# Patient Record
Sex: MALE | Race: WHITE | Employment: FULL TIME | URBAN - METROPOLITAN AREA
[De-identification: names, ages, dates, MRNs, and addresses within clinical notes are randomized per-mention and may not be internally consistent; named-entity substitution may affect disease eponyms.]

---

## 2017-02-17 ENCOUNTER — HOSPITAL ENCOUNTER (OUTPATIENT)
Dept: WOUND CARE | Facility: CLINIC | Age: 52
Discharge: HOME | End: 2017-02-17
Attending: PODIATRIST | Admitting: PODIATRIST
Payer: COMMERCIAL

## 2017-02-17 DIAGNOSIS — L89.93 PRESSURE ULCER, STAGE III (H): Primary | ICD-10-CM

## 2017-02-17 PROCEDURE — 11042 DBRDMT SUBQ TIS 1ST 20SQCM/<: CPT | Performed by: PODIATRIST

## 2017-02-17 PROCEDURE — 27211158 ZZH IODOSORB GEL, 40 GM

## 2017-02-17 PROCEDURE — 11042 DBRDMT SUBQ TIS 1ST 20SQCM/<: CPT

## 2017-02-17 NOTE — PROGRESS NOTES
[facility]      DATE OF VISIT:  2017.        SUBJECTIVE:  Mr. Ry Ybarra is a 51-year-old diabetic male whom we are seeing for chronic right heel ulceration.  Back in August, he underwent a biopsy and bone debridement with flap closure of this chronic wound.  This has reopened up.  He notes that he has been in his regular shoes for the last 3 months and has not been offloading it.  Notes his blood sugar today was 159 and thinks his last A1c was 7.3.  Denies fever, nausea, vomiting, shortness of breath.  The patient continues to smoke.      OBJECTIVE:   VITAL SIGNS:  Blood pressure is 167/96, respirations are 18, pulse is 98 and temperature is 97.2.        After debridement of the lateral right heel wound, it measures 1.0 x 1.6 x 1.5 cm in depth.  It does not probe quite to bone.  No surrounding erythema, purulent drainage or malodor is noted.      ASSESSMENT:  A 51-year-old diabetic with neuropathy, tobacco abuse, chronic right heel ulceration with fat layer exposed.      PROCEDURE:  The wound was debrided down to and including subcutaneous tissue using a #15 blade.  All nonviable tissue was excised to decrease bioburden.  The patient tolerated the procedure well.     Bleeding was controlled with silver nitrate.        PLAN:  Discussed using his Darco offloading shoe to help decrease pressure to this area.  We will do Iodosorb gel and Mepilex dressings.  He will follow up in 4-5 weeks.  He will call with further questions or concerns.         KASHIF SCHUMACHER DPM             D: 2017 08:24   T: 2017 09:41   MT: ALIREZA      Name:     RY YBARRA   MRN:      [id number]        Account:      [id number]   :      1965           Visit Date:   2017      Document: [id number]

## 2017-03-24 ENCOUNTER — HOSPITAL ENCOUNTER (OUTPATIENT)
Dept: WOUND CARE | Facility: CLINIC | Age: 52
Discharge: HOME | End: 2017-03-24
Attending: PODIATRIST | Admitting: PODIATRIST
Payer: COMMERCIAL

## 2017-03-24 DIAGNOSIS — L97.519 DIABETIC ULCER OF RIGHT FOOT ASSOCIATED WITH TYPE 2 DIABETES MELLITUS (H): ICD-10-CM

## 2017-03-24 DIAGNOSIS — E11.42 DIABETIC POLYNEUROPATHY ASSOCIATED WITH TYPE 2 DIABETES MELLITUS (H): Primary | ICD-10-CM

## 2017-03-24 DIAGNOSIS — E11.621 DIABETIC ULCER OF RIGHT FOOT ASSOCIATED WITH TYPE 2 DIABETES MELLITUS (H): ICD-10-CM

## 2017-03-24 DIAGNOSIS — L97.412 ULCER OF RIGHT HEEL, WITH FAT LAYER EXPOSED (H): ICD-10-CM

## 2017-03-24 DIAGNOSIS — T81.31XD SURGICAL WOUND DEHISCENCE, SUBSEQUENT ENCOUNTER: ICD-10-CM

## 2017-03-24 DIAGNOSIS — L89.93 PRESSURE ULCER, STAGE III (H): ICD-10-CM

## 2017-03-24 PROCEDURE — 27211158 ZZH IODOSORB GEL, 40 GM

## 2017-03-24 PROCEDURE — 11042 DBRDMT SUBQ TIS 1ST 20SQCM/<: CPT | Performed by: PODIATRIST

## 2017-03-24 PROCEDURE — 11042 DBRDMT SUBQ TIS 1ST 20SQCM/<: CPT

## 2017-03-24 NOTE — PROGRESS NOTES
[facility]      DATE OF VISIT:  2017.      SUBJECTIVE:  Mr. Ry Ybarra is a 51-year-old diabetic male with neuropathy we have been seeing for right heel ulceration.  We have tried multiple different things on his foot.  He notes that he has been compliant in his offloading shoe.  He has been using Iodosorb for dressings.  Denies fever, nausea, vomiting, shortness of breath.      OBJECTIVE:    VITAL SIGNS:  Blood pressure is 156/108, respirations are 16, pulse is 96, temperature is 97.   EXTREMITIES:  The right heel ulceration after debridement measures 0.6 x 0.5 x 1.5 cm in depth.  It does not probe to bone.  No surrounding erythema, purulent drainage or malodor noted.      ASSESSMENT:  Diabetic with neuropathy, chronic right heel ulceration.      PLAN:  The wound was debrided down to and including subcutaneous tissue using a #15 blade.  All nonviable tissue was excised to decrease bioburden.  The patient tolerated the procedure well.      PLAN:  At this time, he will continue with the Iodosorb dressing changes.  We will see if Dr. Pagan may be able to do a sural flap to the area to see if that would close.  We discussed possibly going in and doing a partial calcanectomy with flap closure; however, my concern is that this may not heal as this has been a chronic wound and the patient's last A1c was 7.6. Will also order a crow walker to try to further offload the heel.         KASHIF SCHUMACHER DPM             D: 2017 08:28   T: 2017 08:59   MT: ALIREZA      Name:     RY YBARRA   MRN:      [id number]        Account:      [id number]   :      1965           Visit Date:   2017      Document: [id number]

## 2017-04-17 ENCOUNTER — HOSPITAL ENCOUNTER (OUTPATIENT)
Dept: WOUND CARE | Facility: CLINIC | Age: 52
Discharge: HOME | End: 2017-04-17
Attending: SURGERY | Admitting: SURGERY
Payer: COMMERCIAL

## 2017-04-17 DIAGNOSIS — L89.94 STAGE 4 PRESSURE ULCER (H): Primary | ICD-10-CM

## 2017-04-17 DIAGNOSIS — L89.613 STAGE III PRESSURE ULCER OF RIGHT HEEL (H): ICD-10-CM

## 2017-04-17 PROCEDURE — 11042 DBRDMT SUBQ TIS 1ST 20SQCM/<: CPT

## 2017-04-17 PROCEDURE — A6209 FOAM DRSG <=16 SQ IN W/O BDR: HCPCS

## 2017-04-17 PROCEDURE — 27211158 ZZH IODOSORB GEL, 40 GM

## 2017-04-17 PROCEDURE — 99202 OFFICE O/P NEW SF 15 MIN: CPT | Mod: 25 | Performed by: SURGERY

## 2017-04-17 PROCEDURE — 99211 OFF/OP EST MAY X REQ PHY/QHP: CPT | Mod: 25

## 2017-04-17 PROCEDURE — 11042 DBRDMT SUBQ TIS 1ST 20SQCM/<: CPT | Performed by: SURGERY

## 2017-04-17 NOTE — PROGRESS NOTES
[facility]        REQUESTING PHYSICIAN:  None stated.       DATE OF SERVICE:  04/17/2017        Mr. Ry Horner returns to see me today at [facility].  I saw him approximately a year ago for a right plantar heel ulceration.  He has a history of excellent arterial blood supply but diabetic peripheral neuropathy.  In 07/2016, he underwent a rotational flap by Dr. Cevallos and her associates of Podiatric Surgery.  The flap did not completely heal and he has had an ongoing ulcer on the plantar aspect of his foot.  He has undergone frequent debridements and he is using Iodosorb but the wound has failed to heal.  He is offloading with a Darco shoe.  He has had no obvious signs of infection but has not had x-rays since his last surgical procedure in the summer.      He did hit his right great toe approximately 2 weeks ago while at home.  The nail fell off.  He had a callus on the tip of the great toe, though no open ulcer.  He is applying lotion to this area.  This is the site exposed by his Darco shoe making trauma more likely.      The patient is not aware of his most recent hemoglobin A1c.  He reports that his blood sugars have been under good control.      PHYSICAL EXAMINATION:   GENERAL:  On exam today, he is alert and appropriate.   VITAL SIGNS:  Temperature 96.9, blood pressure 163/109 with a recheck of 159/106.  Pulse 93, regular.  Nonfasting a.m. blood sugar 219.   CHEST:  Clear to auscultation.   CARDIOVASCULAR:  Regular rate.   EXTREMITIES:  On the right leg, there is no edema.  Decreased sensation is noted in the foot.  A +3 dorsalis pedis and posterior tibial pulse is easily palpable with no evidence of arterial insufficiency.  The heel ulcer measures 0.4 x 0.8 cm with a depth of 1.4 cm.  The tissue is very firm.  There is some mild maceration at the edges.  There is no obvious erythema.  On the great toe he has a callus over the tip and the nail has fallen off.  No open ulcer.       PROCEDURE:  Timeout was called and sites were identified on the right heel.  Using a #15 blade scalpel, a full thickness-subcutaneous excisional debridement was performed.  I excised the tissue circumferentially approximately 0.3 cm down to the base to get rid of the macerated tissue to healthy tissue.  There is no undermining.  The base is solid with no obvious exposed bone.  Adequate bleeding was noted in the skin edges.  He tolerated this with the topical lidocaine, but has a significant peripheral neuropathy.  Iodosorb was placed in this wound.  I also trimmed the callus down on the tip of the great toe with no ulcer noted at the base.      IMPRESSION:  Nonhealing right heel ulcer with failed rotational flap.  No evidence of diabetic peripheral artery disease with excellent distal pulses.  I do have some concerns that he may have ongoing osteomyelitis and will start with a plain x-ray to rule this out though an MRI may be necessary.      We discussed about potential coverage.  One would be operative debridement with pulse lavage and primary closure with heavy nylon sutures and complete offloading for a minimum of 3-4 weeks to see if this would heal.  A free flap would be the other option using for example a radial cutaneous free flap from his wrist that could be performed by Dr. Coronado of Plastic Surgery who has done this on several other patients over the years.  This will obviously require a significant time commitment of offloading.  This has been discussed with the patient.      It is also very important that he has good diabetic control with hemoglobin A1c's less than 7 and lower blood sugars to aid in healing.         ÁNGEL DOMINGUEZ MD             D: 2017 08:56   T: 2017 10:24   MT: william      Name:     YNES YBARRA   MRN:      [id number]        Account:      [id number]   :      1965           Visit Date:   2017      Document: [id number]

## 2017-04-25 ENCOUNTER — HOSPITAL ENCOUNTER (OUTPATIENT)
Dept: GENERAL RADIOLOGY | Facility: CLINIC | Age: 52
Discharge: HOME | End: 2017-04-25
Attending: SURGERY | Admitting: SURGERY
Payer: COMMERCIAL

## 2017-04-25 DIAGNOSIS — L89.94 STAGE 4 PRESSURE ULCER (H): ICD-10-CM

## 2017-04-25 PROCEDURE — 73650 X-RAY EXAM OF HEEL: CPT | Mod: RT

## 2017-04-28 ENCOUNTER — HOSPITAL ENCOUNTER (OUTPATIENT)
Dept: WOUND CARE | Facility: CLINIC | Age: 52
Discharge: HOME | End: 2017-04-28
Attending: PODIATRIST | Admitting: PODIATRIST
Payer: COMMERCIAL

## 2017-04-28 DIAGNOSIS — E11.621 DIABETIC ULCER OF RIGHT FOOT ASSOCIATED WITH TYPE 2 DIABETES MELLITUS (H): Primary | ICD-10-CM

## 2017-04-28 DIAGNOSIS — L89.93 PRESSURE ULCER, STAGE III (H): ICD-10-CM

## 2017-04-28 DIAGNOSIS — L97.519 DIABETIC ULCER OF RIGHT FOOT ASSOCIATED WITH TYPE 2 DIABETES MELLITUS (H): Primary | ICD-10-CM

## 2017-04-28 PROCEDURE — 27211167 ZZH VASHE WOUND CLEANS SOL PER 250 ML

## 2017-04-28 PROCEDURE — A6022 COLLAGEN DRSG>16<=48 SQ IN: HCPCS

## 2017-04-28 PROCEDURE — 11042 DBRDMT SUBQ TIS 1ST 20SQCM/<: CPT

## 2017-04-28 PROCEDURE — 11042 DBRDMT SUBQ TIS 1ST 20SQCM/<: CPT | Performed by: PODIATRIST

## 2017-04-28 NOTE — PROGRESS NOTES
[facility]        REQUESTING PHYSICIAN:  None stated.       DATE OF SERVICE:  04/28/2017        SUBJECTIVE:  Mr. Ry Horner is a 51-year-old diabetic male we are seeing for a chronic right heel ulceration.  He notes a new wound to the lateral heel today.  Presents to clinic in his regular shoes.  Notes that he has not been wearing his Darco shoe as it broke, has not seen his primary care doctor in months and has been out of his metformin for quite a while and notes his blood sugars have been in the 250s.  Blood pressure was also 170/113 today.  He notes that he has been trying to find a new primary care doctor since he has moved to [place].  He just has not done it yet.  Denies fever, nausea, vomiting or shortness breath.  He had previously met with Dr. Pagan who does not do sural flaps and he would like to know what else can be done to get the wound healed.      OBJECTIVE:     VITAL SIGNS:  Blood pressure is 170/113, respirations are 16, pulse is 92 and temperature is 97.5.   EXTREMITIES:  The right heel ulceration after debridement measures 1.0 x 1.5 x 1.5 cm in depth.  The new right lateral heel ulceration just proximal to this measures 1.1 x 0.7 x 0.2 cm after debridement.  No surrounding erythema, purulent drainage or malodor noted.      ASSESSMENT:  A 51-year-old uncontrolled, noncompliant diabetic with right heel ulcer, fat layer exposed, and new right heel ulcer with fat layer exposed.      PROCEDURE:  Wounds were debrided down to and including subcutaneous tissue using a #15 blade.  All nonviable tissue was excised to decrease bioburden.  The patient tolerated the procedure well.        PLAN:  Discussed with the patient that if he is not offloading and his blood sugars as well as blood pressures are not under control, this will not heal.  He asked about just sewing this up and we discussed that that is not really an option.  We did give him the information for a plastic surgeon who might be able  to flap it, however, if he does not, unfortunately get his blood sugars under control and continue to offload, that this may not heal and lead to leg amputation.  We did get him an order for a new offloading boot, one that went up to the knee to try to help further decrease pressure to that area.  We will have him follow up in 5 weeks.  He was told to call for further questions or concerns.  Strongly encouraged him to get a primary care reestablish right away.         KASHIF SCHUMACHER DPM             D: 2017 08:50   T: 2017 09:15   MT: william      Name:     YNES YBARRA   MRN:      [id number]        Account:      [id number]   :      1965           Visit Date:   2017      Document: [id number]

## 2017-04-28 NOTE — IP AVS SNAPSHOT
[facility] [place]    [address] [facility] [address] [place] [id number]    Phone:  [phone]                                       After Visit Summary   4/28/2017    Ry Horner    MRN: [id number]           After Visit Summary Signature Page     I have received my discharge instructions, and my questions have been answered. I have discussed any challenges I see with this plan with the nurse or doctor.    ..........................................................................................................................................  Patient/Patient Representative Signature      ..........................................................................................................................................  Patient Representative Print Name and Relationship to Patient    ..................................................               ................................................  Date                                            Time    ..........................................................................................................................................  Reviewed by Signature/Title    ...................................................              ..............................................  Date                                                            Time

## 2017-04-28 NOTE — IP AVS SNAPSHOT
"                  MRN:[id number]                      After Visit Summary   4/28/2017    Ry Horner    MRN: [id number]           Visit Information        Provider Department      4/28/2017  8:00 AM Milena Cevallos DPM, Podiatry/Foot and Ankle Surgery [facility] [place]           Review of your medicines      UNREVIEWED medicines. Ask your doctor about these medicines        Dose / Directions    acetaminophen 325 MG tablet   Commonly known as:  TYLENOL   Used for:  Ulcer of right heel, with necrosis of bone (H)        Dose:  650 mg   Take 2 tablets (650 mg) by mouth every 4 hours as needed for mild pain   Quantity:  100 tablet   Refills:  0       CIALIS 20 MG tablet   Used for:  Impotence of organic origin   Generic drug:  tadalafil        USE AS DIRECTED   Quantity:  8   Refills:  1 YEAR       glimepiride 2 MG tablet   Commonly known as:  AMARYL        Dose:  4 mg   Take 4 mg by mouth daily   Refills:  0       LISINOPRIL PO        Dose:  20 mg   Take 20 mg by mouth daily   Refills:  0         START taking        Dose / Directions    order for DME   Used for:  Diabetic ulcer of right foot associated with type 2 diabetes mellitus (H), Pressure ulcer, stage III (H)        [facility] Fax [phone]  Primary Dressing Karen   Qty 10 Secondary Dressing Mepilex Foam 4x4 Qty 10 Secondary Dressing 2\" Medipore Tape Qty 1 Length of Need: 1 month Frequency of dressing change: daily If Patient has balance on account please call him. Thanks   Quantity:  30 days   Refills:  0            Where to get your medicines      Some of these will need a paper prescription and others can be bought over the counter. Ask your nurse if you have questions.     Bring a paper prescription for each of these medications     order for DME               Prescriptions were sent or printed at these locations (1 Prescription)                   Other Prescriptions                Printed at Department/Unit printer (1 of " 1)         order for DME                 Protect others around you: Learn how to safely use, store and throw away your medicines at www.disposemymeds.org.         Follow-ups after your visit        Additional Services     FAMILY PRACTICE REFERRAL       Your provider has referred you to: [facility]: [facility] Susana Chaney [phone]   [email or web address]    Please be aware that coverage of these services is subject to the terms and limitations of your health insurance plan.  Call member services at your health plan with any benefit or coverage questions.      Please bring the following with you to your appointment:    (1) Any X-Rays, CTs or MRIs which have been performed.  Contact the facility where they were done to arrange for  prior to your scheduled appointment.    (2) List of current medications   (3) This referral request   (4) Any documents/labs given to you for this referral            ORTHOTICS REFERRAL       **This referral order prints off in the [facility]  (Orthotics & Prosthetics) Central Scheduling Office**    The [facility] Central Scheduling Staff will contact the patient to schedule appointments.     Central Scheduling Contact Information: [phone] ([place])    CAM Boot with removable Pegs or Crow walker     Please be aware that coverage of these services is subject to the terms and limitations of your health insurance plan.  Call member services at your health plan with any benefit or coverage questions.      Please bring the following to your appointment:    >>   Any x-rays, CTs or MRIs which have been performed.  Contact the facility where they were done to arrange for  prior to your scheduled appointment.    >>   List of current medications   >>   This referral request   >>   Any documents/labs given to you for this referral                   Care Instructions         Additional Information About Your Visit        Jolie  "Information     [employer] lets you send messages to your doctor, view your test results, renew your prescriptions, schedule appointments and more. To sign up, go to [email or web address] . Click on \"Log in\" on the left side of the screen, which will take you to the Welcome page. Then click on \"Sign up Now\" on the right side of the page.     You will be asked to enter the access code listed below, as well as some personal information. Please follow the directions to create your username and password.     Your access code is: [email or web address]  Expires: 2017  8:27 AM     Your access code will  in 90 days. If you need help or a new code, please call your [facility] clinic or [phone].        Care EveryWhere ID     This is your Care EveryWhere ID. This could be used by other organizations to access your [facility] medical records  [id number]         Primary Care Provider Office Phone # Fax #    Tony Lesch [phone]      Thank you!     Thank you for choosing [facility] for your care. Our goal is always to provide you with excellent care. Hearing back from our patients is one way we can continue to improve our services. Please take a few minutes to complete the written survey that you may receive in the mail after you visit with us. Thank you!             Medication List: This is a list of all your medications and when to take them. Check marks below indicate your daily home schedule. Keep this list as a reference.      Medications           Morning Afternoon Evening Bedtime As Needed    acetaminophen 325 MG tablet   Commonly known as:  TYLENOL   Take 2 tablets (650 mg) by mouth every 4 hours as needed for mild pain                                CIALIS 20 MG tablet   USE AS DIRECTED   Generic drug:  tadalafil                                glimepiride 2 MG tablet   Commonly known as:  AMARYL   Take 4 mg by mouth daily                                LISINOPRIL PO   Take 20 mg by mouth daily          " "                      order for DME   [facility] Fax [phone]  Primary Dressing Karen   Qty 10 Secondary Dressing Mepilex Foam 4x4 Qty 10 Secondary Dressing 2\" Medipore Tape Qty 1 Length of Need: 1 month Frequency of dressing change: daily If Patient has balance on account please call him. Thanks                                  "

## 2017-06-02 ENCOUNTER — OFFICE VISIT (OUTPATIENT)
Dept: FAMILY MEDICINE | Facility: CLINIC | Age: 52
End: 2017-06-02
Payer: COMMERCIAL

## 2017-06-02 ENCOUNTER — HOSPITAL ENCOUNTER (OUTPATIENT)
Dept: WOUND CARE | Facility: CLINIC | Age: 52
Discharge: HOME | End: 2017-06-02
Attending: PODIATRIST | Admitting: PODIATRIST
Payer: COMMERCIAL

## 2017-06-02 VITALS
DIASTOLIC BLOOD PRESSURE: 105 MMHG | WEIGHT: 255 LBS | SYSTOLIC BLOOD PRESSURE: 153 MMHG | HEART RATE: 103 BPM | RESPIRATION RATE: 20 BRPM | OXYGEN SATURATION: 99 % | BODY MASS INDEX: 34.54 KG/M2 | HEIGHT: 72 IN | TEMPERATURE: 97.3 F

## 2017-06-02 DIAGNOSIS — Z12.5 SCREENING FOR PROSTATE CANCER: ICD-10-CM

## 2017-06-02 DIAGNOSIS — Z12.11 SCREEN FOR COLON CANCER: ICD-10-CM

## 2017-06-02 DIAGNOSIS — E11.42 TYPE 2 DIABETES MELLITUS WITH DIABETIC POLYNEUROPATHY, WITHOUT LONG-TERM CURRENT USE OF INSULIN (H): Primary | ICD-10-CM

## 2017-06-02 DIAGNOSIS — N52.9 IMPOTENCE OF ORGANIC ORIGIN: ICD-10-CM

## 2017-06-02 DIAGNOSIS — L97.519 DIABETIC ULCER OF RIGHT FOOT ASSOCIATED WITH TYPE 2 DIABETES MELLITUS (H): ICD-10-CM

## 2017-06-02 DIAGNOSIS — E78.5 HYPERLIPIDEMIA LDL GOAL <100: ICD-10-CM

## 2017-06-02 DIAGNOSIS — E11.621 DIABETIC ULCER OF RIGHT FOOT ASSOCIATED WITH TYPE 2 DIABETES MELLITUS (H): ICD-10-CM

## 2017-06-02 DIAGNOSIS — I10 ESSENTIAL HYPERTENSION, BENIGN: ICD-10-CM

## 2017-06-02 DIAGNOSIS — E66.01 MORBID OBESITY DUE TO EXCESS CALORIES (H): ICD-10-CM

## 2017-06-02 LAB
ANION GAP SERPL CALCULATED.3IONS-SCNC: 10 MMOL/L (ref 3–14)
BUN SERPL-MCNC: 30 MG/DL (ref 7–30)
CALCIUM SERPL-MCNC: 9.5 MG/DL (ref 8.5–10.1)
CHLORIDE SERPL-SCNC: 103 MMOL/L (ref 94–109)
CHOLEST SERPL-MCNC: 223 MG/DL
CO2 SERPL-SCNC: 25 MMOL/L (ref 20–32)
CREAT SERPL-MCNC: 1.08 MG/DL (ref 0.66–1.25)
GFR SERPL CREATININE-BSD FRML MDRD: 72 ML/MIN/1.7M2
GLUCOSE SERPL-MCNC: 229 MG/DL (ref 70–99)
HBA1C MFR BLD: 9.4 % (ref 4.3–6)
HDLC SERPL-MCNC: 32 MG/DL
LDLC SERPL CALC-MCNC: 116 MG/DL
NONHDLC SERPL-MCNC: 191 MG/DL
POTASSIUM SERPL-SCNC: 4.7 MMOL/L (ref 3.4–5.3)
PSA SERPL-ACNC: 1.23 UG/L (ref 0–4)
SODIUM SERPL-SCNC: 138 MMOL/L (ref 133–144)
TRIGL SERPL-MCNC: 376 MG/DL
TSH SERPL DL<=0.05 MIU/L-ACNC: 1.06 MU/L (ref 0.4–4)

## 2017-06-02 PROCEDURE — 11042 DBRDMT SUBQ TIS 1ST 20SQCM/<: CPT

## 2017-06-02 PROCEDURE — 83036 HEMOGLOBIN GLYCOSYLATED A1C: CPT | Performed by: INTERNAL MEDICINE

## 2017-06-02 PROCEDURE — 82043 UR ALBUMIN QUANTITATIVE: CPT | Performed by: INTERNAL MEDICINE

## 2017-06-02 PROCEDURE — 84443 ASSAY THYROID STIM HORMONE: CPT | Performed by: INTERNAL MEDICINE

## 2017-06-02 PROCEDURE — 80048 BASIC METABOLIC PNL TOTAL CA: CPT | Performed by: INTERNAL MEDICINE

## 2017-06-02 PROCEDURE — G0103 PSA SCREENING: HCPCS | Performed by: INTERNAL MEDICINE

## 2017-06-02 PROCEDURE — 80061 LIPID PANEL: CPT | Performed by: INTERNAL MEDICINE

## 2017-06-02 PROCEDURE — 99203 OFFICE O/P NEW LOW 30 MIN: CPT | Performed by: INTERNAL MEDICINE

## 2017-06-02 PROCEDURE — 36415 COLL VENOUS BLD VENIPUNCTURE: CPT | Performed by: INTERNAL MEDICINE

## 2017-06-02 PROCEDURE — 11042 DBRDMT SUBQ TIS 1ST 20SQCM/<: CPT | Performed by: PODIATRIST

## 2017-06-02 RX ORDER — HYDROCHLOROTHIAZIDE 25 MG/1
25 TABLET ORAL DAILY
Qty: 90 TABLET | Refills: 3 | Status: ON HOLD | OUTPATIENT
Start: 2017-06-02 | End: 2017-09-03

## 2017-06-02 RX ORDER — LISINOPRIL 20 MG/1
20 TABLET ORAL DAILY
Qty: 90 TABLET | Refills: 3 | Status: ON HOLD | OUTPATIENT
Start: 2017-06-02 | End: 2017-09-03

## 2017-06-02 RX ORDER — GLIMEPIRIDE 4 MG/1
4 TABLET ORAL DAILY
Qty: 90 TABLET | Refills: 3 | Status: SHIPPED | OUTPATIENT
Start: 2017-06-02 | End: 2018-06-08

## 2017-06-02 NOTE — MR AVS SNAPSHOT
After Visit Summary   6/2/2017    Ry Horner    MRN: [id number]           Patient Information     Date Of Birth          1965        Visit Information        Provider Department      6/2/2017 9:30 AM Kody Wing MD [facility]        Today's Diagnoses     Type 2 diabetes mellitus with diabetic polyneuropathy, without long-term current use of insulin (H)    -  1    Diabetic ulcer of right foot associated with type 2 diabetes mellitus (H)        Morbid obesity due to excess calories (H)        Essential hypertension, benign        Hyperlipidemia LDL goal <100        Impotence of organic origin        Screen for colon cancer        Screening for prostate cancer           Follow-ups after your visit        Additional Services     GASTROENTEROLOGY ADULT REF PROCEDURE ONLY       Last Lab Result: Creatinine (mg/dL)       Date                     Value                 07/27/2016               1.05             ----------  Body mass index is 34.58 kg/(m^2).     Needed:  No  Language:  English    Patient will be contacted to schedule procedure.     Please be aware that coverage of these services is subject to the terms and limitations of your health insurance plan.  Call member services at your health plan with any benefit or coverage questions.  Any procedures must be performed at a [place] facility OR coordinated by your clinic's referral office.    Please bring the following with you to your appointment:    (1) Any X-Rays, CTs or MRIs which have been performed.  Contact the facility where they were done to arrange for  prior to your scheduled appointment.    (2) List of current medications   (3) This referral request   (4) Any documents/labs given to you for this referral            OPHTHALMOLOGY ADULT REFERRAL       Your provider has referred you to: [facility], PATRICIA Chaney [phone]    "[email or web address]    Dr. Johnathan Dozier    Please be aware that coverage of these services is subject to the terms and limitations of your health insurance plan.  Call member services at your health plan with any benefit or coverage questions.      Please bring the following with you to your appointment:    (1) Any X-Rays, CTs or MRIs which have been performed.  Contact the facility where they were done to arrange for  prior to your scheduled appointment.    (2) List of current medications  (3) This referral request   (4) Any documents/labs given to you for this referral                  Follow-up notes from your care team     Return in about 4 weeks (around 6/30/2017) for Routine Visit.      Who to contact     If you have questions or need follow up information about today's clinic visit or your schedule please contact [facility] directly at [phone].  Normal or non-critical lab and imaging results will be communicated to you by Bloominoushart, letter or phone within 4 business days after the clinic has received the results. If you do not hear from us within 7 days, please contact the clinic through Bloominoushart or phone. If you have a critical or abnormal lab result, we will notify you by phone as soon as possible.  Submit refill requests through [employer] or call your pharmacy and they will forward the refill request to us. Please allow 3 business days for your refill to be completed.          Additional Information About Your Visit        [employer] Information     [employer] lets you send messages to your doctor, view your test results, renew your prescriptions, schedule appointments and more. To sign up, go to [email or web address] . Click on \"Log in\" on the left side of the screen, which will take you to the Welcome page. Then click on \"Sign up Now\" on the right side of the page.     You will be asked to enter the access code listed below, as " well as some personal information. Please follow the directions to create your username and password.     Your access code is: [email or web address]  Expires: 2017  8:27 AM     Your access code will  in 90 days. If you need help or a new code, please call your [place] clinic or [phone].        Care EveryWhere ID     This is your Care EveryWhere ID. This could be used by other organizations to access your [place] medical records  [id number]        Your Vitals Were     Pulse Temperature Respirations Height Pulse Oximetry BMI (Body Mass Index)    103 97.3  F (36.3  C) (Oral) 20 6' (1.829 m) 99% 34.58 kg/m2       Blood Pressure from Last 3 Encounters:   17 (!) 153/105   16 160/74   16 126/85    Weight from Last 3 Encounters:   17 255 lb (115.7 kg)   16 253 lb 12 oz (115.1 kg)   16 253 lb 12 oz (115.1 kg)              We Performed the Following     Albumin Random Urine Quantitative     Basic metabolic panel  (Ca, Cl, CO2, Creat, Gluc, K, Na, BUN)     GASTROENTEROLOGY ADULT REF PROCEDURE ONLY     HEMOGLOBIN A1C     Lipid Profile with reflex to direct LDL     OPHTHALMOLOGY ADULT REFERRAL     PSA, screen     TSH          Today's Medication Changes          These changes are accurate as of: 17 10:12 AM.  If you have any questions, ask your nurse or doctor.               Start taking these medicines.        Dose/Directions    ASPIRIN NOT PRESCRIBED   Commonly known as:  INTENTIONAL   Used for:  Type 2 diabetes mellitus with diabetic polyneuropathy, without long-term current use of insulin (H)   Started by:  Kody Wing MD        Please choose reason not prescribed, below   Quantity:  0 each   Refills:  0       hydrochlorothiazide 25 MG tablet   Commonly known as:  HYDRODIURIL   Used for:  Essential hypertension, benign   Started by:  Kody Wing MD        Dose:  25 mg   Take 1 tablet (25 mg) by mouth daily   Quantity:  90 tablet   Refills:  3         These medicines  have changed or have updated prescriptions.        Dose/Directions    glimepiride 4 MG tablet   Commonly known as:  AMARYL   This may have changed:  medication strength   Used for:  Type 2 diabetes mellitus with diabetic polyneuropathy, without long-term current use of insulin (H)   Changed by:  Kody Wing MD        Dose:  4 mg   Take 1 tablet (4 mg) by mouth daily   Quantity:  90 tablet   Refills:  3       lisinopril 20 MG tablet   Commonly known as:  PRINIVIL/ZESTRIL   This may have changed:  medication strength   Used for:  Essential hypertension, benign   Changed by:  Kody Wing MD        Dose:  20 mg   Take 1 tablet (20 mg) by mouth daily   Quantity:  90 tablet   Refills:  3            Where to get your medicines      These medications were sent to [facility] [address] [facility]  [address] [facility] [address]     Phone:  [phone]     glimepiride 4 MG tablet    hydrochlorothiazide 25 MG tablet    lisinopril 20 MG tablet    metFORMIN 1000 MG tablet         Some of these will need a paper prescription and others can be bought over the counter.  Ask your nurse if you have questions.     You don't need a prescription for these medications     ASPIRIN NOT PRESCRIBED                Primary Care Provider Office Phone # Fax #    Tony Lesch [phone]       [facility] [address]  [facility] [place] [address]        Thank you!     Thank you for choosing [facility]  for your care. Our goal is always to provide you with excellent care. Hearing back from our patients is one way we can continue to improve our services. Please take a few minutes to complete the written survey that you may receive in the mail after your visit with us. Thank you!             Your Updated Medication List - Protect others around you: Learn how to safely use, store and throw away your medicines at www.disposemymeds.org.          This list is accurate as of: 6/2/17  "10:12 AM.  Always use your most recent med list.                   Brand Name Dispense Instructions for use    acetaminophen 325 MG tablet    TYLENOL    100 tablet    Take 2 tablets (650 mg) by mouth every 4 hours as needed for mild pain       ASPIRIN NOT PRESCRIBED    INTENTIONAL    0 each    Please choose reason not prescribed, below       CIALIS 20 MG tablet   Generic drug:  tadalafil     8    USE AS DIRECTED       glimepiride 4 MG tablet    AMARYL    90 tablet    Take 1 tablet (4 mg) by mouth daily       hydrochlorothiazide 25 MG tablet    HYDRODIURIL    90 tablet    Take 1 tablet (25 mg) by mouth daily       lisinopril 20 MG tablet    PRINIVIL/ZESTRIL    90 tablet    Take 1 tablet (20 mg) by mouth daily       metFORMIN 1000 MG tablet    GLUCOPHAGE    180 tablet    Take 1 tablet (1,000 mg) by mouth 2 times daily (with meals)       order for DME     30 days    [facility] Fax [phone]  Primary Dressing Karen   Qty 10 Secondary Dressing Mepilex Foam 4x4 Qty 10 Secondary Dressing 2\" Medipore Tape Qty 1 Length of Need: 1 month Frequency of dressing change: daily If Patient has balance on account please call him. Thanks         "

## 2017-06-02 NOTE — LETTER
"[facility]  [address] [facility]  [address]  [place]  [address]  Tel: [phone]    June 5, 2017    Ry SUE Horner  [facility] [facility] [address]        Dear Maribel Schultze,    The following letter pertains to your most recent diagnostic tests:     -Your micro albumin level is elevated. This means you have trace amounts of protein in the urine. It indicates that your kidneys are being affected by diabetes. Keeping blood pressure low is the best treatment in order to keep this stable. People with microalbuminuria should avoid anti-inflamatory agents such as motrin, alleve and ibuprofen as much as possible because excessive use of these medications can be harmful to the kidneys. Anybody that has microalbuminuria should be on lisinopril-as you already are-since these medications are protective for the kidney's in this situation.     -Your prostate specific antigen (PSA) test result returned normal.     -TSH (thyroid stimulating hormone) level is normal which indicates normal circulating thyroid hormone levels.       -Kidney function is normal for you (Creatinine, GFR), Sodium is normal for you, Potassium is normal for you, Calcium is normal for you     -Your total cholesterol is 223 which is above your goal of total cholesterol less than 200.     -Your triglycerides are 376 which are above your goal of triglycerides less than 150.     -Your HDL or \"good cholesterol\" is 32 which is below your goal of HDL cholesterol greater than 40.     -Your LDL cholesterol or \"bad cholesterol\" is 116 which is above your goal of LDL cholesterol less than <100.  Your LDL goal is based on your risk factors for artery disease including your history of diabetes.         -Your hemoglobin A1c which is a diabetes blood test that represents an average of you blood sugars over the last 3 months returned at 9.4.  This is above your goal of hemoglobin A1c less than 7.       Bottom line:  As I discussed with you in my voice message, your " diabetes is NOT well controlled. Options are starting a new oral medication called januvia which is not generic or going on insulin.  Please inform me of what your preference so we can get things started. Your cholesterol is too high as well.         Follow up:  We should get you started on insulin or Januvia depending on your preference.  Check your blood sugars every morning before breakfast.  Then we should see you back in 3-4 weeks for an office visit appointment to review your blood sugar readings after you have been taking it for 3-4 weeks.         Sincerely,       Kody Wing MD          Enclosure: Lab Results                                        Results for orders placed or performed in visit on 06/02/17   HEMOGLOBIN A1C   Result Value Ref Range    Hemoglobin A1C 9.4 (H) 4.3 - 6.0 %   Albumin Random Urine Quantitative   Result Value Ref Range    Creatinine Urine 151 mg/dL    Albumin Urine mg/L 618 mg/L    Albumin Urine mg/g Cr 409.27 (H) 0 - 17 mg/g Cr   TSH   Result Value Ref Range    TSH 1.06 0.40 - 4.00 mU/L   Basic metabolic panel  (Ca, Cl, CO2, Creat, Gluc, K, Na, BUN)   Result Value Ref Range    Sodium 138 133 - 144 mmol/L    Potassium 4.7 3.4 - 5.3 mmol/L    Chloride 103 94 - 109 mmol/L    Carbon Dioxide 25 20 - 32 mmol/L    Anion Gap 10 3 - 14 mmol/L    Glucose 229 (H) 70 - 99 mg/dL    Urea Nitrogen 30 7 - 30 mg/dL    Creatinine 1.08 0.66 - 1.25 mg/dL    GFR Estimate 72 >60 mL/min/1.7m2    GFR Estimate If Black 88 >60 mL/min/1.7m2    Calcium 9.5 8.5 - 10.1 mg/dL   Lipid Profile with reflex to direct LDL   Result Value Ref Range    Cholesterol 223 (H) <200 mg/dL    Triglycerides 376 (H) <150 mg/dL    HDL Cholesterol 32 (L) >39 mg/dL    LDL Cholesterol Calculated 116 (H) <100 mg/dL    Non HDL Cholesterol 191 (H) <130 mg/dL   PSA, screen   Result Value Ref Range    PSA 1.23 0 - 4 ug/L

## 2017-06-02 NOTE — NURSING NOTE
Chief Complaint   Patient presents with     Establish Care     Hypertension     Diabetes       Initial BP (!) 153/105  Pulse 103  Temp 97.3  F (36.3  C) (Oral)  Resp 20  Ht 6' (1.829 m)  Wt 255 lb (115.7 kg)  SpO2 99%  BMI 34.58 kg/m2 Estimated body mass index is 34.58 kg/(m^2) as calculated from the following:    Height as of this encounter: 6' (1.829 m).    Weight as of this encounter: 255 lb (115.7 kg).  Medication Reconciliation: salena Chavez MA

## 2017-06-02 NOTE — PROGRESS NOTES
SUBJECTIVE:                                                    Ry Horner is a 51 year old male who presents to clinic today for the following health issues:      New Patient/Transfer of Care  Diabetes Follow-up    Patient is checking blood sugars: once daily.  Results are as follows:              postprandial after breakfast - 150-200    Diabetic concerns: blood sugar frequently over 200 and other - foot ulcer , weight gain      Symptoms of hypoglycemia (low blood sugar): none     Paresthesias (numbness or burning in feet) or sores: Yes sometimes      Date of last diabetic eye exam: 2 years ago      Hypertension Follow-up      Outpatient blood pressures are not being checked.    Low Salt Diet: low salt       Amount of exercise or physical activity: None    Problems taking medications regularly: No    Medication side effects: none    Diet: regular (no restrictions)      Nice man needs local PCP  Has had several year history of salazar with non healing right heel ulcer  Had Nec Fasciitis in 2015 by chart review  Had vascular surgery evaluation in past with Dr. Pagan    Problem list and histories reviewed & adjusted, as indicated.  Additional history: as documented    Patient Active Problem List   Diagnosis     Diabetes mellitus, type 2 (H)     Essential hypertension, benign     Hyperlipidemia     Impotence of organic origin     Ulcer of right heel (H)     Morbid obesity due to excess calories (H)     Past Surgical History:   Procedure Laterality Date     APPENDECTOMY       APPLY WOUND VAC Right 3/2/2015    Procedure: APPLY WOUND VAC;  Surgeon: Milena Cevallos DPM, Pod;  Location: [facility] OR     BIOPSY BONE FOOT Right 7/15/2016    Procedure: BIOPSY BONE FOOT;  Surgeon: Tim Douglas DPM;  Location: [facility] OR     IRRIGATION AND DEBRIDEMENT FOOT, COMBINED Right 3/2/2015    Procedure: COMBINED IRRIGATION AND DEBRIDEMENT FOOT;  Surgeon: Milena Cevallos DPM, Pod;  Location: [facility] OR     IRRIGATION AND DEBRIDEMENT FOOT,  "COMBINED Right 7/15/2016    Procedure: COMBINED IRRIGATION AND DEBRIDEMENT FOOT;  Surgeon: Tim Douglas DPM;  Location: [facility] OR     IRRIGATION AND DEBRIDEMENT FOOT, COMBINED Right 7/20/2016    Procedure: COMBINED IRRIGATION AND DEBRIDEMENT FOOT;  Surgeon: Tim Douglas DPM;  Location: [facility] OR     ORTHOPEDIC SURGERY         Social History   Substance Use Topics     Smoking status: Former Smoker     Packs/day: 0.50     Years: 20.00     Types: Cigarettes     Smokeless tobacco: Never Used     Alcohol use Yes      Comment: occasional     Family History   Problem Relation Age of Onset     Genetic Disorder Other      Genetic Disorder Other      Psychotic Disorder Mother      DIABETES Father      Lung Cancer Father      Genetic Disorder Maternal Grandmother      Genetic Disorder Maternal Grandfather      Asthma Sister      C.A.D. No family hx of      Hypertension No family hx of      CEREBROVASCULAR DISEASE No family hx of      Breast Cancer No family hx of      Cancer - colorectal No family hx of      Prostate Cancer No family hx of      Alcohol/Drug No family hx of          Current Outpatient Prescriptions   Medication Sig Dispense Refill     ASPIRIN NOT PRESCRIBED (INTENTIONAL) Please choose reason not prescribed, below 0 each 0     hydrochlorothiazide (HYDRODIURIL) 25 MG tablet Take 1 tablet (25 mg) by mouth daily 90 tablet 3     metFORMIN (GLUCOPHAGE) 1000 MG tablet Take 1 tablet (1,000 mg) by mouth 2 times daily (with meals) 180 tablet 3     glimepiride (AMARYL) 4 MG tablet Take 1 tablet (4 mg) by mouth daily 90 tablet 3     lisinopril (PRINIVIL/ZESTRIL) 20 MG tablet Take 1 tablet (20 mg) by mouth daily 90 tablet 3     order for DME [facility] Fax [phone]    Primary Dressing Karen   Qty 10  Secondary Dressing Mepilex Foam 4x4 Qty 10  Secondary Dressing 2\" Medipore Tape Qty 1  Length of Need: 1 month  Frequency of dressing change: daily  If Patient has balance on account please call him. Thanks " 30 days 0     acetaminophen (TYLENOL) 325 MG tablet Take 2 tablets (650 mg) by mouth every 4 hours as needed for mild pain 100 tablet 0     [DISCONTINUED] metFORMIN (GLUCOPHAGE) 1000 MG tablet Take 1,000 mg by mouth 2 times daily (with meals)  0     [DISCONTINUED] LISINOPRIL PO Take 20 mg by mouth daily        [DISCONTINUED] glimepiride (AMARYL) 2 MG tablet Take 4 mg by mouth daily        CIALIS 20 MG OR TABS USE AS DIRECTED (Patient not taking: No sig reported) 8 1 YEAR     Allergies   Allergen Reactions     Asa [Aspirin]      Vomitting       Reviewed and updated as needed this visit by clinical staff       Reviewed and updated as needed this visit by Provider         ROS:  Constitutional, HEENT, cardiovascular, pulmonary, gi and gu systems are negative, except as otherwise noted.    OBJECTIVE:                                                    BP (!) 153/105  Pulse 103  Temp 97.3  F (36.3  C) (Oral)  Resp 20  Ht 6' (1.829 m)  Wt 255 lb (115.7 kg)  SpO2 99%  BMI 34.58 kg/m2  Body mass index is 34.58 kg/(m^2).  GENERAL: healthy, alert and no distress  EYES: Eyes grossly normal to inspection, PERRL and conjunctivae and sclerae normal  HENT: ear canals and TM's normal, nose and mouth without ulcers or lesions  NECK: no adenopathy, no asymmetry, masses, or scars and thyroid normal to palpation  RESP: lungs clear to auscultation - no rales, rhonchi or wheezes  CV: regular rate and rhythm, normal S1 S2, no S3 or S4, no murmur, click or rub, no peripheral edema and peripheral pulses strong  ABDOMEN: soft, nontender, no hepatosplenomegaly, no masses and bowel sounds normal  MS: no gross musculoskeletal defects noted, no edema  SKIN: no suspicious lesions or rashes  NEURO: Normal strength and tone, mentation intact and speech normal  PSYCH: mentation appears normal, affect normal/bright  Diabetic foot exam: (left foot only as right foot in dressing and immobilizer per podiatry it was debrided this AM per patient)   normal DP and PT pulses, no trophic changes or ulcerative lesions and normal sensory exam    Diagnostic Test Results:  Results for orders placed or performed in visit on 06/02/17   HEMOGLOBIN A1C   Result Value Ref Range    Hemoglobin A1C 9.4 (H) 4.3 - 6.0 %        ASSESSMENT/PLAN:                                                            1. Type 2 diabetes mellitus with diabetic polyneuropathy, without long-term current use of insulin (H)    - ASPIRIN NOT PRESCRIBED (INTENTIONAL); Please choose reason not prescribed, below  Dispense: 0 each; Refill: 0  - HEMOGLOBIN A1C  - Albumin Random Urine Quantitative  - TSH  - OPHTHALMOLOGY ADULT REFERRAL  - metFORMIN (GLUCOPHAGE) 1000 MG tablet; Take 1 tablet (1,000 mg) by mouth 2 times daily (with meals)  Dispense: 180 tablet; Refill: 3  - glimepiride (AMARYL) 4 MG tablet; Take 1 tablet (4 mg) by mouth daily  Dispense: 90 tablet; Refill: 3      He needs more glycemic control, probably insulin, but could try januvia or victoza first pending his preferences, offer diabetes education too     2. Diabetic ulcer of right foot associated with type 2 diabetes mellitus (H)  Continue follow up with podiatry     3. Morbid obesity due to excess calories (H)  Counseled on diet and exercise interventions to promote weight loss.  He is limited due to his foot ulcer     4. Essential hypertension, benign  Add hydrochlorothiazide to lisinopril 20mg; return in 4 weeks to recheck; side effects and risks of HYDROCHLOROTHIAZIDE discussed   - Basic metabolic panel  (Ca, Cl, CO2, Creat, Gluc, K, Na, BUN)  - hydrochlorothiazide (HYDRODIURIL) 25 MG tablet; Take 1 tablet (25 mg) by mouth daily  Dispense: 90 tablet; Refill: 3  - lisinopril (PRINIVIL/ZESTRIL) 20 MG tablet; Take 1 tablet (20 mg) by mouth daily  Dispense: 90 tablet; Refill: 3    5. Hyperlipidemia LDL goal <100    - Lipid Profile with reflex to direct LDL    6. Impotence of organic origin  He declined an prescritpion for cialis today      7. Screen for colon cancer    - GASTROENTEROLOGY ADULT REF PROCEDURE ONLY    8. Screening for prostate cancer    - PSA, screen    FUTURE APPOINTMENTS:       - Follow-up visit in 4 wks     Kody Wing MD  [facility]

## 2017-06-03 LAB
CREAT UR-MCNC: 151 MG/DL
MICROALBUMIN UR-MCNC: 618 MG/L
MICROALBUMIN/CREAT UR: 409.27 MG/G CR (ref 0–17)

## 2017-06-03 NOTE — PROGRESS NOTES
WOUND HEALING  INSTITUTE      DATE OF VISIT:  2017      SUBJECTIVE:  Mr. Ynes Ybarra is a 51-year-old diabetic male we are seeing for chronic right foot heel ulceration.  He has been doing Karen dressing changes.  Notes his blood sugar today was 185 and has been bouncing above 200.  He is going to see a new primary care doctor he notes today.  Currently, denies fever, nausea, vomiting, shortness of breath.      OBJECTIVE:   VITAL SIGNS:  Blood pressure is 157/103, temperature is 96.2, respirations are 16 and pulse is 100.   EXTREMITIES:  The patient does have palpable pulses on physical exam; however, ulceration to lateral right heel persists and measures 0.9 x 0.5 x 1.7 cm in depth.  Hyperkeratotic tissue around the wound was removed.  No surrounding erythema, purulent drainage or malodor noted.  He is in his offloading boot today.      ASSESSMENT:  Uncontrolled diabetic with neuropathy, chronic right heel ulceration.      PROCEDURE:  The wound was debrided down to and including subcutaneous tissue using a #15 blade.  All nonviable tissue was excised to decrease bioburden.  The patient tolerated the procedure well.  Bleeding was controlled with silver nitrate.      PLAN:  At this time, we discussed that unfortunately his blood sugars have been high.  He has had a period where he has not been able to take his medicine as he ran out and with lapse in primary care for his diabetes.  This is likely significantly affecting his healing of his wound.  He is at high risk for a below-knee amputation.  We will continue the Karen at this time.  He will follow up in 4-5 weeks.  He was told to call with further questions or concerns.         KASHIF SCHUMACHER DPM             D: 2017 08:44   T: 2017 10:19   MT: PINO      Name:     YNES YBARRA   MRN:      [id number]        Account:      [id number]   :      1965           Visit Date:   2017      Document: [id number]

## 2017-06-05 ENCOUNTER — TELEPHONE (OUTPATIENT)
Dept: FAMILY MEDICINE | Facility: CLINIC | Age: 52
End: 2017-06-05

## 2017-06-05 DIAGNOSIS — E11.42 TYPE 2 DIABETES MELLITUS WITH DIABETIC POLYNEUROPATHY, WITHOUT LONG-TERM CURRENT USE OF INSULIN (H): Primary | ICD-10-CM

## 2017-06-05 DIAGNOSIS — E78.5 HYPERLIPIDEMIA, UNSPECIFIED HYPERLIPIDEMIA TYPE: ICD-10-CM

## 2017-06-05 RX ORDER — ATORVASTATIN CALCIUM 40 MG/1
40 TABLET, FILM COATED ORAL DAILY
Qty: 90 TABLET | Refills: 3 | Status: SHIPPED | OUTPATIENT
Start: 2017-06-05 | End: 2017-08-28

## 2017-06-05 NOTE — TELEPHONE ENCOUNTER
I will send an prescritpion for tradjenta which is similar to januvia to his pharmacy    I will also sent an prescritpion for atorvastatin (Lipitor) for his cholesterol    He should check his sugars in the AM and record them and schedule a follow up visit with me in 3-4 weeks

## 2017-06-05 NOTE — PROGRESS NOTES
"The following letter pertains to your most recent diagnostic tests:    -Your micro albumin level is elevated. This means you have trace amounts of protein in the urine. It indicates that your kidneys are being affected by diabetes. Keeping blood pressure low is the best treatment in order to keep this stable. People with microalbuminuria should avoid anti-inflamatory agents such as motrin, alleve and ibuprofen as much as possible because excessive use of these medications can be harmful to the kidneys. Anybody that has microalbuminuria should be on lisinopril-as you already are-since these medications are protective for the kidney's in this situation.     -Your prostate specific antigen (PSA) test result returned normal.     -TSH (thyroid stimulating hormone) level is normal which indicates normal circulating thyroid hormone levels.      -Kidney function is normal for you (Creatinine, GFR), Sodium is normal for you, Potassium is normal for you, Calcium is normal for you    -Your total cholesterol is 223 which is above your goal of total cholesterol less than 200.    -Your triglycerides are 376 which are above your goal of triglycerides less than 150.    -Your HDL or \"good cholesterol\" is 32 which is below your goal of HDL cholesterol greater than 40.    -Your LDL cholesterol or \"bad cholesterol\" is 116 which is above your goal of LDL cholesterol less than <100.  Your LDL goal is based on your risk factors for artery disease including your history of diabetes.     -Your hemoglobin A1c which is a diabetes blood test that represents an average of you blood sugars over the last 3 months returned at 9.4.  This is above your goal of hemoglobin A1c less than 7.       Bottom line:  As I discussed with you in my voice message, your diabetes is NOT well controlled. Options are starting a new oral medication called januvia which is not generic or going on insulin.  Please inform me of what your preference so we can get things " started. Your cholesterol is too high as well.        Follow up:  We should get you started on insulin or Januvia depending on your preference.  Check your blood sugars every morning before breakfast.  Then we should see you back in 3-4 weeks for an office visit appointment to review your blood sugar readings after you have been taking it for 3-4 weeks.        Sincerely,    Dr. Wing

## 2017-06-06 NOTE — TELEPHONE ENCOUNTER
Left message for pt to return call to Triage at Clinic.    Also need ok if can leave detailed message with callbacks.  Rere Callaway RN

## 2017-06-06 NOTE — TELEPHONE ENCOUNTER
ADvised all of below. PT already scheduled for 7/10/17, advised to keep this appt, bring log book of BG to this appt, and call sooner if any issues/questions with medications.  Rere Callaway RN

## 2017-07-07 ENCOUNTER — HOSPITAL ENCOUNTER (OUTPATIENT)
Dept: WOUND CARE | Facility: CLINIC | Age: 52
End: 2017-07-07
Attending: PODIATRIST
Payer: COMMERCIAL

## 2017-07-07 PROCEDURE — 11042 DBRDMT SUBQ TIS 1ST 20SQCM/<: CPT | Performed by: PODIATRIST

## 2017-07-07 NOTE — PROGRESS NOTES
[facility] [place] Progress Note    Subject:   Patient was seen for follow up on right heel ulcer. Notes he finally has a new primary care doctor. States he wears the boot most of the time. Denies fever, chills, nausa. No pain to foot due to neuropathy.     Objective:  Vitals:BP: 149/104, T: 97.2, P: 100, R: 16    A1C: 9.4 (6/2017)    General:  Patient is alert and orientated.  NAD  Vascular:  DP and PT pulses are palpable.  No varicosities noted  CFT's < 3secs.  Skin temp is normal  Neuro:  Light and gross touch sensation is diminished.   Derm:  Ulcer right plantar lateral heel measures 1.0cm x 1.2cm, x 1.5cm with fat layer exposed. No redness, purulent drainage or signs of infection.   Musculoskeletal:  No foot deformity noted.      Assessment: diabetic with neuropathy, chronic right heel ulcer with fat layer exposed.     Plan:  At this time, will, switch to hydro ferra blue. Will change dressings daily. Continue offloading heel. Follow up in 1 month.     Procedure: After verbal consent, excisional debridement was performed on ulcer.  ronguer was used to debride ulcer down to and including subcutaneous tissue. Bleeding controlled with light pressure.   No drainage noted.  No anesthesia was used due to neuropathy. Dry dressing applied to foot.  Patient tolerated procedure well.      Milena Cevallos DPM, Podiatry/Foot and Ankle Surgery

## 2017-07-10 ENCOUNTER — OFFICE VISIT (OUTPATIENT)
Dept: FAMILY MEDICINE | Facility: CLINIC | Age: 52
End: 2017-07-10
Payer: COMMERCIAL

## 2017-07-10 VITALS
BODY MASS INDEX: 34.4 KG/M2 | TEMPERATURE: 98 F | HEART RATE: 110 BPM | HEIGHT: 72 IN | WEIGHT: 254 LBS | OXYGEN SATURATION: 100 % | DIASTOLIC BLOOD PRESSURE: 78 MMHG | SYSTOLIC BLOOD PRESSURE: 122 MMHG

## 2017-07-10 DIAGNOSIS — I10 ESSENTIAL HYPERTENSION, BENIGN: ICD-10-CM

## 2017-07-10 DIAGNOSIS — E11.42 TYPE 2 DIABETES MELLITUS WITH DIABETIC POLYNEUROPATHY, WITHOUT LONG-TERM CURRENT USE OF INSULIN (H): ICD-10-CM

## 2017-07-10 DIAGNOSIS — E66.01 MORBID OBESITY DUE TO EXCESS CALORIES (H): ICD-10-CM

## 2017-07-10 DIAGNOSIS — E78.5 HYPERLIPIDEMIA, UNSPECIFIED HYPERLIPIDEMIA TYPE: Primary | ICD-10-CM

## 2017-07-10 PROCEDURE — 80048 BASIC METABOLIC PNL TOTAL CA: CPT | Performed by: INTERNAL MEDICINE

## 2017-07-10 PROCEDURE — 99213 OFFICE O/P EST LOW 20 MIN: CPT | Performed by: INTERNAL MEDICINE

## 2017-07-10 PROCEDURE — 36415 COLL VENOUS BLD VENIPUNCTURE: CPT | Performed by: INTERNAL MEDICINE

## 2017-07-10 RX ORDER — PRAVASTATIN SODIUM 40 MG
40 TABLET ORAL DAILY
Qty: 90 TABLET | Refills: 3 | Status: SHIPPED | OUTPATIENT
Start: 2017-07-10 | End: 2017-08-28

## 2017-07-10 NOTE — LETTER
[place] [address] [facility]  [address]  [place]  [address]  Tel: [phone]    July 13, 2017    Ry Horner  [address] [facility] [address]        Dear Mr. Horner,    The following letter pertains to your most recent diagnostic tests:    Your blood tests look OK on the HYDROCHLOROTHIAZIDE.  We expect the Urea Nitrogen and Creatinine to increase a little when you are on a diuretic.  They have not increased dangerously.          Follow up:  Return in 3 months to recheck the A1c and and cholesterol panel on the pravastatin.        Sincerely,    Dr. Wing      Enclosure: Lab Results    Results for orders placed or performed in visit on 07/10/17   Basic metabolic panel  (Ca, Cl, CO2, Creat, Gluc, K, Na, BUN)   Result Value Ref Range    Sodium 137 133 - 144 mmol/L    Potassium 4.4 3.4 - 5.3 mmol/L    Chloride 104 94 - 109 mmol/L    Carbon Dioxide 26 20 - 32 mmol/L    Anion Gap 7 3 - 14 mmol/L    Glucose 125 (H) 70 - 99 mg/dL    Urea Nitrogen 41 (H) 7 - 30 mg/dL    Creatinine 1.44 (H) 0.66 - 1.25 mg/dL    GFR Estimate 52 (L) >60 mL/min/1.7m2    GFR Estimate If Black 63 >60 mL/min/1.7m2    Calcium 9.0 8.5 - 10.1 mg/dL

## 2017-07-10 NOTE — NURSING NOTE
Chief Complaint   Patient presents with     Diabetes     1 month follow up for glucose monitoring.        Initial /90 (BP Location: Right arm, Cuff Size: Adult Large)  Pulse 110  Temp 98  F (36.7  C) (Tympanic)  Ht 6' (1.829 m)  Wt 254 lb (115.2 kg)  SpO2 100%  BMI 34.45 kg/m2 Estimated body mass index is 34.45 kg/(m^2) as calculated from the following:    Height as of this encounter: 6' (1.829 m).    Weight as of this encounter: 254 lb (115.2 kg).  Medication Reconciliation: complete   Cassie Irizarry MA

## 2017-07-10 NOTE — PROGRESS NOTES
SUBJECTIVE:                                                    Ry Horner is a 51 year old male who presents to clinic today for the following health issues:    Diabetes Follow-up    Patient is checking blood sugars: 1-3 times daily.    Blood sugar testing frequency justification: Uncontrolled diabetes  Results are as follows:         AM before breakfast, 200-220, After lunch, 120-180, before dinner, 140-180's.    Diabetic concerns: blood sugar frequently over 200     Symptoms of hypoglycemia (low blood sugar): none     Paresthesias (numbness or burning in feet) or sores: No     Date of last diabetic eye exam: 2 years ago. Working on getting an appointment.       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 His podiatrist has allowed him to increase his activity to the point where he can now write a bicycle. This has encouraged him to become more active. He believes he can get his weight down and blood sugars down by being more active and riding his bicycle more. Adding Trajenta has not made a significant impact in his blood sugars. His blood sugars remain above his goal. He is scheduled a colonoscopy but has not yet seen an eye doctor for an eye exam. He believes he can do a better job with his diet by reducing bread and potatoes. He did not tolerate atorvastatin. He got cramping in his bilateral hands and arm muscles shortly after starting the medication. He stopped the medication several days ago and has noted resolution of those symptoms.    Problem list and histories reviewed & adjusted, as indicated.  Additional history: as documented    Patient Active  Problem List   Diagnosis     Diabetes mellitus, type 2 (H)     Essential hypertension, benign     Hyperlipidemia     Impotence of organic origin     Ulcer of right heel (H)     Morbid obesity due to excess calories (H)     Past Surgical History:   Procedure Laterality Date     APPENDECTOMY       APPLY WOUND VAC Right 3/2/2015    Procedure: APPLY WOUND VAC;  Surgeon: Milena Cevallos DPM, Pod;  Location: [facility] OR     BIOPSY BONE FOOT Right 7/15/2016    Procedure: BIOPSY BONE FOOT;  Surgeon: Tim Douglas DPM;  Location: [facility] OR     IRRIGATION AND DEBRIDEMENT FOOT, COMBINED Right 3/2/2015    Procedure: COMBINED IRRIGATION AND DEBRIDEMENT FOOT;  Surgeon: Milena Cevallos DPM, Pod;  Location: [facility] OR     IRRIGATION AND DEBRIDEMENT FOOT, COMBINED Right 7/15/2016    Procedure: COMBINED IRRIGATION AND DEBRIDEMENT FOOT;  Surgeon: Tim Douglas DPM;  Location: [facility] OR     IRRIGATION AND DEBRIDEMENT FOOT, COMBINED Right 7/20/2016    Procedure: COMBINED IRRIGATION AND DEBRIDEMENT FOOT;  Surgeon: Tim Douglas DPM;  Location: [facility] OR     ORTHOPEDIC SURGERY         Social History   Substance Use Topics     Smoking status: Former Smoker     Packs/day: 0.50     Years: 20.00     Types: Cigarettes     Smokeless tobacco: Never Used     Alcohol use Yes      Comment: occasional     Family History   Problem Relation Age of Onset     Genetic Disorder Other      Genetic Disorder Other      Psychotic Disorder Mother      DIABETES Father      Lung Cancer Father      Genetic Disorder Maternal Grandmother      Genetic Disorder Maternal Grandfather      Asthma Sister      C.A.D. No family hx of      Hypertension No family hx of      CEREBROVASCULAR DISEASE No family hx of      Breast Cancer No family hx of      Cancer - colorectal No family hx of      Prostate Cancer No family hx of      Alcohol/Drug No family hx of          Current Outpatient Prescriptions   Medication Sig Dispense Refill     pravastatin (PRAVACHOL) 40 MG tablet Take 1  "tablet (40 mg) by mouth daily 90 tablet 3     linagliptin (TRADJENTA) 5 MG TABS tablet Take 1 tablet (5 mg) by mouth daily 30 tablet 2     ASPIRIN NOT PRESCRIBED (INTENTIONAL) Please choose reason not prescribed, below 0 each 0     hydrochlorothiazide (HYDRODIURIL) 25 MG tablet Take 1 tablet (25 mg) by mouth daily 90 tablet 3     metFORMIN (GLUCOPHAGE) 1000 MG tablet Take 1 tablet (1,000 mg) by mouth 2 times daily (with meals) 180 tablet 3     glimepiride (AMARYL) 4 MG tablet Take 1 tablet (4 mg) by mouth daily 90 tablet 3     lisinopril (PRINIVIL/ZESTRIL) 20 MG tablet Take 1 tablet (20 mg) by mouth daily 90 tablet 3     order for DME [facility] Fax [phone]    Primary Dressing Karen   Qty 10  Secondary Dressing Mepilex Foam 4x4 Qty 10  Secondary Dressing 2\" Medipore Tape Qty 1  Length of Need: 1 month  Frequency of dressing change: daily  If Patient has balance on account please call him. Thanks 30 days 0     acetaminophen (TYLENOL) 325 MG tablet Take 2 tablets (650 mg) by mouth every 4 hours as needed for mild pain 100 tablet 0     CIALIS 20 MG OR TABS USE AS DIRECTED 8 1 YEAR     atorvastatin (LIPITOR) 40 MG tablet Take 1 tablet (40 mg) by mouth daily (Patient not taking: Reported on 7/10/2017) 90 tablet 3     Allergies   Allergen Reactions     Asa [Aspirin] Nausea and Vomiting       Reviewed and updated as needed this visit by clinical staff  Tobacco  Allergies  Meds       Reviewed and updated as needed this visit by Provider             OBJECTIVE:     /78  Pulse 110  Temp 98  F (36.7  C) (Tympanic)  Ht 6' (1.829 m)  Wt 254 lb (115.2 kg)  SpO2 100%  BMI 34.45 kg/m2  Body mass index is 34.45 kg/(m^2).  Gen.: This is a well-appearing young man in no acute distress        ASSESSMENT/PLAN:             1. Essential hypertension, benign  His blood pressure is now under good control with the addition of hydrochlorothiazide, he needs a basic metabolic panel today, assuming that that looks " stable, he can continue hydrochlorothiazide  - Basic metabolic panel  (Ca, Cl, CO2, Creat, Gluc, K, Na, BUN)    2. Hyperlipidemia, unspecified hyperlipidemia type  He would greatly benefit from statin therapy to prevent heart attacks and strokes and other vascular complications of diabetes. This is discussed with him detail. We reviewed his 10 year atherosclerotic coronary vascular disease risk. After discussion, he is willing to try pravastatin at the dose below. Side effects and risks were discussed, recheck cholesterol in 2 months. Goal LDL less than 100  - pravastatin (PRAVACHOL) 40 MG tablet; Take 1 tablet (40 mg) by mouth daily  Dispense: 90 tablet; Refill: 3    3. Type 2 diabetes mellitus with diabetic polyneuropathy, without long-term current use of insulin (H)  His blood sugars are still not at goal, we discussed the option of going to insulin or Victoza versus drilling down on diet interventions. He is quite confident that he can make an impact in his weight and carbohydrate consumption and therefore improve his glycemic control. We decided to recheck a hemoglobin A1c and a 3 month time interval. He'll schedule an office visit appointment for that purpose.    4. Morbid obesity due to excess calories (H)  See discussion above regarding counseling regarding diet and exercise changes to promote weight loss      FUTURE APPOINTMENTS:       - Follow-up visit in 3 months or sooner if needed    Kody Wing MD  [facility]      The 10-year ASCVD risk score (Renoadolph BAILEY Jr, et al., 2013) is: 11.9%    Values used to calculate the score:      Age: 51 years      Sex: Male      Is Non- : No      Diabetic: Yes      Tobacco smoker: No      Systolic Blood Pressure: 122 mmHg      Is BP treated: Yes      HDL Cholesterol: 32 mg/dL      Total Cholesterol: 223 mg/dL

## 2017-07-10 NOTE — MR AVS SNAPSHOT
After Visit Summary   7/10/2017    Ry Horner    MRN: [id number]           Patient Information     Date Of Birth          1965        Visit Information        Provider Department      7/10/2017 4:00 PM Kody Wing MD [facility]        Today's Diagnoses     Hyperlipidemia, unspecified hyperlipidemia type    -  1    Essential hypertension, benign        Type 2 diabetes mellitus with diabetic polyneuropathy, without long-term current use of insulin (H)        Morbid obesity due to excess calories (H)           Follow-ups after your visit        Your next 10 appointments already scheduled     Jul 24, 2017   Procedure with Delaney Berg MD   [facility] ([facility])    [address]  [facility] [id number] [phone]           [facility] is located at [address] [place]            Aug 04, 2017  8:00 AM CDT   Return Visit with Milena Cevallos DPM, Podiatry/Foot and Ankle Surgery   [facility] [place] ([facility])    [address]  [address]  [facility] [id number] [phone]              Who to contact     If you have questions or need follow up information about today's clinic visit or your schedule please contact [facility] directly at [phone].  Normal or non-critical lab and imaging results will be communicated to you by MyChart, letter or phone within 4 business days after the clinic has received the results. If you do not hear from us within 7 days, please contact the clinic through MyChart or phone. If you have a critical or abnormal lab result, we will notify you by phone as soon as possible.  Submit refill requests through [facility] or call your pharmacy and they will forward the refill request to us. Please allow 3 business days for your refill to be completed.          Additional Information About Your Visit        MyChart Information     [facility]  "lets you send messages to your doctor, view your test results, renew your prescriptions, schedule appointments and more. To sign up, go to [email or web address] . Click on \"Log in\" on the left side of the screen, which will take you to the Welcome page. Then click on \"Sign up Now\" on the right side of the page.     You will be asked to enter the access code listed below, as well as some personal information. Please follow the directions to create your username and password.     Your access code is: [email or web address]  Expires: 2017  8:27 AM     Your access code will  in 90 days. If you need help or a new code, please call your [facility] clinic or [phone].        Care EveryWhere ID     This is your Care EveryWhere ID. This could be used by other organizations to access your [facility] medical records  [id number]        Your Vitals Were     Pulse Temperature Height Pulse Oximetry BMI (Body Mass Index)       110 98  F (36.7  C) (Tympanic) 6' (1.829 m) 100% 34.45 kg/m2        Blood Pressure from Last 3 Encounters:   07/10/17 122/78   17 (!) 153/105   16 160/74    Weight from Last 3 Encounters:   07/10/17 254 lb (115.2 kg)   17 255 lb (115.7 kg)   16 253 lb 12 oz (115.1 kg)              We Performed the Following     Basic metabolic panel  (Ca, Cl, CO2, Creat, Gluc, K, Na, BUN)          Today's Medication Changes          These changes are accurate as of: 7/10/17  4:45 PM.  If you have any questions, ask your nurse or doctor.               Start taking these medicines.        Dose/Directions    pravastatin 40 MG tablet   Commonly known as:  PRAVACHOL   Used for:  Hyperlipidemia, unspecified hyperlipidemia type   Started by:  Kody Wing MD        Dose:  40 mg   Take 1 tablet (40 mg) by mouth daily   Quantity:  90 tablet   Refills:  3            Where to get your medicines      These medications were sent to [facility] [place] - [place], [place] - [address] [place] [address] " [address] [facility] [address]     Phone:  [phone]     pravastatin 40 MG tablet                Primary Care Provider Office Phone # Fax #    Kody Wing [phone]       [facility] [address]  [facility] [address]        Equal Access to Services     BUNNY SALCIDO : Hadii aad ku hadasho Soomaali, waaxda luqadaha, qaybta kaalmada adeegyada, waxay idiin hayaan adeeg kharash la'aan . So [facility] [phone].    ATENCIÓN: Si habla español, tiene a fallon disposición servicios gratuitos de asistencia lingüística. Llame al [phone].    We comply with applicable federal civil rights laws and [place] laws. We do not discriminate on the basis of race, color, national origin, age, disability sex, sexual orientation or gender identity.            Thank you!     Thank you for choosing [facility]  for your care. Our goal is always to provide you with excellent care. Hearing back from our patients is one way we can continue to improve our services. Please take a few minutes to complete the written survey that you may receive in the mail after your visit with us. Thank you!             Your Updated Medication List - Protect others around you: Learn how to safely use, store and throw away your medicines at www.disposemymeds.org.          This list is accurate as of: 7/10/17  4:45 PM.  Always use your most recent med list.                   Brand Name Dispense Instructions for use Diagnosis    acetaminophen 325 MG tablet    TYLENOL    100 tablet    Take 2 tablets (650 mg) by mouth every 4 hours as needed for mild pain    Ulcer of right heel, with necrosis of bone (H)       ASPIRIN NOT PRESCRIBED    INTENTIONAL    0 each    Please choose reason not prescribed, below    Type 2 diabetes mellitus with diabetic polyneuropathy, without long-term current use of insulin (H)       atorvastatin 40 MG tablet    LIPITOR    90 tablet    Take 1 tablet (40 mg) by mouth daily    Hyperlipidemia,  "unspecified hyperlipidemia type       CIALIS 20 MG tablet   Generic drug:  tadalafil     8    USE AS DIRECTED    Impotence of organic origin       glimepiride 4 MG tablet    AMARYL    90 tablet    Take 1 tablet (4 mg) by mouth daily    Type 2 diabetes mellitus with diabetic polyneuropathy, without long-term current use of insulin (H)       hydrochlorothiazide 25 MG tablet    HYDRODIURIL    90 tablet    Take 1 tablet (25 mg) by mouth daily    Essential hypertension, benign       linagliptin 5 MG Tabs tablet    TRADJENTA    30 tablet    Take 1 tablet (5 mg) by mouth daily    Type 2 diabetes mellitus with diabetic polyneuropathy, without long-term current use of insulin (H)       lisinopril 20 MG tablet    PRINIVIL/ZESTRIL    90 tablet    Take 1 tablet (20 mg) by mouth daily    Essential hypertension, benign       metFORMIN 1000 MG tablet    GLUCOPHAGE    180 tablet    Take 1 tablet (1,000 mg) by mouth 2 times daily (with meals)    Type 2 diabetes mellitus with diabetic polyneuropathy, without long-term current use of insulin (H)       order for DME     30 days    [facility] Fax [phone]  Primary Dressing Karen   Qty 10 Secondary Dressing Mepilex Foam 4x4 Qty 10 Secondary Dressing 2\" Medipore Tape Qty 1 Length of Need: 1 month Frequency of dressing change: daily If Patient has balance on account please call him. Thanks    Diabetic ulcer of right foot associated with type 2 diabetes mellitus (H), Pressure ulcer, stage III (H)       pravastatin 40 MG tablet    PRAVACHOL    90 tablet    Take 1 tablet (40 mg) by mouth daily    Hyperlipidemia, unspecified hyperlipidemia type         "

## 2017-07-12 LAB
ANION GAP SERPL CALCULATED.3IONS-SCNC: 7 MMOL/L (ref 3–14)
BUN SERPL-MCNC: 41 MG/DL (ref 7–30)
CALCIUM SERPL-MCNC: 9 MG/DL (ref 8.5–10.1)
CHLORIDE SERPL-SCNC: 104 MMOL/L (ref 94–109)
CO2 SERPL-SCNC: 26 MMOL/L (ref 20–32)
CREAT SERPL-MCNC: 1.44 MG/DL (ref 0.66–1.25)
GFR SERPL CREATININE-BSD FRML MDRD: 52 ML/MIN/1.7M2
GLUCOSE SERPL-MCNC: 125 MG/DL (ref 70–99)
POTASSIUM SERPL-SCNC: 4.4 MMOL/L (ref 3.4–5.3)
SODIUM SERPL-SCNC: 137 MMOL/L (ref 133–144)

## 2017-07-13 NOTE — PROGRESS NOTES
The following letter pertains to your most recent diagnostic tests:    Your blood tests look OK on the HYDROCHLOROTHIAZIDE.  We expect the Urea Nitrogen and Creatinine to increase a little when you are on a diuretic.  They have not increased dangerously.          Follow up:  Return in 3 months to recheck the A1c and and cholesterol panel on the pravastatin.        Sincerely,    Dr. Wing

## 2017-07-24 ENCOUNTER — SURGERY (OUTPATIENT)
Age: 52
End: 2017-07-24

## 2017-07-24 ENCOUNTER — HOSPITAL ENCOUNTER (OUTPATIENT)
Facility: CLINIC | Age: 52
Discharge: HOME | End: 2017-07-24
Attending: COLON & RECTAL SURGERY | Admitting: COLON & RECTAL SURGERY
Payer: COMMERCIAL

## 2017-07-24 VITALS
DIASTOLIC BLOOD PRESSURE: 70 MMHG | HEART RATE: 93 BPM | RESPIRATION RATE: 21 BRPM | OXYGEN SATURATION: 94 % | HEIGHT: 72 IN | SYSTOLIC BLOOD PRESSURE: 95 MMHG | WEIGHT: 254 LBS | BODY MASS INDEX: 34.4 KG/M2

## 2017-07-24 LAB — COLONOSCOPY: NORMAL

## 2017-07-24 PROCEDURE — 88305 TISSUE EXAM BY PATHOLOGIST: CPT | Mod: 26 | Performed by: COLON & RECTAL SURGERY

## 2017-07-24 PROCEDURE — 25000128 H RX IP 250 OP 636: Performed by: COLON & RECTAL SURGERY

## 2017-07-24 PROCEDURE — G0500 MOD SEDAT ENDO SERVICE >5YRS: HCPCS | Performed by: COLON & RECTAL SURGERY

## 2017-07-24 PROCEDURE — 88305 TISSUE EXAM BY PATHOLOGIST: CPT | Performed by: COLON & RECTAL SURGERY

## 2017-07-24 PROCEDURE — 45385 COLONOSCOPY W/LESION REMOVAL: CPT | Performed by: COLON & RECTAL SURGERY

## 2017-07-24 RX ORDER — FENTANYL CITRATE 50 UG/ML
INJECTION, SOLUTION INTRAMUSCULAR; INTRAVENOUS PRN
Status: DISCONTINUED | OUTPATIENT
Start: 2017-07-24 | End: 2017-07-24 | Stop reason: HOSPADM

## 2017-07-24 RX ORDER — ONDANSETRON 2 MG/ML
4 INJECTION INTRAMUSCULAR; INTRAVENOUS
Status: DISCONTINUED | OUTPATIENT
Start: 2017-07-24 | End: 2017-07-24 | Stop reason: HOSPADM

## 2017-07-24 RX ORDER — LIDOCAINE 40 MG/G
CREAM TOPICAL
Status: DISCONTINUED | OUTPATIENT
Start: 2017-07-24 | End: 2017-07-24 | Stop reason: HOSPADM

## 2017-07-24 RX ADMIN — FENTANYL CITRATE 100 MCG: 50 INJECTION, SOLUTION INTRAMUSCULAR; INTRAVENOUS at 07:32

## 2017-07-24 RX ADMIN — MIDAZOLAM HYDROCHLORIDE 2 MG: 1 INJECTION, SOLUTION INTRAMUSCULAR; INTRAVENOUS at 07:32

## 2017-07-24 NOTE — H&P
Colon & Rectal Surgery History and Physical  Pre-Endoscopy Procedure Note    History of Present Illness   I have been asked by Dr. Kody Wing to evaluate this 52 year old male for colorectal cancer screening. He denies any abdominal pain, weight loss, bleeding per rectum, or recent change in bowel habits.    Past Medical History  Diagnosis Date     BENIGN HYPERTENSION 4/4/2007     DIABETES MELLITUS TYPE II-UNCOMPL 4/4/2007     HYPERLIPIDEMIA NEC/NOS 4/4/2007     Tobacco use disorder 4/4/2007       Past Surgical History  Procedure Laterality Date     APPENDECTOMY       APPLY WOUND VAC Right 3/2/2015    Procedure: APPLY WOUND VAC;  Surgeon: Milena Cevallos DPM, Pod;  Location: [facility] OR     BIOPSY BONE FOOT Right 7/15/2016    Procedure: BIOPSY BONE FOOT;  Surgeon: Tim Douglas DPM;  Location: [facility]     IRRIGATION AND DEBRIDEMENT FOOT, COMBINED Right 3/2/2015    Procedure: COMBINED IRRIGATION AND DEBRIDEMENT FOOT;  Surgeon: Milena Cevallos DPM, Pod;  Location: [facility] OR     IRRIGATION AND DEBRIDEMENT FOOT, COMBINED Right 7/15/2016    Procedure: COMBINED IRRIGATION AND DEBRIDEMENT FOOT;  Surgeon: Tim Douglas DPM;  Location: [facility]     IRRIGATION AND DEBRIDEMENT FOOT, COMBINED Right 7/20/2016    Procedure: COMBINED IRRIGATION AND DEBRIDEMENT FOOT;  Surgeon: Tim Douglas DPM;  Location: [facility] OR     ORTHOPEDIC SURGERY          Medications  Medication Sig     pravastatin (PRAVACHOL) 40 MG tablet Take 1 tablet (40 mg) by mouth daily     linagliptin (TRADJENTA) 5 MG TABS tablet Take 1 tablet (5 mg) by mouth daily     atorvastatin (LIPITOR) 40 MG tablet Take 1 tablet (40 mg) by mouth daily      ASPIRIN NOT PRESCRIBED (INTENTIONAL)      hydrochlorothiazide (HYDRODIURIL) 25 MG tablet Take 1 tablet (25 mg) by mouth daily     metFORMIN (GLUCOPHAGE) 1000 MG tablet Take 1 tablet (1,000 mg) by mouth 2 times daily (with meals)     glimepiride (AMARYL) 4 MG tablet Take 1 tablet (4 mg) by mouth daily     lisinopril  "(PRINIVIL/ZESTRIL) 20 MG tablet Take 1 tablet (20 mg) by mouth daily     acetaminophen (TYLENOL) 325 MG tablet Take 2 tablets (650 mg) by mouth every 4 hours as needed for mild pain     CIALIS 20 MG OR TABS USE AS DIRECTED       Allergies  Allergen Reactions     ASA [Aspirin] Nausea and Vomiting        Family History   Family history includes Asthma in his sister; DIABETES in his father; Genetic Disorder in his maternal grandfather, maternal grandmother, and other family members; Lung Cancer in his father; Psychotic Disorder in his mother.     Social History   He reports that he has quit smoking. His smoking use included Cigarettes. He has a 10.00 pack-year smoking history. He has never used smokeless tobacco. He reports that he drinks alcohol. He reports that he does not use illicit drugs.    Review of Systems   Constitutional:  No fever, weight change or fatigue.    Eyes:     No dry eyes or vision changes.   Ears/Nose/Throat/Neck:  No oral ulcers, sore throat or voice change.    Cardiovascular:   No palpitations, syncope, angina or edema.   Respiratory:    No chest pain, excessive sleepiness, shortness of breath or hemoptysis.    Gastrointestinal:   No abdominal pain, nausea, vomiting, diarrhea or heartburn.    Genitourinary:   No dysuria, hematuria, urinary retention or urinary frequency.   Musculoskeletal:  No joint swelling or arthralgias.    Dermatologic:  No skin rash or other skin changes.   Neurologic:    No focal weakness or numbness. No neuropathy.   Psychiatric:    No depression, anxiety, suicidal ideation, or paranoid ideation.   Endocrine:   No cold or heat intolerance, polydipsia, hirsutism, change in libido, or flushing.   Hematology/Lymphatic:  No bleeding or lymphadenopathy.    Allergy/Immunology:  No rhinitis or hives.     Physical Exam   Vitals:  /77, HR 93, RR 14, height 1.829 m (6' 0.01\"), weight 115.2 kg (254 lb), SpO2 99 %.    General:  Alert and oriented to person, place and " time   Airway: Normal oropharyngeal airway and neck mobility   Lungs:  Clear bilaterally   Heart:  Regular rate and rhythm   Abdomen: Soft, NT, ND, no masses   Rectal:  Perianal skin without excoriation, hemorrhoidal disease or anal fissure        Digital rectal examination reveals normal sphincter tone without masses    ASA Grade: II (mild systemic disease)    Impression: Cleared for use of conscious sedation for colorectal cancer screening    Plan: Proceed with colonoscopy     Delaney Berg MD  [facility]  [phone]

## 2017-07-25 LAB — COPATH REPORT: NORMAL

## 2017-08-28 ENCOUNTER — TELEPHONE (OUTPATIENT)
Dept: WOUND CARE | Facility: CLINIC | Age: 52
End: 2017-08-28

## 2017-08-28 ENCOUNTER — APPOINTMENT (OUTPATIENT)
Dept: ULTRASOUND IMAGING | Facility: CLINIC | Age: 52
DRG: 617 | End: 2017-08-28
Attending: HOSPITALIST
Payer: COMMERCIAL

## 2017-08-28 ENCOUNTER — APPOINTMENT (OUTPATIENT)
Dept: GENERAL RADIOLOGY | Facility: CLINIC | Age: 52
DRG: 617 | End: 2017-08-28
Attending: EMERGENCY MEDICINE
Payer: COMMERCIAL

## 2017-08-28 ENCOUNTER — HOSPITAL ENCOUNTER (INPATIENT)
Facility: CLINIC | Age: 52
LOS: 6 days | Discharge: HOME | DRG: 617 | End: 2017-09-03
Attending: EMERGENCY MEDICINE | Admitting: HOSPITALIST
Payer: COMMERCIAL

## 2017-08-28 DIAGNOSIS — M86.9 OSTEOMYELITIS OF RIGHT FOOT, UNSPECIFIED TYPE (H): ICD-10-CM

## 2017-08-28 DIAGNOSIS — I10 ESSENTIAL HYPERTENSION, BENIGN: ICD-10-CM

## 2017-08-28 DIAGNOSIS — D64.9 ANEMIA, UNSPECIFIED TYPE: Primary | ICD-10-CM

## 2017-08-28 PROBLEM — S91.301A OPEN WOUND OF RIGHT FOOT: Status: ACTIVE | Noted: 2017-08-28

## 2017-08-28 LAB
ANION GAP SERPL CALCULATED.3IONS-SCNC: 5 MMOL/L (ref 3–14)
BASOPHILS # BLD AUTO: 0 10E9/L (ref 0–0.2)
BASOPHILS NFR BLD AUTO: 0.1 %
BUN SERPL-MCNC: 27 MG/DL (ref 7–30)
CALCIUM SERPL-MCNC: 8.9 MG/DL (ref 8.5–10.1)
CHLORIDE SERPL-SCNC: 102 MMOL/L (ref 94–109)
CO2 SERPL-SCNC: 27 MMOL/L (ref 20–32)
CREAT SERPL-MCNC: 1.1 MG/DL (ref 0.66–1.25)
CRP SERPL-MCNC: 85.1 MG/L (ref 0–8)
DIFFERENTIAL METHOD BLD: ABNORMAL
EOSINOPHIL # BLD AUTO: 0.1 10E9/L (ref 0–0.7)
EOSINOPHIL NFR BLD AUTO: 0.6 %
ERYTHROCYTE [DISTWIDTH] IN BLOOD BY AUTOMATED COUNT: 11.7 % (ref 10–15)
ERYTHROCYTE [SEDIMENTATION RATE] IN BLOOD BY WESTERGREN METHOD: 105 MM/H (ref 0–20)
FERRITIN SERPL-MCNC: 798 NG/ML (ref 26–388)
FOLATE SERPL-MCNC: 5.7 NG/ML
GFR SERPL CREATININE-BSD FRML MDRD: 71 ML/MIN/1.7M2
GLUCOSE BLDC GLUCOMTR-MCNC: 157 MG/DL (ref 70–99)
GLUCOSE BLDC GLUCOMTR-MCNC: 224 MG/DL (ref 70–99)
GLUCOSE BLDC GLUCOMTR-MCNC: 96 MG/DL (ref 70–99)
GLUCOSE SERPL-MCNC: 187 MG/DL (ref 70–99)
HBA1C MFR BLD: 7.6 % (ref 4.3–6)
HCT VFR BLD AUTO: 28.3 % (ref 40–53)
HGB BLD-MCNC: 9.9 G/DL (ref 13.3–17.7)
IMM GRANULOCYTES # BLD: 0.1 10E9/L (ref 0–0.4)
IMM GRANULOCYTES NFR BLD: 0.5 %
IRON SATN MFR SERPL: 14 % (ref 15–46)
IRON SERPL-MCNC: 27 UG/DL (ref 35–180)
LACTATE BLD-SCNC: 1.1 MMOL/L (ref 0.7–2)
LACTATE BLD-SCNC: 1.5 MMOL/L (ref 0.7–2)
LYMPHOCYTES # BLD AUTO: 1.4 10E9/L (ref 0.8–5.3)
LYMPHOCYTES NFR BLD AUTO: 10.3 %
MCH RBC QN AUTO: 29.7 PG (ref 26.5–33)
MCHC RBC AUTO-ENTMCNC: 35 G/DL (ref 31.5–36.5)
MCV RBC AUTO: 85 FL (ref 78–100)
MONOCYTES # BLD AUTO: 1 10E9/L (ref 0–1.3)
MONOCYTES NFR BLD AUTO: 7.6 %
NEUTROPHILS # BLD AUTO: 10.9 10E9/L (ref 1.6–8.3)
NEUTROPHILS NFR BLD AUTO: 80.9 %
NRBC # BLD AUTO: 0 10*3/UL
NRBC BLD AUTO-RTO: 0 /100
PLATELET # BLD AUTO: 392 10E9/L (ref 150–450)
POTASSIUM SERPL-SCNC: 4.6 MMOL/L (ref 3.4–5.3)
RBC # BLD AUTO: 3.33 10E12/L (ref 4.4–5.9)
SODIUM SERPL-SCNC: 134 MMOL/L (ref 133–144)
TIBC SERPL-MCNC: 192 UG/DL (ref 240–430)
VIT B12 SERPL-MCNC: 644 PG/ML (ref 193–986)
WBC # BLD AUTO: 13.5 10E9/L (ref 4–11)

## 2017-08-28 PROCEDURE — 25000128 H RX IP 250 OP 636: Performed by: HOSPITALIST

## 2017-08-28 PROCEDURE — 87070 CULTURE OTHR SPECIMN AEROBIC: CPT | Performed by: EMERGENCY MEDICINE

## 2017-08-28 PROCEDURE — 86140 C-REACTIVE PROTEIN: CPT | Performed by: EMERGENCY MEDICINE

## 2017-08-28 PROCEDURE — 73630 X-RAY EXAM OF FOOT: CPT | Mod: RT

## 2017-08-28 PROCEDURE — 83036 HEMOGLOBIN GLYCOSYLATED A1C: CPT | Performed by: EMERGENCY MEDICINE

## 2017-08-28 PROCEDURE — 83550 IRON BINDING TEST: CPT | Performed by: EMERGENCY MEDICINE

## 2017-08-28 PROCEDURE — 99221 1ST HOSP IP/OBS SF/LOW 40: CPT | Performed by: PODIATRIST

## 2017-08-28 PROCEDURE — 85025 COMPLETE CBC W/AUTO DIFF WBC: CPT | Performed by: EMERGENCY MEDICINE

## 2017-08-28 PROCEDURE — 36415 COLL VENOUS BLD VENIPUNCTURE: CPT | Performed by: HOSPITALIST

## 2017-08-28 PROCEDURE — 99223 1ST HOSP IP/OBS HIGH 75: CPT | Mod: AI | Performed by: HOSPITALIST

## 2017-08-28 PROCEDURE — 82607 VITAMIN B-12: CPT | Performed by: EMERGENCY MEDICINE

## 2017-08-28 PROCEDURE — 87077 CULTURE AEROBIC IDENTIFY: CPT | Performed by: EMERGENCY MEDICINE

## 2017-08-28 PROCEDURE — 25000128 H RX IP 250 OP 636: Performed by: EMERGENCY MEDICINE

## 2017-08-28 PROCEDURE — 36415 COLL VENOUS BLD VENIPUNCTURE: CPT

## 2017-08-28 PROCEDURE — 96365 THER/PROPH/DIAG IV INF INIT: CPT

## 2017-08-28 PROCEDURE — 82728 ASSAY OF FERRITIN: CPT | Performed by: EMERGENCY MEDICINE

## 2017-08-28 PROCEDURE — 00000146 ZZHCL STATISTIC GLUCOSE BY METER IP

## 2017-08-28 PROCEDURE — 25000132 ZZH RX MED GY IP 250 OP 250 PS 637: Performed by: HOSPITALIST

## 2017-08-28 PROCEDURE — 85652 RBC SED RATE AUTOMATED: CPT | Performed by: EMERGENCY MEDICINE

## 2017-08-28 PROCEDURE — 83605 ASSAY OF LACTIC ACID: CPT | Performed by: HOSPITALIST

## 2017-08-28 PROCEDURE — 87186 SC STD MICRODIL/AGAR DIL: CPT | Performed by: EMERGENCY MEDICINE

## 2017-08-28 PROCEDURE — 96375 TX/PRO/DX INJ NEW DRUG ADDON: CPT

## 2017-08-28 PROCEDURE — 80048 BASIC METABOLIC PNL TOTAL CA: CPT | Performed by: EMERGENCY MEDICINE

## 2017-08-28 PROCEDURE — 83540 ASSAY OF IRON: CPT | Performed by: EMERGENCY MEDICINE

## 2017-08-28 PROCEDURE — 93971 EXTREMITY STUDY: CPT | Mod: RT

## 2017-08-28 PROCEDURE — 12000000 ZZH R&B MED SURG/OB

## 2017-08-28 PROCEDURE — 93922 UPR/L XTREMITY ART 2 LEVELS: CPT

## 2017-08-28 PROCEDURE — 87147 CULTURE TYPE IMMUNOLOGIC: CPT | Performed by: EMERGENCY MEDICINE

## 2017-08-28 PROCEDURE — 99285 EMERGENCY DEPT VISIT HI MDM: CPT | Mod: 25

## 2017-08-28 PROCEDURE — 82746 ASSAY OF FOLIC ACID SERUM: CPT | Performed by: EMERGENCY MEDICINE

## 2017-08-28 PROCEDURE — 87040 BLOOD CULTURE FOR BACTERIA: CPT | Performed by: EMERGENCY MEDICINE

## 2017-08-28 RX ORDER — LISINOPRIL 20 MG/1
20 TABLET ORAL DAILY
Status: DISCONTINUED | OUTPATIENT
Start: 2017-08-28 | End: 2017-08-28

## 2017-08-28 RX ORDER — HYDROMORPHONE HYDROCHLORIDE 1 MG/ML
.3-.5 INJECTION, SOLUTION INTRAMUSCULAR; INTRAVENOUS; SUBCUTANEOUS
Status: DISCONTINUED | OUTPATIENT
Start: 2017-08-28 | End: 2017-09-03 | Stop reason: HOSPADM

## 2017-08-28 RX ORDER — BISACODYL 10 MG
10 SUPPOSITORY, RECTAL RECTAL DAILY PRN
Status: DISCONTINUED | OUTPATIENT
Start: 2017-08-28 | End: 2017-09-03 | Stop reason: HOSPADM

## 2017-08-28 RX ORDER — ACETAMINOPHEN 325 MG/1
650 TABLET ORAL EVERY 4 HOURS PRN
Status: DISCONTINUED | OUTPATIENT
Start: 2017-08-28 | End: 2017-09-03 | Stop reason: HOSPADM

## 2017-08-28 RX ORDER — HYDRALAZINE HYDROCHLORIDE 20 MG/ML
10 INJECTION INTRAMUSCULAR; INTRAVENOUS EVERY 4 HOURS PRN
Status: DISCONTINUED | OUTPATIENT
Start: 2017-08-28 | End: 2017-09-03 | Stop reason: HOSPADM

## 2017-08-28 RX ORDER — CEFTRIAXONE 1 G/1
1 INJECTION, POWDER, FOR SOLUTION INTRAMUSCULAR; INTRAVENOUS EVERY 24 HOURS
Status: DISCONTINUED | OUTPATIENT
Start: 2017-08-29 | End: 2017-09-02

## 2017-08-28 RX ORDER — PROCHLORPERAZINE MALEATE 5 MG
5-10 TABLET ORAL EVERY 6 HOURS PRN
Status: DISCONTINUED | OUTPATIENT
Start: 2017-08-28 | End: 2017-09-03 | Stop reason: HOSPADM

## 2017-08-28 RX ORDER — HYDROCHLOROTHIAZIDE 25 MG/1
25 TABLET ORAL DAILY
Status: DISCONTINUED | OUTPATIENT
Start: 2017-08-28 | End: 2017-08-28

## 2017-08-28 RX ORDER — PROCHLORPERAZINE 25 MG
25 SUPPOSITORY, RECTAL RECTAL EVERY 12 HOURS PRN
Status: DISCONTINUED | OUTPATIENT
Start: 2017-08-28 | End: 2017-09-03 | Stop reason: HOSPADM

## 2017-08-28 RX ORDER — HYDROCODONE BITARTRATE AND ACETAMINOPHEN 5; 325 MG/1; MG/1
1-2 TABLET ORAL EVERY 4 HOURS PRN
Status: DISCONTINUED | OUTPATIENT
Start: 2017-08-28 | End: 2017-09-01

## 2017-08-28 RX ORDER — AMOXICILLIN 250 MG
1-2 CAPSULE ORAL 2 TIMES DAILY PRN
Status: DISCONTINUED | OUTPATIENT
Start: 2017-08-28 | End: 2017-09-03 | Stop reason: HOSPADM

## 2017-08-28 RX ORDER — NICOTINE POLACRILEX 4 MG
15-30 LOZENGE BUCCAL
Status: DISCONTINUED | OUTPATIENT
Start: 2017-08-28 | End: 2017-09-03 | Stop reason: HOSPADM

## 2017-08-28 RX ORDER — ONDANSETRON 4 MG/1
4 TABLET, ORALLY DISINTEGRATING ORAL EVERY 6 HOURS PRN
Status: DISCONTINUED | OUTPATIENT
Start: 2017-08-28 | End: 2017-09-03 | Stop reason: HOSPADM

## 2017-08-28 RX ORDER — GLIMEPIRIDE 4 MG/1
4 TABLET ORAL
Status: DISCONTINUED | OUTPATIENT
Start: 2017-08-29 | End: 2017-09-03 | Stop reason: HOSPADM

## 2017-08-28 RX ORDER — ONDANSETRON 2 MG/ML
4 INJECTION INTRAMUSCULAR; INTRAVENOUS EVERY 6 HOURS PRN
Status: DISCONTINUED | OUTPATIENT
Start: 2017-08-28 | End: 2017-09-03 | Stop reason: HOSPADM

## 2017-08-28 RX ORDER — DEXTROSE MONOHYDRATE 25 G/50ML
25-50 INJECTION, SOLUTION INTRAVENOUS
Status: DISCONTINUED | OUTPATIENT
Start: 2017-08-28 | End: 2017-09-03 | Stop reason: HOSPADM

## 2017-08-28 RX ORDER — CEFTRIAXONE 2 G/1
2 INJECTION, POWDER, FOR SOLUTION INTRAMUSCULAR; INTRAVENOUS ONCE
Status: COMPLETED | OUTPATIENT
Start: 2017-08-28 | End: 2017-08-28

## 2017-08-28 RX ORDER — SODIUM CHLORIDE 9 MG/ML
INJECTION, SOLUTION INTRAVENOUS CONTINUOUS
Status: DISCONTINUED | OUTPATIENT
Start: 2017-08-28 | End: 2017-08-29

## 2017-08-28 RX ORDER — NALOXONE HYDROCHLORIDE 0.4 MG/ML
.1-.4 INJECTION, SOLUTION INTRAMUSCULAR; INTRAVENOUS; SUBCUTANEOUS
Status: DISCONTINUED | OUTPATIENT
Start: 2017-08-28 | End: 2017-09-01

## 2017-08-28 RX ADMIN — VANCOMYCIN HYDROCHLORIDE 2000 MG: 1 INJECTION, POWDER, LYOPHILIZED, FOR SOLUTION INTRAVENOUS at 12:33

## 2017-08-28 RX ADMIN — CEFTRIAXONE 2 G: 2 INJECTION, POWDER, FOR SOLUTION INTRAMUSCULAR; INTRAVENOUS at 11:37

## 2017-08-28 RX ADMIN — LISINOPRIL 20 MG: 20 TABLET ORAL at 13:54

## 2017-08-28 RX ADMIN — HYDROCODONE BITARTRATE AND ACETAMINOPHEN 1 TABLET: 5; 325 TABLET ORAL at 15:01

## 2017-08-28 RX ADMIN — SODIUM CHLORIDE: 9 INJECTION, SOLUTION INTRAVENOUS at 14:56

## 2017-08-28 RX ADMIN — HYDROCHLOROTHIAZIDE 25 MG: 25 TABLET ORAL at 13:54

## 2017-08-28 RX ADMIN — SODIUM CHLORIDE: 9 INJECTION, SOLUTION INTRAVENOUS at 21:43

## 2017-08-28 RX ADMIN — SODIUM CHLORIDE 1000 ML: 9 INJECTION, SOLUTION INTRAVENOUS at 18:59

## 2017-08-28 ASSESSMENT — ACTIVITIES OF DAILY LIVING (ADL)
AMBULATION: 0-->INDEPENDENT
TOILETING: 0-->INDEPENDENT
FALL_HISTORY_WITHIN_LAST_SIX_MONTHS: NO
RETIRED_EATING: 0-->INDEPENDENT
SWALLOWING: 0-->SWALLOWS FOODS/LIQUIDS WITHOUT DIFFICULTY
DRESS: 0-->INDEPENDENT
RETIRED_COMMUNICATION: 0-->UNDERSTANDS/COMMUNICATES WITHOUT DIFFICULTY
BATHING: 0-->INDEPENDENT
COGNITION: 0 - NO COGNITION ISSUES REPORTED
TRANSFERRING: 0-->INDEPENDENT

## 2017-08-28 ASSESSMENT — ENCOUNTER SYMPTOMS
CHILLS: 0
FEVER: 0
WOUND: 1

## 2017-08-28 NOTE — PHARMACY-ADMISSION MEDICATION HISTORY
Admission medication history interview status for the 8/28/2017  admission is complete. See EPIC admission navigator for prior to admission medications     Medication history source reliability:Good    Actions taken by pharmacist (provider contacted, etc):Verified all meds with patient's bottles that he brought to the hospital.     Additional medication history information not noted on PTA med list :  1) Patient is no longer taking a statin due to intolerance (lack of energy). Per primary MD, he stated he is no longer taking.     Medication reconciliation/reorder completed by provider prior to medication history? No    Time spent in this activity: 15 minutes    Prior to Admission medications    Medication Sig Last Dose Taking? Auth Provider   IBUPROFEN PO Take 600-800 mg by mouth 2 times daily as needed for moderate pain Past Week at Unknown time Yes Unknown, Entered By History   DiphenhydrAMINE HCl (BENADRYL PO) Take 25 mg by mouth daily 8/27/2017 at am Yes Unknown, Entered By History   linagliptin (TRADJENTA) 5 MG TABS tablet Take 1 tablet (5 mg) by mouth daily 8/27/2017 at am Yes Kody Wing MD   hydrochlorothiazide (HYDRODIURIL) 25 MG tablet Take 1 tablet (25 mg) by mouth daily 8/27/2017 at am Yes Kody Wing MD   metFORMIN (GLUCOPHAGE) 1000 MG tablet Take 1 tablet (1,000 mg) by mouth 2 times daily (with meals) 8/27/2017 at Unknown time Yes Kody Wing MD   glimepiride (AMARYL) 4 MG tablet Take 1 tablet (4 mg) by mouth daily  Patient taking differently: Take 4 mg by mouth every morning (before breakfast)  8/27/2017 at am Yes Kody Wing MD   lisinopril (PRINIVIL/ZESTRIL) 20 MG tablet Take 1 tablet (20 mg) by mouth daily 8/27/2017 at am Yes Kody Wing MD   ASPIRIN NOT PRESCRIBED (INTENTIONAL) Please choose reason not prescribed, below   Kody Wing MD   order for DME [facility] Order Fax [phone]    Primary Dressing Karen   Qty 10  Secondary Dressing Mepilex Foam 4x4 Qty  "10  Secondary Dressing 2\" Medipore Tape Qty 1  Length of Need: 1 month  Frequency of dressing change: daily  If Patient has balance on account please call him. Thanks   Milena Cevallos DPM, Podiatry/Foot and Ankle Surgery         "

## 2017-08-28 NOTE — PROVIDER NOTIFICATION
MD Notification    Notified Person:  MD    Notified Persons Name:Dr Bardalesnyaessie    Notification Date/Time:8/28/17 6236    Notification Interaction:  Talked with Physician    Purpose of Notification:BP 75/52, pt states feels lightheaded/dizzy.      Orders Received:MD order for 1000ml NS bolus over 2 hours and increase maintenance fluids after bolus.    Comments:

## 2017-08-28 NOTE — PLAN OF CARE
Problem: Patient Care Overview (Adult)  Goal: Plan of Care Review  Outcome: No Change  Patient alert/orient X4, vss.  Admitted today, sepsis protocol fired, lactic acid 1.1.  Right heel wound (been there for 2.5 years), with bloody drainage/dry dressing applied until podiatry see's patient.  Lungs clear on RA, has NPC from allergies.  Complained of pain to right heel/medicated with norco.  /refused insulin (does not take at home).  Tolerating diet.

## 2017-08-28 NOTE — IP AVS SNAPSHOT
MRN:[id number]                      After Visit Summary   8/28/2017    Ry Horner    MRN: [id number]           Thank you!     Thank you for choosing [facility] for your care. Our goal is always to provide you with excellent care. Hearing back from our patients is one way we can continue to improve our services. Please take a few minutes to complete the written survey that you may receive in the mail after you visit with us. Thank you!        Patient Information     Date Of Birth          1965        Designated Caregiver       Most Recent Value    Caregiver    Will someone help with your care after discharge? yes    Name of designated caregiver Claudette Geller    Phone number of caregiver [phone]    Caregiver address [address]       About your hospital stay     You were admitted on:  August 28, 2017 You last received care in the:  [place] [address] [facility]    You were discharged on:  September 3, 2017        Reason for your hospital stay       You were hospitalized secondary to an infection in the bone of your foot, ultimately requiring amputation.                  Who to Call     For medical emergencies, please call 911.  For non-urgent questions about your medical care, please call your primary care provider or clinic, [phone]  For questions related to your surgery, please call your surgery clinic        Attending Provider     Provider Specialty    Antolin Argueta DO Emergency Medicine    Bc Espinosa MD Internal Medicine       Primary Care Provider Office Phone # Fax #    Kody Wing [phone]       When to contact your care team       Call your primary doctor if you have any of the following: temperature greater than 100.4, increased drainage, increased swelling or increased pain.                  After Care Instructions     Activity       Your activity upon discharge: NWB RLE.            Diet       Follow this diet  upon discharge: Orders Placed This Encounter      Moderate Consistent CHO Diet            Wound care and dressings       Instructions to care for your wound at home: daily dry dressing change or as needed.  Keep brace on at all time except for daily hygiene.  Keep incision clean and dry until sutures removed.                  Follow-up Appointments     Follow-up and recommended labs and tests        The patient will follow-up in clinic with Dr. Nascimento ([facility]; [phone]) in 3 weeks for wound check and suture removal.            Follow-up and recommended labs and tests        Follow up with primary care provider, Kody Wing, within 7 days for hospital follow- up.  The following labs/tests are recommended: CBC.                  Your next 10 appointments already scheduled     Oct 13, 2017  8:30 AM CDT   [facility]    [address] [facility] [address] [phone]           Bring a current list of meds and any records pertaining to this visit. For Physicals, please bring immunization records and any forms needing to be filled out. Please arrive 10 minutes early to complete paperwork.              Pending Results     Date and Time Order Name Status Description    9/1/2017 1429 Surgical pathology exam In process             Statement of Approval     Ordered          09/03/17 1330  I have reviewed and agree with all the recommendations and orders detailed in this document.  EFFECTIVE NOW     Approved and electronically signed by:  Ry Hilliard MD             Admission Information     Date & Time Provider Department Dept. Phone    8/28/2017 Bc Espinosa MD [place] [address] [facility] [phone]      Your Vitals Were     Blood Pressure Pulse Temperature Respirations Pulse Oximetry       131/86 (BP Location: Right arm) 93 99  F (37.2  C) (Oral) 16 96%       MyChart Information   "   [employer] lets you send messages to your doctor, view your test results, renew your prescriptions, schedule appointments and more. To sign up, go to [email or web address] . Click on \"Log in\" on the left side of the screen, which will take you to the Welcome page. Then click on \"Sign up Now\" on the right side of the page.     You will be asked to enter the access code listed below, as well as some personal information. Please follow the directions to create your username and password.     Your access code is: [email or web address]  Expires: 2017  3:24 PM     Your access code will  in 90 days. If you need help or a new code, please call your [facility] clinic or [phone].        Care EveryWhere ID     This is your Care EveryWhere ID. This could be used by other organizations to access your [facility] medical records  [id number]        Equal Access to Services     GILLES SALCIDO : Vania George, geraldine pennington, sue ferguson, charlene menendez . So [facility] [phone].    ATENCIÓN: Si eldonla kari, tiene a fallon disposición servicios gratuitos de asistencia lingüística. Llame al [phone].    We comply with applicable federal civil rights laws and [place] laws. We do not discriminate on the basis of race, color, national origin, age, disability sex, sexual orientation or gender identity.               Review of your medicines      START taking        Dose / Directions    acetaminophen 325 MG tablet   Commonly known as:  TYLENOL   Used for:  Osteomyelitis of right foot, unspecified type (H)        Dose:  650 mg   Take 2 tablets (650 mg) by mouth every 4 hours as needed for mild pain or fever   Quantity:  100 tablet   Refills:  0       aspirin  MG EC tablet   Used for:  Osteomyelitis of right foot, unspecified type (H)        Dose:  325 mg   Take 1 tablet (325 mg) by mouth daily   Quantity:  42 tablet   Refills:  0       ferrous sulfate 325 (65 FE) MG tablet "   Commonly known as:  IRON   Used for:  Anemia, unspecified type        Dose:  325 mg   Take 1 tablet (325 mg) by mouth 2 times daily (with meals)   Quantity:  100 tablet   Refills:  1       hydrOXYzine 25 MG tablet   Commonly known as:  ATARAX   Used for:  Osteomyelitis of right foot, unspecified type (H)        Dose:  25 mg   Take 1 tablet (25 mg) by mouth every 6 hours as needed for itching (nausea, adjuvant pain, spasms)   Quantity:  40 tablet   Refills:  0       oxyCODONE 5 MG IR tablet   Commonly known as:  ROXICODONE   Used for:  Osteomyelitis of right foot, unspecified type (H)        Dose:  5-10 mg   Take 1-2 tablets (5-10 mg) by mouth every 3 hours as needed for moderate to severe pain   Quantity:  60 tablet   Refills:  0         CONTINUE these medicines which may have CHANGED, or have new prescriptions. If we are uncertain of the size of tablets/capsules you have at home, strength may be listed as something that might have changed.        Dose / Directions    glimepiride 4 MG tablet   Commonly known as:  AMARYL   This may have changed:  when to take this   Used for:  Type 2 diabetes mellitus with diabetic polyneuropathy, without long-term current use of insulin (H)        Dose:  4 mg   Take 1 tablet (4 mg) by mouth daily   Quantity:  90 tablet   Refills:  3       lisinopril 20 MG tablet   Commonly known as:  PRINIVIL/ZESTRIL   This may have changed:  how much to take   Used for:  Essential hypertension, benign        Dose:  10 mg   Take 0.5 tablets (10 mg) by mouth daily   Quantity:  90 tablet   Refills:  3       * order for DME   This may have changed:  Another medication with the same name was added. Make sure you understand how and when to take each.   Used for:  Diabetic ulcer of right foot associated with type 2 diabetes mellitus (H), Pressure ulcer, stage III (H)        [facility] Fax [phone]  Primary Dressing Karen   Qty 10 Secondary Dressing Mepilex Foam 4x4 Qty 10 Secondary  "Dressing 2\" Medipore Tape Qty 1 Length of Need: 1 month Frequency of dressing change: daily If Patient has balance on account please call him. Thanks   Quantity:  30 days   Refills:  0       * order for DME   This may have changed:  You were already taking a medication with the same name, and this prescription was added. Make sure you understand how and when to take each.   Used for:  Osteomyelitis of right foot, unspecified type (H)        Equipment being ordered: Walker Wheels () and Walker () Treatment Diagnosis: decreased ambulation   Quantity:  1 each   Refills:  0       * Notice:  This list has 2 medication(s) that are the same as other medications prescribed for you. Read the directions carefully, and ask your doctor or other care provider to review them with you.      CONTINUE these medicines which have NOT CHANGED        Dose / Directions    ASPIRIN NOT PRESCRIBED   Commonly known as:  INTENTIONAL   Used for:  Type 2 diabetes mellitus with diabetic polyneuropathy, without long-term current use of insulin (H)        Please choose reason not prescribed, below   Quantity:  0 each   Refills:  0       IBUPROFEN PO        Dose:  600-800 mg   Take 600-800 mg by mouth 2 times daily as needed for moderate pain   Refills:  0       linagliptin 5 MG Tabs tablet   Commonly known as:  TRADJENTA   Used for:  Type 2 diabetes mellitus with diabetic polyneuropathy, without long-term current use of insulin (H)        Dose:  5 mg   Take 1 tablet (5 mg) by mouth daily   Quantity:  30 tablet   Refills:  2       metFORMIN 1000 MG tablet   Commonly known as:  GLUCOPHAGE   Used for:  Type 2 diabetes mellitus with diabetic polyneuropathy, without long-term current use of insulin (H)        Dose:  1000 mg   Take 1 tablet (1,000 mg) by mouth 2 times daily (with meals)   Quantity:  180 tablet   Refills:  3         STOP taking     BENADRYL PO           hydrochlorothiazide 25 MG tablet   Commonly known as:  HYDRODIURIL        "         Where to get your medicines      These medications were sent to [facility] [place], [place] - [address]  [address], [place] [address]     Phone:  [phone]     aspirin  MG EC tablet    ferrous sulfate 325 (65 FE) MG tablet    hydrOXYzine 25 MG tablet    lisinopril 20 MG tablet         Some of these will need a paper prescription and others can be bought over the counter. Ask your nurse if you have questions.     Bring a paper prescription for each of these medications     order for DME    oxyCODONE 5 MG IR tablet       You don't need a prescription for these medications     acetaminophen 325 MG tablet                Protect others around you: Learn how to safely use, store and throw away your medicines at www.disposemymeds.org.             Medication List: This is a list of all your medications and when to take them. Check marks below indicate your daily home schedule. Keep this list as a reference.      Medications           Morning Afternoon Evening Bedtime As Needed    acetaminophen 325 MG tablet   Commonly known as:  TYLENOL   Take 2 tablets (650 mg) by mouth every 4 hours as needed for mild pain or fever   Last time this was given:  975 mg on 9/3/2017  6:59 AM                                   aspirin  MG EC tablet   Take 1 tablet (325 mg) by mouth daily            9/4/17                       ASPIRIN NOT PRESCRIBED   Commonly known as:  INTENTIONAL   Please choose reason not prescribed, below   Last time this was given:  40 ml given on 9/1/2017 12:34 PM                                ferrous sulfate 325 (65 FE) MG tablet   Commonly known as:  IRON   Take 1 tablet (325 mg) by mouth 2 times daily (with meals)   Last time this was given:  325 mg on 9/3/2017  8:15 AM            9/4/17           9/3/17               glimepiride 4 MG tablet   Commonly known as:  AMARYL   Take 1 tablet (4 mg) by mouth daily   Last time this was given:  4 mg on 9/3/2017  6:58 AM  "           9/4/17                       hydrOXYzine 25 MG tablet   Commonly known as:  ATARAX   Take 1 tablet (25 mg) by mouth every 6 hours as needed for itching (nausea, adjuvant pain, spasms)                                   IBUPROFEN PO   Take 600-800 mg by mouth 2 times daily as needed for moderate pain                                   linagliptin 5 MG Tabs tablet   Commonly known as:  TRADJENTA   Take 1 tablet (5 mg) by mouth daily   Last time this was given:  5 mg on 9/3/2017  6:58 AM            9/4/17                       lisinopril 20 MG tablet   Commonly known as:  PRINIVIL/ZESTRIL   Take 0.5 tablets (10 mg) by mouth daily   Last time this was given:  20 mg on 8/28/2017  1:54 PM                                metFORMIN 1000 MG tablet   Commonly known as:  GLUCOPHAGE   Take 1 tablet (1,000 mg) by mouth 2 times daily (with meals)                    9/3/17               * order for DME   [facility] Fax [phone]  Primary Dressing Karen   Qty 10 Secondary Dressing Mepilex Foam 4x4 Qty 10 Secondary Dressing 2\" Medipore Tape Qty 1 Length of Need: 1 month Frequency of dressing change: daily If Patient has balance on account please call him. Thanks                                * order for DME   Equipment being ordered: Walker Wheels () and Walker () Treatment Diagnosis: decreased ambulation                                oxyCODONE 5 MG IR tablet   Commonly known as:  ROXICODONE   Take 1-2 tablets (5-10 mg) by mouth every 3 hours as needed for moderate to severe pain   Last time this was given:  10 mg on 9/3/2017 10:15 AM                            Anytime after 1.15 pm       * Notice:  This list has 2 medication(s) that are the same as other medications prescribed for you. Read the directions carefully, and ask your doctor or other care provider to review them with you.      "

## 2017-08-28 NOTE — IP AVS SNAPSHOT
[address] [facility]    [address] [place] [address]    Phone:  [phone]                                       After Visit Summary   8/28/2017    Ry Horner    MRN: [id number]           After Visit Summary Signature Page     I have received my discharge instructions, and my questions have been answered. I have discussed any challenges I see with this plan with the nurse or doctor.    ..........................................................................................................................................  Patient/Patient Representative Signature      ..........................................................................................................................................  Patient Representative Print Name and Relationship to Patient    ..................................................               ................................................  Date                                            Time    ..........................................................................................................................................  Reviewed by Signature/Title    ...................................................              ..............................................  Date                                                            Time

## 2017-08-28 NOTE — ED NOTES
[facility]  ED Nurse Handoff Report    ED Chief complaint: Wound Check (has right heel ulcer for 2.5 years, bled a lot yesterday and increased pain, states he was on it more after being in [place] for 10 days)      ED Diagnosis:   Final diagnoses:   Osteomyelitis of right foot, unspecified type (H)       Code Status: Full Code    Allergies:   Allergies   Allergen Reactions     Asa [Aspirin] Nausea and Vomiting       Activity level - Baseline/Home:  Independent    Activity Level - Current:   Independent     Needed?: No    Isolation: No  Infection: Not Applicable    Bariatric?: No    Vital Signs:   Vitals:    08/28/17 0957 08/28/17 1000   BP: 113/78 113/78   Resp: 16    Temp: 98.8  F (37.1  C)    TempSrc: Oral    SpO2: 98% 98%       Cardiac Rhythm: ,        Pain level:      Is this patient confused?: No    Patient Report: Initial Complaint: Ry Horner is a 52 year old male with a history of diabetes mellitus who presents to the ED for evaluation of a chronic diabetic ulcer on his right heel after being in [place] for 10 days and driving almost 1,000 miles; the patient was on his feet more than normal while in [place]. The patient reports he was walking out of the store yesterday and noticed blood coming off his heal, prompting him to call his primary but was referred to the ED due to his primary being out of the office today. The patient notes his heel is more swollen than baseline. He denies fever or chills.  Focused Assessment: right foot pain, odorous right heel ulcer  Tests Performed: labs, XR  Abnormal Results: XR, labs  Treatments provided: 2grams rocephin    Family Comments: wife at bedside     OBS brochure/video discussed/provided to patient: N/A    ED Medications:   Medications   cefTRIAXone (ROCEPHIN) 2 g vial to attach to  ml bag for ADULTS or NS 50 ml bag for PEDS (2 g Intravenous New Bag 8/28/17 0199)       Drips infusing?:  No      ED NURSE PHONE NUMBER: [id number]

## 2017-08-28 NOTE — PROGRESS NOTES
X cover 1717    Called for BP 75/52  Will order 1 litre fluid bolus, increase maintenance to 125 ml/hr  Will d/c his antihypertensives including lisinopril and HCTZ

## 2017-08-28 NOTE — PROVIDER NOTIFICATION
Sepsis protocol fired, ordering lactic acid, vss at this time 99.9, P108 /73.  Notified Dr. Espinosa

## 2017-08-28 NOTE — H&P
PRIMARY CARE PHYSICIAN:  Kody Wing MD      CHIEF COMPLAINT:  Right foot/heel ulcer.      HISTORY OF PRESENT ILLNESS:  Mr. Ry Horner is a 52-year-old morbidly obese male with past medical history significant for diabetes, hypertension, dyslipidemia, CKD stage II, morbid obesity and chronic right heel ulcer who presented to the ER today with wound drainage , pain and swelling.  He has had a right heel ulcer since 2015 and had a graft and wound VAC at one point, then he got admitted 07/2016 with concerns for early osteomyelitis.  He had a calcaneal biopsy done and I&D and biopsy was negative for osteomyelitis at that time.  He was recommended nonweightbearing and was discharged home on 2 weeks of Keflex and minocycline.  He recently returned from [place] and [facility] for about a week or so he has been having worsening swelling of his right foot along with some bloody drainage, local warmth, no fever, but some chills, and he called Podiatry Clinic today, who recommended he come to the ER today.  Here in the ER, he was seen by Dr. Argueta.  He was afebrile.  Vitals were stable, but labs were significant for leukocytosis, elevated CRP and x-ray of foot was suggestive of osteomyelitis.  He was started on vancomycin and ceftriaxone and hospitalist was requested admission for further evaluation.  He denies any chest pain, shortness of breath.  No pain in abdomen.  Reports normal bowel and bladder habits.  Denies any hematemesis or melena, no hematuria.      REVIEW OF SYSTEMS:  A 10-point review was done and was negative apart from those mentioned in the history of present illness.      PAST MEDICAL HISTORY:   1.  Diabetes mellitus type 2.  A1c from 06/17 was 9.4.   2.  CKD stage II.  Baseline creatinine around 1.1.   3.  Hypertension.   4.  Morbid obesity.   5.  Chronic right heel ulcer, details as noted in HPI.   6.  Dyslipidemia, intolerant to statins.      MEDICATIONS PRIOR TO ADMISSION:   Prescriptions Prior to  "Admission   Medication Sig Dispense Refill Last Dose     IBUPROFEN PO Take 600-800 mg by mouth 2 times daily as needed for moderate pain   Past Week at Unknown time     DiphenhydrAMINE HCl (BENADRYL PO) Take 25 mg by mouth daily   8/27/2017 at am     linagliptin (TRADJENTA) 5 MG TABS tablet Take 1 tablet (5 mg) by mouth daily 30 tablet 2 8/27/2017 at am     hydrochlorothiazide (HYDRODIURIL) 25 MG tablet Take 1 tablet (25 mg) by mouth daily 90 tablet 3 8/27/2017 at am     metFORMIN (GLUCOPHAGE) 1000 MG tablet Take 1 tablet (1,000 mg) by mouth 2 times daily (with meals) 180 tablet 3 8/27/2017 at Unknown time     glimepiride (AMARYL) 4 MG tablet Take 1 tablet (4 mg) by mouth daily (Patient taking differently: Take 4 mg by mouth every morning (before breakfast) ) 90 tablet 3 8/27/2017 at am     lisinopril (PRINIVIL/ZESTRIL) 20 MG tablet Take 1 tablet (20 mg) by mouth daily 90 tablet 3 8/27/2017 at am     ASPIRIN NOT PRESCRIBED (INTENTIONAL) Please choose reason not prescribed, below 0 each 0 Taking     order for [facility] Order Fax [phone]    Primary Dressing Karen   Qty 10  Secondary Dressing Mepilex Foam 4x4 Qty 10  Secondary Dressing 2\" Medipore Tape Qty 1  Length of Need: 1 month  Frequency of dressing change: daily  If Patient has balance on account please call him. Thanks 30 days 0 7/23/2017         ALLERGIES:  Aspirin.      SOCIAL HISTORY:  He  denies smoking, takes occasional alcohol, no illicit drug use.      FAMILY HISTORY:  Reviewed and not pertinent to current presentation.      PHYSICAL EXAMINATION:   GENERAL:  The patient is conscious, alert, oriented x3, lying comfortably in bed in no apparent distress.   VITAL SIGNS:  Temperature 98.8, heart rate of 98, blood pressure 113/78, saturation 98% on room air.   HEENT:  Pupils are equal and reactive to light and accommodation.  Extraocular movements are intact.  Oral mucosa is moist.   NECK:  Supple, no rigidity.   RESPIRATORY:  Lungs sounds " bilaterally clear to auscultation, no wheezes or crepitation.   CARDIOVASCULAR:  Normal S1, S2, regular rate and rhythm, no murmur.   ABDOMEN:  Soft, nontender, nondistended, no guarding, rigidity or rebound tenderness.   LOWER EXTREMITIES:  With no edema on the left.  On the right, he has mild edema.  Mild calf tenderness.  He has a right heel ulcerated wound draining some bloody drainage, does have local warmth and tenderness with surrounding erythema.   NEUROLOGIC:  No focal deficits noted.  Cranial nerves II-XII grossly intact.   PSYCHIATRIC:  Normal mood and affect.      LABORATORY AND IMAGING DATA:  BMP with sodium of 134, creatinine 1.1.  A1c 7.6.  CRP 85.1.  Glucose 187.  CBC with a WBC 13.5, hemoglobin 9.9, platelets 392.  Blood and wound cultures are pending.  X-ray of foot shows findings suggestive of osteomyelitis.      ASSESSMENT AND PLAN:  Mr. Ry Horner is a 52-year-old male with past medical history significant for diabetes, hypertension, dyslipidemia, CKD stage II, morbid obesity and chronic right heel ulcer who presented to the Emergency Room today with right foot wound drainage.     1.  Acute-on-chronic right heel ulcerated wound with suggestion of osteomyelitis.  He systemically does not look ill, but has significant leukocytosis, CRP and a suggestion of osteomyelitis, so will admit him as inpatient.  We will start him on vancomycin and ceftriaxone and will consult Podiatry.  He might need further bone biopsy or incision and drainage.  Given his calf tenderness and recent travel, will get ultrasound to rule out deep venous thrombosis.  I will also get ultrasound Doppler KAREN to rule out peripheral arterial disease given the chronicity of the wound.     2.  Normocytic anemia, likely chronic.  His hemoglobin baseline has been around 13-14 at least until last year, but current hemoglobin is 9.9.  He has no suggestion of active bleed.  He is hemodynamically stable.  This is likely chronic from probable  osteomyelitis.  Will get a serum ferritin, TIBC, B12 and folate and will monitor clinically.     3.  Diabetes mellitus type 2, uncontrolled, although it seems that his A1c is getting better and currently is at 7.6.  Will continue with his linagliptin, Amaryl, hold off on the metformin, start him on sliding scale insulin.   4.  Hypertension.  We will continue with his hydrochlorothiazide, lisinopril and will have hydralazine p.r.n.   5.  Chronic kidney disease, stage II.  Creatinine seems to be stable around his baseline of 1.1.   6.  Dyslipidemia.  He has been intolerant to statins.  His last LDL from 2017 was 116.  He is working with his primary care physician to consider a different statin.   7.  Deep venous thrombosis prophylaxis:  Mechanical with PCD boots as he might need surgical intervention.      CODE STATUS:  FULL.         DYLLAN AMEZCUA MD             D: 2017 12:49   T: 2017 13:21   MT: TS      Name:     YNES YBARRA   MRN:      [id number]        Account:      [id number]   :      1965           Admitted:     [id number]      Document: [id number]       cc: Kody Wing MD

## 2017-08-28 NOTE — TELEPHONE ENCOUNTER
"Patient called reporting he returned yesterday from a trip to [place]. He reports he was walking \"way to much\" on his heel ulcer. Since yesterday he is having significant pain and bleeding from his wound. He is concerned and doesn't want to wait until his scheduled visit this Friday. Advised patient that the emergency room at [facility] would likely be a better place to evaluate his bleeding and pain from his wound and potential need for IV antibiotics if infected, rule out bone infection, control bleeding.  "

## 2017-08-28 NOTE — CONSULTS
PATIENT HISTORY:  Ry Horner is a 52 year old male who was admitted for right diabetic foot infection.      I was asked to see Ry Horner  by  for right foot ulcer.    Patient was seen at bedside. Will know to our service. Has been following me in the wound center for approximately 2 years for right heel ulcer. Notes he went to [place] on vacation, was on his feet too much, and noticed increase drainage from right heel a few days ago. Pain and drainage progressed since he has been back and was advised to go to the hospital. Today [place] is 8/10. Notes he can't put weight on it. Normally it is numb. He is diabetic. Notes some chills lately.     Review of Systems:  Patient denies fever, chills, rash, stiffness, limping,  weakness, heart burn, blood in stool, chest pain with activity, calf pain when walking, shortness of breath with activity, chronic cough, easy bleeding/bruising, swelling of ankles, excessive thirst, fatigue, depression, anxiety.  Patient admits to numbness, wound.     PAST MEDICAL HISTORY:   Past Medical History:   Diagnosis Date     BENIGN HYPERTENSION 4/4/2007     DIABETES MELLITUS TYPE II-UNCOMPL 4/4/2007     HYPERLIPIDEMIA NEC/NOS 4/4/2007     Tobacco use disorder 4/4/2007        PAST SURGICAL HISTORY:   Past Surgical History:   Procedure Laterality Date     APPENDECTOMY       APPLY WOUND VAC Right 3/2/2015    Procedure: APPLY WOUND VAC;  Surgeon: Milena Cevallos DPM, Pod;  Location: [facility] OR     BIOPSY BONE FOOT Right 7/15/2016    Procedure: BIOPSY BONE FOOT;  Surgeon: Tim Douglas DPM;  Location: [facility] OR     IRRIGATION AND DEBRIDEMENT FOOT, COMBINED Right 3/2/2015    Procedure: COMBINED IRRIGATION AND DEBRIDEMENT FOOT;  Surgeon: iMlena Cevallos DPM, Pod;  Location: [facility] OR     IRRIGATION AND DEBRIDEMENT FOOT, COMBINED Right 7/15/2016    Procedure: COMBINED IRRIGATION AND DEBRIDEMENT FOOT;  Surgeon: Tim Douglas DPM;  Location: [facility] OR     IRRIGATION AND DEBRIDEMENT FOOT,  COMBINED Right 7/20/2016    Procedure: COMBINED IRRIGATION AND DEBRIDEMENT FOOT;  Surgeon: Tim Douglas DPM;  Location:  OR     ORTHOPEDIC SURGERY          MEDICATIONS:   Current Facility-Administered Medications:      [START ON 8/29/2017] glimepiride (AMARYL) tablet 4 mg, 4 mg, Oral, QAM AC, Bc Espinosa MD     hydrochlorothiazide (HYDRODIURIL) tablet 25 mg, 25 mg, Oral, Daily, Bc Espinosa MD, 25 mg at 08/28/17 1354     linagliptin (TRADJENTA) tablet 5 mg, 5 mg, Oral, Daily, Bc Espinosa MD     lisinopril (PRINIVIL/ZESTRIL) tablet 20 mg, 20 mg, Oral, Daily, Bc Espinosa MD, 20 mg at 08/28/17 1354     [START ON 8/29/2017] cefTRIAXone (ROCEPHIN) 1 g vial to attach to  mL bag for ADULTS or NS 50 mL bag for PEDS, 1 g, Intravenous, Q24H, Bc Espinosa MD     hydrALAZINE (APRESOLINE) injection 10 mg, 10 mg, Intravenous, Q4H PRN, Bc Espinosa MD     glucose 40 % gel 15-30 g, 15-30 g, Oral, Q15 Min PRN **OR** dextrose 50 % injection 25-50 mL, 25-50 mL, Intravenous, Q15 Min PRN **OR** glucagon injection 1 mg, 1 mg, Subcutaneous, Q15 Min PRN, Bc Espinosa MD     naloxone (NARCAN) injection 0.1-0.4 mg, 0.1-0.4 mg, Intravenous, Q2 Min PRN, Bc Espinosa MD     insulin aspart (NovoLOG) inj (RAPID ACTING), 1-10 Units, Subcutaneous, TID AC, Bc Espinosa MD     insulin aspart (NovoLOG) inj (RAPID ACTING), 1-7 Units, Subcutaneous, At Bedtime, Bc Espinosa MD     0.9% sodium chloride infusion, , Intravenous, Continuous, Bc Espinosa MD, Last Rate: 100 mL/hr at 08/28/17 1456     acetaminophen (TYLENOL) tablet 650 mg, 650 mg, Oral, Q4H PRN, Bc Espinosa MD     HYDROcodone-acetaminophen (NORCO) 5-325 MG per tablet 1-2 tablet, 1-2 tablet, Oral, Q4H PRN, Bc Espinosa MD, 1 tablet at 08/28/17 1501     HYDROmorphone (PF) (DILAUDID) injection 0.3-0.5 mg, 0.3-0.5 mg, Intravenous, Q2H PRN, Bc Espinosa,  MD     magnesium hydroxide (MILK OF MAGNESIA) suspension 30 mL, 30 mL, Oral, Daily PRN, Bc Espinosa MD     bisacodyl (DULCOLAX) Suppository 10 mg, 10 mg, Rectal, Daily PRN, Bc Espinosa MD     senna-docusate (SENOKOT-S;PERICOLACE) 8.6-50 MG per tablet 1-2 tablet, 1-2 tablet, Oral, BID PRN, cB Espinosa MD     ondansetron (ZOFRAN-ODT) ODT tab 4 mg, 4 mg, Oral, Q6H PRN **OR** ondansetron (ZOFRAN) injection 4 mg, 4 mg, Intravenous, Q6H PRN, Bc Espinosa MD     prochlorperazine (COMPAZINE) injection 5-10 mg, 5-10 mg, Intravenous, Q6H PRN **OR** prochlorperazine (COMPAZINE) tablet 5-10 mg, 5-10 mg, Oral, Q6H PRN **OR** prochlorperazine (COMPAZINE) Suppository 25 mg, 25 mg, Rectal, Q12H PRN, Bc Espinosa MD     [START ON 8/29/2017] vancomycin (VANCOCIN) 2,000 mg in NaCl 0.9 % 500 mL intermittent infusion, 2,000 mg, Intravenous, Q12H, Bc Espinosa MD     ALLERGIES:    Allergies   Allergen Reactions     Asa [Aspirin] Nausea and Vomiting        SOCIAL HISTORY:   Social History     Social History     Marital status:      Spouse name: N/A     Number of children: N/A     Years of education: N/A     Occupational History     Not on file.     Social History Main Topics     Smoking status: Former Smoker     Packs/day: 0.50     Years: 20.00     Types: Cigarettes     Smokeless tobacco: Never Used     Alcohol use Yes      Comment: occasional     Drug use: No     Sexual activity: Yes     Partners: Female     Other Topics Concern      Service Yes     Blood Transfusions No     Caffeine Concern No     Occupational Exposure No     Hobby Hazards No     Sleep Concern No     Stress Concern No     Weight Concern Yes     Would like to lose some weight     Special Diet No     Back Care No     Exercise Yes     Bike Helmet No     Seat Belt Yes     Self-Exams Yes     Social History Narrative        FAMILY HISTORY:   Family History   Problem Relation Age of Onset     Genetic  Disorder Other      Genetic Disorder Other      Psychotic Disorder Mother      DIABETES Father      Lung Cancer Father      Genetic Disorder Maternal Grandmother      Genetic Disorder Maternal Grandfather      Asthma Sister      C.A.D. No family hx of      Hypertension No family hx of      CEREBROVASCULAR DISEASE No family hx of      Breast Cancer No family hx of      Cancer - colorectal No family hx of      Prostate Cancer No family hx of      Alcohol/Drug No family hx of         EXAM:Vitals: /73 (BP Location: Right arm)  Pulse 108  Temp 99.9  F (37.7  C) (Oral)  Resp 18  SpO2 97%  BMI= There is no height or weight on file to calculate BMI.  LABS:    WBC   Date Value Ref Range Status   08/28/2017 13.5 (H) 4.0 - 11.0 10e9/L Final     ESR: 105  CRP: 85.1  A1c: 7.6    General appearance: Patient is alert and fully cooperative with history & exam.  No sign of distress is noted during the visit.      Psychiatric: Affect is pleasant & appropriate.  Patient appears motivated to improve health.       Respiratory: Breathing is regular & unlabored while sitting.      HEENT: Hearing is intact to spoken word.  Speech is clear.  No gross evidence of visual impairment that would impact ambulation.       Dermatologic: ulcer to plantar right heel measures approximately 1.0cm x 1.0cm x 1.6cm in depth. Malodorous. Purulent heavy drainage from wound. Localized redness noted.      Vascular: DP & PT pulses are intact & regular bilaterally.  Minimal edema but no varicosities noted.  CFT's normal and skin temperature to right foot is warm.     Neurologic: Lower extremity sensation is diminished.     Musculoskeletal: Patient is ambulatory without assistive device or brace.  No gross ankle deformity noted.  No foot or ankle joint effusion is noted.      IMAGING:  KAREN: Normal ABIs bilaterally without evidence of arterial  insufficiency.    Xray; Cortical erosion and lucency in the calcaneus is consistent  with osteomyelitis. This  is a significant change in comparison with  4/25/2017. Osseous structures of the forefoot appear intact.    MRI: pending     ASSESSMENT: 52 yr old diabetic male with right heel ulceration and osteomyelitis.     PLAN:  Reviewed patient's chart in epic.  -dressing changed at bedside.  -Spoke with patient about xray results. Talked about bone infection to the heel.   -he is expressing limb salvage efforts if possible. Will order MRI to assess extent of bone infection. Discussed that if it is significant, that there is noting we can offer to give him a functional foot and would need BKA.   -did order ortho consult as patient had questions about BKA.   -Dr. Randhawa will follow up on MRI results tomorrow.     Appreciate the opportunity to participate in the care of your patient.     Milena Cevallos DPM, Podiatry/Foot and Ankle Surgery  4:51 PM

## 2017-08-28 NOTE — ED PROVIDER NOTES
History   Chief Complaint:  Wound Check    HPI   Ry Horner is a 52 year old male with a history of diabetes mellitus who presents to the ED for evaluation of a chronic diabetic ulcer on his right heel after being in [place] for 10 days; the patient was on his feet more than normal while in [place]. The patient reports he was walking out of the store yesterday and noticed blood coming off his right heal, prompting him to call his primary but was referred to the ED due to his primary being out of the office today. The patient notes his heel is more swollen and painful than baseline. Pain 7/10. He denies fever or chills.    Allergies:  Aspirin    Medications:    pravastatin (PRAVACHOL) 40 MG tablet   linagliptin (TRADJENTA) 5 MG TABS tablet   atorvastatin (LIPITOR) 40 MG tablet   hydrochlorothiazide (HYDRODIURIL) 25 MG tablet   metFORMIN (GLUCOPHAGE) 1000 MG tablet   glimepiride (AMARYL) 4 MG tablet   lisinopril (PRINIVIL/ZESTRIL) 20 MG tablet   acetaminophen (TYLENOL) 325 MG tablet   CIALIS 20 MG OR TABS     Past Medical History:    Hypertension  Diabetes mellitus  Hyperlipidemia  Tobacco use disorder  Ulcer of right heel  Morbid obesity    Past Surgical History:    Appendectomy  Apply wound VAC  Irrigation and debridement foot x 3  Orthopedic surgery    Family History:    Psychotic disorder  Diabetes  Lung cancer  Asthma     Social History:  Marital Status:   [2]  Smoking status: former  Alcohol use: yes  PCP Kody Wing    Review of Systems   Constitutional: Negative for chills and fever.   Skin: Positive for wound.   All other systems reviewed and are negative.    Physical Exam   Patient Vitals for the past 24 hrs:   BP Temp Temp src Heart Rate Resp SpO2   08/28/17 0957 113/78 98.8  F (37.1  C) Oral 98 16 98 %        Physical Exam   General: Patient in mild distress.  Alert and cooperative with exam. Normal mentation  HEENT: NC/AT. Conjunctiva without injection or scleral icterus. External ears  normal.  Respiratory: Breathing comfortably on room air  CV: Normal rate, all extremities well perfused  GI:  Non-distended abdomen  Skin: Warm, dry, no rashes/open wounds on exposed skin  Musculoskeletal: RLE: 1x2 cm Deep heel ulceration with mild bloody oozing (pictured), scant purulent drainage/foul-smelling, mild surround tissue edema with tenderness to palpation and warmth. No significant erythema.   Neuro: Alert, answers questions appropriately. No gross motor deficits          Emergency Department Course   Imaging:  Radiographic findings were communicated with the patient who voiced understanding of the findings.  Foot XR, G/E 3 views, right:  Osteomyelitis.  As read by Radiology.    Laboratory:   CBC: WBC 13.5(H), HGB 9.9(L) o/w WNL ()   BMP: glucose 187(H) o/w WNL (Creatinine 1.10)  ESR: 105(H)  CRP: 85.1(H)  Hemoglobin A1c: 7.6(H)    Blood culture: in process  Blood culture: in process  Wound culture: in process    Interventions:  1137 Rocephin 2 g IV   Vancomycin 2,000 mg IV  Medications   vancomycin (VANCOCIN) 2,000 mg in NaCl 0.9 % 500 mL intermittent infusion (not administered)   cefTRIAXone (ROCEPHIN) 2 g vial to attach to  ml bag for ADULTS or NS 50 ml bag for PEDS (0 g Intravenous Stopped 8/28/17 1228)       Emergency Department Course:  Past medical records, nursing notes, and vitals reviewed.  0956: I performed an exam of the patient and obtained history, as documented above.  The patient was sent for a right foot xray while in the emergency department, findings above.  1043 I rechecked and updated the patient about imaging studies.   IV inserted and blood drawn.  1137 Rocephin 2 g IV  Findings and plan explained to the Patient who consents to admission.   1159: Discussed the patient with Dr. Espinosa, who will admit the patient to a medical bed for further monitoring, evaluation, and treatment.     Impression & Plan    Medical Decision Making:  Patient is a 52 year old male who  presents with pain: history of chronic diabetic foot ulcer. Patients medical history and records were reviewed. Initial consideration for, but not limited to, inflammatory ration, osteomyelitis, infectious process, among others. Labs and imaging were obtained. Foot imaging concerning for development of osteomyelitis. Labs notable for significant elevated for CRP and mild elevation of white count as noted above. Patient does have significant tenderness to palpation and swelling to the ankle with associated warmth again concerning for infection. Ordered IV vancomycin and Ceftriaxone; admitted to the hospitalist service for further evaluation and care. Presentation consistent with osteomyelitis. Patient remained stable throughout my care.     Diagnosis:    ICD-10-CM   1. Osteomyelitis of right foot, unspecified type (H) M86.9     Disposition:  Admitted to medical bed under the care of Dr. Francisca Garcia  8/28/2017    EMERGENCY DEPARTMENT    Valencai PATEL am serving as a scribe at 9:55 AM on 8/28/2017 to document services personally performed by Antolin Argueta DO based on my observations and the provider's statements to me.        Antolin Argueta DO  08/28/17 1737

## 2017-08-28 NOTE — PHARMACY-VANCOMYCIN DOSING SERVICE
Pharmacy Vancomycin Initial Note  Date of Service 2017  Patient's  1965  52 year old, male    Indication: Osteomyelitis    Current estimated CrCl = Estimated Creatinine Clearance: 105.2 mL/min (based on Cr of 1.1).    Creatinine for last 3 days  2017: 10:45 AM Creatinine 1.10 mg/dL    Recent Vancomycin Level(s) for last 3 days  No results found for requested labs within last 72 hours.      Vancomycin IV Administrations (past 72 hours)                   vancomycin (VANCOCIN) 2,000 mg in NaCl 0.9 % 500 mL intermittent infusion (mg) 2,000 mg New Bag 17 1233                Nephrotoxins and other renal medications (Future)    Start     Dose/Rate Route Frequency Ordered Stop    17 0030  vancomycin (VANCOCIN) 2,000 mg in NaCl 0.9 % 500 mL intermittent infusion      2,000 mg  over 60 Minutes Intravenous EVERY 12 HOURS 17 1333      17 1300  lisinopril (PRINIVIL/ZESTRIL) tablet 20 mg      20 mg Oral DAILY 17 1257            Contrast Orders - past 72 hours     None                Plan:  1.  Start vancomycin  2000 mg IV q12h.   2.  Goal Trough Level: 15-20 mg/L   3.  Pharmacy will check trough levels as appropriate in 1-3 Days.    4. Serum creatinine levels will be ordered daily for the first week of therapy and at least twice weekly for subsequent weeks.    5. Callao method utilized to dose vancomycin therapy: Method 2    Ellis Hammonds

## 2017-08-29 ENCOUNTER — APPOINTMENT (OUTPATIENT)
Dept: MRI IMAGING | Facility: CLINIC | Age: 52
DRG: 617 | End: 2017-08-29
Attending: PODIATRIST
Payer: COMMERCIAL

## 2017-08-29 LAB
ANION GAP SERPL CALCULATED.3IONS-SCNC: 6 MMOL/L (ref 3–14)
BASOPHILS # BLD AUTO: 0 10E9/L (ref 0–0.2)
BASOPHILS NFR BLD AUTO: 0.2 %
BUN SERPL-MCNC: 21 MG/DL (ref 7–30)
CALCIUM SERPL-MCNC: 8.3 MG/DL (ref 8.5–10.1)
CHLORIDE SERPL-SCNC: 106 MMOL/L (ref 94–109)
CO2 SERPL-SCNC: 26 MMOL/L (ref 20–32)
CREAT SERPL-MCNC: 1.09 MG/DL (ref 0.66–1.25)
DIFFERENTIAL METHOD BLD: ABNORMAL
EOSINOPHIL # BLD AUTO: 0.1 10E9/L (ref 0–0.7)
EOSINOPHIL NFR BLD AUTO: 1.1 %
ERYTHROCYTE [DISTWIDTH] IN BLOOD BY AUTOMATED COUNT: 11.5 % (ref 10–15)
GFR SERPL CREATININE-BSD FRML MDRD: 72 ML/MIN/1.7M2
GLUCOSE BLDC GLUCOMTR-MCNC: 122 MG/DL (ref 70–99)
GLUCOSE BLDC GLUCOMTR-MCNC: 123 MG/DL (ref 70–99)
GLUCOSE BLDC GLUCOMTR-MCNC: 160 MG/DL (ref 70–99)
GLUCOSE BLDC GLUCOMTR-MCNC: 170 MG/DL (ref 70–99)
GLUCOSE BLDC GLUCOMTR-MCNC: 99 MG/DL (ref 70–99)
GLUCOSE SERPL-MCNC: 173 MG/DL (ref 70–99)
HCT VFR BLD AUTO: 25.2 % (ref 40–53)
HGB BLD-MCNC: 8.5 G/DL (ref 13.3–17.7)
IMM GRANULOCYTES # BLD: 0.1 10E9/L (ref 0–0.4)
IMM GRANULOCYTES NFR BLD: 0.6 %
LYMPHOCYTES # BLD AUTO: 1.6 10E9/L (ref 0.8–5.3)
LYMPHOCYTES NFR BLD AUTO: 14.8 %
MCH RBC QN AUTO: 28.9 PG (ref 26.5–33)
MCHC RBC AUTO-ENTMCNC: 33.7 G/DL (ref 31.5–36.5)
MCV RBC AUTO: 86 FL (ref 78–100)
MONOCYTES # BLD AUTO: 0.9 10E9/L (ref 0–1.3)
MONOCYTES NFR BLD AUTO: 8.9 %
NEUTROPHILS # BLD AUTO: 7.8 10E9/L (ref 1.6–8.3)
NEUTROPHILS NFR BLD AUTO: 74.4 %
PLATELET # BLD AUTO: 368 10E9/L (ref 150–450)
POTASSIUM SERPL-SCNC: 4.4 MMOL/L (ref 3.4–5.3)
RBC # BLD AUTO: 2.94 10E12/L (ref 4.4–5.9)
SODIUM SERPL-SCNC: 138 MMOL/L (ref 133–144)
WBC # BLD AUTO: 10.4 10E9/L (ref 4–11)

## 2017-08-29 PROCEDURE — 25000128 H RX IP 250 OP 636: Performed by: HOSPITALIST

## 2017-08-29 PROCEDURE — 12000000 ZZH R&B MED SURG/OB

## 2017-08-29 PROCEDURE — 36415 COLL VENOUS BLD VENIPUNCTURE: CPT | Performed by: HOSPITALIST

## 2017-08-29 PROCEDURE — 85025 COMPLETE CBC W/AUTO DIFF WBC: CPT | Performed by: HOSPITALIST

## 2017-08-29 PROCEDURE — A9585 GADOBUTROL INJECTION: HCPCS | Performed by: HOSPITALIST

## 2017-08-29 PROCEDURE — 00000146 ZZHCL STATISTIC GLUCOSE BY METER IP

## 2017-08-29 PROCEDURE — 73723 MRI JOINT LWR EXTR W/O&W/DYE: CPT | Mod: RT

## 2017-08-29 PROCEDURE — 25000132 ZZH RX MED GY IP 250 OP 250 PS 637: Performed by: INTERNAL MEDICINE

## 2017-08-29 PROCEDURE — 99233 SBSQ HOSP IP/OBS HIGH 50: CPT | Performed by: INTERNAL MEDICINE

## 2017-08-29 PROCEDURE — 80048 BASIC METABOLIC PNL TOTAL CA: CPT | Performed by: HOSPITALIST

## 2017-08-29 PROCEDURE — 25000132 ZZH RX MED GY IP 250 OP 250 PS 637: Performed by: HOSPITALIST

## 2017-08-29 RX ORDER — GADOBUTROL 604.72 MG/ML
11 INJECTION INTRAVENOUS ONCE
Status: COMPLETED | OUTPATIENT
Start: 2017-08-29 | End: 2017-08-29

## 2017-08-29 RX ORDER — FERROUS SULFATE 325(65) MG
325 TABLET ORAL 2 TIMES DAILY WITH MEALS
Status: DISCONTINUED | OUTPATIENT
Start: 2017-08-29 | End: 2017-09-03 | Stop reason: HOSPADM

## 2017-08-29 RX ORDER — PSEUDOEPHEDRINE HCL 30 MG
30 TABLET ORAL EVERY 6 HOURS PRN
Status: DISCONTINUED | OUTPATIENT
Start: 2017-08-29 | End: 2017-09-03 | Stop reason: HOSPADM

## 2017-08-29 RX ADMIN — CEFTRIAXONE 1 G: 1 INJECTION, POWDER, FOR SOLUTION INTRAMUSCULAR; INTRAVENOUS at 11:28

## 2017-08-29 RX ADMIN — HYDROCODONE BITARTRATE AND ACETAMINOPHEN 1 TABLET: 5; 325 TABLET ORAL at 08:30

## 2017-08-29 RX ADMIN — FERROUS SULFATE TAB 325 MG (65 MG ELEMENTAL FE) 325 MG: 325 (65 FE) TAB at 11:30

## 2017-08-29 RX ADMIN — GLIMEPIRIDE 4 MG: 4 TABLET ORAL at 08:53

## 2017-08-29 RX ADMIN — VANCOMYCIN HYDROCHLORIDE 2000 MG: 5 INJECTION, POWDER, LYOPHILIZED, FOR SOLUTION INTRAVENOUS at 12:21

## 2017-08-29 RX ADMIN — VANCOMYCIN HYDROCHLORIDE 2000 MG: 5 INJECTION, POWDER, LYOPHILIZED, FOR SOLUTION INTRAVENOUS at 01:28

## 2017-08-29 RX ADMIN — LINAGLIPTIN 5 MG: 5 TABLET, FILM COATED ORAL at 08:54

## 2017-08-29 RX ADMIN — GADOBUTROL 11 ML: 604.72 INJECTION INTRAVENOUS at 06:09

## 2017-08-29 RX ADMIN — HYDROCODONE BITARTRATE AND ACETAMINOPHEN 1 TABLET: 5; 325 TABLET ORAL at 19:25

## 2017-08-29 RX ADMIN — FERROUS SULFATE TAB 325 MG (65 MG ELEMENTAL FE) 325 MG: 325 (65 FE) TAB at 17:03

## 2017-08-29 RX ADMIN — HYDROCODONE BITARTRATE AND ACETAMINOPHEN 1 TABLET: 5; 325 TABLET ORAL at 13:52

## 2017-08-29 NOTE — CONSULTS
[facility] Orthopedic Consultation    Ry Horner MRN# [id number]   Age: 52 year old YOB: 1965     Date of Admission:  8/28/2017    Reason for consult: Non healing ulcer RLE       Requesting physician: Milena Cornejo                   Assessment and Plan:   Assessment:   Right heel ulcer of 2 years duration with recurrence and recent increase in drainage with ? Underlying calcaneal osteomyelitis      Plan:   Continue IV antibiotics  Dressing changes by following podiatry service  NWB RLE with elevation as much as possible in acute phase  MRI pending of right foot for further evaluation of ? Involvement of underlying bone  Will discuss further with one of my partners who more routinely performs amputations for further counseling of patient regarding options moving forward.  No urgency at present given patient is not septic so would like to optimize surgical timing/management and allow for full appropriate counseling regarding options/outcomes/expectations moving forward.            Chief Complaint:   Infected right heel ulcer in diabetic patient       History is obtained from the patient         History of Present Illness:   This patient is a 52 year old male who presents with the following condition requiring a hospital admission:      Ry is a 52 yoM with PMH of diabetes who presents for ongoing management of a right heel ulcer.  Previously followed by podiatry and would clinic.  He has had a two year history of issues with this and has previously undergone debridement including down to bone with skin flap in 2016 by Dr. Douglas.  He has had intermittent recurrence and states he has always had at least a small pin point hole for the past two years without complete resolution.  Recently increased pain (which he has not noted before) and drainage.  No fevers/chills. Vitals have been stable since admission.  Was seen by Dr. Cevallos who placed a consult for orthopedics for further evaluation for  need of possible amputation and assist in counseling patient.    At this point he states he feels well other than some heel pain.  Dressing changes have been uneventful.  Having trouble bearing weight since Saturday for any period of time and swelling has been present in foot.    ABIs and Doppler of calf was done on admission. No evidence of DVT.  ABIs were normal with no evidence of vascular compromise to the limb.    Patient denies tobacco or current heavy ETOH use.           Past Medical History:   I have reviewed this patient's past medical history          Past Surgical History:   I have reviewed this patient's past surgical history          Social History:   I have reviewed this patient's social history and commented on significant items within the HPI          Family History:   I have reviewed this patient's family history          Immunizations:   Immunization record in chart reviewed          Allergies:   All allergies reviewed and addressed          Medications:     Current Facility-Administered Medications   Medication     [START ON 8/29/2017] glimepiride (AMARYL) tablet 4 mg     linagliptin (TRADJENTA) tablet 5 mg     [START ON 8/29/2017] cefTRIAXone (ROCEPHIN) 1 g vial to attach to  mL bag for ADULTS or NS 50 mL bag for PEDS     hydrALAZINE (APRESOLINE) injection 10 mg     glucose 40 % gel 15-30 g    Or     dextrose 50 % injection 25-50 mL    Or     glucagon injection 1 mg     naloxone (NARCAN) injection 0.1-0.4 mg     insulin aspart (NovoLOG) inj (RAPID ACTING)     insulin aspart (NovoLOG) inj (RAPID ACTING)     0.9% sodium chloride infusion     acetaminophen (TYLENOL) tablet 650 mg     HYDROcodone-acetaminophen (NORCO) 5-325 MG per tablet 1-2 tablet     HYDROmorphone (PF) (DILAUDID) injection 0.3-0.5 mg     magnesium hydroxide (MILK OF MAGNESIA) suspension 30 mL     bisacodyl (DULCOLAX) Suppository 10 mg     senna-docusate (SENOKOT-S;PERICOLACE) 8.6-50 MG per tablet 1-2 tablet     ondansetron  (ZOFRAN-ODT) ODT tab 4 mg    Or     ondansetron (ZOFRAN) injection 4 mg     prochlorperazine (COMPAZINE) injection 5-10 mg    Or     prochlorperazine (COMPAZINE) tablet 5-10 mg    Or     prochlorperazine (COMPAZINE) Suppository 25 mg     [START ON 8/29/2017] vancomycin (VANCOCIN) 2,000 mg in NaCl 0.9 % 500 mL intermittent infusion     0.9% sodium chloride BOLUS             Review of Systems:   A comprehensive review of systems was performed and found to be negative except as described in this note          Physical Exam:   Vitals were reviewed  All vitals have been reviewed  Well appearing, resting comfortably in bed      Psychiatric: Interactive with appropriate mood and affect.          Respiratory: Breathing is regular & unlabored while sitting.       CN II-XII grossly intact.  No evidence of poor dentition or breakdown    RLE: ulcer to plantar right heel measures approximately 1.0cm x 1.0cm x 1.6cm in depth.  Does appear to probe to bone. Malodorous. No significant surrounding cellulitis.  Erythema of wound edges.  No significant undermining of wound edges.  Purulence noted.  Full pain free ankle ROM without effusion.  Hind and forefoot alignment appropriate without charcot deformity appreciated.      Vascular: DP & PT pulses are intact & regular bilaterally.  No significant RLE edema.       Neurologic: Lower extremity sensation is diminished bilaterally consistent with diabetic peripheral neuropathy.             Data:   All laboratory data reviewed  WBC: 13.5                 CRP 85.1         A1C: 7.6    All imaging studies reviewed by me.  Agree with interval change in calcaneous appearance of plain radiographs with concern for osteomyelitis  Ankle/foot MRI pending     Attestation:  I appreciate the consultation and opportunity to be involved in Mr. Horner's care.  All questions were answered to his satisfaction at this time and he is agreement with the above noted plan.    Kristi Quintana MD  [facility]  [phone]    Kristi Quintana MD

## 2017-08-29 NOTE — PROGRESS NOTES
[facility]    Hospitalist Progress Note    Date of Service (when I saw the patient): 08/29/2017    Assessment & Plan   Mr. Horner is a 51 y/o male with a medical history remarkable for morbid obesity, DM, HTN, HLD, CKD with baseline creat at 1.1 and chronic R heel ulcer who presented to the ED with complaints of pain and swelling involving his R foot/ ankle.     R foot infection  With h/o ulcer of his foot of 2 years, s/p grafting and wound vac at some point. Admit 7/2016 with concern for early osteomyelitis; however calcaneal biopsy done at the time with no evidence of osteomyelitis at that time. To ED 8/28 with ~ 1 week of incfrased swelling of his R foot as well as drainage and warmth, no fever. Seen in Podiatry clinic who recommended to come to ED.   - on admit with elevated WBC to 13.5, ESR to 105, CRP at 85.1  - MRI this am with changes in calcaneous suspicious for osteo  - ortho has been consulted and following  - continues on vanco/ ceftx, although now with noted likely osteo ? Benefit of abx (suspect will need amputation)-> defer to orthopedics  - non weight bearing as per ortho    DM II  Outpatient on linagliptin, glimepiride, metformin  - metformin on hold  - SSI ordered  - BS sl high but monitoring  - A1C 8/28 at 7.6%    HTN  Outpatient on HCTZ and lisinopril.   - currently on hold given hypotension overnight, follow for restart    HLD  H/o intolerance to statins  - working with primary re: statin/ defer to primary    CKD  Baseline creat at 1.1, stable  - some proteinuria noted in 6/2017, likely representing underlying diabetic nephropathy    Anemia  hgb 9.9 on admission 8/28, at 8.5 on 8/29  - t-sat low  - will start on oral iron replacement    FEN (fluids, electrolytes and nutrition):  GI prophylaxis:  Discussed with nursing.  DVT Prophylaxis: Pneumatic Compression Devices  Code Status: Full Code    Disposition: Expected discharge unclear, as per ortho.    Ry Hilliard,  MD  [phone] (P)  [phone] (C)  Text Page until 6 pm (after call answering service)    Interval History   Doing ok. Denies cp/sob. Pain in foot is largely controlled    -Data reviewed today: I reviewed all new labs and imaging results over the last 24 hours. I personally reviewed no images or EKG's today.    Physical Exam   Temp: 98.5  F (36.9  C) Temp src: Oral BP: 111/75 Pulse: 93 Heart Rate: 87 Resp: 18 SpO2: 96 % O2 Device: None (Room air)    There were no vitals filed for this visit.  Vital Signs with Ranges  Temp:  [98.5  F (36.9  C)-99.9  F (37.7  C)] 98.5  F (36.9  C)  Pulse:  [] 93  Heart Rate:  [] 87  Resp:  [16-18] 18  BP: ()/(44-75) 111/75  SpO2:  [96 %-97 %] 96 %  I/O last 3 completed shifts:  In: 2740 [P.O.:940; I.V.:300; IV Piggyback:1500]  Out: 650 [Urine:650]    Constitutional: Alert, oriented, no acute distress  Respiratory: Lungs clear to auscultation bilaterally, no wheezes, no crackles  Cardiovascular: Regular rate and rhythm, no murmurs  GI: Soft, non-tender, non-disteneded, good bowel sounds  Skin/Integumen: No erythema, cyanosis or edema. R foot bandaged, c/d/i  Other:      Medications     NaCl 125 mL/hr at 08/28/17 2143       glimepiride  4 mg Oral QAM AC     linagliptin  5 mg Oral Daily     cefTRIAXone  1 g Intravenous Q24H     insulin aspart  1-10 Units Subcutaneous TID AC     insulin aspart  1-7 Units Subcutaneous At Bedtime     vancomycin (VANCOCIN) IV  2,000 mg Intravenous Q12H     sodium chloride 0.9%  500 mL Intravenous Once       Data     Recent Labs  Lab 08/29/17  0801 08/28/17  1045   WBC 10.4 13.5*   HGB 8.5* 9.9*   MCV 86 85    392    134   POTASSIUM 4.4 4.6   CHLORIDE 106 102   CO2 26 27   BUN 21 27   CR 1.09 1.10   ANIONGAP 6 5   FERNANDO 8.3* 8.9   * 187*       Recent Results (from the past 24 hour(s))   US Lower Extremity Venous Duplex Right    Narrative    VENOUS ULTRASOUND RIGHT LEG  8/28/2017 4:11 PM     HISTORY: Rule out deep vein  thrombosis; right leg.    COMPARISON: None.    FINDINGS:  Examination of the deep veins with graded compression and  color flow Doppler with spectral wave form analysis shows no evidence  of thrombus in the common femoral vein, femoral vein, popliteal vein  or calf veins.        Impression    IMPRESSION: No evidence of deep venous thrombosis.    US KAREN Doppler No Exercise    Narrative    ULTRASOUND ANKLE-BRACHIAL INDEX DOPPLER NO EXERCISE   8/28/2017 4:11  PM     HISTORY: Rule out peripheral arterial disease.      COMPARISON: None.    FINDINGS:  Right KAREN: 0.93  Left KAREN: 1.02    Waveforms are triphasic.    No exercise was done due to ulcer on bottom of the right heel.      Impression    IMPRESSION: Normal ABIs bilaterally without evidence of arterial  insufficiency.   MR Ankle Right w/o & w Contrast    Narrative    MR RIGHT ANKLE WITHOUT AND WITH CONTRAST  8/29/2017 6:17 AM     HISTORY: Assess bone infection right heel.    CONTRAST DOSE:  11 mL Gadavist.    TECHNIQUE: Radiation dose for this scan was reduced using automated  exposure control, adjustment of the mA and/or kV according to patient  size, or iterative reconstruction technique.    FINDINGS:  There is a fracture within the posterior process of the  calcaneus extending from the superior cortex immediately anterior to  the Achilles tendon attachment to the plantar aponeurotic attachment,  new since 6/23/2016. Prominent adjacent marrow edema and enhancement  is noted which nearly fills the entire calcaneus. There appears to be  a large wound along the plantar aspect of the heel extending to the  inferior aspect of the fracture site. Severe plantar fasciitis or  plantar fascial tear is noted at the calcaneal attachment. This has  progressed since 6/23/2016 as well. Subchondral cysts and marrow edema  are noted about the first, second, third, and fourth tarsometatarsal  joints. No talar dome osteochondral lesion is demonstrated. There is a  nonspecific small  tibiotalar joint effusion. Anterior talofibular  ligament and remainder of the fibular collateral ligament complex  appears within normal limits. Peroneal tendons as well as the long  flexor tendons appear within normal limits at the level of the ankle.  The deltoid ligament appears within normal limits.      Impression    IMPRESSION:  1. Large plantar heel pad wound or sinus with rim enhancement  extending from the calcaneus to the dermis.  2. Calcaneal posterior process fracture, new since 6/23/2016.  3. Ill-defined marrow edema and enhancement throughout the calcaneus.  While this may be in part related to the posterior process fracture,  given the adjacent wound or draining sinus, this is highly suspicious  for osteomyelitis.  4. Severe plantar fasciitis with suspected plantar fascial rupture at  the calcaneal attachment which corresponds with the site of the soft  tissue wound.  5. Marrow edema and subchondral cysts involving the first through  fourth tarsometatarsal joints. While nonspecific, this may be related  to early degenerative arthrosis. Early neuroarthropathy cannot be  excluded. However, joint alignment appears to be grossly normal.  6. Subcutaneous edema about the ankle. It should be noted that MR  cannot distinguish reactive edema from cellulitis. No discrete soft  tissue fluid collection or abscess is otherwise visible.  7. Nonspecific small tibiotalar/subtalar and talonavicular joint  effusions.    JOHN FALCON MD

## 2017-08-29 NOTE — PROGRESS NOTES
Called by nursing staff regarding hypotension, nursing states that patient is mentation is normal. Ordered 500cc fluid bolus over one hour with lactate.

## 2017-08-29 NOTE — PLAN OF CARE
Problem: Goal Outcome Summary  Goal: Goal Outcome Summary  Outcome: No Change  A&Ox4.  SBP 70's for most of shift, 1L and 500ml bolus given, last check 89/65.  Lightheaded with low blood pressures, improving.  Lactic acid 1.5.  Up with 1 and gait belt to bathroom x1, unable to bear weight on right heel and unsteady.  Podiatry saw and changed dressing.  IV antibiotics.  MRI not completed tonight due to low BP.  US negative for DVT.  BG 96 and 224.

## 2017-08-29 NOTE — PLAN OF CARE
Problem: Goal Outcome Summary  Goal: Goal Outcome Summary  Outcome: No Change  VSS on RA, up with 1/belt, A&Ox4. Denies pain. Voiding appropriately. BP mica to 97/53 this shift. . NS running at 125 mL/hr. Movement is impeded; unable to put much weight on R heel. IV abx. DC pending progress. Nursing will continue to monitor.

## 2017-08-29 NOTE — PLAN OF CARE
Problem: Goal Outcome Summary  Goal: Goal Outcome Summary  Outcome: No Change  VSS. RA. A&Ox4. Pain managed with Norco. R heel dressing leaking with large amount of serosanguinous drainage, dressing changed. Up SBA, no wt bear RLE. Tolerating mod cho diet,  and 122. MRI done this AM. Awaiting ortho plan. Continue to monitor.     [id number]: no significant changes, BG 99. Dressing remains CDI. VSS. Possible amputation in future, surgery date unknown.

## 2017-08-29 NOTE — PROVIDER NOTIFICATION
MD Notification    Notified Person:  MD    Notified Persons Name:Dr Mc    Notification Date/Time:8/28/17 1940    Notification Interaction:  Talked with Physician    Purpose of Notification:Continued low blood pressure, 77/52 and 75/51.  Heart rate .  Pt states feels lightheaded.    Orders Received:MD to order fluids and lactic acid level.    Comments:

## 2017-08-30 LAB
BACTERIA SPEC CULT: ABNORMAL
CREAT SERPL-MCNC: 1.09 MG/DL (ref 0.66–1.25)
ERYTHROCYTE [DISTWIDTH] IN BLOOD BY AUTOMATED COUNT: 11.7 % (ref 10–15)
GFR SERPL CREATININE-BSD FRML MDRD: 72 ML/MIN/1.7M2
GLUCOSE BLDC GLUCOMTR-MCNC: 103 MG/DL (ref 70–99)
GLUCOSE BLDC GLUCOMTR-MCNC: 126 MG/DL (ref 70–99)
GLUCOSE BLDC GLUCOMTR-MCNC: 129 MG/DL (ref 70–99)
GLUCOSE BLDC GLUCOMTR-MCNC: 146 MG/DL (ref 70–99)
GLUCOSE BLDC GLUCOMTR-MCNC: 197 MG/DL (ref 70–99)
GLUCOSE BLDC GLUCOMTR-MCNC: 60 MG/DL (ref 70–99)
GLUCOSE BLDC GLUCOMTR-MCNC: 65 MG/DL (ref 70–99)
GLUCOSE BLDC GLUCOMTR-MCNC: 90 MG/DL (ref 70–99)
HCT VFR BLD AUTO: 24.1 % (ref 40–53)
HGB BLD-MCNC: 8.3 G/DL (ref 13.3–17.7)
Lab: ABNORMAL
MCH RBC QN AUTO: 29.3 PG (ref 26.5–33)
MCHC RBC AUTO-ENTMCNC: 34.4 G/DL (ref 31.5–36.5)
MCV RBC AUTO: 85 FL (ref 78–100)
PLATELET # BLD AUTO: 280 10E9/L (ref 150–450)
RBC # BLD AUTO: 2.83 10E12/L (ref 4.4–5.9)
SPECIMEN SOURCE: ABNORMAL
VANCOMYCIN SERPL-MCNC: 20.6 MG/L
WBC # BLD AUTO: 9.1 10E9/L (ref 4–11)

## 2017-08-30 PROCEDURE — 25000128 H RX IP 250 OP 636: Performed by: HOSPITALIST

## 2017-08-30 PROCEDURE — 12000000 ZZH R&B MED SURG/OB

## 2017-08-30 PROCEDURE — 99233 SBSQ HOSP IP/OBS HIGH 50: CPT | Performed by: INTERNAL MEDICINE

## 2017-08-30 PROCEDURE — 25000132 ZZH RX MED GY IP 250 OP 250 PS 637: Performed by: INTERNAL MEDICINE

## 2017-08-30 PROCEDURE — 36415 COLL VENOUS BLD VENIPUNCTURE: CPT | Performed by: HOSPITALIST

## 2017-08-30 PROCEDURE — 80202 ASSAY OF VANCOMYCIN: CPT | Performed by: HOSPITALIST

## 2017-08-30 PROCEDURE — 85027 COMPLETE CBC AUTOMATED: CPT | Performed by: HOSPITALIST

## 2017-08-30 PROCEDURE — 82565 ASSAY OF CREATININE: CPT | Performed by: HOSPITALIST

## 2017-08-30 PROCEDURE — 25000132 ZZH RX MED GY IP 250 OP 250 PS 637: Performed by: HOSPITALIST

## 2017-08-30 PROCEDURE — 00000146 ZZHCL STATISTIC GLUCOSE BY METER IP

## 2017-08-30 RX ADMIN — VANCOMYCIN HYDROCHLORIDE 2000 MG: 5 INJECTION, POWDER, LYOPHILIZED, FOR SOLUTION INTRAVENOUS at 14:04

## 2017-08-30 RX ADMIN — HYDROCODONE BITARTRATE AND ACETAMINOPHEN 2 TABLET: 5; 325 TABLET ORAL at 21:51

## 2017-08-30 RX ADMIN — PSEUDOEPHEDRINE HCL 30 MG: 30 TABLET, FILM COATED ORAL at 21:51

## 2017-08-30 RX ADMIN — HYDROCODONE BITARTRATE AND ACETAMINOPHEN 2 TABLET: 5; 325 TABLET ORAL at 17:55

## 2017-08-30 RX ADMIN — FERROUS SULFATE TAB 325 MG (65 MG ELEMENTAL FE) 325 MG: 325 (65 FE) TAB at 17:55

## 2017-08-30 RX ADMIN — HYDROCODONE BITARTRATE AND ACETAMINOPHEN 1 TABLET: 5; 325 TABLET ORAL at 14:03

## 2017-08-30 RX ADMIN — VANCOMYCIN HYDROCHLORIDE 2000 MG: 5 INJECTION, POWDER, LYOPHILIZED, FOR SOLUTION INTRAVENOUS at 01:56

## 2017-08-30 RX ADMIN — LINAGLIPTIN 5 MG: 5 TABLET, FILM COATED ORAL at 08:20

## 2017-08-30 RX ADMIN — HYDROCODONE BITARTRATE AND ACETAMINOPHEN 2 TABLET: 5; 325 TABLET ORAL at 01:51

## 2017-08-30 RX ADMIN — FERROUS SULFATE TAB 325 MG (65 MG ELEMENTAL FE) 325 MG: 325 (65 FE) TAB at 08:20

## 2017-08-30 RX ADMIN — GLIMEPIRIDE 4 MG: 4 TABLET ORAL at 08:20

## 2017-08-30 RX ADMIN — CEFTRIAXONE 1 G: 1 INJECTION, POWDER, FOR SOLUTION INTRAMUSCULAR; INTRAVENOUS at 11:56

## 2017-08-30 RX ADMIN — HYDROCODONE BITARTRATE AND ACETAMINOPHEN 1 TABLET: 5; 325 TABLET ORAL at 08:26

## 2017-08-30 NOTE — CONSULTS
[facility]  Orthopaedics/Foot and Ankle Surgery Consultation         Nikolas Nascimento MD    Ry Horner MRN# [id number]   YOB: 1965 Age: 52 year old      Date of Admission:  8/28/2017  Date of Consult: 08/29/2017           Assessment and Plan:   51 y/o M w/ PMH significant for DM, obesity, and chronic R heel ulceration with examination and imaging studies now consistent with chronic osteomyelitis.  Poor salvage options at this point given suspected extent of infection and soft tissue envelope.  BKA would allow for eradication of infection and may typically start rehab and WB activity by 6 weeks post-op.  Not septic at this time and unsure if abx would be necessary given this surgical plan, but may be reasonable to keep on board to limit any localized or systemic spread of infection.  Daily dressing changes as ordered.  Will have the prosthetics team evaluate and consult pre-op tomorrow.  Plan for OR on Friday afternoon at 1 p.m.    The benefits and risks of surgery were reviewed extensively with the patient today.  The typical postoperative recovery following a below-knee amputation was also reviewed.  I discussed with the patient that there are no good soft tissue reconstruction options after resection of the area of infected bone that would still allow for reasonable weightbearing activity on a plantigrade or balanced foot.  The risks of infection and need for further surgical procedures were reviewed with the patient as well.  The patient's hemoglobin A1c is higher than ideal but under improved control compared to the patient's historical numbers and mildly increases the risks of infection or delayed wound healing.            Code Status:   Full Code         Primary Care Physician:   Kody Wing [phone]         Requesting Physician:      Dr. Quintana         Chief Complaint:   R chronic heel ulcer, osteomyelitis    History is obtained from the patient and medical  chart.         History of Present Illness:   Ry Horner is a 52 year old male with past medical history significant for diabetes mellitus and a chronic right heel ulcer.  The patient has undergone multiple debridement procedures with podiatry over the past two years to address a chronic ulcer.  The patient has undergone a bone biopsy of the calcaneus in the past without evidence of osteomyelitis.  Due to increasing swelling, drainage, and worsening appearance of the ulcer, the patient presented to the emergency department a few days ago.  Examination is now concerning for osteomyelitis with evidence of bone at the base of the ulcer.  MRI scan also confirms extensive edema throughout the calcaneus and insufficiency fracture of the calcaneus consistent with weakening of the bone from chronic osteomyelitis.  The patient has been admitted to the hospital and is currently on IV antibiotics.  The patient denies fevers, chills, or other systemic signs of infection.  My partner Dr. Quintana evaluated the patient earlier this morning and requested my input and consultation given my expertise in foot and ankle orthopedic surgery and performing below-knee amputations.           Past Medical History:     Patient Active Problem List   Diagnosis     Diabetes mellitus, type 2 (H)     Essential hypertension, benign     Hyperlipidemia     Impotence of organic origin     Ulcer of right heel (H)     Morbid obesity due to excess calories (H)     Open wound of right foot      Past Medical History:   Diagnosis Date     BENIGN HYPERTENSION 4/4/2007     DIABETES MELLITUS TYPE II-UNCOMPL 4/4/2007     HYPERLIPIDEMIA NEC/NOS 4/4/2007     Tobacco use disorder 4/4/2007             Past Surgical History:     Past Surgical History:   Procedure Laterality Date     APPENDECTOMY       APPLY WOUND VAC Right 3/2/2015    Procedure: APPLY WOUND VAC;  Surgeon: Milena Cevallos DPM, Pod;  Location: [facility] OR     BIOPSY BONE FOOT Right 7/15/2016    Procedure:  "BIOPSY BONE FOOT;  Surgeon: Tim Douglsa DPM;  Location: [facility] OR     IRRIGATION AND DEBRIDEMENT FOOT, COMBINED Right 3/2/2015    Procedure: COMBINED IRRIGATION AND DEBRIDEMENT FOOT;  Surgeon: Milena Cevallos DPM, Pod;  Location: [facility] OR     IRRIGATION AND DEBRIDEMENT FOOT, COMBINED Right 7/15/2016    Procedure: COMBINED IRRIGATION AND DEBRIDEMENT FOOT;  Surgeon: Tim Douglas DPM;  Location: [facility] OR     IRRIGATION AND DEBRIDEMENT FOOT, COMBINED Right 7/20/2016    Procedure: COMBINED IRRIGATION AND DEBRIDEMENT FOOT;  Surgeon: Tim Douglas DPM;  Location:  OR     ORTHOPEDIC SURGERY              Home Medications:     Prior to Admission medications    Medication Sig Last Dose Taking? Auth Provider   IBUPROFEN PO Take 600-800 mg by mouth 2 times daily as needed for moderate pain Past Week at Unknown time Yes Unknown, Entered By History   DiphenhydrAMINE HCl (BENADRYL PO) Take 25 mg by mouth daily 8/27/2017 at am Yes Unknown, Entered By History   linagliptin (TRADJENTA) 5 MG TABS tablet Take 1 tablet (5 mg) by mouth daily 8/27/2017 at am Yes Kody Wing MD   hydrochlorothiazide (HYDRODIURIL) 25 MG tablet Take 1 tablet (25 mg) by mouth daily 8/27/2017 at am Yes Kody Wing MD   metFORMIN (GLUCOPHAGE) 1000 MG tablet Take 1 tablet (1,000 mg) by mouth 2 times daily (with meals) 8/27/2017 at Unknown time Yes Kody Wing MD   glimepiride (AMARYL) 4 MG tablet Take 1 tablet (4 mg) by mouth daily  Patient taking differently: Take 4 mg by mouth every morning (before breakfast)  8/27/2017 at am Yes Kody Wing MD   lisinopril (PRINIVIL/ZESTRIL) 20 MG tablet Take 1 tablet (20 mg) by mouth daily 8/27/2017 at am Yes Kody Wing MD   ASPIRIN NOT PRESCRIBED (INTENTIONAL) Please choose reason not prescribed, below   Kody Wing MD   order for [facility] [place] [facility] Fax [phone]    Primary Dressing Karen   Qty 10  Secondary Dressing Mepilex Foam 4x4 Qty 10  Secondary Dressing 2\" Medipore " Tape Qty 1  Length of Need: 1 month  Frequency of dressing change: daily  If Patient has balance on account please call him. Thanks   Milena Cevallos DPM, Podiatry/Foot and Ankle Surgery            Current Medications:           ferrous sulfate  325 mg Oral BID w/meals     [START ON 8/30/2017] vancomycin (VANCOCIN) IV  2,000 mg Intravenous Q12H     glimepiride  4 mg Oral QAM AC     linagliptin  5 mg Oral Daily     cefTRIAXone  1 g Intravenous Q24H     insulin aspart  1-10 Units Subcutaneous TID AC     insulin aspart  1-7 Units Subcutaneous At Bedtime     sodium chloride 0.9%  500 mL Intravenous Once     hydrALAZINE, glucose **OR** dextrose **OR** glucagon, naloxone, acetaminophen, HYDROcodone-acetaminophen, HYDROmorphone, magnesium hydroxide, bisacodyl, senna-docusate, ondansetron **OR** ondansetron, prochlorperazine **OR** prochlorperazine **OR** prochlorperazine         Allergies:     Allergies   Allergen Reactions     Asa [Aspirin] Nausea and Vomiting            Social History:     Social History   Substance Use Topics     Smoking status: Former Smoker     Packs/day: 0.50     Years: 20.00     Types: Cigarettes     Smokeless tobacco: Never Used     Alcohol use Yes      Comment: occasional             Family History:     Family History   Problem Relation Age of Onset     Genetic Disorder Other      Genetic Disorder Other      Psychotic Disorder Mother      DIABETES Father      Lung Cancer Father      Genetic Disorder Maternal Grandmother      Genetic Disorder Maternal Grandfather      Asthma Sister      C.A.D. No family hx of      Hypertension No family hx of      CEREBROVASCULAR DISEASE No family hx of      Breast Cancer No family hx of      Cancer - colorectal No family hx of      Prostate Cancer No family hx of      Alcohol/Drug No family hx of               Review of Systems:   The 10 point Review of Systems is negative other than noted in the HPI            Physical Exam:   Blood pressure 113/74, pulse 93,  temperature 98.3  F (36.8  C), temperature source Oral, resp. rate 18, SpO2 97 %.  0 lbs 0 oz    Constitutional:   Awake, alert, cooperative, no apparent distress, and appears stated age.     Lungs:   No increased work of breathing, good air exchange.     Musculoskeletal:   Dressings with modest drainage around heel.  Left in place for patient comfort.    Exam earlier in the morning demonstrated ulcer measuring approximately 1.0cm x 1.0cm x 1.6cm in depth with probing to bone.  Malodorous.  No significant surrounding cellulitis but erythema of wound edges.  Purulence present.  No significant erythema or concern for infection extending proximally up the leg.               Data:   All new lab and imaging data was reviewed.  MRI of the R ankle and hindfoot with extensive edema throughout the calcaneus with associated sinus tract from the heel ulcer.  Consistent with chronic osteomyelitis.  Insufficiency fracture of the posterior tuberosity is also noted without significant displacement.  There is no substantial edema extending to the other bones of the hindfoot.  Radiographs of the right calcaneus obtained in the emergency department were personally reviewed and confirm extensive cortical erosion throughout the posterior tuberosity consistent with osteomyelitis in addition to a nondisplaced fracture of the posterior tuberosity.    Results for orders placed or performed during the hospital encounter of 08/28/17 (from the past 24 hour(s))   Glucose by meter   Result Value Ref Range    Glucose 170 (H) 70 - 99 mg/dL   MR Ankle Right w/o & w Contrast    Narrative    MR RIGHT ANKLE WITHOUT AND WITH CONTRAST  8/29/2017 6:17 AM     HISTORY: Assess bone infection right heel.    CONTRAST DOSE:  11 mL Gadavist.    TECHNIQUE: Radiation dose for this scan was reduced using automated  exposure control, adjustment of the mA and/or kV according to patient  size, or iterative reconstruction technique.    FINDINGS:  There is a fracture  within the posterior process of the  calcaneus extending from the superior cortex immediately anterior to  the Achilles tendon attachment to the plantar aponeurotic attachment,  new since 6/23/2016. Prominent adjacent marrow edema and enhancement  is noted which nearly fills the entire calcaneus. There appears to be  a large wound along the plantar aspect of the heel extending to the  inferior aspect of the fracture site. Severe plantar fasciitis or  plantar fascial tear is noted at the calcaneal attachment. This has  progressed since 6/23/2016 as well. Subchondral cysts and marrow edema  are noted about the first, second, third, and fourth tarsometatarsal  joints. No talar dome osteochondral lesion is demonstrated. There is a  nonspecific small tibiotalar joint effusion. Anterior talofibular  ligament and remainder of the fibular collateral ligament complex  appears within normal limits. Peroneal tendons as well as the long  flexor tendons appear within normal limits at the level of the ankle.  The deltoid ligament appears within normal limits.      Impression    IMPRESSION:  1. Large plantar heel pad wound or sinus with rim enhancement  extending from the calcaneus to the dermis.  2. Calcaneal posterior process fracture, new since 6/23/2016.  3. Ill-defined marrow edema and enhancement throughout the calcaneus.  While this may be in part related to the posterior process fracture,  given the adjacent wound or draining sinus, this is highly suspicious  for osteomyelitis.  4. Severe plantar fasciitis with suspected plantar fascial rupture at  the calcaneal attachment which corresponds with the site of the soft  tissue wound.  5. Marrow edema and subchondral cysts involving the first through  fourth tarsometatarsal joints. While nonspecific, this may be related  to early degenerative arthrosis. Early neuroarthropathy cannot be  excluded. However, joint alignment appears to be grossly normal.  6. Subcutaneous edema  about the ankle. It should be noted that MR  cannot distinguish reactive edema from cellulitis. No discrete soft  tissue fluid collection or abscess is otherwise visible.  7. Nonspecific small tibiotalar/subtalar and talonavicular joint  effusions.    JOHN FALCON MD   Glucose by meter   Result Value Ref Range    Glucose 160 (H) 70 - 99 mg/dL   Basic metabolic panel   Result Value Ref Range    Sodium 138 133 - 144 mmol/L    Potassium 4.4 3.4 - 5.3 mmol/L    Chloride 106 94 - 109 mmol/L    Carbon Dioxide 26 20 - 32 mmol/L    Anion Gap 6 3 - 14 mmol/L    Glucose 173 (H) 70 - 99 mg/dL    Urea Nitrogen 21 7 - 30 mg/dL    Creatinine 1.09 0.66 - 1.25 mg/dL    GFR Estimate 72 >60 mL/min/1.7m2    GFR Estimate If Black 87 >60 mL/min/1.7m2    Calcium 8.3 (L) 8.5 - 10.1 mg/dL   CBC with platelets differential   Result Value Ref Range    WBC 10.4 4.0 - 11.0 10e9/L    RBC Count 2.94 (L) 4.4 - 5.9 10e12/L    Hemoglobin 8.5 (L) 13.3 - 17.7 g/dL    Hematocrit 25.2 (L) 40.0 - 53.0 %    MCV 86 78 - 100 fl    MCH 28.9 26.5 - 33.0 pg    MCHC 33.7 31.5 - 36.5 g/dL    RDW 11.5 10.0 - 15.0 %    Platelet Count 368 150 - 450 10e9/L    Diff Method Automated Method     % Neutrophils 74.4 %    % Lymphocytes 14.8 %    % Monocytes 8.9 %    % Eosinophils 1.1 %    % Basophils 0.2 %    % Immature Granulocytes 0.6 %    Absolute Neutrophil 7.8 1.6 - 8.3 10e9/L    Absolute Lymphocytes 1.6 0.8 - 5.3 10e9/L    Absolute Monocytes 0.9 0.0 - 1.3 10e9/L    Absolute Eosinophils 0.1 0.0 - 0.7 10e9/L    Absolute Basophils 0.0 0.0 - 0.2 10e9/L    Abs Immature Granulocytes 0.1 0 - 0.4 10e9/L   Glucose by meter   Result Value Ref Range    Glucose 122 (H) 70 - 99 mg/dL   Glucose by meter   Result Value Ref Range    Glucose 99 70 - 99 mg/dL   Glucose by meter   Result Value Ref Range    Glucose 123 (H) 70 - 99 mg/dL

## 2017-08-30 NOTE — PLAN OF CARE
Problem: Goal Outcome Summary  Goal: Goal Outcome Summary  Outcome: No Change  VSS. RA.  A&Ox4. Up SBA, no wt bear RLE.  Pain managed with Norco. R heel dressing leaking  large amount of tan/brown drainage, dressing changed. Tolerating mod cho diet, BG not needing SSI.  @ dinner had hypoglycemic episode- 60/65/90/146 .  Tentative plan for BKA Friday, awaiting ortho consult to answer questions. Continue to monitor.

## 2017-08-30 NOTE — PROVIDER NOTIFICATION
Brief update:    Paged re: nasal congestion.    Pseudoephedrine ordered q6h PRN    Eder Ragland MD  11:30 PM

## 2017-08-30 NOTE — PLAN OF CARE
Problem: Goal Outcome Summary  Goal: Goal Outcome Summary  Outcome: No Change  A/O, VSS on RA and afebrile. C/O pain in Rt foot, dressing with dried drainage. PRN norco given for pain, effective relief of pain obtained. + 2 edema in Rt LE.  Ortho on board, surgery scheduled for Friday. Up with SBA-Non wt bearing. Calls appropriately. IV anbx, Mod Cho diet. BGs unremarkable, no coverage needed. D/C pending

## 2017-08-30 NOTE — PROGRESS NOTES
[facility]    Hospitalist Progress Note    Date of Service (when I saw the patient): 08/30/2017    Assessment & Plan   Mr. Horner is a 53 y/o male with a medical history remarkable for morbid obesity, DM, HTN, HLD, CKD with baseline creat at 1.1 and chronic R heel ulcer who presented to the ED with complaints of pain and swelling involving his R foot/ ankle.     R foot infection  With h/o ulcer of his foot of 2 years, s/p grafting and wound vac at some point. Admit 7/2016 with concern for early osteomyelitis; however calcaneal biopsy done at the time with no evidence of osteomyelitis at that time. To ED 8/28 with ~ 1 week of incfrased swelling of his R foot as well as drainage and warmth, no fever. Seen in Podiatry clinic who recommended to come to ED.   - on admit with elevated WBC to 13.5, ESR to 105, CRP at 85.1-> WBC improved 8/30  - MRI 8/29 consistent with osteo  - Dr. Nascimento has seen pt and plans for BKA on R Friday per notes  - continue on antibiotics until surgery; growing strep and proteus and vanco/ ceftx should cover both (may not need vanco but given OR Friday and uncertain if other bacteria involved)  - blood cultures negative from 8/28  - non weight bearing as per ortho  - KAREN's done without evidence of arterial insuffficiency (8/29)    DM II  Outpatient on linagliptin, glimepiride, metformin  - metformin on hold  - SSI ordered  - BS under very good control at this point; avoid lows  - A1C 8/28 at 7.6% (much improved from previous)    HTN  Outpatient on HCTZ and lisinopril.   - currently on hold given hypotension  - monitoring    HLD  H/o intolerance to statins  - working with primary re: statin/ defer to primary    CKD  Baseline creat at 1.1, stable  - some proteinuria noted in 6/2017, likely representing underlying diabetic nephropathy    Anemia  hgb 9.9 on admission 8/28, at 8.5 on 8/29  - t-sat low  - will start on oral iron replacement    FEN (fluids, electrolytes and nutrition):  CHO diet  Discussed with nursing.  DVT Prophylaxis: Pneumatic Compression Devices  Code Status: Full Code    Disposition: Expected discharge 2+ days, after planned BKA per ortho.    Ry Hilliard MD  [phone] (P)  [phone] (C)  Text Page until 6 pm (after call answering service)    Interval History   Doing ok. Denies cp/sob. Dr. Nascimento has discussed amputation, prosthetics visiting with patient this afternoon    -Data reviewed today: I reviewed all new labs and imaging results over the last 24 hours. I personally reviewed no images or EKG's today.    Physical Exam   Temp: 98.6  F (37  C) Temp src: Oral BP: 107/70 Pulse: 80 Heart Rate: 96 Resp: 16 SpO2: 96 % O2 Device: None (Room air)    There were no vitals filed for this visit.  Vital Signs with Ranges  Temp:  [98.3  F (36.8  C)-98.6  F (37  C)] 98.6  F (37  C)  Pulse:  [80] 80  Heart Rate:  [92-96] 96  Resp:  [14-18] 16  BP: (104-113)/(63-74) 107/70  SpO2:  [96 %-97 %] 96 %  I/O last 3 completed shifts:  In: 1880 [P.O.:1280; I.V.:600]  Out: 1975 [Urine:1975]    Constitutional: Alert, oriented, no acute distress  Respiratory: Lungs clear to auscultation bilaterally, no wheezes, no crackles  Cardiovascular: Regular rate and rhythm, no murmurs  GI: Soft, non-tender, non-disteneded, good bowel sounds  Skin/Integumen: No erythema, cyanosis or edema. R foot bandaged, c/d/i  Other:      Medications        ferrous sulfate  325 mg Oral BID w/meals     vancomycin (VANCOCIN) IV  2,000 mg Intravenous Q12H     glimepiride  4 mg Oral QAM AC     linagliptin  5 mg Oral Daily     cefTRIAXone  1 g Intravenous Q24H     insulin aspart  1-10 Units Subcutaneous TID AC     insulin aspart  1-7 Units Subcutaneous At Bedtime     sodium chloride 0.9%  500 mL Intravenous Once       Data     Recent Labs  Lab 08/30/17  0923 08/29/17  0801 08/28/17  1045   WBC 9.1 10.4 13.5*   HGB 8.3* 8.5* 9.9*   MCV 85 86 85    368 392   NA  --  138 134   POTASSIUM  --  4.4 4.6   CHLORIDE   --  106 102   CO2  --  26 27   BUN  --  21 27   CR 1.09 1.09 1.10   ANIONGAP  --  6 5   FERNANDO  --  8.3* 8.9   GLC  --  173* 187*       No results found for this or any previous visit (from the past 24 hour(s)).

## 2017-08-30 NOTE — PHARMACY-VANCOMYCIN DOSING SERVICE
Pharmacy Vancomycin Note  Date of Service 2017  Patient's  1965   52 year old, male    Indication: Osteomyelitis  Goal Trough Level: 15-20 mg/L  Day of Therapy: 3  Current Vancomycin regimen:  2000 mg IV q12h    Current estimated CrCl = Estimated Creatinine Clearance: 106.2 mL/min (based on Cr of 1.09).    Creatinine for last 3 days  2017: 10:45 AM Creatinine 1.10 mg/dL  2017:  8:01 AM Creatinine 1.09 mg/dL  2017:  9:23 AM Creatinine 1.09 mg/dL    Recent Vancomycin Levels (past 3 days)  2017: 11:35 AM Vancomycin Level 20.6 mg/L    Vancomycin IV Administrations (past 72 hours)                   vancomycin (VANCOCIN) 2,000 mg in NaCl 0.9 % 500 mL intermittent infusion (mg) 2,000 mg New Bag 17 1404     2,000 mg New Bag  0156    vancomycin (VANCOCIN) 2,000 mg in NaCl 0.9 % 500 mL intermittent infusion (mg) 2,000 mg New Bag 17 1221     2,000 mg New Bag  0128                Nephrotoxins and other renal medications (Future)    Start     Dose/Rate Route Frequency Ordered Stop    17 0030  vancomycin (VANCOCIN) 2,000 mg in NaCl 0.9 % 500 mL intermittent infusion      2,000 mg Intravenous EVERY 12 HOURS 17 1606               Contrast Orders - past 72 hours (72h ago through future)    Start     Dose/Rate Route Frequency Ordered Stop    17 0615  gadobutrol (GADAVIST) injection 11 mL      11 mL Intravenous ONCE 17 0607 17 0609          Interpretation of levels and current regimen:  Trough level is  Therapeutic    Has serum creatinine changed > 50% in last 72 hours: No    Urine output:  good urine output    Renal Function: Stable    Plan:  1.  Continue Current Dose  2.  Pharmacy will check trough levels as appropriate in 1-3 Days.    3. Serum creatinine levels will be ordered daily for the first week of therapy and at least twice weekly for subsequent weeks.      Yvette Portillo, Pharm.D          .

## 2017-08-30 NOTE — PROGRESS NOTES
Foot & Ankle Surgery  August 29, 2017    Patient was seen this PM for follow up on MRI results.  He was seen by Dr Quintana from [facility] for discussion of BK amp.  He was also seen by Dr Nascimento today, awaiting note.  Plan, based on extent of infection noted on MRI, is for BK amp, scheduled for 9/1/17.    Reviewed MRI results:    IMPRESSION:  1. Large plantar heel pad wound or sinus with rim enhancement  extending from the calcaneus to the dermis.  2. Calcaneal posterior process fracture, new since 6/23/2016.  3. Ill-defined marrow edema and enhancement throughout the calcaneus.  While this may be in part related to the posterior process fracture,  given the adjacent wound or draining sinus, this is highly suspicious  for osteomyelitis.  4. Severe plantar fasciitis with suspected plantar fascial rupture at  the calcaneal attachment which corresponds with the site of the soft  tissue wound.  5. Marrow edema and subchondral cysts involving the first through  fourth tarsometatarsal joints. While nonspecific, this may be related  to early degenerative arthrosis. Early neuroarthropathy cannot be  excluded. However, joint alignment appears to be grossly normal.  6. Subcutaneous edema about the ankle. It should be noted that MR  cannot distinguish reactive edema from cellulitis. No discrete soft  tissue fluid collection or abscess is otherwise visible.  7. Nonspecific small tibiotalar/subtalar and talonavicular joint  effusions.       Patient has question about surgical timing as well as FMLA/short-term disability.  Will defer to Ortho for this.      Will sign off.  Please call with questions or acute changes to patient/wound status.  Order placed for daily dressing change until surgery     Joseph Randhawa DPM   Podiatric Foot & Ankle Surgeon  [facility]

## 2017-08-31 LAB
CREAT SERPL-MCNC: 1.03 MG/DL (ref 0.66–1.25)
GFR SERPL CREATININE-BSD FRML MDRD: 76 ML/MIN/1.7M2
GLUCOSE BLDC GLUCOMTR-MCNC: 132 MG/DL (ref 70–99)
GLUCOSE BLDC GLUCOMTR-MCNC: 170 MG/DL (ref 70–99)
GLUCOSE BLDC GLUCOMTR-MCNC: 225 MG/DL (ref 70–99)
GLUCOSE BLDC GLUCOMTR-MCNC: 89 MG/DL (ref 70–99)

## 2017-08-31 PROCEDURE — 99233 SBSQ HOSP IP/OBS HIGH 50: CPT | Performed by: INTERNAL MEDICINE

## 2017-08-31 PROCEDURE — 36415 COLL VENOUS BLD VENIPUNCTURE: CPT | Performed by: HOSPITALIST

## 2017-08-31 PROCEDURE — 12000000 ZZH R&B MED SURG/OB

## 2017-08-31 PROCEDURE — 25000128 H RX IP 250 OP 636: Performed by: HOSPITALIST

## 2017-08-31 PROCEDURE — 25000132 ZZH RX MED GY IP 250 OP 250 PS 637: Performed by: HOSPITALIST

## 2017-08-31 PROCEDURE — 25000132 ZZH RX MED GY IP 250 OP 250 PS 637: Performed by: INTERNAL MEDICINE

## 2017-08-31 PROCEDURE — 00000146 ZZHCL STATISTIC GLUCOSE BY METER IP

## 2017-08-31 PROCEDURE — 82565 ASSAY OF CREATININE: CPT | Performed by: HOSPITALIST

## 2017-08-31 RX ORDER — CHLORHEXIDINE GLUCONATE 40 MG/ML
SOLUTION TOPICAL ONCE
Status: DISCONTINUED | OUTPATIENT
Start: 2017-08-31 | End: 2017-09-02

## 2017-08-31 RX ORDER — CHLORHEXIDINE GLUCONATE 40 MG/ML
SOLUTION TOPICAL ONCE
Status: COMPLETED | OUTPATIENT
Start: 2017-08-31 | End: 2017-08-31

## 2017-08-31 RX ADMIN — CEFTRIAXONE 1 G: 1 INJECTION, POWDER, FOR SOLUTION INTRAMUSCULAR; INTRAVENOUS at 12:10

## 2017-08-31 RX ADMIN — HYDROCODONE BITARTRATE AND ACETAMINOPHEN 2 TABLET: 5; 325 TABLET ORAL at 18:53

## 2017-08-31 RX ADMIN — FERROUS SULFATE TAB 325 MG (65 MG ELEMENTAL FE) 325 MG: 325 (65 FE) TAB at 08:26

## 2017-08-31 RX ADMIN — HYDROCODONE BITARTRATE AND ACETAMINOPHEN 1 TABLET: 5; 325 TABLET ORAL at 08:36

## 2017-08-31 RX ADMIN — FERROUS SULFATE TAB 325 MG (65 MG ELEMENTAL FE) 325 MG: 325 (65 FE) TAB at 18:51

## 2017-08-31 RX ADMIN — HYDROCODONE BITARTRATE AND ACETAMINOPHEN 2 TABLET: 5; 325 TABLET ORAL at 14:56

## 2017-08-31 RX ADMIN — VANCOMYCIN HYDROCHLORIDE 2000 MG: 5 INJECTION, POWDER, LYOPHILIZED, FOR SOLUTION INTRAVENOUS at 02:45

## 2017-08-31 RX ADMIN — GLIMEPIRIDE 4 MG: 4 TABLET ORAL at 08:26

## 2017-08-31 RX ADMIN — CHLORHEXIDINE GLUCONATE: 40 SOLUTION TOPICAL at 23:05

## 2017-08-31 RX ADMIN — VANCOMYCIN HYDROCHLORIDE 2000 MG: 5 INJECTION, POWDER, LYOPHILIZED, FOR SOLUTION INTRAVENOUS at 14:52

## 2017-08-31 RX ADMIN — HYDROCODONE BITARTRATE AND ACETAMINOPHEN 2 TABLET: 5; 325 TABLET ORAL at 23:04

## 2017-08-31 RX ADMIN — HYDROCODONE BITARTRATE AND ACETAMINOPHEN 2 TABLET: 5; 325 TABLET ORAL at 02:44

## 2017-08-31 RX ADMIN — LINAGLIPTIN 5 MG: 5 TABLET, FILM COATED ORAL at 08:26

## 2017-08-31 NOTE — PROGRESS NOTES
[facility]  Orthopaedics/Foot and Ankle Surgery Progress Note          Nikolas Nascimento MD   08/31/2017          Assessment and Plan:      53 y/o M w/ PMH significant for DM, obesity, and chronic R heel ulceration with examination and imaging studies now consistent with chronic osteomyelitis.  Poor salvage options at this point given suspected extent of infection and soft tissue envelope.  BKA would allow for eradication of infection and may typically start rehab and WB activity by 6 weeks post-op.       Perioperative care and activity following BKA were discussed with the patient today.  He feels prepared for the surgery and all questions from he and his wife were answered.  They had a good visit with the prosthetics team yesterday.  Plan to proceed tomorrow to the OR for BKA.             Interval History:      No acute events.  Denies f/c.            Physical Exam:      Blood pressure 133/88, pulse 82, temperature 98.5  F (36.9  C), temperature source Oral, resp. rate 18, SpO2 97 %.  There were no vitals filed for this visit.  Vital Signs with Ranges  Temp:  [97.8  F (36.6  C)-98.5  F (36.9  C)] 98.5  F (36.9  C)  Pulse:  [82-95] 82  Heart Rate:  [92] 92  Resp:  [14-18] 18  BP: (110-133)/(77-88) 133/88  SpO2:  [97 %] 97 %  I/O's Last 24 hours  I/O last 3 completed shifts:  In: 500 [P.O.:500]  Out: 1725 [Urine:1725]    R heel with deep ulceration, mild drainage, and mild surrounding erythema.  No erythema spreading proximal to the ankle.  Good gastroc/soleus muscle bulk present.  No concerns for vascular insufficiency that would inhibit healing of the posterior flap.         Medications:          ferrous sulfate  325 mg Oral BID w/meals     vancomycin (VANCOCIN) IV  2,000 mg Intravenous Q12H     glimepiride  4 mg Oral QAM AC     linagliptin  5 mg Oral Daily     cefTRIAXone  1 g Intravenous Q24H     insulin aspart  1-10 Units Subcutaneous TID AC     insulin aspart  1-7 Units Subcutaneous At  Bedtime     sodium chloride 0.9%  500 mL Intravenous Once     PRN Meds: pseudoePHEDrine, hydrALAZINE, glucose **OR** dextrose **OR** glucagon, naloxone, acetaminophen, HYDROcodone-acetaminophen, HYDROmorphone, magnesium hydroxide, bisacodyl, senna-docusate, ondansetron **OR** ondansetron, prochlorperazine **OR** prochlorperazine **OR** prochlorperazine         Data:      All new lab and imaging data was reviewed.      Results for orders placed or performed during the hospital encounter of 08/28/17 (from the past 24 hour(s))   Vancomycin level   Result Value Ref Range    Vancomycin Level 20.6 mg/L   Glucose by meter   Result Value Ref Range    Glucose 103 (H) 70 - 99 mg/dL   Glucose by meter   Result Value Ref Range    Glucose 60 (L) 70 - 99 mg/dL   Glucose by meter   Result Value Ref Range    Glucose 65 (L) 70 - 99 mg/dL   Glucose by meter   Result Value Ref Range    Glucose 90 70 - 99 mg/dL   Glucose by meter   Result Value Ref Range    Glucose 146 (H) 70 - 99 mg/dL   Glucose by meter   Result Value Ref Range    Glucose 197 (H) 70 - 99 mg/dL   Glucose by meter   Result Value Ref Range    Glucose 225 (H) 70 - 99 mg/dL   Glucose by meter   Result Value Ref Range    Glucose 132 (H) 70 - 99 mg/dL

## 2017-08-31 NOTE — PLAN OF CARE
Problem: Goal Outcome Summary  Goal: Goal Outcome Summary  Outcome: No Change  A/O, VSS on RA. Afebrile. C/O pain in Rt heel/foot, + 2 edema. PRN norco given x2 with relief of S/Sy. Dressings changed per plan of care.  and 225 overnight. IV Vanco and Anbx.  SBA and non wt bearing on Rt. Ortho involved. Surgery scheduled for Friday, pt wanting to speak with surgery. D/C pending. RN will continue to monitor

## 2017-08-31 NOTE — PROGRESS NOTES
[facility]    Hospitalist Progress Note    Date of Service (when I saw the patient): 08/31/2017    Assessment & Plan   Mr. Horner is a 51 y/o male with a medical history remarkable for morbid obesity, DM, HTN, HLD, CKD with baseline creat at 1.1 and chronic R heel ulcer who presented to the ED with complaints of pain and swelling involving his R foot/ ankle.     R foot infection  With h/o ulcer of his foot of 2 years, s/p grafting and wound vac at some point. Admit 7/2016 with concern for early osteomyelitis; however calcaneal biopsy done at the time with no evidence of osteomyelitis at that time. To ED 8/28 with ~ 1 week of increased swelling of his R foot as well as drainage and warmth, no fever. Seen in Podiatry clinic who recommended to come to ED.   - on admit with elevated WBC to 13.5, ESR to 105, CRP at 85.1-> WBC improved 8/31 to 9.1  - MRI 8/29 consistent with osteo  - Dr. Nascimento has seen pt and plans for BKA on R tomorrow 9/1; pt states all questions answered  - continue on antibiotics until surgery; growing strep and proteus and vanco/ ceftx should cover both (may not need vanco but given OR Friday and uncertain if other bacteria involved will continue vanco until 9/1)  - blood cultures negative from 8/28  - non weight bearing as per ortho  - KAREN's done without evidence of arterial insuffficiency (8/29)    DM II  Outpatient on linagliptin, glimepiride, metformin  - metformin on hold  - SSI ordered  - BS under very good control at this point; avoid lows  - A1C 8/28 at 7.6% (much improved from previous)  - hold meds (linagliptin, glimepiride) tomorrow am preop to avoid hypoglycemia    HTN  Outpatient on HCTZ and lisinopril.   - currently on hold given hypotension  - monitoring    HLD  H/o intolerance to statins  - working with primary re: statin/ defer to primary    CKD  Baseline creat at 1.1, stable  - some proteinuria noted in 6/2017, likely representing underlying diabetic  nephropathy    Anemia  hgb 9.9 on admission 8/28, at 8.5 on 8/29  - t-sat low, started on iron replacement    Recent Labs  Lab 08/30/17  0923 08/29/17  0801 08/28/17  1045   HGB 8.3* 8.5* 9.9*       FEN (fluids, electrolytes and nutrition): CHO diet; NPO after MN  Discussed with nursing.  DVT Prophylaxis: Pneumatic Compression Devices  Code Status: Full Code    Disposition: Expected discharge as per ortho post operatively    Ry Hilliard MD  [phone] (P)  [phone] (C)  Text Page until 6 pm (after call answering service)    Interval History   Doing ok. Denies cp/sob. Pain under acceptable control    -Data reviewed today: I reviewed all new labs and imaging results over the last 24 hours. I personally reviewed no images or EKG's today.    Physical Exam   Temp: 98.5  F (36.9  C) Temp src: Oral BP: 133/88 Pulse: 82 Heart Rate: 92 Resp: 18 SpO2: 97 % O2 Device: None (Room air)    There were no vitals filed for this visit.  Vital Signs with Ranges  Temp:  [97.8  F (36.6  C)-98.5  F (36.9  C)] 98.5  F (36.9  C)  Pulse:  [82-95] 82  Heart Rate:  [92] 92  Resp:  [14-18] 18  BP: (110-133)/(77-88) 133/88  SpO2:  [97 %] 97 %  I/O last 3 completed shifts:  In: 500 [P.O.:500]  Out: 1725 [Urine:1725]    Constitutional: Alert, oriented, no acute distress  Respiratory: Lungs clear to auscultation bilaterally, no wheezes, no crackles  Cardiovascular: Regular rate and rhythm, no murmurs  GI: Soft, non-tender, non-disteneded, good bowel sounds  Skin/Integumen: No erythema, cyanosis or edema. R foot bandaged, c/d/i  Other:      Medications        ferrous sulfate  325 mg Oral BID w/meals     vancomycin (VANCOCIN) IV  2,000 mg Intravenous Q12H     glimepiride  4 mg Oral QAM AC     linagliptin  5 mg Oral Daily     cefTRIAXone  1 g Intravenous Q24H     insulin aspart  1-10 Units Subcutaneous TID AC     insulin aspart  1-7 Units Subcutaneous At Bedtime     sodium chloride 0.9%  500 mL Intravenous Once       Data     Recent  Labs  Lab 08/31/17  1120 08/30/17  0923 08/29/17  0801 08/28/17  1045   WBC  --  9.1 10.4 13.5*   HGB  --  8.3* 8.5* 9.9*   MCV  --  85 86 85   PLT  --  280 368 392   NA  --   --  138 134   POTASSIUM  --   --  4.4 4.6   CHLORIDE  --   --  106 102   CO2  --   --  26 27   BUN  --   --  21 27   CR 1.03 1.09 1.09 1.10   ANIONGAP  --   --  6 5   FERNANDO  --   --  8.3* 8.9   GLC  --   --  173* 187*       No results found for this or any previous visit (from the past 24 hour(s)).

## 2017-08-31 NOTE — PLAN OF CARE
Problem: Goal Outcome Summary  Goal: Goal Outcome Summary  Outcome: Improving  A&Ox4.  VSS.  Afebrile.  Dressing to RLE changed.  To have Right BKA surgery with Dr. Nascimento (Ortho) tomorrow at 1300.  BG-132 & 89. No SSI coverage needed.  On PO diabetic meds.  Received Norco for pain.  CMS intact.  Up with SBA/Ind.  IV SL.  On IV Vanco & Rocephin.  Tolerating Mod CHO diet.

## 2017-09-01 ENCOUNTER — ANESTHESIA EVENT (OUTPATIENT)
Dept: SURGERY | Facility: CLINIC | Age: 52
DRG: 617 | End: 2017-09-01
Payer: COMMERCIAL

## 2017-09-01 ENCOUNTER — APPOINTMENT (OUTPATIENT)
Dept: GENERAL RADIOLOGY | Facility: CLINIC | Age: 52
DRG: 617 | End: 2017-09-01
Attending: ORTHOPAEDIC SURGERY
Payer: COMMERCIAL

## 2017-09-01 ENCOUNTER — ANESTHESIA (OUTPATIENT)
Dept: SURGERY | Facility: CLINIC | Age: 52
DRG: 617 | End: 2017-09-01
Payer: COMMERCIAL

## 2017-09-01 PROBLEM — M86.9 OSTEOMYELITIS (H): Status: ACTIVE | Noted: 2017-09-01

## 2017-09-01 LAB
CREAT SERPL-MCNC: 1.06 MG/DL (ref 0.66–1.25)
CREAT SERPL-MCNC: 1.09 MG/DL (ref 0.66–1.25)
ERYTHROCYTE [DISTWIDTH] IN BLOOD BY AUTOMATED COUNT: 11.7 % (ref 10–15)
GFR SERPL CREATININE-BSD FRML MDRD: 72 ML/MIN/1.7M2
GFR SERPL CREATININE-BSD FRML MDRD: 74 ML/MIN/1.7M2
GLUCOSE BLDC GLUCOMTR-MCNC: 108 MG/DL (ref 70–99)
GLUCOSE BLDC GLUCOMTR-MCNC: 126 MG/DL (ref 70–99)
GLUCOSE BLDC GLUCOMTR-MCNC: 141 MG/DL (ref 70–99)
GLUCOSE BLDC GLUCOMTR-MCNC: 283 MG/DL (ref 70–99)
GLUCOSE BLDC GLUCOMTR-MCNC: 96 MG/DL (ref 70–99)
GLUCOSE BLDC GLUCOMTR-MCNC: 98 MG/DL (ref 70–99)
HCT VFR BLD AUTO: 24 % (ref 40–53)
HGB BLD-MCNC: 8.1 G/DL (ref 13.3–17.7)
MCH RBC QN AUTO: 28.6 PG (ref 26.5–33)
MCHC RBC AUTO-ENTMCNC: 33.8 G/DL (ref 31.5–36.5)
MCV RBC AUTO: 85 FL (ref 78–100)
PLATELET # BLD AUTO: 300 10E9/L (ref 150–450)
PLATELET # BLD AUTO: 314 10E9/L (ref 150–450)
POTASSIUM SERPL-SCNC: 4.4 MMOL/L (ref 3.4–5.3)
RBC # BLD AUTO: 2.83 10E12/L (ref 4.4–5.9)
VANCOMYCIN SERPL-MCNC: 14.6 MG/L
WBC # BLD AUTO: 9 10E9/L (ref 4–11)

## 2017-09-01 PROCEDURE — 82565 ASSAY OF CREATININE: CPT | Performed by: HOSPITALIST

## 2017-09-01 PROCEDURE — S0020 INJECTION, BUPIVICAINE HYDRO: HCPCS | Performed by: SURGERY

## 2017-09-01 PROCEDURE — 84132 ASSAY OF SERUM POTASSIUM: CPT | Performed by: HOSPITALIST

## 2017-09-01 PROCEDURE — S0020 INJECTION, BUPIVICAINE HYDRO: HCPCS | Performed by: ORTHOPAEDIC SURGERY

## 2017-09-01 PROCEDURE — 36415 COLL VENOUS BLD VENIPUNCTURE: CPT | Performed by: HOSPITALIST

## 2017-09-01 PROCEDURE — 25000566 ZZH SEVOFLURANE, EA 15 MIN: Performed by: ORTHOPAEDIC SURGERY

## 2017-09-01 PROCEDURE — 88311 DECALCIFY TISSUE: CPT | Mod: 26 | Performed by: ORTHOPAEDIC SURGERY

## 2017-09-01 PROCEDURE — 25000132 ZZH RX MED GY IP 250 OP 250 PS 637: Performed by: HOSPITALIST

## 2017-09-01 PROCEDURE — 71000012 ZZH RECOVERY PHASE 1 LEVEL 1 FIRST HR: Performed by: ORTHOPAEDIC SURGERY

## 2017-09-01 PROCEDURE — 25000125 ZZHC RX 250: Performed by: SURGERY

## 2017-09-01 PROCEDURE — 27210995 ZZH RX 272: Performed by: ORTHOPAEDIC SURGERY

## 2017-09-01 PROCEDURE — 36415 COLL VENOUS BLD VENIPUNCTURE: CPT | Performed by: ORTHOPAEDIC SURGERY

## 2017-09-01 PROCEDURE — 37000008 ZZH ANESTHESIA TECHNICAL FEE, 1ST 30 MIN: Performed by: ORTHOPAEDIC SURGERY

## 2017-09-01 PROCEDURE — 88307 TISSUE EXAM BY PATHOLOGIST: CPT | Performed by: ORTHOPAEDIC SURGERY

## 2017-09-01 PROCEDURE — 36000056 ZZH SURGERY LEVEL 3 1ST 30 MIN: Performed by: ORTHOPAEDIC SURGERY

## 2017-09-01 PROCEDURE — 85049 AUTOMATED PLATELET COUNT: CPT | Performed by: ORTHOPAEDIC SURGERY

## 2017-09-01 PROCEDURE — 40000985 XR KNEE PORT RT 1/2 VW: Mod: RT

## 2017-09-01 PROCEDURE — 25000128 H RX IP 250 OP 636: Performed by: SURGERY

## 2017-09-01 PROCEDURE — 0Y6H0Z3 DETACHMENT AT RIGHT LOWER LEG, LOW, OPEN APPROACH: ICD-10-PCS | Performed by: ORTHOPAEDIC SURGERY

## 2017-09-01 PROCEDURE — 00000146 ZZHCL STATISTIC GLUCOSE BY METER IP

## 2017-09-01 PROCEDURE — 99232 SBSQ HOSP IP/OBS MODERATE 35: CPT | Performed by: INTERNAL MEDICINE

## 2017-09-01 PROCEDURE — 85027 COMPLETE CBC AUTOMATED: CPT | Performed by: HOSPITALIST

## 2017-09-01 PROCEDURE — 80202 ASSAY OF VANCOMYCIN: CPT | Performed by: HOSPITALIST

## 2017-09-01 PROCEDURE — 82565 ASSAY OF CREATININE: CPT | Performed by: ORTHOPAEDIC SURGERY

## 2017-09-01 PROCEDURE — 25000132 ZZH RX MED GY IP 250 OP 250 PS 637: Performed by: INTERNAL MEDICINE

## 2017-09-01 PROCEDURE — 88311 DECALCIFY TISSUE: CPT | Performed by: ORTHOPAEDIC SURGERY

## 2017-09-01 PROCEDURE — 25000128 H RX IP 250 OP 636: Performed by: NURSE ANESTHETIST, CERTIFIED REGISTERED

## 2017-09-01 PROCEDURE — 27210794 ZZH OR GENERAL SUPPLY STERILE: Performed by: ORTHOPAEDIC SURGERY

## 2017-09-01 PROCEDURE — 25000128 H RX IP 250 OP 636: Performed by: HOSPITALIST

## 2017-09-01 PROCEDURE — 36000058 ZZH SURGERY LEVEL 3 EA 15 ADDTL MIN: Performed by: ORTHOPAEDIC SURGERY

## 2017-09-01 PROCEDURE — 25000132 ZZH RX MED GY IP 250 OP 250 PS 637: Performed by: ORTHOPAEDIC SURGERY

## 2017-09-01 PROCEDURE — 37000009 ZZH ANESTHESIA TECHNICAL FEE, EACH ADDTL 15 MIN: Performed by: ORTHOPAEDIC SURGERY

## 2017-09-01 PROCEDURE — 40000170 ZZH STATISTIC PRE-PROCEDURE ASSESSMENT II: Performed by: ORTHOPAEDIC SURGERY

## 2017-09-01 PROCEDURE — 25000128 H RX IP 250 OP 636: Performed by: ANESTHESIOLOGY

## 2017-09-01 PROCEDURE — 12000007 ZZH R&B INTERMEDIATE

## 2017-09-01 PROCEDURE — 88307 TISSUE EXAM BY PATHOLOGIST: CPT | Mod: 26 | Performed by: ORTHOPAEDIC SURGERY

## 2017-09-01 PROCEDURE — 25000125 ZZHC RX 250: Performed by: NURSE ANESTHETIST, CERTIFIED REGISTERED

## 2017-09-01 PROCEDURE — 25000128 H RX IP 250 OP 636: Performed by: ORTHOPAEDIC SURGERY

## 2017-09-01 PROCEDURE — 25000125 ZZHC RX 250: Performed by: ORTHOPAEDIC SURGERY

## 2017-09-01 RX ORDER — NALOXONE HYDROCHLORIDE 0.4 MG/ML
.1-.4 INJECTION, SOLUTION INTRAMUSCULAR; INTRAVENOUS; SUBCUTANEOUS
Status: DISCONTINUED | OUTPATIENT
Start: 2017-09-01 | End: 2017-09-03 | Stop reason: HOSPADM

## 2017-09-01 RX ORDER — ONDANSETRON 2 MG/ML
4 INJECTION INTRAMUSCULAR; INTRAVENOUS EVERY 6 HOURS PRN
Status: DISCONTINUED | OUTPATIENT
Start: 2017-09-01 | End: 2017-09-01

## 2017-09-01 RX ORDER — PROPOFOL 10 MG/ML
INJECTION, EMULSION INTRAVENOUS PRN
Status: DISCONTINUED | OUTPATIENT
Start: 2017-09-01 | End: 2017-09-01

## 2017-09-01 RX ORDER — SODIUM CHLORIDE, SODIUM LACTATE, POTASSIUM CHLORIDE, CALCIUM CHLORIDE 600; 310; 30; 20 MG/100ML; MG/100ML; MG/100ML; MG/100ML
INJECTION, SOLUTION INTRAVENOUS CONTINUOUS
Status: DISCONTINUED | OUTPATIENT
Start: 2017-09-01 | End: 2017-09-01 | Stop reason: HOSPADM

## 2017-09-01 RX ORDER — LIDOCAINE 40 MG/G
CREAM TOPICAL
Status: DISCONTINUED | OUTPATIENT
Start: 2017-09-01 | End: 2017-09-03 | Stop reason: HOSPADM

## 2017-09-01 RX ORDER — FENTANYL CITRATE 50 UG/ML
INJECTION, SOLUTION INTRAMUSCULAR; INTRAVENOUS PRN
Status: DISCONTINUED | OUTPATIENT
Start: 2017-09-01 | End: 2017-09-01

## 2017-09-01 RX ORDER — ONDANSETRON 4 MG/1
4 TABLET, ORALLY DISINTEGRATING ORAL EVERY 30 MIN PRN
Status: DISCONTINUED | OUTPATIENT
Start: 2017-09-01 | End: 2017-09-01 | Stop reason: HOSPADM

## 2017-09-01 RX ORDER — SODIUM CHLORIDE 9 MG/ML
INJECTION, SOLUTION INTRAVENOUS CONTINUOUS
Status: DISCONTINUED | OUTPATIENT
Start: 2017-09-01 | End: 2017-09-03 | Stop reason: HOSPADM

## 2017-09-01 RX ORDER — DIAZEPAM 5 MG
5 TABLET ORAL EVERY 6 HOURS PRN
Status: DISCONTINUED | OUTPATIENT
Start: 2017-09-01 | End: 2017-09-03 | Stop reason: HOSPADM

## 2017-09-01 RX ORDER — ACETAMINOPHEN 325 MG/1
975 TABLET ORAL EVERY 8 HOURS
Status: DISCONTINUED | OUTPATIENT
Start: 2017-09-01 | End: 2017-09-03 | Stop reason: HOSPADM

## 2017-09-01 RX ORDER — ONDANSETRON 2 MG/ML
INJECTION INTRAMUSCULAR; INTRAVENOUS PRN
Status: DISCONTINUED | OUTPATIENT
Start: 2017-09-01 | End: 2017-09-01

## 2017-09-01 RX ORDER — ACETAMINOPHEN 325 MG/1
650 TABLET ORAL EVERY 4 HOURS PRN
Status: DISCONTINUED | OUTPATIENT
Start: 2017-09-04 | End: 2017-09-03 | Stop reason: HOSPADM

## 2017-09-01 RX ORDER — HYDROXYZINE HYDROCHLORIDE 25 MG/1
25 TABLET, FILM COATED ORAL EVERY 6 HOURS PRN
Status: DISCONTINUED | OUTPATIENT
Start: 2017-09-01 | End: 2017-09-03 | Stop reason: HOSPADM

## 2017-09-01 RX ORDER — SODIUM CHLORIDE, SODIUM LACTATE, POTASSIUM CHLORIDE, CALCIUM CHLORIDE 600; 310; 30; 20 MG/100ML; MG/100ML; MG/100ML; MG/100ML
500 INJECTION, SOLUTION INTRAVENOUS CONTINUOUS
Status: DISCONTINUED | OUTPATIENT
Start: 2017-09-01 | End: 2017-09-03 | Stop reason: HOSPADM

## 2017-09-01 RX ORDER — KETOROLAC TROMETHAMINE 30 MG/ML
30 INJECTION, SOLUTION INTRAMUSCULAR; INTRAVENOUS EVERY 6 HOURS
Status: COMPLETED | OUTPATIENT
Start: 2017-09-01 | End: 2017-09-02

## 2017-09-01 RX ORDER — FENTANYL CITRATE 50 UG/ML
25-50 INJECTION, SOLUTION INTRAMUSCULAR; INTRAVENOUS
Status: COMPLETED | OUTPATIENT
Start: 2017-09-01 | End: 2017-09-01

## 2017-09-01 RX ORDER — FENTANYL CITRATE 0.05 MG/ML
25-50 INJECTION, SOLUTION INTRAMUSCULAR; INTRAVENOUS
Status: DISCONTINUED | OUTPATIENT
Start: 2017-09-01 | End: 2017-09-01 | Stop reason: HOSPADM

## 2017-09-01 RX ORDER — PROCHLORPERAZINE MALEATE 5 MG
5-10 TABLET ORAL EVERY 6 HOURS PRN
Status: DISCONTINUED | OUTPATIENT
Start: 2017-09-01 | End: 2017-09-01

## 2017-09-01 RX ORDER — BUPIVACAINE HYDROCHLORIDE 5 MG/ML
INJECTION, SOLUTION PERINEURAL PRN
Status: DISCONTINUED | OUTPATIENT
Start: 2017-09-01 | End: 2017-09-01 | Stop reason: HOSPADM

## 2017-09-01 RX ORDER — ONDANSETRON 4 MG/1
4 TABLET, ORALLY DISINTEGRATING ORAL EVERY 6 HOURS PRN
Status: DISCONTINUED | OUTPATIENT
Start: 2017-09-01 | End: 2017-09-01

## 2017-09-01 RX ORDER — GABAPENTIN 300 MG/1
300 CAPSULE ORAL 2 TIMES DAILY
Status: DISCONTINUED | OUTPATIENT
Start: 2017-09-01 | End: 2017-09-03 | Stop reason: HOSPADM

## 2017-09-01 RX ORDER — ONDANSETRON 2 MG/ML
4 INJECTION INTRAMUSCULAR; INTRAVENOUS EVERY 30 MIN PRN
Status: DISCONTINUED | OUTPATIENT
Start: 2017-09-01 | End: 2017-09-01 | Stop reason: HOSPADM

## 2017-09-01 RX ORDER — OXYCODONE HYDROCHLORIDE 5 MG/1
5-10 TABLET ORAL
Status: DISCONTINUED | OUTPATIENT
Start: 2017-09-01 | End: 2017-09-03 | Stop reason: HOSPADM

## 2017-09-01 RX ORDER — LIDOCAINE HYDROCHLORIDE 20 MG/ML
INJECTION, SOLUTION INFILTRATION; PERINEURAL PRN
Status: DISCONTINUED | OUTPATIENT
Start: 2017-09-01 | End: 2017-09-01

## 2017-09-01 RX ORDER — DEXAMETHASONE SODIUM PHOSPHATE 4 MG/ML
INJECTION, SOLUTION INTRA-ARTICULAR; INTRALESIONAL; INTRAMUSCULAR; INTRAVENOUS; SOFT TISSUE PRN
Status: DISCONTINUED | OUTPATIENT
Start: 2017-09-01 | End: 2017-09-01

## 2017-09-01 RX ADMIN — CEFTRIAXONE 1 G: 1 INJECTION, POWDER, FOR SOLUTION INTRAMUSCULAR; INTRAVENOUS at 11:00

## 2017-09-01 RX ADMIN — FENTANYL CITRATE 50 MCG: 50 INJECTION, SOLUTION INTRAMUSCULAR; INTRAVENOUS at 15:11

## 2017-09-01 RX ADMIN — SODIUM CHLORIDE: 9 INJECTION, SOLUTION INTRAVENOUS at 18:11

## 2017-09-01 RX ADMIN — FENTANYL CITRATE 50 MCG: 50 INJECTION, SOLUTION INTRAMUSCULAR; INTRAVENOUS at 14:48

## 2017-09-01 RX ADMIN — VANCOMYCIN HYDROCHLORIDE 2000 MG: 5 INJECTION, POWDER, LYOPHILIZED, FOR SOLUTION INTRAVENOUS at 01:16

## 2017-09-01 RX ADMIN — ACETAMINOPHEN 975 MG: 325 TABLET, FILM COATED ORAL at 18:11

## 2017-09-01 RX ADMIN — MIDAZOLAM HYDROCHLORIDE 2 MG: 1 INJECTION, SOLUTION INTRAMUSCULAR; INTRAVENOUS at 12:25

## 2017-09-01 RX ADMIN — SODIUM CHLORIDE, SODIUM LACTATE, POTASSIUM CHLORIDE, CALCIUM CHLORIDE: 600; 310; 30; 20 INJECTION, SOLUTION INTRAVENOUS at 14:35

## 2017-09-01 RX ADMIN — EPINEPHRINE 40 ML GIVEN: 1 INJECTION INTRAMUSCULAR; INTRAVENOUS; SUBCUTANEOUS at 12:34

## 2017-09-01 RX ADMIN — DEXAMETHASONE SODIUM PHOSPHATE 4 MG: 4 INJECTION, SOLUTION INTRA-ARTICULAR; INTRALESIONAL; INTRAMUSCULAR; INTRAVENOUS; SOFT TISSUE at 14:12

## 2017-09-01 RX ADMIN — PROPOFOL 300 MG: 10 INJECTION, EMULSION INTRAVENOUS at 13:56

## 2017-09-01 RX ADMIN — ONDANSETRON 4 MG: 2 INJECTION INTRAMUSCULAR; INTRAVENOUS at 14:12

## 2017-09-01 RX ADMIN — KETOROLAC TROMETHAMINE 30 MG: 30 INJECTION, SOLUTION INTRAMUSCULAR at 18:11

## 2017-09-01 RX ADMIN — HYDROCODONE BITARTRATE AND ACETAMINOPHEN 2 TABLET: 5; 325 TABLET ORAL at 11:09

## 2017-09-01 RX ADMIN — MIDAZOLAM HYDROCHLORIDE 2 MG: 1 INJECTION, SOLUTION INTRAMUSCULAR; INTRAVENOUS at 13:53

## 2017-09-01 RX ADMIN — LIDOCAINE HYDROCHLORIDE 100 MG: 20 INJECTION, SOLUTION INFILTRATION; PERINEURAL at 13:56

## 2017-09-01 RX ADMIN — FENTANYL CITRATE 100 MCG: 50 INJECTION, SOLUTION INTRAMUSCULAR; INTRAVENOUS at 13:56

## 2017-09-01 RX ADMIN — FENTANYL CITRATE 50 MCG: 50 INJECTION, SOLUTION INTRAMUSCULAR; INTRAVENOUS at 12:25

## 2017-09-01 RX ADMIN — FERROUS SULFATE TAB 325 MG (65 MG ELEMENTAL FE) 325 MG: 325 (65 FE) TAB at 18:40

## 2017-09-01 RX ADMIN — PROPOFOL 50 MG: 10 INJECTION, EMULSION INTRAVENOUS at 14:58

## 2017-09-01 RX ADMIN — VANCOMYCIN HYDROCHLORIDE 1750 MG: 5 INJECTION, POWDER, LYOPHILIZED, FOR SOLUTION INTRAVENOUS at 21:16

## 2017-09-01 RX ADMIN — FENTANYL CITRATE 50 MCG: 50 INJECTION, SOLUTION INTRAMUSCULAR; INTRAVENOUS at 14:17

## 2017-09-01 RX ADMIN — HYDROCODONE BITARTRATE AND ACETAMINOPHEN 2 TABLET: 5; 325 TABLET ORAL at 02:26

## 2017-09-01 RX ADMIN — GABAPENTIN 300 MG: 300 CAPSULE ORAL at 21:18

## 2017-09-01 RX ADMIN — FENTANYL CITRATE 50 MCG: 50 INJECTION, SOLUTION INTRAMUSCULAR; INTRAVENOUS at 15:21

## 2017-09-01 RX ADMIN — SODIUM CHLORIDE, SODIUM LACTATE, POTASSIUM CHLORIDE, CALCIUM CHLORIDE 1000 ML: 600; 310; 30; 20 INJECTION, SOLUTION INTRAVENOUS at 12:01

## 2017-09-01 ASSESSMENT — LIFESTYLE VARIABLES: TOBACCO_USE: 1

## 2017-09-01 ASSESSMENT — ENCOUNTER SYMPTOMS: DYSRHYTHMIAS: 0

## 2017-09-01 NOTE — OP NOTE
PREOPERATIVE DIAGNOSIS: Right calcaneal osteomyelitis.    POSTOPERATIVE DIAGNOSIS: Right calcaneal osteomyelitis .    PROCEDURE(S): Right below knee amputation.    ATTENDING SURGEON: Dr. Nikolas Nascimento.    ASSISTANT SURGEON: None.    ANESTHESIA: General w/ regional nerve block.    EBL: 50mL.    TOURNIQUET TIME: 33min.    SPECIMENS: Right leg sent for permanent pathology.    COMPLICATIONS: None apparent.    INDICATIONS: Ry is a pleasant 52 year-old diabetic gentleman who has been undergoing treatment including multiple surgical procedures for a chronic right neuropathic heel ulcer over the past two years.  The patient presented recently to the emergency department with increasing swelling and concern for worsening infection associated with the chronic ulceration.  Examination and imaging studies were consistent with chronic osteomyelitis throughout the calcaneal tuberosity.  Given the poor salvage options available with resection of the involved portions of the bone, a below-knee amputation was recommended to allow for quicker return to weightbearing activity and eradication of infection.  The benefits and risks of a below-knee amputation were reviewed in depth with the patient and the patient also underwent counseling with the prosthetics team.  The patient provided informed consent to proceed.    The patient was identified in the pre-operative holding area on the date of surgery.  The operative site was marked with indelible marker and the patient was brought back to the operating room and transferred to the operating table in a supine position.  All bony prominences were well-padded.  Anesthesia was administered without complication.  The right lower extremity was prepped and draped in standard sterile fashion.  A pre-operative timeout was performed identifying the correct patient, procedure, operative site, antibiotic administration, and equipment necessary for the procedure.    Esmarch exsanguination was  utilized proximal to the ankle and an upper thigh tourniquet was inflated.  A standard below-knee amputation incision was created leaving a generous posterior flap.  Soft tissue dissection was carefully carried down maintaining excellent hemostasis.  All four muscle compartments were identified and dissection was carried down through each of the muscle bellies with Bovie electrocautery to maintain hemostasis.  The saphenous, sural, and superficial peroneal nerves were identified and infiltrated with 0.5% Marcaine prior to transecting the nerves proximally within the proximal soft tissues.  Hemostasis was achieved of the associated vascular structures.  The anterior tibial artery and deep peroneal nerve were also identified.  The deep peroneal nerve was infiltrated with 0.5% Marcaine and then transected proximally within the soft tissues.  The anterior tibialis artery was cauterized.  An oscillating saw was utilized to transect the distal tibia and fibula.  An appropriate level of amputation was confirmed under fluoroscopic imaging.  A bevel was created across the anterior tibia to limit bony prominence at the end of the residual limb.  The amputation was then completed through the posterior compartments and the leg was sent off for permanent pathology.  The tibial nerve and posterior tibial artery were identified.  The tibial nerve was infiltrated with 0.5% Marcaine and then transected proximally within the soft tissues.  The posterior tibial artery was coagulated and secured with a silk tie prior to transecting the artery.  The tourniquet was released and excellent hemostasis was achieved.  The wound was copiously irrigated.  Two drill holes were made through the tibial cortex and the deep fascia of the posterior compartment was secured to the tibia with #2 Ethibond suture.  The gastrocnemius fascia was reapproximated to the deep fascia of the anterior leg with interrupted 2-0 Vicryl suture.  Subcutaneous tissues  and skin were reapproximated with interrupted 3-0 Monocryl and a running 3-0 nylon suture respectively, taking care to eliminate any dog ears along the ends of the incision.  Xeroform and sterile dressings were applied.  The patient was placed in a long leg splint maintaining the knee in full extension.  The patient was extubated and brought to the PACU in stable condition for further postoperative care.    Postoperatively the patient will remain strictly nonweightbearing on the right lower extremity.  The patient will be placed on Lovenox for DVT prophylaxis.  The patient may certainly continue his antibiotic regimen postoperatively for surgical prophylaxis but I do not anticipate antibiotics will need to be continued beyond 24 hours postoperatively.  The patient will undergo a dressing change on Sunday morning with conversion to a [employer] brace.  The patient will return to clinic for follow-up as an outpatient at three weeks postoperatively for wound check and suture removal.    Of note, all counts were correct at the conclusion of the case.

## 2017-09-01 NOTE — PLAN OF CARE
Problem: Goal Outcome Summary  Goal: Goal Outcome Summary  Outcome: No Change  A&O. VSS on RA. Pain controlled with PO medication. Dressing to RLE changed.  Right BKA surgery with Dr. Nascimento (Ortho) tomorrow at 1300.  BG-170. No SSI coverage needed.  CMS intact, except numbness to right foot.  Up with independently.  IV SL. Preop shower completed. Consent in chart.

## 2017-09-01 NOTE — PLAN OF CARE
Problem: Goal Outcome Summary  Goal: Goal Outcome Summary  Outcome: No Change  VSS. NPO for OR today. , All meds held. C/o of foot/head pain. 2Narco given. R foot swollen with drainage. Kept elevated.  New IV started by OR staff. IV antibiotics. Pt to OR per cart. Transfer to station 55 post op. Wife here and updated.

## 2017-09-01 NOTE — ANESTHESIA PROCEDURE NOTES
Peripheral nerve/Neuraxial procedure note : Femoral (via adductor canal approach)  Pre-Procedure  Performed by JULIA TREVIZO  Location: pre-op      Pre-Anesthestic Checklist: patient identified, IV checked, site marked, risks and benefits discussed, informed consent, monitors and equipment checked, pre-op evaluation, at physician/surgeon's request and post-op pain management    Timeout  Correct Patient: Yes   Correct Procedure: Yes   Correct Site: Yes   Correct Laterality: Yes   Correct Position: Yes   Site Marked: Yes   .   Procedure Documentation    .    Procedure:    Femoral (via adductor canal approach).  Local skin infiltrated with 3 mL of 1% lidocaine.     Ultrasound used to identify targeted nerve, plexus, or vascular marker and placed a needle adjacent to it., Ultrasound was used to visualize the spread of the anesthetic in close proximity to the above stated nerve. A permanent image is entered into the patient's record.  Patient Prep;mask, sterile gloves, chlorhexidine gluconate and isopropyl alcohol, patient draped.  .  Needle: insulated Needle Gauge: 21.    Needle Length (Inches) 3.5  Insertion Method: Single Shot.       Assessment/Narrative  Paresthesias: No.  Injection made incrementally with aspirations every 5 mL..  The placement was negative for: blood aspirated, painful injection and site bleeding.  Bolus given via needle..   Secured via.   Complications: none. Comments:  Medication: 15cc of 0.5% bupivacaine with 1:400k epinephrine  [place]-dissection with saline, 5cc  Dose given via 5cc increments with negative aspiration

## 2017-09-01 NOTE — ANESTHESIA CARE TRANSFER NOTE
Patient: Ry Horner    Procedure(s):  RIGHT BELOW KNEE AMPUTATION  - Wound Class: III-Contaminated    Diagnosis: RIGHT CHRONIC OSTEOMYELITIS RIGHT FOOT   Diagnosis Additional Information: No value filed.    Anesthesia Type:   General, ETT, Periph. Nerve Block for postop pain     Note:  Airway :Nasal Cannula  Patient transferred to:PACU  Comments: Pt to PACU. VSS. Report complete to RN      Vitals: (Last set prior to Anesthesia Care Transfer)    CRNA VITALS  9/1/2017 1517 - 9/1/2017 1553      9/1/2017             Pulse: 94    SpO2: 97 %    Resp Rate (observed): (!)  3    Resp Rate (set): 10                Electronically Signed By: Noemy Ashton CRNA, APRN CRNA  September 1, 2017  3:53 PM

## 2017-09-01 NOTE — ANESTHESIA PREPROCEDURE EVALUATION
Procedure: Procedure(s):  AMPUTATE LEG BELOW KNEE  Preop diagnosis: RIGHT CHRONIC OSTEOMYELITIS RIGHT FOOT     Allergies   Allergen Reactions     Asa [Aspirin] Nausea and Vomiting     Past Medical History:   Diagnosis Date     BENIGN HYPERTENSION 4/4/2007     DIABETES MELLITUS TYPE II-UNCOMPL 4/4/2007     HYPERLIPIDEMIA NEC/NOS 4/4/2007     Tobacco use disorder 4/4/2007     Past Surgical History:   Procedure Laterality Date     APPENDECTOMY       APPLY WOUND VAC Right 3/2/2015    Procedure: APPLY WOUND VAC;  Surgeon: Milena Cevallos DPM, Pod;  Location: [facility] OR     BIOPSY BONE FOOT Right 7/15/2016    Procedure: BIOPSY BONE FOOT;  Surgeon: Tim Douglas DPM;  Location: [facility] OR     IRRIGATION AND DEBRIDEMENT FOOT, COMBINED Right 3/2/2015    Procedure: COMBINED IRRIGATION AND DEBRIDEMENT FOOT;  Surgeon: Milena Cevallos DPM, Pod;  Location: [facility] OR     IRRIGATION AND DEBRIDEMENT FOOT, COMBINED Right 7/15/2016    Procedure: COMBINED IRRIGATION AND DEBRIDEMENT FOOT;  Surgeon: Tim Douglas DPM;  Location: [facility] OR     IRRIGATION AND DEBRIDEMENT FOOT, COMBINED Right 7/20/2016    Procedure: COMBINED IRRIGATION AND DEBRIDEMENT FOOT;  Surgeon: Tim Douglas DPM;  Location:  OR     ORTHOPEDIC SURGERY       Prior to Admission medications    Medication Sig Start Date End Date Taking? Authorizing Provider   IBUPROFEN PO Take 600-800 mg by mouth 2 times daily as needed for moderate pain   Yes Unknown, Entered By History   DiphenhydrAMINE HCl (BENADRYL PO) Take 25 mg by mouth daily   Yes Unknown, Entered By History   linagliptin (TRADJENTA) 5 MG TABS tablet Take 1 tablet (5 mg) by mouth daily 6/5/17  Yes Kody Wing MD   hydrochlorothiazide (HYDRODIURIL) 25 MG tablet Take 1 tablet (25 mg) by mouth daily 6/2/17  Yes Kody Wing MD   metFORMIN (GLUCOPHAGE) 1000 MG tablet Take 1 tablet (1,000 mg) by mouth 2 times daily (with meals) 6/2/17  Yes Kody Wing MD   glimepiride (AMARYL) 4 MG tablet Take 1 tablet (4  "mg) by mouth daily  Patient taking differently: Take 4 mg by mouth every morning (before breakfast)  6/2/17  Yes Kody Wing MD   lisinopril (PRINIVIL/ZESTRIL) 20 MG tablet Take 1 tablet (20 mg) by mouth daily 6/2/17  Yes Kody Wing MD   ASPIRIN NOT PRESCRIBED (INTENTIONAL) Please choose reason not prescribed, below 6/2/17   Kody Wing MD   order for DME [facility] Fax [phone]    Primary Dressing Karen   Qty 10  Secondary Dressing Mepilex Foam 4x4 Qty 10  Secondary Dressing 2\" Medipore Tape Qty 1  Length of Need: 1 month  Frequency of dressing change: daily  If Patient has balance on account please call him. Thanks 4/28/17   Milena Cevallos DPM, Podiatry/Foot and Ankle Surgery     Current Facility-Administered Medications Ordered in Epic   Medication Dose Route Frequency Last Rate Last Dose     Patient RECEIVING antibiotic to treat a different condition and it provides ADEQUATE COVERAGE for this surgical procedure.  1 each As instructed Continuous         lidocaine 1 % 1 mL  1 mL Other Q1H PRN         lactated ringers infusion  500 mL Intravenous Continuous 25 mL/hr at 09/01/17 1201 1,000 mL at 09/01/17 1201     chlorhexidine 4 % solution   Topical Once   Stopped at 08/31/17 2126    Or     chlorhexidine 2 % pads   Topical Once        Or     antimicrobial soap   Topical Once         chlorhexidine 2 % pads   Topical Once   Stopped at 08/31/17 2128    Or     antimicrobial soap   Topical Once         [Auto Hold] ferrous sulfate (IRON) tablet 325 mg  325 mg Oral BID w/meals   325 mg at 08/31/17 1851     [Auto Hold] vancomycin (VANCOCIN) 2,000 mg in NaCl 0.9 % 500 mL intermittent infusion  2,000 mg Intravenous Q12H 250 mL/hr at 09/01/17 0116 2,000 mg at 09/01/17 0116     [Auto Hold] pseudoePHEDrine (SUDAFED) tablet 30 mg  30 mg Oral Q6H PRN   30 mg at 08/30/17 2151     [Auto Hold] glimepiride (AMARYL) tablet 4 mg  4 mg Oral QAM AC   Stopped at 09/01/17 0730     [Auto Hold] linagliptin " (TRADJENTA) tablet 5 mg  5 mg Oral Daily   Stopped at 09/01/17 0900     [Auto Hold] cefTRIAXone (ROCEPHIN) 1 g vial to attach to  mL bag for ADULTS or NS 50 mL bag for PEDS  1 g Intravenous Q24H 200 mL/hr at 08/31/17 1210 1 g at 09/01/17 1100     [Auto Hold] hydrALAZINE (APRESOLINE) injection 10 mg  10 mg Intravenous Q4H PRN         [Auto Hold] glucose 40 % gel 15-30 g  15-30 g Oral Q15 Min PRN        Or     [Auto Hold] dextrose 50 % injection 25-50 mL  25-50 mL Intravenous Q15 Min PRN        Or     [Auto Hold] glucagon injection 1 mg  1 mg Subcutaneous Q15 Min PRN         [Auto Hold] naloxone (NARCAN) injection 0.1-0.4 mg  0.1-0.4 mg Intravenous Q2 Min PRN         [Auto Hold] insulin aspart (NovoLOG) inj (RAPID ACTING)  1-10 Units Subcutaneous TID AC         [Auto Hold] insulin aspart (NovoLOG) inj (RAPID ACTING)  1-7 Units Subcutaneous At Bedtime         [Auto Hold] acetaminophen (TYLENOL) tablet 650 mg  650 mg Oral Q4H PRN         [Auto Hold] HYDROcodone-acetaminophen (NORCO) 5-325 MG per tablet 1-2 tablet  1-2 tablet Oral Q4H PRN   2 tablet at 09/01/17 1109     [Auto Hold] HYDROmorphone (PF) (DILAUDID) injection 0.3-0.5 mg  0.3-0.5 mg Intravenous Q2H PRN         [Auto Hold] magnesium hydroxide (MILK OF MAGNESIA) suspension 30 mL  30 mL Oral Daily PRN         [Auto Hold] bisacodyl (DULCOLAX) Suppository 10 mg  10 mg Rectal Daily PRN         [Auto Hold] senna-docusate (SENOKOT-S;PERICOLACE) 8.6-50 MG per tablet 1-2 tablet  1-2 tablet Oral BID PRN         [Auto Hold] ondansetron (ZOFRAN-ODT) ODT tab 4 mg  4 mg Oral Q6H PRN        Or     [Auto Hold] ondansetron (ZOFRAN) injection 4 mg  4 mg Intravenous Q6H PRN         [Auto Hold] prochlorperazine (COMPAZINE) injection 5-10 mg  5-10 mg Intravenous Q6H PRN        Or     [Auto Hold] prochlorperazine (COMPAZINE) tablet 5-10 mg  5-10 mg Oral Q6H PRN        Or     [Auto Hold] prochlorperazine (COMPAZINE) Suppository 25 mg  25 mg Rectal Q12H PRN         [Auto Hold]  0.9% sodium chloride BOLUS  500 mL Intravenous Once 500 mL/hr at 08/28/17 1947 500 mL at 08/28/17 1947     No current Epic-ordered outpatient prescriptions on file.     Wt Readings from Last 1 Encounters:   07/24/17 115.2 kg (254 lb)     Temp Readings from Last 1 Encounters:   09/01/17 37  C (98.6  F) (Oral)     BP Readings from Last 6 Encounters:   09/01/17 129/88   07/24/17 95/70   07/10/17 122/78   06/02/17 (!) 153/105   07/27/16 160/74   07/21/16 126/85     Pulse Readings from Last 4 Encounters:   08/31/17 82   07/24/17 93   07/10/17 110   06/02/17 103     Resp Readings from Last 1 Encounters:   09/01/17 18     SpO2 Readings from Last 1 Encounters:   09/01/17 95%     Recent Labs   Lab Test  09/01/17   0620  08/31/17   1120   08/29/17   0801  08/28/17   1045   NA   --    --    --   138  134   POTASSIUM  4.4   --    --   4.4  4.6   CHLORIDE   --    --    --   106  102   CO2   --    --    --   26  27   ANIONGAP   --    --    --   6  5   GLC   --    --    --   173*  187*   BUN   --    --    --   21  27   CR  1.09  1.03   < >  1.09  1.10   FERNANDO   --    --    --   8.3*  8.9    < > = values in this interval not displayed.     No results for input(s): AST, ALT in the last 54347 hours.    Invalid input(s): ALP, BILT, LPSE  Recent Labs   Lab Test  09/01/17   0620  08/30/17   0923   WBC  9.0  9.1   HGB  8.1*  8.3*   PLT  300  280     No results for input(s): INR in the last 37697 hours.    Invalid input(s): APTT   No results for input(s): TROPI in the last 48477 hours.  RECENT LABS:   ECG:   ECHO:   CXR:      Anesthesia Evaluation     . Pt has had prior anesthetic. Type: General           ROS/MED HX    ENT/Pulmonary:  - neg pulmonary ROS   (+)tobacco use, , . .    Neurologic:     (+)neuropathy     Cardiovascular:     (+) Dyslipidemia, hypertension----. : . . TORRES, . :. .      (-) CAD and arrhythmias   METS/Exercise Tolerance:  3 - Able to walk 1-2 blocks without stopping   Hematologic:         Musculoskeletal:   (+) , ,  other musculoskeletal- foot ulcer      GI/Hepatic:  - neg GI/hepatic ROS       Renal/Genitourinary:  - ROS Renal section negative   (+) chronic renal disease, type: CRI,       Endo:     (+) type II DM Not using insulin Obesity, .      Psychiatric:  - neg psychiatric ROS      (-) psychiatric history   Infectious Disease:         Malignancy:         Other:    (+) No chance of pregnancy C-spine cleared: N/A, no H/O Chronic Pain,no other significant disability                    Physical Exam  Normal systems: dental    Airway   Mallampati: II  TM distance: >3 FB  Neck ROM: full    Dental     Cardiovascular   Rhythm and rate: regular and normal      Pulmonary    breath sounds clear to auscultation                        Anesthesia Plan      History & Physical Review  History and physical reviewed and following examination; no interval change.    ASA Status:  2 .        Plan for General, ETT and Periph. Nerve Block for postop pain with Intravenous and Propofol induction. Maintenance will be Balanced.    PONV prophylaxis:  Ondansetron (or other 5HT-3) and Dexamethasone or Solumedrol       Postoperative Care  Postoperative pain management:  .  Plan for postoperative opioid use.    Consents  Anesthetic plan, risks, benefits and alternatives discussed with:  Patient or representative and Patient..                          .

## 2017-09-01 NOTE — OR NURSING
EILEEN Pineda now roounding on pt; pt awake and interactive w/MDA, denies pain, VS and surgical sites remain stable.  Okay for pt to transfer to floor per MARLEE Pineda.

## 2017-09-01 NOTE — ANESTHESIA POSTPROCEDURE EVALUATION
Patient: Ry Horner    Procedure(s):  RIGHT BELOW KNEE AMPUTATION  - Wound Class: III-Contaminated    Diagnosis:RIGHT CHRONIC OSTEOMYELITIS RIGHT FOOT   Diagnosis Additional Information: No value filed.    Anesthesia Type:  General, ETT, Periph. Nerve Block for postop pain    Note:  Anesthesia Post Evaluation    Patient location during evaluation: PACU  Patient participation: Able to fully participate in evaluation  Level of consciousness: sleepy but conscious and responsive to verbal stimuli  Pain management: adequate  Airway patency: patent  Cardiovascular status: acceptable and hemodynamically stable  Respiratory status: acceptable and unassisted  Hydration status: acceptable  PONV: none     Anesthetic complications: None          Last vitals:  Vitals:    09/01/17 1700 09/01/17 1710 09/01/17 1720   BP: (!) 132/94 (!) 131/103 (!) 141/93   Pulse:      Resp: 16 19 20   Temp: 36.8  C (98.2  F)     SpO2: 95% 92% 91%         Electronically Signed By: Pacheco Pineda MD  September 1, 2017  5:24 PM

## 2017-09-01 NOTE — ANESTHESIA PROCEDURE NOTES
Peripheral nerve/Neuraxial procedure note : Sciatic (via popliteal fossa approach)  Pre-Procedure  Performed by JULIA TREVIZO  Location: pre-op      Pre-Anesthestic Checklist: patient identified, IV checked, site marked, risks and benefits discussed, informed consent, monitors and equipment checked, pre-op evaluation, at physician/surgeon's request and post-op pain management    Timeout  Correct Patient: Yes   Correct Procedure: Yes   Correct Site: Yes   Correct Laterality: Yes   Correct Position: Yes   Site Marked: Yes   .   Procedure Documentation    .    Procedure:    Sciatic (via popliteal fossa approach).  Local skin infiltrated with 2 mL of 1% lidocaine.     Ultrasound used to identify targeted nerve, plexus, or vascular marker and placed a needle adjacent to it., Ultrasound was used to visualize the spread of the anesthetic in close proximity to the above stated nerve. A permanent image is entered into the patient's record.  Patient Prep;mask, sterile gloves, chlorhexidine gluconate and isopropyl alcohol, patient draped.  .  Needle: insulated Needle Gauge: 21.    Needle Length (Inches) 4  Insertion Method: Single Shot.       Assessment/Narrative  Paresthesias: No.  Injection made incrementally with aspirations every 5 mL..  The placement was negative for: blood aspirated, painful injection and site bleeding.  Bolus given via needle..   Secured via.   Complications: none. Comments:  Medication: 25cc of 0.5% Bupivacaine with 1:400k  Patient tolerated the procedure well without complications  Blocked by the popliteal fossa approach.

## 2017-09-01 NOTE — PLAN OF CARE
Problem: Goal Outcome Summary  Goal: Goal Outcome Summary  Outcome: No Change  A&Ox4. Low soft bp 99/65. Other VSS on RA. Up independently in room. Also using bedside urinal. RLE pain managed with PRN Norco 2 tabs. Diluadid available for severe pain. Dressing on R foot, CDI. Pt declined to change dressing. Elevated with pillow. Mild edema to RLE. Numbness present on R foot, CMS intact otherwise. NPO exp med since midnight. . Schedule for right BKA surgery today at 1300 by Dr Nascimento (Ortho). Plan is to transfer to station 55 after surgery. D/c pending progress. Nursing continue to monitor.

## 2017-09-01 NOTE — PROGRESS NOTES
[facility]    Hospitalist Progress Note    Date of Service (when I saw the patient): 09/01/2017    Assessment & Plan   Mr. Horner is a 53 y/o male with a medical history remarkable for morbid obesity, DM, HTN, HLD, CKD with baseline creat at 1.1 and chronic R heel ulcer who presented to the ED with complaints of pain and swelling involving his R foot/ ankle.     R foot infection  With h/o ulcer of his foot of 2 years, s/p grafting and wound vac at some point. Admit 7/2016 with concern for early osteomyelitis; however calcaneal biopsy done at the time with no evidence of osteomyelitis at that time. To ED 8/28 with ~ 1 week of increased swelling of his R foot as well as drainage and warmth, no fever. Seen in Podiatry clinic who recommended to come to ED.   - on admit with elevated WBC to 13.5, ESR to 105, CRP at 85.1-> WBC improved 9/1 to 9.0  - MRI 8/29 consistent with osteo  - plans for BKA today at 1 pm by Dr. Nascimento  - continue on antibiotics until surgery; growing strep and proteus and vanco/ ceftx should cover both-> likely d/c abx pos op as nidus of infection will be gone  - blood cultures negative from 8/28  - non weight bearing as per ortho  - KAREN's done without evidence of arterial insuffficiency (8/29)    DM II  Outpatient on linagliptin, glimepiride, metformin  - metformin on hold  - SSI ordered  - BS under very good control at this point; avoid lows  - A1C 8/28 at 7.6% (much improved from previous)  - hold meds (linagliptin, glimepiride) today to avoid hypoglycemia    HTN  Outpatient on HCTZ and lisinopril.   - currently on hold given hypotension  - monitoring    HLD  H/o intolerance to statins  - working with primary re: statin/ defer to primary    CKD  Baseline creat at 1.1, stable  - some proteinuria noted in 6/2017, likely representing underlying diabetic nephropathy    Anemia  hgb 9.9 on admission 8/28, at 8.5 on 8/29  - t-sat low, started on iron replacement    Recent Labs  Lab  09/01/17  0620 08/30/17  0923 08/29/17  0801 08/28/17  1045   HGB 8.1* 8.3* 8.5* 9.9*   - will need to watch hgb post op as may need transfusion given hgb at 8/1 9/1    FEN (fluids, electrolytes and nutrition): NPO in anticipation of surgery  Discussed with nursing.  DVT Prophylaxis: Pneumatic Compression Devices  Code Status: Full Code    Disposition: Expected discharge as per ortho post operatively    Ry Hilliard MD  [phone] (P)  [phone] (C)  Text Page until 6 pm (after call answering service)    Interval History   Doing ok. Denies cp/sob.     -Data reviewed today: I reviewed all new labs and imaging results over the last 24 hours. I personally reviewed no images or EKG's today.    Physical Exam   Temp: 98.3  F (36.8  C) Temp src: Oral BP: 99/65   Heart Rate: 88 Resp: 18 SpO2: 96 % O2 Device: None (Room air)    There were no vitals filed for this visit.  Vital Signs with Ranges  Temp:  [98.3  F (36.8  C)-99.1  F (37.3  C)] 98.3  F (36.8  C)  Heart Rate:  [88-98] 88  Resp:  [18] 18  BP: ()/(65-87) 99/65  SpO2:  [96 %-98 %] 96 %  I/O last 3 completed shifts:  In: 960 [P.O.:960]  Out: 1450 [Urine:1450]    Constitutional: Alert, oriented, no acute distress  Respiratory: Lungs clear to auscultation bilaterally, no wheezes, no crackles  Cardiovascular: Regular rate and rhythm, no murmurs  GI: Soft, non-tender, non-disteneded, good bowel sounds  Skin/Integumen: No erythema, cyanosis or edema. R foot bandaged, c/d/i  Other:      Medications        chlorhexidine   Topical Once    Or     chlorhexidine   Topical Once    Or     antimicrobial soap   Topical Once     chlorhexidine   Topical Once    Or     antimicrobial soap   Topical Once     ferrous sulfate  325 mg Oral BID w/meals     vancomycin (VANCOCIN) IV  2,000 mg Intravenous Q12H     glimepiride  4 mg Oral QAM AC     linagliptin  5 mg Oral Daily     cefTRIAXone  1 g Intravenous Q24H     insulin aspart  1-10 Units Subcutaneous TID AC     insulin  aspart  1-7 Units Subcutaneous At Bedtime     sodium chloride 0.9%  500 mL Intravenous Once       Data     Recent Labs  Lab 09/01/17  0620 08/31/17  1120 08/30/17  0923 08/29/17  0801 08/28/17  1045   WBC 9.0  --  9.1 10.4 13.5*   HGB 8.1*  --  8.3* 8.5* 9.9*   MCV 85  --  85 86 85     --  280 368 392   NA  --   --   --  138 134   POTASSIUM 4.4  --   --  4.4 4.6   CHLORIDE  --   --   --  106 102   CO2  --   --   --  26 27   BUN  --   --   --  21 27   CR 1.09 1.03 1.09 1.09 1.10   ANIONGAP  --   --   --  6 5   FERNANDO  --   --   --  8.3* 8.9   GLC  --   --   --  173* 187*       No results found for this or any previous visit (from the past 24 hour(s)).

## 2017-09-02 ENCOUNTER — APPOINTMENT (OUTPATIENT)
Dept: OCCUPATIONAL THERAPY | Facility: CLINIC | Age: 52
DRG: 617 | End: 2017-09-02
Attending: ORTHOPAEDIC SURGERY
Payer: COMMERCIAL

## 2017-09-02 ENCOUNTER — APPOINTMENT (OUTPATIENT)
Dept: PHYSICAL THERAPY | Facility: CLINIC | Age: 52
DRG: 617 | End: 2017-09-02
Attending: ORTHOPAEDIC SURGERY
Payer: COMMERCIAL

## 2017-09-02 LAB
CREAT SERPL-MCNC: 1.31 MG/DL (ref 0.66–1.25)
ERYTHROCYTE [DISTWIDTH] IN BLOOD BY AUTOMATED COUNT: 12 % (ref 10–15)
GFR SERPL CREATININE-BSD FRML MDRD: 58 ML/MIN/1.7M2
GLUCOSE BLDC GLUCOMTR-MCNC: 112 MG/DL (ref 70–99)
GLUCOSE BLDC GLUCOMTR-MCNC: 142 MG/DL (ref 70–99)
GLUCOSE BLDC GLUCOMTR-MCNC: 219 MG/DL (ref 70–99)
GLUCOSE BLDC GLUCOMTR-MCNC: 99 MG/DL (ref 70–99)
GLUCOSE SERPL-MCNC: 174 MG/DL (ref 70–99)
HCT VFR BLD AUTO: 23.4 % (ref 40–53)
HGB BLD-MCNC: 8.1 G/DL (ref 13.3–17.7)
MCH RBC QN AUTO: 29.3 PG (ref 26.5–33)
MCHC RBC AUTO-ENTMCNC: 34.6 G/DL (ref 31.5–36.5)
MCV RBC AUTO: 85 FL (ref 78–100)
PLATELET # BLD AUTO: 296 10E9/L (ref 150–450)
RBC # BLD AUTO: 2.76 10E12/L (ref 4.4–5.9)
WBC # BLD AUTO: 11.8 10E9/L (ref 4–11)

## 2017-09-02 PROCEDURE — 36415 COLL VENOUS BLD VENIPUNCTURE: CPT | Performed by: ORTHOPAEDIC SURGERY

## 2017-09-02 PROCEDURE — 12000007 ZZH R&B INTERMEDIATE

## 2017-09-02 PROCEDURE — 97161 PT EVAL LOW COMPLEX 20 MIN: CPT | Mod: GP

## 2017-09-02 PROCEDURE — 25000132 ZZH RX MED GY IP 250 OP 250 PS 637: Performed by: HOSPITALIST

## 2017-09-02 PROCEDURE — 25000128 H RX IP 250 OP 636: Performed by: HOSPITALIST

## 2017-09-02 PROCEDURE — 97530 THERAPEUTIC ACTIVITIES: CPT | Mod: GP

## 2017-09-02 PROCEDURE — 25000128 H RX IP 250 OP 636: Performed by: ORTHOPAEDIC SURGERY

## 2017-09-02 PROCEDURE — 97535 SELF CARE MNGMENT TRAINING: CPT | Mod: GO

## 2017-09-02 PROCEDURE — 25000132 ZZH RX MED GY IP 250 OP 250 PS 637: Performed by: ORTHOPAEDIC SURGERY

## 2017-09-02 PROCEDURE — 00000146 ZZHCL STATISTIC GLUCOSE BY METER IP

## 2017-09-02 PROCEDURE — 99232 SBSQ HOSP IP/OBS MODERATE 35: CPT | Performed by: INTERNAL MEDICINE

## 2017-09-02 PROCEDURE — 82947 ASSAY GLUCOSE BLOOD QUANT: CPT | Performed by: ORTHOPAEDIC SURGERY

## 2017-09-02 PROCEDURE — 25000132 ZZH RX MED GY IP 250 OP 250 PS 637: Performed by: INTERNAL MEDICINE

## 2017-09-02 PROCEDURE — 40000133 ZZH STATISTIC OT WARD VISIT

## 2017-09-02 PROCEDURE — 40000193 ZZH STATISTIC PT WARD VISIT

## 2017-09-02 PROCEDURE — 97165 OT EVAL LOW COMPLEX 30 MIN: CPT | Mod: GO

## 2017-09-02 PROCEDURE — 82565 ASSAY OF CREATININE: CPT | Performed by: ORTHOPAEDIC SURGERY

## 2017-09-02 PROCEDURE — 97110 THERAPEUTIC EXERCISES: CPT | Mod: GP

## 2017-09-02 PROCEDURE — 85027 COMPLETE CBC AUTOMATED: CPT | Performed by: ORTHOPAEDIC SURGERY

## 2017-09-02 RX ORDER — ACETAMINOPHEN 325 MG/1
650 TABLET ORAL EVERY 4 HOURS PRN
Qty: 100 TABLET | Refills: 0 | COMMUNITY
Start: 2017-09-02 | End: 2018-03-01

## 2017-09-02 RX ORDER — HYDROXYZINE HYDROCHLORIDE 25 MG/1
25 TABLET, FILM COATED ORAL EVERY 6 HOURS PRN
Qty: 40 TABLET | Refills: 0 | Status: SHIPPED | OUTPATIENT
Start: 2017-09-02 | End: 2017-10-13

## 2017-09-02 RX ORDER — OXYCODONE HYDROCHLORIDE 5 MG/1
5-10 TABLET ORAL
Qty: 60 TABLET | Refills: 0 | Status: SHIPPED | OUTPATIENT
Start: 2017-09-02 | End: 2017-10-13

## 2017-09-02 RX ADMIN — LINAGLIPTIN 5 MG: 5 TABLET, FILM COATED ORAL at 06:58

## 2017-09-02 RX ADMIN — KETOROLAC TROMETHAMINE 30 MG: 30 INJECTION, SOLUTION INTRAMUSCULAR at 12:21

## 2017-09-02 RX ADMIN — ACETAMINOPHEN 975 MG: 325 TABLET, FILM COATED ORAL at 22:35

## 2017-09-02 RX ADMIN — GABAPENTIN 300 MG: 300 CAPSULE ORAL at 08:20

## 2017-09-02 RX ADMIN — VANCOMYCIN HYDROCHLORIDE 1750 MG: 5 INJECTION, POWDER, LYOPHILIZED, FOR SOLUTION INTRAVENOUS at 08:19

## 2017-09-02 RX ADMIN — GLIMEPIRIDE 4 MG: 4 TABLET ORAL at 06:58

## 2017-09-02 RX ADMIN — GABAPENTIN 300 MG: 300 CAPSULE ORAL at 21:13

## 2017-09-02 RX ADMIN — ACETAMINOPHEN 975 MG: 325 TABLET, FILM COATED ORAL at 05:38

## 2017-09-02 RX ADMIN — CEFTRIAXONE 1 G: 1 INJECTION, POWDER, FOR SOLUTION INTRAMUSCULAR; INTRAVENOUS at 12:20

## 2017-09-02 RX ADMIN — KETOROLAC TROMETHAMINE 30 MG: 30 INJECTION, SOLUTION INTRAMUSCULAR at 00:12

## 2017-09-02 RX ADMIN — OXYCODONE HYDROCHLORIDE 10 MG: 5 TABLET ORAL at 16:10

## 2017-09-02 RX ADMIN — ENOXAPARIN SODIUM 40 MG: 40 INJECTION SUBCUTANEOUS at 15:26

## 2017-09-02 RX ADMIN — OXYCODONE HYDROCHLORIDE 10 MG: 5 TABLET ORAL at 08:22

## 2017-09-02 RX ADMIN — FERROUS SULFATE TAB 325 MG (65 MG ELEMENTAL FE) 325 MG: 325 (65 FE) TAB at 18:16

## 2017-09-02 RX ADMIN — SENNOSIDES AND DOCUSATE SODIUM 2 TABLET: 8.6; 5 TABLET ORAL at 08:35

## 2017-09-02 RX ADMIN — KETOROLAC TROMETHAMINE 30 MG: 30 INJECTION, SOLUTION INTRAMUSCULAR at 05:37

## 2017-09-02 RX ADMIN — OXYCODONE HYDROCHLORIDE 10 MG: 5 TABLET ORAL at 21:13

## 2017-09-02 RX ADMIN — ACETAMINOPHEN 975 MG: 325 TABLET, FILM COATED ORAL at 15:26

## 2017-09-02 RX ADMIN — FERROUS SULFATE TAB 325 MG (65 MG ELEMENTAL FE) 325 MG: 325 (65 FE) TAB at 08:20

## 2017-09-02 ASSESSMENT — ACTIVITIES OF DAILY LIVING (ADL): PREVIOUS_RESPONSIBILITIES: MEAL PREP;HOUSEKEEPING;LAUNDRY;SHOPPING;YARDWORK;MEDICATION MANAGEMENT;FINANCES;DRIVING;WORK

## 2017-09-02 NOTE — PLAN OF CARE
Problem: Goal Outcome Summary  Goal: Goal Outcome Summary  Outcome: Improving  Alert and oriented x4 . Up with assist of 1 . Pain managed with prn oxycodone and Toradol . Good appetite . Dressing C/D/I . CMS intact . Tolerating activity well . Vital signs stable .

## 2017-09-02 NOTE — PLAN OF CARE
Problem: Goal Outcome Summary  Goal: Goal Outcome Summary  Physical Therapy: Order received, evaluation completed and treatment initiated. Pt is a 52 year old male admitted for right BKA following non-healing diabetic ulcer on right heel. Pt non-WBing right LE, cast over residual limb. At baseline, patient reports that he lives with his significant other in a home with all needs met on first floor, 2 steps to enter. Pt reports he works as a . Pt reports independence with mobility and self cares, no use of AD at baseline.     Discharge Planner PT   Patient plan for discharge: Home with SO  Current status: Pt independent with bed mobility, good sitting balance at EOB. Pt performs sit to/from stand with CGA. Pt able to ambulate 50' with FWW with CGA.  Barriers to return to prior living situation: Steps to enter home, fall risk, decreased activity tolerance, R LE NWBing.  Recommendations for discharge: Home with SO  Rationale for recommendations: Pending progress with ambulation and ability to perform stair management, patient should be safe to discharge home with SO assisting.       Entered by: Liz Campos 09/02/2017 10:53 AM

## 2017-09-02 NOTE — PROGRESS NOTES
[facility]    Hospitalist Progress Note    Date of Service (when I saw the patient): 09/02/2017    Assessment & Plan   Mr. Horner is a 51 y/o male with a medical history remarkable for morbid obesity, DM, HTN, HLD, CKD with baseline creat at 1.1 and chronic R heel ulcer who presented to the ED with complaints of pain and swelling involving his R foot/ ankle.     R foot infection  With h/o ulcer of his foot of 2 years, s/p grafting and wound vac at some point. Admit 7/2016 with concern for early osteomyelitis; however calcaneal biopsy done at the time with no evidence of osteomyelitis at that time. To ED 8/28 with ~ 1 week of increased swelling of his R foot as well as drainage and warmth, no fever. Seen in Podiatry clinic who recommended to come to ED.   - on admit with elevated WBC to 13.5, ESR to 105, CRP at 85.1-> WBC improved 9/1 to 9.0  - MRI 8/29 consistent with osteo  - s/p BKA on 9/1 by Dr. Nascimento  - abx d/c'ed  - blood cultures negative from 8/28  - KAREN's done without evidence of arterial insuffficiency (8/29)  - therapies, DVT ppx as per ortho    DM II  Outpatient on linagliptin, glimepiride, metformin  - metformin on hold  - SSI ordered  - BS under very good control at this point; avoid lows  - A1C 8/28 at 7.6% (much improved from previous)  - resume linagliptin, glimepiride now  - resume metformin at discharge    HTN  Outpatient on HCTZ and lisinopril.   - currently on hold given occ hypotension  - likely restart at d/c    HLD  H/o intolerance to statins  - working with primary re: statin/ defer to primary    CKD  Baseline creat at 1.1, stable  - some proteinuria noted in 6/2017, likely representing underlying diabetic nephropathy  - sl elevated to 1.3 on 9/2, repeat 9/3 ordered    Anemia  hgb 9.9 on admission 8/28, at 8.5 on 8/29  - t-sat low, started on iron replacement    Recent Labs  Lab 09/02/17  0643 09/01/17  0620 08/30/17  0923 08/29/17  0801   HGB 8.1* 8.1* 8.3* 8.5*   - stable  hgb post op and following    FEN (fluids, electrolytes and nutrition): CHO diet  Discussed with nursing.  DVT Prophylaxis: Pneumatic Compression Devices  Code Status: Full Code    Disposition: Expected discharge as per ortho post operatively    Ry Hilliard MD  [phone] (P)  [phone] (C)  Text Page until 6 pm (after call answering service)    Interval History   Doing ok. S/p OR, states went well. Pain under control. Denies cp/sob     -Data reviewed today: I reviewed all new labs and imaging results over the last 24 hours. I personally reviewed no images or EKG's today.    Physical Exam   Temp: 98.3  F (36.8  C) Temp src: Oral BP: 118/72 Pulse: 75 Heart Rate: 90 Resp: 18 SpO2: 97 % O2 Device: None (Room air) Oxygen Delivery: 2 LPM  There were no vitals filed for this visit.  Vital Signs with Ranges  Temp:  [97.7  F (36.5  C)-98.7  F (37.1  C)] 98.3  F (36.8  C)  Pulse:  [75] 75  Heart Rate:  [] 90  Resp:  [15-20] 18  BP: (112-148)/() 118/72  SpO2:  [87 %-100 %] 97 %  I/O last 3 completed shifts:  In: 2195 [P.O.:950; I.V.:1245]  Out: 1475 [Urine:1425; Blood:50]    Constitutional: Alert, oriented, no acute distress  Respiratory: Lungs clear to auscultation bilaterally, no wheezes, no crackles  Cardiovascular: Regular rate and rhythm, no murmurs  GI: Soft, non-tender, non-disteneded, good bowel sounds  Skin/Integumen: No erythema, cyanosis or edema. R BKA site in ace wrap c/d/i  Other:      Medications     Patient RECEIVING antibiotic to treat a different condition and it provides ADEQUATE COVERAGE for this surgical procedure.       lactated ringers 1,000 mL (09/01/17 1201)     NaCl 75 mL/hr at 09/01/17 1811       sodium chloride (PF)  3 mL Intracatheter Q8H     acetaminophen  975 mg Oral Q8H     gabapentin  300 mg Oral BID     enoxaparin  40 mg Subcutaneous Q24H     ferrous sulfate  325 mg Oral BID w/meals     glimepiride  4 mg Oral QAM AC     linagliptin  5 mg Oral Daily     insulin aspart   1-10 Units Subcutaneous TID AC     insulin aspart  1-7 Units Subcutaneous At Bedtime     sodium chloride 0.9%  500 mL Intravenous Once       Data     Recent Labs  Lab 09/02/17  0643 09/01/17  1805 09/01/17  0620  08/30/17  0923 08/29/17  0801 08/28/17  1045   WBC 11.8*  --  9.0  --  9.1 10.4 13.5*   HGB 8.1*  --  8.1*  --  8.3* 8.5* 9.9*   MCV 85  --  85  --  85 86 85    314 300  --  280 368 392   NA  --   --   --   --   --  138 134   POTASSIUM  --   --  4.4  --   --  4.4 4.6   CHLORIDE  --   --   --   --   --  106 102   CO2  --   --   --   --   --  26 27   BUN  --   --   --   --   --  21 27   CR 1.31* 1.06 1.09  < > 1.09 1.09 1.10   ANIONGAP  --   --   --   --   --  6 5   FERNANDO  --   --   --   --   --  8.3* 8.9   *  --   --   --   --  173* 187*   < > = values in this interval not displayed.    No results found for this or any previous visit (from the past 24 hour(s)).

## 2017-09-02 NOTE — PROGRESS NOTES
[facility]  Orthopaedics/Foot and Ankle Surgery  Daily Post-Op Note    09/02/2017          Assessment and Plan:    Assessment:   Post-operative day #1  Procedure(s) with comments:  AMPUTATE LEG BELOW KNEE (Right) - RIGHT BELOW KNEE AMPUTATION         Plan:   1. NWB RLE.  Keep elevated while at rest to limit swelling and pain.  2. Cont. current pain regimen.  Under appropriate control at this time.  No phantom pain.  3. PT/OT, went well today.  4. Lovenox, SCDs for DVT prophy.  5. Appreciate hospitalist comanagement.  No acute issues at this time.  May d/c antibiotics.  6. Plan likely d/c tomorrow after FloTech brace is fit.  MPO will be coming to do the brace fitting.  Ortho orders and rxs placed.            Interval History:   No acute events overnight.  Pain well controlled.  Denies f/c, SOB, CP.  No phantom pain but some phantom sensations in small toe.              Physical Exam:   Blood pressure 124/73, pulse 75, temperature 98.7  F (37.1  C), temperature source Oral, resp. rate 16, SpO2 97 %.  I/O last 3 completed shifts:  In: 2835 [P.O.:590; I.V.:2245]  Out: 1200 [Urine:1150; Blood:50]    RLE splint c/d/i.           Data:   All laboratory data related to this surgery reviewed.    Recent Labs   Lab Test  09/02/17   0643  09/01/17   0620  08/30/17   0923  08/29/17   0801  08/28/17   1045   HGB  8.1*  8.1*  8.3*  8.5*  9.9*     No lab results found.   Recent Labs   Lab Test  09/02/17   0643   09/01/17   0620   WBC  11.8*   --   9.0   PLT  296   < >  300   POTASSIUM   --    --   4.4   CR  1.31*   < >  1.09    < > = values in this interval not displayed.

## 2017-09-02 NOTE — PROGRESS NOTES
09/02/17 1000   Quick Adds   Type of Visit Initial PT Evaluation   Living Environment   Lives With significant other   Living Arrangements house   Home Accessibility stairs to enter home   Number of Stairs to Enter Home 2   Number of Stairs Within Home 10  (To basement)   Stair Railings at Home outside, present on right side   Transportation Available car;family or friend will provide   Self-Care   Usual Activity Tolerance good   Current Activity Tolerance moderate   Regular Exercise no   Equipment Currently Used at Home none   Functional Level Prior   Ambulation 0-->independent   Transferring 0-->independent   Fall history within last six months no   Which of the above functional risks had a recent onset or change? none   Prior Functional Level Comment Pt reports independence with mobility and self cares prior to admit   General Information   Onset of Illness/Injury or Date of Surgery - Date 09/01/17   Referring Physician Dr. Nascimento   Patient/Family Goals Statement To go home   Pertinent History of Current Problem (include personal factors and/or comorbidities that impact the POC) Pt is a 52 year old male admitted for right BKA following non-healing diabetic ulcer on right heel. Pt non-WBing right LE.   Precautions/Limitations fall precautions   Weight-Bearing Status - RLE nonweight-bearing   Cognitive Status Examination   Orientation orientation to person, place and time   Level of Consciousness alert   Follows Commands and Answers Questions 100% of the time   Pain Assessment   Patient Currently in Pain No   Range of Motion (ROM)   ROM Quick Adds No deficits were identified   Strength   Manual Muscle Testing Quick Adds No deficits were identified   Strength Comments Right knee immobilized   Bed Mobility   Bed Mobility Comments Independent supine to/from sit   Transfer Skills   Transfer Comments Sit to/from stand CGA   Gait   Gait Comments 50' FWW CGA   Balance   Balance Comments Good in sitting, fair in  "standing   General Therapy Interventions   Planned Therapy Interventions bed mobility training;gait training;strengthening;transfer training;progressive activity/exercise;home program guidelines   Clinical Impression   Criteria for Skilled Therapeutic Intervention yes, treatment indicated   PT Diagnosis Impaired ambulation   Influenced by the following impairments Decreased balance, decreased endurance   Functional limitations due to impairments Difficulty with transfers, ambulation, stair management   Clinical Presentation Stable/Uncomplicated   Clinical Presentation Rationale VSS, pain controlled   Clinical Decision Making (Complexity) Low complexity   Therapy Frequency` daily   Predicted Duration of Therapy Intervention (days/wks) 1 week   Anticipated Equipment Needs at Discharge walker   Anticipated Discharge Disposition Home with Assist   Risk & Benefits of therapy have been explained Yes   Patient, Family & other staff in agreement with plan of care Yes   [facility] TM \"6 Clicks\"   2016, Trustees of [facility], under license to [employer].  All rights reserved.   6 Clicks Short Forms Basic Mobility Inpatient Short Form   [facility] AM-PAC  \"6 Clicks\" V.2 Basic Mobility Inpatient Short Form   1. Turning from your back to your side while in a flat bed without using bedrails? 4 - None   2. Moving from lying on your back to sitting on the side of a flat bed without using bedrails? 4 - None   3. Moving to and from a bed to a chair (including a wheelchair)? 3 - A Little   4. Standing up from a chair using your arms (e.g., wheelchair, or bedside chair)? 3 - A Little   5. To walk in hospital room? 3 - A Little   6. Climbing 3-5 steps with a railing? 2 - A Lot   Basic Mobility Raw Score (Score out of 24.Lower scores equate to lower levels of function) 19   Total Evaluation Time   Total Evaluation Time (Minutes) 10     "

## 2017-09-02 NOTE — PLAN OF CARE
Problem: Goal Outcome Summary  Goal: Goal Outcome Summary  Outcome: Improving  A&O x4 VSS on RA CMS intact Dressing C/D/I Up with 1 Voiding in urinal denies Pain. Progressing per plan of care.

## 2017-09-02 NOTE — PROGRESS NOTES
09/02/17 1100   Quick Adds   Type of Visit Initial Occupational Therapy Evaluation   Living Environment   Lives With significant other   Living Arrangements house   Home Accessibility bed and bath on same level;tub/shower is not walk in  (Pt will have a shower chair )   Number of Stairs to Enter Home 2   Number of Stairs Within Home 10  (To basement )   Transportation Available car;family or friend will provide   Living Environment Comment Works full time as a    Self-Care   Usual Activity Tolerance good   Current Activity Tolerance moderate   Regular Exercise no   Equipment Currently Used at Home none   Functional Level Prior   Ambulation 0-->independent   Transferring 0-->independent   Toileting 0-->independent   Bathing 0-->independent   Dressing 0-->independent   Fall history within last six months no   Which of the above functional risks had a recent onset or change? none   General Information   Onset of Illness/Injury or Date of Surgery - Date 08/28/17   Referring Physician Pily   Patient/Family Goals Statement Home   Additional Occupational Profile Info/Pertinent History of Current Problem Pt is a 52 year old male admitted for right BKA following non-healing diabetic ulcer on right heel. Pt non-WBing right LE.   Precautions/Limitations fall precautions   Weight-Bearing Status - RLE nonweight-bearing   Cognitive Status Examination   Orientation orientation to person, place and time   Level of Consciousness alert   Visual Perception   Visual Perception Wears glasses   Pain Assessment   Patient Currently in Pain Yes, see Vital Sign flowsheet   Range of Motion (ROM)   ROM Quick Adds No deficits were identified   Mobility   Bed Mobility Bed mobility skill: Sit to supine;Bed mobility skill: Supine to sit   Bed Mobility Skill: Sit to Supine   Level of [place]: Sit/Supine contact guard   Bed Mobility Skill: Supine to Sit   Level of [place]: Supine/Sit contact guard   Transfer Skills  "  Transfer Transfer Safety Analysis Bed/Chair;Transfer Skill: Stand to Sit;Transfer Safety Analysis Sit/Stand   Transfer Skill: Bed to Chair/Chair to Bed   Level of [place]: Bed to Chair contact guard   Transfer Skill: Sit to Stand   Level of [place]: Sit/Stand contact guard   Toilet Transfer   Toilet Transfer Toilet Transfer Skill;Toilet Transfer Safety Analysis   Transfer Skill: Toilet Transfer   Level of [place]: Toilet minimum assist (75% patients effort)   Lower Body Dressing   Level of [place]: Dress Lower Body minimum assist (75% patients effort)   Instrumental Activities of Daily Living (IADL)   Previous Responsibilities meal prep;housekeeping;laundry;shopping;yardwork;medication management;finances;driving;work   General Therapy Interventions   Planned Therapy Interventions ADL retraining;IADL retraining;transfer training   Clinical Impression   Criteria for Skilled Therapeutic Interventions Met yes, treatment indicated   OT Diagnosis Decreased ADls and IADls, functional transfers   Influenced by the following impairments pain, non weight bearing on RLE   Assessment of Occupational Performance 1-3 Performance Deficits   Identified Performance Deficits Decreased ADls and IADls (dressing, bathing, toileting), functional transfers   Clinical Decision Making (Complexity) Low complexity   Therapy Frequency daily   Predicted Duration of Therapy Intervention (days/wks) 5 days   Anticipated Discharge Disposition Home with Assist  (pending progress)   Risks and Benefits of Treatment have been explained. Yes   Patient, Family & other staff in agreement with plan of care Yes   [facility] TM \"6 Clicks\"   2016, Trustees of [facility], under license to [employer].  All rights reserved.   6 Clicks Short Forms Daily Activity Inpatient Short Form   [facility]-PAC  \"6 Clicks\" Daily Activity Inpatient Short Form   1. Putting on and taking off regular lower body clothing? 3 " - A Little   2. Bathing (including washing, rinsing, drying)? 2 - A Lot   3. Toileting, which includes using toilet, bedpan or urinal? 2 - A Lot   4. Putting on and taking off regular upper body clothing? 4 - None   5. Taking care of personal grooming such as brushing teeth? 3 - A Little   6. Eating meals? 4 - None   Daily Activity Raw Score (Score out of 24.Lower scores equate to lower levels of function) 18   Total Evaluation Time   Total Evaluation Time (Minutes) 8

## 2017-09-02 NOTE — PLAN OF CARE
Problem: Goal Outcome Summary  Goal: Goal Outcome Summary  Outcome: No Change  Arrived from PACU around 1730. A&O. CMS intact. VSS. Tolerated mod carb diet for dinner, blood sugars 141 and 283, refused insulin and states all with back to normal when restarted on his oral agents tomorrow AM. Scooted from cart to bed independently. Denies pain. IV abx.

## 2017-09-02 NOTE — PLAN OF CARE
Problem: Goal Outcome Summary  Goal: Goal Outcome Summary  OT: Evaluation and treatment initiated.  Pt is a 52 year old male admitted for right BKA following non-healing diabetic ulcer on right heel. Pt non-weightbearing on right LE and has a cast over residual limb. Pt lives in a home with his significant other. Prior pt independent in all I/ADls.  Discharge Planner OT   Patient plan for discharge: Home  Current status: While seated/standing pt completed lower body dressing with learned compensatory techniques and minimum assist. Pt ambulated to the bathroom with walker and CGA. Pt completed toilet transfer with minimum assist. Pt stood at the sink and completed 1 hygiene task with CGA.   Barriers to return to prior living situation: Non-weightbearing RLE, stairs (will defer to PT)   Recommendations for discharge: Pending progress, anticipate home with assist for I/ADls  Rationale for recommendations: Anticipate increased independence in I/ADLs during progression of therapy        Entered by: Kaylin Pedroza 09/02/2017 11:34 AM

## 2017-09-02 NOTE — PHARMACY-VANCOMYCIN DOSING SERVICE
Pharmacy Vancomycin Note  Date of Service 2017  Patient's  1965   52 year old, male    Indication: Osteomyelitis  Goal Trough Level: 15-20 mg/L  Day of Therapy: 5  Current Vancomycin regimen:  2000 mg IV q12h    Current estimated CrCl = Estimated Creatinine Clearance: 109.2 mL/min (based on Cr of 1.06).    Creatinine for last 3 days  2017:  9:23 AM Creatinine 1.09 mg/dL  2017: 11:20 AM Creatinine 1.03 mg/dL  2017:  6:20 AM Creatinine 1.09 mg/dL;  6:05 PM Creatinine 1.06 mg/dL    Recent Vancomycin Levels (past 3 days)  2017: 11:35 AM Vancomycin Level 20.6 mg/L  2017:  6:05 PM Vancomycin Level 14.6 mg/L    Vancomycin IV Administrations (past 72 hours)                   vancomycin (VANCOCIN) 2,000 mg in NaCl 0.9 % 500 mL intermittent infusion (mg) 2,000 mg New Bag 17 0116     2,000 mg New Bag 17 1452     2,000 mg New Bag  0245     2,000 mg New Bag 17 1404     2,000 mg New Bag  0156                Nephrotoxins and other renal medications (Future)    Start     Dose/Rate Route Frequency Ordered Stop    17 2000  vancomycin (VANCOCIN) 1,750 mg in NaCl 0.9 % 500 mL intermittent infusion      1,750 mg Intravenous EVERY 12 HOURS 17 1929      17 1800  ketorolac (TORADOL) injection 30 mg     Comments:  IF celecoxib was given pre-operatively, start ketorolac 12 hours after celecoxib given.    30 mg Intravenous EVERY 6 HOURS 17 1729 17 1759             Contrast Orders - past 72 hours     None          Interpretation of levels and current regimen:  Trough level is  Subtherapeutic; however, trough drawn ~ 17 hours after last dose of Vancomycin.  Lab drawn to evaluate whether patient is accumulating. Previous Vancomycin level was slightly supratherapeutic and based on recent Vancomycin level, patient is likely accumulating Vancomycin, therefore dose of Vancomycin will be slightly decreased.    Has serum creatinine changed > 50% in last 72  hours: No    Urine output:  good urine output    Renal Function: Stable    Plan:  1.  Decrease dose to Vancomycin 1750 mg IV q12h.    2.  Pharmacy will check trough levels as appropriate in 1-3 Days.    3. Serum creatinine levels will be ordered daily for the first week of therapy and at least twice weekly for subsequent weeks.      Alisa Kuhn, PharmD        .

## 2017-09-03 ENCOUNTER — APPOINTMENT (OUTPATIENT)
Dept: PHYSICAL THERAPY | Facility: CLINIC | Age: 52
DRG: 617 | End: 2017-09-03
Payer: COMMERCIAL

## 2017-09-03 ENCOUNTER — APPOINTMENT (OUTPATIENT)
Dept: OCCUPATIONAL THERAPY | Facility: CLINIC | Age: 52
DRG: 617 | End: 2017-09-03
Payer: COMMERCIAL

## 2017-09-03 VITALS
DIASTOLIC BLOOD PRESSURE: 86 MMHG | HEART RATE: 93 BPM | SYSTOLIC BLOOD PRESSURE: 131 MMHG | TEMPERATURE: 99 F | RESPIRATION RATE: 16 BRPM | OXYGEN SATURATION: 96 %

## 2017-09-03 LAB
BACTERIA SPEC CULT: NO GROWTH
BACTERIA SPEC CULT: NO GROWTH
CREAT SERPL-MCNC: 1.28 MG/DL (ref 0.66–1.25)
ERYTHROCYTE [DISTWIDTH] IN BLOOD BY AUTOMATED COUNT: 11.9 % (ref 10–15)
GFR SERPL CREATININE-BSD FRML MDRD: 59 ML/MIN/1.7M2
GLUCOSE BLDC GLUCOMTR-MCNC: 141 MG/DL (ref 70–99)
GLUCOSE BLDC GLUCOMTR-MCNC: 147 MG/DL (ref 70–99)
GLUCOSE SERPL-MCNC: 137 MG/DL (ref 70–99)
HCT VFR BLD AUTO: 23.5 % (ref 40–53)
HGB BLD-MCNC: 7.7 G/DL (ref 13.3–17.7)
Lab: NORMAL
Lab: NORMAL
MCH RBC QN AUTO: 28.5 PG (ref 26.5–33)
MCHC RBC AUTO-ENTMCNC: 32.8 G/DL (ref 31.5–36.5)
MCV RBC AUTO: 87 FL (ref 78–100)
PLATELET # BLD AUTO: 353 10E9/L (ref 150–450)
RBC # BLD AUTO: 2.7 10E12/L (ref 4.4–5.9)
SPECIMEN SOURCE: NORMAL
SPECIMEN SOURCE: NORMAL
WBC # BLD AUTO: 10.8 10E9/L (ref 4–11)

## 2017-09-03 PROCEDURE — 99239 HOSP IP/OBS DSCHRG MGMT >30: CPT | Performed by: INTERNAL MEDICINE

## 2017-09-03 PROCEDURE — 00000146 ZZHCL STATISTIC GLUCOSE BY METER IP

## 2017-09-03 PROCEDURE — 82947 ASSAY GLUCOSE BLOOD QUANT: CPT | Performed by: ORTHOPAEDIC SURGERY

## 2017-09-03 PROCEDURE — 36415 COLL VENOUS BLD VENIPUNCTURE: CPT | Performed by: ORTHOPAEDIC SURGERY

## 2017-09-03 PROCEDURE — 82565 ASSAY OF CREATININE: CPT | Performed by: ORTHOPAEDIC SURGERY

## 2017-09-03 PROCEDURE — 97535 SELF CARE MNGMENT TRAINING: CPT | Mod: GO | Performed by: OCCUPATIONAL THERAPIST

## 2017-09-03 PROCEDURE — 25000132 ZZH RX MED GY IP 250 OP 250 PS 637: Performed by: ORTHOPAEDIC SURGERY

## 2017-09-03 PROCEDURE — 85027 COMPLETE CBC AUTOMATED: CPT | Performed by: ORTHOPAEDIC SURGERY

## 2017-09-03 PROCEDURE — 25000132 ZZH RX MED GY IP 250 OP 250 PS 637: Performed by: INTERNAL MEDICINE

## 2017-09-03 PROCEDURE — 40000133 ZZH STATISTIC OT WARD VISIT: Performed by: OCCUPATIONAL THERAPIST

## 2017-09-03 PROCEDURE — 82947 ASSAY GLUCOSE BLOOD QUANT: CPT | Performed by: HOSPITALIST

## 2017-09-03 PROCEDURE — 40000193 ZZH STATISTIC PT WARD VISIT: Performed by: PHYSICAL THERAPIST

## 2017-09-03 PROCEDURE — 25000128 H RX IP 250 OP 636: Performed by: ORTHOPAEDIC SURGERY

## 2017-09-03 PROCEDURE — 97530 THERAPEUTIC ACTIVITIES: CPT | Mod: GP | Performed by: PHYSICAL THERAPIST

## 2017-09-03 PROCEDURE — 25000132 ZZH RX MED GY IP 250 OP 250 PS 637: Performed by: HOSPITALIST

## 2017-09-03 PROCEDURE — 97116 GAIT TRAINING THERAPY: CPT | Mod: GP | Performed by: PHYSICAL THERAPIST

## 2017-09-03 RX ORDER — LISINOPRIL 20 MG/1
10 TABLET ORAL DAILY
Qty: 90 TABLET | Refills: 3 | Status: SHIPPED | OUTPATIENT
Start: 2017-09-03 | End: 2019-07-11

## 2017-09-03 RX ORDER — FERROUS SULFATE 325(65) MG
325 TABLET ORAL 2 TIMES DAILY WITH MEALS
Qty: 100 TABLET | Refills: 1 | Status: SHIPPED | OUTPATIENT
Start: 2017-09-03 | End: 2017-10-13

## 2017-09-03 RX ADMIN — OXYCODONE HYDROCHLORIDE 10 MG: 5 TABLET ORAL at 00:43

## 2017-09-03 RX ADMIN — ACETAMINOPHEN 975 MG: 325 TABLET, FILM COATED ORAL at 14:27

## 2017-09-03 RX ADMIN — ENOXAPARIN SODIUM 40 MG: 40 INJECTION SUBCUTANEOUS at 14:27

## 2017-09-03 RX ADMIN — GABAPENTIN 300 MG: 300 CAPSULE ORAL at 08:15

## 2017-09-03 RX ADMIN — FERROUS SULFATE TAB 325 MG (65 MG ELEMENTAL FE) 325 MG: 325 (65 FE) TAB at 08:15

## 2017-09-03 RX ADMIN — OXYCODONE HYDROCHLORIDE 10 MG: 5 TABLET ORAL at 14:31

## 2017-09-03 RX ADMIN — OXYCODONE HYDROCHLORIDE 10 MG: 5 TABLET ORAL at 04:47

## 2017-09-03 RX ADMIN — GLIMEPIRIDE 4 MG: 4 TABLET ORAL at 06:58

## 2017-09-03 RX ADMIN — SENNOSIDES AND DOCUSATE SODIUM 2 TABLET: 8.6; 5 TABLET ORAL at 08:15

## 2017-09-03 RX ADMIN — ACETAMINOPHEN 975 MG: 325 TABLET, FILM COATED ORAL at 06:59

## 2017-09-03 RX ADMIN — LINAGLIPTIN 5 MG: 5 TABLET, FILM COATED ORAL at 06:58

## 2017-09-03 RX ADMIN — OXYCODONE HYDROCHLORIDE 10 MG: 5 TABLET ORAL at 10:15

## 2017-09-03 NOTE — PROGRESS NOTES
[facility]  Orthopaedics/Foot and Ankle Surgery  Daily Post-Op Note    09/03/2017          Assessment and Plan:    Assessment:   Post-operative day #2  Procedure(s) with comments:  AMPUTATE LEG BELOW KNEE (Right) - RIGHT BELOW KNEE AMPUTATION         Plan:   1. NWB RLE.  Keep elevated while at rest to limit swelling and pain.  2. Cont. current pain regimen.  Under appropriate control at this time.  No phantom pain.  3. PT/OT, went well today.  4. Lovenox, SCDs for DVT prophy.  Will d/c Lovenox and d/c on ASA for DVT prophy.  5. Appreciate hospitalist comanagement.  No acute issues at this time.  6. Plan d/c this afternoon.  Ortho orders and rxs placed.            Interval History:   No acute events overnight.  Pain well controlled.  Denies f/c, SOB, CP.  No phantom pain.  FloTech brace fit this a.m. with MPO team.              Physical Exam:   Blood pressure 131/86, pulse 93, temperature 99  F (37.2  C), temperature source Oral, resp. rate 16, SpO2 96 %.  I/O last 3 completed shifts:  In: 920 [P.O.:920]  Out: 2275 [Urine:2275]    RLE incision c/d/i per report.  FloTech brace fits well.           Data:   All laboratory data related to this surgery reviewed.    Recent Labs   Lab Test  09/03/17   0630  09/02/17   0643  09/01/17   0620  08/30/17   0923  08/29/17   0801   HGB  7.7*  8.1*  8.1*  8.3*  8.5*     No lab results found.   Recent Labs   Lab Test  09/03/17   0630   09/01/17   0620   WBC  10.8   < >  9.0   PLT  353   < >  300   POTASSIUM   --    --   4.4   CR  1.28*   < >  1.09    < > = values in this interval not displayed.

## 2017-09-03 NOTE — PLAN OF CARE
Problem: Goal Outcome Summary  Goal: Goal Outcome Summary  Outcome: Improving  A&O x4 VSS on RA CMS intact Dressing C/D/I Up with 1 assist & walker Voiding per urinal Taking oxycodone for pain. Progressing per plan of care. Plan to dc today following brace fitting

## 2017-09-03 NOTE — DISCHARGE SUMMARY
[facility]    Discharge Summary  Hospitalist    Date of Admission:  8/28/2017  Date of Discharge:  9/3/2017  Discharging Provider: Ry Hilliard MD  Date of Service (when I saw the patient): 09/03/17    Discharge Diagnoses   Right foot osteoarthritis  Diabetes mellitus, type II  Hypertension  Hyperlipidemia  Chronic kidney disease  Anemia    History of Present Illness   Mr. Horner is a 53 y/o male with a medical history remarkable for morbid obesity, DM, HTN, HLD, CKD with baseline creat at 1.1 and chronic R heel ulcer who presented to the ED with complaints of pain and swelling involving his R foot/ ankle.     Hospital Course   Ry Horner was admitted on 8/28/2017.  The following problems were addressed during his hospitalization:    Right foot osteoarthritis  Mr. Horner was admitted to the hospital after having worsening swelling and drainage in his R foot. He had chronic infection (2+ years) involving his R foot. MRI was performed and showed evidence of osteomyelitis. He was covered with appropriate antibiotics. Ultimately orthopedics was consulted and deemed amputation the best option. He underwent BKA on 9/1 by Dr. Nascimento. His post-operative course was relatively uneventful. He will follow up with Dr. Nascimento as per his instructions.     Diabetes mellitus, type II  Resume prior to admission management of diabetes.     Hypertension  Blood pressures were running low during his hospitalization (occasionally less than 100 systolic). He was advised at the time of discharge to take 10 mg of lisinopril (instead of his PTA dose of 20 mg) as well as hold his HCTZ. He should see Dr. Wing within a week for blood pressure check to see if these medications need to be restarted.     Hyperlipidemia  As per prior to admission.     Chronic kidney disease  No acute issues, creatinine roughly at baseline at the time of discharge.     Anemia  Noted to have anemia on presentation, at 9.9. His iron saturation  was low and he was started on oral iron replacement. His post op hemoglobin was ~8 range, at 7.7 at the time of discharge (no ongoing source of bleeding). He should see Dr. Wing and have a CBC checked within a week.     Ry Hilliard M.D.  Hospitalist  Pager [phone]    Significant Results and Procedures   MRI R foot  X-ray knee R  U/S doppler KAREN bilateral LEs  Foot x ray  Doppler U/S bilateral LEs    Pending Results   These results will be followed up by Dr. Nascimento  Unresulted Labs Ordered in the Past 30 Days of this Admission     Date and Time Order Name Status Description    9/1/2017 1429 Surgical pathology exam In process           Code Status   Full Code       Primary Care Physician   Kody Wing    Physical Exam   Temp: 99  F (37.2  C) Temp src: Oral BP: 131/86 Pulse: 93 Heart Rate: 93 Resp: 16 SpO2: 96 % O2 Device: None (Room air)    There were no vitals filed for this visit.  Vital Signs with Ranges  Temp:  [98.1  F (36.7  C)-99  F (37.2  C)] 99  F (37.2  C)  Pulse:  [87-93] 93  Heart Rate:  [83-93] 93  Resp:  [16-18] 16  BP: (106-131)/(47-86) 131/86  SpO2:  [95 %-98 %] 96 %  I/O last 3 completed shifts:  In: 920 [P.O.:920]  Out: 2275 [Urine:2275]    Constitutional: Alert, oriented, no acute distress  Respiratory: Lungs clear to auscultation bilaterally, no wheezes, no crackles  Cardiovascular: Regular rate and rhythm, no murmurs  GI: Soft, non-tender, non-disteneded, good bowel sounds  Skin/Integumen: No erythema, cyanosis or edema  Other: R BKA with plastic boot on     Discharge Disposition   Discharged to home  Condition at discharge: Stable    Consultations This Hospital Stay   PHARMACY TO DOSE VANCO  PODIATRY IP CONSULT  PHARMACY TO DOSE VANCO  ORTHOPEDIC SURGERY IP CONSULT  OCCUPATIONAL THERAPY ADULT IP CONSULT  PHYSICAL THERAPY ADULT IP CONSULT  SOCIAL WORK IP CONSULT    Time Spent on this Encounter   I, Ry Hilliard, personally saw the patient today and spent greater than 30  minutes discharging this patient.    Discharge Orders     Follow-up and recommended labs and tests    The patient will follow-up in clinic with Dr. Nacsimento ([facility]; [phone]) in 3 weeks for wound check and suture removal.     Activity   Your activity upon discharge: NWB RLE.     Wound care and dressings   Instructions to care for your wound at home: daily dry dressing change or as needed.  Keep brace on at all time except for daily hygiene.  Keep incision clean and dry until sutures removed.     Reason for your hospital stay   You were hospitalized secondary to an infection in the bone of your foot, ultimately requiring amputation.     Follow-up and recommended labs and tests    Follow up with primary care provider, Kody Wing, within 7 days for hospital follow- up.  The following labs/tests are recommended: CBC.     When to contact your care team   Call your primary doctor if you have any of the following: temperature greater than 100.4, increased drainage, increased swelling or increased pain.     Full Code     Diet   Follow this diet upon discharge: Orders Placed This Encounter     Moderate Consistent CHO Diet       Discharge Medications   Current Discharge Medication List      START taking these medications    Details   aspirin  MG EC tablet Take 1 tablet (325 mg) by mouth daily  Qty: 42 tablet, Refills: 0    Comments: For DVT prophy.  Associated Diagnoses: Osteomyelitis of right foot, unspecified type (H)      ferrous sulfate (IRON) 325 (65 FE) MG tablet Take 1 tablet (325 mg) by mouth 2 times daily (with meals)  Qty: 100 tablet, Refills: 1    Associated Diagnoses: Anemia, unspecified type      oxyCODONE (ROXICODONE) 5 MG IR tablet Take 1-2 tablets (5-10 mg) by mouth every 3 hours as needed for moderate to severe pain  Qty: 60 tablet, Refills: 0    Associated Diagnoses: Osteomyelitis of right foot, unspecified type (H)      acetaminophen (TYLENOL) 325 MG tablet Take 2 tablets  "(650 mg) by mouth every 4 hours as needed for mild pain or fever  Qty: 100 tablet, Refills: 0    Associated Diagnoses: Osteomyelitis of right foot, unspecified type (H)      hydrOXYzine (ATARAX) 25 MG tablet Take 1 tablet (25 mg) by mouth every 6 hours as needed for itching (nausea, adjuvant pain, spasms)  Qty: 40 tablet, Refills: 0    Associated Diagnoses: Osteomyelitis of right foot, unspecified type (H)         CONTINUE these medications which have CHANGED    Details   lisinopril (PRINIVIL/ZESTRIL) 20 MG tablet Take 0.5 tablets (10 mg) by mouth daily  Qty: 90 tablet, Refills: 3    Associated Diagnoses: Essential hypertension, benign         CONTINUE these medications which have NOT CHANGED    Details   IBUPROFEN PO Take 600-800 mg by mouth 2 times daily as needed for moderate pain      linagliptin (TRADJENTA) 5 MG TABS tablet Take 1 tablet (5 mg) by mouth daily  Qty: 30 tablet, Refills: 2    Associated Diagnoses: Type 2 diabetes mellitus with diabetic polyneuropathy, without long-term current use of insulin (H)      metFORMIN (GLUCOPHAGE) 1000 MG tablet Take 1 tablet (1,000 mg) by mouth 2 times daily (with meals)  Qty: 180 tablet, Refills: 3    Associated Diagnoses: Type 2 diabetes mellitus with diabetic polyneuropathy, without long-term current use of insulin (H)      glimepiride (AMARYL) 4 MG tablet Take 1 tablet (4 mg) by mouth daily  Qty: 90 tablet, Refills: 3    Associated Diagnoses: Type 2 diabetes mellitus with diabetic polyneuropathy, without long-term current use of insulin (H)      ASPIRIN NOT PRESCRIBED (INTENTIONAL) Please choose reason not prescribed, below  Qty: 0 each, Refills: 0    Associated Diagnoses: Type 2 diabetes mellitus with diabetic polyneuropathy, without long-term current use of insulin (H)      order for DME [facility] Order Fax [phone]    Primary Dressing Karen   Qty 10  Secondary Dressing Mepilex Foam 4x4 Qty 10  Secondary Dressing 2\" Medipore Tape Qty 1  Length of Need: 1 " month  Frequency of dressing change: daily  If Patient has balance on account please call him. Thanks  Qty: 30 days, Refills: 0    Associated Diagnoses: Diabetic ulcer of right foot associated with type 2 diabetes mellitus (H); Pressure ulcer, stage III (H)         STOP taking these medications       DiphenhydrAMINE HCl (BENADRYL PO) Comments:   Reason for Stopping:         hydrochlorothiazide (HYDRODIURIL) 25 MG tablet Comments:   Reason for Stopping:             Allergies   Allergies   Allergen Reactions     Asa [Aspirin] Nausea and Vomiting     Data   Most Recent 3 CBC's:  Recent Labs   Lab Test  09/03/17   0630  09/02/17   0643  09/01/17   1805 09/01/17   0620   WBC  10.8  11.8*   --   9.0   HGB  7.7*  8.1*   --   8.1*   MCV  87  85   --   85   PLT  353  296  314  300      Most Recent 3 BMP's:  Recent Labs   Lab Test  09/03/17 0630  09/02/17   0643  09/01/17   1805 09/01/17   0620   08/29/17   0801  08/28/17   1045  07/10/17   1646   NA   --    --    --    --    --   138  134  137   POTASSIUM   --    --    --   4.4   --   4.4  4.6  4.4   CHLORIDE   --    --    --    --    --   106  102  104   CO2   --    --    --    --    --   26  27  26   BUN   --    --    --    --    --   21  27  41*   CR  1.28*  1.31*  1.06  1.09   < >  1.09  1.10  1.44*   ANIONGAP   --    --    --    --    --   6  5  7   FERNANDO   --    --    --    --    --   8.3*  8.9  9.0   GLC  137*  174*   --    --    --   173*  187*  125*    < > = values in this interval not displayed.     Most Recent 2 LFT's:No lab results found.  Most Recent INR's and Anticoagulation Dosing History:  Anticoagulation Dose History     There is no flowsheet data to display.        Most Recent 3 Troponin's:No lab results found.  Most Recent Cholesterol Panel:  Recent Labs   Lab Test  06/02/17   1021   CHOL  223*   LDL  116*   HDL  32*   TRIG  376*     Most Recent 6 Bacteria Isolates From Any Culture (See EPIC Reports for Culture Details):  Recent Labs   Lab Test   08/28/17   1136  08/28/17   1059  08/28/17   1054  07/15/16   1521  07/14/16   1029  07/14/16   1023   CULT  No growth  No growth  Heavy growth  Beta hemolytic Streptococcus group G  *  Moderate growth  Proteus vulgaris  *  Moderate growth  Normal skin rosa    No anaerobes isolated  On day 1, isolated in broth only: Staphylococcus simulans This isolate is   presumed to be clindamycin resistant based on detection of inducible   clindamycin resistance. Erythromycin and clindamycin are resistant, therefore,   they are not recommended for use.  On day 1, isolated in broth only: Strain 2 Staphylococcus simulans This isolate   is presumed to be clindamycin resistant based on detection of inducible   clindamycin resistance. Erythromycin and clindamycin are resistant, therefore,   they are not recommended for use.  Strain 1 found to have inducible  Clindamycin resistance also,  results updated  *  No growth  No growth     Most Recent TSH, T4 and A1c Labs:  Recent Labs   Lab Test  08/28/17   1045  06/02/17   1021   TSH   --   1.06   A1C  7.6*  9.4*     Results for orders placed or performed during the hospital encounter of 08/28/17   Foot  XR, G/E 3 views, right    Narrative    XR FOOT RT G/E 3 VW 8/28/2017 10:38 AM    HISTORY: Diabetic heel ulcer, increased swelling, rule out bone  involvement.    COMPARISON: 4/25/2017    FINDINGS: Cortical erosion and lucency in the calcaneus is consistent  with osteomyelitis. This is a significant change in comparison with  4/25/2017. Osseous structures of the forefoot appear intact.      Impression    IMPRESSION: Osteomyelitis.    VON HEBERT MD   US Lower Extremity Venous Duplex Right    Narrative    VENOUS ULTRASOUND RIGHT LEG  8/28/2017 4:11 PM     HISTORY: Rule out deep vein thrombosis; right leg.    COMPARISON: None.    FINDINGS:  Examination of the deep veins with graded compression and  color flow Doppler with spectral wave form analysis shows no evidence  of thrombus in the  common femoral vein, femoral vein, popliteal vein  or calf veins.        Impression    IMPRESSION: No evidence of deep venous thrombosis.     RUBIO ANDREA, DO   US KAREN Doppler No Exercise    Narrative    ULTRASOUND ANKLE-BRACHIAL INDEX DOPPLER NO EXERCISE   8/28/2017 4:11  PM     HISTORY: Rule out peripheral arterial disease.      COMPARISON: None.    FINDINGS:  Right KAREN: 0.93  Left KAREN: 1.02    Waveforms are triphasic.    No exercise was done due to ulcer on bottom of the right heel.      Impression    IMPRESSION: Normal ABIs bilaterally without evidence of arterial  insufficiency.    RUBIO ANDREA, DO   MR Ankle Right w/o & w Contrast    Narrative    MR RIGHT ANKLE WITHOUT AND WITH CONTRAST  8/29/2017 6:17 AM     HISTORY: Assess bone infection right heel.    CONTRAST DOSE:  11 mL Gadavist.    TECHNIQUE: Radiation dose for this scan was reduced using automated  exposure control, adjustment of the mA and/or kV according to patient  size, or iterative reconstruction technique.    FINDINGS:  There is a fracture within the posterior process of the  calcaneus extending from the superior cortex immediately anterior to  the Achilles tendon attachment to the plantar aponeurotic attachment,  new since 6/23/2016. Prominent adjacent marrow edema and enhancement  is noted which nearly fills the entire calcaneus. There appears to be  a large wound along the plantar aspect of the heel extending to the  inferior aspect of the fracture site. Severe plantar fasciitis or  plantar fascial tear is noted at the calcaneal attachment. This has  progressed since 6/23/2016 as well. Subchondral cysts and marrow edema  are noted about the first, second, third, and fourth tarsometatarsal  joints. No talar dome osteochondral lesion is demonstrated. There is a  nonspecific small tibiotalar joint effusion. Anterior talofibular  ligament and remainder of the fibular collateral ligament complex  appears within normal limits. Peroneal  tendons as well as the long  flexor tendons appear within normal limits at the level of the ankle.  The deltoid ligament appears within normal limits.      Impression    IMPRESSION:  1. Large plantar heel pad wound or sinus with rim enhancement  extending from the calcaneus to the dermis.  2. Calcaneal posterior process fracture, new since 6/23/2016.  3. Ill-defined marrow edema and enhancement throughout the calcaneus.  While this may be in part related to the posterior process fracture,  given the adjacent wound or draining sinus, this is highly suspicious  for osteomyelitis.  4. Severe plantar fasciitis with suspected plantar fascial rupture at  the calcaneal attachment which corresponds with the site of the soft  tissue wound.  5. Marrow edema and subchondral cysts involving the first through  fourth tarsometatarsal joints. While nonspecific, this may be related  to early degenerative arthrosis. Early neuroarthropathy cannot be  excluded. However, joint alignment appears to be grossly normal.  6. Subcutaneous edema about the ankle. It should be noted that MR  cannot distinguish reactive edema from cellulitis. No discrete soft  tissue fluid collection or abscess is otherwise visible.  7. Nonspecific small tibiotalar/subtalar and talonavicular joint  effusions.    JOHN FALCON MD   XR Knee Port Right 1/2 Views    Narrative    XR KNEE PORT RT 1/2 VW 9/1/2017 3:00 PM    HISTORY: Intraoperative film for amputation.    COMPARISON: None.    FINDINGS: Single AP view shows below the knee amputation. Osteotomy  lines appear sharp.      Impression    IMPRESSION: Intraoperative view.    VON HEBERT MD

## 2017-09-03 NOTE — PLAN OF CARE
Problem: Goal Outcome Summary  Goal: Goal Outcome Summary  OT: Discharge Planner OT   Patient plan for discharge: Home  Current status: OT: Pt finishing breakfast, willing to participate.  Came to EOB with CGA.  Stood with CGA and moved to bathroom to try toilet transfer.  Completed transfer with CGA and verbal cues.  Also completed tub transfer to commode in tub with CGA.  Talked about application to transferring into a car.  Pt has good understanding of transfer principles with walker.   Barriers to return to prior living situation: Non-weightbearing RLE, stairs    Recommendations for discharge: Pending progress, anticipate home with assist for I/ADls  Rationale for recommendations: Pt has met all OT goals at this time, may DC later today.  Good support system at home.       Entered by: Leobardo Nieves 09/03/2017 8:43 AM        Occupational Therapy Discharge Summary    Reason for therapy discharge:    All goals and outcomes met, no further needs identified.    Progress towards therapy goal(s). See goals on Care Plan in [facility] electronic health record for goal details.  Goals met    Therapy recommendation(s):    No further therapy is recommended.

## 2017-09-03 NOTE — PLAN OF CARE
Problem: Goal Outcome Summary  Goal: Goal Outcome Summary     Discharge Planner PT   Patient plan for discharge: Home with SO  Current status: Pt independent with bed mobility, mod I with sit <> Stand with FWW, mobilized x80' with FWW and SBA progressing to mod I with w/c follow; educated on platform step for home access and pt able to perform with CGA  Barriers to return to prior living situation: Steps to enter home, fall risk, decreased activity tolerance, R LE NWBing.  Recommendations for discharge: Home with SO  Rationale for recommendations: Pending progress with ambulation and ability to perform stair management, patient should be safe to discharge home with SO assisting.       Entered by: Yanely Esparza 09/03/2017 1:55 PM       Physical Therapy Discharge Summary    Reason for therapy discharge:    Discharged to home.    Progress towards therapy goal(s). See goals on Care Plan in [facility] electronic health record for goal details.  Goals partially met.  Barriers to achieving goals:   discharge from facility.    Therapy recommendation(s):    Continue home exercise program.

## 2017-09-03 NOTE — PLAN OF CARE
Problem: Goal Outcome Summary  Goal: Goal Outcome Summary  Outcome: Improving  Up with SBA and walker  . Pain managed with prn oxycodone . Dressing changed , brace applied to right BKA. CMS intact . Vital signs stable . Good appetite .

## 2017-09-03 NOTE — PLAN OF CARE
Problem: Goal Outcome Summary  Goal: Goal Outcome Summary  Outcome: Adequate for Discharge Date Met:  09/03/17  Patient discharged to home with friend . Reviewed discharge instructions, medication and follow up appointment with patient . Discharge paper work and medication given to patient .

## 2017-09-03 NOTE — PLAN OF CARE
Problem: Goal Outcome Summary  Goal: Goal Outcome Summary  Outcome: No Change  A&O x4. Ax1 w/gb to bathroom. CMS intact. VSS. Pain managed with Oxycodone. Dressing CDI. Tolerating diet. Voiding adequately. No BG coverage needed.

## 2017-09-05 ENCOUNTER — TELEPHONE (OUTPATIENT)
Dept: FAMILY MEDICINE | Facility: CLINIC | Age: 52
End: 2017-09-05

## 2017-09-05 NOTE — TELEPHONE ENCOUNTER
No hospital follow up appointment scheduled     ED / Discharge Outreach Protocol    Patient Contact    Attempt # 1    Was call answered?  No.  Left message on voicemail with information to call me back.    Yvette WATKINS RN    [facility]  Discharge Summary  Hospitalist     Date of Admission:  8/28/2017  Date of Discharge:  9/3/2017  Discharging Provider: Ry Hilliard MD  Date of Service (when I saw the patient): 09/03/17     Discharge Diagnoses  Right foot osteoarthritis  Diabetes mellitus, type II  Hypertension  Hyperlipidemia  Chronic kidney disease  Anemia     History of Present Illness  Mr. Horner is a 53 y/o male with a medical history remarkable for morbid obesity, DM, HTN, HLD, CKD with baseline creat at 1.1 and chronic R heel ulcer who presented to the ED with complaints of pain and swelling involving his R foot/ ankle.      Hospital Course  Ry Horner was admitted on 8/28/2017.  The following problems were addressed during his hospitalization:     Right foot osteoarthritis  Mr. Horner was admitted to the hospital after having worsening swelling and drainage in his R foot. He had chronic infection (2+ years) involving his R foot. MRI was performed and showed evidence of osteomyelitis. He was covered with appropriate antibiotics. Ultimately orthopedics was consulted and deemed amputation the best option. He underwent BKA on 9/1 by Dr. Nascimento. His post-operative course was relatively uneventful. He will follow up with Dr. Nascimento as per his instructions.      Diabetes mellitus, type II  Resume prior to admission management of diabetes.      Hypertension  Blood pressures were running low during his hospitalization (occasionally less than 100 systolic). He was advised at the time of discharge to take 10 mg of lisinopril (instead of his PTA dose of 20 mg) as well as hold his HCTZ. He should see Dr. Wing within a week for blood pressure check to see if these medications need to be restarted.       Hyperlipidemia  As per prior to admission.      Chronic kidney disease  No acute issues, creatinine roughly at baseline at the time of discharge.      Anemia  Noted to have anemia on presentation, at 9.9. His iron saturation was low and he was started on oral iron replacement. His post op hemoglobin was ~8 range, at 7.7 at the time of discharge (no ongoing source of bleeding). He should see Dr. Wing and have a CBC checked within a week.     Follow-up and recommended labs and tests    The patient will follow-up in clinic with Dr. Nascimento ([facility]; [phone]) in 3 weeks for wound check and suture removal.      Activity   Your activity upon discharge: NWB RLE.      Wound care and dressings   Instructions to care for your wound at home: daily dry dressing change or as needed.  Keep brace on at all time except for daily hygiene.  Keep incision clean and dry until sutures removed.      Reason for your hospital stay   You were hospitalized secondary to an infection in the bone of your foot, ultimately requiring amputation.      Follow-up and recommended labs and tests    Follow up with primary care provider, Kody Wing, within 7 days for hospital follow- up.  The following labs/tests are recommended: CBC.      When to contact your care team   Call your primary doctor if you have any of the following: temperature greater than 100.4, increased drainage, increased swelling or increased pain.      Full Code      Diet   Follow this diet upon discharge: Orders Placed This Encounter     Moderate Consistent CHO Diet          Discharge Medications            Current Discharge Medication List             START taking these medications     Details   aspirin  MG EC tablet Take 1 tablet (325 mg) by mouth daily  Qty: 42 tablet, Refills: 0     Comments: For DVT prophy.  Associated Diagnoses: Osteomyelitis of right foot, unspecified type (H)       ferrous sulfate (IRON) 325 (65 FE) MG tablet Take 1  tablet (325 mg) by mouth 2 times daily (with meals)  Qty: 100 tablet, Refills: 1     Associated Diagnoses: Anemia, unspecified type       oxyCODONE (ROXICODONE) 5 MG IR tablet Take 1-2 tablets (5-10 mg) by mouth every 3 hours as needed for moderate to severe pain  Qty: 60 tablet, Refills: 0     Associated Diagnoses: Osteomyelitis of right foot, unspecified type (H)       acetaminophen (TYLENOL) 325 MG tablet Take 2 tablets (650 mg) by mouth every 4 hours as needed for mild pain or fever  Qty: 100 tablet, Refills: 0     Associated Diagnoses: Osteomyelitis of right foot, unspecified type (H)       hydrOXYzine (ATARAX) 25 MG tablet Take 1 tablet (25 mg) by mouth every 6 hours as needed for itching (nausea, adjuvant pain, spasms)  Qty: 40 tablet, Refills: 0     Associated Diagnoses: Osteomyelitis of right foot, unspecified type (H)                 CONTINUE these medications which have CHANGED     Details   lisinopril (PRINIVIL/ZESTRIL) 20 MG tablet Take 0.5 tablets (10 mg) by mouth daily  Qty: 90 tablet, Refills: 3     Associated Diagnoses: Essential hypertension, benign                 CONTINUE these medications which have NOT CHANGED     Details   IBUPROFEN PO Take 600-800 mg by mouth 2 times daily as needed for moderate pain       linagliptin (TRADJENTA) 5 MG TABS tablet Take 1 tablet (5 mg) by mouth daily  Qty: 30 tablet, Refills: 2     Associated Diagnoses: Type 2 diabetes mellitus with diabetic polyneuropathy, without long-term current use of insulin (H)       metFORMIN (GLUCOPHAGE) 1000 MG tablet Take 1 tablet (1,000 mg) by mouth 2 times daily (with meals)  Qty: 180 tablet, Refills: 3     Associated Diagnoses: Type 2 diabetes mellitus with diabetic polyneuropathy, without long-term current use of insulin (H)       glimepiride (AMARYL) 4 MG tablet Take 1 tablet (4 mg) by mouth daily  Qty: 90 tablet, Refills: 3     Associated Diagnoses: Type 2 diabetes mellitus with diabetic polyneuropathy, without long-term  "current use of insulin (H)       ASPIRIN NOT PRESCRIBED (INTENTIONAL) Please choose reason not prescribed, below  Qty: 0 each, Refills: 0     Associated Diagnoses: Type 2 diabetes mellitus with diabetic polyneuropathy, without long-term current use of insulin (H)       order for DME Hand Medical Order Fax [phone]     Primary Dressing Karen   Qty 10  Secondary Dressing Mepilex Foam 4x4 Qty 10  Secondary Dressing 2\" Medipore Tape Qty 1  Length of Need: 1 month  Frequency of dressing change: daily  If Patient has balance on account please call him. Thanks  Qty: 30 days, Refills: 0     Associated Diagnoses: Diabetic ulcer of right foot associated with type 2 diabetes mellitus (H); Pressure ulcer, stage III (H)                STOP taking these medications         DiphenhydrAMINE HCl (BENADRYL PO) Comments:   Reason for Stopping:            hydrochlorothiazide (HYDRODIURIL) 25 MG tablet Comments:   Reason for Stopping:              "

## 2017-09-05 NOTE — TELEPHONE ENCOUNTER
"ED / Discharge Outreach Protocol    Patient Contact    Attempt # 2    Was call answered?  Yes.  \"May I please speak with <Ry>\"  Is patient available?   Yes        ED/Discharge Protocol    \"Hi, my name is Yvette Perry, a registered nurse, and I am calling on behalf of Dr. Kaur's office at [place].  I am calling to follow up and see how things are going for you after your recent visit.\"    \"I see that you were in the (ER/UC/IP) on 8/28/17 - 9/3/17  How are you doing now that you are home?\" Doing well, right foot amputation area wound healing well. Has dry packing, is repacking today. No leakage. No redness or openings. Area is covered. Has been checking area and washing/repacking every couple of days. Doing well with pain control, is able to get up and move - doing PT exercises to prepare for prosthesis   Is patient experiencing symptoms that may require a hospital visit?  No     Discharge Instructions    \"Let's review your discharge instructions.  What is/are the follow-up recommendations?  Pt. Response: Take new medications as prescribed (see med list below) - lower dose of lisinopril, hold on HCTZ until follows up with PCP, PT exercises, follow up with PCP within 1 week. Self dressing changes     \"Were you instructed to make a follow-up appointment?\"  Pt. Response: Yes.  Has appointment been made?   No.  \"Can I help you schedule that appointment?\" Yes      \"When you see the provider, I would recommend that you bring your discharge instructions with you.    Medications    \"How many new medications are you on since your hospitalization/ED visit?\"    2 or more - [facility] MTM referral needed  \"How many of your current medicines changed (dose, timing, name, etc.) while you were in the hospital/ED visit?\"   2 or more - [facility] MTM referral needed  \"Do you have questions about your medications?\"   No  \"Were you newly diagnosed with heart failure, COPD, diabetes or did you have a heart attack?\"   No  For patients on " "insulin: \"Did you start on insulin in the hospital or did you have your insulin dose changed?\"   No    Medication reconciliation completed? Yes    Was MTM referral placed (*Make sure to put transitions as reason for referral)?   No - offered but patient declined. States he is well organized and has no questions at this time.     Call Summary    \"Do you have any questions or concerns about your condition or care plan at the moment?\"    No     Patient was in ER twice in the past year (assess appropriateness of ER visits.)      \"If you have questions or things don't continue to improve, we encourage you contact us through the main clinic number, ([phone])   Even if the clinic is not open, triage nurses are available 24/7 to help you.     We would like you to know that our clinic has extended hours (provide information).  We also have urgent care (provide details on closest location and hours/contact info)\"    \"Thank you for your time and take care!\"  Yvette WATKINS RN    "

## 2017-09-05 NOTE — TELEPHONE ENCOUNTER
Chief Complaint: Osteomyelitis Of Right Foot, Unspecified Type (H), Anemia, Unspecified Type,9/3/17,ED/IP 0/1  [phone] (home)

## 2017-09-07 LAB — COPATH REPORT: NORMAL

## 2017-09-08 ENCOUNTER — TELEPHONE (OUTPATIENT)
Dept: FAMILY MEDICINE | Facility: CLINIC | Age: 52
End: 2017-09-08

## 2017-09-08 NOTE — TELEPHONE ENCOUNTER
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

## 2017-09-14 ENCOUNTER — OFFICE VISIT (OUTPATIENT)
Dept: FAMILY MEDICINE | Facility: CLINIC | Age: 52
End: 2017-09-14
Payer: COMMERCIAL

## 2017-09-14 VITALS
HEART RATE: 111 BPM | TEMPERATURE: 98.9 F | BODY MASS INDEX: 31.83 KG/M2 | HEIGHT: 72 IN | SYSTOLIC BLOOD PRESSURE: 103 MMHG | WEIGHT: 235 LBS | DIASTOLIC BLOOD PRESSURE: 73 MMHG | OXYGEN SATURATION: 98 % | RESPIRATION RATE: 18 BRPM

## 2017-09-14 DIAGNOSIS — Z89.511 STATUS POST BELOW KNEE AMPUTATION, RIGHT (H): ICD-10-CM

## 2017-09-14 DIAGNOSIS — D62 ANEMIA DUE TO BLOOD LOSS, ACUTE: Primary | ICD-10-CM

## 2017-09-14 DIAGNOSIS — E11.8 TYPE 2 DIABETES MELLITUS WITH COMPLICATION, WITHOUT LONG-TERM CURRENT USE OF INSULIN (H): ICD-10-CM

## 2017-09-14 DIAGNOSIS — M86.9 OSTEOMYELITIS OF RIGHT FOOT, UNSPECIFIED TYPE (H): ICD-10-CM

## 2017-09-14 DIAGNOSIS — I10 ESSENTIAL HYPERTENSION, BENIGN: ICD-10-CM

## 2017-09-14 LAB
ERYTHROCYTE [DISTWIDTH] IN BLOOD BY AUTOMATED COUNT: 12.9 % (ref 10–15)
HCT VFR BLD AUTO: 35.4 % (ref 40–53)
HGB BLD-MCNC: 11.7 G/DL (ref 13.3–17.7)
MCH RBC QN AUTO: 29 PG (ref 26.5–33)
MCHC RBC AUTO-ENTMCNC: 33.1 G/DL (ref 31.5–36.5)
MCV RBC AUTO: 88 FL (ref 78–100)
PLATELET # BLD AUTO: 467 10E9/L (ref 150–450)
RBC # BLD AUTO: 4.04 10E12/L (ref 4.4–5.9)
WBC # BLD AUTO: 9.9 10E9/L (ref 4–11)

## 2017-09-14 PROCEDURE — 85027 COMPLETE CBC AUTOMATED: CPT | Performed by: INTERNAL MEDICINE

## 2017-09-14 PROCEDURE — 36415 COLL VENOUS BLD VENIPUNCTURE: CPT | Performed by: INTERNAL MEDICINE

## 2017-09-14 PROCEDURE — 99495 TRANSJ CARE MGMT MOD F2F 14D: CPT | Performed by: INTERNAL MEDICINE

## 2017-09-14 NOTE — PROGRESS NOTES
SUBJECTIVE:                                                    Ry Horner is a 52 year old male who presents to clinic today for the following health issues:        Hospital Follow-up Visit:    Hospital/Nursing Home/IP Rehab Facility: [facility]  Date of Admission: 8/28/2017  Date of Discharge: 9/3/2017  Reason(s) for Admission: Anemia; Osteomyelitis of Rt foot; Hypertension            Problems taking medications regularly:  None       Medication changes since discharge: See updated Med list       Problems adhering to non-medication therapy:  Currently working with PT and doing home exercises    Summary of hospitalization:  [facility] discharge summary reviewed  Diagnostic Tests/Treatments reviewed.  Follow up needed: cbc   Other Healthcare Providers Involved in Patient s Care:         None  Update since discharge: improved.     Post Discharge Medication Reconciliation: discharge medications reconciled and changed, per note/orders (see AVS).  Plan of care communicated with patient     Coding guidelines for this visit:  Type of Medical   Decision Making Face-to-Face Visit       within 7 Days of discharge Face-to-Face Visit        within 14 days of discharge   Moderate Complexity [id number]   High Complexity 32000 [id number]          Presented to ER with bleeding in chronically infected foot  Osteomyelitis diagnosed  Amputation was performed  He feels better since discharge  Blood pressure medications were stopped due to low pressures  He did not tolerate pravastatin   He has not restarted metformin   His pain is well controlled         Problem list and histories reviewed & adjusted, as indicated.  Additional history: as documented    Patient Active Problem List   Diagnosis     Diabetes mellitus, type 2 (H)     Essential hypertension, benign     Hyperlipidemia     Impotence of organic origin     Ulcer of right heel (H)     Morbid obesity due to excess calories (H)     Open wound of right foot      Osteomyelitis (H)     Status post below knee amputation, right (H)     Past Surgical History:   Procedure Laterality Date     AMPUTATE LEG BELOW KNEE Right 9/1/2017    Procedure: AMPUTATE LEG BELOW KNEE;  RIGHT BELOW KNEE AMPUTATION ;  Surgeon: Nikolas Nascimento MD;  Location: [facility] OR     APPENDECTOMY       APPLY WOUND VAC Right 3/2/2015    Procedure: APPLY WOUND VAC;  Surgeon: Milena Cevallos DPM, Pod;  Location: [facility] OR     BIOPSY BONE FOOT Right 7/15/2016    Procedure: BIOPSY BONE FOOT;  Surgeon: Tim Douglas DPM;  Location: [facility] OR     IRRIGATION AND DEBRIDEMENT FOOT, COMBINED Right 3/2/2015    Procedure: COMBINED IRRIGATION AND DEBRIDEMENT FOOT;  Surgeon: Milena Cevallos DPM, Pod;  Location: [facility] OR     IRRIGATION AND DEBRIDEMENT FOOT, COMBINED Right 7/15/2016    Procedure: COMBINED IRRIGATION AND DEBRIDEMENT FOOT;  Surgeon: Tim Douglas DPM;  Location: [facility] OR     IRRIGATION AND DEBRIDEMENT FOOT, COMBINED Right 7/20/2016    Procedure: COMBINED IRRIGATION AND DEBRIDEMENT FOOT;  Surgeon: Tim Douglas DPM;  Location:  OR     ORTHOPEDIC SURGERY         Social History   Substance Use Topics     Smoking status: Former Smoker     Packs/day: 0.50     Years: 20.00     Types: Cigarettes     Smokeless tobacco: Never Used     Alcohol use Yes      Comment: occasional     Family History   Problem Relation Age of Onset     Genetic Disorder Other      Genetic Disorder Other      Psychotic Disorder Mother      DIABETES Father      Lung Cancer Father      Genetic Disorder Maternal Grandmother      Genetic Disorder Maternal Grandfather      Asthma Sister      C.A.D. No family hx of      Hypertension No family hx of      CEREBROVASCULAR DISEASE No family hx of      Breast Cancer No family hx of      Cancer - colorectal No family hx of      Prostate Cancer No family hx of      Alcohol/Drug No family hx of          Current Outpatient Prescriptions   Medication Sig Dispense Refill     linagliptin (TRADJENTA)  "5 MG TABS tablet Take 1 tablet (5 mg) by mouth daily 90 tablet 3     aspirin  MG EC tablet Take 1 tablet (325 mg) by mouth daily 42 tablet 0     lisinopril (PRINIVIL/ZESTRIL) 20 MG tablet Take 0.5 tablets (10 mg) by mouth daily 90 tablet 3     ferrous sulfate (IRON) 325 (65 FE) MG tablet Take 1 tablet (325 mg) by mouth 2 times daily (with meals) 100 tablet 1     order for DME Equipment being ordered: Walker Wheels () and Walker ()  Treatment Diagnosis: decreased ambulation 1 each 0     oxyCODONE (ROXICODONE) 5 MG IR tablet Take 1-2 tablets (5-10 mg) by mouth every 3 hours as needed for moderate to severe pain 60 tablet 0     acetaminophen (TYLENOL) 325 MG tablet Take 2 tablets (650 mg) by mouth every 4 hours as needed for mild pain or fever 100 tablet 0     hydrOXYzine (ATARAX) 25 MG tablet Take 1 tablet (25 mg) by mouth every 6 hours as needed for itching (nausea, adjuvant pain, spasms) 40 tablet 0     IBUPROFEN PO Take 600-800 mg by mouth 2 times daily as needed for moderate pain       ASPIRIN NOT PRESCRIBED (INTENTIONAL) Please choose reason not prescribed, below 0 each 0     metFORMIN (GLUCOPHAGE) 1000 MG tablet Take 1 tablet (1,000 mg) by mouth 2 times daily (with meals) 180 tablet 3     glimepiride (AMARYL) 4 MG tablet Take 1 tablet (4 mg) by mouth daily (Patient taking differently: Take 4 mg by mouth every morning (before breakfast) ) 90 tablet 3     order for DME [facility] Fax [phone]    Primary Dressing Karen   Qty 10  Secondary Dressing Mepilex Foam 4x4 Qty 10  Secondary Dressing 2\" Medipore Tape Qty 1  Length of Need: 1 month  Frequency of dressing change: daily  If Patient has balance on account please call him. Thanks 30 days 0     Allergies   Allergen Reactions     Pravastatin      \"sucked the life blood out of me\"     Asa [Aspirin] Nausea and Vomiting         ROS:  Constitutional, HEENT, cardiovascular, pulmonary, gi and gu systems are negative, except as otherwise " noted.      OBJECTIVE:   /73 (BP Location: Right arm, Patient Position: Chair, Cuff Size: Adult Large)  Pulse 111  Temp 98.9  F (37.2  C) (Tympanic)  Resp 18  Ht 6' (1.829 m)  Wt 235 lb (106.6 kg)  SpO2 98%  BMI 31.87 kg/m2  Body mass index is 31.87 kg/(m^2).  GENERAL: healthy, alert and no distress  RESP: lungs clear to auscultation - no rales, rhonchi or wheezes  CV: Heart with regular rate and rhythm.   ABDOMEN: soft, nontender, no hepatosplenomegaly, no masses and bowel sounds normal  MS: Right BKA amputation dressing clean dry and intact   NEURO: Normal strength and tone, mentation intact and speech normal  PSYCH: mentation appears normal, affect normal/bright    Diagnostic Test Results:  Results for orders placed or performed during the hospital encounter of 08/28/17   Foot  XR, G/E 3 views, right    Narrative    XR FOOT RT G/E 3 VW 8/28/2017 10:38 AM    HISTORY: Diabetic heel ulcer, increased swelling, rule out bone  involvement.    COMPARISON: 4/25/2017    FINDINGS: Cortical erosion and lucency in the calcaneus is consistent  with osteomyelitis. This is a significant change in comparison with  4/25/2017. Osseous structures of the forefoot appear intact.      Impression    IMPRESSION: Osteomyelitis.    VON HEBERT MD   US Lower Extremity Venous Duplex Right    Narrative    VENOUS ULTRASOUND RIGHT LEG  8/28/2017 4:11 PM     HISTORY: Rule out deep vein thrombosis; right leg.    COMPARISON: None.    FINDINGS:  Examination of the deep veins with graded compression and  color flow Doppler with spectral wave form analysis shows no evidence  of thrombus in the common femoral vein, femoral vein, popliteal vein  or calf veins.        Impression    IMPRESSION: No evidence of deep venous thrombosis.     RUBIO ANDREA, DO   US KAREN Doppler No Exercise    Narrative    ULTRASOUND ANKLE-BRACHIAL INDEX DOPPLER NO EXERCISE   8/28/2017 4:11  PM     HISTORY: Rule out peripheral arterial disease.       COMPARISON: None.    FINDINGS:  Right KAREN: 0.93  Left KAREN: 1.02    Waveforms are triphasic.    No exercise was done due to ulcer on bottom of the right heel.      Impression    IMPRESSION: Normal ABIs bilaterally without evidence of arterial  insufficiency.    RUBIO ANDREA, DO   MR Ankle Right w/o & w Contrast    Narrative    MR RIGHT ANKLE WITHOUT AND WITH CONTRAST  8/29/2017 6:17 AM     HISTORY: Assess bone infection right heel.    CONTRAST DOSE:  11 mL Gadavist.    TECHNIQUE: Radiation dose for this scan was reduced using automated  exposure control, adjustment of the mA and/or kV according to patient  size, or iterative reconstruction technique.    FINDINGS:  There is a fracture within the posterior process of the  calcaneus extending from the superior cortex immediately anterior to  the Achilles tendon attachment to the plantar aponeurotic attachment,  new since 6/23/2016. Prominent adjacent marrow edema and enhancement  is noted which nearly fills the entire calcaneus. There appears to be  a large wound along the plantar aspect of the heel extending to the  inferior aspect of the fracture site. Severe plantar fasciitis or  plantar fascial tear is noted at the calcaneal attachment. This has  progressed since 6/23/2016 as well. Subchondral cysts and marrow edema  are noted about the first, second, third, and fourth tarsometatarsal  joints. No talar dome osteochondral lesion is demonstrated. There is a  nonspecific small tibiotalar joint effusion. Anterior talofibular  ligament and remainder of the fibular collateral ligament complex  appears within normal limits. Peroneal tendons as well as the long  flexor tendons appear within normal limits at the level of the ankle.  The deltoid ligament appears within normal limits.      Impression    IMPRESSION:  1. Large plantar heel pad wound or sinus with rim enhancement  extending from the calcaneus to the dermis.  2. Calcaneal posterior process fracture, new  since 6/23/2016.  3. Ill-defined marrow edema and enhancement throughout the calcaneus.  While this may be in part related to the posterior process fracture,  given the adjacent wound or draining sinus, this is highly suspicious  for osteomyelitis.  4. Severe plantar fasciitis with suspected plantar fascial rupture at  the calcaneal attachment which corresponds with the site of the soft  tissue wound.  5. Marrow edema and subchondral cysts involving the first through  fourth tarsometatarsal joints. While nonspecific, this may be related  to early degenerative arthrosis. Early neuroarthropathy cannot be  excluded. However, joint alignment appears to be grossly normal.  6. Subcutaneous edema about the ankle. It should be noted that MR  cannot distinguish reactive edema from cellulitis. No discrete soft  tissue fluid collection or abscess is otherwise visible.  7. Nonspecific small tibiotalar/subtalar and talonavicular joint  effusions.    JOHN FALCON MD   XR Knee Port Right 1/2 Views    Narrative    XR KNEE PORT RT 1/2 VW 9/1/2017 3:00 PM    HISTORY: Intraoperative film for amputation.    COMPARISON: None.    FINDINGS: Single AP view shows below the knee amputation. Osteotomy  lines appear sharp.      Impression    IMPRESSION: Intraoperative view.    VON HEBERT MD   CBC with platelets differential   Result Value Ref Range    WBC 13.5 (H) 4.0 - 11.0 10e9/L    RBC Count 3.33 (L) 4.4 - 5.9 10e12/L    Hemoglobin 9.9 (L) 13.3 - 17.7 g/dL    Hematocrit 28.3 (L) 40.0 - 53.0 %    MCV 85 78 - 100 fl    MCH 29.7 26.5 - 33.0 pg    MCHC 35.0 31.5 - 36.5 g/dL    RDW 11.7 10.0 - 15.0 %    Platelet Count 392 150 - 450 10e9/L    Diff Method Automated Method     % Neutrophils 80.9 %    % Lymphocytes 10.3 %    % Monocytes 7.6 %    % Eosinophils 0.6 %    % Basophils 0.1 %    % Immature Granulocytes 0.5 %    Nucleated RBCs 0 0 /100    Absolute Neutrophil 10.9 (H) 1.6 - 8.3 10e9/L    Absolute Lymphocytes 1.4 0.8 - 5.3 10e9/L     Absolute Monocytes 1.0 0.0 - 1.3 10e9/L    Absolute Eosinophils 0.1 0.0 - 0.7 10e9/L    Absolute Basophils 0.0 0.0 - 0.2 10e9/L    Abs Immature Granulocytes 0.1 0 - 0.4 10e9/L    Absolute Nucleated RBC 0.0    Basic metabolic panel   Result Value Ref Range    Sodium 134 133 - 144 mmol/L    Potassium 4.6 3.4 - 5.3 mmol/L    Chloride 102 94 - 109 mmol/L    Carbon Dioxide 27 20 - 32 mmol/L    Anion Gap 5 3 - 14 mmol/L    Glucose 187 (H) 70 - 99 mg/dL    Urea Nitrogen 27 7 - 30 mg/dL    Creatinine 1.10 0.66 - 1.25 mg/dL    GFR Estimate 71 >60 mL/min/1.7m2    GFR Estimate If Black 86 >60 mL/min/1.7m2    Calcium 8.9 8.5 - 10.1 mg/dL   Hemoglobin A1c   Result Value Ref Range    Hemoglobin A1C 7.6 (H) 4.3 - 6.0 %   CRP inflammation   Result Value Ref Range    CRP Inflammation 85.1 (H) 0.0 - 8.0 mg/L   Erythrocyte sedimentation rate auto   Result Value Ref Range    Sed Rate 105 (H) 0 - 20 mm/h   Glucose by meter   Result Value Ref Range    Glucose 157 (H) 70 - 99 mg/dL   Lactic acid level STAT   Result Value Ref Range    Lactic Acid 1.1 0.7 - 2.0 mmol/L   Iron and iron binding capacity   Result Value Ref Range    Iron 27 (L) 35 - 180 ug/dL    Iron Binding Cap 192 (L) 240 - 430 ug/dL    Iron Saturation Index 14 (L) 15 - 46 %   Ferritin   Result Value Ref Range    Ferritin 798 (H) 26 - 388 ng/mL   Folate   Result Value Ref Range    Folate 5.7 >5.4 ng/mL   Vitamin B12   Result Value Ref Range    Vitamin B12 644 193 - 986 pg/mL   Glucose by meter   Result Value Ref Range    Glucose 96 70 - 99 mg/dL   Lactic acid whole blood   Result Value Ref Range    Lactic Acid 1.5 0.7 - 2.0 mmol/L   Glucose by meter   Result Value Ref Range    Glucose 224 (H) 70 - 99 mg/dL   Basic metabolic panel   Result Value Ref Range    Sodium 138 133 - 144 mmol/L    Potassium 4.4 3.4 - 5.3 mmol/L    Chloride 106 94 - 109 mmol/L    Carbon Dioxide 26 20 - 32 mmol/L    Anion Gap 6 3 - 14 mmol/L    Glucose 173 (H) 70 - 99 mg/dL    Urea Nitrogen 21 7 - 30  mg/dL    Creatinine 1.09 0.66 - 1.25 mg/dL    GFR Estimate 72 >60 mL/min/1.7m2    GFR Estimate If Black 87 >60 mL/min/1.7m2    Calcium 8.3 (L) 8.5 - 10.1 mg/dL   CBC with platelets differential   Result Value Ref Range    WBC 10.4 4.0 - 11.0 10e9/L    RBC Count 2.94 (L) 4.4 - 5.9 10e12/L    Hemoglobin 8.5 (L) 13.3 - 17.7 g/dL    Hematocrit 25.2 (L) 40.0 - 53.0 %    MCV 86 78 - 100 fl    MCH 28.9 26.5 - 33.0 pg    MCHC 33.7 31.5 - 36.5 g/dL    RDW 11.5 10.0 - 15.0 %    Platelet Count 368 150 - 450 10e9/L    Diff Method Automated Method     % Neutrophils 74.4 %    % Lymphocytes 14.8 %    % Monocytes 8.9 %    % Eosinophils 1.1 %    % Basophils 0.2 %    % Immature Granulocytes 0.6 %    Absolute Neutrophil 7.8 1.6 - 8.3 10e9/L    Absolute Lymphocytes 1.6 0.8 - 5.3 10e9/L    Absolute Monocytes 0.9 0.0 - 1.3 10e9/L    Absolute Eosinophils 0.1 0.0 - 0.7 10e9/L    Absolute Basophils 0.0 0.0 - 0.2 10e9/L    Abs Immature Granulocytes 0.1 0 - 0.4 10e9/L   Glucose by meter   Result Value Ref Range    Glucose 170 (H) 70 - 99 mg/dL   Glucose by meter   Result Value Ref Range    Glucose 160 (H) 70 - 99 mg/dL   Glucose by meter   Result Value Ref Range    Glucose 122 (H) 70 - 99 mg/dL   Glucose by meter   Result Value Ref Range    Glucose 99 70 - 99 mg/dL   Glucose by meter   Result Value Ref Range    Glucose 123 (H) 70 - 99 mg/dL   Creatinine   Result Value Ref Range    Creatinine 1.09 0.66 - 1.25 mg/dL    GFR Estimate 72 >60 mL/min/1.7m2    GFR Estimate If Black 87 >60 mL/min/1.7m2   CBC with platelets   Result Value Ref Range    WBC 9.1 4.0 - 11.0 10e9/L    RBC Count 2.83 (L) 4.4 - 5.9 10e12/L    Hemoglobin 8.3 (L) 13.3 - 17.7 g/dL    Hematocrit 24.1 (L) 40.0 - 53.0 %    MCV 85 78 - 100 fl    MCH 29.3 26.5 - 33.0 pg    MCHC 34.4 31.5 - 36.5 g/dL    RDW 11.7 10.0 - 15.0 %    Platelet Count 280 150 - 450 10e9/L   Glucose by meter   Result Value Ref Range    Glucose 126 (H) 70 - 99 mg/dL   Vancomycin level   Result Value Ref Range     Vancomycin Level 20.6 mg/L   Glucose by meter   Result Value Ref Range    Glucose 129 (H) 70 - 99 mg/dL   Glucose by meter   Result Value Ref Range    Glucose 103 (H) 70 - 99 mg/dL   Glucose by meter   Result Value Ref Range    Glucose 60 (L) 70 - 99 mg/dL   Glucose by meter   Result Value Ref Range    Glucose 65 (L) 70 - 99 mg/dL   Glucose by meter   Result Value Ref Range    Glucose 90 70 - 99 mg/dL   Glucose by meter   Result Value Ref Range    Glucose 146 (H) 70 - 99 mg/dL   Glucose by meter   Result Value Ref Range    Glucose 197 (H) 70 - 99 mg/dL   Creatinine   Result Value Ref Range    Creatinine 1.03 0.66 - 1.25 mg/dL    GFR Estimate 76 >60 mL/min/1.7m2    GFR Estimate If Black >90 >60 mL/min/1.7m2   Glucose by meter   Result Value Ref Range    Glucose 225 (H) 70 - 99 mg/dL   Glucose by meter   Result Value Ref Range    Glucose 132 (H) 70 - 99 mg/dL   Glucose by meter   Result Value Ref Range    Glucose 89 70 - 99 mg/dL   Glucose by meter   Result Value Ref Range    Glucose 170 (H) 70 - 99 mg/dL   Creatinine   Result Value Ref Range    Creatinine 1.09 0.66 - 1.25 mg/dL    GFR Estimate 72 >60 mL/min/1.7m2    GFR Estimate If Black 87 >60 mL/min/1.7m2   CBC with platelets   Result Value Ref Range    WBC 9.0 4.0 - 11.0 10e9/L    RBC Count 2.83 (L) 4.4 - 5.9 10e12/L    Hemoglobin 8.1 (L) 13.3 - 17.7 g/dL    Hematocrit 24.0 (L) 40.0 - 53.0 %    MCV 85 78 - 100 fl    MCH 28.6 26.5 - 33.0 pg    MCHC 33.8 31.5 - 36.5 g/dL    RDW 11.7 10.0 - 15.0 %    Platelet Count 300 150 - 450 10e9/L   Glucose by meter   Result Value Ref Range    Glucose 126 (H) 70 - 99 mg/dL   Potassium   Result Value Ref Range    Potassium 4.4 3.4 - 5.3 mmol/L   Glucose by meter   Result Value Ref Range    Glucose 108 (H) 70 - 99 mg/dL   Glucose by meter   Result Value Ref Range    Glucose 98 70 - 99 mg/dL   Surgical pathology exam   Result Value Ref Range    Copath Report       Patient Name: YNES YBARRA  MR#: [id number]  Specimen #:  [id number]  Collected: 9/1/2017  Received: 9/1/2017  Reported: 9/7/2017 15:44  Ordering Phy(s): ARI COLLINS    For improved result formatting, select 'View Enhanced Report Format'  under Linked Documents section.    SPECIMEN(S):  Right lower leg    FINAL DIAGNOSIS:  Right leg, below the knee amputation  - Heel, plantar surface, ulcer with abscess, extending to the bone  - Minimal atherosclerotic plaque formation of the popliteal artery    Electronically signed out by:    Mode Caldwell M.D.    GROSS:  The specimen is received fresh with the patient's name and proper  identification a labeled \"right lower leg\".  The specimen consists of  tan edematous right below the knee amputation.  The leg is amputated  31.7 cm proximal to the heel.  There are five identifiable toes.  On the  plantar surface of the heel there is old open healing lesion measuring  2.1 x 0.7 cm.  Deep to this lesion is an abscess extending to  the bone.  The popliteal artery shows minimal atheromatous plaque.  The proximal  skin and soft tissue margins are grossly viable.  Representative  sections are submitted in two cassettes. (Dictated by: James Reilly  9/7/2017 03:37 PM)    CPT Codes:  A: 32026-LC4, 53511-GIV    TESTING LAB LOCATION:  [address]  [address] [phone]    COLLECTION SITE:  Client: [facility]  Location: [facility] (S)     Glucose by meter   Result Value Ref Range    Glucose 96 70 - 99 mg/dL   Platelet count   Result Value Ref Range    Platelet Count 314 150 - 450 10e9/L   Creatinine   Result Value Ref Range    Creatinine 1.06 0.66 - 1.25 mg/dL    GFR Estimate 74 >60 mL/min/1.7m2    GFR Estimate If Black 89 >60 mL/min/1.7m2   Vancomycin level   Result Value Ref Range    Vancomycin Level 14.6 mg/L   Glucose by meter   Result Value Ref Range    Glucose 141 (H) 70 - 99 mg/dL   Glucose by meter   Result Value Ref Range    Glucose 283 (H) 70 - 99 " mg/dL   Creatinine   Result Value Ref Range    Creatinine 1.31 (H) 0.66 - 1.25 mg/dL    GFR Estimate 58 (L) >60 mL/min/1.7m2    GFR Estimate If Black 70 >60 mL/min/1.7m2   CBC with platelets   Result Value Ref Range    WBC 11.8 (H) 4.0 - 11.0 10e9/L    RBC Count 2.76 (L) 4.4 - 5.9 10e12/L    Hemoglobin 8.1 (L) 13.3 - 17.7 g/dL    Hematocrit 23.4 (L) 40.0 - 53.0 %    MCV 85 78 - 100 fl    MCH 29.3 26.5 - 33.0 pg    MCHC 34.6 31.5 - 36.5 g/dL    RDW 12.0 10.0 - 15.0 %    Platelet Count 296 150 - 450 10e9/L   Glucose   Result Value Ref Range    Glucose 174 (H) 70 - 99 mg/dL   Glucose by meter   Result Value Ref Range    Glucose 219 (H) 70 - 99 mg/dL   Glucose by meter   Result Value Ref Range    Glucose 99 70 - 99 mg/dL   Glucose by meter   Result Value Ref Range    Glucose 112 (H) 70 - 99 mg/dL   Glucose by meter   Result Value Ref Range    Glucose 142 (H) 70 - 99 mg/dL   Creatinine   Result Value Ref Range    Creatinine 1.28 (H) 0.66 - 1.25 mg/dL    GFR Estimate 59 (L) >60 mL/min/1.7m2    GFR Estimate If Black 72 >60 mL/min/1.7m2     *Note: Due to a large number of results and/or encounters for the requested time period, some results have not been displayed. A complete set of results can be found in Results Review.       ASSESSMENT/PLAN:             1. Anemia due to blood loss, acute  Recheck hemoglobin to trend  Not likely to be normal yet   - CBC with platelets    2. Type 2 diabetes mellitus with complication, without long-term current use of insulin (H)  Recheck A1c after 10/10  - **A1C FUTURE anytime; Future    3. Osteomyelitis of right foot, unspecified type (H)  Now status post definitive therapy with amputation     4. Status post below knee amputation, right (H)  Continue working with PT and [facility] in the future     5. Essential hypertension, benign  Blood pressure well controlled in clinic today despite halving lisinopril dose and stopping hydrochlorothiazide   Continue current medications        FUTURE APPOINTMENTS:       - Pending symptoms and labs     Kody Wing MD  [facility]

## 2017-09-14 NOTE — MR AVS SNAPSHOT
After Visit Summary   9/14/2017    Ry Horner    MRN: [id number]           Patient Information     Date Of Birth          1965        Visit Information        Provider Department      9/14/2017 2:30 PM Kody Wing MD [facility]        Today's Diagnoses     Anemia due to blood loss, acute    -  1    Type 2 diabetes mellitus with complication, without long-term current use of insulin (H)        Osteomyelitis of right foot, unspecified type (H)        Status post below knee amputation, right (H)        Essential hypertension, benign           Follow-ups after your visit        Your next 10 appointments already scheduled     Oct 13, 2017  8:30 AM CDT   Office Visit with Kody Wing MD   [facility] ([facility])    [address] [facility] [address] [phone]           Bring a current list of meds and any records pertaining to this visit. For Physicals, please bring immunization records and any forms needing to be filled out. Please arrive 10 minutes early to complete paperwork.              Future tests that were ordered for you today     Open Future Orders        Priority Expected Expires Ordered    **A1C FUTURE anytime Routine 10/10/2017 9/14/2018 9/14/2017            Who to contact     If you have questions or need follow up information about today's clinic visit or your schedule please contact [facility] directly at [phone].  Normal or non-critical lab and imaging results will be communicated to you by MyChart, letter or phone within 4 business days after the clinic has received the results. If you do not hear from us within 7 days, please contact the clinic through MyChart or phone. If you have a critical or abnormal lab result, we will notify you by phone as soon as possible.  Submit refill requests through [employer] or call your pharmacy and they will forward the refill request to us. Please allow 3 business days for your  "refill to be completed.          Additional Information About Your Visit        [employer] Information     [employer] lets you send messages to your doctor, view your test results, renew your prescriptions, schedule appointments and more. To sign up, go to [email or web address] . Click on \"Log in\" on the left side of the screen, which will take you to the Welcome page. Then click on \"Sign up Now\" on the right side of the page.     You will be asked to enter the access code listed below, as well as some personal information. Please follow the directions to create your username and password.     Your access code is: [email or web address]  Expires: 2017  3:24 PM     Your access code will  in 90 days. If you need help or a new code, please call your [facility] clinic or [phone].        Care EveryWhere ID     This is your Care EveryWhere ID. This could be used by other organizations to access your [facility] medical records  [id number]        Your Vitals Were     Pulse Temperature Respirations Height Pulse Oximetry BMI (Body Mass Index)    111 98.9  F (37.2  C) (Tympanic) 18 6' (1.829 m) 98% 31.87 kg/m2       Blood Pressure from Last 3 Encounters:   17 103/73   17 131/86   17 95/70    Weight from Last 3 Encounters:   17 235 lb (106.6 kg)   17 254 lb (115.2 kg)   07/10/17 254 lb (115.2 kg)              We Performed the Following     CBC with platelets          Today's Medication Changes          These changes are accurate as of: 17  3:12 PM.  If you have any questions, ask your nurse or doctor.               These medicines have changed or have updated prescriptions.        Dose/Directions    glimepiride 4 MG tablet   Commonly known as:  AMARYL   This may have changed:  when to take this   Used for:  Type 2 diabetes mellitus with diabetic polyneuropathy, without long-term current use of insulin (H)        Dose:  4 mg   Take 1 tablet (4 mg) by mouth daily   Quantity:  90 tablet "   Refills:  3                Primary Care Provider Office Phone # Fax #    Kody Wing [phone]       [place] [address] [facility] [address]  [facility] [address]        Equal Access to Services     GILLES SALCIDO : Hadii aad ku hadgayleo Soomaali, waaxda luqadaha, qaybta kaalmada adeegyada, waxligia len erichbethany remy shon . So [facility] [phone].    ATENCIÓN: Si habla español, tiene a fallon disposición servicios gratuitos de asistencia lingüística. Llame al [phone].    We comply with applicable federal civil rights laws and [place] laws. We do not discriminate on the basis of race, color, national origin, age, disability sex, sexual orientation or gender identity.            Thank you!     Thank you for choosing [facility]  for your care. Our goal is always to provide you with excellent care. Hearing back from our patients is one way we can continue to improve our services. Please take a few minutes to complete the written survey that you may receive in the mail after your visit with us. Thank you!             Your Updated Medication List - Protect others around you: Learn how to safely use, store and throw away your medicines at www.disposemymeds.org.          This list is accurate as of: 9/14/17  3:12 PM.  Always use your most recent med list.                   Brand Name Dispense Instructions for use Diagnosis    acetaminophen 325 MG tablet    TYLENOL    100 tablet    Take 2 tablets (650 mg) by mouth every 4 hours as needed for mild pain or fever    Osteomyelitis of right foot, unspecified type (H)       aspirin  MG EC tablet     42 tablet    Take 1 tablet (325 mg) by mouth daily    Osteomyelitis of right foot, unspecified type (H)       ASPIRIN NOT PRESCRIBED    INTENTIONAL    0 each    Please choose reason not prescribed, below    Type 2 diabetes mellitus with diabetic polyneuropathy, without long-term current use of insulin (H)       ferrous sulfate 325  "(65 FE) MG tablet    IRON    100 tablet    Take 1 tablet (325 mg) by mouth 2 times daily (with meals)    Anemia, unspecified type       glimepiride 4 MG tablet    AMARYL    90 tablet    Take 1 tablet (4 mg) by mouth daily    Type 2 diabetes mellitus with diabetic polyneuropathy, without long-term current use of insulin (H)       hydrOXYzine 25 MG tablet    ATARAX    40 tablet    Take 1 tablet (25 mg) by mouth every 6 hours as needed for itching (nausea, adjuvant pain, spasms)    Osteomyelitis of right foot, unspecified type (H)       IBUPROFEN PO      Take 600-800 mg by mouth 2 times daily as needed for moderate pain        linagliptin 5 MG Tabs tablet    TRADJENTA    90 tablet    Take 1 tablet (5 mg) by mouth daily    Type 2 diabetes mellitus with diabetic polyneuropathy, without long-term current use of insulin (H)       lisinopril 20 MG tablet    PRINIVIL/ZESTRIL    90 tablet    Take 0.5 tablets (10 mg) by mouth daily    Essential hypertension, benign       metFORMIN 1000 MG tablet    GLUCOPHAGE    180 tablet    Take 1 tablet (1,000 mg) by mouth 2 times daily (with meals)    Type 2 diabetes mellitus with diabetic polyneuropathy, without long-term current use of insulin (H)       * order for DME     30 days    [facility] Fax [phone]  Primary Dressing Karen   Qty 10 Secondary Dressing Mepilex Foam 4x4 Qty 10 Secondary Dressing 2\" Medipore Tape Qty 1 Length of Need: 1 month Frequency of dressing change: daily If Patient has balance on account please call him. Thanks    Diabetic ulcer of right foot associated with type 2 diabetes mellitus (H), Pressure ulcer, stage III (H)       * order for DME     1 each    Equipment being ordered: Walker Wheels () and Walker () Treatment Diagnosis: decreased ambulation    Osteomyelitis of right foot, unspecified type (H)       oxyCODONE 5 MG IR tablet    ROXICODONE    60 tablet    Take 1-2 tablets (5-10 mg) by mouth every 3 hours as needed for moderate to " severe pain    Osteomyelitis of right foot, unspecified type (H)       * Notice:  This list has 2 medication(s) that are the same as other medications prescribed for you. Read the directions carefully, and ask your doctor or other care provider to review them with you.

## 2017-09-14 NOTE — LETTER
[place] [address] [facility]  [address]  [place]  [address]  Tel: [phone]    September 18, 2017    Ry SUE   [address] [facility] [address]        Dear Maribel Schultze,    The following letter pertains to your most recent diagnostic tests:    Good news! Your hemoglobin is nearly back to normal.      Follow up:  Return for your A1c after 10/10/17 as we had planned in clinic.        Sincerely,    Kody Wing MD/JOSEF      Enclosure: Lab Results  Results for orders placed or performed in visit on 09/14/17   CBC with platelets   Result Value Ref Range    WBC 9.9 4.0 - 11.0 10e9/L    RBC Count 4.04 (L) 4.4 - 5.9 10e12/L    Hemoglobin 11.7 (L) 13.3 - 17.7 g/dL    Hematocrit 35.4 (L) 40.0 - 53.0 %    MCV 88 78 - 100 fl    MCH 29.0 26.5 - 33.0 pg    MCHC 33.1 31.5 - 36.5 g/dL    RDW 12.9 10.0 - 15.0 %    Platelet Count 467 (H) 150 - 450 10e9/L

## 2017-09-14 NOTE — NURSING NOTE
Chief Complaint   Patient presents with     Hospital F/U       Initial /73 (BP Location: Right arm, Patient Position: Chair, Cuff Size: Adult Large)  Pulse 111  Temp 98.9  F (37.2  C) (Tympanic)  Resp 18  Ht 6' (1.829 m)  Wt 235 lb (106.6 kg)  SpO2 98%  BMI 31.87 kg/m2 Estimated body mass index is 31.87 kg/(m^2) as calculated from the following:    Height as of this encounter: 6' (1.829 m).    Weight as of this encounter: 235 lb (106.6 kg).  Medication Reconciliation: complete   Mckenzie Hammonds CMA (AAMA)

## 2017-09-16 NOTE — PROGRESS NOTES
The following letter pertains to your most recent diagnostic tests:    Good news! Your hemoglobin is nearly back to normal.            Follow up:  Return for your A1c after 10/10/17 as we had planned in clinic.        Sincerely,    Dr. Wing

## 2017-09-25 ENCOUNTER — TELEPHONE (OUTPATIENT)
Dept: FAMILY MEDICINE | Facility: CLINIC | Age: 52
End: 2017-09-25

## 2017-09-25 NOTE — TELEPHONE ENCOUNTER
Reason for Call:  Other FYI    Detailed comments: Kenya, Care Coordinator with [facility] called to let Dr. Wing and his team know that   She has been assigned to help support the plan of care for this patient.    Please call Kenya with questions, and/or if she can do anything to help.    Phone Number Kenya Care Coordinator, can be reached at: [phone]    Best Time: any time    Can we leave a detailed message on this number? YES    Call taken on 9/25/2017 at 8:59 AM by Sahara Zamudio    .

## 2017-09-26 ENCOUNTER — TRANSFERRED RECORDS (OUTPATIENT)
Dept: HEALTH INFORMATION MANAGEMENT | Facility: CLINIC | Age: 52
End: 2017-09-26

## 2017-10-10 DIAGNOSIS — E11.8 TYPE 2 DIABETES MELLITUS WITH COMPLICATION, WITHOUT LONG-TERM CURRENT USE OF INSULIN (H): ICD-10-CM

## 2017-10-10 LAB — HBA1C MFR BLD: 6.5 % (ref 4.3–6)

## 2017-10-10 PROCEDURE — 83036 HEMOGLOBIN GLYCOSYLATED A1C: CPT | Performed by: INTERNAL MEDICINE

## 2017-10-10 PROCEDURE — 36415 COLL VENOUS BLD VENIPUNCTURE: CPT | Performed by: INTERNAL MEDICINE

## 2017-10-10 NOTE — LETTER
[place] [address] [facility]  [address]  [place]  [address]  Tel: [phone]    October 10, 2017    Ry SUE   [address] [facility] [address]        Dear Mr. Schultze,    The following letter pertains to your most recent diagnostic tests:    Good news! -Your hemoglobin A1c test which is a diabetes blood test that represents and average of your blood sugars over the last 3 months returned at 6.5 which is at your goal of hemoglobin A1c less than 7 and this has improved from 7.6 at last check.  Congratulations!  Keep up the good work.  We will need to recheck in 6 months.      If you have any further questions or problems, please contact our office.      Sincerely,    Kody Wing MD/ Jasmin WATKINS, CMA  Results for orders placed or performed in visit on 10/10/17   **A1C FUTURE anytime   Result Value Ref Range    Hemoglobin A1C 6.5 (H) 4.3 - 6.0 %               Enclosure: Lab Results

## 2017-10-10 NOTE — PROGRESS NOTES
The following letter pertains to your most recent diagnostic tests:    Good news! -Your hemoglobin A1c test which is a diabetes blood test that represents and average of your blood sugars over the last 3 months returned at 6.5 which is at your goal of hemoglobin A1c less than 7 and this has improved from 7.6 at last check.  Congratulations!  Keep up the good work.  We will need to recheck in 6 months.        Sincerely,    Dr. Wing

## 2017-10-12 DIAGNOSIS — D64.9 ANEMIA, UNSPECIFIED TYPE: ICD-10-CM

## 2017-10-12 NOTE — TELEPHONE ENCOUNTER
Pending Prescriptions:                       Disp   Refills    ferrous sulfate (IRON) 325 (65 FE) MG tab*100 ta*1            Sig: Take 1 tablet (325 mg) by mouth 2 times daily           (with meals)           Last Written Prescription Date: 9/3/17  Last Fill Quantity: 100,    # refills: 1  Last Office Visit with [facility], P or [facility] prescribing provider:  9/14/17 [place]   Next 5 appointments (look out 90 days)     Oct 13, 2017  8:30 AM CDT   [facility]ody [facility]   [facility]    [address] [facility] [address]   [phone]                   Lab Results   Component Value Date    WBC 9.9 09/14/2017     Lab Results   Component Value Date    RBC 4.04 09/14/2017     Lab Results   Component Value Date    HGB 11.7 09/14/2017     Lab Results   Component Value Date    HCT 35.4 09/14/2017     No components found for: MCT  Lab Results   Component Value Date    MCV 88 09/14/2017     Lab Results   Component Value Date    MCH 29.0 09/14/2017     Lab Results   Component Value Date    MCHC 33.1 09/14/2017     Lab Results   Component Value Date    RDW 12.9 09/14/2017     Lab Results   Component Value Date     09/14/2017     Lab Results   Component Value Date    AST 23 04/04/2007     Lab Results   Component Value Date    ALT 40 04/04/2007     Creatinine   Date Value Ref Range Status   09/03/2017 1.28 (H) 0.66 - 1.25 mg/dL Final     Rea Garcia, RT(R)

## 2017-10-13 ENCOUNTER — OFFICE VISIT (OUTPATIENT)
Dept: FAMILY MEDICINE | Facility: CLINIC | Age: 52
End: 2017-10-13
Payer: COMMERCIAL

## 2017-10-13 ENCOUNTER — TELEPHONE (OUTPATIENT)
Dept: FAMILY MEDICINE | Facility: CLINIC | Age: 52
End: 2017-10-13

## 2017-10-13 ENCOUNTER — TRANSFERRED RECORDS (OUTPATIENT)
Dept: HEALTH INFORMATION MANAGEMENT | Facility: CLINIC | Age: 52
End: 2017-10-13

## 2017-10-13 VITALS
BODY MASS INDEX: 31.83 KG/M2 | SYSTOLIC BLOOD PRESSURE: 144 MMHG | TEMPERATURE: 96.5 F | HEIGHT: 72 IN | DIASTOLIC BLOOD PRESSURE: 88 MMHG | OXYGEN SATURATION: 99 % | HEART RATE: 96 BPM | WEIGHT: 235 LBS

## 2017-10-13 DIAGNOSIS — E11.42 TYPE 2 DIABETES MELLITUS WITH DIABETIC POLYNEUROPATHY, WITHOUT LONG-TERM CURRENT USE OF INSULIN (H): ICD-10-CM

## 2017-10-13 DIAGNOSIS — I10 BENIGN ESSENTIAL HYPERTENSION: Primary | ICD-10-CM

## 2017-10-13 PROCEDURE — 99213 OFFICE O/P EST LOW 20 MIN: CPT | Performed by: INTERNAL MEDICINE

## 2017-10-13 RX ORDER — HYDROCHLOROTHIAZIDE 12.5 MG/1
12.5 TABLET ORAL DAILY
Qty: 90 TABLET | Refills: 3 | Status: SHIPPED | OUTPATIENT
Start: 2017-10-13 | End: 2018-10-01

## 2017-10-13 RX ORDER — ASPIRIN 81 MG/1
81 TABLET, CHEWABLE ORAL DAILY
Qty: 108 TABLET | Refills: 3 | COMMUNITY
Start: 2017-10-13

## 2017-10-13 RX ORDER — FERROUS SULFATE 325(65) MG
325 TABLET ORAL 2 TIMES DAILY WITH MEALS
Qty: 100 TABLET | Refills: 1 | OUTPATIENT
Start: 2017-10-13

## 2017-10-13 NOTE — NURSING NOTE
Chief Complaint   Patient presents with     Follow Up For     lab resuts done 10/10/17       Initial BP (!) 176/106 (BP Location: Right arm, Cuff Size: Adult Regular)  Pulse 96  Temp 96.5  F (35.8  C) (Oral)  Ht 6' (1.829 m)  Wt 235 lb (106.6 kg)  SpO2 99%  BMI 31.87 kg/m2 Estimated body mass index is 31.87 kg/(m^2) as calculated from the following:    Height as of this encounter: 6' (1.829 m).    Weight as of this encounter: 235 lb (106.6 kg).  Medication Reconciliation: complete     HANSA Aponte

## 2017-10-13 NOTE — MR AVS SNAPSHOT
After Visit Summary   10/13/2017    Ry Horner    MRN: [id number]           Patient Information     Date Of Birth          1965        Visit Information        Provider Department      10/13/2017 8:30 AM Kody Wing MD [facility]        Today's Diagnoses     Benign essential hypertension    -  1    Type 2 diabetes mellitus with diabetic polyneuropathy, without long-term current use of insulin (H)           Follow-ups after your visit        Additional Services     OPHTHALMOLOGY ADULT REFERRAL       Your provider has referred you to: [facility], PATRICIA Jaimesa [phone]   [email or web address]    Dr. Johnathan Dozier    Please be aware that coverage of these services is subject to the terms and limitations of your health insurance plan.  Call member services at your health plan with any benefit or coverage questions.      Please bring the following with you to your appointment:    (1) Any X-Rays, CTs or MRIs which have been performed.  Contact the facility where they were done to arrange for  prior to your scheduled appointment.    (2) List of current medications  (3) This referral request   (4) Any documents/labs given to you for this referral                  Follow-up notes from your care team     Return in about 4 weeks (around 11/10/2017) for Routine Visit.      Who to contact     If you have questions or need follow up information about today's clinic visit or your schedule please contact [facility] directly at [phone].  Normal or non-critical lab and imaging results will be communicated to you by MyChart, letter or phone within 4 business days after the clinic has received the results. If you do not hear from us within 7 days, please contact the clinic through MyChart or phone. If you have a critical or abnormal lab result, we will notify you by phone as soon  "as possible.  Submit refill requests through [employer] or call your pharmacy and they will forward the refill request to us. Please allow 3 business days for your refill to be completed.          Additional Information About Your Visit        [employer] Information     [employer] lets you send messages to your doctor, view your test results, renew your prescriptions, schedule appointments and more. To sign up, go to [email or web address] . Click on \"Log in\" on the left side of the screen, which will take you to the Welcome page. Then click on \"Sign up Now\" on the right side of the page.     You will be asked to enter the access code listed below, as well as some personal information. Please follow the directions to create your username and password.     Your access code is: [email or web address]  Expires: 2017  3:24 PM     Your access code will  in 90 days. If you need help or a new code, please call your [facility] clinic or [phone].        Care EveryWhere ID     This is your Care EveryWhere ID. This could be used by other organizations to access your [facility] medical records  [id number]        Your Vitals Were     Pulse Temperature Height Pulse Oximetry BMI (Body Mass Index)       96 96.5  F (35.8  C) (Oral) 6' (1.829 m) 99% 31.87 kg/m2        Blood Pressure from Last 3 Encounters:   10/13/17 144/88   17 103/73   17 131/86    Weight from Last 3 Encounters:   10/13/17 235 lb (106.6 kg)   17 235 lb (106.6 kg)   17 254 lb (115.2 kg)              We Performed the Following     OPHTHALMOLOGY ADULT REFERRAL          Today's Medication Changes          These changes are accurate as of: 10/13/17  9:02 AM.  If you have any questions, ask your nurse or doctor.               Start taking these medicines.        Dose/Directions    hydrochlorothiazide 12.5 MG Tabs tablet   Used for:  Benign essential hypertension   Started by:  Kody Wing MD        Dose:  12.5 mg   Take 1 tablet (12.5 mg) by " mouth daily   Quantity:  90 tablet   Refills:  3         These medicines have changed or have updated prescriptions.        Dose/Directions    glimepiride 4 MG tablet   Commonly known as:  AMARYL   This may have changed:  when to take this   Used for:  Type 2 diabetes mellitus with diabetic polyneuropathy, without long-term current use of insulin (H)        Dose:  4 mg   Take 1 tablet (4 mg) by mouth daily   Quantity:  90 tablet   Refills:  3         Stop taking these medicines if you haven't already. Please contact your care team if you have questions.     ASPIRIN NOT PRESCRIBED   Commonly known as:  INTENTIONAL   Stopped by:  Kody Wing MD           ferrous sulfate 325 (65 FE) MG tablet   Commonly known as:  IRON   Stopped by:  Kody Wing MD           hydrOXYzine 25 MG tablet   Commonly known as:  ATARAX   Stopped by:  Kody Wing MD           oxyCODONE 5 MG IR tablet   Commonly known as:  ROXICODONE   Stopped by:  Kody Wing MD                Where to get your medicines      These medications were sent to [facility] [address] [facility]  [address] [facility] [address]     Phone:  [phone]     hydrochlorothiazide 12.5 MG Tabs tablet                Primary Care Provider Office Phone # Fax #    Kody Wing [phone]       [facility] [address] [place] [address] [address]  [facility] [address]        Equal Access to Services     [facility] Heart[facility]OLIV[facility] : Hadii rony ku hadasho Soidris, waaxda luqadaha, qaybta kaalmada adebethanyyada, charlene menendez . So [facility] [phone].    ATENCIÓN: Si habla español, tiene a fallon disposición servicios gratuitos de asistencia lingüística. Dilshad al [phone].    We comply with applicable federal civil rights laws and [place] laws. We do not discriminate on the basis of race, color, national origin, age, disability, sex, sexual orientation, or gender identity.            Thank you!      Thank you for choosing [facility]  for your care. Our goal is always to provide you with excellent care. Hearing back from our patients is one way we can continue to improve our services. Please take a few minutes to complete the written survey that you may receive in the mail after your visit with us. Thank you!             Your Updated Medication List - Protect others around you: Learn how to safely use, store and throw away your medicines at www.disposemymeds.org.          This list is accurate as of: 10/13/17  9:02 AM.  Always use your most recent med list.                   Brand Name Dispense Instructions for use Diagnosis    acetaminophen 325 MG tablet    TYLENOL    100 tablet    Take 2 tablets (650 mg) by mouth every 4 hours as needed for mild pain or fever    Osteomyelitis of right foot, unspecified type (H)       aspirin  MG EC tablet     42 tablet    Take 1 tablet (325 mg) by mouth daily    Osteomyelitis of right foot, unspecified type (H)       glimepiride 4 MG tablet    AMARYL    90 tablet    Take 1 tablet (4 mg) by mouth daily    Type 2 diabetes mellitus with diabetic polyneuropathy, without long-term current use of insulin (H)       hydrochlorothiazide 12.5 MG Tabs tablet     90 tablet    Take 1 tablet (12.5 mg) by mouth daily    Benign essential hypertension       IBUPROFEN PO      Take 600-800 mg by mouth 2 times daily as needed for moderate pain        linagliptin 5 MG Tabs tablet    TRADJENTA    90 tablet    Take 1 tablet (5 mg) by mouth daily    Type 2 diabetes mellitus with diabetic polyneuropathy, without long-term current use of insulin (H)       lisinopril 20 MG tablet    PRINIVIL/ZESTRIL    90 tablet    Take 0.5 tablets (10 mg) by mouth daily    Essential hypertension, benign       metFORMIN 1000 MG tablet    GLUCOPHAGE    180 tablet    Take 1 tablet (1,000 mg) by mouth 2 times daily (with meals)    Type 2 diabetes mellitus with diabetic polyneuropathy, without long-term  "current use of insulin (H)       * order for DME     30 days    [facility] Fax [phone]  Primary Dressing Karen   Qty 10 Secondary Dressing Mepilex Foam 4x4 Qty 10 Secondary Dressing 2\" Medipore Tape Qty 1 Length of Need: 1 month Frequency of dressing change: daily If Patient has balance on account please call him. Thanks    Diabetic ulcer of right foot associated with type 2 diabetes mellitus (H), Pressure ulcer, stage III (H)       * order for DME     1 each    Equipment being ordered: Walker Wheels () and Walker () Treatment Diagnosis: decreased ambulation    Osteomyelitis of right foot, unspecified type (H)       * Notice:  This list has 2 medication(s) that are the same as other medications prescribed for you. Read the directions carefully, and ask your doctor or other care provider to review them with you.      "

## 2017-10-13 NOTE — TELEPHONE ENCOUNTER
I am going to  postpone  this message and will need to wait to see if pt makes contact with us vis phone or a visit so we can givehim this message.   Tete MITCHELL MA

## 2017-10-13 NOTE — TELEPHONE ENCOUNTER
Kenya is a  with [employer] and has been trying to get in touch with the pt. She asks that if he calls or next time he comes into the clinic if we could let him know to get in touch with her. Her number is [phone]

## 2017-10-13 NOTE — PROGRESS NOTES
SUBJECTIVE:   Ry Horner is a 52 year old male who presents to clinic today for the following health issues:      Here to follow up diabetes, hypertension, anemia, amputation status  Pain from amputation resolved  Not using oxycodone anymore  His blood pressure medications were cut in the hospital due to low pressures after surgery  He is working on getting a prosthesis  We discussed his A1c result which is excellent!          Problem list and histories reviewed & adjusted, as indicated.  Additional history: as documented    Patient Active Problem List   Diagnosis     Diabetes mellitus, type 2 (H)     Essential hypertension, benign     Hyperlipidemia     Impotence of organic origin     Ulcer of right heel (H)     Morbid obesity due to excess calories (H)     Open wound of right foot     Osteomyelitis (H)     Status post below knee amputation, right (H)     Past Surgical History:   Procedure Laterality Date     AMPUTATE LEG BELOW KNEE Right 9/1/2017    Procedure: AMPUTATE LEG BELOW KNEE;  RIGHT BELOW KNEE AMPUTATION ;  Surgeon: Nikolas Nascimento MD;  Location:  OR     APPENDECTOMY       APPLY WOUND VAC Right 3/2/2015    Procedure: APPLY WOUND VAC;  Surgeon: Milena Cevallos DPM, Pod;  Location:  OR     BIOPSY BONE FOOT Right 7/15/2016    Procedure: BIOPSY BONE FOOT;  Surgeon: Tim Douglas DPM;  Location:  OR     IRRIGATION AND DEBRIDEMENT FOOT, COMBINED Right 3/2/2015    Procedure: COMBINED IRRIGATION AND DEBRIDEMENT FOOT;  Surgeon: Milena Cevallos DPM, Pod;  Location: [facility] OR     IRRIGATION AND DEBRIDEMENT FOOT, COMBINED Right 7/15/2016    Procedure: COMBINED IRRIGATION AND DEBRIDEMENT FOOT;  Surgeon: Tim Douglas DPM;  Location:  OR     IRRIGATION AND DEBRIDEMENT FOOT, COMBINED Right 7/20/2016    Procedure: COMBINED IRRIGATION AND DEBRIDEMENT FOOT;  Surgeon: Tim Douglas DPM;  Location:  OR     ORTHOPEDIC SURGERY         Social History   Substance Use Topics     Smoking status:  "Former Smoker     Packs/day: 0.50     Years: 20.00     Types: Cigarettes     Smokeless tobacco: Never Used     Alcohol use Yes      Comment: occasional     Family History   Problem Relation Age of Onset     Genetic Disorder Other      Genetic Disorder Other      Psychotic Disorder Mother      DIABETES Father      Lung Cancer Father      Genetic Disorder Maternal Grandmother      Genetic Disorder Maternal Grandfather      Asthma Sister      C.A.D. No family hx of      Hypertension No family hx of      CEREBROVASCULAR DISEASE No family hx of      Breast Cancer No family hx of      Cancer - colorectal No family hx of      Prostate Cancer No family hx of      Alcohol/Drug No family hx of          Current Outpatient Prescriptions   Medication Sig Dispense Refill     hydrochlorothiazide 12.5 MG TABS tablet Take 1 tablet (12.5 mg) by mouth daily 90 tablet 3     aspirin 81 MG chewable tablet Take 1 tablet (81 mg) by mouth daily 108 tablet 3     linagliptin (TRADJENTA) 5 MG TABS tablet Take 1 tablet (5 mg) by mouth daily 90 tablet 3     lisinopril (PRINIVIL/ZESTRIL) 20 MG tablet Take 0.5 tablets (10 mg) by mouth daily 90 tablet 3     order for DME Equipment being ordered: Walker Wheels () and Walker ()  Treatment Diagnosis: decreased ambulation 1 each 0     acetaminophen (TYLENOL) 325 MG tablet Take 2 tablets (650 mg) by mouth every 4 hours as needed for mild pain or fever 100 tablet 0     IBUPROFEN PO Take 600-800 mg by mouth 2 times daily as needed for moderate pain       metFORMIN (GLUCOPHAGE) 1000 MG tablet Take 1 tablet (1,000 mg) by mouth 2 times daily (with meals) 180 tablet 3     glimepiride (AMARYL) 4 MG tablet Take 1 tablet (4 mg) by mouth daily (Patient taking differently: Take 4 mg by mouth every morning (before breakfast) ) 90 tablet 3     order for DME [employer] [facility] Fax [phone]    Primary Dressing Karen   Qty 10  Secondary Dressing Mepilex Foam 4x4 Qty 10  Secondary Dressing 2\" Medipore " "Tape Qty 1  Length of Need: 1 month  Frequency of dressing change: daily  If Patient has balance on account please call him. Thanks 30 days 0     Allergies   Allergen Reactions     Pravastatin      \"sucked the life blood out of me\"     Asa [Aspirin] Nausea and Vomiting         Reviewed and updated as needed this visit by clinical staffTobacco  Allergies  Meds  Soc Hx      Reviewed and updated as needed this visit by Provider         ROS:  No chest pains or dyspnea, no vision changes, but has not seen eye MD yet     OBJECTIVE:     /88  Pulse 96  Temp 96.5  F (35.8  C) (Oral)  Ht 6' (1.829 m)  Wt 235 lb (106.6 kg)  SpO2 99%  BMI 31.87 kg/m2  Body mass index is 31.87 kg/(m^2).  GENERAL: healthy, alert and no distress  PSYCH: mentation appears normal, affect normal/bright    Diagnostic Test Results:  Results for orders placed or performed in visit on 10/10/17   **A1C FUTURE anytime   Result Value Ref Range    Hemoglobin A1C 6.5 (H) 4.3 - 6.0 %       ASSESSMENT/PLAN:             1. Benign essential hypertension  Blood pressure is climbing back after surgery  Restart hydrochlorothiazide at dose below added to lisinopril 10  Return in 4 weeks for blood pressure recheck and BMP  - hydrochlorothiazide 12.5 MG TABS tablet; Take 1 tablet (12.5 mg) by mouth daily  Dispense: 90 tablet; Refill: 3    2. Type 2 diabetes mellitus with diabetic polyneuropathy, without long-term current use of insulin (H)  Now well controlled, needs to see eye md; contact information provided again  Now that he is past 31 days from surgery, I think he can reduce his aspirin dose to 81mg daily   - OPHTHALMOLOGY ADULT REFERRAL  - aspirin 81 MG chewable tablet; Take 1 tablet (81 mg) by mouth daily  Dispense: 108 tablet; Refill: 3    FUTURE APPOINTMENTS:       - Follow-up visit in 4 weeks or sooner as needed    Kody Wing MD  [facility]    "

## 2017-10-13 NOTE — TELEPHONE ENCOUNTER
Routing refill request to provider for review/approval because:  Labs out of range:  Hgb (low).   Yvette WATKINS RN

## 2017-11-17 ENCOUNTER — TRANSFERRED RECORDS (OUTPATIENT)
Dept: HEALTH INFORMATION MANAGEMENT | Facility: CLINIC | Age: 52
End: 2017-11-17

## 2018-02-22 ENCOUNTER — TELEPHONE (OUTPATIENT)
Dept: FAMILY MEDICINE | Facility: CLINIC | Age: 53
End: 2018-02-22

## 2018-02-22 NOTE — TELEPHONE ENCOUNTER
Reason for Call:  Other call back    Detailed comments: patient has a dental appointment scheduled for Monday.  If he needs to have any type of procedure/surgery, the dental office requested he speak with nurse for PCP to see if he would need any oral antibiotic prior to surgery?    Patient had a below the knee amputation.  Dental office said that  They normally have patients that have had joint replacements take medication, but they are not sure about the amputation.    Phone Number Patient can be reached at: Home number on file [phone] (home)    Best Time: anytime    Can we leave a detailed message on this number? YES    Call taken on 2/22/2018 at 1:52 PM by Sahara Zamudio  .

## 2018-02-23 NOTE — TELEPHONE ENCOUNTER
"Per patients verbal ok in initial message 'Ok' to leave VM machine identified correct number listed in patients chart and number listed he gave us to call back and ok to leave message at. Left in detail message ok providers message of 'no need for Abx with amputation.\" Told patient to call back with further questions/concerns at [phone].  [facility]\    "

## 2018-03-01 ENCOUNTER — OFFICE VISIT (OUTPATIENT)
Dept: FAMILY MEDICINE | Facility: CLINIC | Age: 53
End: 2018-03-01
Payer: COMMERCIAL

## 2018-03-01 VITALS
TEMPERATURE: 98 F | BODY MASS INDEX: 34.81 KG/M2 | DIASTOLIC BLOOD PRESSURE: 70 MMHG | WEIGHT: 257 LBS | HEART RATE: 114 BPM | SYSTOLIC BLOOD PRESSURE: 106 MMHG | HEIGHT: 72 IN | OXYGEN SATURATION: 98 %

## 2018-03-01 DIAGNOSIS — E11.42 TYPE 2 DIABETES MELLITUS WITH DIABETIC POLYNEUROPATHY, WITHOUT LONG-TERM CURRENT USE OF INSULIN (H): Primary | ICD-10-CM

## 2018-03-01 DIAGNOSIS — R09.89 RUNNY NOSE: ICD-10-CM

## 2018-03-01 DIAGNOSIS — Z89.511 STATUS POST BELOW KNEE AMPUTATION, RIGHT (H): ICD-10-CM

## 2018-03-01 DIAGNOSIS — E78.5 HYPERLIPIDEMIA LDL GOAL <100: ICD-10-CM

## 2018-03-01 PROBLEM — M86.9 OSTEOMYELITIS (H): Status: RESOLVED | Noted: 2017-09-01 | Resolved: 2018-03-01

## 2018-03-01 LAB — HBA1C MFR BLD: 8.3 % (ref 4.3–6)

## 2018-03-01 PROCEDURE — 99213 OFFICE O/P EST LOW 20 MIN: CPT | Performed by: INTERNAL MEDICINE

## 2018-03-01 PROCEDURE — 83036 HEMOGLOBIN GLYCOSYLATED A1C: CPT | Performed by: INTERNAL MEDICINE

## 2018-03-01 PROCEDURE — 36415 COLL VENOUS BLD VENIPUNCTURE: CPT | Performed by: INTERNAL MEDICINE

## 2018-03-01 RX ORDER — IPRATROPIUM BROMIDE 42 UG/1
2 SPRAY, METERED NASAL 4 TIMES DAILY PRN
Qty: 3 BOX | Refills: 3 | Status: SHIPPED | OUTPATIENT
Start: 2018-03-01 | End: 2018-07-05

## 2018-03-01 NOTE — LETTER
To Whom it May Concern:      Ry Horner underwent a below the knee amputation of the right leg in September of 2017.  To the best of my knowledge, I do not think he requires prophylactic antibiotics for dental procedures.  However, I advised him to check with his orthopedist's office (Dr. Nascimento [facility]) to see if his surgeon has different advice.      Sincerely,      Dr. Kody Wing (Internal Medicine)

## 2018-03-01 NOTE — PROGRESS NOTES
SUBJECTIVE:   Ry Horner is a 52 year old male who presents to clinic today for the following health issues:      Diabetes Follow-up    Patient is checking blood sugars: three times daily.   Results are as follows:         am -     Diabetic concerns: None     Symptoms of hypoglycemia (low blood sugar): none     Paresthesias (numbness or burning in feet) or sores:  no      Date of last diabetic eye exam: has been longer that 1 year    Hyperlipidemia Follow-Up      Rate your low fat/cholesterol diet?: fair    Taking statin?  Yes, no muscle aches from statin    Other lipid medications/supplements?:  none    Hypertension Follow-up      Outpatient blood pressures are being checked at store.  Results are less than 140/90 .    Low Salt Diet: no added salt    BP Readings from Last 2 Encounters:   10/13/17 144/88   09/14/17 103/73     Hemoglobin A1C (%)   Date Value   10/10/2017 6.5 (H)   08/28/2017 7.6 (H)     LDL Cholesterol Calculated (mg/dL)   Date Value   06/02/2017 116 (H)   04/04/2007     Cannot estimate LDL when triglyceride exceeds 400 mg/dL       Pleasant 52-year-old man with type 2 diabetes, who underwent a right below the knee amputation for a nonhealing foot ulcer with osteomyelitis in September 2017.  Since amputation, he has been paying close attention to his diet and exercise practices.  He has been training for a 5 km walk [place] in [place].  He is exercising regularly.  He has noted blood sugars ranging between  in the mornings fasting.  He does not have symptoms from hypoglycemia.    Problem list and histories reviewed & adjusted, as indicated.  Additional history: as documented    Patient Active Problem List   Diagnosis     Diabetes mellitus, type 2 (H)     Essential hypertension, benign     Hyperlipidemia LDL goal <100     Impotence of organic origin     Morbid obesity due to excess calories (H)     Open wound of right foot     Status post below knee amputation, right (H)     Past  Surgical History:   Procedure Laterality Date     AMPUTATE LEG BELOW KNEE Right 9/1/2017    Procedure: AMPUTATE LEG BELOW KNEE;  RIGHT BELOW KNEE AMPUTATION ;  Surgeon: Nikolas Nascimento MD;  Location: [facility] OR     APPENDECTOMY       APPLY WOUND VAC Right 3/2/2015    Procedure: APPLY WOUND VAC;  Surgeon: Milena Cevallos DPM, Pod;  Location: [facility] OR     BIOPSY BONE FOOT Right 7/15/2016    Procedure: BIOPSY BONE FOOT;  Surgeon: Tim Douglas DPM;  Location: [facility] OR     IRRIGATION AND DEBRIDEMENT FOOT, COMBINED Right 3/2/2015    Procedure: COMBINED IRRIGATION AND DEBRIDEMENT FOOT;  Surgeon: Milena Cevallos DPM, Pod;  Location: [facility] OR     IRRIGATION AND DEBRIDEMENT FOOT, COMBINED Right 7/15/2016    Procedure: COMBINED IRRIGATION AND DEBRIDEMENT FOOT;  Surgeon: Tim Douglas DPM;  Location: [facility] OR     IRRIGATION AND DEBRIDEMENT FOOT, COMBINED Right 7/20/2016    Procedure: COMBINED IRRIGATION AND DEBRIDEMENT FOOT;  Surgeon: Tim Douglas DPM;  Location: [facility] OR     ORTHOPEDIC SURGERY         Social History   Substance Use Topics     Smoking status: Former Smoker     Packs/day: 0.50     Years: 20.00     Types: Cigarettes     Smokeless tobacco: Never Used     Alcohol use Yes      Comment: occasional     Family History   Problem Relation Age of Onset     Genetic Disorder Other      Genetic Disorder Other      Psychotic Disorder Mother      DIABETES Father      Lung Cancer Father      Genetic Disorder Maternal Grandmother      Genetic Disorder Maternal Grandfather      Asthma Sister      C.A.D. No family hx of      Hypertension No family hx of      CEREBROVASCULAR DISEASE No family hx of      Breast Cancer No family hx of      Cancer - colorectal No family hx of      Prostate Cancer No family hx of      Alcohol/Drug No family hx of          Current Outpatient Prescriptions   Medication Sig Dispense Refill     ipratropium (ATROVENT) 0.06 % spray Spray 2 sprays into both nostrils 4 times daily as needed for  "rhinitis 3 Box 3     hydrochlorothiazide 12.5 MG TABS tablet Take 1 tablet (12.5 mg) by mouth daily 90 tablet 3     aspirin 81 MG chewable tablet Take 1 tablet (81 mg) by mouth daily 108 tablet 3     linagliptin (TRADJENTA) 5 MG TABS tablet Take 1 tablet (5 mg) by mouth daily 90 tablet 3     lisinopril (PRINIVIL/ZESTRIL) 20 MG tablet Take 0.5 tablets (10 mg) by mouth daily 90 tablet 3     metFORMIN (GLUCOPHAGE) 1000 MG tablet Take 1 tablet (1,000 mg) by mouth 2 times daily (with meals) 180 tablet 3     glimepiride (AMARYL) 4 MG tablet Take 1 tablet (4 mg) by mouth daily (Patient taking differently: Take 4 mg by mouth every morning (before breakfast) ) 90 tablet 3     Allergies   Allergen Reactions     Pravastatin      \"sucked the life blood out of me\"     Asa [Aspirin] Nausea and Vomiting       Reviewed and updated as needed this visit by clinical staff       Reviewed and updated as needed this visit by Provider         ROS:  Constitutional, HEENT (he describes an intermittent terrible runny nose that occurs when he smells certain perfumes, the runny nose can last for several days without fevers or facial pain), cardiovascular, pulmonary, gi and gu systems are negative, except as otherwise noted.    OBJECTIVE:     /70  Pulse 114  Temp 98  F (36.7  C) (Oral)  Ht 6' (1.829 m)  Wt 257 lb (116.6 kg)  SpO2 98%  BMI 34.86 kg/m2  Body mass index is 34.86 kg/(m^2).  GENERAL: healthy, alert and no distress  EXT: He has a carbon fiber right leg prosthesis  NEURO: Normal strength and tone, mentation intact and speech normal  PSYCH: mentation appears normal, affect normal/bright        ASSESSMENT/PLAN:       1. Type 2 diabetes mellitus with diabetic polyneuropathy, without long-term current use of insulin (H)  Recheck hemoglobin A1c, if less than 6, consider stopping Amaryl due to lower blood sugars.  Continue great work with diet and exercise modification.  - Patient care order  - Hemoglobin A1c    2. " Hyperlipidemia LDL goal <100  He has tried 3 different statins with intolerable side effects.  His LDL was 116 in June.  He would like to have more time to work hard on diet interventions before rechecking his blood cholesterol.  He would like to return in June fasting for cholesterol panel.  If his LDL cholesterol remains greater than 100 at that time, consider an alternative statin    3. Status post below knee amputation, right (H)      4. Runny nose  He can try ipratropium nasal for symptoms  - ipratropium (ATROVENT) 0.06 % spray; Spray 2 sprays into both nostrils 4 times daily as needed for rhinitis  Dispense: 3 Box; Refill: 3    FUTURE APPOINTMENTS:       -3 months or sooner pending symptoms/labs    Kody Wing MD  [facility]

## 2018-03-01 NOTE — MR AVS SNAPSHOT
After Visit Summary   3/1/2018    Ry Horner    MRN: [id number]           Patient Information     Date Of Birth          1965        Visit Information        Provider Department      3/1/2018 5:30 PM Kody Wing MD [facility]        Today's Diagnoses     Type 2 diabetes mellitus with diabetic polyneuropathy, without long-term current use of insulin (H)    -  1    Hyperlipidemia LDL goal <100        Status post below knee amputation, right (H)        Runny nose           Follow-ups after your visit        Your next 10 appointments already scheduled     Jun 06, 2018  8:00 AM CDT   Office Visit with Kody Wing MD   [facility]    [address] [facility] [address] [phone]           Bring a current list of meds and any records pertaining to this visit. For Physicals, please bring immunization records and any forms needing to be filled out. Please arrive 10 minutes early to complete paperwork.              Who to contact     If you have questions or need follow up information about today's clinic visit or your schedule please contact [facility] directly at [phone].  Normal or non-critical lab and imaging results will be communicated to you by MyChart, letter or phone within 4 business days after the clinic has received the results. If you do not hear from us within 7 days, please contact the clinic through [employer] or phone. If you have a critical or abnormal lab result, we will notify you by phone as soon as possible.  Submit refill requests through [employer] or call your pharmacy and they will forward the refill request to us. Please allow 3 business days for your refill to be completed.          Additional Information About Your Visit        MyChart Information     [employer] lets you send messages to your doctor, view your test results, renew your prescriptions, schedule appointments and more. To sign up, go to  [email or web address] . Click on \"Log in\" on the left side of the screen, which will take you to the Welcome page. Then click on \"Sign up Now\" on the right side of the page.     You will be asked to enter the access code listed below, as well as some personal information. Please follow the directions to create your username and password.     Your access code is: [email or web address]  Expires: 2018  6:14 PM     Your access code will  in 90 days. If you need help or a new code, please call your [place] clinic or [phone].        Care EveryWhere ID     This is your Care EveryWhere ID. This could be used by other organizations to access your [place] medical records  [id number]        Your Vitals Were     Pulse Temperature Height Pulse Oximetry BMI (Body Mass Index)       114 98  F (36.7  C) (Oral) 6' (1.829 m) 98% 34.86 kg/m2        Blood Pressure from Last 3 Encounters:   18 106/70   10/13/17 144/88   17 103/73    Weight from Last 3 Encounters:   18 257 lb (116.6 kg)   10/13/17 235 lb (106.6 kg)   17 235 lb (106.6 kg)              We Performed the Following     Hemoglobin A1c     Patient care order          Today's Medication Changes          These changes are accurate as of 3/1/18  6:14 PM.  If you have any questions, ask your nurse or doctor.               Start taking these medicines.        Dose/Directions    ipratropium 0.06 % spray   Commonly known as:  ATROVENT   Used for:  Runny nose   Started by:  Kody Wing MD        Dose:  2 spray   Spray 2 sprays into both nostrils 4 times daily as needed for rhinitis   Quantity:  3 Box   Refills:  3         These medicines have changed or have updated prescriptions.        Dose/Directions    glimepiride 4 MG tablet   Commonly known as:  AMARYL   This may have changed:  when to take this   Used for:  Type 2 diabetes mellitus with diabetic polyneuropathy, without long-term current use of insulin (H)        Dose:  4 mg   Take 1 " tablet (4 mg) by mouth daily   Quantity:  90 tablet   Refills:  3         Stop taking these medicines if you haven't already. Please contact your care team if you have questions.     acetaminophen 325 MG tablet   Commonly known as:  TYLENOL   Stopped by:  Kody Wing MD           IBUPROFEN PO   Stopped by:  Kody Wing MD           order for DME   Stopped by:  Kody Wing MD                Where to get your medicines      These medications were sent to [facility] [address] [facility]  [address] [facility] [address]     Phone:  [phone]     ipratropium 0.06 % spray                Primary Care Provider Office Phone # Fax #    Kody Wing [phone]       [facility] [address] [place] [address] [address]  [facility] [address]        Equal Access to Services     [facility]: Hadii aad ku hadasho Soomaali, waaxda luqadaha, qaybta kaalmada adeegyada, waxay idiin hayaan ade kharajonny menendez . So [facility] [phone].    ATENCIÓN: Si habla español, tiene a fallon disposición servicios gratuitos de asistencia lingüística. Llame al [phone].    We comply with applicable federal civil rights laws and [place] laws. We do not discriminate on the basis of race, color, national origin, age, disability, sex, sexual orientation, or gender identity.            Thank you!     Thank you for choosing [facility]  for your care. Our goal is always to provide you with excellent care. Hearing back from our patients is one way we can continue to improve our services. Please take a few minutes to complete the written survey that you may receive in the mail after your visit with us. Thank you!             Your Updated Medication List - Protect others around you: Learn how to safely use, store and throw away your medicines at www.disposemymeds.org.          This list is accurate as of 3/1/18  6:14 PM.  Always use your most recent med list.                    Brand Name Dispense Instructions for use Diagnosis    aspirin 81 MG chewable tablet     108 tablet    Take 1 tablet (81 mg) by mouth daily    Type 2 diabetes mellitus with diabetic polyneuropathy, without long-term current use of insulin (H)       glimepiride 4 MG tablet    AMARYL    90 tablet    Take 1 tablet (4 mg) by mouth daily    Type 2 diabetes mellitus with diabetic polyneuropathy, without long-term current use of insulin (H)       hydrochlorothiazide 12.5 MG Tabs tablet     90 tablet    Take 1 tablet (12.5 mg) by mouth daily    Benign essential hypertension       ipratropium 0.06 % spray    ATROVENT    3 Box    Spray 2 sprays into both nostrils 4 times daily as needed for rhinitis    Runny nose       linagliptin 5 MG Tabs tablet    TRADJENTA    90 tablet    Take 1 tablet (5 mg) by mouth daily    Type 2 diabetes mellitus with diabetic polyneuropathy, without long-term current use of insulin (H)       lisinopril 20 MG tablet    PRINIVIL/ZESTRIL    90 tablet    Take 0.5 tablets (10 mg) by mouth daily    Essential hypertension, benign       metFORMIN 1000 MG tablet    GLUCOPHAGE    180 tablet    Take 1 tablet (1,000 mg) by mouth 2 times daily (with meals)    Type 2 diabetes mellitus with diabetic polyneuropathy, without long-term current use of insulin (H)

## 2018-03-01 NOTE — NURSING NOTE
Chief Complaint   Patient presents with     Diabetes     3-4 month f/u       Initial /70  Pulse 114  Temp 98  F (36.7  C) (Oral)  Ht 6' (1.829 m)  Wt 257 lb (116.6 kg)  SpO2 98%  BMI 34.86 kg/m2 Estimated body mass index is 34.86 kg/(m^2) as calculated from the following:    Height as of this encounter: 6' (1.829 m).    Weight as of this encounter: 257 lb (116.6 kg).  Medication Reconciliation: complete   Sidney Chadwick, CMA

## 2018-03-01 NOTE — LETTER
[place] [id number] [facility]  [address]  [place]  [address]  Tel: [phone]    March 5, 2018    Ry SUE Horner  [address] [facility] [address]        Dear Mr. Horner,    The following letter pertains to your most recent diagnostic tests:    Unfortunately your hemoglobin A1c has increased significantly since last check.  Rather than starting insulin to get the A1c down, I recommend you drill down on eliminating carbohydrates (starches and sugars) from your diet.  I believe that if you do so, you can get your A1c down below 7 when we recheck in 3 months.  We will recheck your cholesterol then too.        Follow up:  Office visit appointment with A1c and cholesterol panel (fasting) when you return in 3 months.    If you have any further questions or problems, please contact our office.      Sincerely,    Kody Wing MD/ Jenny Freedman CMA  Results for orders placed or performed in visit on 03/01/18   Hemoglobin A1c   Result Value Ref Range    Hemoglobin A1C 8.3 (H) 4.3 - 6.0 %               Enclosure: Lab Results

## 2018-03-03 NOTE — PROGRESS NOTES
The following letter pertains to your most recent diagnostic tests:    Unfortunately your hemoglobin A1c has increased significantly since last check.  Rather than starting insulin to get the A1c down, I recommend you drill down on eliminating carbohydrates (starches and sugars) from your diet.  I believe that if you do so, you can get your A1c down below 7 when we recheck in 3 months.  We will recheck your cholesterol then too.        Follow up:  Office visit appointment with A1c and cholesterol panel (fasting) when you return in 3 months.        Sincerely,    Dr. Wing

## 2018-03-07 ENCOUNTER — TELEPHONE (OUTPATIENT)
Dept: FAMILY MEDICINE | Facility: CLINIC | Age: 53
End: 2018-03-07

## 2018-03-07 DIAGNOSIS — E11.42 TYPE 2 DIABETES MELLITUS WITH DIABETIC POLYNEUROPATHY, WITHOUT LONG-TERM CURRENT USE OF INSULIN (H): Primary | ICD-10-CM

## 2018-03-07 NOTE — TELEPHONE ENCOUNTER
Meter and lancets also requested    Note from pharmacy - paperwork brought in by patient did not have all the necessary information to act as an Rx.    Mary Bloom, RT (R)

## 2018-03-07 NOTE — TELEPHONE ENCOUNTER
Fax from [facility] pharmacy requesting refill of BG test strips  Not active in patient's chart, but patient is known diabetic  Pended per pharmacy request, test 2-3 times daily    LOV 3-1-18 [place]    Needs diagnosis    RT Lily (R)

## 2018-06-05 NOTE — PROGRESS NOTES
SUBJECTIVE:   Ry Horner is a 52 year old male who presents to clinic today for the following health issues:      Diabetes Follow-up      Patient is checking blood sugars: rarely.  Results range from 130 to 90    Diabetic concerns: None     Symptoms of hypoglycemia (low blood sugar): none     Paresthesias (numbness or burning in feet) or sores: No     Date of last diabetic eye exam: over due; reminded patient again today     BP Readings from Last 2 Encounters:   06/06/18 (!) 170/105   03/01/18 106/70     Hemoglobin A1C (%)   Date Value   03/01/2018 8.3 (H)   10/10/2017 6.5 (H)     LDL Cholesterol Calculated (mg/dL)   Date Value   06/02/2017 116 (H)   04/04/2007     Cannot estimate LDL when triglyceride exceeds 400 mg/dL       Amount of exercise or physical activity: 4-5 days/week for an average of 45-60 minutes    Problems taking medications regularly: No    Medication side effects: none    Diet: regular (no restrictions)        Bracket for left leg amputation stump is too big and he needs a new one  Form filled out for patient and faxed     Problem list and histories reviewed & adjusted, as indicated.  Additional history: as documented    Patient Active Problem List   Diagnosis     Diabetes mellitus, type 2 (H)     Essential hypertension, benign     Hyperlipidemia LDL goal <100     Impotence of organic origin     Morbid obesity due to excess calories (H)     Open wound of right foot     Status post below knee amputation, right (H)     Past Surgical History:   Procedure Laterality Date     AMPUTATE LEG BELOW KNEE Right 9/1/2017    Procedure: AMPUTATE LEG BELOW KNEE;  RIGHT BELOW KNEE AMPUTATION ;  Surgeon: Nikolas Nascimento MD;  Location:  OR     APPENDECTOMY       APPLY WOUND VAC Right 3/2/2015    Procedure: APPLY WOUND VAC;  Surgeon: Milena Cevallos DPM, Pod;  Location:  OR     BIOPSY BONE FOOT Right 7/15/2016    Procedure: BIOPSY BONE FOOT;  Surgeon: Tim Douglas DPM;  Location:  OR      IRRIGATION AND DEBRIDEMENT FOOT, COMBINED Right 3/2/2015    Procedure: COMBINED IRRIGATION AND DEBRIDEMENT FOOT;  Surgeon: Milena Cevallos DPM, Pod;  Location: [facility] OR     IRRIGATION AND DEBRIDEMENT FOOT, COMBINED Right 7/15/2016    Procedure: COMBINED IRRIGATION AND DEBRIDEMENT FOOT;  Surgeon: Tim Douglas DPM;  Location: [facility] OR     IRRIGATION AND DEBRIDEMENT FOOT, COMBINED Right 7/20/2016    Procedure: COMBINED IRRIGATION AND DEBRIDEMENT FOOT;  Surgeon: Tim Douglas DPM;  Location: [facility] OR     ORTHOPEDIC SURGERY         Social History   Substance Use Topics     Smoking status: Former Smoker     Packs/day: 0.50     Years: 20.00     Types: Cigarettes     Smokeless tobacco: Never Used     Alcohol use Yes      Comment: occasional     Family History   Problem Relation Age of Onset     Genetic Disorder Other      Genetic Disorder Other      Psychotic Disorder Mother      DIABETES Father      Lung Cancer Father      Genetic Disorder Maternal Grandmother      Genetic Disorder Maternal Grandfather      Asthma Sister      C.A.D. No family hx of      Hypertension No family hx of      CEREBROVASCULAR DISEASE No family hx of      Breast Cancer No family hx of      Cancer - colorectal No family hx of      Prostate Cancer No family hx of      Alcohol/Drug No family hx of          Current Outpatient Prescriptions   Medication Sig Dispense Refill     aspirin 81 MG chewable tablet Take 1 tablet (81 mg) by mouth daily 108 tablet 3     blood glucose (NO BRAND SPECIFIED) lancets standard Use to test blood sugar 2-3 times daily or as directed. 100 each 11     blood glucose monitoring (NO BRAND SPECIFIED) meter device kit Use to test blood sugar 2-3 times daily or as directed. 1 kit 1     blood glucose monitoring (NO BRAND SPECIFIED) test strip Use to test blood sugars 2-3 times daily or as directed 100 strip 3     glimepiride (AMARYL) 4 MG tablet Take 1 tablet (4 mg) by mouth daily (Patient taking differently: Take 4 mg by  "mouth every morning (before breakfast) ) 90 tablet 3     hydrochlorothiazide 12.5 MG TABS tablet Take 1 tablet (12.5 mg) by mouth daily 90 tablet 3     ipratropium (ATROVENT) 0.06 % spray Spray 2 sprays into both nostrils 4 times daily as needed for rhinitis 3 Box 3     linagliptin (TRADJENTA) 5 MG TABS tablet Take 1 tablet (5 mg) by mouth daily 90 tablet 3     lisinopril (PRINIVIL/ZESTRIL) 20 MG tablet Take 0.5 tablets (10 mg) by mouth daily 90 tablet 3     metFORMIN (GLUCOPHAGE) 1000 MG tablet Take 1 tablet (1,000 mg) by mouth 2 times daily (with meals) 180 tablet 3     Allergies   Allergen Reactions     Pravastatin      \"sucked the life blood out of me\"     Asa [Aspirin] Nausea and Vomiting       Reviewed and updated as needed this visit by clinical staff       Reviewed and updated as needed this visit by Provider         ROS:  Constitutional, HEENT, cardiovascular, pulmonary, gi and gu systems are negative, except as otherwise noted.    OBJECTIVE:     /78  Pulse 104  Temp 97.6  F (36.4  C) (Tympanic)  Ht 6' (1.829 m)  Wt 249 lb (112.9 kg)  SpO2 97%  BMI 33.77 kg/m2  Body mass index is 33.77 kg/(m^2).  GENERAL: healthy, alert and no distress  NECK: no adenopathy, no asymmetry, masses, or scars and thyroid normal to palpation  RESP: lungs clear to auscultation - no rales, rhonchi or wheezes  CV: Heart with regular rate and rhythm.   ABDOMEN: soft, nontender, no hepatosplenomegaly, no masses and bowel sounds normal  MS: Right leg amputation, ne edema in left leg   NEURO: Normal strength and tone, mentation intact and speech normal  PSYCH: mentation appears normal, affect normal/bright  Diabetic foot exam: normal DP and PT pulses, no trophic changes or ulcerative lesions and normal sensory exam on left foot    Labs pending     ASSESSMENT/PLAN:       1. Type 2 diabetes mellitus with diabetic polyneuropathy, without long-term current use of insulin (H)  Hopefully A1c will improve with the 8lbs of weight " loss since last visit  Recheck A1c today   - Albumin Random Urine Quantitative with Creat Ratio    2. Hyperlipidemia LDL goal <100  If LDL not below 100, discussed adding Zetia; side effects and risks discussed; he has statin intoleranced   - Lipid panel reflex to direct LDL Non-fasting  - Hemoglobin A1c    3. Status post below knee amputation, right (H)  Form filled out for new part for prosthesis     4. Essential hypertension, benign  Well controlled on second check     5. Screening for diabetic peripheral neuropathy    - FOOT EXAM  NO CHARGE [15409.908]    6. Screening for HIV (human immunodeficiency virus)    - HIV Screening    FUTURE APPOINTMENTS:       - 3-6 months or sooner pending A1c/labs/symptoms     Kody Wing MD  [facility]

## 2018-06-06 ENCOUNTER — OFFICE VISIT (OUTPATIENT)
Dept: FAMILY MEDICINE | Facility: CLINIC | Age: 53
End: 2018-06-06
Payer: COMMERCIAL

## 2018-06-06 VITALS
BODY MASS INDEX: 33.72 KG/M2 | SYSTOLIC BLOOD PRESSURE: 128 MMHG | OXYGEN SATURATION: 97 % | WEIGHT: 249 LBS | HEIGHT: 72 IN | DIASTOLIC BLOOD PRESSURE: 78 MMHG | TEMPERATURE: 97.6 F | HEART RATE: 104 BPM

## 2018-06-06 DIAGNOSIS — Z11.4 SCREENING FOR HIV (HUMAN IMMUNODEFICIENCY VIRUS): ICD-10-CM

## 2018-06-06 DIAGNOSIS — Z89.511 STATUS POST BELOW KNEE AMPUTATION, RIGHT (H): ICD-10-CM

## 2018-06-06 DIAGNOSIS — E11.42 TYPE 2 DIABETES MELLITUS WITH DIABETIC POLYNEUROPATHY, WITHOUT LONG-TERM CURRENT USE OF INSULIN (H): Primary | ICD-10-CM

## 2018-06-06 DIAGNOSIS — I10 ESSENTIAL HYPERTENSION, BENIGN: ICD-10-CM

## 2018-06-06 DIAGNOSIS — Z13.89 SCREENING FOR DIABETIC PERIPHERAL NEUROPATHY: ICD-10-CM

## 2018-06-06 DIAGNOSIS — E78.5 HYPERLIPIDEMIA LDL GOAL <100: ICD-10-CM

## 2018-06-06 LAB
CHOLEST SERPL-MCNC: 309 MG/DL
CREAT UR-MCNC: 57 MG/DL
HBA1C MFR BLD: 12.2 % (ref 0–5.6)
HDLC SERPL-MCNC: 28 MG/DL
HIV 1+2 AB+HIV1 P24 AG SERPL QL IA: NONREACTIVE
LDLC SERPL CALC-MCNC: ABNORMAL MG/DL
LDLC SERPL DIRECT ASSAY-MCNC: 84 MG/DL
MICROALBUMIN UR-MCNC: 111 MG/L
MICROALBUMIN/CREAT UR: 195.08 MG/G CR (ref 0–17)
NONHDLC SERPL-MCNC: 281 MG/DL
TRIGL SERPL-MCNC: 1383 MG/DL

## 2018-06-06 PROCEDURE — 99207 C FOOT EXAM  NO CHARGE: CPT | Mod: 25 | Performed by: INTERNAL MEDICINE

## 2018-06-06 PROCEDURE — 87389 HIV-1 AG W/HIV-1&-2 AB AG IA: CPT | Performed by: INTERNAL MEDICINE

## 2018-06-06 PROCEDURE — 36415 COLL VENOUS BLD VENIPUNCTURE: CPT | Performed by: INTERNAL MEDICINE

## 2018-06-06 PROCEDURE — 82043 UR ALBUMIN QUANTITATIVE: CPT | Performed by: INTERNAL MEDICINE

## 2018-06-06 PROCEDURE — 83721 ASSAY OF BLOOD LIPOPROTEIN: CPT | Mod: 59 | Performed by: INTERNAL MEDICINE

## 2018-06-06 PROCEDURE — 80061 LIPID PANEL: CPT | Performed by: INTERNAL MEDICINE

## 2018-06-06 PROCEDURE — 83036 HEMOGLOBIN GLYCOSYLATED A1C: CPT | Performed by: INTERNAL MEDICINE

## 2018-06-06 PROCEDURE — 99214 OFFICE O/P EST MOD 30 MIN: CPT | Performed by: INTERNAL MEDICINE

## 2018-06-06 NOTE — MR AVS SNAPSHOT
"              After Visit Summary   6/6/2018    Ry Horner    MRN: [id number]           Patient Information     Date Of Birth          1965        Visit Information        Provider Department      6/6/2018 8:00 AM Kody Wing MD [facility]        Today's Diagnoses     Type 2 diabetes mellitus with diabetic polyneuropathy, without long-term current use of insulin (H)    -  1    Hyperlipidemia LDL goal <100        Status post below knee amputation, right (H)        Essential hypertension, benign        Screening for diabetic peripheral neuropathy        Screening for HIV (human immunodeficiency virus)           Follow-ups after your visit        Who to contact     If you have questions or need follow up information about today's clinic visit or your schedule please contact [facility] directly at [phone].  Normal or non-critical lab and imaging results will be communicated to you by MyChart, letter or phone within 4 business days after the clinic has received the results. If you do not hear from us within 7 days, please contact the clinic through MyChart or phone. If you have a critical or abnormal lab result, we will notify you by phone as soon as possible.  Submit refill requests through [employer] or call your pharmacy and they will forward the refill request to us. Please allow 3 business days for your refill to be completed.          Additional Information About Your Visit        [employer] Information     [employer] lets you send messages to your doctor, view your test results, renew your prescriptions, schedule appointments and more. To sign up, go to [email or web address] . Click on \"Log in\" on the left side of the screen, which will take you to the Welcome page. Then click on \"Sign up Now\" on the right side of the page.     You will be asked to enter the access code listed below, as well as some personal information. Please follow the directions to create your username and " password.     Your access code is: [email or web address]  Expires: 2018  8:48 AM     Your access code will  in 90 days. If you need help or a new code, please call your [place] clinic or [phone].        Care EveryWhere ID     This is your Care EveryWhere ID. This could be used by other organizations to access your [place] medical records  [id number]        Your Vitals Were     Pulse Temperature Height Pulse Oximetry BMI (Body Mass Index)       104 97.6  F (36.4  C) (Tympanic) 6' (1.829 m) 97% 33.77 kg/m2        Blood Pressure from Last 3 Encounters:   18 128/78   18 106/70   10/13/17 144/88    Weight from Last 3 Encounters:   18 249 lb (112.9 kg)   18 257 lb (116.6 kg)   10/13/17 235 lb (106.6 kg)              We Performed the Following     Albumin Random Urine Quantitative with Creat Ratio     FOOT EXAM  NO CHARGE [90435.114]     Hemoglobin A1c     HIV Screening     Lipid panel reflex to direct LDL Non-fasting          Today's Medication Changes          These changes are accurate as of 18 10:54 AM.  If you have any questions, ask your nurse or doctor.               These medicines have changed or have updated prescriptions.        Dose/Directions    glimepiride 4 MG tablet   Commonly known as:  AMARYL   This may have changed:  when to take this   Used for:  Type 2 diabetes mellitus with diabetic polyneuropathy, without long-term current use of insulin (H)        Dose:  4 mg   Take 1 tablet (4 mg) by mouth daily   Quantity:  90 tablet   Refills:  3                Primary Care Provider Office Phone # Fax #    Kody Wing [phone] [address] [facility] [address]  [facility] [address]        Equal Access to Services     GILLES SALCIDO : Vania George, geraldine pennington, charlene duarte. So [facility] [phone].    ATENCIÓN: Si habla español, tiene a fallon disposición servicios gratuitos de asistencia  lingüísticaMaribel Yung al [phone].    We comply with applicable federal civil rights laws and [place] laws. We do not discriminate on the basis of race, color, national origin, age, disability, sex, sexual orientation, or gender identity.            Thank you!     Thank you for choosing [facility]  for your care. Our goal is always to provide you with excellent care. Hearing back from our patients is one way we can continue to improve our services. Please take a few minutes to complete the written survey that you may receive in the mail after your visit with us. Thank you!             Your Updated Medication List - Protect others around you: Learn how to safely use, store and throw away your medicines at www.disposemymeds.org.          This list is accurate as of 6/6/18 10:54 AM.  Always use your most recent med list.                   Brand Name Dispense Instructions for use Diagnosis    aspirin 81 MG chewable tablet     108 tablet    Take 1 tablet (81 mg) by mouth daily    Type 2 diabetes mellitus with diabetic polyneuropathy, without long-term current use of insulin (H)       blood glucose lancets standard    no brand specified    100 each    Use to test blood sugar 2-3 times daily or as directed.    Type 2 diabetes mellitus with diabetic polyneuropathy, without long-term current use of insulin (H)       blood glucose monitoring meter device kit    no brand specified    1 kit    Use to test blood sugar 2-3 times daily or as directed.    Type 2 diabetes mellitus with diabetic polyneuropathy, without long-term current use of insulin (H)       blood glucose monitoring test strip    no brand specified    100 strip    Use to test blood sugars 2-3 times daily or as directed    Type 2 diabetes mellitus with diabetic polyneuropathy, without long-term current use of insulin (H)       glimepiride 4 MG tablet    AMARYL    90 tablet    Take 1 tablet (4 mg) by mouth daily    Type 2 diabetes mellitus with  diabetic polyneuropathy, without long-term current use of insulin (H)       hydrochlorothiazide 12.5 MG Tabs tablet     90 tablet    Take 1 tablet (12.5 mg) by mouth daily    Benign essential hypertension       ipratropium 0.06 % spray    ATROVENT    3 Box    Spray 2 sprays into both nostrils 4 times daily as needed for rhinitis    Runny nose       linagliptin 5 MG Tabs tablet    TRADJENTA    90 tablet    Take 1 tablet (5 mg) by mouth daily    Type 2 diabetes mellitus with diabetic polyneuropathy, without long-term current use of insulin (H)       lisinopril 20 MG tablet    PRINIVIL/ZESTRIL    90 tablet    Take 0.5 tablets (10 mg) by mouth daily    Essential hypertension, benign       metFORMIN 1000 MG tablet    GLUCOPHAGE    180 tablet    Take 1 tablet (1,000 mg) by mouth 2 times daily (with meals)    Type 2 diabetes mellitus with diabetic polyneuropathy, without long-term current use of insulin (H)

## 2018-06-06 NOTE — LETTER
[place] [address] [facility]  [address]  [place]  [address]  Tel: [phone]    June 11, 2018    Ry SUE Horner  [address] [facility] [address]        Dear  Horner,    The following letter pertains to your most recent diagnostic tests:    As we discussed by phone, the diabetes blood test is much worse than last check.  As such, I think he should stop taking glimepiride (Amaryl) and start injecting insulin at bedtime.  I have sent a prescription for the insulin pen to your pharmacy.  I recommend starting with the dose of 8 units at bedtime.  Please record your a.m. fasting blood sugars in a log and return to see me after you have been injecting insulin for about 3-4 weeks to discuss the results and to adjust the insulin dose.  Your triglycerides have become markedly elevated again, but I suspect they will improve once we get better control of your blood sugars as we discussed by phone.  When you return to see me after 3 or 4 weeks, return fasting so that we can recheck your blood triglycerides at that time.      Sincerely,    Dr. Wing / ben    Resulted Orders   HIV Screening   Result Value Ref Range    HIV Antigen Antibody Combo Nonreactive NR^Nonreactive          Comment:      HIV-1 p24 Ag & HIV-1/HIV-2 Ab Not Detected   Lipid panel reflex to direct LDL Non-fasting   Result Value Ref Range    Cholesterol 309 (H) <200 mg/dL      Comment:      Desirable:       <200 mg/dl    Triglycerides 1383 (H) <150 mg/dL      Comment:      Borderline high:  150-199 mg/dl  High:             200-499 mg/dl  Very high:       >499 mg/dl      HDL Cholesterol 28 (L) >39 mg/dL    LDL Cholesterol Calculated  <100 mg/dL     Cannot estimate LDL when triglyceride exceeds 400 mg/dL    Non HDL Cholesterol 281 (H) <130 mg/dL      Comment:      Above Desirable:  130-159 mg/dl  Borderline high:  160-189 mg/dl  High:             190-219 mg/dl  Very high:       >219 mg/dl     Albumin Random Urine Quantitative with Creat Ratio    Result Value Ref Range    Creatinine Urine 57 mg/dL    Albumin Urine mg/L 111 mg/L    Albumin Urine mg/g Cr 195.08 (H) 0 - 17 mg/g Cr   Hemoglobin A1c   Result Value Ref Range    Hemoglobin A1C 12.2 (H) 0 - 5.6 %      Comment:      Normal <5.7% Prediabetes 5.7-6.4%  Diabetes 6.5% or higher - adopted from ADA   consensus guidelines.  Results confirmed by repeat test     LDL cholesterol direct   Result Value Ref Range    LDL Cholesterol Direct 84 <100 mg/dL      Comment:      Desirable:       <100 mg/dl

## 2018-06-06 NOTE — NURSING NOTE
Chief Complaint   Patient presents with     Diabetes        Initial BP (!) 170/105 (BP Location: Right arm, Patient Position: Chair, Cuff Size: Adult Large)  Pulse 104  Temp 97.6  F (36.4  C) (Tympanic)  Ht 6' (1.829 m)  Wt 249 lb (112.9 kg)  SpO2 97%  BMI 33.77 kg/m2 Estimated body mass index is 33.77 kg/(m^2) as calculated from the following:    Height as of this encounter: 6' (1.829 m).    Weight as of this encounter: 249 lb (112.9 kg)..    BP completed using cuff size: large  MEDICATIONS REVIEWED  SOCIAL AND FAMILY HX REVIEWED  Doris Mcmullen CMA

## 2018-06-08 ENCOUNTER — NURSE TRIAGE (OUTPATIENT)
Dept: NURSING | Facility: CLINIC | Age: 53
End: 2018-06-08

## 2018-06-08 NOTE — PROGRESS NOTES
The following letter pertains to your most recent diagnostic tests:    As we discussed by phone, the diabetes blood test is much worse than last check.  As such, I think he should stop taking glimepiride (Amaryl) and start injecting insulin at bedtime.  I have sent a prescription for the insulin pen to your pharmacy.  I recommend starting with the dose of 8 units at bedtime.  Please record your a.m. fasting blood sugars in a log and return to see me after you have been injecting insulin for about 3-4 weeks to discuss the results and to adjust the insulin dose.  Your triglycerides have become markedly elevated again, but I suspect they will improve once we get better control of your blood sugars as we discussed by phone.  When you return to see me after 3 or 4 weeks, return fasting so that we can recheck your blood triglycerides at that time.      Sincerely,    Dr. Wing

## 2018-06-08 NOTE — TELEPHONE ENCOUNTER
"  [facility]NA Triage Call  Presenting Problem:[facility] calling regarding:   insulin glargine (LANTUS SOLOSTAR) 100 UNIT/ML pen 15 mL 1 6/8/2018     Sig - Route: Inject 8 Units Subcutaneous At Bedtime - Subcutaneous      His insurance will not cover it, but will cover basaglar, it's the exact same thing.\"  Patient Recommendations/Teaching:Ok per FNA protocol.  Megan Son RN [facility]            "

## 2018-06-09 ENCOUNTER — NURSE TRIAGE (OUTPATIENT)
Dept: NURSING | Facility: CLINIC | Age: 53
End: 2018-06-09

## 2018-06-09 ENCOUNTER — TELEPHONE (OUTPATIENT)
Dept: FAMILY MEDICINE | Facility: CLINIC | Age: 53
End: 2018-06-09

## 2018-06-09 NOTE — TELEPHONE ENCOUNTER
Pharmacist, Cassie[facility] calling to state patient needs prescription for BD pen needles amando to use with basaglar.  FNA gave verbal order for 1 box, no refills.

## 2018-06-09 NOTE — TELEPHONE ENCOUNTER
Pharmacist, Cassie, [facility] calling to state patient needs prescription for BD pen needles amando to use with basaglar.  FNA gave verbal order for 1 box, no refills and routed to PCP.

## 2018-07-02 DIAGNOSIS — E11.42 TYPE 2 DIABETES MELLITUS WITH DIABETIC POLYNEUROPATHY, WITHOUT LONG-TERM CURRENT USE OF INSULIN (H): ICD-10-CM

## 2018-07-02 DIAGNOSIS — R09.89 RUNNY NOSE: ICD-10-CM

## 2018-07-02 NOTE — TELEPHONE ENCOUNTER
"Pending Prescriptions:                       Disp   Refills    ipratropium (ATROVENT) 0.06 % spray [Phar*45 mL  3            Sig: INSTILL TWO SPRAYS INTO BOTH NOSTRILS FOUR TIMES           A DAY AS NEEDED FOR RHINITIS    ACCU-CHEK JAYNA PLUS test strip [Pharmacy*100 ea*3            Sig: TEST BLOOD SUGARS 2 TO 3 TIMES DAILY OR AS           DIRECTED    Ipratropium  Last Written Prescription Date:  03/01/2018  Last Fill Quantity: 3box,  # refills: 3   Last office visit: 6/6/2018 with prescribing provider:     Future Office Visit:      Accu-Chek Test strips  Last Written Prescription Date:  03/07/2018  Last Fill Quantity: 100strips,  # refills: 3   Last office visit: 6/6/2018 with prescribing provider:     Future Office Visit:      Requested Prescriptions   Pending Prescriptions Disp Refills     ipratropium (ATROVENT) 0.06 % spray [Pharmacy Med Name: IPRATROPIUM BROMIDE 0.06% SOLN] 45 mL 3     Sig: INSTILL TWO SPRAYS INTO BOTH NOSTRILS FOUR TIMES A DAY AS NEEDED FOR RHINITIS    There is no refill protocol information for this order        ACCU-CHEK JAYNA PLUS test strip [Pharmacy Med Name: ACCU-CHEK JAYNA PLUS  STRP] 100 each 3     Sig: TEST BLOOD SUGARS 2 TO 3 TIMES DAILY OR AS DIRECTED    Diabetic Supplies Protocol Passed    7/2/2018  3:53 AM       Passed - Patient is 18 years of age or older       Passed - Recent (6 mo) or future (30 days) visit within the authorizing provider's specialty    Patient had office visit in the last 6 months or has a visit in the next 30 days with authorizing provider.  See \"Patient Info\" tab in inbasket, or \"Choose Columns\" in Meds & Orders section of the refill encounter.              "

## 2018-07-03 RX ORDER — BLOOD SUGAR DIAGNOSTIC
STRIP MISCELLANEOUS
Qty: 100 EACH | Refills: 3 | Status: SHIPPED | OUTPATIENT
Start: 2018-07-03 | End: 2020-02-27

## 2018-07-03 RX ORDER — IPRATROPIUM BROMIDE 42 UG/1
SPRAY, METERED NASAL
Start: 2018-07-03

## 2018-07-03 NOTE — TELEPHONE ENCOUNTER
Prescription approved per [facility] [place].- test strips.    Atrovent duplicate.      Nicole Hawthorne RN

## 2018-07-05 RX ORDER — IPRATROPIUM BROMIDE 42 UG/1
2 SPRAY, METERED NASAL 4 TIMES DAILY PRN
Qty: 3 BOX | Refills: 2 | Status: SHIPPED | OUTPATIENT
Start: 2018-07-05 | End: 2018-10-03

## 2018-07-05 NOTE — TELEPHONE ENCOUNTER
[facility] called saying he is out of refills, they have been  All filled    Please send a New Rx for the Ipratropium Spray    Thank you

## 2018-07-05 NOTE — TELEPHONE ENCOUNTER
Sent Ipratropium per Rx approved by PCP before (per below, they are needing a new Rx)  Yvette WATKINS RN

## 2018-07-12 ENCOUNTER — TELEPHONE (OUTPATIENT)
Dept: FAMILY MEDICINE | Facility: CLINIC | Age: 53
End: 2018-07-12

## 2018-07-12 NOTE — TELEPHONE ENCOUNTER
Reason for call:  Patient reporting a symptom    Symptom or request: High Blood Sugar   400- Taken this morning    States he feels fine ( no symptoms)       Duration (how long have symptoms been present): 1 day    Have you been treated for this before? Yes    Additional comments:     Phone Number patient can be reached at:  Home number on file [phone] (home)    Best Time:  any    Can we leave a detailed message on this number:  YES    Call taken on 7/12/2018 at 11:07 AM by Moira Smart

## 2018-07-12 NOTE — TELEPHONE ENCOUNTER
PCP,    Pt reports FASTING BG this morning of 455.  Recently has been having fasting levels of 350-450, today is highest yet.  Pt was started on 8 units of Lantus 6/8/18 has since titrated to 12 units, then 16 units.  Started 16 units 3 days ago.  Advised pt DO NOT titrate insulin on own, especially long acting insulin at night can be very dangerous.      After 3 days of 16 units in evening, pt still at 455.  Pt will plan to continue 16 units, but PCP please advise if this should be changed.     Pt reports some polyuria, polydipsia, denies all other s/sx hyperglycemia.   Advised OV today due to rapidly increasing BG. Pt refused due to transportation.  Pt will be in tomorrow morning at 830 for team Sergo).  Please advise if pt should be moved to your schedule.      Lab Results   Component Value Date     09/03/2017     09/02/2017     08/29/2017     08/28/2017     07/10/2017     06/02/2017    GLC 95 07/27/2016     07/15/2016     07/14/2016     04/04/2007    @ 03/25/2005           Lab Results   Component Value Date    A1C 12.2 06/06/2018    A1C 8.3 03/01/2018    A1C 6.5 10/10/2017    A1C 7.6 08/28/2017    A1C 9.4 06/02/2017         Rere Callaway RN

## 2018-07-13 ENCOUNTER — OFFICE VISIT (OUTPATIENT)
Dept: FAMILY MEDICINE | Facility: CLINIC | Age: 53
End: 2018-07-13
Payer: COMMERCIAL

## 2018-07-13 VITALS
HEART RATE: 112 BPM | BODY MASS INDEX: 32.95 KG/M2 | OXYGEN SATURATION: 99 % | DIASTOLIC BLOOD PRESSURE: 81 MMHG | HEIGHT: 72 IN | TEMPERATURE: 97.9 F | WEIGHT: 243.3 LBS | SYSTOLIC BLOOD PRESSURE: 117 MMHG

## 2018-07-13 DIAGNOSIS — E11.42 TYPE 2 DIABETES MELLITUS WITH DIABETIC POLYNEUROPATHY, WITH LONG-TERM CURRENT USE OF INSULIN (H): Primary | ICD-10-CM

## 2018-07-13 DIAGNOSIS — Z79.4 TYPE 2 DIABETES MELLITUS WITH DIABETIC POLYNEUROPATHY, WITH LONG-TERM CURRENT USE OF INSULIN (H): Primary | ICD-10-CM

## 2018-07-13 PROCEDURE — 99213 OFFICE O/P EST LOW 20 MIN: CPT | Performed by: INTERNAL MEDICINE

## 2018-07-13 RX ORDER — GLIPIZIDE 5 MG/1
5 TABLET ORAL
Qty: 180 TABLET | Refills: 3 | Status: SHIPPED | OUTPATIENT
Start: 2018-07-13 | End: 2019-06-21

## 2018-07-13 NOTE — TELEPHONE ENCOUNTER
Spoke with patient. States he was already informed of below message from PCP when he stopped by at the  this morning  Yvette WATKINS RN

## 2018-07-13 NOTE — TELEPHONE ENCOUNTER
Called patient to offer 1330 with PCP:     Dr. Sow pt is wondering if you require fasting labs? He is willing to come for Lab Only at 0830 and then return for appt at 1330. Please advise and order appropriate labs as necessary.     Thank you,   Grace SHAY RN        Home BG reading this morning 385

## 2018-07-13 NOTE — PROGRESS NOTES
SUBJECTIVE:   Ry Horner is a 52 year old male who presents to clinic today for the following health issues:      Diabetes Follow-up    Patient is checking blood sugars: once daily.  Results are as follows:         am -     Diabetic concerns: None and blood sugar frequently over 200     Symptoms of hypoglycemia (low blood sugar): none     Paresthesias (numbness or burning in feet) or sores: No     Date of last diabetic eye exam: due now, will schedule    Diabetes Management Resources    Hyperlipidemia Follow-Up      Rate your low fat/cholesterol diet?: good    Taking statin?  No    Other lipid medications/supplements?:  none    Hypertension Follow-up      Outpatient blood pressures are not being checked.    Low Salt Diet: no added salt    BP Readings from Last 2 Encounters:   06/06/18 128/78   03/01/18 106/70     Hemoglobin A1C (%)   Date Value   06/06/2018 12.2 (H)   03/01/2018 8.3 (H)     LDL Cholesterol Calculated (mg/dL)   Date Value   06/06/2018     Cannot estimate LDL when triglyceride exceeds 400 mg/dL   06/02/2017 116 (H)     LDL Cholesterol Direct (mg/dL)   Date Value   06/06/2018 84       Amount of exercise or physical activity: decreased with Right lower leg brace    Problems taking medications regularly: No    Medication side effects: none    Diet: low salt        AM fasting sugars remain > 400 despite increasing lantus insulin to 16 units  He wants to avoid with meal insulin injections  Has been trying to cut down on carbs      Problem list and histories reviewed & adjusted, as indicated.  Additional history: as documented    Patient Active Problem List   Diagnosis     Diabetes mellitus, type 2 (H)     Essential hypertension, benign     Hyperlipidemia LDL goal <100     Impotence of organic origin     Morbid obesity due to excess calories (H)     Open wound of right foot     Status post below knee amputation, right (H)     Past Surgical History:   Procedure Laterality Date     AMPUTATE LEG BELOW KNEE  Right 9/1/2017    Procedure: AMPUTATE LEG BELOW KNEE;  RIGHT BELOW KNEE AMPUTATION ;  Surgeon: Nikolas Nascimento MD;  Location: [facility]     APPENDECTOMY       APPLY WOUND VAC Right 3/2/2015    Procedure: APPLY WOUND VAC;  Surgeon: Milena Cevallos DPM, Pod;  Location: [facility] OR     BIOPSY BONE FOOT Right 7/15/2016    Procedure: BIOPSY BONE FOOT;  Surgeon: Tim Douglas DPM;  Location: [facility]     IRRIGATION AND DEBRIDEMENT FOOT, COMBINED Right 3/2/2015    Procedure: COMBINED IRRIGATION AND DEBRIDEMENT FOOT;  Surgeon: Milena Cevallos DPM, Pod;  Location: [facility]     IRRIGATION AND DEBRIDEMENT FOOT, COMBINED Right 7/15/2016    Procedure: COMBINED IRRIGATION AND DEBRIDEMENT FOOT;  Surgeon: Tim Douglas DPM;  Location: [facility]     IRRIGATION AND DEBRIDEMENT FOOT, COMBINED Right 7/20/2016    Procedure: COMBINED IRRIGATION AND DEBRIDEMENT FOOT;  Surgeon: Tim Douglas DPM;  Location: [facility]     ORTHOPEDIC SURGERY         Social History   Substance Use Topics     Smoking status: Former Smoker     Packs/day: 0.50     Years: 20.00     Types: Cigarettes     Smokeless tobacco: Never Used     Alcohol use Yes      Comment: 3-4 drinks per month     Family History   Problem Relation Age of Onset     Genetic Disorder Other      Genetic Disorder Other      Psychotic Disorder Mother      Diabetes Father      Lung Cancer Father      Genetic Disorder Maternal Grandmother      Genetic Disorder Maternal Grandfather      Asthma Sister      C.A.D. No family hx of      Hypertension No family hx of      Cerebrovascular Disease No family hx of      Breast Cancer No family hx of      Cancer - colorectal No family hx of      Prostate Cancer No family hx of      Alcohol/Drug No family hx of          Current Outpatient Prescriptions   Medication Sig Dispense Refill     ACCU-CHEK JAYNA PLUS test strip TEST BLOOD SUGARS 2 TO 3 TIMES DAILY OR AS DIRECTED 100 each 3     aspirin 81 MG chewable tablet Take 1 tablet (81 mg) by mouth daily  "108 tablet 3     blood glucose (NO BRAND SPECIFIED) lancets standard Use to test blood sugar 2-3 times daily or as directed. 100 each 11     blood glucose monitoring (NO BRAND SPECIFIED) meter device kit Use to test blood sugar 2-3 times daily or as directed. 1 kit 1     glipiZIDE (GLUCOTROL) 5 MG tablet Take 1 tablet (5 mg) by mouth 2 times daily (before meals) 180 tablet 3     hydrochlorothiazide 12.5 MG TABS tablet Take 1 tablet (12.5 mg) by mouth daily 90 tablet 3     insulin glargine (LANTUS SOLOSTAR) 100 UNIT/ML pen Inject 8 Units Subcutaneous At Bedtime 15 mL 1     ipratropium (ATROVENT) 0.06 % spray Spray 2 sprays into both nostrils 4 times daily as needed for rhinitis 3 Box 2     linagliptin (TRADJENTA) 5 MG TABS tablet Take 1 tablet (5 mg) by mouth daily 90 tablet 3     lisinopril (PRINIVIL/ZESTRIL) 20 MG tablet Take 0.5 tablets (10 mg) by mouth daily 90 tablet 3     metFORMIN (GLUCOPHAGE) 1000 MG tablet Take 1 tablet (1,000 mg) by mouth 2 times daily (with meals) 180 tablet 3     Allergies   Allergen Reactions     Pravastatin      \"sucked the life blood out of me\"     Asa [Aspirin] Nausea and Vomiting       Reviewed and updated as needed this visit by clinical staff       Reviewed and updated as needed this visit by Provider         ROS:      OBJECTIVE:     /81 (BP Location: Left arm, Cuff Size: Adult Large)  Pulse 112  Temp 97.9  F (36.6  C) (Oral)  Ht 6' (1.829 m)  Wt 243 lb 4.8 oz (110.4 kg)  SpO2 99%  BMI 33 kg/m2  Body mass index is 33 kg/(m^2).  GEN:  Well appearing, no distress        ASSESSMENT/PLAN:         ICD-10-CM    1. Type 2 diabetes mellitus with diabetic polyneuropathy, with long-term current use of insulin (H) E11.42 glipiZIDE (GLUCOTROL) 5 MG tablet    Z79.4      Discussed with meal short acting insulin, he declined  Can try glipizide to help with post prandial hyperglycemia, risks and side effects discussed  Also increase lantus every 3 days until goal of AM sugars < 150 " achieved  He demonstrated understanding on how to safely do so  He will contact me by MyChart if he has symptoms of hypoglycemia or blood sugar readings less than 90     FUTURE APPOINTMENTS:       - Follow-up visit in 4 weeks or sooner pending symptom and sugar readings     Kody Wing MD  [facility]    Total face to face contact time was greater than 20 minutes of which more than 50% of this time was spent counseling and coordinating care regarding the above topics.

## 2018-07-13 NOTE — MR AVS SNAPSHOT
After Visit Summary   7/13/2018    Ry Horner    MRN: [id number]           Patient Information     Date Of Birth          1965        Visit Information        Provider Department      7/13/2018 1:30 PM Kody Wing MD [facility]        Today's Diagnoses     Type 2 diabetes mellitus with diabetic polyneuropathy, with long-term current use of insulin (H)    -  1       Follow-ups after your visit        Your next 10 appointments already scheduled     Sep 07, 2018  4:00 PM CDT   Office Visit with Kody Wing MD   [facility]    [address] [facility] [id number] [phone]           Bring a current list of meds and any records pertaining to this visit. For Physicals, please bring immunization records and any forms needing to be filled out. Please arrive 10 minutes early to complete paperwork.              Who to contact     If you have questions or need follow up information about today's clinic visit or your schedule please contact [facility] directly at [phone].  Normal or non-critical lab and imaging results will be communicated to you by [employer], letter or phone within 4 business days after the clinic has received the results. If you do not hear from us within 7 days, please contact the clinic through [employer] or phone. If you have a critical or abnormal lab result, we will notify you by phone as soon as possible.  Submit refill requests through [employer] or call your pharmacy and they will forward the refill request to us. Please allow 3 business days for your refill to be completed.          Additional Information About Your Visit        MyChart Information     [employer] gives you secure access to your electronic health record. If you see a primary care provider, you can also send messages to your care team and make appointments. If you have questions, please call your primary care clinic.  If you do not have a  primary care provider, please call [phone] and they will assist you.        Care EveryWhere ID     This is your Care EveryWhere ID. This could be used by other organizations to access your [facility] medical records  [id number]        Your Vitals Were     Pulse Temperature Height Pulse Oximetry BMI (Body Mass Index)       112 97.9  F (36.6  C) (Oral) 6' (1.829 m) 99% 33 kg/m2        Blood Pressure from Last 3 Encounters:   07/13/18 117/81   06/06/18 128/78   03/01/18 106/70    Weight from Last 3 Encounters:   07/13/18 243 lb 4.8 oz (110.4 kg)   06/06/18 249 lb (112.9 kg)   03/01/18 257 lb (116.6 kg)              Today, you had the following     No orders found for display         Today's Medication Changes          These changes are accurate as of 7/13/18  2:05 PM.  If you have any questions, ask your nurse or doctor.               Start taking these medicines.        Dose/Directions    glipiZIDE 5 MG tablet   Commonly known as:  GLUCOTROL   Used for:  Type 2 diabetes mellitus with diabetic polyneuropathy, with long-term current use of insulin (H)   Started by:  Kody Wing MD        Dose:  5 mg   Take 1 tablet (5 mg) by mouth 2 times daily (before meals)   Quantity:  180 tablet   Refills:  3            Where to get your medicines      These medications were sent to [place], [place] - [facility] [address] [facility]  [address] [facility] [address]     Phone:  [phone]     glipiZIDE 5 MG tablet                Primary Care Provider Office Phone # Fax #    Kody Wing [phone] [address]  [facility] [address]        Equal Access to Services     GILLES SALCIDO : Vania George, geraldine pennington, qaaubreyta kaalmada phyllis, charlene moss. So [facility] [phone].    ATENCIÓN: Si habla español, tiene a fallon disposición servicios gratuitos de asistencia lingüística. Llame al [phone].    We comply with  applicable federal civil rights laws and [place] laws. We do not discriminate on the basis of race, color, national origin, age, disability, sex, sexual orientation, or gender identity.            Thank you!     Thank you for choosing [facility]  for your care. Our goal is always to provide you with excellent care. Hearing back from our patients is one way we can continue to improve our services. Please take a few minutes to complete the written survey that you may receive in the mail after your visit with us. Thank you!             Your Updated Medication List - Protect others around you: Learn how to safely use, store and throw away your medicines at www.disposemymeds.org.          This list is accurate as of 7/13/18  2:05 PM.  Always use your most recent med list.                   Brand Name Dispense Instructions for use Diagnosis    ACCU-CHEK JAYNA PLUS test strip   Generic drug:  blood glucose monitoring     100 each    TEST BLOOD SUGARS 2 TO 3 TIMES DAILY OR AS DIRECTED    Type 2 diabetes mellitus with diabetic polyneuropathy, without long-term current use of insulin (H)       aspirin 81 MG chewable tablet     108 tablet    Take 1 tablet (81 mg) by mouth daily    Type 2 diabetes mellitus with diabetic polyneuropathy, without long-term current use of insulin (H)       blood glucose lancets standard    no brand specified    100 each    Use to test blood sugar 2-3 times daily or as directed.    Type 2 diabetes mellitus with diabetic polyneuropathy, without long-term current use of insulin (H)       blood glucose monitoring meter device kit    no brand specified    1 kit    Use to test blood sugar 2-3 times daily or as directed.    Type 2 diabetes mellitus with diabetic polyneuropathy, without long-term current use of insulin (H)       glipiZIDE 5 MG tablet    GLUCOTROL    180 tablet    Take 1 tablet (5 mg) by mouth 2 times daily (before meals)    Type 2 diabetes mellitus with diabetic  polyneuropathy, with long-term current use of insulin (H)       hydrochlorothiazide 12.5 MG Tabs tablet     90 tablet    Take 1 tablet (12.5 mg) by mouth daily    Benign essential hypertension       insulin glargine 100 UNIT/ML injection    LANTUS SOLOSTAR    15 mL    Inject 8 Units Subcutaneous At Bedtime    Type 2 diabetes mellitus with diabetic polyneuropathy, without long-term current use of insulin (H)       ipratropium 0.06 % spray    ATROVENT    3 Box    Spray 2 sprays into both nostrils 4 times daily as needed for rhinitis    Runny nose       linagliptin 5 MG Tabs tablet    TRADJENTA    90 tablet    Take 1 tablet (5 mg) by mouth daily    Type 2 diabetes mellitus with diabetic polyneuropathy, without long-term current use of insulin (H)       lisinopril 20 MG tablet    PRINIVIL/ZESTRIL    90 tablet    Take 0.5 tablets (10 mg) by mouth daily    Essential hypertension, benign       metFORMIN 1000 MG tablet    GLUCOPHAGE    180 tablet    Take 1 tablet (1,000 mg) by mouth 2 times daily (with meals)    Type 2 diabetes mellitus with diabetic polyneuropathy, without long-term current use of insulin (H)

## 2018-08-09 DIAGNOSIS — E11.42 TYPE 2 DIABETES MELLITUS WITH DIABETIC POLYNEUROPATHY, WITHOUT LONG-TERM CURRENT USE OF INSULIN (H): ICD-10-CM

## 2018-08-09 NOTE — TELEPHONE ENCOUNTER
Last Written Prescription Date:  6/02/17  Last Fill Quantity: 180 tablet,  # refills: 3   Last office visit: 7/13/2018 with prescribing provider:  [facility]   Future Office Visit:   Next 5 appointments (look out 90 days)     Sep 07, 2018  4:00 PM CDT   Office Visit with Kody Wing MD   [facility]    [address] [facility] [address] [phone]                 Requested Prescriptions   Pending Prescriptions Disp Refills     metFORMIN (GLUCOPHAGE) 1000 MG tablet [Pharmacy Med Name: METFORMIN HCL 1000MG TABS] 180 tablet 3     Sig: TAKE ONE TABLET BY MOUTH TWICE A DAY WITH MEALS    Biguanide Agents Passed    8/9/2018  3:43 AM       Passed - Blood pressure less than 140/90 in past 6 months    BP Readings from Last 3 Encounters:   07/13/18 117/81   06/06/18 128/78   03/01/18 106/70                Passed - Patient has documented LDL within the past 12 mos.    Recent Labs   Lab Test  06/06/18   0855   LDL  Cannot estimate LDL when triglyceride exceeds 400 mg/dL  84            Passed - Patient has had a Microalbumin in the past 12 mos.    Recent Labs   Lab Test  06/06/18   0856   MICROL  111   UMALCR  195.08*            Passed - Patient is age 10 or older       Passed - Patient has documented A1c within the specified period of time.    If HgbA1C is 8 or greater, it needs to be on file within the past 3 months.  If less than 8, must be on file within the past 6 months.     Recent Labs   Lab Test  06/06/18   0855   A1C  12.2*            Passed - Patient's CR is NOT>1.4 OR Patient's EGFR is NOT<45 within past 12 mos.    Recent Labs   Lab Test  09/03/17   0630   GFRESTIMATED  59*   GFRESTBLACK  72       Recent Labs   Lab Test  09/03/17   0630   CR  1.28*            Passed - Patient does NOT have a diagnosis of CHF.       Passed - Recent (6 mo) or future (30 days) visit within the authorizing provider's specialty    Patient had office visit in the last 6 months or has a visit  "in the next 30 days with authorizing provider or within the authorizing provider's specialty.  See \"Patient Info\" tab in inbasket, or \"Choose Columns\" in Meds & Orders section of the refill encounter.              "

## 2018-08-31 DIAGNOSIS — E11.42 TYPE 2 DIABETES MELLITUS WITH DIABETIC POLYNEUROPATHY, WITHOUT LONG-TERM CURRENT USE OF INSULIN (H): ICD-10-CM

## 2018-08-31 RX ORDER — PEN NEEDLE, DIABETIC 32GX 5/32"
NEEDLE, DISPOSABLE MISCELLANEOUS
Qty: 100 EACH | Refills: 0 | Status: SHIPPED | OUTPATIENT
Start: 2018-08-31 | End: 2018-12-02

## 2018-08-31 RX ORDER — LINAGLIPTIN 5 MG/1
TABLET, FILM COATED ORAL
Qty: 90 TABLET | Refills: 3 | Status: SHIPPED | OUTPATIENT
Start: 2018-08-31 | End: 2019-08-25

## 2018-08-31 RX ORDER — PEN NEEDLE, DIABETIC 32GX 5/32"
NEEDLE, DISPOSABLE MISCELLANEOUS
Refills: 0 | COMMUNITY
Start: 2018-06-09 | End: 2019-07-11

## 2018-08-31 NOTE — TELEPHONE ENCOUNTER
Routing refill request to provider for review/approval because:  Labs out of range:  Creatinine    Lab Test  09/03/17   0630    06/23/16   0729   CR  1.28*   < >   --    CREAT   --    --   0.9    < > = values in this interval not displayed       Please review and authorize if appropriate,     Thank you,   Grace TABARES RN

## 2018-08-31 NOTE — TELEPHONE ENCOUNTER
"Tradjenta 5 mg    Last Written Prescription Date:  09/07/17  Last Fill Quantity: 90 tablets,  # refills: 3   Last office visit: 7/13/2018 with prescribing provider:  [facility]   Future Office Visit:   Next 5 appointments (look out 90 days)     Sep 07, 2018  4:00 PM CDT   Office Visit with Kody Wing MD   [facility]    [address] [facility] [address]   [phone]                 BD Megan U/F 32 G x 4 mm  Medication is listed as historical on patient's med list    Last Written Prescription Date:  ?  Last Fill Quantity: ?,  # refills: ?   Last office visit: 7/13/2018 with prescribing provider:  [facility]   Future Office Visit:   Next 5 appointments (look out 90 days)     Sep 07, 2018  4:00 PM CDT   Office Visit with Kody Wing MD   [facility] ([facility]    [address] [facility] [address]   [phone]                 Requested Prescriptions   Pending Prescriptions Disp Refills     BD MEGAN U/F 32G X 4 MM insulin pen needle [Pharmacy Med Name: BD PEN NEEDLE MEGAN  32G X 4 MM MISC] 100 each 0     Sig: USE AS DIRECTED WITH [facility]    Diabetic Supplies Protocol Passed    8/31/2018  3:48 AM       Passed - Patient is 18 years of age or older       Passed - Recent (6 mo) or future (30 days) visit within the authorizing provider's specialty    Patient had office visit in the last 6 months or has a visit in the next 30 days with authorizing provider.  See \"Patient Info\" tab in inbasket, or \"Choose Columns\" in Meds & Orders section of the refill encounter.            TRADJENTA 5 MG TABS tablet [Pharmacy Med Name: TRADJENTA 5MG TABS] 90 tablet 3     Sig: TAKE ONE TABLET BY MOUTH EVERY DAY    DPP4 Inhibitors Protocol Failed    8/31/2018  3:48 AM       Failed - Normal serum creatinine in past 12 months    Recent Labs   Lab Test  09/03/17   0630   06/23/16   0729   CR  1.28*   < >   --    CREAT   --    --   0.9    < > = values in this interval " "not displayed.            Passed - Blood pressure less than 140/90 in past 6 months    BP Readings from Last 3 Encounters:   07/13/18 117/81   06/06/18 128/78   03/01/18 106/70                Passed - LDL on file in past 12 months    Recent Labs   Lab Test  06/06/18   0855   LDL  Cannot estimate LDL when triglyceride exceeds 400 mg/dL  84            Passed - Microalbumin on file in past 12 months    Recent Labs   Lab Test  06/06/18   0856   MICROL  111   UMALCR  195.08*            Passed - HgbA1C in past 3 or 6 months    If HgbA1C is 8 or greater, it needs to be on file within the past 3 months.  If less than 8, must be on file within the past 6 months.     Recent Labs   Lab Test  06/06/18   0855   A1C  12.2*            Passed - Patient is age 18 or older       Passed - Recent (6 mo) or future (30 days) visit within the authorizing provider's specialty    Patient had office visit in the last 6 months or has a visit in the next 30 days with authorizing provider.  See \"Patient Info\" tab in inbasket, or \"Choose Columns\" in Meds & Orders section of the refill encounter.              "

## 2018-09-07 ENCOUNTER — OFFICE VISIT (OUTPATIENT)
Dept: FAMILY MEDICINE | Facility: CLINIC | Age: 53
End: 2018-09-07
Payer: COMMERCIAL

## 2018-09-07 VITALS
WEIGHT: 255.1 LBS | HEART RATE: 88 BPM | SYSTOLIC BLOOD PRESSURE: 112 MMHG | BODY MASS INDEX: 34.55 KG/M2 | DIASTOLIC BLOOD PRESSURE: 64 MMHG | OXYGEN SATURATION: 97 % | HEIGHT: 72 IN | TEMPERATURE: 98.5 F

## 2018-09-07 DIAGNOSIS — Z89.511 STATUS POST BELOW KNEE AMPUTATION, RIGHT (H): ICD-10-CM

## 2018-09-07 DIAGNOSIS — E78.5 HYPERLIPIDEMIA LDL GOAL <100: ICD-10-CM

## 2018-09-07 DIAGNOSIS — I10 ESSENTIAL HYPERTENSION, BENIGN: ICD-10-CM

## 2018-09-07 DIAGNOSIS — E66.01 MORBID OBESITY DUE TO EXCESS CALORIES (H): ICD-10-CM

## 2018-09-07 DIAGNOSIS — E11.42 TYPE 2 DIABETES MELLITUS WITH DIABETIC POLYNEUROPATHY, WITHOUT LONG-TERM CURRENT USE OF INSULIN (H): Primary | ICD-10-CM

## 2018-09-07 LAB — HBA1C MFR BLD: 8.8 % (ref 0–5.6)

## 2018-09-07 PROCEDURE — 99213 OFFICE O/P EST LOW 20 MIN: CPT | Performed by: INTERNAL MEDICINE

## 2018-09-07 PROCEDURE — 83036 HEMOGLOBIN GLYCOSYLATED A1C: CPT | Performed by: INTERNAL MEDICINE

## 2018-09-07 PROCEDURE — 36415 COLL VENOUS BLD VENIPUNCTURE: CPT | Performed by: INTERNAL MEDICINE

## 2018-09-07 PROCEDURE — 80048 BASIC METABOLIC PNL TOTAL CA: CPT | Performed by: INTERNAL MEDICINE

## 2018-09-07 NOTE — MR AVS SNAPSHOT
After Visit Summary   9/7/2018    Ry Horner    MRN: [id number]           Patient Information     Date Of Birth          1965        Visit Information        Provider Department      9/7/2018 4:00 PM Kody Wing MD [facility]        Today's Diagnoses     Type 2 diabetes mellitus with diabetic polyneuropathy, without long-term current use of insulin (H)    -  1    Morbid obesity due to excess calories (H)        Status post below knee amputation, right (H)        Essential hypertension, benign        Hyperlipidemia LDL goal <100           Follow-ups after your visit        Who to contact     If you have questions or need follow up information about today's clinic visit or your schedule please contact [facility] directly at [phone].  Normal or non-critical lab and imaging results will be communicated to you by [employer], letter or phone within 4 business days after the clinic has received the results. If you do not hear from us within 7 days, please contact the clinic through [employer] or phone. If you have a critical or abnormal lab result, we will notify you by phone as soon as possible.  Submit refill requests through [employer] or call your pharmacy and they will forward the refill request to us. Please allow 3 business days for your refill to be completed.          Additional Information About Your Visit        MyChart Information     [employer] gives you secure access to your electronic health record. If you see a primary care provider, you can also send messages to your care team and make appointments. If you have questions, please call your primary care clinic.  If you do not have a primary care provider, please call [phone] and they will assist you.        Care EveryWhere ID     This is your Care EveryWhere ID. This could be used by other organizations to access your [place] medical records  [id number]        Your Vitals Were     Pulse Temperature Height  Pulse Oximetry BMI (Body Mass Index)       88 98.5  F (36.9  C) (Oral) 6' (1.829 m) 97% 34.6 kg/m2        Blood Pressure from Last 3 Encounters:   09/07/18 112/64   07/13/18 117/81   06/06/18 128/78    Weight from Last 3 Encounters:   09/07/18 255 lb 1.6 oz (115.7 kg)   07/13/18 243 lb 4.8 oz (110.4 kg)   06/06/18 249 lb (112.9 kg)              We Performed the Following     Basic metabolic panel     Hemoglobin A1c          Today's Medication Changes          These changes are accurate as of 9/7/18  5:46 PM.  If you have any questions, ask your nurse or doctor.               These medicines have changed or have updated prescriptions.        Dose/Directions    LANTUS SOLOSTAR 100 UNIT/ML injection   This may have changed:  how much to take   Used for:  Type 2 diabetes mellitus with diabetic polyneuropathy, without long-term current use of insulin (H)   Generic drug:  insulin glargine   Changed by:  Kody Wing MD        Dose:  24 Units   Inject 24 Units Subcutaneous At Bedtime   Quantity:  15 mL   Refills:  11                Primary Care Provider Office Phone # Fax #    Kody Wing [phone] [address] [address]        Equal Access to Services     [facility] AH: Hadii aad ku hadasho Soomaali, waaxda luqadaha, qaybta kaalmada adeegyada, waxay idiin hayaan gabe menendez . So [facility] [phone].    ATENCIÓN: Si habla español, tiene a fallon disposición servicios gratuitos de asistencia lingüística. Llame al [phone].    We comply with applicable federal civil rights laws and [place] laws. We do not discriminate on the basis of race, color, national origin, age, disability, sex, sexual orientation, or gender identity.            Thank you!     Thank you for choosing [facility]  for your care. Our goal is always to provide you with excellent care. Hearing back from our patients is one way we can continue to improve our services. Please take a few  minutes to complete the written survey that you may receive in the mail after your visit with us. Thank you!             Your Updated Medication List - Protect others around you: Learn how to safely use, store and throw away your medicines at www.disposemymeds.org.          This list is accurate as of 9/7/18  5:46 PM.  Always use your most recent med list.                   Brand Name Dispense Instructions for use Diagnosis    ACCU-CHEK JAYNA PLUS test strip   Generic drug:  blood glucose monitoring     100 each    TEST BLOOD SUGARS 2 TO 3 TIMES DAILY OR AS DIRECTED    Type 2 diabetes mellitus with diabetic polyneuropathy, without long-term current use of insulin (H)       aspirin 81 MG chewable tablet     108 tablet    Take 1 tablet (81 mg) by mouth daily    Type 2 diabetes mellitus with diabetic polyneuropathy, without long-term current use of insulin (H)       * BD MEGAN U/F 32G X 4 MM   Generic drug:  insulin pen needle      USE AS DIRECTED WITH BASAGLAR        * BD MEGAN U/F 32G X 4 MM   Generic drug:  insulin pen needle     100 each    USE AS DIRECTED WITH BASAGLAR    Type 2 diabetes mellitus with diabetic polyneuropathy, without long-term current use of insulin (H)       blood glucose lancets standard    no brand specified    100 each    Use to test blood sugar 2-3 times daily or as directed.    Type 2 diabetes mellitus with diabetic polyneuropathy, without long-term current use of insulin (H)       blood glucose monitoring meter device kit    no brand specified    1 kit    Use to test blood sugar 2-3 times daily or as directed.    Type 2 diabetes mellitus with diabetic polyneuropathy, without long-term current use of insulin (H)       glipiZIDE 5 MG tablet    GLUCOTROL    180 tablet    Take 1 tablet (5 mg) by mouth 2 times daily (before meals)    Type 2 diabetes mellitus with diabetic polyneuropathy, with long-term current use of insulin (H)       hydrochlorothiazide 12.5 MG Tabs tablet     90 tablet    Take 1  tablet (12.5 mg) by mouth daily    Benign essential hypertension       ipratropium 0.06 % spray    ATROVENT    3 Box    Spray 2 sprays into both nostrils 4 times daily as needed for rhinitis    Runny nose       LANTUS SOLOSTAR 100 UNIT/ML injection   Generic drug:  insulin glargine     15 mL    Inject 24 Units Subcutaneous At Bedtime    Type 2 diabetes mellitus with diabetic polyneuropathy, without long-term current use of insulin (H)       lisinopril 20 MG tablet    PRINIVIL/ZESTRIL    90 tablet    Take 0.5 tablets (10 mg) by mouth daily    Essential hypertension, benign       metFORMIN 1000 MG tablet    GLUCOPHAGE    180 tablet    TAKE ONE TABLET BY MOUTH TWICE A DAY WITH MEALS    Type 2 diabetes mellitus with diabetic polyneuropathy, without long-term current use of insulin (H)       TRADJENTA 5 MG Tabs tablet   Generic drug:  linagliptin     90 tablet    TAKE ONE TABLET BY MOUTH EVERY DAY    Type 2 diabetes mellitus with diabetic polyneuropathy, without long-term current use of insulin (H)       * Notice:  This list has 2 medication(s) that are the same as other medications prescribed for you. Read the directions carefully, and ask your doctor or other care provider to review them with you.

## 2018-09-07 NOTE — PROGRESS NOTES
SUBJECTIVE:   Ry Horner is a 53 year old male who presents to clinic today for the following health issues:      Diabetes Follow-up    Patient is checking blood sugars: twice daily.    Blood sugar testing frequency justification: On insulin, frequency appropriate   Results are as follows:         am - before breakfast, before supper    Diabetic concerns: None and other - Usual 175-220     Symptoms of hypoglycemia (low blood sugar): none     Paresthesias (numbness or burning in feet) or sores: No     Date of last diabetic eye exam: 2016    BP Readings from Last 2 Encounters:   07/13/18 117/81   06/06/18 128/78     Hemoglobin A1C (%)   Date Value   06/06/2018 12.2 (H)   03/01/2018 8.3 (H)     LDL Cholesterol Calculated (mg/dL)   Date Value   06/06/2018     Cannot estimate LDL when triglyceride exceeds 400 mg/dL   06/02/2017 116 (H)     LDL Cholesterol Direct (mg/dL)   Date Value   06/06/2018 84       Diabetes Management Resources    Amount of exercise or physical activity: 1-2 mile walk/day    Problems taking medications regularly: No    Medication side effects: none    Diet: regular (no restrictions)        Menisci with poorly controlled type 2 diabetes who is status post amputation he also has hypertension and intolerance to statin therapy.  He was seen about 2 months ago after his hemoglobin A1c was noted to be  12.2.  He was given parameters to increase his Lantus insulin dose.  He stopped increasing his dose at the dose of 18 units per night.  However, his blood sugars are still routinely abov 150 when he wakes up in the morning.  He has not had any low blood sugar readings.  He has not had any symptoms of hypoglycemia.  He admits to diet indiscretions during his travels over the summer to [place] in [place].    Problem list and histories reviewed & adjusted, as indicated.  Additional history: as documented    Patient Active Problem List   Diagnosis     Diabetes mellitus, type 2 (H)     Essential  hypertension, benign     Hyperlipidemia LDL goal <100     Impotence of organic origin     Morbid obesity due to excess calories (H)     Open wound of right foot     Status post below knee amputation, right (H)     Past Surgical History:   Procedure Laterality Date     AMPUTATE LEG BELOW KNEE Right 9/1/2017    Procedure: AMPUTATE LEG BELOW KNEE;  RIGHT BELOW KNEE AMPUTATION ;  Surgeon: Nikolas Nascimento MD;  Location: [facility] OR     APPENDECTOMY       APPLY WOUND VAC Right 3/2/2015    Procedure: APPLY WOUND VAC;  Surgeon: Milena Cevallos DPM, Pod;  Location: [facility] OR     BIOPSY BONE FOOT Right 7/15/2016    Procedure: BIOPSY BONE FOOT;  Surgeon: Tim Douglas DPM;  Location: [facility] OR     IRRIGATION AND DEBRIDEMENT FOOT, COMBINED Right 3/2/2015    Procedure: COMBINED IRRIGATION AND DEBRIDEMENT FOOT;  Surgeon: Milena Cevallos DPM, Pod;  Location: [facility] OR     IRRIGATION AND DEBRIDEMENT FOOT, COMBINED Right 7/15/2016    Procedure: COMBINED IRRIGATION AND DEBRIDEMENT FOOT;  Surgeon: Tim Douglas DPM;  Location: [facility] OR     IRRIGATION AND DEBRIDEMENT FOOT, COMBINED Right 7/20/2016    Procedure: COMBINED IRRIGATION AND DEBRIDEMENT FOOT;  Surgeon: Tim Douglas DPM;  Location: [facility] OR     ORTHOPEDIC SURGERY         Social History   Substance Use Topics     Smoking status: Former Smoker     Packs/day: 0.50     Years: 20.00     Types: Cigarettes     Smokeless tobacco: Never Used     Alcohol use Yes      Comment: 3-4 drinks per month     Family History   Problem Relation Age of Onset     Genetic Disorder Other      Genetic Disorder Other      Psychotic Disorder Mother      Diabetes Father      Lung Cancer Father      Genetic Disorder Maternal Grandmother      Genetic Disorder Maternal Grandfather      Asthma Sister      C.A.D. No family hx of      Hypertension No family hx of      Cerebrovascular Disease No family hx of      Breast Cancer No family hx of      Cancer - colorectal No family hx of      Prostate Cancer No  "family hx of      Alcohol/Drug No family hx of          Current Outpatient Prescriptions   Medication Sig Dispense Refill     ACCU-CHEK JAYNA PLUS test strip TEST BLOOD SUGARS 2 TO 3 TIMES DAILY OR AS DIRECTED 100 each 3     aspirin 81 MG chewable tablet Take 1 tablet (81 mg) by mouth daily 108 tablet 3     BD MEGAN U/F 32G X 4 MM insulin pen needle USE AS DIRECTED WITH BASAGLAR 100 each 0     BD MEGAN U/F 32G X 4 MM insulin pen needle USE AS DIRECTED WITH BASAGLAR  0     blood glucose (NO BRAND SPECIFIED) lancets standard Use to test blood sugar 2-3 times daily or as directed. 100 each 11     blood glucose monitoring (NO BRAND SPECIFIED) meter device kit Use to test blood sugar 2-3 times daily or as directed. 1 kit 1     glipiZIDE (GLUCOTROL) 5 MG tablet Take 1 tablet (5 mg) by mouth 2 times daily (before meals) 180 tablet 3     hydrochlorothiazide 12.5 MG TABS tablet Take 1 tablet (12.5 mg) by mouth daily 90 tablet 3     insulin glargine (LANTUS SOLOSTAR) 100 UNIT/ML pen Inject 24 Units Subcutaneous At Bedtime 15 mL 11     ipratropium (ATROVENT) 0.06 % spray Spray 2 sprays into both nostrils 4 times daily as needed for rhinitis 3 Box 2     lisinopril (PRINIVIL/ZESTRIL) 20 MG tablet Take 0.5 tablets (10 mg) by mouth daily 90 tablet 3     metFORMIN (GLUCOPHAGE) 1000 MG tablet TAKE ONE TABLET BY MOUTH TWICE A DAY WITH MEALS 180 tablet 0     TRADJENTA 5 MG TABS tablet TAKE ONE TABLET BY MOUTH EVERY DAY 90 tablet 3     [DISCONTINUED] insulin glargine (LANTUS SOLOSTAR) 100 UNIT/ML pen Inject 8 Units Subcutaneous At Bedtime 15 mL 1     Allergies   Allergen Reactions     Pravastatin      \"sucked the life blood out of me\"     Asa [Aspirin] Nausea and Vomiting       Reviewed and updated as needed this visit by clinical staff       Reviewed and updated as needed this visit by Provider         ROS:  No chest pains or shortness of breath    OBJECTIVE:     /64  Pulse 88  Temp 98.5  F (36.9  C) (Oral)  Ht 6' (1.829 m)  " Wt 255 lb 1.6 oz (115.7 kg)  SpO2 97%  BMI 34.6 kg/m2  Body mass index is 34.6 kg/(m^2).    General: This is a well-appearing man in no acute distress  Diagnostic Test Results:  Labs pending    ASSESSMENT/PLAN:         ICD-10-CM    1. Type 2 diabetes mellitus with diabetic polyneuropathy, without long-term current use of insulin (H) E11.42 insulin glargine (LANTUS SOLOSTAR) 100 UNIT/ML pen     Hemoglobin A1c     Basic metabolic panel   2. Morbid obesity due to excess calories (H) E66.01    3. Status post below knee amputation, right (H) Z89.511    4. Essential hypertension, benign I10    5. Hyperlipidemia LDL goal <100 E78.5      I spent about 15 minutes with this patient in face-to-face contact more than 50% of that time was spent counseling and coordinating care regarding his diabetes management.  He needs empowerment to increase his Lantus insulin dose to the target of a.m. fasting blood sugars less than 150.  He was warned to not increase his dose further if he has any blood sugar readings less than 90.  We also discussed diet considerations.  His blood pressure is adequately controlled on second check today.  Again, he has intolerant to any statin therapy.  We plan to have him return in 3 months or sooner depending on the results of his hemoglobin A1c and symptoms.      Kody Wing MD  [facility]

## 2018-09-08 LAB
ANION GAP SERPL CALCULATED.3IONS-SCNC: 10 MMOL/L (ref 3–14)
BUN SERPL-MCNC: 30 MG/DL (ref 7–30)
CALCIUM SERPL-MCNC: 8.3 MG/DL (ref 8.5–10.1)
CHLORIDE SERPL-SCNC: 104 MMOL/L (ref 94–109)
CO2 SERPL-SCNC: 26 MMOL/L (ref 20–32)
CREAT SERPL-MCNC: 1.19 MG/DL (ref 0.66–1.25)
GFR SERPL CREATININE-BSD FRML MDRD: 64 ML/MIN/1.7M2
GLUCOSE SERPL-MCNC: 159 MG/DL (ref 70–99)
POTASSIUM SERPL-SCNC: 4 MMOL/L (ref 3.4–5.3)
SODIUM SERPL-SCNC: 140 MMOL/L (ref 133–144)

## 2018-09-09 NOTE — PROGRESS NOTES
The following letter pertains to your most recent diagnostic tests:    The A1c is much better, but not at goal quite yet.  Nice job on the improvement!  Work on getting those AM fasting blood sugars less than 150 as we discussed in clinic.          Follow up:  Office visit appointment in 3 months.  You can schedule a lab appointment prior to the visit to have your hemoglobin A1c drawn so we can discuss the result at the time of your visit.        Sincerely,    Dr. Wing

## 2018-10-01 DIAGNOSIS — I10 BENIGN ESSENTIAL HYPERTENSION: ICD-10-CM

## 2018-10-01 DIAGNOSIS — R09.89 RUNNY NOSE: ICD-10-CM

## 2018-10-02 RX ORDER — HYDROCHLOROTHIAZIDE 12.5 MG/1
TABLET ORAL
Qty: 90 TABLET | Refills: 1 | Status: SHIPPED | OUTPATIENT
Start: 2018-10-02 | End: 2019-03-26

## 2018-10-02 RX ORDER — IPRATROPIUM BROMIDE 42 UG/1
SPRAY, METERED NASAL
Start: 2018-10-02

## 2018-10-02 NOTE — TELEPHONE ENCOUNTER
"Hydrochlorothiazide:  Prescription approved per [facility] [place].    Atrovent:  Too soon.  Rx sent 7/5/18 for 3 boxes with 2 refills.  Kavita Coto RN    Requested Prescriptions   Signed Prescriptions Disp Refills     hydrochlorothiazide 12.5 MG TABS tablet 90 tablet 1     Sig: TAKE ONE TABLET BY MOUTH EVERY DAY    Diuretics (Including Combos) Protocol Passed    10/1/2018  3:54 AM       Passed - Blood pressure under 140/90 in past 12 months    BP Readings from Last 3 Encounters:   09/07/18 112/64   07/13/18 117/81   06/06/18 128/78                Passed - Recent (12 mo) or future (30 days) visit within the authorizing provider's specialty    Patient had office visit in the last 12 months or has a visit in the next 30 days with authorizing provider or within the authorizing provider's specialty.  See \"Patient Info\" tab in inbasket, or \"Choose Columns\" in Meds & Orders section of the refill encounter.           Passed - Patient is age 18 or older       Passed - Normal serum creatinine on file in past 12 months    Recent Labs   Lab Test  09/07/18   1651   CR  1.19             Passed - Normal serum potassium on file in past 12 months    Recent Labs   Lab Test  09/07/18   1651   POTASSIUM  4.0                   Passed - Normal serum sodium on file in past 12 months    Recent Labs   Lab Test  09/07/18   1651   NA  140            Refused Prescriptions Disp Refills     ipratropium (ATROVENT) 0.06 % spray [Pharmacy Med Name: IPRATROPIUM BROMIDE 0.06% SOLN]       Sig: INHALE TWO SPRAYS IN BOTH NOSTRIL FOUR TIMES A DAY AS NEEDED FOR RHINITIS    There is no refill protocol information for this order          "

## 2018-10-03 RX ORDER — IPRATROPIUM BROMIDE 42 UG/1
2 SPRAY, METERED NASAL 4 TIMES DAILY PRN
Qty: 3 BOX | Refills: 0 | Status: SHIPPED | OUTPATIENT
Start: 2018-10-03 | End: 2020-09-29

## 2018-10-03 NOTE — TELEPHONE ENCOUNTER
Pharmacy calling.  They report that based on the instruction on the box the pt uses 3 boxes in one month.  He is out of this medication and has been calling the pharmacy.

## 2018-10-03 NOTE — TELEPHONE ENCOUNTER
Prescription approved per [facility] Refill Protocol.  Kavita Coto RN

## 2018-10-08 ENCOUNTER — MYC MEDICAL ADVICE (OUTPATIENT)
Dept: FAMILY MEDICINE | Facility: CLINIC | Age: 53
End: 2018-10-08

## 2018-10-08 DIAGNOSIS — E11.42 TYPE 2 DIABETES MELLITUS WITH DIABETIC POLYNEUROPATHY, WITHOUT LONG-TERM CURRENT USE OF INSULIN (H): ICD-10-CM

## 2018-10-08 NOTE — TELEPHONE ENCOUNTER
PCP,  This was written as last OV on 9/7/18 as historical - for pt's updated dose of insulin(Lantus) 24 units HS.  Previously written at 8 units hs.   Pended to pt's pharmacy.  Rere Callaway RN

## 2018-11-14 DIAGNOSIS — E11.42 TYPE 2 DIABETES MELLITUS WITH DIABETIC POLYNEUROPATHY, WITHOUT LONG-TERM CURRENT USE OF INSULIN (H): ICD-10-CM

## 2018-11-14 NOTE — TELEPHONE ENCOUNTER
"Requested Prescriptions   Pending Prescriptions Disp Refills     metFORMIN (GLUCOPHAGE) 1000 MG tablet [Pharmacy Med Name: METFORMIN HCL 1000MG TABS]  Last Written Prescription Date:  08/10/2018  Last Fill Quantity: 180,  # refills: 0   Last office visit: 9/7/2018 with prescribing provider:   MADELYN  Future Office Visit:     180 tablet 0     Sig: TAKE ONE TABLET BY MOUTH TWICE A DAY WITH MEALS    Biguanide Agents Passed    11/14/2018  3:44 AM       Passed - Blood pressure less than 140/90 in past 6 months    BP Readings from Last 3 Encounters:   09/07/18 112/64   07/13/18 117/81   06/06/18 128/78                Passed - Patient has documented LDL within the past 12 mos.    Recent Labs   Lab Test  06/06/18   0855   LDL  Cannot estimate LDL when triglyceride exceeds 400 mg/dL  84            Passed - Patient has had a Microalbumin in the past 15 mos.    Recent Labs   Lab Test  06/06/18   0856   MICROL  111   UMALCR  195.08*            Passed - Patient is age 10 or older       Passed - Patient has documented A1c within the specified period of time.    If HgbA1C is 8 or greater, it needs to be on file within the past 3 months.  If less than 8, must be on file within the past 6 months.     Recent Labs   Lab Test  09/07/18   1651   A1C  8.8*            Passed - Patient's CR is NOT>1.4 OR Patient's EGFR is NOT<45 within past 12 mos.    Recent Labs   Lab Test  09/07/18   1651   GFRESTIMATED  64   GFRESTBLACK  78       Recent Labs   Lab Test  09/07/18   1651   CR  1.19            Passed - Patient does NOT have a diagnosis of CHF.       Passed - Recent (6 mo) or future (30 days) visit within the authorizing provider's specialty    Patient had office visit in the last 6 months or has a visit in the next 30 days with authorizing provider or within the authorizing provider's specialty.  See \"Patient Info\" tab in inbasket, or \"Choose Columns\" in Meds & Orders section of the refill encounter.              "

## 2018-11-14 NOTE — TELEPHONE ENCOUNTER
Prescription approved per [facility] Refill Protocol.  Due for follow up office visit for diabetes in December per last office visit no amilcar.    MILLY Rios, RN, PHN

## 2018-12-02 DIAGNOSIS — E11.42 TYPE 2 DIABETES MELLITUS WITH DIABETIC POLYNEUROPATHY, WITHOUT LONG-TERM CURRENT USE OF INSULIN (H): ICD-10-CM

## 2018-12-04 RX ORDER — PEN NEEDLE, DIABETIC 32GX 5/32"
NEEDLE, DISPOSABLE MISCELLANEOUS
Qty: 100 EACH | Refills: 0 | Status: SHIPPED | OUTPATIENT
Start: 2018-12-04 | End: 2019-03-02

## 2018-12-04 NOTE — TELEPHONE ENCOUNTER
"Requested Prescriptions   Pending Prescriptions Disp Refills     BD MEGAN U/F 32G X 4 MM insulin pen needle [Pharmacy Med Name: BD PEN NEEDLE MEGAN  32G X 4 MM MISC]  Last Written Prescription Date:  8/31/18  Last Fill Quantity: 100 EACH,  # refills: 0   Last office visit: 9/7/2018 with prescribing provider:  MADELYN   Future Office Visit:     100 each 0     Sig: USE AS DIRECTED WITH BASAGLAR    Diabetic Supplies Protocol Passed    12/2/2018  4:18 AM       Passed - Patient is 18 years of age or older       Passed - Recent (6 mo) or future (30 days) visit within the authorizing provider's specialty    Patient had office visit in the last 6 months or has a visit in the next 30 days with authorizing provider.  See \"Patient Info\" tab in inbasket, or \"Choose Columns\" in Meds & Orders section of the refill encounter.              "

## 2018-12-04 NOTE — TELEPHONE ENCOUNTER
Prescription approved per [facility] Refill Protocol.    Regino SCHNEIDER RN

## 2018-12-13 DIAGNOSIS — E11.42 TYPE 2 DIABETES MELLITUS WITH DIABETIC POLYNEUROPATHY, WITHOUT LONG-TERM CURRENT USE OF INSULIN (H): ICD-10-CM

## 2018-12-13 NOTE — TELEPHONE ENCOUNTER
"metFORMIN (GLUCOPHAGE) 1000 MG tablet  Last Written Prescription Date:  11/14/18  Last Fill Quantity: 60,  # refills: 0   Last office visit: 9/7/2018 with prescribing provider:  juan   Future Office Visit:          Requested Prescriptions   Pending Prescriptions Disp Refills     metFORMIN (GLUCOPHAGE) 1000 MG tablet [Pharmacy Med Name: METFORMIN HCL 1000MG TABS] 60 tablet 0     Sig: TAKE ONE TABLET BY MOUTH TWICE A DAY WITH MEALS *DUE FOR FOLLOW UP FOR FURTHER REFILLS*    Biguanide Agents Failed - 12/13/2018  4:03 AM       Failed - Patient has documented A1c within the specified period of time.    If HgbA1C is 8 or greater, it needs to be on file within the past 3 months.  If less than 8, must be on file within the past 6 months.     Recent Labs   Lab Test 09/07/18  1651   A1C 8.8*            Passed - Blood pressure less than 140/90 in past 6 months    BP Readings from Last 3 Encounters:   09/07/18 112/64   07/13/18 117/81   06/06/18 128/78                Passed - Patient has documented LDL within the past 12 mos.    Recent Labs   Lab Test 06/06/18  0855   LDL Cannot estimate LDL when triglyceride exceeds 400 mg/dL  84            Passed - Patient has had a Microalbumin in the past 15 mos.    Recent Labs   Lab Test 06/06/18  0856   MICROL 111   UMALCR 195.08*            Passed - Patient is age 10 or older       Passed - Patient's CR is NOT>1.4 OR Patient's EGFR is NOT<45 within past 12 mos.    Recent Labs   Lab Test 09/07/18  1651   GFRESTIMATED 64   GFRESTBLACK 78       Recent Labs   Lab Test 09/07/18  1651   CR 1.19            Passed - Patient does NOT have a diagnosis of CHF.       Passed - Recent (6 mo) or future (30 days) visit within the authorizing provider's specialty    Patient had office visit in the last 6 months or has a visit in the next 30 days with authorizing provider or within the authorizing provider's specialty.  See \"Patient Info\" tab in inbasket, or \"Choose Columns\" in Meds & Orders section of " the refill encounter.

## 2018-12-14 NOTE — TELEPHONE ENCOUNTER
Routing refill request to provider for review/approval because:  Labs out of range:  A1c  Patient needs to be seen because:  Patient advised to follow up in December    MILLY FryN, RN  [employer] [facility]

## 2019-03-02 DIAGNOSIS — E11.42 TYPE 2 DIABETES MELLITUS WITH DIABETIC POLYNEUROPATHY, WITHOUT LONG-TERM CURRENT USE OF INSULIN (H): ICD-10-CM

## 2019-03-04 RX ORDER — PEN NEEDLE, DIABETIC 32GX 5/32"
NEEDLE, DISPOSABLE MISCELLANEOUS
Qty: 100 EACH | Refills: 0 | Status: SHIPPED | OUTPATIENT
Start: 2019-03-04 | End: 2019-05-31

## 2019-03-26 DIAGNOSIS — I10 BENIGN ESSENTIAL HYPERTENSION: ICD-10-CM

## 2019-03-26 NOTE — TELEPHONE ENCOUNTER
"Pending Prescriptions:                       Disp   Refills    hydrochlorothiazide (HYDRODIURIL) 12.5 MG*90 tab*1            Sig: TAKE ONE TABLET BY MOUTH EVERY DAY    Last Written Prescription Date:  10/2/18  Last Fill Quantity: 90,  # refills: 1   Last office visit: 9/7/2018 with prescribing provider:     Future Office Visit:    Requested Prescriptions   Pending Prescriptions Disp Refills     hydrochlorothiazide (HYDRODIURIL) 12.5 MG tablet [Pharmacy Med Name: HYDROCHLOROTHIAZIDE 12.5MG TABS] 90 tablet 1     Sig: TAKE ONE TABLET BY MOUTH EVERY DAY    Diuretics (Including Combos) Protocol Passed - 3/26/2019  4:08 AM       Passed - Blood pressure under 140/90 in past 12 months    BP Readings from Last 3 Encounters:   09/07/18 112/64   07/13/18 117/81   06/06/18 128/78                Passed - Recent (12 mo) or future (30 days) visit within the authorizing provider's specialty    Patient had office visit in the last 12 months or has a visit in the next 30 days with authorizing provider or within the authorizing provider's specialty.  See \"Patient Info\" tab in inbasket, or \"Choose Columns\" in Meds & Orders section of the refill encounter.             Passed - Medication is active on med list       Passed - Patient is age 18 or older       Passed - Normal serum creatinine on file in past 12 months    Recent Labs   Lab Test 09/07/18  1651   CR 1.19             Passed - Normal serum potassium on file in past 12 months    Recent Labs   Lab Test 09/07/18  1651   POTASSIUM 4.0                   Passed - Normal serum sodium on file in past 12 months    Recent Labs   Lab Test 09/07/18  1651                   "

## 2019-03-27 RX ORDER — HYDROCHLOROTHIAZIDE 12.5 MG/1
TABLET ORAL
Qty: 90 TABLET | Refills: 0 | Status: SHIPPED | OUTPATIENT
Start: 2019-03-27 | End: 2019-06-21

## 2019-03-27 NOTE — TELEPHONE ENCOUNTER
Prescription approved per [facility] Refill Protocol.  Yvette WATKINS RN

## 2019-05-31 ENCOUNTER — TELEPHONE (OUTPATIENT)
Dept: FAMILY MEDICINE | Facility: CLINIC | Age: 54
End: 2019-05-31

## 2019-05-31 DIAGNOSIS — E11.42 TYPE 2 DIABETES MELLITUS WITH DIABETIC POLYNEUROPATHY, WITHOUT LONG-TERM CURRENT USE OF INSULIN (H): ICD-10-CM

## 2019-05-31 NOTE — TELEPHONE ENCOUNTER
"  BD MEGAN U/F 32G X 4 MM insulin pen needle 100 each 0 3/4/2019       Last Written Prescription Date:  03/04/2019  Last Fill Quantity: 100,  # refills: 0   Last office visit: 9/7/2018 with prescribing provider:     Future Office Visit:  Unknown  Requested Prescriptions   Pending Prescriptions Disp Refills     BD MEGAN U/F 32G X 4 MM insulin pen needle [Pharmacy Med Name: BD PEN NEEDLE MEGAN  32G X 4 MM MISC] 100 each 0     Sig: USE AS DIRECTED WITH BASAGLAR *DUE TO BE SEEN*       Diabetic Supplies Protocol Failed - 5/31/2019  3:56 AM        Failed - Recent (6 mo) or future (30 days) visit within the authorizing provider's specialty     Patient had office visit in the last 6 months or has a visit in the next 30 days with authorizing provider.  See \"Patient Info\" tab in inbasket, or \"Choose Columns\" in Meds & Orders section of the refill encounter.            Passed - Medication is active on med list        Passed - Patient is 18 years of age or older          "

## 2019-06-03 RX ORDER — PEN NEEDLE, DIABETIC 32GX 5/32"
NEEDLE, DISPOSABLE MISCELLANEOUS
Qty: 30 EACH | Refills: 0 | Status: SHIPPED | OUTPATIENT
Start: 2019-06-03 | End: 2019-08-25

## 2019-06-03 NOTE — TELEPHONE ENCOUNTER
Routing refill request to provider for review/approval because:  Pt was to f/u in December for 3 month f/u but has not.    Pended 30 days and added ion pharm comments to schedule.  Please authorize if appropriate.  Thanks,  Nicole Hawthorne RN

## 2019-06-03 NOTE — TELEPHONE ENCOUNTER
Reason for Call:  Other prescription    Detailed comments: Mario Pharmacist Colin calling in to let us know that they're changing the quantity from 30 to 100. That was all she needed. Since the quantities in a box is 100 only.    Phone Number Patient can be reached at: Cell number on file:    Telephone Information:   Mobile [phone]       Best Time: anyt    Can we leave a detailed message on this number? NO    Call taken on 6/3/2019 at 3:53 PM by Yehuda Godinez

## 2019-06-21 DIAGNOSIS — E11.42 TYPE 2 DIABETES MELLITUS WITH DIABETIC POLYNEUROPATHY, WITH LONG-TERM CURRENT USE OF INSULIN (H): ICD-10-CM

## 2019-06-21 DIAGNOSIS — I10 BENIGN ESSENTIAL HYPERTENSION: ICD-10-CM

## 2019-06-21 DIAGNOSIS — Z79.4 TYPE 2 DIABETES MELLITUS WITH DIABETIC POLYNEUROPATHY, WITH LONG-TERM CURRENT USE OF INSULIN (H): ICD-10-CM

## 2019-06-21 NOTE — TELEPHONE ENCOUNTER
"hydrochlorothiazide (HYDRODIURIL) 12.5 MG tablet  Last Written Prescription Date:  3/27/19  Last Fill Quantity: 90 tablet,  # refills: 0   Last office visit: 9/7/2018 with prescribing provider:  [facility]   Future Office Visit:      glipiZIDE (GLUCOTROL) 5 MG tablet  Last Written Prescription Date:  7/13/18  Last Fill Quantity: 180 tablet,  # refills: 3   Last office visit: 9/7/2018 with prescribing provider:  [facility]   Future Office Visit:        Requested Prescriptions   Pending Prescriptions Disp Refills     hydrochlorothiazide (HYDRODIURIL) 12.5 MG tablet [Pharmacy Med Name: HYDROCHLOROTHIAZIDE 12.5MG TABS] 90 tablet 0     Sig: TAKE ONE TABLET BY MOUTH EVERY DAY *DUE TO BE SEEN*       Diuretics (Including Combos) Protocol Passed - 6/21/2019  3:54 AM        Passed - Blood pressure under 140/90 in past 12 months     BP Readings from Last 3 Encounters:   09/07/18 112/64   07/13/18 117/81   06/06/18 128/78                 Passed - Recent (12 mo) or future (30 days) visit within the authorizing provider's specialty     Patient had office visit in the last 12 months or has a visit in the next 30 days with authorizing provider or within the authorizing provider's specialty.  See \"Patient Info\" tab in inbasket, or \"Choose Columns\" in Meds & Orders section of the refill encounter.              Passed - Medication is active on med list        Passed - Patient is age 18 or older        Passed - Normal serum creatinine on file in past 12 months     Recent Labs   Lab Test 09/07/18  1651   CR 1.19              Passed - Normal serum potassium on file in past 12 months     Recent Labs   Lab Test 09/07/18  1651   POTASSIUM 4.0                    Passed - Normal serum sodium on file in past 12 months     Recent Labs   Lab Test 09/07/18  1651                 glipiZIDE (GLUCOTROL) 5 MG tablet [Pharmacy Med Name: GLIPIZIDE 5MG TABS] 180 tablet 3     Sig: TAKE ONE TABLET BY MOUTH TWICE A DAY BEFORE MEALS       Sulfonylurea Agents " "Failed - 6/21/2019  3:54 AM        Failed - Blood pressure less than 140/90 in past 6 months     BP Readings from Last 3 Encounters:   09/07/18 112/64   07/13/18 117/81   06/06/18 128/78                 Failed - Patient has documented LDL within the past 12 mos.     Recent Labs   Lab Test 06/06/18  0855   LDL Cannot estimate LDL when triglyceride exceeds 400 mg/dL  84             Failed - Patient has documented A1c within the specified period of time.     If HgbA1C is 8 or greater, it needs to be on file within the past 3 months.  If less than 8, must be on file within the past 6 months.     Recent Labs   Lab Test 09/07/18  1651   A1C 8.8*             Failed - Recent (6 mo) or future (30 days) visit within the authorizing provider's specialty     Patient had office visit in the last 6 months or has a visit in the next 30 days with authorizing provider or within the authorizing provider's specialty.  See \"Patient Info\" tab in inbasket, or \"Choose Columns\" in Meds & Orders section of the refill encounter.            Passed - Patient has had a Microalbumin in the past 15 mos.     Recent Labs   Lab Test 06/06/18  0856   MICROL 111   UMALCR 195.08*             Passed - Medication is active on med list        Passed - Patient is age 18 or older        Passed - Patient has a recent creatinine (normal) within the past 12 mos.     Recent Labs   Lab Test 09/07/18  1651  06/23/16  0729   CR 1.19   < >  --    CREAT  --   --  0.9    < > = values in this interval not displayed.               "

## 2019-06-24 RX ORDER — HYDROCHLOROTHIAZIDE 12.5 MG/1
12.5 TABLET ORAL DAILY
Qty: 90 TABLET | Refills: 0 | Status: SHIPPED | OUTPATIENT
Start: 2019-06-24 | End: 2019-09-20

## 2019-06-24 RX ORDER — GLIPIZIDE 5 MG/1
TABLET ORAL
Qty: 180 TABLET | Refills: 0 | Status: SHIPPED | OUTPATIENT
Start: 2019-06-24 | End: 2019-07-11

## 2019-06-30 DIAGNOSIS — E11.42 TYPE 2 DIABETES MELLITUS WITH DIABETIC POLYNEUROPATHY, WITHOUT LONG-TERM CURRENT USE OF INSULIN (H): ICD-10-CM

## 2019-07-01 NOTE — TELEPHONE ENCOUNTER
One month supply sent 12/14/18  Last OV 9/7/18  A1C was 8.8. Was due for follow up in December.    [employer] message sent to patient asking him to schedule an appointment.  Kavita Coto RN

## 2019-07-01 NOTE — TELEPHONE ENCOUNTER
"metFORMIN (GLUCOPHAGE) 1000 MG tablet  Last Written Prescription Date:  12/14/18  Last Fill Quantity: 60,  # refills: 0   Last office visit: 9/7/2018 with prescribing provider:  [facility]    Future Office Visit:        Requested Prescriptions   Pending Prescriptions Disp Refills     metFORMIN (GLUCOPHAGE) 1000 MG tablet [Pharmacy Med Name: METFORMIN HCL 1000MG TABS] 60 tablet 0     Sig: TAKE ONE TABLET BY MOUTH TWICE A DAY WITH MEALS *DUE TO BE SEEN*       Biguanide Agents Failed - 6/30/2019  4:07 AM        Failed - Blood pressure less than 140/90 in past 6 months     BP Readings from Last 3 Encounters:   09/07/18 112/64   07/13/18 117/81   06/06/18 128/78                 Failed - Patient has documented LDL within the past 12 mos.     Recent Labs   Lab Test 06/06/18  0855   LDL Cannot estimate LDL when triglyceride exceeds 400 mg/dL  84             Failed - Patient has documented A1c within the specified period of time.     If HgbA1C is 8 or greater, it needs to be on file within the past 3 months.  If less than 8, must be on file within the past 6 months.     Recent Labs   Lab Test 09/07/18  1651   A1C 8.8*             Failed - Recent (6 mo) or future (30 days) visit within the authorizing provider's specialty     Patient had office visit in the last 6 months or has a visit in the next 30 days with authorizing provider or within the authorizing provider's specialty.  See \"Patient Info\" tab in inbasket, or \"Choose Columns\" in Meds & Orders section of the refill encounter.            Passed - Patient has had a Microalbumin in the past 15 mos.     Recent Labs   Lab Test 06/06/18  0856   MICROL 111   UMALCR 195.08*             Passed - Patient is age 10 or older        Passed - Patient's CR is NOT>1.4 OR Patient's EGFR is NOT<45 within past 12 mos.     Recent Labs   Lab Test 09/07/18  1651   GFRESTIMATED 64   GFRESTBLACK 78       Recent Labs   Lab Test 09/07/18  1651   CR 1.19             Passed - Patient does NOT have a " diagnosis of CHF.        Passed - Medication is active on med list

## 2019-07-11 ENCOUNTER — OFFICE VISIT (OUTPATIENT)
Dept: FAMILY MEDICINE | Facility: CLINIC | Age: 54
End: 2019-07-11
Payer: COMMERCIAL

## 2019-07-11 VITALS
WEIGHT: 266 LBS | TEMPERATURE: 97.5 F | SYSTOLIC BLOOD PRESSURE: 161 MMHG | DIASTOLIC BLOOD PRESSURE: 108 MMHG | HEART RATE: 109 BPM | OXYGEN SATURATION: 99 % | HEIGHT: 72 IN | BODY MASS INDEX: 36.03 KG/M2

## 2019-07-11 DIAGNOSIS — E11.42 TYPE 2 DIABETES MELLITUS WITH DIABETIC POLYNEUROPATHY, WITHOUT LONG-TERM CURRENT USE OF INSULIN (H): ICD-10-CM

## 2019-07-11 DIAGNOSIS — E78.5 HYPERLIPIDEMIA LDL GOAL <100: ICD-10-CM

## 2019-07-11 DIAGNOSIS — Z89.511 STATUS POST BELOW KNEE AMPUTATION, RIGHT (H): Primary | ICD-10-CM

## 2019-07-11 DIAGNOSIS — I10 ESSENTIAL HYPERTENSION, BENIGN: ICD-10-CM

## 2019-07-11 DIAGNOSIS — E66.01 MORBID OBESITY DUE TO EXCESS CALORIES (H): ICD-10-CM

## 2019-07-11 LAB — HBA1C MFR BLD: 7.5 % (ref 0–5.6)

## 2019-07-11 PROCEDURE — 83036 HEMOGLOBIN GLYCOSYLATED A1C: CPT | Performed by: INTERNAL MEDICINE

## 2019-07-11 PROCEDURE — 82043 UR ALBUMIN QUANTITATIVE: CPT | Performed by: INTERNAL MEDICINE

## 2019-07-11 PROCEDURE — 99214 OFFICE O/P EST MOD 30 MIN: CPT | Performed by: INTERNAL MEDICINE

## 2019-07-11 PROCEDURE — 83721 ASSAY OF BLOOD LIPOPROTEIN: CPT | Performed by: INTERNAL MEDICINE

## 2019-07-11 PROCEDURE — 36415 COLL VENOUS BLD VENIPUNCTURE: CPT | Performed by: INTERNAL MEDICINE

## 2019-07-11 RX ORDER — LISINOPRIL 40 MG/1
40 TABLET ORAL DAILY
Qty: 90 TABLET | Refills: 3 | Status: SHIPPED | OUTPATIENT
Start: 2019-07-11 | End: 2020-06-30

## 2019-07-11 ASSESSMENT — MIFFLIN-ST. JEOR: SCORE: 2099.57

## 2019-07-11 NOTE — PROGRESS NOTES
Subjective     Ry Horner is a 53 year old male who presents to clinic today for the following health issues:    HPI   Follow up - DM      53-year-old man with type 2 diabetes right below the knee amputation, obesity, hypertension, hyperlipidemia who is a non-smoker who presents for routine follow-up of his diabetes.  His last hemoglobin A1c was checked in September 2018 and was noted to be 8.8.  He has titrated his dose of Lantus insulin to 26 units/day.  He describes a.m. fasting blood sugars usually in the 170s to 200s.  He states that when he increase his Lantus insulin further, he has low blood sugars during the day.  When asked how low is low, he states that he has sugars sometimes in the 60s or 70s mostly in the 90s.  He does not feel well when his blood sugars drop into the 90s.  He is adamant that he is intolerant to all statins.  He also states that his blood pressure is always higher in the doctor's office than it is when he checks it at the pharmacy frequently outside of the clinic.  Otherwise he feels well and has returned to playing golf and is doing a kitchen remodel project.  He plans a trip to the [facility] next fall.    Patient Active Problem List   Diagnosis     Diabetes mellitus, type 2 (H)     Essential hypertension, benign     Hyperlipidemia LDL goal <100     Impotence of organic origin     Morbid obesity due to excess calories (H)     Open wound of right foot     Status post below knee amputation, right (H)     Type 2 diabetes mellitus with diabetic polyneuropathy, without long-term current use of insulin (H)     Past Surgical History:   Procedure Laterality Date     AMPUTATE LEG BELOW KNEE Right 9/1/2017    Procedure: AMPUTATE LEG BELOW KNEE;  RIGHT BELOW KNEE AMPUTATION ;  Surgeon: Nikolas Nascimento MD;  Location: [facility] OR     APPENDECTOMY       APPLY WOUND VAC Right 3/2/2015    Procedure: APPLY WOUND VAC;  Surgeon: Milena Cevallos DPM, Pod;  Location: [facility] OR     BIOPSY BONE FOOT  Right 7/15/2016    Procedure: BIOPSY BONE FOOT;  Surgeon: Tim Douglas DPM;  Location: [facility] OR     IRRIGATION AND DEBRIDEMENT FOOT, COMBINED Right 3/2/2015    Procedure: COMBINED IRRIGATION AND DEBRIDEMENT FOOT;  Surgeon: Milena Cevallos DPM, Pod;  Location: [facility] OR     IRRIGATION AND DEBRIDEMENT FOOT, COMBINED Right 7/15/2016    Procedure: COMBINED IRRIGATION AND DEBRIDEMENT FOOT;  Surgeon: Tim Douglas DPM;  Location: [facility] OR     IRRIGATION AND DEBRIDEMENT FOOT, COMBINED Right 7/20/2016    Procedure: COMBINED IRRIGATION AND DEBRIDEMENT FOOT;  Surgeon: Tim Douglas DPM;  Location: [facility] OR     ORTHOPEDIC SURGERY         Social History     Tobacco Use     Smoking status: Former Smoker     Packs/day: 0.50     Years: 20.00     Pack years: 10.00     Types: Cigarettes     Smokeless tobacco: Never Used   Substance Use Topics     Alcohol use: Yes     Comment: 3-4 drinks per month     Family History   Problem Relation Age of Onset     Genetic Disorder Other      Genetic Disorder Other      Psychotic Disorder Mother      Diabetes Father      Lung Cancer Father      Genetic Disorder Maternal Grandmother      Genetic Disorder Maternal Grandfather      Asthma Sister      C.A.D. No family hx of      Hypertension No family hx of      Cerebrovascular Disease No family hx of      Breast Cancer No family hx of      Cancer - colorectal No family hx of      Prostate Cancer No family hx of      Alcohol/Drug No family hx of          Current Outpatient Medications   Medication Sig Dispense Refill     ACCU-CHEK JAYNA PLUS test strip TEST BLOOD SUGARS 2 TO 3 TIMES DAILY OR AS DIRECTED 100 each 3     aspirin 81 MG chewable tablet Take 1 tablet (81 mg) by mouth daily 108 tablet 3     BD MEGAN U/F 32G X 4 MM insulin pen needle USE AS DIRECTED WITH BASAGLAR *DUE TO BE SEEN* 30 each 0     blood glucose (NO BRAND SPECIFIED) lancets standard Use to test blood sugar 2-3 times daily or as directed. 100 each 11     blood glucose  "monitoring (NO BRAND SPECIFIED) meter device kit Use to test blood sugar 2-3 times daily or as directed. 1 kit 1     hydrochlorothiazide (HYDRODIURIL) 12.5 MG tablet Take 1 tablet (12.5 mg) by mouth daily 90 tablet 0     insulin glargine (LANTUS SOLOSTAR PEN) 100 UNIT/ML pen Inject 26 Units Subcutaneous At Bedtime 15 mL 5     ipratropium (ATROVENT) 0.06 % spray Spray 2 sprays into both nostrils 4 times daily as needed for rhinitis 3 Box 0     lisinopril (PRINIVIL/ZESTRIL) 40 MG tablet Take 1 tablet (40 mg) by mouth daily 90 tablet 3     metFORMIN (GLUCOPHAGE) 1000 MG tablet TAKE ONE TABLET BY MOUTH TWICE A DAY WITH MEALS *DUE TO BE SEEN* 60 tablet 0     TRADJENTA 5 MG TABS tablet TAKE ONE TABLET BY MOUTH EVERY DAY 90 tablet 3     Allergies   Allergen Reactions     Pravastatin      \"sucked the life blood out of me\"     Asa [Aspirin] Nausea and Vomiting       Reviewed and updated as needed this visit by Provider         Review of Systems   ROS COMP: Constitutional, HEENT, cardiovascular, pulmonary, gi and gu systems are negative, except as otherwise noted.      Objective    BP (!) 161/108 (BP Location: Right arm, Cuff Size: Adult Large)   Pulse 109   Temp 97.5  F (36.4  C) (Oral)   Ht 1.829 m (6')   Wt 120.7 kg (266 lb)   SpO2 99%   BMI 36.08 kg/m    Body mass index is 36.08 kg/m .  Physical Exam   General: This is a well-appearing man in no acute distress.  Cardiovascular: The heart has a regular rate and rhythm and there are no carotid bruits.  Pulmonary: The lungs are clear to auscultation bilaterally, breathing is not labored.  Extremities: The left foot is with a 2+ dorsalis pedis pulse there are no foot ulcerations, sensation to 10 g monofilament is intact in the left foot.    Diagnostic Test Results:    Labs are pending        Assessment & Plan       ICD-10-CM    1. Status post below knee amputation, right (H) Z89.511    2. Type 2 diabetes mellitus with diabetic polyneuropathy, without long-term current " use of insulin (H) E11.42 Albumin Random Urine Quantitative with Creat Ratio     Hemoglobin A1c     insulin glargine (LANTUS SOLOSTAR PEN) 100 UNIT/ML pen     DIABETES EDUCATOR REFERRAL   3. Morbid obesity due to excess calories (H) E66.01    4. Essential hypertension, benign I10 lisinopril (PRINIVIL/ZESTRIL) 40 MG tablet   5. Hyperlipidemia LDL goal <100 E78.5 LDL cholesterol direct       Total face to face contact time was greater than 26 minutes of which more than 50% of this time was spent counseling and coordinating care regarding the above topics.      Diabetes does not seem to be well controlled.  Given that he has high a.m. fasting sugars, I think that increasing the Lantus is probably the right move.  However, the glipizide that he takes during the day might be causing low blood sugars throughout the day.  To understand this better, a continuous glucose monitor might be helpful.  Patient was referred to diabetes education for fitting of a continuous glucose monitor.  Until then, we will stop glipizide and have him slowly increase Lantus insulin to allow for blood sugar readings of less than 150 consistently throughout the morning.  He was counseled on how to do so.  He is also been to work on diet and exercise interventions to promote weight loss.  His blood pressure is not well controlled at all and I would not attribute this just to whitecoat blood pressure elevation.  After a lengthy discussion, we decided to increase lisinopril from 20 mg daily to 40 mg daily with short-term follow-up.  He may need a calcium channel blocker in addition to the hydrochlorothiazide and maximum dose lisinopril to get the blood pressure under good control.  He will record his blood pressure readings from the out of clinic blood pressure machine at his pharmacy for our review when he returns in 6 weeks.  Again, he remains reluctant to consider statin therapy, but if the LDL is above 100, he would consider Zetia or some other  non-statin lipid-lowering agent.    BMI:   Estimated body mass index is 36.08 kg/m  as calculated from the following:    Height as of this encounter: 1.829 m (6').    Weight as of this encounter: 120.7 kg (266 lb).   Weight management plan: Discussed healthy diet and exercise guidelines            Return in about 6 weeks (around 8/22/2019) for Diabetes Check, Blood Pressure Check.    Kody Wing MD  [facility]

## 2019-07-12 LAB
CREAT UR-MCNC: 80 MG/DL
LDLC SERPL DIRECT ASSAY-MCNC: 116 MG/DL
MICROALBUMIN UR-MCNC: 324 MG/L
MICROALBUMIN/CREAT UR: 402.48 MG/G CR (ref 0–17)

## 2019-07-13 RX ORDER — EZETIMIBE 10 MG/1
10 TABLET ORAL DAILY
Qty: 90 TABLET | Refills: 3 | Status: SHIPPED | OUTPATIENT
Start: 2019-07-13 | End: 2019-12-18

## 2019-07-13 NOTE — RESULT ENCOUNTER NOTE
The following letter pertains to your most recent diagnostic tests:    -Your micro albumin level is elevated. This means you have trace amounts of protein in the urine. It indicates that your kidneys are being affected by diabetes. Keeping blood pressure low is the best treatment in order to keep this stable. People with microalbuminuria should avoid anti-inflamatory agents such as motrin, alleve and ibuprofen as much as possible because excessive use of these medications can be harmful to the kidneys. Anybody that has microalbuminuria should be on lisinopril or losartan-as you already are-since these medications are protective for the kidney's in this situation.     The cholesterol has worsened since last check and is now above your goal of LDL cholesterol less than 100.      -Your hemoglobin A1c test which is a diabetes blood test that represents and average of your blood sugars over the last 3 months returned at above which is at your goal of hemoglobin A1c less than 7, but actually improved from last check when it was 8.8.         Bottom line:  I recommend starting NON STATIN cholesterol-lowering medication called ezetimibe (Zetia) to get the LDL cholesterol down.  Since this medication is not a statin, it does not cause muscle side effects.  We should recheck in your cholesterol 6 weeks.   You can also reduce your total and LDL cholesterol levels by eating less saturated fats.  This means you should eat less fried foods and meat.  If you eat meat, you should try to eat more chicken and fish and less beef or pork.  Also, you should increase dietary fiber intake by eating more fruits, vegetables and whole grains.  A diet high in fiber reduces total and LDL cholesterol levels.     The A1c is actually better!   I still think that stopping the glipizide and slowly increasing the Lantus insulin is the best way to get the A1c less than 7.   The continuous glucose monitor will give us even more information on how to  balance pills and basal insulin or whether there is a need for short acting insulin with meals.         Follow up:  Schedule an appointment with the diabetes educator to be fitted with the continuous glucose monitor.  Return to see me in 6 weeks to review those results, recheck your blood pressure and recheck your cholesterol.          Sincerely,    Dr. Wing

## 2019-07-30 DIAGNOSIS — E11.42 TYPE 2 DIABETES MELLITUS WITH DIABETIC POLYNEUROPATHY, WITHOUT LONG-TERM CURRENT USE OF INSULIN (H): ICD-10-CM

## 2019-07-30 NOTE — TELEPHONE ENCOUNTER
Last Written Prescription Date:  7/05/19  Last Fill Quantity: 60 tablet,  # refills: 0   Last office visit: 7/11/2019 with prescribing provider:  [facility]   Future Office Visit:   Next 5 appointments (look out 90 days)    Aug 22, 2019  4:00 PM CDT  [facility] [address] [facility] [id number] [phone]         Requested Prescriptions   Pending Prescriptions Disp Refills     metFORMIN (GLUCOPHAGE) 1000 MG tablet [Pharmacy Med Name: METFORMIN HCL 1000MG TABS] 60 tablet 0     Sig: TAKE ONE TABLET BY MOUTH TWICE A DAY WITH MEALS **NEED TO BE SEEN FOR MORE REFILLS**       Biguanide Agents Failed - 7/30/2019  4:05 AM        Failed - Blood pressure less than 140/90 in past 6 months     BP Readings from Last 3 Encounters:   07/11/19 (!) 161/108   09/07/18 112/64   07/13/18 117/81                 Passed - Patient has documented LDL within the past 12 mos.     Recent Labs   Lab Test 07/11/19  1720   *             Passed - Patient has had a Microalbumin in the past 15 mos.     Recent Labs   Lab Test 07/11/19  1720   MICROL 324   UMALCR 402.48*             Passed - Patient is age 10 or older        Passed - Patient has documented A1c within the specified period of time.     If HgbA1C is 8 or greater, it needs to be on file within the past 3 months.  If less than 8, must be on file within the past 6 months.     Recent Labs   Lab Test 07/11/19  1720   A1C 7.5*             Passed - Patient's CR is NOT>1.4 OR Patient's EGFR is NOT<45 within past 12 mos.     Recent Labs   Lab Test 09/07/18  1651   GFRESTIMATED 64   GFRESTBLACK 78       Recent Labs   Lab Test 09/07/18  1651   CR 1.19             Passed - Patient does NOT have a diagnosis of CHF.        Passed - Medication is active on med list        Passed - Recent (6 mo) or future (30 days) visit within the authorizing provider's specialty     Patient had office visit in the last 6 months  "or has a visit in the next 30 days with authorizing provider or within the authorizing provider's specialty.  See \"Patient Info\" tab in inbasket, or \"Choose Columns\" in Meds & Orders section of the refill encounter.              "

## 2019-07-31 NOTE — TELEPHONE ENCOUNTER
Pt is due for annual OV. Refilled 30 day supply with note to pharmacy to inform patient to keep schedule OV for further refills    Regino SCHNEIDER RN

## 2019-08-20 ENCOUNTER — ALLIED HEALTH/NURSE VISIT (OUTPATIENT)
Dept: EDUCATION SERVICES | Facility: CLINIC | Age: 54
End: 2019-08-20
Payer: COMMERCIAL

## 2019-08-20 VITALS — WEIGHT: 265 LBS | BODY MASS INDEX: 35.94 KG/M2

## 2019-08-20 DIAGNOSIS — E11.42 TYPE 2 DIABETES MELLITUS WITH DIABETIC POLYNEUROPATHY, WITHOUT LONG-TERM CURRENT USE OF INSULIN (H): Primary | ICD-10-CM

## 2019-08-20 PROCEDURE — 95250 CONT GLUC MNTR PHYS/QHP EQP: CPT

## 2019-08-20 PROCEDURE — G0108 DIAB MANAGE TRN  PER INDIV: HCPCS

## 2019-08-20 NOTE — PATIENT INSTRUCTIONS
1. Plan to wear the LibrePro sensor for 14 days. It is okay to shower, bathe, and swim (up to 3 feet deep for 30 minutes)    2. Continue with your usual diabetes care plan - check blood sugars and take medicines, as prescribed.    3. Keep a log of what you eat and drink, when you take your medications and how much you take, and exercise you do while you are wearing the sensor.    3. Do not cover the sensor with extra adhesive (the small hole in the center of the sensor must remain uncovered)    4. Use a little extra care, especially when getting dressed or exercising, to avoid accidentally loosening or removing the sensor.     5. Remove the sensor if you need to have an MRI or CT scan.     Return the sensor to the [facility] on 9/3.    Follow-up appointment: 9/3 at 2pm    [facility] the [facility]:  For Your Diabetes Education and Nutrition Appointments Call:  [phone]   For Diabetes Education or Nutrition Related Questions:   Phone: [phone]  E-mail: [email or web address]  Fax: [phone]   If you need a medication refill please contact your pharmacy. Please allow 3 business days for your refills to be completed.    Instructions for emailing the Diabetes Educators    If you need to communicate a non-urgent message to a Diabetes Educator via email, please send to [email or web address].    Please follow the following email guidelines:    Subject line: Secure: your clinic name (example: Secure: [facility])  In the email please include: First name, middle initial, last name and date of birth.    We will be in touch with you within one (1) business day.

## 2019-08-20 NOTE — PROGRESS NOTES
Diabetes Self-Management Education & Support      Diabetes Self-Management Education & Support - Professional CGM Insertion    SUBJECTIVE/OBJECTIVE  Presents for: Individual review  Accompanied by: Self  Diabetes education in the past 24mo: No(Sister is diabetes educator )  Focus of Visit: CGM  Diabetes type: Type 2  Date of diagnosis:   Disease course: Getting harder to manage  How confident are you filling out medical forms by yourself:: Extremely  Transportation concerns: No  Other concerns:: Physical impairment  Cultural Influences/Ethnic Background:  American    Patient seen today for Professional CGM Insertion:    Professional CGM Insertion  Sensor Type: LibrePro  Lot #: [id number]  Serial #: [id number]  Expiration Date: 19  Indication(s) for CGM Study: Unexplained fluctuations in glucose values       Healthy Eating  Healthy Eating Assessed Today: Yes  Cultural/Latter day diet restrictions?: No  Patient on a regular basis: Eats 3 meals a day, Has a low intake of carbohydrates  Meal planning: Carbohydrate counting, Smaller portions(avoid white potatoes )  Meals include: Breakfast, Lunch, Dinner, Snacks  Beverages: Coffee, Water(coffee with sweetener in AM, otherwise black )  Has patient met with a dietitian in the past?: No        Being Active  Being Active Assessed Today: Yes  Exercise:: Yes  Days per week of moderate to strenuous exercise (like a brisk walk): 5(Working towards going to the gym 5 days/week)  On average, minutes per day of exercise at this level: 60  How intense was your typical exercise? : Heavy (like jogging or swimming  Exercise Minutes per Week: 300  Barrier to exercise: None    Monitoring  Monitoring Assessed Today: Yes  Did patient bring glucose meter to appointment? : No  Home Glucose (Sugar) Monitorin-2 times per day  Overall Range (mg/dL): 140-180      Taking Medications  Diabetes Medication(s)     Biguanides       metFORMIN (GLUCOPHAGE) 1000 MG tablet    TAKE ONE TABLET BY  MOUTH TWICE A DAY WITH MEALS **NEED TO BE SEEN FOR MORE REFILLS**    Dipeptidyl Peptidase-4 (DPP-4) Inhibitors       TRADJENTA 5 MG TABS tablet    TAKE ONE TABLET BY MOUTH EVERY DAY    Insulin       insulin glargine (LANTUS SOLOSTAR PEN) 100 UNIT/ML pen    Inject 26 Units Subcutaneous At Bedtime          Taking Medication Assessed Today: Yes  Current Treatments: Oral Agent (dual therapy), Insulin Injections  Dose schedule: at bedtime  Given by: Patient  Injection/Infusion sites: Abdomen  Problems taking diabetes medications regularly?: Yes  Diabetes medication side effects?: Yes(occasional low blood sugar )  Treatment Compliance: Most of the time    Problem Solving  Problem Solving Assessed Today: Yes  Hypoglycemia Frequency: Rarely  Hypoglycemia Treatment: Other food  Patient carries a carbohydrate source: Yes    Hypoglycemia symptoms  Dizziness or Light-Headedness: Yes  Sweats: Yes  Tremors: Yes         Reducing Risks  Reducing Risks Assessed Today: Yes  Diabetes Risks: Age over 45 years  CAD Risks: Diabetes Mellitus, Male sex, Obesity, Hypertension  Has dilated eye exam at least once a year?: No  Sees dentist every 6 months?: Yes  Sees podiatrist (foot doctor)?: No    Healthy Coping  Healthy Coping Assessed Today: No  Emotional response to diabetes: Concern for health and well-being, Acceptance  Informal Support system:: Significant other, Children  Stage of change: PREPARATION (Decided to change - considering how)  Difficulty affording diabetes management supplies?: Yes(High deductible plan)  Support resources: None  Patient Activation Measure Survey Score:  No flowsheet data found.      ASSESSMENT  Patient states his sister helps him manage his diabetes. She lives in [place] but has access to his [facility] and knows his meds. As a result, he feels he has good support and no immediate questions or concerns today. He declines interest in personal Sj today as he has a high deductible insurance plan so it would be  out of pocket right now. Reviewed pricing of this product and he would like to hold off. He is hoping the study will be helpful in determining changes to make but is somewhat uncertain if they will. He is hoping to determine what is causing elevated fasting blood sugars.       INTERVENTION:   Diabetes knowledge and skills assessment:     Patient is knowledgeable in diabetes management concepts related to: Healthy Eating, Being Active, Monitoring, Taking Medication and Problem Solving    Patient needs further education on the following diabetes management concepts: Monitoring, Taking Medication and Problem Solving    Based on learning assessment above, most appropriate setting for further diabetes education would be: Group class or Individual setting.    Education provided today on:  AADE Self-Care Behaviors:  Monitoring: individual blood glucose targets, frequency of monitoring and use of personal Sj as option  Taking Medication: proper site selection and rotation for injections and side effects of prescribed medications  Problem Solving: low blood glucose - causes, signs/symptoms, treatment and prevention and carrying a carbohydrate source at all times    WRITTEN AND VERBAL INFORMATION GIVEN TO SUPPORT UNDERSTANDING OF:   LibrePro CGM: Sensor insertion, intention of monitoring for 14 days. Keep records of BG, food intake, exercise, and medication dosing during wear.     Opportunities for ongoing education and support in diabetes-self management were discussed.    Pt verbalized understanding of concepts discussed and recommendations provided today.       Education Materials Provided:  BG Log Sheet      PLAN  See Patient Instructions for co-developed, patient-stated behavior change goals.  AVS printed and provided to patient today. See Follow-Up section for recommended follow-up.    Adrianna Peterson RD, LD   Time Spent: 45 minutes  Encounter Type: Individual    Any diabetes medication dose changes were made via the  CDE Protocol and Collaborative Practice Agreement with the patient's referring provider. A copy of this encounter was shared with the provider.

## 2019-08-25 DIAGNOSIS — E11.42 TYPE 2 DIABETES MELLITUS WITH DIABETIC POLYNEUROPATHY, WITHOUT LONG-TERM CURRENT USE OF INSULIN (H): ICD-10-CM

## 2019-08-26 RX ORDER — PEN NEEDLE, DIABETIC 32GX 5/32"
NEEDLE, DISPOSABLE MISCELLANEOUS
Qty: 100 EACH | Refills: 0 | Status: SHIPPED | OUTPATIENT
Start: 2019-08-26 | End: 2019-11-29

## 2019-08-26 RX ORDER — LINAGLIPTIN 5 MG/1
TABLET, FILM COATED ORAL
Qty: 90 TABLET | Refills: 3 | Status: SHIPPED | OUTPATIENT
Start: 2019-08-26 | End: 2019-12-18

## 2019-08-26 NOTE — TELEPHONE ENCOUNTER
TRADJENTA 5 MG TABS tablet 90 tablet 3 8/31/2018     Last Written Prescription Date:  8/31/2018  Last Fill Quantity: 90,  # refills: 3     BD MEGAN U/F 32G X 4 MM insulin pen needle 30 each 0 6/3/2019     Last Written Prescription Date:  6/3/2019  Last Fill Quantity: 30,  # refills: 0     metFORMIN (GLUCOPHAGE) 1000 MG tablet 60 tablet 0 7/31/2019     Last Written Prescription Date:  7/31/2019  Last Fill Quantity: 60,  # refills: 0   Last office visit: 7/11/2019 with prescribing provider: ERICA Wing   Future Office Visit:   Next 5 appointments (look out 90 days)    Sep 19, 2019  4:00 PM CDT  Office Visit with Kody Wing MD  [facility] [address] [id number]  [phone]         Requested Prescriptions   Pending Prescriptions Disp Refills     metFORMIN (GLUCOPHAGE) 1000 MG tablet [Pharmacy Med Name: METFORMIN HCL 1000MG TABS] 60 tablet 0     Sig: TAKE ONE TABLET BY MOUTH TWICE A DAY WITH MEALS **MUST MAKE APPOINTMENT**       Biguanide Agents Failed - 8/25/2019  5:49 AM        Failed - Blood pressure less than 140/90 in past 6 months     BP Readings from Last 3 Encounters:   07/11/19 (!) 161/108   09/07/18 112/64   07/13/18 117/81                 Passed - Patient has documented LDL within the past 12 mos.     Recent Labs   Lab Test 07/11/19  1720   *             Passed - Patient has had a Microalbumin in the past 15 mos.     Recent Labs   Lab Test 07/11/19  1720   MICROL 324   UMALCR 402.48*             Passed - Patient is age 10 or older        Passed - Patient has documented A1c within the specified period of time.     If HgbA1C is 8 or greater, it needs to be on file within the past 3 months.  If less than 8, must be on file within the past 6 months.     Recent Labs   Lab Test 07/11/19  1720   A1C 7.5*             Passed - Patient's CR is NOT>1.4 OR Patient's EGFR is NOT<45 within past 12 mos.     Recent Labs   Lab Test 09/07/18  6321  "  GFRESTIMATED 64   GFRESTBLACK 78       Recent Labs   Lab Test 09/07/18  1651   CR 1.19             Passed - Patient does NOT have a diagnosis of CHF.        Passed - Medication is active on med list        Passed - Recent (6 mo) or future (30 days) visit within the authorizing provider's specialty     Patient had office visit in the last 6 months or has a visit in the next 30 days with authorizing provider or within the authorizing provider's specialty.  See \"Patient Info\" tab in inbasket, or \"Choose Columns\" in Meds & Orders section of the refill encounter.            BD MEGAN U/F 32G X 4 MM insulin pen needle [Pharmacy Med Name: BD PEN NEEDLE MEGAN  32G X 4 MM MISC] 100 each 0     Sig: USE AS DIRECTED WITH [facility] (1 BOX = 100 DAYS) *DUE TO BE SEEN*       Diabetic Supplies Protocol Passed - 8/25/2019  5:49 AM        Passed - Medication is active on med list        Passed - Patient is 18 years of age or older        Passed - Recent (6 mo) or future (30 days) visit within the authorizing provider's specialty     Patient had office visit in the last 6 months or has a visit in the next 30 days with authorizing provider.  See \"Patient Info\" tab in inbasket, or \"Choose Columns\" in Meds & Orders section of the refill encounter.            TRADJENTA 5 MG TABS tablet [Pharmacy Med Name: TRADJENTA 5MG TABS] 90 tablet 3     Sig: TAKE ONE TABLET BY MOUTH EVERY DAY       DPP4 Inhibitors Protocol Failed - 8/25/2019  5:49 AM        Failed - Blood pressure less than 140/90 in past 6 months     BP Readings from Last 3 Encounters:   07/11/19 (!) 161/108   09/07/18 112/64   07/13/18 117/81                 Passed - LDL on file in past 12 months     Recent Labs   Lab Test 07/11/19  1720   *             Passed - Microalbumin on file in past 12 months     Recent Labs   Lab Test 07/11/19  1720   MICROL 324   UMALCR 402.48*             Passed - HgbA1C in past 3 or 6 months     If HgbA1C is 8 or greater, it needs to be on file " "within the past 3 months.  If less than 8, must be on file within the past 6 months.     Recent Labs   Lab Test 07/11/19  1720   A1C 7.5*             Passed - Medication is active on med list        Passed - Patient is age 18 or older        Passed - Normal serum creatinine in past 12 months     Recent Labs   Lab Test 09/07/18  1651  06/23/16  0729   CR 1.19   < >  --    CREAT  --   --  0.9    < > = values in this interval not displayed.             Passed - Recent (6 mo) or future (30 days) visit within the authorizing provider's specialty     Patient had office visit in the last 6 months or has a visit in the next 30 days with authorizing provider.  See \"Patient Info\" tab in inbasket, or \"Choose Columns\" in Meds & Orders section of the refill encounter.              "

## 2019-08-26 NOTE — TELEPHONE ENCOUNTER
Routing refill request to provider for review/approval because:  Labs out of range:  BP    MILLY FryN, RN  [employer] [facility]

## 2019-09-03 ENCOUNTER — ALLIED HEALTH/NURSE VISIT (OUTPATIENT)
Dept: EDUCATION SERVICES | Facility: CLINIC | Age: 54
End: 2019-09-03
Payer: COMMERCIAL

## 2019-09-03 DIAGNOSIS — E11.42 TYPE 2 DIABETES MELLITUS WITH DIABETIC POLYNEUROPATHY, WITHOUT LONG-TERM CURRENT USE OF INSULIN (H): Primary | ICD-10-CM

## 2019-09-03 PROCEDURE — G0108 DIAB MANAGE TRN  PER INDIV: HCPCS

## 2019-09-03 NOTE — PROGRESS NOTES
Sent PDF copy of LibrePro report to patient via email, per patient request    Email message:  Prasanth Raza,   Unfortunately, I wasn t able to place an attachment on a ubigrate message. Instead, I m attaching a PDF of your LibrePro report here. Please let me know if you have any questions.   Thank you!  Adrianna

## 2019-09-03 NOTE — PATIENT INSTRUCTIONS
-Continue to increase Lantus dosing, per Dr. Wing, until fasting blood sugars are closer to 150 or below    -Follow up in 3-6 months, with new insurance coverage, to review other potential medication options if needed     -You could consider doing another LibrePro study then

## 2019-09-03 NOTE — PROGRESS NOTES
Diabetes Self-Management Education & Support      Diabetes Education Self-Management & Training: Follow-up and Continuous Glucose Monitor Download    Ry Horner presents today for download and report review of Professional continuous glucose monitor device      Current Diabetes Management per Patient:  Diabetes Medication(s)     Biguanides       metFORMIN (GLUCOPHAGE) 1000 MG tablet    TAKE ONE TABLET BY MOUTH TWICE A DAY WITH MEALS **MUST MAKE APPOINTMENT**    Dipeptidyl Peptidase-4 (DPP-4) Inhibitors       TRADJENTA 5 MG TABS tablet    TAKE ONE TABLET BY MOUTH EVERY DAY    Insulin       insulin glargine (LANTUS SOLOSTAR PEN) 100 UNIT/ML pen    Inject 26 Units Subcutaneous At Bedtime          Taking Medication Assessed Today: Yes  Current Treatments: Oral Agent (dual therapy), Insulin Injections  Dose schedule: at bedtime  Given by: Patient  Injection/Infusion sites: Abdomen  Problems taking diabetes medications regularly?: Yes  Diabetes medication side effects?: Yes(occasional low blood sugar )  Treatment Compliance: Most of the time    Most Recent A1c Result:    Lab Results   Component Value Date    A1C 7.5 07/11/2019       Continuous Glucose Monitor Interpretation     Reports:            Consistent day-to-day patterns: Pattern of nocturnal hyperglycemia, Pattern of post meal hyperglycemia    Healthy Eating  Healthy Eating Assessed Today: Yes  Cultural/Restoration diet restrictions?: No  Patient on a regular basis: Keeps food records, Eats 3 meals a day  Meal planning: Carbohydrate counting, Smaller portions(avoid white potatoes )  Meals include: Breakfast, Lunch, Dinner, Snacks  Beverages: Coffee, Water(coffee with sweetener in AM, otherwise black )  Has patient met with a dietitian in the past?: No            Being Active  Being Active Assessed Today: Yes  Exercise:: Yes  Barrier to exercise: Physical limitation    Monitoring  Monitoring Assessed Today: Yes  Did patient bring glucose meter to appointment? :  Yes  Blood Glucose Meter: Accu-check  Home Glucose (Sugar) Monitorin-2 times per day  Blood glucose trend: Increasing steadily  Low Glucose Range (mg/dL): 180-200  High Glucose Range (mg/dL): >200  Overall Range (mg/dL): 180-200    Taking Medications  Diabetes Medication(s)     Biguanides       metFORMIN (GLUCOPHAGE) 1000 MG tablet    TAKE ONE TABLET BY MOUTH TWICE A DAY WITH MEALS **MUST MAKE APPOINTMENT**    Dipeptidyl Peptidase-4 (DPP-4) Inhibitors       TRADJENTA 5 MG TABS tablet    TAKE ONE TABLET BY MOUTH EVERY DAY    Insulin       insulin glargine (LANTUS SOLOSTAR PEN) 100 UNIT/ML pen    Inject 26 Units Subcutaneous At Bedtime      Lantus - 0-0-0-34, increasing per PCP recommendation    Taking Medication Assessed Today: Yes  Current Treatments: Oral Agent (dual therapy), Insulin Injections  Dose schedule: at bedtime  Given by: Patient  Injection/Infusion sites: Abdomen  Problems taking diabetes medications regularly?: Yes  Diabetes medication side effects?: Yes(occasional low blood sugar )  Treatment Compliance: Most of the time    Problem Solving  Problem Solving Assessed Today: Yes  Hypoglycemia Frequency: Rarely  Hypoglycemia Treatment: Other food  Patient carries a carbohydrate source: Yes    Hypoglycemia symptoms  Dizziness or Light-Headedness: Yes  Sweats: Yes  Tremors: Yes    Reducing Risks  Reducing Risks Assessed Today: Yes  Diabetes Risks: Age over 45 years  CAD Risks: Diabetes Mellitus, Male sex, Obesity, Hypertension  Has dilated eye exam at least once a year?: No  Sees dentist every 6 months?: Yes  Sees podiatrist (foot doctor)?: No    Healthy Coping  Healthy Coping Assessed Today: No  Emotional response to diabetes: Concern for health and well-being, Acceptance  Informal Support system:: Significant other, Children  Stage of change: PREPARATION (Decided to change - considering how)  Difficulty affording diabetes management supplies?: Yes(High deductible plan)  Support resources:  None  Patient Activation Measure Survey Score:  No flowsheet data found.      Assessment:  Medication and/or insulin dosing is: accurate    Patient feels his diet suffered in the 2nd week of wearing the LibrePro due to some unexpected travel and making poor food choices. He continues to try his best to eta healthy. He feels that his very high blood sugars seen on the 2nd week of the study are very much diet related. The first week he feels is a more typical week. His sister is a Diabetes Educator and is very interested to see the LibrePro reports. He did forget his Lantus when traveling last weekend and instead took glipizide BID on that day (8/31). This seemed to be effective.     Goals        General    Monitoring (pt-stated)     Notes - Note created  8/20/2019  4:04 PM by Adrianna Peterson RD    My Goal: I will wear Sj sensor x 14 days and take notes on food, drink, medication & activity in this time.     What I need to meet my goal: logs, sensor placement     I plan to meet my goal by this date: 9/3 at follow up appointment               INTERVENTION:   Diabetes knowledge and skills assessment:     Patient is knowledgeable in diabetes management concepts related to: Healthy Eating, Being Active, Monitoring, Taking Medication and Problem Solving    Patient needs further education on the following diabetes management concepts: Monitoring, Taking Medication and Problem Solving    Based on learning assessment above, most appropriate setting for further diabetes education would be: Group class or Individual setting.    Education provided today on:  AADE Self-Care Behaviors:  Healthy Eating: consistency in amount, composition, and timing of food intake and portion control  Monitoring: individual blood glucose targets and frequency of monitoring  Taking Medication: when to take medications and discussed increasing Lantus dosing to bring blood sugars down overall, per PCP's recommendation. He is agreeable and feels that  36-38 unit(s) at HS is likely what he needs. He is resistant to meal time insulin and would prefer to work on diet. His TGs were very elevated, per labs ~1 year ago, so he likely would not be a good candidate for GLP1 medication options. Cost and insurance coverage are additionally an issue for different medication options - this will change in the new year.   Problem Solving: high blood glucose - causes, signs/symptoms, treatment and prevention    CGM-specific education:   Use of trends and graphs for pattern management and problem solving    Pt verbalized understanding of concepts discussed and recommendations provided today.       Education Materials Provided:  LibrePro printout    PLAN:  See Patient Instructions for co-developed, patient-stated behavior change goals.  AVS printed and provided to patient today. See Follow-Up section for recommended follow-up.    Adrianna Peterson RD, LD   Time Spent: 30 minutes  Encounter Type: Individual    Any diabetes medication dose changes were made via the CDE Protocol and Collaborative Practice Agreement with the patient's referring provider. A copy of this encounter was shared with the provider.

## 2019-09-03 NOTE — Clinical Note
Dr. Wing, Patient feels diet plays a big role with much higher blood sugars. My recommendation is to continue to increase his Lantus dosing, per your previus recs, until fasting blood sugars are looking better. He is agreeable. Please see scanned continuous glucose monitoring (CGM) reports, interpretation and recommendations. As a provider, you can bill for a non face-to-face interpretation of the sensor report. If you feel it is appropriate, please create a 'Documentation Only' encounter noting the interpretation and your recommended plan and bill for this glucose sensor interpretation using code [id number].Let me know if you have other questions. Thanks!Adrianna Peterson, RD, LD

## 2019-09-19 ENCOUNTER — OFFICE VISIT (OUTPATIENT)
Dept: FAMILY MEDICINE | Facility: CLINIC | Age: 54
End: 2019-09-19
Payer: COMMERCIAL

## 2019-09-19 VITALS
BODY MASS INDEX: 36.04 KG/M2 | WEIGHT: 266.1 LBS | HEART RATE: 107 BPM | TEMPERATURE: 98.4 F | HEIGHT: 72 IN | DIASTOLIC BLOOD PRESSURE: 95 MMHG | SYSTOLIC BLOOD PRESSURE: 146 MMHG | OXYGEN SATURATION: 97 %

## 2019-09-19 DIAGNOSIS — I10 ESSENTIAL HYPERTENSION, BENIGN: Primary | ICD-10-CM

## 2019-09-19 DIAGNOSIS — E78.5 HYPERLIPIDEMIA LDL GOAL <100: ICD-10-CM

## 2019-09-19 DIAGNOSIS — E11.42 TYPE 2 DIABETES MELLITUS WITH DIABETIC POLYNEUROPATHY, WITHOUT LONG-TERM CURRENT USE OF INSULIN (H): ICD-10-CM

## 2019-09-19 PROCEDURE — 99214 OFFICE O/P EST MOD 30 MIN: CPT | Performed by: INTERNAL MEDICINE

## 2019-09-19 RX ORDER — AMLODIPINE BESYLATE 5 MG/1
5 TABLET ORAL DAILY
Qty: 90 TABLET | Refills: 3 | Status: SHIPPED | OUTPATIENT
Start: 2019-09-19 | End: 2019-11-25

## 2019-09-19 ASSESSMENT — MIFFLIN-ST. JEOR: SCORE: 2095.02

## 2019-09-19 NOTE — PATIENT INSTRUCTIONS
To get better control of the blood sugars, I recommend increasing Lantus insulin slowly.  Every 3 days increase Lantus by 2 units if sugars are measured greater than 150 fasting in the AM.  Continue to do this until the blood sugars are consistently less than 150.  If you measure a sugar less than 90, do not increase Lantus further and inform me.

## 2019-09-19 NOTE — PROGRESS NOTES
Subjective     Ry Horner is a 54 year old male who presents to clinic today for the following health issues:    HPI   Diabetes Follow-up      How often are you checking your blood sugar? One time daily    What time of day are you checking your blood sugars (select all that apply)?  Before meals    Have you had any blood sugars above 200?  Yes - today    Have you had any blood sugars below 70?  No    What symptoms do you notice when your blood sugar is low?  Shaky, sweaty     What concerns do you have today about your diabetes? None and Blood sugar is often over 200     Do you have any of these symptoms? (Select all that apply)  No numbness or tingling in feet.  No redness, sores or blisters on feet.  No complaints of excessive thirst.  No reports of blurry vision.  No significant changes to weight.     Have you had a diabetic eye exam in the last 12 months? No    BP Readings from Last 2 Encounters:   09/19/19 (!) 146/95   07/11/19 (!) 161/108     Hemoglobin A1C (%)   Date Value   07/11/2019 7.5 (H)   09/07/2018 8.8 (H)     LDL Cholesterol Calculated (mg/dL)   Date Value   06/06/2018     Cannot estimate LDL when triglyceride exceeds 400 mg/dL   06/02/2017 116 (H)     LDL Cholesterol Direct (mg/dL)   Date Value   07/11/2019 116 (H)   06/06/2018 84       Diabetes Management Resources  Hypertension Follow-up      Do you check your blood pressure regularly outside of the clinic? No     Are you following a low salt diet? Yes    Are your blood pressures ever more than 140 on the top number (systolic) OR more   than 90 on the bottom number (diastolic), for example 140/90? Yes      How many servings of fruits and vegetables do you eat daily?  4 or more    On average, how many sweetened beverages do you drink each day (soda, juice, sweet tea, etc)?   2  How many days per week do you miss taking your medication? 1    What makes it hard for you to take your medications?  remembering to take    54-year-old man with  insulin-dependent type 2 diabetes, obesity, hypertension, hyperlipidemia statin intolerance who is status post a below-knee amputation of the right leg presents for follow-up of his diabetes.  He wore a 14-day continuous glucose monitor which did not demonstrate any low blood sugar readings other than one reading that occurred when he return to taking glipizide because he forgot to bring his Lantus insulin with him on a business trip.  He is frustrated because his morning fasting blood sugars are very high and he is adhering to a low carbohydrate diet.  He has increased his Lantus insulin dose to 40 units daily.  He has not had any symptoms of hypoglycemia.  He is tolerating Zetia without issues.    Patient Active Problem List   Diagnosis     Diabetes mellitus, type 2 (H)     Essential hypertension, benign     Hyperlipidemia LDL goal <100     Impotence of organic origin     Morbid obesity due to excess calories (H)     Open wound of right foot     Status post below knee amputation, right (H)     Type 2 diabetes mellitus with diabetic polyneuropathy, without long-term current use of insulin (H)     Past Surgical History:   Procedure Laterality Date     AMPUTATE LEG BELOW KNEE Right 9/1/2017    Procedure: AMPUTATE LEG BELOW KNEE;  RIGHT BELOW KNEE AMPUTATION ;  Surgeon: Nikolas Nascimento MD;  Location: [facility]     APPENDECTOMY       APPLY WOUND VAC Right 3/2/2015    Procedure: APPLY WOUND VAC;  Surgeon: Milena Cevallos DPM, Pod;  Location: [facility] OR     BIOPSY BONE FOOT Right 7/15/2016    Procedure: BIOPSY BONE FOOT;  Surgeon: Tim Douglas DPM;  Location: [facility]     IRRIGATION AND DEBRIDEMENT FOOT, COMBINED Right 3/2/2015    Procedure: COMBINED IRRIGATION AND DEBRIDEMENT FOOT;  Surgeon: Milena Cevallos DPM, Pod;  Location: [facility]     IRRIGATION AND DEBRIDEMENT FOOT, COMBINED Right 7/15/2016    Procedure: COMBINED IRRIGATION AND DEBRIDEMENT FOOT;  Surgeon: Tim Douglas DPM;  Location: [facility]     IRRIGATION  AND DEBRIDEMENT FOOT, COMBINED Right 7/20/2016    Procedure: COMBINED IRRIGATION AND DEBRIDEMENT FOOT;  Surgeon: Tim Douglas DPM;  Location: [facility] OR     ORTHOPEDIC SURGERY         Social History     Tobacco Use     Smoking status: Former Smoker     Packs/day: 0.50     Years: 20.00     Pack years: 10.00     Types: Cigarettes     Smokeless tobacco: Never Used   Substance Use Topics     Alcohol use: Yes     Comment: 3-4 drinks per month     Family History   Problem Relation Age of Onset     Genetic Disorder Other      Genetic Disorder Other      Psychotic Disorder Mother      Diabetes Father      Lung Cancer Father      Genetic Disorder Maternal Grandmother      Genetic Disorder Maternal Grandfather      Asthma Sister      C.A.D. No family hx of      Hypertension No family hx of      Cerebrovascular Disease No family hx of      Breast Cancer No family hx of      Cancer - colorectal No family hx of      Prostate Cancer No family hx of      Alcohol/Drug No family hx of          Current Outpatient Medications   Medication Sig Dispense Refill     ACCU-CHEK JAYNA PLUS test strip TEST BLOOD SUGARS 2 TO 3 TIMES DAILY OR AS DIRECTED 100 each 3     amLODIPine (NORVASC) 5 MG tablet Take 1 tablet (5 mg) by mouth daily 90 tablet 3     aspirin 81 MG chewable tablet Take 1 tablet (81 mg) by mouth daily 108 tablet 3     BD MEGAN U/F 32G X 4 MM insulin pen needle USE AS DIRECTED WITH NEEMA (1 BOX = 100 DAYS) *DUE TO BE SEEN* 100 each 0     blood glucose (NO BRAND SPECIFIED) lancets standard Use to test blood sugar 2-3 times daily or as directed. 100 each 11     blood glucose monitoring (NO BRAND SPECIFIED) meter device kit Use to test blood sugar 2-3 times daily or as directed. 1 kit 1     ezetimibe (ZETIA) 10 MG tablet Take 1 tablet (10 mg) by mouth daily 90 tablet 3     hydrochlorothiazide (HYDRODIURIL) 12.5 MG tablet Take 1 tablet (12.5 mg) by mouth daily 90 tablet 0     insulin glargine (LANTUS SOLOSTAR) 100 UNIT/ML pen  "Inject 50 Units Subcutaneous At Bedtime 15 mL 11     ipratropium (ATROVENT) 0.06 % spray Spray 2 sprays into both nostrils 4 times daily as needed for rhinitis 3 Box 0     lisinopril (PRINIVIL/ZESTRIL) 40 MG tablet Take 1 tablet (40 mg) by mouth daily 90 tablet 3     metFORMIN (GLUCOPHAGE) 1000 MG tablet TAKE ONE TABLET BY MOUTH TWICE A DAY WITH MEALS **MUST MAKE APPOINTMENT** 60 tablet 0     STATIN NOT PRESCRIBED (INTENTIONAL) Please choose reason not prescribed, below       TRADJENTA 5 MG TABS tablet TAKE ONE TABLET BY MOUTH EVERY DAY 90 tablet 3     Allergies   Allergen Reactions     Pravastatin      \"sucked the life blood out of me\"     Asa [Aspirin] Nausea and Vomiting       Reviewed and updated as needed this visit by Provider         Review of Systems   ROS COMP: Constitutional, HEENT, cardiovascular, pulmonary, gi and gu systems are negative, except as otherwise noted.      Objective    BP (!) 146/95 (BP Location: Right arm, Patient Position: Sitting, Cuff Size: Adult Large)   Pulse 107   Temp 98.4  F (36.9  C) (Oral)   Ht 1.829 m (6')   Wt 120.7 kg (266 lb 1.6 oz)   SpO2 97%   BMI 36.09 kg/m    Body mass index is 36.09 kg/m .  Physical Exam   General: This is a well-appearing man in no acute distress    Diagnostic Test Results:  Labs reviewed in Epic  Results for orders placed or performed in visit on 07/11/19   Albumin Random Urine Quantitative with Creat Ratio   Result Value Ref Range    Creatinine Urine 80 mg/dL    Albumin Urine mg/L 324 mg/L    Albumin Urine mg/g Cr 402.48 (H) 0 - 17 mg/g Cr   Hemoglobin A1c   Result Value Ref Range    Hemoglobin A1C 7.5 (H) 0 - 5.6 %   LDL cholesterol direct   Result Value Ref Range    LDL Cholesterol Direct 116 (H) <100 mg/dL           Assessment & Plan       ICD-10-CM    1. Essential hypertension, benign I10 amLODIPine (NORVASC) 5 MG tablet   2. Type 2 diabetes mellitus with diabetic polyneuropathy, without long-term current use of insulin (H) E11.42 insulin " glargine (LANTUS SOLOSTAR) 100 UNIT/ML pen   3. Hyperlipidemia LDL goal <100 E78.5      Diabetes is not well controlled, increase Lantus as per patient instructions.  Blood pressure is not well controlled, add amlodipine to current medications.  Potential risks and side effects of amlodipine were discussed with the patient in detail.  Return in 4-6 weeks to recheck blood pressure, check A1c and recheck LDL on Zetia.    He declined my recommendation for a flu shot today      BMI:   Estimated body mass index is 36.09 kg/m  as calculated from the following:    Height as of this encounter: 1.829 m (6').    Weight as of this encounter: 120.7 kg (266 lb 1.6 oz).   Weight management plan: Discussed healthy diet and exercise guidelines      Total face to face contact time was greater than 26 minutes of which more than 50% of this time was spent counseling and coordinating care regarding the above topics.        Return in about 6 weeks (around 10/31/2019) for Diabetes Check.    Kody Wing MD  [facility]

## 2019-09-20 DIAGNOSIS — I10 BENIGN ESSENTIAL HYPERTENSION: ICD-10-CM

## 2019-09-20 DIAGNOSIS — E11.42 TYPE 2 DIABETES MELLITUS WITH DIABETIC POLYNEUROPATHY, WITHOUT LONG-TERM CURRENT USE OF INSULIN (H): ICD-10-CM

## 2019-09-20 DIAGNOSIS — E11.42 TYPE 2 DIABETES MELLITUS WITH DIABETIC POLYNEUROPATHY, WITH LONG-TERM CURRENT USE OF INSULIN (H): ICD-10-CM

## 2019-09-20 DIAGNOSIS — Z79.4 TYPE 2 DIABETES MELLITUS WITH DIABETIC POLYNEUROPATHY, WITH LONG-TERM CURRENT USE OF INSULIN (H): ICD-10-CM

## 2019-09-20 RX ORDER — HYDROCHLOROTHIAZIDE 12.5 MG/1
TABLET ORAL
Qty: 90 TABLET | Refills: 1 | Status: SHIPPED | OUTPATIENT
Start: 2019-09-20 | End: 2019-11-25

## 2019-09-20 RX ORDER — GLIPIZIDE 5 MG/1
TABLET ORAL
Qty: 180 TABLET | Refills: 1 | Status: ON HOLD | OUTPATIENT
Start: 2019-09-20 | End: 2019-11-17

## 2019-09-20 RX ORDER — GLIPIZIDE 5 MG/1
TABLET ORAL
Qty: 180 TABLET | Refills: 0 | OUTPATIENT
Start: 2019-09-20

## 2019-09-20 NOTE — TELEPHONE ENCOUNTER
Metformin 1000mg  Last Written Prescription Date:  8/26/19  Last Fill Quantity: 60,  # refills: 0    Glipidize 5mg (NOT ACTIVE ON MED LIST)  Last Written Prescription Date:  6/24/19  Last Fill Quantity: 180,  # refills: 0 (stop date: 7/11/19)    Per OV notes 7/11/19, PCP stopped Glipizide  Unclear if patient to resume taking per yesterday's OV notes?     Hydrochlorothiazide 12.5mg  Last Written Prescription Date:  6/24/19  Last Fill Quantity: 90,  # refills: 0     Last office visit: 9/19/2019 with prescribing provider:  PCP      Routing refill request to provider for review/approval because:  Last BP high         Future Office Visit:   Next 5 appointments (look out 90 days)    Oct 31, 2019  4:00 PM CDT  [facility] [address] [address]  [phone]             Requested Prescriptions   Pending Prescriptions Disp Refills     hydrochlorothiazide (HYDRODIURIL) 12.5 MG tablet [Pharmacy Med Name: HYDROCHLOROTHIAZIDE 12.5MG TABS] 90 tablet 0     Sig: TAKE ONE TABLET BY MOUTH EVERY DAY       Diuretics (Including Combos) Protocol Failed - 9/20/2019  4:08 AM        Failed - Blood pressure under 140/90 in past 12 months     BP Readings from Last 3 Encounters:   09/19/19 (!) 146/95   07/11/19 (!) 161/108   09/07/18 112/64                 Failed - Normal serum creatinine on file in past 12 months     Recent Labs   Lab Test 09/07/18  1651   CR 1.19              Failed - Normal serum potassium on file in past 12 months     Recent Labs   Lab Test 09/07/18  1651   POTASSIUM 4.0                    Failed - Normal serum sodium on file in past 12 months     Recent Labs   Lab Test 09/07/18  1651                 Passed - Recent (12 mo) or future (30 days) visit within the authorizing provider's specialty     Patient had office visit in the last 12 months or has a visit in the next 30 days with authorizing provider or within the  "authorizing provider's specialty.  See \"Patient Info\" tab in inbasket, or \"Choose Columns\" in Meds & Orders section of the refill encounter.              Passed - Medication is active on med list        Passed - Patient is age 18 or older        glipiZIDE (GLUCOTROL) 5 MG tablet [Pharmacy Med Name: GLIPIZIDE 5MG TABS] 180 tablet 0     Sig: TAKE ONE TABLET BY MOUTH TWICE A DAY BEFORE MEALS       Sulfonylurea Agents Failed - 9/20/2019  4:08 AM        Failed - Blood pressure less than 140/90 in past 6 months     BP Readings from Last 3 Encounters:   09/19/19 (!) 146/95   07/11/19 (!) 161/108   09/07/18 112/64                 Failed - Medication is active on med list        Failed - Patient has a recent creatinine (normal) within the past 12 mos.     Recent Labs   Lab Test 09/07/18  1651  06/23/16  0729   CR 1.19   < >  --    CREAT  --   --  0.9    < > = values in this interval not displayed.             Passed - Patient has documented LDL within the past 12 mos.     Recent Labs   Lab Test 07/11/19  1720   *             Passed - Patient has had a Microalbumin in the past 15 mos.     Recent Labs   Lab Test 07/11/19  1720   MICROL 324   UMALCR 402.48*             Passed - Patient has documented A1c within the specified period of time.     If HgbA1C is 8 or greater, it needs to be on file within the past 3 months.  If less than 8, must be on file within the past 6 months.     Recent Labs   Lab Test 07/11/19  1720   A1C 7.5*             Passed - Patient is age 18 or older        Passed - Recent (6 mo) or future (30 days) visit within the authorizing provider's specialty     Patient had office visit in the last 6 months or has a visit in the next 30 days with authorizing provider or within the authorizing provider's specialty.  See \"Patient Info\" tab in inbasket, or \"Choose Columns\" in Meds & Orders section of the refill encounter.            metFORMIN (GLUCOPHAGE) 1000 MG tablet [Pharmacy Med Name: METFORMIN HCL " "1000MG TABS] 60 tablet 0     Sig: TAKE ONE TABLET BY MOUTH TWICE A DAY WITH MEALS **MUST MAKE AN APPOINTMENT**       Biguanide Agents Failed - 9/20/2019  4:08 AM        Failed - Blood pressure less than 140/90 in past 6 months     BP Readings from Last 3 Encounters:   09/19/19 (!) 146/95   07/11/19 (!) 161/108   09/07/18 112/64                 Failed - Patient's CR is NOT>1.4 OR Patient's EGFR is NOT<45 within past 12 mos.     Recent Labs   Lab Test 09/07/18  1651   GFRESTIMATED 64   GFRESTBLACK 78       Recent Labs   Lab Test 09/07/18  1651   CR 1.19             Passed - Patient has documented LDL within the past 12 mos.     Recent Labs   Lab Test 07/11/19  1720   *             Passed - Patient has had a Microalbumin in the past 15 mos.     Recent Labs   Lab Test 07/11/19  1720   MICROL 324   UMALCR 402.48*             Passed - Patient is age 10 or older        Passed - Patient has documented A1c within the specified period of time.     If HgbA1C is 8 or greater, it needs to be on file within the past 3 months.  If less than 8, must be on file within the past 6 months.     Recent Labs   Lab Test 07/11/19  1720   A1C 7.5*             Passed - Patient does NOT have a diagnosis of CHF.        Passed - Medication is active on med list        Passed - Recent (6 mo) or future (30 days) visit within the authorizing provider's specialty     Patient had office visit in the last 6 months or has a visit in the next 30 days with authorizing provider or within the authorizing provider's specialty.  See \"Patient Info\" tab in inbasket, or \"Choose Columns\" in Meds & Orders section of the refill encounter.              "

## 2019-11-08 ENCOUNTER — HEALTH MAINTENANCE LETTER (OUTPATIENT)
Age: 54
End: 2019-11-08

## 2019-11-15 ENCOUNTER — OFFICE VISIT (OUTPATIENT)
Dept: URGENT CARE | Facility: URGENT CARE | Age: 54
End: 2019-11-15
Payer: COMMERCIAL

## 2019-11-15 VITALS
HEART RATE: 130 BPM | DIASTOLIC BLOOD PRESSURE: 73 MMHG | WEIGHT: 257 LBS | OXYGEN SATURATION: 97 % | BODY MASS INDEX: 34.86 KG/M2 | SYSTOLIC BLOOD PRESSURE: 146 MMHG | TEMPERATURE: 102.3 F

## 2019-11-15 DIAGNOSIS — L03.119 CELLULITIS IN DIABETIC FOOT (H): Primary | ICD-10-CM

## 2019-11-15 DIAGNOSIS — E11.628 CELLULITIS IN DIABETIC FOOT (H): Primary | ICD-10-CM

## 2019-11-15 PROCEDURE — 99214 OFFICE O/P EST MOD 30 MIN: CPT

## 2019-11-15 RX ORDER — CIPROFLOXACIN 750 MG/1
750 TABLET, FILM COATED ORAL 2 TIMES DAILY
Qty: 20 TABLET | Refills: 0 | Status: ON HOLD | OUTPATIENT
Start: 2019-11-15 | End: 2019-11-22

## 2019-11-15 RX ORDER — CLINDAMYCIN HCL 300 MG
300 CAPSULE ORAL 3 TIMES DAILY
Qty: 30 CAPSULE | Refills: 0 | Status: ON HOLD | OUTPATIENT
Start: 2019-11-15 | End: 2019-11-22

## 2019-11-16 NOTE — PATIENT INSTRUCTIONS
Take prescribed antibiotics as directed.  Contact on-call doctor if you develop any side effects from the medications over the weekend -- dial [phone] and follow the voice instructions to get in touch with the on-call doctor.    Seek immediate medical attention if you develop persistent fever, shortness of breath, nausea/vomiting, diarrhea, worsening redness/pain/swelling at the left foot.    Contact your podiatrist and schedule a follow-up visit as soon as possible.    Follow-up with Dr. Wnig next week.

## 2019-11-17 ENCOUNTER — APPOINTMENT (OUTPATIENT)
Dept: GENERAL RADIOLOGY | Facility: CLINIC | Age: 54
DRG: 616 | End: 2019-11-17
Attending: EMERGENCY MEDICINE
Payer: COMMERCIAL

## 2019-11-17 ENCOUNTER — APPOINTMENT (OUTPATIENT)
Dept: MRI IMAGING | Facility: CLINIC | Age: 54
DRG: 616 | End: 2019-11-17
Attending: PODIATRIST
Payer: COMMERCIAL

## 2019-11-17 ENCOUNTER — HOSPITAL ENCOUNTER (INPATIENT)
Facility: CLINIC | Age: 54
LOS: 5 days | Discharge: HOME HEALTH | DRG: 616 | End: 2019-11-22
Attending: EMERGENCY MEDICINE | Admitting: INTERNAL MEDICINE
Payer: COMMERCIAL

## 2019-11-17 ENCOUNTER — ANESTHESIA EVENT (OUTPATIENT)
Dept: SURGERY | Facility: CLINIC | Age: 54
DRG: 616 | End: 2019-11-17
Payer: COMMERCIAL

## 2019-11-17 ENCOUNTER — ANESTHESIA (OUTPATIENT)
Dept: SURGERY | Facility: CLINIC | Age: 54
DRG: 616 | End: 2019-11-17
Payer: COMMERCIAL

## 2019-11-17 ENCOUNTER — APPOINTMENT (OUTPATIENT)
Dept: GENERAL RADIOLOGY | Facility: CLINIC | Age: 54
DRG: 616 | End: 2019-11-17
Attending: PODIATRIST
Payer: COMMERCIAL

## 2019-11-17 DIAGNOSIS — M86.9 OSTEOMYELITIS (H): Primary | ICD-10-CM

## 2019-11-17 DIAGNOSIS — M86.9 OSTEOMYELITIS OF LEFT FOOT, UNSPECIFIED TYPE (H): ICD-10-CM

## 2019-11-17 DIAGNOSIS — N28.9 RENAL INSUFFICIENCY: ICD-10-CM

## 2019-11-17 LAB
ANION GAP SERPL CALCULATED.3IONS-SCNC: 8 MMOL/L (ref 3–14)
BASOPHILS # BLD AUTO: 0 10E9/L (ref 0–0.2)
BASOPHILS NFR BLD AUTO: 0.1 %
BUN SERPL-MCNC: 51 MG/DL (ref 7–30)
CALCIUM SERPL-MCNC: 8.7 MG/DL (ref 8.5–10.1)
CHLORIDE SERPL-SCNC: 99 MMOL/L (ref 94–109)
CO2 SERPL-SCNC: 24 MMOL/L (ref 20–32)
CREAT SERPL-MCNC: 1.82 MG/DL (ref 0.66–1.25)
DIFFERENTIAL METHOD BLD: ABNORMAL
EOSINOPHIL # BLD AUTO: 0 10E9/L (ref 0–0.7)
EOSINOPHIL NFR BLD AUTO: 0.1 %
ERYTHROCYTE [DISTWIDTH] IN BLOOD BY AUTOMATED COUNT: 11.6 % (ref 10–15)
GFR SERPL CREATININE-BSD FRML MDRD: 42 ML/MIN/{1.73_M2}
GLUCOSE BLDC GLUCOMTR-MCNC: 182 MG/DL (ref 70–99)
GLUCOSE BLDC GLUCOMTR-MCNC: 189 MG/DL (ref 70–99)
GLUCOSE BLDC GLUCOMTR-MCNC: 235 MG/DL (ref 70–99)
GLUCOSE BLDC GLUCOMTR-MCNC: 261 MG/DL (ref 70–99)
GLUCOSE SERPL-MCNC: 224 MG/DL (ref 70–99)
GRAM STN SPEC: ABNORMAL
HBA1C MFR BLD: 9.3 % (ref 0–5.6)
HCT VFR BLD AUTO: 29.7 % (ref 40–53)
HGB BLD-MCNC: 10.1 G/DL (ref 13.3–17.7)
IMM GRANULOCYTES # BLD: 0.1 10E9/L (ref 0–0.4)
IMM GRANULOCYTES NFR BLD: 0.3 %
INTERPRETATION ECG - MUSE: NORMAL
LYMPHOCYTES # BLD AUTO: 0.8 10E9/L (ref 0.8–5.3)
LYMPHOCYTES NFR BLD AUTO: 4.4 %
MCH RBC QN AUTO: 28.8 PG (ref 26.5–33)
MCHC RBC AUTO-ENTMCNC: 34 G/DL (ref 31.5–36.5)
MCV RBC AUTO: 85 FL (ref 78–100)
MONOCYTES # BLD AUTO: 1.6 10E9/L (ref 0–1.3)
MONOCYTES NFR BLD AUTO: 8.8 %
NEUTROPHILS # BLD AUTO: 15.9 10E9/L (ref 1.6–8.3)
NEUTROPHILS NFR BLD AUTO: 86.3 %
NRBC # BLD AUTO: 0 10*3/UL
NRBC BLD AUTO-RTO: 0 /100
PLATELET # BLD AUTO: 294 10E9/L (ref 150–450)
POTASSIUM SERPL-SCNC: 3.9 MMOL/L (ref 3.4–5.3)
RBC # BLD AUTO: 3.51 10E12/L (ref 4.4–5.9)
SODIUM SERPL-SCNC: 131 MMOL/L (ref 133–144)
SPECIMEN SOURCE: ABNORMAL
WBC # BLD AUTO: 18.4 10E9/L (ref 4–11)

## 2019-11-17 PROCEDURE — 96365 THER/PROPH/DIAG IV INF INIT: CPT

## 2019-11-17 PROCEDURE — 80048 BASIC METABOLIC PNL TOTAL CA: CPT | Performed by: EMERGENCY MEDICINE

## 2019-11-17 PROCEDURE — A9585 GADOBUTROL INJECTION: HCPCS | Performed by: INTERNAL MEDICINE

## 2019-11-17 PROCEDURE — 88311 DECALCIFY TISSUE: CPT | Performed by: PODIATRIST

## 2019-11-17 PROCEDURE — 25800030 ZZH RX IP 258 OP 636: Performed by: ANESTHESIOLOGY

## 2019-11-17 PROCEDURE — 25000132 ZZH RX MED GY IP 250 OP 250 PS 637: Performed by: PODIATRIST

## 2019-11-17 PROCEDURE — 25000128 H RX IP 250 OP 636: Performed by: EMERGENCY MEDICINE

## 2019-11-17 PROCEDURE — 87076 CULTURE ANAEROBE IDENT EACH: CPT | Performed by: PODIATRIST

## 2019-11-17 PROCEDURE — 25000128 H RX IP 250 OP 636: Performed by: NURSE ANESTHETIST, CERTIFIED REGISTERED

## 2019-11-17 PROCEDURE — 25000125 ZZHC RX 250: Performed by: NURSE ANESTHETIST, CERTIFIED REGISTERED

## 2019-11-17 PROCEDURE — 88305 TISSUE EXAM BY PATHOLOGIST: CPT | Performed by: PODIATRIST

## 2019-11-17 PROCEDURE — 40000985 XR FOOT PORT LT 2 VW: Mod: LT

## 2019-11-17 PROCEDURE — 73660 X-RAY EXAM OF TOE(S): CPT | Mod: LT

## 2019-11-17 PROCEDURE — 25000566 ZZH SEVOFLURANE, EA 15 MIN: Performed by: PODIATRIST

## 2019-11-17 PROCEDURE — 25500064 ZZH RX 255 OP 636: Performed by: INTERNAL MEDICINE

## 2019-11-17 PROCEDURE — 87186 SC STD MICRODIL/AGAR DIL: CPT | Performed by: PODIATRIST

## 2019-11-17 PROCEDURE — 37000009 ZZH ANESTHESIA TECHNICAL FEE, EACH ADDTL 15 MIN: Performed by: PODIATRIST

## 2019-11-17 PROCEDURE — 27110028 ZZH OR GENERAL SUPPLY NON-STERILE: Performed by: PODIATRIST

## 2019-11-17 PROCEDURE — 12000000 ZZH R&B MED SURG/OB

## 2019-11-17 PROCEDURE — 25000131 ZZH RX MED GY IP 250 OP 636 PS 637: Performed by: INTERNAL MEDICINE

## 2019-11-17 PROCEDURE — 25000128 H RX IP 250 OP 636: Performed by: PODIATRIST

## 2019-11-17 PROCEDURE — 25000128 H RX IP 250 OP 636: Performed by: INTERNAL MEDICINE

## 2019-11-17 PROCEDURE — 99285 EMERGENCY DEPT VISIT HI MDM: CPT | Mod: 25

## 2019-11-17 PROCEDURE — 36000058 ZZH SURGERY LEVEL 3 EA 15 ADDTL MIN: Performed by: PODIATRIST

## 2019-11-17 PROCEDURE — 83036 HEMOGLOBIN GLYCOSYLATED A1C: CPT | Performed by: EMERGENCY MEDICINE

## 2019-11-17 PROCEDURE — 71000012 ZZH RECOVERY PHASE 1 LEVEL 1 FIRST HR: Performed by: PODIATRIST

## 2019-11-17 PROCEDURE — 85025 COMPLETE CBC W/AUTO DIFF WBC: CPT | Performed by: EMERGENCY MEDICINE

## 2019-11-17 PROCEDURE — 25800030 ZZH RX IP 258 OP 636: Performed by: NURSE ANESTHETIST, CERTIFIED REGISTERED

## 2019-11-17 PROCEDURE — 25800030 ZZH RX IP 258 OP 636: Performed by: INTERNAL MEDICINE

## 2019-11-17 PROCEDURE — 87070 CULTURE OTHR SPECIMN AEROBIC: CPT | Performed by: PODIATRIST

## 2019-11-17 PROCEDURE — 99223 1ST HOSP IP/OBS HIGH 75: CPT | Mod: 57 | Performed by: PODIATRIST

## 2019-11-17 PROCEDURE — 27210794 ZZH OR GENERAL SUPPLY STERILE: Performed by: PODIATRIST

## 2019-11-17 PROCEDURE — 25000125 ZZHC RX 250: Performed by: PODIATRIST

## 2019-11-17 PROCEDURE — 88305 TISSUE EXAM BY PATHOLOGIST: CPT | Mod: 26 | Performed by: PODIATRIST

## 2019-11-17 PROCEDURE — 37000008 ZZH ANESTHESIA TECHNICAL FEE, 1ST 30 MIN: Performed by: PODIATRIST

## 2019-11-17 PROCEDURE — 28820 AMPUTATION OF TOE: CPT | Mod: TA | Performed by: PODIATRIST

## 2019-11-17 PROCEDURE — 00000146 ZZHCL STATISTIC GLUCOSE BY METER IP

## 2019-11-17 PROCEDURE — 0Y6Q0Z0 DETACHMENT AT LEFT 1ST TOE, COMPLETE, OPEN APPROACH: ICD-10-PCS | Performed by: PODIATRIST

## 2019-11-17 PROCEDURE — 88311 DECALCIFY TISSUE: CPT | Mod: 26 | Performed by: PODIATRIST

## 2019-11-17 PROCEDURE — 25800030 ZZH RX IP 258 OP 636: Performed by: PODIATRIST

## 2019-11-17 PROCEDURE — 25000131 ZZH RX MED GY IP 250 OP 636 PS 637: Performed by: PODIATRIST

## 2019-11-17 PROCEDURE — 25800030 ZZH RX IP 258 OP 636: Performed by: EMERGENCY MEDICINE

## 2019-11-17 PROCEDURE — 87075 CULTR BACTERIA EXCEPT BLOOD: CPT | Performed by: PODIATRIST

## 2019-11-17 PROCEDURE — 71046 X-RAY EXAM CHEST 2 VIEWS: CPT

## 2019-11-17 PROCEDURE — 36000060 ZZH SURGERY LEVEL 3 W FLUORO 1ST 30 MIN: Performed by: PODIATRIST

## 2019-11-17 PROCEDURE — 96375 TX/PRO/DX INJ NEW DRUG ADDON: CPT

## 2019-11-17 PROCEDURE — 99223 1ST HOSP IP/OBS HIGH 75: CPT | Mod: AI | Performed by: INTERNAL MEDICINE

## 2019-11-17 PROCEDURE — 87077 CULTURE AEROBIC IDENTIFY: CPT | Performed by: PODIATRIST

## 2019-11-17 PROCEDURE — 93005 ELECTROCARDIOGRAM TRACING: CPT

## 2019-11-17 PROCEDURE — 40000170 ZZH STATISTIC PRE-PROCEDURE ASSESSMENT II: Performed by: PODIATRIST

## 2019-11-17 PROCEDURE — 87205 SMEAR GRAM STAIN: CPT | Performed by: PODIATRIST

## 2019-11-17 PROCEDURE — 96374 THER/PROPH/DIAG INJ IV PUSH: CPT

## 2019-11-17 PROCEDURE — 73720 MRI LWR EXTREMITY W/O&W/DYE: CPT | Mod: LT

## 2019-11-17 RX ORDER — VANCOMYCIN HYDROCHLORIDE 1 G/200ML
1000 INJECTION, SOLUTION INTRAVENOUS ONCE
Status: COMPLETED | OUTPATIENT
Start: 2019-11-17 | End: 2019-11-17

## 2019-11-17 RX ORDER — HYDRALAZINE HYDROCHLORIDE 20 MG/ML
2.5-5 INJECTION INTRAMUSCULAR; INTRAVENOUS EVERY 10 MIN PRN
Status: DISCONTINUED | OUTPATIENT
Start: 2019-11-17 | End: 2019-11-17 | Stop reason: HOSPADM

## 2019-11-17 RX ORDER — ONDANSETRON 4 MG/1
4 TABLET, ORALLY DISINTEGRATING ORAL EVERY 30 MIN PRN
Status: DISCONTINUED | OUTPATIENT
Start: 2019-11-17 | End: 2019-11-17 | Stop reason: HOSPADM

## 2019-11-17 RX ORDER — FENTANYL CITRATE 50 UG/ML
25-50 INJECTION, SOLUTION INTRAMUSCULAR; INTRAVENOUS
Status: DISCONTINUED | OUTPATIENT
Start: 2019-11-17 | End: 2019-11-17 | Stop reason: HOSPADM

## 2019-11-17 RX ORDER — LIDOCAINE HYDROCHLORIDE 20 MG/ML
INJECTION, SOLUTION INFILTRATION; PERINEURAL PRN
Status: DISCONTINUED | OUTPATIENT
Start: 2019-11-17 | End: 2019-11-17

## 2019-11-17 RX ORDER — NICOTINE POLACRILEX 4 MG
15-30 LOZENGE BUCCAL
Status: DISCONTINUED | OUTPATIENT
Start: 2019-11-17 | End: 2019-11-22 | Stop reason: HOSPADM

## 2019-11-17 RX ORDER — ONDANSETRON 4 MG/1
4 TABLET, ORALLY DISINTEGRATING ORAL EVERY 6 HOURS PRN
Status: DISCONTINUED | OUTPATIENT
Start: 2019-11-17 | End: 2019-11-22 | Stop reason: HOSPADM

## 2019-11-17 RX ORDER — MAGNESIUM HYDROXIDE 1200 MG/15ML
LIQUID ORAL PRN
Status: DISCONTINUED | OUTPATIENT
Start: 2019-11-17 | End: 2019-11-17 | Stop reason: HOSPADM

## 2019-11-17 RX ORDER — ONDANSETRON 2 MG/ML
4 INJECTION INTRAMUSCULAR; INTRAVENOUS ONCE
Status: COMPLETED | OUTPATIENT
Start: 2019-11-17 | End: 2019-11-17

## 2019-11-17 RX ORDER — HYDROMORPHONE HYDROCHLORIDE 1 MG/ML
0.5 INJECTION, SOLUTION INTRAMUSCULAR; INTRAVENOUS; SUBCUTANEOUS ONCE
Status: COMPLETED | OUTPATIENT
Start: 2019-11-17 | End: 2019-11-17

## 2019-11-17 RX ORDER — OXYCODONE HYDROCHLORIDE 5 MG/1
5 TABLET ORAL EVERY 4 HOURS PRN
Status: DISCONTINUED | OUTPATIENT
Start: 2019-11-17 | End: 2019-11-20

## 2019-11-17 RX ORDER — ONDANSETRON 2 MG/ML
INJECTION INTRAMUSCULAR; INTRAVENOUS PRN
Status: DISCONTINUED | OUTPATIENT
Start: 2019-11-17 | End: 2019-11-17

## 2019-11-17 RX ORDER — NALOXONE HYDROCHLORIDE 0.4 MG/ML
.1-.4 INJECTION, SOLUTION INTRAMUSCULAR; INTRAVENOUS; SUBCUTANEOUS
Status: ACTIVE | OUTPATIENT
Start: 2019-11-17 | End: 2019-11-18

## 2019-11-17 RX ORDER — FENTANYL CITRATE 50 UG/ML
INJECTION, SOLUTION INTRAMUSCULAR; INTRAVENOUS PRN
Status: DISCONTINUED | OUTPATIENT
Start: 2019-11-17 | End: 2019-11-17

## 2019-11-17 RX ORDER — LIDOCAINE 40 MG/G
CREAM TOPICAL
Status: DISCONTINUED | OUTPATIENT
Start: 2019-11-17 | End: 2019-11-20

## 2019-11-17 RX ORDER — AMOXICILLIN 250 MG
2 CAPSULE ORAL 2 TIMES DAILY PRN
Status: DISCONTINUED | OUTPATIENT
Start: 2019-11-17 | End: 2019-11-22 | Stop reason: HOSPADM

## 2019-11-17 RX ORDER — LABETALOL HYDROCHLORIDE 5 MG/ML
10 INJECTION, SOLUTION INTRAVENOUS
Status: DISCONTINUED | OUTPATIENT
Start: 2019-11-17 | End: 2019-11-17 | Stop reason: HOSPADM

## 2019-11-17 RX ORDER — ONDANSETRON 2 MG/ML
4 INJECTION INTRAMUSCULAR; INTRAVENOUS EVERY 6 HOURS PRN
Status: DISCONTINUED | OUTPATIENT
Start: 2019-11-17 | End: 2019-11-22 | Stop reason: HOSPADM

## 2019-11-17 RX ORDER — GADOBUTROL 604.72 MG/ML
11 INJECTION INTRAVENOUS ONCE
Status: COMPLETED | OUTPATIENT
Start: 2019-11-17 | End: 2019-11-17

## 2019-11-17 RX ORDER — ONDANSETRON 2 MG/ML
4 INJECTION INTRAMUSCULAR; INTRAVENOUS EVERY 30 MIN PRN
Status: DISCONTINUED | OUTPATIENT
Start: 2019-11-17 | End: 2019-11-17 | Stop reason: HOSPADM

## 2019-11-17 RX ORDER — SODIUM CHLORIDE 9 MG/ML
INJECTION, SOLUTION INTRAVENOUS CONTINUOUS
Status: DISCONTINUED | OUTPATIENT
Start: 2019-11-17 | End: 2019-11-20

## 2019-11-17 RX ORDER — SODIUM CHLORIDE, SODIUM LACTATE, POTASSIUM CHLORIDE, CALCIUM CHLORIDE 600; 310; 30; 20 MG/100ML; MG/100ML; MG/100ML; MG/100ML
INJECTION, SOLUTION INTRAVENOUS CONTINUOUS
Status: DISCONTINUED | OUTPATIENT
Start: 2019-11-17 | End: 2019-11-17 | Stop reason: HOSPADM

## 2019-11-17 RX ORDER — PIPERACILLIN SODIUM, TAZOBACTAM SODIUM 4; .5 G/20ML; G/20ML
4.5 INJECTION, POWDER, LYOPHILIZED, FOR SOLUTION INTRAVENOUS ONCE
Status: COMPLETED | OUTPATIENT
Start: 2019-11-17 | End: 2019-11-17

## 2019-11-17 RX ORDER — DEXTROSE MONOHYDRATE 25 G/50ML
25-50 INJECTION, SOLUTION INTRAVENOUS
Status: DISCONTINUED | OUTPATIENT
Start: 2019-11-17 | End: 2019-11-22 | Stop reason: HOSPADM

## 2019-11-17 RX ORDER — AMOXICILLIN 250 MG
1 CAPSULE ORAL 2 TIMES DAILY PRN
Status: DISCONTINUED | OUTPATIENT
Start: 2019-11-17 | End: 2019-11-22 | Stop reason: HOSPADM

## 2019-11-17 RX ORDER — NALOXONE HYDROCHLORIDE 0.4 MG/ML
.1-.4 INJECTION, SOLUTION INTRAMUSCULAR; INTRAVENOUS; SUBCUTANEOUS
Status: DISCONTINUED | OUTPATIENT
Start: 2019-11-17 | End: 2019-11-20

## 2019-11-17 RX ORDER — PIPERACILLIN SODIUM, TAZOBACTAM SODIUM 3; .375 G/15ML; G/15ML
3.38 INJECTION, POWDER, LYOPHILIZED, FOR SOLUTION INTRAVENOUS EVERY 6 HOURS
Status: DISCONTINUED | OUTPATIENT
Start: 2019-11-17 | End: 2019-11-22 | Stop reason: HOSPADM

## 2019-11-17 RX ORDER — PROPOFOL 10 MG/ML
INJECTION, EMULSION INTRAVENOUS PRN
Status: DISCONTINUED | OUTPATIENT
Start: 2019-11-17 | End: 2019-11-17

## 2019-11-17 RX ORDER — HYDROMORPHONE HYDROCHLORIDE 1 MG/ML
0.3 INJECTION, SOLUTION INTRAMUSCULAR; INTRAVENOUS; SUBCUTANEOUS
Status: DISCONTINUED | OUTPATIENT
Start: 2019-11-17 | End: 2019-11-22 | Stop reason: HOSPADM

## 2019-11-17 RX ORDER — HYDROMORPHONE HYDROCHLORIDE 1 MG/ML
.3-.5 INJECTION, SOLUTION INTRAMUSCULAR; INTRAVENOUS; SUBCUTANEOUS EVERY 5 MIN PRN
Status: DISCONTINUED | OUTPATIENT
Start: 2019-11-17 | End: 2019-11-17 | Stop reason: HOSPADM

## 2019-11-17 RX ORDER — EZETIMIBE 10 MG/1
10 TABLET ORAL DAILY
Status: DISCONTINUED | OUTPATIENT
Start: 2019-11-17 | End: 2019-11-22 | Stop reason: HOSPADM

## 2019-11-17 RX ADMIN — PHENYLEPHRINE HYDROCHLORIDE 100 MCG: 10 INJECTION INTRAVENOUS at 14:23

## 2019-11-17 RX ADMIN — FENTANYL CITRATE 50 MCG: 50 INJECTION, SOLUTION INTRAMUSCULAR; INTRAVENOUS at 13:53

## 2019-11-17 RX ADMIN — PHENYLEPHRINE HYDROCHLORIDE 50 MCG: 10 INJECTION INTRAVENOUS at 14:11

## 2019-11-17 RX ADMIN — PROPOFOL 200 MG: 10 INJECTION, EMULSION INTRAVENOUS at 13:50

## 2019-11-17 RX ADMIN — FENTANYL CITRATE 25 MCG: 50 INJECTION, SOLUTION INTRAMUSCULAR; INTRAVENOUS at 14:08

## 2019-11-17 RX ADMIN — GADOBUTROL 11 ML: 604.72 INJECTION INTRAVENOUS at 20:31

## 2019-11-17 RX ADMIN — PROPOFOL 50 MG: 10 INJECTION, EMULSION INTRAVENOUS at 13:58

## 2019-11-17 RX ADMIN — PHENYLEPHRINE HYDROCHLORIDE 100 MCG: 10 INJECTION INTRAVENOUS at 14:28

## 2019-11-17 RX ADMIN — MIDAZOLAM 2 MG: 1 INJECTION INTRAMUSCULAR; INTRAVENOUS at 13:45

## 2019-11-17 RX ADMIN — FENTANYL CITRATE 50 MCG: 50 INJECTION, SOLUTION INTRAMUSCULAR; INTRAVENOUS at 13:49

## 2019-11-17 RX ADMIN — ONDANSETRON 4 MG: 2 INJECTION INTRAMUSCULAR; INTRAVENOUS at 09:55

## 2019-11-17 RX ADMIN — LIDOCAINE HYDROCHLORIDE 100 MG: 20 INJECTION, SOLUTION INFILTRATION; PERINEURAL at 13:49

## 2019-11-17 RX ADMIN — SODIUM CHLORIDE 1000 ML: 9 INJECTION, SOLUTION INTRAVENOUS at 09:54

## 2019-11-17 RX ADMIN — PIPERACILLIN SODIUM AND TAZOBACTAM SODIUM 3.38 G: 3; .375 INJECTION, POWDER, LYOPHILIZED, FOR SOLUTION INTRAVENOUS at 14:00

## 2019-11-17 RX ADMIN — ONDANSETRON 4 MG: 2 INJECTION INTRAMUSCULAR; INTRAVENOUS at 14:31

## 2019-11-17 RX ADMIN — INSULIN ASPART 1 UNITS: 100 INJECTION, SOLUTION INTRAVENOUS; SUBCUTANEOUS at 17:47

## 2019-11-17 RX ADMIN — INSULIN ASPART 2 UNITS: 100 INJECTION, SOLUTION INTRAVENOUS; SUBCUTANEOUS at 12:15

## 2019-11-17 RX ADMIN — PIPERACILLIN SODIUM AND TAZOBACTAM SODIUM 4.5 G: 4; .5 INJECTION, POWDER, LYOPHILIZED, FOR SOLUTION INTRAVENOUS at 09:58

## 2019-11-17 RX ADMIN — SODIUM CHLORIDE, POTASSIUM CHLORIDE, SODIUM LACTATE AND CALCIUM CHLORIDE: 600; 310; 30; 20 INJECTION, SOLUTION INTRAVENOUS at 13:35

## 2019-11-17 RX ADMIN — HYDROMORPHONE HYDROCHLORIDE 0.5 MG: 1 INJECTION, SOLUTION INTRAMUSCULAR; INTRAVENOUS; SUBCUTANEOUS at 09:55

## 2019-11-17 RX ADMIN — OXYCODONE HYDROCHLORIDE 5 MG: 5 TABLET ORAL at 16:10

## 2019-11-17 RX ADMIN — SODIUM CHLORIDE 500 ML: 9 INJECTION, SOLUTION INTRAVENOUS at 12:16

## 2019-11-17 RX ADMIN — PHENYLEPHRINE HYDROCHLORIDE 100 MCG: 10 INJECTION INTRAVENOUS at 14:14

## 2019-11-17 RX ADMIN — INSULIN GLARGINE 30 UNITS: 100 INJECTION, SOLUTION SUBCUTANEOUS at 22:24

## 2019-11-17 RX ADMIN — SODIUM CHLORIDE: 9 INJECTION, SOLUTION INTRAVENOUS at 16:16

## 2019-11-17 RX ADMIN — FENTANYL CITRATE 25 MCG: 50 INJECTION, SOLUTION INTRAMUSCULAR; INTRAVENOUS at 14:25

## 2019-11-17 RX ADMIN — PIPERACILLIN SODIUM AND TAZOBACTAM SODIUM 3.38 G: 3; .375 INJECTION, POWDER, LYOPHILIZED, FOR SOLUTION INTRAVENOUS at 22:24

## 2019-11-17 RX ADMIN — VANCOMYCIN HYDROCHLORIDE 1500 MG: 5 INJECTION, POWDER, LYOPHILIZED, FOR SOLUTION INTRAVENOUS at 20:46

## 2019-11-17 RX ADMIN — PIPERACILLIN SODIUM AND TAZOBACTAM SODIUM 3.38 G: 3; .375 INJECTION, POWDER, LYOPHILIZED, FOR SOLUTION INTRAVENOUS at 16:10

## 2019-11-17 RX ADMIN — VANCOMYCIN HYDROCHLORIDE 1000 MG: 1 INJECTION, SOLUTION INTRAVENOUS at 10:40

## 2019-11-17 RX ADMIN — PHENYLEPHRINE HYDROCHLORIDE 100 MCG: 10 INJECTION INTRAVENOUS at 14:22

## 2019-11-17 RX ADMIN — PHENYLEPHRINE HYDROCHLORIDE 50 MCG: 10 INJECTION INTRAVENOUS at 14:09

## 2019-11-17 ASSESSMENT — ENCOUNTER SYMPTOMS
WOUND: 1
DYSRHYTHMIAS: 0

## 2019-11-17 ASSESSMENT — LIFESTYLE VARIABLES: TOBACCO_USE: 1

## 2019-11-17 ASSESSMENT — ACTIVITIES OF DAILY LIVING (ADL)
ADLS_ACUITY_SCORE: 12

## 2019-11-17 ASSESSMENT — MIFFLIN-ST. JEOR: SCORE: 2053.74

## 2019-11-17 NOTE — H&P
[facility]  Hospitalist History and Physical    Name: Ry Horner    MRN: [id number]  YOB: 1965    Age: 54 year old  Date of Admission:  11/17/2019  Physician:  Carlos Enrique Dennis DO, [facility]    Assessment & Plan   Ry Horner is a 54 year old Diabetic male who presented to [facility] ER with worsening left foot redness and swelling.    Left foot/great toe diabetic foot infection, concern for osteomyelitis:  -  X-ray suggests osteomyelitis.  Zosyn, Vancomycin IV started in ER.  -  I did not obtain blood cultures as patient had already started on the IV abx at admission in the ER.  If he has fevers I would obtain blood cultures.  -  Podiatry consult  -  Pain control  -  NPO for now until Podiatry plan clear, if no plan for intervention today he can have a diet until midnight.  -  From pre-op perspective, he denies any recent chest pain or prior major cardiac problems.  No chronic pulm problems reported.  He denies prior anesthesia issues.  EKG NSR.  Appears reasonably optimized for surgery.  While he has a cough, it is dry and CXR not consistent with a pneumonia.    Diabetes mellitus, last A1C 7.5 July 2019 but he reports more uncontrolled this Fall:  -  Check A1C  -  NPO sliding scale insulin, hold oral diabetic meds  -  Reduced bedtime lantus to 30 units given probable NPO plan.  If no surgery planned tomorrow and he does not need to be NPO the dose can be increased back toward his home dose.  -  Hold ASA until surgical plan clear    Hypertension with lower normal range BP's currently:  -  Hold anti-hypertensive's with current BP and JOHNATHAN.  -  Patient to have another IVF bolus    JOHNATHAN superimposed on CKD, likely dehydration:  -  Hold ACEI  -  Obtain UA  -  IVF as above, recheck lab in AM    Dyslipidemia:  -  Zetia  -  Intolerant to statins    DVT Prophylaxis: Pneumatic Compression Devices  Code Status: Full Code    Disposition: Expected discharge in 2-3 days once infection treated.    Primary Care  "Physician   Kody Wing, [phone]    Chief Complaint   Left foot swelling/redness    History is obtained from the patient.  I also spoke with the ER provider about the history.     History of Present Illness   Ry Horner is a 54 year old male who presents with worsening left foot pain/redness.  He reports 2-3 weeks ago he noticed a \"blister\" on the 1st toe.  He treated this with neosporin.  A few days ago the toe dramatically started swelling and he had more erythema.  He presented to a local clinic Friday and was noted to have a temperature of 102.  He was given Cipro and clindamycin and asked to follow-up with his podiatrist.  His toe swelling and foot erythema worsened over the past 24 hours leading him to present to the ER.  He denies a fever this AM.  No chest pain, sob, nausea.  He has had a dry cough for a couple weeks he blames on a post-nasal drip.  No other acute complaints.    Past Medical History    Past Medical History:   Diagnosis Date     BENIGN HYPERTENSION 4/4/2007     DIABETES MELLITUS TYPE II-UNCOMPL 4/4/2007     HYPERLIPIDEMIA NEC/NOS 4/4/2007     Tobacco use disorder 4/4/2007    (Not currently smoking)  Prior RLE infection, BKA      Past Surgical History   Past Surgical History:   Procedure Laterality Date     AMPUTATE LEG BELOW KNEE Right 9/1/2017    Procedure: AMPUTATE LEG BELOW KNEE;  RIGHT BELOW KNEE AMPUTATION ;  Surgeon: Nikolas Nascimento MD;  Location: [facility] OR     APPENDECTOMY       APPLY WOUND VAC Right 3/2/2015    Procedure: APPLY WOUND VAC;  Surgeon: Milena Cevallos DPM, Pod;  Location: [facility] OR     BIOPSY BONE FOOT Right 7/15/2016    Procedure: BIOPSY BONE FOOT;  Surgeon: Tim Douglas DPM;  Location: [facility] OR     IRRIGATION AND DEBRIDEMENT FOOT, COMBINED Right 3/2/2015    Procedure: COMBINED IRRIGATION AND DEBRIDEMENT FOOT;  Surgeon: Milena Cevallos DPM, Pod;  Location: [facility] OR     IRRIGATION AND DEBRIDEMENT FOOT, COMBINED Right 7/15/2016    Procedure: COMBINED IRRIGATION " AND DEBRIDEMENT FOOT;  Surgeon: Tim Douglas DPM;  Location: [facility] OR     IRRIGATION AND DEBRIDEMENT FOOT, COMBINED Right 7/20/2016    Procedure: COMBINED IRRIGATION AND DEBRIDEMENT FOOT;  Surgeon: Tim Douglas DPM;  Location:  OR     ORTHOPEDIC SURGERY          Prior to Admission Medications   Prior to Admission Medications   Prescriptions Last Dose Informant Patient Reported? Taking?   ACCU-CHEK JAYNA PLUS test strip   No No   Sig: TEST BLOOD SUGARS 2 TO 3 TIMES DAILY OR AS DIRECTED   BD MEGAN U/F 32G X 4 MM insulin pen needle   No No   Sig: USE AS DIRECTED WITH NEEMA (1 BOX = 100 DAYS) *DUE TO BE SEEN*   STATIN NOT PRESCRIBED (INTENTIONAL)   No No   Sig: Please choose reason not prescribed, below   TRADJENTA 5 MG TABS tablet   No No   Sig: TAKE ONE TABLET BY MOUTH EVERY DAY   amLODIPine (NORVASC) 5 MG tablet   No No   Sig: Take 1 tablet (5 mg) by mouth daily   aspirin 81 MG chewable tablet   Yes No   Sig: Take 1 tablet (81 mg) by mouth daily   blood glucose (NO BRAND SPECIFIED) lancets standard   No No   Sig: Use to test blood sugar 2-3 times daily or as directed.   blood glucose monitoring (NO BRAND SPECIFIED) meter device kit   No No   Sig: Use to test blood sugar 2-3 times daily or as directed.   ciprofloxacin (CIPRO) 750 MG tablet   No No   Sig: Take 1 tablet (750 mg) by mouth 2 times daily for 10 days   clindamycin (CLEOCIN) 300 MG capsule   No No   Sig: Take 1 capsule (300 mg) by mouth 3 times daily for 10 days   ezetimibe (ZETIA) 10 MG tablet   No No   Sig: Take 1 tablet (10 mg) by mouth daily   glipiZIDE (GLUCOTROL) 5 MG tablet   No No   Sig: TAKE ONE TABLET BY MOUTH TWICE A DAY BEFORE MEALS   hydrochlorothiazide (HYDRODIURIL) 12.5 MG tablet   No No   Sig: TAKE ONE TABLET BY MOUTH EVERY DAY   insulin glargine (LANTUS SOLOSTAR) 100 UNIT/ML pen   No No   Sig: Inject 50 Units Subcutaneous At Bedtime   ipratropium (ATROVENT) 0.06 % spray   No No   Sig: Spray 2 sprays into both nostrils 4 times  "daily as needed for rhinitis   lisinopril (PRINIVIL/ZESTRIL) 40 MG tablet   No No   Sig: Take 1 tablet (40 mg) by mouth daily   metFORMIN (GLUCOPHAGE) 1000 MG tablet   No No   Sig: Take 1 tablet (1,000 mg) by mouth 2 times daily (with meals)      Facility-Administered Medications: None     Allergies   Allergies   Allergen Reactions     Pravastatin      \"sucked the life blood out of me\"       Social History   Social History     Tobacco Use     Smoking status: Former Smoker     Packs/day: 0.50     Years: 20.00     Pack years: 10.00     Types: Cigarettes     Smokeless tobacco: Never Used   Substance Use Topics     Alcohol use: Yes     Comment: 3-4 drinks per month   Quit smoking over a year ago.  Drinks 3 alcoholic drinks per month.  Works as a .      Family History   I have reviewed this patient's family history and updated it with pertinent information if needed.   Family History   Problem Relation Age of Onset     Genetic Disorder Other      Genetic Disorder Other      Psychotic Disorder Mother      Diabetes Father      Lung Cancer Father      Genetic Disorder Maternal Grandmother      Genetic Disorder Maternal Grandfather      Asthma Sister      C.A.D. No family hx of      Hypertension No family hx of      Cerebrovascular Disease No family hx of      Breast Cancer No family hx of      Cancer - colorectal No family hx of      Prostate Cancer No family hx of      Alcohol/Drug No family hx of    Polycystic kidneys runs in his family, but he reports he does not have them.    Review of Systems   A Comprehensive greater than 10 system review of systems was carried out.  Pertinent positives and negatives are noted above.  Otherwise negative for contributory information.    Physical Exam   Temp: 98.5  F (36.9  C) Temp src: Oral BP: 104/66 Pulse: 93   Resp: 18 SpO2: 95 % O2 Device: None (Room air)    Vital Signs with Ranges  Temp:  [97.7  F (36.5  C)-98.5  F (36.9  C)] 98.5  F (36.9  C)  Pulse:  [] " 93  Resp:  [18] 18  BP: (103-113)/(66-75) 104/66  SpO2:  [94 %-99 %] 95 %  257 lbs 0 oz    GEN:  Alert, oriented x 3, appears ill but comfortable, no overt distress  HEENT:  Normocephalic/atraumatic, no scleral icterus, no nasal discharge, mouth moist.  CV:  Regular rate and rhythm, no murmur or JVD.  S1 + S2 noted, no S3 or S4.  LUNGS:  Clear to auscultation bilaterally without rales/rhonchi/wheezing/retractions.  Symmetric chest rise on inhalation noted.  ABD:  Active bowel sounds, soft, non-tender/non-distended.  No rebound/guarding/rigidity.  EXT:  Trace LE edema except +1 left foot.  No cyanosis.  Left foot and 1st toe swollen.        SKIN:  Dry to touch, no exanthems noted in the visualized areas outside the left foot.  NEURO:  Moves extremities well on general exam, sensation to touch grossly intact though appears to have some decreased sensation in general to the feet.  No new focal deficits appreciated.    Data   Data reviewed today:  I personally reviewed the EKG tracing showing NSR, no marked ST elevation or depression and the chest x-ray image(s) showing No acute infiltrate.    Recent Labs   Lab 11/17/19  0856   WBC 18.4*   HGB 10.1*   HCT 29.7*   MCV 85          Recent Labs   Lab 11/17/19  0856   *   POTASSIUM 3.9   CHLORIDE 99   CO2 24   ANIONGAP 8   *   BUN 51*   CR 1.82*   GFRESTIMATED 42*   GFRESTBLACK 48*   FERNANDO 8.7       Recent Results (from the past 24 hour(s))   XR Toe Left G/E 2 Views    Narrative    Examination:  XR TOE LT G/E 2 VW  Date:  11/17/2019 9:16 AM     Clinical Information: Foot wound and infection.   Comparison: None.      Impression    Impression: Acute left first toe proximal phalanx head fracture and  fragmentation; in the setting of soft tissue infection, this  represents osteomyelitis until proven otherwise. There is moderate  first toe soft tissue swelling as well as subcutaneous emphysema  (either related to open wound or gas-forming infection). The first  toe  and foot is otherwise unremarkable.    RODRIGUE REID MD   XR Chest 2 Views    Narrative    CHEST TWO VIEWS  11/17/2019 10:12 AM     HISTORY: Cough, pre-op.    COMPARISON: None.      Impression    IMPRESSION: No acute cardiopulmonary disease.

## 2019-11-17 NOTE — PHARMACY-VANCOMYCIN DOSING SERVICE
Pharmacy Vancomycin Initial Note  Date of Service 2019  Patient's  1965  54 year old, male    Indication: Skin and Soft Tissue Infection    Current estimated CrCl = Estimated Creatinine Clearance: 62.6 mL/min (A) (based on SCr of 1.82 mg/dL (H)).    Creatinine for last 3 days  2019:  8:56 AM Creatinine 1.82 mg/dL    Recent Vancomycin Level(s) for last 3 days  No results found for requested labs within last 72 hours.      Vancomycin IV Administrations (past 72 hours)                   vancomycin (VANCOCIN) 1000 mg in dextrose 5% 200 mL PREMIX (mg) 1,000 mg New Bag 19 1040                Nephrotoxins and other renal medications (From now, onward)    Start     Dose/Rate Route Frequency Ordered Stop    19 1800  vancomycin 1500 mg in 0.9% NaCl 250 ml intermittent infusion 1,500 mg      1,500 mg  over 90 Minutes Intravenous EVERY 12 HOURS 19 1117      19 1600  piperacillin-tazobactam (ZOSYN) 3.375 g vial to attach to  mL bag      3.375 g  over 30 Minutes Intravenous EVERY 6 HOURS 19 1105            Contrast Orders - past 72 hours (72h ago, onward)    None                Plan:  1.  Start vancomycin  1500 mg IV q12h.   2.  Goal Trough Level: 10-15 mg/L   3.  Pharmacy will check trough levels as appropriate in 1-3 Days.    4. Serum creatinine levels will be ordered daily for the first week of therapy and at least twice weekly for subsequent weeks.    5. Cordova method utilized to dose vancomycin therapy: Method 1    Bianka Ware [facility]

## 2019-11-17 NOTE — ANESTHESIA PREPROCEDURE EVALUATION
Anesthesia Pre-Procedure Evaluation    Patient: Ry Horner   MRN: [id number] : 1965          Preoperative Diagnosis: * No pre-op diagnosis entered *    Procedure(s):  LEFT PARTIAL FOOT AMPUTATION  (SAGITAL SAW; MINI C-ARM)    Past Medical History:   Diagnosis Date     BENIGN HYPERTENSION 2007     DIABETES MELLITUS TYPE II-UNCOMPL 2007     HYPERLIPIDEMIA NEC/NOS 2007     Tobacco use disorder 2007     Past Surgical History:   Procedure Laterality Date     AMPUTATE LEG BELOW KNEE Right 2017    Procedure: AMPUTATE LEG BELOW KNEE;  RIGHT BELOW KNEE AMPUTATION ;  Surgeon: Nikolas Nascimento MD;  Location: [facility] OR     APPENDECTOMY       APPLY WOUND VAC Right 3/2/2015    Procedure: APPLY WOUND VAC;  Surgeon: Milena Cevallos DPM, Pod;  Location: [facility] OR     BIOPSY BONE FOOT Right 7/15/2016    Procedure: BIOPSY BONE FOOT;  Surgeon: Tim Douglas DPM;  Location: [facility] OR     IRRIGATION AND DEBRIDEMENT FOOT, COMBINED Right 3/2/2015    Procedure: COMBINED IRRIGATION AND DEBRIDEMENT FOOT;  Surgeon: Milena Cevallos DPM, Pod;  Location: [facility] OR     IRRIGATION AND DEBRIDEMENT FOOT, COMBINED Right 7/15/2016    Procedure: COMBINED IRRIGATION AND DEBRIDEMENT FOOT;  Surgeon: Tim Douglas DPM;  Location: [facility] OR     IRRIGATION AND DEBRIDEMENT FOOT, COMBINED Right 2016    Procedure: COMBINED IRRIGATION AND DEBRIDEMENT FOOT;  Surgeon: Tim Douglas DPM;  Location:  OR     ORTHOPEDIC SURGERY         Anesthesia Evaluation     . Pt has had prior anesthetic. Type: General    No history of anesthetic complications          ROS/MED HX    ENT/Pulmonary:  - neg pulmonary ROS   (+)NITESH risk factors hypertension, obese, daytime somnolence, tobacco use, Past use , . .    Neurologic:     (+)neuropathy     Cardiovascular:     (+) Dyslipidemia, hypertension----. : . . TORRES, . :. . Previous cardiac testing date:results:date: results:ECG reviewed date:19 results:NSR date: results:         (-) CAD and  arrhythmias   METS/Exercise Tolerance:  1 - Eating, dressing   Hematologic:         Musculoskeletal:   (+)  other musculoskeletal- foot ulcer      GI/Hepatic:         Renal/Genitourinary:     (+) chronic renal disease, type: CRI,       Endo:     (+) type II DM Not using insulin Obesity, .   (-) Type I DM   Psychiatric:  - neg psychiatric ROS      (-) psychiatric history   Infectious Disease:   (+) Other Infectious Disease Osteo      Malignancy:         Other:    (+) No chance of pregnancy C-spine cleared: N/A, no H/O Chronic Pain,no other significant disability                         Physical Exam  Normal systems: dental    Airway   Mallampati: II  TM distance: >3 FB  Neck ROM: full    Dental     Cardiovascular   Rhythm and rate: regular and normal      Pulmonary    breath sounds clear to auscultation            Lab Results   Component Value Date    WBC 18.4 (H) 11/17/2019    HGB 10.1 (L) 11/17/2019    HCT 29.7 (L) 11/17/2019     11/17/2019    CRP 85.1 (H) 08/28/2017     (H) 08/28/2017     (L) 11/17/2019    POTASSIUM 3.9 11/17/2019    CHLORIDE 99 11/17/2019    CO2 24 11/17/2019    BUN 51 (H) 11/17/2019    CR 1.82 (H) 11/17/2019     (H) 11/17/2019    FERNANDO 8.7 11/17/2019    ALT 40 04/04/2007    AST 23 04/04/2007    TSH 1.06 06/02/2017       Preop Vitals  BP Readings from Last 3 Encounters:   11/17/19 104/66   11/15/19 (!) 146/73   09/19/19 (!) 146/95    Pulse Readings from Last 3 Encounters:   11/17/19 93   11/15/19 130   09/19/19 107      Resp Readings from Last 3 Encounters:   11/17/19 18   09/14/17 18   09/03/17 16    SpO2 Readings from Last 3 Encounters:   11/17/19 95%   11/15/19 97%   09/19/19 97%      Temp Readings from Last 1 Encounters:   11/17/19 36.9  C (98.5  F) (Oral)    Ht Readings from Last 1 Encounters:   11/17/19 1.829 m (6')      Wt Readings from Last 1 Encounters:   11/17/19 116.6 kg (257 lb)    Estimated body mass index is 34.86 kg/m  as calculated from the following:     Height as of this encounter: 1.829 m (6').    Weight as of this encounter: 116.6 kg (257 lb).       Anesthesia Plan      History & Physical Review  History and physical reviewed and following examination; no interval change.    ASA Status:  3 .        Plan for General and LMA with Intravenous and Propofol induction. Maintenance will be Balanced.    PONV prophylaxis:  Ondansetron (or other 5HT-3)       Postoperative Care  Postoperative pain management:  IV analgesics and Multi-modal analgesia.      Consents  Anesthetic plan, risks, benefits and alternatives discussed with:  Patient or representative and Patient..                 James Evans MD

## 2019-11-17 NOTE — ED PROVIDER NOTES
History     Chief Complaint:  Wound Check    HPI   Ry Horner is a 54 year old male with a history of diabetes mellitus type 2 who presents with wound check status post right below knee amputation in 2017. The patient reports that he had a blister develop on his left great toe two weeks ago. He states that on 11/13, it started to look significantly worse. He notes that between 11/13-11/15, his great toe had doubled in size along with an odor and pain. He states he went to Urgent Care on 11/15 where he was prescribed Cipro and Cleocin. He comes in today due to increased swelling and pain despite taking antibiotics as prescribed.     Allergies:  Pravastatin    Medications:    Cipro  Cleocin  Glucotrol  Hydrodiuril  Glucophage  Norvasc  Zetia  Lisinopril  Aspirin 81 mg    Past Medical History:    Hypertension  Diabetes mellitus type 2   Hyperlipidemia  Tobacco use disorder    Past Surgical History:    Amputate leg below knee (R)  Appendectomy  Apply wound vac  Irrigation and debridement foot (R) (x3)  Orthopedic surgery    Family History:    Psychotic disorder (Mother)  Diabetes (Father)  Lung Cancer (Father)  Asthma (Sister)    Social History:  Smoking status: Former  Alcohol use: Yes, 3-4 drinks/month  Drug use: No  PCP: Kody Wing  Presents to the ED with wife  Marital Status:   [2]    Review of Systems   Skin: Positive for wound.   All other systems reviewed and are negative.        Physical Exam     Patient Vitals for the past 24 hrs:   BP Temp Temp src Pulse Resp SpO2 Height Weight   11/17/19 1104 104/66 98.5  F (36.9  C) Oral 93 18 95 % -- --   11/17/19 1048 -- -- -- -- -- 95 % -- --   11/17/19 1029 103/67 -- -- 96 -- 94 % -- --   11/17/19 0838 113/75 97.7  F (36.5  C) Oral 100 18 99 % 1.829 m (6') 116.6 kg (257 lb)     Physical Exam  GENERAL: well developed, pleasant  HEAD: atraumatic  EYES: pupils reactive, extraocular muscles intact, conjunctivae normal  ENT:  mucus membranes moist  NECK:   trachea midline, normal range of motion  RESPIRATORY: no tachypnea, breath sounds clear to auscultation   CVS: normal S1/S2, no murmurs, intact distal pulses  ABDOMEN: soft, nontender, nondistention  MUSCULOSKELETAL: wound as noted in photos with odor. Prior amputation below the knee on the right  SKIN: warm and dry, no acute rashes or ulceration  NEURO: GCS 15, cranial nerves intact, alert and oriented x3  PSYCH:  Mood/affect normal                Emergency Department Course   ECG (09:58:01):  Rate 97 bpm. AL interval 158. QRS duration 70. QT/QTc 340/431. P-R-T axes 39 26 32. Sinus rhythm. Normal ECG.  Interpreted at 1000 by Ez Jones MD.     Imaging:  Radiographic findings were communicated with the patient who voiced understanding of the findings.    XR Toe Left G/E 2 Views  Acute left first toe proximal phalanx head fracture and  fragmentation; in the setting of soft tissue infection, this  represents osteomyelitis until proven otherwise. There is moderate  first toe soft tissue swelling as well as subcutaneous emphysema  (either related to open wound or gas-forming infection). The first toe  and foot is otherwise unremarkable.  As read by Radiology.    XR Chest 2 Views  No acute cardiopulmonary disease.  As read by Radiology.     Laboratory:  CBC: WBC 18.4 (H), HGB 10.1 (L) o/w WNL ()  BMP:  (L), Glucose 224 (H), BUN 51 (H), Creatinine 1.82 (H), GFR 42 (L) o/w WNL  Hemoglobin A1c: Pending    Procedures:  None    Interventions:  0954: NS 1L IV Bolus   0958: Zosyn 4.5 g IV Infusion  0955: Zofran 4 mg IV  0955: Dilaudid 0.5 mg IV  1040: Vancocin 1000 mg in 5% dextrose 200 mL mL IV Infusion    Emergency Department Course:  Past medical records, nursing notes, and vitals reviewed.  0846: I performed an exam of the patient and obtained history, as documented above.  EKG performed, results above.  IV inserted and blood drawn.  The patient was sent for a left toe x-ray and a chest x-ray while in the  emergency department, findings above.    0937: Discussed the patient with Dr. Dennis, who will admit the patient to an othropedic bed for further monitoring, evaluation, and treatment.     0939: Findings and plan explained to the Patient and spouse who consents to admission.       0951: I performed a doppler pulse.    Impression & Plan    Medical Decision Making:    Patient presents with wound check with great toe symptoms.  Pictures are noted as above.  He has been on clindamycin and Cipro and notes rapidly worsening symptoms over the last few days with a strong odor.  Physical exam is highly concerning for osteomyelitis and x-ray and labs confirm this.  Patient was given antibiotics and spoke with the hospitalist.  Attempted calling orthopedic surgery several times and patient went upstairs.  Hospitalist notes that he will contact orthopedics.  Patient is also had a mild cough recently and looks dehydrated he was given IV fluids and chest x-ray was added on as well.  Patient has a bounding pulse with Doppler on the dorsalis pedis.    Diagnosis:    ICD-10-CM    1. Osteomyelitis of left foot, unspecified type (H) M86.9 Hemoglobin A1c     Hemoglobin A1c     CANCELED: Hemoglobin A1c   2. Renal insufficiency N28.9      Disposition:  Admitted to ortho    Naeem Hansen  11/17/2019    EMERGENCY DEPARTMENT  Naeem PATEL, am serving as a scribe at 8:46 AM on 11/17/2019 to document services personally performed by Song Jones MD based on my observations and the provider's statements to me.      Song Jones MD  11/17/19 5178

## 2019-11-17 NOTE — BRIEF OP NOTE
[facility]    Brief Operative Note    Pre-operative diagnosis: Gas gangrene L foot  Post-operative diagnosis Same as pre-operative diagnosis    Procedure: Procedure(s):  LEFT PARTIAL FOOT AMPUTATION  Surgeon: Surgeon(s) and Role:     * Antoine Pena DPM - Primary  Anesthesia: General   Estimated blood loss: Less than 50 ml  Drains: None  Specimens:   ID Type Source Tests Collected by Time Destination   1 : left wound tissue Tissue Foot, Left ANAEROBIC BACTERIAL CULTURE Antoine Pena DPM 11/17/2019  2:11 PM    2 : left foot wound tissue Tissue Foot, Left ANAEROBIC BACTERIAL CULTURE, GRAM STAIN Antoine Pena DPM 11/17/2019  2:14 PM    3 : left 1st toe bone Bone Toe ANAEROBIC BACTERIAL CULTURE Antoine Pena DPM 11/17/2019  2:18 PM    4 : left 1st toe bone Tissue Toe TISSUE CULTURE AEROBIC BACTERIAL Antoine Pena DPM 11/17/2019  2:19 PM    A : left great toe Tissue Toe SURGICAL PATHOLOGY EXAM Antoine Pena DPM 11/17/2019  2:21 PM      Findings:   None.  Complications: None.  Implants: * No implants in log *

## 2019-11-17 NOTE — OP NOTE
Procedure Date: 11/17/2019      STAFF SURGEON:  Antoine ePna DPM.      PREOPERATIVE DIAGNOSES:   1.  Gas gangrene of left foot.   2.  Osteomyelitis of left great toe.   3.  Diabetes mellitus with peripheral polyneuropathy.      POSTOPERATIVE DIAGNOSES:     1.  Gas gangrene of left foot.   2.  Osteomyelitis of left great toe.   3.  Diabetes mellitus with peripheral polyneuropathy.      PROCEDURE:  Left hallux amputation with irrigation and debridement.      ANESTHESIA:  General.      HEMOSTASIS:  Electrocautery.      ESTIMATED BLOOD LOSS:  20 mL.      SPECIMENS:   1.  Left foot deep wound tissue for culture, aerobic, anaerobic and Gram stain.   2.  Left first toe bone for culture, aerobic and anaerobic.   3.  Left first toe for pathology, gross and micro.      INDICATIONS:  This is a 54-year-old diabetic male who was admitted to [facility] with a severely infected left foot.  The left great toe was clearly nonviable and necrotic with wet black tissue.  There is a medial soft tissue ulcer on the great toe that probes to bone.  X-ray examination shows gas in the first toe with a pathologic fracture of the first toe indicative of osteomyelitis.  Emergent partial foot amputation was recommended to control this severe limb-threatening infection.  The patient agreed.  Risks versus benefit discussed at length prior.  Risks include but are not limited to infection, open wound, delayed wound healing, need for additional surgery, further amputation, blood clot, loss of function.      DESCRIPTION OF PROCEDURE:  All questions were answered to the patient's satisfaction.  Consent form was signed and placed in chart.  The patient was brought into the operating room by the anesthesia team and placed supine on the table.  After general sedation was administered, foot was prepped and draped in the usual sterile fashion.      Attention was directed to the left foot.  Full-thickness incision was carried out with a #15  blade directly to bone circumferentially around the great toe proximal to any clearly necrotic and nonviable tissue.  The hallux was disarticulated at the metatarsophalangeal joint.  Upon incising the joint, purulent drainage was noted from within the joint.  Toe was placed on the back table and specimens were retrieved as above.  The surgical site was copiously lavaged with sterile saline.  Further sharp excisional debridement was carried out with a #15 blade and rongeur to what appeared to be healthy bleeding tissue.  There was some necrotic tissue also into the first webspace, which was excisionally debrided with a rongeur to healthy bleeding tissue.  Debridement was up to and including fascia.  No further purulence or clearly nonviable tissue was noted at the end of the procedure.  After hemostasis with electrocautery, sterile saline irrigation was again performed, packing with iodoform gauze into the first webspace.  The head of the first metatarsal and sesamoids appeared viable at this time, so I did not resect these.  Sterile dressing was applied.      The patient was awakened from anesthesia and returned to the PACU with vital signs stable and vascular status intact.  He tolerated the procedure and anesthesia well.      PLAN:  The patient will be returned to the inpatient floor.  He will continue on IV antibiotics.  I expect the patient will likely need repeat irrigation and debridement in 2 days' time.  The patient will also need revision partial foot amputation at some point for hopeful delayed primary closure, but even with this the wound may not be closable.  This would involve partial first ray amputation.  I am planning a postoperative MRI to evaluate for any proximal osteomyelitis.  I am also ordering ABIs to evaluate for significant peripheral vascular disease.  The patient was bleeding intraoperatively, but bleeding was less than optimal.  He may need a Vascular Surgery consultation pending  findings.  Our service will continue to follow.         TAYE BELLAMY DPM             D: 2019   T: 2019   MT: [id number]      Name:     YNES YBARRA   MRN:      [id number]        Account:        [id number]   :      1965           Procedure Date: 2019      Document: [id number]

## 2019-11-17 NOTE — CONSULTS
[facility]ATION  November 17, 2019      ASSESSMENT:   L foot gas gangrene, osteomyelitis  DM II with peripheral polyneuropathy     PLAN:  Reviewed patient's chart in epic.  Discussed condition and treatment options including pros and cons.    Pt has a severe gas forming infection with evidence of osteomyelitis.    Advised OR today emergently for L partial foot amputation.  Pt agrees.  Expect pt will need multiple surgeries.    Discussed surgery today would entail at least 1st toe amputation, possibly partial 1st ray.  Level of amputation pending intraop findings.    Potential risks associated with surgery include but are not limited to infection, continued open wound at the surgical site, nonhealing wound, ulceration, bleeding, repeat surgery or amputation and blood clot.       Discussed with hospitalist.    Will likely plan post op MRI to assess for more proximal osteomyelitis, also ABIs to assess for PAD.  Severity of infection necessitates OR intervention prior.    Continue IV abx; ID consult placed.     Antoine Pena DPM, FACFAS  Pager: [phone]      PATIENT HISTORY:  Ry Horner is a 54 year old diabetic male who was admitted for severe L foot infection.  Hx of R BKA.  Pt reports 2-3 wks ago he had a blister on the L great toe.  A few days ago the toe began to swell and look red.  He was seen at [facility] with a fever and placed on oral abx.  Pt presented to the ED today with worsening SOI.      I was requested to see this patient for this issue by Dr Dennis.    Review of Systems:  Patient denies current f/c/n/v.  Rest of 10 pt ROS neg except for HPI.     PAST MEDICAL HISTORY:   Past Medical History:   Diagnosis Date     BENIGN HYPERTENSION 4/4/2007     DIABETES MELLITUS TYPE II-UNCOMPL 4/4/2007     HYPERLIPIDEMIA NEC/NOS 4/4/2007     Tobacco use disorder 4/4/2007        PAST SURGICAL HISTORY:   Past Surgical History:   Procedure Laterality Date     AMPUTATE LEG  BELOW KNEE Right 9/1/2017    Procedure: AMPUTATE LEG BELOW KNEE;  RIGHT BELOW KNEE AMPUTATION ;  Surgeon: Nikolas Nascimento MD;  Location: [facility] OR     APPENDECTOMY       APPLY WOUND VAC Right 3/2/2015    Procedure: APPLY WOUND VAC;  Surgeon: Milena Cevallos DPM, Pod;  Location: [facility] OR     BIOPSY BONE FOOT Right 7/15/2016    Procedure: BIOPSY BONE FOOT;  Surgeon: Tim Douglas DPM;  Location: [facility] OR     IRRIGATION AND DEBRIDEMENT FOOT, COMBINED Right 3/2/2015    Procedure: COMBINED IRRIGATION AND DEBRIDEMENT FOOT;  Surgeon: Milena Cevallos DPM, Pod;  Location: [facility] OR     IRRIGATION AND DEBRIDEMENT FOOT, COMBINED Right 7/15/2016    Procedure: COMBINED IRRIGATION AND DEBRIDEMENT FOOT;  Surgeon: Tim Douglas DPM;  Location: [facility] OR     IRRIGATION AND DEBRIDEMENT FOOT, COMBINED Right 7/20/2016    Procedure: COMBINED IRRIGATION AND DEBRIDEMENT FOOT;  Surgeon: Tim Douglas DPM;  Location: [facility] OR     ORTHOPEDIC SURGERY          MEDICATIONS:   Current Facility-Administered Medications:      0.9% sodium chloride BOLUS, 500 mL, Intravenous, Once, Carlos Enrique Dennis,      glucose gel 15-30 g, 15-30 g, Oral, Q15 Min PRN **OR** dextrose 50 % injection 25-50 mL, 25-50 mL, Intravenous, Q15 Min PRN **OR** glucagon injection 1 mg, 1 mg, Subcutaneous, Q15 Min PRN, Carlos Enrique Dennis, DO     ezetimibe (ZETIA) tablet 10 mg, 10 mg, Oral, Daily, Carlos Enrique Dennis, DO     HYDROmorphone (PF) (DILAUDID) injection 0.3 mg, 0.3 mg, Intravenous, Q2H PRN, Carlos Enrique Dennis, DO     insulin aspart (NovoLOG) inj (RAPID ACTING), 1-6 Units, Subcutaneous, Q4H, Carlos Enrique Dennis,      insulin glargine (LANTUS PEN) injection 30 Units, 30 Units, Subcutaneous, At Bedtime, Carlos Enrique Dennis, DO     lidocaine (LMX4) cream, , Topical, Q1H PRN, Carlos Enrique Dennis, DO     lidocaine 1 % 0.1-1 mL, 0.1-1 mL, Other, Q1H PRN, Carlos Enrique Dennis, DO     melatonin tablet 1 mg, 1 mg, Oral, At Bedtime PRN, Carlos Enrique Dennis P, DO     naloxone (NARCAN) injection 0.1-0.4  "mg, 0.1-0.4 mg, Intravenous, Q2 Min PRN, Carlos Enrique Dennis P, DO     ondansetron (ZOFRAN-ODT) ODT tab 4 mg, 4 mg, Oral, Q6H PRN **OR** ondansetron (ZOFRAN) injection 4 mg, 4 mg, Intravenous, Q6H PRN, Esvin Dennisn P, DO     oxyCODONE (ROXICODONE) tablet 5 mg, 5 mg, Oral, Q4H PRN, Esvin Dennisn P, DO     piperacillin-tazobactam (ZOSYN) 3.375 g vial to attach to  mL bag, 3.375 g, Intravenous, Q6H, Carlos Enrique Dennis P, DO     senna-docusate (SENOKOT-S/PERICOLACE) 8.6-50 MG per tablet 1 tablet, 1 tablet, Oral, BID PRN **OR** senna-docusate (SENOKOT-S/PERICOLACE) 8.6-50 MG per tablet 2 tablet, 2 tablet, Oral, BID PRN, Esvin Dennisn P, DO     sodium chloride (PF) 0.9% PF flush 3 mL, 3 mL, Intracatheter, q1 min prn, Carlos Enrique Dennis P, DO     sodium chloride (PF) 0.9% PF flush 3 mL, 3 mL, Intracatheter, Q8H, Carlos Enrique Dennis P, DO     sodium chloride 0.9% infusion, , Intravenous, Continuous, Carlos Enrique Dennis P, DO     vancomycin 1500 mg in 0.9% NaCl 250 ml intermittent infusion 1,500 mg, 1,500 mg, Intravenous, Q12H, Carlos Enrique Dennis P, DO     ALLERGIES:    Allergies   Allergen Reactions     Pravastatin      \"sucked the life blood out of me\"        SOCIAL HISTORY:   Social History     Socioeconomic History     Marital status:      Spouse name: Not on file     Number of children: Not on file     Years of education: Not on file     Highest education level: Not on file   Occupational History     Not on file   Social Needs     Financial resource strain: Not on file     Food insecurity:     Worry: Not on file     Inability: Not on file     Transportation needs:     Medical: Not on file     Non-medical: Not on file   Tobacco Use     Smoking status: Former Smoker     Packs/day: 0.50     Years: 20.00     Pack years: 10.00     Types: Cigarettes     Smokeless tobacco: Never Used   Substance and Sexual Activity     Alcohol use: Yes     Comment: 3-4 drinks per month     Drug use: No     Sexual activity: Yes     Partners: Female   Lifestyle "     Physical activity:     Days per week: Not on file     Minutes per session: Not on file     Stress: Not on file   Relationships     Social connections:     Talks on phone: Not on file     Gets together: Not on file     Attends [employer] service: Not on file     Active member of club or organization: Not on file     Attends meetings of clubs or organizations: Not on file     Relationship status: Not on file     Intimate partner violence:     Fear of current or ex partner: Not on file     Emotionally abused: Not on file     Physically abused: Not on file     Forced sexual activity: Not on file   Other Topics Concern      Service Yes     Blood Transfusions No     Caffeine Concern No     Occupational Exposure No     Hobby Hazards No     Sleep Concern No     Stress Concern No     Weight Concern Yes     Comment: Would like to lose some weight     Special Diet No     Back Care No     Exercise Yes     Bike Helmet No     Seat Belt Yes     Self-Exams Yes     Parent/sibling w/ CABG, MI or angioplasty before 65F 55M? Not Asked   Social History Narrative     Not on file        FAMILY HISTORY:   Family History   Problem Relation Age of Onset     Genetic Disorder Other      Genetic Disorder Other      Psychotic Disorder Mother      Diabetes Father      Lung Cancer Father      Genetic Disorder Maternal Grandmother      Genetic Disorder Maternal Grandfather      Asthma Sister      C.A.D. No family hx of      Hypertension No family hx of      Cerebrovascular Disease No family hx of      Breast Cancer No family hx of      Cancer - colorectal No family hx of      Prostate Cancer No family hx of      Alcohol/Drug No family hx of         EXAM:Vitals: /66 (BP Location: Left arm)   Pulse 93   Temp 98.5  F (36.9  C) (Oral)   Resp 18   Ht 1.829 m (6')   Wt 116.6 kg (257 lb)   SpO2 95%   BMI 34.86 kg/m    BMI= Body mass index is 34.86 kg/m .    General appearance: Patient is alert and fully cooperative with history &  exam.  No sign of distress is noted during the visit.     Psychiatric: Affect is pleasant & appropriate.  Patient appears motivated to improve health.     Respiratory: Breathing is regular & unlabored while sitting.     HEENT: Hearing is intact to spoken word.  Speech is clear.  No gross evidence of visual impairment that would impact ambulation.     Dermatologic: L hallux is extremely edematous, obviously necrotic with medial black 1cm ulceration that probes to hard end point, purple discoloration of toe.  Proximal erythema into forefoot.     Vascular: DP & PT pulses are not readily palpable on the L, but significant edema likely preventing this.  Pulses were dopplerable today per pt, and pedal hair growth is noted.  CFT and skin temperature are normal.     Neurologic: Lower extremity sensation is diminished to light touch.     Musculoskeletal: R BKA.  Patient is ambulatory..  No gross ankle deformity noted.  No foot or ankle joint effusion is noted.        Imaging reviewed with pt:       Examination:  XR TOE LT G/E 2 VW  Date:  11/17/2019 9:16 AM      Clinical Information: Foot wound and infection.   Comparison: None.                                                                      Impression: Acute left first toe proximal phalanx head fracture and  fragmentation; in the setting of soft tissue infection, this  represents osteomyelitis until proven otherwise. There is moderate  first toe soft tissue swelling as well as subcutaneous emphysema  (either related to open wound or gas-forming infection). The first toe  and foot is otherwise unremarkable.     RODRIGUE REID MD          Lab Results   Component Value Date    WBC 18.4 11/17/2019     Lab Results   Component Value Date    RBC 3.51 11/17/2019     Lab Results   Component Value Date    HGB 10.1 11/17/2019     Lab Results   Component Value Date    HCT 29.7 11/17/2019     No components found for: MCT  Lab Results   Component Value Date    MCV 85 11/17/2019      Lab Results   Component Value Date    MCH 28.8 11/17/2019     Lab Results   Component Value Date    MCHC 34.0 11/17/2019     Lab Results   Component Value Date    RDW 11.6 11/17/2019     Lab Results   Component Value Date     11/17/2019

## 2019-11-17 NOTE — ANESTHESIA CARE TRANSFER NOTE
Patient: Ry Horner    Procedure(s):  LEFT PARTIAL FOOT AMPUTATION    Diagnosis: * No pre-op diagnosis entered *  Diagnosis Additional Information: No value filed.    Anesthesia Type:   General, LMA     Note:  Airway :Face Mask  Patient transferred to:PACU  Comments: To pacu. Vss. Denies pain. Report given to RN assuming care of ptHandoff Report: Identifed the Patient, Identified the Reponsible Provider, Reviewed the pertinent medical history, Discussed the surgical course, Reviewed Intra-OP anesthesia mangement and issues during anesthesia, Set expectations for post-procedure period and Allowed opportunity for questions and acknowledgement of understanding      Vitals: (Last set prior to Anesthesia Care Transfer)    CRNA VITALS  11/17/2019 1409 - 11/17/2019 1446      11/17/2019             SpO2:  97 %    Resp Rate (set):  10                Electronically Signed By: LILIANA Cruz CRNA  November 17, 2019  2:46 PM

## 2019-11-17 NOTE — PROGRESS NOTES
RECEIVING UNIT ED HANDOFF REVIEW    ED Nurse Handoff Report was reviewed by: Jennie Catherine RN on November 17, 2019 at 10:02 AM

## 2019-11-17 NOTE — PHARMACY-ADMISSION MEDICATION HISTORY
Pharmacy Medication History  Admission medication history interview status for the 11/17/2019  admission is complete. See EPIC admission navigator for prior to admission medications     Medication history sources: Patient and Surescripts  Medication history source reliability: Good  Adherence assessment: Good    Significant changes made to the medication list:  Removed Glipizide       Additional medication history information:       Medication reconciliation completed by provider prior to medication history? Yes    Time spent in this activity: 15 min      Prior to Admission medications    Medication Sig Last Dose Taking? Auth Provider   amLODIPine (NORVASC) 5 MG tablet Take 1 tablet (5 mg) by mouth daily 11/16/2019 at am Yes Kody Wing MD   aspirin 81 MG chewable tablet Take 1 tablet (81 mg) by mouth daily 11/16/2019 at am Yes Kody Wing MD   ciprofloxacin (CIPRO) 750 MG tablet Take 1 tablet (750 mg) by mouth 2 times daily for 10 days  Patient taking differently: Take 750 mg by mouth 2 times daily Started 11/15 11/16/2019 at Unknown time Yes Craig Villatoro MD   clindamycin (CLEOCIN) 300 MG capsule Take 1 capsule (300 mg) by mouth 3 times daily for 10 days  Patient taking differently: Take 300 mg by mouth 3 times daily Started 11/15 11/16/2019 at Unknown time Yes Craig Villatoro MD   ezetimibe (ZETIA) 10 MG tablet Take 1 tablet (10 mg) by mouth daily 11/16/2019 at am Yes Kody Wing MD   hydrochlorothiazide (HYDRODIURIL) 12.5 MG tablet TAKE ONE TABLET BY MOUTH EVERY DAY 11/16/2019 at am Yes Kody Wing MD   insulin glargine (LANTUS PEN) 100 UNIT/ML pen Inject 58 Units Subcutaneous At Bedtime 11/16/2019 at Unknown time Yes Unknown, Entered By History   ipratropium (ATROVENT) 0.06 % spray Spray 2 sprays into both nostrils 4 times daily as needed for rhinitis Past Week at Unknown time Yes Kody Wing MD   lisinopril (PRINIVIL/ZESTRIL) 40 MG tablet Take 1  tablet (40 mg) by mouth daily 11/16/2019 at am Yes Kody Wing MD   metFORMIN (GLUCOPHAGE) 1000 MG tablet Take 1 tablet (1,000 mg) by mouth 2 times daily (with meals) 11/16/2019 at pm Yes Kody Wing MD   TRADJENTA 5 MG TABS tablet TAKE ONE TABLET BY MOUTH EVERY DAY 11/16/2019 at Unknown time Yes Kody Wing MD   ACCU-CHEK JAYNA PLUS test strip TEST BLOOD SUGARS 2 TO 3 TIMES DAILY OR AS DIRECTED   Kody Wing MD   BD MEGAN U/F 32G X 4 MM insulin pen needle USE AS DIRECTED WITH NEEMA (1 BOX = 100 DAYS) *DUE TO BE SEEN*   Kody Wing MD   blood glucose (NO BRAND SPECIFIED) lancets standard Use to test blood sugar 2-3 times daily or as directed.   Kody Wing MD   blood glucose monitoring (NO BRAND SPECIFIED) meter device kit Use to test blood sugar 2-3 times daily or as directed.   Kody Wing MD   STATIN NOT PRESCRIBED (INTENTIONAL) Please choose reason not prescribed, below   Kody Wing MD

## 2019-11-17 NOTE — ED NOTES
"[facility]  ED Nurse Handoff Report    ED Chief complaint: Wound Check      ED Diagnosis:   Final diagnoses:   None       Code Status: Full Code    Allergies:   Allergies   Allergen Reactions     Pravastatin      \"sucked the life blood out of me\"       Activity level - Baseline/Home:  Independent  Activity Level - Current:   Independent RBK amputation    Patient's Preferred language: English   Needed?: No    Isolation: No  Infection: Not Applicable  Bariatric?: No    Vital Signs:   Vitals:    11/17/19 0838   BP: 113/75   Pulse: 100   Resp: 18   Temp: 97.7  F (36.5  C)   TempSrc: Oral   SpO2: 99%   Weight: 116.6 kg (257 lb)   Height: 1.829 m (6')       Cardiac Rhythm: ,        Pain level: 0-10 Pain Scale: 5    Is this patient confused?: No   Does this patient have a guardian?  No         If yes, is there guardianship documents in the Epic \"Code/ACP\" activity?  N/A         Guardian Notified?  N/A  [place] - Suicide Severity Rating Scale Completed?  Yes  If yes, what color did the patient score?  White    Patient Report: Initial Complaint: Pt presents to the ER with c/o left big toe cellulitis that is getting worse since starting antibiotics.   Focused Assessment: Pt has a hx of diabetes, has a RBK amputation from a few years ago, 2 wks ago pt got a blister on his left great toe, it continued to get worse, started abx on Friday but doesn't feel it has helped, Toe is very swollen with necrotic tissue on the side.   Tests Performed:   Results for orders placed or performed during the hospital encounter of 11/17/19   CBC with platelets differential     Status: Abnormal   Result Value Ref Range    WBC 18.4 (H) 4.0 - 11.0 10e9/L    RBC Count 3.51 (L) 4.4 - 5.9 10e12/L    Hemoglobin 10.1 (L) 13.3 - 17.7 g/dL    Hematocrit 29.7 (L) 40.0 - 53.0 %    MCV 85 78 - 100 fl    MCH 28.8 26.5 - 33.0 pg    MCHC 34.0 31.5 - 36.5 g/dL    RDW 11.6 10.0 - 15.0 %    Platelet Count 294 150 - 450 10e9/L    Diff " Method Automated Method     % Neutrophils 86.3 %    % Lymphocytes 4.4 %    % Monocytes 8.8 %    % Eosinophils 0.1 %    % Basophils 0.1 %    % Immature Granulocytes 0.3 %    Nucleated RBCs 0 0 /100    Absolute Neutrophil 15.9 (H) 1.6 - 8.3 10e9/L    Absolute Lymphocytes 0.8 0.8 - 5.3 10e9/L    Absolute Monocytes 1.6 (H) 0.0 - 1.3 10e9/L    Absolute Eosinophils 0.0 0.0 - 0.7 10e9/L    Absolute Basophils 0.0 0.0 - 0.2 10e9/L    Abs Immature Granulocytes 0.1 0 - 0.4 10e9/L    Absolute Nucleated RBC 0.0    Basic metabolic panel     Status: Abnormal   Result Value Ref Range    Sodium 131 (L) 133 - 144 mmol/L    Potassium 3.9 3.4 - 5.3 mmol/L    Chloride 99 94 - 109 mmol/L    Carbon Dioxide 24 20 - 32 mmol/L    Anion Gap 8 3 - 14 mmol/L    Glucose 224 (H) 70 - 99 mg/dL    Urea Nitrogen 51 (H) 7 - 30 mg/dL    Creatinine 1.82 (H) 0.66 - 1.25 mg/dL    GFR Estimate 42 (L) >60 mL/min/[1.73_m2]    GFR Estimate If Black 48 (L) >60 mL/min/[1.73_m2]    Calcium 8.7 8.5 - 10.1 mg/dL       Abnormal Results: see results  Treatments provided: Monitoring    Family Comments: SO at bedside    OBS brochure/video discussed/provided to patient/family: N/A              Name of person given brochure if not patient:               Relationship to patient:     ED Medications: Medications - No data to display    Drips infusing?:  Yes    For the majority of the shift this patient was Green.   Interventions performed were .    Severe Sepsis OR Septic Shock Diagnosis Present: No    To be done/followed up on inpatient unit:      ED NURSE PHONE NUMBER: [id number]

## 2019-11-17 NOTE — ANESTHESIA POSTPROCEDURE EVALUATION
Patient: Ry Horner    Procedure(s):  LEFT PARTIAL FOOT AMPUTATION    Diagnosis:* No pre-op diagnosis entered *  Diagnosis Additional Information: No value filed.    Anesthesia Type:  General, LMA    Note:  Anesthesia Post Evaluation    Patient location during evaluation: PACU  Patient participation: Able to fully participate in evaluation  Level of consciousness: awake  Pain management: adequate  Airway patency: patent  Cardiovascular status: acceptable  Respiratory status: acceptable  Hydration status: acceptable  PONV: none             Last vitals:  Vitals:    11/17/19 1440 11/17/19 1450 11/17/19 1500   BP:  90/46 100/65   Pulse:  96 93   Resp: 12 12 23   Temp: 37.5  C (99.5  F)  37.1  C (98.7  F)   SpO2: 96% 96% 97%         Electronically Signed By: James Evans MD  November 17, 2019  3:07 PM

## 2019-11-18 ENCOUNTER — APPOINTMENT (OUTPATIENT)
Dept: ULTRASOUND IMAGING | Facility: CLINIC | Age: 54
DRG: 616 | End: 2019-11-18
Attending: PODIATRIST
Payer: COMMERCIAL

## 2019-11-18 ENCOUNTER — APPOINTMENT (OUTPATIENT)
Dept: PHYSICAL THERAPY | Facility: CLINIC | Age: 54
DRG: 616 | End: 2019-11-18
Payer: COMMERCIAL

## 2019-11-18 ENCOUNTER — HOME INFUSION (PRE-WILLOW HOME INFUSION) (OUTPATIENT)
Dept: PHARMACY | Facility: CLINIC | Age: 54
End: 2019-11-18

## 2019-11-18 LAB
ALBUMIN UR-MCNC: 10 MG/DL
ANION GAP SERPL CALCULATED.3IONS-SCNC: 4 MMOL/L (ref 3–14)
APPEARANCE UR: CLEAR
BILIRUB UR QL STRIP: NEGATIVE
BUN SERPL-MCNC: 43 MG/DL (ref 7–30)
CALCIUM SERPL-MCNC: 7.7 MG/DL (ref 8.5–10.1)
CHLORIDE SERPL-SCNC: 103 MMOL/L (ref 94–109)
CO2 SERPL-SCNC: 27 MMOL/L (ref 20–32)
COLOR UR AUTO: YELLOW
CREAT SERPL-MCNC: 1.85 MG/DL (ref 0.66–1.25)
ERYTHROCYTE [DISTWIDTH] IN BLOOD BY AUTOMATED COUNT: 11.8 % (ref 10–15)
GFR SERPL CREATININE-BSD FRML MDRD: 41 ML/MIN/{1.73_M2}
GLUCOSE BLDC GLUCOMTR-MCNC: 203 MG/DL (ref 70–99)
GLUCOSE BLDC GLUCOMTR-MCNC: 207 MG/DL (ref 70–99)
GLUCOSE BLDC GLUCOMTR-MCNC: 241 MG/DL (ref 70–99)
GLUCOSE BLDC GLUCOMTR-MCNC: 272 MG/DL (ref 70–99)
GLUCOSE BLDC GLUCOMTR-MCNC: 274 MG/DL (ref 70–99)
GLUCOSE SERPL-MCNC: 209 MG/DL (ref 70–99)
GLUCOSE UR STRIP-MCNC: NEGATIVE MG/DL
HCT VFR BLD AUTO: 26.1 % (ref 40–53)
HGB BLD-MCNC: 8.8 G/DL (ref 13.3–17.7)
HGB UR QL STRIP: ABNORMAL
KETONES UR STRIP-MCNC: NEGATIVE MG/DL
LEUKOCYTE ESTERASE UR QL STRIP: NEGATIVE
MCH RBC QN AUTO: 29.2 PG (ref 26.5–33)
MCHC RBC AUTO-ENTMCNC: 33.7 G/DL (ref 31.5–36.5)
MCV RBC AUTO: 87 FL (ref 78–100)
NITRATE UR QL: NEGATIVE
PH UR STRIP: 5.5 PH (ref 5–7)
PLATELET # BLD AUTO: 275 10E9/L (ref 150–450)
POTASSIUM SERPL-SCNC: 4.5 MMOL/L (ref 3.4–5.3)
RBC # BLD AUTO: 3.01 10E12/L (ref 4.4–5.9)
RBC #/AREA URNS AUTO: 3 /HPF (ref 0–2)
SODIUM SERPL-SCNC: 134 MMOL/L (ref 133–144)
SOURCE: ABNORMAL
SP GR UR STRIP: 1.02 (ref 1–1.03)
SQUAMOUS #/AREA URNS AUTO: <1 /HPF (ref 0–1)
UROBILINOGEN UR STRIP-MCNC: 2 MG/DL (ref 0–2)
WBC # BLD AUTO: 13.9 10E9/L (ref 4–11)
WBC #/AREA URNS AUTO: 2 /HPF (ref 0–5)

## 2019-11-18 PROCEDURE — 40000141 ZZH STATISTIC PERIPHERAL IV START W/O US GUIDANCE

## 2019-11-18 PROCEDURE — 81001 URINALYSIS AUTO W/SCOPE: CPT | Performed by: PODIATRIST

## 2019-11-18 PROCEDURE — 80048 BASIC METABOLIC PNL TOTAL CA: CPT | Performed by: PODIATRIST

## 2019-11-18 PROCEDURE — 36415 COLL VENOUS BLD VENIPUNCTURE: CPT | Performed by: PODIATRIST

## 2019-11-18 PROCEDURE — 25000132 ZZH RX MED GY IP 250 OP 250 PS 637: Performed by: INTERNAL MEDICINE

## 2019-11-18 PROCEDURE — 85027 COMPLETE CBC AUTOMATED: CPT | Performed by: PODIATRIST

## 2019-11-18 PROCEDURE — 25000128 H RX IP 250 OP 636: Performed by: PODIATRIST

## 2019-11-18 PROCEDURE — 25800030 ZZH RX IP 258 OP 636: Performed by: PODIATRIST

## 2019-11-18 PROCEDURE — 25000131 ZZH RX MED GY IP 250 OP 636 PS 637: Performed by: INTERNAL MEDICINE

## 2019-11-18 PROCEDURE — 00000146 ZZHCL STATISTIC GLUCOSE BY METER IP

## 2019-11-18 PROCEDURE — 93922 UPR/L XTREMITY ART 2 LEVELS: CPT

## 2019-11-18 PROCEDURE — 97161 PT EVAL LOW COMPLEX 20 MIN: CPT | Mod: GP | Performed by: PHYSICAL THERAPIST

## 2019-11-18 PROCEDURE — 25000132 ZZH RX MED GY IP 250 OP 250 PS 637: Performed by: PODIATRIST

## 2019-11-18 PROCEDURE — 97530 THERAPEUTIC ACTIVITIES: CPT | Mod: GP | Performed by: PHYSICAL THERAPIST

## 2019-11-18 PROCEDURE — 99232 SBSQ HOSP IP/OBS MODERATE 35: CPT | Performed by: INTERNAL MEDICINE

## 2019-11-18 PROCEDURE — 12000000 ZZH R&B MED SURG/OB

## 2019-11-18 RX ORDER — ACETAMINOPHEN 325 MG/1
650 TABLET ORAL EVERY 4 HOURS PRN
Status: DISCONTINUED | OUTPATIENT
Start: 2019-11-18 | End: 2019-11-22 | Stop reason: HOSPADM

## 2019-11-18 RX ADMIN — ACETAMINOPHEN 650 MG: 325 TABLET, FILM COATED ORAL at 12:53

## 2019-11-18 RX ADMIN — EZETIMIBE 10 MG: 10 TABLET ORAL at 08:16

## 2019-11-18 RX ADMIN — ACETAMINOPHEN 650 MG: 325 TABLET, FILM COATED ORAL at 22:11

## 2019-11-18 RX ADMIN — OXYCODONE HYDROCHLORIDE 5 MG: 5 TABLET ORAL at 12:53

## 2019-11-18 RX ADMIN — SODIUM CHLORIDE: 9 INJECTION, SOLUTION INTRAVENOUS at 19:51

## 2019-11-18 RX ADMIN — OXYCODONE HYDROCHLORIDE 5 MG: 5 TABLET ORAL at 17:17

## 2019-11-18 RX ADMIN — PIPERACILLIN SODIUM AND TAZOBACTAM SODIUM 3.38 G: 3; .375 INJECTION, POWDER, LYOPHILIZED, FOR SOLUTION INTRAVENOUS at 23:41

## 2019-11-18 RX ADMIN — ACETAMINOPHEN 650 MG: 325 TABLET, FILM COATED ORAL at 17:21

## 2019-11-18 RX ADMIN — OXYCODONE HYDROCHLORIDE 5 MG: 5 TABLET ORAL at 22:11

## 2019-11-18 RX ADMIN — INSULIN GLARGINE 36 UNITS: 100 INJECTION, SOLUTION SUBCUTANEOUS at 22:07

## 2019-11-18 RX ADMIN — PIPERACILLIN SODIUM AND TAZOBACTAM SODIUM 3.38 G: 3; .375 INJECTION, POWDER, LYOPHILIZED, FOR SOLUTION INTRAVENOUS at 04:48

## 2019-11-18 RX ADMIN — PIPERACILLIN SODIUM AND TAZOBACTAM SODIUM 3.38 G: 3; .375 INJECTION, POWDER, LYOPHILIZED, FOR SOLUTION INTRAVENOUS at 17:21

## 2019-11-18 RX ADMIN — OXYCODONE HYDROCHLORIDE 5 MG: 5 TABLET ORAL at 04:50

## 2019-11-18 RX ADMIN — OXYCODONE HYDROCHLORIDE 5 MG: 5 TABLET ORAL at 00:09

## 2019-11-18 RX ADMIN — VANCOMYCIN HYDROCHLORIDE 1500 MG: 5 INJECTION, POWDER, LYOPHILIZED, FOR SOLUTION INTRAVENOUS at 05:59

## 2019-11-18 ASSESSMENT — ACTIVITIES OF DAILY LIVING (ADL)
ADLS_ACUITY_SCORE: 12

## 2019-11-18 NOTE — PROGRESS NOTES
"Care Coordination:    Hospitalist indicated there is a chance pt will need IV antibiotics at discharge.      CC-RN requested benefit check from [facility] department.  The results are below.    Pt has a [employer] plan with a ded $4300 ($2468 met so far), then 80% up to max OOP $7350 ($4175 met so far). Once ded and OOP are met, coverage will be at 100% for IV abx.   Thank you     Provider:  please enter a  \"Care Transition RN / SW IP Consult\" (select reason) if it is possible that Home IV antibiotics or TCU is needed at discharge.     Yvette Combs RN, BSN, PHN  [facility] [place] Care Coordination  [facility]   Mobile: [phone]    "

## 2019-11-18 NOTE — PLAN OF CARE
Patient is A&O, VSS on RA, CMS intact, moderate carb diet, up with assist with 1, voiding adequately in the urinal, and dressing CDI. He is taking Oxycodone for pain control, NS infusing at 125 ml, on Zosyn and Vancomycin, and BG checks. Will continue to monitor

## 2019-11-18 NOTE — CONSULTS
[facility]    Infectious Disease Consultation     Date of Admission:  11/17/2019  Date of Consult (When I saw the patient): 11/18/19    Assessment & Plan   Ry Horner is a 54 year old male who was admitted on 11/17/2019.     Impression:  1. 54 y.o male with diabetes with peripheral polyneuropathy.  2. History of infection in the right foot leading to amputation at the knee.   3. Admitted with severely infected left foot.   4. Gas gangrene of left foot. Osteomyelitis of left great toe.   5. S/PLeft hallux amputation with irrigation and debridement.  6. CKD.     Recommendations:   Will do zosyn for this   Follow up on the pending cultures       Franklin Maza MD    Reason for Consult   Reason for consult: I was asked by Dr. Chavez  to evaluate this patient for left foot infection and osteo.    Primary Care Physician   Kody Wing    Chief Complaint   Severely infected left foot.     History is obtained from the patient and medical records    History of Present Illness   Ry Horner is a 54 year old male who presents with  a 54-year-old diabetic male who was admitted to [facility] with a severely infected left foot.  The left great toe was clearly nonviable and necrotic with wet black tissue.  There is a medial soft tissue ulcer on the great toe that probes to bone.  X-ray examination shows gas in the first toe with a pathologic fracture of the first toe indicative of osteomyelitis.  Emergent partial foot amputation was recommended to control this severe limb-threatening infection.    Past Medical History   I have reviewed this patient's medical history and updated it with pertinent information if needed.   Past Medical History:   Diagnosis Date     BENIGN HYPERTENSION 4/4/2007     DIABETES MELLITUS TYPE II-UNCOMPL 4/4/2007     HYPERLIPIDEMIA NEC/NOS 4/4/2007     Tobacco use disorder 4/4/2007       Past Surgical History   I have reviewed this patient's surgical history and updated it with  pertinent information if needed.  Past Surgical History:   Procedure Laterality Date     AMPUTATE LEG BELOW KNEE Right 9/1/2017    Procedure: AMPUTATE LEG BELOW KNEE;  RIGHT BELOW KNEE AMPUTATION ;  Surgeon: Nikolas Nascimento MD;  Location: [facility] OR     APPENDECTOMY       APPLY WOUND VAC Right 3/2/2015    Procedure: APPLY WOUND VAC;  Surgeon: Milena Cevallos DPM, Pod;  Location: [facility] OR     BIOPSY BONE FOOT Right 7/15/2016    Procedure: BIOPSY BONE FOOT;  Surgeon: Tim Douglas DPM;  Location: [facility] OR     IRRIGATION AND DEBRIDEMENT FOOT, COMBINED Right 3/2/2015    Procedure: COMBINED IRRIGATION AND DEBRIDEMENT FOOT;  Surgeon: Milena Cevallos DPM, Pod;  Location: [facility] OR     IRRIGATION AND DEBRIDEMENT FOOT, COMBINED Right 7/15/2016    Procedure: COMBINED IRRIGATION AND DEBRIDEMENT FOOT;  Surgeon: Tim Douglas DPM;  Location: [facility] OR     IRRIGATION AND DEBRIDEMENT FOOT, COMBINED Right 7/20/2016    Procedure: COMBINED IRRIGATION AND DEBRIDEMENT FOOT;  Surgeon: Tim Douglas DPM;  Location:  OR     ORTHOPEDIC SURGERY         Prior to Admission Medications   Prior to Admission Medications   Prescriptions Last Dose Informant Patient Reported? Taking?   ACCU-CHEK JAYNA PLUS test strip  Self No No   Sig: TEST BLOOD SUGARS 2 TO 3 TIMES DAILY OR AS DIRECTED   BD MEGAN U/F 32G X 4 MM insulin pen needle  Self No No   Sig: USE AS DIRECTED WITH NEEMA (1 BOX = 100 DAYS) *DUE TO BE SEEN*   STATIN NOT PRESCRIBED (INTENTIONAL)  Self No No   Sig: Please choose reason not prescribed, below   TRADJENTA 5 MG TABS tablet 11/16/2019 at Unknown time Self No Yes   Sig: TAKE ONE TABLET BY MOUTH EVERY DAY   amLODIPine (NORVASC) 5 MG tablet 11/16/2019 at am Self No Yes   Sig: Take 1 tablet (5 mg) by mouth daily   aspirin 81 MG chewable tablet 11/16/2019 at am Self Yes Yes   Sig: Take 1 tablet (81 mg) by mouth daily   blood glucose (NO BRAND SPECIFIED) lancets standard  Self No No   Sig: Use to test blood sugar 2-3 times  "daily or as directed.   blood glucose monitoring (NO BRAND SPECIFIED) meter device kit  Self No No   Sig: Use to test blood sugar 2-3 times daily or as directed.   ciprofloxacin (CIPRO) 750 MG tablet 11/16/2019 at Unknown time Self No Yes   Sig: Take 1 tablet (750 mg) by mouth 2 times daily for 10 days   Patient taking differently: Take 750 mg by mouth 2 times daily Started 11/15   clindamycin (CLEOCIN) 300 MG capsule 11/16/2019 at Unknown time Self No Yes   Sig: Take 1 capsule (300 mg) by mouth 3 times daily for 10 days   Patient taking differently: Take 300 mg by mouth 3 times daily Started 11/15   ezetimibe (ZETIA) 10 MG tablet 11/16/2019 at am Self No Yes   Sig: Take 1 tablet (10 mg) by mouth daily   hydrochlorothiazide (HYDRODIURIL) 12.5 MG tablet 11/16/2019 at am Self No Yes   Sig: TAKE ONE TABLET BY MOUTH EVERY DAY   insulin glargine (LANTUS PEN) 100 UNIT/ML pen 11/16/2019 at Unknown time Self Yes Yes   Sig: Inject 58 Units Subcutaneous At Bedtime   ipratropium (ATROVENT) 0.06 % spray Past Week at Unknown time Self No Yes   Sig: Spray 2 sprays into both nostrils 4 times daily as needed for rhinitis   lisinopril (PRINIVIL/ZESTRIL) 40 MG tablet 11/16/2019 at am Self No Yes   Sig: Take 1 tablet (40 mg) by mouth daily   metFORMIN (GLUCOPHAGE) 1000 MG tablet 11/16/2019 at pm Self No Yes   Sig: Take 1 tablet (1,000 mg) by mouth 2 times daily (with meals)      Facility-Administered Medications: None     Allergies   Allergies   Allergen Reactions     Pravastatin      \"sucked the life blood out of me\"       Immunization History   Immunization History   Administered Date(s) Administered     Influenza (H1N1) 01/19/2010     Influenza (IIV3) PF 11/01/2010, 09/22/2011     Pneumococcal 23 valent 10/31/2011, 07/16/2016     TDAP Vaccine (Adacel) 10/28/2014       Social History   I have reviewed this patient's social history and updated it with pertinent information if needed. Ry Horner  reports that he has quit smoking. His " smoking use included cigarettes. He has a 10.00 pack-year smoking history. He has never used smokeless tobacco. He reports current alcohol use. He reports that he does not use drugs.    Family History   I have reviewed this patient's family history and updated it with pertinent information if needed.   Family History   Problem Relation Age of Onset     Genetic Disorder Other      Genetic Disorder Other      Psychotic Disorder Mother      Diabetes Father      Lung Cancer Father      Genetic Disorder Maternal Grandmother      Genetic Disorder Maternal Grandfather      Asthma Sister      C.A.D. No family hx of      Hypertension No family hx of      Cerebrovascular Disease No family hx of      Breast Cancer No family hx of      Cancer - colorectal No family hx of      Prostate Cancer No family hx of      Alcohol/Drug No family hx of        Review of Systems   The 10 point Review of Systems is negative other than noted in the HPI or here.     Physical Exam   Temp: 99.6  F (37.6  C) Temp src: Oral BP: 104/68 Pulse: 98 Heart Rate: 72 Resp: 16 SpO2: 91 % O2 Device: None (Room air) Oxygen Delivery: 1 LPM  Vital Signs with Ranges  Temp:  [98.4  F (36.9  C)-100.4  F (38  C)] 99.6  F (37.6  C)  Pulse:  [] 98  Heart Rate:  [] 72  Resp:  [10-23] 16  BP: ()/(46-73) 104/68  SpO2:  [91 %-97 %] 91 %  257 lbs 0 oz  Body mass index is 34.86 kg/m .    GENERAL APPEARANCE:  alert and no distress  EYES: Eyes grossly normal to inspection, PERRL and conjunctivae and sclerae normal  HENT: ear canals and TM's normal and nose and mouth without ulcers or lesions  NECK: no adenopathy, no asymmetry, masses, or scars and thyroid normal to palpation  RESP: lungs clear to auscultation - no rales, rhonchi or wheezes  CV: regular rates and rhythm, normal S1 S2, no S3 or S4 and no murmur, click or rub  LYMPHATICS: normal ant/post cervical and supraclavicular nodes  ABDOMEN: soft, nontender, without hepatosplenomegaly or masses and  bowel sounds normal  MS: extremities right sided BKA and left foot in bandage   SKIN: no suspicious lesions or rashes      Data   Lab Results   Component Value Date    WBC 13.9 (H) 11/18/2019    HGB 8.8 (L) 11/18/2019    HCT 26.1 (L) 11/18/2019     11/18/2019     11/18/2019    POTASSIUM 4.5 11/18/2019    CHLORIDE 103 11/18/2019    CO2 27 11/18/2019    BUN 43 (H) 11/18/2019    CR 1.85 (H) 11/18/2019     (H) 11/18/2019     (H) 08/28/2017    AST 23 04/04/2007    ALT 40 04/04/2007     Recent Labs   Lab 11/17/19  1525 11/17/19  1419 11/17/19  1411   CULT PENDING Culture negative monitoring continues  PENDING Culture negative monitoring continues     Recent Labs   Lab Test 11/17/19  1525 11/17/19  1419 11/17/19  1411 08/28/17  1136 08/28/17  1059 08/28/17  1054 07/15/16  1521 07/14/16  1029 07/14/16  1023   CULT PENDING Culture negative monitoring continues  PENDING Culture negative monitoring continues No growth No growth Heavy growth  Beta hemolytic Streptococcus group G  *  Moderate growth  Proteus vulgaris  *  Moderate growth  Normal skin rosa   No anaerobes isolated  On day 1, isolated in broth only: Staphylococcus simulans This isolate is   presumed to be clindamycin resistant based on detection of inducible   clindamycin resistance. Erythromycin and clindamycin are resistant, therefore,   they are not recommended for use.  On day 1, isolated in broth only: Strain 2 Staphylococcus simulans This isolate   is presumed to be clindamycin resistant based on detection of inducible   clindamycin resistance. Erythromycin and clindamycin are resistant, therefore,   they are not recommended for use.  Strain 1 found to have inducible  Clindamycin resistance also,  results updated  * No growth No growth       Amount of time performed on this consult: 45 minutes. This includes face to face assessment and care coordination with the primary team.

## 2019-11-18 NOTE — PROGRESS NOTES
Podiatry / Foot and Ankle Surgery Progress Note    November 18, 2019    Subject: Patient was seen at bedside.  Wife at bedside. Notes intermittent pain to foot but denies fever, chills.     Objective:  Vitals: /75   Pulse 98   Temp 99.9  F (37.7  C) (Oral)   Resp 16   Ht 1.829 m (6')   Wt 116.6 kg (257 lb)   SpO2 92%   BMI 34.86 kg/m    BMI= Body mass index is 34.86 kg/m .     A1C: 9.3 (11/2019)    General:  Patient is alert and orientated.  NAD  Dressing is c/d/i. Redness to foot resolving. 1st metatarsal head bone exposed in wound. No further purulent drainage or necrotic tissue noted.     Imaging:MRI left foot -  Surgical changes of amputation at the level of the first metatarsophalangeal joint. There is no evidence of osteomyelitis of  the remaining bones.     KAREN's: Waveform analysis indicates triphasic waveforms in the distal left  tibial arteries    Cultures:  Currently negative, gram stain - gram positive cocci  Pathology: pending    Assessment: 54 yr old diabetic male s/p left great toe amputation due to gas gangrene.    Plan:    -Dressing changed.  -reviewed MrI and KAREN's with patient.   -will schedule repeat incision and debridement for tomorrow, possible closure with Dr. Randhawa.   -NPO after 8am tomorrow. Orders placed.     Milena Cevallos DPM, Podiatry/Foot and Ankle Surgery  3:25 PM

## 2019-11-18 NOTE — PROGRESS NOTES
Therapy: IV abx  Insurance: HP  Ded: $4300  Met: $2468    Co-Insurance: 80%  Max Out of Pocket: $7350  Met: $4175       In reference to admission on 11/17/19 to check IV abx coverage       Please contact Intake with any questions, [phone] or In Basket pool, [facility] ([phone]).

## 2019-11-18 NOTE — PLAN OF CARE
Pt a&o x4, VSS on RA, capno on, dressing CDI, CMS intact, NWB on LLE, mod cho diet - blood sugar checks, controlling pain with oxycodone. Continue to monitor.

## 2019-11-18 NOTE — PROGRESS NOTES
[facility]    Medicine Progress Note - Hospitalist Service       Date of Admission:  11/17/2019  Assessment & Plan   Ry Horner is a 54 year old Diabetic male who presented to [facility] ER with worsening left foot redness and swelling.     Left foot/great toe diabetic foot infection, concern for osteomyelitis  X-ray suggestive of osteomyelitis.  Zosyn, Vancomycin IV started in ER.  Seen by podiatry who took the patient to the ER emergently on admission for a left 1st toe amputation on 11/17/19.    -  Blood cultures were note obtained as the patient had already started on the IV abx at admission in the ER.  If he has fevers I would obtain blood cultures  - Podiatry consulted and appreciate their assistance.  Defer post-op cares to them   - ID following and appreciate their assistance in antibiotics      Diabetes mellitus type II   Last A1C 7.5 July 2019 but he reports more uncontrolled this fall. HgbA1c here is 9.3.  Blood sugars have been in the 200s  -  Increased Lantus to 36 U at bedtime   -  SSI      Hypertension  -  Holing  anti-hypertensive's with current BP and JOHNATHAN     JOHNATHAN superimposed on CKD, likely dehydration  Cr still elevated at 1.85.  Baseline appears to be 1.1-1.2  -  Holding ACEI  - Continue IVF      Dyslipidemia  Intolerant to statins  -  Zetia      Diet: Advance Diet as Tolerated: Regular Diet Adult; 8155-9608 Calories: Moderate Consistent CHO (4-6 CHO units/meal)    DVT Prophylaxis: Pneumatic Compression Devices  Corrales Catheter: not present  Code Status: Full Code      Disposition Plan   Expected discharge: 3-5 days, will need PT/OT evaluation and antibiotic determination.  Entered: Napoleon Chavez DO 11/18/2019, 11:17 AM       The patient's care was discussed with the Care Coordinator/ and Patient.    Napoleon Chavez DO  Hospitalist Service  [facility]    ______________________________________________________________________    Interval History    Patient seen and examined.  No acute events over night.  Tolerated the surgery well.  Pain is controlled.  No fevers or chills.  No chest pain or SOB.     Data reviewed today: I reviewed all medications, new labs and imaging results over the last 24 hours. I personally reviewed no images or EKG's today.    Physical Exam   Vital Signs: Temp: 99.6  F (37.6  C) Temp src: Oral BP: 104/68 Pulse: 98 Heart Rate: 72 Resp: 16 SpO2: 91 % O2 Device: None (Room air) Oxygen Delivery: 1 LPM  Weight: 257 lbs 0 oz  General Appearance: Resting comfortably.  NAD   Respiratory: Clear to auscultation.  No respiratory distress  Cardiovascular: RRR.  No murmurs  GI: Bowel sounds noted.  Non-tender  Skin: Left foot is wrapped.  Scabbing at the right AKA  Other: Right AKA.  No obvious edema     Data   Recent Labs   Lab 11/18/19  0709 11/17/19  0856   WBC 13.9* 18.4*   HGB 8.8* 10.1*   MCV 87 85    294    131*   POTASSIUM 4.5 3.9   CHLORIDE 103 99   CO2 27 24   BUN 43* 51*   CR 1.85* 1.82*   ANIONGAP 4 8   FERNANDO 7.7* 8.7   * 224*     Recent Results (from the past 24 hour(s))   MR Foot Left w/o & w Contrast    Narrative    MRI LEFT FOOT WITHOUT AND WITH INTRAVENOUS CONTRAST   11/17/2019 8:33  PM    HISTORY: Left foot infection, status post great toe amputation 11/17,  evaluate for further osteomyelitis.    COMPARISON: Radiographs earlier today.    TECHNIQUE: Multiplanar MR imaging was performed through the left  midfoot and forefoot before and after the uneventful intravenous  administration of 11 mL Gadavist.     FINDINGS: There has been amputation of the great toe at the level of  the metatarsophalangeal joint. No abnormal marrow signal intensity or  contrast enhancement is seen to suggest osteomyelitis. There is edema  in the soft tissues consistent with cellulitis with no evidence of an  abscess. There is a large soft tissue defect adjacent to the first  metatarsal head at the site of surgery. It appears that the bone  is  exposed. No joint effusion is seen. No other abnormality is noted.      Impression    IMPRESSION: Surgical changes of amputation at the level of the first  metatarsophalangeal joint. There is no evidence of osteomyelitis of  the remaining bones.     CHRIS ROBERTS MD   US KAREN Doppler No Exercise    Narrative    US KAREN DOPPLER NO EXERCISE, 1-2 LEVELS LEFT 11/18/2019 9:41 AM     HISTORY: L foot infection, wound    COMPARISON: 8/28/2017    FINDINGS:   The resting left ankle-brachial index is 1.09 versus 1.02 on the prior  exam.    Waveform analysis indicates triphasic waveforms in the distal left  tibial arteries      Impression    IMPRESSION: No evidence for significant lower left extremity arterial  insufficiency.      KAREN Diagnostic Criteria      >/= 1.3          Non compressible  0.95-1.0          Normal  0.90-0.94        Mild PAD  0.50-0.89        Moderate PAD  0.20-0.49        Severe PAD  <0.20               Critical    CRISTELA SKINNER MD

## 2019-11-18 NOTE — PLAN OF CARE
Discharge Planner PT   Patient plan for discharge: Return home.  Current status: Orders received, evaluation completed, and treatment initiated. Patient is a 55 y/o male POD # 1 L hallux amputation with I & D. Pt lives with his significant other in a house with 2 stairs to enter/exit and 2 stairs to navigate within house. Pt is independent at baseline with use of R LE prosthesis. Pt performed bed mobility independently. Noted pt had difficulty getting R prosthesis to click in sitting EOB requiring assist. Pt performed sit <> stand and ambulation of 10 feet with FWW and CGA. Pt required cues to maintain L NWB restriction. Pt returned to supine at end of session.  Barriers to return to prior living situation: Level of assist, NWB restriction, stairs-not yet assessed  Recommendations for discharge: Return home with assist of SO to navigate stairs  Rationale for recommendations: Anticipate pt will continue to progress towards independence in order to safely discharge home with assist.       Entered by: Keeley Nice 11/18/2019 12:16 PM

## 2019-11-18 NOTE — PROGRESS NOTES
Hospitalist cross cover    The patient's back, diet changed blood sugars to preprandial any chest with appropriate sliding scale coverage.    Carrie Larson PA-C  Hospitalist [facility]  Pager: [phone]

## 2019-11-18 NOTE — PROGRESS NOTES
11/18/19 1014   Quick Adds   Type of Visit Initial PT Evaluation   Living Environment   Lives With significant other   Living Arrangements house   Home Accessibility stairs to enter home;stairs within home   Number of Stairs, Main Entrance 2   Stair Railings, Main Entrance none   Number of Stairs, Within Home, Primary 2   Stair Railings, Within Home, Primary none   Transportation Anticipated car, drives self;public transportation   Living Environment Comment Pt's SO will work from home for a month in order to assist pt.   Self-Care   Usual Activity Tolerance good   Current Activity Tolerance moderate   Equipment Currently Used at Home prosthesis  (R LE prosthesis)   Functional Level Prior   Ambulation 1-->assistive equipment  (R prosthesis)   Transferring 1-->assistive equipment  (R prosthesis)   Fall history within last six months no   General Information   Onset of Illness/Injury or Date of Surgery - Date 11/17/19   Referring Physician Antoine Pena DPM   Patient/Family Goals Statement None stated.   Pertinent History of Current Problem (include personal factors and/or comorbidities that impact the POC) 53 y/o male POD # 1 L hallux amputation with I & D. PMH including R BKA.   Precautions/Limitations fall precautions   Weight-Bearing Status - LLE nonweight-bearing   General Observations Pt in supine upon arrival of therapist.    Cognitive Status Examination   Orientation orientation to person, place and time   Level of Consciousness alert   Follows Commands and Answers Questions 100% of the time   Personal Safety and Judgment intact   Pain Assessment   Patient Currently in Pain   (L foot pain at rest: 6/10)   Integumentary/Edema   Integumentary/Edema Comments L foot covered with dressing.   Posture    Posture Comments Noted forward head and shoulder posture upon sitting EOB and standing at FWW.    Range of Motion (ROM)   ROM Comment B LEs WFL.    Strength   Strength Comments Not formally assessed, pt  "demonstrated sufficient B LE strength to complete mobility with AD and assist, at least 3/5 grossly in B LEs.    Bed Mobility   Bed Mobility Comments Supine <> sit independently.   Transfer Skills   Transfer Comments Sit <> stand with FWW and CGA.    Gait   Gait Comments Pt amb 5' with FWW and CGA.   Balance   Balance Comments Noted good sitting and standing balance at FWW.   Sensory Examination   Sensory Perception Comments Pt reported varying numbness/tingling in L LE.   General Therapy Interventions   Planned Therapy Interventions bed mobility training;gait training;strengthening;transfer training   Clinical Impression   Criteria for Skilled Therapeutic Intervention yes, treatment indicated   PT Diagnosis Difficulty with gait.   Influenced by the following impairments Pain, Generalized weakness, Decreased activity tolerance, L NWB restriction   Functional limitations due to impairments Limited functional mobility requiring AD and assist.   Clinical Presentation Stable/Uncomplicated   Clinical Presentation Rationale Based on PMH, current presentation, and social support.    Clinical Decision Making (Complexity) Low complexity   Therapy Frequency Daily   Predicted Duration of Therapy Intervention (days/wks) 3 days   Anticipated Equipment Needs at Discharge other (see comments)  (Knee scooter)   Anticipated Discharge Disposition Home with Assist   Risk & Benefits of therapy have been explained Yes   Patient, Family & other staff in agreement with plan of care Yes   [facility] [employer] \"6 Clicks\"   2016, Trustees of [facility], under license to [employer].  All rights reserved.   6 Clicks Short Forms Basic Mobility Inpatient Short Form   [facility] [employer]  \"6 Clicks\" V.2 Basic Mobility Inpatient Short Form   1. Turning from your back to your side while in a flat bed without using bedrails? 4 - None   2. Moving from lying on your back to sitting on the side of a flat bed without using bedrails? " 4 - None   3. Moving to and from a bed to a chair (including a wheelchair)? 3 - A Little   4. Standing up from a chair using your arms (e.g., wheelchair, or bedside chair)? 3 - A Little   5. To walk in hospital room? 3 - A Little   6. Climbing 3-5 steps with a railing? 2 - A Lot   Basic Mobility Raw Score (Score out of 24.Lower scores equate to lower levels of function) 19   Total Evaluation Time   Total Evaluation Time (Minutes) 5

## 2019-11-19 ENCOUNTER — ANESTHESIA EVENT (OUTPATIENT)
Dept: SURGERY | Facility: CLINIC | Age: 54
DRG: 616 | End: 2019-11-19
Payer: COMMERCIAL

## 2019-11-19 ENCOUNTER — ANESTHESIA (OUTPATIENT)
Dept: SURGERY | Facility: CLINIC | Age: 54
DRG: 616 | End: 2019-11-19
Payer: COMMERCIAL

## 2019-11-19 LAB
CREAT SERPL-MCNC: 1.44 MG/DL (ref 0.66–1.25)
GFR SERPL CREATININE-BSD FRML MDRD: 55 ML/MIN/{1.73_M2}
GLUCOSE BLDC GLUCOMTR-MCNC: 152 MG/DL (ref 70–99)
GLUCOSE BLDC GLUCOMTR-MCNC: 164 MG/DL (ref 70–99)
GLUCOSE BLDC GLUCOMTR-MCNC: 174 MG/DL (ref 70–99)
GLUCOSE BLDC GLUCOMTR-MCNC: 190 MG/DL (ref 70–99)
GLUCOSE BLDC GLUCOMTR-MCNC: 202 MG/DL (ref 70–99)
GLUCOSE BLDC GLUCOMTR-MCNC: 212 MG/DL (ref 70–99)
GLUCOSE BLDC GLUCOMTR-MCNC: 216 MG/DL (ref 70–99)
LACTATE BLD-SCNC: 0.9 MMOL/L (ref 0.7–2)

## 2019-11-19 PROCEDURE — 25000128 H RX IP 250 OP 636: Performed by: PODIATRIST

## 2019-11-19 PROCEDURE — 87070 CULTURE OTHR SPECIMN AEROBIC: CPT | Performed by: PODIATRIST

## 2019-11-19 PROCEDURE — 40000170 ZZH STATISTIC PRE-PROCEDURE ASSESSMENT II: Performed by: PODIATRIST

## 2019-11-19 PROCEDURE — 25800030 ZZH RX IP 258 OP 636: Performed by: NURSE ANESTHETIST, CERTIFIED REGISTERED

## 2019-11-19 PROCEDURE — 25000128 H RX IP 250 OP 636: Performed by: NURSE ANESTHETIST, CERTIFIED REGISTERED

## 2019-11-19 PROCEDURE — 27110028 ZZH OR GENERAL SUPPLY NON-STERILE: Performed by: PODIATRIST

## 2019-11-19 PROCEDURE — 25000132 ZZH RX MED GY IP 250 OP 250 PS 637: Performed by: PODIATRIST

## 2019-11-19 PROCEDURE — 25000125 ZZHC RX 250: Performed by: NURSE ANESTHETIST, CERTIFIED REGISTERED

## 2019-11-19 PROCEDURE — 12000000 ZZH R&B MED SURG/OB

## 2019-11-19 PROCEDURE — 11044 DBRDMT BONE 1ST 20 SQ CM/<: CPT | Mod: 78 | Performed by: PODIATRIST

## 2019-11-19 PROCEDURE — 87077 CULTURE AEROBIC IDENTIFY: CPT | Performed by: PODIATRIST

## 2019-11-19 PROCEDURE — 0QBR0ZZ EXCISION OF LEFT TOE PHALANX, OPEN APPROACH: ICD-10-PCS | Performed by: PODIATRIST

## 2019-11-19 PROCEDURE — 37000009 ZZH ANESTHESIA TECHNICAL FEE, EACH ADDTL 15 MIN: Performed by: PODIATRIST

## 2019-11-19 PROCEDURE — 83605 ASSAY OF LACTIC ACID: CPT | Performed by: INTERNAL MEDICINE

## 2019-11-19 PROCEDURE — 00000146 ZZHCL STATISTIC GLUCOSE BY METER IP

## 2019-11-19 PROCEDURE — 87075 CULTR BACTERIA EXCEPT BLOOD: CPT | Performed by: PODIATRIST

## 2019-11-19 PROCEDURE — 71000012 ZZH RECOVERY PHASE 1 LEVEL 1 FIRST HR: Performed by: PODIATRIST

## 2019-11-19 PROCEDURE — 25800030 ZZH RX IP 258 OP 636: Performed by: PODIATRIST

## 2019-11-19 PROCEDURE — 36415 COLL VENOUS BLD VENIPUNCTURE: CPT | Performed by: INTERNAL MEDICINE

## 2019-11-19 PROCEDURE — 36000069 ZZH SURGERY LEVEL 5 EA 15 ADDTL MIN: Performed by: PODIATRIST

## 2019-11-19 PROCEDURE — 25000125 ZZHC RX 250: Performed by: PODIATRIST

## 2019-11-19 PROCEDURE — 82565 ASSAY OF CREATININE: CPT | Performed by: PODIATRIST

## 2019-11-19 PROCEDURE — 99207 ZZC NON-BILLABLE SERV PER CHARTING: CPT | Performed by: INTERNAL MEDICINE

## 2019-11-19 PROCEDURE — 27210794 ZZH OR GENERAL SUPPLY STERILE: Performed by: PODIATRIST

## 2019-11-19 PROCEDURE — 25000132 ZZH RX MED GY IP 250 OP 250 PS 637: Performed by: INTERNAL MEDICINE

## 2019-11-19 PROCEDURE — 25000131 ZZH RX MED GY IP 250 OP 636 PS 637: Performed by: INTERNAL MEDICINE

## 2019-11-19 PROCEDURE — 37000008 ZZH ANESTHESIA TECHNICAL FEE, 1ST 30 MIN: Performed by: PODIATRIST

## 2019-11-19 PROCEDURE — 87076 CULTURE ANAEROBE IDENT EACH: CPT | Performed by: PODIATRIST

## 2019-11-19 PROCEDURE — 36000067 ZZH SURGERY LEVEL 5 1ST 30 MIN: Performed by: PODIATRIST

## 2019-11-19 PROCEDURE — 36415 COLL VENOUS BLD VENIPUNCTURE: CPT | Performed by: PODIATRIST

## 2019-11-19 PROCEDURE — 87186 SC STD MICRODIL/AGAR DIL: CPT | Performed by: PODIATRIST

## 2019-11-19 RX ORDER — PROPOFOL 10 MG/ML
INJECTION, EMULSION INTRAVENOUS PRN
Status: DISCONTINUED | OUTPATIENT
Start: 2019-11-19 | End: 2019-11-19

## 2019-11-19 RX ORDER — BUPIVACAINE HYDROCHLORIDE 2.5 MG/ML
INJECTION, SOLUTION EPIDURAL; INFILTRATION; INTRACAUDAL
Status: DISCONTINUED
Start: 2019-11-19 | End: 2019-11-19 | Stop reason: HOSPADM

## 2019-11-19 RX ORDER — ONDANSETRON 2 MG/ML
INJECTION INTRAMUSCULAR; INTRAVENOUS PRN
Status: DISCONTINUED | OUTPATIENT
Start: 2019-11-19 | End: 2019-11-19

## 2019-11-19 RX ORDER — SIMETHICONE 80 MG
80 TABLET,CHEWABLE ORAL EVERY 6 HOURS PRN
Status: DISCONTINUED | OUTPATIENT
Start: 2019-11-19 | End: 2019-11-22 | Stop reason: HOSPADM

## 2019-11-19 RX ORDER — BUPIVACAINE HYDROCHLORIDE 5 MG/ML
INJECTION, SOLUTION EPIDURAL; INTRACAUDAL PRN
Status: DISCONTINUED | OUTPATIENT
Start: 2019-11-19 | End: 2019-11-19 | Stop reason: HOSPADM

## 2019-11-19 RX ORDER — GINSENG 100 MG
CAPSULE ORAL
Status: DISCONTINUED
Start: 2019-11-19 | End: 2019-11-19 | Stop reason: HOSPADM

## 2019-11-19 RX ORDER — SODIUM CHLORIDE, SODIUM LACTATE, POTASSIUM CHLORIDE, CALCIUM CHLORIDE 600; 310; 30; 20 MG/100ML; MG/100ML; MG/100ML; MG/100ML
INJECTION, SOLUTION INTRAVENOUS CONTINUOUS PRN
Status: DISCONTINUED | OUTPATIENT
Start: 2019-11-19 | End: 2019-11-19

## 2019-11-19 RX ORDER — LIDOCAINE HYDROCHLORIDE 20 MG/ML
INJECTION, SOLUTION INFILTRATION; PERINEURAL PRN
Status: DISCONTINUED | OUTPATIENT
Start: 2019-11-19 | End: 2019-11-19

## 2019-11-19 RX ORDER — PROPOFOL 10 MG/ML
INJECTION, EMULSION INTRAVENOUS CONTINUOUS PRN
Status: DISCONTINUED | OUTPATIENT
Start: 2019-11-19 | End: 2019-11-19

## 2019-11-19 RX ORDER — BUPIVACAINE HYDROCHLORIDE 5 MG/ML
INJECTION, SOLUTION EPIDURAL; INTRACAUDAL
Status: DISCONTINUED
Start: 2019-11-19 | End: 2019-11-19 | Stop reason: HOSPADM

## 2019-11-19 RX ADMIN — PIPERACILLIN SODIUM AND TAZOBACTAM SODIUM 3.38 G: 3; .375 INJECTION, POWDER, LYOPHILIZED, FOR SOLUTION INTRAVENOUS at 23:15

## 2019-11-19 RX ADMIN — SODIUM CHLORIDE: 9 INJECTION, SOLUTION INTRAVENOUS at 23:20

## 2019-11-19 RX ADMIN — PROPOFOL 30 MG: 10 INJECTION, EMULSION INTRAVENOUS at 18:12

## 2019-11-19 RX ADMIN — PROPOFOL 100 MCG/KG/MIN: 10 INJECTION, EMULSION INTRAVENOUS at 18:12

## 2019-11-19 RX ADMIN — PIPERACILLIN SODIUM AND TAZOBACTAM SODIUM 3.38 G: 3; .375 INJECTION, POWDER, LYOPHILIZED, FOR SOLUTION INTRAVENOUS at 18:11

## 2019-11-19 RX ADMIN — MIDAZOLAM 2 MG: 1 INJECTION INTRAMUSCULAR; INTRAVENOUS at 18:12

## 2019-11-19 RX ADMIN — EZETIMIBE 10 MG: 10 TABLET ORAL at 08:54

## 2019-11-19 RX ADMIN — OXYCODONE HYDROCHLORIDE 5 MG: 5 TABLET ORAL at 09:01

## 2019-11-19 RX ADMIN — SODIUM CHLORIDE: 9 INJECTION, SOLUTION INTRAVENOUS at 05:37

## 2019-11-19 RX ADMIN — SODIUM CHLORIDE: 9 INJECTION, SOLUTION INTRAVENOUS at 11:13

## 2019-11-19 RX ADMIN — INSULIN GLARGINE 36 UNITS: 100 INJECTION, SOLUTION SUBCUTANEOUS at 23:15

## 2019-11-19 RX ADMIN — LIDOCAINE HYDROCHLORIDE 100 MG: 20 INJECTION, SOLUTION INFILTRATION; PERINEURAL at 18:11

## 2019-11-19 RX ADMIN — PIPERACILLIN SODIUM AND TAZOBACTAM SODIUM 3.38 G: 3; .375 INJECTION, POWDER, LYOPHILIZED, FOR SOLUTION INTRAVENOUS at 05:36

## 2019-11-19 RX ADMIN — PROPOFOL 20 MG: 10 INJECTION, EMULSION INTRAVENOUS at 18:14

## 2019-11-19 RX ADMIN — SODIUM CHLORIDE, POTASSIUM CHLORIDE, SODIUM LACTATE AND CALCIUM CHLORIDE: 600; 310; 30; 20 INJECTION, SOLUTION INTRAVENOUS at 18:02

## 2019-11-19 RX ADMIN — SIMETHICONE CHEW TAB 80 MG 80 MG: 80 TABLET ORAL at 11:56

## 2019-11-19 RX ADMIN — OXYCODONE HYDROCHLORIDE 5 MG: 5 TABLET ORAL at 23:20

## 2019-11-19 RX ADMIN — ONDANSETRON 4 MG: 2 INJECTION INTRAMUSCULAR; INTRAVENOUS at 18:34

## 2019-11-19 RX ADMIN — PIPERACILLIN SODIUM AND TAZOBACTAM SODIUM 3.38 G: 3; .375 INJECTION, POWDER, LYOPHILIZED, FOR SOLUTION INTRAVENOUS at 11:11

## 2019-11-19 ASSESSMENT — ENCOUNTER SYMPTOMS
SINUS PAIN: 1
CHILLS: 1
SINUS PRESSURE: 1
DIARRHEA: 0
NAUSEA: 0
ABDOMINAL PAIN: 0
SHORTNESS OF BREATH: 0
COUGH: 1
FEVER: 1
SORE THROAT: 0
LIGHT-HEADEDNESS: 0
VOMITING: 0
FATIGUE: 1
DYSRHYTHMIAS: 0

## 2019-11-19 ASSESSMENT — ACTIVITIES OF DAILY LIVING (ADL)
ADLS_ACUITY_SCORE: 14
ADLS_ACUITY_SCORE: 12

## 2019-11-19 ASSESSMENT — LIFESTYLE VARIABLES: TOBACCO_USE: 1

## 2019-11-19 NOTE — PLAN OF CARE
Pt a&o x4, VSS on RA, dressing CDI, up with 1 - NWB on LLE, mod cho diet, controlling pain with oxy and tylenol. Possible surgery tomorrow. Continue to monitor.

## 2019-11-19 NOTE — PROGRESS NOTES
[facility]    Infectious Disease Progress Note    Date of Service (when I saw the patient): 11/19/2019     Assessment & Plan   Ry Horner is a 54 year old male who was admitted on 11/17/2019.        Impression:  1. 54 y.o male with diabetes with peripheral polyneuropathy.  2. History of infection in the right foot leading to amputation at the knee.   3. Admitted with severely infected left foot.   4. Gas gangrene of left foot. Osteomyelitis of left great toe.   5. S/PLeft hallux amputation with irrigation and debridement.  6. CKD.      Recommendations:   Continue on zosyn.   Follow up on the pending cultures, GS positive for both GPC and GNR.   Noted plans for more surgery.          Franklin Maza MD    Interval History   Afebrile   Feels ok     Physical Exam   Temp: 99.5  F (37.5  C) Temp src: Oral BP: (!) 144/89 Pulse: 82 Heart Rate: 104 Resp: 16 SpO2: 93 % O2 Device: None (Room air) Oxygen Delivery: 2 LPM  Vitals:    11/17/19 0838   Weight: 116.6 kg (257 lb)     Vital Signs with Ranges  Temp:  [99.2  F (37.3  C)-100.1  F (37.8  C)] 99.5  F (37.5  C)  Pulse:  [82] 82  Heart Rate:  [] 104  Resp:  [16-18] 16  BP: (109-144)/(67-89) 144/89  SpO2:  [88 %-96 %] 93 %    Constitutional: Awake, alert, cooperative, no apparent distress  Lungs: Clear to auscultation bilaterally, no crackles or wheezing  Cardiovascular: Regular rate and rhythm, normal S1 and S2, and no murmur noted  Abdomen: Normal bowel sounds, soft, non-distended, non-tender  Skin: No rashes, no cyanosis, no edema  Other:    Medications     sodium chloride 125 mL/hr at 11/19/19 0537       ezetimibe  10 mg Oral Daily     insulin aspart  1-7 Units Subcutaneous TID AC     insulin aspart  1-5 Units Subcutaneous At Bedtime     insulin glargine  36 Units Subcutaneous At Bedtime     piperacillin-tazobactam  3.375 g Intravenous Q6H     sodium chloride (PF)  3 mL Intracatheter Q8H       Data   All microbiology laboratory data reviewed.  Recent  Labs   Lab Test 11/18/19  0709 11/17/19  0856 09/14/17  1508   WBC 13.9* 18.4* 9.9   HGB 8.8* 10.1* 11.7*   HCT 26.1* 29.7* 35.4*   MCV 87 85 88    294 467*     Recent Labs   Lab Test 11/19/19  0725 11/18/19  0709 11/17/19  0856   CR 1.44* 1.85* 1.82*     Recent Labs   Lab Test 08/28/17  1045   *     Recent Labs   Lab Test 11/17/19  1525 11/17/19  1419 11/17/19  1411 08/28/17  1136 08/28/17  1059 08/28/17  1054 07/15/16  1521 07/14/16  1029 07/14/16  1023   CULT Heavy growth  Gram positive cocci  *  Culture in progress Moderate growth  Gram positive cocci  *  Culture in progress  Culture negative monitoring continues Culture negative monitoring continues No growth No growth Heavy growth  Beta hemolytic Streptococcus group G  *  Moderate growth  Proteus vulgaris  *  Moderate growth  Normal skin rosa   No anaerobes isolated  On day 1, isolated in broth only: Staphylococcus simulans This isolate is   presumed to be clindamycin resistant based on detection of inducible   clindamycin resistance. Erythromycin and clindamycin are resistant, therefore,   they are not recommended for use.  On day 1, isolated in broth only: Strain 2 Staphylococcus simulans This isolate   is presumed to be clindamycin resistant based on detection of inducible   clindamycin resistance. Erythromycin and clindamycin are resistant, therefore,   they are not recommended for use.  Strain 1 found to have inducible  Clindamycin resistance also,  results updated  * No growth No growth       Attestation:  Total time on the floor involved in the patient's care: 35 minutes. Total time spent in counseling/care coordination: >50%

## 2019-11-19 NOTE — ANESTHESIA PREPROCEDURE EVALUATION
Anesthesia Pre-Procedure Evaluation    Patient: Ry Horner   MRN: [id number] : 1965          Preoperative Diagnosis: Diabetic polyneuropathy (H) [E11.42]    Procedure(s):  REVISIONAL IRRIGATION AND DEBRIDEMENT LEFT FOOT AND POSSIBLE BONE DEBEIDEMENT  IRRIGATION AND DEBRIDEMENT, BONE, LOWER EXTREMITY    Past Medical History:   Diagnosis Date     BENIGN HYPERTENSION 2007     DIABETES MELLITUS TYPE II-UNCOMPL 2007     HYPERLIPIDEMIA NEC/NOS 2007     Tobacco use disorder 2007     Past Surgical History:   Procedure Laterality Date     AMPUTATE FOOT Left 2019    Procedure: LEFT PARTIAL FOOT AMPUTATION;  Surgeon: Antoine Pena DPM;  Location: [facility] OR     AMPUTATE LEG BELOW KNEE Right 2017    Procedure: AMPUTATE LEG BELOW KNEE;  RIGHT BELOW KNEE AMPUTATION ;  Surgeon: Nikolas Nascimento MD;  Location: [facility] OR     APPENDECTOMY       APPLY WOUND VAC Right 3/2/2015    Procedure: APPLY WOUND VAC;  Surgeon: Milena Cevallos DPM, Pod;  Location: [facility] OR     BIOPSY BONE FOOT Right 7/15/2016    Procedure: BIOPSY BONE FOOT;  Surgeon: Tim Douglas DPM;  Location: [facility] OR     IRRIGATION AND DEBRIDEMENT FOOT, COMBINED Right 3/2/2015    Procedure: COMBINED IRRIGATION AND DEBRIDEMENT FOOT;  Surgeon: Milena Cevallos DPM, Pod;  Location: [facility] OR     IRRIGATION AND DEBRIDEMENT FOOT, COMBINED Right 7/15/2016    Procedure: COMBINED IRRIGATION AND DEBRIDEMENT FOOT;  Surgeon: Tim Douglas DPM;  Location: [facility] OR     IRRIGATION AND DEBRIDEMENT FOOT, COMBINED Right 2016    Procedure: COMBINED IRRIGATION AND DEBRIDEMENT FOOT;  Surgeon: Tim Douglas DPM;  Location:  OR     ORTHOPEDIC SURGERY         Anesthesia Evaluation     . Pt has had prior anesthetic. Type: General    No history of anesthetic complications          ROS/MED HX    ENT/Pulmonary:  - neg pulmonary ROS   (+)NITESH risk factors hypertension, obese, daytime somnolence, tobacco use, Past use , . .    Neurologic:      (+)neuropathy     Cardiovascular:     (+) Dyslipidemia, hypertension----. : . . TORRES, . :. . Previous cardiac testing date:results:date: results:ECG reviewed date:11/17/19 results:NSR date: results:         (-) CAD and arrhythmias   METS/Exercise Tolerance:  1 - Eating, dressing   Hematologic:         Musculoskeletal:   (+)  other musculoskeletal- foot ulcer      GI/Hepatic:         Renal/Genitourinary:     (+) chronic renal disease, type: CRI,       Endo:     (+) type II DM Not using insulin Obesity, .   (-) Type I DM   Psychiatric:  - neg psychiatric ROS      (-) psychiatric history   Infectious Disease:   (+) Other Infectious Disease Osteo      Malignancy:         Other:    (+) No chance of pregnancy C-spine cleared: N/A, no H/O Chronic Pain,no other significant disability                         Physical Exam  Normal systems: cardiovascular and pulmonary    Airway   Mallampati: II  TM distance: >3 FB  Neck ROM: full    Dental   (+) missing    Cardiovascular       Pulmonary             Lab Results   Component Value Date    WBC 13.9 (H) 11/18/2019    HGB 8.8 (L) 11/18/2019    HCT 26.1 (L) 11/18/2019     11/18/2019    CRP 85.1 (H) 08/28/2017     (H) 08/28/2017     11/18/2019    POTASSIUM 4.5 11/18/2019    CHLORIDE 103 11/18/2019    CO2 27 11/18/2019    BUN 43 (H) 11/18/2019    CR 1.44 (H) 11/19/2019     (H) 11/18/2019    FERNANDO 7.7 (L) 11/18/2019    ALT 40 04/04/2007    AST 23 04/04/2007    TSH 1.06 06/02/2017       Preop Vitals  BP Readings from Last 3 Encounters:   11/19/19 (!) 143/84   11/15/19 (!) 146/73   09/19/19 (!) 146/95    Pulse Readings from Last 3 Encounters:   11/18/19 82   11/15/19 130   09/19/19 107      Resp Readings from Last 3 Encounters:   11/19/19 16   09/14/17 18   09/03/17 16    SpO2 Readings from Last 3 Encounters:   11/19/19 96%   11/15/19 97%   09/19/19 97%      Temp Readings from Last 1 Encounters:   11/19/19 38.4  C (101.1  F) (Oral)    Ht Readings from Last 1  Encounters:   11/17/19 1.829 m (6')      Wt Readings from Last 1 Encounters:   11/17/19 116.6 kg (257 lb)    Estimated body mass index is 34.86 kg/m  as calculated from the following:    Height as of this encounter: 1.829 m (6').    Weight as of this encounter: 116.6 kg (257 lb).       Anesthesia Plan      History & Physical Review  History and physical reviewed and following examination; no interval change.    ASA Status:  3 .    NPO Status:  > 8 hours    Plan for MAC Reason for MAC:  Deep or markedly invasive procedure (G8)  PONV prophylaxis:  Ondansetron (or other 5HT-3)       Postoperative Care  Postoperative pain management:  Multi-modal analgesia.      Consents  Anesthetic plan, risks, benefits and alternatives discussed with:  Patient..                 Felipe Aly MD

## 2019-11-19 NOTE — PROGRESS NOTES
Patient complains of swelling and redness at the left forefoot and great toe.  This started out as a skin break following a blister formation at the great toe.  Tried treating it with over-the-counter Neosporin; however, condition persisted prompting this consultation.    Patient feels feverish.  Also complains of recent URI symptoms.  Denies chest pain or shortness of breath.      Past Medical History:   Diagnosis Date     BENIGN HYPERTENSION 4/4/2007     DIABETES MELLITUS TYPE II-UNCOMPL 4/4/2007     HYPERLIPIDEMIA NEC/NOS 4/4/2007     Tobacco use disorder 4/4/2007       Review of Systems   Constitutional: Positive for chills, fatigue and fever.   HENT: Positive for congestion, sinus pressure and sinus pain. Negative for sore throat.    Respiratory: Positive for cough. Negative for shortness of breath.    Cardiovascular: Negative for chest pain.   Gastrointestinal: Negative for abdominal pain, diarrhea, nausea and vomiting.   Neurological: Negative for light-headedness.       BP (!) 146/73   Pulse 130   Temp 102.3  F (39.1  C) (Oral)   Wt 116.6 kg (257 lb)   SpO2 97%   BMI 34.86 kg/m      Physical Exam  Constitutional:       General: He is not in acute distress.     Appearance: He is not ill-appearing.   HENT:      Mouth/Throat:      Mouth: Mucous membranes are moist.   Cardiovascular:      Rate and Rhythm: Regular rhythm. Tachycardia present.   Pulmonary:      Effort: Pulmonary effort is normal. No respiratory distress.      Breath sounds: Normal breath sounds.   Musculoskeletal:      Left lower leg: Edema present.   Skin:     Comments: Foul-smelling open wound at left great toe and erythema/swelling at the left forefoot.   Neurological:      Mental Status: He is alert and oriented to person, place, and time.   Psychiatric:         Mood and Affect: Mood normal.         Behavior: Behavior normal.           ICD-10-CM    1. Cellulitis in diabetic foot (H) E11.628 clindamycin (CLEOCIN) 300 MG capsule     L03.119 ciprofloxacin (CIPRO) 750 MG tablet       Patient Instructions   Take prescribed antibiotics as directed.  Contact on-call doctor if you develop any side effects from the medications over the weekend -- dial [phone] and follow the voice instructions to get in touch with the on-call doctor.    Seek immediate medical attention if you develop persistent fever, shortness of breath, nausea/vomiting, diarrhea, worsening redness/pain/swelling at the left foot.    Contact your podiatrist and schedule a follow-up visit as soon as possible.    Follow-up with Dr. Wing next week.

## 2019-11-19 NOTE — PLAN OF CARE
PT: Attempted session, pt declining at this time stating he has been up to the bathroom a few times and would like to rest now prior to returning to OR this afternoon.

## 2019-11-19 NOTE — PROGRESS NOTES
5:10 pm chart check  Went to see pt and he is in OR this evening  Chart reviewed    On abx (zosyn) and ID following    On glargine 36 units at HS, sliding scale insulin, he did receive full dose last evening and was NPO at 0800. Will monitor closely this evening for any hypoglycemia    JOHNATHAN noted to be improving    BP's sl high, will have prn hydralazine available and address restarting meds in the am pending renal function and pressures    Labs ordered for the am     Please call the hospitalist service overnight with questions, will be see on 11/20    Ry Hilliard M.D.  Hospitalist  Pager [phone]  Text Page

## 2019-11-19 NOTE — PLAN OF CARE
Tmax 100.1. Sats 88% on RA. Frequent cough, pt states it is from post nasal drip. IS encouraged. Sats 91-92% on 2L overnight. Lungs with fine crackles in right base. Left foot dressing CDI. Right BKA stump with small scab. Voiding per urinal. Plan for NPO at 0800 with return to OR for I&D and possible closure at 1600.

## 2019-11-20 ENCOUNTER — APPOINTMENT (OUTPATIENT)
Dept: GENERAL RADIOLOGY | Facility: CLINIC | Age: 54
DRG: 616 | End: 2019-11-20
Attending: INTERNAL MEDICINE
Payer: COMMERCIAL

## 2019-11-20 ENCOUNTER — APPOINTMENT (OUTPATIENT)
Dept: PHYSICAL THERAPY | Facility: CLINIC | Age: 54
DRG: 616 | End: 2019-11-20
Payer: COMMERCIAL

## 2019-11-20 LAB
ANION GAP SERPL CALCULATED.3IONS-SCNC: 6 MMOL/L (ref 3–14)
BACTERIA SPEC CULT: ABNORMAL
BACTERIA SPEC CULT: ABNORMAL
BUN SERPL-MCNC: 17 MG/DL (ref 7–30)
CALCIUM SERPL-MCNC: 7.7 MG/DL (ref 8.5–10.1)
CHLORIDE SERPL-SCNC: 104 MMOL/L (ref 94–109)
CO2 SERPL-SCNC: 25 MMOL/L (ref 20–32)
CREAT SERPL-MCNC: 1.04 MG/DL (ref 0.66–1.25)
ERYTHROCYTE [DISTWIDTH] IN BLOOD BY AUTOMATED COUNT: 11.7 % (ref 10–15)
GFR SERPL CREATININE-BSD FRML MDRD: 82 ML/MIN/{1.73_M2}
GLUCOSE BLDC GLUCOMTR-MCNC: 205 MG/DL (ref 70–99)
GLUCOSE BLDC GLUCOMTR-MCNC: 286 MG/DL (ref 70–99)
GLUCOSE BLDC GLUCOMTR-MCNC: 347 MG/DL (ref 70–99)
GLUCOSE BLDC GLUCOMTR-MCNC: 400 MG/DL (ref 70–99)
GLUCOSE SERPL-MCNC: 192 MG/DL (ref 70–99)
HCT VFR BLD AUTO: 28 % (ref 40–53)
HGB BLD-MCNC: 9.3 G/DL (ref 13.3–17.7)
LACTATE BLD-SCNC: 1.4 MMOL/L (ref 0.7–2)
MCH RBC QN AUTO: 28.6 PG (ref 26.5–33)
MCHC RBC AUTO-ENTMCNC: 33.2 G/DL (ref 31.5–36.5)
MCV RBC AUTO: 86 FL (ref 78–100)
PLATELET # BLD AUTO: 360 10E9/L (ref 150–450)
POTASSIUM SERPL-SCNC: 4.2 MMOL/L (ref 3.4–5.3)
RBC # BLD AUTO: 3.25 10E12/L (ref 4.4–5.9)
SODIUM SERPL-SCNC: 135 MMOL/L (ref 133–144)
SPECIMEN SOURCE: ABNORMAL
WBC # BLD AUTO: 17.5 10E9/L (ref 4–11)

## 2019-11-20 PROCEDURE — 25000132 ZZH RX MED GY IP 250 OP 250 PS 637: Performed by: INTERNAL MEDICINE

## 2019-11-20 PROCEDURE — 80048 BASIC METABOLIC PNL TOTAL CA: CPT | Performed by: INTERNAL MEDICINE

## 2019-11-20 PROCEDURE — 83605 ASSAY OF LACTIC ACID: CPT | Performed by: INTERNAL MEDICINE

## 2019-11-20 PROCEDURE — 94640 AIRWAY INHALATION TREATMENT: CPT | Mod: 76

## 2019-11-20 PROCEDURE — 71045 X-RAY EXAM CHEST 1 VIEW: CPT

## 2019-11-20 PROCEDURE — 25000128 H RX IP 250 OP 636: Performed by: PODIATRIST

## 2019-11-20 PROCEDURE — 12000000 ZZH R&B MED SURG/OB

## 2019-11-20 PROCEDURE — 97605 NEG PRS WND THER DME<=50SQCM: CPT

## 2019-11-20 PROCEDURE — 99232 SBSQ HOSP IP/OBS MODERATE 35: CPT | Performed by: INTERNAL MEDICINE

## 2019-11-20 PROCEDURE — 25000132 ZZH RX MED GY IP 250 OP 250 PS 637: Performed by: PODIATRIST

## 2019-11-20 PROCEDURE — 00000146 ZZHCL STATISTIC GLUCOSE BY METER IP

## 2019-11-20 PROCEDURE — 85027 COMPLETE CBC AUTOMATED: CPT | Performed by: INTERNAL MEDICINE

## 2019-11-20 PROCEDURE — 36415 COLL VENOUS BLD VENIPUNCTURE: CPT | Performed by: INTERNAL MEDICINE

## 2019-11-20 PROCEDURE — G0463 HOSPITAL OUTPT CLINIC VISIT: HCPCS | Mod: 25

## 2019-11-20 PROCEDURE — 25000131 ZZH RX MED GY IP 250 OP 636 PS 637: Performed by: INTERNAL MEDICINE

## 2019-11-20 PROCEDURE — 40000275 ZZH STATISTIC RCP TIME EA 10 MIN

## 2019-11-20 PROCEDURE — 93005 ELECTROCARDIOGRAM TRACING: CPT

## 2019-11-20 PROCEDURE — 97116 GAIT TRAINING THERAPY: CPT | Mod: GP | Performed by: PHYSICAL THERAPIST

## 2019-11-20 PROCEDURE — 94640 AIRWAY INHALATION TREATMENT: CPT

## 2019-11-20 PROCEDURE — 25000125 ZZHC RX 250: Performed by: INTERNAL MEDICINE

## 2019-11-20 PROCEDURE — 93010 ELECTROCARDIOGRAM REPORT: CPT | Performed by: INTERNAL MEDICINE

## 2019-11-20 RX ORDER — NALOXONE HYDROCHLORIDE 0.4 MG/ML
.1-.4 INJECTION, SOLUTION INTRAMUSCULAR; INTRAVENOUS; SUBCUTANEOUS
Status: DISCONTINUED | OUTPATIENT
Start: 2019-11-20 | End: 2019-11-22 | Stop reason: HOSPADM

## 2019-11-20 RX ORDER — FUROSEMIDE 20 MG
20 TABLET ORAL ONCE
Status: COMPLETED | OUTPATIENT
Start: 2019-11-20 | End: 2019-11-20

## 2019-11-20 RX ORDER — ACETAMINOPHEN 325 MG/1
975 TABLET ORAL EVERY 8 HOURS
Status: DISCONTINUED | OUTPATIENT
Start: 2019-11-20 | End: 2019-11-22 | Stop reason: HOSPADM

## 2019-11-20 RX ORDER — LIDOCAINE 40 MG/G
CREAM TOPICAL
Status: DISCONTINUED | OUTPATIENT
Start: 2019-11-20 | End: 2019-11-22 | Stop reason: HOSPADM

## 2019-11-20 RX ORDER — IPRATROPIUM BROMIDE AND ALBUTEROL SULFATE 2.5; .5 MG/3ML; MG/3ML
3 SOLUTION RESPIRATORY (INHALATION)
Status: DISCONTINUED | OUTPATIENT
Start: 2019-11-20 | End: 2019-11-21

## 2019-11-20 RX ORDER — SODIUM CHLORIDE, SODIUM LACTATE, POTASSIUM CHLORIDE, CALCIUM CHLORIDE 600; 310; 30; 20 MG/100ML; MG/100ML; MG/100ML; MG/100ML
INJECTION, SOLUTION INTRAVENOUS CONTINUOUS
Status: DISCONTINUED | OUTPATIENT
Start: 2019-11-20 | End: 2019-11-20

## 2019-11-20 RX ORDER — OXYCODONE HYDROCHLORIDE 5 MG/1
5-10 TABLET ORAL
Status: DISCONTINUED | OUTPATIENT
Start: 2019-11-20 | End: 2019-11-22 | Stop reason: HOSPADM

## 2019-11-20 RX ORDER — ACETAMINOPHEN 325 MG/1
650 TABLET ORAL EVERY 4 HOURS PRN
Status: DISCONTINUED | OUTPATIENT
Start: 2019-11-22 | End: 2019-11-20

## 2019-11-20 RX ADMIN — OXYCODONE HYDROCHLORIDE 5 MG: 5 TABLET ORAL at 15:20

## 2019-11-20 RX ADMIN — PIPERACILLIN SODIUM AND TAZOBACTAM SODIUM 3.38 G: 3; .375 INJECTION, POWDER, LYOPHILIZED, FOR SOLUTION INTRAVENOUS at 23:17

## 2019-11-20 RX ADMIN — PIPERACILLIN SODIUM AND TAZOBACTAM SODIUM 3.38 G: 3; .375 INJECTION, POWDER, LYOPHILIZED, FOR SOLUTION INTRAVENOUS at 04:30

## 2019-11-20 RX ADMIN — IPRATROPIUM BROMIDE AND ALBUTEROL SULFATE 3 ML: .5; 3 SOLUTION RESPIRATORY (INHALATION) at 20:51

## 2019-11-20 RX ADMIN — INSULIN GLARGINE 46 UNITS: 100 INJECTION, SOLUTION SUBCUTANEOUS at 23:19

## 2019-11-20 RX ADMIN — GUAIFENESIN 10 ML: 200 SOLUTION ORAL at 10:51

## 2019-11-20 RX ADMIN — FUROSEMIDE 20 MG: 20 TABLET ORAL at 00:58

## 2019-11-20 RX ADMIN — OXYCODONE HYDROCHLORIDE 5 MG: 5 TABLET ORAL at 04:30

## 2019-11-20 RX ADMIN — OXYCODONE HYDROCHLORIDE 5 MG: 5 TABLET ORAL at 19:40

## 2019-11-20 RX ADMIN — GUAIFENESIN 10 ML: 200 SOLUTION ORAL at 21:12

## 2019-11-20 RX ADMIN — PIPERACILLIN SODIUM AND TAZOBACTAM SODIUM 3.38 G: 3; .375 INJECTION, POWDER, LYOPHILIZED, FOR SOLUTION INTRAVENOUS at 17:47

## 2019-11-20 RX ADMIN — EZETIMIBE 10 MG: 10 TABLET ORAL at 08:13

## 2019-11-20 RX ADMIN — PIPERACILLIN SODIUM AND TAZOBACTAM SODIUM 3.38 G: 3; .375 INJECTION, POWDER, LYOPHILIZED, FOR SOLUTION INTRAVENOUS at 11:06

## 2019-11-20 RX ADMIN — OXYCODONE HYDROCHLORIDE 10 MG: 5 TABLET ORAL at 23:17

## 2019-11-20 RX ADMIN — IPRATROPIUM BROMIDE AND ALBUTEROL SULFATE 3 ML: .5; 3 SOLUTION RESPIRATORY (INHALATION) at 07:43

## 2019-11-20 RX ADMIN — OXYCODONE HYDROCHLORIDE 5 MG: 5 TABLET ORAL at 10:49

## 2019-11-20 RX ADMIN — ACETAMINOPHEN 975 MG: 325 TABLET, FILM COATED ORAL at 23:17

## 2019-11-20 RX ADMIN — IPRATROPIUM BROMIDE AND ALBUTEROL SULFATE 3 ML: .5; 3 SOLUTION RESPIRATORY (INHALATION) at 15:33

## 2019-11-20 RX ADMIN — ACETAMINOPHEN 650 MG: 325 TABLET, FILM COATED ORAL at 13:41

## 2019-11-20 ASSESSMENT — ACTIVITIES OF DAILY LIVING (ADL)
ADLS_ACUITY_SCORE: 14
ADLS_ACUITY_SCORE: 13

## 2019-11-20 ASSESSMENT — MIFFLIN-ST. JEOR: SCORE: 2053.74

## 2019-11-20 NOTE — PROGRESS NOTES
[facility]    Infectious Disease Progress Note    Date of Service (when I saw the patient): 11/20/2019     Assessment & Plan   Ry Horner is a 54 year old male who was admitted on 11/17/2019.        Impression:  1. 54 y.o male with diabetes with peripheral polyneuropathy.  2. History of infection in the right foot leading to amputation at the knee.   3. Admitted with severely infected left foot.   4. Gas gangrene of left foot. Osteomyelitis of left great toe.   5. S/PLeft hallux amputation with irrigation and debridement.  6. CKD.      Recommendations:   Continue on zosyn. So far Strep, staph, anaerobes, will follow up on the pending ID and NEAL and adjust antibiotics as needed.   Noted podiatry`s recommendations on another 24- 48 hours of IV antibiotics.            Franklin Maza MD    Interval History   Afebrile   Feels ok   S/p above mentioned surgery   Cultures as below     Physical Exam   Temp: 100.2  F (37.9  C) Temp src: Oral BP: 129/81 Pulse: 115 Heart Rate: 102 Resp: 16 SpO2: 90 % O2 Device: None (Room air) Oxygen Delivery: 3 LPM  Vitals:    11/17/19 0838 11/20/19 0713   Weight: 116.6 kg (257 lb) 116.6 kg (257 lb)     Vital Signs with Ranges  Temp:  [98  F (36.7  C)-101.1  F (38.4  C)] 100.2  F (37.9  C)  Pulse:  [] 115  Heart Rate:  [] 102  Resp:  [11-28] 16  BP: (119-169)/() 129/81  SpO2:  [90 %-99 %] 90 %    Constitutional: Awake, alert, cooperative, no apparent distress  Lungs: Clear to auscultation bilaterally, no crackles or wheezing  Cardiovascular: Regular rate and rhythm, normal S1 and S2, and no murmur noted  Abdomen: Normal bowel sounds, soft, non-distended, non-tender  Skin: No rashes, no cyanosis, no edema  Other:    Medications       ezetimibe  10 mg Oral Daily     insulin aspart  1-7 Units Subcutaneous TID AC     insulin aspart  1-5 Units Subcutaneous At Bedtime     insulin glargine  36 Units Subcutaneous At Bedtime     ipratropium - albuterol 0.5 mg/2.5 mg/3  mL  3 mL Nebulization 4x daily     piperacillin-tazobactam  3.375 g Intravenous Q6H     sodium chloride (PF)  3 mL Intracatheter Q8H       Data   All microbiology laboratory data reviewed.  Recent Labs   Lab Test 11/20/19  0717 11/18/19  0709 11/17/19  0856   WBC 17.5* 13.9* 18.4*   HGB 9.3* 8.8* 10.1*   HCT 28.0* 26.1* 29.7*   MCV 86 87 85    275 294     Recent Labs   Lab Test 11/20/19  0717 11/19/19  0725 11/18/19  0709   CR 1.04 1.44* 1.85*     Recent Labs   Lab Test 08/28/17  1045   *     Recent Labs   Lab Test 11/19/19  1829 11/17/19  1525 11/17/19  1419 11/17/19  1411 08/28/17  1136 08/28/17  1059 08/28/17  1054 07/15/16  1521 07/14/16  1029   CULT Culture negative monitoring continues  Culture negative monitoring continues Heavy growth  beta hemolytic   Streptococcus constellatus  Susceptibility testing done on previous specimen  *  Light growth  Alcaligenes faecalis  Susceptibility testing in progress  *  Light growth  Staphylococcus aureus  Susceptibility testing in progress  *  On day 1, isolated in broth only:  Anaerobic gram negative rods  See anaerobic report for identification  * Heavy growth  Bacteroides fragilis  Susceptibility testing not routinely done  *  Heavy growth  Peptoniphilus asaccharolyticus  Susceptibility testing not routinely done  *  Moderate growth  beta hemolytic   Streptococcus constellatus  *  On day 1, isolated in broth only:  Anaerobic gram negative rods  See anaerobic report for identification  * Heavy growth  Bacteroides fragilis  Susceptibility testing not routinely done  *  Heavy growth  Parvimonas micra  Susceptibility testing not routinely done  *  Heavy growth  Peptoniphilus asaccharolyticus  Susceptibility testing not routinely done  * No growth No growth Heavy growth  Beta hemolytic Streptococcus group G  *  Moderate growth  Proteus vulgaris  *  Moderate growth  Normal skin rosa   No anaerobes isolated  On day 1, isolated in broth only:  Staphylococcus simulans This isolate is   presumed to be clindamycin resistant based on detection of inducible   clindamycin resistance. Erythromycin and clindamycin are resistant, therefore,   they are not recommended for use.  On day 1, isolated in broth only: Strain 2 Staphylococcus simulans This isolate   is presumed to be clindamycin resistant based on detection of inducible   clindamycin resistance. Erythromycin and clindamycin are resistant, therefore,   they are not recommended for use.  Strain 1 found to have inducible  Clindamycin resistance also,  results updated  * No growth       Attestation:  Total time on the floor involved in the patient's care: 35 minutes. Total time spent in counseling/care coordination: >50%

## 2019-11-20 NOTE — BRIEF OP NOTE
[facility]    Brief Operative Note    Pre-operative diagnosis: Diabetic polyneuropathy (H) [E11.42]  Post-operative diagnosis sp wound debridement and tibial sesamoid resection left foot    Procedure: Procedure(s):  REVISIONAL IRRIGATION AND DEBRIDEMENT LEFT FOOT AND BONE DEBRIDEMENT  Surgeon: Surgeon(s) and Role:     * Joseph Randhawa DPM - Primary  Anesthesia: Combined MAC with Local   Estimated blood loss: Less than 10 ml  Drains: None  Specimens:   ID Type Source Tests Collected by Time Destination   1 : left foot tissue for aerobic Tissue Foot, Left TISSUE CULTURE AEROBIC BACTERIAL Joseph Randhawa DPM 11/19/2019  6:29 PM    2 : left foot tissue for anaerobic  Tissue Foot, Left ANAEROBIC BACTERIAL CULTURE Joseph Randhawa DPM 11/19/2019  6:29 PM      Findings:   debrided back to healthy/bleeding tissue.  fibular sesamoid left intact, but tibial sesamoid resected..  Complications: None.  Implants: * No implants in log *

## 2019-11-20 NOTE — OP NOTE
Procedure Date: 11/19/2019      SURGEON:  Joseph Randhawa DPM      PREOPERATIVE DIAGNOSES:   1.  Poorly controlled diabetes mellitus.   2.  Gas gangrene, left great toe, status post open amputation 11/17/2019.      POSTOPERATIVE DIAGNOSES:   1.  Poorly controlled diabetes mellitus.   2.  Gas gangrene, left great toe, status post open amputation 11/17/2019.      PROCEDURE:  Debridement down to and including bone, less than 20 cm2.      PATHOLOGY:  Deep tissue was sent off for aerobic and anaerobic cultures.      ANESTHESIA:  MAC with local.      HEMOSTASIS:  None.      ESTIMATED BLOOD LOSS:  10 mL.      MATERIALS:  None.      INJECTABLES:  18 mL of 0.25% bupivacaine plain.      COMPLICATIONS:  None apparent.      INDICATIONS FOR PROCEDURE:  The patient is a 54-year-old neuropathic, poorly controlled diabetic male who presented with worsening infection of the left foot.  X-ray showed gas within the soft tissue of the left great toe.  He underwent an open amputation by Dr. Pena on 11/17/2019.  The amputation unfortunately required resection of a large amount of soft tissue, and so there was insufficient tissue for closure.  The first metatarsal head is readily exposed at the base of the wound.  He is brought back today for surgical management.      DETAILS OF PROCEDURE:  After obtaining written consent, the patient was transferred to the operating room, placed in supine position on the operating table.  IV sedation was initiated.  The foot was anesthetized with a preoperative local.  It was then prepped and draped in normal aseptic fashion.  No tourniquet was utilized.      Attention was directed to the left hallux open amputation site.  Utilizing a #15 blade, a rongeur and a curet, the wound was debrided down to and including deep fascia.  The tibial sesamoid was exposed and appeared nonsalvageable, so this was resected as well.  Tissue was debrided back to healthy bleeding margins, excising all nonviable  tissue with the above sharp instrumentation.  Based on the fact that the patient has a right below-knee amputation, I had concerns about resecting the first metatarsal head, as this would likely lead to a rapid deterioration of the forefoot, including plantar second MPJ ulcerations.  I applied a few sutures distally to close as much of the wound as possible, but elected to leave the remaining portion open versus resecting the first metatarsal head.  A wound nurse consult has been placed for a wound VAC application.  We will attempt to close this and maintain a plantigrade foot with an intact metatarsal parabola, but I did discuss with family that further amputation may be necessary if this fails to heal.         SHAWN MOHR DPM             D: 2019   T: 2019   MT: PARVIZ      Name:     YNES YBARRA   MRN:      [id number]        Account:        [id number]   :      1965           Procedure Date: 2019      Document: [id number]

## 2019-11-20 NOTE — PROVIDER NOTIFICATION
MD Notification    Notified Person: MD    Notified Person Name: Dr. Franco    Notification Date/Time: 11/19/19 @ 4368    Notification Interaction: page/phone    Purpose of Notification: pt feeling very SOB and congested, could we try a neb treatment or mucinex. Also patient brought Cepacol throat lozenges and would like to use these and need them added to MAR and labeled by Pharmacy please.     Orders Received: MD to come assess patient, ordering chest XR, stop IV fluids    Comments: After MD assessment, ordered Lasix, EKG, Tele, PRN nebs, robitussin for cough PRN

## 2019-11-20 NOTE — PLAN OF CARE
Pt up with 1, NWB LLE.  Oxycodone for pain.  Tele ST.  Breathing improving from overnight, cough continues.  Wound Vac placed today.  Low grade temp.

## 2019-11-20 NOTE — PROGRESS NOTES
[facility]    No complaints.  in room for discharge planning discussion.    Exam:  B/P: 129/81, T: 100.2, P: 115, R: 16  Wound VAC in on and functioning, left foot  There is residual erythema around the medial and plantar left 1st metatarsophalangeal joint    Assessment:  54-year old male with poorly controlled DM, peripheral neuropathy status post open left hallux amputation 11/17/19 for treatment of gas gangrene and excisional debridement 11/19/19.     Plan:  Wound VAC therapy; paperwork signed  Non weight bearing left foot;  Heel weight bearing for transfers only.  Discharge planning per Case Management  Recommend another 24-48 hours of IV antibiotics/ hospitalization  Discharge antibiotics per ID  Podiatry will sign off at this time. Please notify us if any increasing redness or other concerns.  Foot relevant discharge orders placed.    Tim Douglas DPM, FACFAS, MS  [facility]  [phone]

## 2019-11-20 NOTE — ANESTHESIA POSTPROCEDURE EVALUATION
Patient: Ry Horner    Procedure(s):  REVISIONAL IRRIGATION AND DEBRIDEMENT LEFT FOOT AND BONE DEBRIDEMENT    Diagnosis:Diabetic polyneuropathy (H) [E11.42]  Diagnosis Additional Information: No value filed.    Anesthesia Type:  MAC    Note:  Anesthesia Post Evaluation    Patient location during evaluation: PACU  Patient participation: Able to fully participate in evaluation  Level of consciousness: awake and alert  Pain management: adequate  Airway patency: patent  Cardiovascular status: acceptable  Respiratory status: acceptable  Hydration status: acceptable  PONV: none     Anesthetic complications: None          Last vitals:  Vitals:    11/19/19 1900 11/19/19 1910 11/19/19 1920   BP: 119/83 123/81 130/82   Pulse: 92 91 90   Resp: 20 22 11   Temp:      SpO2: 92% 92% 93%         Electronically Signed By: Felipe Aly MD  November 19, 2019  7:26 PM

## 2019-11-20 NOTE — PLAN OF CARE
A&Ox4. VSS on 3L O2 overnight for continued SOB, and elevated BP. Tele ST. Up A1 GB/W, NWB on L, R BKA with prosthesis. Dressing CDI. CMS intact ex numbness to LLE. Cough congested and causing severe pain in RLQ abdomen. IV SL. LS dim with crackles in bilat bases and some exp wheezes. Needs PTA meds addressed and added today please. Continue to monitor.

## 2019-11-20 NOTE — PROGRESS NOTES
X-cover note      Called regarding shortness of breath. Patient evaluated. See vitals-shows tachy in 116. Placed on oxygen for comfort but no hypoxia document. Patient reports to feel sob for last hour or so. Reports has been coughing for the last few weeks.  On exam, diminshed air movment bilaterally with few scattered faint wheezes and bibasilar crackles.   - CXR stat  - EKG  - telemetry  - Lasix 20mg IV x 1  - stop IVF- currently running at 125cc/hr  - strict I & Os.     Zulay Franco MD  Hospitalist

## 2019-11-20 NOTE — CONSULTS
Care Coordination:    Care Transition Initial Assessment - RN        Met with: Patient.  DATA   Active Problems:    Osteomyelitis (H)       Cognitive Status: awake, alert and oriented.        Contact information and PCP information verified: Yes  Lives With: significant other   Living Arrangements: house                 Insurance concerns: No Insurance issues identified  ASSESSMENT  Patient currently receives the following services:  Pt lives with girlfriend in house. Currently no services in the home. Pt has R LE prosthesis.        Identified issues/concerns regarding health management: Pt admitted with L foot gas gangrene/osteomyhelitis.  Taken to OR 11/18 for L foot partial foot amputation. Back to OR on 11/19 for partial closure.  Seen 11/20 by [facility] and wound vac placed.  ID following for IV abx.  Benefit check done on 11/18, refer to Yvette Combs note. [facility] has been following for IV infusion needs  Met with patient and explained role. Per PT Pt could return home w/ assist of S.O to navigate stairs.  Pt is comfortable with this plan and feels he could manage.  If he does not have IV infusion, he would like to use [facility] for Wound vac needs.  Vac therapy insurance authorization form filled out/signed by Dr. Douglas/[facility].  Entered in [facility] [employer] and supporting clinical data sent to [facility]  Patient would like a w/c for home. Hoping to rent. [facility] contacted to inquire of W/C rental. Negrito Luis at [facility] does not have any w/c that will suit the patient's size.  Recommended to call [employer] or [facility].  Call made to  [employer] Phone: [phone].  They do have an available chair.  Clinical data faxed to [phone]. Will follow up tomorrow that they have received supporting clinical data.        PLAN  Financial costs for the patient include TBD .  Patient given options and choices for discharge yes .  Patient/family is agreeable to the plan?  Yes:   Patient anticipates discharging to home .        Patient  anticipates needs for home equipment: Yes- VAC + Wheelchair  Transportation/person available to transport on day of discharge  is significant other and have they been notified/set up TBD  Plan/Disposition: Home   Appointments: TBD.. awaiting final recommendations.      Care  (CTS) will continue to follow as needed.      Hannah Horne RN BSN  Inpatient Care Coordination  [facility]  [phone]

## 2019-11-20 NOTE — PROGRESS NOTES
[facility]    Medicine Progress Note - Hospitalist Service       Date of Admission:  11/17/2019  Assessment & Plan   Ry Horner is a 54 year old Diabetic male who presented to [facility] ER with worsening left foot redness and swelling.     Left foot/great toe diabetic foot infection, concern for osteomyelitis  X-ray suggestive of osteomyelitis.  Zosyn, Vancomycin IV started in ER.  Seen by podiatry who took the patient to the ER emergently on admission for a left 1st toe amputation on 11/17/19.    -  Blood cultures were note obtained as the patient had already started on the IV abx at admission in the ER.   - Podiatry consulted and s/p open L hallux amputation 11/17 for gas gangrene and excisional debridement 11/19  - wound vac in place  - NWB L foot  - 1-2 more days of IV abx per podiatry/ ID then PO regimen per ID  - noted WBC elevation to 17.5 post-op, will monitor    SOB  Episode of SOB last evening. CXR checked and suggestive of volume overload. EKG with sinus tachy. Given lasix 20 mg IV x 1. Likely 2/2 fluid overload from volume resuscitation  - stable, monitor      Diabetes mellitus type II   Prior to admission on lantus 58 units at HS, metformin 1000 mg BID, tradjenta 5 mg daily. Last A1C 7.5% July 2019 but he reports more uncontrolled this fall. HgbA1c here is 9.3.  Blood sugars have been in the 200s  -  currently on lantus 36 units at HS  -  SSI      Hypertension  PTA on amlodipine 5 mg daily, hydrochlorothiazide 12.5 mg daily, lisinopril 40 mg daily. meds initially held 2/2 JOHNATHAN on admission   - restart meds as needed/ able     JOHNATHAN superimposed on CKD, likely dehydration  Cr peak at  1.85 on second day of admission.  Baseline appears to be 1.1-1.2  -  Holding ACEI  - Continue IVF      Dyslipidemia  Intolerant to statins  -  Zetia      Diet: Moderate Consistent CHO Diet    DVT Prophylaxis: Pneumatic Compression Devices  Corrales Catheter: not present  Code Status: Full Code      Disposition Plan    Expected discharge: 2 - 3 days after wound vac change on 11/22.  Entered: Ry Hilliard MD 11/20/2019, 3:33 PM       The patient's care was discussed with the Care Coordinator/ and Patient.    Ry Hilliard MD  Hospitalist Service  [facility]    ______________________________________________________________________    Interval History   Patient seen and examined. Overnight acute events reviewed, had episode of acute SOB. Better now. Denies cp. C/o R flank pain with cough.     Data reviewed today: I reviewed all medications, new labs and imaging results over the last 24 hours. I personally reviewed no images or EKG's today.    Physical Exam   Vital Signs: Temp: 100.2  F (37.9  C) Temp src: Oral BP: 129/81 Pulse: 115 Heart Rate: 102 Resp: 16 SpO2: 90 % O2 Device: None (Room air) Oxygen Delivery: 3 LPM  Weight: 256 lbs 15.98 oz  General Appearance: Resting comfortably.  NAD   Respiratory: Clear to auscultation.  No respiratory distress  Cardiovascular: RRR.  No murmurs  GI: Bowel sounds noted.  Non-tender  Skin: L foot wound vac in place.   Other: Right AKA.  No obvious edema     Data   Recent Labs   Lab 11/20/19  0717 11/19/19  0725 11/18/19  0709 11/17/19  0856   WBC 17.5*  --  13.9* 18.4*   HGB 9.3*  --  8.8* 10.1*   MCV 86  --  87 85     --  275 294     --  134 131*   POTASSIUM 4.2  --  4.5 3.9   CHLORIDE 104  --  103 99   CO2 25  --  27 24   BUN 17  --  43* 51*   CR 1.04 1.44* 1.85* 1.82*   ANIONGAP 6  --  4 8   FERNANDO 7.7*  --  7.7* 8.7   *  --  209* 224*     Recent Results (from the past 24 hour(s))   XR Chest Port 1 View    Narrative    CHEST SINGLE VIEW PORTABLE  11/20/2019 12:41 AM     HISTORY: Shortness of breath.    COMPARISON: 11/17/2019.    FINDINGS: Increased pulmonary vascularity and mild interstitial  opacities in both lungs, new since 11/17/2019. The lungs are otherwise  clear. Normal-sized cardiac silhouette.      Impression    IMPRESSION:  Increased pulmonary vascularity and mild interstitial  opacities in both lungs, likely relating to pulmonary edema. These  findings are new since 11/17/2019.    TERRI ARRINGTON MD

## 2019-11-20 NOTE — PROGRESS NOTES
[facility] Nurse Inpatient Wound Assessment   -KCI VAC Dressing change     Initial Assessment of wound(s) on pt's:   Left great toe amp site        Data:   Patient History:      per Provider note(s):  The patient is a 54-year-old neuropathic, poorly controlled diabetic male who presented with worsening infection of the left foot.  X-ray showed gas within the soft tissue of the left great toe.  He underwent an open amputation by Dr. Pena on 11/17/2019.  The amputation unfortunately required resection of a large amount of soft tissue, and so there was insufficient tissue for closure.  The first metatarsal head is readily exposed at the base of the wound.  He is brought back today for surgical management.      11-20-19: PROCEDURE:  Debridement down to and including bone, less than 20 cm2. (Dr. Randhawa, [facility])    Reji Risk Assessment  Sensory Perception: 4-->no impairment    Moisture: 4-->rarely moist   Activity: 3-->walks occasionally     Mobility: 3-->slightly limited   Nutrition: 3-->adequate   Reji Score: 20      Positioning: Pillows    Mattress:  Standard , Atmos Air mattress    Moisture Management:  Urinal      Current Diet / Nutrition:       Orders Placed This Encounter        Moderate Consistent CHO Diet        Labs:   Recent Labs   Lab Test 11/20/19  0717  11/17/19  0856  08/28/17  1045   HGB 9.3*   < > 10.1*   < > 9.9*   WBC 17.5*   < > 18.4*   < > 13.5*   A1C  --   --  9.3*   < > 7.6*   CRP  --   --   --   --  85.1*    < > = values in this interval not displayed.       Wound Assessment (location):   Left distal foot/ great toe amp site  Wound History:  See above; urgent amputation of left hallux 11-17-19 for gas gangrene, with follow-up I&D 11-19-19.     11-20-19 11-20-19          Age of wound/ surgical date: 11-19-19    Date KCI VAC placed: 11-20-19      KCI Wound VAC initiated by:  [facility] Nurse: Yes  MD: No    Any other wound therapies tried prior to KCI VAC placed?  No    Wound Base: smooth round bone exposed, with pink-yellow moist tissue at edges    Specific Dimensions (length x width x depth, in cm) :   approx 5 x 2.5 x 3.5cm, depth is down around theunderside of the bone    Tunneling:  N/A    Undermining: up to 0.5cm from 3-4 o'clock    Palpation of the wound bed:  Normal and firm smooth bone    Slough appearance:  none    Eschar appearance:  none    Periwound Skin: peely and macerated to immediate edges; plantar surface of foot has approx 10 x 6cm area of ecchymotic pink-purple erythema, slightly boggy and blistery  ? Color: pink and purple  ? Temperature  normal     Drainage:  Small serosang    Odor: none    Pain:  minimal    ? Was patient premedicated prior to dressing change? Yes  ? Medication(s) used:  Oral narcotics          Intervention:     Patient's chart evaluated.      Wound was assessed.    Wound Care: was done:  KCI vac dressing applied:  Skin prep to periwound, double-layer of Adaptic to main bone surface, white foam x 3 pieces into depths around bone and over bone, black foam x 1 to surface, partial ostomy ring applied over sutures/creases near medial 2nd toe, TRAC pad bridged to dorsal foot, all sealed with drape, NPWT started at -150mmHg continuous (higher suction due to density of white foam), good seal noted    Orders  Written    Supplies  reviewed and gathered    Discussed plan of care with Patient and Nurse and care coordinator            Assessment:         Left great toe amp site with readily exposed smooth white bone.  Plantar foot with ecchymotic tissue that will need to be monitored for ongoing viability.  Foot needs close monitoring for further s/s infection.  Vac therapy appropriate at this time.             Need to use Adaptic and white foam (under the black foam) to protect the exposed bone and to keep it moist.  NPWT should then be set slightly higher (-150mmHg) because of the higher density of the white foam.          Plan:     Nursing to  notify the Provider(s) and re-consult the [facility] Nurse if wound(s) deteriorate(s) or if necessary to reevaluate the plan.      Plan of care for wound located on left great toe amp site:  ? NPWT dressing change by the [facility] Nurses MWF while in the hospital, using adaptic and white foam over bone, and black foam over the top, and pressure set to -150mmHg continuous suction.  ? Need to premedicate pt prior to dressing change: yes    Pt planning to have home care for dressing changes 3x/week and will follow-up with Podiatry.      KCI VAC dressing change:  < 50 cm     Michelle Thompson RN

## 2019-11-20 NOTE — ANESTHESIA CARE TRANSFER NOTE
Patient: Ry Horner    Procedure(s):  REVISIONAL IRRIGATION AND DEBRIDEMENT LEFT FOOT AND BONE DEBRIDEMENT    Diagnosis: Diabetic polyneuropathy (H) [E11.42]  Diagnosis Additional Information: No value filed.    Anesthesia Type:   MAC     Note:  Airway :Face Mask  Patient transferred to:PACU  Comments: At end of procedure, spontaneous respirations, patient alert to voice, able to follow commands. Oxygen via facemask at 6 liters per minute to PACU. Oxygen tubing connected to wall O2 in PACU, SpO2, NiBP, and EKG monitors and alarms on and functioning, report on patient's clinical status given to PACU RN, RN questions answered.Handoff Report: Identifed the Patient, Identified the Reponsible Provider, Reviewed the pertinent medical history, Discussed the surgical course, Reviewed Intra-OP anesthesia mangement and issues during anesthesia, Set expectations for post-procedure period and Allowed opportunity for questions and acknowledgement of understanding      Vitals: (Last set prior to Anesthesia Care Transfer)    CRNA VITALS  11/19/2019 1816 - 11/19/2019 1852      11/19/2019             Resp Rate (set):  10                Electronically Signed By: LILIANA Braxton CRNA  November 19, 2019  6:52 PM

## 2019-11-20 NOTE — PROVIDER NOTIFICATION
MD Notification    Notified Person: MD    Notified Person Name: Dr. Franco    Notification Date/Time: 11/20/19 @ 0450    Notification Interaction: page/phone    Purpose of Notification: Pt noted that he has not received any of his PTA meds except zetia. Please address these meds. BP remains high.    Orders Received: Joan related to pass on to rounding physician this morning, please.     Comments:

## 2019-11-20 NOTE — PLAN OF CARE
Discharge Planner PT   Patient plan for discharge: Return home with girlfriend.   Current status: Noted pt is now POD # 1 revision of I & D of L foot and bone debridement. Pt in supine upon arrival of therapist, pt reported L foot pain of 6/10 at rest. Discussed trial of knee scooter, pt declined trial of knee scooter stating he prefers to utilize FWW in his house and is requesting a wheelchair for community ambulation. Pt performed supine-sit independently. Sit <> stand with FWW and SBA, cues provided for safe technique and pt tends to pull up on walker and sitting impulsively. Pt ambulated 5 feet x 2 with FWW and CGA. Pt navigated platform step x 2 with FWW and CGA, cue provided for sequencing. Pt demonstrated ability to maintain NWB restriction.  Barriers to return to prior living situation: Pain, Decreased activity tolerance, Stairs, NWB restriction  Recommendations for discharge: Return home with assist of girlfriend to navigate stairs  Rationale for recommendations: Anticipate pt will continue to progress towards independence in order to safely discharge home with his SO.        Entered by: Keeley Nice 11/20/2019 11:12 AM     Discussed with SW, pt is requesting a wheelchair for discharge as pt doesn't feel comfortable using a knee scooter with his R prosthetic.

## 2019-11-21 ENCOUNTER — APPOINTMENT (OUTPATIENT)
Dept: PHYSICAL THERAPY | Facility: CLINIC | Age: 54
DRG: 616 | End: 2019-11-21
Attending: PODIATRIST
Payer: COMMERCIAL

## 2019-11-21 LAB
BACTERIA SPEC CULT: ABNORMAL
COPATH REPORT: NORMAL
CREAT SERPL-MCNC: 1.12 MG/DL (ref 0.66–1.25)
ERYTHROCYTE [DISTWIDTH] IN BLOOD BY AUTOMATED COUNT: 11.7 % (ref 10–15)
GFR SERPL CREATININE-BSD FRML MDRD: 75 ML/MIN/{1.73_M2}
GLUCOSE BLDC GLUCOMTR-MCNC: 267 MG/DL (ref 70–99)
GLUCOSE BLDC GLUCOMTR-MCNC: 284 MG/DL (ref 70–99)
GLUCOSE BLDC GLUCOMTR-MCNC: 289 MG/DL (ref 70–99)
GLUCOSE BLDC GLUCOMTR-MCNC: 291 MG/DL (ref 70–99)
GLUCOSE BLDC GLUCOMTR-MCNC: 363 MG/DL (ref 70–99)
HCT VFR BLD AUTO: 27.6 % (ref 40–53)
HGB BLD-MCNC: 9 G/DL (ref 13.3–17.7)
INTERPRETATION ECG - MUSE: NORMAL
LACTATE BLD-SCNC: 0.7 MMOL/L (ref 0.7–2)
Lab: ABNORMAL
MCH RBC QN AUTO: 28.2 PG (ref 26.5–33)
MCHC RBC AUTO-ENTMCNC: 32.6 G/DL (ref 31.5–36.5)
MCV RBC AUTO: 87 FL (ref 78–100)
PLATELET # BLD AUTO: 382 10E9/L (ref 150–450)
RBC # BLD AUTO: 3.19 10E12/L (ref 4.4–5.9)
SPECIMEN SOURCE: ABNORMAL
SPECIMEN SOURCE: ABNORMAL
WBC # BLD AUTO: 14 10E9/L (ref 4–11)

## 2019-11-21 PROCEDURE — 25000125 ZZHC RX 250: Performed by: INTERNAL MEDICINE

## 2019-11-21 PROCEDURE — 94640 AIRWAY INHALATION TREATMENT: CPT | Mod: 76

## 2019-11-21 PROCEDURE — 83605 ASSAY OF LACTIC ACID: CPT | Performed by: INTERNAL MEDICINE

## 2019-11-21 PROCEDURE — 25000132 ZZH RX MED GY IP 250 OP 250 PS 637: Performed by: INTERNAL MEDICINE

## 2019-11-21 PROCEDURE — 97116 GAIT TRAINING THERAPY: CPT | Mod: GP

## 2019-11-21 PROCEDURE — 25000132 ZZH RX MED GY IP 250 OP 250 PS 637: Performed by: PODIATRIST

## 2019-11-21 PROCEDURE — 36415 COLL VENOUS BLD VENIPUNCTURE: CPT | Performed by: INTERNAL MEDICINE

## 2019-11-21 PROCEDURE — 00000146 ZZHCL STATISTIC GLUCOSE BY METER IP

## 2019-11-21 PROCEDURE — 25000128 H RX IP 250 OP 636: Performed by: PODIATRIST

## 2019-11-21 PROCEDURE — 94640 AIRWAY INHALATION TREATMENT: CPT

## 2019-11-21 PROCEDURE — 82565 ASSAY OF CREATININE: CPT | Performed by: PODIATRIST

## 2019-11-21 PROCEDURE — 40000275 ZZH STATISTIC RCP TIME EA 10 MIN

## 2019-11-21 PROCEDURE — 99232 SBSQ HOSP IP/OBS MODERATE 35: CPT | Performed by: INTERNAL MEDICINE

## 2019-11-21 PROCEDURE — 25000131 ZZH RX MED GY IP 250 OP 636 PS 637: Performed by: INTERNAL MEDICINE

## 2019-11-21 PROCEDURE — 85027 COMPLETE CBC AUTOMATED: CPT | Performed by: PODIATRIST

## 2019-11-21 PROCEDURE — 12000000 ZZH R&B MED SURG/OB

## 2019-11-21 PROCEDURE — 36415 COLL VENOUS BLD VENIPUNCTURE: CPT | Performed by: PODIATRIST

## 2019-11-21 RX ORDER — IPRATROPIUM BROMIDE AND ALBUTEROL SULFATE 2.5; .5 MG/3ML; MG/3ML
3 SOLUTION RESPIRATORY (INHALATION) EVERY 4 HOURS PRN
Status: DISCONTINUED | OUTPATIENT
Start: 2019-11-21 | End: 2019-11-22 | Stop reason: HOSPADM

## 2019-11-21 RX ORDER — LISINOPRIL 40 MG/1
40 TABLET ORAL DAILY
Status: DISCONTINUED | OUTPATIENT
Start: 2019-11-21 | End: 2019-11-22 | Stop reason: HOSPADM

## 2019-11-21 RX ADMIN — OXYCODONE HYDROCHLORIDE 10 MG: 5 TABLET ORAL at 17:14

## 2019-11-21 RX ADMIN — INSULIN GLARGINE 46 UNITS: 100 INJECTION, SOLUTION SUBCUTANEOUS at 23:01

## 2019-11-21 RX ADMIN — OXYCODONE HYDROCHLORIDE 10 MG: 5 TABLET ORAL at 13:48

## 2019-11-21 RX ADMIN — OXYCODONE HYDROCHLORIDE 10 MG: 5 TABLET ORAL at 22:58

## 2019-11-21 RX ADMIN — IPRATROPIUM BROMIDE AND ALBUTEROL SULFATE 3 ML: .5; 3 SOLUTION RESPIRATORY (INHALATION) at 07:12

## 2019-11-21 RX ADMIN — GUAIFENESIN 10 ML: 200 SOLUTION ORAL at 19:20

## 2019-11-21 RX ADMIN — ACETAMINOPHEN 975 MG: 325 TABLET, FILM COATED ORAL at 13:49

## 2019-11-21 RX ADMIN — LISINOPRIL 40 MG: 40 TABLET ORAL at 18:19

## 2019-11-21 RX ADMIN — PIPERACILLIN SODIUM AND TAZOBACTAM SODIUM 3.38 G: 3; .375 INJECTION, POWDER, LYOPHILIZED, FOR SOLUTION INTRAVENOUS at 23:01

## 2019-11-21 RX ADMIN — IPRATROPIUM BROMIDE AND ALBUTEROL SULFATE 3 ML: .5; 3 SOLUTION RESPIRATORY (INHALATION) at 10:36

## 2019-11-21 RX ADMIN — EZETIMIBE 10 MG: 10 TABLET ORAL at 09:12

## 2019-11-21 RX ADMIN — PIPERACILLIN SODIUM AND TAZOBACTAM SODIUM 3.38 G: 3; .375 INJECTION, POWDER, LYOPHILIZED, FOR SOLUTION INTRAVENOUS at 17:30

## 2019-11-21 RX ADMIN — ACETAMINOPHEN 975 MG: 325 TABLET, FILM COATED ORAL at 22:58

## 2019-11-21 RX ADMIN — PIPERACILLIN SODIUM AND TAZOBACTAM SODIUM 3.38 G: 3; .375 INJECTION, POWDER, LYOPHILIZED, FOR SOLUTION INTRAVENOUS at 11:21

## 2019-11-21 RX ADMIN — PIPERACILLIN SODIUM AND TAZOBACTAM SODIUM 3.38 G: 3; .375 INJECTION, POWDER, LYOPHILIZED, FOR SOLUTION INTRAVENOUS at 06:14

## 2019-11-21 RX ADMIN — OXYCODONE HYDROCHLORIDE 10 MG: 5 TABLET ORAL at 09:12

## 2019-11-21 RX ADMIN — ACETAMINOPHEN 975 MG: 325 TABLET, FILM COATED ORAL at 06:14

## 2019-11-21 ASSESSMENT — ACTIVITIES OF DAILY LIVING (ADL)
WHICH_OF_THE_ABOVE_FUNCTIONAL_RISKS_HAD_A_RECENT_ONSET_OR_CHANGE?: AMBULATION;TRANSFERRING;TOILETING;BATHING
RETIRED_EATING: 0-->INDEPENDENT
TRANSFERRING: 1-->ASSISTIVE EQUIPMENT
TOILETING: 0-->INDEPENDENT
SWALLOWING: 0-->SWALLOWS FOODS/LIQUIDS WITHOUT DIFFICULTY
COGNITION: 0 - NO COGNITION ISSUES REPORTED
ADLS_ACUITY_SCORE: 13
ADLS_ACUITY_SCORE: 13
DRESS: 0-->INDEPENDENT
ADLS_ACUITY_SCORE: 13
ADLS_ACUITY_SCORE: 13
AMBULATION: 1-->ASSISTIVE EQUIPMENT
BATHING: 0-->INDEPENDENT
RETIRED_COMMUNICATION: 0-->UNDERSTANDS/COMMUNICATES WITHOUT DIFFICULTY
ADLS_ACUITY_SCORE: 13
ADLS_ACUITY_SCORE: 13

## 2019-11-21 NOTE — PROGRESS NOTES
Care Coordination:    Follow for discharge on Friday 11/22.      Mei at [facility] ([phone]) received documentation of w/c and is working to arrange delivery to hospital.  Message left to arrange timing for 11/22.    Received confirmation from [facility] for Order Number [id number] is ready to be placed.   Will release tomorrow at discharge.       Noted patient has discharge orders/plans for home care including Home (RN)  Patient was given choice in selecting homecare and chose [facility]  Referral sent (11/21) and confirmed.  Provided contact information on S for pt to call if they have questions for ([facility] [phone])    CC to follow for discharge plans       Hannah Horne RN BSN  Inpatient Care Coordination  [facility]  [phone]

## 2019-11-21 NOTE — PLAN OF CARE
Discharge Planner PT   Patient plan for discharge: Return home with girlfriend.   Current status: Pt transferred supine to/from sit on EOB with SBA. Pt donned R prosthesis. Pt transferred sit to/from stand IND. Gait training 30' in room with FWW and NWB on L LE and CGA. Following gait pt transferred back to supine, left with alarm on and needs in reach.  Barriers to return to prior living situation: Pain, Decreased activity tolerance, Stairs, NWB restriction  Recommendations for discharge: Return home with assist of girlfriend to navigate stairs per plan established by the PT.  Rationale for recommendations: Anticipate pt will continue to progress towards independence in order to safely discharge home with his SO.        Entered by: Delma Núñez 11/21/2019 3:43 PM

## 2019-11-21 NOTE — PLAN OF CARE
Pt A/OX4. VSS. Up 1 assist with walker. Reports pain 5/10 using oxycodone with relief. Mod CHO diet tolerated, . Voiding adequately. Dressing c/d/I. Wound vac patent.

## 2019-11-21 NOTE — PROGRESS NOTES
[facility]    Medicine Progress Note - Hospitalist Service       Date of Admission:  11/17/2019  Assessment & Plan   Ry Horner is a 54 year old Diabetic male who presented to [facility] with worsening left foot redness and swelling.     Left foot/great toe diabetic foot infection, concern for osteomyelitis  X-ray suggestive of osteomyelitis.  Zosyn, Vancomycin IV started in ER.  Seen by podiatry who took the patient to the ER emergently on admission for a left 1st toe amputation on 11/17/19.    -  Blood cultures were not obtained as the patient had already started on the IV abx at admission in the ER.   - Podiatry consulted and s/p open L hallux amputation 11/17 for gas gangrene and excisional debridement 11/19  - wound vac in place  - NWB L foot  - 24 more hours of IV abx per podiatry/ ID, then 7 days of Augmentin at discharge    Diabetes mellitus type II   Prior to admission on lantus 58 units at HS, metformin 1000 mg BID, tradjenta 5 mg daily. Last A1C 7.5% July 2019 but he reports more uncontrolled this fall. HgbA1c here is 9.3.  Blood sugars have been in the 200s  - hyperglycemia last night to 400  - increased lantus to 46 11/21 (home dose 58)  - sliding scale insulin- high intensity     Hypertension  PTA on amlodipine 5 mg daily, hydrochlorothiazide 12.5 mg daily, lisinopril 40 mg daily. meds initially held 2/2 JOHNATHAN on admission   - bp sl high, will restart lisinopril tonight 11/21  - likely resume amlodipine, hydrochlorothiazide at discharge     JOHNATHAN superimposed on CKD, likely dehydration  Cr peak at  1.85 on second day of admission.  Baseline appears to be 1.1-1.2  - creatinine normalized  - monitor for now     Dyslipidemia  Intolerant to statins  -  Zetia    Diet: Moderate Consistent CHO Diet    DVT Prophylaxis: Pneumatic Compression Devices  Corrales Catheter: not present  Code Status: Full Code      Disposition Plan   Expected discharge: Tomorrow after wound vac change on 11/22.  Entered: Ry  ANA Hilliard MD 11/21/2019, 4:54 PM     The patient's care was discussed with the Patient and Patient's Family.    Ry Hilliard MD  Hospitalist Service  [facility]    ______________________________________________________________________    Interval History   Patient seen and examined. Overnight acute events reviewed. Denies cp/sob.     Data reviewed today: I reviewed all medications, new labs and imaging results over the last 24 hours. I personally reviewed no images or EKG's today.    Physical Exam   Vital Signs: Temp: 98.8  F (37.1  C) Temp src: Oral BP: (!) 150/95 Pulse: 106 Heart Rate: 104 Resp: 18 SpO2: 95 % O2 Device: None (Room air)    Weight: 256 lbs 15.98 oz  General Appearance: Resting comfortably.  NAD   Respiratory: Clear to auscultation.  No respiratory distress  Cardiovascular: RRR.  No murmurs  GI: Bowel sounds noted.  Non-tender  Skin: L foot wound vac in place. Edema, mild erythema  Other: Right AKA.  No obvious edema     Data   Recent Labs   Lab 11/21/19  0644 11/20/19  0717 11/19/19  0725 11/18/19  0709 11/17/19  0856   WBC 14.0* 17.5*  --  13.9* 18.4*   HGB 9.0* 9.3*  --  8.8* 10.1*   MCV 87 86  --  87 85    360  --  275 294   NA  --  135  --  134 131*   POTASSIUM  --  4.2  --  4.5 3.9   CHLORIDE  --  104  --  103 99   CO2  --  25  --  27 24   BUN  --  17  --  43* 51*   CR 1.12 1.04 1.44* 1.85* 1.82*   ANIONGAP  --  6  --  4 8   FERNANDO  --  7.7*  --  7.7* 8.7   GLC  --  192*  --  209* 224*     No results found for this or any previous visit (from the past 24 hour(s)).

## 2019-11-21 NOTE — PROGRESS NOTES
[facility]    Infectious Disease Progress Note    Date of Service (when I saw the patient): 11/21/2019     Assessment & Plan   Ry Horner is a 54 year old male who was admitted on 11/17/2019.        Impression:  1. 54 y.o male with diabetes with peripheral polyneuropathy.  2. History of infection in the right foot leading to amputation at the knee.   3. Admitted with severely infected left foot.   4. Gas gangrene of left foot. Osteomyelitis of left great toe.   5. S/PLeft hallux amputation with irrigation and debridement.  6. CKD.      Recommendations:   Continue on zosyn. So far Strep, staph, anaerobes, will follow up on the pending ID and NEAL and adjust antibiotics as needed.   Another 24 hours of IV antibiotics.   Ok to discharge tomorrow on oral Augmentin for 7 days.            Franklin Maza MD    Interval History   Afebrile   Feels ok   S/p above mentioned surgery   Cultures as below     Physical Exam   Temp: 99.3  F (37.4  C) Temp src: Oral BP: 136/82 Pulse: 99 Heart Rate: 96 Resp: 16 SpO2: 95 % O2 Device: None (Room air)    Vitals:    11/17/19 0838 11/20/19 0713   Weight: 116.6 kg (257 lb) 116.6 kg (257 lb)     Vital Signs with Ranges  Temp:  [97.5  F (36.4  C)-100.2  F (37.9  C)] 99.3  F (37.4  C)  Pulse:  [] 99  Heart Rate:  [] 96  Resp:  [16-18] 16  BP: (117-149)/(55-93) 136/82  SpO2:  [90 %-95 %] 95 %    Constitutional: Awake, alert, cooperative, no apparent distress  Lungs: Clear to auscultation bilaterally, no crackles or wheezing  Cardiovascular: Regular rate and rhythm, normal S1 and S2, and no murmur noted  Abdomen: Normal bowel sounds, soft, non-distended, non-tender  Skin: No rashes, no cyanosis, no edema  Other:    Medications       acetaminophen  975 mg Oral Q8H     ezetimibe  10 mg Oral Daily     insulin aspart  1-10 Units Subcutaneous TID AC     insulin aspart  1-7 Units Subcutaneous At Bedtime     insulin glargine  46 Units Subcutaneous At Bedtime     ipratropium -  albuterol 0.5 mg/2.5 mg/3 mL  3 mL Nebulization 4x daily     piperacillin-tazobactam  3.375 g Intravenous Q6H     sodium chloride (PF)  3 mL Intracatheter Q8H       Data   All microbiology laboratory data reviewed.  Recent Labs   Lab Test 11/21/19  0644 11/20/19  0717 11/18/19  0709   WBC 14.0* 17.5* 13.9*   HGB 9.0* 9.3* 8.8*   HCT 27.6* 28.0* 26.1*   MCV 87 86 87    360 275     Recent Labs   Lab Test 11/21/19  0644 11/20/19  0717 11/19/19  0725   CR 1.12 1.04 1.44*     Recent Labs   Lab Test 08/28/17  1045   *     Recent Labs   Lab Test 11/19/19  1829 11/17/19  1525 11/17/19  1419 11/17/19  1411 08/28/17  1136 08/28/17  1059 08/28/17  1054 07/15/16  1521 07/14/16  1029   CULT Culture negative monitoring continues  Culture negative monitoring continues Heavy growth  beta hemolytic   Streptococcus constellatus  Susceptibility testing done on previous specimen  *  Light growth  Alcaligenes faecalis  Susceptibility testing in progress  *  Light growth  Staphylococcus aureus  *  On day 1, isolated in broth only:  Anaerobic gram negative rods  See anaerobic report for identification  * Heavy growth  Bacteroides fragilis  Susceptibility testing not routinely done  *  Heavy growth  Peptoniphilus asaccharolyticus  Susceptibility testing not routinely done  *  Moderate growth  beta hemolytic   Streptococcus constellatus  *  On day 1, isolated in broth only:  Anaerobic gram negative rods  See anaerobic report for identification  * Heavy growth  Bacteroides fragilis  Susceptibility testing not routinely done  *  Heavy growth  Parvimonas micra  Susceptibility testing not routinely done  *  Heavy growth  Peptoniphilus asaccharolyticus  Susceptibility testing not routinely done  * No growth No growth Heavy growth  Beta hemolytic Streptococcus group G  *  Moderate growth  Proteus vulgaris  *  Moderate growth  Normal skin rosa   No anaerobes isolated  On day 1, isolated in broth only: Staphylococcus  simulans This isolate is   presumed to be clindamycin resistant based on detection of inducible   clindamycin resistance. Erythromycin and clindamycin are resistant, therefore,   they are not recommended for use.  On day 1, isolated in broth only: Strain 2 Staphylococcus simulans This isolate   is presumed to be clindamycin resistant based on detection of inducible   clindamycin resistance. Erythromycin and clindamycin are resistant, therefore,   they are not recommended for use.  Strain 1 found to have inducible  Clindamycin resistance also,  results updated  * No growth       Attestation:  Total time on the floor involved in the patient's care: 35 minutes. Total time spent in counseling/care coordination: >50%

## 2019-11-21 NOTE — PLAN OF CARE
PT-  Attempted to see pt x 2 this AM but pt was just starting breakfast on first attempt and wanted more time.  Returned later and pt was in the bathroom.  Unable to see pt at time of AM appt.

## 2019-11-21 NOTE — PROGRESS NOTES
X-cover note    Called regarding BG of 400. Trending up throughout the day today.  PTA on Lantus 58 units at bedtime and Metformin.      Will increase Lantus to 46 units and adjust sliding scale insulin to higher intensity.     Zulay Franco MD  Hospitalist

## 2019-11-21 NOTE — PLAN OF CARE
Pt A/Ox4. VSS. Up 1 assist with walker. Reports pain 6/10 using oxycodone with relief. Mod CHO diet tolerated. CMS intact, except baseline numbness in LLE. Dressing c/d/I. Wound vac patent.

## 2019-11-21 NOTE — PLAN OF CARE
.A/Ox4. Ax 1 with gait belt and walker. VSS on RA Tachycardia at times.Tele Sinus Tach. Sepsis protocol triggered, lactic acid normal. CMS intact. Dressing C/D/I. Wound vac intact and patent, minimal output. Pain controlled with Oxycodone & Tylenol. Tolerating Modified Carb Diet. 0200 BS elevated; Hospitalist notified via page-advised to keep monitoring.  Progressing per POC. Will Cont to monitor.

## 2019-11-22 VITALS
RESPIRATION RATE: 18 BRPM | OXYGEN SATURATION: 96 % | TEMPERATURE: 99.1 F | WEIGHT: 264 LBS | DIASTOLIC BLOOD PRESSURE: 62 MMHG | HEIGHT: 72 IN | BODY MASS INDEX: 35.76 KG/M2 | SYSTOLIC BLOOD PRESSURE: 135 MMHG | HEART RATE: 106 BPM

## 2019-11-22 LAB
CREAT SERPL-MCNC: 1.02 MG/DL (ref 0.66–1.25)
ERYTHROCYTE [DISTWIDTH] IN BLOOD BY AUTOMATED COUNT: 11.9 % (ref 10–15)
GFR SERPL CREATININE-BSD FRML MDRD: 84 ML/MIN/{1.73_M2}
GLUCOSE BLDC GLUCOMTR-MCNC: 188 MG/DL (ref 70–99)
GLUCOSE BLDC GLUCOMTR-MCNC: 206 MG/DL (ref 70–99)
GLUCOSE BLDC GLUCOMTR-MCNC: 235 MG/DL (ref 70–99)
HCT VFR BLD AUTO: 29.5 % (ref 40–53)
HGB BLD-MCNC: 9.7 G/DL (ref 13.3–17.7)
MCH RBC QN AUTO: 28.6 PG (ref 26.5–33)
MCHC RBC AUTO-ENTMCNC: 32.9 G/DL (ref 31.5–36.5)
MCV RBC AUTO: 87 FL (ref 78–100)
PLATELET # BLD AUTO: 401 10E9/L (ref 150–450)
RBC # BLD AUTO: 3.39 10E12/L (ref 4.4–5.9)
WBC # BLD AUTO: 17.4 10E9/L (ref 4–11)

## 2019-11-22 PROCEDURE — 82565 ASSAY OF CREATININE: CPT | Performed by: PODIATRIST

## 2019-11-22 PROCEDURE — 85027 COMPLETE CBC AUTOMATED: CPT | Performed by: PODIATRIST

## 2019-11-22 PROCEDURE — 97605 NEG PRS WND THER DME<=50SQCM: CPT

## 2019-11-22 PROCEDURE — 25000128 H RX IP 250 OP 636: Performed by: PODIATRIST

## 2019-11-22 PROCEDURE — 00000146 ZZHCL STATISTIC GLUCOSE BY METER IP

## 2019-11-22 PROCEDURE — 25000132 ZZH RX MED GY IP 250 OP 250 PS 637: Performed by: INTERNAL MEDICINE

## 2019-11-22 PROCEDURE — 99239 HOSP IP/OBS DSCHRG MGMT >30: CPT | Performed by: INTERNAL MEDICINE

## 2019-11-22 PROCEDURE — 25000132 ZZH RX MED GY IP 250 OP 250 PS 637: Performed by: PODIATRIST

## 2019-11-22 PROCEDURE — 36415 COLL VENOUS BLD VENIPUNCTURE: CPT | Performed by: PODIATRIST

## 2019-11-22 RX ORDER — OXYCODONE HYDROCHLORIDE 5 MG/1
5 TABLET ORAL EVERY 6 HOURS PRN
Qty: 15 TABLET | Refills: 0 | Status: ON HOLD | OUTPATIENT
Start: 2019-11-22 | End: 2019-12-03

## 2019-11-22 RX ADMIN — PIPERACILLIN SODIUM AND TAZOBACTAM SODIUM 3.38 G: 3; .375 INJECTION, POWDER, LYOPHILIZED, FOR SOLUTION INTRAVENOUS at 06:55

## 2019-11-22 RX ADMIN — OXYCODONE HYDROCHLORIDE 10 MG: 5 TABLET ORAL at 13:43

## 2019-11-22 RX ADMIN — PIPERACILLIN SODIUM AND TAZOBACTAM SODIUM 3.38 G: 3; .375 INJECTION, POWDER, LYOPHILIZED, FOR SOLUTION INTRAVENOUS at 12:28

## 2019-11-22 RX ADMIN — OXYCODONE HYDROCHLORIDE 5 MG: 5 TABLET ORAL at 06:57

## 2019-11-22 RX ADMIN — EZETIMIBE 10 MG: 10 TABLET ORAL at 08:28

## 2019-11-22 RX ADMIN — OXYCODONE HYDROCHLORIDE 10 MG: 5 TABLET ORAL at 10:11

## 2019-11-22 RX ADMIN — LISINOPRIL 40 MG: 40 TABLET ORAL at 08:28

## 2019-11-22 RX ADMIN — ACETAMINOPHEN 975 MG: 325 TABLET, FILM COATED ORAL at 06:57

## 2019-11-22 ASSESSMENT — ACTIVITIES OF DAILY LIVING (ADL)
ADLS_ACUITY_SCORE: 13

## 2019-11-22 ASSESSMENT — MIFFLIN-ST. JEOR: SCORE: 2085.5

## 2019-11-22 NOTE — PLAN OF CARE
PT-  Pt declined PT this AM.  States it's too early.  Pt reports he is planning to discharge home today.  Pt denies need for PT prior to discharge.  Discussed activity at home.  Pt reports he has all the equipment he needs at this time.       Pt discharged home today.  PT goals partially met.

## 2019-11-22 NOTE — PLAN OF CARE
Pt has wound vac attached to (L) ft.  Rating pain #7 and taking Oxycodone every 3 hrs.  (L) ft swollen and enc to be up on pillows.  Denies nausea.  Pt continues with prod cough but states it has improved.  Robtussin given.  Lungs clear.  Voiding. Blood sugar 289 but pt states he is not on his usual doses of insulin yet.

## 2019-11-22 NOTE — PROGRESS NOTES
[facility]    Infectious Disease Progress Note    Date of Service (when I saw the patient): 11/22/2019     Assessment & Plan   Ry Horner is a 54 year old male who was admitted on 11/17/2019.        Impression:  1. 54 y.o male with diabetes with peripheral polyneuropathy.  2. History of infection in the right foot leading to amputation at the knee.   3. Admitted with severely infected left foot.   4. Gas gangrene of left foot. Osteomyelitis of left great toe.   5. S/PLeft hallux amputation with irrigation and debridement.  6. CKD.      Recommendations:   If all bony infection deemed surgically removed will go home on oral Augmentin. 7 days course.            Franklin Maza MD    Interval History   Afebrile   Feels ok   S/p above mentioned surgery   Cultures as below     Physical Exam   Temp: 99.1  F (37.3  C) Temp src: Oral BP: 135/62 Pulse: 106 Heart Rate: 87 Resp: 18 SpO2: 96 % O2 Device: None (Room air)    Vitals:    11/17/19 0838 11/20/19 0713 11/22/19 0613   Weight: 116.6 kg (257 lb) 116.6 kg (257 lb) 119.7 kg (264 lb)     Vital Signs with Ranges  Temp:  [98.8  F (37.1  C)-100.2  F (37.9  C)] 99.1  F (37.3  C)  Pulse:  [106] 106  Heart Rate:  [] 87  Resp:  [16-18] 18  BP: (131-154)/(62-97) 135/62  SpO2:  [91 %-96 %] 96 %    Constitutional: Awake, alert, cooperative, no apparent distress  Lungs: Clear to auscultation bilaterally, no crackles or wheezing  Cardiovascular: Regular rate and rhythm, normal S1 and S2, and no murmur noted  Abdomen: Normal bowel sounds, soft, non-distended, non-tender  Skin: No rashes, no cyanosis, no edema  Other:    Medications       acetaminophen  975 mg Oral Q8H     ezetimibe  10 mg Oral Daily     insulin aspart  1-10 Units Subcutaneous TID AC     insulin aspart  1-7 Units Subcutaneous At Bedtime     insulin glargine  46 Units Subcutaneous At Bedtime     lisinopril  40 mg Oral Daily     piperacillin-tazobactam  3.375 g Intravenous Q6H     sodium chloride (PF)   3 mL Intracatheter Q8H       Data   All microbiology laboratory data reviewed.  Recent Labs   Lab Test 11/22/19  0624 11/21/19  0644 11/20/19  0717   WBC 17.4* 14.0* 17.5*   HGB 9.7* 9.0* 9.3*   HCT 29.5* 27.6* 28.0*   MCV 87 87 86    382 360     Recent Labs   Lab Test 11/22/19  0624 11/21/19  0644 11/20/19  0717   CR 1.02 1.12 1.04     Recent Labs   Lab Test 08/28/17  1045   *     Recent Labs   Lab Test 11/19/19  1829 11/17/19  1525 11/17/19  1419 11/17/19  1411 08/28/17  1136 08/28/17  1059 08/28/17  1054 07/15/16  1521 07/14/16  1029   CULT On day 2, isolated in broth only:  Gram positive cocci  *  Culture negative monitoring continues Heavy growth  beta hemolytic   Streptococcus constellatus  Susceptibility testing done on previous specimen  *  Light growth  Alcaligenes faecalis  *  Light growth  Staphylococcus aureus  *  On day 1, isolated in broth only:  Anaerobic gram negative rods  See anaerobic report for identification  * Heavy growth  Bacteroides fragilis  Susceptibility testing not routinely done  *  Heavy growth  Peptoniphilus asaccharolyticus  Susceptibility testing not routinely done  *  Moderate growth  beta hemolytic   Streptococcus constellatus  *  On day 1, isolated in broth only:  Anaerobic gram negative rods  See anaerobic report for identification  * Heavy growth  Bacteroides fragilis  *  Heavy growth  Parvimonas micra  *  Heavy growth  Peptoniphilus asaccharolyticus  *  Heavy growth  Mixed aerobic and anaerobic rosa  *  Susceptibility testing not routinely done No growth No growth Heavy growth  Beta hemolytic Streptococcus group G  *  Moderate growth  Proteus vulgaris  *  Moderate growth  Normal skin rosa   No anaerobes isolated  On day 1, isolated in broth only: Staphylococcus simulans This isolate is   presumed to be clindamycin resistant based on detection of inducible   clindamycin resistance. Erythromycin and clindamycin are resistant, therefore,   they are  not recommended for use.  On day 1, isolated in broth only: Strain 2 Staphylococcus simulans This isolate   is presumed to be clindamycin resistant based on detection of inducible   clindamycin resistance. Erythromycin and clindamycin are resistant, therefore,   they are not recommended for use.  Strain 1 found to have inducible  Clindamycin resistance also,  results updated  * No growth       Attestation:  Total time on the floor involved in the patient's care: 35 minutes. Total time spent in counseling/care coordination: >50%

## 2019-11-22 NOTE — DISCHARGE INSTRUCTIONS
Your doctor has ordered home care to help you after your hospital stay.  They will contact you regarding your 1st visit.  The service will be provided by [facility].  If you have not received a call within 48hrs of discharge, please call them at [phone]

## 2019-11-22 NOTE — PLAN OF CARE
Patient A&Ox4. VSS on RA. Tele NSR. Tachy at times. CMS intact, except baseline numbness LLE. Left foot redness unchanged. Dressing intact. Wound vac intact and patent, minimal output. Pain managed with PRN Oxycodone. Up SBA. Voiding adequately. Blood glucose monitoring, blood sugars 284 and 206. Patient slept between cares. Plan for discharge home today. Will continue to monitor.

## 2019-11-22 NOTE — PROGRESS NOTES
Care Coordination:    Wound vac [id number] + supplies delivered to patient's room.  Proof of Delivery signed and faxed to [phone].  Copy given to patient.      Mei from [facility] called and is arranging delivery of w/c to patient's hospital room at 12noon.    [facility] updated that patient is leaving this afternoon.       Pt voices no other needs for discharge.       Hannah Horne RN BSN  Inpatient Care Coordination  [facility]  [phone]

## 2019-11-22 NOTE — DISCHARGE SUMMARY
[facility]    Discharge Summary  Hospitalist    Date of Admission:  11/17/2019  Date of Discharge:  11/22/2019  Discharging Provider: Ry Hilliard MD  Date of Service (when I saw the patient): 11/22/19    Discharge Diagnoses   L foot gas gangrene, diabetic foot infection, concern for osteomyelitis  DM II  HTN  Acute on chronic renal insufficiency  HLD    History of Present Illness   Ry Horner is a 54 year old Diabetic male who presented to [facility] ER with worsening left foot redness and swelling.    Hospital Course   Ry Horner was admitted on 11/17/2019.  The following problems were addressed during his hospitalization:    L foot gas gangrene, diabetic foot infection, concern for osteomyelitis  Mr Horner presented to the hospital with concerns of redness and swelling in his left foot.  He has a history of infection in his right foot leading to an amputation.  Imaging demonstrated gas gangrene of his left foot.  He also had osteomyelitis of his left great toe.  Podiatry became involved in a left hallux amputation with irrigation and debridement was performed.  He was continued on Zosyn during his hospital stay.  Infectious disease was also consulted and followed closely.  He had a wound VAC placed.  At the time of discharge he will be placed on Augmentin 1 tablet p.o. twice daily for 7 days.  He was also given oxycodone 5 mg no. 15 with no refills.  Podiatry will arrange follow-up.    DM II  Prior to admission he is on Lantus 58 units at at bedtime, metformin 1000 mg twice daily and Tradjenta 5 mg daily.  Last A1c was 7.5% in July 2019.  On presentation here his A1c was 9.3%.  During his hospital stay his insulin dose was decreased given surgery and n.p.o. status.  At the time of discharge he should resume his prior to admission diabetes management.    HTN  Prior to admission on amlodipine 5 mg daily, hydrochlorothiazide 12.5 mg daily and lisinopril 5 mg daily.  He initially had acute kidney  injury on presentation, his meds were held, but this resolved by the time of discharge.  He should resume his prior to admission antihypertensive regimen at discharge.    Acute on chronic renal insufficiency  Creatinine peaked at 1.85 on day 2 of admission.  His baseline is  1.1-1.2.  His acute renal insufficiency likely secondary to his acute illness.  His creatinine normalized at the time of discharge.    HLD  He is intolerant of statins.  He should continue Zetia at discharge.    Ry Hilliard M.D.  Hospitalist  Pager [phone]    Significant Results and Procedures   Foot xray, multiple  MR L foot  US KAREN doppler  CXR    Pending Results   These results will be followed up by the hospitalist service  Unresulted Labs Ordered in the Past 30 Days of this Admission     Date and Time Order Name Status Description    11/19/2019 1830 Tissue Culture Aerobic Bacterial Preliminary     11/19/2019 1830 Anaerobic bacterial culture Preliminary     11/17/2019 1415 Anaerobic bacterial culture Preliminary           Code Status   Full Code       Primary Care Physician   Kody Wing    Physical Exam   Temp: 99.1  F (37.3  C) Temp src: Oral BP: 135/62 Pulse: 106 Heart Rate: 87 Resp: 18 SpO2: 96 % O2 Device: None (Room air)    Vitals:    11/17/19 0838 11/20/19 0713 11/22/19 0613   Weight: 116.6 kg (257 lb) 116.6 kg (257 lb) 119.7 kg (264 lb)     Vital Signs with Ranges  Temp:  [98.8  F (37.1  C)-100.2  F (37.9  C)] 99.1  F (37.3  C)  Pulse:  [106] 106  Heart Rate:  [] 87  Resp:  [16-18] 18  BP: (131-154)/(62-97) 135/62  SpO2:  [92 %-96 %] 96 %  I/O last 3 completed shifts:  In: 840 [P.O.:840]  Out: 1850 [Urine:1850]    Constitutional: Alert, oriented, no acute distress  Respiratory: Lungs clear to auscultation bilaterally, no wheezes, no crackles  Cardiovascular: Regular rate and rhythm, no murmurs  GI: Soft, non-tender, non-disteneded, good bowel sounds  Skin/Integumen: R BKA. L foot with wound vac, erythema as well  as edema in foot to mid-calf area  Other:      Discharge Disposition   Discharged to home  Condition at discharge: Stable    Consultations This Hospital Stay   PODIATRY IP CONSULT  PHARMACY TO DOSE VANCO  INFECTIOUS DISEASES IP CONSULT  PHYSICAL THERAPY ADULT IP CONSULT  CARE TRANSITION RN/SW IP CONSULT  WOUND OSTOMY CONTINENCE NURSE  IP CONSULT  PHYSICAL THERAPY ADULT IP CONSULT  WOUND OSTOMY CONTINENCE NURSE  IP CONSULT    Time Spent on this Encounter   I, Ry Hilliard MD, personally saw the patient today and spent greater than 30 minutes discharging this patient.    Discharge Orders      Activity    Non weight bearing left foot  Heel touch down okay for transfers.     Dressing    Every other day Wound VAC dressing change per home health.     Follow Up    Follow up with Dr. Pena 1-2 weeks post discharge.  Please note, wound VAC will not be put back on in clinic. A gauze dressing will be placed. Plan clinic visit the day before or morning prior to home health care visit. Thank you.     Discharge Equipment: Wheelchair    The patient has a mobility limitation that significantly impairs his/her ability to participate in one or more mobility-related activities of daily living (MRADLs).  The patient's mobility limitation cannot be sufficiently resolved by the use of an appropriately fitted cane or walker  The patient's home provides adequate access between rooms, maneuvering space and surfaces for the use of the manual wheelchair provided.  Use of a manual wheelchair will significantly improve the patient's ability to participate in MRADLs and the patient will use it on a regular basis in the home  The patient has not expressed an unwillingness to use the manual wheelchair that is provided in the home.  The patient has sufficient upper extremity function and other physical and mental capabilities needed to safely self-propel the manual wheelchair that is provided in the home during a typical day, OR the  patient has a caregiver who is available, willing and able to provide assistance with the wheelchair when the patient has limitations.    Treatment Diagnosis: diabetic foot wound with L hallux amputation 2/2 gangrene     Reason for your hospital stay    You were hospitalized secondary to a severe infection in your foot     Follow-up and recommended labs and tests     Follow up with primary care provider, Kody Wing, as previously arranged on 11/25. Recommended tests: BMP, CBC     When to contact your care team    Call your primary doctor if you have any of the following: temperature greater than 100.5 or less than 99.6, increased drainage, increased swelling or increased pain.     Full Code     Diet    Follow this diet upon discharge: Orders Placed This Encounter      Moderate Consistent CHO Diet     Discharge Medications   Current Discharge Medication List      START taking these medications    Details   amoxicillin-clavulanate (AUGMENTIN) 875-125 MG tablet Take 1 tablet by mouth 2 times daily  Qty: 15 tablet, Refills: 0    Associated Diagnoses: Osteomyelitis of left foot, unspecified type (H)      order for DME Equipment being ordered: Wheelchair Treatment Diagnosis: Diabetic Foot wound with L hallux amputation 2/2 gangrene  Qty: 1 Units, Refills: 0    Associated Diagnoses: Osteomyelitis (H)      oxyCODONE (ROXICODONE) 5 MG tablet Take 1 tablet (5 mg) by mouth every 6 hours as needed for moderate to severe pain  Qty: 15 tablet, Refills: 0    Associated Diagnoses: Osteomyelitis of left foot, unspecified type (H)         CONTINUE these medications which have NOT CHANGED    Details   amLODIPine (NORVASC) 5 MG tablet Take 1 tablet (5 mg) by mouth daily  Qty: 90 tablet, Refills: 3    Associated Diagnoses: Essential hypertension, benign      aspirin 81 MG chewable tablet Take 1 tablet (81 mg) by mouth daily  Qty: 108 tablet, Refills: 3    Associated Diagnoses: Type 2 diabetes mellitus with diabetic polyneuropathy,  without long-term current use of insulin (H)      ezetimibe (ZETIA) 10 MG tablet Take 1 tablet (10 mg) by mouth daily  Qty: 90 tablet, Refills: 3    Associated Diagnoses: Hyperlipidemia LDL goal <100      hydrochlorothiazide (HYDRODIURIL) 12.5 MG tablet TAKE ONE TABLET BY MOUTH EVERY DAY  Qty: 90 tablet, Refills: 1    Associated Diagnoses: Benign essential hypertension      insulin glargine (LANTUS PEN) 100 UNIT/ML pen Inject 58 Units Subcutaneous At Bedtime    Comments: If Lantus is not covered by insurance, may substitute Basaglar at same dose and frequency.        ipratropium (ATROVENT) 0.06 % spray Spray 2 sprays into both nostrils 4 times daily as needed for rhinitis  Qty: 3 Box, Refills: 0    Associated Diagnoses: Runny nose      lisinopril (PRINIVIL/ZESTRIL) 40 MG tablet Take 1 tablet (40 mg) by mouth daily  Qty: 90 tablet, Refills: 3    Associated Diagnoses: Essential hypertension, benign      metFORMIN (GLUCOPHAGE) 1000 MG tablet Take 1 tablet (1,000 mg) by mouth 2 times daily (with meals)  Qty: 180 tablet, Refills: 3    Associated Diagnoses: Type 2 diabetes mellitus with diabetic polyneuropathy, without long-term current use of insulin (H)      TRADJENTA 5 MG TABS tablet TAKE ONE TABLET BY MOUTH EVERY DAY  Qty: 90 tablet, Refills: 3    Associated Diagnoses: Type 2 diabetes mellitus with diabetic polyneuropathy, without long-term current use of insulin (H)      ACCU-CHEK JAYNA PLUS test strip TEST BLOOD SUGARS 2 TO 3 TIMES DAILY OR AS DIRECTED  Qty: 100 each, Refills: 3    Associated Diagnoses: Type 2 diabetes mellitus with diabetic polyneuropathy, without long-term current use of insulin (H)      BD MEGAN U/F 32G X 4 MM insulin pen needle USE AS DIRECTED WITH NEEMA (1 BOX = 100 DAYS) *DUE TO BE SEEN*  Qty: 100 each, Refills: 0    Associated Diagnoses: Type 2 diabetes mellitus with diabetic polyneuropathy, without long-term current use of insulin (H)      blood glucose (NO BRAND SPECIFIED) lancets  "standard Use to test blood sugar 2-3 times daily or as directed.  Qty: 100 each, Refills: 11    Comments: Accu Chek softclix lancets or brand desired by insurance  Associated Diagnoses: Type 2 diabetes mellitus with diabetic polyneuropathy, without long-term current use of insulin (H)      blood glucose monitoring (NO BRAND SPECIFIED) meter device kit Use to test blood sugar 2-3 times daily or as directed.  Qty: 1 kit, Refills: 1    Comments: Accu Chek Letha meter or brand desired by insurance  Associated Diagnoses: Type 2 diabetes mellitus with diabetic polyneuropathy, without long-term current use of insulin (H)      STATIN NOT PRESCRIBED (INTENTIONAL) Please choose reason not prescribed, below    Associated Diagnoses: Hyperlipidemia LDL goal <100         STOP taking these medications       ciprofloxacin (CIPRO) 750 MG tablet Comments:   Reason for Stopping:         clindamycin (CLEOCIN) 300 MG capsule Comments:   Reason for Stopping:             Allergies   Allergies   Allergen Reactions     Pravastatin      \"sucked the life blood out of me\"     Data   Most Recent 3 CBC's:  Recent Labs   Lab Test 11/22/19  0624 11/21/19  0644 11/20/19  0717   WBC 17.4* 14.0* 17.5*   HGB 9.7* 9.0* 9.3*   MCV 87 87 86    382 360      Most Recent 3 BMP's:  Recent Labs   Lab Test 11/22/19  0624 11/21/19  0644 11/20/19  0717  11/18/19  0709 11/17/19  0856   NA  --   --  135  --  134 131*   POTASSIUM  --   --  4.2  --  4.5 3.9   CHLORIDE  --   --  104  --  103 99   CO2  --   --  25  --  27 24   BUN  --   --  17  --  43* 51*   CR 1.02 1.12 1.04   < > 1.85* 1.82*   ANIONGAP  --   --  6  --  4 8   FERNANDO  --   --  7.7*  --  7.7* 8.7   GLC  --   --  192*  --  209* 224*    < > = values in this interval not displayed.     Most Recent 2 LFT's:No lab results found.  Most Recent INR's and Anticoagulation Dosing History:  Anticoagulation Dose History     There is no flowsheet data to display.        Most Recent 3 Troponin's:No lab results " found.  Most Recent Cholesterol Panel:  Recent Labs   Lab Test 07/11/19  1720 06/06/18  0855   CHOL  --  309*   * Cannot estimate LDL when triglyceride exceeds 400 mg/dL  84   HDL  --  28*   TRIG  --  1,383*     Most Recent 6 Bacteria Isolates From Any Culture (See EPIC Reports for Culture Details):  Recent Labs   Lab Test 11/19/19  1829 11/17/19  1525 11/17/19  1419 11/17/19  1411 08/28/17  1136 08/28/17  1059   CULT On day 2, isolated in broth only:  Streptococcus constellatus  *  Susceptibility testing in progress  Light growth  Bacteroides fragilis  Susceptibility testing not routinely done  * Heavy growth  beta hemolytic   Streptococcus constellatus  Susceptibility testing done on previous specimen  *  Light growth  Alcaligenes faecalis  *  Light growth  Staphylococcus aureus  *  On day 1, isolated in broth only:  Anaerobic gram negative rods  See anaerobic report for identification  * Heavy growth  Bacteroides fragilis  Susceptibility testing not routinely done  *  Heavy growth  Peptoniphilus asaccharolyticus  Susceptibility testing not routinely done  *  Moderate growth  beta hemolytic   Streptococcus constellatus  *  On day 1, isolated in broth only:  Anaerobic gram negative rods  See anaerobic report for identification  * Heavy growth  Bacteroides fragilis  *  Heavy growth  Parvimonas micra  *  Heavy growth  Peptoniphilus asaccharolyticus  *  Heavy growth  Mixed aerobic and anaerobic rosa  *  Susceptibility testing not routinely done No growth No growth     Most Recent TSH, T4 and A1c Labs:  Recent Labs   Lab Test 11/17/19  0856  06/02/17  1021   TSH  --   --  1.06   A1C 9.3*   < > 9.4*    < > = values in this interval not displayed.     Results for orders placed or performed during the hospital encounter of 11/17/19   XR Toe Left G/E 2 Views    Narrative    Examination:  XR TOE LT G/E 2 VW  Date:  11/17/2019 9:16 AM     Clinical Information: Foot wound and infection.   Comparison:  None.      Impression    Impression: Acute left first toe proximal phalanx head fracture and  fragmentation; in the setting of soft tissue infection, this  represents osteomyelitis until proven otherwise. There is moderate  first toe soft tissue swelling as well as subcutaneous emphysema  (either related to open wound or gas-forming infection). The first toe  and foot is otherwise unremarkable.    RODRIGUE REID MD   XR Chest 2 Views    Narrative    CHEST TWO VIEWS  11/17/2019 10:12 AM     HISTORY: Cough, pre-op.    COMPARISON: None.      Impression    IMPRESSION: No acute cardiopulmonary disease.    SHANTA COFFMAN MD   XR Foot Port Left 2 Views    Narrative    Examination:  XR FOOT PORT LT 2 VW  Date:  11/17/2019 3:12 PM     Clinical Information: Postoperative follow-up.   Comparison: 11/17/2019.      Impression    Impression: Interval left first toe amputation at the level of the MTP  joint. Negative for postoperative purposes. No fracture or  subluxation. Soft tissue irregularity and bandage in the region of  amputation. Mild subcutaneous emphysema.    RODRIGUE REID MD   MR Foot Left w/o & w Contrast    Narrative    MRI LEFT FOOT WITHOUT AND WITH INTRAVENOUS CONTRAST   11/17/2019 8:33  PM    HISTORY: Left foot infection, status post great toe amputation 11/17,  evaluate for further osteomyelitis.    COMPARISON: Radiographs earlier today.    TECHNIQUE: Multiplanar MR imaging was performed through the left  midfoot and forefoot before and after the uneventful intravenous  administration of 11 mL Gadavist.     FINDINGS: There has been amputation of the great toe at the level of  the metatarsophalangeal joint. No abnormal marrow signal intensity or  contrast enhancement is seen to suggest osteomyelitis. There is edema  in the soft tissues consistent with cellulitis with no evidence of an  abscess. There is a large soft tissue defect adjacent to the first  metatarsal head at the site of surgery. It appears that the bone  is  exposed. No joint effusion is seen. No other abnormality is noted.      Impression    IMPRESSION: Surgical changes of amputation at the level of the first  metatarsophalangeal joint. There is no evidence of osteomyelitis of  the remaining bones.     CHRIS ROBERTS MD   US KAREN Doppler No Exercise    Narrative    US KAREN DOPPLER NO EXERCISE, 1-2 LEVELS LEFT 11/18/2019 9:41 AM     HISTORY: L foot infection, wound    COMPARISON: 8/28/2017    FINDINGS:   The resting left ankle-brachial index is 1.09 versus 1.02 on the prior  exam.    Waveform analysis indicates triphasic waveforms in the distal left  tibial arteries      Impression    IMPRESSION: No evidence for significant lower left extremity arterial  insufficiency.      KAREN Diagnostic Criteria      >/= 1.3          Non compressible  0.95-1.0          Normal  0.90-0.94        Mild PAD  0.50-0.89        Moderate PAD  0.20-0.49        Severe PAD  <0.20               Critical    CRISTELA SKINNER MD   XR Chest Port 1 View    Narrative    CHEST SINGLE VIEW PORTABLE  11/20/2019 12:41 AM     HISTORY: Shortness of breath.    COMPARISON: 11/17/2019.    FINDINGS: Increased pulmonary vascularity and mild interstitial  opacities in both lungs, new since 11/17/2019. The lungs are otherwise  clear. Normal-sized cardiac silhouette.      Impression    IMPRESSION: Increased pulmonary vascularity and mild interstitial  opacities in both lungs, likely relating to pulmonary edema. These  findings are new since 11/17/2019.    TERRI ARRINGTON MD

## 2019-11-22 NOTE — PROGRESS NOTES
[facility] Nurse Inpatient Wound Assessment   -KCI VAC Dressing change     Follow-up Assessment of wound(s) on pt's:   Left great toe amp site        Data:   Patient History:      per Provider note(s):  The patient is a 54-year-old neuropathic, poorly controlled diabetic male who presented with worsening infection of the left foot.  X-ray showed gas within the soft tissue of the left great toe.  He underwent an open amputation by Dr. Pena on 11/17/2019.  The amputation unfortunately required resection of a large amount of soft tissue, and so there was insufficient tissue for closure.  The first metatarsal head is readily exposed at the base of the wound.  He is brought back today for surgical management.      11-20-19: PROCEDURE:  Debridement down to and including bone, less than 20 cm2. (Dr. Randhawa, [facility])    Reji Risk Assessment  Sensory Perception: 4-->no impairment    Moisture: 4-->rarely moist   Activity: 3-->walks occasionally     Mobility: 3-->slightly limited   Nutrition: 3-->adequate   Friction and Shear: 3-->no apparent problem  Reji Score: 20         Positioning: Pillows    Mattress:  Standard , Atmos Air mattress    Moisture Management:  Urinal      Current Diet / Nutrition:     Orders Placed This Encounter      Moderate Consistent CHO Diet      Labs:   Recent Labs   Lab Test 11/22/19  0624  11/17/19  0856  08/28/17  1045   HGB 9.7*   < > 10.1*   < > 9.9*   WBC 17.4*   < > 18.4*   < > 13.5*   A1C  --   --  9.3*   < > 7.6*   CRP  --   --   --   --  85.1*    < > = values in this interval not displayed.       Wound Assessment (location):   Left distal foot/ great toe amp site  Wound History:  See above; urgent amputation of left hallux 11-17-19 for gas gangrene, with follow-up I&D 11-19-19.     11-22-19 11-20-19 11-22-19 11-20-19          Age of wound/ surgical date: 11-19-19    Date KCI VAC placed: 11-20-19      KCI Wound VAC initiated by:  [facility] Nurse: Yes   MD: No    Any other wound therapies tried prior to KCI VAC placed? No    Wound Base: smooth round bone exposed, with pink-yellow moist tissue at edges    Specific Dimensions (length x width x depth, in cm) :   approx 5 x 2.5 x 3.5cm, depth is down around the underside of the bone    Tunneling:  N/A    Undermining: up to 1cm from 3-4 o'clock, under the sutures    Palpation of the wound bed:  Normal and firm smooth bone    Slough appearance:  none    Eschar appearance:  none    Periwound Skin: peely and macerated to immediate edges; 3 sutures by 2nd toe; plantar surface of foot has approx 10 x 6cm area of ecchymotic pink-purple erythema, slightly boggy and blistery but more firm and healthier-looking than a couple days ago  ? Color: pink and purple  ? Temperature  normal     Drainage:  Small serosang    Odor: none    Pain:  minimal    ? Was patient premedicated prior to dressing change? no  ? Medication(s) used: n/a          Intervention:     Patient's chart evaluated.      Wound was assessed.    Wound Care: was done:  KCI vac dressing applied:  Skin prep to periwound, double-layer of Adaptic to main bone surface, white foam x 1 into depths around bone and over bone, black foam x 2 to undermining and to overall surface, partial ostomy ring applied around sutures/creases near medial 2nd toe, TRAC pad bridged to dorsal foot, all sealed with drape, NPWT started at -125mmHg continuous, good seal noted; attempted -150mmHg due to the white foam, but the home vac unit would not run well at this level of suction so decreased back to -125    Orders  reviewed    Supplies  reviewed and gathered - home vac now connected to pt in anticipation of discharge home in a couple of hours; supplies for home are in room    Discussed plan of care with Patient and Nurse and care coordinator            Assessment:         Left great toe amp site with readily-exposed smooth white bone.  Plantar foot with ecchymotic tissue that will need to be  monitored for ongoing viability.  Foot needs close monitoring for further s/s infection.  Vac therapy appropriate at this time.             Need to use Adaptic and white foam (under the black foam) to protect the exposed bone and to keep it moist.  NPWT should then ideally be set slightly higher (-150mmHg) because of the higher density of the white foam.          Plan:     Nursing to notify the Provider(s) and re-consult the [facility] Nurse if wound(s) deteriorate(s) or if necessary to reevaluate the plan.      Plan of care for wound located on left great toe amp site:  ? NPWT dressing change by the [facility] Nurses MWF while in the hospital, using adaptic and white foam over bone, and black foam over the top, and pressure set to -150mmHg continuous suction.  ? Need to premedicate pt prior to dressing change: yes    Pt planning to have home care for dressing changes 3x/week and will follow-up with Podiatry.      KCI VAC dressing change:  < 50 cm     Michelle Thompson RN

## 2019-11-22 NOTE — PLAN OF CARE
Patient up with assist of 1 and walker, partial weight on heel only for transfers. Adequate I/O. Wound vac in place for discharge. Pain managed with PO meds. Discharge AVS and medications reviewed with patient and significant other, all questions answered at this time. Patient discharged home with all belongings.

## 2019-11-22 NOTE — PROGRESS NOTES
[facility]  Met with pt to discuss plans for HC.  Pt to be discharged home today and has agreed to have [facility] follow with services of SN/WOCN. Patient care support center processing referral.  Pt verbalized understanding that initial visit is scheduled for Monday 11/25/19 as ordered by MD for first home wound vac dressing change. Pt has 24 hour phone number for [facility] for any questions or concerns.

## 2019-11-22 NOTE — PROGRESS NOTES
Spiritual Health    SH visited Pt per length of stay. Pt says he is doing very well and is looking forward to going home today around 3. Pt has not additional SH needs at this time.    SH will remain available as needed.     Ramonita Sauceda  Chaplain Resident

## 2019-11-23 LAB
BACTERIA SPEC CULT: ABNORMAL
SPECIMEN SOURCE: ABNORMAL

## 2019-11-24 LAB
BACTERIA SPEC CULT: ABNORMAL
BACTERIA SPEC CULT: ABNORMAL
Lab: ABNORMAL
SPECIMEN SOURCE: ABNORMAL

## 2019-11-25 ENCOUNTER — OFFICE VISIT (OUTPATIENT)
Dept: FAMILY MEDICINE | Facility: CLINIC | Age: 54
End: 2019-11-25
Payer: COMMERCIAL

## 2019-11-25 ENCOUNTER — TELEPHONE (OUTPATIENT)
Dept: FAMILY MEDICINE | Facility: CLINIC | Age: 54
End: 2019-11-25

## 2019-11-25 ENCOUNTER — TELEPHONE (OUTPATIENT)
Dept: PODIATRY | Facility: CLINIC | Age: 54
End: 2019-11-25

## 2019-11-25 VITALS
DIASTOLIC BLOOD PRESSURE: 81 MMHG | BODY MASS INDEX: 35.8 KG/M2 | TEMPERATURE: 97.4 F | HEIGHT: 72 IN | HEART RATE: 101 BPM | SYSTOLIC BLOOD PRESSURE: 125 MMHG | OXYGEN SATURATION: 98 %

## 2019-11-25 DIAGNOSIS — E11.42 TYPE 2 DIABETES MELLITUS WITH DIABETIC POLYNEUROPATHY, WITHOUT LONG-TERM CURRENT USE OF INSULIN (H): ICD-10-CM

## 2019-11-25 DIAGNOSIS — I10 BENIGN ESSENTIAL HYPERTENSION: ICD-10-CM

## 2019-11-25 DIAGNOSIS — E78.5 HYPERLIPIDEMIA LDL GOAL <100: ICD-10-CM

## 2019-11-25 DIAGNOSIS — A48.0 GAS GANGRENE (H): Primary | ICD-10-CM

## 2019-11-25 PROCEDURE — 99214 OFFICE O/P EST MOD 30 MIN: CPT | Performed by: INTERNAL MEDICINE

## 2019-11-25 RX ORDER — HYDROCHLOROTHIAZIDE 50 MG/1
50 TABLET ORAL DAILY
Qty: 90 TABLET | Refills: 3 | Status: ON HOLD | OUTPATIENT
Start: 2019-11-25 | End: 2019-12-12

## 2019-11-25 NOTE — TELEPHONE ENCOUNTER
Chief Complaint: Osteomyelitis Of Left Foot, Unspecified Type (H), Osteomyelitis (H),  FRI 22-NOV-2019 0 / 1    [phone] (home)

## 2019-11-25 NOTE — PROGRESS NOTES
Subjective     Ry Horner is a 54 year old male who presents to clinic today for the following health issues:    HPI       Hospital Follow-up Visit:    Hospital/Nursing Home/IP Rehab Facility: [facility]  Date of Admission: 11/17/2019  Date of Discharge: 11/22/2019  Reason(s) for Admission:     L foot gas gangrene, diabetic foot infection, concern for osteomyelitis  DM II  HTN  Acute on chronic renal insufficiency  HLD            Problems taking medications regularly:  None       Medication changes since discharge: Yes - updated in med list        Problems adhering to non-medication therapy:  None    Summary of hospitalization:  [facility] discharge summary reviewed  Diagnostic Tests/Treatments reviewed.  Follow up needed: podiatry as directed   Other Healthcare Providers Involved in Patient s Care:         None  Update since discharge: improved.     Post Discharge Medication Reconciliation: discharge medications reconciled and changed, per note/orders (see AVS).  Plan of care communicated with patient and family     Coding guidelines for this visit:  Type of Medical   Decision Making Face-to-Face Visit       within 7 Days of discharge Face-to-Face Visit        within 14 days of discharge   Moderate Complexity 82995 [id number]   High Complexity [id number]            Hospitalized with gas gangrene of left great toe  Status post amputation with wound vac in place  Since discharge moderate swelling of left leg without redness fever or pain  Swelling improves overnight   No orthopnea PND or TORRES or chest pain  Sugar control has been good back on home regimen       Patient Active Problem List   Diagnosis     Diabetes mellitus, type 2 (H)     Essential hypertension, benign     Hyperlipidemia LDL goal <100     Impotence of organic origin     Morbid obesity due to excess calories (H)     Open wound of right foot     Status post below knee amputation, right (H)     Type 2 diabetes mellitus with diabetic  polyneuropathy, without long-term current use of insulin (H)     Osteomyelitis (H)     Past Surgical History:   Procedure Laterality Date     AMPUTATE FOOT Left 11/17/2019    Procedure: LEFT PARTIAL FOOT AMPUTATION;  Surgeon: Antoine Pena DPM;  Location:  OR     AMPUTATE LEG BELOW KNEE Right 9/1/2017    Procedure: AMPUTATE LEG BELOW KNEE;  RIGHT BELOW KNEE AMPUTATION ;  Surgeon: Nikolas Nascimento MD;  Location:  OR     APPENDECTOMY       APPLY WOUND VAC Right 3/2/2015    Procedure: APPLY WOUND VAC;  Surgeon: Milena Cevallos DPM, Pod;  Location: [facility] OR     BIOPSY BONE FOOT Right 7/15/2016    Procedure: BIOPSY BONE FOOT;  Surgeon: Tim Douglas DPM;  Location:  OR     IRRIGATION AND DEBRIDEMENT FOOT, COMBINED Right 3/2/2015    Procedure: COMBINED IRRIGATION AND DEBRIDEMENT FOOT;  Surgeon: Milena Cevallos DPM, Pod;  Location: [facility] OR     IRRIGATION AND DEBRIDEMENT FOOT, COMBINED Right 7/15/2016    Procedure: COMBINED IRRIGATION AND DEBRIDEMENT FOOT;  Surgeon: Tim Douglas DPM;  Location:  OR     IRRIGATION AND DEBRIDEMENT FOOT, COMBINED Right 7/20/2016    Procedure: COMBINED IRRIGATION AND DEBRIDEMENT FOOT;  Surgeon: Tim Douglas DPM;  Location:  OR     IRRIGATION AND DEBRIDEMENT FOOT, COMBINED Left 11/19/2019    Procedure: REVISIONAL IRRIGATION AND DEBRIDEMENT LEFT FOOT AND BONE DEBRIDEMENT;  Surgeon: Joseph Randhawa DPM;  Location:  OR     ORTHOPEDIC SURGERY         Social History     Tobacco Use     Smoking status: Former Smoker     Packs/day: 0.50     Years: 20.00     Pack years: 10.00     Types: Cigarettes     Smokeless tobacco: Never Used   Substance Use Topics     Alcohol use: Yes     Comment: 3-4 drinks per month     Family History   Problem Relation Age of Onset     Genetic Disorder Other      Genetic Disorder Other      Psychotic Disorder Mother      Diabetes Father      Lung Cancer Father      Genetic Disorder Maternal Grandmother      Genetic Disorder  Maternal Grandfather      Asthma Sister      C.A.D. No family hx of      Hypertension No family hx of      Cerebrovascular Disease No family hx of      Breast Cancer No family hx of      Cancer - colorectal No family hx of      Prostate Cancer No family hx of      Alcohol/Drug No family hx of          Current Outpatient Medications   Medication Sig Dispense Refill     ACCU-CHEK JAYNA PLUS test strip TEST BLOOD SUGARS 2 TO 3 TIMES DAILY OR AS DIRECTED 100 each 3     amoxicillin-clavulanate (AUGMENTIN) 875-125 MG tablet Take 1 tablet by mouth 2 times daily 15 tablet 0     aspirin 81 MG chewable tablet Take 1 tablet (81 mg) by mouth daily 108 tablet 3     BD MEGAN U/F 32G X 4 MM insulin pen needle USE AS DIRECTED WITH NEEMA (1 BOX = 100 DAYS) *DUE TO BE SEEN* 100 each 0     blood glucose (NO BRAND SPECIFIED) lancets standard Use to test blood sugar 2-3 times daily or as directed. 100 each 11     blood glucose monitoring (NO BRAND SPECIFIED) meter device kit Use to test blood sugar 2-3 times daily or as directed. 1 kit 1     ezetimibe (ZETIA) 10 MG tablet Take 1 tablet (10 mg) by mouth daily 90 tablet 3     hydrochlorothiazide (HYDRODIURIL) 50 MG tablet Take 1 tablet (50 mg) by mouth daily 90 tablet 3     insulin glargine (LANTUS PEN) 100 UNIT/ML pen Inject 58 Units Subcutaneous At Bedtime       ipratropium (ATROVENT) 0.06 % spray Spray 2 sprays into both nostrils 4 times daily as needed for rhinitis 3 Box 0     lisinopril (PRINIVIL/ZESTRIL) 40 MG tablet Take 1 tablet (40 mg) by mouth daily 90 tablet 3     metFORMIN (GLUCOPHAGE) 1000 MG tablet Take 1 tablet (1,000 mg) by mouth 2 times daily (with meals) 180 tablet 3     order for DME Equipment being ordered: Wheelchair Treatment Diagnosis: Diabetic Foot wound with L hallux amputation 2/2 gangrene 1 Units 0     oxyCODONE (ROXICODONE) 5 MG tablet Take 1 tablet (5 mg) by mouth every 6 hours as needed for moderate to severe pain 15 tablet 0     STATIN NOT PRESCRIBED  "(INTENTIONAL) Please choose reason not prescribed, below       TRADJENTA 5 MG TABS tablet TAKE ONE TABLET BY MOUTH EVERY DAY 90 tablet 3     Allergies   Allergen Reactions     Pravastatin      \"sucked the life blood out of me\"       Reviewed and updated as needed this visit by Provider         Review of Systems   ROS COMP: Constitutional, HEENT, cardiovascular, pulmonary, gi and gu systems are negative, except as otherwise noted.      Objective    /81 (BP Location: Right arm, Patient Position: Sitting, Cuff Size: Adult Regular)   Pulse 101   Temp 97.4  F (36.3  C) (Tympanic)   Ht 1.829 m (6')   SpO2 98%   BMI 35.80 kg/m    Body mass index is 35.8 kg/m .  Physical Exam   GEN:  Well appearing, not toxic  EXT:  Wound vac over left great toe amputation without surrounding skin erythema,  edema of left leg extending to mid thigh, with negative Hohmann's sign , DP pulse palpable, distal C/S/M seems intact          Assessment & Plan     1. Gas gangrene (H)  On oral antibiotic   Post amputation  Seems to be responding  Follow up with podiatry for discussion of when to remove wound vac and try to close wound     2. Benign essential hypertension  OK control but amlodipine may be causing swelling problems  Recommended stopping amlodipine and increasing hydrochlorothiazide dose  Low clinical suspicion for DVT, no evidence of compartment syndrome   Short term follow up to assess therapy and recheck blood pressure and labs   - hydrochlorothiazide (HYDRODIURIL) 50 MG tablet; Take 1 tablet (50 mg) by mouth daily  Dispense: 90 tablet; Refill: 3    3. Type 2 diabetes mellitus with diabetic polyneuropathy, without long-term current use of insulin (H)  He reports good glycemic control after titrating lantus dose to 58 units per day   Recheck A1c in 3 month interval    4. Hyperlipidemia LDL goal <100  Now on Zetia recheck LDL next visit; he did not feel like labs today                No follow-ups on file.    Kody Wing, " MD  [facility]

## 2019-11-25 NOTE — TELEPHONE ENCOUNTER
Reason for Call: Request for an order or referral:    Order or referral being requested: Wound Care:  KCI dressing change 3 times a week and as needed.    Apply skin prep to alexander wound. Double layer adaptic to bone surface.    White foam x1 in to ducts around and over bone.    Black foam x2 to undermining and over all surface.    Partial ostomy ring to be applied over sutures and creases near medial 2nd toe.    Wound vac to run at -125 continuously with a goal of -150.    Date needed: as soon as possible    Has the patient been seen by the PCP for this problem? YES    Additional comments:     Phone number Patient can be reached at:  Other phone number:  [phone]    Best Time:      Can we leave a detailed message on this number?  YES    Call taken on 11/25/2019 at 2:30 PM by Karen Meek

## 2019-11-25 NOTE — TELEPHONE ENCOUNTER
Reason for Call:  Home Health Care    Aggie with [facility] called regarding (reason for call):     Orders are needed for this patient.     Skilled Nursing: 2x1 week, 3x3 weeks, 5 prn for wound management    Phone Number Homecare Nurse can be reached at: [phone]    Can we leave a detailed message on this number? YES    Best Time: any    Call taken on 11/25/2019 at 1:41 PM by Sarah Bliss

## 2019-11-26 ENCOUNTER — DOCUMENTATION ONLY (OUTPATIENT)
Dept: OTHER | Facility: CLINIC | Age: 54
End: 2019-11-26

## 2019-11-26 NOTE — TELEPHONE ENCOUNTER
Dressing recommendations acknowledged.  I approve. Thank you. Voice message also left for Aggie.     Dr. Douglas

## 2019-11-28 LAB
BACTERIA SPEC CULT: ABNORMAL
Lab: ABNORMAL
SPECIMEN SOURCE: ABNORMAL

## 2019-11-29 ENCOUNTER — TELEPHONE (OUTPATIENT)
Dept: PODIATRY | Facility: CLINIC | Age: 54
End: 2019-11-29

## 2019-11-29 DIAGNOSIS — E11.42 TYPE 2 DIABETES MELLITUS WITH DIABETIC POLYNEUROPATHY, WITHOUT LONG-TERM CURRENT USE OF INSULIN (H): ICD-10-CM

## 2019-11-29 NOTE — TELEPHONE ENCOUNTER
"Last Written Prescription Date:  8/26/19  Last Fill Quantity: 100,  # refills: 0   Last office visit: 11/25/2019 with prescribing provider:     Future Office Visit:   Next 5 appointments (look out 90 days)    Nov 29, 2019 12:00 PM CST  Office Visit with Craig Villatoro MD  [facility] ([facility]) [address] [address]  [phone]   Dec 04, 2019  3:00 PM CST  Office Visit with Kody Wing MD  [facility] ([facility]) [address] [address]  [phone]   Dec 06, 2019  7:45 AM CST  Return Visit with Antoine Pena DPM  [facility] ([facility]) [address] [address]  [phone]         Requested Prescriptions   Pending Prescriptions Disp Refills     BD PEN NEEDLE MEGAN 2ND GEN 32G X 4 MM miscellaneous [Pharmacy Med Name: BD PEN NEEDLE MEGAN  32G X 4 MM MISC] 100 each 0     Sig: USE AS DIRECTED WITH BASGAGLAR (1 BOX = 100 DAYS) *DUE TO BE SEEN*       Diabetic Supplies Protocol Passed - 11/29/2019  4:05 AM        Passed - Medication is active on med list        Passed - Patient is 18 years of age or older        Passed - Recent (6 mo) or future (30 days) visit within the authorizing provider's specialty     Patient had office visit in the last 6 months or has a visit in the next 30 days with authorizing provider.  See \"Patient Info\" tab in inbasket, or \"Choose Columns\" in Meds & Orders section of the refill encounter.              "

## 2019-11-29 NOTE — TELEPHONE ENCOUNTER
Patient and spouse came in because the VAC did not hold suction and the periwound skin is so macerted that it is now in shreds per the spouse. RN took the VAC off and sent them to the clinic to see a podiatrist and they ended up seeing an internist then came to our clinic. I sent them home with supplies moist to moist and adaptic to cover the bone with instructions to do it daily or bid if it soaks through. The periwound skin needs to dry out and the wound and bone needs to stay moist. They said they understood. RN to visit on Monday and he will follow up with Dr Pena on the 6th.

## 2019-11-29 NOTE — TELEPHONE ENCOUNTER
This was completed. Will hold VAC and try to resolve maceration. If able to do this, then will resume with VAC therapy. I spoke with RN, Tracey.    Tim Douglas DPM, FACFAS, MS    [place] Department of Podiatry/Foot & Ankle Surgery

## 2019-11-29 NOTE — TELEPHONE ENCOUNTER
Ry Horner is a 54 year old male whose Wound Care Nurse called with request for new orders to discontinue Wound Vac.     Wound is 12 x 12cm on the plantar surface macerated and has no intact skin available for wound vac re-application .    Description/location: Wound is 12 x 12cm on the plantar surface of left foot macerated and has no intact skin available for wound vac re-application .    Request Orders to discontinue wound vac and instead pack moist ABD Bandages changing BID.       Preferred contact number:  Tracey ROSAS at [phone]  Can we leave a detailed message on this number: YES

## 2019-12-02 NOTE — TELEPHONE ENCOUNTER
Info noted.  Dr. Douglas also discussed with the RN late last week.    Pt is scheduled with me for Friday, but I would rather he come in sooner for a wound check.  He can see myself or Dr. Randhawa, as we both operated on him.  Ideally a 30 minute visit if scheduled with me.

## 2019-12-02 NOTE — TELEPHONE ENCOUNTER
Phone call to patient and informed of need to come in sooner. Appointment scheduled for tomorrow in [place].     KELLEY Simon RN

## 2019-12-03 ENCOUNTER — APPOINTMENT (OUTPATIENT)
Dept: MRI IMAGING | Facility: CLINIC | Age: 54
DRG: 465 | End: 2019-12-03
Attending: PHYSICIAN ASSISTANT
Payer: COMMERCIAL

## 2019-12-03 ENCOUNTER — OFFICE VISIT (OUTPATIENT)
Dept: PODIATRY | Facility: CLINIC | Age: 54
End: 2019-12-03
Payer: COMMERCIAL

## 2019-12-03 ENCOUNTER — ANCILLARY PROCEDURE (OUTPATIENT)
Dept: GENERAL RADIOLOGY | Facility: CLINIC | Age: 54
End: 2019-12-03
Attending: PODIATRIST
Payer: COMMERCIAL

## 2019-12-03 ENCOUNTER — HOSPITAL ENCOUNTER (INPATIENT)
Facility: CLINIC | Age: 54
LOS: 9 days | Discharge: HOME | DRG: 465 | End: 2019-12-12
Attending: INTERNAL MEDICINE | Admitting: HOSPITALIST
Payer: COMMERCIAL

## 2019-12-03 VITALS
TEMPERATURE: 98.1 F | HEART RATE: 102 BPM | WEIGHT: 257 LBS | BODY MASS INDEX: 34.81 KG/M2 | HEIGHT: 72 IN | SYSTOLIC BLOOD PRESSURE: 138 MMHG | DIASTOLIC BLOOD PRESSURE: 91 MMHG

## 2019-12-03 DIAGNOSIS — Z89.432 STATUS POST PARTIAL AMPUTATION OF FOOT, LEFT (H): ICD-10-CM

## 2019-12-03 DIAGNOSIS — M86.9 OSTEOMYELITIS (H): ICD-10-CM

## 2019-12-03 DIAGNOSIS — E11.42 TYPE 2 DIABETES MELLITUS WITH DIABETIC POLYNEUROPATHY, WITHOUT LONG-TERM CURRENT USE OF INSULIN (H): ICD-10-CM

## 2019-12-03 DIAGNOSIS — M86.9 OSTEOMYELITIS OF LEFT FOOT, UNSPECIFIED TYPE (H): ICD-10-CM

## 2019-12-03 DIAGNOSIS — D50.9 IRON DEFICIENCY ANEMIA, UNSPECIFIED IRON DEFICIENCY ANEMIA TYPE: ICD-10-CM

## 2019-12-03 DIAGNOSIS — L03.119 CELLULITIS OF FOOT: ICD-10-CM

## 2019-12-03 DIAGNOSIS — S91.301A OPEN WOUND OF RIGHT FOOT, INITIAL ENCOUNTER: Primary | ICD-10-CM

## 2019-12-03 DIAGNOSIS — I10 BENIGN ESSENTIAL HYPERTENSION: ICD-10-CM

## 2019-12-03 LAB
ALBUMIN SERPL-MCNC: 2.8 G/DL (ref 3.4–5)
ALP SERPL-CCNC: 95 U/L (ref 40–150)
ALT SERPL W P-5'-P-CCNC: 40 U/L (ref 0–70)
ANION GAP SERPL CALCULATED.3IONS-SCNC: 6 MMOL/L (ref 3–14)
AST SERPL W P-5'-P-CCNC: 15 U/L (ref 0–45)
BILIRUB SERPL-MCNC: 0.3 MG/DL (ref 0.2–1.3)
BUN SERPL-MCNC: 28 MG/DL (ref 7–30)
CALCIUM SERPL-MCNC: 8.9 MG/DL (ref 8.5–10.1)
CHLORIDE SERPL-SCNC: 103 MMOL/L (ref 94–109)
CK SERPL-CCNC: 51 U/L (ref 30–300)
CO2 SERPL-SCNC: 27 MMOL/L (ref 20–32)
CREAT SERPL-MCNC: 1.06 MG/DL (ref 0.66–1.25)
CRP SERPL-MCNC: 31.6 MG/L (ref 0–8)
ERYTHROCYTE [DISTWIDTH] IN BLOOD BY AUTOMATED COUNT: 11.8 % (ref 10–15)
ERYTHROCYTE [SEDIMENTATION RATE] IN BLOOD BY WESTERGREN METHOD: 88 MM/H (ref 0–20)
GFR SERPL CREATININE-BSD FRML MDRD: 80 ML/MIN/{1.73_M2}
GLUCOSE BLDC GLUCOMTR-MCNC: 182 MG/DL (ref 70–99)
GLUCOSE BLDC GLUCOMTR-MCNC: 247 MG/DL (ref 70–99)
GLUCOSE SERPL-MCNC: 223 MG/DL (ref 70–99)
HCT VFR BLD AUTO: 33.6 % (ref 40–53)
HGB BLD-MCNC: 11.1 G/DL (ref 13.3–17.7)
LACTATE BLD-SCNC: 0.7 MMOL/L (ref 0.7–2)
MCH RBC QN AUTO: 28 PG (ref 26.5–33)
MCHC RBC AUTO-ENTMCNC: 33 G/DL (ref 31.5–36.5)
MCV RBC AUTO: 85 FL (ref 78–100)
PLATELET # BLD AUTO: 503 10E9/L (ref 150–450)
POTASSIUM SERPL-SCNC: 4.2 MMOL/L (ref 3.4–5.3)
PROT SERPL-MCNC: 8.5 G/DL (ref 6.8–8.8)
RBC # BLD AUTO: 3.96 10E12/L (ref 4.4–5.9)
SODIUM SERPL-SCNC: 136 MMOL/L (ref 133–144)
WBC # BLD AUTO: 12.4 10E9/L (ref 4–11)

## 2019-12-03 PROCEDURE — 12000000 ZZH R&B MED SURG/OB

## 2019-12-03 PROCEDURE — 25000128 H RX IP 250 OP 636: Performed by: PODIATRIST

## 2019-12-03 PROCEDURE — 25000132 ZZH RX MED GY IP 250 OP 250 PS 637: Performed by: PHYSICIAN ASSISTANT

## 2019-12-03 PROCEDURE — 40000141 ZZH STATISTIC PERIPHERAL IV START W/O US GUIDANCE

## 2019-12-03 PROCEDURE — 25800025 ZZH RX 258: Performed by: PODIATRIST

## 2019-12-03 PROCEDURE — 73720 MRI LWR EXTREMITY W/O&W/DYE: CPT | Mod: LT

## 2019-12-03 PROCEDURE — 80053 COMPREHEN METABOLIC PANEL: CPT | Performed by: PHYSICIAN ASSISTANT

## 2019-12-03 PROCEDURE — 99223 1ST HOSP IP/OBS HIGH 75: CPT | Mod: AI | Performed by: PHYSICIAN ASSISTANT

## 2019-12-03 PROCEDURE — 11042 DBRDMT SUBQ TIS 1ST 20SQCM/<: CPT | Mod: 78 | Performed by: PODIATRIST

## 2019-12-03 PROCEDURE — 85652 RBC SED RATE AUTOMATED: CPT | Performed by: HOSPITALIST

## 2019-12-03 PROCEDURE — 25000128 H RX IP 250 OP 636: Performed by: PHYSICIAN ASSISTANT

## 2019-12-03 PROCEDURE — 86140 C-REACTIVE PROTEIN: CPT | Performed by: HOSPITALIST

## 2019-12-03 PROCEDURE — 25000131 ZZH RX MED GY IP 250 OP 636 PS 637: Performed by: PHYSICIAN ASSISTANT

## 2019-12-03 PROCEDURE — 99215 OFFICE O/P EST HI 40 MIN: CPT | Mod: 24 | Performed by: PODIATRIST

## 2019-12-03 PROCEDURE — 83605 ASSAY OF LACTIC ACID: CPT | Performed by: PHYSICIAN ASSISTANT

## 2019-12-03 PROCEDURE — 82550 ASSAY OF CK (CPK): CPT | Performed by: HOSPITALIST

## 2019-12-03 PROCEDURE — 36415 COLL VENOUS BLD VENIPUNCTURE: CPT | Performed by: HOSPITALIST

## 2019-12-03 PROCEDURE — 00000146 ZZHCL STATISTIC GLUCOSE BY METER IP

## 2019-12-03 PROCEDURE — 25500064 ZZH RX 255 OP 636: Performed by: INTERNAL MEDICINE

## 2019-12-03 PROCEDURE — 85027 COMPLETE CBC AUTOMATED: CPT | Performed by: PHYSICIAN ASSISTANT

## 2019-12-03 PROCEDURE — 36415 COLL VENOUS BLD VENIPUNCTURE: CPT | Performed by: PHYSICIAN ASSISTANT

## 2019-12-03 PROCEDURE — A9585 GADOBUTROL INJECTION: HCPCS | Performed by: INTERNAL MEDICINE

## 2019-12-03 PROCEDURE — 73630 X-RAY EXAM OF FOOT: CPT | Mod: LT

## 2019-12-03 RX ORDER — DEXTROSE MONOHYDRATE 25 G/50ML
25-50 INJECTION, SOLUTION INTRAVENOUS
Status: DISCONTINUED | OUTPATIENT
Start: 2019-12-03 | End: 2019-12-12 | Stop reason: HOSPADM

## 2019-12-03 RX ORDER — DEXTROSE MONOHYDRATE, SODIUM CHLORIDE, AND POTASSIUM CHLORIDE 50; .745; 4.5 G/1000ML; G/1000ML; G/1000ML
INJECTION, SOLUTION INTRAVENOUS CONTINUOUS
Status: DISCONTINUED | OUTPATIENT
Start: 2019-12-04 | End: 2019-12-03

## 2019-12-03 RX ORDER — NICOTINE POLACRILEX 4 MG
15-30 LOZENGE BUCCAL
Status: DISCONTINUED | OUTPATIENT
Start: 2019-12-03 | End: 2019-12-12 | Stop reason: HOSPADM

## 2019-12-03 RX ORDER — MULTIVIT WITH MINERALS/LUTEIN
1 TABLET ORAL DAILY
COMMUNITY

## 2019-12-03 RX ORDER — HYDROMORPHONE HYDROCHLORIDE 1 MG/ML
.3-.5 INJECTION, SOLUTION INTRAMUSCULAR; INTRAVENOUS; SUBCUTANEOUS
Status: DISCONTINUED | OUTPATIENT
Start: 2019-12-03 | End: 2019-12-12 | Stop reason: HOSPADM

## 2019-12-03 RX ORDER — OXYCODONE HYDROCHLORIDE 5 MG/1
5-10 TABLET ORAL
Status: DISCONTINUED | OUTPATIENT
Start: 2019-12-03 | End: 2019-12-12 | Stop reason: HOSPADM

## 2019-12-03 RX ORDER — PIPERACILLIN SODIUM, TAZOBACTAM SODIUM 3; .375 G/15ML; G/15ML
3.38 INJECTION, POWDER, LYOPHILIZED, FOR SOLUTION INTRAVENOUS EVERY 6 HOURS
Status: DISCONTINUED | OUTPATIENT
Start: 2019-12-03 | End: 2019-12-12 | Stop reason: HOSPADM

## 2019-12-03 RX ORDER — HYDRALAZINE HYDROCHLORIDE 20 MG/ML
10 INJECTION INTRAMUSCULAR; INTRAVENOUS EVERY 4 HOURS PRN
Status: DISCONTINUED | OUTPATIENT
Start: 2019-12-03 | End: 2019-12-12 | Stop reason: HOSPADM

## 2019-12-03 RX ORDER — PROCHLORPERAZINE 25 MG
25 SUPPOSITORY, RECTAL RECTAL EVERY 12 HOURS PRN
Status: DISCONTINUED | OUTPATIENT
Start: 2019-12-03 | End: 2019-12-12 | Stop reason: HOSPADM

## 2019-12-03 RX ORDER — SODIUM CHLORIDE 9 MG/ML
INJECTION, SOLUTION INTRAVENOUS CONTINUOUS
Status: DISCONTINUED | OUTPATIENT
Start: 2019-12-03 | End: 2019-12-05

## 2019-12-03 RX ORDER — ONDANSETRON 2 MG/ML
4 INJECTION INTRAMUSCULAR; INTRAVENOUS EVERY 6 HOURS PRN
Status: DISCONTINUED | OUTPATIENT
Start: 2019-12-03 | End: 2019-12-12 | Stop reason: HOSPADM

## 2019-12-03 RX ORDER — ONDANSETRON 4 MG/1
4 TABLET, ORALLY DISINTEGRATING ORAL EVERY 6 HOURS PRN
Status: DISCONTINUED | OUTPATIENT
Start: 2019-12-03 | End: 2019-12-12 | Stop reason: HOSPADM

## 2019-12-03 RX ORDER — PROCHLORPERAZINE MALEATE 5 MG
10 TABLET ORAL EVERY 6 HOURS PRN
Status: DISCONTINUED | OUTPATIENT
Start: 2019-12-03 | End: 2019-12-12 | Stop reason: HOSPADM

## 2019-12-03 RX ORDER — EZETIMIBE 10 MG/1
10 TABLET ORAL DAILY
Status: DISCONTINUED | OUTPATIENT
Start: 2019-12-04 | End: 2019-12-12 | Stop reason: HOSPADM

## 2019-12-03 RX ORDER — ACETAMINOPHEN 650 MG/1
650 SUPPOSITORY RECTAL EVERY 4 HOURS PRN
Status: DISCONTINUED | OUTPATIENT
Start: 2019-12-03 | End: 2019-12-12 | Stop reason: HOSPADM

## 2019-12-03 RX ORDER — ACETAMINOPHEN 325 MG/1
650 TABLET ORAL EVERY 4 HOURS PRN
Status: DISCONTINUED | OUTPATIENT
Start: 2019-12-03 | End: 2019-12-12 | Stop reason: HOSPADM

## 2019-12-03 RX ORDER — GADOBUTROL 604.72 MG/ML
11 INJECTION INTRAVENOUS ONCE
Status: COMPLETED | OUTPATIENT
Start: 2019-12-03 | End: 2019-12-03

## 2019-12-03 RX ORDER — NALOXONE HYDROCHLORIDE 0.4 MG/ML
.1-.4 INJECTION, SOLUTION INTRAMUSCULAR; INTRAVENOUS; SUBCUTANEOUS
Status: DISCONTINUED | OUTPATIENT
Start: 2019-12-03 | End: 2019-12-12 | Stop reason: HOSPADM

## 2019-12-03 RX ORDER — LIDOCAINE 40 MG/G
CREAM TOPICAL
Status: DISCONTINUED | OUTPATIENT
Start: 2019-12-03 | End: 2019-12-12 | Stop reason: HOSPADM

## 2019-12-03 RX ADMIN — POTASSIUM CHLORIDE: 149 INJECTION, SOLUTION, CONCENTRATE INTRAVENOUS at 18:54

## 2019-12-03 RX ADMIN — INSULIN GLARGINE 40 UNITS: 100 INJECTION, SOLUTION SUBCUTANEOUS at 21:44

## 2019-12-03 RX ADMIN — PIPERACILLIN AND TAZOBACTAM 3.38 G: 3; .375 INJECTION, POWDER, FOR SOLUTION INTRAVENOUS at 18:20

## 2019-12-03 RX ADMIN — OXYCODONE HYDROCHLORIDE 10 MG: 5 TABLET ORAL at 20:47

## 2019-12-03 RX ADMIN — PIPERACILLIN AND TAZOBACTAM 3.38 G: 3; .375 INJECTION, POWDER, FOR SOLUTION INTRAVENOUS at 23:54

## 2019-12-03 RX ADMIN — GADOBUTROL 11 ML: 604.72 INJECTION INTRAVENOUS at 23:43

## 2019-12-03 ASSESSMENT — ACTIVITIES OF DAILY LIVING (ADL)
SWALLOWING: 0-->SWALLOWS FOODS/LIQUIDS WITHOUT DIFFICULTY
AMBULATION: 1-->ASSISTIVE EQUIPMENT
ADLS_ACUITY_SCORE: 13
RETIRED_COMMUNICATION: 0-->UNDERSTANDS/COMMUNICATES WITHOUT DIFFICULTY
TRANSFERRING: 1-->ASSISTIVE EQUIPMENT
WHICH_OF_THE_ABOVE_FUNCTIONAL_RISKS_HAD_A_RECENT_ONSET_OR_CHANGE?: AMBULATION;TRANSFERRING
TOILETING: 0-->INDEPENDENT
ADLS_ACUITY_SCORE: 15
RETIRED_EATING: 0-->INDEPENDENT
BATHING: 0-->INDEPENDENT
FALL_HISTORY_WITHIN_LAST_SIX_MONTHS: NO
COGNITION: 0 - NO COGNITION ISSUES REPORTED
DRESS: 0-->INDEPENDENT

## 2019-12-03 ASSESSMENT — MIFFLIN-ST. JEOR: SCORE: 2053.74

## 2019-12-03 NOTE — Clinical Note
Here is the [facility] admit.  We both operated on him in Nov.  Nonhealing L 1st toe amputation site wound, exposed 1st met head.  Concern for possible bone infection beyond this area as well.  Will need new MRI first, but I would schedule him for surgery with Raghavendra tomorrow, as it looks rough and they are expecting surgery tomorrow.  Thanks.

## 2019-12-03 NOTE — PROGRESS NOTES
Foot & Ankle Surgery  December 3, 2019    Admission from Dr Pena's clinic.  11/17 surgery for gas left foot with subsequent revision debridement 11/19.  Discharged with wound VAC.  The wound has now deteriorated, and there's readily exposed 1st metatarsal head.  xrays indicate possible gas, although this is not a necrotizing infection.    To OR tomorrow with Dr Douglas for partial left foot amputation.  NPO after breakfast.    Joseph Randhawa DPM FACFAS FACFAOM  Podiatric Foot & Ankle Surgeon  [facility]  [phone]

## 2019-12-03 NOTE — PROGRESS NOTES
PATIENT HISTORY:  Ry Horner is a 54 year old male who presents to clinic for recheck of L foot following partial foot amputation for gas gangrene with me on 11/17.  Pt had repeat I&D on 11/19 with Dr. Randhawa.  Pt was discharged to home on a Wound VAC.  Pt walked into the wound clinic on Friday 11/29, and has been followed by home wound RN.  They note the VAC was discontinued Friday due to concern for surrounding wound maceration.  Pt was scheduled to follow-up with me on 12/6, but I asked that he come into clinic sooner this week for wound check.  Pt denies f/c/n/v.  Local redness reported.  They have been changing a dressing at home.  Pt's wife present.  Hx of DM with neuropathy, prior R BKA.    Review of Systems:  Rest of 10 pt ROS neg except for HPI.     PAST MEDICAL HISTORY:   Past Medical History:   Diagnosis Date     BENIGN HYPERTENSION 4/4/2007     DIABETES MELLITUS TYPE II-UNCOMPL 4/4/2007     HYPERLIPIDEMIA NEC/NOS 4/4/2007     Tobacco use disorder 4/4/2007        PAST SURGICAL HISTORY:   Past Surgical History:   Procedure Laterality Date     AMPUTATE FOOT Left 11/17/2019    Procedure: LEFT PARTIAL FOOT AMPUTATION;  Surgeon: Antoine Pena DPM;  Location: [facility] OR     AMPUTATE LEG BELOW KNEE Right 9/1/2017    Procedure: AMPUTATE LEG BELOW KNEE;  RIGHT BELOW KNEE AMPUTATION ;  Surgeon: Nikolas Nascimento MD;  Location: [facility] OR     APPENDECTOMY       APPLY WOUND VAC Right 3/2/2015    Procedure: APPLY WOUND VAC;  Surgeon: Milena Cevallos DPM, Pod;  Location: [facility] OR     BIOPSY BONE FOOT Right 7/15/2016    Procedure: BIOPSY BONE FOOT;  Surgeon: Tim Douglas DPM;  Location: [facility] OR     IRRIGATION AND DEBRIDEMENT FOOT, COMBINED Right 3/2/2015    Procedure: COMBINED IRRIGATION AND DEBRIDEMENT FOOT;  Surgeon: Milena Cevallos DPM, Pod;  Location: [facility] OR     IRRIGATION AND DEBRIDEMENT FOOT, COMBINED Right 7/15/2016    Procedure: COMBINED IRRIGATION AND DEBRIDEMENT FOOT;  Surgeon: Tim Douglas  LAURA;  Location: [facility] OR     IRRIGATION AND DEBRIDEMENT FOOT, COMBINED Right 7/20/2016    Procedure: COMBINED IRRIGATION AND DEBRIDEMENT FOOT;  Surgeon: Tim Douglas DPM;  Location: [facility] OR     IRRIGATION AND DEBRIDEMENT FOOT, COMBINED Left 11/19/2019    Procedure: REVISIONAL IRRIGATION AND DEBRIDEMENT LEFT FOOT AND BONE DEBRIDEMENT;  Surgeon: Joseph Randhawa DPM;  Location: [facility] OR     ORTHOPEDIC SURGERY          MEDICATIONS:   Current Outpatient Medications:      ACCU-CHEK JAYNA PLUS test strip, TEST BLOOD SUGARS 2 TO 3 TIMES DAILY OR AS DIRECTED, Disp: 100 each, Rfl: 3     amoxicillin-clavulanate (AUGMENTIN) 875-125 MG tablet, Take 1 tablet by mouth 2 times daily, Disp: 15 tablet, Rfl: 0     aspirin 81 MG chewable tablet, Take 1 tablet (81 mg) by mouth daily, Disp: 108 tablet, Rfl: 3     blood glucose (NO BRAND SPECIFIED) lancets standard, Use to test blood sugar 2-3 times daily or as directed., Disp: 100 each, Rfl: 11     blood glucose monitoring (NO BRAND SPECIFIED) meter device kit, Use to test blood sugar 2-3 times daily or as directed., Disp: 1 kit, Rfl: 1     ezetimibe (ZETIA) 10 MG tablet, Take 1 tablet (10 mg) by mouth daily, Disp: 90 tablet, Rfl: 3     hydrochlorothiazide (HYDRODIURIL) 50 MG tablet, Take 1 tablet (50 mg) by mouth daily, Disp: 90 tablet, Rfl: 3     insulin glargine (LANTUS PEN) 100 UNIT/ML pen, Inject 58 Units Subcutaneous At Bedtime, Disp: , Rfl:      insulin pen needle (BD PEN NEEDLE MEGAN 2ND GEN) 32G X 4 MM miscellaneous, Use as directed with Basaglar (1 box = 100 days), Disp: 100 each, Rfl: 1     ipratropium (ATROVENT) 0.06 % spray, Spray 2 sprays into both nostrils 4 times daily as needed for rhinitis, Disp: 3 Box, Rfl: 0     lisinopril (PRINIVIL/ZESTRIL) 40 MG tablet, Take 1 tablet (40 mg) by mouth daily, Disp: 90 tablet, Rfl: 3     metFORMIN (GLUCOPHAGE) 1000 MG tablet, Take 1 tablet (1,000 mg) by mouth 2 times daily (with meals), Disp: 180 tablet, Rfl: 3     order for DME,  "Equipment being ordered: Wheelchair Treatment Diagnosis: Diabetic Foot wound with L hallux amputation 2/2 gangrene, Disp: 1 Units, Rfl: 0     oxyCODONE (ROXICODONE) 5 MG tablet, Take 1 tablet (5 mg) by mouth every 6 hours as needed for moderate to severe pain, Disp: 15 tablet, Rfl: 0     STATIN NOT PRESCRIBED (INTENTIONAL), Please choose reason not prescribed, below, Disp: , Rfl:      TRADJENTA 5 MG TABS tablet, TAKE ONE TABLET BY MOUTH EVERY DAY, Disp: 90 tablet, Rfl: 3     ALLERGIES:    Allergies   Allergen Reactions     Pravastatin      \"sucked the life blood out of me\"        SOCIAL HISTORY:   Social History     Socioeconomic History     Marital status:      Spouse name: Not on file     Number of children: Not on file     Years of education: Not on file     Highest education level: Not on file   Occupational History     Not on file   Social Needs     Financial resource strain: Not on file     Food insecurity:     Worry: Not on file     Inability: Not on file     Transportation needs:     Medical: Not on file     Non-medical: Not on file   Tobacco Use     Smoking status: Former Smoker     Packs/day: 0.50     Years: 20.00     Pack years: 10.00     Types: Cigarettes     Smokeless tobacco: Never Used   Substance and Sexual Activity     Alcohol use: Yes     Comment: 3-4 drinks per month     Drug use: No     Sexual activity: Yes     Partners: Female   Lifestyle     Physical activity:     Days per week: Not on file     Minutes per session: Not on file     Stress: Not on file   Relationships     Social connections:     Talks on phone: Not on file     Gets together: Not on file     Attends [employer] service: Not on file     Active member of club or organization: Not on file     Attends meetings of clubs or organizations: Not on file     Relationship status: Not on file     Intimate partner violence:     Fear of current or ex partner: Not on file     Emotionally abused: Not on file     Physically abused: Not on " file     Forced sexual activity: Not on file   Other Topics Concern      Service Yes     Blood Transfusions No     Caffeine Concern No     Occupational Exposure No     Hobby Hazards No     Sleep Concern No     Stress Concern No     Weight Concern Yes     Comment: Would like to lose some weight     Special Diet No     Back Care No     Exercise Yes     Bike Helmet No     Seat Belt Yes     Self-Exams Yes     Parent/sibling w/ CABG, MI or angioplasty before 65F 55M? Not Asked   Social History Narrative     Not on file        FAMILY HISTORY:   Family History   Problem Relation Age of Onset     Genetic Disorder Other      Genetic Disorder Other      Psychotic Disorder Mother      Diabetes Father      Lung Cancer Father      Genetic Disorder Maternal Grandmother      Genetic Disorder Maternal Grandfather      Asthma Sister      C.A.D. No family hx of      Hypertension No family hx of      Cerebrovascular Disease No family hx of      Breast Cancer No family hx of      Cancer - colorectal No family hx of      Prostate Cancer No family hx of      Alcohol/Drug No family hx of         EXAM:Vitals: BP (!) 138/91   Pulse 102   Ht 1.829 m (6')   Wt 116.6 kg (257 lb)   BMI 34.86 kg/m    BMI= Body mass index is 34.86 kg/m .    General appearance: Patient is alert and fully cooperative with history & exam.  No sign of distress is noted during the visit.     Psychiatric: Affect is pleasant & appropriate.  Patient appears motivated to improve health.     Respiratory: Breathing is regular & unlabored while sitting.     HEENT: Hearing is intact to spoken word.  Speech is clear.  No gross evidence of visual impairment that would impact ambulation.     Dermatologic: L foot 1st toe amputation site appears to have extensive nonviable tissue, surrounding erythema and edema, exposed 1st metatarsal head.  Wound is approx 5x4cm.  Serous appearing drainage.  Dry sloughing skin to plantar surface.       Vascular: DP & PT pulses are not  readily palpable on the L, but edema noted.  Prior ABIs showed adequate flow to foot.  CFT and skin temperature are normal.     Neurologic: Lower extremity sensation is diminished to light touch, L foot.     Musculoskeletal: s/p open L hallux amputation.  No gross ankle deformity noted.  No foot or ankle joint effusion is noted.  R BKA.    XRs of L foot reviewed with pt.  Some small areas of air corresponding to open wound area.  Clinically this does not present as an acute necrotizing infection at this time.      Upon comparing the films with prior x-rays, question subtle erosions at medial base of 2nd toe/2nd met head, but inconclusive.  I reviewed this with him on the phone after he left.  We did discuss in clinic there is risk of bone infection beyond the 1st toe area, and MRI would be needed to assess further.     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  Discussed condition and treatment options including pros and cons.    Discussed the foot wound looks to have deteriorated significantly.  Local signs of infection, necrotic appearing tissue, exposed bone.  Further bone infection is likely.    I advised immediate admission for IV abx, surgery.   Surgery would involve at least further irrigation and excisional debridement, likely further partial foot amputation.  Discussed risk of partial limb loss.  Advise new MRI in house.  Surgery likely tomorrow with Dr. Douglas.  Reviewed with on call Dr. Randhawa.    Pt reluctant to be readmitted at first, but ultimately agreed.    With consent I did perform excisional debridement on the L foot with a tissue nipper and scissors up to an including sub q.  This was limited by tools available and pt's reported pain.  Betadine applied and gauze dressing placed.      They will proceed to [facility] for admission.  Discussed with  hospitalist.    Antoine Pena DPM, FACFAS    Weight management plan: Patient was referred to their PCP to discuss a diet and exercise plan.  Ry to follow up with Primary Care provider regarding elevated blood pressure.

## 2019-12-03 NOTE — LETTER
12/3/2019         RE: Ry Horner  [address] [facility] [address]        Dear Colleague,    Thank you for referring your patient, Ry Horner, to the [facility]. Please see a copy of my visit note below.    PATIENT HISTORY:  Ry Horner is a 54 year old male who presents to clinic for recheck of L foot following partial foot amputation for gas gangrene with me on 11/17.  Pt had repeat I&D on 11/19 with Dr. Randhawa.  Pt was discharged to home on a Wound VAC.  Pt walked into the wound clinic on Friday 11/29, and has been followed by home wound RN.  They note the VAC was discontinued Friday due to concern for surrounding wound maceration.  Pt was scheduled to follow-up with me on 12/6, but I asked that he come into clinic sooner this week for wound check.  Pt denies f/c/n/v.  Local redness reported.  They have been changing a dressing at home.  Pt's wife present.  Hx of DM with neuropathy, prior R BKA.    Review of Systems:  Rest of 10 pt ROS neg except for HPI.     PAST MEDICAL HISTORY:   Past Medical History:   Diagnosis Date     BENIGN HYPERTENSION 4/4/2007     DIABETES MELLITUS TYPE II-UNCOMPL 4/4/2007     HYPERLIPIDEMIA NEC/NOS 4/4/2007     Tobacco use disorder 4/4/2007        PAST SURGICAL HISTORY:   Past Surgical History:   Procedure Laterality Date     AMPUTATE FOOT Left 11/17/2019    Procedure: LEFT PARTIAL FOOT AMPUTATION;  Surgeon: Antoine Pena DPM;  Location: [facility] OR     AMPUTATE LEG BELOW KNEE Right 9/1/2017    Procedure: AMPUTATE LEG BELOW KNEE;  RIGHT BELOW KNEE AMPUTATION ;  Surgeon: Nikolas Nascimento MD;  Location: [facility] OR     APPENDECTOMY       APPLY WOUND VAC Right 3/2/2015    Procedure: APPLY WOUND VAC;  Surgeon: Milena Cevallos DPM, Pod;  Location: [facility] OR     BIOPSY BONE FOOT Right 7/15/2016    Procedure: BIOPSY BONE FOOT;  Surgeon: Tim Douglas DPM;  Location: [facility] OR     IRRIGATION AND DEBRIDEMENT FOOT, COMBINED Right 3/2/2015    Procedure: COMBINED  IRRIGATION AND DEBRIDEMENT FOOT;  Surgeon: Milena Cevallos DPM, Pod;  Location: [facility] OR     IRRIGATION AND DEBRIDEMENT FOOT, COMBINED Right 7/15/2016    Procedure: COMBINED IRRIGATION AND DEBRIDEMENT FOOT;  Surgeon: Tim Douglas DPM;  Location: [facility] OR     IRRIGATION AND DEBRIDEMENT FOOT, COMBINED Right 7/20/2016    Procedure: COMBINED IRRIGATION AND DEBRIDEMENT FOOT;  Surgeon: Tim Douglas DPM;  Location: [facility] OR     IRRIGATION AND DEBRIDEMENT FOOT, COMBINED Left 11/19/2019    Procedure: REVISIONAL IRRIGATION AND DEBRIDEMENT LEFT FOOT AND BONE DEBRIDEMENT;  Surgeon: Joseph Randhawa DPM;  Location: [facility] OR     ORTHOPEDIC SURGERY          MEDICATIONS:   Current Outpatient Medications:      ACCU-CHEK JAYNA PLUS test strip, TEST BLOOD SUGARS 2 TO 3 TIMES DAILY OR AS DIRECTED, Disp: 100 each, Rfl: 3     amoxicillin-clavulanate (AUGMENTIN) 875-125 MG tablet, Take 1 tablet by mouth 2 times daily, Disp: 15 tablet, Rfl: 0     aspirin 81 MG chewable tablet, Take 1 tablet (81 mg) by mouth daily, Disp: 108 tablet, Rfl: 3     blood glucose (NO BRAND SPECIFIED) lancets standard, Use to test blood sugar 2-3 times daily or as directed., Disp: 100 each, Rfl: 11     blood glucose monitoring (NO BRAND SPECIFIED) meter device kit, Use to test blood sugar 2-3 times daily or as directed., Disp: 1 kit, Rfl: 1     ezetimibe (ZETIA) 10 MG tablet, Take 1 tablet (10 mg) by mouth daily, Disp: 90 tablet, Rfl: 3     hydrochlorothiazide (HYDRODIURIL) 50 MG tablet, Take 1 tablet (50 mg) by mouth daily, Disp: 90 tablet, Rfl: 3     insulin glargine (LANTUS PEN) 100 UNIT/ML pen, Inject 58 Units Subcutaneous At Bedtime, Disp: , Rfl:      insulin pen needle (BD PEN NEEDLE MEGAN 2ND GEN) 32G X 4 MM miscellaneous, Use as directed with Basaglar (1 box = 100 days), Disp: 100 each, Rfl: 1     ipratropium (ATROVENT) 0.06 % spray, Spray 2 sprays into both nostrils 4 times daily as needed for rhinitis, Disp: 3 Box, Rfl: 0     lisinopril  "(PRINIVIL/ZESTRIL) 40 MG tablet, Take 1 tablet (40 mg) by mouth daily, Disp: 90 tablet, Rfl: 3     metFORMIN (GLUCOPHAGE) 1000 MG tablet, Take 1 tablet (1,000 mg) by mouth 2 times daily (with meals), Disp: 180 tablet, Rfl: 3     order for DME, Equipment being ordered: Wheelchair Treatment Diagnosis: Diabetic Foot wound with L hallux amputation 2/2 gangrene, Disp: 1 Units, Rfl: 0     oxyCODONE (ROXICODONE) 5 MG tablet, Take 1 tablet (5 mg) by mouth every 6 hours as needed for moderate to severe pain, Disp: 15 tablet, Rfl: 0     STATIN NOT PRESCRIBED (INTENTIONAL), Please choose reason not prescribed, below, Disp: , Rfl:      TRADJENTA 5 MG TABS tablet, TAKE ONE TABLET BY MOUTH EVERY DAY, Disp: 90 tablet, Rfl: 3     ALLERGIES:    Allergies   Allergen Reactions     Pravastatin      \"sucked the life blood out of me\"        SOCIAL HISTORY:   Social History     Socioeconomic History     Marital status:      Spouse name: Not on file     Number of children: Not on file     Years of education: Not on file     Highest education level: Not on file   Occupational History     Not on file   Social Needs     Financial resource strain: Not on file     Food insecurity:     Worry: Not on file     Inability: Not on file     Transportation needs:     Medical: Not on file     Non-medical: Not on file   Tobacco Use     Smoking status: Former Smoker     Packs/day: 0.50     Years: 20.00     Pack years: 10.00     Types: Cigarettes     Smokeless tobacco: Never Used   Substance and Sexual Activity     Alcohol use: Yes     Comment: 3-4 drinks per month     Drug use: No     Sexual activity: Yes     Partners: Female   Lifestyle     Physical activity:     Days per week: Not on file     Minutes per session: Not on file     Stress: Not on file   Relationships     Social connections:     Talks on phone: Not on file     Gets together: Not on file     Attends Anabaptist service: Not on file     Active member of club or organization: Not on file "     Attends meetings of clubs or organizations: Not on file     Relationship status: Not on file     Intimate partner violence:     Fear of current or ex partner: Not on file     Emotionally abused: Not on file     Physically abused: Not on file     Forced sexual activity: Not on file   Other Topics Concern      Service Yes     Blood Transfusions No     Caffeine Concern No     Occupational Exposure No     Hobby Hazards No     Sleep Concern No     Stress Concern No     Weight Concern Yes     Comment: Would like to lose some weight     Special Diet No     Back Care No     Exercise Yes     Bike Helmet No     Seat Belt Yes     Self-Exams Yes     Parent/sibling w/ CABG, MI or angioplasty before 65F 55M? Not Asked   Social History Narrative     Not on file        FAMILY HISTORY:   Family History   Problem Relation Age of Onset     Genetic Disorder Other      Genetic Disorder Other      Psychotic Disorder Mother      Diabetes Father      Lung Cancer Father      Genetic Disorder Maternal Grandmother      Genetic Disorder Maternal Grandfather      Asthma Sister      C.A.D. No family hx of      Hypertension No family hx of      Cerebrovascular Disease No family hx of      Breast Cancer No family hx of      Cancer - colorectal No family hx of      Prostate Cancer No family hx of      Alcohol/Drug No family hx of         EXAM:Vitals: BP (!) 138/91   Pulse 102   Ht 1.829 m (6')   Wt 116.6 kg (257 lb)   BMI 34.86 kg/m     BMI= Body mass index is 34.86 kg/m .    General appearance: Patient is alert and fully cooperative with history & exam.  No sign of distress is noted during the visit.     Psychiatric: Affect is pleasant & appropriate.  Patient appears motivated to improve health.     Respiratory: Breathing is regular & unlabored while sitting.     HEENT: Hearing is intact to spoken word.  Speech is clear.  No gross evidence of visual impairment that would impact ambulation.     Dermatologic: L foot 1st toe  amputation site appears to have extensive nonviable tissue, surrounding erythema and edema, exposed 1st metatarsal head.  Wound is approx 5x4cm.  Serous appearing drainage.  Dry sloughing skin to plantar surface.       Vascular: DP & PT pulses are not readily palpable on the L, but edema noted.  Prior ABIs showed adequate flow to foot.  CFT and skin temperature are normal.     Neurologic: Lower extremity sensation is diminished to light touch, L foot.     Musculoskeletal: s/p open L hallux amputation.  No gross ankle deformity noted.  No foot or ankle joint effusion is noted.  R BKA.    XRs of L foot reviewed with pt.  Some small areas of air corresponding to open wound area.  Clinically this does not present as an acute necrotizing infection at this time.      Upon comparing the films with prior x-rays, question subtle erosions at medial base of 2nd toe/2nd met head, but inconclusive.  I reviewed this with him on the phone after he left.  We did discuss in clinic there is risk of bone infection beyond the 1st toe area, and MRI would be needed to assess further.     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  Discussed condition and treatment options including pros and cons.    Discussed the foot wound looks to have deteriorated significantly.  Local signs of infection, necrotic appearing tissue, exposed bone.  Further bone infection is likely.    I advised immediate admission for IV abx, surgery.   Surgery would involve at least further irrigation and excisional debridement, likely further partial foot amputation.  Discussed risk of partial limb loss.  Advise new MRI in house.  Surgery likely tomorrow with Dr. Douglas.  Reviewed with on call Dr. Randhawa.    Pt reluctant to be readmitted at first, but ultimately agreed.    With consent I did perform excisional  debridement on the L foot with a tissue nipper and scissors up to an including sub q.  This was limited by tools available and pt's reported pain.  Betadine applied and gauze dressing placed.      They will proceed to [facility] for admission.  Discussed with hospitalist.    Antoine Pena DPM, FACFAS    Weight management plan: Patient was referred to their PCP to discuss a diet and exercise plan.  Ry to follow up with Primary Care provider regarding elevated blood pressure.        Again, thank you for allowing me to participate in the care of your patient.        Sincerely,        Antoine Pena DPM

## 2019-12-03 NOTE — PHARMACY-ADMISSION MEDICATION HISTORY
Pharmacy Medication History  Admission medication history interview status for the 12/3/2019  admission is complete. See EPIC admission navigator for prior to admission medications     Medication history sources: Patient and Surescripts  Medication history source reliability: Good  Adherence assessment: Good      Additional medication history information:   Recently completed course of Augmentin.  Recent changes to blood pressure medications - Amlodipine 5 mg dc'd, and hydrochlorothiazide increased from 12.5 mg to 50 mg daily.    Medication reconciliation completed by provider prior to medication history? Yes    Time spent in this activity: 15 minutes      Prior to Admission medications    Medication Sig Last Dose Taking? Auth Provider   aspirin 81 MG chewable tablet Take 1 tablet (81 mg) by mouth daily 12/3/2019 at Unknown time Yes Kody Wing MD   ezetimibe (ZETIA) 10 MG tablet Take 1 tablet (10 mg) by mouth daily 12/3/2019 at Unknown time Yes Kody Wing MD   hydrochlorothiazide (HYDRODIURIL) 50 MG tablet Take 1 tablet (50 mg) by mouth daily 12/3/2019 at Unknown time Yes Kody Wing MD   insulin glargine (LANTUS PEN) 100 UNIT/ML pen Inject 58 Units Subcutaneous At Bedtime 12/2/2019 at Unknown time Yes Unknown, Entered By History   ipratropium (ATROVENT) 0.06 % spray Spray 2 sprays into both nostrils 4 times daily as needed for rhinitis prn Yes Kody Wing MD   lisinopril (PRINIVIL/ZESTRIL) 40 MG tablet Take 1 tablet (40 mg) by mouth daily 12/3/2019 at Unknown time Yes Kody Wing MD   metFORMIN (GLUCOPHAGE) 1000 MG tablet Take 1 tablet (1,000 mg) by mouth 2 times daily (with meals) 12/3/2019 at am Yes Kody Wing MD   multivitamin (CENTRUM SILVER) tablet Take 1 tablet by mouth daily 12/3/2019 at Unknown time Yes Unknown, Entered By History   TRADJENTA 5 MG TABS tablet TAKE ONE TABLET BY MOUTH EVERY DAY 12/3/2019 at Unknown time Yes Kody Wing MD   ACCU-CHEK JAYNA PLUS test  strip TEST BLOOD SUGARS 2 TO 3 TIMES DAILY OR AS DIRECTED   Kody Wing MD   blood glucose (NO BRAND SPECIFIED) lancets standard Use to test blood sugar 2-3 times daily or as directed.   Kody Wing MD   blood glucose monitoring (NO BRAND SPECIFIED) meter device kit Use to test blood sugar 2-3 times daily or as directed.   Kody Wing MD   insulin pen needle (BD PEN NEEDLE MEGAN 2ND GEN) 32G X 4 MM miscellaneous Use as directed with Basaglar (1 box = 100 days)   Kody Wing MD   order for DME Equipment being ordered: Wheelchair Treatment Diagnosis: Diabetic Foot wound with L hallux amputation 2/2 gangrene   Ry Hilliard MD   STATIN NOT PRESCRIBED (INTENTIONAL) Please choose reason not prescribed, below   Kody Wing MD

## 2019-12-03 NOTE — LETTER
REPORT OF WORK ABILITY    [place] [address] [facility]  [address] [place] [address]  [phone]    Employee Name: Ry Horner        : 1965         Today's date: 2019    To Whom it May Concern:    Patient recently underwent surgery on the left foot. May need further surgery. Please allow him to work remotely from home for the next 2 months. He may be able to go into the office a few times within the next 2 months if feeling okay. Please accommodate. Please call with questions or concerns.             Milena Cevallos DPM

## 2019-12-03 NOTE — PATIENT INSTRUCTIONS
"Proceed to [facility] desk      Thank you for choosing [facility]!    DR. BELLAMY'S CLINIC LOCATIONS     MONDAY  [place] TUESDAY & FRIDAY AM  [place]   [address] [facility]   [address]   [place] [address] [place] [address]   [phone]  [phone]  [phone]       WEDNESDAY  [place] SCHEDULE SURGERY: [phone]   [address] [facility] APPOINTMENTS: [phone]   [place] [address] BILLING QUESTIONS: [phone]   [phone]         DIABETES AND YOUR FEET  Diabetes can result in several problems in the feet including ulcers (open sores) and amputations. Two of the most important reasons why people develop foot problems when they have diabetes is : 1. Neuropathy (loss of feeling)  2. Vascular disease (loss or decrease of blood flow).    Neuropathy is a term used to describe a loss of nerve function.  Patients with diabetes are at risk of developing neuropathy if their sugars continue to run high and are above the normal value. One theory for neuropathy is that the \"extra\" sugar in the body enters the nerves and is broken down. These by-products build up in the nerve causing it to swell and impairing nerve function. Often times, this can be prevented by controlling your sugars, dieting and exercise.    When a person develops neuropathy, they usually begin to feel numbness or tingling in their feet and sometime in their legs.  Other symptoms may include painful burning or hot feet, tingling or feeling like insects or ants are crawling on your feet or legs.  If the diabetes is sever and the sugars run high for long periods of time, neuropathy can also occur in the hands.    Vascular disease  is a term used to describe a loss or decrease in circulation (blood flow). There is a problem in getting blood and oxygen to areas that need it. Similar to neuropathy, sugars can build up in the " walls of the arteries (blood vessels) and cause them to become swollen, thickened and hardened. This decreases the amount of blood that can go to an area that needs it. Though this is common in the legs of diabetic patients, it can also affect other arteries (blood vessels) in the body such as in the heart and eyes.    In the legs, vascular disease usually results in cramping. Patients who develop leg cramps after walking the same distance every time (i.e. One block, half a mile, ect.) need to let their doctors know so that their circulation may be checked. Cramps causing severe pain in the feet and/or legs while sleeping and the cramps go away when you stand or hang your legs off the side of the bed, may also be a sign of poor blood circulation.  Occasional cramping in cold weather or on rare occasions with activity may not be due to poor circulation, but you should inform your doctor.    PREVENTION OF THESE DISEASES  The key to prevention is good blood sugar control. Poor blood sugar control is a big reason many of these problems start. Physical activity (exercise) is a very good way to help decrease your blood sugars. Exercise can lower your blood sugar, blood pressure, and cholesterol. It also reduces your risk for heart disease and stroke, relieves stress, and strengthens your heart, muscles and bones.  In addition, regular activity helps insulin work better, improves your blood circulation, and keeps your joints flexible. If you're trying to lose weight, a combination of exercise and wise food choices can help you reach your target weight and maintain it.      PAIN MANAGEMENT  1.Blood Sugar Control - Most important  2. Medications such as:  Amytriptylline, duloxetine, gabapentin, lyrica, tramadol  3. Nutritional therapy:  Vitamin B6 (100mg daily), Vitamin B12 (75mcg daily), Vitamin D 2000 IU daily), Alpha-Lipoic Acid (600-1800mg daily), Acetyl-L-Carnitine (500-1000mg TID, L-methyl folate (1500mcg daily)    **  Metformin can block Vitamin B6 and B12 so it is important to supplement**    FOOT CARE RECOMMENDATIONS   1. Wash your feet with lukewarm water and a mild soap and then dry them thoroughly, especially between the toes.     2. Examine your feet daily looking for cuts, corns, blisters, cracks, ect, especially after wearing new shoes. Make sure to look between your toes. If you cannot see the bottom of your feet, set a mirror on the floor and hold your foot over it, or ask a spouse, friend or family member to examine your feet for you. Contact your doctor immediately if new problems are noted or if sores are not healing.     3. Immediately apply moisturizer to the tops and bottoms of your feet, avoiding areas between the toes. Hand lotion (Intesive Care, Kate, Eucerin, Neutrogena, Curel, ect) is sufficient unless your doctor prescribes a medicated lotion. Apply sunscreen to your feet when going swimming outside.     4. Use clean comfortable shoes, wear white socks (if you have any bleeding or drainage, you will see it on white socks). Socks should not have thick seams or cut off the circulation around the leg. Break in new shoes slowly and rotate with older shoes until broken in. Check the inside of your shoes with your hand to look for areas of irritation or objects that may have fallen into your shoes.       5. Keep slippers by the side of your bed for use during the night.     6.  Shoes should be fitted by a professional and should not cause areas of irritation.  Check your feet regularly when wearing a new pair of shoes and replace them as needed.     7.  Talk to your doctor about proper exercise. Exercise and stretching stimulate blood flow to your feet and maintain proper glucose levels.     8.  Monitor your blood glucose level as instructed by your doctor. Notify your doctor immediately if your blood sugar is abnormally high or low.    9. Cut your nails straight across, but then gently round any sharp edges with  a cardboard nail file. If you have neuropathy, peripheral vascular disease or cannot see that well to trim your own toenails contact [employer] ([phone]) or [employer] ([phone]).      THINGS TO AVOID DOING   1.  Do not soak your feet if you have an open sore. Use only lukewarm water and always check the temperature with your hand as hot water can easily burn your feet.       2.  Never use a hot water bottle or heating pad on your feet. Also do not apply cold compresses to your feet. With decreased sensation, you could burn or freeze your feet.       3.  Do not apply any of these to your feet:    -  Over the counter medicine for corns or warts    -  Harsh chemicals like boric acid    -  Do not self-treat corns, cuts, blisters or infections. Always consult your doctor.       4.  Do not wear sandals, slippers or walk barefoot, especially on hot sand or concrete or other harsh surfaces.     5.  If you smoke, stop!!!        Ry to follow up with Primary Care provider regarding elevated blood pressure.        BODY WEIGHT AND YOUR FEET  The following information is included in the after visit summary for all patients. Body weight can be a sensitive issue to discuss in clinic, but we think the following information is very important. Although we focus on the feet and ankles, we do support the overall health of our patients.     Many things can cause foot and ankle problems. Foot structure, activity level, foot mechanics and injuries are common causes of pain. One very important issue that often goes unmentioned, is body weight. Extra weight can cause increased stress on muscles, ligaments, bones and tendons. Sometimes just a few extra pounds is all it takes to put one over her/his threshold. Without reducing that stress, it can be difficult to alleviate pain. As Foot & Ankle specialists, our job is addressing the lower extremity problem and possible causes. Regarding extra body weight, we encourage patients to  discuss diet and weight management plans with their primary care doctors. It is this team approach that gives you the best opportunity for pain relief and getting you back on your feet.      [facility] has a Comprehensive Weight Management Program. This program includes counseling, education, non-surgical and surgical approaches to weight loss. If you are interested in learning more either talk to you primary care provider or call [phone].

## 2019-12-03 NOTE — H&P
Admitted:     12/03/2019      PRIMARY CARE PHYSICIAN:  Kody Wing MD.      CHIEF COMPLAINT:  Left foot wound.      SOURCE FILE INFORMATION:  History obtained from patient and chart review.      HISTORY OF PRESENT ILLNESS:  Ry Horner is an exceptionally pleasant 54-year-old male with past medical history of insulin-dependent type 2 diabetes, previous osteomyelitis requiring a right below-the-knee amputation, and recent osteomyelitis status post left hallux amputation 11/17/2019 who was directly admitted from Podiatry Clinic due to wound dehiscence.  The patient was admitted 11/17 through 11/22 at [facility] after he presented with erythema of his left hallux.  He was diagnosed with gas gangrene.  He was treated with IV Zosyn.  He underwent a left hallux amputation on 11/17/2019 and subsequent debridement on 11/19/2019.  Infectious Disease was consulted.  Surgical cultures grew out staph and strep.  He was treated with a course of Zosyn and discharged on oral Augmentin with a wound VAC.  He notes that unfortunately with the wound VAC his foot became somewhat macerated.  The wound VAC was discontinued on Friday.  He was seen by Podiatry Clinic today with exposed bone.  Admission was requested for likely surgical intervention tomorrow.      At present, the patient is evaluated in his hospital room.  He is doing well.  Notes that he has been following all of his weightbearing restrictions.  He has been feeling great.  No fevers, chills or general malaise.  Pain has been minimal.      PAST MEDICAL HISTORY:   1.  Insulin-dependent type 2 diabetes, uncontrolled.  A1c 9.3.   2.  Hypertension.   3.  Dyslipidemia.      PRIOR TO ADMISSION MEDICATIONS:   Prior to Admission medications    Medication Sig Last Dose Taking? Auth Provider   ACCU-CHEK JAYNA PLUS test strip TEST BLOOD SUGARS 2 TO 3 TIMES DAILY OR AS DIRECTED   Kody Wing MD   amoxicillin-clavulanate (AUGMENTIN) 875-125 MG tablet Take 1 tablet  by mouth 2 times daily   Ry Hilliard MD   aspirin 81 MG chewable tablet Take 1 tablet (81 mg) by mouth daily   Kody Wing MD   blood glucose (NO BRAND SPECIFIED) lancets standard Use to test blood sugar 2-3 times daily or as directed.   Kody Wing MD   blood glucose monitoring (NO BRAND SPECIFIED) meter device kit Use to test blood sugar 2-3 times daily or as directed.   Kody Wing MD   ezetimibe (ZETIA) 10 MG tablet Take 1 tablet (10 mg) by mouth daily   Kody Wing MD   hydrochlorothiazide (HYDRODIURIL) 50 MG tablet Take 1 tablet (50 mg) by mouth daily   Kody Wing MD   insulin glargine (LANTUS PEN) 100 UNIT/ML pen Inject 58 Units Subcutaneous At Bedtime   Unknown, Entered By History   insulin pen needle (BD PEN NEEDLE MEGAN 2ND GEN) 32G X 4 MM miscellaneous Use as directed with Basaglar (1 box = 100 days)   Kody Wing MD   ipratropium (ATROVENT) 0.06 % spray Spray 2 sprays into both nostrils 4 times daily as needed for rhinitis   Kody Wing MD   lisinopril (PRINIVIL/ZESTRIL) 40 MG tablet Take 1 tablet (40 mg) by mouth daily   Kody Wing MD   metFORMIN (GLUCOPHAGE) 1000 MG tablet Take 1 tablet (1,000 mg) by mouth 2 times daily (with meals)   Kody Wing MD   order for DME Equipment being ordered: Wheelchair Treatment Diagnosis: Diabetic Foot wound with L hallux amputation 2/2 gangrene   Ry Hilliard MD   oxyCODONE (ROXICODONE) 5 MG tablet Take 1 tablet (5 mg) by mouth every 6 hours as needed for moderate to severe pain   Ry Hilliard MD   STATIN NOT PRESCRIBED (INTENTIONAL) Please choose reason not prescribed, below   Kody Wing MD   TRADJENTA 5 MG TABS tablet TAKE ONE TABLET BY MOUTH EVERY DAY   Kody Wing MD          ALLERGIES:  PRAVASTATIN.      PAST SURGICAL HISTORY:  Reviewed in Epic.      FAMILY HISTORY:  Father had diabetes and lung cancer.      SOCIAL HISTORY:  He is a previous smoker, smoked half pack per  day for 20 years.  Drinks alcohol on occasion.  Works in [employer].      REVIEW OF SYSTEMS:  A 10-point review of systems was completed.  Pertinent positives are noted in HPI, all other systems negative.      PHYSICAL EXAMINATION:   GENERAL:  Ry Horner is a well-developed, well-nourished 54-year-old male who is lying comfortably in bed.   VITAL SIGNS:  Blood pressure is 140/90, pulse 101, temperature 98.9.   HEENT:  Normocephalic and atraumatic.  Eyes:  Pupils equal, round, reactive to light.   NECK:  Supple, no adenopathy.   CARDIOVASCULAR:  Regular rate and rhythm, no murmurs.   PULMONARY:  Normal effort.  Lungs are clear.     ABDOMEN:  Soft, nontender.   EXTREMITIES:  Status post right BKA.  Left lower extremity:  Dorsalis pedis pulses palpated.  Status post left hallux amputation.  A large open wound with visualized bone.  Mild erythema.   NEUROLOGIC:  Alert and oriented.  Cranial nerves II through XII grossly intact.      LABORATORY DATA:  BMP and CBC are pending.      ASSESSMENT:  Ry Horner is a 54-year-old male with past medical history of osteomyelitis with recent hospitalization for left gas gangrene, status post left hallux amputation on 11/19/2019 who presents from Podiatry Clinic for wound dehiscence and concern for ongoing infection.   1.  Status post left hallux amputation 11/19/2019 with wound dehiscence.  The patient directly admitted from Podiatry Clinic.  Will admit under inpatient status.  CBC and BMP are pending.  We will initiate IV Zosyn.  An MRI will be obtained.  Podiatry will be consulted with likely surgical intervention tomorrow.  Pain control will be provided with Tylenol, oxycodone, and IV Dilaudid.   2.  Uncontrolled insulin-dependent type 2 diabetes.  A1c is 9.3.  Prior to admission regimen includes Lantus 58 units at bedtime, Metformin 1000 mg b.i.d., and Tradjenta 5 mg daily.  Hold metformin and Tradjenta.  Reduce Lantus to 40 units this evening.  Sliding scale insulin  with meals and at bedtime.   3.  Hypertension.  Prior to admission regimen includes hydrochlorothiazide 50 mg daily and lisinopril 40 mg daily.  Likely planned for surgical intervention tomorrow.  He already took his medications today.  We will hold both of these medications for now.  Hydralazine will be available as needed.   4.  Dyslipidemia, not on statin due to intolerance.   5.  Deep venous thrombosis prophylaxis.  PCDs.      CODE STATUS:  Full code.      The patient was discussed with Dr. Currie of the Hospitalist Service, who independently interviewed and examined the patient.  She is in agreement with the above plan.         JANIS CURRIE MD       As dictated by DANIELLE BACON PA-C            D: 2019   T: 2019   MT: TUYET      Name:     YNES YBARRA   MRN:      [id number]        Account:      [id number]   :      1965        Admitted:     2019                   Document: [id number]       cc: Kody Wing MD

## 2019-12-04 ENCOUNTER — HOME INFUSION (PRE-WILLOW HOME INFUSION) (OUTPATIENT)
Dept: PHARMACY | Facility: CLINIC | Age: 54
End: 2019-12-04

## 2019-12-04 ENCOUNTER — ANESTHESIA (OUTPATIENT)
Dept: SURGERY | Facility: CLINIC | Age: 54
DRG: 465 | End: 2019-12-04
Payer: COMMERCIAL

## 2019-12-04 ENCOUNTER — ANESTHESIA EVENT (OUTPATIENT)
Dept: SURGERY | Facility: CLINIC | Age: 54
DRG: 465 | End: 2019-12-04
Payer: COMMERCIAL

## 2019-12-04 ENCOUNTER — APPOINTMENT (OUTPATIENT)
Dept: GENERAL RADIOLOGY | Facility: CLINIC | Age: 54
DRG: 465 | End: 2019-12-04
Attending: PODIATRIST
Payer: COMMERCIAL

## 2019-12-04 LAB
GLUCOSE BLDC GLUCOMTR-MCNC: 120 MG/DL (ref 70–99)
GLUCOSE BLDC GLUCOMTR-MCNC: 143 MG/DL (ref 70–99)
GLUCOSE BLDC GLUCOMTR-MCNC: 174 MG/DL (ref 70–99)
GLUCOSE BLDC GLUCOMTR-MCNC: 188 MG/DL (ref 70–99)
GLUCOSE BLDC GLUCOMTR-MCNC: 209 MG/DL (ref 70–99)
GLUCOSE BLDC GLUCOMTR-MCNC: 334 MG/DL (ref 70–99)

## 2019-12-04 PROCEDURE — 88305 TISSUE EXAM BY PATHOLOGIST: CPT | Performed by: PODIATRIST

## 2019-12-04 PROCEDURE — 40000277 XR SURGERY CARM FLUORO LESS THAN 5 MIN W STILLS

## 2019-12-04 PROCEDURE — 11043 DBRDMT MUSC&/FSCA 1ST 20/<: CPT | Mod: 51 | Performed by: PODIATRIST

## 2019-12-04 PROCEDURE — 20240 BONE BIOPSY OPEN SUPERFICIAL: CPT | Mod: 59 | Performed by: PODIATRIST

## 2019-12-04 PROCEDURE — 12000000 ZZH R&B MED SURG/OB

## 2019-12-04 PROCEDURE — 36000067 ZZH SURGERY LEVEL 5 1ST 30 MIN: Performed by: PODIATRIST

## 2019-12-04 PROCEDURE — 25000131 ZZH RX MED GY IP 250 OP 636 PS 637: Performed by: PHYSICIAN ASSISTANT

## 2019-12-04 PROCEDURE — 25800030 ZZH RX IP 258 OP 636: Performed by: PODIATRIST

## 2019-12-04 PROCEDURE — 25000125 ZZHC RX 250: Performed by: NURSE ANESTHETIST, CERTIFIED REGISTERED

## 2019-12-04 PROCEDURE — 25800030 ZZH RX IP 258 OP 636: Performed by: ANESTHESIOLOGY

## 2019-12-04 PROCEDURE — 28315 REMOVAL OF SESAMOID BONE: CPT | Mod: 51 | Performed by: PODIATRIST

## 2019-12-04 PROCEDURE — 0JBR0ZZ EXCISION OF LEFT FOOT SUBCUTANEOUS TISSUE AND FASCIA, OPEN APPROACH: ICD-10-PCS | Performed by: PODIATRIST

## 2019-12-04 PROCEDURE — 25000128 H RX IP 250 OP 636: Performed by: PHYSICIAN ASSISTANT

## 2019-12-04 PROCEDURE — 87075 CULTR BACTERIA EXCEPT BLOOD: CPT | Performed by: PODIATRIST

## 2019-12-04 PROCEDURE — 0QBR0ZX EXCISION OF LEFT TOE PHALANX, OPEN APPROACH, DIAGNOSTIC: ICD-10-PCS | Performed by: PODIATRIST

## 2019-12-04 PROCEDURE — 87070 CULTURE OTHR SPECIMN AEROBIC: CPT | Performed by: PODIATRIST

## 2019-12-04 PROCEDURE — 88311 DECALCIFY TISSUE: CPT | Performed by: PODIATRIST

## 2019-12-04 PROCEDURE — 28122 PARTIAL REMOVAL OF FOOT BONE: CPT | Mod: 78 | Performed by: PODIATRIST

## 2019-12-04 PROCEDURE — 25000128 H RX IP 250 OP 636: Performed by: NURSE ANESTHETIST, CERTIFIED REGISTERED

## 2019-12-04 PROCEDURE — 37000009 ZZH ANESTHESIA TECHNICAL FEE, EACH ADDTL 15 MIN: Performed by: PODIATRIST

## 2019-12-04 PROCEDURE — 88311 DECALCIFY TISSUE: CPT | Mod: 26 | Performed by: PODIATRIST

## 2019-12-04 PROCEDURE — 0Y6N0Z9 DETACHMENT AT LEFT FOOT, PARTIAL 1ST RAY, OPEN APPROACH: ICD-10-PCS | Performed by: PODIATRIST

## 2019-12-04 PROCEDURE — 25000125 ZZHC RX 250: Performed by: PODIATRIST

## 2019-12-04 PROCEDURE — 87205 SMEAR GRAM STAIN: CPT | Performed by: PODIATRIST

## 2019-12-04 PROCEDURE — 11046 DBRDMT MUSC&/FSCA EA ADDL: CPT | Mod: 59 | Performed by: PODIATRIST

## 2019-12-04 PROCEDURE — 40000169 ZZH STATISTIC PRE-PROCEDURE ASSESSMENT I: Performed by: PODIATRIST

## 2019-12-04 PROCEDURE — 25000132 ZZH RX MED GY IP 250 OP 250 PS 637: Performed by: PHYSICIAN ASSISTANT

## 2019-12-04 PROCEDURE — 00000146 ZZHCL STATISTIC GLUCOSE BY METER IP

## 2019-12-04 PROCEDURE — 25000128 H RX IP 250 OP 636: Performed by: PODIATRIST

## 2019-12-04 PROCEDURE — 36000069 ZZH SURGERY LEVEL 5 EA 15 ADDTL MIN: Performed by: PODIATRIST

## 2019-12-04 PROCEDURE — 40000985 XR FOOT PORT LT 3 VW: Mod: LT

## 2019-12-04 PROCEDURE — 25000132 ZZH RX MED GY IP 250 OP 250 PS 637: Performed by: PODIATRIST

## 2019-12-04 PROCEDURE — 25800030 ZZH RX IP 258 OP 636: Performed by: PHYSICIAN ASSISTANT

## 2019-12-04 PROCEDURE — 88305 TISSUE EXAM BY PATHOLOGIST: CPT | Mod: 26,59 | Performed by: PODIATRIST

## 2019-12-04 PROCEDURE — 37000008 ZZH ANESTHESIA TECHNICAL FEE, 1ST 30 MIN: Performed by: PODIATRIST

## 2019-12-04 PROCEDURE — 71000012 ZZH RECOVERY PHASE 1 LEVEL 1 FIRST HR: Performed by: PODIATRIST

## 2019-12-04 PROCEDURE — 0QBP0ZZ EXCISION OF LEFT METATARSAL, OPEN APPROACH: ICD-10-PCS | Performed by: PODIATRIST

## 2019-12-04 PROCEDURE — 27210794 ZZH OR GENERAL SUPPLY STERILE: Performed by: PODIATRIST

## 2019-12-04 PROCEDURE — 87176 TISSUE HOMOGENIZATION CULTR: CPT | Performed by: PODIATRIST

## 2019-12-04 PROCEDURE — 27110028 ZZH OR GENERAL SUPPLY NON-STERILE: Performed by: PODIATRIST

## 2019-12-04 RX ORDER — ONDANSETRON 4 MG/1
4 TABLET, ORALLY DISINTEGRATING ORAL EVERY 30 MIN PRN
Status: DISCONTINUED | OUTPATIENT
Start: 2019-12-04 | End: 2019-12-04 | Stop reason: HOSPADM

## 2019-12-04 RX ORDER — ONDANSETRON 2 MG/ML
4 INJECTION INTRAMUSCULAR; INTRAVENOUS EVERY 30 MIN PRN
Status: DISCONTINUED | OUTPATIENT
Start: 2019-12-04 | End: 2019-12-04 | Stop reason: HOSPADM

## 2019-12-04 RX ORDER — SODIUM CHLORIDE, SODIUM LACTATE, POTASSIUM CHLORIDE, CALCIUM CHLORIDE 600; 310; 30; 20 MG/100ML; MG/100ML; MG/100ML; MG/100ML
INJECTION, SOLUTION INTRAVENOUS CONTINUOUS
Status: DISCONTINUED | OUTPATIENT
Start: 2019-12-04 | End: 2019-12-04 | Stop reason: HOSPADM

## 2019-12-04 RX ORDER — ALBUTEROL SULFATE 0.83 MG/ML
2.5 SOLUTION RESPIRATORY (INHALATION) EVERY 4 HOURS PRN
Status: DISCONTINUED | OUTPATIENT
Start: 2019-12-04 | End: 2019-12-04 | Stop reason: HOSPADM

## 2019-12-04 RX ORDER — LIDOCAINE HYDROCHLORIDE 20 MG/ML
INJECTION, SOLUTION EPIDURAL; INFILTRATION; INTRACAUDAL; PERINEURAL PRN
Status: DISCONTINUED | OUTPATIENT
Start: 2019-12-04 | End: 2019-12-04 | Stop reason: HOSPADM

## 2019-12-04 RX ORDER — ONDANSETRON 2 MG/ML
INJECTION INTRAMUSCULAR; INTRAVENOUS PRN
Status: DISCONTINUED | OUTPATIENT
Start: 2019-12-04 | End: 2019-12-04

## 2019-12-04 RX ORDER — FENTANYL CITRATE 50 UG/ML
25-50 INJECTION, SOLUTION INTRAMUSCULAR; INTRAVENOUS
Status: DISCONTINUED | OUTPATIENT
Start: 2019-12-04 | End: 2019-12-04 | Stop reason: HOSPADM

## 2019-12-04 RX ORDER — PROPOFOL 10 MG/ML
INJECTION, EMULSION INTRAVENOUS CONTINUOUS PRN
Status: DISCONTINUED | OUTPATIENT
Start: 2019-12-04 | End: 2019-12-04

## 2019-12-04 RX ORDER — LIDOCAINE HYDROCHLORIDE 20 MG/ML
INJECTION, SOLUTION INFILTRATION; PERINEURAL PRN
Status: DISCONTINUED | OUTPATIENT
Start: 2019-12-04 | End: 2019-12-04

## 2019-12-04 RX ORDER — BUPIVACAINE HYDROCHLORIDE 5 MG/ML
INJECTION, SOLUTION EPIDURAL; INTRACAUDAL PRN
Status: DISCONTINUED | OUTPATIENT
Start: 2019-12-04 | End: 2019-12-04 | Stop reason: HOSPADM

## 2019-12-04 RX ORDER — MEPERIDINE HYDROCHLORIDE 25 MG/ML
12.5 INJECTION INTRAMUSCULAR; INTRAVENOUS; SUBCUTANEOUS EVERY 5 MIN PRN
Status: DISCONTINUED | OUTPATIENT
Start: 2019-12-04 | End: 2019-12-04 | Stop reason: HOSPADM

## 2019-12-04 RX ORDER — FENTANYL CITRATE 50 UG/ML
INJECTION, SOLUTION INTRAMUSCULAR; INTRAVENOUS PRN
Status: DISCONTINUED | OUTPATIENT
Start: 2019-12-04 | End: 2019-12-04

## 2019-12-04 RX ORDER — HYDROMORPHONE HYDROCHLORIDE 1 MG/ML
.3-.5 INJECTION, SOLUTION INTRAMUSCULAR; INTRAVENOUS; SUBCUTANEOUS EVERY 5 MIN PRN
Status: DISCONTINUED | OUTPATIENT
Start: 2019-12-04 | End: 2019-12-04 | Stop reason: HOSPADM

## 2019-12-04 RX ORDER — NALOXONE HYDROCHLORIDE 0.4 MG/ML
.1-.4 INJECTION, SOLUTION INTRAMUSCULAR; INTRAVENOUS; SUBCUTANEOUS
Status: ACTIVE | OUTPATIENT
Start: 2019-12-04 | End: 2019-12-05

## 2019-12-04 RX ADMIN — FENTANYL CITRATE 50 MCG: 50 INJECTION, SOLUTION INTRAMUSCULAR; INTRAVENOUS at 15:17

## 2019-12-04 RX ADMIN — OXYCODONE HYDROCHLORIDE 5 MG: 5 TABLET ORAL at 05:59

## 2019-12-04 RX ADMIN — SODIUM CHLORIDE: 9 INJECTION, SOLUTION INTRAVENOUS at 17:48

## 2019-12-04 RX ADMIN — ONDANSETRON 4 MG: 2 INJECTION INTRAMUSCULAR; INTRAVENOUS at 15:26

## 2019-12-04 RX ADMIN — PIPERACILLIN AND TAZOBACTAM 3.38 G: 3; .375 INJECTION, POWDER, FOR SOLUTION INTRAVENOUS at 18:57

## 2019-12-04 RX ADMIN — OXYCODONE HYDROCHLORIDE 5 MG: 5 TABLET ORAL at 11:58

## 2019-12-04 RX ADMIN — LIDOCAINE HYDROCHLORIDE 60 MG: 20 INJECTION, SOLUTION INFILTRATION; PERINEURAL at 15:20

## 2019-12-04 RX ADMIN — PIPERACILLIN AND TAZOBACTAM 3.38 G: 3; .375 INJECTION, POWDER, FOR SOLUTION INTRAVENOUS at 05:52

## 2019-12-04 RX ADMIN — OXYCODONE HYDROCHLORIDE 10 MG: 5 TABLET ORAL at 21:18

## 2019-12-04 RX ADMIN — PIPERACILLIN AND TAZOBACTAM 3.38 G: 3; .375 INJECTION, POWDER, FOR SOLUTION INTRAVENOUS at 11:58

## 2019-12-04 RX ADMIN — PIPERACILLIN AND TAZOBACTAM 3.38 G: 3; .375 INJECTION, POWDER, FOR SOLUTION INTRAVENOUS at 23:39

## 2019-12-04 RX ADMIN — ACETAMINOPHEN 650 MG: 325 TABLET, FILM COATED ORAL at 23:46

## 2019-12-04 RX ADMIN — PROPOFOL 100 MCG/KG/MIN: 10 INJECTION, EMULSION INTRAVENOUS at 15:20

## 2019-12-04 RX ADMIN — INSULIN ASPART 1 UNITS: 100 INJECTION, SOLUTION INTRAVENOUS; SUBCUTANEOUS at 08:02

## 2019-12-04 RX ADMIN — SODIUM CHLORIDE, POTASSIUM CHLORIDE, SODIUM LACTATE AND CALCIUM CHLORIDE: 600; 310; 30; 20 INJECTION, SOLUTION INTRAVENOUS at 14:38

## 2019-12-04 RX ADMIN — SODIUM CHLORIDE: 9 INJECTION, SOLUTION INTRAVENOUS at 05:52

## 2019-12-04 RX ADMIN — EZETIMIBE 10 MG: 10 TABLET ORAL at 08:03

## 2019-12-04 RX ADMIN — INSULIN GLARGINE 18 UNITS: 100 INJECTION, SOLUTION SUBCUTANEOUS at 23:37

## 2019-12-04 RX ADMIN — FENTANYL CITRATE 50 MCG: 50 INJECTION, SOLUTION INTRAMUSCULAR; INTRAVENOUS at 15:35

## 2019-12-04 RX ADMIN — INSULIN GLARGINE 40 UNITS: 100 INJECTION, SOLUTION SUBCUTANEOUS at 21:20

## 2019-12-04 RX ADMIN — MIDAZOLAM 2 MG: 1 INJECTION INTRAMUSCULAR; INTRAVENOUS at 15:17

## 2019-12-04 RX ADMIN — OXYCODONE HYDROCHLORIDE 10 MG: 5 TABLET ORAL at 00:04

## 2019-12-04 ASSESSMENT — ACTIVITIES OF DAILY LIVING (ADL)
ADLS_ACUITY_SCORE: 15

## 2019-12-04 ASSESSMENT — LIFESTYLE VARIABLES: TOBACCO_USE: 1

## 2019-12-04 NOTE — PROGRESS NOTES
[facility]  Patient is currently open to home care services with [facility]. The patient is currently receiving Skilled Nursing/WOCN services. [facility]  and team have been notified of patient admission. [facility] liaison will continue to follow patient during stay. If appropriate provide orders to resume home care at time of discharge.

## 2019-12-04 NOTE — OP NOTE
Procedure Date: 12/04/2019      SURGEON:  Tim Douglas DPM.      PREOPERATIVE DIAGNOSES:   1.  Necrotic wound, left foot.   2.  Osteomyelitis, left first metatarsal.   3.  Possible osteomyelitis, left second toe proximal phalanx.      POSTOPERATIVE DIAGNOSES:   1.  Necrotic wound, left foot.   2.  Osteomyelitis, left first metatarsal.   3.  Possible osteomyelitis, left second toe proximal phalanx.      PROCEDURES:   1.  Excisional debridement of an area greater than 20 square cm down to and including the fat layer and deep fascia.   2.  Excision of the sesamoids.   3.  Partial amputation of the left first metatarsal.   4.  Bone biopsy, proximal phalanx, left second toe.      ANESTHESIA:  MAC with local.      HEMOSTASIS:  None.      ESTIMATED BLOOD LOSS:  5 mL.      MATERIALS:  Nonabsorbable suture material.      INJECTABLES:  0.5% Marcaine plain injected preoperatively.      COMPLICATIONS:  None apparent.      INTRAOPERATIVE FINDINGS:  The open wound had edematous and necrotic tissue including skin and deep fat.  The bone at the level of the left first metatarsal amputation appeared healthy.  The large open wound was noted to be very close to the region of the left second metatarsophalangeal joint; however, I did not see any exposed bone at the joint.     INDICATIONS FOR SURGERY:  Ry Horner is a 54-year-old male with insulin-dependent type 2 diabetes with a history of osteomyelitis requiring right below-knee amputation and more recent osteomyelitis of the left hallux.  This toe was amputated on 11/17/2019.  He went on to develop wound dehiscence.  Negative pressure therapy/wound VAC therapy was attempted and ultimately not successful.  The left first metatarsal head became fully exposed.  He was evaluated by Dr. Pena yesterday in clinic.  There was concern for a deep infection including osteomyelitis.  Hospital admission was recommended.      Dr. Pena, Dr. Randhawa and I have discussed surgical  intervention with Mr. Horner.  His wife was present prior to surgery today.  I explained that our goal is to treat the infection.  The situation is complicated, given his right-sided below-knee amputation and now complications/nonhealing after a hallux amputation on the left.  We had a thorough discussion and ultimately decided on the above listed procedures.  No guarantees were given.  I could not guarantee that his wound will be ultimately closeable.  He might need ongoing wound VAC therapy.  He might need additional amputation.  Mr. Horner stated understanding and opted to proceed with the operation.      DESCRIPTION OF PROCEDURE:  Ry Horner was transported to the operating room and placed supine on the operating table.  IV sedation was initiated.  A timeout was called.  Local anesthetic was injected in the left foot.  The left foot was then prepped and draped in the normal aseptic fashion.  A second timeout was called for the planned procedures.      PROCEDURE #1:  Excisional debridement down to and including the fat layer and deep fascia was performed at the area of wound dehiscence in the region of the first metatarsal head.  Skin around the wound was excised back to healthy bleeding tissue.  Deeper necrotic tissues were excised.  Methodical excisional debridement was carried out until the majority of the soft tissues appeared vascular/viable.  I did not encounter any purulence or Infectious tenosynovitis.  Excisional debridement involved an area of greater than 20 square cm.  The resulting wound measured 4 cm x 6 cm x 2 cm in depth.  The wound was irrigated with a copious amount of normal sterile saline.      PROCEDURE #2:  An incision was made along the lateral aspect of the foot extending proximally from the open wound.  This allowed adequate exposure of the distal first metatarsal.  Subperiosteal reflection was performed.  A sagittal saw was then used to perform a partial amputation of the left first  metatarsal just beyond the mid shaft level.  A second small wafer of bone was then taken and sent to pathology, labeled proximal margin.  This is to rule out osteomyelitis at that level.  Bone was harvested from the left first metatarsal head and sent for aerobic and anaerobic culture.  This area was irrigated with a copious amount of normal sterile saline.  Closure was performed with several loose retention sutures.      PROCEDURE #3:  After partial amputation of the left first metatarsal, the sesamoid apparatus remained and has no function.  Therefore, it was indicated to remove these bones as is typically done with a first metatarsal amputation.  A #15 scalpel was used to excise and remove the sesamoid bones.  The area was irrigated with a copious amount of normal sterile saline.  Additional retention sutures were placed to provide temporary loose closure of the medial forefoot.      PROCEDURE #4:  Next the foot was reprepped with Betadine.  It was allowed to dry.  A large piece of Tegaderm was then used to cover the region of the amputation.  Donning fresh instrumentation, an incision was made at the base of the left second toe.  Blunt dissection was used to dissect down to bone.  An 11 gauge LeeLock bone trephine was then used to harvest a dowel of bone from the base of the proximal phalanx.  The wound was irrigated with a copious amount of normal sterile saline.  Skin was reapproximated with 3-0 nylon.      A well-padded compressive dressing was placed.  Mr. Horner tolerated the anesthesia and procedure well.  He was transported to the postanesthesia care unit in stable condition.      Specimens collected today included bone, aerobic and anaerobic cultures from the left first metatarsal head, proximal margin bone from the first metatarsal, and bone from the proximal phalanx of the left second toe.  The latter was sent for histological analysis to evaluate for osteomyelitis.         EDUARDA FUENTES DPM              D: 2019   T: 2019   MT: ANDRES      Name:     YNES YBARRA   MRN:      [id number]        Account:        [id number]   :      1965           Procedure Date: 2019      Document: [id number]

## 2019-12-04 NOTE — PROGRESS NOTES
Therapy: iv abx  Insurance: health partners   Ded: $4300  Met: $4300    Co-Insurance: 0  Max Out of Pocket: $7350  Met: $7350    Please contact Intake with any questions, [phone] or In Basket pool, [facility] ([phone]).  In reference to admission date 12/03/2019 to check iv abx coverage

## 2019-12-04 NOTE — ANESTHESIA CARE TRANSFER NOTE
Patient: Ry Horner    Procedure(s):  LEFT PARTIAL FOOT AMPUTATION  EXCISIONAL DEBRIDEMENT LEFT FOOT  BONE BIOPSY LEFT SECOND TOE    Diagnosis: * No pre-op diagnosis entered *  Diagnosis Additional Information: No value filed.    Anesthesia Type:   MAC     Note:  Airway :Room Air  Patient transferred to:PACU  Comments: To recovery, VSSHandoff Report: Identifed the Patient, Identified the Reponsible Provider, Reviewed the pertinent medical history, Discussed the surgical course, Reviewed Intra-OP anesthesia mangement and issues during anesthesia, Set expectations for post-procedure period and Allowed opportunity for questions and acknowledgement of understanding      Vitals: (Last set prior to Anesthesia Care Transfer)    CRNA VITALS  12/4/2019 1556 - 12/4/2019 1630      12/4/2019             Resp Rate (set):  10                Electronically Signed By: LILIANA Mccrary CRNA  December 4, 2019  4:30 PM

## 2019-12-04 NOTE — CONSULTS
Anticipate patient may need IV Antibiotics at discharge. Benefits checked with [facility]. See benefits below:    The patient is covered at 100% for IV ABX. He has met the $4,300 deductible and the $7,350 out of pocket max.   Thank You,   Bao Hurd  Intake   [facility]

## 2019-12-04 NOTE — PROGRESS NOTES
[facility]    Received referral for IV antibiotics-currently on Zosyn.  Benefits verified, covered at 100%.  Will meet with patient to introduce home infusion services, review benefits and offer choice of providers.      Thank you for the referral.    Bobbi Templeton, MILLYN, [facility]  [phone]

## 2019-12-04 NOTE — BRIEF OP NOTE
[facility] Brief Operative Note    Pre-operative diagnosis: 1) necrotic wound, left foot  2) osteomyelitis, left first metatarsal  3) possible osteomyelitis, left 2nd toe, proximal phalanx.   Post-operative diagnosis same   Procedure: Procedure(s):  LEFT PARTIAL FOOT AMPUTATION  EXCISIONAL DEBRIDEMENT LEFT FOOT  BONE BIOPSY LEFT SECOND TOE   Surgeon(s): Surgeon(s) and Role:     * Tim Douglas DPM - Primary   Estimated blood loss: 10 mL    Specimens: ID Type Source Tests Collected by Time Destination   2 : LEFT 1ST METATARSAL HEAD  Tissue Foot, Left ANAEROBIC BACTERIAL CULTURE Tim Douglas DPM 12/4/2019  4:19 PM    3 : LEFT 1ST METATARSAL HEAD Tissue Foot, Left TISSUE CULTURE AEROBIC BACTERIAL Tim Douglas DPM 12/4/2019  4:21 PM    A : LEFT 1ST PROXIMAL MARGIN  Tissue Foot, Left SURGICAL PATHOLOGY EXAM Tim Douglas DPM 12/4/2019  3:20 PM    B : LEFT 2ND TOE BONE  Tissue Foot, Left GRAM STAIN, SURGICAL PATHOLOGY EXAM Tim Douglas DPM 12/4/2019  3:23 PM       Findings:              Plan: At the location of the open wound, there was edematous and necrotic tissue. Post excisional debridement, the majority of the wound appear vascular/ viable. The wound is very close toe the 2nd metatarsophalangeal joint, yet I did not find exposed bone.  At the level of the first metatarsal amputation, the bone appears healthy.  No purulence found.    Continue IV Zosyn  Await bone biopsy and bone cultures results  Definitive surgery once results available. This might involve additional first metatarsal resection, delayed primary closure and 2nd toe amputation.   Will continue to follow.    Tim Douglas DPM, SHANIAFAS, MS    [facility] Department of Podiatry/Foot & Ankle Surgery

## 2019-12-04 NOTE — PLAN OF CARE
Pt A/O x4. Dressing has scant drainage. VSS on RA besides being slighty tachy at times. Up A1 ww and R prosthesis; Voiding adequately in urinal. Taking oxy 10 mg for pain minimally. MRI done late last night. Pt to be NPO by 10:30 today for LLE surgical intervention.

## 2019-12-04 NOTE — PROGRESS NOTES
hospitalist note :  I was unable to see the pt on rounds today as the pt is in the OR. Discussed with RN to call me with concerns. I will see the pt in the morning.    Jessica Kat MD.  Hospitalist [phone] (7am -6 pm)

## 2019-12-04 NOTE — ANESTHESIA POSTPROCEDURE EVALUATION
Patient: Ry Horner    Procedure(s):  LEFT PARTIAL FOOT AMPUTATION  EXCISIONAL DEBRIDEMENT LEFT FOOT  BONE BIOPSY LEFT SECOND TOE    Diagnosis:* No pre-op diagnosis entered *  Diagnosis Additional Information: No value filed.    Anesthesia Type:  MAC    Note:  Anesthesia Post Evaluation    Patient location during evaluation: PACU  Patient participation: Able to fully participate in evaluation  Level of consciousness: awake and alert  Pain management: adequate  Airway patency: patent  Cardiovascular status: acceptable  Respiratory status: acceptable and unassisted  Hydration status: acceptable  PONV: none             Last vitals:  Vitals:    12/04/19 1428 12/04/19 1628 12/04/19 1630   BP: 125/88 99/66 98/61   Pulse:  91 92   Resp: 17 17 21   Temp: 36.2  C (97.2  F) 36.6  C (97.8  F) 36.6  C (97.9  F)   SpO2: 97% 97% 93%         Electronically Signed By: Marnie Blackmon MD  December 4, 2019  4:34 PM

## 2019-12-04 NOTE — ANESTHESIA PREPROCEDURE EVALUATION
Anesthesia Pre-Procedure Evaluation    Patient: Ry Horner   MRN: [id number] : 1965          Preoperative Diagnosis: * No pre-op diagnosis entered *    Procedure(s):  LEFT PARTIAL FOOT AMPUTATION    Past Medical History:   Diagnosis Date     BENIGN HYPERTENSION 2007     DIABETES MELLITUS TYPE II-UNCOMPL 2007     HYPERLIPIDEMIA NEC/NOS 2007     Tobacco use disorder 2007     Past Surgical History:   Procedure Laterality Date     AMPUTATE FOOT Left 2019    Procedure: LEFT PARTIAL FOOT AMPUTATION;  Surgeon: Antoine Pena DPM;  Location:  OR     AMPUTATE LEG BELOW KNEE Right 2017    Procedure: AMPUTATE LEG BELOW KNEE;  RIGHT BELOW KNEE AMPUTATION ;  Surgeon: Nikolas Nascimento MD;  Location:  OR     APPENDECTOMY       APPLY WOUND VAC Right 3/2/2015    Procedure: APPLY WOUND VAC;  Surgeon: Milnea Cevallos DPM, Pod;  Location: [facility] OR     BIOPSY BONE FOOT Right 7/15/2016    Procedure: BIOPSY BONE FOOT;  Surgeon: Tim Douglas DPM;  Location:  OR     IRRIGATION AND DEBRIDEMENT FOOT, COMBINED Right 3/2/2015    Procedure: COMBINED IRRIGATION AND DEBRIDEMENT FOOT;  Surgeon: Milena Cevallos DPM, Pod;  Location: [facility] OR     IRRIGATION AND DEBRIDEMENT FOOT, COMBINED Right 7/15/2016    Procedure: COMBINED IRRIGATION AND DEBRIDEMENT FOOT;  Surgeon: Tim Douglas DPM;  Location:  OR     IRRIGATION AND DEBRIDEMENT FOOT, COMBINED Right 2016    Procedure: COMBINED IRRIGATION AND DEBRIDEMENT FOOT;  Surgeon: Tim Douglas DPM;  Location:  OR     IRRIGATION AND DEBRIDEMENT FOOT, COMBINED Left 2019    Procedure: REVISIONAL IRRIGATION AND DEBRIDEMENT LEFT FOOT AND BONE DEBRIDEMENT;  Surgeon: Joseph Randhawa DPM;  Location:  OR     ORTHOPEDIC SURGERY         Anesthesia Evaluation     . Pt has had prior anesthetic. Type: General (Bhandari 2 = grade 1 view)    No history of anesthetic complications          ROS/MED HX    ENT/Pulmonary:     (+)tobacco use,  Past use , . .   (-) sleep apnea   Neurologic:  - neg neurologic ROS     Cardiovascular: Comment: S/p R BKA  S/p left hallux amputation, now with would dehiscence and osteomyelitis    (+) Dyslipidemia, hypertension----. : . . . :. . Previous cardiac testing date:results:date: results:ECG reviewed date:11/2019 results:ST at 106 bpm date: results:          METS/Exercise Tolerance:  >4 METS   Hematologic:  - neg hematologic  ROS       Musculoskeletal:         GI/Hepatic:  - neg GI/hepatic ROS      (-) GERD   Renal/Genitourinary:  - ROS Renal section negative       Endo: Comment: BMI 35    (+) type II DM Using insulin Obesity, .   (-) Type I DM   Psychiatric:         Infectious Disease:   (+) Other Infectious Disease Osteomyelitis      Malignancy:         Other:                          Physical Exam      Airway   Mallampati: II  TM distance: >3 FB  Neck ROM: full    Dental   (+) missing    Cardiovascular   Rhythm and rate: regular      Pulmonary    breath sounds clear to auscultation            Lab Results   Component Value Date    WBC 12.4 (H) 12/03/2019    HGB 11.1 (L) 12/03/2019    HCT 33.6 (L) 12/03/2019     (H) 12/03/2019    CRP 31.6 (H) 12/03/2019    SED 88 (H) 12/03/2019     12/03/2019    POTASSIUM 4.2 12/03/2019    CHLORIDE 103 12/03/2019    CO2 27 12/03/2019    BUN 28 12/03/2019    CR 1.06 12/03/2019     (H) 12/03/2019    FERNANDO 8.9 12/03/2019    ALBUMIN 2.8 (L) 12/03/2019    PROTTOTAL 8.5 12/03/2019    ALT 40 12/03/2019    AST 15 12/03/2019    ALKPHOS 95 12/03/2019    BILITOTAL 0.3 12/03/2019    TSH 1.06 06/02/2017       Preop Vitals  BP Readings from Last 3 Encounters:   12/04/19 114/80   12/03/19 (!) 138/91   11/25/19 125/81    Pulse Readings from Last 3 Encounters:   12/04/19 98   12/03/19 102   11/25/19 101      Resp Readings from Last 3 Encounters:   12/04/19 16   11/22/19 18   09/14/17 18    SpO2 Readings from Last 3 Encounters:   12/04/19 96%   11/25/19 98%   11/22/19 96%      Temp  Readings from Last 1 Encounters:   12/04/19 37.2  C (99  F) (Oral)    Ht Readings from Last 1 Encounters:   12/03/19 1.829 m (6')      Wt Readings from Last 1 Encounters:   12/03/19 116.6 kg (257 lb)    Estimated body mass index is 34.86 kg/m  as calculated from the following:    Height as of an earlier encounter on 12/3/19: 1.829 m (6').    Weight as of an earlier encounter on 12/3/19: 116.6 kg (257 lb).       Anesthesia Plan      History & Physical Review  History and physical reviewed and following examination; no interval change.    ASA Status:  3 .        Plan for MAC   PONV prophylaxis:  Ondansetron (or other 5HT-3)       Postoperative Care  Postoperative pain management:  Multi-modal analgesia.      Consents  Anesthetic plan, risks, benefits and alternatives discussed with:  Patient..                 Manpreet Willis MD

## 2019-12-04 NOTE — PLAN OF CARE
Pt A&Ox4, VSS on RA, Pain on LLE but refused pain meds at this time, blood sugar checked and not able to give insulin before dinner that was brought by partner because pt had already eaten even after asking to call. Dressing changed by MD. Baseline numbness LLE and BKA on RLE. Continue to monitor.

## 2019-12-05 LAB
GLUCOSE BLDC GLUCOMTR-MCNC: 168 MG/DL (ref 70–99)
GLUCOSE BLDC GLUCOMTR-MCNC: 170 MG/DL (ref 70–99)
GLUCOSE BLDC GLUCOMTR-MCNC: 228 MG/DL (ref 70–99)
GLUCOSE BLDC GLUCOMTR-MCNC: 258 MG/DL (ref 70–99)
GLUCOSE BLDC GLUCOMTR-MCNC: 264 MG/DL (ref 70–99)
GRAM STN SPEC: NORMAL
GRAM STN SPEC: NORMAL
SPECIMEN SOURCE: NORMAL

## 2019-12-05 PROCEDURE — 25000128 H RX IP 250 OP 636: Performed by: PODIATRIST

## 2019-12-05 PROCEDURE — 25000132 ZZH RX MED GY IP 250 OP 250 PS 637: Performed by: PODIATRIST

## 2019-12-05 PROCEDURE — 99232 SBSQ HOSP IP/OBS MODERATE 35: CPT | Performed by: INTERNAL MEDICINE

## 2019-12-05 PROCEDURE — 25000132 ZZH RX MED GY IP 250 OP 250 PS 637: Performed by: PHYSICIAN ASSISTANT

## 2019-12-05 PROCEDURE — 25800030 ZZH RX IP 258 OP 636: Performed by: PODIATRIST

## 2019-12-05 PROCEDURE — 99207 ZZC MOONLIGHTING INDICATOR: CPT | Performed by: INTERNAL MEDICINE

## 2019-12-05 PROCEDURE — 12000000 ZZH R&B MED SURG/OB

## 2019-12-05 PROCEDURE — 25000132 ZZH RX MED GY IP 250 OP 250 PS 637: Performed by: INTERNAL MEDICINE

## 2019-12-05 PROCEDURE — 25000131 ZZH RX MED GY IP 250 OP 636 PS 637: Performed by: PHYSICIAN ASSISTANT

## 2019-12-05 PROCEDURE — 00000146 ZZHCL STATISTIC GLUCOSE BY METER IP

## 2019-12-05 RX ORDER — LISINOPRIL 5 MG/1
5 TABLET ORAL DAILY
Status: DISCONTINUED | OUTPATIENT
Start: 2019-12-06 | End: 2019-12-06

## 2019-12-05 RX ADMIN — ACETAMINOPHEN 650 MG: 325 TABLET, FILM COATED ORAL at 09:13

## 2019-12-05 RX ADMIN — PIPERACILLIN AND TAZOBACTAM 3.38 G: 3; .375 INJECTION, POWDER, FOR SOLUTION INTRAVENOUS at 06:27

## 2019-12-05 RX ADMIN — PIPERACILLIN AND TAZOBACTAM 3.38 G: 3; .375 INJECTION, POWDER, FOR SOLUTION INTRAVENOUS at 18:12

## 2019-12-05 RX ADMIN — INSULIN GLARGINE 58 UNITS: 100 INJECTION, SOLUTION SUBCUTANEOUS at 22:10

## 2019-12-05 RX ADMIN — EZETIMIBE 10 MG: 10 TABLET ORAL at 09:13

## 2019-12-05 RX ADMIN — INSULIN ASPART 2 UNITS: 100 INJECTION, SOLUTION INTRAVENOUS; SUBCUTANEOUS at 18:13

## 2019-12-05 RX ADMIN — INSULIN ASPART 1 UNITS: 100 INJECTION, SOLUTION INTRAVENOUS; SUBCUTANEOUS at 13:35

## 2019-12-05 RX ADMIN — INSULIN ASPART 1 UNITS: 100 INJECTION, SOLUTION INTRAVENOUS; SUBCUTANEOUS at 08:22

## 2019-12-05 RX ADMIN — ACETAMINOPHEN 650 MG: 325 TABLET, FILM COATED ORAL at 18:16

## 2019-12-05 RX ADMIN — SODIUM CHLORIDE: 9 INJECTION, SOLUTION INTRAVENOUS at 04:34

## 2019-12-05 RX ADMIN — PIPERACILLIN AND TAZOBACTAM 3.38 G: 3; .375 INJECTION, POWDER, FOR SOLUTION INTRAVENOUS at 13:34

## 2019-12-05 RX ADMIN — LINAGLIPTIN 5 MG: 5 TABLET, FILM COATED ORAL at 18:13

## 2019-12-05 RX ADMIN — OXYCODONE HYDROCHLORIDE 10 MG: 5 TABLET ORAL at 22:11

## 2019-12-05 ASSESSMENT — ACTIVITIES OF DAILY LIVING (ADL)
ADLS_ACUITY_SCORE: 15

## 2019-12-05 NOTE — PLAN OF CARE
Pt is A & O x 4. Lungs sound clear, bowel sounds active, cms intact except for baseline numbness and swelling in left foot. Up with SBA and walker. NWB on left foot, Bedrest with BRP. BG covered per order. Received oxycodone before surgery and surgery today. IV fluid and antibiotic infusing. Will continue to monitor.

## 2019-12-05 NOTE — PROGRESS NOTES
[facility]  December 5, 2019    A/P:  55 y/o DM male s/p:    PROCEDURES:   1.  Excisional debridement of an area greater than 20 square cm down to and including the fat layer and deep fascia.   2.  Excision of the sesamoids.   3.  Partial amputation of the left first metatarsal.   4.  Bone biopsy, proximal phalanx, left second toe.     POD #1.    Discussed condition and treatment options including pros and cons.    Dressing changed.  Betadine applied to wound edges.    Foot improving.  Will await further resolution of edema/erythema.  Pt will need eventual repeat I&D with delayed primary closure.  May also need further partial amputation pending results of biopsies.    Dr. Cevallos to f/u tomorrow.    NWB L foot.    IV abx to continue.  Will consult ID.    Antoine Pena DPM, FACFAS  Pager: [phone]    S:  Pt seen at bedside.  No acute concerns.    O:  BP (!) 131/93 (BP Location: Left arm)   Pulse 92   Temp 98.7  F (37.1  C) (Oral)   Resp 16   SpO2 96%   NAD.  L foot dressing changed.  1st ray incision loosely copated, but not completely closed.  Mild maceration of wound edges.  2nd toe biopsy site stable.  Erythema and edema reducing.  No purulence.        Path pending.    All cultures:  No results for input(s): CULT in the last 168 hours.

## 2019-12-05 NOTE — PROGRESS NOTES
[facility]  Hospitalist Progress Note for 12/5/2019:          Assessment and Plan:    Ry Horner is a 54-year-old male with past medical history of osteomyelitis with recent hospitalization for left gas gangrene, status post left hallux amputation on 11/19/2019 who presents from Podiatry Clinic for wound dehiscence and concern for ongoing infection.       Necrotic L foot wound, Osteomyelitis Left metatarsal,possible osteomyelitis, left second toe proximal phalanx   S/P Excisional debridement of an area greater than 20 square cm down to and including the fat layer and deep fascia.  Excision of the sesamoids. Partial amputation of the left first metatarsal.   & Bone biopsy, proximal phalanx, left second toe on 12/4/2019:    The patient directly admitted from Podiatry Clinic 2/2  wound dehiscence of a  recent left hallux amputation 11/19/2019. wound Cultures then showed Bacteroides fragilis, treated with Augmentin.  - WBC 12.4,MRI was abn & suspicious for osteo.  -on Zosyn since admission x 12/3  - Podiatry following & pt underwent surgery on 12/4th  - surgical wound cult NTD  - post op pain controlled with Tylenol, oxycodone, and IV Dilaudid.   - further postop orders per Podiatry     Uncontrolled insulin-dependent type 2 diabetes:   A1c is 9.3.  Prior to admission regimen includes Lantus 58 units at bedtime, Metformin 1000 mg b.i.d., and Tradjenta 5 mg daily.    -on admission PTA metformin and Tradjenta held & PTA.Lantus decreased to 40 units Sliding scale insulin with meals and at bedtime.   - , 168, 170 today   - Lantus dose increased to PTA 58 U at HS , restart  Tradjenta 5 mg today & resume PTA Metformin from 12/6 Am  - continue ISS      Hypertension:    PTA regimen includes hydrochlorothiazide 50 mg daily and lisinopril 40 mg daily.held  BP creeping up, restart lower dose of Lisinopril 5 mg /day with hold parameters , continue to hold PTA hydrochlorothiazide  - has prn Hydralazine available  if needed.     Dyslipidemia:   not on statin due to intolerance.      DVT prophylaxis.  PCDs.      CODE STATUS:  Full code.      Disposition: expected discharge 3+ days.    Jessica Kat MD.  Hospitalist [phone] (7am -6 pm)             Interval History:   no new complaints, doing well; no cp, sob, n/v/d, or abd pain.  Tolerating a reg diet.              Medications:       ezetimibe  10 mg Oral Daily     insulin aspart  1-7 Units Subcutaneous TID AC     insulin aspart  1-5 Units Subcutaneous At Bedtime     insulin glargine  58 Units Subcutaneous At Bedtime     piperacillin-tazobactam  3.375 g Intravenous Q6H     sodium chloride (PF)  3 mL Intracatheter Q8H     acetaminophen, acetaminophen, glucose **OR** dextrose **OR** glucagon, hydrALAZINE, HYDROmorphone, lidocaine 4%, lidocaine (buffered or not buffered), melatonin, naloxone, naloxone, ondansetron **OR** ondansetron, oxyCODONE, prochlorperazine **OR** prochlorperazine **OR** prochlorperazine, sodium chloride (PF)               Physical Exam:   Blood pressure (!) 131/93, pulse 92, temperature 98.7  F (37.1  C), temperature source Oral, resp. rate 16, SpO2 96 %.  Wt Readings from Last 4 Encounters:   19 116.6 kg (257 lb)   19 119.7 kg (264 lb)   11/15/19 116.6 kg (257 lb)   19 120.7 kg (266 lb 1.6 oz)         Vital Sign Ranges  Temperature Temp  Av  F (36.7  C)  Min: 97.2  F (36.2  C)  Max: 98.7  F (37.1  C)   Blood pressure Systolic (24hrs), Av , Min:98 , Max:138        Diastolic (24hrs), Av, Min:58, Max:94      Pulse Pulse  Av.2  Min: 85  Max: 94   Respirations Resp  Av.4  Min: 12  Max: 23   Pulse oximetry SpO2  Av.9 %  Min: 93 %  Max: 98 %         Intake/Output Summary (Last 24 hours) at 2019 1403  Last data filed at 2019 1200  Gross per 24 hour   Intake 3420 ml   Output 2685 ml   Net 735 ml       Constitutional: Awake, alert, cooperative, no apparent distress   Lungs: Clear to auscultation  bilaterally, no crackles or wheezing   Cardiovascular: Regular rate and rhythm, normal S1 and S2, and no murmur noted   Abdomen: Normal bowel sounds, soft, non-distended, non-tender   Skin: No rashes, no cyanosis, no edema  Left foot and dressing.   Neuro:                Data:   All laboratory data reviewed

## 2019-12-05 NOTE — PROGRESS NOTES
[facility]    Referral received for [facility] provide [facility].  I met with patient at bedside today.  Introduced myself and my role to assist with   transition to home.   Offered some preliminary information about I services and offered choice of providers. Let patient know I am available M-F and would continue to follow and be available for any questions.   I brochure left with patient  as well as information on how to reach me if they have any additional questions.      Thank you for the home infusion referral.    ROSALBA Ron, EDIN  [facility]  [phone]

## 2019-12-05 NOTE — PROGRESS NOTES
.A/Ox4. Ax 1 with gait belt, walker and pt prosthetic foot. VSS on RA. CMS intact. Dressing C/D/I. Pain controlled with Tylenol and Oxycodone . Tolerating Consistent Carb Diet. Progressing per POC. Will Cont to monitor.

## 2019-12-06 LAB
GLUCOSE BLDC GLUCOMTR-MCNC: 137 MG/DL (ref 70–99)
GLUCOSE BLDC GLUCOMTR-MCNC: 146 MG/DL (ref 70–99)
GLUCOSE BLDC GLUCOMTR-MCNC: 194 MG/DL (ref 70–99)
GLUCOSE BLDC GLUCOMTR-MCNC: 214 MG/DL (ref 70–99)

## 2019-12-06 PROCEDURE — 00000146 ZZHCL STATISTIC GLUCOSE BY METER IP

## 2019-12-06 PROCEDURE — 99207 ZZC MOONLIGHTING INDICATOR: CPT | Performed by: INTERNAL MEDICINE

## 2019-12-06 PROCEDURE — 25000132 ZZH RX MED GY IP 250 OP 250 PS 637: Performed by: PODIATRIST

## 2019-12-06 PROCEDURE — 25000132 ZZH RX MED GY IP 250 OP 250 PS 637: Performed by: INTERNAL MEDICINE

## 2019-12-06 PROCEDURE — 25000128 H RX IP 250 OP 636: Performed by: PODIATRIST

## 2019-12-06 PROCEDURE — 12000000 ZZH R&B MED SURG/OB

## 2019-12-06 PROCEDURE — 25000132 ZZH RX MED GY IP 250 OP 250 PS 637: Performed by: PHYSICIAN ASSISTANT

## 2019-12-06 PROCEDURE — 25000131 ZZH RX MED GY IP 250 OP 636 PS 637: Performed by: PHYSICIAN ASSISTANT

## 2019-12-06 PROCEDURE — 99232 SBSQ HOSP IP/OBS MODERATE 35: CPT | Performed by: INTERNAL MEDICINE

## 2019-12-06 RX ORDER — HYDROCHLOROTHIAZIDE 12.5 MG/1
12.5 CAPSULE ORAL DAILY
Status: DISCONTINUED | OUTPATIENT
Start: 2019-12-06 | End: 2019-12-12 | Stop reason: HOSPADM

## 2019-12-06 RX ORDER — LISINOPRIL 40 MG/1
40 TABLET ORAL DAILY
Status: DISCONTINUED | OUTPATIENT
Start: 2019-12-07 | End: 2019-12-12 | Stop reason: HOSPADM

## 2019-12-06 RX ORDER — LISINOPRIL 5 MG/1
5 TABLET ORAL DAILY
Status: DISCONTINUED | OUTPATIENT
Start: 2019-12-06 | End: 2019-12-06

## 2019-12-06 RX ADMIN — PIPERACILLIN AND TAZOBACTAM 3.38 G: 3; .375 INJECTION, POWDER, FOR SOLUTION INTRAVENOUS at 18:11

## 2019-12-06 RX ADMIN — ACETAMINOPHEN 650 MG: 325 TABLET, FILM COATED ORAL at 12:44

## 2019-12-06 RX ADMIN — HYDROCHLOROTHIAZIDE 12.5 MG: 12.5 CAPSULE ORAL at 13:41

## 2019-12-06 RX ADMIN — INSULIN GLARGINE 58 UNITS: 100 INJECTION, SOLUTION SUBCUTANEOUS at 21:51

## 2019-12-06 RX ADMIN — EZETIMIBE 10 MG: 10 TABLET ORAL at 08:04

## 2019-12-06 RX ADMIN — PIPERACILLIN AND TAZOBACTAM 3.38 G: 3; .375 INJECTION, POWDER, FOR SOLUTION INTRAVENOUS at 01:11

## 2019-12-06 RX ADMIN — METFORMIN HYDROCHLORIDE 1000 MG: 500 TABLET, FILM COATED ORAL at 18:11

## 2019-12-06 RX ADMIN — ACETAMINOPHEN 650 MG: 325 TABLET, FILM COATED ORAL at 21:50

## 2019-12-06 RX ADMIN — PIPERACILLIN AND TAZOBACTAM 3.38 G: 3; .375 INJECTION, POWDER, FOR SOLUTION INTRAVENOUS at 05:30

## 2019-12-06 RX ADMIN — LISINOPRIL 5 MG: 5 TABLET ORAL at 08:04

## 2019-12-06 RX ADMIN — LINAGLIPTIN 5 MG: 5 TABLET, FILM COATED ORAL at 08:04

## 2019-12-06 RX ADMIN — OXYCODONE HYDROCHLORIDE 10 MG: 5 TABLET ORAL at 21:53

## 2019-12-06 RX ADMIN — LISINOPRIL 30 MG: 20 TABLET ORAL at 13:41

## 2019-12-06 RX ADMIN — METFORMIN HYDROCHLORIDE 1000 MG: 500 TABLET, FILM COATED ORAL at 13:41

## 2019-12-06 RX ADMIN — PIPERACILLIN AND TAZOBACTAM 3.38 G: 3; .375 INJECTION, POWDER, FOR SOLUTION INTRAVENOUS at 12:40

## 2019-12-06 ASSESSMENT — ACTIVITIES OF DAILY LIVING (ADL)
ADLS_ACUITY_SCORE: 15

## 2019-12-06 NOTE — PROGRESS NOTES
.A/Ox 4. Ax 1 with prosthetic leg, gait belt and walker. VSS on RA. CMS intact. Dressing C/D/I. Pain controlled with Tylenol and Oxycodone at bedtime. Tolerating Mod Carb Diet. Progressing per POC. Will Cont to monitor.

## 2019-12-06 NOTE — PROGRESS NOTES
[facility]    Infectious Disease Progress Note    Date of Service (when I saw the patient): 12/06/2019     Assessment & Plan   Ry Horner is a 54 year old male who was admitted on 12/3/2019.     Impression:     1. 54 y.o male with diabetes with peripheral polyneuropathy.  2. History of infection in the right foot leading to amputation at the knee.   3. Recently Admitted with severely infected left foot. Gas gangrene of left foot. Osteomyelitis of left great toe. S/PLeft hallux amputation with irrigation and debridement.  4. CKD.   5. Was discharged home on oral Augmentin, presenting now with his foot became macerated.  The wound VAC was discontinued on Friday.  He was seen by Podiatry Clinic with exposed bone.   6. Admitted for IV antibiotics and surgical debridement.         Recommendations:   Continue on IV zosyn covers all of the previously isolated organisms   Will follow up on the culture, path report          Franklin Maza MD    Interval History   Afebrile no new complaints     Physical Exam   Temp: 98.4  F (36.9  C) Temp src: Oral BP: (!) 140/92 Pulse: 94 Heart Rate: 94 Resp: 16 SpO2: 96 % O2 Device: None (Room air)    There were no vitals filed for this visit.  Vital Signs with Ranges  Temp:  [98.4  F (36.9  C)-98.8  F (37.1  C)] 98.4  F (36.9  C)  Pulse:  [90-94] 94  Heart Rate:  [] 94  Resp:  [16] 16  BP: (132-173)/() 140/92  SpO2:  [94 %-96 %] 96 %    Constitutional: Awake, alert, cooperative, no apparent distress  Lungs: Clear to auscultation bilaterally, no crackles or wheezing  Cardiovascular: Regular rate and rhythm, normal S1 and S2, and no murmur noted  Abdomen: Normal bowel sounds, soft, non-distended, non-tender  Skin: No rashes, no cyanosis, no edema  Other:    Medications       ezetimibe  10 mg Oral Daily     insulin aspart  1-7 Units Subcutaneous TID AC     insulin aspart  1-5 Units Subcutaneous At Bedtime     insulin glargine  58 Units Subcutaneous At Bedtime      linagliptin  5 mg Oral Daily     lisinopril  5 mg Oral Daily     piperacillin-tazobactam  3.375 g Intravenous Q6H     sodium chloride (PF)  3 mL Intracatheter Q8H       Data   All microbiology laboratory data reviewed.  Recent Labs   Lab Test 12/03/19  1606 11/22/19  0624 11/21/19  0644   WBC 12.4* 17.4* 14.0*   HGB 11.1* 9.7* 9.0*   HCT 33.6* 29.5* 27.6*   MCV 85 87 87   * 401 382     Recent Labs   Lab Test 12/03/19  1606 11/22/19  0624 11/21/19  0644   CR 1.06 1.02 1.12     Recent Labs   Lab Test 12/03/19  1727   SED 88*     Recent Labs   Lab Test 12/04/19  1619 11/19/19  1829 11/17/19  1525 11/17/19  1419 11/17/19  1411 08/28/17  1136 08/28/17  1059 08/28/17  1054 07/15/16  1521   CULT Culture negative monitoring continues  Culture negative monitoring continues Light growth  Bacteroides fragilis  *  Light growth  Parvimonas micra  *  Susceptibility testing not routinely done  On day 2, isolated in broth only:  beta hemolytic   Streptococcus constellatus  * Heavy growth  beta hemolytic   Streptococcus constellatus  Susceptibility testing done on previous specimen  *  Light growth  Alcaligenes faecalis  *  Light growth  Staphylococcus aureus  *  On day 1, isolated in broth only:  Anaerobic gram negative rods  See anaerobic report for identification  * Heavy growth  Bacteroides fragilis  Susceptibility testing not routinely done  *  Heavy growth  Peptoniphilus asaccharolyticus  Susceptibility testing not routinely done  *  Moderate growth  beta hemolytic   Streptococcus constellatus  *  On day 1, isolated in broth only:  Anaerobic gram negative rods  See anaerobic report for identification  * Heavy growth  Bacteroides fragilis  *  Heavy growth  Parvimonas micra  *  Heavy growth  Peptoniphilus asaccharolyticus  *  Heavy growth  Mixed aerobic and anaerobic rosa  *  Susceptibility testing not routinely done No growth No growth Heavy growth  Beta hemolytic Streptococcus group G  *  Moderate  growth  Proteus vulgaris  *  Moderate growth  Normal skin rosa   No anaerobes isolated  On day 1, isolated in broth only: Staphylococcus simulans This isolate is   presumed to be clindamycin resistant based on detection of inducible   clindamycin resistance. Erythromycin and clindamycin are resistant, therefore,   they are not recommended for use.  On day 1, isolated in broth only: Strain 2 Staphylococcus simulans This isolate   is presumed to be clindamycin resistant based on detection of inducible   clindamycin resistance. Erythromycin and clindamycin are resistant, therefore,   they are not recommended for use.  Strain 1 found to have inducible  Clindamycin resistance also,  results updated  *       Attestation:  Total time on the floor involved in the patient's care: 35 minutes. Total time spent in counseling/care coordination: >50%

## 2019-12-06 NOTE — PROGRESS NOTES
[facility]  Hospitalist Progress Note for 12/6/2019:          Assessment and Plan:   Ry Horner is a 54-year-old male with past medical history of osteomyelitis with recent hospitalization for left gas gangrene, status post left hallux amputation on 11/19/2019 who presents from Podiatry Clinic for wound dehiscence and concern for ongoing infection.       S/P OR Excisional debridement, Excision of the sesamoids. Partial amputation of the left first metatarsal. Bone biopsy, proximal phalanx, left second toe on 12/4/2019:  2/2  Necrotic L foot wound, Osteomyelitis Left metatarsal, possible osteomyelitis left second toe proximal phalanx.    The patient directly admitted from Podiatry Clinic 2/2  wound dehiscence of a  recent left hallux amputation 11/19/2019. wound Cultures then showed Bacteroides fragilis, treated with Augmentin.  - WBC 12.4,MRI was abn & suspicious for osteo.  - on Zosyn since admission x 12/3  -  pt underwent surgery on 12/4th  - surgical wound cult NTD, path report pending  - post op pain controlled with Tylenol, oxycodone, and IV Dilaudid.   - Podiatry & ID following      Uncontrolled insulin-dependent type 2 diabetes:   A1c is 9.3.  Prior to admission regimen includes Lantus 58 units at bedtime, Metformin 1000 mg b.i.d., and Tradjenta 5 mg daily.    -on admission PTA metformin and Tradjenta held & PTA.Lantus decreased to 40 units Sliding scale insulin with meals and at bedtime.    - on 12/5 resumed PTA Lantus dose 58 U at HS on 12/5 , restarted  Tradjenta 5 mg   - on 12/6 resumed PTA Metformin 1000 mg bid  - BS in 200's yest 137 this morning  - continue PTA medications & continue ISS       Hypertension:    PTA regimen includes hydrochlorothiazide 50 mg daily and lisinopril 40 mg daily.held  On 12/5 BP creeping up, restart lower dose of Lisinopril 5 mg /day with hold parameters , continue to hold PTA hydrochlorothiazide  - on 12/6 BP uncontrolled 162/111.Give 30 mg lisinopril &  hydrochlorothiazide 50 mg   - has prn Hydralazine availabe   - BMP in AM      Dyslipidemia:   not on statin due to intolerance.       DVT prophylaxis.  PCDs.      CODE STATUS:  Full code.      Disposition: expected discharge 3+ days.     Jessica Kat MD.  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              Medications:       ezetimibe  10 mg Oral Daily     insulin aspart  1-7 Units Subcutaneous TID AC     insulin aspart  1-5 Units Subcutaneous At Bedtime     insulin glargine  58 Units Subcutaneous At Bedtime     linagliptin  5 mg Oral Daily     lisinopril  5 mg Oral Daily     piperacillin-tazobactam  3.375 g Intravenous Q6H     sodium chloride (PF)  3 mL Intracatheter Q8H     acetaminophen, acetaminophen, glucose **OR** dextrose **OR** glucagon, hydrALAZINE, HYDROmorphone, lidocaine 4%, lidocaine (buffered or not buffered), melatonin, naloxone, ondansetron **OR** ondansetron, oxyCODONE, prochlorperazine **OR** prochlorperazine **OR** prochlorperazine, sodium chloride (PF)               Physical Exam:   Blood pressure (!) 162/111, pulse 99, temperature 98.6  F (37  C), temperature source Oral, resp. rate 16, SpO2 98 %.  Wt Readings from Last 4 Encounters:   19 116.6 kg (257 lb)   19 119.7 kg (264 lb)   11/15/19 116.6 kg (257 lb)   19 120.7 kg (266 lb 1.6 oz)         Vital Sign Ranges  Temperature Temp  Av.6  F (37  C)  Min: 98.4  F (36.9  C)  Max: 98.8  F (37.1  C)   Blood pressure Systolic (24hrs), Av , Min:132 , Max:173        Diastolic (24hrs), Av, Min:86, Max:111      Pulse Pulse  Av.5  Min: 90  Max: 99   Respirations Resp  Av  Min: 16  Max: 16   Pulse oximetry SpO2  Av %  Min: 94 %  Max: 98 %         Intake/Output Summary (Last 24 hours) at 2019 1218  Last data filed at 2019 1030  Gross per 24 hour   Intake 820 ml   Output 2050 ml   Net -1230 ml        Constitutional: Awake, alert, cooperative, no apparent distress   Lungs: Clear to auscultation bilaterally, no crackles or wheezing   Cardiovascular: Regular rate and rhythm, normal S1 and S2, and no murmur noted   Abdomen: Normal bowel sounds, soft, non-distended, non-tender   Skin: No rashes, no cyanosis, no edema  Dressing over L distal foot-appears dry.   Neuro:                Data:   All laboratory data reviewed

## 2019-12-06 NOTE — PROGRESS NOTES
Podiatry / Foot and Ankle Surgery Progress Note    December 6, 2019    Subject: Patient was seen at bedside.  No pain to foot. Notes he is feeling better.     Objective:  Vitals: BP (!) 136/92 (BP Location: Right arm)   Pulse 99   Temp 98.5  F (36.9  C) (Oral)   Resp 16   SpO2 97%   BMI= There is no height or weight on file to calculate BMI.     WBC   Date Value Ref Range Status   12/03/2019 12.4 (H) 4.0 - 11.0 10e9/L Final     A1C: 9.3 (11/2019)    General:  Patient is alert and orientated.  NAD    Vascular:  DP and PT pulses are palpable.  No varicosities noted  CFT's < 3secs.  Skin temp is normal    Neuro: sensation absent to feet.     Derm:  Dressing is c/d/i. Retention sutures intact. No redness or purulent drainage noted.     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     Imaging: left foot xray (12/4/2019) - The amputation of the first ray has been extended in the interval and is now present at the level of the proximal first metatarsal diaphysis. Small amount of postoperative air is present.     Cultures:  Currently negative    Pathology: pending    Assessment: 53 y/o DM male s/p:     PROCEDURES:   1.  Excisional debridement of an area greater than 20 square cm down to and including the fat layer and deep fascia.   2.  Excision of the sesamoids.   3.  Partial amputation of the left first metatarsal.   4.  Bone biopsy, proximal phalanx, left second toe.     Plan:     -POD#2  -dressing changed.   -at this time, would hold off on further surgery until pathology comes back.   -will monitor daily.     Milena Cevallos DPM, Podiatry/Foot and Ankle Surgery  5:55 PM

## 2019-12-06 NOTE — PLAN OF CARE
Pt A&Ox4, VSS, CMS intact at baseline, dressing has small amount of moist sanguinous drainage, reinforced, dressing changed by MD this am, up with assist of one and walker to BR, voiding well, tylenol for pain management, continues on IV antibiotics, bg checks with sliding scale insulin.

## 2019-12-07 LAB
ANION GAP SERPL CALCULATED.3IONS-SCNC: 4 MMOL/L (ref 3–14)
BUN SERPL-MCNC: 21 MG/DL (ref 7–30)
CALCIUM SERPL-MCNC: 9 MG/DL (ref 8.5–10.1)
CHLORIDE SERPL-SCNC: 106 MMOL/L (ref 94–109)
CO2 SERPL-SCNC: 31 MMOL/L (ref 20–32)
CREAT SERPL-MCNC: 1.19 MG/DL (ref 0.66–1.25)
GFR SERPL CREATININE-BSD FRML MDRD: 70 ML/MIN/{1.73_M2}
GLUCOSE BLDC GLUCOMTR-MCNC: 104 MG/DL (ref 70–99)
GLUCOSE BLDC GLUCOMTR-MCNC: 143 MG/DL (ref 70–99)
GLUCOSE BLDC GLUCOMTR-MCNC: 176 MG/DL (ref 70–99)
GLUCOSE BLDC GLUCOMTR-MCNC: 237 MG/DL (ref 70–99)
GLUCOSE SERPL-MCNC: 110 MG/DL (ref 70–99)
POTASSIUM SERPL-SCNC: 4 MMOL/L (ref 3.4–5.3)
SODIUM SERPL-SCNC: 141 MMOL/L (ref 133–144)

## 2019-12-07 PROCEDURE — 00000146 ZZHCL STATISTIC GLUCOSE BY METER IP

## 2019-12-07 PROCEDURE — 25000128 H RX IP 250 OP 636: Performed by: PODIATRIST

## 2019-12-07 PROCEDURE — 25000132 ZZH RX MED GY IP 250 OP 250 PS 637: Performed by: INTERNAL MEDICINE

## 2019-12-07 PROCEDURE — 25000132 ZZH RX MED GY IP 250 OP 250 PS 637: Performed by: PHYSICIAN ASSISTANT

## 2019-12-07 PROCEDURE — 25000131 ZZH RX MED GY IP 250 OP 636 PS 637: Performed by: PHYSICIAN ASSISTANT

## 2019-12-07 PROCEDURE — 99207 ZZC CDG-MDM COMPONENT: MEETS MODERATE - UP CODED: CPT | Performed by: INTERNAL MEDICINE

## 2019-12-07 PROCEDURE — 12000000 ZZH R&B MED SURG/OB

## 2019-12-07 PROCEDURE — 36415 COLL VENOUS BLD VENIPUNCTURE: CPT | Performed by: INTERNAL MEDICINE

## 2019-12-07 PROCEDURE — 25000132 ZZH RX MED GY IP 250 OP 250 PS 637: Performed by: PODIATRIST

## 2019-12-07 PROCEDURE — 80048 BASIC METABOLIC PNL TOTAL CA: CPT | Performed by: INTERNAL MEDICINE

## 2019-12-07 PROCEDURE — 99232 SBSQ HOSP IP/OBS MODERATE 35: CPT | Performed by: INTERNAL MEDICINE

## 2019-12-07 RX ADMIN — INSULIN GLARGINE 58 UNITS: 100 INJECTION, SOLUTION SUBCUTANEOUS at 23:31

## 2019-12-07 RX ADMIN — HYDROCHLOROTHIAZIDE 12.5 MG: 12.5 CAPSULE ORAL at 08:24

## 2019-12-07 RX ADMIN — ACETAMINOPHEN 650 MG: 325 TABLET, FILM COATED ORAL at 16:18

## 2019-12-07 RX ADMIN — LISINOPRIL 40 MG: 40 TABLET ORAL at 08:24

## 2019-12-07 RX ADMIN — OXYCODONE HYDROCHLORIDE 5 MG: 5 TABLET ORAL at 19:34

## 2019-12-07 RX ADMIN — PIPERACILLIN AND TAZOBACTAM 3.38 G: 3; .375 INJECTION, POWDER, FOR SOLUTION INTRAVENOUS at 23:32

## 2019-12-07 RX ADMIN — PIPERACILLIN AND TAZOBACTAM 3.38 G: 3; .375 INJECTION, POWDER, FOR SOLUTION INTRAVENOUS at 06:45

## 2019-12-07 RX ADMIN — PIPERACILLIN AND TAZOBACTAM 3.38 G: 3; .375 INJECTION, POWDER, FOR SOLUTION INTRAVENOUS at 11:59

## 2019-12-07 RX ADMIN — INSULIN ASPART 1 UNITS: 100 INJECTION, SOLUTION INTRAVENOUS; SUBCUTANEOUS at 18:21

## 2019-12-07 RX ADMIN — METFORMIN HYDROCHLORIDE 1000 MG: 500 TABLET, FILM COATED ORAL at 08:24

## 2019-12-07 RX ADMIN — PIPERACILLIN AND TAZOBACTAM 3.38 G: 3; .375 INJECTION, POWDER, FOR SOLUTION INTRAVENOUS at 17:54

## 2019-12-07 RX ADMIN — LINAGLIPTIN 5 MG: 5 TABLET, FILM COATED ORAL at 08:24

## 2019-12-07 RX ADMIN — EZETIMIBE 10 MG: 10 TABLET ORAL at 08:24

## 2019-12-07 RX ADMIN — PIPERACILLIN AND TAZOBACTAM 3.38 G: 3; .375 INJECTION, POWDER, FOR SOLUTION INTRAVENOUS at 00:54

## 2019-12-07 RX ADMIN — METFORMIN HYDROCHLORIDE 1000 MG: 500 TABLET, FILM COATED ORAL at 18:21

## 2019-12-07 RX ADMIN — OXYCODONE HYDROCHLORIDE 5 MG: 5 TABLET ORAL at 00:58

## 2019-12-07 RX ADMIN — OXYCODONE HYDROCHLORIDE 10 MG: 5 TABLET ORAL at 23:37

## 2019-12-07 ASSESSMENT — ACTIVITIES OF DAILY LIVING (ADL)
ADLS_ACUITY_SCORE: 15

## 2019-12-07 NOTE — PLAN OF CARE
A&OX4. VSS on RA. Numbness and tingling present- baseline neuropathy. Taking oxycodone and scheduled tylenol for pain. Dressing CDI. Awaiting culture. Continue to monitor.

## 2019-12-07 NOTE — PROGRESS NOTES
[facility]  Hospitalist Progress Note for 12/6/2019:          Assessment and Plan:   Ry Horner is a 54-year-old male with past medical history of osteomyelitis with recent hospitalization for left gas gangrene, status post left hallux amputation on 11/19/2019 who presents from Podiatry Clinic for wound dehiscence and concern for ongoing infection.       S/P OR Excisional debridement, Excision of the sesamoids. Partial amputation of the left first metatarsal. Bone biopsy, proximal phalanx, left second toe on 12/4/2019:  2/2  Necrotic L foot wound, Osteomyelitis Left metatarsal, possible osteomyelitis left second toe proximal phalanx.    The patient directly admitted from Podiatry Clinic 2/2  wound dehiscence of a  recent left hallux amputation 11/19/2019. wound Cultures then showed Bacteroides fragilis, treated with Augmentin.  - WBC 12.4,MRI was abn & suspicious for osteo.  - on Zosyn since admission x 12/3  -  pt underwent surgery on 12/4th  - surgical wound cult NTD, path report pending  - post op pain controlled with Tylenol, oxycodone, and IV Dilaudid.   - Podiatry & ID following      Uncontrolled insulin-dependent type 2 diabetes:   A1c is 9.3.  Prior to admission regimen includes Lantus 58 units at bedtime, Metformin 1000 mg b.i.d., and Tradjenta 5 mg daily.    -on admission PTA metformin and Tradjenta held & PTA.Lantus decreased to 40 units Sliding scale insulin with meals and at bedtime.    - on 12/5 resumed PTA Lantus dose 58 U at HS on 12/5 , restarted  Tradjenta 5 mg   - on 12/6 resumed PTA Metformin 1000 mg bid  - BS in 200's yest 137 this morning  - continue PTA medications & continue ISS       Hypertension:    PTA regimen includes hydrochlorothiazide 50 mg daily and lisinopril 40 mg daily.held  On 12/5 BP creeping up, restart lower dose of Lisinopril 5 mg /day with hold parameters , continue to hold PTA hydrochlorothiazide  - on 12/6 BP uncontrolled 162/111.Give 30 mg lisinopril &  hydrochlorothiazide 50 mg   - has prn Hydralazine availabe   - BMP in AM      Dyslipidemia:   not on statin due to intolerance.       DVT prophylaxis.  PCDs.      CODE STATUS:  Full code.      Disposition: expected discharge 3+ days.     Daysi Mercedes MD  Hospitalist [phone] (7am -6 pm)                        Interval History:   Chart reviewed, patient seen.  Pain controlled.  No SOB, f/c, n/v.              Medications:       ezetimibe  10 mg Oral Daily     hydrochlorothiazide  12.5 mg Oral Daily     insulin aspart  1-7 Units Subcutaneous TID AC     insulin aspart  1-5 Units Subcutaneous At Bedtime     insulin glargine  58 Units Subcutaneous At Bedtime     linagliptin  5 mg Oral Daily     lisinopril  40 mg Oral Daily     metFORMIN  1,000 mg Oral BID w/meals     piperacillin-tazobactam  3.375 g Intravenous Q6H     sodium chloride (PF)  3 mL Intracatheter Q8H     acetaminophen, acetaminophen, glucose **OR** dextrose **OR** glucagon, hydrALAZINE, HYDROmorphone, lidocaine 4%, lidocaine (buffered or not buffered), melatonin, naloxone, ondansetron **OR** ondansetron, oxyCODONE, prochlorperazine **OR** prochlorperazine **OR** prochlorperazine, sodium chloride (PF)               Physical Exam:   Blood pressure (!) 134/95, pulse 99, temperature 98.2  F (36.8  C), resp. rate 16, SpO2 98 %.  Wt Readings from Last 4 Encounters:   19 116.6 kg (257 lb)   19 119.7 kg (264 lb)   11/15/19 116.6 kg (257 lb)   19 120.7 kg (266 lb 1.6 oz)         Vital Sign Ranges  Temperature Temp  Av.6  F (37  C)  Min: 98.4  F (36.9  C)  Max: 98.8  F (37.1  C)   Blood pressure Systolic (24hrs), Av , Min:132 , Max:173        Diastolic (24hrs), Av, Min:86, Max:111      Pulse Pulse  Av.5  Min: 90  Max: 99   Respirations Resp  Av  Min: 16  Max: 16   Pulse oximetry SpO2  Av %  Min: 94 %  Max: 98 %         Intake/Output Summary (Last 24 hours) at 2019 1218  Last data filed at 2019 1030  Gross  per 24 hour   Intake 820 ml   Output 2050 ml   Net -1230 ml       Constitutional: Awake, alert, cooperative, no apparent distress   Lungs: Clear to auscultation bilaterally, no crackles or wheezing   Cardiovascular: Regular rate and rhythm, normal S1 and S2, and no murmur noted   Abdomen: Normal bowel sounds, soft, non-distended, non-tender   Skin: No rashes, no cyanosis, no edema  Dressing over L distal foot-appears dry.   Neuro:                Data:   All laboratory data reviewed

## 2019-12-07 NOTE — PROGRESS NOTES
Podiatry / Foot and Ankle Surgery Progress Note    December 7, 2019    Subject: Patient was seen at bedside.  No pain to foot. Note he is doing well.     Objective:  Vitals: /85 (BP Location: Right arm)   Pulse 99   Temp 98.2  F (36.8  C) (Oral)   Resp 16   SpO2 95%   BMI= There is no height or weight on file to calculate BMI.     A1C: 9.3 (11/2019)     General:  Patient is alert and orientated.  NAD     Vascular:  DP and PT pulses are palpable.  No varicosities noted  CFT's < 3secs.  Skin temp is normal     Neuro: sensation absent to feet.      Derm:  Dressing is c/d/i. Retention sutures intact. No redness or purulent drainage noted.      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      Imaging: left foot xray (12/4/2019) - The amputation of the first ray has been extended in the interval and is now present at the level of the proximal first metatarsal diaphysis. Small amount of postoperative air is present.      Cultures:  Currently negative     Pathology: pending     Assessment: 55 y/o DM male s/p:     PROCEDURES:   1.  Excisional debridement of an area greater than 20 square cm down to and including the fat layer and deep fascia.   2.  Excision of the sesamoids.   3.  Partial amputation of the left first metatarsal.   4.  Bone biopsy, proximal phalanx, left second toe.      Plan:     -POD#3  -dressing changed.   -at this time, would hold off on further surgery until pathology comes back.   -will monitor daily.       Milena Cevallos DPM, Podiatry/Foot and Ankle Surgery  7:21 AM

## 2019-12-07 NOTE — PLAN OF CARE
Pt A&Ox4, VSS, CMS intact with baseline neuropathy, up with assist of one and walker to BR, NWB to LLE, dressing has small amount of dried bloody drainage, reinforced, tolerating mod carb diet, continues on IV antibiotics, awaiting biopsy results.

## 2019-12-07 NOTE — PLAN OF CARE
Pt A&Ox4, VSS, CMS intact with baseline neuropathy, up with assist of one and walker to BR, voiding well, IV SL, continues on IV antibiotics, awaiting biopsy results, dressing C,D,I, tolerating mod carb diet, bg WNL today, tylenol/oxy for pain management.

## 2019-12-08 LAB
GLUCOSE BLDC GLUCOMTR-MCNC: 114 MG/DL (ref 70–99)
GLUCOSE BLDC GLUCOMTR-MCNC: 120 MG/DL (ref 70–99)
GLUCOSE BLDC GLUCOMTR-MCNC: 134 MG/DL (ref 70–99)
GLUCOSE BLDC GLUCOMTR-MCNC: 196 MG/DL (ref 70–99)
GLUCOSE BLDC GLUCOMTR-MCNC: 239 MG/DL (ref 70–99)

## 2019-12-08 PROCEDURE — 25000128 H RX IP 250 OP 636: Performed by: PODIATRIST

## 2019-12-08 PROCEDURE — 99207 ZZC CDG-MDM COMPONENT: MEETS MODERATE - UP CODED: CPT | Performed by: INTERNAL MEDICINE

## 2019-12-08 PROCEDURE — 00000146 ZZHCL STATISTIC GLUCOSE BY METER IP

## 2019-12-08 PROCEDURE — 25000132 ZZH RX MED GY IP 250 OP 250 PS 637: Performed by: PHYSICIAN ASSISTANT

## 2019-12-08 PROCEDURE — 99232 SBSQ HOSP IP/OBS MODERATE 35: CPT | Performed by: INTERNAL MEDICINE

## 2019-12-08 PROCEDURE — 12000000 ZZH R&B MED SURG/OB

## 2019-12-08 PROCEDURE — 25000132 ZZH RX MED GY IP 250 OP 250 PS 637: Performed by: INTERNAL MEDICINE

## 2019-12-08 PROCEDURE — 25000132 ZZH RX MED GY IP 250 OP 250 PS 637: Performed by: PODIATRIST

## 2019-12-08 PROCEDURE — 25000131 ZZH RX MED GY IP 250 OP 636 PS 637: Performed by: PHYSICIAN ASSISTANT

## 2019-12-08 RX ADMIN — INSULIN GLARGINE 58 UNITS: 100 INJECTION, SOLUTION SUBCUTANEOUS at 22:15

## 2019-12-08 RX ADMIN — METFORMIN HYDROCHLORIDE 1000 MG: 500 TABLET, FILM COATED ORAL at 18:12

## 2019-12-08 RX ADMIN — OXYCODONE HYDROCHLORIDE 5 MG: 5 TABLET ORAL at 06:29

## 2019-12-08 RX ADMIN — LINAGLIPTIN 5 MG: 5 TABLET, FILM COATED ORAL at 08:28

## 2019-12-08 RX ADMIN — HYDROCHLOROTHIAZIDE 12.5 MG: 12.5 CAPSULE ORAL at 08:28

## 2019-12-08 RX ADMIN — PIPERACILLIN AND TAZOBACTAM 3.38 G: 3; .375 INJECTION, POWDER, FOR SOLUTION INTRAVENOUS at 17:23

## 2019-12-08 RX ADMIN — OXYCODONE HYDROCHLORIDE 10 MG: 5 TABLET ORAL at 22:20

## 2019-12-08 RX ADMIN — LISINOPRIL 40 MG: 40 TABLET ORAL at 08:28

## 2019-12-08 RX ADMIN — PIPERACILLIN AND TAZOBACTAM 3.38 G: 3; .375 INJECTION, POWDER, FOR SOLUTION INTRAVENOUS at 06:26

## 2019-12-08 RX ADMIN — EZETIMIBE 10 MG: 10 TABLET ORAL at 08:28

## 2019-12-08 RX ADMIN — METFORMIN HYDROCHLORIDE 1000 MG: 500 TABLET, FILM COATED ORAL at 08:28

## 2019-12-08 RX ADMIN — PIPERACILLIN AND TAZOBACTAM 3.38 G: 3; .375 INJECTION, POWDER, FOR SOLUTION INTRAVENOUS at 12:09

## 2019-12-08 ASSESSMENT — ACTIVITIES OF DAILY LIVING (ADL)
ADLS_ACUITY_SCORE: 15

## 2019-12-08 NOTE — PROGRESS NOTES
Podiatry / Foot and Ankle Surgery Progress Note    December 8, 2019    Subject: Patient was seen at bedside.  No pain. No concerns.     Objective:  Vitals: BP (!) 157/97 (BP Location: Right leg)   Pulse 100   Temp 98.7  F (37.1  C) (Oral)   Resp 16   SpO2 95%   BMI= There is no height or weight on file to calculate BMI.     A1C: 9.3 (11/2019)     General:  Patient is alert and orientated.  NAD     Vascular:  DP and PT pulses are palpable.  No varicosities noted  CFT's < 3secs.  Skin temp is normal     Neuro: sensation absent to feet.      Derm:  Dressing is c/d/i. Retention sutures intact. No redness or purulent drainage noted.      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      Imaging: left foot xray (12/4/2019) - The amputation of the first ray has been extended in the interval and is now present at the level of the proximal first metatarsal diaphysis. Small amount of postoperative air is present.      Cultures:  Currently negative     Pathology: pending     Assessment: 55 y/o DM male s/p:     PROCEDURES:   1.  Excisional debridement of an area greater than 20 square cm down to and including the fat layer and deep fascia.   2.  Excision of the sesamoids.   3.  Partial amputation of the left first metatarsal.   4.  Bone biopsy, proximal phalanx, left second toe.      Plan:     -POD#4  -dressing changed.   -at this time, would hold off on further surgery until pathology comes back.   -will monitor daily.      Milena Cevallos DPM, Podiatry/Foot and Ankle Surgery  1:19 PM

## 2019-12-08 NOTE — PLAN OF CARE
Patient A&Ox4. VSS on RA. CMS intact, except left foot numbness. Foot dressing clean, dry, and intact. Pain managed with PRN Oxycodone. Voiding adequately. Blood glucose monitoring, blood sugars 237 and 196. Insulin per sliding scale at HS. Awaiting biopsy results. Patient slept between cares. Will continue to monitor.

## 2019-12-08 NOTE — PROGRESS NOTES
[facility]  Hospitalist Progress Note for 12/6/2019:          Assessment and Plan:   Ry Horner is a 54-year-old male with past medical history of osteomyelitis with recent hospitalization for left gas gangrene, status post left hallux amputation on 11/19/2019 who presents from Podiatry Clinic for wound dehiscence and concern for ongoing infection.       S/P OR Excisional debridement, Excision of the sesamoids. Partial amputation of the left first metatarsal. Bone biopsy, proximal phalanx, left second toe on 12/4/2019:  2/2  Necrotic L foot wound, Osteomyelitis Left metatarsal, possible osteomyelitis left second toe proximal phalanx.    The patient directly admitted from Podiatry Clinic 2/2  wound dehiscence of a  recent left hallux amputation 11/19/2019. wound Cultures then showed Bacteroides fragilis, treated with Augmentin.  - WBC 12.4,MRI was abn & suspicious for osteo.  - on Zosyn since admission x 12/3  -  pt underwent surgery on 12/4th  - surgical wound cult NTD, path report pending  - post op pain controlled with Tylenol, oxycodone, and IV Dilaudid.   - Podiatry & ID following      Uncontrolled insulin-dependent type 2 diabetes:   A1c is 9.3.  Prior to admission regimen includes Lantus 58 units at bedtime, Metformin 1000 mg b.i.d., and Tradjenta 5 mg daily.    -on admission PTA metformin and Tradjenta held & PTA.Lantus decreased to 40 units Sliding scale insulin with meals and at bedtime.    - on 12/5 resumed PTA Lantus dose 58 U at HS on 12/5 , restarted  Tradjenta 5 mg   - on 12/6 resumed PTA Metformin 1000 mg bid  - BS in 200's yest 137 this morning  - continue PTA medications & continue ISS       Hypertension:    PTA regimen includes hydrochlorothiazide 50 mg daily and lisinopril 40 mg daily.held  On 12/5 BP creeping up, restart lower dose of Lisinopril 5 mg /day with hold parameters , continue to hold PTA hydrochlorothiazide  - on 12/6 BP uncontrolled 162/111.Give 30 mg lisinopril &  hydrochlorothiazide 50 mg   - has prn Hydralazine availabe   - BMP in AM      Dyslipidemia:   not on statin due to intolerance.       DVT prophylaxis.  PCDs.      CODE STATUS:  Full code.      Disposition: expected discharge 3+ days.     Daysi Mercedes MD  Hospitalist [phone] (7am -6 pm)                        Interval History:   Chart reviewed, patient seen.  Pain controlled.  No SOB, f/c, n/v.              Medications:       ezetimibe  10 mg Oral Daily     hydrochlorothiazide  12.5 mg Oral Daily     insulin aspart  1-7 Units Subcutaneous TID AC     insulin aspart  1-5 Units Subcutaneous At Bedtime     insulin glargine  58 Units Subcutaneous At Bedtime     linagliptin  5 mg Oral Daily     lisinopril  40 mg Oral Daily     metFORMIN  1,000 mg Oral BID w/meals     piperacillin-tazobactam  3.375 g Intravenous Q6H     sodium chloride (PF)  3 mL Intracatheter Q8H     acetaminophen, acetaminophen, glucose **OR** dextrose **OR** glucagon, hydrALAZINE, HYDROmorphone, lidocaine 4%, lidocaine (buffered or not buffered), melatonin, naloxone, ondansetron **OR** ondansetron, oxyCODONE, prochlorperazine **OR** prochlorperazine **OR** prochlorperazine, sodium chloride (PF)               Physical Exam:   Blood pressure (!) 139/97, pulse 105, temperature 97.7  F (36.5  C), temperature source Oral, resp. rate 16, SpO2 96 %.  Wt Readings from Last 4 Encounters:   19 116.6 kg (257 lb)   19 119.7 kg (264 lb)   11/15/19 116.6 kg (257 lb)   19 120.7 kg (266 lb 1.6 oz)         Vital Sign Ranges  Temperature Temp  Av.6  F (37  C)  Min: 98.4  F (36.9  C)  Max: 98.8  F (37.1  C)   Blood pressure Systolic (24hrs), Av , Min:132 , Max:173        Diastolic (24hrs), Av, Min:86, Max:111      Pulse Pulse  Av.5  Min: 90  Max: 99   Respirations Resp  Av  Min: 16  Max: 16   Pulse oximetry SpO2  Av %  Min: 94 %  Max: 98 %         Intake/Output Summary (Last 24 hours) at 2019 1218  Last data  filed at 12/6/2019 1030  Gross per 24 hour   Intake 820 ml   Output 2050 ml   Net -1230 ml       Constitutional: Awake, alert, cooperative, no apparent distress   Lungs: Clear to auscultation bilaterally, no crackles or wheezing   Cardiovascular: Regular rate and rhythm, normal S1 and S2, and no murmur noted   Abdomen: Normal bowel sounds, soft, non-distended, non-tender   Skin: No rashes, no cyanosis, no edema  Dressing over L distal foot-appears dry.   Neuro:                Data:   All laboratory data reviewed

## 2019-12-08 NOTE — PLAN OF CARE
[id number]:  A&OX4.  Up with 1 and walker and prothesis for Right BKA, non weight bearing to LLE.  Dressing C/D/I.  Denies pain.  Blood glucose 167.  Waiting for biopsy results.

## 2019-12-09 ENCOUNTER — DOCUMENTATION ONLY (OUTPATIENT)
Dept: PODIATRY | Facility: CLINIC | Age: 54
End: 2019-12-09

## 2019-12-09 LAB
BACTERIA SPEC CULT: NO GROWTH
GLUCOSE BLDC GLUCOMTR-MCNC: 111 MG/DL (ref 70–99)
GLUCOSE BLDC GLUCOMTR-MCNC: 132 MG/DL (ref 70–99)
GLUCOSE BLDC GLUCOMTR-MCNC: 135 MG/DL (ref 70–99)
GLUCOSE BLDC GLUCOMTR-MCNC: 160 MG/DL (ref 70–99)
GLUCOSE BLDC GLUCOMTR-MCNC: 266 MG/DL (ref 70–99)
SPECIMEN SOURCE: NORMAL

## 2019-12-09 PROCEDURE — 12000000 ZZH R&B MED SURG/OB

## 2019-12-09 PROCEDURE — 99232 SBSQ HOSP IP/OBS MODERATE 35: CPT | Performed by: INTERNAL MEDICINE

## 2019-12-09 PROCEDURE — 25000132 ZZH RX MED GY IP 250 OP 250 PS 637: Performed by: PODIATRIST

## 2019-12-09 PROCEDURE — 25000132 ZZH RX MED GY IP 250 OP 250 PS 637: Performed by: INTERNAL MEDICINE

## 2019-12-09 PROCEDURE — 25000128 H RX IP 250 OP 636: Performed by: PODIATRIST

## 2019-12-09 PROCEDURE — 25000132 ZZH RX MED GY IP 250 OP 250 PS 637: Performed by: PHYSICIAN ASSISTANT

## 2019-12-09 PROCEDURE — 25000131 ZZH RX MED GY IP 250 OP 636 PS 637: Performed by: PHYSICIAN ASSISTANT

## 2019-12-09 PROCEDURE — 99207 ZZC CDG-MDM COMPONENT: MEETS MODERATE - UP CODED: CPT | Performed by: INTERNAL MEDICINE

## 2019-12-09 PROCEDURE — 00000146 ZZHCL STATISTIC GLUCOSE BY METER IP

## 2019-12-09 RX ADMIN — PIPERACILLIN AND TAZOBACTAM 3.38 G: 3; .375 INJECTION, POWDER, FOR SOLUTION INTRAVENOUS at 00:51

## 2019-12-09 RX ADMIN — METFORMIN HYDROCHLORIDE 1000 MG: 500 TABLET, FILM COATED ORAL at 17:37

## 2019-12-09 RX ADMIN — PIPERACILLIN AND TAZOBACTAM 3.38 G: 3; .375 INJECTION, POWDER, FOR SOLUTION INTRAVENOUS at 19:50

## 2019-12-09 RX ADMIN — INSULIN GLARGINE 58 UNITS: 100 INJECTION, SOLUTION SUBCUTANEOUS at 22:35

## 2019-12-09 RX ADMIN — EZETIMIBE 10 MG: 10 TABLET ORAL at 08:23

## 2019-12-09 RX ADMIN — LISINOPRIL 40 MG: 40 TABLET ORAL at 08:23

## 2019-12-09 RX ADMIN — LINAGLIPTIN 5 MG: 5 TABLET, FILM COATED ORAL at 08:23

## 2019-12-09 RX ADMIN — OXYCODONE HYDROCHLORIDE 10 MG: 5 TABLET ORAL at 01:29

## 2019-12-09 RX ADMIN — METFORMIN HYDROCHLORIDE 1000 MG: 500 TABLET, FILM COATED ORAL at 08:23

## 2019-12-09 RX ADMIN — OXYCODONE HYDROCHLORIDE 10 MG: 5 TABLET ORAL at 19:42

## 2019-12-09 RX ADMIN — HYDROCHLOROTHIAZIDE 12.5 MG: 12.5 CAPSULE ORAL at 08:23

## 2019-12-09 RX ADMIN — PIPERACILLIN AND TAZOBACTAM 3.38 G: 3; .375 INJECTION, POWDER, FOR SOLUTION INTRAVENOUS at 06:39

## 2019-12-09 RX ADMIN — PIPERACILLIN AND TAZOBACTAM 3.38 G: 3; .375 INJECTION, POWDER, FOR SOLUTION INTRAVENOUS at 13:03

## 2019-12-09 ASSESSMENT — ACTIVITIES OF DAILY LIVING (ADL)
ADLS_ACUITY_SCORE: 15

## 2019-12-09 NOTE — PROGRESS NOTES
Podiatry / Foot and Ankle Surgery Progress Note    December 9, 2019    Subject: Patient was seen at bedside.  No pain, wife at bedside.     Objective:  Vitals: /88 (BP Location: Left arm)   Pulse 100   Temp 98.4  F (36.9  C) (Oral)   Resp 16   SpO2 96%   BMI= There is no height or weight on file to calculate BMI.    A1C: 9.3 (11/2019)     General:  Patient is alert and orientated.  NAD     Vascular:  DP and PT pulses are palpable.  No varicosities noted  CFT's < 3secs.  Skin temp is normal     Neuro: sensation absent to feet.      Derm:  Dressing is c/d/i. Retention sutures intact. No redness or purulent drainage noted.      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      Imaging: left foot xray (12/4/2019) - The amputation of the first ray has been extended in the interval and is now present at the level of the proximal first metatarsal diaphysis. Small amount of postoperative air is present.      Cultures:  Currently negative     Pathology: pending     Assessment: 55 y/o DM male s/p:     PROCEDURES:   1.  Excisional debridement of an area greater than 20 square cm down to and including the fat layer and deep fascia.   2.  Excision of the sesamoids.   3.  Partial amputation of the left first metatarsal.   4.  Bone biopsy, proximal phalanx, left second toe.      Plan:     -POD#4  -dressing changed.   -at this time, would hold off on further surgery until pathology comes back.   -spoke with path. Results hopefully back tomorrow.   -will schedule I&D for Wednesday with Dr. Douglas, possible partial 2nd ray/toe amputation if pathology comes back positive.     Milena Cevallos DPM, Podiatry/Foot and Ankle Surgery  12:55 PM

## 2019-12-09 NOTE — PROGRESS NOTES
[facility]    Infectious Disease Progress Note    Date of Service (when I saw the patient): 12/09/2019     Assessment & Plan   Ry Horner is a 54 year old male who was admitted on 12/3/2019.     Impression:     1. 54 y.o male with diabetes with peripheral polyneuropathy.  2. History of infection in the right foot leading to amputation at the knee.   3. Recently Admitted with severely infected left foot. Gas gangrene of left foot. Osteomyelitis of left great toe. S/PLeft hallux amputation with irrigation and debridement.  4. CKD.   5. Was discharged home on oral Augmentin, presenting now with his foot became macerated.  The wound VAC was discontinued on Friday.  He was seen by Podiatry Clinic with exposed bone.   6. Admitted for IV antibiotics and surgical debridement.         Recommendations:   Continue on IV zosyn covers all of the previously isolated organisms   Will follow up on the culture, path report          Franklin Maza MD    Interval History   Afebrile no new complaints     Physical Exam   Temp: 98.4  F (36.9  C) Temp src: Oral BP: 131/88 Pulse: 100 Heart Rate: 97 Resp: 16 SpO2: 96 % O2 Device: None (Room air)    There were no vitals filed for this visit.  Vital Signs with Ranges  Temp:  [98.4  F (36.9  C)-98.7  F (37.1  C)] 98.4  F (36.9  C)  Pulse:  [100] 100  Heart Rate:  [] 97  Resp:  [16] 16  BP: (121-157)/(83-97) 131/88  SpO2:  [95 %-96 %] 96 %    Constitutional: Awake, alert, cooperative, no apparent distress  Lungs: Clear to auscultation bilaterally, no crackles or wheezing  Cardiovascular: Regular rate and rhythm, normal S1 and S2, and no murmur noted  Abdomen: Normal bowel sounds, soft, non-distended, non-tender  Skin: No rashes, no cyanosis, no edema  Other:    Medications       ezetimibe  10 mg Oral Daily     hydrochlorothiazide  12.5 mg Oral Daily     insulin aspart  1-7 Units Subcutaneous TID AC     insulin aspart  1-5 Units Subcutaneous At Bedtime     insulin glargine   58 Units Subcutaneous At Bedtime     linagliptin  5 mg Oral Daily     lisinopril  40 mg Oral Daily     metFORMIN  1,000 mg Oral BID w/meals     piperacillin-tazobactam  3.375 g Intravenous Q6H     sodium chloride (PF)  3 mL Intracatheter Q8H       Data   All microbiology laboratory data reviewed.  Recent Labs   Lab Test 12/03/19  1606 11/22/19  0624 11/21/19  0644   WBC 12.4* 17.4* 14.0*   HGB 11.1* 9.7* 9.0*   HCT 33.6* 29.5* 27.6*   MCV 85 87 87   * 401 382     Recent Labs   Lab Test 12/07/19  0647 12/03/19  1606 11/22/19  0624   CR 1.19 1.06 1.02     Recent Labs   Lab Test 12/03/19  1727   SED 88*     Recent Labs   Lab Test 12/04/19  1619 11/19/19  1829 11/17/19  1525 11/17/19  1419 11/17/19  1411 08/28/17  1136 08/28/17  1059 08/28/17  1054 07/15/16  1521   CULT No growth  Culture negative monitoring continues Light growth  Bacteroides fragilis  *  Light growth  Parvimonas micra  *  Susceptibility testing not routinely done  On day 2, isolated in broth only:  beta hemolytic   Streptococcus constellatus  * Heavy growth  beta hemolytic   Streptococcus constellatus  Susceptibility testing done on previous specimen  *  Light growth  Alcaligenes faecalis  *  Light growth  Staphylococcus aureus  *  On day 1, isolated in broth only:  Anaerobic gram negative rods  See anaerobic report for identification  * Heavy growth  Bacteroides fragilis  Susceptibility testing not routinely done  *  Heavy growth  Peptoniphilus asaccharolyticus  Susceptibility testing not routinely done  *  Moderate growth  beta hemolytic   Streptococcus constellatus  *  On day 1, isolated in broth only:  Anaerobic gram negative rods  See anaerobic report for identification  * Heavy growth  Bacteroides fragilis  *  Heavy growth  Parvimonas micra  *  Heavy growth  Peptoniphilus asaccharolyticus  *  Heavy growth  Mixed aerobic and anaerobic rosa  *  Susceptibility testing not routinely done No growth No growth Heavy growth  Beta  hemolytic Streptococcus group G  *  Moderate growth  Proteus vulgaris  *  Moderate growth  Normal skin rosa   No anaerobes isolated  On day 1, isolated in broth only: Staphylococcus simulans This isolate is   presumed to be clindamycin resistant based on detection of inducible   clindamycin resistance. Erythromycin and clindamycin are resistant, therefore,   they are not recommended for use.  On day 1, isolated in broth only: Strain 2 Staphylococcus simulans This isolate   is presumed to be clindamycin resistant based on detection of inducible   clindamycin resistance. Erythromycin and clindamycin are resistant, therefore,   they are not recommended for use.  Strain 1 found to have inducible  Clindamycin resistance also,  results updated  *       Attestation:  Total time on the floor involved in the patient's care: 35 minutes. Total time spent in counseling/care coordination: >50%

## 2019-12-09 NOTE — PLAN OF CARE
Pt A&O. VSS on RA. Up w/ A1 using walker. Rt BKA, uses prosthesis. CMS intact, dressing CDI. Taking oxycodone for pain. Mod CHO diet. Possible discharge pending biopsy results. Will continue to monitor.

## 2019-12-09 NOTE — PLAN OF CARE
[id number]: A&Ox4. VSS on RA. Up with SB, walker, gait belt, and prosthesis for right BKA. IV SL. Intermittent antibiotics. Diabetic diet. LLE NWB and neuropathy. Reports 5/10 pain is tolerable and declines available oxycodone. Bone biopsy results pending. Tentative plan for I&D on Wednesday with Dr. Douglas. Continue to monitor.

## 2019-12-09 NOTE — PROGRESS NOTES
[facility]  Hospitalist Progress Note for 12/6/2019:          Assessment and Plan:   Ry Horner is a 54-year-old male with past medical history of osteomyelitis with recent hospitalization for left gas gangrene, status post left hallux amputation on 11/19/2019 who presents from Podiatry Clinic for wound dehiscence and concern for ongoing infection.       S/P OR Excisional debridement, Excision of the sesamoids. Partial amputation of the left first metatarsal. Bone biopsy, proximal phalanx, left second toe on 12/4/2019:  2/2  Necrotic L foot wound, Osteomyelitis Left metatarsal, possible osteomyelitis left second toe proximal phalanx.    The patient directly admitted from Podiatry Clinic 2/2  wound dehiscence of a  recent left hallux amputation 11/19/2019. wound Cultures then showed Bacteroides fragilis, treated with Augmentin.  - WBC 12.4,MRI was abn & suspicious for osteo.  - on Zosyn since admission x 12/3  -  pt underwent surgery on 12/4th  - surgical wound cult NTD, path report pending  - post op pain controlled with Tylenol, oxycodone, and IV Dilaudid.   - Podiatry  following      Uncontrolled insulin-dependent type 2 diabetes:   A1c is 9.3.  Prior to admission regimen includes Lantus 58 units at bedtime, Metformin 1000 mg b.i.d., and Tradjenta 5 mg daily.    -on admission PTA metformin and Tradjenta held & PTA.Lantus decreased to 40 units Sliding scale insulin with meals and at bedtime.    - on 12/5 resumed PTA Lantus dose 58 U at HS on 12/5 , restarted  Tradjenta 5 mg   - on 12/6 resumed PTA Metformin 1000 mg bid  - BS in 200's yest 137 this morning  - continue PTA medications & continue ISS       Hypertension:    PTA regimen includes hydrochlorothiazide 50 mg daily and lisinopril 40 mg daily.held  On 12/5 BP creeping up, restart lower dose of Lisinopril 5 mg /day with hold parameters , continue to hold PTA hydrochlorothiazide  - on 12/6 BP uncontrolled 162/111.Give 30 mg lisinopril &  hydrochlorothiazide 50 mg   - has prn Hydralazine availabe   - BMP in AM      Dyslipidemia:   not on statin due to intolerance.       DVT prophylaxis.  PCDs.      CODE STATUS:  Full code.      Disposition: Generally medically stable, discharge plan per podiatry.     Daysi Mercedes MD  Hospitalist [phone] (7am -6 pm)                        Interval History:   Uneventful night, pain controlled.  No SOB, f/c, n/v.              Medications:       ezetimibe  10 mg Oral Daily     hydrochlorothiazide  12.5 mg Oral Daily     insulin aspart  1-7 Units Subcutaneous TID AC     insulin aspart  1-5 Units Subcutaneous At Bedtime     insulin glargine  58 Units Subcutaneous At Bedtime     linagliptin  5 mg Oral Daily     lisinopril  40 mg Oral Daily     metFORMIN  1,000 mg Oral BID w/meals     piperacillin-tazobactam  3.375 g Intravenous Q6H     sodium chloride (PF)  3 mL Intracatheter Q8H     acetaminophen, acetaminophen, glucose **OR** dextrose **OR** glucagon, hydrALAZINE, HYDROmorphone, lidocaine 4%, lidocaine (buffered or not buffered), melatonin, naloxone, ondansetron **OR** ondansetron, oxyCODONE, prochlorperazine **OR** prochlorperazine **OR** prochlorperazine, sodium chloride (PF)               Physical Exam:   Blood pressure 131/88, pulse 100, temperature 98.4  F (36.9  C), temperature source Oral, resp. rate 16, SpO2 96 %.  Wt Readings from Last 4 Encounters:   19 116.6 kg (257 lb)   19 119.7 kg (264 lb)   11/15/19 116.6 kg (257 lb)   19 120.7 kg (266 lb 1.6 oz)         Vital Sign Ranges  Temperature Temp  Av.6  F (37  C)  Min: 98.4  F (36.9  C)  Max: 98.8  F (37.1  C)   Blood pressure Systolic (24hrs), Av , Min:132 , Max:173        Diastolic (24hrs), Av, Min:86, Max:111      Pulse Pulse  Av.5  Min: 90  Max: 99   Respirations Resp  Av  Min: 16  Max: 16   Pulse oximetry SpO2  Av %  Min: 94 %  Max: 98 %         Intake/Output Summary (Last 24 hours) at 2019  1218  Last data filed at 12/6/2019 1030  Gross per 24 hour   Intake 820 ml   Output 2050 ml   Net -1230 ml       Constitutional: Awake, alert, cooperative, no apparent distress   Lungs: Clear to auscultation bilaterally, no crackles or wheezing   Cardiovascular: Regular rate and rhythm, normal S1 and S2, and no murmur noted   Abdomen: Normal bowel sounds, soft, non-distended, non-tender   Skin: No rashes, no cyanosis, no edema  Dressing over L distal foot-appears dry.   Neuro:                Data:   All laboratory data reviewed

## 2019-12-09 NOTE — PLAN OF CARE
Pt POD#5 for toe amputation and bone biopsy. HX of R BKA; uses prosthetic for ambulation. Pt Aox4, VSS on RA, CMS intact (numbness at baseline), NWB on LLE Ax1 w/ GB and walker. Voiding w/o difficulty. Tolerating mod carb diet. PIV SL. Pain is manageable w/ distraction; declined medication, Oxy available. Plan pending bone biopsy results and pt progress.

## 2019-12-09 NOTE — PROGRESS NOTES
Spiritual Health    SH attempted to visit Pt per length of stay. SH attempted to visit Pt several times today and Pt was unavailable during each attempt.     SH will make a follow up visit this week.     Ramonita Sauceda  Chaplain Resident

## 2019-12-09 NOTE — PLAN OF CARE
A&OX4.  Up with 1 and walker and prothesis to RLE.  NWB to LLE.  Voiding adequately.  Blood glucose checks.  States pain is tolerable, declines pain medication.  Biopsy results pending.

## 2019-12-10 LAB
COPATH REPORT: NORMAL
GLUCOSE BLDC GLUCOMTR-MCNC: 128 MG/DL (ref 70–99)
GLUCOSE BLDC GLUCOMTR-MCNC: 134 MG/DL (ref 70–99)
GLUCOSE BLDC GLUCOMTR-MCNC: 147 MG/DL (ref 70–99)
GLUCOSE BLDC GLUCOMTR-MCNC: 178 MG/DL (ref 70–99)
GLUCOSE BLDC GLUCOMTR-MCNC: 205 MG/DL (ref 70–99)

## 2019-12-10 PROCEDURE — 25000132 ZZH RX MED GY IP 250 OP 250 PS 637: Performed by: PODIATRIST

## 2019-12-10 PROCEDURE — 00000146 ZZHCL STATISTIC GLUCOSE BY METER IP

## 2019-12-10 PROCEDURE — 25000132 ZZH RX MED GY IP 250 OP 250 PS 637: Performed by: PHYSICIAN ASSISTANT

## 2019-12-10 PROCEDURE — 25000132 ZZH RX MED GY IP 250 OP 250 PS 637: Performed by: INTERNAL MEDICINE

## 2019-12-10 PROCEDURE — 25000128 H RX IP 250 OP 636: Performed by: PODIATRIST

## 2019-12-10 PROCEDURE — 12000000 ZZH R&B MED SURG/OB

## 2019-12-10 PROCEDURE — 25000131 ZZH RX MED GY IP 250 OP 636 PS 637: Performed by: PHYSICIAN ASSISTANT

## 2019-12-10 PROCEDURE — 99207 ZZC CDG-MDM COMPONENT: MEETS MODERATE - UP CODED: CPT | Performed by: INTERNAL MEDICINE

## 2019-12-10 PROCEDURE — 99232 SBSQ HOSP IP/OBS MODERATE 35: CPT | Performed by: INTERNAL MEDICINE

## 2019-12-10 RX ORDER — DEXTROSE MONOHYDRATE, SODIUM CHLORIDE, AND POTASSIUM CHLORIDE 50; .745; 4.5 G/1000ML; G/1000ML; G/1000ML
INJECTION, SOLUTION INTRAVENOUS CONTINUOUS
Status: DISCONTINUED | OUTPATIENT
Start: 2019-12-11 | End: 2019-12-10

## 2019-12-10 RX ADMIN — INSULIN ASPART 1 UNITS: 100 INJECTION, SOLUTION INTRAVENOUS; SUBCUTANEOUS at 18:32

## 2019-12-10 RX ADMIN — HYDROCHLOROTHIAZIDE 12.5 MG: 12.5 CAPSULE ORAL at 08:36

## 2019-12-10 RX ADMIN — LINAGLIPTIN 5 MG: 5 TABLET, FILM COATED ORAL at 08:35

## 2019-12-10 RX ADMIN — PIPERACILLIN AND TAZOBACTAM 3.38 G: 3; .375 INJECTION, POWDER, FOR SOLUTION INTRAVENOUS at 15:06

## 2019-12-10 RX ADMIN — OXYCODONE HYDROCHLORIDE 10 MG: 5 TABLET ORAL at 20:40

## 2019-12-10 RX ADMIN — PIPERACILLIN AND TAZOBACTAM 3.38 G: 3; .375 INJECTION, POWDER, FOR SOLUTION INTRAVENOUS at 20:31

## 2019-12-10 RX ADMIN — LISINOPRIL 40 MG: 40 TABLET ORAL at 08:35

## 2019-12-10 RX ADMIN — PIPERACILLIN AND TAZOBACTAM 3.38 G: 3; .375 INJECTION, POWDER, FOR SOLUTION INTRAVENOUS at 08:36

## 2019-12-10 RX ADMIN — EZETIMIBE 10 MG: 10 TABLET ORAL at 08:36

## 2019-12-10 RX ADMIN — INSULIN GLARGINE 58 UNITS: 100 INJECTION, SOLUTION SUBCUTANEOUS at 22:27

## 2019-12-10 RX ADMIN — METFORMIN HYDROCHLORIDE 1000 MG: 500 TABLET, FILM COATED ORAL at 18:32

## 2019-12-10 RX ADMIN — METFORMIN HYDROCHLORIDE 1000 MG: 500 TABLET, FILM COATED ORAL at 08:35

## 2019-12-10 RX ADMIN — OXYCODONE HYDROCHLORIDE 10 MG: 5 TABLET ORAL at 01:46

## 2019-12-10 RX ADMIN — PIPERACILLIN AND TAZOBACTAM 3.38 G: 3; .375 INJECTION, POWDER, FOR SOLUTION INTRAVENOUS at 01:47

## 2019-12-10 RX ADMIN — ACETAMINOPHEN 650 MG: 325 TABLET, FILM COATED ORAL at 19:34

## 2019-12-10 ASSESSMENT — ACTIVITIES OF DAILY LIVING (ADL)
ADLS_ACUITY_SCORE: 15

## 2019-12-10 NOTE — PROGRESS NOTES
[facility]  Hospitalist Progress Note for 12/6/2019:          Assessment and Plan:   Ry Horner is a 54-year-old male with past medical history of osteomyelitis with recent hospitalization for left gas gangrene, status post left hallux amputation on 11/19/2019 who presents from Podiatry Clinic for wound dehiscence and concern for ongoing infection.       S/P OR Excisional debridement, Excision of the sesamoids. Partial amputation of the left first metatarsal. Bone biopsy, proximal phalanx, left second toe on 12/4/2019:  2/2  Necrotic L foot wound, Osteomyelitis Left metatarsal, possible osteomyelitis left second toe proximal phalanx.    The patient directly admitted from Podiatry Clinic 2/2  wound dehiscence of a  recent left hallux amputation 11/19/2019. wound Cultures then showed Bacteroides fragilis, treated with Augmentin.  - WBC 12.4,MRI was abn & suspicious for osteo.  - on Zosyn since admission x 12/3  -  pt underwent surgery on 12/4th  - surgical wound cult NTD, path report pending  - post op pain controlled with Tylenol, oxycodone, and IV Dilaudid.   - Podiatry  Following, awaiting pathology results to determine next course of action, possibly back to OR on Wednesday per Dr. Cevallos's note.      Uncontrolled insulin-dependent type 2 diabetes:   A1c is 9.3.  Prior to admission regimen includes Lantus 58 units at bedtime, Metformin 1000 mg b.i.d., and Tradjenta 5 mg daily.    -on admission PTA metformin and Tradjenta held & PTA.Lantus decreased to 40 units Sliding scale insulin with meals and at bedtime.    - on 12/5 resumed PTA Lantus dose 58 U at HS on 12/5 , restarted  Tradjenta 5 mg   - on 12/6 resumed PTA Metformin 1000 mg bid  - BS in 200's yest 137 this morning  - continue PTA medications & continue ISS       Hypertension:    PTA regimen includes hydrochlorothiazide 50 mg daily and lisinopril 40 mg daily.held  On 12/5 BP creeping up, restart lower dose of Lisinopril 5 mg /day with hold  parameters , continue to hold PTA hydrochlorothiazide  - on  BP uncontrolled 162/111.Give 30 mg lisinopril & hydrochlorothiazide 50 mg   - has prn Hydralazine availabe   - BMP in AM      Dyslipidemia:   not on statin due to intolerance.       DVT prophylaxis.  PCDs.      CODE STATUS:  Full code.      Disposition: Generally medically stable, discharge plan per podiatry.     Daysi Mercedes MD  Hospitalist [phone] (7am -6 pm)                        Interval History:   Uneventful night, pain controlled.  No SOB, f/c, n/v.              Medications:       ezetimibe  10 mg Oral Daily     hydrochlorothiazide  12.5 mg Oral Daily     insulin aspart  1-7 Units Subcutaneous TID AC     insulin aspart  1-5 Units Subcutaneous At Bedtime     insulin glargine  58 Units Subcutaneous At Bedtime     linagliptin  5 mg Oral Daily     lisinopril  40 mg Oral Daily     metFORMIN  1,000 mg Oral BID w/meals     piperacillin-tazobactam  3.375 g Intravenous Q6H     sodium chloride (PF)  3 mL Intracatheter Q8H     acetaminophen, acetaminophen, glucose **OR** dextrose **OR** glucagon, hydrALAZINE, HYDROmorphone, lidocaine 4%, lidocaine (buffered or not buffered), melatonin, naloxone, ondansetron **OR** ondansetron, oxyCODONE, prochlorperazine **OR** prochlorperazine **OR** prochlorperazine, sodium chloride (PF)               Physical Exam:   Blood pressure (!) 142/95, pulse 106, temperature 98.3  F (36.8  C), temperature source Oral, resp. rate 16, SpO2 96 %.  Wt Readings from Last 4 Encounters:   19 116.6 kg (257 lb)   19 119.7 kg (264 lb)   11/15/19 116.6 kg (257 lb)   19 120.7 kg (266 lb 1.6 oz)         Vital Sign Ranges  Temperature Temp  Av.6  F (37  C)  Min: 98.4  F (36.9  C)  Max: 98.8  F (37.1  C)   Blood pressure Systolic (24hrs), Av , Min:132 , Max:173        Diastolic (24hrs), Av, Min:86, Max:111      Pulse Pulse  Av.5  Min: 90  Max: 99   Respirations Resp  Av  Min: 16  Max: 16    Pulse oximetry SpO2  Av %  Min: 94 %  Max: 98 %         Intake/Output Summary (Last 24 hours) at 2019 1218  Last data filed at 2019 1030  Gross per 24 hour   Intake 820 ml   Output 2050 ml   Net -1230 ml       Constitutional: Awake, alert, cooperative, no apparent distress   Lungs: Clear to auscultation bilaterally, no crackles or wheezing   Cardiovascular: Regular rate and rhythm, normal S1 and S2, and no murmur noted   Abdomen: Normal bowel sounds, soft, non-distended, non-tender   Skin: No rashes, no cyanosis, no edema  Dressing over L distal foot-appears dry.   Neuro:                Data:   All laboratory data reviewed

## 2019-12-10 NOTE — PROGRESS NOTES
Foot & Ankle Surgery  December 10, 2019    Patient seen at bedside this PM POD#6 sp revision I&D left foot for non-healing toe amp.  No acute concerns.    BP (!) 149/91 (BP Location: Left arm)   Pulse 101   Temp 97.8  F (36.6  C) (Oral)   Resp 18   SpO2 96%     PE - retention sutures intact, some gapping at distal aspect of the incision.  Slight serosanguinous drainage but minimal erythema and no purulence.    Pathology - proximal 1st met and 2nd toe biopsies neg for osteomyelitis    Cultures - current cultures and Gram show NGTD and neg for organisms    A/P - 53 yo neuropathic poorly-controlled diabetic male sp above procedure  -dressing change at bedside  -discussed neg bone biopsies  -to OR tomorrow with Dr Douglas for debridement and likely closure; discussed possible need for VAC.  They would prefer clinic VAC changes rather than home WOC RN.  Discussed we don't typically apply VAC in clinic, unless he's seen at the [facility]  -NPO after breakfast    Joseph Randhawa DPM FACFAS FACFAOM  Podiatric Foot & Ankle Surgeon  [facility]  [phone]

## 2019-12-10 NOTE — PROGRESS NOTES
BRIEF NUTRITION ASSESSMENT      REASON FOR ASSESSMENT:  Ry Horner is a 54 year old male seen by Registered Dietitian for [facility]    NUTRITION HISTORY:  Information received from patient.  Patient follows a regular diet at home.  Eats three meals per day and snacks. Watches blood sugars closely.  Noticed his blood sugars were elevated a couple of months ago.  Followed up with primary care and endocrinologist to confirm pancreas was producing less insulin than before.  Increased insulin regimen.  A few weeks later went to Urgent care for infection on foot.  A couple of days later had to have toe amputated.      Intake 100% of all meals.    CURRENT DIET AND INTAKE:  Diet:  Mod CHO               ANTHROPOMETRICS:  Height: 6'  Weight:  116.6 kg   BMI = 34.86 kg/m^2   Weight Status: Obesity Grade I BMI 30-34.9  IBW:  178# (80.9 kg)  %IBW: 144%  Weight History: Pt reports usual wt is #257.  Reports no wt changes. Pt has LLE prosthetic. States that variation in weights may be due to wt taken with or without prosthetic.   Wt Readings from Last 10 Encounters:   12/03/19 116.6 kg (257 lb)   11/22/19 119.7 kg (264 lb)   11/15/19 116.6 kg (257 lb)   09/19/19 120.7 kg (266 lb 1.6 oz)   08/20/19 120.2 kg (265 lb)   07/11/19 120.7 kg (266 lb)   09/07/18 115.7 kg (255 lb 1.6 oz)   07/13/18 110.4 kg (243 lb 4.8 oz)   06/06/18 112.9 kg (249 lb)   03/01/18 116.6 kg (257 lb)     LABS:  BG = 111-266    MALNUTRITION:  Visual Nutrition Focused Physical Assessment (NFPA) completed. Patient does not meet two of the following criteria necessary for diagnosing malnutrition: significant weight loss, reduced intake, subcutaneous fat loss, muscle loss or fluid retention.     NUTRITION INTERVENTION:  Nutrition Diagnosis:  No nutrition diagnosis at this time.    Implementation:  Nutrition Education:  Per Provider order if indicated    FOLLOW UP/MONITORING:   Will re-evaluate in 7 - 10 days, or sooner, if re-consulted.    Lindy Mosqueda  Registered  Dietitian

## 2019-12-10 NOTE — PROGRESS NOTES
Spiritual Health    SH visited Pt per length of stay. SH offered SH services and Pt has no SH needs at this time.     SH will remain available as needed.     Ramonita Sauceda  Chaplain Resident

## 2019-12-10 NOTE — PLAN OF CARE
A&Ox4. VSS ex. tachycardic at times on . Up with Ax1, walker, gait belt, and prosthesis for right BKA. IV SL. Intermittent antibiotics. Diabetic diet. B, 205, insulin given per sliding scale. LLE NWB. Dressing CDI.  C/o L foot pain, PRN oxy given. Bone biopsy results pending. Tentative plan for I&D on Wednesday with Dr. Douglas. Continue to monitor.

## 2019-12-11 ENCOUNTER — ANESTHESIA (OUTPATIENT)
Dept: SURGERY | Facility: CLINIC | Age: 54
DRG: 465 | End: 2019-12-11
Payer: COMMERCIAL

## 2019-12-11 ENCOUNTER — APPOINTMENT (OUTPATIENT)
Dept: GENERAL RADIOLOGY | Facility: CLINIC | Age: 54
DRG: 465 | End: 2019-12-11
Attending: PODIATRIST
Payer: COMMERCIAL

## 2019-12-11 ENCOUNTER — ANESTHESIA EVENT (OUTPATIENT)
Dept: SURGERY | Facility: CLINIC | Age: 54
DRG: 465 | End: 2019-12-11
Payer: COMMERCIAL

## 2019-12-11 LAB
ALBUMIN SERPL-MCNC: 2.8 G/DL (ref 3.4–5)
ALP SERPL-CCNC: 81 U/L (ref 40–150)
ALT SERPL W P-5'-P-CCNC: 28 U/L (ref 0–70)
ANION GAP SERPL CALCULATED.3IONS-SCNC: 5 MMOL/L (ref 3–14)
AST SERPL W P-5'-P-CCNC: 20 U/L (ref 0–45)
BACTERIA SPEC CULT: NORMAL
BILIRUB SERPL-MCNC: 0.4 MG/DL (ref 0.2–1.3)
BUN SERPL-MCNC: 19 MG/DL (ref 7–30)
CALCIUM SERPL-MCNC: 9 MG/DL (ref 8.5–10.1)
CHLORIDE SERPL-SCNC: 105 MMOL/L (ref 94–109)
CK SERPL-CCNC: 63 U/L (ref 30–300)
CO2 SERPL-SCNC: 28 MMOL/L (ref 20–32)
CREAT SERPL-MCNC: 1.26 MG/DL (ref 0.66–1.25)
CRP SERPL-MCNC: 24.9 MG/L (ref 0–8)
ERYTHROCYTE [DISTWIDTH] IN BLOOD BY AUTOMATED COUNT: 12.6 % (ref 10–15)
GFR SERPL CREATININE-BSD FRML MDRD: 65 ML/MIN/{1.73_M2}
GLUCOSE BLDC GLUCOMTR-MCNC: 112 MG/DL (ref 70–99)
GLUCOSE BLDC GLUCOMTR-MCNC: 113 MG/DL (ref 70–99)
GLUCOSE BLDC GLUCOMTR-MCNC: 123 MG/DL (ref 70–99)
GLUCOSE BLDC GLUCOMTR-MCNC: 163 MG/DL (ref 70–99)
GLUCOSE BLDC GLUCOMTR-MCNC: 71 MG/DL (ref 70–99)
GLUCOSE SERPL-MCNC: 120 MG/DL (ref 70–99)
HCT VFR BLD AUTO: 32.6 % (ref 40–53)
HGB BLD-MCNC: 10.6 G/DL (ref 13.3–17.7)
IRON SATN MFR SERPL: 14 % (ref 15–46)
IRON SERPL-MCNC: 31 UG/DL (ref 35–180)
Lab: NORMAL
MCH RBC QN AUTO: 27.7 PG (ref 26.5–33)
MCHC RBC AUTO-ENTMCNC: 32.5 G/DL (ref 31.5–36.5)
MCV RBC AUTO: 85 FL (ref 78–100)
PLATELET # BLD AUTO: 332 10E9/L (ref 150–450)
POTASSIUM SERPL-SCNC: 4.2 MMOL/L (ref 3.4–5.3)
PROT SERPL-MCNC: 8 G/DL (ref 6.8–8.8)
RBC # BLD AUTO: 3.83 10E12/L (ref 4.4–5.9)
SODIUM SERPL-SCNC: 138 MMOL/L (ref 133–144)
SPECIMEN SOURCE: NORMAL
TIBC SERPL-MCNC: 223 UG/DL (ref 240–430)
WBC # BLD AUTO: 11.2 10E9/L (ref 4–11)

## 2019-12-11 PROCEDURE — 0QBP0ZZ EXCISION OF LEFT METATARSAL, OPEN APPROACH: ICD-10-PCS | Performed by: PODIATRIST

## 2019-12-11 PROCEDURE — 36415 COLL VENOUS BLD VENIPUNCTURE: CPT | Performed by: HOSPITALIST

## 2019-12-11 PROCEDURE — 25800030 ZZH RX IP 258 OP 636: Performed by: NURSE ANESTHETIST, CERTIFIED REGISTERED

## 2019-12-11 PROCEDURE — 00000146 ZZHCL STATISTIC GLUCOSE BY METER IP

## 2019-12-11 PROCEDURE — 83550 IRON BINDING TEST: CPT | Performed by: HOSPITALIST

## 2019-12-11 PROCEDURE — 25000128 H RX IP 250 OP 636: Performed by: NURSE ANESTHETIST, CERTIFIED REGISTERED

## 2019-12-11 PROCEDURE — 25800025 ZZH RX 258: Performed by: ANESTHESIOLOGY

## 2019-12-11 PROCEDURE — 36000069 ZZH SURGERY LEVEL 5 EA 15 ADDTL MIN: Performed by: PODIATRIST

## 2019-12-11 PROCEDURE — 25800025 ZZH RX 258: Performed by: PODIATRIST

## 2019-12-11 PROCEDURE — 71000012 ZZH RECOVERY PHASE 1 LEVEL 1 FIRST HR: Performed by: PODIATRIST

## 2019-12-11 PROCEDURE — 99232 SBSQ HOSP IP/OBS MODERATE 35: CPT | Performed by: HOSPITALIST

## 2019-12-11 PROCEDURE — 25000132 ZZH RX MED GY IP 250 OP 250 PS 637: Performed by: PODIATRIST

## 2019-12-11 PROCEDURE — 27210794 ZZH OR GENERAL SUPPLY STERILE: Performed by: PODIATRIST

## 2019-12-11 PROCEDURE — 25000128 H RX IP 250 OP 636: Performed by: PODIATRIST

## 2019-12-11 PROCEDURE — 80053 COMPREHEN METABOLIC PANEL: CPT | Performed by: HOSPITALIST

## 2019-12-11 PROCEDURE — 11046 DBRDMT MUSC&/FSCA EA ADDL: CPT | Performed by: PODIATRIST

## 2019-12-11 PROCEDURE — 27110028 ZZH OR GENERAL SUPPLY NON-STERILE: Performed by: PODIATRIST

## 2019-12-11 PROCEDURE — 25800030 ZZH RX IP 258 OP 636: Performed by: ANESTHESIOLOGY

## 2019-12-11 PROCEDURE — 37000009 ZZH ANESTHESIA TECHNICAL FEE, EACH ADDTL 15 MIN: Performed by: PODIATRIST

## 2019-12-11 PROCEDURE — 28122 PARTIAL REMOVAL OF FOOT BONE: CPT | Mod: 58 | Performed by: PODIATRIST

## 2019-12-11 PROCEDURE — 0JBR0ZZ EXCISION OF LEFT FOOT SUBCUTANEOUS TISSUE AND FASCIA, OPEN APPROACH: ICD-10-PCS | Performed by: PODIATRIST

## 2019-12-11 PROCEDURE — 25000132 ZZH RX MED GY IP 250 OP 250 PS 637: Performed by: PHYSICIAN ASSISTANT

## 2019-12-11 PROCEDURE — 25000132 ZZH RX MED GY IP 250 OP 250 PS 637: Performed by: INTERNAL MEDICINE

## 2019-12-11 PROCEDURE — 25000125 ZZHC RX 250: Performed by: NURSE ANESTHETIST, CERTIFIED REGISTERED

## 2019-12-11 PROCEDURE — 11043 DBRDMT MUSC&/FSCA 1ST 20/<: CPT | Mod: 58 | Performed by: PODIATRIST

## 2019-12-11 PROCEDURE — 37000008 ZZH ANESTHESIA TECHNICAL FEE, 1ST 30 MIN: Performed by: PODIATRIST

## 2019-12-11 PROCEDURE — 40000985 XR FOOT PORT LT 3 VW: Mod: LT

## 2019-12-11 PROCEDURE — 36000071 ZZH SURGERY LEVEL 5 W FLUORO 1ST 30 MIN: Performed by: PODIATRIST

## 2019-12-11 PROCEDURE — 25000131 ZZH RX MED GY IP 250 OP 636 PS 637: Performed by: PODIATRIST

## 2019-12-11 PROCEDURE — 82550 ASSAY OF CK (CPK): CPT | Performed by: HOSPITALIST

## 2019-12-11 PROCEDURE — 86140 C-REACTIVE PROTEIN: CPT | Performed by: HOSPITALIST

## 2019-12-11 PROCEDURE — 85027 COMPLETE CBC AUTOMATED: CPT | Performed by: HOSPITALIST

## 2019-12-11 PROCEDURE — 40000169 ZZH STATISTIC PRE-PROCEDURE ASSESSMENT I: Performed by: PODIATRIST

## 2019-12-11 PROCEDURE — 12000000 ZZH R&B MED SURG/OB

## 2019-12-11 PROCEDURE — 83540 ASSAY OF IRON: CPT | Performed by: HOSPITALIST

## 2019-12-11 RX ORDER — ONDANSETRON 4 MG/1
4 TABLET, ORALLY DISINTEGRATING ORAL EVERY 30 MIN PRN
Status: DISCONTINUED | OUTPATIENT
Start: 2019-12-11 | End: 2019-12-11 | Stop reason: HOSPADM

## 2019-12-11 RX ORDER — SODIUM CHLORIDE, SODIUM LACTATE, POTASSIUM CHLORIDE, CALCIUM CHLORIDE 600; 310; 30; 20 MG/100ML; MG/100ML; MG/100ML; MG/100ML
INJECTION, SOLUTION INTRAVENOUS CONTINUOUS
Status: DISCONTINUED | OUTPATIENT
Start: 2019-12-11 | End: 2019-12-11 | Stop reason: HOSPADM

## 2019-12-11 RX ORDER — HYDROMORPHONE HYDROCHLORIDE 1 MG/ML
.3-.5 INJECTION, SOLUTION INTRAMUSCULAR; INTRAVENOUS; SUBCUTANEOUS EVERY 5 MIN PRN
Status: DISCONTINUED | OUTPATIENT
Start: 2019-12-11 | End: 2019-12-11 | Stop reason: HOSPADM

## 2019-12-11 RX ORDER — ONDANSETRON 2 MG/ML
4 INJECTION INTRAMUSCULAR; INTRAVENOUS EVERY 30 MIN PRN
Status: DISCONTINUED | OUTPATIENT
Start: 2019-12-11 | End: 2019-12-11 | Stop reason: HOSPADM

## 2019-12-11 RX ORDER — ONDANSETRON 2 MG/ML
INJECTION INTRAMUSCULAR; INTRAVENOUS PRN
Status: DISCONTINUED | OUTPATIENT
Start: 2019-12-11 | End: 2019-12-11

## 2019-12-11 RX ORDER — DEXTROSE MONOHYDRATE 25 G/50ML
25 INJECTION, SOLUTION INTRAVENOUS ONCE
Status: COMPLETED | OUTPATIENT
Start: 2019-12-11 | End: 2019-12-11

## 2019-12-11 RX ORDER — BUPIVACAINE HYDROCHLORIDE 5 MG/ML
INJECTION, SOLUTION EPIDURAL; INTRACAUDAL PRN
Status: DISCONTINUED | OUTPATIENT
Start: 2019-12-11 | End: 2019-12-11 | Stop reason: HOSPADM

## 2019-12-11 RX ORDER — BUPIVACAINE HYDROCHLORIDE 5 MG/ML
INJECTION, SOLUTION EPIDURAL; INTRACAUDAL
Status: DISCONTINUED
Start: 2019-12-11 | End: 2019-12-11 | Stop reason: HOSPADM

## 2019-12-11 RX ORDER — NALOXONE HYDROCHLORIDE 0.4 MG/ML
.1-.4 INJECTION, SOLUTION INTRAMUSCULAR; INTRAVENOUS; SUBCUTANEOUS
Status: DISCONTINUED | OUTPATIENT
Start: 2019-12-11 | End: 2019-12-12 | Stop reason: HOSPADM

## 2019-12-11 RX ORDER — LIDOCAINE HYDROCHLORIDE 20 MG/ML
INJECTION, SOLUTION INFILTRATION; PERINEURAL PRN
Status: DISCONTINUED | OUTPATIENT
Start: 2019-12-11 | End: 2019-12-11

## 2019-12-11 RX ORDER — FENTANYL CITRATE 50 UG/ML
INJECTION, SOLUTION INTRAMUSCULAR; INTRAVENOUS PRN
Status: DISCONTINUED | OUTPATIENT
Start: 2019-12-11 | End: 2019-12-11

## 2019-12-11 RX ORDER — PROPOFOL 10 MG/ML
INJECTION, EMULSION INTRAVENOUS CONTINUOUS PRN
Status: DISCONTINUED | OUTPATIENT
Start: 2019-12-11 | End: 2019-12-11

## 2019-12-11 RX ORDER — FENTANYL CITRATE 50 UG/ML
25-50 INJECTION, SOLUTION INTRAMUSCULAR; INTRAVENOUS
Status: DISCONTINUED | OUTPATIENT
Start: 2019-12-11 | End: 2019-12-11 | Stop reason: HOSPADM

## 2019-12-11 RX ADMIN — OXYCODONE HYDROCHLORIDE 5 MG: 5 TABLET ORAL at 22:18

## 2019-12-11 RX ADMIN — INSULIN GLARGINE 58 UNITS: 100 INJECTION, SOLUTION SUBCUTANEOUS at 23:30

## 2019-12-11 RX ADMIN — SODIUM CHLORIDE, POTASSIUM CHLORIDE, SODIUM LACTATE AND CALCIUM CHLORIDE: 600; 310; 30; 20 INJECTION, SOLUTION INTRAVENOUS at 18:16

## 2019-12-11 RX ADMIN — FENTANYL CITRATE 25 MCG: 50 INJECTION, SOLUTION INTRAMUSCULAR; INTRAVENOUS at 18:23

## 2019-12-11 RX ADMIN — POTASSIUM CHLORIDE: 149 INJECTION, SOLUTION, CONCENTRATE INTRAVENOUS at 10:54

## 2019-12-11 RX ADMIN — DEXMEDETOMIDINE HYDROCHLORIDE 20 MCG: 100 INJECTION, SOLUTION INTRAVENOUS at 18:31

## 2019-12-11 RX ADMIN — POTASSIUM CHLORIDE: 149 INJECTION, SOLUTION, CONCENTRATE INTRAVENOUS at 20:28

## 2019-12-11 RX ADMIN — ACETAMINOPHEN 650 MG: 325 TABLET, FILM COATED ORAL at 15:04

## 2019-12-11 RX ADMIN — PIPERACILLIN AND TAZOBACTAM 3.38 G: 3; .375 INJECTION, POWDER, FOR SOLUTION INTRAVENOUS at 08:37

## 2019-12-11 RX ADMIN — ONDANSETRON 4 MG: 2 INJECTION INTRAMUSCULAR; INTRAVENOUS at 19:08

## 2019-12-11 RX ADMIN — PIPERACILLIN AND TAZOBACTAM 3.38 G: 3; .375 INJECTION, POWDER, FOR SOLUTION INTRAVENOUS at 00:57

## 2019-12-11 RX ADMIN — EZETIMIBE 10 MG: 10 TABLET ORAL at 08:37

## 2019-12-11 RX ADMIN — OXYCODONE HYDROCHLORIDE 10 MG: 5 TABLET ORAL at 00:58

## 2019-12-11 RX ADMIN — METFORMIN HYDROCHLORIDE 1000 MG: 500 TABLET, FILM COATED ORAL at 20:26

## 2019-12-11 RX ADMIN — PIPERACILLIN AND TAZOBACTAM 3.38 G: 3; .375 INJECTION, POWDER, FOR SOLUTION INTRAVENOUS at 14:18

## 2019-12-11 RX ADMIN — PROPOFOL 100 MCG/KG/MIN: 10 INJECTION, EMULSION INTRAVENOUS at 18:21

## 2019-12-11 RX ADMIN — DEXTROSE MONOHYDRATE 25 ML: 500 INJECTION PARENTERAL at 17:52

## 2019-12-11 RX ADMIN — FENTANYL CITRATE 25 MCG: 50 INJECTION, SOLUTION INTRAMUSCULAR; INTRAVENOUS at 18:24

## 2019-12-11 RX ADMIN — PIPERACILLIN AND TAZOBACTAM 3.38 G: 3; .375 INJECTION, POWDER, FOR SOLUTION INTRAVENOUS at 20:26

## 2019-12-11 RX ADMIN — OXYCODONE HYDROCHLORIDE 5 MG: 5 TABLET ORAL at 22:15

## 2019-12-11 RX ADMIN — MIDAZOLAM 2 MG: 1 INJECTION INTRAMUSCULAR; INTRAVENOUS at 18:17

## 2019-12-11 RX ADMIN — LISINOPRIL 40 MG: 40 TABLET ORAL at 08:36

## 2019-12-11 RX ADMIN — LINAGLIPTIN 5 MG: 5 TABLET, FILM COATED ORAL at 08:37

## 2019-12-11 RX ADMIN — LIDOCAINE HYDROCHLORIDE 60 MG: 20 INJECTION, SOLUTION INFILTRATION; PERINEURAL at 18:21

## 2019-12-11 ASSESSMENT — LIFESTYLE VARIABLES: TOBACCO_USE: 1

## 2019-12-11 ASSESSMENT — ACTIVITIES OF DAILY LIVING (ADL)
ADLS_ACUITY_SCORE: 13
ADLS_ACUITY_SCORE: 15
ADLS_ACUITY_SCORE: 15
ADLS_ACUITY_SCORE: 14
ADLS_ACUITY_SCORE: 15

## 2019-12-11 ASSESSMENT — ENCOUNTER SYMPTOMS
ORTHOPNEA: 0
SEIZURES: 0

## 2019-12-11 ASSESSMENT — MIFFLIN-ST. JEOR: SCORE: 2053.74

## 2019-12-11 NOTE — PROGRESS NOTES
[facility]    Infectious Disease Progress Note    Date of Service (when I saw the patient): 12/11/2019     Assessment & Plan   Ry Horner is a 54 year old male who was admitted on 12/3/2019.     Impression:     1. 54 y.o male with diabetes with peripheral polyneuropathy.  2. History of infection in the right foot leading to amputation at the knee.   3. Recently Admitted with severely infected left foot. Gas gangrene of left foot. Osteomyelitis of left great toe. S/PLeft hallux amputation with irrigation and debridement.  4. CKD.   5. Was discharged home on oral Augmentin, presenting now with his foot became macerated.  The wound VAC was discontinued   He was seen by Podiatry Clinic with exposed bone.   6. Admitted for IV antibiotics and surgical debridement.         Recommendations:   Continue on IV zosyn covers all of the previously isolated organisms            Franklin Maza MD    Interval History   Afebrile no new complaints     Physical Exam   Temp: 97.9  F (36.6  C) Temp src: Oral BP: 126/88 Pulse: 101 Heart Rate: 109 Resp: 16 SpO2: 98 % O2 Device: None (Room air)    There were no vitals filed for this visit.  Vital Signs with Ranges  Temp:  [97.8  F (36.6  C)-98.8  F (37.1  C)] 97.9  F (36.6  C)  Pulse:  [101] 101  Heart Rate:  [] 109  Resp:  [16-18] 16  BP: (114-157)/() 126/88  SpO2:  [96 %-98 %] 98 %    Constitutional: Awake, alert, cooperative, no apparent distress  Lungs: Clear to auscultation bilaterally, no crackles or wheezing  Cardiovascular: Regular rate and rhythm, normal S1 and S2, and no murmur noted  Abdomen: Normal bowel sounds, soft, non-distended, non-tender  Skin: No rashes, no cyanosis, no edema  Other:    Medications     IV infusion builder WITH LARGE additive list       no pre procedure antibiotic needed         ezetimibe  10 mg Oral Daily     hydrochlorothiazide  12.5 mg Oral Daily     insulin aspart  1-7 Units Subcutaneous TID AC     insulin aspart  1-5 Units  Subcutaneous At Bedtime     insulin glargine  58 Units Subcutaneous At Bedtime     linagliptin  5 mg Oral Daily     lisinopril  40 mg Oral Daily     metFORMIN  1,000 mg Oral BID w/meals     piperacillin-tazobactam  3.375 g Intravenous Q6H     sodium chloride (PF)  3 mL Intracatheter Q8H       Data   All microbiology laboratory data reviewed.  Recent Labs   Lab Test 12/03/19  1606 11/22/19  0624 11/21/19  0644   WBC 12.4* 17.4* 14.0*   HGB 11.1* 9.7* 9.0*   HCT 33.6* 29.5* 27.6*   MCV 85 87 87   * 401 382     Recent Labs   Lab Test 12/07/19  0647 12/03/19  1606 11/22/19  0624   CR 1.19 1.06 1.02     Recent Labs   Lab Test 12/03/19  1727   SED 88*     Recent Labs   Lab Test 12/04/19  1619 11/19/19  1829 11/17/19  1525 11/17/19  1419 11/17/19  1411 08/28/17  1136 08/28/17  1059 08/28/17  1054 07/15/16  1521   CULT No growth  Culture negative monitoring continues Light growth  Bacteroides fragilis  *  Light growth  Parvimonas micra  *  Susceptibility testing not routinely done  On day 2, isolated in broth only:  beta hemolytic   Streptococcus constellatus  * Heavy growth  beta hemolytic   Streptococcus constellatus  Susceptibility testing done on previous specimen  *  Light growth  Alcaligenes faecalis  *  Light growth  Staphylococcus aureus  *  On day 1, isolated in broth only:  Anaerobic gram negative rods  See anaerobic report for identification  * Heavy growth  Bacteroides fragilis  Susceptibility testing not routinely done  *  Heavy growth  Peptoniphilus asaccharolyticus  Susceptibility testing not routinely done  *  Moderate growth  beta hemolytic   Streptococcus constellatus  *  On day 1, isolated in broth only:  Anaerobic gram negative rods  See anaerobic report for identification  * Heavy growth  Bacteroides fragilis  *  Heavy growth  Parvimonas micra  *  Heavy growth  Peptoniphilus asaccharolyticus  *  Heavy growth  Mixed aerobic and anaerobic rosa  *  Susceptibility testing not routinely  done No growth No growth Heavy growth  Beta hemolytic Streptococcus group G  *  Moderate growth  Proteus vulgaris  *  Moderate growth  Normal skin rosa   No anaerobes isolated  On day 1, isolated in broth only: Staphylococcus simulans This isolate is   presumed to be clindamycin resistant based on detection of inducible   clindamycin resistance. Erythromycin and clindamycin are resistant, therefore,   they are not recommended for use.  On day 1, isolated in broth only: Strain 2 Staphylococcus simulans This isolate   is presumed to be clindamycin resistant based on detection of inducible   clindamycin resistance. Erythromycin and clindamycin are resistant, therefore,   they are not recommended for use.  Strain 1 found to have inducible  Clindamycin resistance also,  results updated  *       Attestation:  Total time on the floor involved in the patient's care: 35 minutes. Total time spent in counseling/care coordination: >50%

## 2019-12-11 NOTE — ANESTHESIA PREPROCEDURE EVALUATION
Anesthesia Pre-Procedure Evaluation    Patient: Ry Horner   MRN: [id number] : 1965          Preoperative Diagnosis: Diabetic infection of left foot (H) [E11.628, L08.9]    Procedure(s):  IRRIGATION AND DEBRIDEMENT FOOT ( MINI C ARM, DOUBLE ANTIBIOTIC SOLUTION, SAGITTAL SAW WITH 138 BLADE)  POSSIBLE PARTIAL FOOT AMPUTATION    Past Medical History:   Diagnosis Date     BENIGN HYPERTENSION 2007     DIABETES MELLITUS TYPE II-UNCOMPL 2007     HYPERLIPIDEMIA NEC/NOS 2007     Tobacco use disorder 2007     Past Surgical History:   Procedure Laterality Date     AMPUTATE FOOT Left 2019    Procedure: LEFT PARTIAL FOOT AMPUTATION;  Surgeon: Antoine Pena DPM;  Location: [facility] OR     AMPUTATE FOOT Left 2019    Procedure: LEFT PARTIAL FOOT AMPUTATION;  Surgeon: Tim Douglas DPM;  Location: [facility] OR     AMPUTATE LEG BELOW KNEE Right 2017    Procedure: AMPUTATE LEG BELOW KNEE;  RIGHT BELOW KNEE AMPUTATION ;  Surgeon: Nikolas Nascimento MD;  Location: [facility] OR     APPENDECTOMY       APPLY WOUND VAC Right 3/2/2015    Procedure: APPLY WOUND VAC;  Surgeon: Milena Cevallos DPM, Pod;  Location: [facility] OR     BIOPSY BONE FOOT Right 7/15/2016    Procedure: BIOPSY BONE FOOT;  Surgeon: Tim Douglas DPM;  Location: [facility] OR     BIOPSY BONE TOE Left 2019    Procedure: BONE BIOPSY LEFT SECOND TOE;  Surgeon: Tim Douglas DPM;  Location: [facility] OR     IRRIGATION AND DEBRIDEMENT FOOT, COMBINED Right 3/2/2015    Procedure: COMBINED IRRIGATION AND DEBRIDEMENT FOOT;  Surgeon: Milena Cevallos DPM, Pod;  Location: [facility] OR     IRRIGATION AND DEBRIDEMENT FOOT, COMBINED Right 7/15/2016    Procedure: COMBINED IRRIGATION AND DEBRIDEMENT FOOT;  Surgeon: Tim Douglas DPM;  Location: [facility] OR     IRRIGATION AND DEBRIDEMENT FOOT, COMBINED Right 2016    Procedure: COMBINED IRRIGATION AND DEBRIDEMENT FOOT;  Surgeon: Tim Douglas DPM;  Location: [facility] OR     IRRIGATION AND DEBRIDEMENT FOOT,  COMBINED Left 11/19/2019    Procedure: REVISIONAL IRRIGATION AND DEBRIDEMENT LEFT FOOT AND BONE DEBRIDEMENT;  Surgeon: Joseph Randhawa DPM;  Location: [facility] OR     IRRIGATION AND DEBRIDEMENT FOOT, COMBINED Left 12/4/2019    Procedure: EXCISIONAL DEBRIDEMENT LEFT FOOT;  Surgeon: Tim Douglas DPM;  Location:  OR     ORTHOPEDIC SURGERY       EKG  20-NOV-2019 00:39:32 [facility]-S5 SO ROUTINE RECORD  Sinus tachycardia  Otherwise normal ECG  When compared with ECG of 17-NOV-2019 09:58,  No significant change was found  Anesthesia Evaluation     . Pt has had prior anesthetic.     No history of anesthetic complications          ROS/MED HX    ENT/Pulmonary:     (+)tobacco use, , . .    Neurologic:     (+)neuropathy    (-) seizures and migraines   Cardiovascular:     (+) Dyslipidemia, hypertension----. : . . . :. .      (-) CHF and orthopnea/PND   METS/Exercise Tolerance:     Hematologic:     (+) Anemia, -      Musculoskeletal: Comment: Diabetic foot  Osteomyelitis    S/p Right below the knee amputation        GI/Hepatic:  - neg GI/hepatic ROS      (-) GERD   Renal/Genitourinary: Comment: CKD - 2        Endo:     (+) type II DM Last HgA1c: 9.3 Using insulin - not using insulin pump Diabetic complications: neuropathy, Obesity, .      Psychiatric:         Infectious Disease: Comment: Diabetic foot with osteomyelitis.         Malignancy:         Other:                          Physical Exam  Normal systems: cardiovascular and pulmonary    Airway   Mallampati: II  TM distance: >3 FB  Neck ROM: full    Dental   (+) missing    Cardiovascular   Rhythm and rate: regular and normal      Pulmonary    breath sounds clear to auscultation            Lab Results   Component Value Date    WBC 12.4 (H) 12/03/2019    HGB 11.1 (L) 12/03/2019    HCT 33.6 (L) 12/03/2019     (H) 12/03/2019    CRP 31.6 (H) 12/03/2019    SED 88 (H) 12/03/2019     12/07/2019    POTASSIUM 4.0 12/07/2019    CHLORIDE 106 12/07/2019     CO2 31 12/07/2019    BUN 21 12/07/2019    CR 1.19 12/07/2019     (H) 12/07/2019    FERNANDO 9.0 12/07/2019    ALBUMIN 2.8 (L) 12/03/2019    PROTTOTAL 8.5 12/03/2019    ALT 40 12/03/2019    AST 15 12/03/2019    ALKPHOS 95 12/03/2019    BILITOTAL 0.3 12/03/2019    TSH 1.06 06/02/2017       Preop Vitals  BP Readings from Last 3 Encounters:   12/11/19 126/88   12/03/19 (!) 138/91   11/25/19 125/81    Pulse Readings from Last 3 Encounters:   12/10/19 101   12/03/19 102   11/25/19 101      Resp Readings from Last 3 Encounters:   12/11/19 16   11/22/19 18   09/14/17 18    SpO2 Readings from Last 3 Encounters:   12/11/19 98%   11/25/19 98%   11/22/19 96%      Temp Readings from Last 1 Encounters:   12/11/19 36.6  C (97.9  F) (Oral)    Ht Readings from Last 1 Encounters:   12/03/19 1.829 m (6')      Wt Readings from Last 1 Encounters:   12/03/19 116.6 kg (257 lb)    Estimated body mass index is 34.86 kg/m  as calculated from the following:    Height as of an earlier encounter on 12/3/19: 1.829 m (6').    Weight as of an earlier encounter on 12/3/19: 116.6 kg (257 lb).       Anesthesia Plan      History & Physical Review  History and physical reviewed and following examination; no interval change.    ASA Status:  3 .    NPO Status:  > 8 hours    Plan for MAC Reason for MAC:  Deep or markedly invasive procedure (G8)  PONV prophylaxis:  Ondansetron (or other 5HT-3)       Postoperative Care  Postoperative pain management:  Multi-modal analgesia.      Consents  Anesthetic plan, risks, benefits and alternatives discussed with:  Patient and Spouse.  Use of blood products discussed: No .   .                 Tomas Hauser MD

## 2019-12-11 NOTE — PLAN OF CARE
Pt. A&o, vss, up with SBA, voiding adequate amount in urinal, denied pain, dressing CDI, possible surgery tomorrow. Will continue to monitor.

## 2019-12-11 NOTE — PLAN OF CARE
POD 7 from a L partial food amputation. A&O. CMS intact with baseline R BKA. Bowel sounds +x4, + flatus, tolerating MOD CHO diet. VSS. Dressing CDI. Up with SBA & prosthetic. Used urinal overnight. C/o moderate pain, decreased with PRN oxycodone & Tyenol. Headache relieved with Tylenol.  1st CHG bed bath & shampoo completed. Plan for L debridement & closure today at 1545 - NPO @ 8AM, 2nd CHG bed bath & Metformin, Microzide, & Lisinopril held.

## 2019-12-11 NOTE — PROGRESS NOTES
[facility]  Hospitalist Progress Note   12/11/2019          Assessment and Plan:       Ry Horner is a 54-year-old male with medical history of osteomyelitis with recent hospitalization for left gas gangrene, status post left hallux amputation on 11/19/2019 admitted from podiatry clinic for wound dehiscence on 12/3/2019.      S/P OR Excisional debridement, Excision of the sesamoids. Partial amputation of the left first metatarsal. Bone biopsy, proximal phalanx, left second toe on 12/4/2019:  2/2  Necrotic L foot wound, Osteomyelitis Left metatarsal, possible osteomyelitis left second toe proximal phalanx.  Directly admitted from Podiatry Clinic 2/2 wound dehiscence of recent left hallux amputation 11/19/2019. Wound Cultures then showed Bacteroides fragilis, Was treated with Augmentin.  WBC 12.4, CRP 31 CPK 51.  Lactic acid 0.7.  MRI Great toe amputation at the metatarsophalangeal joint with large soft tissue defect or wound extending to the first metatarsal head. Mild nonspecific marrow edema is noted within the first metatarsal head. Second toe proximal phalangeal marrow edema and enhancement. Given proximity to the wound, this may represent osteomyelitis as well.  Underwent Excisional debridement, Excision of the sesamoids. Partial amputation of the left first metatarsal. Bone biopsy, proximal phalanx, left second toe on 12/4/2019.   Surgical wound cultures no growth to date.  Left first metatarsal head,  left second toe bone biopsy is negative for osteomyelitis.  On IV Zosyn from 12/3, to continue.  Infectious disease following.  Podiatry following, plan for debridement and likely closure with possible VAC.  Post op pain controlled with Tylenol, oxycodone for moderate pain, and IV Dilaudid for severe pain.  Will need to wean off oxycodone as able to.  Appreciate podiatry, ID comanagement.  PT, OT, care coordinator assistance with transition    Uncontrolled insulin-dependent type 2 diabetes: With  hemoglobin A1c of 9.3.  Diabetic neuropathy.  Continue PTA insulin Lantus 58 units at bedtime.  Continue PTA metformin thousand milligrams twice daily.  Hold while n.p.o. for procedure.  Continue PTA linagliptin 5 mg oral daily.  Continue insulin sliding scale.  Will need optimization of insulin therapy, diabetic health maintenance as outpatient.     Hypertension:   Continue PTA lisinopril 40 mg oral daily.  Decrease dose of PTA hydrochlorothiazide from 50 mg to 12.5 mg anticipating procedure, borderline low blood pressures.  Optimize dose a.m.  PRN IV hydralazine.    CKD stage II.  Baseline creatinine 1.1-1.2.  Monitor renal function periodically.    History of recurrent right foot infection status post BKA 2017.  Stump appears clean no acute issues.     Hyperlipidemia.  PTA not on statin due to intolerance.   Continue PTA Zetia.  Needs age-appropriate health maintenance on PCP visit.    History of chronic anemia.  Baseline hemoglobin around 10.  Check hemoglobin levels a.m., iron studies.  Needs age-appropriate health maintenance on PCP visit.    Overweight.  Weight 116.6 kg's, requested BMI.    Orders Placed This Encounter      NPO per Anesthesia Guidelines for Procedure/Surgery Except for: Meds      DVT Prophylaxis: SCD, postprocedure pharmacological prophylaxis per surgical team.  Code Status: Full Code  Disposition: Expected discharge pending podiatry clearance.    Discussed with patient, bedside RN    Annette Pozo MD        Interval History:      Patient appears comfortable lying in bed.  N.p.o. for debridement and closure later today with podiatry.  Denies any chest pain or shortness of breath.  No headache or dizziness.  No nausea vomiting.         Physical Exam:        Physical Exam   Temp:  [97.8  F (36.6  C)-98.8  F (37.1  C)] 97.9  F (36.6  C)  Pulse:  [101] 101  Heart Rate:  [] 109  Resp:  [16-18] 16  BP: (114-157)/() 126/88  SpO2:  [96 %-98 %] 98 %    PHYSICAL EXAM  GENERAL: Patient  is in no distress. Alert and oriented.  HEART: Regular rate and rhythm. S1S2. No murmurs  LUNGS: Clear to auscultation bilaterally. No expiratory wheeze.  Respirations unlabored  ABDOMEN: Soft, no abdominal tenderness, bowel sounds heard   NEURO: Moving all extremities.  EXTREMITIES: Right below-knee amputation.  Left foot dressing intact.  SKIN: Warm, dry.  PSYCHIATRY Cooperative       Medications:          ezetimibe  10 mg Oral Daily     hydrochlorothiazide  12.5 mg Oral Daily     insulin aspart  1-7 Units Subcutaneous TID AC     insulin aspart  1-5 Units Subcutaneous At Bedtime     insulin glargine  58 Units Subcutaneous At Bedtime     linagliptin  5 mg Oral Daily     lisinopril  40 mg Oral Daily     metFORMIN  1,000 mg Oral BID w/meals     piperacillin-tazobactam  3.375 g Intravenous Q6H     sodium chloride (PF)  3 mL Intracatheter Q8H     acetaminophen, acetaminophen, glucose **OR** dextrose **OR** glucagon, hydrALAZINE, HYDROmorphone, lidocaine 4%, lidocaine (buffered or not buffered), melatonin, naloxone, ondansetron **OR** ondansetron, oxyCODONE, prochlorperazine **OR** prochlorperazine **OR** prochlorperazine, sodium chloride (PF)         Data:      All new lab and imaging data was reviewed.

## 2019-12-11 NOTE — PLAN OF CARE
Pt A&Ox4, VSS, CMS intact with baseline neuropathy to LLE, dressing C, D,I, NWB to LLE, voiding well , tylenol for pain management, bg 112,123 today, NPO at 0800 for OR this afternoon, IVF, continues on IV Zosyn. Pt transferred to pre op at 1445 today via cart.

## 2019-12-12 ENCOUNTER — TELEPHONE (OUTPATIENT)
Dept: PODIATRY | Facility: CLINIC | Age: 54
End: 2019-12-12

## 2019-12-12 VITALS
HEIGHT: 72 IN | BODY MASS INDEX: 34.81 KG/M2 | HEART RATE: 101 BPM | SYSTOLIC BLOOD PRESSURE: 141 MMHG | DIASTOLIC BLOOD PRESSURE: 84 MMHG | OXYGEN SATURATION: 97 % | WEIGHT: 257 LBS | RESPIRATION RATE: 16 BRPM | TEMPERATURE: 98.4 F

## 2019-12-12 LAB
ANION GAP SERPL CALCULATED.3IONS-SCNC: 2 MMOL/L (ref 3–14)
BUN SERPL-MCNC: 16 MG/DL (ref 7–30)
CALCIUM SERPL-MCNC: 9.1 MG/DL (ref 8.5–10.1)
CHLORIDE SERPL-SCNC: 105 MMOL/L (ref 94–109)
CO2 SERPL-SCNC: 30 MMOL/L (ref 20–32)
CREAT SERPL-MCNC: 1.29 MG/DL (ref 0.66–1.25)
GFR SERPL CREATININE-BSD FRML MDRD: 63 ML/MIN/{1.73_M2}
GLUCOSE BLDC GLUCOMTR-MCNC: 123 MG/DL (ref 70–99)
GLUCOSE BLDC GLUCOMTR-MCNC: 247 MG/DL (ref 70–99)
GLUCOSE SERPL-MCNC: 142 MG/DL (ref 70–99)
HGB BLD-MCNC: 10.8 G/DL (ref 13.3–17.7)
POTASSIUM SERPL-SCNC: 4 MMOL/L (ref 3.4–5.3)
SODIUM SERPL-SCNC: 137 MMOL/L (ref 133–144)
WBC # BLD AUTO: 9.6 10E9/L (ref 4–11)

## 2019-12-12 PROCEDURE — 80048 BASIC METABOLIC PNL TOTAL CA: CPT | Performed by: PODIATRIST

## 2019-12-12 PROCEDURE — 25000128 H RX IP 250 OP 636: Performed by: PODIATRIST

## 2019-12-12 PROCEDURE — 85018 HEMOGLOBIN: CPT | Performed by: PODIATRIST

## 2019-12-12 PROCEDURE — L3260 AMBULATORY SURGICAL BOOT EAC: HCPCS

## 2019-12-12 PROCEDURE — 25000132 ZZH RX MED GY IP 250 OP 250 PS 637: Performed by: PODIATRIST

## 2019-12-12 PROCEDURE — 00000146 ZZHCL STATISTIC GLUCOSE BY METER IP

## 2019-12-12 PROCEDURE — 99239 HOSP IP/OBS DSCHRG MGMT >30: CPT | Performed by: HOSPITALIST

## 2019-12-12 PROCEDURE — 40000893 ZZH STATISTIC PT IP EVAL DEFER: Performed by: PHYSICAL THERAPIST

## 2019-12-12 PROCEDURE — 25800025 ZZH RX 258: Performed by: PODIATRIST

## 2019-12-12 PROCEDURE — 85048 AUTOMATED LEUKOCYTE COUNT: CPT | Performed by: PODIATRIST

## 2019-12-12 RX ORDER — OXYCODONE HYDROCHLORIDE 5 MG/1
2.5-5 TABLET ORAL EVERY 6 HOURS PRN
Qty: 12 TABLET | Refills: 0 | Status: ON HOLD | OUTPATIENT
Start: 2019-12-12 | End: 2020-02-03

## 2019-12-12 RX ORDER — FERROUS SULFATE 325(65) MG
325 TABLET ORAL
Qty: 30 TABLET | Refills: 0 | Status: SHIPPED | OUTPATIENT
Start: 2019-12-12 | End: 2020-02-17

## 2019-12-12 RX ORDER — ACETAMINOPHEN 325 MG/1
650 TABLET ORAL EVERY 8 HOURS PRN
Refills: 0 | Status: ON HOLD | COMMUNITY
Start: 2019-12-12 | End: 2020-02-03

## 2019-12-12 RX ORDER — HYDROCHLOROTHIAZIDE 50 MG/1
25 TABLET ORAL DAILY
Qty: 90 TABLET | Refills: 3 | Status: ON HOLD | COMMUNITY
Start: 2019-12-12 | End: 2020-02-03

## 2019-12-12 RX ADMIN — METFORMIN HYDROCHLORIDE 1000 MG: 500 TABLET, FILM COATED ORAL at 08:48

## 2019-12-12 RX ADMIN — PIPERACILLIN AND TAZOBACTAM 3.38 G: 3; .375 INJECTION, POWDER, FOR SOLUTION INTRAVENOUS at 08:48

## 2019-12-12 RX ADMIN — LINAGLIPTIN 5 MG: 5 TABLET, FILM COATED ORAL at 08:48

## 2019-12-12 RX ADMIN — OXYCODONE HYDROCHLORIDE 10 MG: 5 TABLET ORAL at 01:19

## 2019-12-12 RX ADMIN — LISINOPRIL 40 MG: 40 TABLET ORAL at 08:48

## 2019-12-12 RX ADMIN — POTASSIUM CHLORIDE: 149 INJECTION, SOLUTION, CONCENTRATE INTRAVENOUS at 01:17

## 2019-12-12 RX ADMIN — EZETIMIBE 10 MG: 10 TABLET ORAL at 08:48

## 2019-12-12 RX ADMIN — OXYCODONE HYDROCHLORIDE 5 MG: 5 TABLET ORAL at 04:15

## 2019-12-12 RX ADMIN — HYDROCHLOROTHIAZIDE 12.5 MG: 12.5 CAPSULE ORAL at 08:48

## 2019-12-12 RX ADMIN — PIPERACILLIN AND TAZOBACTAM 3.38 G: 3; .375 INJECTION, POWDER, FOR SOLUTION INTRAVENOUS at 01:20

## 2019-12-12 ASSESSMENT — ACTIVITIES OF DAILY LIVING (ADL)
ADLS_ACUITY_SCORE: 16
ADLS_ACUITY_SCORE: 14
ADLS_ACUITY_SCORE: 16

## 2019-12-12 NOTE — PLAN OF CARE
Pt A&Ox4, VSS, CMS intact with baseline neuropathy, dressing C,D,I, up with SBA and walker to BR, NWB LLE, oxycodone/tylenol for pain management, IV SL, tolerating mod carb diet, bg WNL. Pt discharged to home with significant other. Pt verbalizes understanding of discharge instructions/medications/follow up plan. Pt significant other present during education.

## 2019-12-12 NOTE — PROGRESS NOTES
S: Order received for a DH2 shoe for the L.    O:  The patient is sitting up in bed getting ready to shower.  I donned a size large DH2 shoe on the L foot.  A:  The DH2 shoe fits the L foot adequate and will accommodate the L foot dressings and bandages.  P:  The patient will wear the DH2 shoe when weight bearing.  G:  Provide DH2 shoe to accommodate the L foot.  Jimmy DUEÑAS

## 2019-12-12 NOTE — TELEPHONE ENCOUNTER
Reason for Call:  Other appointment    Detailed comments: Care coordinator from  [facility] called to see if pt can be seen sooner than his scheduled appt on 12/26. This would be a new pt appt and pt was discharged from the hospital today. Please f/u with pt if this can or cannot be done.     Phone Number Patient can be reached at: Home number on file [phone] (home)        Call taken on 12/12/2019 at 3:54 PM by Saumya Burns

## 2019-12-12 NOTE — PROGRESS NOTES
[facility]    Infectious Disease Progress Note    Date of Service (when I saw the patient): 12/12/2019     Assessment & Plan   Ry Horner is a 54 year old male who was admitted on 12/3/2019.     Impression:     1. 54 y.o male with diabetes with peripheral polyneuropathy.  2. History of infection in the right foot leading to amputation at the knee.   3. Recently Admitted with severely infected left foot. Gas gangrene of left foot. Osteomyelitis of left great toe. S/PLeft hallux amputation with irrigation and debridement.  4. CKD.   5. Was discharged home on oral Augmentin, presenting now with his foot became macerated.  The wound VAC was discontinued   He was seen by Podiatry Clinic with exposed bone.   6. Admitted for IV antibiotics and surgical debridement. s/p I and D and amputation.         Recommendations:   Done with 10 days of zosyn while admitted, if all bony infection deemed surgically removed another 7 days of oral Augmentin for discharge,            Franklin Maza MD    Interval History   Afebrile no new complaints     Physical Exam   Temp: 98.3  F (36.8  C) Temp src: Oral BP: 126/82 Pulse: 97 Heart Rate: 95 Resp: 16 SpO2: 98 % O2 Device: None (Room air) Oxygen Delivery: 6 LPM  Vitals:    12/11/19 1522   Weight: 116.6 kg (257 lb)     Vital Signs with Ranges  Temp:  [97  F (36.1  C)-98.4  F (36.9  C)] 98.3  F (36.8  C)  Pulse:  [] 97  Heart Rate:  [] 95  Resp:  [10-18] 16  BP: ()/() 126/82  SpO2:  [96 %-100 %] 98 %    Constitutional: Awake, alert, cooperative, no apparent distress  Lungs: Clear to auscultation bilaterally, no crackles or wheezing  Cardiovascular: Regular rate and rhythm, normal S1 and S2, and no murmur noted  Abdomen: Normal bowel sounds, soft, non-distended, non-tender  Skin: No rashes, no cyanosis, no edema  Other:    Medications     IV infusion builder WITH LARGE additive list 125 mL/hr at 12/12/19 0117       ezetimibe  10 mg Oral Daily      hydrochlorothiazide  12.5 mg Oral Daily     insulin aspart  1-7 Units Subcutaneous TID AC     insulin aspart  1-5 Units Subcutaneous At Bedtime     insulin glargine  58 Units Subcutaneous At Bedtime     linagliptin  5 mg Oral Daily     lisinopril  40 mg Oral Daily     metFORMIN  1,000 mg Oral BID w/meals     piperacillin-tazobactam  3.375 g Intravenous Q6H     sodium chloride (PF)  3 mL Intracatheter Q8H       Data   All microbiology laboratory data reviewed.  Recent Labs   Lab Test 12/12/19  0620 12/11/19  1127 12/03/19  1606 11/22/19  0624   WBC 9.6 11.2* 12.4* 17.4*   HGB 10.8* 10.6* 11.1* 9.7*   HCT  --  32.6* 33.6* 29.5*   MCV  --  85 85 87   PLT  --  332 503* 401     Recent Labs   Lab Test 12/12/19  0620 12/11/19  1127 12/07/19  0647   CR 1.29* 1.26* 1.19     Recent Labs   Lab Test 12/03/19  1727   SED 88*     Recent Labs   Lab Test 12/04/19  1619 11/19/19  1829 11/17/19  1525 11/17/19  1419 11/17/19  1411 08/28/17  1136 08/28/17  1059 08/28/17  1054 07/15/16  1521   CULT No anaerobes isolated  No growth Light growth  Bacteroides fragilis  *  Light growth  Parvimonas micra  *  Susceptibility testing not routinely done  On day 2, isolated in broth only:  beta hemolytic   Streptococcus constellatus  * Heavy growth  beta hemolytic   Streptococcus constellatus  Susceptibility testing done on previous specimen  *  Light growth  Alcaligenes faecalis  *  Light growth  Staphylococcus aureus  *  On day 1, isolated in broth only:  Anaerobic gram negative rods  See anaerobic report for identification  * Heavy growth  Bacteroides fragilis  Susceptibility testing not routinely done  *  Heavy growth  Peptoniphilus asaccharolyticus  Susceptibility testing not routinely done  *  Moderate growth  beta hemolytic   Streptococcus constellatus  *  On day 1, isolated in broth only:  Anaerobic gram negative rods  See anaerobic report for identification  * Heavy growth  Bacteroides fragilis  *  Heavy growth  Parvimonas  micra  *  Heavy growth  Peptoniphilus asaccharolyticus  *  Heavy growth  Mixed aerobic and anaerobic rosa  *  Susceptibility testing not routinely done No growth No growth Heavy growth  Beta hemolytic Streptococcus group G  *  Moderate growth  Proteus vulgaris  *  Moderate growth  Normal skin rosa   No anaerobes isolated  On day 1, isolated in broth only: Staphylococcus simulans This isolate is   presumed to be clindamycin resistant based on detection of inducible   clindamycin resistance. Erythromycin and clindamycin are resistant, therefore,   they are not recommended for use.  On day 1, isolated in broth only: Strain 2 Staphylococcus simulans This isolate   is presumed to be clindamycin resistant based on detection of inducible   clindamycin resistance. Erythromycin and clindamycin are resistant, therefore,   they are not recommended for use.  Strain 1 found to have inducible  Clindamycin resistance also,  results updated  *       Attestation:  Total time on the floor involved in the patient's care: 35 minutes. Total time spent in counseling/care coordination: >50%

## 2019-12-12 NOTE — DISCHARGE SUMMARY
Discharge Summary  Hospitalist    Date of Admission:  12/3/2019  Date of Discharge:  12/12/2019  Discharging Provider: Annette Pozo MD    Primary Care Physician   Kody Wing  Primary Care Provider Phone Number: [phone]  Primary Care Provider Fax Number: [phone]    PRINCIPAL DIAGNOSIS  S/P OR Excisional debridement, Excision of the sesamoids. Partial amputation of the left first metatarsal. Bone biopsy, proximal phalanx, left second toe on 12/4/2019  Status post partial amputation with closure 12/11/2019.  2/2  Necrotic L foot wound, Osteomyelitis Left metatarsal, possible osteomyelitis left second toe proximal phalanx.  Uncontrolled insulin-dependent type 2 diabetes: With hemoglobin A1c of 9.3.      Past Medical History:   Diagnosis Date     BENIGN HYPERTENSION 4/4/2007     DIABETES MELLITUS TYPE II-UNCOMPL 4/4/2007     HYPERLIPIDEMIA NEC/NOS 4/4/2007     Tobacco use disorder 4/4/2007       History of Present Illness   Ry Horner is an 54 year old male who presented with wound dehiscence.    Hospital Course   Ry Horner is a 54-year-old male with medical history of osteomyelitis with recent hospitalization for left gas gangrene, status post left hallux amputation on 11/19/2019 admitted from podiatry clinic for wound dehiscence on 12/3/2019.       S/P OR Excisional debridement, Excision of the sesamoids. Partial amputation of the left first metatarsal. Bone biopsy, proximal phalanx, left second toe on 12/4/2019  Status post partial amputation with closure 12/11/2019.  2/2  Necrotic L foot wound, Osteomyelitis Left metatarsal, possible osteomyelitis left second toe proximal phalanx.  Directly admitted from Podiatry Clinic 2/2 wound dehiscence of recent left hallux amputation 11/19/2019. Wound Cultures then showed Bacteroides fragilis, Was treated with Augmentin.  WBC 12.4, CRP 31 CPK 51.  Lactic acid 0.7.  MRI Great toe amputation at the metatarsophalangeal joint with large soft tissue defect or  wound extending to the first metatarsal head. Mild nonspecific marrow edema is noted within the first metatarsal head. Second toe proximal phalangeal marrow edema and enhancement. Given proximity to the wound, this may represent osteomyelitis as well.  Underwent Excisional debridement, Excision of the sesamoids. Partial amputation of the left first metatarsal. Bone biopsy, proximal phalanx, left second toe on 12/4/2019.    Surgical wound cultures no growth to date.  Left first metatarsal head,  left second toe bone biopsy is negative for osteomyelitis.  Was followed by infectious disease during hospitalization.  Was on IV Zosyn from 12/3 to 12/12.  Infectious disease recommend oral Augmentin for 7 days at discharge.   Underwent partial amputation with wound closure on 12/11/2019.  Postoperative recommendations per podiatry, nonweightbearing.  Commended Tylenol for pain, oxycodone wean off as tolerated [prescription for 12 tablets provided at time of discharge]  Evaluated by physical therapy, recommend home with her significant other supervision on stairs.    Uncontrolled insulin-dependent type 2 diabetes: With hemoglobin A1c of 9.3.  Diabetic neuropathy.  Continue PTA insulin Lantus 58 units at bedtime.  Continue PTA metformin thousand milligrams twice daily.  Continue PTA linagliptin 5 mg oral daily.  Will need optimization of insulin therapy, diabetic health maintenance as outpatient.     Hypertension:   Continue PTA lisinopril 40 mg oral daily.  Decrease dose of PTA hydrochlorothiazide from 50 mg to 12.5 mg  during hospitalization anticipating procedures, borderline low blood pressure.  Increase dose to 25 mg oral daily at discharge.  Monitor blood pressure readings and review on provider visit and optimize therapy accordingly.    CKD stage II.  Baseline creatinine 1.1-1.2.  Monitor renal function periodically.  Avoid nephrotoxic drugs.     History of recurrent right foot infection status post BKA 2017.  Stump  appears clean no acute issues.     Hyperlipidemia.  PTA not on statin due to intolerance.   Continue PTA Zetia.  Needs age-appropriate health maintenance on PCP visit.     History of chronic anemia.  Baseline hemoglobin around 10.  Hemoglobin around baseline.  Iron saturation index 14.  Started on ferrous sulfate supplements oral once a day.  Needs age-appropriate health maintenance on PCP visit.     Obesity with a BMI of 34.86.  Need to consider lifestyle modification with diet and exercise as able to.    Annette Pozo MD, MD    Pending Results   Unresulted Labs Ordered in the Past 30 Days of this Admission     No orders found from 11/3/2019 to 12/4/2019.             Physical Exam   Vitals:    12/11/19 1522   Weight: 116.6 kg (257 lb)     Vital Signs with Ranges  Temp:  [97  F (36.1  C)-98.4  F (36.9  C)] 98.4  F (36.9  C)  Pulse:  [] 101  Heart Rate:  [] 100  Resp:  [10-18] 16  BP: ()/() 141/84  SpO2:  [96 %-100 %] 97 %  I/O last 3 completed shifts:  In: 2347 [P.O.:420; I.V.:1927]  Out: 1700 [Urine:1700]  PHYSICAL EXAM  GENERAL: Patient is in no distress. Alert and oriented.  HEART: Regular rate and rhythm. S1S2. No murmurs  LUNGS: Clear to auscultation bilaterally. No expiratory wheeze.  Respirations unlabored  ABDOMEN: Soft, no abdominal tenderness, bowel sounds heard   NEURO: Moving all extremities.  EXTREMITIES: Right below-knee amputation.  Left foot dressing intact.  SKIN: Warm, dry.  PSYCHIATRY Cooperative    )Consultations This Hospital Stay   PODIATRY IP CONSULT  CARE TRANSITION RN/SW IP CONSULT  INFECTIOUS DISEASES IP CONSULT  PHYSICAL THERAPY ADULT IP CONSULT  CARE COORDINATOR IP CONSULT  ORTHOSIS EXTREMITY LOWER REFERRAL IP CONSULT    Time Spent on this Encounter   I, Annette Pozo MD, personally saw the patient today and spent greater than 30 minutes discharging this patient.  Discussed with patient, bedside RN.    Discharge Orders      Follow Up    With Dr. Douglas 1-2  weeks post discharge.    Thank you for choosing [facility] Pod[facility] / Foot & Ankle Surgery!    DR. FUENTES'S CLINIC LOCATIONS    MONDAY - [place] WEDNESDAY (AM ONLY) - [place]  [address] [place] [address] [place] [address]  [phone] / [phone] / [phone]     THURSDAY - [place] SCHEDULE SURGERY: [phone]  [address] APPOINTMENTS: [phone]  [place] [address] BILLING QUESTIONS: [phone] / [phone]     Activity    Strict non weight bearing, left foot     Dressing    The dressing place by Podiatry on POD #1 should stay on and intact until follow up in clinic.     Reason for your hospital stay    Admitted from podiatry clinic for wound dehiscence on 12/3/2019.  Underwent Excisional debridement, Excision of the sesamoids. Partial amputation of the left first metatarsal. Bone biopsy, proximal phalanx, left second toe on 12/4/2019, followed by podiatry, ID.  Treated with IV antibiotics and switch to oral antibiotics at time of discharge.     Follow-up and recommended labs and tests     Follow up with primary care provider, Kody Wing, within 7 days for hospital follow- up.  The following labs/tests are recommended: Creatinine, blood sugars.     Follow-up and recommended labs and tests     Follow-up with podiatry per schedule.  Age-appropriate health maintenance including anemia work-up on PCP visit.     When to contact your care team    Contact your medical provider if any worsening pain or oozing discharge from surgical site.     Discharge Instructions    Ambulation, weightbearing per podiatry  Avoid nephrotoxic drugs.  Must monitor blood sugars at least 2 times a day and review blood sugar log on PCP visit.  Must monitor blood pressures at home if able to and review on provider visit.     Diet    Follow this diet upon discharge: Orders Placed This Encounter      Consistent Carbohydrate Diet 4650-3872  Calories: Moderate Consistent CHO (4-6 CHO units/meal)  Low-salt, low-fat diet emphasized.       Discharge Medications   Current Discharge Medication List      START taking these medications    Details   acetaminophen (TYLENOL) 325 MG tablet Take 2 tablets (650 mg) by mouth every 8 hours as needed for pain  Refills: 0    Associated Diagnoses: Open wound of right foot, initial encounter      amoxicillin-clavulanate (AUGMENTIN) 875-125 MG tablet Take 1 tablet by mouth 2 times daily  Qty: 14 tablet, Refills: 0    Associated Diagnoses: Open wound of right foot, initial encounter      ferrous sulfate (FEROSUL) 325 (65 Fe) MG tablet Take 1 tablet (325 mg) by mouth daily (with breakfast)  Qty: 30 tablet, Refills: 0    Comments: Future refills by PCP Dr. Kody Wing with phone number [phone].  Associated Diagnoses: Iron deficiency anemia, unspecified iron deficiency anemia type      oxyCODONE (ROXICODONE) 5 MG tablet Take 0.5-1 tablets (2.5-5 mg) by mouth every 6 hours as needed for severe pain  Qty: 12 tablet, Refills: 0    Associated Diagnoses: Open wound of right foot, initial encounter         CONTINUE these medications which have CHANGED    Details   hydrochlorothiazide (HYDRODIURIL) 50 MG tablet Take 0.5 tablets (25 mg) by mouth daily Dose decreased to 25 mg  Qty: 90 tablet, Refills: 3    Associated Diagnoses: Benign essential hypertension         CONTINUE these medications which have NOT CHANGED    Details   aspirin 81 MG chewable tablet Take 1 tablet (81 mg) by mouth daily  Qty: 108 tablet, Refills: 3    Associated Diagnoses: Type 2 diabetes mellitus with diabetic polyneuropathy, without long-term current use of insulin (H)      ezetimibe (ZETIA) 10 MG tablet Take 1 tablet (10 mg) by mouth daily  Qty: 90 tablet, Refills: 3    Associated Diagnoses: Hyperlipidemia LDL goal <100      insulin glargine (LANTUS PEN) 100 UNIT/ML pen Inject 58 Units Subcutaneous At Bedtime    Comments: If Lantus is not covered by  insurance, may substitute Basaglar at same dose and frequency.        ipratropium (ATROVENT) 0.06 % spray Spray 2 sprays into both nostrils 4 times daily as needed for rhinitis  Qty: 3 Box, Refills: 0    Associated Diagnoses: Runny nose      lisinopril (PRINIVIL/ZESTRIL) 40 MG tablet Take 1 tablet (40 mg) by mouth daily  Qty: 90 tablet, Refills: 3    Associated Diagnoses: Essential hypertension, benign      metFORMIN (GLUCOPHAGE) 1000 MG tablet Take 1 tablet (1,000 mg) by mouth 2 times daily (with meals)  Qty: 180 tablet, Refills: 3    Associated Diagnoses: Type 2 diabetes mellitus with diabetic polyneuropathy, without long-term current use of insulin (H)      multivitamin (CENTRUM SILVER) tablet Take 1 tablet by mouth daily      TRADJENTA 5 MG TABS tablet TAKE ONE TABLET BY MOUTH EVERY DAY  Qty: 90 tablet, Refills: 3    Associated Diagnoses: Type 2 diabetes mellitus with diabetic polyneuropathy, without long-term current use of insulin (H)      ACCU-CHEK JAYNA PLUS test strip TEST BLOOD SUGARS 2 TO 3 TIMES DAILY OR AS DIRECTED  Qty: 100 each, Refills: 3    Associated Diagnoses: Type 2 diabetes mellitus with diabetic polyneuropathy, without long-term current use of insulin (H)      blood glucose (NO BRAND SPECIFIED) lancets standard Use to test blood sugar 2-3 times daily or as directed.  Qty: 100 each, Refills: 11    Comments: Accu Chek softclix lancets or brand desired by insurance  Associated Diagnoses: Type 2 diabetes mellitus with diabetic polyneuropathy, without long-term current use of insulin (H)      blood glucose monitoring (NO BRAND SPECIFIED) meter device kit Use to test blood sugar 2-3 times daily or as directed.  Qty: 1 kit, Refills: 1    Comments: Accu Chek Jayna meter or brand desired by insurance  Associated Diagnoses: Type 2 diabetes mellitus with diabetic polyneuropathy, without long-term current use of insulin (H)      insulin pen needle (BD PEN NEEDLE MEGAN 2ND GEN) 32G X 4 MM miscellaneous Use  "as directed with Basaglar (1 box = 100 days)  Qty: 100 each, Refills: 1    Associated Diagnoses: Type 2 diabetes mellitus with diabetic polyneuropathy, without long-term current use of insulin (H)      order for DME Equipment being ordered: Wheelchair Treatment Diagnosis: Diabetic Foot wound with L hallux amputation 2/2 gangrene  Qty: 1 Units, Refills: 0    Associated Diagnoses: Osteomyelitis (H)      STATIN NOT PRESCRIBED (INTENTIONAL) Please choose reason not prescribed, below    Associated Diagnoses: Hyperlipidemia LDL goal <100           Allergies   Allergies   Allergen Reactions     Pravastatin      \"sucked the life blood out of me\"       Discharge Disposition   Discharged to home  Condition at discharge: Stable    DATA  Most Recent 3 CBC's:  Recent Labs   Lab Test 12/12/19  0620 12/11/19  1127 12/03/19  1606 11/22/19  0624   WBC 9.6 11.2* 12.4* 17.4*   HGB 10.8* 10.6* 11.1* 9.7*   MCV  --  85 85 87   PLT  --  332 503* 401      Most Recent 3 BMP's:  Recent Labs   Lab Test 12/12/19  0620 12/11/19  1127 12/07/19  0647    138 141   POTASSIUM 4.0 4.2 4.0   CHLORIDE 105 105 106   CO2 30 28 31   BUN 16 19 21   CR 1.29* 1.26* 1.19   ANIONGAP 2* 5 4   FERNANDO 9.1 9.0 9.0   * 120* 110*     Most Recent 2 LFT's:  Recent Labs   Lab Test 12/11/19  1127 12/03/19  1606   AST 20 15   ALT 28 40   ALKPHOS 81 95   BILITOTAL 0.4 0.3       Most Recent 6 Bacteria Isolates From Any Culture (See EPIC Reports for Culture Details):  Recent Labs   Lab Test 12/04/19  1619 11/19/19  1829 11/17/19  1525 11/17/19  1419 11/17/19  1411 08/28/17  1136   CULT No anaerobes isolated  No growth Light growth  Bacteroides fragilis  *  Light growth  Parvimonas micra  *  Susceptibility testing not routinely done  On day 2, isolated in broth only:  beta hemolytic   Streptococcus constellatus  * Heavy growth  beta hemolytic   Streptococcus constellatus  Susceptibility testing done on previous specimen  *  Light growth  Alcaligenes " faecalis  *  Light growth  Staphylococcus aureus  *  On day 1, isolated in broth only:  Anaerobic gram negative rods  See anaerobic report for identification  * Heavy growth  Bacteroides fragilis  Susceptibility testing not routinely done  *  Heavy growth  Peptoniphilus asaccharolyticus  Susceptibility testing not routinely done  *  Moderate growth  beta hemolytic   Streptococcus constellatus  *  On day 1, isolated in broth only:  Anaerobic gram negative rods  See anaerobic report for identification  * Heavy growth  Bacteroides fragilis  *  Heavy growth  Parvimonas micra  *  Heavy growth  Peptoniphilus asaccharolyticus  *  Heavy growth  Mixed aerobic and anaerobic rosa  *  Susceptibility testing not routinely done No growth     Most Recent TSH, T4 and A1c Labs:  Recent Labs   Lab Test 11/17/19  0856  06/02/17  1021   TSH  --   --  1.06   A1C 9.3*   < > 9.4*    < > = values in this interval not displayed.     Results for orders placed or performed during the hospital encounter of 12/03/19   MR Foot Left w/o & w Contrast    Narrative    MR FOOT LEFT WITH AND WITHOUT CONTRAST December 3, 2019 11:41 PM     HISTORY: Evaluate osteomyelitis.    DOSE: 11 mL Gadavist.    COMPARISON: 11/17/2019 MR and 12/3/2019 radiographs.    FINDINGS: Again noted is an amputation of the great toe at the  metatarsophalangeal joint with a large soft tissue defect or wound  extending down to the first metatarsal head. Mild marrow edema and  enhancement are noted in the first metatarsal head raising the  suspicion of osteomyelitis. In addition, there is now marrow edema and  enhancement within the second toe proximal phalanx, new since  11/17/2019. Increased signal intensity on fat-suppressed T1-weighted  images in the additional toes is likely artifactual. Mild patient  motion artifact is noted. Within the forefoot, there is dorsal and  deeper soft tissue edema and enhancement. There also appears to be rim  enhancement along the long  flexor tendons including the flexor  hallucis longus and flexor digitorum longus tendons along the plantar  aspect of the midfoot. Punctate regions of very low signal intensity  could represent soft tissue gas though soft tissue gas is not  definitively confirmed on recent radiographs. On the proximalmost  images, trace fluid is noted in the distal aspect of the posterior  tibialis and peroneus longus tendon sheaths of indeterminate  significance.      Impression    IMPRESSION:  1. Great toe amputation at the metatarsophalangeal joint with large  soft tissue defect or wound extending to the first metatarsal head.  Mild nonspecific marrow edema is noted within the first metatarsal  head which could be reactive. However, developing osteomyelitis is not  excluded.  2. Second toe proximal phalangeal marrow edema and enhancement. Given  proximity to the wound, this may represent osteomyelitis as well.  3. Dorsal and deeper soft tissue edema/enhancement within the  forefoot. MR cannot distinguish reactive edema from cellulitis.  4. Fluid/rim enhancement involving the flexor hallucis longus and  flexor digitorum tendon sheaths within the midfoot. Punctate regions  of low signal intensity may represent tendon sheath or soft tissue gas  but is not confirmed on recent radiographs. However, the finding is  highly suspicious for infectious tenosynovitis given the large wound  present distally.  5. Mild fluid within the visualized distal portions of the posterior  tibialis and peroneus longus tendon sheaths suggesting nonspecific  tenosynovitis. This is of uncertain significance.    JOHN FALCON MD   XR Foot Port Left 3 Views    Narrative    XR FOOT PORT LT 3 VW 12/4/2019 5:19 PM     HISTORY: post op    COMPARISON: 12/3/2019      Impression    IMPRESSION: The amputation of the first ray has been extended in the  interval and is now present at the level of the proximal first  metatarsal diaphysis. Small amount of postoperative  air is present.     XOCHITL CUELLAR MD   XR Surgery ABELARDO L/T 5 Min Fluoro w Stills    Narrative    XR SURGERY C-ARM FLUOROSCOPY LESS THAN FIVE MINUTES WITH STILLS  12/4/2019 4:25 PM     COMPARISON: 7/15/2016.    HISTORY: Left foot; Osteomyelitis (H)    NUMBER OF IMAGES ACQUIRED: 4    VIEWS: 2    FLUOROSCOPY TIME: .7 minutes.      Impression    IMPRESSION: Posterior plantar heel soft tissue defect is again seen.  The defect now extends all the way to the bone. The underlying bone  appears irregular which may be related to infection or recent  biopsy/debridement. First ray amputation at the mid first metatarsal  diaphysis.    RADHA DE SANTIAGO MD   XR Foot Port Left 3 Views    Narrative    PORTABLE THREE VIEWS LEFT FOOT   12/11/2019 7:53 PM    HISTORY: Postoperative evaluation.    COMPARISON: 12/4/2019      Impression    IMPRESSION: Again seen is amputation of the first ray at the level of  the midshaft of the metatarsal. No other abnormality is noted. I see  no definite change since the previous examination.      CHRIS ROBERTS MD

## 2019-12-12 NOTE — BRIEF OP NOTE
[facility]    Brief Operative Note    Pre-operative diagnosis: Diabetic infection of left foot (H) [E11.628, L08.9]  Post-operative diagnosis Same as pre-operative diagnosis    Procedure: Procedure(s):  IRRIGATION AND DEBRIDEMENT FOOT, Partial osteotomy left first metatarsal  POSSIBLE PARTIAL FOOT AMPUTATION  Surgeon: Surgeon(s) and Role:     * Tim Douglas DPM - Primary  Anesthesia: General   Estimated blood loss: 10 ml  Drains: None  Specimens: * No specimens in log *  Findings:   None.  Complications: None.  Implants: * No implants in log *    Plan:  Will check surgical site tomorrow.   Closed without difficulty.  Will place foot-relevant discharge orders  Discharge antibiotic plan per ID; no additional cultures or bone biopsy    Tim Douglas DPM, FACFAS, MS    [place] [facility]/Foot & Ankle Surgery

## 2019-12-12 NOTE — PLAN OF CARE
Discharge Planner PT   Patient plan for discharge: Home with S.O.  Current status: PT orders received, PT screen completed. 53 y/o male s/p multiple L foot surgeries for osteomyelitis, POD#1 s/p partial amputation with closure; strict NWB LLE. PMH includes R BKA with prosthesis, uncontrolled DM.    Today pt able to demonstrate IND with bed mobility, IND donning/doffing R prosthesis, Mod IND with sit<>Stand to FWW, Mod IND to ambulate 30' with FWW and excellent adherence to NWB on L. Pt reports using FWW for household mobility and w/c for community mobility has been working well.   Barriers to return to prior living situation: None anticipated  Recommendations for discharge: Home with S.O supervision on stairs, use of FWW for household ambulation, w/c for community mobility as prior  Rationale for recommendations: Pt demonstrates IND with functional mobility, no skilled PT needs identified, all AD/AE needs met at home. PT orders completed.       Entered by: Ana María Mcdowell 12/12/2019 2:20 PM

## 2019-12-12 NOTE — PLAN OF CARE
Returned to floor around 1945.    AxO x4. VSS. Pain managed with PRN Oxycodone. Denies nausea. R CMS intact. DANIE pulses due to surgical wrap. L foot in bandage, CDI. R BKA, prosthetic in room. Good UO post-surgery. Bedrest overnight. Mod carb diet. Discharge pending.

## 2019-12-12 NOTE — PROGRESS NOTES
[facility]  December 12, 2019    A/P:  55 y/o DM male s/p multiple L foot surgeries for osteomyelitis, s/p partial amputation with closure yesterday by Dr. Douglas.  Foot is stable.    -Discussed condition and instructions with pt.  -Sterile dressing applied.  Keep c/d/i until follow up.  -Elevate foot.  -NWB L foot.  Will order post op shoe.  -Abx per ID.  -Pt ok for discharge from our standpoint.  Orders placed.  F/u with Dr. Douglas within 1 wk of discharge.  -Will sign off.    Antoine Pena DPM, FACFAS  Pager: [phone]    S:  Pt seen at bedside.  No acute concerns.    O:  /82   Pulse 97   Temp 98.3  F (36.8  C) (Oral)   Resp 16   Ht 1.829 m (6')   Wt 116.6 kg (257 lb)   SpO2 98%   BMI 34.86 kg/m    NAD.  L foot incision coapted.  Very minimal local erythema.  Some maceration distally, betadine applied.  No active drainage.        Lab Results   Component Value Date    WBC 9.6 12/12/2019     Lab Results   Component Value Date    RBC 3.83 12/11/2019     Lab Results   Component Value Date    HGB 10.8 12/12/2019     Lab Results   Component Value Date    HCT 32.6 12/11/2019     No components found for: MCT  Lab Results   Component Value Date    MCV 85 12/11/2019     Lab Results   Component Value Date    MCH 27.7 12/11/2019     Lab Results   Component Value Date    MCHC 32.5 12/11/2019     Lab Results   Component Value Date    RDW 12.6 12/11/2019     Lab Results   Component Value Date     12/11/2019

## 2019-12-12 NOTE — ANESTHESIA CARE TRANSFER NOTE
Patient: Ry Horner    Procedure(s):  IRRIGATION AND DEBRIDEMENT FOOT, Partial osteotomy left first metatarsal  POSSIBLE PARTIAL FOOT AMPUTATION    Diagnosis: Diabetic infection of left foot (H) [E11.628, L08.9]  Diagnosis Additional Information: No value filed.    Anesthesia Type:   MAC     Note:  Airway :Face Mask  Patient transferred to:PACU  Comments: At end of procedure, spontaneous respirations, patient alert to voice, able to follow commands. Oxygen via facemask at 6 liters per minute to PACU. Oxygen tubing connected to wall O2 in PACU, SpO2, NiBP, and EKG monitors and alarms on and functioning, [place] Hugger warmer connected to patient gown, report on patient's clinical status given to PACU RN, RN questions answered.Handoff Report: Identifed the Patient, Identified the Reponsible Provider, Reviewed the pertinent medical history, Discussed the surgical course, Reviewed Intra-OP anesthesia mangement and issues during anesthesia, Set expectations for post-procedure period and Allowed opportunity for questions and acknowledgement of understanding      Vitals: (Last set prior to Anesthesia Care Transfer)    CRNA VITALS  12/11/2019 1845 - 12/11/2019 1919      12/11/2019             Resp Rate (set):  10                Electronically Signed By: LILIANA Razo CRNA  December 11, 2019  7:19 PM

## 2019-12-12 NOTE — OP NOTE
Procedure Date: 12/11/2019      SURGEON:  Tim Douglas DPM.      PREOPERATIVE DIAGNOSIS:  Left foot osteomyelitis status post open amputation of the left first metatarsal with excisional debridement on 12/04/2019.      POSTOPERATIVE DIAGNOSIS:  Left foot osteomyelitis status post open amputation of the left first metatarsal with excisional debridement on 12/04/2019.      PROCEDURE:     1.  Repeat irrigation and excisional debridement involving an area larger than 20 square cm with debridement down to and including the fat layer and deep fascia.   2.  Partial ostectomy distal left first metatarsal.      ANESTHESIA:  MAC with local.      HEMOSTASIS:  None.      ESTIMATED BLOOD LOSS:  10 mL.      MATERIALS:  Nonabsorbable suture material.      INJECTABLES:  0.5% Marcaine plain injected preoperatively.      COMPLICATIONS:  None apparent.      INTRAOPERATIVE FINDINGS:  All tissues appeared grossly viable post excisional debridement.  The wound depth was down to the joint capsule of the left second metatarsophalangeal joint, but no exposed bone.  Bone at the level of the ostectomy appeared grossly viable.  Delayed primary closure was achieved without tension along the incision.      INDICATIONS FOR SURGERY:  Ry Horner is a 54-year-old male with insulin-dependent type 2 diabetes and a history of osteomyelitis requiring right below-knee amputation and more recent osteomyelitis of the left hallux.  The left hallux was amputated on 11/17/2019.  He went on to develop wound dehiscence.  Negative pressure therapy/wound VAC therapy was attempted and ultimately not successful.  The left first metatarsal head became fully exposed.  MRI and clinical exam was consistent with osteomyelitis of the left first metatarsal.  I brought him to surgery on 12/04/2019.  Excisional debridement was performed.  The sesamoids were excised.  Partial amputation of the first metatarsal was performed.  A bone biopsy was taken from the left second toe  as MRI was concerning for osteomyelitis at that location as well.  Ultimately bone biopsies from the second toe and the first metatarsal (proximal margin) were negative for osteomyelitis.  There was no growth in the bone cultures.  My team then discussed the planned procedure, return to the OR for excisional debridement and partial ostectomy with attempt to achieve delayed primary closure.  No guarantees were given.  Mr. Horner consented to the procedures.      DESCRIPTION OF PROCEDURE:  Ry Horner was transported to the operating room and placed supine on the operating table.  IV sedation was initiated.  Local anesthetic was injected into the left foot after calling a timeout.  The left foot was then prepped and draped in the normal aseptic fashion.  A second timeout was called for the planned procedures.      The wound was opened.  Excisional debridement was performed in a methodical fashion from proximal to distal using a #15 scalpel and bone rongeur.  This debridement involved an area larger than 20 square cm and was carried out down to, and including, the fat layer and deep fascia.  After completion of the excisional debridement, the tissues within the wound appeared healthier and viable.  The wound was then irrigated with a copious amount of normal sterile saline.      Next, some periosteal reflection was performed around the distal aspect of the residual left first metatarsal.  Using a sagittal saw, a partial ostectomy was performed.  This involved removing an additional 0.3 cm of bone.  This was done to freshen the distal edge of the bone which has essentially been exposed to the environment for a week.  It also helped achieve delayed primary closure without unnecessary tension.  The wound was irrigated with a copious amount of normal sterile saline.  Complete delayed primary closure was then completed with 3-0 nylon.  A well-padded compressive dressing was placed.      Mr. Ry Horner tolerated the anesthesia  and procedure well.  He was transported to the postanesthesia care unit in stable condition.         EDUARDA FUENTES DPM             D: 2019   T: 2019   MT: [id number]      Name:     YNES YBARRA   MRN:      [id number]        Account:        [id number]   :      1965           Procedure Date: 2019      Document: [id number]

## 2019-12-13 ENCOUNTER — PATIENT OUTREACH (OUTPATIENT)
Dept: CARE COORDINATION | Facility: CLINIC | Age: 54
End: 2019-12-13

## 2019-12-13 ASSESSMENT — ACTIVITIES OF DAILY LIVING (ADL): DEPENDENT_IADLS:: INDEPENDENT

## 2019-12-13 NOTE — TELEPHONE ENCOUNTER
Pt needs to be scheduled with Dr. Douglas since he did the surgery.  Please ask Dr. Douglas if he can double book if he is full.

## 2019-12-13 NOTE — TELEPHONE ENCOUNTER
Phone call to patient. He states Dr. Douglas told him it was ok to double book him in order to get a timely appointment. He prefers early morning or as late as possible if able. Informed that Dr. Douglas is out of the office today. Will discuss with provider and get back with him on 12/16/19.   He can be reached at: [phone]  Ok to leave message :YES    Please advise if patient may be worked in your schedule on 12/19/19 at [place] for post op 12/11/19: Repeat irrigation and excisional debridement involving an area larger than 20 square cm with debridement down to and including the fat layer and deep fascia.   2.  Partial ostectomy distal left first metatarsal    Patient lives in [place], [place].     KELLEY Simon RN

## 2019-12-13 NOTE — TELEPHONE ENCOUNTER
I recommend double book at 0815, 12/19.  I have a meeting at noon and likely will have little wiggle room as the day progresses.    Thank you.

## 2019-12-13 NOTE — TELEPHONE ENCOUNTER
Phone call to patient and offered 8:15 on 12/19/19. Appointment scheduled and address given. Provided triage number for future concerns. Patient was appreciative of assistance.     KELLEY Simon RN

## 2019-12-13 NOTE — PROGRESS NOTES
Clinic Care Coordination Contact  [facility]/Voicemail    Referral Source: IP Report  Clinical Data: Care Coordinator Outreach    Outreach attempted x 1.  Left message on patient's voicemail with call back information and requested return call.    Plan: Care Coordinator will try to reach patient again in 1-2 business days.    JERI Barnhart, [facility]  Clinic Care Coordinator  [facility]  [phone]  [email or web address]

## 2019-12-13 NOTE — TELEPHONE ENCOUNTER
Unsure of why patient got scheduled on 12/26/19 when there are sooner appointments with Dr. Douglas on 12/23/19.   1 wk follow up would be due on 12/19/19.  Is it ok to schedule patient with Dr. Pnea on 12/20/19 in [place]?    Please advise.     KELLEY Simon RN

## 2019-12-13 NOTE — PROGRESS NOTES
Clinic Care Coordination Contact    Clinic Care Coordination Contact  OUTREACH    Referral Information:  Referral Source: IP Report    Primary Diagnosis: Other (include Comment box)(Podiatry)    Chief Complaint   Patient presents with     Clinic Care Coordination - Post Hospital     [place] Utilization: Pt has been hospitalized 2x in past 90 days.  Clinic Utilization  Difficulty keeping appointments:: No  Compliance Concerns: No  No-Show Concerns: No  No PCP office visit in Past Year: No  Utilization    Last refreshed: 12/13/2019 11:04 AM:  Hospital Admissions 2           Last refreshed: 12/13/2019 11:04 AM:  ED Visits 0           Last refreshed: 12/13/2019 11:04 AM:  No Show Count (past year) 0              Current as of: 12/13/2019 11:04 AM            Clinical Concerns:  [facility] spoke with pt regarding his recent hospital stay. Pt stated that he is doing well. He has a follow up with his PCP on 12/18 and his Podiatrist 12/26. He has a call in to his Podiatrist to also try to get an appointment for next week.    Pt has no questions or concerns at this time regarding his recent surgery or his recovery.    Resources and Interventions:  Community Resources: None  Referrals Placed: None     Goals        General    Monitoring (pt-stated)     Notes - Note created  8/20/2019  4:04 PM by Adrianna Peterson RD    My Goal: I will wear Sj sensor x 14 days and take notes on food, drink, medication & activity in this time.     What I need to meet my goal: logs, sensor placement     I plan to meet my goal by this date: 9/3 at follow up appointment             Future Appointments              In 5 days Kody Wing MD [facility] [place],     In 1 week Tim Douglas DPM [facility] [place],         Plan: No further outreaches will be made at this time unless a new referral is made or a change in the pt's status occurs. Patient was provided with [facility] contact information and encouraged to call with any  questions or concerns.    JERI Barnhart, [facility]  Clinic Care Coordinator  [facility]  [phone]  [email or web address]

## 2019-12-16 ENCOUNTER — DOCUMENTATION ONLY (OUTPATIENT)
Dept: PODIATRY | Facility: CLINIC | Age: 54
End: 2019-12-16

## 2019-12-18 ENCOUNTER — OFFICE VISIT (OUTPATIENT)
Dept: FAMILY MEDICINE | Facility: CLINIC | Age: 54
End: 2019-12-18
Payer: COMMERCIAL

## 2019-12-18 VITALS
HEART RATE: 115 BPM | OXYGEN SATURATION: 98 % | TEMPERATURE: 97.3 F | BODY MASS INDEX: 34.86 KG/M2 | SYSTOLIC BLOOD PRESSURE: 143 MMHG | DIASTOLIC BLOOD PRESSURE: 88 MMHG | HEIGHT: 72 IN

## 2019-12-18 DIAGNOSIS — E78.5 HYPERLIPIDEMIA LDL GOAL <100: ICD-10-CM

## 2019-12-18 DIAGNOSIS — E11.42 TYPE 2 DIABETES MELLITUS WITH DIABETIC POLYNEUROPATHY, WITHOUT LONG-TERM CURRENT USE OF INSULIN (H): ICD-10-CM

## 2019-12-18 PROCEDURE — 99495 TRANSJ CARE MGMT MOD F2F 14D: CPT | Performed by: INTERNAL MEDICINE

## 2019-12-18 RX ORDER — EZETIMIBE 10 MG/1
10 TABLET ORAL DAILY
Qty: 100 TABLET | Refills: 11 | Status: SHIPPED | OUTPATIENT
Start: 2019-12-18 | End: 2020-06-30

## 2019-12-18 NOTE — PROGRESS NOTES
Subjective     Ry Horner is a 54 year old male who presents to clinic today for the following health issues:    HPI       Hospital Follow-up Visit:    Hospital/Nursing Home/IP Rehab Facility: [facility]  Date of Admission: 12/3/2019  Date of Discharge: 12/12/2019  Reason(s) for Admission:     S/P OR Excisional debridement, Excision of the sesamoids. Partial amputation of the left first metatarsal. Bone biopsy, proximal phalanx, left second toe on 12/4/2019  Status post partial amputation with closure 12/11/2019.  2/2  Necrotic L foot wound, Osteomyelitis Left metatarsal, possible osteomyelitis left second toe proximal phalanx.  Uncontrolled insulin-dependent type 2 diabetes: With hemoglobin A1c of 9.3.  Diabetic neuropathy.  Hypertension  CKD stage II.  History of recurrent right foot infection status post BKA 2017  Hyperlipidemia  History of chronic anemia  Obesity with a BMI of 34.86               Problems taking medications regularly:  None       Medication changes since discharge: None       Problems adhering to non-medication therapy:  None    Summary of hospitalization:  [facility] discharge summary reviewed  Diagnostic Tests/Treatments reviewed.  Follow up needed: podiatry as directed  Other Healthcare Providers Involved in Patient s Care:         None  Update since discharge: improved.     Post Discharge Medication Reconciliation: discharge medications reconciled, continue medications without change.  Plan of care communicated with patient and family     Coding guidelines for this visit:  Type of Medical   Decision Making Face-to-Face Visit       within 7 Days of discharge Face-to-Face Visit        within 14 days of discharge   Moderate Complexity [id number]   High Complexity [id number]            Admitted for closing of wound  Hydrochlorothiazide was decreased to 25 daily  Blood pressure was low in hospital but is high today  Swelling improved after stopping amlodipine  Sugars  have been well controlled upon review of glucometer most AM fasting sugars in low 100's  No low sugars      Patient Active Problem List   Diagnosis     Diabetes mellitus, type 2 (H)     Essential hypertension, benign     Hyperlipidemia LDL goal <100     Impotence of organic origin     Morbid obesity due to excess calories (H)     Open wound of right foot     Status post below knee amputation, right (H)     Type 2 diabetes mellitus with diabetic polyneuropathy, without long-term current use of insulin (H)     Osteomyelitis (H)     Past Surgical History:   Procedure Laterality Date     AMPUTATE FOOT Left 11/17/2019    Procedure: LEFT PARTIAL FOOT AMPUTATION;  Surgeon: Antoine Pena DPM;  Location: [facility] OR     AMPUTATE FOOT Left 12/4/2019    Procedure: LEFT PARTIAL FOOT AMPUTATION;  Surgeon: Tim Douglas DPM;  Location: [facility] OR     AMPUTATE FOOT Left 12/11/2019    Procedure: POSSIBLE PARTIAL FOOT AMPUTATION;  Surgeon: Tim Douglas DPM;  Location: [facility] OR     AMPUTATE LEG BELOW KNEE Right 9/1/2017    Procedure: AMPUTATE LEG BELOW KNEE;  RIGHT BELOW KNEE AMPUTATION ;  Surgeon: Nikolas Nascimento MD;  Location: [facility] OR     APPENDECTOMY       APPLY WOUND VAC Right 3/2/2015    Procedure: APPLY WOUND VAC;  Surgeon: Milena Cevallos DPM, Pod;  Location: [facility] OR     BIOPSY BONE FOOT Right 7/15/2016    Procedure: BIOPSY BONE FOOT;  Surgeon: Tim Douglas DPM;  Location: [facility]     BIOPSY BONE TOE Left 12/4/2019    Procedure: BONE BIOPSY LEFT SECOND TOE;  Surgeon: Tim Douglas DPM;  Location: [facility]     IRRIGATION AND DEBRIDEMENT FOOT, COMBINED Right 3/2/2015    Procedure: COMBINED IRRIGATION AND DEBRIDEMENT FOOT;  Surgeon: Milena Cevallos DPM, Pod;  Location: [facility] OR     IRRIGATION AND DEBRIDEMENT FOOT, COMBINED Right 7/15/2016    Procedure: COMBINED IRRIGATION AND DEBRIDEMENT FOOT;  Surgeon: Tim Douglas DPM;  Location: [facility]     IRRIGATION AND DEBRIDEMENT FOOT, COMBINED Right 7/20/2016     Procedure: COMBINED IRRIGATION AND DEBRIDEMENT FOOT;  Surgeon: Tim Douglas DPM;  Location: [facility] OR     IRRIGATION AND DEBRIDEMENT FOOT, COMBINED Left 2019    Procedure: REVISIONAL IRRIGATION AND DEBRIDEMENT LEFT FOOT AND BONE DEBRIDEMENT;  Surgeon: Joseph Randhawa DPM;  Location: [facility] OR     IRRIGATION AND DEBRIDEMENT FOOT, COMBINED Left 2019    Procedure: EXCISIONAL DEBRIDEMENT LEFT FOOT;  Surgeon: Tim Douglas DPM;  Location: [facility] OR     IRRIGATION AND DEBRIDEMENT FOOT, COMBINED Left 2019    Procedure: IRRIGATION AND DEBRIDEMENT FOOT, Partial osteotomy left first metatarsal;  Surgeon: Tim Douglas DPM;  Location: [facility] OR     ORTHOPEDIC SURGERY         Social History     Tobacco Use     Smoking status: Former Smoker     Packs/day: 0.50     Years: 20.00     Pack years: 10.00     Types: Cigarettes     Last attempt to quit:      Years since quittin.9     Smokeless tobacco: Never Used   Substance Use Topics     Alcohol use: Yes     Comment: 3-4 drinks per month     Family History   Problem Relation Age of Onset     Genetic Disorder Other      Genetic Disorder Other      Psychotic Disorder Mother      Diabetes Father      Lung Cancer Father      Genetic Disorder Maternal Grandmother      Genetic Disorder Maternal Grandfather      Asthma Sister      C.A.D. No family hx of      Hypertension No family hx of      Cerebrovascular Disease No family hx of      Breast Cancer No family hx of      Cancer - colorectal No family hx of      Prostate Cancer No family hx of      Alcohol/Drug No family hx of          Current Outpatient Medications   Medication Sig Dispense Refill     ACCU-CHEK JAYNA PLUS test strip TEST BLOOD SUGARS 2 TO 3 TIMES DAILY OR AS DIRECTED 100 each 3     acetaminophen (TYLENOL) 325 MG tablet Take 2 tablets (650 mg) by mouth every 8 hours as needed for pain  0     amoxicillin-clavulanate (AUGMENTIN) 875-125 MG tablet Take 1 tablet by mouth 2 times daily 14 tablet 0  "    aspirin 81 MG chewable tablet Take 1 tablet (81 mg) by mouth daily 108 tablet 3     blood glucose (NO BRAND SPECIFIED) lancets standard Use to test blood sugar 2-3 times daily or as directed. 100 each 11     blood glucose monitoring (NO BRAND SPECIFIED) meter device kit Use to test blood sugar 2-3 times daily or as directed. 1 kit 1     ezetimibe (ZETIA) 10 MG tablet Take 1 tablet (10 mg) by mouth daily 100 tablet 11     ferrous sulfate (FEROSUL) 325 (65 Fe) MG tablet Take 1 tablet (325 mg) by mouth daily (with breakfast) 30 tablet 0     hydrochlorothiazide (HYDRODIURIL) 50 MG tablet Take 0.5 tablets (25 mg) by mouth daily Dose decreased to 25 mg 90 tablet 3     insulin glargine (LANTUS PEN) 100 UNIT/ML pen Inject 58 Units Subcutaneous At Bedtime       insulin pen needle (BD PEN NEEDLE MEGAN 2ND GEN) 32G X 4 MM miscellaneous Use as directed with Basaglar (1 box = 100 days) 100 each 1     ipratropium (ATROVENT) 0.06 % spray Spray 2 sprays into both nostrils 4 times daily as needed for rhinitis 3 Box 0     linagliptin (TRADJENTA) 5 MG TABS tablet Take 1 tablet (5 mg) by mouth daily 84 tablet 11     lisinopril (PRINIVIL/ZESTRIL) 40 MG tablet Take 1 tablet (40 mg) by mouth daily 90 tablet 3     metFORMIN (GLUCOPHAGE) 1000 MG tablet Take 1 tablet (1,000 mg) by mouth 2 times daily (with meals) 180 tablet 3     multivitamin (CENTRUM SILVER) tablet Take 1 tablet by mouth daily       order for DME Equipment being ordered: Wheelchair Treatment Diagnosis: Diabetic Foot wound with L hallux amputation 2/2 gangrene 1 Units 0     STATIN NOT PRESCRIBED (INTENTIONAL) Please choose reason not prescribed, below       Allergies   Allergen Reactions     Pravastatin      \"sucked the life blood out of me\"       Reviewed and updated as needed this visit by Provider         Review of Systems   ROS COMP: Constitutional, HEENT, cardiovascular, pulmonary, gi and gu systems are negative, except as otherwise noted.      Objective    BP (!) " 143/88 (BP Location: Right arm, Patient Position: Sitting, Cuff Size: Adult Regular)   Pulse 115   Temp 97.3  F (36.3  C) (Tympanic)   Ht 1.829 m (6')   SpO2 98%   BMI 34.86 kg/m    Body mass index is 34.86 kg/m .  Physical Exam   GENERAL: healthy, alert and no distress  RESP: lungs clear to auscultation - no rales, rhonchi or wheezes  CV: Heart with regular rate and rhythm.   ABDOMEN: soft, nontender, no hepatosplenomegaly, no masses and bowel sounds normal  MS: left leg wrapped in ace wraps with sterile dressings that he tells me will be removed tomorrow in podiatry clinic  NEURO: Normal strength and tone, mentation intact and speech normal  PSYCH: mentation appears normal, affect normal/bright    Labs reviewed in EPIC        Assessment & Plan     1. Hyperlipidemia LDL goal <100  - ezetimibe (ZETIA) 10 MG tablet; Take 1 tablet (10 mg) by mouth daily  Dispense: 100 tablet; Refill: 11  - LDL cholesterol direct; Future    2. Type 2 diabetes mellitus with diabetic polyneuropathy, without long-term current use of insulin (H)    Am fasting sugars at goal  Check a1c after 2/18; consider with meal insulin if a1c remains > 7 then  Office visit follow up following A1c to discuss result      - linagliptin (TRADJENTA) 5 MG TABS tablet; Take 1 tablet (5 mg) by mouth daily  Dispense: 84 tablet; Refill: 11  - **TSH with free T4 reflex FUTURE anytime; Future  - **A1C FUTURE anytime; Future    3.  Hypertension   Recheck pressure in Feb, if still > 130/80 then, consider adding spironolactone or beta blocker         Return in about 2 months (around 2/18/2020). or sooner as needed     Kody Wing MD  [facility]

## 2019-12-19 ENCOUNTER — OFFICE VISIT (OUTPATIENT)
Dept: PODIATRY | Facility: CLINIC | Age: 54
End: 2019-12-19
Payer: COMMERCIAL

## 2019-12-19 VITALS
DIASTOLIC BLOOD PRESSURE: 84 MMHG | HEIGHT: 72 IN | SYSTOLIC BLOOD PRESSURE: 138 MMHG | WEIGHT: 257 LBS | BODY MASS INDEX: 34.81 KG/M2

## 2019-12-19 DIAGNOSIS — Z09 SURGERY FOLLOW-UP EXAMINATION: Primary | ICD-10-CM

## 2019-12-19 DIAGNOSIS — E11.42 TYPE 2 DIABETES MELLITUS WITH DIABETIC POLYNEUROPATHY, WITHOUT LONG-TERM CURRENT USE OF INSULIN (H): ICD-10-CM

## 2019-12-19 DIAGNOSIS — G89.18 POST-OPERATIVE PAIN: ICD-10-CM

## 2019-12-19 DIAGNOSIS — Z89.432 STATUS POST PARTIAL AMPUTATION OF LEFT FOOT (H): ICD-10-CM

## 2019-12-19 DIAGNOSIS — E78.5 HYPERLIPIDEMIA LDL GOAL <100: ICD-10-CM

## 2019-12-19 LAB
LDLC SERPL DIRECT ASSAY-MCNC: 103 MG/DL
TSH SERPL DL<=0.005 MIU/L-ACNC: 0.94 MU/L (ref 0.4–4)

## 2019-12-19 PROCEDURE — 83721 ASSAY OF BLOOD LIPOPROTEIN: CPT | Performed by: FAMILY MEDICINE

## 2019-12-19 PROCEDURE — 84443 ASSAY THYROID STIM HORMONE: CPT | Performed by: FAMILY MEDICINE

## 2019-12-19 PROCEDURE — 36415 COLL VENOUS BLD VENIPUNCTURE: CPT | Performed by: FAMILY MEDICINE

## 2019-12-19 PROCEDURE — 99024 POSTOP FOLLOW-UP VISIT: CPT | Performed by: PODIATRIST

## 2019-12-19 RX ORDER — OXYCODONE HYDROCHLORIDE 5 MG/1
5 TABLET ORAL EVERY 6 HOURS PRN
Qty: 12 TABLET | Refills: 0 | Status: ON HOLD | OUTPATIENT
Start: 2019-12-19 | End: 2020-02-03

## 2019-12-19 ASSESSMENT — MIFFLIN-ST. JEOR: SCORE: 2053.74

## 2019-12-19 NOTE — LETTER
12/19/2019         RE: Ry Horner  [address] [address]        Dear Colleague,    Thank you for referring your patient, Ry Horner, to the [facility]. Please see a copy of my visit note below.    S: Ry Horner  presents 1+ week  post op.     POSTOPERATIVE DIAGNOSES:   1.  Necrotic wound, left foot.   2.  Osteomyelitis, left first metatarsal.   3.  Possible osteomyelitis, left second toe proximal phalanx.      12/4/19 PROCEDURES:   1.  Excisional debridement of an area greater than 20 square cm down to and including the fat layer and deep fascia.   2.  Excision of the sesamoids.   3.  Partial amputation of the left first metatarsal.   4.  Bone biopsy, proximal phalanx, left second toe.        POSTOPERATIVE DIAGNOSIS:  Left foot osteomyelitis status post open amputation of the left first metatarsal with excisional debridement on 12/04/2019.      12/11/19 PROCEDURES:     1.  Repeat irrigation and excisional debridement involving an area larger than 20 square cm with debridement down to and including the fat layer and deep fascia.   2.  Partial ostectomy distal left first metatarsal.       He was hospitalized at the time and received IV antibiotics.    O:   Vascular:  Pedal pulses are palpable.  Moderate left forefoot edema.    Derm: The incision is well coapted.  Sutures are intact.  No significant alexander-incisional erythema.  The incision looks like it has healed more proximally than distally, yet no open areas/ dehiscence. Skin is fully viable.     ASSESSMENT:  1) s/p above noted surgery.  No clinical signs of infection.  Pain is controlled.    Encounter Diagnoses   Name Primary?     Surgery follow-up examination Yes     Post-operative pain      Status post partial amputation of left foot (H)          PLAN:  1) sterile re-dress of left foot  2) I reinforced the importance of non weight bearing/ offloading  3) continue and complete oral antibiotics  4) sutures will be left in for  1 months  5) follow up for a check on 12/26.  6) oxycodone refilled    Tim Douglas DPM, [place], [place]    [place] Department of Podiatry/Foot & Ankle Surgery        Tim Douglas DPM;      Again, thank you for allowing me to participate in the care of your patient.        Sincerely,        Tim Douglas DPM

## 2019-12-19 NOTE — PROGRESS NOTES
S: Ry Horner  presents 1+ week  post op.     POSTOPERATIVE DIAGNOSES:   1.  Necrotic wound, left foot.   2.  Osteomyelitis, left first metatarsal.   3.  Possible osteomyelitis, left second toe proximal phalanx.      12/4/19 PROCEDURES:   1.  Excisional debridement of an area greater than 20 square cm down to and including the fat layer and deep fascia.   2.  Excision of the sesamoids.   3.  Partial amputation of the left first metatarsal.   4.  Bone biopsy, proximal phalanx, left second toe.        POSTOPERATIVE DIAGNOSIS:  Left foot osteomyelitis status post open amputation of the left first metatarsal with excisional debridement on 12/04/2019.      12/11/19 PROCEDURES:     1.  Repeat irrigation and excisional debridement involving an area larger than 20 square cm with debridement down to and including the fat layer and deep fascia.   2.  Partial ostectomy distal left first metatarsal.       He was hospitalized at the time and received IV antibiotics.    O:   Vascular:  Pedal pulses are palpable.  Moderate left forefoot edema.    Derm: The incision is well coapted.  Sutures are intact.  No significant alexander-incisional erythema.  The incision looks like it has healed more proximally than distally, yet no open areas/ dehiscence. Skin is fully viable.     ASSESSMENT:  1) s/p above noted surgery.  No clinical signs of infection.  Pain is controlled.    Encounter Diagnoses   Name Primary?     Surgery follow-up examination Yes     Post-operative pain      Status post partial amputation of left foot (H)          PLAN:  1) sterile re-dress of left foot  2) I reinforced the importance of non weight bearing/ offloading  3) continue and complete oral antibiotics  4) sutures will be left in for 1 months  5) follow up for a check on 12/26.  6) oxycodone refilled    Tim Douglas DPM, [place], [place]    [place] Department of Podiatry/Foot & Ankle Surgery        Tim Douglas DPM;

## 2019-12-19 NOTE — LETTER
[place] [address] [address] [facility]  [address]  [place]  [address]  Tel: [phone]    December 20, 2019    Ry Horner  [address] [address]        Dear Mr. Horner,    The following letter pertains to your most recent diagnostic tests:     -TSH (thyroid stimulating hormone) level is normal which indicates normal circulating thyroid hormone levels.       -Cholesterol is ALMOST at you goal of that less than 100.  You can reduce your total and LDL cholesterol levels by eating less saturated fats.  This means you should eat less fried foods and meat.  If you eat meat, you should try to eat more chicken and fish and less beef or pork.  Also, you should increase dietary fiber intake by eating more fruits, vegetables and whole grains.  A diet high in fiber reduces total and LDL cholesterol levels. Keep taking the Zetia (ezetamibe) as you are (you are on the maximum dose).       If you have any further questions or problems, please contact our office.      Sincerely,    Kody Wing MD / ben      Resulted Orders   **TSH with free T4 reflex FUTURE anytime   Result Value Ref Range    TSH 0.94 0.40 - 4.00 mU/L   LDL cholesterol direct   Result Value Ref Range    LDL Cholesterol Direct 103 (H) <100 mg/dL      Comment:      Above desirable:  100-129 mg/dl  Borderline High:  130-159 mg/dL  High:             160-189 mg/dL  Very high:       >189 mg/dl

## 2019-12-19 NOTE — PATIENT INSTRUCTIONS
Pain Control:  Rest and elevation  1000 mg Tylenol every 8 hours  600 mg Ibuprofen every 6 hours    Will do one refill of oxycodone.     Daily dressing change:  Paint incision with betadine  Apply sterile gauze    SIGNS OF INFECTION    expanding redness around the wound     yellow or greenish-colored pus or cloudy wound drainage     red streaking spreading from the wound     increased swelling, tenderness, or pain around the wound     fever  *If you notice any of these signs of infection, call us right away!          Thank you for choosing [facility] Podiatry / Foot & Ankle Surgery!    DR. FUENTES'S CLINIC LOCATIONS     MONDAY - [place] WEDNESDAY (AM ONLY) - [place]   [address] [place] [address] [place] [address]   [phone] / [phone] / [phone]       [facility] [phone]   [address] APPOINTMENTS: [phone]   [place] [address] BILLING QUESTIONS: [phone] / [phone]               FYI: BODY WEIGHT AND YOUR FEET  The following information is included in the after visit summary for all patients. Body weight can be a sensitive issue to discuss in clinic, but we think the following information is very important. Although we focus on the feet and ankles, we do support the overall health of our patients.     Many things can cause foot and ankle problems. Foot structure, activity level, foot mechanics and injuries are common causes of pain. One very important issue that often goes unmentioned, is body weight. Extra weight can cause increased stress on muscles, ligaments, bones and tendons. Sometimes just a few extra pounds is all it takes to put one over her/his threshold. Without reducing that stress, it can be difficult to alleviate pain. As Foot & Ankle specialists, our job is addressing the lower extremity problem and possible causes. Regarding extra body weight, we encourage  patients to discuss diet and weight management plans with their primary care doctors. It is this team approach that gives you the best opportunity for pain relief and getting you back on your feet.      [place] has a Comprehensive Weight Management Program. This program includes counseling, education, non-surgical and surgical approaches to weight loss. If you are interested in learning more either talk to you primary care provider or call [phone].    Ry to follow up with Primary Care provider regarding elevated blood pressure.

## 2019-12-20 NOTE — RESULT ENCOUNTER NOTE
The following letter pertains to your most recent diagnostic tests:    -TSH (thyroid stimulating hormone) level is normal which indicates normal circulating thyroid hormone levels.      -Cholesterol is ALMOST at you goal of that less than 100.  You can reduce your total and LDL cholesterol levels by eating less saturated fats.  This means you should eat less fried foods and meat.  If you eat meat, you should try to eat more chicken and fish and less beef or pork.  Also, you should increase dietary fiber intake by eating more fruits, vegetables and whole grains.  A diet high in fiber reduces total and LDL cholesterol levels. Keep taking the Zetia (ezetamibe) as you are (you are on the maximum dose).        Sincerely,    Dr. Wing

## 2019-12-26 ENCOUNTER — OFFICE VISIT (OUTPATIENT)
Dept: PODIATRY | Facility: CLINIC | Age: 54
End: 2019-12-26
Payer: COMMERCIAL

## 2019-12-26 VITALS
BODY MASS INDEX: 34.81 KG/M2 | SYSTOLIC BLOOD PRESSURE: 128 MMHG | WEIGHT: 257 LBS | HEIGHT: 72 IN | DIASTOLIC BLOOD PRESSURE: 88 MMHG

## 2019-12-26 DIAGNOSIS — Z89.431 STATUS POST PARTIAL AMPUTATION OF RIGHT FOOT (H): ICD-10-CM

## 2019-12-26 DIAGNOSIS — Z09 SURGERY FOLLOW-UP EXAMINATION: ICD-10-CM

## 2019-12-26 DIAGNOSIS — L03.119 CELLULITIS OF FOOT: ICD-10-CM

## 2019-12-26 DIAGNOSIS — S91.301A OPEN WOUND OF RIGHT FOOT, INITIAL ENCOUNTER: ICD-10-CM

## 2019-12-26 DIAGNOSIS — E11.42 TYPE 2 DIABETES MELLITUS WITH DIABETIC POLYNEUROPATHY, WITHOUT LONG-TERM CURRENT USE OF INSULIN (H): ICD-10-CM

## 2019-12-26 DIAGNOSIS — T81.30XA WOUND DEHISCENCE: Primary | ICD-10-CM

## 2019-12-26 PROCEDURE — 99024 POSTOP FOLLOW-UP VISIT: CPT | Performed by: PODIATRIST

## 2019-12-26 PROCEDURE — 11042 DBRDMT SUBQ TIS 1ST 20SQCM/<: CPT | Mod: 78 | Performed by: PODIATRIST

## 2019-12-26 ASSESSMENT — MIFFLIN-ST. JEOR: SCORE: 2053.74

## 2019-12-26 NOTE — LETTER
12/26/2019         RE: Ry Horner  [address] [facility] [facility] [address]        Dear Colleague,    Thank you for referring your patient, Ry Horner, to the [facility]. Please see a copy of my visit note below.    S: Ry Horner  presents 2+ week  post op.     POSTOPERATIVE DIAGNOSES:   1.  Necrotic wound, left foot.   2.  Osteomyelitis, left first metatarsal.   3.  Possible osteomyelitis, left second toe proximal phalanx.      12/4/19 PROCEDURES:   1.  Excisional debridement of an area greater than 20 square cm down to and including the fat layer and deep fascia.   2.  Excision of the sesamoids.   3.  Partial amputation of the left first metatarsal.   4.  Bone biopsy, proximal phalanx, left second toe.        POSTOPERATIVE DIAGNOSIS:  Left foot osteomyelitis status post open amputation of the left first metatarsal with excisional debridement on 12/04/2019.      12/11/19 PROCEDURES:     1.  Repeat irrigation and excisional debridement involving an area larger than 20 square cm with debridement down to and including the fat layer and deep fascia.   2.  Partial ostectomy distal left first metatarsal.       He was hospitalized at the time and received IV antibiotics.    No complaints. Wearing a DH2 shoe, uses a walker at home and a wheel chair outside of the house. His wife is assisting with daily dressing cares.       O:   Vascular:  Pedal pulses are palpable.  Interval reduction in left forefoot edema.    Derm: The incision is well coapted.  Sutures are intact.  No significant alexander-incisional erythema.    The incision looks like it has healed more proximally than distally. There is macerated skin distally. A suture has come out. I can probe 0.3cm into an area of dehiscence. No bone is contacted.    ASSESSMENT:  1) s/p above noted surgery.  No clinical signs of infection.  Pain is controlled.  Encounter Diagnoses   Name Primary?     Wound dehiscence Yes     Cellulitis of foot      Surgery  "follow-up examination      Type 2 diabetes mellitus with diabetic polyneuropathy, without long-term current use of insulin (H)      Status post partial amputation of right foot (H)      Open wound of right foot, initial encounter        PLAN:  1) sterile re-dress of left foot  2) I reinforced the importance of non weight bearing/ offloading  3) Due to wound dehiscence and light erythema, will prescribed Augmenting 875-125mg PO BID x 10 days  4) sutures will be left in for 1 month  5) follow up next week    Excisional Debridement    The excisional debridement procedure was discussed.  This included the goals of removing non-viable tissue, evaluating the full extent of wound, and promoting wound healing.  Ry Horner  provided verbal and written consent.  The \"Time Out\" was called.     Using a sterile #15 blade, tissue nippers, small scissors and forceps, excisional debridment of the right foot wound/ ulcer was performed.  Debridement was carried out to the depth of the fat layer.   The hyperkeratotic eschar surrounding the wound was removed, by excising skin edges back to healthy, bleeding tissue .  Other non-viable tissue was excised. The ulcer base was scraped to remove bioburden and promote healing.  The area debrided was less than 20 square cm.   There was moderate bleeding with the procedure. No anesthesia was needed due to peripheral neuropathy.   A sterile dressing was applied.      Tim Douglas DPM, BLAKE, MS    [place] Department of Podiatry/Foot & Ankle Surgery        Tim Douglas DPM;      Again, thank you for allowing me to participate in the care of your patient.        Sincerely,        Tim Douglas DPM    "

## 2019-12-26 NOTE — PROGRESS NOTES
S: Ry Horner  presents 2+ week  post op.     POSTOPERATIVE DIAGNOSES:   1.  Necrotic wound, left foot.   2.  Osteomyelitis, left first metatarsal.   3.  Possible osteomyelitis, left second toe proximal phalanx.      12/4/19 PROCEDURES:   1.  Excisional debridement of an area greater than 20 square cm down to and including the fat layer and deep fascia.   2.  Excision of the sesamoids.   3.  Partial amputation of the left first metatarsal.   4.  Bone biopsy, proximal phalanx, left second toe.        POSTOPERATIVE DIAGNOSIS:  Left foot osteomyelitis status post open amputation of the left first metatarsal with excisional debridement on 12/04/2019.      12/11/19 PROCEDURES:     1.  Repeat irrigation and excisional debridement involving an area larger than 20 square cm with debridement down to and including the fat layer and deep fascia.   2.  Partial ostectomy distal left first metatarsal.       He was hospitalized at the time and received IV antibiotics.    No complaints. Wearing a DH2 shoe, uses a walker at home and a wheel chair outside of the house. His wife is assisting with daily dressing cares.       O:   Vascular:  Pedal pulses are palpable.  Interval reduction in left forefoot edema.    Derm: The incision is well coapted.  Sutures are intact.  No significant alexander-incisional erythema.    The incision looks like it has healed more proximally than distally. There is macerated skin distally. A suture has come out. I can probe 0.3cm into an area of dehiscence. No bone is contacted.    ASSESSMENT:  1) s/p above noted surgery.  No clinical signs of infection.  Pain is controlled.  Encounter Diagnoses   Name Primary?     Wound dehiscence Yes     Cellulitis of foot      Surgery follow-up examination      Type 2 diabetes mellitus with diabetic polyneuropathy, without long-term current use of insulin (H)      Status post partial amputation of right foot (H)      Open wound of right foot, initial encounter   "      PLAN:  1) sterile re-dress of left foot  2) I reinforced the importance of non weight bearing/ offloading  3) Due to wound dehiscence and light erythema, will prescribed Augmenting 875-125mg PO BID x 10 days  4) sutures will be left in for 1 month  5) follow up next week    Excisional Debridement    The excisional debridement procedure was discussed.  This included the goals of removing non-viable tissue, evaluating the full extent of wound, and promoting wound healing.  Ry SUE Schultze  provided verbal and written consent.  The \"Time Out\" was called.     Using a sterile #15 blade, tissue nippers, small scissors and forceps, excisional debridment of the right foot wound/ ulcer was performed.  Debridement was carried out to the depth of the fat layer.   The hyperkeratotic eschar surrounding the wound was removed, by excising skin edges back to healthy, bleeding tissue .  Other non-viable tissue was excised. The ulcer base was scraped to remove bioburden and promote healing.  The area debrided was less than 20 square cm.   There was moderate bleeding with the procedure. No anesthesia was needed due to peripheral neuropathy.   A sterile dressing was applied.      Tim Douglas DPM, FACFAS, MS    [place] Department of Podiatry/Foot & Ankle Surgery        Tim Douglas DPM;    "

## 2020-01-02 ENCOUNTER — OFFICE VISIT (OUTPATIENT)
Dept: PODIATRY | Facility: CLINIC | Age: 55
End: 2020-01-02
Payer: COMMERCIAL

## 2020-01-02 VITALS
BODY MASS INDEX: 34.81 KG/M2 | WEIGHT: 257 LBS | DIASTOLIC BLOOD PRESSURE: 72 MMHG | HEIGHT: 72 IN | SYSTOLIC BLOOD PRESSURE: 132 MMHG

## 2020-01-02 DIAGNOSIS — Z09 SURGERY FOLLOW-UP EXAMINATION: Primary | ICD-10-CM

## 2020-01-02 PROCEDURE — 99024 POSTOP FOLLOW-UP VISIT: CPT | Performed by: PODIATRIST

## 2020-01-02 ASSESSMENT — MIFFLIN-ST. JEOR: SCORE: 2053.74

## 2020-01-02 NOTE — PROGRESS NOTES
S: Ry Horner  presents 3+ week  post op.     POSTOPERATIVE DIAGNOSES:   1.  Necrotic wound, left foot.   2.  Osteomyelitis, left first metatarsal.   3.  Possible osteomyelitis, left second toe proximal phalanx.      12/4/19 PROCEDURES:   1.  Excisional debridement of an area greater than 20 square cm down to and including the fat layer and deep fascia.   2.  Excision of the sesamoids.   3.  Partial amputation of the left first metatarsal.   4.  Bone biopsy, proximal phalanx, left second toe.        POSTOPERATIVE DIAGNOSIS:  Left foot osteomyelitis status post open amputation of the left first metatarsal with excisional debridement on 12/04/2019.      12/11/19 PROCEDURES:     1.  Repeat irrigation and excisional debridement involving an area larger than 20 square cm with debridement down to and including the fat layer and deep fascia.   2.  Partial ostectomy distal left first metatarsal.       He was hospitalized at the time and received IV antibiotics.    No complaints. Wearing a DH2 shoe, uses a walker at home and a wheel chair outside of the house. His wife is assisting with daily dressing cares.     At his last visit, some wound dehiscence was noted.    O:   Vascular:  Pedal pulses are palpable.  Interval reduction in left forefoot edema.    Derm: The incision is well coapted.  Sutures are intact.  No significant alexander-incisional erythema.    The incision looks like it has healed more proximally than distally. There is macerated skin distally. A suture has come out. I can probe 0.3cm into an area of dehiscence. No bone is contacted.    ASSESSMENT:  1) s/p above noted surgery.  No clinical signs of infection.  Pain is controlled.  Encounter Diagnosis   Name Primary?     Surgery follow-up examination Yes       PLAN:  1) sterile re-dress of left foot; no indication for debridement today  3) complete the Augmentin 875-125mg PO BID x 10 days  4) sutures will be removed next week and we will then focus on healing the  open area by secondary intention. We discussed basic wound cares today.  5) follow up next week    Tim Douglas DPM, BLAKE, MS [place] Department of Podiatry/Foot & Ankle Surgery

## 2020-01-02 NOTE — LETTER
1/2/2020         RE: Ry Horner  [address] [facility] [address]        Dear Colleague,    Thank you for referring your patient, Ry Horner, to the [facility]. Please see a copy of my visit note below.    S: Ry Horner  presents 3+ week  post op.     POSTOPERATIVE DIAGNOSES:   1.  Necrotic wound, left foot.   2.  Osteomyelitis, left first metatarsal.   3.  Possible osteomyelitis, left second toe proximal phalanx.      12/4/19 PROCEDURES:   1.  Excisional debridement of an area greater than 20 square cm down to and including the fat layer and deep fascia.   2.  Excision of the sesamoids.   3.  Partial amputation of the left first metatarsal.   4.  Bone biopsy, proximal phalanx, left second toe.        POSTOPERATIVE DIAGNOSIS:  Left foot osteomyelitis status post open amputation of the left first metatarsal with excisional debridement on 12/04/2019.      12/11/19 PROCEDURES:     1.  Repeat irrigation and excisional debridement involving an area larger than 20 square cm with debridement down to and including the fat layer and deep fascia.   2.  Partial ostectomy distal left first metatarsal.       He was hospitalized at the time and received IV antibiotics.    No complaints. Wearing a DH2 shoe, uses a walker at home and a wheel chair outside of the house. His wife is assisting with daily dressing cares.     At his last visit, some wound dehiscence was noted.    O:   Vascular:  Pedal pulses are palpable.  Interval reduction in left forefoot edema.    Derm: The incision is well coapted.  Sutures are intact.  No significant alexander-incisional erythema.    The incision looks like it has healed more proximally than distally. There is macerated skin distally. A suture has come out. I can probe 0.3cm into an area of dehiscence. No bone is contacted.    ASSESSMENT:  1) s/p above noted surgery.  No clinical signs of infection.  Pain is controlled.  Encounter Diagnosis   Name Primary?     Surgery  follow-up examination Yes       PLAN:  1) sterile re-dress of left foot; no indication for debridement today  3) complete the Augmentin 875-125mg PO BID x 10 days  4) sutures will be removed next week and we will then focus on healing the open area by secondary intention. We discussed basic wound cares today.  5) follow up next week    Tim Douglas DPM, FACFAS, MS    [place] Department of Podiatry/Foot & Ankle Surgery      Again, thank you for allowing me to participate in the care of your patient.        Sincerely,        Tim Douglas DPM

## 2020-01-09 ENCOUNTER — ANCILLARY PROCEDURE (OUTPATIENT)
Dept: GENERAL RADIOLOGY | Facility: CLINIC | Age: 55
End: 2020-01-09
Attending: PODIATRIST
Payer: COMMERCIAL

## 2020-01-09 ENCOUNTER — OFFICE VISIT (OUTPATIENT)
Dept: PODIATRY | Facility: CLINIC | Age: 55
End: 2020-01-09
Payer: COMMERCIAL

## 2020-01-09 VITALS
DIASTOLIC BLOOD PRESSURE: 82 MMHG | WEIGHT: 257 LBS | SYSTOLIC BLOOD PRESSURE: 136 MMHG | BODY MASS INDEX: 34.81 KG/M2 | HEIGHT: 72 IN

## 2020-01-09 DIAGNOSIS — Z89.432 STATUS POST PARTIAL AMPUTATION OF LEFT FOOT (H): ICD-10-CM

## 2020-01-09 DIAGNOSIS — E11.42 TYPE 2 DIABETES MELLITUS WITH DIABETIC POLYNEUROPATHY, WITHOUT LONG-TERM CURRENT USE OF INSULIN (H): ICD-10-CM

## 2020-01-09 DIAGNOSIS — Z09 SURGERY FOLLOW-UP EXAMINATION: Primary | ICD-10-CM

## 2020-01-09 DIAGNOSIS — T81.30XA WOUND DEHISCENCE: ICD-10-CM

## 2020-01-09 DIAGNOSIS — S91.302D OPEN WOUND OF LEFT FOOT, SUBSEQUENT ENCOUNTER: ICD-10-CM

## 2020-01-09 PROCEDURE — 73630 X-RAY EXAM OF FOOT: CPT | Mod: LT

## 2020-01-09 PROCEDURE — 99024 POSTOP FOLLOW-UP VISIT: CPT | Performed by: PODIATRIST

## 2020-01-09 PROCEDURE — 11042 DBRDMT SUBQ TIS 1ST 20SQCM/<: CPT | Mod: 58 | Performed by: PODIATRIST

## 2020-01-09 ASSESSMENT — MIFFLIN-ST. JEOR: SCORE: 2053.74

## 2020-01-09 NOTE — PATIENT INSTRUCTIONS
Thank you for choosing [facility] Podiatry / Foot & Ankle Surgery!    DR. FUENTES'S CLINIC LOCATIONS     MONDAY - [place] WEDNESDAY (AM ONLY) - [place]   [address] [address] [place] [address]   [phone] / [phone] / [phone]       THURSDAY - [place] SCHEDULE SURGERY: [phone]   [address] APPOINTMENTS: [phone]   [place] [address] BILLING QUESTIONS: [phone] / [phone] TRIAGE NURSE: [phone]

## 2020-01-09 NOTE — LETTER
1/9/2020         RE: Ry Horner  [address] [address] [address]        Dear Colleague,    Thank you for referring your patient, Ry Horner, to the [facility]. Please see a copy of my visit note below.    S: Ry Horner  presents 4+ week  post op.     POSTOPERATIVE DIAGNOSES:   1.  Necrotic wound, left foot.   2.  Osteomyelitis, left first metatarsal.   3.  Possible osteomyelitis, left second toe proximal phalanx.      12/4/19 PROCEDURES:   1.  Excisional debridement of an area greater than 20 square cm down to and including the fat layer and deep fascia.   2.  Excision of the sesamoids.   3.  Partial amputation of the left first metatarsal.   4.  Bone biopsy, proximal phalanx, left second toe.        POSTOPERATIVE DIAGNOSIS:  Left foot osteomyelitis status post open amputation of the left first metatarsal with excisional debridement on 12/04/2019.      12/11/19 PROCEDURES:     1.  Repeat irrigation and excisional debridement involving an area larger than 20 square cm with debridement down to and including the fat layer and deep fascia.   2.  Partial ostectomy distal left first metatarsal.       He was hospitalized at the time and received IV antibiotics.    No complaints. Wearing a DH2 shoe, uses a walker at home and a wheel chair outside of the house. His wife is assisting with daily dressing cares.     dehiscence of the wound distally    O:   Vascular:  Pedal pulses are palpable.  Interval reduction in left forefoot edema.    Derm: The incision is well coapted.  Sutures are intact.  No significant alexander-incisional erythema.    The incision looks like it has healed more proximally than distally. There is macerated skin distally. Mild malodor. No erythema. No purulence.    Post suture removal, near complete wound dehiscence.  It does not probe to the residual first metatarsal.       ASSESSMENT:  Encounter Diagnoses   Name Primary?     Surgery follow-up examination Yes     Wound  "dehiscence      Open wound of left foot, subsequent encounter      Type 2 diabetes mellitus with diabetic polyneuropathy, without long-term current use of insulin (H)      Status post partial amputation of left foot (H)      No clinical signs of infection.    PLAN:  1) Suture Removal    The incision was prepped with povodine iodine solution.  Using a sterile suture scissors and forceps, the sutures were removed w/o difficulty.  Incision cleansed with an alcohol wipe and a light dressing was applied.  Pt advised to keep foot dry for an additional 24 hours, and then washing the foot is okay.  Pt stated understanding.    3) Short CAM walker and cane for offloading.     4) Wound was debrided - see below    5) continue current wound cares: daily cleansing with MicroKlenz, blot dry, apply Iodosorb and a dressing    5) XR left foot pending    6) follow up in 3-4 weeks; if no significant progress, will refer him to the [facility] [place].     Excisional Debridement    The excisional debridement procedure was discussed.  This included the goals of removing non-viable tissue, evaluating the full extent of wound, and promoting wound healing.  Ry ROGERS Horner  provided verbal and written consent.  The \"Time Out\" was called.     Using a sterile #15 blade, tissue nippers, small scissors and forceps, excisional debridment of the left foot wound/ ulcer was performed.  Debridement was carried out to the depth of the fat layer.   The hyperkeratotic eschar surrounding the wound was removed, by excising skin edges back to healthy, bleeding tissue .  Other non-viable tissue was excised. The ulcer base was scraped to remove bioburden and promote healing.  The area debrided was less than 20 square cm.   There was moderate bleeding with the procedure. No anesthesia was needed due to peripheral neuropathy.   A sterile dressing was applied.      Tim Douglas DPM, FACFAS, MS    [place] Department of Podiatry/Foot & Ankle " Surgery      Again, thank you for allowing me to participate in the care of your patient.        Sincerely,        Tim Douglas DPM

## 2020-01-09 NOTE — PROGRESS NOTES
S: Ry Horner  presents 4+ week  post op.     POSTOPERATIVE DIAGNOSES:   1.  Necrotic wound, left foot.   2.  Osteomyelitis, left first metatarsal.   3.  Possible osteomyelitis, left second toe proximal phalanx.      12/4/19 PROCEDURES:   1.  Excisional debridement of an area greater than 20 square cm down to and including the fat layer and deep fascia.   2.  Excision of the sesamoids.   3.  Partial amputation of the left first metatarsal.   4.  Bone biopsy, proximal phalanx, left second toe.        POSTOPERATIVE DIAGNOSIS:  Left foot osteomyelitis status post open amputation of the left first metatarsal with excisional debridement on 12/04/2019.      12/11/19 PROCEDURES:     1.  Repeat irrigation and excisional debridement involving an area larger than 20 square cm with debridement down to and including the fat layer and deep fascia.   2.  Partial ostectomy distal left first metatarsal.       He was hospitalized at the time and received IV antibiotics.    No complaints. Wearing a DH2 shoe, uses a walker at home and a wheel chair outside of the house. His wife is assisting with daily dressing cares.     dehiscence of the wound distally    O:   Vascular:  Pedal pulses are palpable.  Interval reduction in left forefoot edema.    Derm: The incision is well coapted.  Sutures are intact.  No significant alexander-incisional erythema.    The incision looks like it has healed more proximally than distally. There is macerated skin distally. Mild malodor. No erythema. No purulence.    Post suture removal, near complete wound dehiscence.  It does not probe to the residual first metatarsal.       ASSESSMENT:  Encounter Diagnoses   Name Primary?     Surgery follow-up examination Yes     Wound dehiscence      Open wound of left foot, subsequent encounter      Type 2 diabetes mellitus with diabetic polyneuropathy, without long-term current use of insulin (H)      Status post partial amputation of left foot (H)      No clinical signs  "of infection.    PLAN:  1) Suture Removal    The incision was prepped with povodine iodine solution.  Using a sterile suture scissors and forceps, the sutures were removed w/o difficulty.  Incision cleansed with an alcohol wipe and a light dressing was applied.  Pt advised to keep foot dry for an additional 24 hours, and then washing the foot is okay.  Pt stated understanding.    3) Short CAM walker and cane for offloading.     4) Wound was debrided - see below    5) continue current wound cares: daily cleansing with MicroKlenz, blot dry, apply Iodosorb and a dressing    5) XR left foot pending    6) follow up in 3-4 weeks; if no significant progress, will refer him to the [facility] [place].     Excisional Debridement    The excisional debridement procedure was discussed.  This included the goals of removing non-viable tissue, evaluating the full extent of wound, and promoting wound healing.  Ry Horner  provided verbal and written consent.  The \"Time Out\" was called.     Using a sterile #15 blade, tissue nippers, small scissors and forceps, excisional debridment of the left foot wound/ ulcer was performed.  Debridement was carried out to the depth of the fat layer.   The hyperkeratotic eschar surrounding the wound was removed, by excising skin edges back to healthy, bleeding tissue .  Other non-viable tissue was excised. The ulcer base was scraped to remove bioburden and promote healing.  The area debrided was less than 20 square cm.   There was moderate bleeding with the procedure. No anesthesia was needed due to peripheral neuropathy.   A sterile dressing was applied.      Tim Douglas DPM, FACFAS, MS    [place] Department of Podiatry/Foot & Ankle Surgery    "

## 2020-02-02 ENCOUNTER — HOSPITAL ENCOUNTER (INPATIENT)
Facility: CLINIC | Age: 55
LOS: 4 days | Discharge: HOME | DRG: 616 | End: 2020-02-07
Attending: EMERGENCY MEDICINE | Admitting: HOSPITALIST
Payer: COMMERCIAL

## 2020-02-02 ENCOUNTER — APPOINTMENT (OUTPATIENT)
Dept: GENERAL RADIOLOGY | Facility: CLINIC | Age: 55
DRG: 616 | End: 2020-02-02
Attending: EMERGENCY MEDICINE
Payer: COMMERCIAL

## 2020-02-02 DIAGNOSIS — L08.9 DIABETIC FOOT INFECTION (H): ICD-10-CM

## 2020-02-02 DIAGNOSIS — E11.628 DIABETIC FOOT INFECTION (H): ICD-10-CM

## 2020-02-02 DIAGNOSIS — N17.9 AKI (ACUTE KIDNEY INJURY) (H): ICD-10-CM

## 2020-02-02 LAB
ALBUMIN SERPL-MCNC: 3.5 G/DL (ref 3.4–5)
ALP SERPL-CCNC: 96 U/L (ref 40–150)
ALT SERPL W P-5'-P-CCNC: 22 U/L (ref 0–70)
ANION GAP SERPL CALCULATED.3IONS-SCNC: 7 MMOL/L (ref 3–14)
AST SERPL W P-5'-P-CCNC: 14 U/L (ref 0–45)
BASOPHILS # BLD AUTO: 0 10E9/L (ref 0–0.2)
BASOPHILS NFR BLD AUTO: 0.2 %
BILIRUB SERPL-MCNC: 0.3 MG/DL (ref 0.2–1.3)
BUN SERPL-MCNC: 40 MG/DL (ref 7–30)
CALCIUM SERPL-MCNC: 9 MG/DL (ref 8.5–10.1)
CHLORIDE SERPL-SCNC: 102 MMOL/L (ref 94–109)
CO2 SERPL-SCNC: 23 MMOL/L (ref 20–32)
CREAT SERPL-MCNC: 1.6 MG/DL (ref 0.66–1.25)
CRP SERPL-MCNC: 125 MG/L (ref 0–8)
DIFFERENTIAL METHOD BLD: ABNORMAL
EOSINOPHIL # BLD AUTO: 0.1 10E9/L (ref 0–0.7)
EOSINOPHIL NFR BLD AUTO: 0.7 %
ERYTHROCYTE [DISTWIDTH] IN BLOOD BY AUTOMATED COUNT: 13.4 % (ref 10–15)
GFR SERPL CREATININE-BSD FRML MDRD: 49 ML/MIN/{1.73_M2}
GLUCOSE SERPL-MCNC: 279 MG/DL (ref 70–99)
HCT VFR BLD AUTO: 33.2 % (ref 40–53)
HGB BLD-MCNC: 11 G/DL (ref 13.3–17.7)
IMM GRANULOCYTES # BLD: 0 10E9/L (ref 0–0.4)
IMM GRANULOCYTES NFR BLD: 0.1 %
LACTATE BLD-SCNC: 1 MMOL/L (ref 0.7–2)
LYMPHOCYTES # BLD AUTO: 1.6 10E9/L (ref 0.8–5.3)
LYMPHOCYTES NFR BLD AUTO: 9.8 %
MCH RBC QN AUTO: 27.1 PG (ref 26.5–33)
MCHC RBC AUTO-ENTMCNC: 33.1 G/DL (ref 31.5–36.5)
MCV RBC AUTO: 82 FL (ref 78–100)
MONOCYTES # BLD AUTO: 1.4 10E9/L (ref 0–1.3)
MONOCYTES NFR BLD AUTO: 8.5 %
NEUTROPHILS # BLD AUTO: 13.5 10E9/L (ref 1.6–8.3)
NEUTROPHILS NFR BLD AUTO: 80.7 %
PLATELET # BLD AUTO: 390 10E9/L (ref 150–450)
POTASSIUM SERPL-SCNC: 4 MMOL/L (ref 3.4–5.3)
PROT SERPL-MCNC: 8.8 G/DL (ref 6.8–8.8)
RBC # BLD AUTO: 4.06 10E12/L (ref 4.4–5.9)
SODIUM SERPL-SCNC: 132 MMOL/L (ref 133–144)
WBC # BLD AUTO: 16.7 10E9/L (ref 4–11)

## 2020-02-02 PROCEDURE — 87040 BLOOD CULTURE FOR BACTERIA: CPT | Performed by: EMERGENCY MEDICINE

## 2020-02-02 PROCEDURE — 85025 COMPLETE CBC W/AUTO DIFF WBC: CPT | Performed by: EMERGENCY MEDICINE

## 2020-02-02 PROCEDURE — 96365 THER/PROPH/DIAG IV INF INIT: CPT

## 2020-02-02 PROCEDURE — 86140 C-REACTIVE PROTEIN: CPT | Performed by: EMERGENCY MEDICINE

## 2020-02-02 PROCEDURE — 36415 COLL VENOUS BLD VENIPUNCTURE: CPT

## 2020-02-02 PROCEDURE — 83605 ASSAY OF LACTIC ACID: CPT | Performed by: EMERGENCY MEDICINE

## 2020-02-02 PROCEDURE — 99285 EMERGENCY DEPT VISIT HI MDM: CPT | Mod: 25

## 2020-02-02 PROCEDURE — 25800030 ZZH RX IP 258 OP 636: Performed by: EMERGENCY MEDICINE

## 2020-02-02 PROCEDURE — 73630 X-RAY EXAM OF FOOT: CPT | Mod: LT

## 2020-02-02 PROCEDURE — 96375 TX/PRO/DX INJ NEW DRUG ADDON: CPT

## 2020-02-02 PROCEDURE — 80053 COMPREHEN METABOLIC PANEL: CPT | Performed by: EMERGENCY MEDICINE

## 2020-02-02 PROCEDURE — 96361 HYDRATE IV INFUSION ADD-ON: CPT

## 2020-02-02 RX ADMIN — SODIUM CHLORIDE 1000 ML: 9 INJECTION, SOLUTION INTRAVENOUS at 22:53

## 2020-02-02 ASSESSMENT — ENCOUNTER SYMPTOMS
SHORTNESS OF BREATH: 0
ARTHRALGIAS: 1
WOUND: 1
COLOR CHANGE: 1
ABDOMINAL PAIN: 0
JOINT SWELLING: 1
FEVER: 0

## 2020-02-02 ASSESSMENT — MIFFLIN-ST. JEOR: SCORE: 2044.67

## 2020-02-03 ENCOUNTER — ANESTHESIA (OUTPATIENT)
Dept: SURGERY | Facility: CLINIC | Age: 55
DRG: 616 | End: 2020-02-03
Payer: COMMERCIAL

## 2020-02-03 ENCOUNTER — APPOINTMENT (OUTPATIENT)
Dept: MRI IMAGING | Facility: CLINIC | Age: 55
DRG: 616 | End: 2020-02-03
Attending: HOSPITALIST
Payer: COMMERCIAL

## 2020-02-03 ENCOUNTER — ANESTHESIA EVENT (OUTPATIENT)
Dept: SURGERY | Facility: CLINIC | Age: 55
DRG: 616 | End: 2020-02-03
Payer: COMMERCIAL

## 2020-02-03 ENCOUNTER — APPOINTMENT (OUTPATIENT)
Dept: GENERAL RADIOLOGY | Facility: CLINIC | Age: 55
DRG: 616 | End: 2020-02-03
Attending: PODIATRIST
Payer: COMMERCIAL

## 2020-02-03 PROBLEM — L03.119 CELLULITIS IN DIABETIC FOOT (H): Status: ACTIVE | Noted: 2020-02-03

## 2020-02-03 PROBLEM — E11.628 CELLULITIS IN DIABETIC FOOT (H): Status: ACTIVE | Noted: 2020-02-03

## 2020-02-03 LAB
ANION GAP SERPL CALCULATED.3IONS-SCNC: 5 MMOL/L (ref 3–14)
BASOPHILS # BLD AUTO: 0 10E9/L (ref 0–0.2)
BASOPHILS NFR BLD AUTO: 0.2 %
BUN SERPL-MCNC: 34 MG/DL (ref 7–30)
CALCIUM SERPL-MCNC: 8.2 MG/DL (ref 8.5–10.1)
CHLORIDE SERPL-SCNC: 109 MMOL/L (ref 94–109)
CO2 SERPL-SCNC: 24 MMOL/L (ref 20–32)
CREAT SERPL-MCNC: 1.41 MG/DL (ref 0.66–1.25)
DIFFERENTIAL METHOD BLD: ABNORMAL
EOSINOPHIL # BLD AUTO: 0.2 10E9/L (ref 0–0.7)
EOSINOPHIL NFR BLD AUTO: 1.2 %
ERYTHROCYTE [DISTWIDTH] IN BLOOD BY AUTOMATED COUNT: 13.4 % (ref 10–15)
GFR SERPL CREATININE-BSD FRML MDRD: 57 ML/MIN/{1.73_M2}
GLUCOSE BLDC GLUCOMTR-MCNC: 131 MG/DL (ref 70–99)
GLUCOSE BLDC GLUCOMTR-MCNC: 164 MG/DL (ref 70–99)
GLUCOSE BLDC GLUCOMTR-MCNC: 169 MG/DL (ref 70–99)
GLUCOSE BLDC GLUCOMTR-MCNC: 78 MG/DL (ref 70–99)
GLUCOSE BLDC GLUCOMTR-MCNC: 95 MG/DL (ref 70–99)
GLUCOSE BLDC GLUCOMTR-MCNC: 99 MG/DL (ref 70–99)
GLUCOSE SERPL-MCNC: 128 MG/DL (ref 70–99)
GRAM STN SPEC: ABNORMAL
HBA1C MFR BLD: 7.9 % (ref 0–5.6)
HCT VFR BLD AUTO: 27.2 % (ref 40–53)
HGB BLD-MCNC: 9.1 G/DL (ref 13.3–17.7)
IMM GRANULOCYTES # BLD: 0 10E9/L (ref 0–0.4)
IMM GRANULOCYTES NFR BLD: 0.2 %
LYMPHOCYTES # BLD AUTO: 1.6 10E9/L (ref 0.8–5.3)
LYMPHOCYTES NFR BLD AUTO: 12.8 %
Lab: ABNORMAL
MCH RBC QN AUTO: 27.5 PG (ref 26.5–33)
MCHC RBC AUTO-ENTMCNC: 33.5 G/DL (ref 31.5–36.5)
MCV RBC AUTO: 82 FL (ref 78–100)
MONOCYTES # BLD AUTO: 1.3 10E9/L (ref 0–1.3)
MONOCYTES NFR BLD AUTO: 10.6 %
NEUTROPHILS # BLD AUTO: 9.1 10E9/L (ref 1.6–8.3)
NEUTROPHILS NFR BLD AUTO: 75 %
PLATELET # BLD AUTO: 314 10E9/L (ref 150–450)
POTASSIUM SERPL-SCNC: 3.8 MMOL/L (ref 3.4–5.3)
RBC # BLD AUTO: 3.31 10E12/L (ref 4.4–5.9)
SODIUM SERPL-SCNC: 138 MMOL/L (ref 133–144)
SPECIMEN SOURCE: ABNORMAL
WBC # BLD AUTO: 12.2 10E9/L (ref 4–11)

## 2020-02-03 PROCEDURE — 25800030 ZZH RX IP 258 OP 636: Performed by: NURSE ANESTHETIST, CERTIFIED REGISTERED

## 2020-02-03 PROCEDURE — 71000012 ZZH RECOVERY PHASE 1 LEVEL 1 FIRST HR: Performed by: PODIATRIST

## 2020-02-03 PROCEDURE — 25000125 ZZHC RX 250: Performed by: NURSE ANESTHETIST, CERTIFIED REGISTERED

## 2020-02-03 PROCEDURE — 25000566 ZZH SEVOFLURANE, EA 15 MIN: Performed by: PODIATRIST

## 2020-02-03 PROCEDURE — 25000131 ZZH RX MED GY IP 250 OP 636 PS 637: Performed by: HOSPITALIST

## 2020-02-03 PROCEDURE — 25000128 H RX IP 250 OP 636

## 2020-02-03 PROCEDURE — 73720 MRI LWR EXTREMITY W/O&W/DYE: CPT | Mod: LT

## 2020-02-03 PROCEDURE — 40000170 ZZH STATISTIC PRE-PROCEDURE ASSESSMENT II: Performed by: PODIATRIST

## 2020-02-03 PROCEDURE — A9585 GADOBUTROL INJECTION: HCPCS | Performed by: HOSPITALIST

## 2020-02-03 PROCEDURE — 99207 ZZC NON-BILLABLE SERV PER CHARTING: CPT | Performed by: INTERNAL MEDICINE

## 2020-02-03 PROCEDURE — 25800030 ZZH RX IP 258 OP 636: Performed by: HOSPITALIST

## 2020-02-03 PROCEDURE — 28820 AMPUTATION OF TOE: CPT | Mod: 79 | Performed by: PODIATRIST

## 2020-02-03 PROCEDURE — 00000146 ZZHCL STATISTIC GLUCOSE BY METER IP

## 2020-02-03 PROCEDURE — 25000132 ZZH RX MED GY IP 250 OP 250 PS 637: Performed by: HOSPITALIST

## 2020-02-03 PROCEDURE — 25000132 ZZH RX MED GY IP 250 OP 250 PS 637: Performed by: PODIATRIST

## 2020-02-03 PROCEDURE — 40000985 XR FOOT PORT LT 3 VW: Mod: LT

## 2020-02-03 PROCEDURE — 88305 TISSUE EXAM BY PATHOLOGIST: CPT | Performed by: PODIATRIST

## 2020-02-03 PROCEDURE — 87186 SC STD MICRODIL/AGAR DIL: CPT | Performed by: PODIATRIST

## 2020-02-03 PROCEDURE — 83036 HEMOGLOBIN GLYCOSYLATED A1C: CPT | Performed by: HOSPITALIST

## 2020-02-03 PROCEDURE — 25000128 H RX IP 250 OP 636: Performed by: HOSPITALIST

## 2020-02-03 PROCEDURE — 37000009 ZZH ANESTHESIA TECHNICAL FEE, EACH ADDTL 15 MIN: Performed by: PODIATRIST

## 2020-02-03 PROCEDURE — 25800030 ZZH RX IP 258 OP 636: Performed by: EMERGENCY MEDICINE

## 2020-02-03 PROCEDURE — 88311 DECALCIFY TISSUE: CPT | Mod: 26 | Performed by: PODIATRIST

## 2020-02-03 PROCEDURE — 36000052 ZZH SURGERY LEVEL 2 EA 15 ADDTL MIN: Performed by: PODIATRIST

## 2020-02-03 PROCEDURE — 87205 SMEAR GRAM STAIN: CPT | Performed by: PODIATRIST

## 2020-02-03 PROCEDURE — 12000000 ZZH R&B MED SURG/OB

## 2020-02-03 PROCEDURE — 25000128 H RX IP 250 OP 636: Performed by: PODIATRIST

## 2020-02-03 PROCEDURE — 25800030 ZZH RX IP 258 OP 636: Performed by: PODIATRIST

## 2020-02-03 PROCEDURE — 99223 1ST HOSP IP/OBS HIGH 75: CPT | Mod: AI | Performed by: HOSPITALIST

## 2020-02-03 PROCEDURE — 25000131 ZZH RX MED GY IP 250 OP 636 PS 637: Performed by: PODIATRIST

## 2020-02-03 PROCEDURE — 25500064 ZZH RX 255 OP 636: Performed by: HOSPITALIST

## 2020-02-03 PROCEDURE — 36000050 ZZH SURGERY LEVEL 2 1ST 30 MIN: Performed by: PODIATRIST

## 2020-02-03 PROCEDURE — 0Y6S0Z0 DETACHMENT AT LEFT 2ND TOE, COMPLETE, OPEN APPROACH: ICD-10-PCS | Performed by: PODIATRIST

## 2020-02-03 PROCEDURE — 25800030 ZZH RX IP 258 OP 636

## 2020-02-03 PROCEDURE — 87040 BLOOD CULTURE FOR BACTERIA: CPT | Performed by: EMERGENCY MEDICINE

## 2020-02-03 PROCEDURE — 37000008 ZZH ANESTHESIA TECHNICAL FEE, 1ST 30 MIN: Performed by: PODIATRIST

## 2020-02-03 PROCEDURE — 25000128 H RX IP 250 OP 636: Performed by: EMERGENCY MEDICINE

## 2020-02-03 PROCEDURE — 27210794 ZZH OR GENERAL SUPPLY STERILE: Performed by: PODIATRIST

## 2020-02-03 PROCEDURE — 25800030 ZZH RX IP 258 OP 636: Performed by: ANESTHESIOLOGY

## 2020-02-03 PROCEDURE — 25000128 H RX IP 250 OP 636: Performed by: NURSE ANESTHETIST, CERTIFIED REGISTERED

## 2020-02-03 PROCEDURE — 88311 DECALCIFY TISSUE: CPT | Performed by: PODIATRIST

## 2020-02-03 PROCEDURE — 85025 COMPLETE CBC W/AUTO DIFF WBC: CPT | Performed by: HOSPITALIST

## 2020-02-03 PROCEDURE — 80048 BASIC METABOLIC PNL TOTAL CA: CPT | Performed by: HOSPITALIST

## 2020-02-03 PROCEDURE — 36415 COLL VENOUS BLD VENIPUNCTURE: CPT | Performed by: HOSPITALIST

## 2020-02-03 PROCEDURE — 88305 TISSUE EXAM BY PATHOLOGIST: CPT | Mod: 26 | Performed by: PODIATRIST

## 2020-02-03 PROCEDURE — 99223 1ST HOSP IP/OBS HIGH 75: CPT | Mod: 24 | Performed by: PODIATRIST

## 2020-02-03 PROCEDURE — 87077 CULTURE AEROBIC IDENTIFY: CPT | Performed by: PODIATRIST

## 2020-02-03 PROCEDURE — 87070 CULTURE OTHR SPECIMN AEROBIC: CPT | Performed by: PODIATRIST

## 2020-02-03 RX ORDER — ACETAMINOPHEN 325 MG/1
975 TABLET ORAL EVERY 8 HOURS
Status: COMPLETED | OUTPATIENT
Start: 2020-02-03 | End: 2020-02-06

## 2020-02-03 RX ORDER — HYDROCHLOROTHIAZIDE 12.5 MG/1
12.5 TABLET ORAL DAILY
COMMUNITY
End: 2020-03-11

## 2020-02-03 RX ORDER — DEXTROSE MONOHYDRATE 25 G/50ML
25-50 INJECTION, SOLUTION INTRAVENOUS
Status: DISCONTINUED | OUTPATIENT
Start: 2020-02-03 | End: 2020-02-07 | Stop reason: HOSPADM

## 2020-02-03 RX ORDER — NICOTINE POLACRILEX 4 MG
15-30 LOZENGE BUCCAL
Status: DISCONTINUED | OUTPATIENT
Start: 2020-02-03 | End: 2020-02-07 | Stop reason: HOSPADM

## 2020-02-03 RX ORDER — ONDANSETRON 2 MG/ML
4 INJECTION INTRAMUSCULAR; INTRAVENOUS EVERY 6 HOURS PRN
Status: DISCONTINUED | OUTPATIENT
Start: 2020-02-03 | End: 2020-02-07 | Stop reason: HOSPADM

## 2020-02-03 RX ORDER — IBUPROFEN 200 MG
1000 TABLET ORAL
COMMUNITY
End: 2021-02-22

## 2020-02-03 RX ORDER — HYDROMORPHONE HYDROCHLORIDE 1 MG/ML
.3-.5 INJECTION, SOLUTION INTRAMUSCULAR; INTRAVENOUS; SUBCUTANEOUS
Status: DISCONTINUED | OUTPATIENT
Start: 2020-02-03 | End: 2020-02-07 | Stop reason: HOSPADM

## 2020-02-03 RX ORDER — OXYCODONE HYDROCHLORIDE 5 MG/1
5-10 TABLET ORAL
Status: DISCONTINUED | OUTPATIENT
Start: 2020-02-03 | End: 2020-02-03

## 2020-02-03 RX ORDER — ONDANSETRON 4 MG/1
4 TABLET, ORALLY DISINTEGRATING ORAL EVERY 6 HOURS PRN
Status: DISCONTINUED | OUTPATIENT
Start: 2020-02-03 | End: 2020-02-07 | Stop reason: HOSPADM

## 2020-02-03 RX ORDER — HYDROMORPHONE HYDROCHLORIDE 1 MG/ML
0.5 INJECTION, SOLUTION INTRAMUSCULAR; INTRAVENOUS; SUBCUTANEOUS ONCE
Status: COMPLETED | OUTPATIENT
Start: 2020-02-03 | End: 2020-02-03

## 2020-02-03 RX ORDER — LIDOCAINE 40 MG/G
CREAM TOPICAL
Status: DISCONTINUED | OUTPATIENT
Start: 2020-02-03 | End: 2020-02-07 | Stop reason: HOSPADM

## 2020-02-03 RX ORDER — ACETAMINOPHEN 325 MG/1
650 TABLET ORAL EVERY 4 HOURS PRN
Status: DISCONTINUED | OUTPATIENT
Start: 2020-02-06 | End: 2020-02-07 | Stop reason: HOSPADM

## 2020-02-03 RX ORDER — GLYCOPYRROLATE 0.2 MG/ML
INJECTION, SOLUTION INTRAMUSCULAR; INTRAVENOUS PRN
Status: DISCONTINUED | OUTPATIENT
Start: 2020-02-03 | End: 2020-02-03

## 2020-02-03 RX ORDER — SODIUM CHLORIDE, SODIUM LACTATE, POTASSIUM CHLORIDE, CALCIUM CHLORIDE 600; 310; 30; 20 MG/100ML; MG/100ML; MG/100ML; MG/100ML
INJECTION, SOLUTION INTRAVENOUS CONTINUOUS
Status: DISCONTINUED | OUTPATIENT
Start: 2020-02-03 | End: 2020-02-03 | Stop reason: HOSPADM

## 2020-02-03 RX ORDER — ACETAMINOPHEN 500 MG
500 TABLET ORAL
COMMUNITY
End: 2020-06-03

## 2020-02-03 RX ORDER — NALOXONE HYDROCHLORIDE 0.4 MG/ML
.1-.4 INJECTION, SOLUTION INTRAMUSCULAR; INTRAVENOUS; SUBCUTANEOUS
Status: DISCONTINUED | OUTPATIENT
Start: 2020-02-03 | End: 2020-02-05

## 2020-02-03 RX ORDER — OXYCODONE HYDROCHLORIDE 5 MG/1
5-10 TABLET ORAL
Status: DISCONTINUED | OUTPATIENT
Start: 2020-02-03 | End: 2020-02-07 | Stop reason: HOSPADM

## 2020-02-03 RX ORDER — SODIUM CHLORIDE, SODIUM LACTATE, POTASSIUM CHLORIDE, CALCIUM CHLORIDE 600; 310; 30; 20 MG/100ML; MG/100ML; MG/100ML; MG/100ML
INJECTION, SOLUTION INTRAVENOUS CONTINUOUS
Status: DISCONTINUED | OUTPATIENT
Start: 2020-02-03 | End: 2020-02-04

## 2020-02-03 RX ORDER — LANOLIN ALCOHOL/MO/W.PET/CERES
3 CREAM (GRAM) TOPICAL
Status: DISCONTINUED | OUTPATIENT
Start: 2020-02-03 | End: 2020-02-07 | Stop reason: HOSPADM

## 2020-02-03 RX ORDER — BISACODYL 10 MG
10 SUPPOSITORY, RECTAL RECTAL DAILY PRN
Status: DISCONTINUED | OUTPATIENT
Start: 2020-02-03 | End: 2020-02-07 | Stop reason: HOSPADM

## 2020-02-03 RX ORDER — GADOBUTROL 604.72 MG/ML
11 INJECTION INTRAVENOUS ONCE
Status: COMPLETED | OUTPATIENT
Start: 2020-02-03 | End: 2020-02-03

## 2020-02-03 RX ORDER — AMOXICILLIN 250 MG
2 CAPSULE ORAL 2 TIMES DAILY PRN
Status: DISCONTINUED | OUTPATIENT
Start: 2020-02-03 | End: 2020-02-07 | Stop reason: HOSPADM

## 2020-02-03 RX ORDER — NALOXONE HYDROCHLORIDE 0.4 MG/ML
.1-.4 INJECTION, SOLUTION INTRAMUSCULAR; INTRAVENOUS; SUBCUTANEOUS
Status: DISCONTINUED | OUTPATIENT
Start: 2020-02-03 | End: 2020-02-03

## 2020-02-03 RX ORDER — FENTANYL CITRATE 50 UG/ML
INJECTION, SOLUTION INTRAMUSCULAR; INTRAVENOUS PRN
Status: DISCONTINUED | OUTPATIENT
Start: 2020-02-03 | End: 2020-02-03

## 2020-02-03 RX ORDER — ACETAMINOPHEN 325 MG/1
650 TABLET ORAL EVERY 4 HOURS PRN
Status: DISCONTINUED | OUTPATIENT
Start: 2020-02-03 | End: 2020-02-03

## 2020-02-03 RX ORDER — AMPICILLIN AND SULBACTAM 2; 1 G/1; G/1
3 INJECTION, POWDER, FOR SOLUTION INTRAMUSCULAR; INTRAVENOUS EVERY 6 HOURS
Status: DISCONTINUED | OUTPATIENT
Start: 2020-02-03 | End: 2020-02-07 | Stop reason: HOSPADM

## 2020-02-03 RX ORDER — ACETAMINOPHEN 650 MG/1
650 SUPPOSITORY RECTAL EVERY 4 HOURS PRN
Status: DISCONTINUED | OUTPATIENT
Start: 2020-02-03 | End: 2020-02-07 | Stop reason: HOSPADM

## 2020-02-03 RX ORDER — PROPOFOL 10 MG/ML
INJECTION, EMULSION INTRAVENOUS CONTINUOUS PRN
Status: DISCONTINUED | OUTPATIENT
Start: 2020-02-03 | End: 2020-02-03

## 2020-02-03 RX ORDER — AMOXICILLIN 250 MG
1 CAPSULE ORAL 2 TIMES DAILY PRN
Status: DISCONTINUED | OUTPATIENT
Start: 2020-02-03 | End: 2020-02-07 | Stop reason: HOSPADM

## 2020-02-03 RX ORDER — LIDOCAINE 40 MG/G
CREAM TOPICAL
Status: DISCONTINUED | OUTPATIENT
Start: 2020-02-03 | End: 2020-02-03

## 2020-02-03 RX ORDER — BUPIVACAINE HYDROCHLORIDE 5 MG/ML
INJECTION, SOLUTION EPIDURAL; INTRACAUDAL PRN
Status: DISCONTINUED | OUTPATIENT
Start: 2020-02-03 | End: 2020-02-03 | Stop reason: HOSPADM

## 2020-02-03 RX ORDER — POLYETHYLENE GLYCOL 3350 17 G/17G
17 POWDER, FOR SOLUTION ORAL DAILY PRN
Status: DISCONTINUED | OUTPATIENT
Start: 2020-02-03 | End: 2020-02-07 | Stop reason: HOSPADM

## 2020-02-03 RX ORDER — PROPOFOL 10 MG/ML
INJECTION, EMULSION INTRAVENOUS PRN
Status: DISCONTINUED | OUTPATIENT
Start: 2020-02-03 | End: 2020-02-03

## 2020-02-03 RX ORDER — ONDANSETRON 2 MG/ML
INJECTION INTRAMUSCULAR; INTRAVENOUS PRN
Status: DISCONTINUED | OUTPATIENT
Start: 2020-02-03 | End: 2020-02-03

## 2020-02-03 RX ADMIN — AMPICILLIN SODIUM AND SULBACTAM SODIUM 3 G: 2; 1 INJECTION, POWDER, FOR SOLUTION INTRAMUSCULAR; INTRAVENOUS at 22:36

## 2020-02-03 RX ADMIN — INSULIN GLARGINE 20 UNITS: 100 INJECTION, SOLUTION SUBCUTANEOUS at 02:47

## 2020-02-03 RX ADMIN — SODIUM CHLORIDE, POTASSIUM CHLORIDE, SODIUM LACTATE AND CALCIUM CHLORIDE: 600; 310; 30; 20 INJECTION, SOLUTION INTRAVENOUS at 12:19

## 2020-02-03 RX ADMIN — SODIUM CHLORIDE 1000 ML: 9 INJECTION, SOLUTION INTRAVENOUS at 01:09

## 2020-02-03 RX ADMIN — PROPOFOL 20 MG: 10 INJECTION, EMULSION INTRAVENOUS at 13:13

## 2020-02-03 RX ADMIN — GLYCOPYRROLATE 0.2 MG: 0.2 INJECTION, SOLUTION INTRAMUSCULAR; INTRAVENOUS at 13:21

## 2020-02-03 RX ADMIN — ACETAMINOPHEN 975 MG: 325 TABLET, FILM COATED ORAL at 17:24

## 2020-02-03 RX ADMIN — DEXMEDETOMIDINE HYDROCHLORIDE 8 MCG: 100 INJECTION, SOLUTION INTRAVENOUS at 13:19

## 2020-02-03 RX ADMIN — AMPICILLIN SODIUM AND SULBACTAM SODIUM 3 G: 2; 1 INJECTION, POWDER, FOR SOLUTION INTRAMUSCULAR; INTRAVENOUS at 03:16

## 2020-02-03 RX ADMIN — FENTANYL CITRATE 25 MCG: 50 INJECTION, SOLUTION INTRAMUSCULAR; INTRAVENOUS at 13:37

## 2020-02-03 RX ADMIN — SODIUM CHLORIDE, POTASSIUM CHLORIDE, SODIUM LACTATE AND CALCIUM CHLORIDE: 600; 310; 30; 20 INJECTION, SOLUTION INTRAVENOUS at 05:13

## 2020-02-03 RX ADMIN — PROPOFOL 75 MCG/KG/MIN: 10 INJECTION, EMULSION INTRAVENOUS at 13:10

## 2020-02-03 RX ADMIN — ACETAMINOPHEN 975 MG: 325 TABLET, FILM COATED ORAL at 22:35

## 2020-02-03 RX ADMIN — INSULIN GLARGINE 20 UNITS: 100 INJECTION, SOLUTION SUBCUTANEOUS at 22:40

## 2020-02-03 RX ADMIN — OXYCODONE HYDROCHLORIDE 10 MG: 5 TABLET ORAL at 17:26

## 2020-02-03 RX ADMIN — MIDAZOLAM 2 MG: 1 INJECTION INTRAMUSCULAR; INTRAVENOUS at 13:07

## 2020-02-03 RX ADMIN — GADOBUTROL 11 ML: 604.72 INJECTION INTRAVENOUS at 21:01

## 2020-02-03 RX ADMIN — DEXMEDETOMIDINE HYDROCHLORIDE 8 MCG: 100 INJECTION, SOLUTION INTRAVENOUS at 13:15

## 2020-02-03 RX ADMIN — AMPICILLIN SODIUM AND SULBACTAM SODIUM 3 G: 2; 1 INJECTION, POWDER, FOR SOLUTION INTRAMUSCULAR; INTRAVENOUS at 07:49

## 2020-02-03 RX ADMIN — INSULIN ASPART 1 UNITS: 100 INJECTION, SOLUTION INTRAVENOUS; SUBCUTANEOUS at 02:48

## 2020-02-03 RX ADMIN — AMPICILLIN SODIUM AND SULBACTAM SODIUM 3 G: 2; 1 INJECTION, POWDER, FOR SOLUTION INTRAMUSCULAR; INTRAVENOUS at 13:15

## 2020-02-03 RX ADMIN — HYDROMORPHONE HYDROCHLORIDE 0.5 MG: 1 INJECTION, SOLUTION INTRAMUSCULAR; INTRAVENOUS; SUBCUTANEOUS at 01:09

## 2020-02-03 RX ADMIN — VANCOMYCIN HYDROCHLORIDE 2000 MG: 5 INJECTION, POWDER, LYOPHILIZED, FOR SOLUTION INTRAVENOUS at 00:08

## 2020-02-03 RX ADMIN — OXYCODONE HYDROCHLORIDE 10 MG: 5 TABLET ORAL at 22:35

## 2020-02-03 RX ADMIN — OXYCODONE HYDROCHLORIDE 10 MG: 5 TABLET ORAL at 04:16

## 2020-02-03 RX ADMIN — PHENYLEPHRINE HYDROCHLORIDE 100 MCG: 10 INJECTION INTRAVENOUS at 13:41

## 2020-02-03 RX ADMIN — FENTANYL CITRATE 25 MCG: 50 INJECTION, SOLUTION INTRAMUSCULAR; INTRAVENOUS at 13:36

## 2020-02-03 RX ADMIN — SODIUM CHLORIDE, POTASSIUM CHLORIDE, SODIUM LACTATE AND CALCIUM CHLORIDE: 600; 310; 30; 20 INJECTION, SOLUTION INTRAVENOUS at 17:27

## 2020-02-03 RX ADMIN — INSULIN ASPART 1 UNITS: 100 INJECTION, SOLUTION INTRAVENOUS; SUBCUTANEOUS at 22:40

## 2020-02-03 RX ADMIN — DEXMEDETOMIDINE HYDROCHLORIDE 4 MCG: 100 INJECTION, SOLUTION INTRAVENOUS at 13:25

## 2020-02-03 RX ADMIN — ONDANSETRON 4 MG: 2 INJECTION INTRAMUSCULAR; INTRAVENOUS at 13:19

## 2020-02-03 ASSESSMENT — ACTIVITIES OF DAILY LIVING (ADL)
ADLS_ACUITY_SCORE: 14

## 2020-02-03 ASSESSMENT — ENCOUNTER SYMPTOMS
ORTHOPNEA: 0
SEIZURES: 0

## 2020-02-03 ASSESSMENT — MIFFLIN-ST. JEOR: SCORE: 1999.31

## 2020-02-03 ASSESSMENT — LIFESTYLE VARIABLES: TOBACCO_USE: 1

## 2020-02-03 NOTE — PHARMACY-VANCOMYCIN DOSING SERVICE
Pharmacy Vancomycin Initial Note  Date of Service February 3, 2020  Patient's  1965  54 year old, male    Indication: Sepsis    Current estimated CrCl = Estimated Creatinine Clearance: 70.9 mL/min (A) (based on SCr of 1.6 mg/dL (H)).    Creatinine for last 3 days  2020: 10:50 PM Creatinine 1.60 mg/dL    Recent Vancomycin Level(s) for last 3 days  No results found for requested labs within last 72 hours.      Vancomycin IV Administrations (past 72 hours)                   vancomycin (VANCOCIN) 2,000 mg in sodium chloride 0.9 % 500 mL intermittent infusion (mg) 2,000 mg New Bag 20 0008                Nephrotoxins and other renal medications (From now, onward)    Start     Dose/Rate Route Frequency Ordered Stop    20 1200  vancomycin (VANCOCIN) 2,000 mg in sodium chloride 0.9 % 500 mL intermittent infusion      2,000 mg  over 2 Hours Intravenous EVERY 12 HOURS 20 0210      20 0200  ampicillin-sulbactam (UNASYN) 3 g vial to attach to  mL bag      3 g  over 1 Hours Intravenous EVERY 6 HOURS 20 0147            Contrast Orders - past 72 hours (72h ago, onward)    None                Plan:  1.  Start vancomycin  2000 mg IV q12h.   2.  Goal Trough Level: 15-20 mg/L   3.  Pharmacy will check trough levels as appropriate in 1-3 Days.    4. Serum creatinine levels will be ordered daily for the first week of therapy and at least twice weekly for subsequent weeks.    5. Proctor method utilized to dose vancomycin therapy: Method 1    Mingo Gonzalez [facility]

## 2020-02-03 NOTE — OP NOTE
Procedure Date: 02/03/2020      SURGEON:  Milena Cevallos DPM      PREOPERATIVE DIAGNOSES:   1.  Diabetic with neuropathy.   2.  Ulceration, left second toe.   3.  Gas gangrene, left second toe.      POSTOPERATIVE DIAGNOSES:   1.  Diabetic with neuropathy.   2.  Ulceration, left second toe.   3.  Gas gangrene, left second toe.      PROCEDURES:  Left second toe amputation at metatarsophalangeal joint.      ANESTHESIA:  MAC with local.      HEMOSTASIS:  None.      ESTIMATED BLOOD LOSS:  Less than 10 mL.      SPECIMENS:  Left second toe wound swab and toe for pathology.      MATERIALS:  Quarter-inch plain packing.      INDICATIONS:  Mr. Horner is a 54-year-old diabetic male who presented to the hospital with redness to the left foot and a new wound to the second toe.  On physical exam, patient's pulses are palpable.  He has had a previous right below-the-knee amputation and left partial first ray amputation.  There was significant black eschar to the distal left second toe with malodor noted.  Bone was exposed.  X-rays did show gas in the tissue.  It was discussed with patient to go in and remove the toe urgently to try to prevent worsening infection or possible further loss of limb, sepsis or life.  Risks, benefits and complications were discussed with the patient.  No guarantees were made.  Discussed that we may have to go in and do another washout in a few days to try to help get infection under control.  They wished to proceed with surgery.      PROCEDURE:  The patient was brought to the operating room, placed on the operating table in a supine position.  Anesthesia was administered and local was injected.  The foot was prepped and draped using sterile technique.  Attention was directed to the distal aspect of the left second toe.  A fishmouth incision was made around the base of the left second toe full thickness down to bone with a #15 blade.  The tissue was sharply dissected off the base of the proximal phalanx  and the toe was disarticulated at the metatarsophalangeal joint.  There was purulent material that was noted and this was swabbed and sent for cultures.  The wound was flushed with copious amounts of normal saline and probed with a hemostat.  This did probe to the previous incision area of the left great toe amputation and the incision area was opened.  Incision was then extended medially.  The previous incision was debrided with rongeur.  Again, the wound was flushed with copious amounts of normal saline.  No further purulent material was noted.  The wound was packed with quarter-inch packing and the skin was reapproximated with 4-0 Prolene.  The patient's foot was placed in a dry sterile dressing.  The patient tolerated the procedure and anesthesia well and was transferred to recovery with vital signs stable and vascular status intact.  He will be nonweightbearing.         KASHIF SCHUMACHER DPM             D: 2020   T: 2020   MT: MAYKEL      Name:     YNES YBARRA   MRN:      [id number]        Account:        [id number]   :      1965           Procedure Date: 2020      Document: [id number]

## 2020-02-03 NOTE — OR NURSING
CHIDI tristan for patient to return to the floor from PACU from [facility] Dr. Herrera.

## 2020-02-03 NOTE — ED NOTES
Bed: ED18  Expected date:   Expected time:   Means of arrival:   Comments:  Triage if will take a medical

## 2020-02-03 NOTE — ANESTHESIA CARE TRANSFER NOTE
Patient: Ry Horner    Procedure(s):  LEFT SECOND TOE AMPUTATION    Diagnosis: Osteomyelitis (H) [M86.9]  Diagnosis Additional Information: No value filed.    Anesthesia Type:   MAC     Note:  Airway :Face Mask  Patient transferred to:PACU  Comments: To PACU with face mask, 8l/min O2, spontaneous ventilations. VSS. Report to RNHandoff Report: Identifed the Patient, Identified the Reponsible Provider, Reviewed the pertinent medical history, Discussed the surgical course, Reviewed Intra-OP anesthesia mangement and issues during anesthesia, Set expectations for post-procedure period and Allowed opportunity for questions and acknowledgement of understanding      Vitals: (Last set prior to Anesthesia Care Transfer)    CRNA VITALS  2/3/2020 1321 - 2/3/2020 1357      2/3/2020             Pulse:  89    SpO2:  100 %    Resp Rate (set):  10                Electronically Signed By: LILIANA Marx CRNA  February 3, 2020  1:57 PM

## 2020-02-03 NOTE — PLAN OF CARE
Arrived at 1500 from PACU. VSS, CMS intact, up with SBA . Pain well controlled with tylenol and oxycodone. Dressing C/D/I. Voiding per urinal. A&OX4.

## 2020-02-03 NOTE — BRIEF OP NOTE
[facility]    Brief Operative Note    Pre-operative diagnosis: Osteomyelitis (H) [M86.9], diabetic with neuropathy, gas gangrene left foot.   Post-operative diagnosis Same as pre-operative diagnosis    Procedure: Procedure(s):  LEFT SECOND TOE AMPUTATION  Surgeon: Surgeon(s) and Role:     * Milena Cevallos DPM, Podiatry/Foot and Ankle Surgery - Primary  Anesthesia: Combined MAC with Local   Estimated blood loss: Less than 10 ml  Drains: 1/4 inch plain packing  Specimens:   ID Type Source Tests Collected by Time Destination   1 : LEFT SECOND TOE WOUND Wound Toe GRAM STAIN, WOUND CULTURE AEROBIC BACTERIAL Milena Cevallos DPM, Podiatry/Foot and Ankle Surgery 2/3/2020  1:24 PM    A : LEFT SECOND TOE Tissue Toe SURGICAL PATHOLOGY EXAM Milena Cevallos DPM, Podiatry/Foot and Ankle Surgery 2/3/2020  1:29 PM      Findings:   None.  Complications: None   .  Implants: * No implants in log *

## 2020-02-03 NOTE — ED NOTES
"[facility]  ED Nurse Handoff Report    ED Chief complaint: post op infection      ED Diagnosis:   Final diagnoses:   None       Code Status: Full Code    Allergies:   Allergies   Allergen Reactions     Pravastatin      \"sucked the life blood out of me\"       Patient Story: wound check  Focused Assessment:  Pt has gangrene foot, pt is too embarassed to show his girlfriend his foot. MD thinks at least a toe will need amputation.     Treatments and/or interventions provided: IVF, vanco  Patient's response to treatments and/or interventions: good    To be done/followed up on inpatient unit:  monitor, see ortho    Does this patient have any cognitive concerns?:none    Activity level - Baseline/Home:  Independent  Activity Level - Current:   Stand with Assist    Patient's Preferred language: English   Needed?: No    Isolation: None  Infection: Not Applicable  Bariatric?: No    Vital Signs:   Vitals:    02/02/20 2237 02/02/20 2300   BP: (!) 143/91 (!) 157/96   Pulse: 125 123   Resp: 16 15   Temp: 99.6  F (37.6  C)    TempSrc: Temporal    SpO2:  98%   Weight: 115.7 kg (255 lb)    Height: 1.829 m (6')        Cardiac Rhythm:     Was the PSS-3 completed:   Yes  What interventions are required if any?               Family Comments: GF at bedside  OBS brochure/video discussed/provided to patient/family: N/A              Name of person given brochure if not patient: NA              Relationship to patient: NA    For the majority of the shift this patient's behavior was Green.   Behavioral interventions performed were none.    ED NURSE PHONE NUMBER: *[phone]         "

## 2020-02-03 NOTE — H&P
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    PLAN    1) Left toe diabetic foot dry gas gangrene with foot cellulitis: he has has had prior right BKA. More recently he has had ongoing left foot diabetic ulcerations with osteomyelitis. He was admitted for left foot gas gangrene with osteomyelitis 11/2019. ALLISON KAREN testing showed no acute vascular abnormalities. Podiatry was consulted and he had a left hallux amputation with I and D; he had a wound vac placed under ID consultation guidance and was discharged with Augmentin. He was readmitted 12/2019 for wound dehiscence and underwent excisional debridement, excision of the sesamoids, and partial amputation of the left first metatarsal. He also had a bone biopsy of the proximal phalanx of the left second toe for suspected osteomyelitis. Wound cultures were notable for B fragilis. He was treated with Zosyn and Augmentin again under ID guidance. His most recent follow up with Podiatry was 1/9/2020. He has been using a CAM boot. Now he has new evident gas gangrene of the left second toe with concomitant cellulitis; it seems likely he has osteomyelitis as well. There are no signs of sepsis at present.      -- Inpatient. NPO. Podiatry and ID consulted. Vancomycin and Unasyn continued. MRI of the left foot ordered. Follow up blood cultures.  ml/hour IV fluid. Tylenol, Oxycodone, IV Dilaudid as needed for pain. Anti-emetics as needed.    2) JOHNATHAN: Likely due to hypovolemia; repeat after fluid  "administration    3) Diabetes: Uncontrolled when A1c last checked 11/2019; it was 9.3.     -- ISS insulin. Lantus 20 units ordered for tonight while he is NPO (less than usual 58 units at bedtime). Also holding Linagliptin and Metformin for now.    4) Hyponatremia: Normal when corrected for Glucose. Monitor    5) Chronic anemia: Monitor while hospitalized    Other chronic medical problems include Hypertension, dyslipidemia: Hold hydrochlorothiazide, Lisinopril for JOHNATHAN, resume statin, Zetia when verifeid    Chief Complaint   Left toe pain    History is obtained from the patient, his wife at the bedside, and the ED physician whom I have spoken with    History of Present Illness   Ry Horner is a markedly pleasant 54 year old gentleman who presents with worsening odor, swelling, and pain in th left 2nd toe radiating to the middle of the foot for the past couple of days. He has been wearing a CAM boot without relief of symptoms. The toe has been \"curled up\" chronically and he hasn't really had a chance to assess the tip of the toe until today, due to work demands. Today he noticed the tip was black, with redness down the medial side of the foot, and so he came in. He denies fever, chills, sweats, or nausea, abdominal pain, pain in the upper left leg, or any other acute complaints.    In the ED, T 99.6, pulse 123, /96, sats 98% on room air.    CBC notable for WBC 17, HGB 11 (unchagned from 12/12/2019), . CMP notable for Na 132, Glucose 279, BUN 40 and Cr 1.60 (it was 1.3 on 12/12/2019). Hepatic panel in normal reference range. CRP was 125. Lactate 1.0. Blood cultures were sent.     X-Rays of the left foot showed:  \"IMPRESSION: Previous first metatarsal amputation. There is probable gas in the soft tissues at the distal second toe. No fracture or definite bone destruction to suggest osteomyelitis. Multiple vascular calcifications.\"    He was given IV fluid, Vancomycin and Dilaudid in the ED.    PHYSICAL " EXAM  Blood pressure 112/70, pulse 112, temperature 100.2  F (37.9  C), temperature source Oral, resp. rate 16, height 1.829 m (6'), weight 115.7 kg (255 lb), SpO2 97 %.  Constitutional: Alert and oriented to person, place and time; no apparent distress  HEENT: normocephalic moist mucus membranes  Respiratory: lungs clear to auscultation bilaterally  Cardiovascular: regular S1 S2   GI: abdomen soft non tender non distended bowel sounds positive  Lymph/Hematologic: no pallor, no cervical lymphadenopathy  Skin: no rash, good turgor  Musculoskeletal: Right BKA stump; LLE 2nd toe with black gangrene at the tip and erythema extending downward and also over the medical surface of the foot; pulse are palpable; see ED proider picture  Neurologic: extra-ocular muscles intact; moves all four extremities  Psychiatric: appropriate affect, insight and judgment     DVT Prophylaxis: Pneumatic Compression Devices  Code Status: Full Code    Disposition: Expected discharge in 2-3 days    Felipe Haywood MD    Past Medical History    I have reviewed this patient's medical history and updated it with pertinent information if needed.   Past Medical History:   Diagnosis Date     BENIGN HYPERTENSION 4/4/2007     DIABETES MELLITUS TYPE II-UNCOMPL 4/4/2007     HYPERLIPIDEMIA NEC/NOS 4/4/2007     Tobacco use disorder 4/4/2007       Past Surgical History   I have reviewed this patient's surgical history and updated it with pertinent information if needed.  Past Surgical History:   Procedure Laterality Date     AMPUTATE FOOT Left 11/17/2019    Procedure: LEFT PARTIAL FOOT AMPUTATION;  Surgeon: Antoine Pena DPM;  Location: [facility] OR     AMPUTATE FOOT Left 12/4/2019    Procedure: LEFT PARTIAL FOOT AMPUTATION;  Surgeon: Tim Douglas DPM;  Location: [facility] OR     AMPUTATE FOOT Left 12/11/2019    Procedure: POSSIBLE PARTIAL FOOT AMPUTATION;  Surgeon: Tim Douglas DPM;  Location: [facility] OR     AMPUTATE LEG BELOW KNEE Right 9/1/2017     Procedure: AMPUTATE LEG BELOW KNEE;  RIGHT BELOW KNEE AMPUTATION ;  Surgeon: Nikolas Nascimento MD;  Location: [facility] OR     APPENDECTOMY       APPLY WOUND VAC Right 3/2/2015    Procedure: APPLY WOUND VAC;  Surgeon: Milena Cevallos DPM, Pod;  Location: [facility] OR     BIOPSY BONE FOOT Right 7/15/2016    Procedure: BIOPSY BONE FOOT;  Surgeon: Tim Douglas DPM;  Location: [facility] OR     BIOPSY BONE TOE Left 12/4/2019    Procedure: BONE BIOPSY LEFT SECOND TOE;  Surgeon: Tim Douglas DPM;  Location: [facility] OR     IRRIGATION AND DEBRIDEMENT FOOT, COMBINED Right 3/2/2015    Procedure: COMBINED IRRIGATION AND DEBRIDEMENT FOOT;  Surgeon: Milena Cevallos DPM, Pod;  Location: [facility] OR     IRRIGATION AND DEBRIDEMENT FOOT, COMBINED Right 7/15/2016    Procedure: COMBINED IRRIGATION AND DEBRIDEMENT FOOT;  Surgeon: Tim Douglas DPM;  Location: [facility]     IRRIGATION AND DEBRIDEMENT FOOT, COMBINED Right 7/20/2016    Procedure: COMBINED IRRIGATION AND DEBRIDEMENT FOOT;  Surgeon: Tim Douglas DPM;  Location: [facility] OR     IRRIGATION AND DEBRIDEMENT FOOT, COMBINED Left 11/19/2019    Procedure: REVISIONAL IRRIGATION AND DEBRIDEMENT LEFT FOOT AND BONE DEBRIDEMENT;  Surgeon: Joseph Randhawa DPM;  Location: [facility]     IRRIGATION AND DEBRIDEMENT FOOT, COMBINED Left 12/4/2019    Procedure: EXCISIONAL DEBRIDEMENT LEFT FOOT;  Surgeon: Tim Douglas DPM;  Location: [facility] OR     IRRIGATION AND DEBRIDEMENT FOOT, COMBINED Left 12/11/2019    Procedure: IRRIGATION AND DEBRIDEMENT FOOT, Partial osteotomy left first metatarsal;  Surgeon: Tim Douglas DPM;  Location:  OR     ORTHOPEDIC SURGERY         Prior to Admission Medications   Prior to Admission Medications   Prescriptions Last Dose Informant Patient Reported? Taking?   ACCU-CHEK JAYNA PLUS test strip  Self No No   Sig: TEST BLOOD SUGARS 2 TO 3 TIMES DAILY OR AS DIRECTED   STATIN NOT PRESCRIBED (INTENTIONAL)  Self No No   Sig: Please choose reason not prescribed, below    acetaminophen (TYLENOL) 325 MG tablet   No No   Sig: Take 2 tablets (650 mg) by mouth every 8 hours as needed for pain   amoxicillin-clavulanate (AUGMENTIN) 875-125 MG tablet   No No   Sig: Take 1 tablet by mouth 2 times daily   amoxicillin-clavulanate (AUGMENTIN) 875-125 MG tablet   No No   Sig: Take 1 tablet by mouth 2 times daily   aspirin 81 MG chewable tablet  Self Yes No   Sig: Take 1 tablet (81 mg) by mouth daily   blood glucose (NO BRAND SPECIFIED) lancets standard  Self No No   Sig: Use to test blood sugar 2-3 times daily or as directed.   blood glucose monitoring (NO BRAND SPECIFIED) meter device kit  Self No No   Sig: Use to test blood sugar 2-3 times daily or as directed.   ezetimibe (ZETIA) 10 MG tablet   No No   Sig: Take 1 tablet (10 mg) by mouth daily   ferrous sulfate (FEROSUL) 325 (65 Fe) MG tablet   No No   Sig: Take 1 tablet (325 mg) by mouth daily (with breakfast)   hydrochlorothiazide (HYDRODIURIL) 50 MG tablet   Yes No   Sig: Take 0.5 tablets (25 mg) by mouth daily Dose decreased to 25 mg   insulin glargine (LANTUS PEN) 100 UNIT/ML pen  Self Yes No   Sig: Inject 58 Units Subcutaneous At Bedtime   insulin pen needle (BD PEN NEEDLE MEGAN 2ND GEN) 32G X 4 MM miscellaneous   No No   Sig: Use as directed with Basaglar (1 box = 100 days)   ipratropium (ATROVENT) 0.06 % spray  Self No No   Sig: Spray 2 sprays into both nostrils 4 times daily as needed for rhinitis   linagliptin (TRADJENTA) 5 MG TABS tablet   No No   Sig: Take 1 tablet (5 mg) by mouth daily   lisinopril (PRINIVIL/ZESTRIL) 40 MG tablet  Self No No   Sig: Take 1 tablet (40 mg) by mouth daily   metFORMIN (GLUCOPHAGE) 1000 MG tablet  Self No No   Sig: Take 1 tablet (1,000 mg) by mouth 2 times daily (with meals)   multivitamin (CENTRUM SILVER) tablet   Yes No   Sig: Take 1 tablet by mouth daily   order for DME   No No   Sig: Equipment being ordered: Wheelchair Treatment Diagnosis: Diabetic Foot wound with L hallux amputation 2/2 gangrene  "  oxyCODONE (ROXICODONE) 5 MG tablet   No No   Sig: Take 0.5-1 tablets (2.5-5 mg) by mouth every 6 hours as needed for severe pain   oxyCODONE (ROXICODONE) 5 MG tablet   No No   Sig: Take 1 tablet (5 mg) by mouth every 6 hours as needed for pain      Facility-Administered Medications: None     Allergies   Allergies   Allergen Reactions     Pravastatin      \"sucked the life blood out of me\"       Social History   I have reviewed this patient's social history and updated it with pertinent information if needed. Ry Horner  reports that he quit smoking about 14 years ago. His smoking use included cigarettes. He has a 10.00 pack-year smoking history. He has never used smokeless tobacco. He reports current alcohol use. He reports that he does not use drugs.    Family History   Family history assessed and, except as above, is non-contributory.    Family History   Problem Relation Age of Onset     Genetic Disorder Other      Genetic Disorder Other      Psychotic Disorder Mother      Diabetes Father      Lung Cancer Father      Genetic Disorder Maternal Grandmother      Genetic Disorder Maternal Grandfather      Asthma Sister      C.A.D. No family hx of      Hypertension No family hx of      Cerebrovascular Disease No family hx of      Breast Cancer No family hx of      Cancer - colorectal No family hx of      Prostate Cancer No family hx of      Alcohol/Drug No family hx of        Review of Systems   The 10 point Review of Systems is negative other than noted in the HPI or here.     Primary Care Physician   Kody Wing    Data   Labs Ordered and Resulted from Time of ED Arrival Up to the Time of Departure from the ED   CBC WITH PLATELETS DIFFERENTIAL - Abnormal; Notable for the following components:       Result Value    WBC 16.7 (*)     RBC Count 4.06 (*)     Hemoglobin 11.0 (*)     Hematocrit 33.2 (*)     Absolute Neutrophil 13.5 (*)     Absolute Monocytes 1.4 (*)     All other components within normal limits "   COMPREHENSIVE METABOLIC PANEL - Abnormal; Notable for the following components:    Sodium 132 (*)     Glucose 279 (*)     Urea Nitrogen 40 (*)     Creatinine 1.60 (*)     GFR Estimate 49 (*)     GFR Estimate If Black 56 (*)     All other components within normal limits   CRP INFLAMMATION - Abnormal; Notable for the following components:    CRP Inflammation 125.0 (*)     All other components within normal limits   LACTIC ACID WHOLE BLOOD   PULSE OXIMETRY NURSING   CARDIAC CONTINUOUS MONITORING   STRICT INTAKE AND OUTPUT   PERIPHERAL IV CATHETER   BLOOD CULTURE   BLOOD CULTURE       Data reviewed today:  I personally reviewed the left foot x-ray image(s) showing gas gangrene.    Recent Results (from the past 24 hour(s))   Foot XR, G/E 3 views, left    Narrative    EXAM: XR FOOT LT G/E 3 VW  LOCATION: [facility]  DATE/TIME: 2/2/2020 11:26 PM    INDICATION: Second toe swelling. Evaluate for osteomyelitis.  COMPARISON: None.      Impression    IMPRESSION: Previous first metatarsal amputation. There is probable gas in the soft tissues at the distal second toe. No fracture or definite bone destruction to suggest osteomyelitis. Multiple vascular calcifications.

## 2020-02-03 NOTE — CONSULTS
[facility]    Infectious Disease Consultation     Date of Admission:  2/2/2020  Date of Consult (When I saw the patient): 02/03/20    Assessment & Plan   Ry Horner is a 54 year old male who was admitted on 2/2/2020.     Impression:  1. 54 y.o male with diabetes with peripheral polyneuropathy.  2. History of infection in the right foot leading to amputation at the knee.   3. Recently Admitted with severely infected left foot. Gas gangrene of left foot. Osteomyelitis of left great toe. S/PLeft hallux amputation with irrigation and debridement. Followed by readmission in December and had another I and D.   4. CKD.   5. Admitted now with second toe on the left infection.       Recommendations:   On unasyn + vanco, given no history of MRSA and CKD recommend stopping Vanco and continuing on the ampicillin-sulbactam alone.   Noted plans for surgery today.     Franklin Maza MD    Reason for Consult   Reason for consult: I was asked to evaluate this patient for diabetic foot infection.    Primary Care Physician   Kody Wing    Chief Complaint   Left foot infection.     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    Past Medical History   I have reviewed this patient's medical history and updated it with pertinent information if needed.   Past Medical History:   Diagnosis Date     BENIGN HYPERTENSION 4/4/2007     DIABETES MELLITUS TYPE II-UNCOMPL 4/4/2007     HYPERLIPIDEMIA NEC/NOS 4/4/2007     Tobacco use disorder 4/4/2007       Past Surgical History   I have reviewed  this patient's surgical history and updated it with pertinent information if needed.  Past Surgical History:   Procedure Laterality Date     AMPUTATE FOOT Left 11/17/2019    Procedure: LEFT PARTIAL FOOT AMPUTATION;  Surgeon: Antoine Pena DPM;  [facility]     AMPUTATE FOOT Left 12/4/2019    Procedure: LEFT PARTIAL FOOT AMPUTATION;  Surgeon: Tim Douglas DPM;  [facility]: [facility]     AMPUTATE FOOT Left 12/11/2019    Procedure: POSSIBLE PARTIAL FOOT AMPUTATION;  Surgeon: Tim Douglas DPM;  [facility]     AMPUTATE LEG BELOW KNEE Right 9/1/2017    Procedure: AMPUTATE LEG BELOW KNEE;  RIGHT BELOW KNEE AMPUTATION ;  Surgeon: Nikolas Nascimento MD;  [facility]     APPENDECTOMY       APPLY WOUND VAC Right 3/2/2015    Procedure: APPLY WOUND VAC;  Surgeon: Milena Cevallos DPM, Pod;  Location: [facility]     BIOPSY BONE FOOT Right 7/15/2016    Procedure: BIOPSY BONE FOOT;  Surgeon: Tim Douglas DPM;  [facility]     BIOPSY BONE TOE Left 12/4/2019    Procedure: BONE BIOPSY LEFT SECOND TOE;  Surgeon: Tim Douglas DPM;  [facility]     IRRIGATION AND DEBRIDEMENT FOOT, COMBINED Right 3/2/2015    Procedure: COMBINED IRRIGATION AND DEBRIDEMENT FOOT;  Surgeon: Milena Cevallos DPM, Pod;  [facility]: [facility]     IRRIGATION AND DEBRIDEMENT FOOT, COMBINED Right 7/15/2016    Procedure: COMBINED IRRIGATION AND DEBRIDEMENT FOOT;  Surgeon: Tim Douglas DPM;  [facility]     IRRIGATION AND DEBRIDEMENT FOOT, COMBINED Right 7/20/2016    Procedure: COMBINED IRRIGATION AND DEBRIDEMENT FOOT;  Surgeon: Tim Douglas DPM;  [facility]     IRRIGATION AND DEBRIDEMENT FOOT, COMBINED Left 11/19/2019    Procedure: REVISIONAL IRRIGATION AND DEBRIDEMENT LEFT FOOT AND BONE DEBRIDEMENT;  Surgeon: Joseph Randhawa DPM;  [facility]     IRRIGATION AND DEBRIDEMENT FOOT, COMBINED Left 12/4/2019    Procedure: EXCISIONAL DEBRIDEMENT LEFT FOOT;  Surgeon: Tim Douglas DPM;   Location:  OR     IRRIGATION AND DEBRIDEMENT FOOT, COMBINED Left 12/11/2019    Procedure: IRRIGATION AND DEBRIDEMENT FOOT, Partial osteotomy left first metatarsal;  Surgeon: Tim Douglas DPM;  Location:  OR     ORTHOPEDIC SURGERY         Prior to Admission Medications   Prior to Admission Medications   Prescriptions Last Dose Informant Patient Reported? Taking?   ACCU-CHEK JAYNA PLUS test strip  Self No No   Sig: TEST BLOOD SUGARS 2 TO 3 TIMES DAILY OR AS DIRECTED   STATIN NOT PRESCRIBED (INTENTIONAL)  Self No No   Sig: Please choose reason not prescribed, below   acetaminophen (TYLENOL) 325 MG tablet   No No   Sig: Take 2 tablets (650 mg) by mouth every 8 hours as needed for pain   amoxicillin-clavulanate (AUGMENTIN) 875-125 MG tablet   No No   Sig: Take 1 tablet by mouth 2 times daily   amoxicillin-clavulanate (AUGMENTIN) 875-125 MG tablet   No No   Sig: Take 1 tablet by mouth 2 times daily   aspirin 81 MG chewable tablet  Self Yes No   Sig: Take 1 tablet (81 mg) by mouth daily   blood glucose (NO BRAND SPECIFIED) lancets standard  Self No No   Sig: Use to test blood sugar 2-3 times daily or as directed.   blood glucose monitoring (NO BRAND SPECIFIED) meter device kit  Self No No   Sig: Use to test blood sugar 2-3 times daily or as directed.   ezetimibe (ZETIA) 10 MG tablet   No No   Sig: Take 1 tablet (10 mg) by mouth daily   ferrous sulfate (FEROSUL) 325 (65 Fe) MG tablet   No No   Sig: Take 1 tablet (325 mg) by mouth daily (with breakfast)   hydrochlorothiazide (HYDRODIURIL) 50 MG tablet   Yes No   Sig: Take 0.5 tablets (25 mg) by mouth daily Dose decreased to 25 mg   insulin glargine (LANTUS PEN) 100 UNIT/ML pen  Self Yes No   Sig: Inject 58 Units Subcutaneous At Bedtime   insulin pen needle (BD PEN NEEDLE MEGAN 2ND GEN) 32G X 4 MM miscellaneous   No No   Sig: Use as directed with Basaglar (1 box = 100 days)   ipratropium (ATROVENT) 0.06 % spray  Self No No   Sig: Spray 2 sprays into both nostrils 4  "times daily as needed for rhinitis   linagliptin (TRADJENTA) 5 MG TABS tablet   No No   Sig: Take 1 tablet (5 mg) by mouth daily   lisinopril (PRINIVIL/ZESTRIL) 40 MG tablet  Self No No   Sig: Take 1 tablet (40 mg) by mouth daily   metFORMIN (GLUCOPHAGE) 1000 MG tablet  Self No No   Sig: Take 1 tablet (1,000 mg) by mouth 2 times daily (with meals)   multivitamin (CENTRUM SILVER) tablet   Yes No   Sig: Take 1 tablet by mouth daily   order for DME   No No   Sig: Equipment being ordered: Wheelchair Treatment Diagnosis: Diabetic Foot wound with L hallux amputation 2/2 gangrene   oxyCODONE (ROXICODONE) 5 MG tablet   No No   Sig: Take 0.5-1 tablets (2.5-5 mg) by mouth every 6 hours as needed for severe pain   oxyCODONE (ROXICODONE) 5 MG tablet   No No   Sig: Take 1 tablet (5 mg) by mouth every 6 hours as needed for pain      Facility-Administered Medications: None     Allergies   Allergies   Allergen Reactions     Pravastatin      \"sucked the life blood out of me\"       Immunization History   Immunization History   Administered Date(s) Administered     Influenza (H1N1) 01/19/2010     Influenza (IIV3) PF 11/01/2010, 09/22/2011     Pneumococcal 23 valent 10/31/2011, 07/16/2016     TDAP Vaccine (Adacel) 10/28/2014       Social History   I have reviewed this patient's social history and updated it with pertinent information if needed. Ry ROGERS Horner  reports that he quit smoking about 14 years ago. His smoking use included cigarettes. He has a 10.00 pack-year smoking history. He has never used smokeless tobacco. He reports current alcohol use. He reports that he does not use drugs.    Family History   I have reviewed this patient's family history and updated it with pertinent information if needed.   Family History   Problem Relation Age of Onset     Genetic Disorder Other      Genetic Disorder Other      Psychotic Disorder Mother      Diabetes Father      Lung Cancer Father      Genetic Disorder Maternal Grandmother      " Genetic Disorder Maternal Grandfather      Asthma Sister      C.A.D. No family hx of      Hypertension No family hx of      Cerebrovascular Disease No family hx of      Breast Cancer No family hx of      Cancer - colorectal No family hx of      Prostate Cancer No family hx of      Alcohol/Drug No family hx of        Review of Systems   The 10 point Review of Systems is negative other than noted in the HPI or here.     Physical Exam   Temp: 98.9  F (37.2  C) Temp src: Oral BP: 104/67 Pulse: 93 Heart Rate: 113 Resp: 16 SpO2: 98 % O2 Device: None (Room air)    Vital Signs with Ranges  Temp:  [98.9  F (37.2  C)-100.2  F (37.9  C)] 98.9  F (37.2  C)  Pulse:  [] 93  Heart Rate:  [113-124] 113  Resp:  [15-16] 16  BP: ()/(54-96) 104/67  SpO2:  [95 %-98 %] 98 %  245 lbs 0 oz  Body mass index is 33.23 kg/m .    GENERAL APPEARANCE:  alert and no distress  EYES: Eyes grossly normal to inspection, PERRL and conjunctivae and sclerae normal  HENT: ear canals and TM's normal and nose and mouth without ulcers or lesions  NECK: no adenopathy, no asymmetry, masses, or scars and thyroid normal to palpation  RESP: lungs clear to auscultation - no rales, rhonchi or wheezes  CV: regular rates and rhythm, normal S1 S2, no S3 or S4 and no murmur, click or rub  LYMPHATICS: normal ant/post cervical and supraclavicular nodes  ABDOMEN: soft, nontender, without hepatosplenomegaly or masses and bowel sounds normal  MS: left foot second toe is swollen and gangrenous along with incision itself on the stump of the great toe appearing boggy and draining.   SKIN: no suspicious lesions or rashes      Data   Lab Results   Component Value Date    WBC 12.2 (H) 02/03/2020    HGB 9.1 (L) 02/03/2020    HCT 27.2 (L) 02/03/2020     02/03/2020     02/03/2020    POTASSIUM 3.8 02/03/2020    CHLORIDE 109 02/03/2020    CO2 24 02/03/2020    BUN 34 (H) 02/03/2020    CR 1.41 (H) 02/03/2020     (H) 02/03/2020    SED 88 (H) 12/03/2019     AST 14 02/02/2020    ALT 22 02/02/2020    ALKPHOS 96 02/02/2020    BILITOTAL 0.3 02/02/2020     Recent Labs   Lab 02/03/20  0007 02/02/20  2250   CULT No growth after 2 hours No growth after 5 hours     Recent Labs   Lab Test 02/03/20  0007 02/02/20  2250 12/04/19  1619 11/19/19  1829 11/17/19  1525 11/17/19  1419 11/17/19  1411 08/28/17  1136 08/28/17  1059   CULT No growth after 2 hours No growth after 5 hours No anaerobes isolated  No growth Light growth  Bacteroides fragilis  *  Light growth  Parvimonas micra  *  Susceptibility testing not routinely done  On day 2, isolated in broth only:  beta hemolytic   Streptococcus constellatus  * Heavy growth  beta hemolytic   Streptococcus constellatus  Susceptibility testing done on previous specimen  *  Light growth  Alcaligenes faecalis  *  Light growth  Staphylococcus aureus  *  On day 1, isolated in broth only:  Anaerobic gram negative rods  See anaerobic report for identification  * Heavy growth  Bacteroides fragilis  Susceptibility testing not routinely done  *  Heavy growth  Peptoniphilus asaccharolyticus  Susceptibility testing not routinely done  *  Moderate growth  beta hemolytic   Streptococcus constellatus  *  On day 1, isolated in broth only:  Anaerobic gram negative rods  See anaerobic report for identification  * Heavy growth  Bacteroides fragilis  *  Heavy growth  Parvimonas micra  *  Heavy growth  Peptoniphilus asaccharolyticus  *  Heavy growth  Mixed aerobic and anaerobic rosa  *  Susceptibility testing not routinely done No growth No growth       Amount of time performed on this consult: 45 minutes. This includes face to face assessment and care coordination with the primary team.

## 2020-02-03 NOTE — ANESTHESIA PREPROCEDURE EVALUATION
Anesthesia Pre-Procedure Evaluation    Patient: Ry Horner   MRN: [id number] : 1965          Preoperative Diagnosis: Diabetic infection of left foot (H) [E11.628, L08.9]    Procedure(s):  IRRIGATION AND DEBRIDEMENT FOOT ( MINI C ARM, DOUBLE ANTIBIOTIC SOLUTION, SAGITTAL SAW WITH 138 BLADE)  POSSIBLE PARTIAL FOOT AMPUTATION    Past Medical History:   Diagnosis Date     BENIGN HYPERTENSION 2007     DIABETES MELLITUS TYPE II-UNCOMPL 2007     HYPERLIPIDEMIA NEC/NOS 2007     Tobacco use disorder 2007     Past Surgical History:   Procedure Laterality Date     AMPUTATE FOOT Left 2019    Procedure: LEFT PARTIAL FOOT AMPUTATION;  Surgeon: Antoine Pena DPM;  [facility]: [facility]     AMPUTATE FOOT Left 2019    Procedure: LEFT PARTIAL FOOT AMPUTATION;  Surgeon: Tim Douglas DPM;  [facility]: [facility]     AMPUTATE FOOT Left 2019    Procedure: POSSIBLE PARTIAL FOOT AMPUTATION;  Surgeon: Tim Doulgas DPM;  [facility]: [facility]     AMPUTATE LEG BELOW KNEE Right 2017    Procedure: AMPUTATE LEG BELOW KNEE;  RIGHT BELOW KNEE AMPUTATION ;  Surgeon: Nikolas Nascimento MD;  [facility]     APPENDECTOMY       APPLY WOUND VAC Right 3/2/2015    Procedure: APPLY WOUND VAC;  Surgeon: Milena Cevallos DPM, Pod;  [facility]: [facility]     BIOPSY BONE FOOT Right 7/15/2016    Procedure: BIOPSY BONE FOOT;  Surgeon: Tim Douglas DPM;  [facility]     BIOPSY BONE TOE Left 2019    Procedure: BONE BIOPSY LEFT SECOND TOE;  Surgeon: Tim Douglas DPM;  [facility]     IRRIGATION AND DEBRIDEMENT FOOT, COMBINED Right 3/2/2015    Procedure: COMBINED IRRIGATION AND DEBRIDEMENT FOOT;  Surgeon: Milena Cevallos DPM, Pod;  [facility]: [facility]     IRRIGATION AND DEBRIDEMENT FOOT, COMBINED Right 7/15/2016    Procedure: COMBINED IRRIGATION AND DEBRIDEMENT FOOT;  Surgeon: Tim Douglas DPM;  [facility]     IRRIGATION AND DEBRIDEMENT FOOT, COMBINED Right 2016    Procedure:  COMBINED IRRIGATION AND DEBRIDEMENT FOOT;  Surgeon: Tim Douglas DPM;  [facility]     IRRIGATION AND DEBRIDEMENT FOOT, COMBINED Left 11/19/2019    Procedure: REVISIONAL IRRIGATION AND DEBRIDEMENT LEFT FOOT AND BONE DEBRIDEMENT;  Surgeon: Joseph Randhawa DPM;  [facility]     IRRIGATION AND DEBRIDEMENT FOOT, COMBINED Left 12/4/2019    Procedure: EXCISIONAL DEBRIDEMENT LEFT FOOT;  Surgeon: Tim Douglas DPM;  [facility]     IRRIGATION AND DEBRIDEMENT FOOT, COMBINED Left 12/11/2019    Procedure: IRRIGATION AND DEBRIDEMENT FOOT, Partial osteotomy left first metatarsal;  Surgeon: Tim Douglas DPM;  Location:  OR     ORTHOPEDIC SURGERY       EKG  20-NOV-2019 00:39:32 [facility]-S5 SO ROUTINE RECORD  Sinus tachycardia  Otherwise normal ECG  When compared with ECG of 17-NOV-2019 09:58,  No significant change was found  Anesthesia Evaluation     . Pt has had prior anesthetic.     History of anesthetic complications          ROS/MED HX    ENT/Pulmonary:     (+)tobacco use, Past use , . .   (-) sleep apnea   Neurologic:     (+)neuropathy    (-) seizures and migraines   Cardiovascular:     (+) Dyslipidemia, hypertension----. : . . . :. .      (-) CHF and orthopnea/PND   METS/Exercise Tolerance:     Hematologic:     (+) Anemia, -      Musculoskeletal: Comment: Diabetic foot  Osteomyelitis    S/p Right below the knee amputation        GI/Hepatic:  - neg GI/hepatic ROS      (-) GERD   Renal/Genitourinary: Comment: CKD - 2        Endo:     (+) type II DM Last HgA1c: 9.3 Using insulin - not using insulin pump Diabetic complications: neuropathy, Obesity, .      Psychiatric:         Infectious Disease: Comment: Diabetic foot with osteomyelitis.         Malignancy:         Other:                            Physical Exam  Normal systems: cardiovascular and pulmonary    Airway   Mallampati: I  TM distance: >3 FB  Neck ROM: full    Dental   (+) missing and caps    Cardiovascular    Rhythm and rate: regular and normal      Pulmonary    breath sounds clear to auscultation            Lab Results   Component Value Date    WBC 12.2 (H) 02/03/2020    HGB 9.1 (L) 02/03/2020    HCT 27.2 (L) 02/03/2020     02/03/2020    .0 (H) 02/02/2020    SED 88 (H) 12/03/2019     02/03/2020    POTASSIUM 3.8 02/03/2020    CHLORIDE 109 02/03/2020    CO2 24 02/03/2020    BUN 34 (H) 02/03/2020    CR 1.41 (H) 02/03/2020     (H) 02/03/2020    FERNANDO 8.2 (L) 02/03/2020    ALBUMIN 3.5 02/02/2020    PROTTOTAL 8.8 02/02/2020    ALT 22 02/02/2020    AST 14 02/02/2020    ALKPHOS 96 02/02/2020    BILITOTAL 0.3 02/02/2020    TSH 0.94 12/19/2019       Preop Vitals  BP Readings from Last 3 Encounters:   02/03/20 104/67   01/09/20 136/82   01/02/20 132/72    Pulse Readings from Last 3 Encounters:   02/03/20 93   12/18/19 115   12/12/19 101      Resp Readings from Last 3 Encounters:   02/03/20 16   12/12/19 16   11/22/19 18    SpO2 Readings from Last 3 Encounters:   02/03/20 98%   12/18/19 98%   12/12/19 97%      Temp Readings from Last 1 Encounters:   02/03/20 37.2  C (98.9  F) (Oral)    Ht Readings from Last 1 Encounters:   02/02/20 1.829 m (6')      Wt Readings from Last 1 Encounters:   02/03/20 111.1 kg (245 lb)    Estimated body mass index is 33.23 kg/m  as calculated from the following:    Height as of 2/2/20: 1.829 m (6').    Weight as of an earlier encounter on 2/3/20: 111.1 kg (245 lb).       Anesthesia Plan      History & Physical Review  History and physical reviewed and following examination; no interval change.    ASA Status:  3 .    NPO Status:  > 8 hours    Plan for MAC Reason for MAC:  Deep or markedly invasive procedure (G8)  PONV prophylaxis:  Ondansetron (or other 5HT-3)       Postoperative Care  Postoperative pain management:  Multi-modal analgesia.      Consents  Anesthetic plan, risks, benefits and alternatives discussed with:  Patient and Spouse.  Use of blood products discussed: No  .   .                   Krishan Herrera MD

## 2020-02-03 NOTE — PROGRESS NOTES
[facility]    Hospitalist Progress Note    Date of Service (when I saw the patient): 02/03/2020    Assessment & Plan   Ry Horner is a pleasant 54 year old gentleman with Type 2 Diabetes mellitus, peripheral neuropathy and chronic foot ulcerations; status post Right BKA and recent Left 1st toe resection for osteomyelitis; presented earlier today with Left toe diabetic foot dry gas gangrene with foot cellulitis as well as JOHNATHAN.  Current problems include:      Left toe diabetic foot dry gas gangrene with foot cellulitis; stable post-op this afternoon.  Had Left second toe amputation at metatarsophalangeal joint; see OP note by Dr. Cevallos.  - anticipate f/u by Podiatry and ID 2/4; appreciate their input.  - continue Unasyn; vancomycin discontinued earlier today  - Follow up blood cultures.   -  ml/hour IV fluid; decrease when PO intake adequate  - Tylenol, Oxycodone, IV Dilaudid as needed for pain. Anti-emetics as needed.  - post-op MRI to be reviewed by Podiatry and ID 2/4     JOHNATHAN: Likely due to hypovolemia; improving.  - recheck 2/4 a.m.     Diabetes, uncontrolled when A1c last checked 11/2019; it was 9.3.   - ISS insulin.   - Lantus 20 units at HS until eating adequately (usual 58 units at bedtime).  - holding Linagliptin and Metformin for now.     Hyponatremia; has corrected.     Chronic anemia  - repeat CBC 2/4     Hypertension; stable pressures.  - holding hydrochlorothiazide and lisinopril; reassess 2/4    Dyslipidemia  - resume Zetia 2/4 if no new problems overnight      DVT Prophylaxis: Pneumatic Compression Devices  Code Status: Full Code    Disposition: Expected discharge in 2-3 days.      STEPHEN Dee MD, [facility]     Internal Medicine Hospitalist  Text Page (7am - 6pm)    Interval History   Doing well post-procedure this afternoon.  No f/c/SOB or chest/abdom. Pain.  No leg or foot pain.    Data reviewed today: I reviewed all new labs and imaging results over the last 24 hours.    Physical  Exam   Temp: 98.9  F (37.2  C) Temp src: Oral BP: 104/67 Pulse: 93 Heart Rate: 113 Resp: 16 SpO2: 98 % O2 Device: None (Room air)    Vitals:    02/02/20 2237 02/03/20 0515   Weight: 115.7 kg (255 lb) 111.1 kg (245 lb)     Vital Signs with Ranges  Temp:  [98.9  F (37.2  C)-100.2  F (37.9  C)] 98.9  F (37.2  C)  Pulse:  [] 93  Heart Rate:  [113-124] 113  Resp:  [15-16] 16  BP: ()/(54-96) 104/67  SpO2:  [95 %-98 %] 98 %  I/O last 3 completed shifts:  In: -   Out: 450 [Urine:450]    Constitutional: In no apparent distress; lying in bed  HEENT: sclerae clear; MM's moist  Respiratory: good a/e bilaterally, no wheezing or rhonchi  Cardiovascular: Regular rate and rhythm, S1, S2 noted; no m/r/g  GI: abdomen flat, + bowel sounds; soft, non-tender, non-distended  Skin/Integumen: no rashes, no cyanosis, no jaundice  Musculoskeletal: Right BKA stump; Left foot covered in dressings  Neurologic:  no focal deficits      Medications     lactated ringers 150 mL/hr at 02/03/20 0513       ampicillin-sulbactam (UNASYN) IV  3 g Intravenous Q6H     insulin aspart  1-6 Units Subcutaneous Q4H     insulin glargine  20 Units Subcutaneous At Bedtime     sodium chloride (PF)  3 mL Intracatheter Q8H       Data   Recent Labs   Lab 02/03/20  0638 02/02/20  2250   WBC 12.2* 16.7*   HGB 9.1* 11.0*   MCV 82 82    390    132*   POTASSIUM 3.8 4.0   CHLORIDE 109 102   CO2 24 23   BUN 34* 40*   CR 1.41* 1.60*   ANIONGAP 5 7   FERNANDO 8.2* 9.0   * 279*   ALBUMIN  --  3.5   PROTTOTAL  --  8.8   BILITOTAL  --  0.3   ALKPHOS  --  96   ALT  --  22   AST  --  14       Recent Results (from the past 24 hour(s))   Foot XR, G/E 3 views, left    Narrative    EXAM: XR FOOT LT G/E 3 VW  LOCATION: [facility]  DATE/TIME: 2/2/2020 11:26 PM    INDICATION: Second toe swelling. Evaluate for osteomyelitis.  COMPARISON: None.      Impression    IMPRESSION: Previous first metatarsal amputation. There is probable gas in the soft  tissues at the distal second toe. No fracture or definite bone destruction to suggest osteomyelitis. Multiple vascular calcifications.

## 2020-02-03 NOTE — ANESTHESIA POSTPROCEDURE EVALUATION
Patient: Ry Horner    Procedure(s):  LEFT SECOND TOE AMPUTATION    Diagnosis:Osteomyelitis (H) [M86.9]  Diagnosis Additional Information: No value filed.    Anesthesia Type:  MAC    Note:  Anesthesia Post Evaluation    Patient location during evaluation: PACU  Patient participation: Able to fully participate in evaluation  Level of consciousness: awake  Pain management: adequate  Airway patency: patent  Cardiovascular status: acceptable  Respiratory status: acceptable  Hydration status: acceptable  PONV: none     Anesthetic complications: None          Last vitals:  Vitals:    02/03/20 1440 02/03/20 1450 02/03/20 1510   BP: 103/76 101/75 110/76   Pulse: 84 83 98   Resp: 21 20 24   Temp:  37  C (98.6  F) 37.1  C (98.7  F)   SpO2:  97% 97%         Electronically Signed By: Krishan Herrera MD  February 3, 2020  3:54 PM

## 2020-02-03 NOTE — PLAN OF CARE
AA&O. VSS. CMS intact. Right BKA. Left second toe infection, foul smell, red, swelling. PRN oxy for pain. SBA. NPO. Voiding in urinal.

## 2020-02-03 NOTE — PHARMACY-ADMISSION MEDICATION HISTORY
Pharmacy Medication History  Admission medication history interview status for the 2/2/2020  admission is complete. See EPIC admission navigator for prior to admission medications     Medication history sources: Patient, Surescripts, Care Everywhere and Prescription bottles from home.  Medication history source reliability: Good  Adherence assessment: Good    Significant changes made to the medication list:  - Added ibuprofen  - Removed Augmentin (patient finished the beginning of January), iron supplement, and oxycodone.    Additional medication history information:   - None    Medication reconciliation completed by provider prior to medication history? Yes    Time spent in this activity: 15 minutes      Prior to Admission medications    Medication Sig Last Dose Taking? Auth Provider   acetaminophen (TYLENOL) 500 MG tablet Take 500 mg by mouth nightly as needed for mild pain prn Yes Unknown, Entered By History   aspirin 81 MG chewable tablet Take 1 tablet (81 mg) by mouth daily 2/2/2020 at am Yes Kody Wing MD   ezetimibe (ZETIA) 10 MG tablet Take 1 tablet (10 mg) by mouth daily 2/2/2020 at am Yes Kody Wing MD   hydrochlorothiazide (HYDRODIURIL) 12.5 MG tablet Take 12.5 mg by mouth daily 2/2/2020 at am Yes Unknown, Entered By History   ibuprofen (ADVIL/MOTRIN) 200 MG tablet Take 1,000 mg by mouth nightly as needed for mild pain prn Yes Unknown, Entered By History   insulin glargine (LANTUS PEN) 100 UNIT/ML pen Inject 58 Units Subcutaneous At Bedtime 2/1/2020 at pm Yes Unknown, Entered By History   ipratropium (ATROVENT) 0.06 % spray Spray 2 sprays into both nostrils 4 times daily as needed for rhinitis prn Yes Kody Wing MD   linagliptin (TRADJENTA) 5 MG TABS tablet Take 1 tablet (5 mg) by mouth daily 2/2/2020 at am Yes Kody Wing MD   lisinopril (PRINIVIL/ZESTRIL) 40 MG tablet Take 1 tablet (40 mg) by mouth daily 2/2/2020 at am Yes Kody Wing MD   metFORMIN (GLUCOPHAGE) 1000 MG  tablet Take 1 tablet (1,000 mg) by mouth 2 times daily (with meals) 2/2/2020 at am Yes Kody Wing MD   multivitamin (CENTRUM SILVER) tablet Take 1 tablet by mouth daily 2/2/2020 at am Yes Unknown, Entered By History   ACCU-CHEK JAYNA PLUS test strip TEST BLOOD SUGARS 2 TO 3 TIMES DAILY OR AS DIRECTED   Kody Wing MD   blood glucose (NO BRAND SPECIFIED) lancets standard Use to test blood sugar 2-3 times daily or as directed.   Kody Wing MD   blood glucose monitoring (NO BRAND SPECIFIED) meter device kit Use to test blood sugar 2-3 times daily or as directed.   Kody Wing MD   ferrous sulfate (FEROSUL) 325 (65 Fe) MG tablet Take 1 tablet (325 mg) by mouth daily (with breakfast)   Annette Pozo MD   insulin pen needle (BD PEN NEEDLE MEGAN 2ND GEN) 32G X 4 MM miscellaneous Use as directed with Basaglar (1 box = 100 days)   Kody Wing MD   order for DME Equipment being ordered: Wheelchair Treatment Diagnosis: Diabetic Foot wound with L hallux amputation 2/2 gangrene   Ry Hilliard MD   STATIN NOT PRESCRIBED (INTENTIONAL) Please choose reason not prescribed, below   Kody Wing MD

## 2020-02-03 NOTE — ED TRIAGE NOTES
Pt has foull smelling wound / possibly necrotic left 2nd toe - post amputation of great toe . Tachy in the 125's

## 2020-02-03 NOTE — ED PROVIDER NOTES
"  History     Chief Complaint:  Post Op Infection    The history is provided by the patient.      Ry Horner is a 54 year old type II diabetic male, with history of osteomyelitis, right below the knee amputation, hypertension, hyperlipidemia, former smoker, who presents with his girlfriend for evaluation of a post of infection. Patient reports he had his left great toe amputated in December 2019. Patient reports that after the surgery, his second toe had \"curled up\", but had no pain or issue. He notes over the last few days, he noticed a foul smelling for the left foot with associated increased redness, swelling and pain. However, he states that he has been working \"crazy hours\" at work and finally took a good look at it earlier this afternoon and was prompted to present.     Here, patient reports that he had a similar occurrence to his right foot after he had a diabetic ulcer in the heel, which then shattered and required a below the knee amputation. He states that he took 1000 mg of ibuprofen this morning, but denies any chest pain, noted fever, abdominal pain or shortness of breath.     Allergies:  Pravastatin     Medications:    Tylenol  Aspirin 81 mg  Zetia  Hydrochlorothiazide  Lantus pen  Tradgenta  Lisinopril  Metformin  Statin    Past Medical History:    Benign hypertension  Diabetes mellitus type II  Hyperlipidemia  Tobacco use disorder  Osteomyelitis  CKD stage II    Past Surgical History:    Amputate foot - left x3 partial  Amputate leg below knee - right  Appendectomy  Apply wound vac  Biopsy bone foot - right  Biopsy bone toe - left  Irrigation and debridement foot, combined right x3  Irrigation and debridement foot, combined left x3  Orthopedic surgery    Family History:    Mother - psychotic disorder  Father - diabetes, lung cancer  Sister - asthma    Social History:  The patient was accompanied to the ED by significant other.  Smoking Status: Former  Smokeless Tobacco: No  Alcohol Use: Yes  Drug " Use: No   Marital Status:   [2]     Review of Systems   Constitutional: Negative for fever.   Respiratory: Negative for shortness of breath.    Cardiovascular: Negative for chest pain.   Gastrointestinal: Negative for abdominal pain.   Musculoskeletal: Positive for arthralgias and joint swelling.   Skin: Positive for color change and wound.   All other systems reviewed and are negative.      Physical Exam     Patient Vitals for the past 24 hrs:   BP Temp Temp src Pulse Heart Rate Resp SpO2 Height Weight   02/02/20 2300 (!) 157/96 -- -- 123 124 15 98 % -- --   02/02/20 2237 (!) 143/91 99.6  F (37.6  C) Temporal 125 -- 16 -- 1.829 m (6') 115.7 kg (255 lb)       Physical Exam  General: Alert and cooperative with exam. Patient in mild distress. Normal mentation.  Head:  Scalp is NC/AT  Eyes:  No scleral icterus, PERRL  ENT:  The external nose and ears are normal. The oropharynx is normal and without erythema; mucus membranes are moist. Uvula midline, no evidence of deep space infection.  Neck:  Normal range of motion without rigidity.  CV:  Tachycardic rate and regular rhythm    No pathologic murmur   Resp:  Breath sounds are clear bilaterally    Non-labored, no retractions or accessory muscle use  GI:  Abdomen is soft, no distension, no tenderness. No peritoneal signs  MS:  RLE: BKA    LLE: CMS intact. Evidence of prior great toe amputation with cellulitis, necrosis and swelling to the second toe as pictured. Foul smelling.   Skin:  Warm and dry, No rash or lesions noted.  Neuro: Oriented x 3. No gross motor deficits.            Emergency Department Course     Imaging:  Radiology findings were communicated with the patient and family who voiced understanding of the findings.    Foot XR Left:  IMPRESSION: Previous first metatarsal amputation. There is probable gas in the soft tissues at the distal second toe. No fracture or definite bone destruction to suggest osteomyelitis. Multiple vascular  calcifications.  Reading per radiology.     Laboratory:  Laboratory findings were communicated with the patient and family who voiced understanding of the findings.    CBC: WBC 16.7 (H), HGB 11.0 (L) o/w WNL ()  CMP:  (L), Glucose 279 (H), Bun 40 (H), Creatinine 1.60 (H), GFR Estimate 49 (L) o/w WNL  CRP Inflammation: 125.0 (H)  Lactic Acid (Resulted at 2308): 1.0   Blood cultures: Pending x2    Interventions:  2253 0.9% NaCl Bolus 1000 mL IV  0008 Vancocin 2000 mg IV  0109 0.9% NaCl Bolus 1000 mL IV  0109 Dilaudid 0.5 mg IV    Emergency Department Course:  Past medical records, nursing notes, and vitals reviewed.    (2248)   I performed an exam of the patient as documented above. History obtained from patient. Discussed admission is indicated.     The patient was sent for a Foot XR Left while in the emergency department, results above.     IV was inserted and blood was drawn for laboratory testing, results above.     (0032)   I spoke with Dr. Haywood of the Hospitalist service regarding patient's presentation, findings, and plan of care, who agrees to accept patient for further care, monitoring and evaluation.      (0100)   I rechecked the patient and discussed the results of his workup thus far.     Findings and plan explained to the Patient and significant other who consents to admission. Discussed the patient with Dr. Haywood, who will admit the patient to a [facility] bed for further monitoring, evaluation, and treatment.    I personally reviewed the laboratory and imaging results with the Patient and significant other and answered all related questions prior to admission.     Impression & Plan     Medical Decision Making:  Patient is a 54-year-old male who presents with diabetic foot infection; history of recent toe amputation/surgery.  Patient's medical history and records were reviewed.  On evaluation, patient has obvious infection/cellulitis/necrosis of his second left toe.  He was noted to be  borderline febrile and tachycardic on initial arrival.  He was provided IV fluids and initiated vancomycin in the ED.  X-ray demonstrates probable gas in the distal second toe though no definitive bone destruction.  Patient's wound has been slowly progressive over the last 2 days and at this time there is no emergent indication for ED surgical consultation.  Patient was provided Dilaudid for pain control.  Labs notable for normal lactic acid, significantly elevated CRP (125), elevated white count (16.7), acute kidney injury (creatinine 1.6), hyperglycemia (glucose 279), and mild hyponatremia (sodium 132).  Patient will be admitted to the hospitalist service for further evaluation and care.  Blood cultures obtained prior to antibiotic administration.    Diagnosis:    ICD-10-CM    1. Diabetic foot infection (H) E11.628 Blood culture    L08.9 Blood culture     Glucose by meter     Glucose by meter   2. JOHNATHAN (acute kidney injury) (H) N17.9        Disposition:  Admitted to Medicine.    Scribe Disclosure:  Purnima PATEL, am serving as a scribe at 10:46 PM on 2/2/2020 to document services personally performed by Antolin Argueta DO based on my observations and the provider's statements to me.   2/2/2020    EMERGENCY DEPARTMENT       Antolin Argueta DO  02/03/20 0403

## 2020-02-03 NOTE — PROGRESS NOTES
RECEIVING UNIT ED HANDOFF REVIEW    ED Nurse Handoff Report was reviewed by: Uday Mooney RN on February 3, 2020 at 1:06 AM

## 2020-02-03 NOTE — CONSULTS
PATIENT HISTORY:  Ry Horner is a 54 year old male who was admitted for left 2nd toe ulcer and cellulitis.      I was asked to see Ry Horner  by  for left 2nd toe ulcer and gas in tissue.    Patient was seen at bedside.  Wife at bedside. He notes that he has been working a lot and noticed it was red the other day. That is why he came in yesterday.  Currently no pain due to diabetic neuropathy.      Review of Systems:  Patient denies fever, chills, rash, stiffness, limping, numbness, weakness, heart burn, blood in stool, chest pain with activity, calf pain when walking, shortness of breath with activity, chronic cough, easy bleeding/bruising, swelling of ankles, excessive thirst, fatigue, depression, anxiety.  Patient admits to numbness, wound.     PAST MEDICAL HISTORY:   Past Medical History:   Diagnosis Date     BENIGN HYPERTENSION 4/4/2007     DIABETES MELLITUS TYPE II-UNCOMPL 4/4/2007     HYPERLIPIDEMIA NEC/NOS 4/4/2007     Tobacco use disorder 4/4/2007        PAST SURGICAL HISTORY:   Past Surgical History:   Procedure Laterality Date     AMPUTATE FOOT Left 11/17/2019    Procedure: LEFT PARTIAL FOOT AMPUTATION;  Surgeon: Antoine Pena DPM;  Location: [facility] OR     AMPUTATE FOOT Left 12/4/2019    Procedure: LEFT PARTIAL FOOT AMPUTATION;  Surgeon: Tim Douglas DPM;  Location: [facility]     AMPUTATE FOOT Left 12/11/2019    Procedure: POSSIBLE PARTIAL FOOT AMPUTATION;  Surgeon: Tim Douglas DPM;  Location: [facility]     AMPUTATE LEG BELOW KNEE Right 9/1/2017    Procedure: AMPUTATE LEG BELOW KNEE;  RIGHT BELOW KNEE AMPUTATION ;  Surgeon: Nikolas Nascimento MD;  Location: [facility]     APPENDECTOMY       APPLY WOUND VAC Right 3/2/2015    Procedure: APPLY WOUND VAC;  Surgeon: Milena Cevallos DPM, Pod;  Location: [facility] OR     BIOPSY BONE FOOT Right 7/15/2016    Procedure: BIOPSY BONE FOOT;  Surgeon: Tim Douglas DPM;  Location: [facility]     BIOPSY BONE TOE Left 12/4/2019    Procedure: BONE BIOPSY  LEFT SECOND TOE;  Surgeon: Tim Douglas DPM;  Location: [facility]     IRRIGATION AND DEBRIDEMENT FOOT, COMBINED Right 3/2/2015    Procedure: COMBINED IRRIGATION AND DEBRIDEMENT FOOT;  Surgeon: Milena Cevallos DPM, Pod;  Location: [facility]     IRRIGATION AND DEBRIDEMENT FOOT, COMBINED Right 7/15/2016    Procedure: COMBINED IRRIGATION AND DEBRIDEMENT FOOT;  Surgeon: Tim Douglas DPM;  Location: [facility]     IRRIGATION AND DEBRIDEMENT FOOT, COMBINED Right 7/20/2016    Procedure: COMBINED IRRIGATION AND DEBRIDEMENT FOOT;  Surgeon: Tim Douglas DPM;  Location: [facility]     IRRIGATION AND DEBRIDEMENT FOOT, COMBINED Left 11/19/2019    Procedure: REVISIONAL IRRIGATION AND DEBRIDEMENT LEFT FOOT AND BONE DEBRIDEMENT;  Surgeon: Joseph Randhawa DPM;  Location: [facility]     IRRIGATION AND DEBRIDEMENT FOOT, COMBINED Left 12/4/2019    Procedure: EXCISIONAL DEBRIDEMENT LEFT FOOT;  Surgeon: Tim Douglas DPM;  Location: [facility]     IRRIGATION AND DEBRIDEMENT FOOT, COMBINED Left 12/11/2019    Procedure: IRRIGATION AND DEBRIDEMENT FOOT, Partial osteotomy left first metatarsal;  Surgeon: Tim Douglas DPM;  Location: [facility]     ORTHOPEDIC SURGERY          MEDICATIONS:   Current Facility-Administered Medications:      acetaminophen (TYLENOL) Suppository 650 mg, 650 mg, Rectal, Q4H PRN, Felipe Haywood MD     acetaminophen (TYLENOL) tablet 650 mg, 650 mg, Oral, Q4H PRN, Felipe Haywood MD     ampicillin-sulbactam (UNASYN) 3 g vial to attach to  mL bag, 3 g, Intravenous, Q6H, Felipe Haywood MD, Last Rate: 100 mL/hr at 02/03/20 0316, 3 g at 02/03/20 0749     bisacodyl (DULCOLAX) Suppository 10 mg, 10 mg, Rectal, Daily PRN, Felipe Haywood MD     glucose gel 15-30 g, 15-30 g, Oral, Q15 Min PRN **OR** dextrose 50 % injection 25-50 mL, 25-50 mL, Intravenous, Q15 Min PRN **OR** glucagon injection 1 mg, 1 mg, Subcutaneous, Q15 Min PRN, Felipe Haywood MD     HYDROmorphone (PF)  "(DILAUDID) injection 0.3-0.5 mg, 0.3-0.5 mg, Intravenous, Q2H PRN, Felipe Haywood MD     insulin aspart (NovoLOG) inj (RAPID ACTING), 1-6 Units, Subcutaneous, Q4H, Felipe Haywood MD, 1 Units at 02/03/20 0248     insulin glargine (LANTUS PEN) injection 20 Units, 20 Units, Subcutaneous, At Bedtime, Felipe Haywood MD, 20 Units at 02/03/20 0247     lactated ringers infusion, , Intravenous, Continuous, Felipe Haywood MD, Last Rate: 150 mL/hr at 02/03/20 0513     lidocaine (LMX4) cream, , Topical, Q1H PRN, Felipe Haywood MD     lidocaine 1 % 0.1-1 mL, 0.1-1 mL, Other, Q1H PRN, Felipe Haywood MD     melatonin tablet 3 mg, 3 mg, Oral, At Bedtime PRN, Felipe Haywood MD     naloxone (NARCAN) injection 0.1-0.4 mg, 0.1-0.4 mg, Intravenous, Q2 Min PRN, Felipe Haywood MD     ondansetron (ZOFRAN-ODT) ODT tab 4 mg, 4 mg, Oral, Q6H PRN **OR** ondansetron (ZOFRAN) injection 4 mg, 4 mg, Intravenous, Q6H PRN, Felipe Haywood MD     oxyCODONE (ROXICODONE) tablet 5-10 mg, 5-10 mg, Oral, Q3H PRN, Felipe Haywood MD, 10 mg at 02/03/20 0416     polyethylene glycol (MIRALAX/GLYCOLAX) Packet 17 g, 17 g, Oral, Daily PRN, Felipe Haywood MD     senna-docusate (SENOKOT-S/PERICOLACE) 8.6-50 MG per tablet 1 tablet, 1 tablet, Oral, BID PRN **OR** senna-docusate (SENOKOT-S/PERICOLACE) 8.6-50 MG per tablet 2 tablet, 2 tablet, Oral, BID PRN, Felipe Haywood MD     sodium chloride (PF) 0.9% PF flush 3 mL, 3 mL, Intracatheter, q1 min prn, Kendall, Antolin Beltre, DO     sodium chloride (PF) 0.9% PF flush 3 mL, 3 mL, Intracatheter, Q8H, Antolin Argueta, DO     vancomycin (VANCOCIN) 2,000 mg in sodium chloride 0.9 % 500 mL intermittent infusion, 2,000 mg, Intravenous, Q12H, Felipe Haywood MD     ALLERGIES:    Allergies   Allergen Reactions     Pravastatin      \"sucked the life blood out of me\"        SOCIAL HISTORY:   Social History "     Socioeconomic History     Marital status:      Spouse name: Not on file     Number of children: Not on file     Years of education: Not on file     Highest education level: Not on file   Occupational History     Not on file   Social Needs     Financial resource strain: Not on file     Food insecurity:     Worry: Not on file     Inability: Not on file     Transportation needs:     Medical: Not on file     Non-medical: Not on file   Tobacco Use     Smoking status: Former Smoker     Packs/day: 0.50     Years: 20.00     Pack years: 10.00     Types: Cigarettes     Last attempt to quit:      Years since quittin.0     Smokeless tobacco: Never Used   Substance and Sexual Activity     Alcohol use: Yes     Comment: 3-4 drinks per month     Drug use: No     Sexual activity: Yes     Partners: Female   Lifestyle     Physical activity:     Days per week: Not on file     Minutes per session: Not on file     Stress: Not on file   Relationships     Social connections:     Talks on phone: Not on file     Gets together: Not on file     Attends Jew service: Not on file     Active member of club or organization: Not on file     Attends meetings of clubs or organizations: Not on file     Relationship status: Not on file     Intimate partner violence:     Fear of current or ex partner: Not on file     Emotionally abused: Not on file     Physically abused: Not on file     Forced sexual activity: Not on file   Other Topics Concern      Service Yes     Blood Transfusions No     Caffeine Concern No     Occupational Exposure No     Hobby Hazards No     Sleep Concern No     Stress Concern No     Weight Concern Yes     Comment: Would like to lose some weight     Special Diet No     Back Care No     Exercise Yes     Bike Helmet No     Seat Belt Yes     Self-Exams Yes     Parent/sibling w/ CABG, MI or angioplasty before 65F 55M? Not Asked   Social History Narrative     Not on file        FAMILY HISTORY:   Family  History   Problem Relation Age of Onset     Genetic Disorder Other      Genetic Disorder Other      Psychotic Disorder Mother      Diabetes Father      Lung Cancer Father      Genetic Disorder Maternal Grandmother      Genetic Disorder Maternal Grandfather      Asthma Sister      C.A.D. No family hx of      Hypertension No family hx of      Cerebrovascular Disease No family hx of      Breast Cancer No family hx of      Cancer - colorectal No family hx of      Prostate Cancer No family hx of      Alcohol/Drug No family hx of         EXAM:Vitals: /67   Pulse 93   Temp 98.9  F (37.2  C) (Oral)   Resp 16   Ht 1.829 m (6')   Wt 111.1 kg (245 lb)   SpO2 98%   BMI 33.23 kg/m    BMI= Body mass index is 33.23 kg/m .    LABS:    WBC   Date Value Ref Range Status   02/03/2020 12.2 (H) 4.0 - 11.0 10e9/L Final     HA1C:  7.9 (2/3/2019)    CRP: 125    General appearance: Patient is alert and fully cooperative with history & exam.  No sign of distress is noted during the visit.      Psychiatric: Affect is pleasant & appropriate.  Patient appears motivated to improve health.       Respiratory: Breathing is regular & unlabored while sitting.      HEENT: Hearing is intact to spoken word.  Speech is clear.  No gross evidence of visual impairment that would impact ambulation.       Dermatologic:  Black eschar to the entire distal left 2nd toe. Malodorous. No purulent drainage observed. Redness to dorsal left foot.      Vascular: DP & PT pulses are intact & regular bilaterally.  minimal edema left foot.   CFT and skin temperature is normal to both lower extremities.       Neurologic: Lower extremity sensation is absent to left foot.      Musculoskeletal: Patient is ambulatory without assistive device or brace.  Previous right below knee amputation, left great toe amputation.     IMAGING: left foot xray - I personally reviewed images - Previous first metatarsal amputation. There is probable gas in the soft tissues at the  distal second toe. No fracture or definite bone destruction to suggest osteomyelitis. Multiple vascular calcifications.    MRI: pending      ASSESSMENT: 54 yr old diabetic male with previous right below knee amputation and left great toe amputation, with current left 2nd toe ulceration and gas gangrene.      PLAN:  Reviewed [facility] in [facility].  -Patient was NPO after midnight.   -given the gas noted on xray, recommend urgent surgery to remove the left 2nd toe. May require subsequent surgery for closure.  -Discussed risks of not doing surgery including worsening infection, potential further loss of limb or life.   -Patient agrees to surgery.   -will do MRI after surgery.   -did discuss possibly removing toes 3-5 to give his left foot a more even parabola as now those toes will be more prone to pressure and ulceration. He does not want that at this time.        Milena Cevallos DPM, Podiatry/Foot and Ankle Surgery    8:14 AM

## 2020-02-04 LAB
ANION GAP SERPL CALCULATED.3IONS-SCNC: 3 MMOL/L (ref 3–14)
BUN SERPL-MCNC: 24 MG/DL (ref 7–30)
CALCIUM SERPL-MCNC: 8.2 MG/DL (ref 8.5–10.1)
CHLORIDE SERPL-SCNC: 108 MMOL/L (ref 94–109)
CO2 SERPL-SCNC: 26 MMOL/L (ref 20–32)
CREAT SERPL-MCNC: 1.33 MG/DL (ref 0.66–1.25)
ERYTHROCYTE [DISTWIDTH] IN BLOOD BY AUTOMATED COUNT: 13.4 % (ref 10–15)
GFR SERPL CREATININE-BSD FRML MDRD: 61 ML/MIN/{1.73_M2}
GLUCOSE BLDC GLUCOMTR-MCNC: 140 MG/DL (ref 70–99)
GLUCOSE BLDC GLUCOMTR-MCNC: 198 MG/DL (ref 70–99)
GLUCOSE BLDC GLUCOMTR-MCNC: 99 MG/DL (ref 70–99)
GLUCOSE BLDC GLUCOMTR-MCNC: 99 MG/DL (ref 70–99)
GLUCOSE SERPL-MCNC: 78 MG/DL (ref 70–99)
HCT VFR BLD AUTO: 25.8 % (ref 40–53)
HGB BLD-MCNC: 8.6 G/DL (ref 13.3–17.7)
MCH RBC QN AUTO: 27.8 PG (ref 26.5–33)
MCHC RBC AUTO-ENTMCNC: 33.3 G/DL (ref 31.5–36.5)
MCV RBC AUTO: 84 FL (ref 78–100)
PLATELET # BLD AUTO: 226 10E9/L (ref 150–450)
POTASSIUM SERPL-SCNC: 4.1 MMOL/L (ref 3.4–5.3)
RBC # BLD AUTO: 3.09 10E12/L (ref 4.4–5.9)
SODIUM SERPL-SCNC: 137 MMOL/L (ref 133–144)
WBC # BLD AUTO: 10.1 10E9/L (ref 4–11)

## 2020-02-04 PROCEDURE — 99207 ZZC CDG-MDM COMPONENT: MEETS MODERATE - UP CODED: CPT | Performed by: INTERNAL MEDICINE

## 2020-02-04 PROCEDURE — 80048 BASIC METABOLIC PNL TOTAL CA: CPT | Performed by: PODIATRIST

## 2020-02-04 PROCEDURE — 40000893 ZZH STATISTIC PT IP EVAL DEFER: Performed by: PHYSICAL THERAPIST

## 2020-02-04 PROCEDURE — 25000131 ZZH RX MED GY IP 250 OP 636 PS 637: Performed by: PODIATRIST

## 2020-02-04 PROCEDURE — 25800030 ZZH RX IP 258 OP 636: Performed by: INTERNAL MEDICINE

## 2020-02-04 PROCEDURE — 36415 COLL VENOUS BLD VENIPUNCTURE: CPT | Performed by: PODIATRIST

## 2020-02-04 PROCEDURE — 25000128 H RX IP 250 OP 636: Performed by: PODIATRIST

## 2020-02-04 PROCEDURE — 85027 COMPLETE CBC AUTOMATED: CPT | Performed by: PODIATRIST

## 2020-02-04 PROCEDURE — 25000132 ZZH RX MED GY IP 250 OP 250 PS 637: Performed by: PODIATRIST

## 2020-02-04 PROCEDURE — 12000000 ZZH R&B MED SURG/OB

## 2020-02-04 PROCEDURE — 00000146 ZZHCL STATISTIC GLUCOSE BY METER IP

## 2020-02-04 PROCEDURE — 99233 SBSQ HOSP IP/OBS HIGH 50: CPT | Performed by: INTERNAL MEDICINE

## 2020-02-04 RX ORDER — SODIUM CHLORIDE, SODIUM LACTATE, POTASSIUM CHLORIDE, CALCIUM CHLORIDE 600; 310; 30; 20 MG/100ML; MG/100ML; MG/100ML; MG/100ML
INJECTION, SOLUTION INTRAVENOUS CONTINUOUS
Status: DISCONTINUED | OUTPATIENT
Start: 2020-02-04 | End: 2020-02-06

## 2020-02-04 RX ORDER — SODIUM CHLORIDE, SODIUM LACTATE, POTASSIUM CHLORIDE, CALCIUM CHLORIDE 600; 310; 30; 20 MG/100ML; MG/100ML; MG/100ML; MG/100ML
INJECTION, SOLUTION INTRAVENOUS CONTINUOUS
Status: DISCONTINUED | OUTPATIENT
Start: 2020-02-05 | End: 2020-02-04

## 2020-02-04 RX ADMIN — INSULIN GLARGINE 20 UNITS: 100 INJECTION, SOLUTION SUBCUTANEOUS at 21:47

## 2020-02-04 RX ADMIN — AMPICILLIN SODIUM AND SULBACTAM SODIUM 3 G: 2; 1 INJECTION, POWDER, FOR SOLUTION INTRAMUSCULAR; INTRAVENOUS at 21:46

## 2020-02-04 RX ADMIN — ACETAMINOPHEN 975 MG: 325 TABLET, FILM COATED ORAL at 06:50

## 2020-02-04 RX ADMIN — AMPICILLIN SODIUM AND SULBACTAM SODIUM 3 G: 2; 1 INJECTION, POWDER, FOR SOLUTION INTRAMUSCULAR; INTRAVENOUS at 17:08

## 2020-02-04 RX ADMIN — ACETAMINOPHEN 975 MG: 325 TABLET, FILM COATED ORAL at 15:03

## 2020-02-04 RX ADMIN — AMPICILLIN SODIUM AND SULBACTAM SODIUM 3 G: 2; 1 INJECTION, POWDER, FOR SOLUTION INTRAMUSCULAR; INTRAVENOUS at 04:49

## 2020-02-04 RX ADMIN — SODIUM CHLORIDE, POTASSIUM CHLORIDE, SODIUM LACTATE AND CALCIUM CHLORIDE: 600; 310; 30; 20 INJECTION, SOLUTION INTRAVENOUS at 09:00

## 2020-02-04 RX ADMIN — OXYCODONE HYDROCHLORIDE 10 MG: 5 TABLET ORAL at 21:50

## 2020-02-04 RX ADMIN — OXYCODONE HYDROCHLORIDE 10 MG: 5 TABLET ORAL at 04:56

## 2020-02-04 RX ADMIN — OXYCODONE HYDROCHLORIDE 10 MG: 5 TABLET ORAL at 15:03

## 2020-02-04 RX ADMIN — AMPICILLIN SODIUM AND SULBACTAM SODIUM 3 G: 2; 1 INJECTION, POWDER, FOR SOLUTION INTRAMUSCULAR; INTRAVENOUS at 09:35

## 2020-02-04 ASSESSMENT — ACTIVITIES OF DAILY LIVING (ADL)
ADLS_ACUITY_SCORE: 14

## 2020-02-04 NOTE — PROGRESS NOTES
[facility]    Infectious Disease Progress Note    Date of Service (when I saw the patient): 02/04/2020     Assessment & Plan   Ry Horner is a 54 year old male who was admitted on 2/2/2020.     Impression:  1. 54 y.o male with diabetes with peripheral polyneuropathy.  2. History of infection in the right foot leading to amputation at the knee.   3. Recently Admitted with severely infected left foot. Gas gangrene of left foot. Osteomyelitis of left great toe. S/PLeft hallux amputation with irrigation and debridement. Followed by readmission in December and had another I and D.   4. CKD.   5. Admitted now with second toe on the left infection.       Recommendations:   On unasyn to continue, follow up on the cultures. S/P:  Left second toe amputation at metatarsophalangeal join           Franklin Maza MD    Interval History   S/p above mentioned surgery   Afebrile   Cultures as below    Physical Exam   Temp: 98.5  F (36.9  C) Temp src: Oral BP: 128/81 Pulse: 87 Heart Rate: 87 Resp: 16 SpO2: 98 % O2 Device: None (Room air) Oxygen Delivery: 2 LPM  Vitals:    02/02/20 2237 02/03/20 0515   Weight: 115.7 kg (255 lb) 111.1 kg (245 lb)     Vital Signs with Ranges  Temp:  [96.5  F (35.8  C)-100.6  F (38.1  C)] 98.5  F (36.9  C)  Pulse:  [82-98] 87  Heart Rate:  [] 87  Resp:  [14-24] 16  BP: ()/(60-82) 128/81  SpO2:  [93 %-100 %] 98 %    Constitutional: Awake, alert, cooperative, no apparent distress  Lungs: Clear to auscultation bilaterally, no crackles or wheezing  Cardiovascular: Regular rate and rhythm, normal S1 and S2, and no murmur noted  Abdomen: Normal bowel sounds, soft, non-distended, non-tender  Skin: No rashes, no cyanosis, no edema  Other:    Medications     lactated ringers 150 mL/hr at 02/04/20 0900       acetaminophen  975 mg Oral Q8H     ampicillin-sulbactam (UNASYN) IV  3 g Intravenous Q6H     insulin aspart  1-7 Units Subcutaneous TID AC     insulin aspart  1-5 Units Subcutaneous  At Bedtime     insulin glargine  20 Units Subcutaneous At Bedtime     sodium chloride (PF)  3 mL Intracatheter Q8H       Data   All microbiology laboratory data reviewed.  Recent Labs   Lab Test 02/04/20  0631 02/03/20  0638 02/02/20  2250   WBC 10.1 12.2* 16.7*   HGB 8.6* 9.1* 11.0*   HCT 25.8* 27.2* 33.2*   MCV 84 82 82    314 390     Recent Labs   Lab Test 02/04/20  0631 02/03/20  0638 02/02/20  2250   CR 1.33* 1.41* 1.60*     Recent Labs   Lab Test 12/03/19  1727   SED 88*     Recent Labs   Lab Test 02/03/20  1324 02/03/20  0007 02/02/20  2250 12/04/19  1619 11/19/19  1829 11/17/19  1525 11/17/19  1419 11/17/19  1411 08/28/17  1136   CULT PENDING No growth after 1 day No growth after 1 day No anaerobes isolated  No growth Light growth  Bacteroides fragilis  *  Light growth  Parvimonas micra  *  Susceptibility testing not routinely done  On day 2, isolated in broth only:  beta hemolytic   Streptococcus constellatus  * Heavy growth  beta hemolytic   Streptococcus constellatus  Susceptibility testing done on previous specimen  *  Light growth  Alcaligenes faecalis  *  Light growth  Staphylococcus aureus  *  On day 1, isolated in broth only:  Anaerobic gram negative rods  See anaerobic report for identification  * Heavy growth  Bacteroides fragilis  Susceptibility testing not routinely done  *  Heavy growth  Peptoniphilus asaccharolyticus  Susceptibility testing not routinely done  *  Moderate growth  beta hemolytic   Streptococcus constellatus  *  On day 1, isolated in broth only:  Anaerobic gram negative rods  See anaerobic report for identification  * Heavy growth  Bacteroides fragilis  *  Heavy growth  Parvimonas micra  *  Heavy growth  Peptoniphilus asaccharolyticus  *  Heavy growth  Mixed aerobic and anaerobic rosa  *  Susceptibility testing not routinely done No growth       Attestation:  Total time on the floor involved in the patient's care: 35 minutes. Total time spent in  counseling/care coordination: >50%

## 2020-02-04 NOTE — PLAN OF CARE
Pt progressing well towards goals. CMS intact; Dressing CDI. VSS on RA. Blood glucose Up SBA; Voiding adequately in urinal. Pain well controlled with 10 mg oxycodone. MRI done for LLE. Continue to monitor.

## 2020-02-04 NOTE — PLAN OF CARE
Discharge Planner PT   Patient plan for discharge: Home with S.O assist as prior    Current status: PT orders received, screen completed. Pt known to writer from prior admissions. Pt here for L 2nd toe amputation secondary to ulceration and gas gangrene; pt to be NWB. Hx of R BKA.     Pt reports no concerns with mobility, currently IND with bed mobility, SBA for transfers and ambulation with FWW with good adherence to NWB. Pt reports he has been using his FWW to navigate 2 platform steps into home and then all needs met on main level. Pt has FWW, w/c for use at home. Reports S.O. available to assist as needed. Reviewed importance of NWB and elevation L foot to promote healing. Pt verbalized understanding, denied need for further PT while IP.    Barriers to return to prior living situation: None  Recommendations for discharge: Home   Rationale for recommendations: Pt demonstrates understanding of mobility adhering to NWB L foot, pt has all AD/AE needs met at home, S.O. available to assist as needed. No further IP PT needs identified, anticipate safe return home when medically ready. PT orders completed.       Entered by: Ana María Mcdowell 02/04/2020 12:04 PM

## 2020-02-04 NOTE — PLAN OF CARE
VSS.  Numbness LLE.  Up with SBA. DRESSING C/D/I. Pain managed with oxycodone and tylenol. NPO after MN for surgery tomorrow. A&OX4.

## 2020-02-04 NOTE — PROGRESS NOTES
[facility]    Medicine Progress Note - Hospitalist Service       Date of Admission:  2/2/2020  Assessment & Plan     Ry Horner is a pleasant 54 year old gentleman with Type 2 Diabetes mellitus, peripheral neuropathy and chronic foot ulcerations; status post Right BKA and recent Left 1st toe resection for osteomyelitis; presented earlier today with Left toe diabetic foot gas gangrene with foot cellulitis as well as JOHNATHAN.      Left foot(second toe) gas gangrene   Had recent admission with severely infected left foot with gas gangrene and osteomyelitis of left great toe, status post left hallux amputation with I&D now presents with left second toe infection  Started on Unasyn and Vanco during admission, infectious disease following, Vanco discontinued and currently on Unasyn, continue  Status post left second toe amputation at metatarsophalangeal joint 2/3/2020    by Dr. Cevallos.  Wound culture growing beta-hemolytic streptococcus constellatus, sensitivity pending  - Follow up blood cultures, negative to date.   - post-op MRI shows no evidence of osteomyelitis, no subcutaneous abscess.  Plan for revision I and D on 2/5/2020 and anticipate debridement every 2 day until stable wound, podiatry following, appreciate input     JOHNATHAN on chronic kidney disease stage II:  Baseline creatinine runs around 1.1-1.2, admission creatinine was 1.6   -likely due to hypovolemia; improving.  Continue hydration especially with patient being n.p.o.  -Continue to hold PTA ibuprofen, hydrochlorothiazide, lisinopril  -Monitor, avoid nephrotoxins     Diabetes, uncontrolled   Hemoglobin A1c this admission is 7.9  -PTA on 58 units of Lantus along with linagliptin 5 mg daily and metformin thousand milligrams twice daily  -Currently on Lantus 20 units (as patient is n.p.o. at midnight )at night and ISS insulin.   -Blood sugar over the last 24 hours   -Continue current dose of Lantus with sliding scale insulin and close  monitoring  - holding Linagliptin and Metformin for now.     Hyponatremia; has corrected.     Chronic anemia  -Hemoglobin at baseline runs around 9-11  -Hemoglobin this morning is 8.6, monitor during perioperative period and transfuse as needed.     Essential Hypertension   -PTA on lisinopril and hydrochlorothiazide  Currently PTA lisinopril and hydrochlorothiazide on hold secondary to acute kidney injury, also patient has normal/soft blood pressure  -Continue to hold PTA antihypertensives while surgical procedure completed, renal failure improves  -Monitor and adjust medication as needed     Dyslipidemia  -PTA on Zetia, resume once stable     Diet: Moderate Consistent CHO Diet    DVT Prophylaxis: Pneumatic Compression Devices  Corrales Catheter: not present  Code Status: Full Code      Disposition Plan   Expected discharge: 2+ days, recommended to Prior living versus transitional care once Once all surgical procedure completed, antibiotic plan formulated and cleared by all consultants.  Entered: Betzy Olea MD 02/04/2020, 3:35 PM       The patient's care was discussed with the Bedside Nurse and Patient.    Betzy Olea MD  Hospitalist Service  [facility]    ______________________________________________________________________    Interval History   Patient without any new complaints.  No new nursing concerns    Data reviewed today: I reviewed all medications, new labs and imaging results over the last 24 hours. I personally reviewed the Lower extremity MRI image(s) showing No evidence of abscess/tendinitis/osteomyelitis.  See below for details.    Physical Exam   Vital Signs: Temp: 98.6  F (37  C) Temp src: Oral BP: (!) 146/87 Pulse: 89 Heart Rate: 90 Resp: 16 SpO2: 99 % O2 Device: None (Room air) Oxygen Delivery: 2 LPM  Weight: 245 lbs 0 oz  Exam:  Constitutional: Awake, alert and no distress. Appears comfortable  Head: Normocephalic. No masses, lesions, tenderness or abnormalities  ENT: ENT exam  normal, no neck nodes or sinus tenderness  Cardiovascular: RRR.  no murmurs, no rubs or JVD  Respiratory:normal WOB,b/l equal air entry, no wheezes or crackles   Gastrointestinal: Abdomen soft, non-tender. BS normal. No masses, organomegaly  : Deferred  Extremities :minimal edema on left shin, dressing on left leg/foot, no clubbing or cyanosis.  Below-knee amputation on the right    Data   Recent Labs   Lab 02/04/20  0631 02/03/20  0638 02/02/20  2250   WBC 10.1 12.2* 16.7*   HGB 8.6* 9.1* 11.0*   MCV 84 82 82    314 390    138 132*   POTASSIUM 4.1 3.8 4.0   CHLORIDE 108 109 102   CO2 26 24 23   BUN 24 34* 40*   CR 1.33* 1.41* 1.60*   ANIONGAP 3 5 7   FERNANDO 8.2* 8.2* 9.0   GLC 78 128* 279*   ALBUMIN  --   --  3.5   PROTTOTAL  --   --  8.8   BILITOTAL  --   --  0.3   ALKPHOS  --   --  96   ALT  --   --  22   AST  --   --  14     Recent Results (from the past 24 hour(s))   MR Foot Left w/o & w Contrast    Narrative    EXAM: MR FOOT LEFT W/O and W CONTRAST  LOCATION: [facility]  DATE/TIME: 2/3/2020 7:56 PM    INDICATION: Osteomyelitis suspected, foot swelling, diabetic  COMPARISON: 12/03/2019 MRI. 2/3/2020, 2/2/2020 and 1/9/2020 radiographs.  TECHNIQUE: Routine. Additional postgadolinium T1 sequences were obtained.  IV CONTRAST: 11 mL Gadavist    FINDINGS:     JOINTS AND BONES: Interval resection of the second toe at the level of the MTP joint. Minimal intramedullary edema and enhancement in the head of the second metatarsal is likely reactive. No cortical erosion is evident. Continued follow-up could be   performed as clinically indicated. Partial resection of the first ray at the level of the proximal diaphysis of the metatarsal is again identified. Mild reactive soft tissue thickening and dystrophic calcification is evident. No findings to suggest   osteomyelitis at the amputation margin. No additional osseous abnormalities. The visualized articular cartilage is intact. No evidence for  septic arthritis.    TENDONS/MUSCLES: No evidence for tenosynovitis. Diffuse muscular atrophy and edema.    SOFT TISSUES: There is a subcutaneous tract/wound along the medial margin of the foot which extends to the base of the great toe and second toe amputation margins. There is a prominent amount of adjacent subcutaneous edema and soft tissue enhancement.   However no focal abscess is identified. Diffuse subcutaneous edema.      Impression    IMPRESSION:  1.  New amputation of the second toe. Edema at the second metatarsal head is likely reactive.  2.  Partial amputation of the first ray. Adjacent reactive soft tissue thickening and dystrophic calcification. No evidence for osteomyelitis.  3.  Tract/wound along the first and second ray's amputation margin with soft tissue edema/enhancement. No subcutaneous abscess.                 Medications     lactated ringers         acetaminophen  975 mg Oral Q8H     ampicillin-sulbactam (UNASYN) IV  3 g Intravenous Q6H     insulin aspart  1-7 Units Subcutaneous TID AC     insulin aspart  1-5 Units Subcutaneous At Bedtime     insulin glargine  20 Units Subcutaneous At Bedtime     sodium chloride (PF)  3 mL Intracatheter Q8H

## 2020-02-05 ENCOUNTER — ANESTHESIA (OUTPATIENT)
Dept: SURGERY | Facility: CLINIC | Age: 55
DRG: 616 | End: 2020-02-05
Payer: COMMERCIAL

## 2020-02-05 ENCOUNTER — ANESTHESIA EVENT (OUTPATIENT)
Dept: SURGERY | Facility: CLINIC | Age: 55
DRG: 616 | End: 2020-02-05
Payer: COMMERCIAL

## 2020-02-05 LAB
ANION GAP SERPL CALCULATED.3IONS-SCNC: 3 MMOL/L (ref 3–14)
BUN SERPL-MCNC: 17 MG/DL (ref 7–30)
CALCIUM SERPL-MCNC: 8.6 MG/DL (ref 8.5–10.1)
CHLORIDE SERPL-SCNC: 107 MMOL/L (ref 94–109)
CO2 SERPL-SCNC: 27 MMOL/L (ref 20–32)
COPATH REPORT: NORMAL
CREAT SERPL-MCNC: 1.25 MG/DL (ref 0.66–1.25)
ERYTHROCYTE [DISTWIDTH] IN BLOOD BY AUTOMATED COUNT: 13.1 % (ref 10–15)
GFR SERPL CREATININE-BSD FRML MDRD: 66 ML/MIN/{1.73_M2}
GLUCOSE BLDC GLUCOMTR-MCNC: 109 MG/DL (ref 70–99)
GLUCOSE BLDC GLUCOMTR-MCNC: 166 MG/DL (ref 70–99)
GLUCOSE BLDC GLUCOMTR-MCNC: 191 MG/DL (ref 70–99)
GLUCOSE BLDC GLUCOMTR-MCNC: 85 MG/DL (ref 70–99)
GLUCOSE BLDC GLUCOMTR-MCNC: 94 MG/DL (ref 70–99)
GLUCOSE SERPL-MCNC: 152 MG/DL (ref 70–99)
HCT VFR BLD AUTO: 26.6 % (ref 40–53)
HGB BLD-MCNC: 8.8 G/DL (ref 13.3–17.7)
MCH RBC QN AUTO: 27.6 PG (ref 26.5–33)
MCHC RBC AUTO-ENTMCNC: 33.1 G/DL (ref 31.5–36.5)
MCV RBC AUTO: 83 FL (ref 78–100)
PLATELET # BLD AUTO: 259 10E9/L (ref 150–450)
POTASSIUM SERPL-SCNC: 4.2 MMOL/L (ref 3.4–5.3)
RBC # BLD AUTO: 3.19 10E12/L (ref 4.4–5.9)
SODIUM SERPL-SCNC: 137 MMOL/L (ref 133–144)
WBC # BLD AUTO: 8.1 10E9/L (ref 4–11)

## 2020-02-05 PROCEDURE — 25000132 ZZH RX MED GY IP 250 OP 250 PS 637: Performed by: PODIATRIST

## 2020-02-05 PROCEDURE — 25000131 ZZH RX MED GY IP 250 OP 636 PS 637: Performed by: PODIATRIST

## 2020-02-05 PROCEDURE — 37000009 ZZH ANESTHESIA TECHNICAL FEE, EACH ADDTL 15 MIN: Performed by: PODIATRIST

## 2020-02-05 PROCEDURE — 80048 BASIC METABOLIC PNL TOTAL CA: CPT | Performed by: INTERNAL MEDICINE

## 2020-02-05 PROCEDURE — 71000012 ZZH RECOVERY PHASE 1 LEVEL 1 FIRST HR: Performed by: PODIATRIST

## 2020-02-05 PROCEDURE — 25000566 ZZH SEVOFLURANE, EA 15 MIN: Performed by: PODIATRIST

## 2020-02-05 PROCEDURE — 85027 COMPLETE CBC AUTOMATED: CPT | Performed by: INTERNAL MEDICINE

## 2020-02-05 PROCEDURE — 11046 DBRDMT MUSC&/FSCA EA ADDL: CPT | Mod: 58 | Performed by: PODIATRIST

## 2020-02-05 PROCEDURE — 40000169 ZZH STATISTIC PRE-PROCEDURE ASSESSMENT I: Performed by: PODIATRIST

## 2020-02-05 PROCEDURE — 12000000 ZZH R&B MED SURG/OB

## 2020-02-05 PROCEDURE — 36000067 ZZH SURGERY LEVEL 5 1ST 30 MIN: Performed by: PODIATRIST

## 2020-02-05 PROCEDURE — 00000146 ZZHCL STATISTIC GLUCOSE BY METER IP

## 2020-02-05 PROCEDURE — 11043 DBRDMT MUSC&/FSCA 1ST 20/<: CPT | Mod: 58 | Performed by: PODIATRIST

## 2020-02-05 PROCEDURE — 25000128 H RX IP 250 OP 636: Performed by: PODIATRIST

## 2020-02-05 PROCEDURE — 0JBR0ZZ EXCISION OF LEFT FOOT SUBCUTANEOUS TISSUE AND FASCIA, OPEN APPROACH: ICD-10-PCS | Performed by: PODIATRIST

## 2020-02-05 PROCEDURE — 36415 COLL VENOUS BLD VENIPUNCTURE: CPT | Performed by: INTERNAL MEDICINE

## 2020-02-05 PROCEDURE — 27110028 ZZH OR GENERAL SUPPLY NON-STERILE: Performed by: PODIATRIST

## 2020-02-05 PROCEDURE — 25000125 ZZHC RX 250: Performed by: NURSE ANESTHETIST, CERTIFIED REGISTERED

## 2020-02-05 PROCEDURE — 25800030 ZZH RX IP 258 OP 636: Performed by: ANESTHESIOLOGY

## 2020-02-05 PROCEDURE — 25800030 ZZH RX IP 258 OP 636: Performed by: INTERNAL MEDICINE

## 2020-02-05 PROCEDURE — 25000125 ZZHC RX 250: Performed by: PODIATRIST

## 2020-02-05 PROCEDURE — 37000008 ZZH ANESTHESIA TECHNICAL FEE, 1ST 30 MIN: Performed by: PODIATRIST

## 2020-02-05 PROCEDURE — 25000128 H RX IP 250 OP 636: Performed by: NURSE ANESTHETIST, CERTIFIED REGISTERED

## 2020-02-05 PROCEDURE — 36000069 ZZH SURGERY LEVEL 5 EA 15 ADDTL MIN: Performed by: PODIATRIST

## 2020-02-05 PROCEDURE — 99232 SBSQ HOSP IP/OBS MODERATE 35: CPT | Performed by: INTERNAL MEDICINE

## 2020-02-05 PROCEDURE — 27210794 ZZH OR GENERAL SUPPLY STERILE: Performed by: PODIATRIST

## 2020-02-05 RX ORDER — FENTANYL CITRATE 50 UG/ML
25-50 INJECTION, SOLUTION INTRAMUSCULAR; INTRAVENOUS
Status: DISCONTINUED | OUTPATIENT
Start: 2020-02-05 | End: 2020-02-05 | Stop reason: HOSPADM

## 2020-02-05 RX ORDER — ONDANSETRON 2 MG/ML
INJECTION INTRAMUSCULAR; INTRAVENOUS PRN
Status: DISCONTINUED | OUTPATIENT
Start: 2020-02-05 | End: 2020-02-05

## 2020-02-05 RX ORDER — MAGNESIUM HYDROXIDE 1200 MG/15ML
LIQUID ORAL PRN
Status: DISCONTINUED | OUTPATIENT
Start: 2020-02-05 | End: 2020-02-05 | Stop reason: HOSPADM

## 2020-02-05 RX ORDER — FENTANYL CITRATE 50 UG/ML
INJECTION, SOLUTION INTRAMUSCULAR; INTRAVENOUS PRN
Status: DISCONTINUED | OUTPATIENT
Start: 2020-02-05 | End: 2020-02-05

## 2020-02-05 RX ORDER — SODIUM CHLORIDE, SODIUM LACTATE, POTASSIUM CHLORIDE, CALCIUM CHLORIDE 600; 310; 30; 20 MG/100ML; MG/100ML; MG/100ML; MG/100ML
INJECTION, SOLUTION INTRAVENOUS CONTINUOUS
Status: DISCONTINUED | OUTPATIENT
Start: 2020-02-05 | End: 2020-02-05 | Stop reason: HOSPADM

## 2020-02-05 RX ORDER — HYDROMORPHONE HYDROCHLORIDE 1 MG/ML
.3-.5 INJECTION, SOLUTION INTRAMUSCULAR; INTRAVENOUS; SUBCUTANEOUS EVERY 5 MIN PRN
Status: DISCONTINUED | OUTPATIENT
Start: 2020-02-05 | End: 2020-02-05 | Stop reason: HOSPADM

## 2020-02-05 RX ORDER — ONDANSETRON 4 MG/1
4 TABLET, ORALLY DISINTEGRATING ORAL EVERY 30 MIN PRN
Status: DISCONTINUED | OUTPATIENT
Start: 2020-02-05 | End: 2020-02-05 | Stop reason: HOSPADM

## 2020-02-05 RX ORDER — PROPOFOL 10 MG/ML
INJECTION, EMULSION INTRAVENOUS PRN
Status: DISCONTINUED | OUTPATIENT
Start: 2020-02-05 | End: 2020-02-05

## 2020-02-05 RX ORDER — ONDANSETRON 2 MG/ML
4 INJECTION INTRAMUSCULAR; INTRAVENOUS EVERY 30 MIN PRN
Status: DISCONTINUED | OUTPATIENT
Start: 2020-02-05 | End: 2020-02-05 | Stop reason: HOSPADM

## 2020-02-05 RX ORDER — NALOXONE HYDROCHLORIDE 0.4 MG/ML
.1-.4 INJECTION, SOLUTION INTRAMUSCULAR; INTRAVENOUS; SUBCUTANEOUS
Status: ACTIVE | OUTPATIENT
Start: 2020-02-05 | End: 2020-02-06

## 2020-02-05 RX ORDER — LIDOCAINE HYDROCHLORIDE 20 MG/ML
INJECTION, SOLUTION INFILTRATION; PERINEURAL PRN
Status: DISCONTINUED | OUTPATIENT
Start: 2020-02-05 | End: 2020-02-05

## 2020-02-05 RX ADMIN — AMPICILLIN SODIUM AND SULBACTAM SODIUM 3 G: 2; 1 INJECTION, POWDER, FOR SOLUTION INTRAMUSCULAR; INTRAVENOUS at 04:00

## 2020-02-05 RX ADMIN — INSULIN GLARGINE 20 UNITS: 100 INJECTION, SOLUTION SUBCUTANEOUS at 22:09

## 2020-02-05 RX ADMIN — AMPICILLIN SODIUM AND SULBACTAM SODIUM 3 G: 2; 1 INJECTION, POWDER, FOR SOLUTION INTRAMUSCULAR; INTRAVENOUS at 09:44

## 2020-02-05 RX ADMIN — ONDANSETRON 4 MG: 2 INJECTION INTRAMUSCULAR; INTRAVENOUS at 14:12

## 2020-02-05 RX ADMIN — OXYCODONE HYDROCHLORIDE 10 MG: 5 TABLET ORAL at 06:34

## 2020-02-05 RX ADMIN — FENTANYL CITRATE 50 MCG: 50 INJECTION, SOLUTION INTRAMUSCULAR; INTRAVENOUS at 14:08

## 2020-02-05 RX ADMIN — PROPOFOL 170 MG: 10 INJECTION, EMULSION INTRAVENOUS at 14:08

## 2020-02-05 RX ADMIN — ACETAMINOPHEN 975 MG: 325 TABLET, FILM COATED ORAL at 16:17

## 2020-02-05 RX ADMIN — SODIUM CHLORIDE, POTASSIUM CHLORIDE, SODIUM LACTATE AND CALCIUM CHLORIDE: 600; 310; 30; 20 INJECTION, SOLUTION INTRAVENOUS at 13:31

## 2020-02-05 RX ADMIN — OXYCODONE HYDROCHLORIDE 10 MG: 5 TABLET ORAL at 18:18

## 2020-02-05 RX ADMIN — AMPICILLIN SODIUM AND SULBACTAM SODIUM 3 G: 2; 1 INJECTION, POWDER, FOR SOLUTION INTRAMUSCULAR; INTRAVENOUS at 22:09

## 2020-02-05 RX ADMIN — OXYCODONE HYDROCHLORIDE 10 MG: 5 TABLET ORAL at 22:09

## 2020-02-05 RX ADMIN — ACETAMINOPHEN 975 MG: 325 TABLET, FILM COATED ORAL at 00:22

## 2020-02-05 RX ADMIN — OXYCODONE HYDROCHLORIDE 10 MG: 5 TABLET ORAL at 01:56

## 2020-02-05 RX ADMIN — MIDAZOLAM 2 MG: 1 INJECTION INTRAMUSCULAR; INTRAVENOUS at 14:08

## 2020-02-05 RX ADMIN — LIDOCAINE HYDROCHLORIDE 100 MG: 20 INJECTION, SOLUTION INFILTRATION; PERINEURAL at 14:08

## 2020-02-05 RX ADMIN — AMPICILLIN SODIUM AND SULBACTAM SODIUM 3 G: 2; 1 INJECTION, POWDER, FOR SOLUTION INTRAMUSCULAR; INTRAVENOUS at 16:17

## 2020-02-05 RX ADMIN — ACETAMINOPHEN 975 MG: 325 TABLET, FILM COATED ORAL at 06:34

## 2020-02-05 RX ADMIN — SODIUM CHLORIDE, POTASSIUM CHLORIDE, SODIUM LACTATE AND CALCIUM CHLORIDE: 600; 310; 30; 20 INJECTION, SOLUTION INTRAVENOUS at 08:54

## 2020-02-05 ASSESSMENT — ACTIVITIES OF DAILY LIVING (ADL)
ADLS_ACUITY_SCORE: 16
ADLS_ACUITY_SCORE: 14
ADLS_ACUITY_SCORE: 14

## 2020-02-05 ASSESSMENT — LIFESTYLE VARIABLES: TOBACCO_USE: 1

## 2020-02-05 ASSESSMENT — ENCOUNTER SYMPTOMS
ORTHOPNEA: 0
SEIZURES: 0

## 2020-02-05 ASSESSMENT — MIFFLIN-ST. JEOR: SCORE: 2021.99

## 2020-02-05 NOTE — ANESTHESIA CARE TRANSFER NOTE
Patient: Ry Horner    Procedure(s):  IRRIGATION AND DEBRIDEMENT LEFT FOOT    Diagnosis: Gangrene (H) [I96]  Diagnosis Additional Information: No value filed.    Anesthesia Type:   General, LMA     Note:  Airway :Face Mask  Patient transferred to:PACU  Comments: At end of procedure, spontaneous respirations, adequate tidal volumes, followed commands to voice, LMA removed atraumatically, oropharynx suctioned, airway patent after LMA removal. Oxygen via facemask at 10 liters per minute to PACU. Oxygen tubing connected to wall O2 in PACU, SpO2, NiBP, and EKG monitors and alarms on and functioning, Belem Hugger warmer connected to patient gown, report on patient's clinical status given to PACU RN, RN questions answered.Handoff Report: Identifed the Patient, Identified the Reponsible Provider, Reviewed the pertinent medical history, Discussed the surgical course, Reviewed Intra-OP anesthesia mangement and issues during anesthesia, Set expectations for post-procedure period and Allowed opportunity for questions and acknowledgement of understanding      Vitals: (Last set prior to Anesthesia Care Transfer)    CRNA VITALS  2/5/2020 1420 - 2/5/2020 1456      2/5/2020             Resp Rate (observed):  16    EKG:  Sinus rhythm                Electronically Signed By: LILIANA Cool CRNA  February 5, 2020  2:56 PM

## 2020-02-05 NOTE — ANESTHESIA POSTPROCEDURE EVALUATION
Patient: Ry Horner    Procedure(s):  IRRIGATION AND DEBRIDEMENT LEFT FOOT    Diagnosis:Gangrene (H) [I96]  Diagnosis Additional Information: No value filed.    Anesthesia Type:  General, LMA    Note:  Anesthesia Post Evaluation    Patient location during evaluation: PACU  Patient participation: Able to fully participate in evaluation  Level of consciousness: awake  Pain management: adequate  Airway patency: patent  Cardiovascular status: acceptable  Respiratory status: acceptable  Hydration status: acceptable  PONV: none     Anesthetic complications: None          Last vitals:  Vitals:    02/05/20 1510 02/05/20 1520 02/05/20 1530   BP: 125/85 (!) 137/97 (!) 141/91   Pulse:   80   Resp: 11 16 20   Temp:      SpO2: 94% 97% 95%         Electronically Signed By: Tim Alarcon MD  February 5, 2020  3:49 PM

## 2020-02-05 NOTE — ANESTHESIA PREPROCEDURE EVALUATION
Anesthesia Pre-Procedure Evaluation    Patient: Ry Horner   MRN: [id number] : 1965          Preoperative Diagnosis: Gangrene (H) [I96]    Procedure(s):  IRRIGATION AND DEBRIDEMENT LEFT FOOT    Past Medical History:   Diagnosis Date     BENIGN HYPERTENSION 2007     DIABETES MELLITUS TYPE II-UNCOMPL 2007     HYPERLIPIDEMIA NEC/NOS 2007     Tobacco use disorder 2007     Past Surgical History:   Procedure Laterality Date     AMPUTATE FOOT Left 2019    Procedure: LEFT PARTIAL FOOT AMPUTATION;  Surgeon: Antoine Pena DPM;  Location: [facility]     AMPUTATE FOOT Left 2019    Procedure: LEFT PARTIAL FOOT AMPUTATION;  Surgeon: Tim Douglas DPM;  Location: [facility]     AMPUTATE FOOT Left 2019    Procedure: POSSIBLE PARTIAL FOOT AMPUTATION;  Surgeon: Tim Douglas DPM;  Location: [facility]     AMPUTATE LEG BELOW KNEE Right 2017    Procedure: AMPUTATE LEG BELOW KNEE;  RIGHT BELOW KNEE AMPUTATION ;  Surgeon: Nikolas Nascimento MD;  Location: [facility]     AMPUTATE TOE(S) Left 2/3/2020    Procedure: LEFT SECOND TOE AMPUTATION;  Surgeon: Milena Cevallos DPM, Podiatry/Foot and Ankle Surgery;  Location: [facility]     APPENDECTOMY       APPLY WOUND VAC Right 3/2/2015    Procedure: APPLY WOUND VAC;  Surgeon: Milena Cevallos DPM, Pod;  Location: [facility]     BIOPSY BONE FOOT Right 7/15/2016    Procedure: BIOPSY BONE FOOT;  Surgeon: Tim Douglas DPM;  Location: [facility]     BIOPSY BONE TOE Left 2019    Procedure: BONE BIOPSY LEFT SECOND TOE;  Surgeon: Tim Douglas DPM;  Location: [facility]     IRRIGATION AND DEBRIDEMENT FOOT, COMBINED Right 3/2/2015    Procedure: COMBINED IRRIGATION AND DEBRIDEMENT FOOT;  Surgeon: Milena Cevallos DPM, Pod;  Location: [facility]     IRRIGATION AND DEBRIDEMENT FOOT, COMBINED Right 7/15/2016    Procedure: COMBINED IRRIGATION AND DEBRIDEMENT FOOT;  Surgeon: Tim Douglas DPM;  Location: [facility]     IRRIGATION AND DEBRIDEMENT FOOT, COMBINED Right  7/20/2016    Procedure: COMBINED IRRIGATION AND DEBRIDEMENT FOOT;  Surgeon: Tim Douglas DPM;  Location: [facility]     IRRIGATION AND DEBRIDEMENT FOOT, COMBINED Left 11/19/2019    Procedure: REVISIONAL IRRIGATION AND DEBRIDEMENT LEFT FOOT AND BONE DEBRIDEMENT;  Surgeon: Joseph Randhawa DPM;  Location: [facility]     IRRIGATION AND DEBRIDEMENT FOOT, COMBINED Left 12/4/2019    Procedure: EXCISIONAL DEBRIDEMENT LEFT FOOT;  Surgeon: Tim Douglas DPM;  Location: [facility]     IRRIGATION AND DEBRIDEMENT FOOT, COMBINED Left 12/11/2019    Procedure: IRRIGATION AND DEBRIDEMENT FOOT, Partial osteotomy left first metatarsal;  Surgeon: Tim Douglas DPM;  Location: [facility]     ORTHOPEDIC SURGERY       EKG  20-NOV-2019 00:39:32 [facility]-S5 SO ROUTINE RECORD  Sinus tachycardia  Otherwise normal ECG  When compared with ECG of 17-NOV-2019 09:58,  No significant change was found  Anesthesia Evaluation     . Pt has had prior anesthetic.     History of anesthetic complications          ROS/MED HX    ENT/Pulmonary:     (+)tobacco use, Past use , . .   (-) sleep apnea   Neurologic:     (+)neuropathy    (-) seizures and migraines   Cardiovascular:     (+) Dyslipidemia, hypertension----. : . . . :. .      (-) CHF and orthopnea/PND   METS/Exercise Tolerance:     Hematologic:     (+) Anemia, -      Musculoskeletal: Comment: Diabetic foot  Osteomyelitis    S/p Right below the knee amputation        GI/Hepatic:  - neg GI/hepatic ROS      (-) GERD   Renal/Genitourinary: Comment: CKD - 2        Endo:     (+) type II DM Last HgA1c: 9.3 Using insulin - not using insulin pump Diabetic complications: neuropathy, Obesity, .      Psychiatric:         Infectious Disease: Comment: Diabetic foot with osteomyelitis.         Malignancy:         Other:                          Physical Exam  Normal systems: cardiovascular and pulmonary    Airway   Mallampati: II  TM distance: >3 FB  Neck ROM: full    Dental     Cardiovascular    Rhythm and rate: regular and normal      Pulmonary    breath sounds clear to auscultation            Lab Results   Component Value Date    WBC 8.1 02/05/2020    HGB 8.8 (L) 02/05/2020    HCT 26.6 (L) 02/05/2020     02/05/2020    .0 (H) 02/02/2020    SED 88 (H) 12/03/2019     02/05/2020    POTASSIUM 4.2 02/05/2020    CHLORIDE 107 02/05/2020    CO2 27 02/05/2020    BUN 17 02/05/2020    CR 1.25 02/05/2020     (H) 02/05/2020    FERNANDO 8.6 02/05/2020    ALBUMIN 3.5 02/02/2020    PROTTOTAL 8.8 02/02/2020    ALT 22 02/02/2020    AST 14 02/02/2020    ALKPHOS 96 02/02/2020    BILITOTAL 0.3 02/02/2020    TSH 0.94 12/19/2019       Preop Vitals  BP Readings from Last 3 Encounters:   02/05/20 (!) 149/98   01/09/20 136/82   01/02/20 132/72    Pulse Readings from Last 3 Encounters:   02/05/20 89   12/18/19 115   12/12/19 101      Resp Readings from Last 3 Encounters:   02/05/20 16   12/12/19 16   11/22/19 18    SpO2 Readings from Last 3 Encounters:   02/05/20 98%   12/18/19 98%   12/12/19 97%      Temp Readings from Last 1 Encounters:   02/05/20 36.3  C (97.4  F) (Oral)    Ht Readings from Last 1 Encounters:   02/02/20 1.829 m (6')      Wt Readings from Last 1 Encounters:   02/05/20 113.4 kg (250 lb)    Estimated body mass index is 33.91 kg/m  as calculated from the following:    Height as of this encounter: 1.829 m (6').    Weight as of this encounter: 113.4 kg (250 lb).       Anesthesia Plan      History & Physical Review  History and physical reviewed and following examination; no interval change.    ASA Status:  2 .    NPO Status:  > 8 hours    Plan for General and LMA with Propofol induction. Maintenance will be Balanced.    PONV prophylaxis:  Ondansetron (or other 5HT-3)       Postoperative Care      Consents  Anesthetic plan, risks, benefits and alternatives discussed with:  Patient..                 Tim Alarcon MD

## 2020-02-05 NOTE — PLAN OF CARE
Pt A/O x4. Baseline numbness to LLE. ACE wrap dressing CDI. LR @ 100; adequate I/Os. Pain well controlled with 10 mg oxycodone. Glucose overnight was 191. Pt NPO since 0000 for I/D of L foot today @ 1400.

## 2020-02-05 NOTE — OP NOTE
Procedure Date: 02/05/2020      SURGEON:  Tim Douglas DPM      PREOPERATIVE DIAGNOSES:   1.  Diabetes with peripheral neuropathy.   2.  Ulceration, left second toe.   3.  Gas gangrene, left second toe.      POSTOPERATIVE DIAGNOSES:     1.  Diabetes with peripheral neuropathy.   2.  Ulceration, left second toe.   3.  Gas gangrene, left second toe.      PROCEDURE:  Irrigation and excisional debridement, left foot, status post amputation of the left second toe on 02/03/2020.      ANESTHESIA:  General.      HEMOSTASIS:  None.      ESTIMATED BLOOD LOSS:  5 mL.      MATERIALS:  One-quarter-inch Nu Gauze packing dressing nonabsorbable suture material.      INDICATIONS FOR SURGERY:  Ry Horner is a 54-year-old male with type 2 diabetes, peripheral neuropathy, status post right below-knee amputation and a left partial first ray amputation, who was admitted to the hospital on 02/02/2020 due to concerning changes of his left second toe.  He was found to have gas gangrene.  He was brought to the operating room by Dr. Cevallos for an amputation of the toe.  Retention sutures and packing dressing were placed.  His foot was monitored and the plan was to bring him back to the operating room in 48 hours for additional debridement and irrigation.  Prior to surgery, I reviewed the goals with the patient and his wife.  Goals include treating the infection as well as achieving primary closure of the wound.  I explained that closure would depend on intraoperative findings.  No guarantees were given.      DESCRIPTION OF PROCEDURE:  Mr. Horner was transported to the operating room and placed supine on the operating table.  General endotracheal anesthesia was initiated.  The left lower extremity was prepped and draped in the normal aseptic fashion, a timeout was called.      The sutures that were placed at his previous surgery were removed.  The wound was opened bluntly.  An excisional debridement was performed in a methodical fashion from  proximal to distal.  Debridement was carried out down to the level of deep fat and deep fascia.  This involved an area greater than 20 square cm.  Debridement was performed via a bone rongeur and curettage.  At various locations, skin edges were incised to remove necrotic skin.       After excisional debridement, the wound was irrigated with a copious amount of normal sterile saline.  With visualization, all residual tissues appeared viable.  The cartilage on the head of the left second metatarsal appeared grossly viable.  At that point, it was deemed appropriate to primarily close the wound.  This was done using 3-0 nylon.  I did place packing dressing in 2 locations.  A well-padded compressive dressing was placed.      Mr. Ry Ybarra tolerated the anesthesia and procedure well and was transported to the postanesthesia care unit.  We will continue to monitor.  If his foot appears stable tomorrow, he will possibly be ready for discharge on Friday.         EDUARDA FUENTES DPM             D: 2020   T: 2020   MT: CARLIN      Name:     RY YBARRA   MRN:      [id number]        Account:        [id number]   :      1965           Procedure Date: 2020      Document: [id number]

## 2020-02-05 NOTE — BRIEF OP NOTE
[facility] Brief Operative Note    Pre-operative diagnosis: Gangrene (H) [I96]   Post-operative diagnosis same   Procedure: Procedure(s):  IRRIGATION AND DEBRIDEMENT LEFT FOOT   Surgeon(s): Surgeon(s) and Role:     * Tim Douglas DPM - Primary   Estimated blood loss: 5 mL    Specimens: * No specimens in log *   Findings: All tissues within the wound appeared grossly viable after debridement. The head of the 2nd metatarsal showed grossly intact, viable cartilage.  No purulence. Closure achieved without unnecessary tension.

## 2020-02-05 NOTE — PROGRESS NOTES
[facility]    Infectious Disease Progress Note    Date of Service (when I saw the patient): 02/05/2020     Assessment & Plan   Ry Horner is a 54 year old male who was admitted on 2/2/2020.     Impression:  1. 54 y.o male with diabetes with peripheral polyneuropathy.  2. History of infection in the right foot leading to amputation at the knee.   3. Recently Admitted with severely infected left foot. Gas gangrene of left foot. Osteomyelitis of left great toe. S/PLeft hallux amputation with irrigation and debridement. Followed by readmission in December and had another I and D.   4. CKD.   5. Admitted now with second toe on the left infection.       Recommendations:   On unasyn to continue, follow up on the cultures, so far strep, covered by current antibiotics,  S/P:  Left second toe amputation at metatarsophalangeal join with plans formore I and d will follow.            Franklin Maza MD    Interval History   S/p above mentioned surgery   Afebrile   Cultures as below    Physical Exam   Temp: 97.4  F (36.3  C) Temp src: Oral BP: (!) 149/98 Pulse: 89 Heart Rate: 92 Resp: 16 SpO2: 98 % O2 Device: None (Room air)    Vitals:    02/02/20 2237 02/03/20 0515 02/05/20 0630   Weight: 115.7 kg (255 lb) 111.1 kg (245 lb) 113.4 kg (250 lb)     Vital Signs with Ranges  Temp:  [97.4  F (36.3  C)-99.1  F (37.3  C)] 97.4  F (36.3  C)  Pulse:  [89-96] 89  Heart Rate:  [86-94] 92  Resp:  [16-18] 16  BP: (125-149)/(82-98) 149/98  SpO2:  [96 %-99 %] 98 %    Constitutional: Awake, alert, cooperative, no apparent distress  Lungs: Clear to auscultation bilaterally, no crackles or wheezing  Cardiovascular: Regular rate and rhythm, normal S1 and S2, and no murmur noted  Abdomen: Normal bowel sounds, soft, non-distended, non-tender  Skin: No rashes, no cyanosis, no edema  Other:    Medications     lactated ringers 150 mL/hr at 02/05/20 0854       acetaminophen  975 mg Oral Q8H     ampicillin-sulbactam (UNASYN) IV  3 g  Intravenous Q6H     insulin aspart  1-7 Units Subcutaneous TID AC     insulin aspart  1-5 Units Subcutaneous At Bedtime     insulin glargine  20 Units Subcutaneous At Bedtime     sodium chloride (PF)  3 mL Intracatheter Q8H       Data   All microbiology laboratory data reviewed.  Recent Labs   Lab Test 02/05/20  0708 02/04/20  0631 02/03/20  0638   WBC 8.1 10.1 12.2*   HGB 8.8* 8.6* 9.1*   HCT 26.6* 25.8* 27.2*   MCV 83 84 82    226 314     Recent Labs   Lab Test 02/05/20  0708 02/04/20  0631 02/03/20  0638   CR 1.25 1.33* 1.41*     Recent Labs   Lab Test 12/03/19  1727   SED 88*     Recent Labs   Lab Test 02/03/20  1324 02/03/20  0007 02/02/20  2250 12/04/19  1619 11/19/19  1829 11/17/19  1525 11/17/19  1419 11/17/19  1411 08/28/17  1136   CULT Heavy growth  beta hemolytic   Streptococcus constellatus  *  Culture in progress No growth after 2 days No growth after 2 days No anaerobes isolated  No growth Light growth  Bacteroides fragilis  *  Light growth  Parvimonas micra  *  Susceptibility testing not routinely done  On day 2, isolated in broth only:  beta hemolytic   Streptococcus constellatus  * Heavy growth  beta hemolytic   Streptococcus constellatus  Susceptibility testing done on previous specimen  *  Light growth  Alcaligenes faecalis  *  Light growth  Staphylococcus aureus  *  On day 1, isolated in broth only:  Anaerobic gram negative rods  See anaerobic report for identification  * Heavy growth  Bacteroides fragilis  Susceptibility testing not routinely done  *  Heavy growth  Peptoniphilus asaccharolyticus  Susceptibility testing not routinely done  *  Moderate growth  beta hemolytic   Streptococcus constellatus  *  On day 1, isolated in broth only:  Anaerobic gram negative rods  See anaerobic report for identification  * Heavy growth  Bacteroides fragilis  *  Heavy growth  Parvimonas micra  *  Heavy growth  Peptoniphilus asaccharolyticus  *  Heavy growth  Mixed aerobic and anaerobic  rosa  *  Susceptibility testing not routinely done No growth       Attestation:  Total time on the floor involved in the patient's care: 35 minutes. Total time spent in counseling/care coordination: >50%

## 2020-02-05 NOTE — PROGRESS NOTES
[facility]    Medicine Progress Note - Hospitalist Service       Date of Admission:  2/2/2020  Assessment & Plan     Ry Horner is a pleasant 54 year old gentleman with Type 2 Diabetes mellitus, peripheral neuropathy and chronic foot ulcerations; status post Right BKA and recent Left 1st toe resection for osteomyelitis; presented now with Left toe diabetic foot gas gangrene with foot cellulitis as well as JOHNATHAN.      Left foot(second toe) gas gangrene status post left second toe amputation at metatarsophalangeal joint.  Further I&D as per the podiatry.  Had recent admission with severely infected left foot with gas gangrene and osteomyelitis of left great toe, status post left hallux amputation with I&D now presents with left second toe infection  Started on Unasyn and Vanco during admission, infectious disease following, Vanco discontinued and currently on Unasyn, continue  Status post left second toe amputation at metatarsophalangeal joint 2/3/2020    by Dr. Cevallos.  Wound culture heavy growth of beta-hemolytic streptococcus constellatus, sensitivity pending, also growing staph aureus.  - Follow up blood cultures, negative to date.   - post-op MRI shows no evidence of osteomyelitis, no subcutaneous abscess.  Plan for revision I and D today and and further department as per podiatry.  Await for sensitivities.  Continue IV Unasyn at this time.     JOHNATHAN on chronic kidney disease stage II:  Baseline creatinine runs around 1.1-1.2, admission creatinine was 1.6, improved to 1.25 today.  -likely due to hypovolemia; improving.  Continue with IV fluids for now..  -Continue to hold PTA ibuprofen, hydrochlorothiazide, lisinopril  -Monitor, avoid nephrotoxins     Diabetes, uncontrolled  on admission  Hemoglobin A1c this admission is 7.9  -PTA on 58 units of Lantus along with linagliptin 5 mg daily and metformin thousand milligrams twice daily  -Currently on Lantus 20 units (as patient is n.p.o. at midnight )at  night and ISS insulin.   -Blood sugar over the last 24 hours are is much better controlled.  -Continue current dose of Lantus with sliding scale insulin and close monitoring  - holding Linagliptin and Metformin for now.     Hyponatremia; has corrected.     Chronic anemia  -Hemoglobin at baseline runs around 9-11  -Hemoglobin this morning is 8.6, monitor during perioperative period and transfuse as needed.     Essential Hypertension   -PTA on lisinopril and hydrochlorothiazide  Currently PTA lisinopril and hydrochlorothiazide on hold secondary to acute kidney injury, also patient has normal/soft blood pressure  -Continue to hold PTA antihypertensives while surgical procedure completed, renal failure improves  -Monitor and adjust medication as needed  Patient slightly on higher side today.  If his kidney function continue to improve and blood pressure on higher side can restart his lisinopril from tomorrow.     Dyslipidemia  -PTA on Zetia, resume once stable     Diet: NPO per Anesthesia Guidelines for Procedure/Surgery Except for: Meds    DVT Prophylaxis: Pneumatic Compression Devices  Corrales Catheter: not present  Code Status: Full Code      Disposition Plan   Expected discharge: 2+ days, recommended to Prior living versus transitional care once Once all surgical procedure completed, antibiotic plan formulated and cleared by all consultants.  Entered: Ran Garcia MD 02/05/2020, 12:51 PM       The patient's care was discussed with the Bedside Nurse and Patient.    Ran Garcia MD  Hospitalist Service  [facility]    ______________________________________________________________________    Interval History   Patient seen and examined this morning.  Denies any chest pain shortness of breath nausea vomiting headache dizziness lightheadedness no abdominal pain back pain dysuria major constipation diarrhea at this time.    No other significant event overnight    Data reviewed today: I reviewed all  medications, new labs and imaging results over the last 24 hours. I personally reviewed the Lower extremity MRI image(s) showing No evidence of abscess/tendinitis/osteomyelitis.  See below for details.    Physical Exam   Vital Signs: Temp: 97.4  F (36.3  C) Temp src: Oral BP: (!) 149/98 Pulse: 89 Heart Rate: 92 Resp: 16 SpO2: 98 % O2 Device: None (Room air)    Weight: 250 lbs 0 oz  Exam:  Constitutional: Awake, alert and no distress. Appears comfortable  Head: Normocephalic. No masses, lesions, tenderness or abnormalities  ENT: ENT exam normal, no neck nodes or sinus tenderness  Cardiovascular: RRR.  no murmurs, no rubs or JVD  Respiratory:normal WOB,b/l equal air entry, no wheezes or crackles   Gastrointestinal: Abdomen soft, non-tender. BS normal. No masses, organomegaly  : Deferred  Extremities :minimal edema on left shin, dressing on left leg/foot, no clubbing or cyanosis.  Below-knee amputation on the right    Data   Recent Labs   Lab 02/05/20  0708 02/04/20  0631 02/03/20  0638 02/02/20  2250   WBC 8.1 10.1 12.2* 16.7*   HGB 8.8* 8.6* 9.1* 11.0*   MCV 83 84 82 82    226 314 390    137 138 132*   POTASSIUM 4.2 4.1 3.8 4.0   CHLORIDE 107 108 109 102   CO2 27 26 24 23   BUN 17 24 34* 40*   CR 1.25 1.33* 1.41* 1.60*   ANIONGAP 3 3 5 7   FERNANDO 8.6 8.2* 8.2* 9.0   * 78 128* 279*   ALBUMIN  --   --   --  3.5   PROTTOTAL  --   --   --  8.8   BILITOTAL  --   --   --  0.3   ALKPHOS  --   --   --  96   ALT  --   --   --  22   AST  --   --   --  14     No results found for this or any previous visit (from the past 24 hour(s)).  Medications     lactated ringers 150 mL/hr at 02/05/20 0854       acetaminophen  975 mg Oral Q8H     ampicillin-sulbactam (UNASYN) IV  3 g Intravenous Q6H     insulin aspart  1-7 Units Subcutaneous TID AC     insulin aspart  1-5 Units Subcutaneous At Bedtime     insulin glargine  20 Units Subcutaneous At Bedtime     sodium chloride (PF)  3 mL Intracatheter Q8H

## 2020-02-06 LAB
ALBUMIN SERPL-MCNC: 2.5 G/DL (ref 3.4–5)
ANION GAP SERPL CALCULATED.3IONS-SCNC: 3 MMOL/L (ref 3–14)
BASOPHILS # BLD AUTO: 0 10E9/L (ref 0–0.2)
BASOPHILS NFR BLD AUTO: 0.5 %
BUN SERPL-MCNC: 12 MG/DL (ref 7–30)
CALCIUM SERPL-MCNC: 8.7 MG/DL (ref 8.5–10.1)
CHLORIDE SERPL-SCNC: 106 MMOL/L (ref 94–109)
CO2 SERPL-SCNC: 29 MMOL/L (ref 20–32)
CREAT SERPL-MCNC: 1.1 MG/DL (ref 0.66–1.25)
DIFFERENTIAL METHOD BLD: ABNORMAL
EOSINOPHIL # BLD AUTO: 0.2 10E9/L (ref 0–0.7)
EOSINOPHIL NFR BLD AUTO: 2.6 %
ERYTHROCYTE [DISTWIDTH] IN BLOOD BY AUTOMATED COUNT: 13.2 % (ref 10–15)
GFR SERPL CREATININE-BSD FRML MDRD: 76 ML/MIN/{1.73_M2}
GLUCOSE BLDC GLUCOMTR-MCNC: 130 MG/DL (ref 70–99)
GLUCOSE BLDC GLUCOMTR-MCNC: 144 MG/DL (ref 70–99)
GLUCOSE BLDC GLUCOMTR-MCNC: 166 MG/DL (ref 70–99)
GLUCOSE BLDC GLUCOMTR-MCNC: 216 MG/DL (ref 70–99)
GLUCOSE SERPL-MCNC: 120 MG/DL (ref 70–99)
HCT VFR BLD AUTO: 27.1 % (ref 40–53)
HGB BLD-MCNC: 9 G/DL (ref 13.3–17.7)
IMM GRANULOCYTES # BLD: 0 10E9/L (ref 0–0.4)
IMM GRANULOCYTES NFR BLD: 0.3 %
LYMPHOCYTES # BLD AUTO: 1.2 10E9/L (ref 0.8–5.3)
LYMPHOCYTES NFR BLD AUTO: 16 %
MCH RBC QN AUTO: 27.6 PG (ref 26.5–33)
MCHC RBC AUTO-ENTMCNC: 33.2 G/DL (ref 31.5–36.5)
MCV RBC AUTO: 83 FL (ref 78–100)
MONOCYTES # BLD AUTO: 0.5 10E9/L (ref 0–1.3)
MONOCYTES NFR BLD AUTO: 7 %
NEUTROPHILS # BLD AUTO: 5.7 10E9/L (ref 1.6–8.3)
NEUTROPHILS NFR BLD AUTO: 73.6 %
NRBC # BLD AUTO: 0 10*3/UL
NRBC BLD AUTO-RTO: 0 /100
PHOSPHATE SERPL-MCNC: 3.7 MG/DL (ref 2.5–4.5)
PLATELET # BLD AUTO: 289 10E9/L (ref 150–450)
POTASSIUM SERPL-SCNC: 4.2 MMOL/L (ref 3.4–5.3)
RBC # BLD AUTO: 3.26 10E12/L (ref 4.4–5.9)
SODIUM SERPL-SCNC: 138 MMOL/L (ref 133–144)
WBC # BLD AUTO: 7.7 10E9/L (ref 4–11)

## 2020-02-06 PROCEDURE — 12000000 ZZH R&B MED SURG/OB

## 2020-02-06 PROCEDURE — 36415 COLL VENOUS BLD VENIPUNCTURE: CPT | Performed by: PODIATRIST

## 2020-02-06 PROCEDURE — 25000131 ZZH RX MED GY IP 250 OP 636 PS 637: Performed by: PODIATRIST

## 2020-02-06 PROCEDURE — 25000132 ZZH RX MED GY IP 250 OP 250 PS 637: Performed by: INTERNAL MEDICINE

## 2020-02-06 PROCEDURE — 25000128 H RX IP 250 OP 636: Performed by: PODIATRIST

## 2020-02-06 PROCEDURE — 85025 COMPLETE CBC W/AUTO DIFF WBC: CPT | Performed by: PODIATRIST

## 2020-02-06 PROCEDURE — 25000132 ZZH RX MED GY IP 250 OP 250 PS 637: Performed by: PODIATRIST

## 2020-02-06 PROCEDURE — 25800030 ZZH RX IP 258 OP 636: Performed by: PODIATRIST

## 2020-02-06 PROCEDURE — 00000146 ZZHCL STATISTIC GLUCOSE BY METER IP

## 2020-02-06 PROCEDURE — 99232 SBSQ HOSP IP/OBS MODERATE 35: CPT | Performed by: INTERNAL MEDICINE

## 2020-02-06 PROCEDURE — 80069 RENAL FUNCTION PANEL: CPT | Performed by: PODIATRIST

## 2020-02-06 RX ORDER — ASPIRIN 81 MG/1
81 TABLET, CHEWABLE ORAL DAILY
Status: DISCONTINUED | OUTPATIENT
Start: 2020-02-06 | End: 2020-02-07 | Stop reason: HOSPADM

## 2020-02-06 RX ORDER — HYDROCHLOROTHIAZIDE 12.5 MG/1
12.5 CAPSULE ORAL DAILY
Status: DISCONTINUED | OUTPATIENT
Start: 2020-02-06 | End: 2020-02-07 | Stop reason: HOSPADM

## 2020-02-06 RX ORDER — LISINOPRIL 40 MG/1
40 TABLET ORAL DAILY
Status: DISCONTINUED | OUTPATIENT
Start: 2020-02-06 | End: 2020-02-07 | Stop reason: HOSPADM

## 2020-02-06 RX ORDER — HYDROCHLOROTHIAZIDE 12.5 MG/1
12.5 TABLET ORAL DAILY
Status: DISCONTINUED | OUTPATIENT
Start: 2020-02-06 | End: 2020-02-06

## 2020-02-06 RX ORDER — EZETIMIBE 10 MG/1
10 TABLET ORAL DAILY
Status: DISCONTINUED | OUTPATIENT
Start: 2020-02-06 | End: 2020-02-07 | Stop reason: HOSPADM

## 2020-02-06 RX ADMIN — AMPICILLIN SODIUM AND SULBACTAM SODIUM 3 G: 2; 1 INJECTION, POWDER, FOR SOLUTION INTRAMUSCULAR; INTRAVENOUS at 22:06

## 2020-02-06 RX ADMIN — POLYETHYLENE GLYCOL 3350 17 G: 17 POWDER, FOR SOLUTION ORAL at 14:11

## 2020-02-06 RX ADMIN — OXYCODONE HYDROCHLORIDE 10 MG: 5 TABLET ORAL at 19:24

## 2020-02-06 RX ADMIN — OXYCODONE HYDROCHLORIDE 10 MG: 5 TABLET ORAL at 14:09

## 2020-02-06 RX ADMIN — AMPICILLIN SODIUM AND SULBACTAM SODIUM 3 G: 2; 1 INJECTION, POWDER, FOR SOLUTION INTRAMUSCULAR; INTRAVENOUS at 03:42

## 2020-02-06 RX ADMIN — AMPICILLIN SODIUM AND SULBACTAM SODIUM 3 G: 2; 1 INJECTION, POWDER, FOR SOLUTION INTRAMUSCULAR; INTRAVENOUS at 16:18

## 2020-02-06 RX ADMIN — LISINOPRIL 40 MG: 40 TABLET ORAL at 11:03

## 2020-02-06 RX ADMIN — HYDROCHLOROTHIAZIDE 12.5 MG: 12.5 CAPSULE ORAL at 11:27

## 2020-02-06 RX ADMIN — ASPIRIN 81 MG 81 MG: 81 TABLET ORAL at 11:03

## 2020-02-06 RX ADMIN — ACETAMINOPHEN 975 MG: 325 TABLET, FILM COATED ORAL at 00:34

## 2020-02-06 RX ADMIN — SODIUM CHLORIDE, POTASSIUM CHLORIDE, SODIUM LACTATE AND CALCIUM CHLORIDE: 600; 310; 30; 20 INJECTION, SOLUTION INTRAVENOUS at 00:34

## 2020-02-06 RX ADMIN — OXYCODONE HYDROCHLORIDE 10 MG: 5 TABLET ORAL at 03:41

## 2020-02-06 RX ADMIN — AMPICILLIN SODIUM AND SULBACTAM SODIUM 3 G: 2; 1 INJECTION, POWDER, FOR SOLUTION INTRAMUSCULAR; INTRAVENOUS at 11:03

## 2020-02-06 RX ADMIN — ACETAMINOPHEN 975 MG: 325 TABLET, FILM COATED ORAL at 08:56

## 2020-02-06 RX ADMIN — INSULIN GLARGINE 20 UNITS: 100 INJECTION, SOLUTION SUBCUTANEOUS at 22:07

## 2020-02-06 RX ADMIN — EZETIMIBE 10 MG: 10 TABLET ORAL at 11:27

## 2020-02-06 ASSESSMENT — ACTIVITIES OF DAILY LIVING (ADL)
ADLS_ACUITY_SCORE: 16
ADLS_ACUITY_SCORE: 16
ADLS_ACUITY_SCORE: 14
ADLS_ACUITY_SCORE: 16
ADLS_ACUITY_SCORE: 14
ADLS_ACUITY_SCORE: 16

## 2020-02-06 NOTE — PLAN OF CARE
Arrived at 1600 from PACU. VSS except BP slightly elevated but asymptomatic. Baseline numbness LLE. Tolerated regular diet. Pain well managed with oxycodone. A&OX4. Refused capno.

## 2020-02-06 NOTE — PROGRESS NOTES
[facility]    Medicine Progress Note - Hospitalist Service       Date of Admission:  2/2/2020  Assessment & Plan     Ry Horner is a pleasant 54 year old gentleman with Type 2 Diabetes mellitus, peripheral neuropathy and chronic foot ulcerations; status post Right BKA and recent Left 1st toe resection for osteomyelitis; presented now with Left toe diabetic foot gas gangrene with foot cellulitis as well as JOHNATHAN.      Left foot(second toe) gas gangrene status post left second toe amputation at metatarsophalangeal joint.  Status post repeat I&D by the podiatry on 2 5/2/2010 with primary closure.  Had recent admission with severely infected left foot with gas gangrene and osteomyelitis of left great toe, status post left hallux amputation with I&D now presents with left second toe infection  Started on Unasyn and Vanco during admission, infectious disease following, Vanco discontinued and currently on Unasyn, continue  Status post left second toe amputation at metatarsophalangeal joint 2/3/2020    by Dr. Cevallos.  Wound culture heavy growth of beta-hemolytic streptococcus constellatus, sensitivity pending, also growing staph aureus.  - Follow up blood cultures, negative to date.   - post-op MRI shows no evidence of osteomyelitis, no subcutaneous abscess.  Plan for revision I and D today and and further department as per podiatry.  Await for sensitivities.  Continue IV Unasyn at this time.    Podiatry has close the wound.  Most likely will not need any further I&D if his wound remains stable.  Infectious disease following will need to determine the mode, duration and type of antibiotic on discharge.  Most likely will be able to discharge on Friday.     JOHNATHAN on chronic kidney disease stage II: Now resolved.  Baseline creatinine runs around 1.1-1.2, admission creatinine was 1.6, creatinine is at his baseline  -likely due to hypovolemia; improving.  Stop IV fluids today.  -Continue to hold PTA ibuprofen, can  restart his lisinopril hydrochlorothiazide at this time.  -Monitor, avoid nephrotoxins     Diabetes, uncontrolled  on admission  Now much better control.  Hemoglobin A1c this admission is 7.9  -PTA on 58 units of Lantus along with linagliptin 5 mg daily and metformin thousand milligrams twice daily  -Currently on Lantus 20 units (as patient is n.p.o. at midnight )at night and ISS insulin.   -Blood sugar over the last 24 hours are is much better controlled.  -Continue current dose of Lantus with sliding scale insulin and close monitoring  - holding Linagliptin and Metformin for now.     Hyponatremia; has corrected.     Chronic anemia  -Hemoglobin at baseline runs around 9-11  -Hemoglobin this morning is 8.6, monitor during perioperative period and transfuse as needed.     Essential Hypertension   -PTA on lisinopril and hydrochlorothiazide  Initially PTA lisinopril and hydrochlorothiazide was on hold secondary to acute kidney injury, also patient has normal/soft blood pressure.  Now blood pressure is on the higher side.  I will restart his lisinopril and hydrochlorothiazide at this time.  -This is done with all the debridement and his renal failure is resolved.  -Monitor and adjust medication as needed       Dyslipidemia  -Start Zetia today as well.      Status post IND and primary wound closure on 2/5/2020  He is on IV Unasyn and infectious disease following.  Infectious disease need to determine the duration type and more of antibiotics.  If need IV antibiotic we can put PICC line and today.  He will be ready to discharge tomorrow if his foot wound remains stable.  Appreciate input from podiatry and infectious disease.  Start lisinopril hydrochlorothiazide at this time.    Diet: Consistent Carbohydrate Diet 8143-9052 Calories: High Consistent CHO (4-7 CHO units/meal)    DVT Prophylaxis: Pneumatic Compression Devices  Corrales Catheter: not present  Code Status: Full Code      Disposition Plan   Expected discharge: 2+  days, recommended to Prior living versus transitional care once Once all surgical procedure completed, antibiotic plan formulated and cleared by all consultants.  Entered: Ran Garcia MD 02/06/2020, 11:42 AM       The patient's care was discussed with the Bedside Nurse and Patient.    Ran Garcia MD  Hospitalist Service  [facility]    ______________________________________________________________________    Interval History   Wanted to have his blood pressure medication back on his blood pressure is now on higher side.  Want to know when he can be discharged.  Denies any chest pain shortness of breath fever chills nausea vomiting abdominal pain back pain dysuria hematuria constipation diarrhea at this time.    No other significant event overnight    Data reviewed today: I reviewed all medications, new labs and imaging results over the last 24 hours. I personally reviewed the Lower extremity MRI image(s) showing No evidence of abscess/tendinitis/osteomyelitis.  See below for details.    Physical Exam   Vital Signs: Temp: 97.9  F (36.6  C) Temp src: Oral BP: (!) 158/99 Pulse: 80 Heart Rate: 76 Resp: 18 SpO2: 96 % O2 Device: None (Room air) Oxygen Delivery: 8 LPM  Weight: 250 lbs 0 oz  Exam:  Constitutional: Awake, alert and no distress. Appears comfortable  Head: Normocephalic. No masses, lesions, tenderness or abnormalities  ENT: ENT exam normal, no neck nodes or sinus tenderness  Cardiovascular: RRR.  no murmurs, no rubs or JVD  Respiratory:normal WOB,b/l equal air entry, no wheezes or crackles   Gastrointestinal: Abdomen soft, non-tender. BS normal. No masses, organomegaly  : Deferred  Extremities :minimal edema on left shin, dressing on left leg/foot, no clubbing or cyanosis.  Below-knee amputation on the right    Data   Recent Labs   Lab 02/06/20  0656 02/05/20  0708 02/04/20  0631  02/02/20  2250   WBC 7.7 8.1 10.1   < > 16.7*   HGB 9.0* 8.8* 8.6*   < > 11.0*   MCV 83 83 84   < > 82   PLT  289 259 226   < > 390    137 137   < > 132*   POTASSIUM 4.2 4.2 4.1   < > 4.0   CHLORIDE 106 107 108   < > 102   CO2 29 27 26   < > 23   BUN 12 17 24   < > 40*   CR 1.10 1.25 1.33*   < > 1.60*   ANIONGAP 3 3 3   < > 7   FERNANDO 8.7 8.6 8.2*   < > 9.0   * 152* 78   < > 279*   ALBUMIN 2.5*  --   --   --  3.5   PROTTOTAL  --   --   --   --  8.8   BILITOTAL  --   --   --   --  0.3   ALKPHOS  --   --   --   --  96   ALT  --   --   --   --  22   AST  --   --   --   --  14    < > = values in this interval not displayed.     No results found for this or any previous visit (from the past 24 hour(s)).  Medications       ampicillin-sulbactam (UNASYN) IV  3 g Intravenous Q6H     aspirin  81 mg Oral Daily     ezetimibe  10 mg Oral Daily     hydrochlorothiazide  12.5 mg Oral Daily     insulin aspart  1-7 Units Subcutaneous TID AC     insulin aspart  1-5 Units Subcutaneous At Bedtime     insulin glargine  20 Units Subcutaneous At Bedtime     lisinopril  40 mg Oral Daily     sodium chloride (PF)  3 mL Intracatheter Q8H

## 2020-02-06 NOTE — PROGRESS NOTES
[facility]  February 6, 2020    A/P:  53 y/o DM male s/p L 2nd toe amputation, POD #1 from repeat I&D and closure    -Discussed condition and treatment options including pros and cons.  -Dressing changed, packing pulled; betadine to incision  -NWB to L foot  -Elevate L foot  -Will monitor 1 more day, if stable, likely ok for discharge tomorrow  -Abx per ID    Antoine Pena DPM, FACFAS  Pager: [phone]    S:  Pt seen at bedside.  No acute concerns    O:  BP (!) 165/108 (BP Location: Left arm)   Pulse 80   Temp 98.8  F (37.1  C) (Oral)   Resp 18   Ht 1.829 m (6')   Wt 113.4 kg (250 lb)   SpO2 96%   BMI 33.91 kg/m    NAD.  L foot incision coapted.  Mild serous drainage, some maceration at packing sites.  Packing removed.  Little to no erythema.  No purulence.    Lab Results   Component Value Date    WBC 7.7 02/06/2020     Lab Results   Component Value Date    RBC 3.26 02/06/2020     Lab Results   Component Value Date    HGB 9.0 02/06/2020     Lab Results   Component Value Date    HCT 27.1 02/06/2020     No components found for: MCT  Lab Results   Component Value Date    MCV 83 02/06/2020     Lab Results   Component Value Date    MCH 27.6 02/06/2020     Lab Results   Component Value Date    MCHC 33.2 02/06/2020     Lab Results   Component Value Date    RDW 13.2 02/06/2020     Lab Results   Component Value Date     02/06/2020

## 2020-02-06 NOTE — PLAN OF CARE
Worked with pt on pain goals and constipation, constipation is a new symptom onset.      Pain seems to be managed with oxycodone.   Pt states pain in foot after dressing was changed andstates a pain of 6 or 7 consistently

## 2020-02-06 NOTE — PLAN OF CARE
Pt doing well. Baseline numbness LLE; R BKA. Dressing CDI. VSS on RA. Voiding adequately via urinal; IV infusing. Pain well controlled with oxy 10 mg.  Will continue to monitor.

## 2020-02-06 NOTE — PLAN OF CARE
Pt A&O, VSS, CMS intact. Up SBA to bathroom. Reg. Diet. Saline lock. Oxy given once for pain. Possibly discharging tomorrow.

## 2020-02-06 NOTE — PROGRESS NOTES
[facility]    Infectious Disease Progress Note    Date of Service (when I saw the patient): 02/06/2020     Assessment & Plan   Ry Horner is a 54 year old male who was admitted on 2/2/2020.     Impression:  1. 54 y.o male with diabetes with peripheral polyneuropathy.  2. History of infection in the right foot leading to amputation at the knee.   3. Recently Admitted with severely infected left foot. Gas gangrene of left foot. Osteomyelitis of left great toe. S/PLeft hallux amputation with irrigation and debridement. Followed by readmission in December and had another I and D.   4. CKD.   5. Admitted now with second toe on the left infection.       Recommendations:   S/P:  Left second toe amputation at metatarsophalangeal and a repeat I and D.   MSSA and strep constellatus in the cultures so far, covered by unasyn.   If all bony infection deemed surgically removed can be discharged on 10 more days of oral Augmentin.              Franklin Maza MD    Interval History   S/p above mentioned surgery   Afebrile   Cultures as below    Physical Exam   Temp: 97.9  F (36.6  C) Temp src: Oral BP: (!) 158/99 Pulse: 80 Heart Rate: 76 Resp: 18 SpO2: 96 % O2 Device: None (Room air) Oxygen Delivery: 8 LPM  Vitals:    02/02/20 2237 02/03/20 0515 02/05/20 0630   Weight: 115.7 kg (255 lb) 111.1 kg (245 lb) 113.4 kg (250 lb)     Vital Signs with Ranges  Temp:  [97.5  F (36.4  C)-99.7  F (37.6  C)] 97.9  F (36.6  C)  Pulse:  [80-85] 80  Heart Rate:  [76-97] 76  Resp:  [11-20] 18  BP: (118-158)/() 158/99  SpO2:  [94 %-97 %] 96 %    Constitutional: Awake, alert, cooperative, no apparent distress  Lungs: Clear to auscultation bilaterally, no crackles or wheezing  Cardiovascular: Regular rate and rhythm, normal S1 and S2, and no murmur noted  Abdomen: Normal bowel sounds, soft, non-distended, non-tender  Skin: No rashes, no cyanosis, no edema  Other:    Medications       ampicillin-sulbactam (UNASYN) IV  3 g  Intravenous Q6H     aspirin  81 mg Oral Daily     ezetimibe  10 mg Oral Daily     hydrochlorothiazide  12.5 mg Oral Daily     insulin aspart  1-7 Units Subcutaneous TID AC     insulin aspart  1-5 Units Subcutaneous At Bedtime     insulin glargine  20 Units Subcutaneous At Bedtime     lisinopril  40 mg Oral Daily     sodium chloride (PF)  3 mL Intracatheter Q8H       Data   All microbiology laboratory data reviewed.  Recent Labs   Lab Test 02/06/20  0656 02/05/20  0708 02/04/20  0631   WBC 7.7 8.1 10.1   HGB 9.0* 8.8* 8.6*   HCT 27.1* 26.6* 25.8*   MCV 83 83 84    259 226     Recent Labs   Lab Test 02/06/20  0656 02/05/20  0708 02/04/20  0631   CR 1.10 1.25 1.33*     Recent Labs   Lab Test 12/03/19  1727   SED 88*     Recent Labs   Lab Test 02/03/20  1324 02/03/20  0007 02/02/20  2250 12/04/19  1619 11/19/19  1829 11/17/19  1525 11/17/19  1419 11/17/19  1411 08/28/17  1136   CULT Heavy growth  beta hemolytic   Streptococcus constellatus  Susceptibility testing in progress  *  Light growth  Staphylococcus aureus  * No growth after 3 days No growth after 3 days No anaerobes isolated  No growth Light growth  Bacteroides fragilis  *  Light growth  Parvimonas micra  *  Susceptibility testing not routinely done  On day 2, isolated in broth only:  beta hemolytic   Streptococcus constellatus  * Heavy growth  beta hemolytic   Streptococcus constellatus  Susceptibility testing done on previous specimen  *  Light growth  Alcaligenes faecalis  *  Light growth  Staphylococcus aureus  *  On day 1, isolated in broth only:  Anaerobic gram negative rods  See anaerobic report for identification  * Heavy growth  Bacteroides fragilis  Susceptibility testing not routinely done  *  Heavy growth  Peptoniphilus asaccharolyticus  Susceptibility testing not routinely done  *  Moderate growth  beta hemolytic   Streptococcus constellatus  *  On day 1, isolated in broth only:  Anaerobic gram negative rods  See anaerobic report  for identification  * Heavy growth  Bacteroides fragilis  *  Heavy growth  Parvimonas micra  *  Heavy growth  Peptoniphilus asaccharolyticus  *  Heavy growth  Mixed aerobic and anaerobic rosa  *  Susceptibility testing not routinely done No growth       Attestation:  Total time on the floor involved in the patient's care: 35 minutes. Total time spent in counseling/care coordination: >50%

## 2020-02-07 VITALS
SYSTOLIC BLOOD PRESSURE: 137 MMHG | OXYGEN SATURATION: 95 % | HEART RATE: 87 BPM | HEIGHT: 72 IN | BODY MASS INDEX: 34.07 KG/M2 | WEIGHT: 251.54 LBS | RESPIRATION RATE: 16 BRPM | TEMPERATURE: 98.8 F | DIASTOLIC BLOOD PRESSURE: 93 MMHG

## 2020-02-07 LAB — GLUCOSE BLDC GLUCOMTR-MCNC: 150 MG/DL (ref 70–99)

## 2020-02-07 PROCEDURE — 25000128 H RX IP 250 OP 636: Performed by: PODIATRIST

## 2020-02-07 PROCEDURE — 99239 HOSP IP/OBS DSCHRG MGMT >30: CPT | Performed by: INTERNAL MEDICINE

## 2020-02-07 PROCEDURE — 25000132 ZZH RX MED GY IP 250 OP 250 PS 637: Performed by: PODIATRIST

## 2020-02-07 PROCEDURE — 25000132 ZZH RX MED GY IP 250 OP 250 PS 637: Performed by: INTERNAL MEDICINE

## 2020-02-07 PROCEDURE — 00000146 ZZHCL STATISTIC GLUCOSE BY METER IP

## 2020-02-07 RX ORDER — OXYCODONE HYDROCHLORIDE 5 MG/1
5 TABLET ORAL EVERY 6 HOURS PRN
Qty: 16 TABLET | Refills: 0 | Status: SHIPPED | OUTPATIENT
Start: 2020-02-07 | End: 2020-02-17

## 2020-02-07 RX ADMIN — EZETIMIBE 10 MG: 10 TABLET ORAL at 08:15

## 2020-02-07 RX ADMIN — SENNOSIDES AND DOCUSATE SODIUM 2 TABLET: 8.6; 5 TABLET ORAL at 08:23

## 2020-02-07 RX ADMIN — AMPICILLIN SODIUM AND SULBACTAM SODIUM 3 G: 2; 1 INJECTION, POWDER, FOR SOLUTION INTRAMUSCULAR; INTRAVENOUS at 10:21

## 2020-02-07 RX ADMIN — HYDROCHLOROTHIAZIDE 12.5 MG: 12.5 CAPSULE ORAL at 08:15

## 2020-02-07 RX ADMIN — ACETAMINOPHEN 650 MG: 325 TABLET, FILM COATED ORAL at 08:15

## 2020-02-07 RX ADMIN — ASPIRIN 81 MG 81 MG: 81 TABLET ORAL at 08:15

## 2020-02-07 RX ADMIN — LISINOPRIL 40 MG: 40 TABLET ORAL at 08:15

## 2020-02-07 RX ADMIN — AMPICILLIN SODIUM AND SULBACTAM SODIUM 3 G: 2; 1 INJECTION, POWDER, FOR SOLUTION INTRAMUSCULAR; INTRAVENOUS at 03:51

## 2020-02-07 RX ADMIN — OXYCODONE HYDROCHLORIDE 10 MG: 5 TABLET ORAL at 03:52

## 2020-02-07 ASSESSMENT — ACTIVITIES OF DAILY LIVING (ADL)
ADLS_ACUITY_SCORE: 14

## 2020-02-07 ASSESSMENT — MIFFLIN-ST. JEOR: SCORE: 2029

## 2020-02-07 NOTE — PLAN OF CARE
Patient up with SBA and walker with orthotic brace on RLE, NWB LLE maintained.  Pain managed with Oxycodone.  Hypertensive at times, otherwise VSS on RA, afebrile.  A/Ox4.  Dressing CDI.  CMS intact.  LLE elevated.  Voiding adequately.  No BM this shift, patient feels Miralax will be effective and wishes to defer taking other PRN bowel regimen meds.  Good PO intake.  Patient refusing sliding scale insulin if parameters are only to receive 1 unit.  Patient will likely discharge home tomorrow.

## 2020-02-07 NOTE — PLAN OF CARE
Date/Time 2/6 [id number]     Trauma/Ortho/Medical (Choose one) Ortho     Diagnosis/POD: Left 2nd toe amputation (POD4) -->  I/D of L foot infection (POD2)  Mental Status: A/Ox4  Activity/dangle: Up with SBA and walker; NWB LLE  Diet: Regular   Pain:Oxycodone given x1   [place]/Voiding: Urinal/BR  Tele/Restraints/Iso: NA  02/LDA: RA; SL  D/C Date: Home today   Other Info: Hx: Right BKA.  Refuses sliding scale insulin if only requiring 1 unit.  HTN at times.  Pt ambulates well with orthotic brace on RLE.  Dressing CDI. ID following.

## 2020-02-07 NOTE — PROGRESS NOTES
[facility]  February 7, 2020    A/P:  53 y/o DM male s/p L 2nd toe amputation, POD #2 from repeat I&D and closure    -Discussed condition and treatment options including pros and cons.  -Dressing changed; betadine to incision; overall stable.  Keep c/d/i until f/u.  -NWB to L foot.  -Elevate L foot.  -Abx per ID.  -ok for discharge from Podiatry standpoint.  -F/u next week with Dr. Douglas.  -Will sign off.    Antoine Pena DPM, FACFAS  Pager: [phone]    S:  Pt seen at bedside.  No acute concerns    O:  BP (!) 137/93 (BP Location: Right arm)   Pulse 87   Temp 98.8  F (37.1  C) (Oral)   Resp 16   Ht 1.829 m (6')   Wt 114.1 kg (251 lb 8.7 oz)   SpO2 95%   BMI 34.12 kg/m    NAD.  L foot incision coapted.  Mild serous drainage, some maceration at packing site. No erythema.  No purulence.

## 2020-02-07 NOTE — PLAN OF CARE
A&Ox4. VSS on RA. Up SB with prosthetic limb. Pain managed with tylenol and oxycodone. Mod carb diet well tolerated. CMS intact. Dressing CDI. NWB LLE. AVS reviewed at the bedside; understanding verbalized.

## 2020-02-07 NOTE — DISCHARGE SUMMARY
[facility]    Discharge Summary  Hospitalist    Date of Admission:  2/2/2020  Date of Discharge:  2/7/2020 11:23 AM  Discharging Provider: Ran Garcia MD  Date of Service (when I saw the patient): 02/07/20    Discharge Diagnoses   Please refer below    History of Present Illness   Ry Horner is an 54 year old male who presented with left toe diabetic foot infection    Hospital Course      Ry Horner is a pleasant 54 year old gentleman with Type 2 Diabetes mellitus, peripheral neuropathy and chronic foot ulcerations; status post Right BKA and recent Left 1st toe resection for osteomyelitis; presented now with Left toe diabetic foot gas gangrene with foot cellulitis as well as JOHNATHAN.    Final discharge diagnoses and hospital course      Left foot(second toe) gas gangrene status post left second toe amputation at metatarsophalangeal joint 2/3/2020.  Status post repeat I&D by the podiatry on 2/5/2010 with primary closure.  Had recent admission with severely infected left foot with gas gangrene and osteomyelitis of left great toe, status post left hallux amputation with I&D now presents with left second toe infection  Started on Unasyn and Vanco during admission, infectious disease following, Vanco discontinued and currently on Unasyn, continue  Status post left second toe amputation at metatarsophalangeal joint 2/3/2020    by Dr. Cevallos.  Wound culture heavy growth of beta-hemolytic streptococcus constellatus, and methicillin sensitive staph aureus.  - Follow up blood cultures, negative to date.   - post-op MRI shows no evidence of osteomyelitis, no subcutaneous abscess.  revision I and D done on 2/5/2020 with primary closure and no further debridement as per the podiatry.  Patient continue IV Unasyn while he was in the hospital.     At this time the patient is doing well.  Podiatry reevaluate the patient today and recommending discharging home.  Not weightbearing on the left leg.  ID recommended oral  Augmentin for 10 more days.  At this time the patient will be discharged home in stable condition with oral antibiotics and follow-up with the podiatry and primary care physician.     JOHNATHAN on chronic kidney disease stage II: Now resolved.   Baseline creatinine runs around 1.1-1.2, admission creatinine was 1.6, creatinine is at his baseline  -likely due to hypovolemia; improving.  Stop IV fluids now  -Continue to hold PTA ibuprofen, can restart his lisinopril hydrochlorothiazide at this time.  -Monitor, avoid nephrotoxins     Diabetes, uncontrolled  on admission  Now much better control.  Hemoglobin A1c this admission is 7.9  -PTA on 58 units of Lantus along with linagliptin 5 mg daily and metformin thousand milligrams twice daily  -Currently on Lantus 20 units (as patient is n.p.o. at midnight )at night and ISS insulin.   -Blood sugar over the last 24 hours are is much better controlled.  -Continue current dose of Lantus with sliding scale insulin and close monitoring  - holding Linagliptin and Metformin for now.  Resume oral medications at discharge.     Hyponatremia; has corrected.     Chronic anemia  -Hemoglobin at baseline runs around 9-11  -Hemoglobin this morning is 8.6, monitor during perioperative period and transfuse as needed.     Essential Hypertension   -PTA on lisinopril and hydrochlorothiazide  Initially PTA lisinopril and hydrochlorothiazide was on hold secondary to acute kidney injury, also patient has normal/soft blood pressure.  Now blood pressure is on the higher side,  restart his lisinopril and hydrochlorothiazide at this time, blood pressure is much better controlled at this time.        Dyslipidemia  -Start Zetia today as well.        Status post amputation and I&D and primary wound closure on 2/5/2020  He is on IV Unasyn switch to oral Augmentin on discharge as per ID recommendation  Discharged home in stable condition.     Ran Garcia MD, MD    Significant Results and Procedures   Procedure  Date: 02/03/2020      SURGEON:  Milena Cevallos DPM      PREOPERATIVE DIAGNOSES:   1.  Diabetic with neuropathy.   2.  Ulceration, left second toe.   3.  Gas gangrene, left second toe.      POSTOPERATIVE DIAGNOSES:   1.  Diabetic with neuropathy.   2.  Ulceration, left second toe.   3.  Gas gangrene, left second toe.      PROCEDURES:  Left second toe amputation at metatarsophalangeal joint.       Procedure Date: 02/05/2020      SURGEON:  Tim Douglas DPM      PREOPERATIVE DIAGNOSES:   1.  Diabetes with peripheral neuropathy.   2.  Ulceration, left second toe.   3.  Gas gangrene, left second toe.      POSTOPERATIVE DIAGNOSES:     1.  Diabetes with peripheral neuropathy.   2.  Ulceration, left second toe.   3.  Gas gangrene, left second toe.      PROCEDURE:  Irrigation and excisional debridement, left foot, status post amputation of the left second toe on 02/03/2020.     Pending Results   These results will be followed up by PCP  Unresulted Labs Ordered in the Past 30 Days of this Admission     Date and Time Order Name Status Description    2/3/2020 1327 Wound Culture Aerobic Bacterial Preliminary     2/2/2020 2245 Blood culture Preliminary     2/2/2020 2245 Blood culture Preliminary           Code Status   Full Code       Primary Care Physician   Kody Wing    Physical Exam   Temp: 98.8  F (37.1  C) Temp src: Oral BP: (!) 137/93 Pulse: 87 Heart Rate: 77 Resp: 16 SpO2: 95 % O2 Device: None (Room air)    Vitals:    02/03/20 0515 02/05/20 0630 02/07/20 0623   Weight: 111.1 kg (245 lb) 113.4 kg (250 lb) 114.1 kg (251 lb 8.7 oz)     Vital Signs with Ranges  Temp:  [98.1  F (36.7  C)-99.2  F (37.3  C)] 98.8  F (37.1  C)  Pulse:  [87] 87  Heart Rate:  [77-89] 77  Resp:  [16] 16  BP: (137-161)/() 137/93  SpO2:  [94 %-96 %] 95 %  I/O last 3 completed shifts:  In: 850 [P.O.:850]  Out: 3050 [Urine:3050]    Constitutional: awake, alert, cooperative, no apparent distress, and appears stated age  Eyes: Lids and lashes  normal, pupils equal, round and reactive to light, extra ocular muscles intact, sclera clear, conjunctiva normal  Respiratory: No increased work of breathing, good air exchange, clear to auscultation bilaterally, no crackles or wheezing  Cardiovascular: Normal apical impulse, regular rate and rhythm, normal S1 and S2, no S3 or S4, and no murmur noted  GI: No scars, normal bowel sounds, soft, non-distended, non-tender, no masses palpated, no hepatosplenomegally  Neurologic: Awake, alert, oriented to name, place and time.  Cranial nerves II-XII are grossly intact.  Motor is 5 out of 5 bilaterally.  Cerebellar finger to nose, heel to shin intact.  Sensory is intact.  Babinski down going, Romberg negative, and gait is normal.    Discharge Disposition   Discharged to home  Condition at discharge: Stable    Consultations This Hospital Stay   PHARMACY TO DOSE [facility]  PODIATRY IP CONSULT  PHARMACY TO DOSE [facility]  INFECTIOUS DISEASES IP CONSULT  PHYSICAL THERAPY ADULT IP CONSULT    Time Spent on this Encounter   I, Ran Garcia MD, personally saw the patient today and spent greater than 30 minutes discharging this patient.    Discharge Orders      Follow-up and recommended labs and tests     -follow up with Dr. Douglas within 1 week of discharge  -keep foot dressing clean, dry and intact until clinic follow up  -No weight bearing on surgical foot  -elevate foot  -ankle ROM exercises bilateral lower extremity every 4 hours x 5 minutes     Reason for your hospital stay    Gas gangrene left 2nd toe s/p amputation.     Follow-up and recommended labs and tests     Follow up with primary care provider, Kody Wing, within 7 days for hospital follow- up.  The following labs/tests are recommended: cbc, bmp.     Activity    Your activity upon discharge: activity as tolerated and NWB to Left lower extremity     Full Code     Diet    Follow this diet upon discharge: Orders Placed This Encounter      Consistent Carbohydrate Diet  3663-1091 Calories: High Consistent CHO (4-7 CHO units/meal)     Discharge Medications   Discharge Medication List as of 2/7/2020 10:34 AM      START taking these medications    Details   amoxicillin-clavulanate (AUGMENTIN) 875-125 MG tablet Take 1 tablet by mouth 2 times daily for 10 days, Disp-20 tablet, R-0, E-Prescribe      oxyCODONE (ROXICODONE) 5 MG tablet Take 1 tablet (5 mg) by mouth every 6 hours as needed for moderate to severe pain, Disp-16 tablet, R-0, E-Prescribe         CONTINUE these medications which have NOT CHANGED    Details   ACCU-CHEK JAYNA PLUS test strip TEST BLOOD SUGARS 2 TO 3 TIMES DAILY OR AS DIRECTED, Disp-100 each, R-3, E-Prescribe      acetaminophen (TYLENOL) 500 MG tablet Take 500 mg by mouth nightly as needed for mild pain, Historical      aspirin 81 MG chewable tablet Take 1 tablet (81 mg) by mouth daily, Disp-108 tablet, R-3, Historical      blood glucose (NO BRAND SPECIFIED) lancets standard Use to test blood sugar 2-3 times daily or as directed.Disp-100 each, [id number] Chek softclix lancets or brand desired by insurance      blood glucose monitoring (NO BRAND SPECIFIED) meter device kit Use to test blood sugar 2-3 times daily or as directed.Disp-1 kit, [id number] Chek Jayna meter or brand desired by insurance      ezetimibe (ZETIA) 10 MG tablet Take 1 tablet (10 mg) by mouth daily, Disp-100 tablet, R-11, Local Print      ferrous sulfate (FEROSUL) 325 (65 Fe) MG tablet Take 1 tablet (325 mg) by mouth daily (with breakfast), Disp-30 tablet, R-0, E-PrescribeFuture refills by PCP Dr. Kody Wing with phone number [phone].      hydrochlorothiazide (HYDRODIURIL) 12.5 MG tablet Take 12.5 mg by mouth daily, Historical      ibuprofen (ADVIL/MOTRIN) 200 MG tablet Take 1,000 mg by mouth nightly as needed for mild pain, Historical      insulin glargine (LANTUS PEN) 100 UNIT/ML pen Inject 58 Units Subcutaneous At Bedtime, HistoricalIf Lantus is not covered by  "insurance, may substitute Basaglar at same dose and frequency.        insulin pen needle (BD PEN NEEDLE MEGAN 2ND GEN) 32G X 4 MM miscellaneous Use as directed with Basaglar (1 box = 100 days)Disp-100 each, [id number]      ipratropium (ATROVENT) 0.06 % spray Spray 2 sprays into both nostrils 4 times daily as needed for rhinitis, Disp-3 Box, R-0, E-Prescribe      linagliptin (TRADJENTA) 5 MG TABS tablet Take 1 tablet (5 mg) by mouth daily, Disp-84 tablet, R-11, Local Print      lisinopril (PRINIVIL/ZESTRIL) 40 MG tablet Take 1 tablet (40 mg) by mouth daily, Disp-90 tablet, R-3, E-Prescribe      metFORMIN (GLUCOPHAGE) 1000 MG tablet Take 1 tablet (1,000 mg) by mouth 2 times daily (with meals), Disp-180 tablet, R-3, E-Prescribe      multivitamin (CENTRUM SILVER) tablet Take 1 tablet by mouth daily, Historical      order for DME Equipment being ordered: Wheelchair Treatment Diagnosis: Diabetic Foot wound with L hallux amputation 2/2 gangreneDisp-1 Units, R-0, Local Print      STATIN NOT PRESCRIBED (INTENTIONAL) Reason Statin was Not Prescribed: Intolerance (with supporting documentation of trying a statin at least once within the last 5 years)           Allergies   Allergies   Allergen Reactions     Pravastatin      \"sucked the life blood out of me,\" Indicates this occurs with all statins.     Data   Most Recent 3 CBC's:  Recent Labs   Lab Test 02/06/20  0656 02/05/20  0708 02/04/20  0631   WBC 7.7 8.1 10.1   HGB 9.0* 8.8* 8.6*   MCV 83 83 84    259 226      Most Recent 3 BMP's:  Recent Labs   Lab Test 02/06/20  0656 02/05/20  0708 02/04/20  0631    137 137   POTASSIUM 4.2 4.2 4.1   CHLORIDE 106 107 108   CO2 29 27 26   BUN 12 17 24   CR 1.10 1.25 1.33*   ANIONGAP 3 3 3   FERNANDO 8.7 8.6 8.2*   * 152* 78     Most Recent 2 LFT's:  Recent Labs   Lab Test 02/02/20  2250 12/11/19  1127   AST 14 20   ALT 22 28   ALKPHOS 96 81   BILITOTAL 0.3 0.4     Most Recent INR's and Anticoagulation Dosing " History:  Anticoagulation Dose History     There is no flowsheet data to display.        Most Recent 3 Troponin's:No lab results found.  Most Recent Cholesterol Panel:  Recent Labs   Lab Test 12/19/19  0852  06/06/18  0855   CHOL  --   --  309*   *   < > Cannot estimate LDL when triglyceride exceeds 400 mg/dL  84   HDL  --   --  28*   TRIG  --   --  1,383*    < > = values in this interval not displayed.     Most Recent 6 Bacteria Isolates From Any Culture (See EPIC Reports for Culture Details):  Recent Labs   Lab Test 02/03/20  1324 02/03/20  0007 02/02/20  2250 12/04/19  1619 11/19/19  1829 11/17/19  1525   CULT Heavy growth  beta hemolytic   Streptococcus constellatus  Susceptibility testing in progress  *  Light growth  Staphylococcus aureus  * No growth after 4 days No growth after 4 days No anaerobes isolated  No growth Light growth  Bacteroides fragilis  *  Light growth  Parvimonas micra  *  Susceptibility testing not routinely done  On day 2, isolated in broth only:  beta hemolytic   Streptococcus constellatus  * Heavy growth  beta hemolytic   Streptococcus constellatus  Susceptibility testing done on previous specimen  *  Light growth  Alcaligenes faecalis  *  Light growth  Staphylococcus aureus  *  On day 1, isolated in broth only:  Anaerobic gram negative rods  See anaerobic report for identification  *     Most Recent TSH, T4 and A1c Labs:  Recent Labs   Lab Test 02/03/20  0638 12/19/19  0852   TSH  --  0.94   A1C 7.9*  --      Results for orders placed or performed during the hospital encounter of 02/02/20   Foot XR, G/E 3 views, left    Narrative    EXAM: XR FOOT LT G/E 3 VW  LOCATION: [facility]  DATE/TIME: 2/2/2020 11:26 PM    INDICATION: Second toe swelling. Evaluate for osteomyelitis.  COMPARISON: None.      Impression    IMPRESSION: Previous first metatarsal amputation. There is probable gas in the soft tissues at the distal second toe. No fracture or definite bone  destruction to suggest osteomyelitis. Multiple vascular calcifications.   MR Foot Left w/o & w Contrast    Narrative    EXAM: MR FOOT LEFT W/O and W CONTRAST  LOCATION: [facility]  DATE/TIME: 2/3/2020 7:56 PM    INDICATION: Osteomyelitis suspected, foot swelling, diabetic  COMPARISON: 12/03/2019 MRI. 2/3/2020, 2/2/2020 and 1/9/2020 radiographs.  TECHNIQUE: Routine. Additional postgadolinium T1 sequences were obtained.  IV CONTRAST: 11 mL Gadavist    FINDINGS:     JOINTS AND BONES: Interval resection of the second toe at the level of the MTP joint. Minimal intramedullary edema and enhancement in the head of the second metatarsal is likely reactive. No cortical erosion is evident. Continued follow-up could be   performed as clinically indicated. Partial resection of the first ray at the level of the proximal diaphysis of the metatarsal is again identified. Mild reactive soft tissue thickening and dystrophic calcification is evident. No findings to suggest   osteomyelitis at the amputation margin. No additional osseous abnormalities. The visualized articular cartilage is intact. No evidence for septic arthritis.    TENDONS/MUSCLES: No evidence for tenosynovitis. Diffuse muscular atrophy and edema.    SOFT TISSUES: There is a subcutaneous tract/wound along the medial margin of the foot which extends to the base of the great toe and second toe amputation margins. There is a prominent amount of adjacent subcutaneous edema and soft tissue enhancement.   However no focal abscess is identified. Diffuse subcutaneous edema.      Impression    IMPRESSION:  1.  New amputation of the second toe. Edema at the second metatarsal head is likely reactive.  2.  Partial amputation of the first ray. Adjacent reactive soft tissue thickening and dystrophic calcification. No evidence for osteomyelitis.  3.  Tract/wound along the first and second ray's amputation margin with soft tissue edema/enhancement. No subcutaneous  abscess.               X-ray lt Foot 3 vw port: In PACU    Narrative    XR PORTABLE LEFT FOOT THREE VIEWS  2/3/2020 2:19 PM     HISTORY: Postoperative state.      Impression    IMPRESSION: Second toe amputation at the metatarsophalangeal joint,  new since 2/2/2020. Soft tissue bubbles of gas are noted, presumably  postoperative in origin. First ray amputation appears unchanged.  Forefoot and ankle vascular calcification is noted.    JOHN FALCON MD     Most Recent 3 CBC's:  Recent Labs   Lab Test 02/06/20  0656 02/05/20  0708 02/04/20  0631   WBC 7.7 8.1 10.1   HGB 9.0* 8.8* 8.6*   MCV 83 83 84    259 226     Most Recent 3 BMP's:  Recent Labs   Lab Test 02/06/20  0656 02/05/20  0708 02/04/20  0631    137 137   POTASSIUM 4.2 4.2 4.1   CHLORIDE 106 107 108   CO2 29 27 26   BUN 12 17 24   CR 1.10 1.25 1.33*   ANIONGAP 3 3 3   FERNANDO 8.7 8.6 8.2*   * 152* 78

## 2020-02-08 LAB
BACTERIA SPEC CULT: ABNORMAL
BACTERIA SPEC CULT: ABNORMAL
Lab: ABNORMAL
SPECIMEN SOURCE: ABNORMAL

## 2020-02-09 LAB
BACTERIA SPEC CULT: NO GROWTH
BACTERIA SPEC CULT: NO GROWTH
SPECIMEN SOURCE: NORMAL
SPECIMEN SOURCE: NORMAL

## 2020-02-10 ENCOUNTER — PATIENT OUTREACH (OUTPATIENT)
Dept: CARE COORDINATION | Facility: CLINIC | Age: 55
End: 2020-02-10

## 2020-02-10 ENCOUNTER — TELEPHONE (OUTPATIENT)
Dept: FAMILY MEDICINE | Facility: CLINIC | Age: 55
End: 2020-02-10

## 2020-02-10 DIAGNOSIS — L03.032 CELLULITIS OF TOE OF LEFT FOOT: Primary | ICD-10-CM

## 2020-02-10 DIAGNOSIS — E11.42 TYPE 2 DIABETES MELLITUS WITH DIABETIC POLYNEUROPATHY, WITHOUT LONG-TERM CURRENT USE OF INSULIN (H): Primary | ICD-10-CM

## 2020-02-10 RX ORDER — INSULIN GLARGINE 100 [IU]/ML
58 INJECTION, SOLUTION SUBCUTANEOUS DAILY
Qty: 99 ML | Refills: 11 | Status: SHIPPED | OUTPATIENT
Start: 2020-02-10 | End: 2021-02-22

## 2020-02-10 ASSESSMENT — ACTIVITIES OF DAILY LIVING (ADL): DEPENDENT_IADLS:: INDEPENDENT

## 2020-02-10 NOTE — PROGRESS NOTES
Clinic Care Coordination Contact    Clinic Care Coordination Contact  OUTREACH    Referral Information:  Referral Source: IP Report    Primary Diagnosis: Other (include Comment box)(Podiatry/infected toe)    Chief Complaint   Patient presents with     Clinic Care Coordination - Post Hospital     Clinic Care Coordination RN         [place] Utilization: [facility] admission 2/2-2/7/2020 Left toe diabetic foot infection   Clinic Utilization  Difficulty keeping appointments:: No  Compliance Concerns: No  No-Show Concerns: No  No PCP office visit in Past Year: No  Utilization    Last refreshed: 2/10/2020  9:39 AM:  Hospital Admissions 3           Last refreshed: 2/10/2020  9:39 AM:  ED Visits 0           Last refreshed: 2/10/2020  9:39 AM:  No Show Count (past year) 0              Current as of: 2/10/2020  9:39 AM              Clinical Concerns:  Current Medical Concerns:  Patient reports he has taken 4 Oxycodone since hospital discharge.  patient describes the pain as a # 7-8 and goes down to a # 3-4 after the pain medication.  Patient changed his dressing last night and the stiches are intact and no redness or increased swelling or drainage.  Patient has a follow up appointment with Dr Douglas Thursday 2/13/2020.  Patient continues to elevate leg continues course of antibiotic and uses a walker in the home and a wheelchair away from home   Patient is able to work in the home during the healing process   Patient reports the Lantus is no longer covered with insurance and will discuss a PA at hospital visit Patient has 1 1/2 weeks left of the insulin     Current Behavioral Concerns: Not discussed     Education Provided to patient: CC introductory letter and care plan mailed to patient today    Pain  Pain (GOAL):: No  Health Maintenance Reviewed:    Clinical Pathway: None    Medication Management:  Reviewed      Functional Status:  Dependent ADLs:: Ambulation-walker, Wheelchair-independent  Dependent  IADLs:: Independent  Bed or wheelchair confined:: No  Mobility Status: Independent    Living Situation:  Current living arrangement:: I live in a private home with spouse  Type of residence:: Private home - stairs    Lifestyle & Psychosocial Needs:        Diet:: Regular  Inadequate nutrition (GOAL):: No  Tube Feeding: No  Inadequate activity/exercise (GOAL):: No  Significant changes in sleep pattern (GOAL): No  Regular car  Financial/Insurance concerns (GOAL):: No  Protestant or spiritual beliefs that impact treatment:: No  Mental health DX:: No  Mental health management concern (GOAL):: No  Informal Support system:: Significant other   Socioeconomic History     Marital status:      Spouse name: Not on file     Number of children: Not on file     Years of education: Not on file     Highest education level: Not on file     Tobacco Use     Smoking status: Former Smoker     Packs/day: 0.50     Years: 20.00     Pack years: 10.00     Types: Cigarettes     Last attempt to quit:      Years since quittin.1     Smokeless tobacco: Never Used   Substance and Sexual Activity     Alcohol use: Yes     Comment: 3-4 drinks per month     Drug use: No     Sexual activity: Yes     Partners: Female        Community Resources: None  Supplies used at home:: Wound Care Supplies, Diabetic Supplies  Equipment Currently Used at Home: wheelchair, manual, walker, rolling    Advance Care Plan/Directive  Advanced Care Plans/Directives on file:: Yes  Type Advanced Care Plans/Directives: Advanced Directive - On File  Advanced Care Plan/Directive Status: Not Applicable    Referrals Placed: None     Goals:   Goals        General    Monitoring (pt-stated)     Notes - Note created  2019  4:04 PM by Adrianna Peterson RD    My Goal: I will wear Sj sensor x 14 days and take notes on food, drink, medication & activity in this time.     What I need to meet my goal: logs, sensor placement     I plan to meet my goal by this date: 9/3 at  follow up appointment             Goal Statement: I will have a healed surgical toe infection   Date Goal set: 2/10/2020  Barriers: Deconditioned   Strengths: Supportive girlfriend   Date to Achieve By: 4/10/2020  Patient expressed understanding of goal: Yes  Action steps to achieve this goal:  1. I will change surgical dressing as needed   2. I will call Podiatry office if experiencing any signs of infection such as increased pain,redness ,swelling ,drainage or fever   3. I will elevate leg and do ankle exercises twice a day   4  I will keep podiatry visit 2/13 and will call their office with questions or concerns   5> I will follow no weightbearing and use walker or wheelchair for safety   As of today's date 2/10/2020 goal is met at 0 - 25%.   Goal Status:  Active      Patient/Caregiver understanding: Expresses good understanding of discharge instructions     Outreach Frequency: 2 weeks  Future Appointments              In 3 days Tim Douglas DPM [facility],     In 1 week Kody Wing MD [facility],           Plan:   Patient will keep Podiatry appointment 2/13 and call with any concerns of surgical infection before   Patient will make a hospital follow up with PCP  Patient will elevate leg,change dressing as needed. Non weightbearing on surgical leg,ROM ankle exercises twice a day   Patent will use walker or wheelchair for safety   Patient will continue the antibiotic and Oxycodone as directed   CC will follow up in 3-5 business days   [facility]     Lavonne Tello RN Care Coordinator   [facility] / [facility] -[facility] -[facility]   Phone: [phone]  Email :  [email or web address]

## 2020-02-10 NOTE — TELEPHONE ENCOUNTER
"ED / Discharge Outreach Protocol    Patient Contact    Attempt # 1    Was call answered?  Yes.  \"May I please speak with <patient name>\"  Is patient available?   Yes    Not weightbearing on the left leg.  ID recommended oral Augmentin for 10 more days.  At this time the patient will be discharged home in stable condition with oral antibiotics and follow-up with the podiatry and primary care physician.    Follow-up and recommended labs and tests      -follow up with Dr. Douglas within 1 week of discharge  -keep foot dressing clean, dry and intact until clinic follow up  -No weight bearing on surgical foot  -elevate foot  -ankle ROM exercises bilateral lower extremity every 4 hours x 5 minutes      Follow-up and recommended labs and tests      Follow up with primary care provider, Kody Wing, within 7 days for hospital follow- up.  The following labs/tests are recommended: cbc, bmp     Hospital/TCU/ED for chronic condition Discharge Protocol    \"Hi, my name is Delaney New RN, a registered nurse, and I am calling from [facility].  I am calling to follow up and see how things are going for you after your recent emergency visit/hospital/TCU stay.\"    Tell me how you are doing now that you are home?\" doing well---biggest concern is a change to his med insurance.  Has learned that Lantus not covered by his current insurance plan.   Only has 1 1/2 weeks of Lantus still available.          Discharge Instructions    \"Let's review your discharge instructions.  What is/are the follow-up recommendations?  Pt. Response: see above    \"Has an appointment with your primary care provider been scheduled?\"   Yes. (confirm)    \"When you see the provider, I would recommend that you bring your medications with you.\"    Medications    \"Tell me what changed about your medicines when you discharged?\"    Changes to chronic meds?    0-1    \"What questions do you have about your medications?\"    Lantus coverage - Questions cannot be " "easily answered - Epic MTM referral needed     New diagnoses of heart failure, COPD, diabetes, or MI?    No     On insulin: \"Did you start on insulin in the hospital or did you have your insulin dose changed?\"  No         Post Discharge Medication Reconciliation Status: unable to reconcile discharge medications due to Pt concerned about Lantus insurance coverage. States he just finished speaking with someone else (Care Coordination) who reviewed his meds.  .    Was MTM referral placed (*Make sure to put transitions as reason for referral)?   No    Call Summary    \"What questions or concerns do you have about your recent visit and your follow-up care?\"     none    \"If you have questions or things don't continue to improve, we encourage you contact us through the main clinic number (give number).  Even if the clinic is not open, triage nurses are available 24/7 to help you.     We would like you to know that our clinic has extended hours (provide information).  We also have urgent care (provide details on closest location and hours/contact info)\"      \"Thank you for your time and take care!\"             "

## 2020-02-10 NOTE — TELEPHONE ENCOUNTER
Verified insurance plans formulary. Lantus is not covered by insurance policy but Basaglar pens are. Pended up new Rx for Basaglar with previous Lantus sig. Medication is listed as historical so quantity to dispense and refills unknown.    Joseph Bess, CMA on 2/10/2020 at 2:54 PM

## 2020-02-10 NOTE — LETTER
[facility]  Complex Care Plan  About Me:    Patient Name:  Ry Ybarra    YOB: 1965  Age:         54 year old   [place] MRN:    [id number] Telephone Information:  Home Phone [phone]   Mobile [phone]       Address:  [place] [address] Email address:  [email or web address]      Emergency Contact(s)    Name Relationship Lgl Grd Work Phone Home Phone Mobile Phone   1. AVA FORREST Significant ot* No  [phone]   2. TEDDY NAVAS Sister No   [phone]   3. RODRIGUE YBARRA Son No   [phone]           Primary language:  English     needed? No   [place] [facility]:  [phone] op. 1  Other communication barriers:    Preferred Method of Communication:  Jolie  Current living arrangement: I live in a private home with spouse  Mobility Status/ Medical Equipment: Independent    Health Maintenance  Health Maintenance Reviewed:      My Access Plan  Medical Emergency 911   Primary Clinic Line [facility] - [phone]   24 Hour Appointment Line [phone] or  [phone]) (toll-free)   24 Hour Nurse Line [phone] (toll-free)   Preferred Urgent Care [facility], [phone]   Preferred Hospital [facility]  [phone]   Preferred Pharmacy -[place], [place] - [facility] [address] [place] [address]     Behavioral Health Crisis Line The National Suicide Prevention Lifeline at 1-145.483.9158 or 911             My Care Team Members  Patient Care Team       Relationship Specialty Notifications Start End    Rodrigue Wing MD PCP - General Internal Medicine  6/5/17     Phone: [phone] Pager: [phone] Fax: [phone] [address] [facility] [address]    Rodrigue Wing MD Assigned PCP   5/11/17     Phone: [phone] Pager: [phone] Fax: [phone]        [address] [facility] [address]    Adrianna Peterson, RD  Diabetes Educator Dietitian, Registered  8/20/19     Phone: [phone] Fax: [phone] [address] [place] [address]    [facility] ([facility] ([place])  11/22/19     Phone: [phone]         Lavonne Tello, RN Lead Care Coordinator Primary Care - CC Admissions 2/10/20     Phone: [phone]                 My Care Plans  Self Management and Treatment Plan  Goals and (Comments)  Goals        General    Monitoring (pt-stated)     Notes - Note created  8/20/2019  4:04 PM by Adrianna Peterson RD    My Goal: I will wear Sj sensor x 14 days and take notes on food, drink, medication & activity in this time.     What I need to meet my goal: logs, sensor placement     I plan to meet my goal by this date: 9/3 at follow up appointment         Monitoring (pt-stated)     Notes - Note edited  2/10/2020 10:38 AM by Lavonne Tello, RALPH    Goal Statement: I will have a healed surgical toe infection   Date Goal set: 2/10/2020  Barriers: Deconditioned   Strengths: Supportive girlfriend   Date to Achieve By: 4/10/2020  Patient expressed understanding of goal: Yes  Action steps to achieve this goal:  1. I will change surgical dressing as needed   2. I will call Podiatry office if experiencing any signs of infection such as increased pain,reness ,swelling ,drainage or fever   3. I will elevate leg and do ankle exercises twice a day   4  I will keep podiatry visit 2/13 and will call their office with questions or concerns   5> I will follow no weightbearing and use walker or wheelchair for safety   As of today's date 2/10/2020 goal is met at 0 - 25%.   Goal Status:  Active               Action Plans on File: None                      Advance Care Plans/Directives Type: None  Type Advanced Care Plans/Directives: Advanced Directive - On File    My Medical and Care Information  Problem List   Patient Active Problem List   Diagnosis     Diabetes mellitus, type 2 (H)     Essential hypertension,  benign     Hyperlipidemia LDL goal <100     Impotence of organic origin     Morbid obesity due to excess calories (H)     Open wound of right foot     Status post below knee amputation, right (H)     Type 2 diabetes mellitus with diabetic polyneuropathy, without long-term current use of insulin (H)     Osteomyelitis (H)     Cellulitis in diabetic foot (H)      Current Medications and Allergies:  See printed Medication Report.    Care Coordination Start Date: 2/10/2020   Frequency of Care Coordination: 2 weeks   Form Last Updated: 02/10/2020

## 2020-02-10 NOTE — LETTER
[facility] CARE COORDINATION  [address]  [place] [address]    February 10, 2020    Ry Horner  [address] [place] [address]      Dear Ry,    I am a clinic care coordinator who works with Kody Wing MD. at [facility] . I wanted to thank you for spending the time to talk with me.  Below is a description of clinic care coordination and how I can further assist you.      The clinic care coordinator team is made up of a registered nurse,  and community health worker who understand the health care system. The goal of clinic care coordination is to help you manage your health and improve access to the health care system in the most efficient manner. The team can assist you in meeting your health care goals by providing education, coordinating services, strengthening the communication among your providers  and supporting you with any resource needs.    Please feel free to contact [phone], with any questions or concerns. We are focused on providing you with the highest-quality healthcare experience possible and that all starts with you.     Sincerely,     [facility]     Lavonne Tello RN Care Coordinator   [facility] / [facility] -[facility]   Phone: [phone]  Email :  [email or web address]      Enclosed: I have enclosed a copy of the Complex Care Plan. This has helpful information and goals that we have talked about. Please keep this in an easy to access place to use as needed.

## 2020-02-10 NOTE — TELEPHONE ENCOUNTER
Routing to Dr Wing and Prior Auth pool.     Patient has learned that RX Lantus not covered by his insurance as of 2020.  States his company merged and he still has [employer] insurance---however as of 2020 it has changed to [employer].      Patient only has 1 1/2 weeks of Lantus still available.    Rx Lantus 100 Unit/ml pen:  58 units at hs.      Please review and start PA, if needed.       Thank you,  Delaney LYNCH RN,BSN

## 2020-02-13 ENCOUNTER — OFFICE VISIT (OUTPATIENT)
Dept: PODIATRY | Facility: CLINIC | Age: 55
End: 2020-02-13
Payer: COMMERCIAL

## 2020-02-13 VITALS
HEIGHT: 72 IN | DIASTOLIC BLOOD PRESSURE: 72 MMHG | SYSTOLIC BLOOD PRESSURE: 126 MMHG | BODY MASS INDEX: 34 KG/M2 | WEIGHT: 251 LBS

## 2020-02-13 DIAGNOSIS — Z89.432 STATUS POST PARTIAL AMPUTATION OF LEFT FOOT (H): ICD-10-CM

## 2020-02-13 DIAGNOSIS — Z09 SURGERY FOLLOW-UP EXAMINATION: Primary | ICD-10-CM

## 2020-02-13 DIAGNOSIS — E11.42 TYPE 2 DIABETES MELLITUS WITH DIABETIC POLYNEUROPATHY, WITHOUT LONG-TERM CURRENT USE OF INSULIN (H): ICD-10-CM

## 2020-02-13 PROCEDURE — 99024 POSTOP FOLLOW-UP VISIT: CPT | Performed by: PODIATRIST

## 2020-02-13 ASSESSMENT — MIFFLIN-ST. JEOR: SCORE: 2026.53

## 2020-02-13 NOTE — LETTER
2/13/2020         RE: Ry Horner  [address] [place] [address]        Dear Colleague,    Thank you for referring your patient, Ry Horner, to the [facility]. Please see a copy of my visit note below.    S: Ry Horner  presents 1 week  post op.      POSTOPERATIVE DIAGNOSES:     1.  Diabetes with peripheral neuropathy.   2.  Ulceration, left second toe.   3.  Gas gangrene, left second toe.      PROCEDURE:  Irrigation and excisional debridement, left foot, status post amputation of the left second toe on 02/03/2020.      INDICATIONS FOR SURGERY:  Ry Horner is a 54-year-old male with type 2 diabetes, peripheral neuropathy, status post right below-knee amputation and a left partial first ray amputation, who was admitted to the hospital on 02/02/2020 due to concerning changes of his left second toe.  He was found to have gas gangrene.  He was brought to the operating room by Dr. Cevallos for an amputation of the toe.  Retention sutures and packing dressing were placed.  His foot was monitored and the plan was to bring him back to the operating room in 48 hours for additional debridement and irrigation.  Prior to surgery, I reviewed the goals with the patient and his wife.  Goals include treating the infection as well as achieving primary closure of the wound.  I explained that closure would depend on intraoperative findings.  No guarantees were given.       O:   Vascular:  Pedal pulses are palpable.  Moderate left forefoot edema.    Derm: The incision is coapted.  Sutures are intact.  No significant alexander-incisional erythema.  There is an area in the mid incision that shows some macerated skin and granulation tissue.       ASSESSMENT:  1) s/p above noted surgery.  No clinical signs of infection.  Pain is controlled.  Encounter Diagnoses   Name Primary?     Surgery follow-up examination Yes     Status post partial amputation of left foot (H)      Type 2 diabetes mellitus with diabetic  polyneuropathy, without long-term current use of insulin (H)        PLAN:  I did a sterile redress of his left foot.   He and his wife are to continue with daily dressing changes.  This involves gently cleansing, blotting dry, applying Betadine, and dry gauze dressing.   He is to remain strictly nonweightbearing  He is to complete his oral antibiotic  I did express some concern about the mid incision area that is not dry.  We will plan on leaving the sutures in for a total of 4 weeks  Thereafter the plan will be diabetic shoes, custom orthotics and prevention of future complications.  He is to follow-up in 2 weeks, and call if he has any concerns before and call if he has any concerns before then    Tim Douglas DPM, FACFAS, MS    [facility] Department of Podiatry/Foot & Ankle Surgery      Again, thank you for allowing me to participate in the care of your patient.        Sincerely,        Tim Douglas DPM

## 2020-02-17 ENCOUNTER — OFFICE VISIT (OUTPATIENT)
Dept: FAMILY MEDICINE | Facility: CLINIC | Age: 55
End: 2020-02-17
Payer: COMMERCIAL

## 2020-02-17 ENCOUNTER — PATIENT OUTREACH (OUTPATIENT)
Dept: CARE COORDINATION | Facility: CLINIC | Age: 55
End: 2020-02-17

## 2020-02-17 VITALS
HEIGHT: 72 IN | WEIGHT: 245.8 LBS | SYSTOLIC BLOOD PRESSURE: 136 MMHG | BODY MASS INDEX: 33.29 KG/M2 | DIASTOLIC BLOOD PRESSURE: 82 MMHG | OXYGEN SATURATION: 98 % | TEMPERATURE: 97.9 F | HEART RATE: 102 BPM

## 2020-02-17 DIAGNOSIS — E11.42 TYPE 2 DIABETES MELLITUS WITH DIABETIC POLYNEUROPATHY, WITHOUT LONG-TERM CURRENT USE OF INSULIN (H): ICD-10-CM

## 2020-02-17 DIAGNOSIS — E78.5 HYPERLIPIDEMIA LDL GOAL <100: ICD-10-CM

## 2020-02-17 DIAGNOSIS — A48.0 GAS GANGRENE (H): Primary | ICD-10-CM

## 2020-02-17 DIAGNOSIS — L08.9 TOE INFECTION: Primary | ICD-10-CM

## 2020-02-17 PROBLEM — M86.9 OSTEOMYELITIS (H): Status: RESOLVED | Noted: 2019-11-17 | Resolved: 2020-02-17

## 2020-02-17 PROBLEM — E66.01 MORBID OBESITY DUE TO EXCESS CALORIES (H): Status: RESOLVED | Noted: 2017-06-02 | Resolved: 2020-02-17

## 2020-02-17 PROCEDURE — 99495 TRANSJ CARE MGMT MOD F2F 14D: CPT | Performed by: INTERNAL MEDICINE

## 2020-02-17 ASSESSMENT — MIFFLIN-ST. JEOR: SCORE: 2002.94

## 2020-02-17 ASSESSMENT — ACTIVITIES OF DAILY LIVING (ADL): DEPENDENT_IADLS:: INDEPENDENT

## 2020-02-17 NOTE — PROGRESS NOTES
Clinic Care Coordination Contact    Follow Up Progress Note      Assessment:CC met the patient and his significant other .  Patient presents in a wheelchair and very pleasant.  Patient has a boot on left foot and reports his toe is healing .  Patient saw Podiatrist last week and has a future appointment next week     Goals addressed this encounter:   Goal Statement: I will have a healed surgical toe infection   Date Goal set: 2/10/2020  Barriers: Deconditioned   Strengths: Supportive girlfriend   Date to Achieve By: 4/10/2020  Patient expressed understanding of goal: Yes  Action steps to achieve this goal:  1. I will change surgical dressing as needed   2. I will call Podiatry office if experiencing any signs of infection such as increased pain,redness ,swelling ,drainage or fever   3. I will elevate leg and do ankle exercises twice a day   4  I will keep podiatry visit 2/13 and will call their office with questions or concerns   5> I will follow no weightbearing and use walker or wheelchair for safety   As of today's date 2/10/2020 goal is met at 0 - 25%.   Goal Status:  Active         Intervention/Education provided during outreach: Not discussed today      Outreach Frequency: 2 weeks    Plan:   Patient will continue toe wound care as directed     Care Coordinator will follow up in 3-5 business days   [facility]     Lavonne Tello  RN Care Coordinator   [facility] / [facility] -[facility] -[facility]   Phone: [phone]  Email :  [email or web address]

## 2020-02-17 NOTE — PROGRESS NOTES
"Subjective     Ry Horner is a 54 year old male who presents to clinic today for the following health issues:    HPI       Hospital Follow-up Visit:    Hospital/Nursing Home/IP Rehab Facility: [facility]  Date of Admission: 2-2-2020  Date of Discharge: 2-7-2020  Reason(s) for Admission: left toe diabetic foot infection            Problems taking medications regularly:  None       Medication changes since discharge: None       Problems adhering to non-medication therapy:  None    Summary of hospitalization:  [facility] discharge summary reviewed  Diagnostic Tests/Treatments reviewed.  Follow up needed: Podiatry as directed  Other Healthcare Providers Involved in Patient s Care:         None  Update since discharge: improved.     Post Discharge Medication Reconciliation: discharge medications reconciled and changed, per note/orders (see AVS).  Plan of care communicated with patient and family     Coding guidelines for this visit:  Type of Medical   Decision Making Face-to-Face Visit       within 7 Days of discharge Face-to-Face Visit        within 14 days of discharge   Moderate Complexity 32246 [id number]   High Complexity 27632 [id number]            54-year-old man with insulin-dependent diabetes was hospitalized for foot infection status post amputation of the digit.  Today feels well.  Plans to follow-up with podiatry for suture removal.  Home blood sugar readings have been at goal per his report.  Home blood pressure readings have been at goal per his report as well.  His wife and the patient explained to me how they are changing his diet to a \"noninflammatory\" diet.  This has resulted in some weight loss.    Patient Active Problem List   Diagnosis     Diabetes mellitus, type 2 (H)     Essential hypertension, benign     Hyperlipidemia LDL goal <100     Impotence of organic origin     Open wound of right foot     Status post below knee amputation, right (H)     Type 2 diabetes mellitus with diabetic " polyneuropathy, without long-term current use of insulin (H)     Cellulitis in diabetic foot (H)     Past Surgical History:   Procedure Laterality Date     AMPUTATE FOOT Left 11/17/2019    Procedure: LEFT PARTIAL FOOT AMPUTATION;  Surgeon: Antoine Pena DPM;  Location: [facility]     AMPUTATE FOOT Left 12/4/2019    Procedure: LEFT PARTIAL FOOT AMPUTATION;  Surgeon: Tim Douglas DPM;  Location: [facility]     AMPUTATE FOOT Left 12/11/2019    Procedure: POSSIBLE PARTIAL FOOT AMPUTATION;  Surgeon: Tim Douglas DPM;  Location: [facility]     AMPUTATE LEG BELOW KNEE Right 9/1/2017    Procedure: AMPUTATE LEG BELOW KNEE;  RIGHT BELOW KNEE AMPUTATION ;  Surgeon: Nikolas Nascimento MD;  Location: [facility]     AMPUTATE TOE(S) Left 2/3/2020    Procedure: LEFT SECOND TOE AMPUTATION;  Surgeon: Milena Cevallos DPM, Podiatry/Foot and Ankle Surgery;  Location: [facility]     APPENDECTOMY       APPLY WOUND VAC Right 3/2/2015    Procedure: APPLY WOUND VAC;  Surgeon: Milena Cevallos DPM, Pod;  Location: [facility]     BIOPSY BONE FOOT Right 7/15/2016    Procedure: BIOPSY BONE FOOT;  Surgeon: Tim Douglas DPM;  [facility]     BIOPSY BONE TOE Left 12/4/2019    Procedure: BONE BIOPSY LEFT SECOND TOE;  Surgeon: Tim Douglas DPM;  [facility]     IRRIGATION AND DEBRIDEMENT FOOT, COMBINED Right 3/2/2015    Procedure: COMBINED IRRIGATION AND DEBRIDEMENT FOOT;  Surgeon: Milena Cevallos DPM, Pod;  Location: [facility]     IRRIGATION AND DEBRIDEMENT FOOT, COMBINED Right 7/15/2016    Procedure: COMBINED IRRIGATION AND DEBRIDEMENT FOOT;  Surgeon: Tim Douglas DPM;  [facility]     IRRIGATION AND DEBRIDEMENT FOOT, COMBINED Right 7/20/2016    Procedure: COMBINED IRRIGATION AND DEBRIDEMENT FOOT;  Surgeon: Tim Douglas DPM;  [facility]     IRRIGATION AND DEBRIDEMENT FOOT, COMBINED Left 11/19/2019    Procedure: REVISIONAL IRRIGATION AND DEBRIDEMENT LEFT FOOT AND BONE DEBRIDEMENT;  Surgeon: Joseph Randhawa,  LAURA;  Location: [facility]     IRRIGATION AND DEBRIDEMENT FOOT, COMBINED Left 2019    Procedure: EXCISIONAL DEBRIDEMENT LEFT FOOT;  Surgeon: Tim Douglas DPM;  [facility]     IRRIGATION AND DEBRIDEMENT FOOT, COMBINED Left 2019    Procedure: IRRIGATION AND DEBRIDEMENT FOOT, Partial osteotomy left first metatarsal;  Surgeon: Tim Douglas DPM;  [facility]     IRRIGATION AND DEBRIDEMENT FOOT, COMBINED Left 2020    Procedure: IRRIGATION AND DEBRIDEMENT LEFT FOOT;  Surgeon: Tim Douglas DPM;  Location: [facility]     ORTHOPEDIC SURGERY         Social History     Tobacco Use     Smoking status: Former Smoker     Packs/day: 0.50     Years: 20.00     Pack years: 10.00     Types: Cigarettes     Last attempt to quit:      Years since quittin.1     Smokeless tobacco: Never Used   Substance Use Topics     Alcohol use: Yes     Comment: 3-4 drinks per month     Family History   Problem Relation Age of Onset     Genetic Disorder Other      Genetic Disorder Other      Psychotic Disorder Mother      Diabetes Father      Lung Cancer Father      Genetic Disorder Maternal Grandmother      Genetic Disorder Maternal Grandfather      Asthma Sister      C.A.D. No family hx of      Hypertension No family hx of      Cerebrovascular Disease No family hx of      Breast Cancer No family hx of      Cancer - colorectal No family hx of      Prostate Cancer No family hx of      Alcohol/Drug No family hx of          Current Outpatient Medications   Medication Sig Dispense Refill     ACCU-CHEK JAYNA PLUS test strip TEST BLOOD SUGARS 2 TO 3 TIMES DAILY OR AS DIRECTED 100 each 3     acetaminophen (TYLENOL) 500 MG tablet Take 500 mg by mouth nightly as needed for mild pain       aspirin 81 MG chewable tablet Take 1 tablet (81 mg) by mouth daily 108 tablet 3     blood glucose (NO BRAND SPECIFIED) lancets standard Use to test blood sugar 2-3 times daily or as directed. 100 each 11     blood glucose monitoring  "(NO BRAND SPECIFIED) meter device kit Use to test blood sugar 2-3 times daily or as directed. 1 kit 1     ezetimibe (ZETIA) 10 MG tablet Take 1 tablet (10 mg) by mouth daily 100 tablet 11     hydrochlorothiazide (HYDRODIURIL) 12.5 MG tablet Take 12.5 mg by mouth daily       ibuprofen (ADVIL/MOTRIN) 200 MG tablet Take 1,000 mg by mouth nightly as needed for mild pain       insulin glargine (BASAGLAR KWIKPEN) 100 UNIT/ML pen Inject 58 Units Subcutaneous daily 99 mL 11     insulin pen needle (BD PEN NEEDLE MEGAN 2ND GEN) 32G X 4 MM miscellaneous Use as directed with Basaglar (1 box = 100 days) 100 each 1     ipratropium (ATROVENT) 0.06 % spray Spray 2 sprays into both nostrils 4 times daily as needed for rhinitis 3 Box 0     linagliptin (TRADJENTA) 5 MG TABS tablet Take 1 tablet (5 mg) by mouth daily 84 tablet 11     lisinopril (PRINIVIL/ZESTRIL) 40 MG tablet Take 1 tablet (40 mg) by mouth daily 90 tablet 3     metFORMIN (GLUCOPHAGE) 1000 MG tablet Take 1 tablet (1,000 mg) by mouth 2 times daily (with meals) 180 tablet 3     multivitamin (CENTRUM SILVER) tablet Take 1 tablet by mouth daily       order for DME Equipment being ordered: Wheelchair Treatment Diagnosis: Diabetic Foot wound with L hallux amputation 2/2 gangrene 1 Units 0     STATIN NOT PRESCRIBED (INTENTIONAL) Please choose reason not prescribed, below (Patient not taking: Reported on 2/17/2020)       Allergies   Allergen Reactions     Pravastatin      \"sucked the life blood out of me,\" Indicates this occurs with all statins.         Reviewed and updated as needed this visit by Provider         Review of Systems   ROS COMP: Constitutional, HEENT, cardiovascular, pulmonary, gi and gu systems are negative, except as otherwise noted.      Objective    /82 (BP Location: Right arm, Patient Position: Sitting, Cuff Size: Adult Large)   Pulse 102   Temp 97.9  F (36.6  C) (Oral)   Ht 1.829 m (6')   Wt 111.5 kg (245 lb 12.8 oz)   SpO2 98%   BMI 33.34 kg/m  "   Body mass index is 33.34 kg/m .  Physical Exam   General: This is a well-appearing man in no acute distress.  His foot is wrapped in an immobilizer and bandage.    A1c in the hospital was noted to be 7.9        Assessment & Plan       ICD-10-CM    1. Gas gangrene (H) A48.0    2. Type 2 diabetes mellitus with diabetic polyneuropathy, without long-term current use of insulin (H) E11.42 Hemoglobin A1c   3. Hyperlipidemia LDL goal <100 E78.5 Lipid panel reflex to direct LDL Fasting     Continue follow-up with podiatry for foot infection as directed.  Complete course of oral antibiotics as directed.  Continue current management of diabetes, follow-up A1c in 3 months.  Recheck lipids at that time as well.  He is currently taking Zetia as he is intolerant to statins.        Return in about 3 months (around 5/17/2020) for Pre-visit Non-fasting Lab.  Or return sooner as needed    Kody Wing MD  [facility]

## 2020-02-27 ENCOUNTER — OFFICE VISIT (OUTPATIENT)
Dept: PODIATRY | Facility: CLINIC | Age: 55
End: 2020-02-27
Payer: COMMERCIAL

## 2020-02-27 ENCOUNTER — ANCILLARY PROCEDURE (OUTPATIENT)
Dept: GENERAL RADIOLOGY | Facility: CLINIC | Age: 55
End: 2020-02-27
Attending: PODIATRIST
Payer: COMMERCIAL

## 2020-02-27 VITALS — WEIGHT: 245.8 LBS | BODY MASS INDEX: 33.29 KG/M2 | HEIGHT: 72 IN

## 2020-02-27 DIAGNOSIS — Z89.432 STATUS POST PARTIAL AMPUTATION OF LEFT FOOT (H): ICD-10-CM

## 2020-02-27 DIAGNOSIS — E11.42 TYPE 2 DIABETES MELLITUS WITH DIABETIC POLYNEUROPATHY, WITHOUT LONG-TERM CURRENT USE OF INSULIN (H): ICD-10-CM

## 2020-02-27 DIAGNOSIS — Z09 SURGERY FOLLOW-UP EXAMINATION: Primary | ICD-10-CM

## 2020-02-27 DIAGNOSIS — Z09 SURGERY FOLLOW-UP EXAMINATION: ICD-10-CM

## 2020-02-27 DIAGNOSIS — T81.30XA WOUND DEHISCENCE: ICD-10-CM

## 2020-02-27 DIAGNOSIS — L97.522 SKIN ULCER OF LEFT FOOT WITH FAT LAYER EXPOSED (H): ICD-10-CM

## 2020-02-27 PROCEDURE — 11042 DBRDMT SUBQ TIS 1ST 20SQCM/<: CPT | Mod: 58 | Performed by: PODIATRIST

## 2020-02-27 PROCEDURE — 99024 POSTOP FOLLOW-UP VISIT: CPT | Performed by: PODIATRIST

## 2020-02-27 PROCEDURE — 73630 X-RAY EXAM OF FOOT: CPT | Mod: LT

## 2020-02-27 ASSESSMENT — MIFFLIN-ST. JEOR: SCORE: 2002.94

## 2020-02-27 NOTE — PROGRESS NOTES
S: Ry Horner  presents 2 weeks  post op.      POSTOPERATIVE DIAGNOSES:     1.  Diabetes with peripheral neuropathy.   2.  Ulceration, left second toe.   3.  Gas gangrene, left second toe.      PROCEDURE:  Irrigation and excisional debridement, left foot, status post amputation of the left second toe on 02/03/2020.      INDICATIONS FOR SURGERY:  Ry Horner is a 54-year-old male with type 2 diabetes, peripheral neuropathy, status post right below-knee amputation and a left partial first ray amputation, who was admitted to the hospital on 02/02/2020 due to concerning changes of his left second toe.  He was found to have gas gangrene.  He was brought to the operating room by Dr. Cevallos for an amputation of the toe.  Retention sutures and packing dressing were placed.  His foot was monitored and the plan was to bring him back to the operating room in 48 hours for additional debridement and irrigation.  Prior to surgery, I reviewed the goals with the patient and his wife.  Goals include treating the infection as well as achieving primary closure of the wound.  I explained that closure would depend on intraoperative findings.  No guarantees were given.     He and his wife are reporting minimal drainage now.       O:   Vascular:  Pedal pulses are palpable.  Interval reduction to minimal left forefoot edema.    Derm: The proxima distal incision is coapted.  Sutures are intact.  No significant alexander-incisional erythema.      There is an area in the mid incision that now as dehiscence.  Post excisional debridement of macerated tissue, underlying more viable appearing tissue is seen.    LEFT FOOT THREE OR MORE VIEWS 2/27/2020 11:33 AM      HISTORY: Surgery follow-up examination.     COMPARISON: 2/3/2020.                                                                      IMPRESSION: First ray amputation at the mid metatarsal level and  second ray amputation at the metatarsophalangeal joint level are again  noted. Some of the  "small amount of bony debris adjacent to the first  metatarsal surgical site are no longer identified. Interval decrease  in the associated postoperative soft tissue swelling. No new bony  abnormalities. Vascular calcifications are present.    ASSESSMENT:  Encounter Diagnoses   Name Primary?     Surgery follow-up examination Yes     Wound dehiscence      Skin ulcer of left foot with fat layer exposed (H)      Type 2 diabetes mellitus with diabetic polyneuropathy, without long-term current use of insulin (H)      Status post partial amputation of left foot (H)      I am not sure why he is again having difficulty healing this foot.  This is happened twice before.  Noninvasive vascular studies in November did not indicate any significant lower left extremity arterial insufficiency.    PLAN:  I reviewed the XR images with the patient.  I did a sterile redress of his left foot.   He and his wife are to continue with daily dressing changes.  This involves gently cleansing, blotting dry, applying Betadine, and dry gauze dressing.   We will use Iodosorb at the open wound  He is to remain strictly nonweightbearing  I did express some concern about the mid incision area that has dehisced now.  We will plan on leaving the sutures in for a total of 4 weeks  Thereafter the plan will be diabetic shoes, custom orthotics and prevention of future complications.  He is to follow-up in 2 weeks, and call if he has any concerns before and call if he has any concerns before then    If he does not heal, I will refer him to the [facility].     Excisional Debridement    The excisional debridement procedure was discussed.  This included the goals of removing non-viable tissue, evaluating the full extent of wound, and promoting wound healing.  Ry Horner  provided verbal and written consent.  The \"Time Out\" was called.     Using a sterile #15 blade, tissue nippers, small scissors and forceps, excisional debridment of the left foot " area of wound dehiscence/  ulcer was performed.  Debridement was carried out to the depth of the fat layer. The macerated, non viable issue was excised. The ulcer base was scraped to remove bioburden and promote healing.  The area debrided was less than 20 square cm.   There was moderate bleeding with the procedure. No anesthesia was needed due to peripheral neuropathy.  A sterile dressing was applied.      Tim Douglas DPM, FACTHAI, MS    [place] [facility] Pod[facility] & Ankle Surgery

## 2020-02-27 NOTE — LETTER
2/27/2020         RE: Ry Horner  [address] [place] [address]        Dear Colleague,    Thank you for referring your patient, Ry Horner, to the [facility]. Please see a copy of my visit note below.    S: Ry Horner  presents 2 weeks  post op.      POSTOPERATIVE DIAGNOSES:     1.  Diabetes with peripheral neuropathy.   2.  Ulceration, left second toe.   3.  Gas gangrene, left second toe.      PROCEDURE:  Irrigation and excisional debridement, left foot, status post amputation of the left second toe on 02/03/2020.      INDICATIONS FOR SURGERY:  Ry Horner is a 54-year-old male with type 2 diabetes, peripheral neuropathy, status post right below-knee amputation and a left partial first ray amputation, who was admitted to the hospital on 02/02/2020 due to concerning changes of his left second toe.  He was found to have gas gangrene.  He was brought to the operating room by Dr. Cevallos for an amputation of the toe.  Retention sutures and packing dressing were placed.  His foot was monitored and the plan was to bring him back to the operating room in 48 hours for additional debridement and irrigation.  Prior to surgery, I reviewed the goals with the patient and his wife.  Goals include treating the infection as well as achieving primary closure of the wound.  I explained that closure would depend on intraoperative findings.  No guarantees were given.     He and his wife are reporting minimal drainage now.       O:   Vascular:  Pedal pulses are palpable.  Interval reduction to minimal left forefoot edema.    Derm: The proxima distal incision is coapted.  Sutures are intact.  No significant alexander-incisional erythema.      There is an area in the mid incision that now as dehiscence.  Post excisional debridement of macerated tissue, underlying more viable appearing tissue is seen.    LEFT FOOT THREE OR MORE VIEWS 2/27/2020 11:33 AM      HISTORY: Surgery follow-up examination.     COMPARISON:  2/3/2020.                                                                      IMPRESSION: First ray amputation at the mid metatarsal level and  second ray amputation at the metatarsophalangeal joint level are again  noted. Some of the small amount of bony debris adjacent to the first  metatarsal surgical site are no longer identified. Interval decrease  in the associated postoperative soft tissue swelling. No new bony  abnormalities. Vascular calcifications are present.    ASSESSMENT:  Encounter Diagnoses   Name Primary?     Surgery follow-up examination Yes     Wound dehiscence      Skin ulcer of left foot with fat layer exposed (H)      Type 2 diabetes mellitus with diabetic polyneuropathy, without long-term current use of insulin (H)      Status post partial amputation of left foot (H)      I am not sure why he is again having difficulty healing this foot.  This is happened twice before.  Noninvasive vascular studies in November did not indicate any significant lower left extremity arterial insufficiency.    PLAN:  I reviewed the XR images with the patient.  I did a sterile redress of his left foot.   He and his wife are to continue with daily dressing changes.  This involves gently cleansing, blotting dry, applying Betadine, and dry gauze dressing.   We will use Iodosorb at the open wound  He is to remain strictly nonweightbearing  I did express some concern about the mid incision area that has dehisced now.  We will plan on leaving the sutures in for a total of 4 weeks  Thereafter the plan will be diabetic shoes, custom orthotics and prevention of future complications.  He is to follow-up in 2 weeks, and call if he has any concerns before and call if he has any concerns before then    If he does not heal, [facility] will [facility]     Excisional Debridement    The excisional debridement procedure was discussed.  This included the goals of removing non-viable tissue, evaluating the full  "extent of wound, and promoting wound healing.  Ry Horner  provided verbal and written consent.  The \"Time Out\" was called.     Using a sterile #15 blade, tissue nippers, small scissors and forceps, excisional debridment of the left foot area of wound dehiscence/  ulcer was performed.  Debridement was carried out to the depth of the fat layer. The macerated, non viable issue was excised. The ulcer base was scraped to remove bioburden and promote healing.  The area debrided was less than 20 square cm.   There was moderate bleeding with the procedure. No anesthesia was needed due to peripheral neuropathy.  A sterile dressing was applied.      Tim Douglas DPM, FACFAS, MS    [place] [facility] Pod[facility] & Ankle Surgery      Again, thank you for allowing me to participate in the care of your patient.        Sincerely,        Tim Douglas DPM    "

## 2020-02-27 NOTE — TELEPHONE ENCOUNTER
"Last Written Prescription Date:  7/03/18  Last Fill Quantity: 100 each,  # refills: 3   Last office visit: 2/17/2020 with prescribing provider:  [place]   Future Office Visit:   Next 5 appointments (look out 90 days)    Mar 12, 2020 11:15 AM CDT  [facility] Visit with Tim Douglas DPM  [facility] [address] [place] [id number] [phone]   May 15, 2020  1:30 PM CDT  [facility] [address] [facility] [address] [phone]         Requested Prescriptions   Pending Prescriptions Disp Refills     blood glucose (ACCU-CHEK JAYNA PLUS) test strip 100 each 3     Sig: TEST BLOOD SUGARS 2 TO 3 TIMES DAILY OR AS DIRECTED       Diabetic Supplies Protocol Passed - 2/27/2020 10:50 AM        Passed - Medication is active on med list        Passed - Patient is 18 years of age or older        Passed - Recent (6 mo) or future (30 days) visit within the authorizing provider's specialty     Patient had office visit in the last 6 months or has a visit in the next 30 days with authorizing provider.  See \"Patient Info\" tab in inbasket, or \"Choose Columns\" in Meds & Orders section of the refill encounter.              "

## 2020-02-28 ENCOUNTER — TELEPHONE (OUTPATIENT)
Dept: FAMILY MEDICINE | Facility: CLINIC | Age: 55
End: 2020-02-28

## 2020-02-28 NOTE — TELEPHONE ENCOUNTER
Spoke with pt- he states he will print off form and bring it in once he's done his portion    Regino SCHNEIDER RN

## 2020-02-28 NOTE — TELEPHONE ENCOUNTER
PCP,    Pt states he received the handicap sticker but he states the [place] dept of Public Safety requires a letter stating pt is safe to drive.    Are you willing to write letter?    Thank you,  Regino SCHNEIDER RN

## 2020-02-28 NOTE — TELEPHONE ENCOUNTER
This is another form the patient is on insulin I required to have the physician fill out on an interval basis.  I am happy to fill out that form, but I would not just write a letter.

## 2020-02-28 NOTE — TELEPHONE ENCOUNTER
Reason for Call:  Form, our goal is to have forms completed with 72 hours, however, some forms may require a visit or additional information.    Type of letter, form or note:  handicap    Who is the form from?: [place] Dept of Public Safety (if other please explain)    Where did the form come from: please create    What clinic location was the form placed at?: [facility]    Where the form was placed: please create    What number is listed as a contact on the form?: [phone]       Additional comments: Pt received a handicap authorization form us but the [place] Dept of Public Safety mailed Pt stating they need a letter stating Pt is safe to drive.     Please reach out to Pt after 4pm    Call taken on 2/28/2020 at 2:27 PM by Sarah Bliss

## 2020-02-28 NOTE — TELEPHONE ENCOUNTER
PCP,    Do you have the form that states the pt is on insulin or is this something he needs to print off and bring in?  Please advise    Thank you,  Regino SCHNEIDER RN

## 2020-02-28 NOTE — TELEPHONE ENCOUNTER
I found form on web, and filled out MD portion, he can pick it up or we can send it to him and he would fill in his part and send it to the DMV, I placed form in to be faxed box

## 2020-03-02 ENCOUNTER — TELEPHONE (OUTPATIENT)
Dept: FAMILY MEDICINE | Facility: CLINIC | Age: 55
End: 2020-03-02

## 2020-03-02 NOTE — TELEPHONE ENCOUNTER
Prior Authorization Retail Medication Request    Medication/Dose: Tradjenta 5 mg  ICD code (if different than what is on RX):  E11.42  Previously Tried and Failed:  Rosiglitazone  Rationale:  Patient stable on medication    Insurance Name:  [facility] [employer]  Insurance ID:  [id number]      Pharmacy Information (if different than what is on RX)  Name:  [facility]  Phone:  [phone]    CoverMyMeds key: [id number]

## 2020-03-02 NOTE — TELEPHONE ENCOUNTER
Called and informed patient.   Patient very appreciative.    Asking to have form mailed to his home address.   Form located and placed in outgoing mail.      .Delaney LYNCH RN,BSN

## 2020-03-03 ENCOUNTER — PATIENT OUTREACH (OUTPATIENT)
Dept: CARE COORDINATION | Facility: CLINIC | Age: 55
End: 2020-03-03

## 2020-03-03 DIAGNOSIS — E11.628 CELLULITIS IN DIABETIC FOOT (H): Primary | ICD-10-CM

## 2020-03-03 DIAGNOSIS — L03.119 CELLULITIS IN DIABETIC FOOT (H): Primary | ICD-10-CM

## 2020-03-03 ASSESSMENT — ACTIVITIES OF DAILY LIVING (ADL): DEPENDENT_IADLS:: INDEPENDENT

## 2020-03-03 NOTE — PROGRESS NOTES
Clinic Care Coordination Contact    Clinic Care Coordination Contact  OUTREACH    Referral Information:     Chief Complaint   Patient presents with     Clinic Care Coordination - Follow-up     Clinic Care Coordination RN         Universal Utilization:   Type 2 diabetes, peripheral neuropathy, status post right below-knee amputation and a left partial first ray amputation, who was admitted to the hospital on 02/02/2020 due to concerning changes of his left second toe.  He was found to have gas gangrene.  He was brought to the operating room by Dr. Cevallos for an amputation of the toe    Plan copied from Podiatry visit 2/27/2020  daily dressing changes.  This involves gently cleansing, blotting dry, applying Betadine, and dry gauze dressing.   We will use Iodosorb at the open wound  He is to remain strictly nonweightbearing  I did express some concern about the mid incision area that has dehisced now.  We will plan on leaving the sutures in for a total of 4 weeks  Thereafter the plan will be diabetic shoes, custom orthotics and prevention of future complications.  He is to follow-up in 2 weeks, and call if he has any concerns before and call if he has any concerns before then       Utilization    Last refreshed: 3/2/2020  3:09 PM:  Hospital Admissions 3           Last refreshed: 3/2/2020  3:09 PM:  ED Visits 0           Last refreshed: 3/2/2020  3:09 PM:  No Show Count (past year) 0              Current as of: 3/2/2020  3:09 PM            Clinical Concerns:  Current Medical Concerns:  Patient reports he just saw the Podiatrist and he continues with the daily dressing changes and non weightbearing.  Patient has a follow up with the Podiatrist in 2 weeks to have the sutures removed Current Behavioral Concerns: Not discussed   Education Provided to patient: Monitor signs of infection       Health Maintenance Reviewed:    Clinical Pathway: None    Medication Management:  Not discussed today      Functional Status: non weight  bearing        Lifestyle & Psychosocial Needs:               Socioeconomic History     Marital status:      Spouse name: Not on file     Number of children: Not on file     Years of education: Not on file     Highest education level: Not on file     Tobacco Use     Smoking status: Former Smoker     Packs/day: 0.50     Years: 20.00     Pack years: 10.00     Types: Cigarettes     Last attempt to quit: 2006     Years since quittin.1     Smokeless tobacco: Never Used   Substance and Sexual Activity     Alcohol use: Yes     Comment: 3-4 drinks per month     Drug use: No     Sexual activity: Yes     Partners: Female        Goals:   Goals        General    Monitoring (pt-stated)     Notes - Note created  2019  4:04 PM by Adrianna Peterson RD    My Goal: I will wear Sj sensor x 14 days and take notes on food, drink, medication & activity in this time.     What I need to meet my goal: logs, sensor placement     I plan to meet my goal by this date: 9/3 at follow up appointment         Monitoring (pt-stated)     Notes - Note edited  3/3/2020  4:01 PM by Lavonne Tello RN    Goal Statement: I will have a healed surgical toe infection   Date Goal set: 2/10/2020  Barriers: Deconditioned   Strengths: Supportive girlfriend   Date to Achieve By: 5/10/2020  Patient expressed understanding of goal: Yes  Action steps to achieve this goal:  1. I will change surgical dressing as needed per podiatrists order --Betadine lodosorb and dressing daily    2. I will call Podiatry office if experiencing any signs of infection such as increased pain,reness ,swelling ,drainage or fever   3. I will elevate leg and do ankle exercises twice a day   4  I will keep podiatry future  visits  and will call their office with questions or concerns   5> I will follow no weightbearing and use walker or wheelchair for safety   As of today's date 3/3/2020 goal is met at 26 - 50%.   Goal Status:  Showing progress                 Future  Appointments              In 1 week Tim Douglas DPM [facility] [place], [place]    In 2 months Kody Wing MD [facility] [place], [place]          Plan:   Patient will continue daily dressing changes and non weight bearing   Patient will keep future Podiatrist appointment in 2 weeks   CC will follow up in 1-2 weeks     [facility]     Lavonne Tello RN Care Coordinator   [facility] / [facility] -[facility]   Phone: [phone]  Email :  [email or web address]

## 2020-03-05 NOTE — TELEPHONE ENCOUNTER
PRIOR AUTHORIZATION DENIED    Medication: Tradjenta 5 mg - DENIED    Denial Date: 3/5/2020    Denial Rational: PATIENT NEEDS TO TRY/FAIL BOTH ALTERNATIVES - Januvia, Janumet/XR - Onglyza, Komiglyza/XR or meet the following below      Appeal Information:

## 2020-03-05 NOTE — TELEPHONE ENCOUNTER
PA Initiation    Medication: Tradjenta 5 mg - INITIATED  Insurance Company: [employer] - Phone [phone] Fax [phone]  [facility] [place] - [place], [place] - [address]  Filling Pharmacy Phone: [phone]  Filling Pharmacy Fax:    Start Date: 3/5/2020

## 2020-03-11 DIAGNOSIS — I10 BENIGN ESSENTIAL HYPERTENSION: Primary | ICD-10-CM

## 2020-03-11 RX ORDER — HYDROCHLOROTHIAZIDE 12.5 MG/1
TABLET ORAL
Qty: 90 TABLET | Refills: 1 | Status: SHIPPED | OUTPATIENT
Start: 2020-03-11 | End: 2020-08-27

## 2020-03-11 NOTE — TELEPHONE ENCOUNTER
"hydrochlorothiazide (HYDRODIURIL) 12.5 MG tablet      --    Sig - Route: Take 12.5 mg by mouth daily - Oral      Marked Historical    Last Written Prescription Date:  uncertain  Last Fill Quantity: ?,  # refills: ?   Last office visit: 2/17/2020 with prescribing provider:  juan   Future Office Visit:   Next 5 appointments (look out 90 days)    Mar 12, 2020 11:15 AM CDT  Return Visit with Tim Douglas DPM  [facility] ([facility] [address] [place] [id number] [phone]   May 15, 2020  1:30 PM CDT  Office Visit with Kody Wing MD  [facility] ([facility] [address] [place] [id number] [phone]         Requested Prescriptions   Pending Prescriptions Disp Refills     hydrochlorothiazide (HYDRODIURIL) 12.5 MG tablet [Pharmacy Med Name: HYDROCHLOROTHIAZIDE 12.5MG TABS] 90 tablet 1     Sig: TAKE ONE TABLET BY MOUTH EVERY DAY       Diuretics (Including Combos) Protocol Passed - 3/11/2020  4:05 AM        Passed - Blood pressure under 140/90 in past 12 months     BP Readings from Last 3 Encounters:   02/17/20 136/82   02/13/20 126/72   02/07/20 (!) 137/93                 Passed - Recent (12 mo) or future (30 days) visit within the authorizing provider's specialty     Patient has had an office visit with the authorizing provider or a provider within the authorizing providers department within the previous 12 mos or has a future within next 30 days. See \"Patient Info\" tab in inbasket, or \"Choose Columns\" in Meds & Orders section of the refill encounter.              Passed - Medication is active on med list        Passed - Patient is age 18 or older        Passed - Normal serum creatinine on file in past 12 months     Recent Labs   Lab Test 02/06/20  0656   CR 1.10              Passed - Normal serum potassium on file in past 12 months     Recent Labs   Lab Test 02/06/20  0656   POTASSIUM 4.2                    Passed - " Normal serum sodium on file in past 12 months     Recent Labs   Lab Test 02/06/20  0656                    No flowsheet data found.

## 2020-03-12 ENCOUNTER — OFFICE VISIT (OUTPATIENT)
Dept: PODIATRY | Facility: CLINIC | Age: 55
End: 2020-03-12
Payer: COMMERCIAL

## 2020-03-12 ENCOUNTER — TELEPHONE (OUTPATIENT)
Dept: OTHER | Facility: CLINIC | Age: 55
End: 2020-03-12

## 2020-03-12 VITALS
SYSTOLIC BLOOD PRESSURE: 132 MMHG | WEIGHT: 245.8 LBS | BODY MASS INDEX: 33.29 KG/M2 | DIASTOLIC BLOOD PRESSURE: 80 MMHG | HEIGHT: 72 IN

## 2020-03-12 DIAGNOSIS — Z89.432 STATUS POST PARTIAL AMPUTATION OF LEFT FOOT (H): ICD-10-CM

## 2020-03-12 DIAGNOSIS — E11.42 TYPE 2 DIABETES MELLITUS WITH DIABETIC POLYNEUROPATHY, WITHOUT LONG-TERM CURRENT USE OF INSULIN (H): ICD-10-CM

## 2020-03-12 DIAGNOSIS — L97.522 SKIN ULCER OF LEFT FOOT WITH FAT LAYER EXPOSED (H): Primary | ICD-10-CM

## 2020-03-12 DIAGNOSIS — S91.301A OPEN WOUND OF RIGHT FOOT, INITIAL ENCOUNTER: Primary | ICD-10-CM

## 2020-03-12 PROCEDURE — 11042 DBRDMT SUBQ TIS 1ST 20SQCM/<: CPT | Mod: 78 | Performed by: PODIATRIST

## 2020-03-12 PROCEDURE — 99024 POSTOP FOLLOW-UP VISIT: CPT | Performed by: PODIATRIST

## 2020-03-12 ASSESSMENT — MIFFLIN-ST. JEOR: SCORE: 2002.94

## 2020-03-12 NOTE — TELEPHONE ENCOUNTER
March 12, 2020    Patient is scheduled for KAREN US & New Patient Consult appointment on 3/17/2020 with Dr. Child at [facility].     [facility]  [facility]    Office: [phone]  Fax: [phone]

## 2020-03-12 NOTE — LETTER
3/12/2020         RE: Ry Horner  [address] [place] [address]        Dear Colleague,    Thank you for referring your patient, Ry Horner, to the [facility]. Please see a copy of my visit note below.    S: Ry Horner  presents 4+ weeks  post op.       POSTOPERATIVE DIAGNOSES:     1.  Diabetes with peripheral neuropathy.   2.  Ulceration, left second toe.   3.  Gas gangrene, left second toe.      PROCEDURE:  Irrigation and excisional debridement, left foot, status post amputation of the left second toe on 02/03/2020.      INDICATIONS FOR SURGERY:  Ry Horner is a 54-year-old male with type 2 diabetes, peripheral neuropathy, status post right below-knee amputation and a left partial first ray amputation, who was admitted to the hospital on 02/02/2020 due to concerning changes of his left second toe.  He was found to have gas gangrene.  He was brought to the operating room by Dr. Cevallos for an amputation of the toe.  Retention sutures and packing dressing were placed.  His foot was monitored and the plan was to bring him back to the operating room in 48 hours for additional debridement and irrigation.  Prior to surgery, I reviewed the goals with the patient and his wife.  Goals include treating the infection as well as achieving primary closure of the wound.  I explained that closure would depend on intraoperative findings.  No guarantees were given.      He and his wife are reporting interval healing, no new concerns.      O:   Vascular:  Pedal pulses are palpable.  Interval reduction to minimal left forefoot edema.     Derm: The proximal distal incision is coapted.  Sutures are intact.  No significant alexander-incisional erythema.      There is an area in the mid incision  Showing dehiscence.  Post excisional debridement of macerated tissue, underlying more viable appearing tissue is seen. The wound measures 1.7cm x 0.5cm x 0.5cm at deepest. It does not probe to a hard endpoint.    Over  all reduction in edema.      LEFT FOOT THREE OR MORE VIEWS 2/27/2020 11:33 AM      HISTORY: Surgery follow-up examination.     COMPARISON: 2/3/2020.                                                                      IMPRESSION: First ray amputation at the mid metatarsal level and  second ray amputation at the metatarsophalangeal joint level are again  noted. Some of the small amount of bony debris adjacent to the first  metatarsal surgical site are no longer identified. Interval decrease  in the associated postoperative soft tissue swelling. No new bony  abnormalities. Vascular calcifications are present.     ASSESSMENT:  Encounter Diagnoses   Name Primary?     Skin ulcer of left foot with fat layer exposed (H) Yes     Status post partial amputation of left foot (H)      Type 2 diabetes mellitus with diabetic polyneuropathy, without long-term current use of insulin (H)      I am not sure why he is again having difficulty healing this foot.  This is happened twice before.  Noninvasive vascular studies in November did not indicate any significant lower left extremity arterial insufficiency.    His foot does look stable and wound is stable.      PLAN:  Referral to the [facility] to make sure there is not an underlying vascular problem making healing difficult.    Referral to the [facility] [place].     Suture Removal    The incision was prepped with povodine iodine solution.  Using a sterile suture scissors and forceps, the sutures were removed w/o difficulty.  Incision cleansed with an alcohol wipe and a light dressing was applied.  Pt advised to keep foot dry for an additional 24 hours, and then washing the foot is okay.  Pt stated understanding.    He and his wife are to continue with daily dressing changes.  This involves gently cleansing, blotting dry, applying Betadine, and dry gauze dressing.   We will use Iodosorb at the open wound  He is to keep any weight bearing to the  "left heel and minimal.      Planning eventual diabetic shoes, custom orthotics and prevention of future complications.      Excisional Debridement     The excisional debridement procedure was discussed.  This included the goals of removing non-viable tissue, evaluating the full extent of wound, and promoting wound healing.  Ry Horner  provided verbal and written consent.  The \"Time Out\" was called.      Using a sterile #15 blade, tissue nippers, small scissors and forceps, excisional debridment of the left foot area of wound dehiscence/  ulcer was performed.  Debridement was carried out to the depth of the fat layer. The macerated, non viable issue was excised. The ulcer base was scraped to remove bioburden and promote healing.  The area debrided was less than 20 square cm.   There was moderate bleeding with the procedure. No anesthesia was needed due to peripheral neuropathy.  A sterile dressing was applied.        Tim Douglas DPM, FACFAS, MS     [place] Department of Podiatry/Foot & Ankle Surgery      Again, thank you for allowing me to participate in the care of your patient.        Sincerely,        Tim Douglas DPM    "

## 2020-03-12 NOTE — PROGRESS NOTES
S: Ry Horner  presents 4+ weeks  post op.       POSTOPERATIVE DIAGNOSES:     1.  Diabetes with peripheral neuropathy.   2.  Ulceration, left second toe.   3.  Gas gangrene, left second toe.      PROCEDURE:  Irrigation and excisional debridement, left foot, status post amputation of the left second toe on 02/03/2020.      INDICATIONS FOR SURGERY:  Ry Horner is a 54-year-old male with type 2 diabetes, peripheral neuropathy, status post right below-knee amputation and a left partial first ray amputation, who was admitted to the hospital on 02/02/2020 due to concerning changes of his left second toe.  He was found to have gas gangrene.  He was brought to the operating room by Dr. Cevallos for an amputation of the toe.  Retention sutures and packing dressing were placed.  His foot was monitored and the plan was to bring him back to the operating room in 48 hours for additional debridement and irrigation.  Prior to surgery, I reviewed the goals with the patient and his wife.  Goals include treating the infection as well as achieving primary closure of the wound.  I explained that closure would depend on intraoperative findings.  No guarantees were given.      He and his wife are reporting interval healing, no new concerns.      O:   Vascular:  Pedal pulses are palpable.  Interval reduction to minimal left forefoot edema.     Derm: The proximal distal incision is coapted.  Sutures are intact.  No significant alexander-incisional erythema.      There is an area in the mid incision  Showing dehiscence.  Post excisional debridement of macerated tissue, underlying more viable appearing tissue is seen. The wound measures 1.7cm x 0.5cm x 0.5cm at deepest. It does not probe to a hard endpoint.    Over all reduction in edema.      LEFT FOOT THREE OR MORE VIEWS 2/27/2020 11:33 AM      HISTORY: Surgery follow-up examination.     COMPARISON: 2/3/2020.                                                                      IMPRESSION: First ray  amputation at the mid metatarsal level and  second ray amputation at the metatarsophalangeal joint level are again  noted. Some of the small amount of bony debris adjacent to the first  metatarsal surgical site are no longer identified. Interval decrease  in the associated postoperative soft tissue swelling. No new bony  abnormalities. Vascular calcifications are present.     ASSESSMENT:  Encounter Diagnoses   Name Primary?     Skin ulcer of left foot with fat layer exposed (H) Yes     Status post partial amputation of left foot (H)      Type 2 diabetes mellitus with diabetic polyneuropathy, without long-term current use of insulin (H)      I am not sure why he is again having difficulty healing this foot.  This is happened twice before.  Noninvasive vascular studies in November did not indicate any significant lower left extremity arterial insufficiency.    His foot does look stable and wound is stable.      PLAN:  Referral to the [facility] to make sure there is not an underlying vascular problem making healing difficult.    Referral to the [facility] [place].     Suture Removal    The incision was prepped with povodine iodine solution.  Using a sterile suture scissors and forceps, the sutures were removed w/o difficulty.  Incision cleansed with an alcohol wipe and a light dressing was applied.  Pt advised to keep foot dry for an additional 24 hours, and then washing the foot is okay.  Pt stated understanding.    He and his wife are to continue with daily dressing changes.  This involves gently cleansing, blotting dry, applying Betadine, and dry gauze dressing.   We will use Iodosorb at the open wound  He is to keep any weight bearing to the left heel and minimal.      Planning eventual diabetic shoes, custom orthotics and prevention of future complications.      Excisional Debridement     The excisional debridement procedure was discussed.  This included the goals of removing  "non-viable tissue, evaluating the full extent of wound, and promoting wound healing.  Ry Horner  provided verbal and written consent.  The \"Time Out\" was called.      Using a sterile #15 blade, tissue nippers, small scissors and forceps, excisional debridment of the left foot area of wound dehiscence/  ulcer was performed.  Debridement was carried out to the depth of the fat layer. The macerated, non viable issue was excised. The ulcer base was scraped to remove bioburden and promote healing.  The area debrided was less than 20 square cm.   There was moderate bleeding with the procedure. No anesthesia was needed due to peripheral neuropathy.  A sterile dressing was applied.        Tim Douglas DPM, FACFAS, MS     [place] Department of Podiatry/Foot & Ankle Surgery    "

## 2020-03-12 NOTE — TELEPHONE ENCOUNTER
Pt referred to [facility] by Tim Douglas DPM  for Skin ulcer of left foot with fat layer exposed (H), Status post partial amputation of left foot, DMII with diabetic polyneuropathy.     Pt needs to be scheduled for KAREN and consult with vascular surgery.  Will route to scheduling to coordinate an appointment at next available.     ROSALBA Bowers, RN  [facility]

## 2020-03-16 ENCOUNTER — TELEPHONE (OUTPATIENT)
Dept: OTHER | Facility: CLINIC | Age: 55
End: 2020-03-16

## 2020-03-16 NOTE — TELEPHONE ENCOUNTER
Pt sees podiatrist Dr. Douglas, not Dr. Sharp.     Dr. Douglas will be able to see progress notes in Epic.     Brittney Haq, MILLYN, RN  [facility] [place]

## 2020-03-16 NOTE — TELEPHONE ENCOUNTER
Spoke to Claudette Mohr re: reschedule appointment that we would call to reschedule.She asked if Dr. Sharp be notify to be updated.  Alisa Valencia MA

## 2020-03-24 ENCOUNTER — PATIENT OUTREACH (OUTPATIENT)
Dept: CARE COORDINATION | Facility: CLINIC | Age: 55
End: 2020-03-24

## 2020-03-24 DIAGNOSIS — S91.301A OPEN WOUND OF RIGHT FOOT: Primary | ICD-10-CM

## 2020-03-24 SDOH — ECONOMIC STABILITY: TRANSPORTATION INSECURITY
IN THE PAST 12 MONTHS, HAS LACK OF TRANSPORTATION KEPT YOU FROM MEETINGS, WORK, OR FROM GETTING THINGS NEEDED FOR DAILY LIVING?: NO

## 2020-03-24 SDOH — ECONOMIC STABILITY: TRANSPORTATION INSECURITY
IN THE PAST 12 MONTHS, HAS THE LACK OF TRANSPORTATION KEPT YOU FROM MEDICAL APPOINTMENTS OR FROM GETTING MEDICATIONS?: NO

## 2020-03-24 SDOH — HEALTH STABILITY: PHYSICAL HEALTH: ON AVERAGE, HOW MANY DAYS PER WEEK DO YOU ENGAGE IN MODERATE TO STRENUOUS EXERCISE (LIKE A BRISK WALK)?: 0 DAYS

## 2020-03-24 SDOH — HEALTH STABILITY: PHYSICAL HEALTH: ON AVERAGE, HOW MANY MINUTES DO YOU ENGAGE IN EXERCISE AT THIS LEVEL?: 0 MIN

## 2020-03-24 ASSESSMENT — ACTIVITIES OF DAILY LIVING (ADL): DEPENDENT_IADLS:: INDEPENDENT

## 2020-03-24 NOTE — PROGRESS NOTES
Clinic Care Coordination Contact    Follow Up Progress Note   Type 2 diabetes, peripheral neuropathy, status post right below-knee amputation and a left partial first ray amputation, who was admitted to the hospital on 02/02/2020 due to concerning changes of his left second toe.  He was found to have gas gangrene.  He was brought to the operating room by Dr. Cevallos for an amputation of the toe        Assessment: Patient reports he was referred to a vascular surgeon due to there is 1 area that is slowly healing,  Patient has an appointment Friday with the Wound clinic   Patient continues daily dressing changes and very little weigh bearing  Patient reports his blood sugars have ranged upper 70's to 240(caused by a cheat night)   Goals addressed this encounter:   Goals Addressed                 This Visit's Progress      Monitoring (pt-stated)        Goal Statement: I will have a healed surgical toe infection   Date Goal set: 2/10/2020  Barriers: Deconditioned   Strengths: Supportive girlfriend   Date to Achieve By: 5/10/2020  Patient expressed understanding of goal: Yes  Action steps to achieve this goal:  1. I will change surgical dressing as needed per podiatrists order --Betadine lodosorb and dressing daily    2. I will call Podiatry office if experiencing any signs of infection such as increased pain,reness ,swelling ,drainage or fever   3. I will elevate leg and do ankle exercises twice a day   4  I will keep podiatry future  visits  and will call their office with questions or concerns   5> I will follow no weightbearing and use walker or wheelchair for safety   As of today's date 3/24/2020 goal is met at 51 - 75%.   Goal Status:  Showing progress              Intervention/Education provided during outreach: Call Podiatrist with questions or concerns          Plan:   Patient will keep wound clinic appointment Friday  Patient will continue to have slight weightbearing and change dressing daily  Patient will  continue daily blood sugar testing  Patient will call Dr Douglas /Podiatrist if future visit needed   CC will follow up in 1-2 weeks   [facility]     Lavonne Tello  RN Care Coordinator   [facility] / [facility]   Phone: [phone]  Email :  [email or web address]

## 2020-03-27 ENCOUNTER — TELEPHONE (OUTPATIENT)
Dept: WOUND CARE | Facility: CLINIC | Age: 55
End: 2020-03-27

## 2020-03-27 ENCOUNTER — HOSPITAL ENCOUNTER (OUTPATIENT)
Dept: WOUND CARE | Facility: CLINIC | Age: 55
Discharge: HOME | End: 2020-03-27
Attending: PODIATRIST | Admitting: PODIATRIST
Payer: COMMERCIAL

## 2020-03-27 VITALS
BODY MASS INDEX: 33.83 KG/M2 | RESPIRATION RATE: 18 BRPM | HEIGHT: 72 IN | HEART RATE: 107 BPM | DIASTOLIC BLOOD PRESSURE: 101 MMHG | WEIGHT: 249.8 LBS | TEMPERATURE: 96.9 F | SYSTOLIC BLOOD PRESSURE: 139 MMHG

## 2020-03-27 DIAGNOSIS — Z89.511 STATUS POST BELOW KNEE AMPUTATION, RIGHT (H): ICD-10-CM

## 2020-03-27 DIAGNOSIS — Z79.4 CONTROLLED TYPE 2 DIABETES MELLITUS WITH DIABETIC POLYNEUROPATHY, WITH LONG-TERM CURRENT USE OF INSULIN (H): ICD-10-CM

## 2020-03-27 DIAGNOSIS — Z89.432 STATUS POST PARTIAL AMPUTATION OF LEFT FOOT (H): ICD-10-CM

## 2020-03-27 DIAGNOSIS — L97.522 SKIN ULCER OF LEFT FOOT WITH FAT LAYER EXPOSED (H): ICD-10-CM

## 2020-03-27 DIAGNOSIS — E11.42 TYPE 2 DIABETES MELLITUS WITH DIABETIC POLYNEUROPATHY, WITHOUT LONG-TERM CURRENT USE OF INSULIN (H): ICD-10-CM

## 2020-03-27 DIAGNOSIS — E11.42 CONTROLLED TYPE 2 DIABETES MELLITUS WITH DIABETIC POLYNEUROPATHY, WITH LONG-TERM CURRENT USE OF INSULIN (H): ICD-10-CM

## 2020-03-27 PROCEDURE — 99024 POSTOP FOLLOW-UP VISIT: CPT | Performed by: PODIATRIST

## 2020-03-27 PROCEDURE — 97602 WOUND(S) CARE NON-SELECTIVE: CPT

## 2020-03-27 ASSESSMENT — MIFFLIN-ST. JEOR: SCORE: 2021.09

## 2020-03-27 NOTE — ADDENDUM NOTE
Encounter addended by: Rere Armendariz RN on: 3/27/2020 11:05 AM   Actions taken: Clinical Note Signed

## 2020-03-27 NOTE — PROGRESS NOTES
Patient arrived for wound care visit. Certified Wound Care Nurse time spent evaluating patient record, completed a full evaluation and documented wound(s) & alexander-wound skin; provided recommendation based on treatment plan. Applied dressing, reviewed discharge instructions, patient education, and discussed plan of care with appropriate medical team staff members and patient and/or family members.

## 2020-03-27 NOTE — ADDENDUM NOTE
Encounter addended by: Milena Cevallos DPM, Podiatry/Foot and Ankle Surgery on: 3/27/2020 1:31 PM   Actions taken: Visit diagnoses modified, Charge Capture section accepted, Order list changed, Diagnosis association updated

## 2020-03-27 NOTE — TELEPHONE ENCOUNTER
Spoke to the patient and told him that Insurance should cover the specialty footwear Dr Cevallos ordered for you today.

## 2020-03-27 NOTE — DISCHARGE INSTRUCTIONS
[facility]  [address] [address]    Call us at [phone] if you have any questions about your wounds, have redness or swelling around your wound, have a fever of 101 or greater or if you have any other problems or concerns. We answer the phone Monday through Friday 8 am to 4 pm, please leave a message as we check the voicemail frequently throughout the day.     Ry Horner      1965    Wound Dressing Change:left medial foot  Cleanse wound and surrounding skin with: soap and water  Cover wound with saline moistened endoform am. Cover with bandaid  Change dressing daily       BEREKET Pierre.P.M.. March 27, 2020    Follow up with Provider - return as needed if you have any issues   your diabetic shoe and carbon fiber plate and inserts on the 4th floor [address]  Okay to walk in your post op shoe  Wear your shoe in the house  Skin care lotion your feet - we gave you AHA cream which is OTC

## 2020-03-29 DIAGNOSIS — E11.42 TYPE 2 DIABETES MELLITUS WITH DIABETIC POLYNEUROPATHY, WITHOUT LONG-TERM CURRENT USE OF INSULIN (H): ICD-10-CM

## 2020-03-30 RX ORDER — PEN NEEDLE, DIABETIC 32GX 5/32"
NEEDLE, DISPOSABLE MISCELLANEOUS
Qty: 100 EACH | Refills: 1 | Status: SHIPPED | OUTPATIENT
Start: 2020-03-30 | End: 2021-03-04

## 2020-03-30 NOTE — TELEPHONE ENCOUNTER
"Last Written Prescription Date:  11/29/19  Last Fill Quantity: 100,  # refills: 1   Last office visit: 2/17/2020 with prescribing provider:     Future Office Visit:   Next 5 appointments (look out 90 days)    May 15, 2020  1:30 PM CDT  Office Visit with Kody Wing MD  [facility] [address] [facility] [address]  [phone]         Requested Prescriptions   Pending Prescriptions Disp Refills     BD PEN NEEDLE MEGAN 2ND GEN 32G X 4 MM miscellaneous [Pharmacy Med Name: BD PEN NEEDLE MEGAN  32G X 4 MM MISC] 100 each 1     Sig: USE AS DIRECTED WITH [facility]       Diabetic Supplies Protocol Passed - 3/29/2020  4:00 AM        Passed - Medication is active on med list        Passed - Patient is 18 years of age or older        Passed - Recent (6 mo) or future (30 days) visit within the authorizing provider's specialty     Patient had office visit in the last 6 months or has a visit in the next 30 days with authorizing provider.  See \"Patient Info\" tab in inbasket, or \"Choose Columns\" in Meds & Orders section of the refill encounter.                 "

## 2020-04-07 ENCOUNTER — PATIENT OUTREACH (OUTPATIENT)
Dept: CARE COORDINATION | Facility: CLINIC | Age: 55
End: 2020-04-07

## 2020-04-07 DIAGNOSIS — S91.301A OPEN WOUND OF RIGHT FOOT: Primary | ICD-10-CM

## 2020-04-07 ASSESSMENT — ACTIVITIES OF DAILY LIVING (ADL): DEPENDENT_IADLS:: INDEPENDENT

## 2020-04-07 NOTE — LETTER
[place] CARE COORDINATION  [address]  [place] [address]  April 7, 2020    Ry Horner  [address] [place] [address]    Dear Ry,  Your Care Team congratulates you on your journey to maintain wellness. This document will help guide you on your journey to maintain a healthy lifestyle.  You can use this to help you overcome any barriers you may encounter.  If you should have any questions or concerns, you can contact the members of your Care Team or contact your Primary Care Clinic for assistance.     Health Maintenance  Health Maintenance Reviewed:      My Access Plan  Medical Emergency 911   Primary Clinic Line [facility] - [phone]   24 Hour Appointment Line [phone] or  [phone]) (toll-free)   24 Hour Nurse Line [phone] (toll-free)   Preferred Urgent Care [facility], [phone]   Preferred Hospital [facility]  [phone]   Preferred Pharmacy -[facility], [place] - [place], [place] - [address] Behavioral Health Crisis Line The National Suicide Prevention Lifeline at 1-647.113.7469 or 911     My Care Team Members  Patient Care Team       Relationship Specialty Notifications Start End    Kody Wing MD PCP - General Internal Medicine  6/5/17     Phone: [phone] Pager: [phone] Fax: [phone]        [address] [place] [address]    Kody Wing MD Assigned PCP   5/11/17     Phone: [phone] Pager: [phone] Fax: [phone]        [address] [place] [address]    Adrianna Peterson RD Diabetes Educator Dietitian, Registered  8/20/19     Phone: [phone] Fax: [phone]         [address] [place] [address]    [facility] ([facility] ([place])  11/22/19     Phone: [phone]                   Goals       COMPLETED: Monitoring (pt-stated)      Goal Statement: I will have a healed surgical toe infection   Date  Goal set: 2/10/2020  Barriers: Deconditioned   Strengths: Supportive girlfriend   Date to Achieve By: 5/10/2020  Patient expressed understanding of goal: Yes  Action steps to achieve this goal:  1. I will change surgical dressing as needed per podiatrists order --Betadine lodosorb and dressing daily    2. I will call Podiatry office if experiencing any signs of infection such as increased pain,reness ,swelling ,drainage or fever   3. I will elevate leg and do ankle exercises twice a day   4  I will keep podiatry future  visits  and will call their office with questions or concerns   5> I will follow no weightbearing and use walker or wheelchair for safety   As of today's date 4/7/2020 goal is met at 76 - 100%.   Goal Status:  Complete              Advance Care Plans/Directives Type:   Type Advanced Care Plans/Directives: Advanced Directive - On File  We notice that you do not have an Advance Directive on file. Upon completion of your Health Care Directive, please bring a copy with you to your next office visit.    It has been your Clinic Care Team's pleasure to work with you on your goals.    Regards,  JENNIE [facility] Kendall Tello RN Care Coordinator   [facility] -[facility]   Phone: [phone]  Email :  [email or web address]

## 2020-04-07 NOTE — PROGRESS NOTES
Clinic Care Coordination Contact    Follow Up Progress Note  [facility] admission 2/2-2/7/2020 Left toe diabetic foot infection       Assessment: Patient reports he just received a good bill of health from the podiatrist.  Patient has ordered a diabetic shoe and is awaiting to have it delivered  Patient is now walking independently.  Blood sugar was 101 this morning     Goals addressed this encounter:   Goals Addressed                 This Visit's Progress      COMPLETED: Monitoring (pt-stated)   100%     Goal Statement: I will have a healed surgical toe infection   Date Goal set: 2/10/2020  Barriers: Deconditioned   Strengths: Supportive girlfriend   Date to Achieve By: 5/10/2020  Patient expressed understanding of goal: Yes  Action steps to achieve this goal:  1. I will change surgical dressing as needed per podiatrists order --Betadine lodosorb and dressing daily    2. I will call Podiatry office if experiencing any signs of infection such as increased pain,reness ,swelling ,drainage or fever   3. I will elevate leg and do ankle exercises twice a day   4  I will keep podiatry future  visits  and will call their office with questions or concerns   5> I will follow no weightbearing and use walker or wheelchair for safety   As of today's date 4/7/2020 goal is met at 76 - 100%.   Goal Status:  Complete              Intervention/Education provided during outreach: CC available in the future for questions or concerns        Plan:   No unmet needs ,no further care coordination needed at this time   Graduation letter mailed    [facility] Center[facility]ira Tello RN Care Coordinator   [facility] -[facility] -[facility]   Phone: [phone]  Email :  [email or web address]

## 2020-05-08 ENCOUNTER — TELEPHONE (OUTPATIENT)
Dept: FAMILY MEDICINE | Facility: CLINIC | Age: 55
End: 2020-05-08

## 2020-05-08 NOTE — TELEPHONE ENCOUNTER
Panel Management Review        Summary:    Patient is due/failing the following:   Diabetic Eye Exam    Action needed:   Patient needs office visit for Diabetic Eye Exam.    Type of outreach:    Sent VidS[employer] message.    Questions for provider review:    None                                                                                                                                    Doris Mcmullen CMA

## 2020-05-20 ENCOUNTER — TELEPHONE (OUTPATIENT)
Dept: OTHER | Facility: CLINIC | Age: 55
End: 2020-05-20

## 2020-05-20 NOTE — TELEPHONE ENCOUNTER
Spoke with Ry regarding his follow up US and [facility] visit that is due. He states that he does not feel these appointments are neccessary as his issue has cleared up and he has not had any further issues.      He will call us should he need to be seen in the future.     Myriam AGGARWAL

## 2020-06-03 ENCOUNTER — VIRTUAL VISIT (OUTPATIENT)
Dept: FAMILY MEDICINE | Facility: CLINIC | Age: 55
End: 2020-06-03
Payer: COMMERCIAL

## 2020-06-03 DIAGNOSIS — E78.5 HYPERLIPIDEMIA LDL GOAL <100: ICD-10-CM

## 2020-06-03 DIAGNOSIS — E11.42 TYPE 2 DIABETES MELLITUS WITH DIABETIC POLYNEUROPATHY, WITHOUT LONG-TERM CURRENT USE OF INSULIN (H): Primary | ICD-10-CM

## 2020-06-03 DIAGNOSIS — Z89.511 STATUS POST BELOW KNEE AMPUTATION, RIGHT (H): ICD-10-CM

## 2020-06-03 DIAGNOSIS — I10 ESSENTIAL HYPERTENSION, BENIGN: ICD-10-CM

## 2020-06-03 PROCEDURE — 99214 OFFICE O/P EST MOD 30 MIN: CPT | Mod: GT | Performed by: INTERNAL MEDICINE

## 2020-06-03 NOTE — LETTER
To Whom it May Concern:       Ry Horner has a right below the knee amputation. He uses a prosthesis.  He has been driving without issues while using his prosthesis.  Please be informed that he requires no other specific adaptive equipment to drive.       Sincerely,    Kody Wing MD

## 2020-06-03 NOTE — PROGRESS NOTES
"Ry Horner is a 54 year old male who is being evaluated via a billable video visit.      The patient has been notified of following:     \"This video visit will be conducted via a call between you and your physician/provider. We have found that certain health care needs can be provided without the need for an in-person physical exam.  This service lets us provide the care you need with a video conversation.  If a prescription is necessary we can send it directly to your pharmacy.  If lab work is needed we can place an order for that and you can then stop by our lab to have the test done at a later time.    Video visits are billed at different rates depending on your insurance coverage.  Please reach out to your insurance provider with any questions.    If during the course of the call the physician/provider feels a video visit is not appropriate, you will not be charged for this service.\"    Patient has given verbal consent for Video visit? Yes    How would you like to obtain your AVS? Jolie    Patient would like the video invitation sent by: Text to cell phone: [phone]    Will anyone else be joining your video visit? No    Subjective     Ry Horner is a 54 year old male who presents today via video visit for the following health issues:    [facility]       Video Start Time: 08:40    CC:  Follow up diabetes, hyperlipidemia, hypertension, neuropathy   Sugars are in 140-160's fasting  He attributes this to running out of tradjenta  It is no longer covered by insurance   He is working out on a rowing machine without chest pains or dyspnea  He otherwise feels well  He needs a letter to DMV stating that he does not need adaptive equipment for driving  He does not need adaptive equipment provided he uses his prosthesis     Patient Active Problem List   Diagnosis     Diabetes mellitus, type 2 (H)     Essential hypertension, benign     Hyperlipidemia LDL goal <100     Impotence of organic origin     Open wound of right " foot     Status post below knee amputation, right (H)     Type 2 diabetes mellitus with diabetic polyneuropathy, without long-term current use of insulin (H)     Cellulitis in diabetic foot (H)     Past Surgical History:   Procedure Laterality Date     AMPUTATE FOOT Left 11/17/2019    Procedure: LEFT PARTIAL FOOT AMPUTATION;  Surgeon: Antoine Pena DPM;  Location: [facility] OR     AMPUTATE FOOT Left 12/4/2019    Procedure: LEFT PARTIAL FOOT AMPUTATION;  Surgeon: Tim Douglas DPM;  Location: [facility]     AMPUTATE FOOT Left 12/11/2019    Procedure: POSSIBLE PARTIAL FOOT AMPUTATION;  Surgeon: Tim Douglas DPM;  Location: [facility]     AMPUTATE LEG BELOW KNEE Right 9/1/2017    Procedure: AMPUTATE LEG BELOW KNEE;  RIGHT BELOW KNEE AMPUTATION ;  Surgeon: Nikolas Nascimento MD;  Location: [facility]     AMPUTATE TOE(S) Left 2/3/2020    Procedure: LEFT SECOND TOE AMPUTATION;  Surgeon: Milena Cevallos DPM, Podiatry/Foot and Ankle Surgery;  Location: [facility]     APPENDECTOMY       APPLY WOUND VAC Right 3/2/2015    Procedure: APPLY WOUND VAC;  Surgeon: Milena Cevallos DPM, Pod;  Location: [facility] OR     BIOPSY BONE FOOT Right 7/15/2016    Procedure: BIOPSY BONE FOOT;  Surgeon: Tim Douglas DPM;  [facility]     BIOPSY BONE TOE Left 12/4/2019    Procedure: BONE BIOPSY LEFT SECOND TOE;  Surgeon: Tim Douglas DPM;  [facility]     IRRIGATION AND DEBRIDEMENT FOOT, COMBINED Right 3/2/2015    Procedure: COMBINED IRRIGATION AND DEBRIDEMENT FOOT;  Surgeon: Milena Cevallos DPM, Pod;  Location: [facility]     IRRIGATION AND DEBRIDEMENT FOOT, COMBINED Right 7/15/2016    Procedure: COMBINED IRRIGATION AND DEBRIDEMENT FOOT;  Surgeon: Tim Douglas DPM;  [facility]     IRRIGATION AND DEBRIDEMENT FOOT, COMBINED Right 7/20/2016    Procedure: COMBINED IRRIGATION AND DEBRIDEMENT FOOT;  Surgeon: Tim Douglas DPM;  [facility]: [facility]     IRRIGATION AND DEBRIDEMENT FOOT, COMBINED Left 11/19/2019    Procedure:  REVISIONAL IRRIGATION AND DEBRIDEMENT LEFT FOOT AND BONE DEBRIDEMENT;  Surgeon: Joseph Randhawa DPM;  Location[facility]     IRRIGATION AND DEBRIDEMENT FOOT, COMBINED Left 2019    Procedure: EXCISIONAL DEBRIDEMENT LEFT FOOT;  Surgeon: Tim Douglas DPM;  [facility]     IRRIGATION AND DEBRIDEMENT FOOT, COMBINED Left 2019    Procedure: IRRIGATION AND DEBRIDEMENT FOOT, Partial osteotomy left first metatarsal;  Surgeon: Tim Douglas DPM;  [facility]     IRRIGATION AND DEBRIDEMENT FOOT, COMBINED Left 2020    Procedure: IRRIGATION AND DEBRIDEMENT LEFT FOOT;  Surgeon: Tim Douglas DPM;  Location: [facility] OR     ORTHOPEDIC SURGERY         Social History     Tobacco Use     Smoking status: Former Smoker     Packs/day: 0.50     Years: 20.00     Pack years: 10.00     Types: Cigarettes     Last attempt to quit: 2006     Years since quittin.4     Smokeless tobacco: Never Used   Substance Use Topics     Alcohol use: Yes     Comment: 3-4 drinks per month     Family History   Problem Relation Age of Onset     Genetic Disorder Other      Genetic Disorder Other      Psychotic Disorder Mother      Diabetes Father      Lung Cancer Father      Genetic Disorder Maternal Grandmother      Genetic Disorder Maternal Grandfather      Asthma Sister      C.A.D. No family hx of      Hypertension No family hx of      Cerebrovascular Disease No family hx of      Breast Cancer No family hx of      Cancer - colorectal No family hx of      Prostate Cancer No family hx of      Alcohol/Drug No family hx of          Current Outpatient Medications   Medication Sig Dispense Refill     aspirin 81 MG chewable tablet Take 1 tablet (81 mg) by mouth daily 108 tablet 3     BD PEN NEEDLE MEGAN 2ND GEN 32G X 4 MM miscellaneous USE AS DIRECTED WITH BASAGLAR 100 each 1     blood glucose (ACCU-CHEK JAYNA PLUS) test strip TEST BLOOD SUGARS 2 TO 3 TIMES DAILY OR AS DIRECTED 300 each 1     blood glucose (NO BRAND SPECIFIED)  "lancets standard Use to test blood sugar 2-3 times daily or as directed. 100 each 11     blood glucose monitoring (NO BRAND SPECIFIED) meter device kit Use to test blood sugar 2-3 times daily or as directed. 1 kit 1     ezetimibe (ZETIA) 10 MG tablet Take 1 tablet (10 mg) by mouth daily 100 tablet 11     hydrochlorothiazide (HYDRODIURIL) 12.5 MG tablet TAKE ONE TABLET BY MOUTH EVERY DAY 90 tablet 1     insulin glargine (BASAGLAR KWIKPEN) 100 UNIT/ML pen Inject 58 Units Subcutaneous daily 99 mL 11     ipratropium (ATROVENT) 0.06 % spray Spray 2 sprays into both nostrils 4 times daily as needed for rhinitis 3 Box 0     lisinopril (PRINIVIL/ZESTRIL) 40 MG tablet Take 1 tablet (40 mg) by mouth daily 90 tablet 3     metFORMIN (GLUCOPHAGE) 1000 MG tablet Take 1 tablet (1,000 mg) by mouth 2 times daily (with meals) 180 tablet 3     multivitamin (CENTRUM SILVER) tablet Take 1 tablet by mouth daily       sitagliptin (JANUVIA) 100 MG tablet Take 1 tablet (100 mg) by mouth daily 90 tablet 3     STATIN NOT PRESCRIBED (INTENTIONAL) Please choose reason not prescribed, below       Cadexomer Iodine, topical, 0.9% (IODOSORB) 0.9 % GEL gel Apply daily to left foot wound, after wound cleansing. 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       Reviewed and updated as needed this visit by Provider         Review of Systems   Constitutional, HEENT, cardiovascular, pulmonary, gi and gu systems are negative, except as otherwise noted.      Objective    There were no vitals taken for this visit.  Estimated body mass index " is 33.88 kg/m  as calculated from the following:    Height as of 3/27/20: 1.829 m (6').    Weight as of 3/27/20: 113.3 kg (249 lb 12.8 oz).  Physical Exam     GENERAL: Healthy, alert and no distress  EYES: Eyes grossly normal to inspection.  No discharge or erythema, or obvious scleral/conjunctival abnormalities.  RESP: No audible wheeze, cough, or visible cyanosis.  No visible retractions or increased work of breathing.    SKIN: Visible skin clear. No significant rash, abnormal pigmentation or lesions.  NEURO: Cranial nerves grossly intact.  Mentation and speech appropriate for age.  PSYCH: Mentation appears normal, affect normal/bright, judgement and insight intact, normal speech and appearance well-groomed.      Diagnostic Test Results:  Labs reviewed in Epic        Assessment & Plan     1. Type 2 diabetes mellitus with diabetic polyneuropathy, without long-term current use of insulin (H)  Stop tradgjenta, replace with januvia for formulary reasons  Side effects and risks of januvia discussed  Plan for A1c when safer re covid    2. Status post below knee amputation, right (H)  Will right letter to DMV    3. Hyperlipidemia LDL goal <100  Statin intolerant  On zetia   Check lipids prior to visit later this summer post COVID    4. Essential hypertension, benign  Had been under good control recheck post COVID       BMI:   Estimated body mass index is 33.88 kg/m  as calculated from the following:    Height as of 3/27/20: 1.829 m (6').    Weight as of 3/27/20: 113.3 kg (249 lb 12.8 oz).   Weight management plan: Discussed healthy diet and exercise guidelines            Return in about 3 months (around 9/3/2020) for Diabetes Check, Pre-visit Fasting Lab.    Kody Wing MD  [facility]      Video-Visit Details    Type of service:  Video Visit    Video End Time:9:01 AM    Originating Location (pt. Location): Home    Distant Location (provider location):  [facility]     Platform used for Video  Visit: Doximity    Return in about 3 months (around 9/3/2020) for Diabetes Check, Pre-visit Fasting Lab.  Patient instructed to return to clinic or contact us sooner if symptoms worsen or new symptoms develop.       Kody Wing MD

## 2020-06-29 DIAGNOSIS — E78.5 HYPERLIPIDEMIA LDL GOAL <100: ICD-10-CM

## 2020-06-29 DIAGNOSIS — I10 ESSENTIAL HYPERTENSION, BENIGN: ICD-10-CM

## 2020-06-30 RX ORDER — EZETIMIBE 10 MG/1
TABLET ORAL
Qty: 90 TABLET | Refills: 3 | Status: SHIPPED | OUTPATIENT
Start: 2020-06-30 | End: 2021-06-04

## 2020-06-30 RX ORDER — LISINOPRIL 40 MG/1
TABLET ORAL
Qty: 90 TABLET | Refills: 3 | Status: SHIPPED | OUTPATIENT
Start: 2020-06-30 | End: 2021-06-04

## 2020-06-30 NOTE — TELEPHONE ENCOUNTER
BP Readings from Last 3 Encounters:   03/27/20 (!) 139/101   03/12/20 132/80   02/17/20 136/82     Routing refill request to provider for review/approval because:  Last BP high     LDL not current     Yvette WATKINS RN

## 2020-07-26 NOTE — TELEPHONE ENCOUNTER
"Last Written Prescription Date:  12/04/18  Last Fill Quantity: 100 each,  # refills: 0   Last office visit: 9/7/2018 with prescribing provider:  [place]   Future Office Visit:      Requested Prescriptions   Pending Prescriptions Disp Refills     BD MEGAN U/F 32G X 4 MM insulin pen needle [Pharmacy Med Name: BD PEN NEEDLE MEGAN  32G X 4 MM MISC] 100 each 0     Sig: USE AS DIRECTED WITH BASAGLAR    Diabetic Supplies Protocol Passed - 3/2/2019  3:52 AM       Passed - Medication is active on med list       Passed - Patient is 18 years of age or older       Passed - Recent (6 mo) or future (30 days) visit within the authorizing provider's specialty    Patient had office visit in the last 6 months or has a visit in the next 30 days with authorizing provider.  See \"Patient Info\" tab in inbasket, or \"Choose Columns\" in Meds & Orders section of the refill encounter.              " no

## 2020-08-21 DIAGNOSIS — E78.5 HYPERLIPIDEMIA LDL GOAL <100: ICD-10-CM

## 2020-08-21 DIAGNOSIS — E11.42 TYPE 2 DIABETES MELLITUS WITH DIABETIC POLYNEUROPATHY, WITHOUT LONG-TERM CURRENT USE OF INSULIN (H): ICD-10-CM

## 2020-08-21 LAB — HBA1C MFR BLD: 7 % (ref 0–5.6)

## 2020-08-21 PROCEDURE — 80061 LIPID PANEL: CPT | Performed by: INTERNAL MEDICINE

## 2020-08-21 PROCEDURE — 36415 COLL VENOUS BLD VENIPUNCTURE: CPT | Performed by: INTERNAL MEDICINE

## 2020-08-21 PROCEDURE — 83721 ASSAY OF BLOOD LIPOPROTEIN: CPT | Mod: 59 | Performed by: INTERNAL MEDICINE

## 2020-08-21 PROCEDURE — 82043 UR ALBUMIN QUANTITATIVE: CPT | Performed by: INTERNAL MEDICINE

## 2020-08-21 PROCEDURE — 83036 HEMOGLOBIN GLYCOSYLATED A1C: CPT | Performed by: INTERNAL MEDICINE

## 2020-08-21 NOTE — LETTER
August 24, 2020      Ry Horner  [address] [place] [address]        Dear ,    The following letter pertains to your most recent diagnostic tests:     -Your micro albumin level is elevated. This means you have trace amounts of protein in the urine. It indicates that your kidneys are being affected by diabetes. Keeping blood pressure low is the best treatment in order to keep this stable. People with microalbuminuria should avoid anti-inflamatory agents such as motrin, alleve and ibuprofen as much as possible because excessive use of these medications can be harmful to the kidneys. Anybody that has microalbuminuria should be on lisinopril or losartan-as you already are-since these medications are protective for the kidney's in this situation.     -Triglycerides are much improved.  Furthermore, the LDL has increase significantly.  Overall, the cholesterol is a lot better!     -The A1c is better!  The A1c is at your goal of that less than 8!     Bottom line:  These numbers look a lot better!  Keep up the very good work.       Follow up:  We will chat further when we talk on 8/24     Resulted Orders   Albumin Random Urine Quantitative with Creat Ratio   Result Value Ref Range    Creatinine Urine 115 mg/dL    Albumin Urine mg/L 322 mg/L    Albumin Urine mg/g Cr 280.00 (H) 0 - 17 mg/g Cr   Lipid panel reflex to direct LDL Fasting   Result Value Ref Range    Cholesterol 192 <200 mg/dL    Triglycerides 448 (H) <150 mg/dL      Comment:      Borderline high:  150-199 mg/dl  High:             200-499 mg/dl  Very high:       >499 mg/dl  Fasting specimen      HDL Cholesterol 35 (L) >39 mg/dL    LDL Cholesterol Calculated  <100 mg/dL     Cannot estimate LDL when triglyceride exceeds 400 mg/dL    Non HDL Cholesterol 157 (H) <130 mg/dL      Comment:      Above Desirable:  130-159 mg/dl  Borderline high:  160-189 mg/dl  High:             190-219 mg/dl  Very high:       >219 mg/dl     **A1C FUTURE anytime    Result Value Ref Range    Hemoglobin A1C 7.0 (H) 0 - 5.6 %      Comment:      Normal <5.7% Prediabetes 5.7-6.4%  Diabetes 6.5% or higher - adopted from ADA   consensus guidelines.     LDL cholesterol direct   Result Value Ref Range    LDL Cholesterol Direct 79 <100 mg/dL      Comment:      Desirable:       <100 mg/dl       If you have any questions or concerns, please call the clinic at the number listed above.       Sincerely,     Dr. Wing

## 2020-08-22 LAB
CHOLEST SERPL-MCNC: 192 MG/DL
CREAT UR-MCNC: 115 MG/DL
HDLC SERPL-MCNC: 35 MG/DL
LDLC SERPL CALC-MCNC: ABNORMAL MG/DL
LDLC SERPL DIRECT ASSAY-MCNC: 79 MG/DL
MICROALBUMIN UR-MCNC: 322 MG/L
MICROALBUMIN/CREAT UR: 280 MG/G CR (ref 0–17)
NONHDLC SERPL-MCNC: 157 MG/DL
TRIGL SERPL-MCNC: 448 MG/DL

## 2020-08-24 NOTE — RESULT ENCOUNTER NOTE
The following letter pertains to your most recent diagnostic tests:    -Your micro albumin level is elevated. This means you have trace amounts of protein in the urine. It indicates that your kidneys are being affected by diabetes. Keeping blood pressure low is the best treatment in order to keep this stable. People with microalbuminuria should avoid anti-inflamatory agents such as motrin, alleve and ibuprofen as much as possible because excessive use of these medications can be harmful to the kidneys. Anybody that has microalbuminuria should be on lisinopril or losartan-as you already are-since these medications are protective for the kidney's in this situation.    -Triglycerides are much improved.  Furthermore, the LDL has increase significantly.  Overall, the cholesterol is a lot better!    -The A1c is better!  The A1c is at your goal of that less than 8!    Bottom line:  These numbers look a lot better!  Keep up the very good work.        Follow up:  We will chat further when we talk on 8/24      Sincerely,    Dr. Wing

## 2020-08-26 DIAGNOSIS — I10 BENIGN ESSENTIAL HYPERTENSION: ICD-10-CM

## 2020-08-26 DIAGNOSIS — E11.42 TYPE 2 DIABETES MELLITUS WITH DIABETIC POLYNEUROPATHY, WITHOUT LONG-TERM CURRENT USE OF INSULIN (H): ICD-10-CM

## 2020-08-27 ENCOUNTER — VIRTUAL VISIT (OUTPATIENT)
Dept: FAMILY MEDICINE | Facility: CLINIC | Age: 55
End: 2020-08-27
Payer: COMMERCIAL

## 2020-08-27 DIAGNOSIS — E78.5 HYPERLIPIDEMIA LDL GOAL <100: Primary | ICD-10-CM

## 2020-08-27 DIAGNOSIS — I10 ESSENTIAL HYPERTENSION, BENIGN: ICD-10-CM

## 2020-08-27 DIAGNOSIS — E11.42 TYPE 2 DIABETES MELLITUS WITH DIABETIC POLYNEUROPATHY, WITHOUT LONG-TERM CURRENT USE OF INSULIN (H): ICD-10-CM

## 2020-08-27 PROCEDURE — 99214 OFFICE O/P EST MOD 30 MIN: CPT | Mod: GT | Performed by: INTERNAL MEDICINE

## 2020-08-27 RX ORDER — BLOOD SUGAR DIAGNOSTIC
STRIP MISCELLANEOUS
Qty: 300 EACH | Refills: 11 | Status: SHIPPED | OUTPATIENT
Start: 2020-08-27 | End: 2021-11-01

## 2020-08-27 RX ORDER — HYDROCHLOROTHIAZIDE 12.5 MG/1
TABLET ORAL
Qty: 90 TABLET | Refills: 1 | Status: SHIPPED | OUTPATIENT
Start: 2020-08-27 | End: 2021-09-07

## 2020-08-27 NOTE — PROGRESS NOTES
"Ry Horner is a 55 year old male who is being evaluated via a billable video visit.      The patient has been notified of following:     \"This video visit will be conducted via a call between you and your physician/provider. We have found that certain health care needs can be provided without the need for an in-person physical exam.  This service lets us provide the care you need with a video conversation.  If a prescription is necessary we can send it directly to your pharmacy.  If lab work is needed we can place an order for that and you can then stop by our lab to have the test done at a later time.    Video visits are billed at different rates depending on your insurance coverage.  Please reach out to your insurance provider with any questions.    If during the course of the call the physician/provider feels a video visit is not appropriate, you will not be charged for this service.\"    Patient has given verbal consent for Video visit? Yes  How would you like to obtain your AVS? MyChart  If you are dropped from the video visit, the video invite should be resent to: Send to e-mail at: [email or web address]  Will anyone else be joining your video visit? No    Subjective     Ry Horner is a 55 year old male who presents today via video visit for the following health issues:    HPI    Diabetes Follow-up    How often are you checking your blood sugar? One time daily  What time of day are you checking your blood sugars (select all that apply)?  Before meals  Have you had any blood sugars above 200?  No  Have you had any blood sugars below 70?  No    What symptoms do you notice when your blood sugar is low?  Shaky and clamy     What concerns do you have today about your diabetes? None     Do you have any of these symptoms? (Select all that apply)  Weight loss    Have you had a diabetic eye exam in the last 12 months? No    BP Readings from Last 2 Encounters:   03/27/20 (!) 139/101   03/12/20 132/80     Hemoglobin A1C " (%)   Date Value   08/21/2020 7.0 (H)   02/03/2020 7.9 (H)     LDL Cholesterol Calculated (mg/dL)   Date Value   08/21/2020     Cannot estimate LDL when triglyceride exceeds 400 mg/dL   06/06/2018     Cannot estimate LDL when triglyceride exceeds 400 mg/dL     LDL Cholesterol Direct (mg/dL)   Date Value   08/21/2020 79   12/19/2019 103 (H)           How many servings of fruits and vegetables do you eat daily?  4 or more    On average, how many sweetened beverages do you drink each day (Examples: soda, juice, sweet tea, etc.  Do NOT count diet or artificially sweetened beverages)?   1    How many days per week do you exercise enough to make your heart beat faster? 3 or less    How many minutes a day do you exercise enough to make your heart beat faster? 30 - 60    How many days per week do you miss taking your medication? 0         Video Start Time: 1610    Sugars are doing well  He is exercising more  He is making better food choices  He feels great  No complaints  Needs a referral for an eye MD     Review of Systems   Constitutional, HEENT, cardiovascular, pulmonary, gi and gu systems are negative, except as otherwise noted.      Objective           Vitals:  No vitals were obtained today due to virtual visit.    Physical Exam     GENERAL: Healthy, alert and no distress  EYES: Eyes grossly normal to inspection.  No discharge or erythema, or obvious scleral/conjunctival abnormalities.  RESP: No audible wheeze, cough, or visible cyanosis.  No visible retractions or increased work of breathing.    SKIN: Visible skin clear. No significant rash, abnormal pigmentation or lesions.  NEURO: Cranial nerves grossly intact.  Mentation and speech appropriate for age.  PSYCH: Mentation appears normal, affect normal/bright, judgement and insight intact, normal speech and appearance well-groomed.              Assessment & Plan     Ry was seen today for diabetes.    Diagnoses and all orders for this visit:    Hyperlipidemia LDL  goal <100    Type 2 diabetes mellitus with diabetic polyneuropathy, without long-term current use of insulin (H)  -     EYE ADULT REFERRAL; Future  -     blood glucose (ACCU-CHEK JAYNA PLUS) test strip; TEST BLOOD SUGARS 2 TO 3 TIMES DAILY OR AS DIRECTED    Essential hypertension, benign       Diabetes well controlled on current drugs, continue, counseled on diet and exercise  Lipids better controlled continue current drugs; recheck one year  No home blood pressure readings, he plans on getting a cuff, continue current medications  Recheck in 6 months with A1c       No follow-ups on file.    Kody Wing MD  [facility]      Video-Visit Details    Type of service:  Video Visit    Video End Time:4:35 PM    Originating Location (pt. Location): Home    Distant Location (provider location):  [facility]     Platform used for Video Visit: Zack

## 2020-09-28 DIAGNOSIS — R09.89 RUNNY NOSE: ICD-10-CM

## 2020-09-29 RX ORDER — IPRATROPIUM BROMIDE 42 UG/1
2 SPRAY, METERED NASAL 4 TIMES DAILY PRN
Qty: 3 BOX | Refills: 1 | Status: SHIPPED | OUTPATIENT
Start: 2020-09-29 | End: 2020-11-19

## 2020-10-05 ENCOUNTER — TELEPHONE (OUTPATIENT)
Dept: FAMILY MEDICINE | Facility: CLINIC | Age: 55
End: 2020-10-05
Payer: COMMERCIAL

## 2020-10-05 NOTE — TELEPHONE ENCOUNTER
Reason for Call:  Form, our goal is to have forms completed with 72 hours, however, some forms may require a visit or additional information.    Type of letter, form or note:  medical    Who is the form from?: Patient    Where did the form come from: Patient or family brought in       What clinic location was the form placed at?: [facility]    Where the form was placed: [address] [place] [address] tray Box/Folder    What number is listed as a contact on the form?: [phone] or fax [phone]       Additional comments: business card enclosed with contact information    Call taken on 10/5/2020 at 4:35 PM by Olinda Thompson

## 2020-10-07 ENCOUNTER — MEDICAL CORRESPONDENCE (OUTPATIENT)
Dept: HEALTH INFORMATION MANAGEMENT | Facility: CLINIC | Age: 55
End: 2020-10-07

## 2020-12-06 ENCOUNTER — HEALTH MAINTENANCE LETTER (OUTPATIENT)
Age: 55
End: 2020-12-06

## 2020-12-12 DIAGNOSIS — R09.89 RUNNY NOSE: ICD-10-CM

## 2020-12-14 RX ORDER — IPRATROPIUM BROMIDE 42 UG/1
SPRAY, METERED NASAL
Qty: 15 ML | Refills: 0 | Status: SHIPPED | OUTPATIENT
Start: 2020-12-14 | End: 2022-02-17

## 2021-02-19 DIAGNOSIS — E11.42 TYPE 2 DIABETES MELLITUS WITH DIABETIC POLYNEUROPATHY, WITHOUT LONG-TERM CURRENT USE OF INSULIN (H): ICD-10-CM

## 2021-02-19 LAB — HBA1C MFR BLD: 8.4 % (ref 0–5.6)

## 2021-02-19 PROCEDURE — 83036 HEMOGLOBIN GLYCOSYLATED A1C: CPT | Performed by: INTERNAL MEDICINE

## 2021-02-19 PROCEDURE — 36415 COLL VENOUS BLD VENIPUNCTURE: CPT | Performed by: INTERNAL MEDICINE

## 2021-02-19 NOTE — LETTER
February 22, 2021      Ry SUE   [address]  [place] [facility] [address]        Dear ,    We are writing to inform you of your test results.    The following letter pertains to your most recent diagnostic tests:     The A1c result is not as good as we would like.  We can discuss this further when I see you tomorrow.         Resulted Orders   **A1C FUTURE 6mo   Result Value Ref Range    Hemoglobin A1C 8.4 (H) 0 - 5.6 %      Comment:      Normal <5.7% Prediabetes 5.7-6.4%  Diabetes 6.5% or higher - adopted from ADA   consensus guidelines.         If you have any questions or concerns, please call the clinic at the number listed above.       Sincerely,      Kody Wing MD

## 2021-02-21 NOTE — RESULT ENCOUNTER NOTE
The following letter pertains to your most recent diagnostic tests:    The A1c result is not as good as we would like.  We can discuss this further when I see you tomorrow.         Sincerely,    Dr. Wing

## 2021-02-22 ENCOUNTER — TELEPHONE (OUTPATIENT)
Dept: FAMILY MEDICINE | Facility: CLINIC | Age: 56
End: 2021-02-22

## 2021-02-22 ENCOUNTER — OFFICE VISIT (OUTPATIENT)
Dept: FAMILY MEDICINE | Facility: CLINIC | Age: 56
End: 2021-02-22
Payer: COMMERCIAL

## 2021-02-22 VITALS
WEIGHT: 264 LBS | HEART RATE: 105 BPM | DIASTOLIC BLOOD PRESSURE: 110 MMHG | TEMPERATURE: 96.8 F | BODY MASS INDEX: 35.76 KG/M2 | SYSTOLIC BLOOD PRESSURE: 170 MMHG | OXYGEN SATURATION: 99 % | HEIGHT: 72 IN

## 2021-02-22 DIAGNOSIS — E11.42 TYPE 2 DIABETES MELLITUS WITH DIABETIC POLYNEUROPATHY, WITHOUT LONG-TERM CURRENT USE OF INSULIN (H): Primary | ICD-10-CM

## 2021-02-22 DIAGNOSIS — I10 BENIGN ESSENTIAL HYPERTENSION: ICD-10-CM

## 2021-02-22 DIAGNOSIS — E11.42 TYPE 2 DIABETES MELLITUS WITH DIABETIC POLYNEUROPATHY, WITHOUT LONG-TERM CURRENT USE OF INSULIN (H): ICD-10-CM

## 2021-02-22 DIAGNOSIS — E66.01 MORBID OBESITY (H): ICD-10-CM

## 2021-02-22 DIAGNOSIS — Z12.5 PROSTATE CANCER SCREENING: ICD-10-CM

## 2021-02-22 DIAGNOSIS — E78.5 HYPERLIPIDEMIA LDL GOAL <100: ICD-10-CM

## 2021-02-22 PROCEDURE — 99214 OFFICE O/P EST MOD 30 MIN: CPT | Performed by: INTERNAL MEDICINE

## 2021-02-22 RX ORDER — INSULIN GLARGINE 100 [IU]/ML
62 INJECTION, SOLUTION SUBCUTANEOUS DAILY
Qty: 99 ML | Refills: 11 | COMMUNITY
Start: 2021-02-22 | End: 2021-03-23

## 2021-02-22 RX ORDER — PROCHLORPERAZINE 25 MG/1
1 SUPPOSITORY RECTAL
Qty: 1 EACH | Refills: 3 | Status: SHIPPED | OUTPATIENT
Start: 2021-02-22 | End: 2021-02-23

## 2021-02-22 RX ORDER — PROCHLORPERAZINE 25 MG/1
1 SUPPOSITORY RECTAL
Qty: 3 EACH | Refills: 11 | Status: SHIPPED | OUTPATIENT
Start: 2021-02-22 | End: 2021-02-23

## 2021-02-22 RX ORDER — PROCHLORPERAZINE 25 MG/1
1 SUPPOSITORY RECTAL ONCE
Qty: 1 EACH | Refills: 11 | Status: SHIPPED | OUTPATIENT
Start: 2021-02-22 | End: 2021-02-23

## 2021-02-22 ASSESSMENT — MIFFLIN-ST. JEOR: SCORE: 2080.5

## 2021-02-22 NOTE — PATIENT INSTRUCTIONS
Omron home blood pressure cuff is a good brand  Check twice daily in AM and PM for one week and record readings  We are looking for pressures less than 140/90   If the majority of readings are greater than 140/90 please inform me by MyChart  Bring your blood pressure readings with you for our review when you return in 3 months

## 2021-02-22 NOTE — TELEPHONE ENCOUNTER
Reason for Call:  Medication or medication refill:    Do you use a [facility]?  Name of the pharmacy and phone number for the current request:     [facility] - [place], [place] - [address] [place] [address]    Name of the medication requested: Dexcom , sensor, transmitter    Other request: Pt would like to have items mailed to him rather than . Please send to change in pharmacy    Can we leave a detailed message on this number? YES    Phone number [place] [employer] for [facility] can be reached at: [phone]    Best Time: any    Call taken on 2/22/2021 at 5:12 PM by Sarah Bliss
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
## 2021-02-23 ENCOUNTER — TELEPHONE (OUTPATIENT)
Dept: FAMILY MEDICINE | Facility: CLINIC | Age: 56
End: 2021-02-23

## 2021-02-23 DIAGNOSIS — E11.42 TYPE 2 DIABETES MELLITUS WITH DIABETIC POLYNEUROPATHY, WITHOUT LONG-TERM CURRENT USE OF INSULIN (H): Primary | ICD-10-CM

## 2021-02-23 DIAGNOSIS — E11.42 TYPE 2 DIABETES MELLITUS WITH DIABETIC POLYNEUROPATHY, WITHOUT LONG-TERM CURRENT USE OF INSULIN (H): ICD-10-CM

## 2021-02-23 RX ORDER — PROCHLORPERAZINE 25 MG/1
1 SUPPOSITORY RECTAL
Qty: 3 EACH | Refills: 11 | Status: SHIPPED | OUTPATIENT
Start: 2021-02-23 | End: 2023-01-04

## 2021-02-23 RX ORDER — PROCHLORPERAZINE 25 MG/1
1 SUPPOSITORY RECTAL
Qty: 1 EACH | Refills: 3 | Status: SHIPPED | OUTPATIENT
Start: 2021-02-23 | End: 2022-01-03

## 2021-02-23 RX ORDER — PROCHLORPERAZINE 25 MG/1
1 SUPPOSITORY RECTAL ONCE
Qty: 1 EACH | Refills: 11 | Status: SHIPPED | OUTPATIENT
Start: 2021-02-23 | End: 2021-02-23

## 2021-02-23 NOTE — TELEPHONE ENCOUNTER
Question set received from the insurance and was placed in the [facility] FOLDER for completion     Prior Authorization Retail Medication Request    Medication/Dose: Continuous Blood Gluc Sensor (DEXCOM G6 SENSOR) MISC   ICD code (if different than what is on RX):  Type 2 diabetes mellitus with diabetic polyneuropathy, without long-term current use of insulin (H) (E11.42)  Previously Tried and Failed:   Rationale:     Insurance Name: [employer]  Insurance ID: [id number]      Pharmacy Information (if different than what is on RX)  [place] [address] [place] [address]  Phone: [phone]

## 2021-02-24 RX ORDER — INSULIN GLARGINE 100 [IU]/ML
INJECTION, SOLUTION SUBCUTANEOUS
Refills: 11 | OUTPATIENT
Start: 2021-02-24

## 2021-02-24 NOTE — TELEPHONE ENCOUNTER
PA Initiation    Medication: Continuous Blood Gluc Sensor (DEXCOM G6 SENSOR) MISC   Insurance Company: [employer] - Phone [phone] Fax [phone]  Pharmacy Filling the Rx: [facility] - [address]  Filling Pharmacy Phone: [phone]  Filling Pharmacy Fax: [phone]  Start Date: 2/24/2021    Faxed question-set back to insurance.

## 2021-02-25 NOTE — TELEPHONE ENCOUNTER
Prior Authorization Not Needed per Insurance    Medication: Continuous Blood Gluc Sensor (DEXCOM G6 SENSOR) MISC   Insurance Company: [employer] - Phone [phone] Fax [phone]  Expected CoPay:      Pharmacy Filling the Rx: [facility] - [address]  Pharmacy Notified: Yes  Patient Notified: No    Pharmacy needs to contact help desk for assistance with claim processing. Notified pharmacy.

## 2021-03-03 DIAGNOSIS — E11.42 TYPE 2 DIABETES MELLITUS WITH DIABETIC POLYNEUROPATHY, WITHOUT LONG-TERM CURRENT USE OF INSULIN (H): ICD-10-CM

## 2021-03-04 RX ORDER — PEN NEEDLE, DIABETIC 32GX 5/32"
NEEDLE, DISPOSABLE MISCELLANEOUS
Qty: 100 EACH | Refills: 1 | Status: SHIPPED | OUTPATIENT
Start: 2021-03-04 | End: 2021-10-04

## 2021-03-08 NOTE — TELEPHONE ENCOUNTER
Dexcom sensors are excluded from patient's insurance policy. Staff message sent to Dr. Suazo to see if there any alternative options or any recommendations that he might have.    Joseph Bess, SURESH on 3/8/2021 at 3:20 PM     0

## 2021-03-10 ENCOUNTER — MYC MEDICAL ADVICE (OUTPATIENT)
Dept: FAMILY MEDICINE | Facility: CLINIC | Age: 56
End: 2021-03-10

## 2021-03-10 ENCOUNTER — TELEPHONE (OUTPATIENT)
Dept: FAMILY MEDICINE | Facility: CLINIC | Age: 56
End: 2021-03-10

## 2021-03-10 NOTE — TELEPHONE ENCOUNTER
Patient called requesting status of PA for Dexcom G6 Sensor. Reports he has the glucose monitor and transmitter but is missing the sensor. States he got off the phone with insurance before calling clinic and they are requesting a PA letter from PCP. Pt was advised to  Schedule an appointment with diabetes education. Reports he has already gone through diabetes education.       Pt also talked to JESÚS Ruiz who advised he call his insurance. Pt is getting  mixed messages for insurance. Triage huddled with JESÚS Ruiz. MA will send Diabetes education scheduling number via my chart. Pt will follow up with clinic if further questions. See previus telephone encounter.

## 2021-03-10 NOTE — TELEPHONE ENCOUNTER
I think the best I can do here is set up a diabetes education referral  Call [phone] to schedule an appointment for a diabetes education session.

## 2021-03-10 NOTE — TELEPHONE ENCOUNTER
"Per information from Dr. Suazo:    \"wonder if his insurance doesn't cover a particular brand of CGM sensor yet may cover a different brand?  Since the Freestyle Sj is cheaper, it may be a more manageable option for patient if out of pocket payment.  His PCP can also arrange a Diabetes Education Referral to see one of our [facility] CDE's and they may have other ideas... or be able to place a 14-day Diagnostic Sj sensor to provide up to 14-days of useful trend data. \"    Dr. Wing,    Please review information above from Dr. Suazo about options for CGM as they are excluded from his insurance policy.    Joseph Bess, CMA on 3/10/2021 at 10:24 AM    "

## 2021-03-20 DIAGNOSIS — E11.42 TYPE 2 DIABETES MELLITUS WITH DIABETIC POLYNEUROPATHY, WITHOUT LONG-TERM CURRENT USE OF INSULIN (H): ICD-10-CM

## 2021-03-23 RX ORDER — INSULIN GLARGINE 100 [IU]/ML
INJECTION, SOLUTION SUBCUTANEOUS
Qty: 99 ML | Refills: 11 | Status: SHIPPED | OUTPATIENT
Start: 2021-03-23 | End: 2021-07-01

## 2021-03-23 NOTE — TELEPHONE ENCOUNTER
Routing refill request to provider for review/approval because:  Creat overdue, plus A1C review    Creatinine   Date Value Ref Range Status   02/06/2020 1.10 0.66 - 1.25 mg/dL Final     Lab Results   Component Value Date    A1C 8.4 02/19/2021    A1C 7.0 08/21/2020    A1C 7.9 02/03/2020    A1C 9.3 11/17/2019    A1C 7.5 07/11/2019       Rere Prasad RN

## 2021-03-24 NOTE — TELEPHONE ENCOUNTER
Prior authorization started via CoverMyMeds and will update with further information as it is received.    Joseph Bess, CMA on 3/24/2021 at 8:35 AM

## 2021-03-25 ENCOUNTER — MYC MEDICAL ADVICE (OUTPATIENT)
Dept: PEDIATRICS | Facility: CLINIC | Age: 56
End: 2021-03-25

## 2021-05-11 ENCOUNTER — TRANSFERRED RECORDS (OUTPATIENT)
Dept: HEALTH INFORMATION MANAGEMENT | Facility: CLINIC | Age: 56
End: 2021-05-11

## 2021-05-11 LAB — RETINOPATHY: POSITIVE

## 2021-05-23 DIAGNOSIS — E11.42 TYPE 2 DIABETES MELLITUS WITH DIABETIC POLYNEUROPATHY, WITHOUT LONG-TERM CURRENT USE OF INSULIN (H): ICD-10-CM

## 2021-05-26 RX ORDER — SITAGLIPTIN 100 MG/1
TABLET, FILM COATED ORAL
Qty: 90 TABLET | Refills: 3 | Status: SHIPPED | OUTPATIENT
Start: 2021-05-26 | End: 2021-10-12

## 2021-06-04 DIAGNOSIS — I10 ESSENTIAL HYPERTENSION, BENIGN: ICD-10-CM

## 2021-06-04 DIAGNOSIS — E78.5 HYPERLIPIDEMIA LDL GOAL <100: ICD-10-CM

## 2021-06-04 RX ORDER — EZETIMIBE 10 MG/1
TABLET ORAL
Qty: 90 TABLET | Refills: 3 | Status: SHIPPED | OUTPATIENT
Start: 2021-06-04 | End: 2022-05-13

## 2021-06-04 RX ORDER — LISINOPRIL 40 MG/1
TABLET ORAL
Qty: 90 TABLET | Refills: 3 | Status: SHIPPED | OUTPATIENT
Start: 2021-06-04 | End: 2021-08-05

## 2021-06-09 ENCOUNTER — TRANSFERRED RECORDS (OUTPATIENT)
Dept: HEALTH INFORMATION MANAGEMENT | Facility: CLINIC | Age: 56
End: 2021-06-09

## 2021-06-09 LAB — RETINOPATHY: POSITIVE

## 2021-06-25 DIAGNOSIS — E11.42 TYPE 2 DIABETES MELLITUS WITH DIABETIC POLYNEUROPATHY, WITHOUT LONG-TERM CURRENT USE OF INSULIN (H): ICD-10-CM

## 2021-06-25 DIAGNOSIS — E78.5 HYPERLIPIDEMIA LDL GOAL <100: ICD-10-CM

## 2021-06-25 DIAGNOSIS — Z12.5 PROSTATE CANCER SCREENING: ICD-10-CM

## 2021-06-25 LAB
ALBUMIN SERPL-MCNC: 3.7 G/DL (ref 3.4–5)
ALP SERPL-CCNC: 84 U/L (ref 40–150)
ALT SERPL W P-5'-P-CCNC: 52 U/L (ref 0–70)
ANION GAP SERPL CALCULATED.3IONS-SCNC: 2 MMOL/L (ref 3–14)
AST SERPL W P-5'-P-CCNC: 19 U/L (ref 0–45)
BILIRUB SERPL-MCNC: 0.4 MG/DL (ref 0.2–1.3)
BUN SERPL-MCNC: 41 MG/DL (ref 7–30)
CALCIUM SERPL-MCNC: 9.6 MG/DL (ref 8.5–10.1)
CHLORIDE SERPL-SCNC: 103 MMOL/L (ref 94–109)
CHOLEST SERPL-MCNC: 250 MG/DL
CO2 SERPL-SCNC: 29 MMOL/L (ref 20–32)
CREAT SERPL-MCNC: 1.51 MG/DL (ref 0.66–1.25)
ERYTHROCYTE [DISTWIDTH] IN BLOOD BY AUTOMATED COUNT: 12.4 % (ref 10–15)
GFR SERPL CREATININE-BSD FRML MDRD: 52 ML/MIN/{1.73_M2}
GLUCOSE SERPL-MCNC: 181 MG/DL (ref 70–99)
HBA1C MFR BLD: 8.5 % (ref 0–5.6)
HCT VFR BLD AUTO: 35.7 % (ref 40–53)
HDLC SERPL-MCNC: 30 MG/DL
HGB BLD-MCNC: 12.8 G/DL (ref 13.3–17.7)
LDLC SERPL CALC-MCNC: ABNORMAL MG/DL
MCH RBC QN AUTO: 30.6 PG (ref 26.5–33)
MCHC RBC AUTO-ENTMCNC: 35.9 G/DL (ref 31.5–36.5)
MCV RBC AUTO: 85 FL (ref 78–100)
NONHDLC SERPL-MCNC: 220 MG/DL
PLATELET # BLD AUTO: 231 10E9/L (ref 150–450)
POTASSIUM SERPL-SCNC: 5.2 MMOL/L (ref 3.4–5.3)
PROT SERPL-MCNC: 7.8 G/DL (ref 6.8–8.8)
RBC # BLD AUTO: 4.18 10E12/L (ref 4.4–5.9)
SODIUM SERPL-SCNC: 134 MMOL/L (ref 133–144)
TRIGL SERPL-MCNC: 797 MG/DL
WBC # BLD AUTO: 10.5 10E9/L (ref 4–11)

## 2021-06-25 PROCEDURE — 85027 COMPLETE CBC AUTOMATED: CPT | Performed by: INTERNAL MEDICINE

## 2021-06-25 PROCEDURE — 82043 UR ALBUMIN QUANTITATIVE: CPT | Performed by: INTERNAL MEDICINE

## 2021-06-25 PROCEDURE — 80061 LIPID PANEL: CPT | Performed by: INTERNAL MEDICINE

## 2021-06-25 PROCEDURE — G0103 PSA SCREENING: HCPCS | Performed by: INTERNAL MEDICINE

## 2021-06-25 PROCEDURE — 83721 ASSAY OF BLOOD LIPOPROTEIN: CPT | Mod: 59 | Performed by: INTERNAL MEDICINE

## 2021-06-25 PROCEDURE — 36415 COLL VENOUS BLD VENIPUNCTURE: CPT | Performed by: INTERNAL MEDICINE

## 2021-06-25 PROCEDURE — 83036 HEMOGLOBIN GLYCOSYLATED A1C: CPT | Performed by: INTERNAL MEDICINE

## 2021-06-25 PROCEDURE — 80053 COMPREHEN METABOLIC PANEL: CPT | Performed by: INTERNAL MEDICINE

## 2021-06-25 NOTE — LETTER
June 28, 2021      Ry SUE   [address]  [place] [address]        Dear ,    We are writing to inform you of your test results.    -Your prostate specific antigen (PSA) test result returned normal. j     -Your micro albumin level is elevated. This means you have trace amounts of protein in the urine. It indicates that your kidneys are being affected by diabetes. Keeping blood pressure low is the best treatment in order to keep this stable. People with microalbuminuria should avoid anti-inflamatory agents such as motrin, alleve and ibuprofen as much as possible because excessive use of these medications can be harmful to the kidneys. Anybody that has microalbuminuria should be on lisinopril or losartan-as you already are-since these medications are protective for the kidney's in this situation.     -The cholesterol is not so good and we can discuss this when we meet up later this week.     -The metabolic panel shows a slight increase in the kidney blood test (creatinine).     -The complete blood count is stable.       -The hemoglobin A1c is a little higher than we would like and we can discuss this further when we meet up as well.         Follow up:  We will talk on July 1st about these matters.       Resulted Orders   Comprehensive metabolic panel   Result Value Ref Range    Sodium 134 133 - 144 mmol/L    Potassium 5.2 3.4 - 5.3 mmol/L    Chloride 103 94 - 109 mmol/L    Carbon Dioxide 29 20 - 32 mmol/L    Anion Gap 2 (L) 3 - 14 mmol/L    Glucose 181 (H) 70 - 99 mg/dL      Comment:      Fasting specimen    Urea Nitrogen 41 (H) 7 - 30 mg/dL    Creatinine 1.51 (H) 0.66 - 1.25 mg/dL    GFR Estimate 52 (L) >60 mL/min/[1.73_m2]      Comment:      Non  GFR Calc  Starting 12/18/2018, serum creatinine based estimated GFR (eGFR) will be   calculated using the Chronic Kidney Disease Epidemiology Collaboration   (CKD-EPI) equation.      GFR Estimate If Black 60 (L) >60 mL/min/[1.73_m2]       Comment:       GFR Calc  Starting 12/18/2018, serum creatinine based estimated GFR (eGFR) will be   calculated using the Chronic Kidney Disease Epidemiology Collaboration   (CKD-EPI) equation.      Calcium 9.6 8.5 - 10.1 mg/dL    Bilirubin Total 0.4 0.2 - 1.3 mg/dL    Albumin 3.7 3.4 - 5.0 g/dL    Protein Total 7.8 6.8 - 8.8 g/dL    Alkaline Phosphatase 84 40 - 150 U/L    ALT 52 0 - 70 U/L    AST 19 0 - 45 U/L   CBC with platelets   Result Value Ref Range    WBC 10.5 4.0 - 11.0 10e9/L    RBC Count 4.18 (L) 4.4 - 5.9 10e12/L    Hemoglobin 12.8 (L) 13.3 - 17.7 g/dL    Hematocrit 35.7 (L) 40.0 - 53.0 %    MCV 85 78 - 100 fl    MCH 30.6 26.5 - 33.0 pg    MCHC 35.9 31.5 - 36.5 g/dL    RDW 12.4 10.0 - 15.0 %    Platelet Count 231 150 - 450 10e9/L   Prostate spec antigen screen   Result Value Ref Range    PSA 0.92 0 - 4 ug/L      Comment:      Assay Method:  Chemiluminescence using Siemens Vista analyzer   Lipid panel reflex to direct LDL Fasting   Result Value Ref Range    Cholesterol 250 (H) <200 mg/dL      Comment:      Desirable:       <200 mg/dl    Triglycerides 797 (H) <150 mg/dL      Comment:      Borderline high:  150-199 mg/dl  High:             200-499 mg/dl  Very high:       >499 mg/dl  Fasting specimen      HDL Cholesterol 30 (L) >39 mg/dL    LDL Cholesterol Calculated  <100 mg/dL     Cannot estimate LDL when triglyceride exceeds 400 mg/dL    Non HDL Cholesterol 220 (H) <130 mg/dL      Comment:      Above Desirable:  130-159 mg/dl  Borderline high:  160-189 mg/dl  High:             190-219 mg/dl  Very high:       >219 mg/dl     Hemoglobin A1c   Result Value Ref Range    Hemoglobin A1C 8.5 (H) 0 - 5.6 %      Comment:      Normal <5.7% Prediabetes 5.7-6.4%  Diabetes 6.5% or higher - adopted from ADA   consensus guidelines.  Reviewed: OK with previous     Albumin Random Urine Quantitative with Creat Ratio   Result Value Ref Range    Creatinine Urine 68 mg/dL    Albumin Urine mg/L 676 mg/L     Albumin Urine mg/g Cr 988.30 (H) 0 - 17 mg/g Cr   LDL cholesterol direct   Result Value Ref Range    LDL Cholesterol Direct 90 <100 mg/dL      Comment:      Desirable:       <100 mg/dl       If you have any questions or concerns, please call the clinic at the number listed above.       Sincerely,      Kody Wing MD

## 2021-06-26 LAB
CREAT UR-MCNC: 68 MG/DL
LDLC SERPL DIRECT ASSAY-MCNC: 90 MG/DL
MICROALBUMIN UR-MCNC: 676 MG/L
MICROALBUMIN/CREAT UR: 988.3 MG/G CR (ref 0–17)
PSA SERPL-ACNC: 0.92 UG/L (ref 0–4)

## 2021-06-28 NOTE — RESULT ENCOUNTER NOTE
The following letter pertains to your most recent diagnostic tests:    -Your prostate specific antigen (PSA) test result returned normal. j    -Your micro albumin level is elevated. This means you have trace amounts of protein in the urine. It indicates that your kidneys are being affected by diabetes. Keeping blood pressure low is the best treatment in order to keep this stable. People with microalbuminuria should avoid anti-inflamatory agents such as motrin, alleve and ibuprofen as much as possible because excessive use of these medications can be harmful to the kidneys. Anybody that has microalbuminuria should be on lisinopril or losartan-as you already are-since these medications are protective for the kidney's in this situation.     -The cholesterol is not so good and we can discuss this when we meet up later this week.    -The metabolic panel shows a slight increase in the kidney blood test (creatinine).    -The complete blood count is stable.      -The hemoglobin A1c is a little higher than we would like and we can discuss this further when we meet up as well.        Follow up:  We will talk on July 1st about these matters.        Sincerely,    Dr. Wing

## 2021-07-01 ENCOUNTER — OFFICE VISIT (OUTPATIENT)
Dept: FAMILY MEDICINE | Facility: CLINIC | Age: 56
End: 2021-07-01
Payer: COMMERCIAL

## 2021-07-01 VITALS
SYSTOLIC BLOOD PRESSURE: 159 MMHG | WEIGHT: 270 LBS | DIASTOLIC BLOOD PRESSURE: 101 MMHG | TEMPERATURE: 98.4 F | HEIGHT: 72 IN | OXYGEN SATURATION: 97 % | HEART RATE: 110 BPM | BODY MASS INDEX: 36.57 KG/M2

## 2021-07-01 DIAGNOSIS — E11.42 TYPE 2 DIABETES MELLITUS WITH DIABETIC POLYNEUROPATHY, WITHOUT LONG-TERM CURRENT USE OF INSULIN (H): Primary | ICD-10-CM

## 2021-07-01 DIAGNOSIS — N18.30 STAGE 3 CHRONIC KIDNEY DISEASE, UNSPECIFIED WHETHER STAGE 3A OR 3B CKD (H): ICD-10-CM

## 2021-07-01 DIAGNOSIS — I10 ESSENTIAL HYPERTENSION, BENIGN: ICD-10-CM

## 2021-07-01 DIAGNOSIS — E66.01 MORBID OBESITY (H): ICD-10-CM

## 2021-07-01 DIAGNOSIS — E78.5 HYPERLIPIDEMIA LDL GOAL <100: ICD-10-CM

## 2021-07-01 DIAGNOSIS — Z89.511 STATUS POST BELOW KNEE AMPUTATION, RIGHT (H): ICD-10-CM

## 2021-07-01 PROCEDURE — 99214 OFFICE O/P EST MOD 30 MIN: CPT | Performed by: INTERNAL MEDICINE

## 2021-07-01 RX ORDER — KETOROLAC TROMETHAMINE 5 MG/ML
1 SOLUTION OPHTHALMIC 4 TIMES DAILY
COMMUNITY
Start: 2021-06-09 | End: 2023-06-24

## 2021-07-01 RX ORDER — INSULIN GLARGINE 100 [IU]/ML
80 INJECTION, SOLUTION SUBCUTANEOUS DAILY
Qty: 99 ML | Refills: 11 | Status: SHIPPED | OUTPATIENT
Start: 2021-07-01 | End: 2022-01-03

## 2021-07-01 RX ORDER — PREDNISOLONE ACETATE 10 MG/ML
1 SUSPENSION/ DROPS OPHTHALMIC 4 TIMES DAILY
COMMUNITY
Start: 2021-06-09 | End: 2023-06-24

## 2021-07-01 ASSESSMENT — ENCOUNTER SYMPTOMS
HEMATOCHEZIA: 0
CONSTIPATION: 0
NAUSEA: 0
HEARTBURN: 0
NERVOUS/ANXIOUS: 0
MYALGIAS: 0
HEADACHES: 0
HEMATURIA: 0
WEAKNESS: 0
SORE THROAT: 0
DIARRHEA: 0
DYSURIA: 0
EYE PAIN: 0
PALPITATIONS: 0
FREQUENCY: 0
COUGH: 0
ABDOMINAL PAIN: 0
PARESTHESIAS: 0
FEVER: 0
SHORTNESS OF BREATH: 0
ARTHRALGIAS: 0
CHILLS: 0
JOINT SWELLING: 0
DIZZINESS: 0

## 2021-07-01 ASSESSMENT — MIFFLIN-ST. JEOR: SCORE: 2107.71

## 2021-07-01 NOTE — PROGRESS NOTES
Assessment & Plan     Type 2 diabetes mellitus with diabetic polyneuropathy, without long-term current use of insulin (H)  Not well controlled, discussed options.  Reluctant to increase insulin because it will promote further weight gain.  He would like to try adding an additional oral agent.  Jardiance would address his weight gain, blood pressure elevation, blood sugar being out of control and provide renal protection as well as cardiovascular protection.  We discussed the potential risks as well including the association with increased risk of amputation which is pertinent to his case.  Understanding the potential risks and side effects, he would like to proceed with Jardiance.  We will start with a low-dose of 10 mg daily and follow a basic metabolic panel after taking it for 2 weeks.  We will schedule telephone follow-up to review home blood sugar readings at home blood pressure readings after being on Jardiance 10 mg once daily for 1 month.  If there is some improvement in blood sugar control and blood pressure control but suboptimal improvement, the dose can be further increased to 25 mg daily.  Plan for repeat hemoglobin A1c in September.  Goal A1c less than 8 at least, but less than 7 would be better.  - insulin glargine (BASAGLAR KWIKPEN) 100 UNIT/ML pen; Inject 80 Units Subcutaneous daily  - empagliflozin (JARDIANCE) 10 MG TABS tablet; Take 1 tablet (10 mg) by mouth daily  - **Basic metabolic panel FUTURE anytime; Future    Morbid obesity (H)  Discussed the importance of resuming regular physical activity as a mechanism for controlling blood pressure and blood sugar and improving outcomes with type 2 diabetes.    Stage 3 chronic kidney disease, unspecified whether stage 3a or 3b CKD    - **Basic metabolic panel FUTURE anytime; Future    Status post below knee amputation, right (H)      Essential hypertension, benign  Hopefully, his blood pressure will improve with the addition of Jardiance.  Returning  to physical activity and any weight loss will also improve this.    Hyperlipidemia LDL goal <100  The triglycerides are markedly elevated, but he attributes this to indiscretions related to diet and weight gain.  In the past, when he has paid more attention to his diet and been more physically active, he has been able to improve his triglyceride management.  Certainly improve glycemic control will also help the triglycerides.  Plan to recheck when we recheck a A1c in September.        32 minutes spent on the date of the encounter doing chart review, history and exam, documentation and further activities per the note           Return in about 1 month (around 8/1/2021) for non-fasting lab visit 2 weeks, telephone/video visit in 1 month w Mecca GARCIA.  Patient instructed to return to clinic or contact us sooner if symptoms worsen or new symptoms develop.     Kody Wing MD  [facility] SARANYA Raza is a 55 year old who presents for the following health issues     HPI     Diabetes Follow up - Declines physical today, would like to focus on medications and diabetes. Review recent labs.    He is here to follow-up diabetes, hypertension, hyperlipidemia, hypertriglyceridemia, obesity, status post below the knee amputation    Unfortunately, his hemoglobin A1c is back up again.  His home blood pressure readings have been elevated as well.  He admits to not exercising regularly.  He has been spending a lot of time working on restoring a classic [place] convertible.  He increased his Lantus insulin dose to 80 units each night.  His a.m. fasting blood sugars have all been over 150 despite this.  Most of his a.m. fasting sugars are in the 200s.      Review of Systems         Objective    BP (!) 159/101 (BP Location: Right arm, Cuff Size: Adult Large)   Pulse 110   Temp 98.4  F (36.9  C) (Temporal)   Ht 1.829 m (6')   Wt 122.5 kg (270 lb)   SpO2 97%   BMI 36.62 kg/m    Body mass index is 36.62  kg/m .  Physical Exam   This is a well-appearing man in no acute distress.  No new carotid bruits.  Heart has a regular rate and rhythm.  Lungs are clear to auscultation bilaterally, breathing is nonlabored.  There is a right below the knee amputation.  The left foot has several toe amputations, there are no ulcerations, there is a palpable dorsalis pedis pulse, there is sensation to 10 g monofilament in the left foot.

## 2021-07-14 ENCOUNTER — LAB (OUTPATIENT)
Dept: LAB | Facility: CLINIC | Age: 56
End: 2021-07-14
Payer: COMMERCIAL

## 2021-07-14 ENCOUNTER — TELEPHONE (OUTPATIENT)
Dept: FAMILY MEDICINE | Facility: CLINIC | Age: 56
End: 2021-07-14

## 2021-07-14 DIAGNOSIS — N18.30 STAGE 3 CHRONIC KIDNEY DISEASE, UNSPECIFIED WHETHER STAGE 3A OR 3B CKD (H): ICD-10-CM

## 2021-07-14 DIAGNOSIS — E11.42 TYPE 2 DIABETES MELLITUS WITH DIABETIC POLYNEUROPATHY, WITHOUT LONG-TERM CURRENT USE OF INSULIN (H): ICD-10-CM

## 2021-07-14 LAB
ANION GAP SERPL CALCULATED.3IONS-SCNC: 10 MMOL/L (ref 3–14)
BUN SERPL-MCNC: 52 MG/DL (ref 7–30)
CALCIUM SERPL-MCNC: 9.2 MG/DL (ref 8.5–10.1)
CHLORIDE BLD-SCNC: 102 MMOL/L
CO2 SERPL-SCNC: 22 MMOL/L (ref 20–32)
CREAT SERPL-MCNC: 2.4 MG/DL
GFR SERPL CREATININE-BSD FRML MDRD: 30 ML/MIN/1.73M2
GLUCOSE BLD-MCNC: 214 MG/DL (ref 70–99)
POTASSIUM BLD-SCNC: 5.1 MMOL/L (ref 3.4–5.3)
SODIUM SERPL-SCNC: 134 MMOL/L (ref 133–144)

## 2021-07-14 PROCEDURE — 36415 COLL VENOUS BLD VENIPUNCTURE: CPT

## 2021-07-14 PROCEDURE — 80048 BASIC METABOLIC PNL TOTAL CA: CPT

## 2021-07-14 NOTE — RESULT ENCOUNTER NOTE
The following letter pertains to your most recent diagnostic tests:    As we discussed by phone, the kidney function is worse than last check.  I recommend scheduling a kidney ultrasound as we discussed.  Please call [facility] at [phone] to schedule your kidney ultrasound.  Also, schedule a lab appointment as soon as convenient to check blood and urine tests that may help us understand why the kidney function is worsening.      Sincerely,    Dr. Wing

## 2021-07-14 NOTE — RESULT ENCOUNTER NOTE
Ry,     The kidney blood test is worse than last check.  I am not sure why.  I am going to have my staff reach out to you to schedule an appointment tomorrow to discuss these findings and next steps.  If you cannot make it into the clinic physically, a virtual visit would suffice.    Sincerely,    Kody Wing MD

## 2021-07-14 NOTE — TELEPHONE ENCOUNTER
Reason for Call:  Other     Detailed comments: Pt calling in about getting labs for Friday, however, there is no lab availability anywhere near by due to new system. Writer does not have authority to overbook labs. Please advise pt.      Phone Number Patient can be reached at: [phone]    Best Time: Any    Can we leave a detailed message on this number? YES    Call taken on 7/14/2021 at 5:37 PM by Neelam Clark

## 2021-07-14 NOTE — RESULT ENCOUNTER NOTE
Can you please call Ry and inform him that his kidney blood test is unfortunately worse than the last check.  Can you help him schedule an appointment in my hospital follow-up or same-day slot tomorrow to discuss these results and next steps in evaluating the elevated kidney blood test level.   If he cannot make it into the clinic, a virtual visit would suffice.

## 2021-07-14 NOTE — TELEPHONE ENCOUNTER
Provider spoke with writer face to face.   States they spoke with patient no follow up needed at this time.   Michelle Brooks RN  [facility]

## 2021-07-19 ENCOUNTER — HOSPITAL ENCOUNTER (OUTPATIENT)
Dept: ULTRASOUND IMAGING | Facility: CLINIC | Age: 56
Discharge: HOME | End: 2021-07-19
Attending: INTERNAL MEDICINE | Admitting: INTERNAL MEDICINE
Payer: COMMERCIAL

## 2021-07-19 DIAGNOSIS — N18.30 STAGE 3 CHRONIC KIDNEY DISEASE, UNSPECIFIED WHETHER STAGE 3A OR 3B CKD (H): ICD-10-CM

## 2021-07-19 PROCEDURE — 76770 US EXAM ABDO BACK WALL COMP: CPT

## 2021-07-19 NOTE — RESULT ENCOUNTER NOTE
The following letter pertains to your most recent diagnostic tests:      Good news!  The kidney ultrasound is normal        Sincerely,    Dr. Wing

## 2021-07-21 NOTE — PROGRESS NOTES
[facility] Progress Note    Subject: Patient was seen at wound center.  Patient is here for follow-up status post 10 weeks first and second toe amputations on left foot.  He notes he is doing well.  Has not noted any drainage.  Denies fever, nausea, vomiting.  No concerns today.    PMH:   Past Medical History:   Diagnosis Date     BENIGN HYPERTENSION 2007     DIABETES MELLITUS TYPE II-UNCOMPL 2007     HYPERLIPIDEMIA NEC/NOS 2007     Tobacco use disorder 2007       Social Hx:   Social History     Socioeconomic History     Marital status: Single     Spouse name: Not on file     Number of children: Not on file     Years of education: Not on file     Highest education level: Not on file   Occupational History     Not on file   Social Needs     Financial resource strain: Not on file     Food insecurity     Worry: Not on file     Inability: Not on file     Transportation needs     Medical: No     Non-medical: No   Tobacco Use     Smoking status: Former Smoker     Packs/day: 0.50     Years: 20.00     Pack years: 10.00     Types: Cigarettes     Last attempt to quit:      Years since quittin.2     Smokeless tobacco: Never Used   Substance and Sexual Activity     Alcohol use: Yes     Comment: 3-4 drinks per month     Drug use: No     Sexual activity: Yes     Partners: Female   Lifestyle     Physical activity     Days per week: 0 days     Minutes per session: 0 min     Stress: Not on file   Relationships     Social connections     Talks on phone: Not on file     Gets together: Not on file     Attends Caodaism service: Not on file     Active member of club or organization: Not on file     Attends meetings of clubs or organizations: Not on file     Relationship status: Not on file     Intimate partner violence     Fear of current or ex partner: Not on file     Emotionally abused: Not on file     Physically abused: Not on file     Forced sexual activity: Not on file   Other Topics  Subjective:      Dennis Collins is a 33 y.o. male who presents with No chief complaint on file.            Dennis comes in today for audio clearance occupational health encounter.  See scanned forms.      Review of Systems   HENT: Negative for ear discharge, ear pain, hearing loss and tinnitus.      Medications, Allergies, and current problem list reviewed today in Epic     Objective:     There were no vitals taken for this visit.     Physical Exam  Constitutional:       General: He is not in acute distress.     Appearance: Normal appearance. He is not ill-appearing, toxic-appearing or diaphoretic.   HENT:      Right Ear: Tympanic membrane, ear canal and external ear normal. There is no impacted cerumen.      Left Ear: Tympanic membrane, ear canal and external ear normal. There is no impacted cerumen.   Neurological:      Mental Status: He is alert.                        Assessment/Plan:        1. Encounter for audiology evaluation       Concern      Service Yes     Blood Transfusions No     Caffeine Concern No     Occupational Exposure No     Hobby Hazards No     Sleep Concern No     Stress Concern No     Weight Concern Yes     Comment: Would like to lose some weight     Special Diet No     Back Care No     Exercise Yes     Bike Helmet No     Seat Belt Yes     Self-Exams Yes     Parent/sibling w/ CABG, MI or angioplasty before 65F 55M? Not Asked   Social History Narrative     Not on file       Surgical Hx:   Past Surgical History:   Procedure Laterality Date     AMPUTATE FOOT Left 11/17/2019    Procedure: LEFT PARTIAL FOOT AMPUTATION;  Surgeon: Antoine Pena DPM;  Location: [facility]     AMPUTATE FOOT Left 12/4/2019    Procedure: LEFT PARTIAL FOOT AMPUTATION;  Surgeon: Tim Douglas DPM;  Location: [facility]     AMPUTATE FOOT Left 12/11/2019    Procedure: POSSIBLE PARTIAL FOOT AMPUTATION;  Surgeon: Tim Douglas DPM;  Location: [facility]     AMPUTATE LEG BELOW KNEE Right 9/1/2017    Procedure: AMPUTATE LEG BELOW KNEE;  RIGHT BELOW KNEE AMPUTATION ;  Surgeon: Nikolas Nascimento MD;  Location: [facility]     AMPUTATE TOE(S) Left 2/3/2020    Procedure: LEFT SECOND TOE AMPUTATION;  Surgeon: Milena Cevallos DPM, Podiatry/Foot and Ankle Surgery;  Location: [facility]     APPENDECTOMY       APPLY WOUND VAC Right 3/2/2015    Procedure: APPLY WOUND VAC;  Surgeon: Milena Cevallos DPM, Pod;  Location: [facility]     BIOPSY BONE FOOT Right 7/15/2016    Procedure: BIOPSY BONE FOOT;  Surgeon: Tim Douglas DPM;  [facility]: [facility]     BIOPSY BONE TOE Left 12/4/2019    Procedure: BONE BIOPSY LEFT SECOND TOE;  Surgeon: Tim Douglas DPM;  [facility]     IRRIGATION AND DEBRIDEMENT FOOT, COMBINED Right 3/2/2015    Procedure: COMBINED IRRIGATION AND DEBRIDEMENT FOOT;  Surgeon: Milena Cevallos DPM, Pod;  Location: [facility]     IRRIGATION AND DEBRIDEMENT FOOT, COMBINED Right 7/15/2016    Procedure: COMBINED IRRIGATION AND DEBRIDEMENT FOOT;  Surgeon:  "Tim Douglas DPM;  Location: [facility]     IRRIGATION AND DEBRIDEMENT FOOT, COMBINED Right 7/20/2016    Procedure: COMBINED IRRIGATION AND DEBRIDEMENT FOOT;  Surgeon: Tim Douglas DPM;  [facility]     IRRIGATION AND DEBRIDEMENT FOOT, COMBINED Left 11/19/2019    Procedure: REVISIONAL IRRIGATION AND DEBRIDEMENT LEFT FOOT AND BONE DEBRIDEMENT;  Surgeon: Joseph Randhawa DPM;  [facility]     IRRIGATION AND DEBRIDEMENT FOOT, COMBINED Left 12/4/2019    Procedure: EXCISIONAL DEBRIDEMENT LEFT FOOT;  Surgeon: Tim Douglas DPM;  [facility]     IRRIGATION AND DEBRIDEMENT FOOT, COMBINED Left 12/11/2019    Procedure: IRRIGATION AND DEBRIDEMENT FOOT, Partial osteotomy left first metatarsal;  Surgeon: Tim Douglas DPM;  [facility]     IRRIGATION AND DEBRIDEMENT FOOT, COMBINED Left 2/5/2020    Procedure: IRRIGATION AND DEBRIDEMENT LEFT FOOT;  Surgeon: Tim Douglas DPM;  Location: [facility]     ORTHOPEDIC SURGERY         Allergies:    Allergies   Allergen Reactions     Pravastatin      \"sucked the life blood out of me,\" Indicates this occurs with all statins.       Medications:   Current Outpatient Medications   Medication     acetaminophen (TYLENOL) 500 MG tablet     aspirin 81 MG chewable tablet     blood glucose (ACCU-CHEK JAYNA PLUS) test strip     blood glucose (NO BRAND SPECIFIED) lancets standard     blood glucose monitoring (NO BRAND SPECIFIED) meter device kit     Cadexomer Iodine, topical, 0.9% (IODOSORB) 0.9 % GEL gel     ezetimibe (ZETIA) 10 MG tablet     hydrochlorothiazide (HYDRODIURIL) 12.5 MG tablet     ibuprofen (ADVIL/MOTRIN) 200 MG tablet     insulin glargine (BASAGLAR KWIKPEN) 100 UNIT/ML pen     insulin pen needle (BD PEN NEEDLE MEGAN 2ND GEN) 32G X 4 MM miscellaneous     ipratropium (ATROVENT) 0.06 % spray     linagliptin (TRADJENTA) 5 MG TABS tablet     lisinopril (PRINIVIL/ZESTRIL) 40 MG tablet     metFORMIN (GLUCOPHAGE) 1000 MG tablet     multivitamin " (CENTRUM SILVER) tablet     order for DME     STATIN NOT PRESCRIBED (INTENTIONAL)     No current facility-administered medications for this encounter.          Objective:  Vitals:  BP (!) 139/101   Pulse 107   Temp 96.9  F (36.1  C) (Temporal)   Resp 18   Ht 1.829 m (6')   Wt 113.3 kg (249 lb 12.8 oz)   BMI 33.88 kg/m      A1C: 7.9 (7/2019)    General:  Patient is alert and orientated.  NAD     Dermatologic: Medial right foot incision is mostly healed at this time.  There is just a small area open at the distal aspect of the incision that measures 0.1 x 0.1 x 0.2.  No drainage noted.  No redness or acute signs of infection noted.    .   Wound (used by OP [facility] only) 03/27/20 0847 Left medial foot surgical (Active)   Length (cm) 0.4 03/27/20 0800   Width (cm) 0.1 03/27/20 0800   Depth (cm) 0.8 03/27/20 0800   Wound (cm^2) 0.04 cm^2 03/27/20 0800   Wound Volume (cm^3) 0.03 cm^3 03/27/20 0800   Dressing Appearance moist drainage 03/27/20 0800   Drainage Characteristics/Odor serosanguineous 03/27/20 0800   Drainage Amount moderate 03/27/20 0800     Vascular: DP & PT pulses are intact & regular bilaterally.   edema but no varicosities noted.  CFT and skin temperature is normal to both lower extremities.     Neurologic: Lower extremity sensation is absent.     Musculoskeletal: BKA on the right foot. Previous partial left 1st ray and 2nd toe amputations.     Assessment:    Skin ulcer of left foot with fat layer exposed (H)  Status post partial amputation of left foot (H)  Type 2 diabetes mellitus with diabetic polyneuropathy, without long-term current use of insulin (H)  Status post below knee amputation, right (H)    Plan: At this time we will have him apply endoform to the small open area for the next week with a Band-Aid.  He can continue to get the foot wet.  We will order diabetic shoe and inserts with a carbon fiber plate to help decrease pressure to the ball of the foot to hopefully prevent ulceration of this  area.  We will have him call with further questions or concerns.      Milena Cevallos DPM, Podiatry/Foot and Ankle Surgery

## 2021-07-23 ENCOUNTER — TELEPHONE (OUTPATIENT)
Dept: FAMILY MEDICINE | Facility: CLINIC | Age: 56
End: 2021-07-23

## 2021-07-23 NOTE — TELEPHONE ENCOUNTER
Can year you please call this man and remind him to schedule a lab appointment for his follow up kidney tests.  His ultrasound was OK, but I don't want him think he does not need to have the additional ordered labs because of that result

## 2021-07-26 ENCOUNTER — LAB (OUTPATIENT)
Dept: LAB | Facility: CLINIC | Age: 56
End: 2021-07-26
Payer: COMMERCIAL

## 2021-07-26 DIAGNOSIS — N18.30 STAGE 3 CHRONIC KIDNEY DISEASE, UNSPECIFIED WHETHER STAGE 3A OR 3B CKD (H): ICD-10-CM

## 2021-07-26 LAB
ALBUMIN UR-MCNC: 100 MG/DL
ANION GAP SERPL CALCULATED.3IONS-SCNC: 8 MMOL/L (ref 3–14)
APPEARANCE UR: CLEAR
BACTERIA #/AREA URNS HPF: NORMAL /HPF
BILIRUB UR QL STRIP: NEGATIVE
BUN SERPL-MCNC: 34 MG/DL (ref 7–30)
CALCIUM SERPL-MCNC: 10 MG/DL (ref 8.5–10.1)
CHLORIDE BLD-SCNC: 104 MMOL/L (ref 94–109)
CO2 SERPL-SCNC: 24 MMOL/L (ref 20–32)
COLOR UR AUTO: YELLOW
CREAT SERPL-MCNC: 1.51 MG/DL (ref 0.66–1.25)
CREAT SERPL-MCNC: 1.51 MG/DL (ref 0.66–1.25)
CREAT UR-MCNC: 62 MG/DL
FRACT EXCRET NA UR+SERPL-RTO: 1.5 %
GFR SERPL CREATININE-BSD FRML MDRD: 52 ML/MIN/1.73M2
GLUCOSE BLD-MCNC: 231 MG/DL (ref 70–99)
GLUCOSE UR STRIP-MCNC: >=1000 MG/DL
HGB UR QL STRIP: ABNORMAL
KETONES UR STRIP-MCNC: NEGATIVE MG/DL
LEUKOCYTE ESTERASE UR QL STRIP: NEGATIVE
NITRATE UR QL: NEGATIVE
PH UR STRIP: 5.5 [PH] (ref 5–7)
POTASSIUM BLD-SCNC: 4.4 MMOL/L (ref 3.4–5.3)
RBC #/AREA URNS AUTO: NORMAL /HPF
SODIUM SERPL-SCNC: 136 MMOL/L (ref 133–144)
SODIUM SERPL-SCNC: 136 MMOL/L (ref 133–144)
SODIUM UR-SCNC: 85 MMOL/L
SP GR UR STRIP: 1.02 (ref 1–1.03)
TOTAL PROTEIN SERUM FOR ELP: 7.9 G/DL (ref 6.8–8.8)
UROBILINOGEN UR STRIP-ACNC: 0.2 E.U./DL
WBC #/AREA URNS AUTO: NORMAL /HPF

## 2021-07-26 PROCEDURE — 84295 ASSAY OF SERUM SODIUM: CPT | Mod: 59

## 2021-07-26 PROCEDURE — 36415 COLL VENOUS BLD VENIPUNCTURE: CPT

## 2021-07-26 PROCEDURE — 81001 URINALYSIS AUTO W/SCOPE: CPT

## 2021-07-26 PROCEDURE — 84166 PROTEIN E-PHORESIS/URINE/CSF: CPT | Performed by: INTERNAL MEDICINE

## 2021-07-26 PROCEDURE — 84300 ASSAY OF URINE SODIUM: CPT

## 2021-07-26 PROCEDURE — 84165 PROTEIN E-PHORESIS SERUM: CPT | Performed by: PATHOLOGY

## 2021-07-26 PROCEDURE — 84155 ASSAY OF PROTEIN SERUM: CPT

## 2021-07-26 PROCEDURE — 80048 BASIC METABOLIC PNL TOTAL CA: CPT

## 2021-07-26 PROCEDURE — 82570 ASSAY OF URINE CREATININE: CPT

## 2021-07-26 NOTE — LETTER
[facility]  [address] [facility]  [address]  [place]  [address]  Tel: [phone]    July 27, 2021    Ry Horner  [address]  [facility] [address]        Dear Mr. Horner,    The following letter pertains to your most recent diagnostic tests:     Great News!  The kidney function test (creatinine) actually returned to your baseline level.  Furthermore the additional blood and urine testing did not reveal a dangerous cause for worsening kidney function.   As you know the ultrasound was normal too.  I am not sure what caused the transient elevation in your kidney test.  Sometimes this is from dehydration.   However, it is better now. I think we can resume checking the kidney function test when you return for routine follow up.           Sincerely,     Dr. Wing/JOSEF

## 2021-07-27 LAB
ALBUMIN MFR UR ELPH: 79.7 %
ALBUMIN SERPL ELPH-MCNC: 4.5 G/DL (ref 3.7–5.1)
ALPHA1 GLOB MFR UR ELPH: 1.7 %
ALPHA1 GLOB SERPL ELPH-MCNC: 0.4 G/DL (ref 0.2–0.4)
ALPHA2 GLOB MFR UR ELPH: 3 %
ALPHA2 GLOB SERPL ELPH-MCNC: 0.9 G/DL (ref 0.5–0.9)
B-GLOBULIN MFR UR ELPH: 4.4 %
B-GLOBULIN SERPL ELPH-MCNC: 1 G/DL (ref 0.6–1)
GAMMA GLOB MFR UR ELPH: 11.2 %
GAMMA GLOB SERPL ELPH-MCNC: 1.1 G/DL (ref 0.7–1.6)
M PROTEIN MFR UR ELPH: 0 %
M PROTEIN SERPL ELPH-MCNC: 0 G/DL
PROT PATTERN SERPL ELPH-IMP: NORMAL
PROT PATTERN UR ELPH-IMP: ABNORMAL

## 2021-07-27 NOTE — RESULT ENCOUNTER NOTE
The following letter pertains to your most recent diagnostic tests:    Great News!  The kidney function test (creatinine) actually returned to your baseline level.  Furthermore the additional blood and urine testing did not reveal a dangerous cause for worsening kidney function.   As you know the ultrasound was normal too.  I am not sure what caused the transient elevation in your kidney test.  Sometimes this is from dehydration.   However, it is better now. I think we can resume checking the kidney function test when you return for routine follow up.          Sincerely,    Dr. Wing

## 2021-08-05 ENCOUNTER — VIRTUAL VISIT (OUTPATIENT)
Dept: FAMILY MEDICINE | Facility: CLINIC | Age: 56
End: 2021-08-05
Payer: COMMERCIAL

## 2021-08-05 DIAGNOSIS — Z11.59 NEED FOR HEPATITIS C SCREENING TEST: ICD-10-CM

## 2021-08-05 DIAGNOSIS — E78.5 HYPERLIPIDEMIA LDL GOAL <100: ICD-10-CM

## 2021-08-05 DIAGNOSIS — I10 ESSENTIAL HYPERTENSION, BENIGN: ICD-10-CM

## 2021-08-05 DIAGNOSIS — E11.42 TYPE 2 DIABETES MELLITUS WITH DIABETIC POLYNEUROPATHY, WITHOUT LONG-TERM CURRENT USE OF INSULIN (H): Primary | ICD-10-CM

## 2021-08-05 DIAGNOSIS — N18.30 STAGE 3 CHRONIC KIDNEY DISEASE, UNSPECIFIED WHETHER STAGE 3A OR 3B CKD (H): ICD-10-CM

## 2021-08-05 DIAGNOSIS — E11.42 TYPE 2 DIABETES MELLITUS WITH DIABETIC POLYNEUROPATHY, WITHOUT LONG-TERM CURRENT USE OF INSULIN (H): ICD-10-CM

## 2021-08-05 PROCEDURE — 99213 OFFICE O/P EST LOW 20 MIN: CPT | Mod: 95 | Performed by: INTERNAL MEDICINE

## 2021-08-05 RX ORDER — LISINOPRIL 40 MG/1
40 TABLET ORAL DAILY
Qty: 90 TABLET | Refills: 3 | COMMUNITY
Start: 2021-08-05 | End: 2022-05-13

## 2021-08-05 NOTE — PROGRESS NOTES
Ry is a 56 year old who is being evaluated via a billable video visit.      How would you like to obtain your AVS? MyChart  If the video visit is dropped, the invitation should be resent by: Text to cell phone: [phone]  Will anyone else be joining your video visit? No    Video Start Time: 8:07 AM    Assessment & Plan     Type 2 diabetes mellitus with diabetic polyneuropathy, without long-term current use of insulin (H)  Difficult to ascertain how well the 10 mg jardiance dose is working due to need to stop metformin   We decided to be patient and wait for hemoglobin A1c after 9/27 before we made any adjustments to jardiance dose  Check A1c at 3 month interval 9/27   - Hemoglobin A1c; Future    Stage 3 chronic kidney disease, unspecified whether stage 3a or 3b CKD  Unclear what caused transient cr elevation  However now cr back to baseline   I think it is ok to go back on metformin and lisinopril and he was instructed to do so  Recheck bmp on 9/27 blood work ; blood pressure should improve with reintroducing lisinopril and with the addition of jardiance   - Basic metabolic panel  (Ca, Cl, CO2, Creat, Gluc, K, Na, BUN); Future    Hyperlipidemia LDL goal <100  Recheck triglycerides on 9/27 too; hopefully there will be improvement with improved glycemic control  - Lipid panel reflex to direct LDL Fasting; Future    Need for hepatitis C screening test    - Hepatitis C antibody; Future    21 minutes spent on the date of the encounter doing chart review, history and exam, documentation and further activities per the note           Return in about 8 weeks (around 9/27/2021) for Non-fasting Lab Only Visit.   Patient instructed to return to clinic or contact us sooner if symptoms worsen or new symptoms develop.   Kody Wing MD  [facility]    Subjective   Ry is a 56 year old who presents for the following health issues     HPI       Blood sugars were improving on jardiance 10  However, he had  to stop lisinopril and metformin due to Cr elevation  Work up for dangerous or reversible causes of JOHNATHAN was negative  Cr returned to baseline  Home blood pressure is high due to holding lisinopril  Blood sugars also elevated after stopping metformin       Review of Systems         Objective    Vitals - Patient Reported  Systolic (Patient Reported): (!) 150  Diastolic (Patient Reported): (!) 98        Physical Exam   GENERAL: Healthy, alert and no distress  EYES: Eyes grossly normal to inspection.  No discharge or erythema, or obvious scleral/conjunctival abnormalities.  RESP: No audible wheeze, cough, or visible cyanosis.  No visible retractions or increased work of breathing.    SKIN: Visible skin clear. No significant rash, abnormal pigmentation or lesions.  NEURO: Cranial nerves grossly intact.  Mentation and speech appropriate for age.  PSYCH: Mentation appears normal, affect normal/bright, judgement and insight intact, normal speech and appearance well-groomed.                Video-Visit Details    Type of service:  Video Visit    Video End Time:8:20 AM    Originating Location (pt. Location): Home    Distant Location (provider location):  [facility]     Platform used for Video Visit: Insight Direct (ServiceCEO)

## 2021-08-18 DIAGNOSIS — E11.42 TYPE 2 DIABETES MELLITUS WITH DIABETIC POLYNEUROPATHY, WITHOUT LONG-TERM CURRENT USE OF INSULIN (H): ICD-10-CM

## 2021-08-19 NOTE — TELEPHONE ENCOUNTER
This is a duplicate medication request. Mediation script last sent on 8/5/21     Michelle Brooks RN  [facility]

## 2021-09-25 ENCOUNTER — HEALTH MAINTENANCE LETTER (OUTPATIENT)
Age: 56
End: 2021-09-25

## 2021-09-30 ENCOUNTER — LAB (OUTPATIENT)
Dept: LAB | Facility: CLINIC | Age: 56
End: 2021-09-30
Payer: COMMERCIAL

## 2021-09-30 DIAGNOSIS — Z11.59 NEED FOR HEPATITIS C SCREENING TEST: ICD-10-CM

## 2021-09-30 DIAGNOSIS — E11.42 TYPE 2 DIABETES MELLITUS WITH DIABETIC POLYNEUROPATHY, WITHOUT LONG-TERM CURRENT USE OF INSULIN (H): ICD-10-CM

## 2021-09-30 DIAGNOSIS — N18.30 STAGE 3 CHRONIC KIDNEY DISEASE, UNSPECIFIED WHETHER STAGE 3A OR 3B CKD (H): ICD-10-CM

## 2021-09-30 DIAGNOSIS — E78.5 HYPERLIPIDEMIA LDL GOAL <100: ICD-10-CM

## 2021-09-30 LAB
ANION GAP SERPL CALCULATED.3IONS-SCNC: 6 MMOL/L (ref 3–14)
BUN SERPL-MCNC: 71 MG/DL (ref 7–30)
CALCIUM SERPL-MCNC: 9 MG/DL (ref 8.5–10.1)
CHLORIDE BLD-SCNC: 105 MMOL/L (ref 94–109)
CHOLEST SERPL-MCNC: 276 MG/DL
CO2 SERPL-SCNC: 21 MMOL/L (ref 20–32)
CREAT SERPL-MCNC: 2.5 MG/DL (ref 0.66–1.25)
FASTING STATUS PATIENT QL REPORTED: YES
GFR SERPL CREATININE-BSD FRML MDRD: 28 ML/MIN/1.73M2
GLUCOSE BLD-MCNC: 233 MG/DL (ref 70–99)
HBA1C MFR BLD: 9.6 % (ref 0–5.6)
HCV AB SERPL QL IA: NONREACTIVE
HDLC SERPL-MCNC: 27 MG/DL
LDLC SERPL CALC-MCNC: 92 MG/DL
LDLC SERPL CALC-MCNC: ABNORMAL MG/DL
NONHDLC SERPL-MCNC: 249 MG/DL
POTASSIUM BLD-SCNC: 5.2 MMOL/L (ref 3.4–5.3)
SODIUM SERPL-SCNC: 132 MMOL/L (ref 133–144)
TRIGL SERPL-MCNC: 728 MG/DL

## 2021-09-30 PROCEDURE — 80061 LIPID PANEL: CPT

## 2021-09-30 PROCEDURE — 36415 COLL VENOUS BLD VENIPUNCTURE: CPT

## 2021-09-30 PROCEDURE — 83036 HEMOGLOBIN GLYCOSYLATED A1C: CPT

## 2021-09-30 PROCEDURE — 86803 HEPATITIS C AB TEST: CPT

## 2021-09-30 PROCEDURE — 80048 BASIC METABOLIC PNL TOTAL CA: CPT

## 2021-09-30 PROCEDURE — 83721 ASSAY OF BLOOD LIPOPROTEIN: CPT | Mod: 59

## 2021-09-30 NOTE — LETTER
October 1, 2021      Ry Horner  [address]  [place] [address]        Dear ,    We are writing to inform you of your test results.    The following letter pertains to your most recent diagnostic tests:     These result do NOT look good.  I recommend that you schedule a visit with me (telephone or video visit is OK) to discuss these results.                 Resulted Orders   Hemoglobin A1c   Result Value Ref Range    Hemoglobin A1C 9.6 (H) 0.0 - 5.6 %      Comment:      Normal <5.7%   Prediabetes 5.7-6.4%    Diabetes 6.5% or higher     Note: Adopted from ADA consensus guidelines.   Hepatitis C antibody   Result Value Ref Range    Hepatitis C Antibody Nonreactive Nonreactive    Narrative    Assay performance characteristics have not been established for newborns, infants, and children.   Lipid panel reflex to direct LDL Fasting   Result Value Ref Range    Cholesterol 276 (H) <200 mg/dL    Triglycerides 728 (H) <150 mg/dL    Direct Measure HDL 27 (L) >=40 mg/dL    LDL Cholesterol Calculated        Comment:      Cannot estimate LDL when triglyceride exceeds 400 mg/dL    Non HDL Cholesterol 249 (H) <130 mg/dL    Patient Fasting > 8hrs? Yes     Narrative    Cholesterol  Desirable:  <200 mg/dL    Triglycerides  Normal:  Less than 150 mg/dL  Borderline High:  150-199 mg/dL  High:  200-499 mg/dL  Very High:  Greater than or equal to 500 mg/dL    Direct Measure HDL  Female:  Greater than or equal to 50 mg/dL   Male:  Greater than or equal to 40 mg/dL    LDL Cholesterol  Desirable:  <100mg/dL  Above Desirable:  100-129 mg/dL   Borderline High:  130-159 mg/dL   High:  160-189 mg/dL   Very High:  >= 190 mg/dL    Non HDL Cholesterol  Desirable:  130 mg/dL  Above Desirable:  130-159 mg/dL  Borderline High:  160-189 mg/dL  High:  190-219 mg/dL  Very High:  Greater than or equal to 220 mg/dL   Basic metabolic panel  (Ca, Cl, CO2, Creat, Gluc, K, Na, BUN)   Result Value Ref Range    Sodium 132 (L) 133 - 144 mmol/L     Potassium 5.2 3.4 - 5.3 mmol/L    Chloride 105 94 - 109 mmol/L    Carbon Dioxide (CO2) 21 20 - 32 mmol/L    Anion Gap 6 3 - 14 mmol/L    Urea Nitrogen 71 (H) 7 - 30 mg/dL    Creatinine 2.50 (H) 0.66 - 1.25 mg/dL    Calcium 9.0 8.5 - 10.1 mg/dL    Glucose 233 (H) 70 - 99 mg/dL    GFR Estimate 28 (L) >60 mL/min/1.73m2      Comment:      As of July 11, 2021, eGFR is calculated by the CKD-EPI creatinine equation, without race adjustment. eGFR can be influenced by muscle mass, exercise, and diet. The reported eGFR is an estimation only and is only applicable if the renal function is stable.   LDL cholesterol direct   Result Value Ref Range    LDL Cholesterol Direct 92 <100 mg/dL      Comment:      Age 0-19 years:  Desirable: 0-110 mg/dL   Borderline high: 110-129 mg/dL   High: >= 130 mg/dL    Age 20 years and older:  Desirable: <100mg/dL  Above desirable: 100-129 mg/dL   Borderline high: 130-159 mg/dL   High: 160-189 mg/dL   Very high: >= 190 mg/dL       If you have any questions or concerns, please call the clinic at the number listed above.       Sincerely,      Kody Wing MD

## 2021-10-01 ENCOUNTER — OFFICE VISIT (OUTPATIENT)
Dept: FAMILY MEDICINE | Facility: CLINIC | Age: 56
End: 2021-10-01
Payer: COMMERCIAL

## 2021-10-01 ENCOUNTER — HOSPITAL ENCOUNTER (OUTPATIENT)
Dept: WOUND CARE | Facility: CLINIC | Age: 56
Discharge: HOME | End: 2021-10-01
Attending: PHYSICIAN ASSISTANT | Admitting: PHYSICIAN ASSISTANT
Payer: COMMERCIAL

## 2021-10-01 ENCOUNTER — TELEPHONE (OUTPATIENT)
Dept: FAMILY MEDICINE | Facility: CLINIC | Age: 56
End: 2021-10-01

## 2021-10-01 ENCOUNTER — NURSE TRIAGE (OUTPATIENT)
Dept: NURSING | Facility: CLINIC | Age: 56
End: 2021-10-01

## 2021-10-01 VITALS
RESPIRATION RATE: 16 BRPM | HEIGHT: 72 IN | WEIGHT: 260 LBS | TEMPERATURE: 96.8 F | BODY MASS INDEX: 35.21 KG/M2 | OXYGEN SATURATION: 99 % | DIASTOLIC BLOOD PRESSURE: 89 MMHG | HEART RATE: 99 BPM | SYSTOLIC BLOOD PRESSURE: 131 MMHG

## 2021-10-01 VITALS
HEIGHT: 72 IN | WEIGHT: 262 LBS | SYSTOLIC BLOOD PRESSURE: 123 MMHG | TEMPERATURE: 96.8 F | DIASTOLIC BLOOD PRESSURE: 79 MMHG | BODY MASS INDEX: 35.49 KG/M2 | HEART RATE: 110 BPM

## 2021-10-01 DIAGNOSIS — E11.42 TYPE 2 DIABETES MELLITUS WITH DIABETIC POLYNEUROPATHY, WITHOUT LONG-TERM CURRENT USE OF INSULIN (H): ICD-10-CM

## 2021-10-01 DIAGNOSIS — S91.302D OPEN WOUND OF PLANTAR ASPECT OF FOOT, LEFT, SUBSEQUENT ENCOUNTER: ICD-10-CM

## 2021-10-01 DIAGNOSIS — S81.802A OPEN WOUND OF LEFT LOWER EXTREMITY, INITIAL ENCOUNTER: Primary | ICD-10-CM

## 2021-10-01 PROCEDURE — 99214 OFFICE O/P EST MOD 30 MIN: CPT | Performed by: INTERNAL MEDICINE

## 2021-10-01 PROCEDURE — 99215 OFFICE O/P EST HI 40 MIN: CPT | Performed by: PHYSICIAN ASSISTANT

## 2021-10-01 PROCEDURE — 97602 WOUND(S) CARE NON-SELECTIVE: CPT

## 2021-10-01 PROCEDURE — G0463 HOSPITAL OUTPT CLINIC VISIT: HCPCS | Mod: 25

## 2021-10-01 ASSESSMENT — MIFFLIN-ST. JEOR
SCORE: 2057.35
SCORE: 2066.42

## 2021-10-01 NOTE — DISCHARGE INSTRUCTIONS
[facility]  [address] [facility] [address], [facility] [address]    Call us at [phone] if you have any questions about your wounds, have redness or swelling around your wound, have a fever of 101 or greater or if you have any other problems or concerns. We answer the phone Monday through Friday 8 am to 4 pm, please leave a message as we check the voicemail frequently throughout the day.     Ry Horner      1965    Continue to monitor Blood Sugars and continue high protein diet.   Shoe wear: off load and elevate the foot through out the day as often as possible    Wound Dressing Change: Left Plantar   Cleanse wound and surrounding skin with: soap and water, rinse well and pat dry  Swab open area, toes and bottom of foot with Betadine swab and fan dry  Cover wound with dry gauze and secure with roll gauze and tape  Change dressing  Daily     Carmina Paz PA-C. October 1, 2021    Consult placed to : [facility] [place]:Phone: [phone] Fax: [phone]  [place]: Phone [phone] Fax [phone]  Please call Patient to schedule Evaluate and Treat as indicated     Return to [facility] as scheduled  If you had a positive experience please indicate on your patient satisfaction survey form that [facility] will be sending you.    If you have any billing related questions please call the [facility] Business office at [phone]. The clinic staff does not handle billing related matters.

## 2021-10-01 NOTE — PROGRESS NOTES
[facility]    ASSESSMENT:   1. (S91.302D) Open wound of plantar aspect of foot, left, subsequent encounter  2. Poorly controlled diabetes    PLAN/DISCUSSION:   1. Wound care plan: daily paint with betadine, dry gauze  2. Referral to [facility] to fit for custom inserts  3. Stay off foot as much as possible  4. Take care to improve blood glucose, this is vital to healing    HISTORY OF PRESENT ILLNESS:   Ry Horner is a 56 year old male with poorly controlled diabetes and history of R BKA who presents today for a new ulceration on his plantar left foot. Has long history of foot wounds. Most recent A1C was 9.6% is working with his team to improve this. Started wearing new shoes a month ago, these are just off the shelf sneakers.      TREATMENT COURSE:  10/01/21: Presents for initial visit at our clinic.     VITALS: /79 (BP Location: Left arm)   Pulse 110   Temp 96.8  F (36  C) (Temporal)   Ht 1.829 m (6')   Wt 118.8 kg (262 lb)   BMI 35.53 kg/m       PHYSICAL EXAM:  GENERAL: Patient is alert and oriented and in no acute distress  CV: unable to palpate pedal pulses, triphasic signal heard on doppler on PT and DP  INTEGUMENTARY:   Wound (used by OP [facility] only) 10/01/21 1414 Left neuropathic ulcer (Active)   Thickness/Stage full thickness 10/01/21 1400   Dressing Appearance moist drainage 10/01/21 1400   Base moist;pink 10/01/21 1400   Periwound macerated;intact 10/01/21 1400   Periwound Temperature warm 10/01/21 1400   Periwound Skin Turgor soft 10/01/21 1400   Edges open 10/01/21 1400   Length (cm) 3 10/01/21 1400   Width (cm) 2.5 10/01/21 1400   Depth (cm) 0 10/01/21 1400   Wound (cm^2) 7.5 cm^2 10/01/21 1400   Wound Volume (cm^3) 0 cm^3 10/01/21 1400   Drainage Characteristics/Odor serosanguineous 10/01/21 1400   Drainage Amount moderate 10/01/21 1400   Care, Wound non-select wound debridement performed 10/01/21 1400   Dressing Care dressing applied 10/01/21 1400           MDM: 45-59 minutes  were spent on the date of the visit reviewing previous chart notes, evaluating patient and developing the treatment plan, this excludes any time spent on procedures.     BRITT CHRISTIANSON PA-C

## 2021-10-01 NOTE — TELEPHONE ENCOUNTER
Blister on left foot, starting to bleed. Type 2 diabetic. Last time it happened he was on antibiotics and had toes removed. I connected him with scheduling for an appointment and advised urgent care or the ER if they can't get him an appointment. He said he will go to the ER because last time this happened, he had his toes amputated.   Tennille Ramires RN  [facility]      Reason for Disposition    [1] Drainage from foot AND [2] new or increased    Additional Information    Negative: Sounds like a life-threatening emergency to the triager    Negative: Caused by an animal bite    Negative: Caused by a human bite    Negative: Foreign body in skin (e.g., splinter, sliver)    Negative: Injury is a puncture wound    Negative: Followed a foot or ankle injury    Negative: SEVERE pain     Got new shoes. Pain at 4 neuropathy    Negative: Foot is cool or blue in comparison to other side    Negative: [1] Looks infected (e.g., spreading redness, red streak, or pus) AND [2] fever    Negative: Patient sounds very sick or weak to the triager    Negative: Fever > 100.4 F (38.0 C)    Protocols used: DIABETES - FOOT PROBLEMS AND [id number]

## 2021-10-01 NOTE — TELEPHONE ENCOUNTER
Pt has urgent 3-5 day pod referral- please triage and advise/schedule appropriately.  No openings within 3-5 days     Thank you,     [facility]a [facility]

## 2021-10-01 NOTE — TELEPHONE ENCOUNTER
Upon chart review, patient appears to have been seen at [facility] [place] today for foot wound. Podiatry visit/ referral is not needed.     Karrie Pimentel, ATC

## 2021-10-01 NOTE — PROGRESS NOTES
Assessment & Plan   Problem List Items Addressed This Visit     None      Visit Diagnoses     Open wound of left lower extremity, initial encounter    -  Primary    Relevant Medications    cephALEXin (KEFLEX) 250 MG capsule    Other Relevant Orders    Wound Care Referral    Orthopedic  Referral           Given his history of peripheral vascular disease,  Foot/toe amputation and now with blister formation with some redness near the second digit at the base; possible early cellulitis, will go out and cover with Keflex for 3 days I do not see any purulent drainage or collection.  There appears to be a  blister unroofed with granulation tissue, no surrounding erythema, applied nonadhesive dressing referral urgent wound clinic and also with podiatry.  There is no signs of active cellulitis will cover with Keflex for 3 days and go from there.  If any redness swelling or purulent drainage to follow-up immediately.  Continue follow-up with PCP for management of his diabetes.       BMI:   Estimated body mass index is 35.26 kg/m  as calculated from the following:    Height as of this encounter: 1.829 m (6').    Weight as of this encounter: 117.9 kg (260 lb).   Weight management plan: Discussed healthy diet and exercise guidelines    See Patient Instructions    No follow-ups on file.    Roxy Wallace MD  [facility] SARANYA Raza is a 56 year old who presents for the following health issues     HPI     Blister concern    Patient presented for evaluation of blisters that opened on his left foot there was some serous drainage on his socks.  He has right below-knee amputation he has a left big toe amputation he was very concerned with blister that happened in the drainage.  Denies any pus draining.  Denies any redness or swelling of the left foot.  No other systemic complaints.  His diabetes still not controlled.    Review of Systems   Constitutional, HEENT, cardiovascular, pulmonary, gi  and gu systems are negative, except as otherwise noted.      Objective    /89 (BP Location: Right arm, Patient Position: Sitting, Cuff Size: Adult Large)   Pulse 99   Temp 96.8  F (36  C) (Temporal)   Resp 16   Ht 1.829 m (6')   Wt 117.9 kg (260 lb)   SpO2 99%   BMI 35.26 kg/m    Body mass index is 35.26 kg/m .  Physical Exam   General appearance: not in acute distress.Patient is not sick or toxic lookin   MSK:left lower extremity has prosthesis in right leg  SKIN: Left foot there is a blister coin sized on the plantar aspect of the forefoot with unroofed blister and there is  granulation tissue underlying, area near the medial aspect of the second digit at the base is slightly red but there is no pus drainage, no purulence collection, no spread of the swelling.  No foot swelling per se.  No red streaks.    EXTremity: left foot, Pulses +1-2 distally.     Lab on 09/30/2021   Component Date Value Ref Range Status     Hemoglobin A1C 09/30/2021 9.6* 0.0 - 5.6 % Final    Normal <5.7%   Prediabetes 5.7-6.4%    Diabetes 6.5% or higher     Note: Adopted from ADA consensus guidelines.     Hepatitis C Antibody 09/30/2021 Nonreactive  Nonreactive Final     Cholesterol 09/30/2021 276* <200 mg/dL Final     Triglycerides 09/30/2021 728* <150 mg/dL Final     Direct Measure HDL 09/30/2021 27* >=40 mg/dL Final     LDL Cholesterol Calculated 09/30/2021    Final    Cannot estimate LDL when triglyceride exceeds 400 mg/dL     Non HDL Cholesterol 09/30/2021 249* <130 mg/dL Final     Patient Fasting > 8hrs? 09/30/2021 Yes   Final     Sodium 09/30/2021 132* 133 - 144 mmol/L Final     Potassium 09/30/2021 5.2  3.4 - 5.3 mmol/L Final     Chloride 09/30/2021 105  94 - 109 mmol/L Final     Carbon Dioxide (CO2) 09/30/2021 21  20 - 32 mmol/L Final     Anion Gap 09/30/2021 6  3 - 14 mmol/L Final     Urea Nitrogen 09/30/2021 71* 7 - 30 mg/dL Final     Creatinine 09/30/2021 2.50* 0.66 - 1.25 mg/dL Final     Calcium 09/30/2021 9.0  8.5  - 10.1 mg/dL Final     Glucose 09/30/2021 233* 70 - 99 mg/dL Final     GFR Estimate 09/30/2021 28* >60 mL/min/1.73m2 Final    As of July 11, 2021, eGFR is calculated by the CKD-EPI creatinine equation, without race adjustment. eGFR can be influenced by muscle mass, exercise, and diet. The reported eGFR is an estimation only and is only applicable if the renal function is stable.     LDL Cholesterol Direct 09/30/2021 92  <100 mg/dL Final    Age 0-19 years:  Desirable: 0-110 mg/dL   Borderline high: 110-129 mg/dL   High: >= 130 mg/dL    Age 20 years and older:  Desirable: <100mg/dL  Above desirable: 100-129 mg/dL   Borderline high: 130-159 mg/dL   High: 160-189 mg/dL   Very high: >= 190 mg/dL

## 2021-10-01 NOTE — RESULT ENCOUNTER NOTE
Please help him schedule an appointment with me to discuss his abnormal lab results.  Virtual visit OK

## 2021-10-01 NOTE — PROGRESS NOTES
Patient arrived for wound care visit. Certified Wound Care Nurse time spent evaluating patient record, completed a full evaluation and documented wound(s) & alexander-wound skin; provided recommendation based on treatment plan. Applied dressing, reviewed discharge instructions, patient education, and discussed plan of care with appropriate medical team staff members and patient and/or family members.     Education provided on wound care, healing with good teach back.  all concerns addressed and pt will get supplies from drug store.

## 2021-10-01 NOTE — RESULT ENCOUNTER NOTE
The following letter pertains to your most recent diagnostic tests:    These result do NOT look good.  I recommend that you schedule a visit with me (telephone or video visit is OK) to discuss these results.        I will have my staff contact you to secure an appointment.        Sincerely,    Dr. Wing

## 2021-10-04 RX ORDER — PEN NEEDLE, DIABETIC 32GX 5/32"
NEEDLE, DISPOSABLE MISCELLANEOUS
Qty: 100 EACH | Refills: 1 | Status: SHIPPED | OUTPATIENT
Start: 2021-10-04 | End: 2022-01-13

## 2021-10-11 ENCOUNTER — OFFICE VISIT (OUTPATIENT)
Dept: FAMILY MEDICINE | Facility: CLINIC | Age: 56
End: 2021-10-11
Payer: COMMERCIAL

## 2021-10-11 VITALS
SYSTOLIC BLOOD PRESSURE: 129 MMHG | WEIGHT: 259.6 LBS | BODY MASS INDEX: 35.21 KG/M2 | OXYGEN SATURATION: 97 % | DIASTOLIC BLOOD PRESSURE: 89 MMHG | RESPIRATION RATE: 16 BRPM | TEMPERATURE: 97.1 F | HEART RATE: 113 BPM

## 2021-10-11 DIAGNOSIS — E78.5 HYPERLIPIDEMIA LDL GOAL <100: ICD-10-CM

## 2021-10-11 DIAGNOSIS — N17.9 ACUTE RENAL FAILURE, UNSPECIFIED ACUTE RENAL FAILURE TYPE (H): Primary | ICD-10-CM

## 2021-10-11 DIAGNOSIS — I10 ESSENTIAL HYPERTENSION, BENIGN: ICD-10-CM

## 2021-10-11 DIAGNOSIS — E11.42 TYPE 2 DIABETES MELLITUS WITH DIABETIC POLYNEUROPATHY, WITHOUT LONG-TERM CURRENT USE OF INSULIN (H): ICD-10-CM

## 2021-10-11 PROCEDURE — 80048 BASIC METABOLIC PNL TOTAL CA: CPT | Performed by: INTERNAL MEDICINE

## 2021-10-11 PROCEDURE — 99214 OFFICE O/P EST MOD 30 MIN: CPT | Performed by: INTERNAL MEDICINE

## 2021-10-11 PROCEDURE — 36415 COLL VENOUS BLD VENIPUNCTURE: CPT | Performed by: INTERNAL MEDICINE

## 2021-10-11 NOTE — PROGRESS NOTES
Assessment & Plan     Acute renal failure, unspecified acute renal failure type (H)  Need to recheck kidney function after good hydration, I suspect that the creatinine and BUN are up because of prerenal azotemia because of a hyperosmolar state and resultant dehydration, if the creatinine remains above 1.5, consideration needs to be given to stopping renally cleared drugs such as Metformin, Januvia, Jardiance  - Basic metabolic panel  (Ca, Cl, CO2, Creat, Gluc, K, Na, BUN); Future  - Basic metabolic panel  (Ca, Cl, CO2, Creat, Gluc, K, Na, BUN)    Type 2 diabetes mellitus with diabetic polyneuropathy, without long-term current use of insulin (H)  Not well controlled, increase Basaglar insulin to 43 units twice daily, if fasting blood sugars remain greater than 150 after 3 days, increase by 1 unit twice daily every 3 days until a.m. fasting blood sugars less than 150, patient verbalized understanding of these instructions    Essential hypertension, benign  Home blood pressure readings are well controlled, need to stop lisinopril if creatinine is up again    Hyperlipidemia LDL goal <100  Intolerant to statin therapy, on Zetia, triglycerides were high last visit probably due to hyperglycemia, hopefully, with improvement in glycemic control, triglycerides will come down into a safer range as they have in the past        22 minutes spent on the date of the encounter doing chart review, history and exam, documentation and further activities per the note           No follow-ups on file.    Kody Wing MD  [facility] SARANYA Raza is a 56 year old who presents for the following health issues     HPI     Review Lab results   Nice man with insulin-dependent diabetes, poorly controlled, also with hypertension, hyperlipidemia, statin intolerance, polyneuropathy with history of foot infection status post amputation    He was seen last week with concerns about a blister on his foot, he was  referred to wound care, things are improving    Blood sugars are typically greater than 200 fasting    A1c was over 9    Triglycerides were very high    Creatinine was up again so was BUN    Review of Systems         Objective    BP (!) 141/99 (BP Location: Right arm, Patient Position: Sitting, Cuff Size: Adult Regular)   Pulse 113   Temp 97.1  F (36.2  C) (Temporal)   Resp 16   Wt 117.8 kg (259 lb 9.6 oz)   SpO2 97%   BMI 35.21 kg/m    Body mass index is 35.21 kg/m .  Physical Exam   General: Well-appearing man in no acute distress, not toxic.  Foot: There is a well-healing blister that was unroofed on the left foot without evidence of infection

## 2021-10-11 NOTE — LETTER
October 13, 2021      Ry Horner  [address]  [facility] [address]        Dear ,    We are writing to inform you of your test results.    The following letter pertains to your most recent diagnostic tests:     The good news is that the kidney function test creatinine has gotten significantly better since last check 12 days ago.  The not as good news is, it is a little higher than your baseline when previously checked several months ago.       The kidney function is sufficiently impaired to the point that I think we should reduce the dose of Metformin from 1000 mg twice daily to 500 mg twice daily.  I sent a prescription for 500 mg tablets to your pharmacy.       Also, the Januvia dose should be reduced from 100 mg daily to 50 mg daily.  I sent a new prescription for 50 mg Januvia tablets to your pharmacy ([facility]).     The other oral medications can remain at your previous doses.     Increase the Basaglar insulin incrementally as we discussed yesterday in clinic.     We will touch base in a few weeks to see how blood sugars are doing.       Resulted Orders   Basic metabolic panel  (Ca, Cl, CO2, Creat, Gluc, K, Na, BUN)   Result Value Ref Range    Sodium 134 133 - 144 mmol/L    Potassium 5.0 3.4 - 5.3 mmol/L    Chloride 104 94 - 109 mmol/L    Carbon Dioxide (CO2) 20 20 - 32 mmol/L    Anion Gap 10 3 - 14 mmol/L    Urea Nitrogen 54 (H) 7 - 30 mg/dL    Creatinine 1.91 (H) 0.66 - 1.25 mg/dL    Calcium 9.7 8.5 - 10.1 mg/dL    Glucose 238 (H) 70 - 99 mg/dL    GFR Estimate 39 (L) >60 mL/min/1.73m2      Comment:      As of July 11, 2021, eGFR is calculated by the CKD-EPI creatinine equation, without race adjustment. eGFR can be influenced by muscle mass, exercise, and diet. The reported eGFR is an estimation only and is only applicable if the renal function is stable.       If you have any questions or concerns, please call the clinic at the number listed above.       Sincerely,      Kody Wing,  MD

## 2021-10-12 DIAGNOSIS — E11.42 TYPE 2 DIABETES MELLITUS WITH DIABETIC POLYNEUROPATHY, WITHOUT LONG-TERM CURRENT USE OF INSULIN (H): ICD-10-CM

## 2021-10-12 LAB
ANION GAP SERPL CALCULATED.3IONS-SCNC: 10 MMOL/L (ref 3–14)
BUN SERPL-MCNC: 54 MG/DL (ref 7–30)
CALCIUM SERPL-MCNC: 9.7 MG/DL (ref 8.5–10.1)
CHLORIDE BLD-SCNC: 104 MMOL/L (ref 94–109)
CO2 SERPL-SCNC: 20 MMOL/L (ref 20–32)
CREAT SERPL-MCNC: 1.91 MG/DL (ref 0.66–1.25)
GFR SERPL CREATININE-BSD FRML MDRD: 39 ML/MIN/1.73M2
GLUCOSE BLD-MCNC: 238 MG/DL (ref 70–99)
POTASSIUM BLD-SCNC: 5 MMOL/L (ref 3.4–5.3)
SODIUM SERPL-SCNC: 134 MMOL/L (ref 133–144)

## 2021-10-12 NOTE — RESULT ENCOUNTER NOTE
The following letter pertains to your most recent diagnostic tests:    The good news is that the kidney function test creatinine has gotten significantly better since last check 12 days ago.  The not as good news is, it is a little higher than your baseline when previously checked several months ago.      The kidney function is sufficiently impaired to the point that I think we should reduce the dose of Metformin from 1000 mg twice daily to 500 mg twice daily.  I sent a prescription for 500 mg tablets to your pharmacy.      Also, the Januvia dose should be reduced from 100 mg daily to 50 mg daily.  I sent a new prescription for 50 mg Januvia tablets to your pharmacy ([facility]).    The other oral medications can remain at your previous doses.    Increase the Basaglar insulin incrementally as we discussed yesterday in clinic.    We will touch base in a few weeks to see how blood sugars are doing.    Sincerely,    Dr. Wing

## 2021-10-19 ENCOUNTER — HOSPITAL ENCOUNTER (OUTPATIENT)
Dept: WOUND CARE | Facility: CLINIC | Age: 56
End: 2021-10-19
Attending: PHYSICIAN ASSISTANT
Payer: COMMERCIAL

## 2021-10-19 DIAGNOSIS — S81.802A OPEN WOUND OF LEFT LOWER EXTREMITY, INITIAL ENCOUNTER: ICD-10-CM

## 2021-10-19 PROCEDURE — 99213 OFFICE O/P EST LOW 20 MIN: CPT | Mod: TEL | Performed by: PHYSICIAN ASSISTANT

## 2021-10-19 NOTE — PROGRESS NOTES
[facility]    ASSESSMENT:   1. (S91.302D) Open wound of plantar aspect of foot, left, subsequent encounter  2. Poorly controlled diabetes    PLAN/DISCUSSION:   1. Wound care plan: daily paint with betadine, dry gauze  2. Encouraged him to make appt with [facility] to fit for custom inserts  3. Stay off foot as much as possible  4. Take care to improve blood glucose, this is vital to healing    HISTORY OF PRESENT ILLNESS:   Ry Horner is a 56 year old male with poorly controlled diabetes and history of R BKA who follows up on an ulceration on his plantar left foot. Has long history of foot wounds. Most recent A1C was 9.6% is working with his team to improve this. Started wearing new shoes a month ago, these are just off the shelf sneakers.      TREATMENT COURSE:  10/01/21: Presents for initial visit at our clinic. Wound is very shallow. Recommend painting daily with betadine and applying dry gauze. Offloading precautions given. Recommended he get fitted for custom inserts/orthotics and referral to [facility] made.  10/19/21: Returns for televisit. Has not made appt with [facility] yet. Wound appears to be improving via patients report.     VITALS: There were no vitals taken for this visit.     PHYSICAL EXAM:  INTEGUMENTARY:   Wound (used by OP [facility] only) 10/01/21 1414 Left neuropathic ulcer (Active)   Thickness/Stage full thickness 10/19/21 0752   Dressing Appearance moist drainage 10/19/21 0752   Base granulating 10/19/21 0752   Periwound intact 10/19/21 0752   Periwound Temperature warm 10/19/21 0752   Periwound Skin Turgor soft 10/19/21 0752   Edges open 10/19/21 0752   Length (cm) 3 10/19/21 0752   Width (cm) 2.2 10/19/21 0752   Depth (cm) 0.1 10/19/21 0752   Wound (cm^2) 6.6 cm^2 10/19/21 0752   Wound Volume (cm^3) 0.66 cm^3 10/19/21 0752   Wound healing % 12 10/19/21 0752   Drainage Characteristics/Odor serosanguineous 10/19/21 0752   Drainage Amount moderate 10/19/21 0752           The patient has been  "notified of following:     \"This telephone visit will be conducted via a call between you and your physician/provider. We have found that certain health care needs can be provided without the need for a physical exam.  This service lets us provide the care you need with a short phone conversation.  If a prescription is necessary we can send it directly to your pharmacy.  If lab work is needed we can place an order for that and you can then stop by our lab to have the test done at a later time.    If during the course of the call the physician/provider feels a telephone visit is not appropriate, you will not be charged for this service.\"     Patient has given verbal consent for Telephone visit?  Yes  DURATION OF CALL 15 mins    BRITT CHRISTIANSON PA-C    "

## 2021-11-01 ENCOUNTER — VIRTUAL VISIT (OUTPATIENT)
Dept: FAMILY MEDICINE | Facility: CLINIC | Age: 56
End: 2021-11-01
Payer: COMMERCIAL

## 2021-11-01 DIAGNOSIS — E11.42 TYPE 2 DIABETES MELLITUS WITH DIABETIC POLYNEUROPATHY, WITHOUT LONG-TERM CURRENT USE OF INSULIN (H): Primary | ICD-10-CM

## 2021-11-01 PROCEDURE — 99213 OFFICE O/P EST LOW 20 MIN: CPT | Mod: 95 | Performed by: INTERNAL MEDICINE

## 2021-11-01 NOTE — PROGRESS NOTES
Ry is a 56 year old who is being evaluated via a billable video visit.      How would you like to obtain your AVS? MyChart  If the video visit is dropped, the invitation should be resent by: Text to cell phone: [phone]  Will anyone else be joining your video visit? No    Video Start Time: 5:13 PM    Assessment & Plan     Type 2 diabetes mellitus with diabetic polyneuropathy, without long-term current use of insulin (H)    Write a prescription for the test strips that he needs, continue up titration of Basaglar insulin for goal a.m. fasting blood sugar less than 150 or ideally less than 130, continue renally dosed Januvia and Metformin, discontinue concomitant hydrochlorothiazide with Jardiance to avoid urinary symptoms, monitor home blood pressures upon doing so to make sure that they remain less than 140/90, contact me if they fail to do so to discuss other antihypertensive medications, follow-up in 2 months after A1c    - blood glucose (NO BRAND SPECIFIED) test strip; 120 strips by In Vitro route 4 times daily Use to test blood sugar up to 4 times daily or as directed.      25 minutes spent on the date of the encounter doing chart review, history and exam, documentation and further activities per the note           No follow-ups on file.    Kody Wing MD  [facility]   Ry is a 56 year old who presents for the following health issues     HPI     Follow Up Diabetes   Needs new prescription for test strips (pended) has a new meter.    A.m. fasting sugars are between 150 and 170 after increasing Basaglar insulin to 48 units twice daily, he ran out of test strips to facilitate further up titration of insulin dose, home blood pressure readings have been well controlled    He is urinating frequency without dysuria or hematuria, but it disrupts his sleep    Other premeal blood sugar readings are actually lower including prelunch and predinner readings, no low blood sugar  readings        Review of Systems         Objective           Vitals:  No vitals were obtained today due to virtual visit.    Physical Exam   GENERAL: Healthy, alert and no distress  EYES: Eyes grossly normal to inspection.  No discharge or erythema, or obvious scleral/conjunctival abnormalities.  RESP: No audible wheeze, cough, or visible cyanosis.  No visible retractions or increased work of breathing.    SKIN: Visible skin clear. No significant rash, abnormal pigmentation or lesions.  NEURO: Cranial nerves grossly intact.  Mentation and speech appropriate for age.  PSYCH: Mentation appears normal, affect normal/bright, judgement and insight intact, normal speech and appearance well-groomed.          Video-Visit Details    Type of service:  Video Visit    Video End Time:1522    Originating Location (pt. Location): Home    Distant Location (provider location):  [facility]     Platform used for Video Visit: [employer]

## 2021-11-02 ENCOUNTER — HOSPITAL ENCOUNTER (OUTPATIENT)
Dept: WOUND CARE | Facility: CLINIC | Age: 56
Discharge: HOME | End: 2021-11-02
Attending: PHYSICIAN ASSISTANT | Admitting: PHYSICIAN ASSISTANT
Payer: COMMERCIAL

## 2021-11-02 ENCOUNTER — TELEPHONE (OUTPATIENT)
Dept: FAMILY MEDICINE | Facility: CLINIC | Age: 56
End: 2021-11-02

## 2021-11-02 VITALS — DIASTOLIC BLOOD PRESSURE: 92 MMHG | HEART RATE: 121 BPM | TEMPERATURE: 95.5 F | SYSTOLIC BLOOD PRESSURE: 158 MMHG

## 2021-11-02 DIAGNOSIS — S91.302D OPEN WOUND OF PLANTAR ASPECT OF FOOT, LEFT, SUBSEQUENT ENCOUNTER: ICD-10-CM

## 2021-11-02 DIAGNOSIS — E11.42 TYPE 2 DIABETES MELLITUS WITH DIABETIC POLYNEUROPATHY, WITHOUT LONG-TERM CURRENT USE OF INSULIN (H): ICD-10-CM

## 2021-11-02 PROCEDURE — 11042 DBRDMT SUBQ TIS 1ST 20SQCM/<: CPT | Performed by: PHYSICIAN ASSISTANT

## 2021-11-02 PROCEDURE — 11042 DBRDMT SUBQ TIS 1ST 20SQCM/<: CPT

## 2021-11-02 NOTE — TELEPHONE ENCOUNTER
Pharmacy faxing: need Rx's for new meter and lancets ASAP    LOV 11-1-2021 [place] (Rx for test strips was sent - test 4x daily)  Follow up 2 months  No future OV scheduled    Pended as requested    RT Lily (R)

## 2021-11-02 NOTE — DISCHARGE INSTRUCTIONS
[facility]  [address] [address]    Call us at [phone] if you have any questions about your wounds, have redness or swelling around your wound, have a fever of 101 or greater or if you have any other problems or concerns. We answer the phone Monday through Friday 8 am to 4 pm, please leave a message as we check the voicemail frequently throughout the day.     Ry Horner      1965    Wound Dressing Change: left foot  1. Cleanse wound and surrounding skin with: mild soap and water or wound cleanser  2. Cover wound with Zorflex  3. Pull on EdemaWear from toes to calf  4. Apply roll gauze over the EdemaWear to collect drainage  5. Change dressing 1-2 times daily       Carmina Paz PA-C. November 2, 2021      If you had a positive experience please indicate on your patient satisfaction survey form that [facility] will be sending you.    If you have any billing related questions please call the [facility] office at [phone]. The clinic staff does not handle billing related matters.

## 2021-11-02 NOTE — PROGRESS NOTES
[facility]    ASSESSMENT:   1. (S98.302D) Open wound of plantar aspect of foot, left, subsequent encounter  2. Poorly controlled diabetes    PLAN/DISCUSSION:   1. Wound is not improving, most important thing to do is stay off of foot. He will make appt with [facility] for custom orthotics  2. Briefly discussed casting, unsure how he would do with contralateral prosthetic but may need to consider in the future if things don't improve  3. Wound care plan: twice daily cleanse wound, apply ZorFlex, Small EdemaWear, gauze and roll gauze  4. Stay off foot as much as possible  5. Take care to improve blood glucose, this is vital to healing  6. Follow-up 2 weeks with Ban     HISTORY OF PRESENT ILLNESS:   Ry Horner is a 56 year old male with poorly controlled diabetes and history of R BKA who follows up on an ulceration on his plantar left foot. Has long history of foot wounds. Most recent A1C was 9.6% is working with his team to improve this. Started wearing new shoes a month ago, these are just off the shelf sneakers.      TREATMENT COURSE:  10/01/21: Presents for initial visit at our clinic. Wound is very shallow. Recommend painting daily with betadine and applying dry gauze. Offloading precautions given. Recommended he get fitted for custom inserts/orthotics and referral to [facility] made.  10/19/21: Returns for televisit. Has not made appt with [facility] yet. Wound appears to be improving via patients report.   11/02/21: Returns for in office follow-up. Still has not made appt with [facility] yet despite our recommendations. Wound has regressed. More swelling today. Has been changing bandage twice a day due to drainage.     VITALS: BP (!) 158/92   Pulse (!) 121   Temp (!) 95.5  F (35.3  C) (Temporal)      PHYSICAL EXAM:  INTEGUMENTARY:   Wound (used by OP [facility] only) 10/01/21 1414 Left neuropathic ulcer (Active)   Dressing Appearance moist drainage 11/02/21 0814   Length (cm) 2.7 11/02/21 0814   Width (cm) 2 11/02/21  0814   Depth (cm) 0.2 11/02/21 0814   Wound (cm^2) 5.4 cm^2 11/02/21 0814   Wound Volume (cm^3) 1.08 cm^3 11/02/21 0814   Wound healing % 28 11/02/21 0814   Undermining [Depth (cm)/Location] 1to10/0.5 11/02/21 0814   Drainage Characteristics/Odor serosanguineous 11/02/21 0814   Drainage Amount moderate 11/02/21 0814             PROCEDURE: 4% topical lidocaine was applied to the wound by the nursing staff. Patient was determined to be capable of making their own medical decisions and informed consent was obtained. Using a 15 blade a surgical debridement was performed down to and including subcutaneous tissue of <20 cm. Hemostasis was achieved with pressure. The patient tolerated the procedure well.      BRITT CHRISTIANSON PA-C

## 2021-11-02 NOTE — ADDENDUM NOTE
Encounter addended by: Kimberley Wright RN on: 11/2/2021 1:25 PM   Actions taken: Charge Capture section accepted

## 2021-11-19 ENCOUNTER — ANCILLARY PROCEDURE (OUTPATIENT)
Dept: GENERAL RADIOLOGY | Facility: CLINIC | Age: 56
End: 2021-11-19
Attending: PODIATRIST
Payer: COMMERCIAL

## 2021-11-19 ENCOUNTER — HOSPITAL ENCOUNTER (OUTPATIENT)
Dept: WOUND CARE | Facility: CLINIC | Age: 56
Discharge: HOME | End: 2021-11-19
Attending: PODIATRIST | Admitting: PODIATRIST
Payer: COMMERCIAL

## 2021-11-19 VITALS — TEMPERATURE: 96.3 F | HEART RATE: 114 BPM | SYSTOLIC BLOOD PRESSURE: 149 MMHG | DIASTOLIC BLOOD PRESSURE: 97 MMHG

## 2021-11-19 DIAGNOSIS — L97.522 DIABETIC ULCER OF OTHER PART OF LEFT FOOT ASSOCIATED WITH TYPE 2 DIABETES MELLITUS, WITH FAT LAYER EXPOSED (H): ICD-10-CM

## 2021-11-19 DIAGNOSIS — S91.302D OPEN WOUND OF PLANTAR ASPECT OF FOOT, LEFT, SUBSEQUENT ENCOUNTER: ICD-10-CM

## 2021-11-19 DIAGNOSIS — Z89.511 HX OF BKA, RIGHT (H): ICD-10-CM

## 2021-11-19 DIAGNOSIS — E11.42 DIABETIC POLYNEUROPATHY ASSOCIATED WITH TYPE 2 DIABETES MELLITUS (H): ICD-10-CM

## 2021-11-19 DIAGNOSIS — E11.621 DIABETIC ULCER OF OTHER PART OF LEFT FOOT ASSOCIATED WITH TYPE 2 DIABETES MELLITUS, WITH FAT LAYER EXPOSED (H): ICD-10-CM

## 2021-11-19 DIAGNOSIS — S98.112A AMPUTATION OF LEFT GREAT TOE (H): ICD-10-CM

## 2021-11-19 DIAGNOSIS — R60.0 EDEMA OF LEFT LOWER LEG DUE TO PERIPHERAL VENOUS INSUFFICIENCY: ICD-10-CM

## 2021-11-19 DIAGNOSIS — I87.2 EDEMA OF LEFT LOWER LEG DUE TO PERIPHERAL VENOUS INSUFFICIENCY: ICD-10-CM

## 2021-11-19 PROCEDURE — 11042 DBRDMT SUBQ TIS 1ST 20SQCM/<: CPT | Performed by: PODIATRIST

## 2021-11-19 PROCEDURE — 11042 DBRDMT SUBQ TIS 1ST 20SQCM/<: CPT

## 2021-11-19 PROCEDURE — 99213 OFFICE O/P EST LOW 20 MIN: CPT | Mod: 25 | Performed by: PODIATRIST

## 2021-11-19 PROCEDURE — 73630 X-RAY EXAM OF FOOT: CPT | Mod: LT | Performed by: RADIOLOGY

## 2021-11-19 NOTE — DISCHARGE INSTRUCTIONS
Ry Horner      1965    Wound Dressing Change: Left foot  Cleanse with mild unscented soap and water.  Apply alginate ag to wound then apply navy stripe EdemaWear from toes to knee. Edemawear should be worn 24/7 unless bathing/showering or changing the dressing.  Then apply 4x4 gauze/roll gauze over the EdemaWear and secure with tape.  Change dressing 1-2 times daily    Please call [facility] [phone] ([address] [place] [address] Debby PEREZ [place], [place]) to schedule your left foot xray appointment if you have not heard from them in two business days.       Milena Cveallos D.P.M. November 19, 2021      Call us at [phone] if you have any questions about your wounds, have redness or swelling around your wound, have a fever of 101 or greater or if you have any other problems or concerns. We answer the phone Monday through Friday 8 am to 4 pm, please leave a message as we check the voicemail frequently throughout the day.     If you had a positive experience please indicate that on your patient satisfaction survey form that [facility] will be sending you.    It was a pleasure meeting with you today.  Thank you for allowing me and my team the privilege of caring for you today.  YOU are the reason we are here, and I truly hope we provided you with the excellent service you deserve.  Please let us know if there is anything else we can do for you so that we can be sure you are leaving completely satisfied with your care experience.      If you have any billing related questions please call the [facility] Business office at [phone]. The clinic staff does not handle billing related matters.

## 2021-12-17 ENCOUNTER — HOSPITAL ENCOUNTER (OUTPATIENT)
Dept: WOUND CARE | Facility: CLINIC | Age: 56
Discharge: HOME | End: 2021-12-17
Attending: PODIATRIST | Admitting: PODIATRIST
Payer: COMMERCIAL

## 2021-12-17 VITALS — DIASTOLIC BLOOD PRESSURE: 97 MMHG | SYSTOLIC BLOOD PRESSURE: 159 MMHG | TEMPERATURE: 97.1 F | HEART RATE: 109 BPM

## 2021-12-17 DIAGNOSIS — S98.112A AMPUTATION OF LEFT GREAT TOE (H): ICD-10-CM

## 2021-12-17 DIAGNOSIS — Z89.511 HX OF BKA, RIGHT (H): ICD-10-CM

## 2021-12-17 DIAGNOSIS — S91.302D OPEN WOUND OF PLANTAR ASPECT OF FOOT, LEFT, SUBSEQUENT ENCOUNTER: ICD-10-CM

## 2021-12-17 DIAGNOSIS — E11.42 TYPE 2 DIABETES MELLITUS WITH DIABETIC POLYNEUROPATHY, WITHOUT LONG-TERM CURRENT USE OF INSULIN (H): ICD-10-CM

## 2021-12-17 DIAGNOSIS — E66.01 MORBID OBESITY (H): ICD-10-CM

## 2021-12-17 PROCEDURE — 11042 DBRDMT SUBQ TIS 1ST 20SQCM/<: CPT | Performed by: PODIATRIST

## 2021-12-17 PROCEDURE — 99213 OFFICE O/P EST LOW 20 MIN: CPT | Mod: 25 | Performed by: PODIATRIST

## 2021-12-17 PROCEDURE — 11042 DBRDMT SUBQ TIS 1ST 20SQCM/<: CPT

## 2021-12-17 NOTE — PROGRESS NOTES
[facility] Progress Note    Subject: Patient was seen at wound center.  Patient presents for follow-up of left foot ulceration.  He has been doing Aquacel Ag dressing changes.  He is in a  2 shoe check to offload the foot.  Denies fever, nausea, vomiting.  No concerns today.    PMH:   Past Medical History:   Diagnosis Date     BENIGN HYPERTENSION 4/4/2007     DIABETES MELLITUS TYPE II-UNCOMPL 4/4/2007     HYPERLIPIDEMIA NEC/NOS 4/4/2007     Tobacco use disorder 4/4/2007       Social Hx:   Social History     Socioeconomic History     Marital status: Single     Spouse name: Not on file     Number of children: Not on file     Years of education: Not on file     Highest education level: Not on file   Occupational History     Not on file   Tobacco Use     Smoking status: Former Smoker     Packs/day: 0.50     Years: 20.00     Pack years: 10.00     Types: Cigarettes     Start date: 5/7/1987     Quit date: 2006     Years since quitting: 15.9     Smokeless tobacco: Never Used   Substance and Sexual Activity     Alcohol use: Yes     Comment: 3-4 drinks per month     Drug use: No     Sexual activity: Yes     Partners: Female   Other Topics Concern      Service Yes     Blood Transfusions No     Caffeine Concern No     Occupational Exposure No     Hobby Hazards No     Sleep Concern No     Stress Concern No     Weight Concern Yes     Comment: Would like to lose some weight     Special Diet No     Back Care No     Exercise Yes     Bike Helmet No     Seat Belt Yes     Self-Exams Yes     Parent/sibling w/ CABG, MI or angioplasty before 65F 55M? No   Social History Narrative     Not on file     Social Determinants of Health     Financial Resource Strain: Not on file   Food Insecurity: Not on file   Transportation Needs: No Transportation Needs     Lack of Transportation (Medical): No     Lack of Transportation (Non-Medical): No   Physical Activity: Not on file   Stress: Not on file   Social  Connections: Not on file   Intimate Partner Violence: Not on file   Housing Stability: Not on file       Surgical Hx:   Past Surgical History:   Procedure Laterality Date     AMPUTATE FOOT Left 11/17/2019    Procedure: LEFT PARTIAL FOOT AMPUTATION;  Surgeon: Antoine Pena DPM;  Location: [facility]     AMPUTATE FOOT Left 12/4/2019    Procedure: LEFT PARTIAL FOOT AMPUTATION;  Surgeon: Tim Douglas DPM;  Location: [facility]     AMPUTATE FOOT Left 12/11/2019    Procedure: POSSIBLE PARTIAL FOOT AMPUTATION;  Surgeon: Tim Douglas DPM;  Location: [facility]     AMPUTATE LEG BELOW KNEE Right 9/1/2017    Procedure: AMPUTATE LEG BELOW KNEE;  RIGHT BELOW KNEE AMPUTATION ;  Surgeon: Nikolas Nascimento MD;  Location: [facility]     AMPUTATE TOE(S) Left 2/3/2020    Procedure: LEFT SECOND TOE AMPUTATION;  Surgeon: Milena Cevallos DPM, Podiatry/Foot and Ankle Surgery;  Location: [facility]     APPENDECTOMY       APPLY WOUND VAC Right 3/2/2015    Procedure: APPLY WOUND VAC;  Surgeon: Milena Cevallos DPM, Pod;  Location: [facility] OR     BIOPSY BONE FOOT Right 7/15/2016    Procedure: BIOPSY BONE FOOT;  Surgeon: Tim Douglas DPM;  Location: [facility]     BIOPSY BONE TOE Left 12/4/2019    Procedure: BONE BIOPSY LEFT SECOND TOE;  Surgeon: Tim Douglas DPM;  Location: [facility]     IRRIGATION AND DEBRIDEMENT FOOT, COMBINED Right 3/2/2015    Procedure: COMBINED IRRIGATION AND DEBRIDEMENT FOOT;  Surgeon: Milena Cevallos DPM, Pod;  Location: [facility]     IRRIGATION AND DEBRIDEMENT FOOT, COMBINED Right 7/15/2016    Procedure: COMBINED IRRIGATION AND DEBRIDEMENT FOOT;  Surgeon: Tim Douglas DPM;  [facility]     IRRIGATION AND DEBRIDEMENT FOOT, COMBINED Right 7/20/2016    Procedure: COMBINED IRRIGATION AND DEBRIDEMENT FOOT;  Surgeon: Tim Douglas DPM;  [facility]: [facility]     IRRIGATION AND DEBRIDEMENT FOOT, COMBINED Left 11/19/2019    Procedure: REVISIONAL IRRIGATION AND DEBRIDEMENT LEFT FOOT AND BONE DEBRIDEMENT;  Surgeon:  "Joseph Randhawa DPM;  Location: [facility]     IRRIGATION AND DEBRIDEMENT FOOT, COMBINED Left 12/4/2019    Procedure: EXCISIONAL DEBRIDEMENT LEFT FOOT;  Surgeon: Tim Douglas DPM;  [facility]     IRRIGATION AND DEBRIDEMENT FOOT, COMBINED Left 12/11/2019    Procedure: IRRIGATION AND DEBRIDEMENT FOOT, Partial osteotomy left first metatarsal;  Surgeon: Tim Douglas DPM;  [facility]     IRRIGATION AND DEBRIDEMENT FOOT, COMBINED Left 2/5/2020    Procedure: IRRIGATION AND DEBRIDEMENT LEFT FOOT;  Surgeon: Tim Douglas DPM;  Location: [facility]     ORTHOPEDIC SURGERY         Allergies:    Allergies   Allergen Reactions     Pravastatin      \"sucked the life blood out of me,\" Indicates this occurs with all statins.       Medications:   Current Outpatient Medications   Medication     aspirin 81 MG chewable tablet     BD PEN NEEDLE MEGAN 2ND GEN 32G X 4 MM miscellaneous     blood glucose (NO BRAND SPECIFIED) lancets standard     blood glucose (NO BRAND SPECIFIED) test strip     blood glucose monitoring (NO BRAND SPECIFIED) meter device kit     cephALEXin (KEFLEX) 250 MG capsule     Continuous Blood Gluc Sensor (DEXCOM G6 SENSOR) MISC     Continuous Blood Gluc Transmit (DEXCOM G6 TRANSMITTER) MISC     empagliflozin (JARDIANCE) 10 MG TABS tablet     ezetimibe (ZETIA) 10 MG tablet     insulin glargine (BASAGLAR KWIKPEN) 100 UNIT/ML pen     ipratropium (ATROVENT) 0.06 % nasal spray     ketorolac (ACULAR) 0.5 % ophthalmic solution     lisinopril (ZESTRIL) 40 MG tablet     metFORMIN (GLUCOPHAGE) 500 MG tablet     multivitamin (CENTRUM SILVER) tablet     prednisoLONE acetate (PRED FORTE) 1 % ophthalmic suspension     sitagliptin (JANUVIA) 50 MG tablet     STATIN NOT PRESCRIBED (INTENTIONAL)     No current facility-administered medications for this encounter.         Objective:  Vitals:  BP (!) 159/97 (BP Location: Left arm)   Pulse 109   Temp 97.1  F (36.2  C) (Temporal)     A1C: 9.6 (9/2021)     General:  " Patient is alert and orientated.  NAD      Dermatologic: Full-thickness ulceration to the plantar aspect of the left second metatarsal.  Please see measurements below after debridement.  No purulent drainage or surrounding redness but heavy amounts of clear serous drainage with maceration noted around the wounds.  .   Wound (used by OP [facility] only) 10/01/21 1414 Left neuropathic ulcer (Active)   Thickness/Stage full thickness 12/17/21 0819   Dressing Appearance moist drainage 12/17/21 0819   Base granulating 12/17/21 0819   Periwound intact 12/17/21 0819   Periwound Temperature warm 12/17/21 0819   Periwound Skin Turgor soft 12/17/21 0819   Edges callused 12/17/21 0819   Length (cm) 2 12/17/21 0819   Width (cm) 1.8 12/17/21 0819   Depth (cm) 1 12/17/21 0819   Wound (cm^2) 3.6 cm^2 12/17/21 0819   Wound Volume (cm^3) 3.6 cm^3 12/17/21 0819   Wound healing % 52 12/17/21 0819   Drainage Characteristics/Odor serosanguineous 12/17/21 0819   Drainage Amount moderate 12/17/21 0819   Care, Wound debrided 12/17/21 0819     Vascular: DP & PT pulses are intact & regular bilaterally.   edema but no varicosities noted.  CFT and skin temperature is normal to both lower extremities.     Neurologic: Lower extremity sensation is absent to feet.     Musculoskeletal: BKA on the right leg and partial left first ray amputation.     Imaging: Previous amputation of the first ray at the proximal  metatarsal and amputation of the second toe. These findings are  unchanged. No lytic or destructive osseous lesions to suggest  osteomyelitis. Soft tissue swelling of the foot.     Assessment:     Open wound of plantar aspect of foot, left, subsequent encounter  Type 2 diabetes mellitus with diabetic polyneuropathy, without long-term current use of insulin (H)  Morbid obesity (H)  Hx of BKA, right (H)  Amputation of left great toe (H)     Plan: At this time the wound was debrided.  Please see procedure note below.    Reviewed x-rays.  They were  negative for deeper infection. We will have him switch to daily dressing changes of the fibrocal with 4 x 4 gauze covering  and gauze roll and Ace bandage.  Also will have him wear edema wear to help with swelling.  We will have him follow-up in 1 month for reassessment.  All questions were answered to patient satisfaction and he will call for the questions or concerns.     Procedure:  After verbal consent, per protocol lidocaine was applied to the wound. Excisional debridement was performed on ulcer.   #15 blade was used to debride ulcer down to and including subcutaneous tissue. Bleeding controlled with light pressure.  No drainage noted.   < 20sq cm debrided.  Dry dressing applied to foot.  Patient tolerated procedure well.    Milena Cevallos DPM, Podiatry/Foot and Ankle Surgery          Further instructions from your care team       Ry Horner      1965    Wound Dressing Change: Left foot  Cleanse with mild unscented soap and water.  Apply fibracol to wound then apply navy stripe EdemaWear from toes to knee.  Edemawear should be worn 24/7 unless bathing/showering or changing the dressing.  Then apply 4x4 gauze/roll gauze over the EdemaWear and secure with tape.  Change dressing 1-2 times daily     Milena Cevallos, BEREKET.P.M. December 17, 2021    Call us at [phone] if you have any questions about your wounds, have redness or swelling around your wound, have a fever of 101 or greater or if you have any other problems or concerns. We answer the phone Monday through Friday 8 am to 4 pm, please leave a message as we check the voicemail frequently throughout the day.     If you had a positive experience please indicate on your patient satisfaction survey form that [facility] will be sending you.    If you have any billing related questions please call the [facility] Business office at [phone]. The clinic staff does not handle billing related matters.

## 2021-12-17 NOTE — DISCHARGE INSTRUCTIONS
Ry Horner      1965    Wound Dressing Change: Left foot  Cleanse with mild unscented soap and water.  Apply fibracol to wound then apply navy stripe EdemaWear from toes to knee.  Edemawear should be worn 24/7 unless bathing/showering or changing the dressing.  Then apply 4x4 gauze/roll gauze over the EdemaWear and secure with tape.  Change dressing 1-2 times daily     BEREKET Pierre.P.M. December 17, 2021    Call us at [phone] if you have any questions about your wounds, have redness or swelling around your wound, have a fever of 101 or greater or if you have any other problems or concerns. We answer the phone Monday through Friday 8 am to 4 pm, please leave a message as we check the voicemail frequently throughout the day.     If you had a positive experience please indicate on your patient satisfaction survey form that "[employer] will be sending you.    If you have any billing related questions please call the  [facility] Business office at [phone]. The clinic staff does not handle billing related matters.

## 2022-01-03 ENCOUNTER — OFFICE VISIT (OUTPATIENT)
Dept: FAMILY MEDICINE | Facility: CLINIC | Age: 57
End: 2022-01-03
Payer: COMMERCIAL

## 2022-01-03 ENCOUNTER — LAB (OUTPATIENT)
Dept: LAB | Facility: CLINIC | Age: 57
End: 2022-01-03

## 2022-01-03 VITALS
BODY MASS INDEX: 36.37 KG/M2 | WEIGHT: 268.5 LBS | HEIGHT: 72 IN | DIASTOLIC BLOOD PRESSURE: 80 MMHG | HEART RATE: 111 BPM | OXYGEN SATURATION: 97 % | TEMPERATURE: 98.3 F | SYSTOLIC BLOOD PRESSURE: 121 MMHG | RESPIRATION RATE: 16 BRPM

## 2022-01-03 DIAGNOSIS — N18.30 STAGE 3 CHRONIC KIDNEY DISEASE, UNSPECIFIED WHETHER STAGE 3A OR 3B CKD (H): ICD-10-CM

## 2022-01-03 DIAGNOSIS — E11.42 TYPE 2 DIABETES MELLITUS WITH DIABETIC POLYNEUROPATHY, WITHOUT LONG-TERM CURRENT USE OF INSULIN (H): ICD-10-CM

## 2022-01-03 DIAGNOSIS — E78.5 HYPERLIPIDEMIA LDL GOAL <100: ICD-10-CM

## 2022-01-03 DIAGNOSIS — I10 ESSENTIAL HYPERTENSION, BENIGN: Primary | ICD-10-CM

## 2022-01-03 LAB — HBA1C MFR BLD: 8.1 % (ref 0–5.6)

## 2022-01-03 PROCEDURE — 83036 HEMOGLOBIN GLYCOSYLATED A1C: CPT

## 2022-01-03 PROCEDURE — 99213 OFFICE O/P EST LOW 20 MIN: CPT | Performed by: INTERNAL MEDICINE

## 2022-01-03 PROCEDURE — 36415 COLL VENOUS BLD VENIPUNCTURE: CPT

## 2022-01-03 RX ORDER — INSULIN GLARGINE 100 [IU]/ML
56 INJECTION, SOLUTION SUBCUTANEOUS EVERY 12 HOURS
Qty: 99 ML | Refills: 11 | COMMUNITY
Start: 2022-01-03 | End: 2022-02-07

## 2022-01-03 ASSESSMENT — PAIN SCALES - GENERAL: PAINLEVEL: MILD PAIN (2)

## 2022-01-03 ASSESSMENT — MIFFLIN-ST. JEOR: SCORE: 2095.91

## 2022-01-03 NOTE — PROGRESS NOTES
Assessment & Plan     Type 2 diabetes mellitus with diabetic polyneuropathy, without long-term current use of insulin (H)  Improving glycemic control, continue current antihyperglycemic agents, discussed diet interventions, recheck hemoglobin A1c and 3 months without any medication changes today.  If A1c remains above 8 at the 3-month interval, discussed increasing Jardiance from 10 mg daily to 25 mg daily if renal function is improved.  Discussed potential side effects.  - insulin glargine (BASAGLAR KWIKPEN) 100 UNIT/ML pen; Inject 56 Units Subcutaneous every 12 hours  - FOOT EXAM  - **A1C FUTURE 3mo; Future  - Creatinine; Future  - Triglycerides; Future    Essential hypertension, benign  Well-controlled on current regimen    Hyperlipidemia LDL goal <100  Need to recheck triglycerides, will do at upcoming visit    Stage III chronic kidney disease  Recheck creatinine with upcoming labs as well      20 minutes spent on the date of the encounter doing chart review, history and exam, documentation and further activities per the note           Return in about 3 months (around 4/3/2022) for fasting lab appt in 3 months .   Patient instructed to return to clinic or contact us sooner if symptoms worsen or new symptoms develop.     Kody Wing MD  [facility] SARANYA Raza is a 56 year old who presents for the following health issues     HPI   2 month Lyric, hgb A1c  And fasting sugars are generally less than 120  No random sugars greater than 200  No new symptoms  Seeing wound care clinic for ulcer on the bottom of left foot which is improving            Review of Systems         Objective    /80 (BP Location: Right arm, Cuff Size: Adult Large)   Pulse 111   Temp 98.3  F (36.8  C) (Tympanic)   Resp 16   Ht 1.829 m (6')   Wt 121.8 kg (268 lb 8 oz)   SpO2 97%   BMI 36.42 kg/m    Body mass index is 36.42 kg/m .  Physical Exam   General: This is a well-appearing man in no acute  distress.  Foot: There is an ulcer with a clean base on the left foot, there is diminished sensation to 10 g monofilament when the foot is examined.  The right foot is amputated.

## 2022-01-03 NOTE — RESULT ENCOUNTER NOTE
The following letter pertains to your most recent diagnostic tests:    As we discussed in clinic, the hemoglobin A1c is improving.  Please recheck in 3 months.      Sincerely,    Dr. Wing

## 2022-01-03 NOTE — LETTER
January 4, 2022      Ry SUE   [address]  [place] [address]        Dear ,    The following letter pertains to your most recent diagnostic tests:     As we discussed in clinic, the hemoglobin A1c is improving.  Please recheck in 3 months.       Resulted Orders   Hemoglobin A1c   Result Value Ref Range    Hemoglobin A1C 8.1 (H) 0.0 - 5.6 %      Comment:      Normal <5.7%   Prediabetes 5.7-6.4%    Diabetes 6.5% or higher     Note: Adopted from ADA consensus guidelines.       If you have any questions or concerns, please call the clinic at the number listed above.       Sincerely,      Kody Wing MD    ben

## 2022-01-09 NOTE — TELEPHONE ENCOUNTER
Discharge instructions and prescription information given to the patient who expressed understanding of information and follow up care.      Cosme Bethea RN  01/09/22 [employer] message sent to patient and discussed this information with patient who will reach out to his insurance company to get a more accurate statement as they had told him he needed a prior authorization but staff in clinic were told that it was excluded from plan.    Joseph Bess CMA on 3/10/2021 at 1:49 PM

## 2022-01-13 DIAGNOSIS — E11.42 TYPE 2 DIABETES MELLITUS WITH DIABETIC POLYNEUROPATHY, WITHOUT LONG-TERM CURRENT USE OF INSULIN (H): ICD-10-CM

## 2022-01-13 NOTE — TELEPHONE ENCOUNTER
Disp Refills Start End SABINA   BD PEN NEEDLE MEGAN 2ND GEN 32G X 4 MM miscellaneous 100 each 1 10/4/2021  No   Sig: USE ONCE DAILY AS DIRECTED WITH TYRELL     Fax from pharmacy states pt is using 2 needles per day    New RX needed if appropriate

## 2022-01-14 ENCOUNTER — HOSPITAL ENCOUNTER (OUTPATIENT)
Dept: WOUND CARE | Facility: CLINIC | Age: 57
Discharge: HOME | End: 2022-01-14
Attending: PODIATRIST | Admitting: PODIATRIST
Payer: COMMERCIAL

## 2022-01-14 VITALS
DIASTOLIC BLOOD PRESSURE: 103 MMHG | SYSTOLIC BLOOD PRESSURE: 165 MMHG | RESPIRATION RATE: 18 BRPM | TEMPERATURE: 97.2 F | HEART RATE: 118 BPM

## 2022-01-14 DIAGNOSIS — E11.42 TYPE 2 DIABETES MELLITUS WITH DIABETIC POLYNEUROPATHY, WITH LONG-TERM CURRENT USE OF INSULIN (H): Primary | ICD-10-CM

## 2022-01-14 DIAGNOSIS — Z79.4 TYPE 2 DIABETES MELLITUS WITH DIABETIC POLYNEUROPATHY, WITH LONG-TERM CURRENT USE OF INSULIN (H): Primary | ICD-10-CM

## 2022-01-14 DIAGNOSIS — Z89.511 STATUS POST BELOW KNEE AMPUTATION, RIGHT (H): ICD-10-CM

## 2022-01-14 DIAGNOSIS — Z89.511 STATUS POST BELOW-KNEE AMPUTATION OF RIGHT LOWER EXTREMITY (H): ICD-10-CM

## 2022-01-14 DIAGNOSIS — S91.302D OPEN WOUND OF PLANTAR ASPECT OF FOOT, LEFT, SUBSEQUENT ENCOUNTER: ICD-10-CM

## 2022-01-14 DIAGNOSIS — S98.112A AMPUTATION OF LEFT GREAT TOE (H): ICD-10-CM

## 2022-01-14 DIAGNOSIS — E66.01 MORBID OBESITY (H): ICD-10-CM

## 2022-01-14 PROCEDURE — 11042 DBRDMT SUBQ TIS 1ST 20SQCM/<: CPT | Performed by: PODIATRIST

## 2022-01-14 PROCEDURE — 11042 DBRDMT SUBQ TIS 1ST 20SQCM/<: CPT

## 2022-01-14 PROCEDURE — 99213 OFFICE O/P EST LOW 20 MIN: CPT | Mod: 25 | Performed by: PODIATRIST

## 2022-01-14 RX ORDER — PEN NEEDLE, DIABETIC 32GX 5/32"
NEEDLE, DISPOSABLE MISCELLANEOUS
Qty: 100 EACH | Refills: 1 | Status: SHIPPED | OUTPATIENT
Start: 2022-01-14 | End: 2022-04-26

## 2022-01-14 RX ORDER — LANCETS 33 GAUGE
EACH MISCELLANEOUS
COMMUNITY
Start: 2022-01-09 | End: 2022-03-24

## 2022-01-14 NOTE — PROGRESS NOTES
[facility] [place] Progress Note    Subject: Patient was seen at wound center. Patient presents for follow-up of left foot ulceration.  He has been doing Fibracol dressing changes.  He is in a  2 shoe check to offload the foot.  Denies fever, nausea, vomiting.  No concerns today     PMH:   Past Medical History:   Diagnosis Date     BENIGN HYPERTENSION 2007     DIABETES MELLITUS TYPE II-UNCOMPL 2007     HYPERLIPIDEMIA NEC/NOS 2007     Tobacco use disorder 2007       Social Hx:   Social History     Socioeconomic History     Marital status: Single     Spouse name: Not on file     Number of children: Not on file     Years of education: Not on file     Highest education level: Not on file   Occupational History     Not on file   Tobacco Use     Smoking status: Former Smoker     Packs/day: 0.50     Years: 20.00     Pack years: 10.00     Types: Cigarettes     Start date: 1987     Quit date:      Years since quittin.0     Smokeless tobacco: Never Used   Substance and Sexual Activity     Alcohol use: Yes     Comment: 3-4 drinks per month     Drug use: No     Sexual activity: Yes     Partners: Female   Other Topics Concern      Service Yes     Blood Transfusions No     Caffeine Concern No     Occupational Exposure No     Hobby Hazards No     Sleep Concern No     Stress Concern No     Weight Concern Yes     Comment: Would like to lose some weight     Special Diet No     Back Care No     Exercise Yes     Bike Helmet No     Seat Belt Yes     Self-Exams Yes     Parent/sibling w/ CABG, MI or angioplasty before 65F 55M? No   Social History Narrative     Not on file     Social Determinants of Health     Financial Resource Strain: Not on file   Food Insecurity: Not on file   Transportation Needs: No Transportation Needs     Lack of Transportation (Medical): No     Lack of Transportation (Non-Medical): No   Physical Activity: Not on file   Stress: Not on file   Social Connections:  Not on file   Intimate Partner Violence: Not on file   Housing Stability: Not on file       Surgical Hx:   Past Surgical History:   Procedure Laterality Date     AMPUTATE FOOT Left 11/17/2019    Procedure: LEFT PARTIAL FOOT AMPUTATION;  Surgeon: Antoine Pena DPM;  Location: [facility]     AMPUTATE FOOT Left 12/4/2019    Procedure: LEFT PARTIAL FOOT AMPUTATION;  Surgeon: Tim Douglas DPM;  Location: [facility]     AMPUTATE FOOT Left 12/11/2019    Procedure: POSSIBLE PARTIAL FOOT AMPUTATION;  Surgeon: Tim Douglas DPM;  Location: [facility]     AMPUTATE LEG BELOW KNEE Right 9/1/2017    Procedure: AMPUTATE LEG BELOW KNEE;  RIGHT BELOW KNEE AMPUTATION ;  Surgeon: Nikolas Nascimento MD;  Location: [facility]     AMPUTATE TOE(S) Left 2/3/2020    Procedure: LEFT SECOND TOE AMPUTATION;  Surgeon: Milena Cevallos DPM, Podiatry/Foot and Ankle Surgery;  Location: [facility]     APPENDECTOMY       APPLY WOUND VAC Right 3/2/2015    Procedure: APPLY WOUND VAC;  Surgeon: Milena Cevallos DPM, Pod;  Location: [facility]     BIOPSY BONE FOOT Right 7/15/2016    Procedure: BIOPSY BONE FOOT;  Surgeon: Tim Douglas DPM;  Location: [facility]     BIOPSY BONE TOE Left 12/4/2019    Procedure: BONE BIOPSY LEFT SECOND TOE;  Surgeon: Tim Douglas DPM;  Location: [facility]     IRRIGATION AND DEBRIDEMENT FOOT, COMBINED Right 3/2/2015    Procedure: COMBINED IRRIGATION AND DEBRIDEMENT FOOT;  Surgeon: Milena Cevallos DPM, Pod;  Location: [facility]     IRRIGATION AND DEBRIDEMENT FOOT, COMBINED Right 7/15/2016    Procedure: COMBINED IRRIGATION AND DEBRIDEMENT FOOT;  Surgeon: Tim Douglas DPM;  [facility]: [facility]     IRRIGATION AND DEBRIDEMENT FOOT, COMBINED Right 7/20/2016    Procedure: COMBINED IRRIGATION AND DEBRIDEMENT FOOT;  Surgeon: Tim Douglas DPM;  Location: [facility]     IRRIGATION AND DEBRIDEMENT FOOT, COMBINED Left 11/19/2019    Procedure: REVISIONAL IRRIGATION AND DEBRIDEMENT LEFT FOOT AND BONE DEBRIDEMENT;  Surgeon: Sydnee  "LAURA Ruiz;  Location: [facility]     IRRIGATION AND DEBRIDEMENT FOOT, COMBINED Left 12/4/2019    Procedure: EXCISIONAL DEBRIDEMENT LEFT FOOT;  Surgeon: Tim Douglas DPM;  [facility]: [facility]     IRRIGATION AND DEBRIDEMENT FOOT, COMBINED Left 12/11/2019    Procedure: IRRIGATION AND DEBRIDEMENT FOOT, Partial osteotomy left first metatarsal;  Surgeon: Tim Douglas DPM;  [facility]     IRRIGATION AND DEBRIDEMENT FOOT, COMBINED Left 2/5/2020    Procedure: IRRIGATION AND DEBRIDEMENT LEFT FOOT;  Surgeon: Tim Douglas DPM;  Location: [facility]     ORTHOPEDIC SURGERY         Allergies:    Allergies   Allergen Reactions     Pravastatin      \"sucked the life blood out of me,\" Indicates this occurs with all statins.       Medications:   Current Outpatient Medications   Medication     aspirin 81 MG chewable tablet     BD PEN NEEDLE MEGAN 2ND GEN 32G X 4 MM miscellaneous     blood glucose (NO BRAND SPECIFIED) lancets standard     blood glucose (NO BRAND SPECIFIED) test strip     blood glucose monitoring (NO BRAND SPECIFIED) meter device kit     Continuous Blood Gluc Sensor (DEXCOM G6 SENSOR) MISC     empagliflozin (JARDIANCE) 10 MG TABS tablet     ezetimibe (ZETIA) 10 MG tablet     insulin glargine (BASAGLAR KWIKPEN) 100 UNIT/ML pen     ipratropium (ATROVENT) 0.06 % nasal spray     ketorolac (ACULAR) 0.5 % ophthalmic solution     Lancets (ONETOUCH DELICA PLUS NYLOFZ91H) MISC     lisinopril (ZESTRIL) 40 MG tablet     metFORMIN (GLUCOPHAGE) 500 MG tablet     multivitamin (CENTRUM SILVER) tablet     prednisoLONE acetate (PRED FORTE) 1 % ophthalmic suspension     sitagliptin (JANUVIA) 50 MG tablet     STATIN NOT PRESCRIBED (INTENTIONAL)     No current facility-administered medications for this encounter.         Objective:  Vitals:  BP (!) 165/103 (BP Location: Left arm)   Pulse 118   Temp 97.2  F (36.2  C) (Temporal)   Resp 18     A1C: 8.1 (1/2022)     General:  Patient is alert and orientated.  NAD "      Dermatologic: Full-thickness ulceration to the plantar aspect of the left second metatarsal.  This measures approximately 2.5 cm x 2.0 cm x 2.5 cm in depth after debridement.  No purulent drainage or surrounding redness but heavy amounts of clear serous drainage with maceration noted around the wounds.       Vascular: DP & PT pulses are intact & regular bilaterally.   edema but no varicosities noted.  CFT and skin temperature is normal to both lower extremities.     Neurologic: Lower extremity sensation is absent to feet.     Musculoskeletal: BKA on the right leg and partial left first ray amputation.     Imaging: Previous amputation of the first ray at the proximal  metatarsal and amputation of the second toe. These findings are  unchanged. No lytic or destructive osseous lesions to suggest  osteomyelitis. Soft tissue swelling of the foot.     Assessment:     Open wound of plantar aspect of foot, left, subsequent encounter  Type 2 diabetes mellitus with diabetic polyneuropathy, without long-term current use of insulin (H)  Morbid obesity (H)  Hx of BKA, right (H)  Amputation of left great toe (H)     Plan: At this time the wound was debrided.  Please see procedure note below.      Discussed that I am concerned that the wound probes quite deep.  We will switch the dressing changes.  I will Plex every day with 4 x 4 gauze, gauze roll and Ace bandage.  Recommend Spanogrip  for edema to the foot.  Also recommend MRI of the left foot to assess for any deeper bone infection.  We will call him with the MRI results.  Also recommend that he wear his tall cast boot instead of the DH to shoe as I feel like this will take more pressure off of his foot.  We will have him follow-up in 1 month for reassessment.  All questions were answered to patient satisfaction and he will call for the questions or concerns.     Procedure:  After verbal consent, per protocol lidocaine was applied to the wound. Excisional debridement was  performed on ulcer.   #15 blade was used to debride ulcer down to and including subcutaneous tissue. Bleeding controlled with light pressure.  No drainage noted.   < 20sq cm debrided.  Dry dressing applied to foot.  Patient tolerated procedure well.    Milena Cevallos DPM, Podiatry/Foot and Ankle Surgery              Further instructions from your care team       Ry Horner      1965    Wound Dressing Change: Left foot  Cleanse with mild unscented soap and water.  Pack undermining of 2.5 cm with Ioplex to wound then apply navy stripe EdemaWear from toes to knee.  Edemawear should be worn 24/7 unless bathing/showering or changing the dressing.  Then apply 4x4 gauze/roll gauze over the EdemaWear and secure with tape.  Change dressing once daily  Compression: Wear daily.  You have a compression wrap spandigrip E is supposed to be removed at night and put back on first thing in the morning.   Please remove compression dressing if toes turn blue and/or tingle and can not be relieved by raising the leg for one hour.        Milena Cevallos, D.P.M.. January 14, 2022    Call us at [phone] if you have any questions about your wounds, have redness or swelling around your wound, have a fever of 101 or greater or if you have any other problems or concerns. We answer the phone Monday through Friday 8 am to 4 pm, please leave a message as we check the voicemail frequently throughout the day.     If you had a positive experience please indicate on your patient satisfaction survey form that  [employer] [place] will be sending you.    If you have any billing related questions please call the  [employer] office at [phone]. The clinic staff does not handle billing related matters.

## 2022-01-14 NOTE — DISCHARGE INSTRUCTIONS
Ry Horner      1965    Wound Dressing Change: Left foot  Cleanse with mild unscented soap and water.  Pack undermining of 2.5 cm with Ioplex to wound then apply navy stripe EdemaWear from toes to knee.  Edemawear should be worn 24/7 unless bathing/showering or changing the dressing.  Then apply 4x4 gauze/roll gauze over the EdemaWear and secure with tape.  Change dressing once daily  Compression: Wear daily.  You have a compression wrap spandigrip E is supposed to be removed at night and put back on first thing in the morning.   Please remove compression dressing if toes turn blue and/or tingle and can not be relieved by raising the leg for one hour.        Milena Cevallos, D.P.M.. January 14, 2022    Call us at [phone] if you have any questions about your wounds, have redness or swelling around your wound, have a fever of 101 or greater or if you have any other problems or concerns. We answer the phone Monday through Friday 8 am to 4 pm, please leave a message as we check the voicemail frequently throughout the day.     If you had a positive experience please indicate on your patient satisfaction survey form that [email or web address] will be sending you.    If you have any billing related questions please call the [employer] office at [phone]. The clinic staff does not handle billing related matters.

## 2022-01-14 NOTE — ADDENDUM NOTE
Encounter addended by: Kimberley Wright RN on: 1/14/2022 11:31 AM   Actions taken: Order list changed, Diagnosis association updated

## 2022-01-15 ENCOUNTER — HEALTH MAINTENANCE LETTER (OUTPATIENT)
Age: 57
End: 2022-01-15

## 2022-01-25 ENCOUNTER — HOSPITAL ENCOUNTER (OUTPATIENT)
Dept: MRI IMAGING | Facility: CLINIC | Age: 57
Discharge: HOME | End: 2022-01-25
Attending: PODIATRIST | Admitting: PODIATRIST
Payer: COMMERCIAL

## 2022-01-25 ENCOUNTER — TELEPHONE (OUTPATIENT)
Dept: WOUND CARE | Facility: CLINIC | Age: 57
End: 2022-01-25

## 2022-01-25 DIAGNOSIS — S91.302D OPEN WOUND OF PLANTAR ASPECT OF FOOT, LEFT, SUBSEQUENT ENCOUNTER: ICD-10-CM

## 2022-01-25 DIAGNOSIS — L97.522 ULCER OF FOOT, LEFT, WITH FAT LAYER EXPOSED (H): ICD-10-CM

## 2022-01-25 DIAGNOSIS — E11.42 TYPE 2 DIABETES MELLITUS WITH DIABETIC POLYNEUROPATHY, WITH LONG-TERM CURRENT USE OF INSULIN (H): ICD-10-CM

## 2022-01-25 DIAGNOSIS — S98.112A AMPUTATION OF LEFT GREAT TOE (H): ICD-10-CM

## 2022-01-25 DIAGNOSIS — Z79.4 TYPE 2 DIABETES MELLITUS WITH DIABETIC POLYNEUROPATHY, WITH LONG-TERM CURRENT USE OF INSULIN (H): ICD-10-CM

## 2022-01-25 DIAGNOSIS — E11.42 DIABETIC POLYNEUROPATHY ASSOCIATED WITH TYPE 2 DIABETES MELLITUS (H): Primary | ICD-10-CM

## 2022-01-25 DIAGNOSIS — M86.272 SUBACUTE OSTEOMYELITIS OF LEFT FOOT (H): ICD-10-CM

## 2022-01-25 PROCEDURE — 73720 MRI LWR EXTREMITY W/O&W/DYE: CPT | Mod: LT

## 2022-01-25 PROCEDURE — 255N000002 HC RX 255 OP 636: Performed by: PODIATRIST

## 2022-01-25 PROCEDURE — 73720 MRI LWR EXTREMITY W/O&W/DYE: CPT | Mod: 26 | Performed by: RADIOLOGY

## 2022-01-25 PROCEDURE — A9585 GADOBUTROL INJECTION: HCPCS | Performed by: PODIATRIST

## 2022-01-25 RX ORDER — GADOBUTROL 604.72 MG/ML
12 INJECTION INTRAVENOUS ONCE
Status: COMPLETED | OUTPATIENT
Start: 2022-01-25 | End: 2022-01-25

## 2022-01-25 RX ADMIN — GADOBUTROL 12 ML: 604.72 INJECTION INTRAVENOUS at 06:38

## 2022-01-25 NOTE — TELEPHONE ENCOUNTER
Message received from Dr. Cevallos that the patient's MRI showed bone infection behind the 2nd and 3rd toes and to contact patient to inform him. Patient called and results discussed. Patient instructed to contact [facility] to schedule an appointment with Dr. Cevallos per Dr. Cevallos's request. Number given for [place] office. The patient is not currently on antibiotics. Prescription can be sent to the [facility] in [place]. The patient was informed to go to the ER for fever, chills, nausa or streaking redness up the foot. Pt verbalized understanding. Routing to Dr. Cevallos to order antibiotics if needed.

## 2022-02-01 ENCOUNTER — TELEPHONE (OUTPATIENT)
Dept: PODIATRY | Facility: CLINIC | Age: 57
End: 2022-02-01

## 2022-02-01 ENCOUNTER — OFFICE VISIT (OUTPATIENT)
Dept: PODIATRY | Facility: CLINIC | Age: 57
End: 2022-02-01
Payer: COMMERCIAL

## 2022-02-01 VITALS
HEIGHT: 72 IN | BODY MASS INDEX: 36.3 KG/M2 | DIASTOLIC BLOOD PRESSURE: 86 MMHG | SYSTOLIC BLOOD PRESSURE: 138 MMHG | WEIGHT: 268 LBS

## 2022-02-01 DIAGNOSIS — S98.112A AMPUTATION OF LEFT GREAT TOE (H): ICD-10-CM

## 2022-02-01 DIAGNOSIS — L97.524 ULCER OF LEFT FOOT, WITH NECROSIS OF BONE (H): ICD-10-CM

## 2022-02-01 DIAGNOSIS — Z11.59 ENCOUNTER FOR SCREENING FOR OTHER VIRAL DISEASES: Primary | ICD-10-CM

## 2022-02-01 DIAGNOSIS — Z89.422 H/O AMPUTATION OF LESSER TOE, LEFT (H): ICD-10-CM

## 2022-02-01 DIAGNOSIS — E11.42 DIABETIC POLYNEUROPATHY ASSOCIATED WITH TYPE 2 DIABETES MELLITUS (H): Primary | ICD-10-CM

## 2022-02-01 DIAGNOSIS — Z89.511 HX OF BKA, RIGHT (H): ICD-10-CM

## 2022-02-01 PROCEDURE — 99214 OFFICE O/P EST MOD 30 MIN: CPT | Performed by: PODIATRIST

## 2022-02-01 ASSESSMENT — PAIN SCALES - GENERAL: PAINLEVEL: MILD PAIN (2)

## 2022-02-01 ASSESSMENT — MIFFLIN-ST. JEOR: SCORE: 2093.64

## 2022-02-01 NOTE — TELEPHONE ENCOUNTER
Phone call to patient and informed of Dr. Cevallos's information below. He states he will need to check his finances but he will call. Phone number of rep provided.     KELLEY Simon RN

## 2022-02-01 NOTE — TELEPHONE ENCOUNTER
Scheduled surgery    Type of surgery: left foot partial amputation  Location of surgery: [facility]  Date and time of surgery: 2/10/22  Surgeon: Ban  Pre-Op Appt Date: 2/7/22  Post-Op Appt Date: 2/11/22   Packet sent out: Yes  Pre-cert/Authorization completed:  No  Date: 2.1.22      Cj Grey, Surgery Scheduler

## 2022-02-01 NOTE — PATIENT INSTRUCTIONS
Thank you for choosing [facility] Podiatry / Foot & Ankle Surgery!    DR. SCHUMACHER'S CLINIC:  [facility]   [address]   [id number]   [place] [address]   [phone]  [phone]      SCHEDULE SURGERY: [phone]   BILLING QUESTIONS: [phone]   APPOINTMENTS: [phone]   RADIOLOGY: [phone]   CONSUMER PRICE LINE: [phone]      Follow up: ky call to schedule surgery.           GETTING READY FOR YOUR SURGERY  ONE-THREE WEEKS BEFORE  1. See your Family Doctor or Primary Care Doctor for a History and Physical. If you do not, we may need to change the date of your surgery.  2. Please see pre-surgical medications below to which medications need to be stopped before surgery and when.    TEN OR MORE DAYS BEFORE    1. [place] with the hospital. (For [facility])      By Phone: [phone].      By Internet: [email or web address]. Choose [facility].      If you do not register by phone or online, we will call to help you register.    SAME DAY SURGERY PATIENTS  1. You will need a family member of friend to drive you home. If you do not have one the surgery will be cancelled or rescheduled.  2. You will need a responsible adult to stay with you that night after the surgery.       We will ask this person to listen to some instructions before you leave the hospital.  * If your child is having surgery, and you would like a tour of the hospital, please call: [phone].      DAY BEFORE SURGERY  1. DO NOT EAT OR DRINK ANYTHING AFTER MIDNIGHT THE NIGHT BEFORE YOUR SURGERY!   2. DO NOT DRINK ALCOHOL.  3. Do not take over the counter drugs.  4. Some people need to have blood tests at the hospital. If you need blood tests, we will tell you in advance.  5. Take medications as directed by your doctor. You may take these with a small amount of water.  6. Do not chew gum, chew tobacco, or suck on hard candy the day of surgery.  7. Bring your insurance cards, a list of your  medicines and co-pays you might need. Leave jewelry and other valuables at home.  8. If you received papers at your doctor's office, bring these with you to the surgery.    If you have questions about these instructions, please call: [phone]  Ask to speak with a pre-admitting nurse.    PRESURGICAL MEDICATIONS:  Certain prescription, over-the-counter, and herbal medications interfere with healing after an operation. The main concern relates to medications that increase bleeding at the surgical site. Excess blood under the incision results in poor wound healing, excess pain, increased scarring, and a higher risk of infection.    Some medications slow the healing process of bone. Medications can also interfere with the anesthesia drugs that keep you asleep during the operation. It is important to ensure that these medications are out of your system prior to the operation. The list below details a number of medications that are of concern. Pay special attention to how long you should avoid these medications before your operation. Please note that this list is not complete. You should ask your surgeon or pharmacist if you are uncertain about other medications. Any herbal supplement not listed should be discontinued at least one week prior to surgery.    Aspirin: Hold for one week prior to surgery and restart the day after surgery. This over the counter medication promotes bleeding.    Motrin / Ibuprofen / Aleve / Advil / NSAIDS:  Stop one week prior to surgery. These medications affect bleeding and may cause delay in bone healing. Avoid taking these medications for six weeks after bone surgeries. Other procedures may allow you to restart sooner than 6 weeks after surgery.    Coumadin / Plavix: Your primary care provider will manage Coumadin in relation to surgery. Coumadin may result in excessive bleeding and may be adjusted before and after surgery.    Enbriel: Stop two weeks prior to surgery and restart two weeks  after surgery. This medication can effect soft tissue healing and increases the risk of infection.    Remicade: Stop 8-12 weeks before surgery and restart two weeks after surgery. This medication can affect soft tissue healing and increases the risk of infection.    Humira: Stop 4 weeks before surgery and restart two weeks after surgery. This medication can affect soft tissue healing and increases the risk of infection.    Methotrexate: Stop one dose prior to surgery. This medication will be restarted when the wound appears to be healing well. Please ask your surgeon about restarting this medication when you are being seen in the office for wound checks.    Kava: Stop at least one day prior to surgery and may restart one day after surgery. Kava may increase the sedative effect of anesthetics that are given during the operation. Kava can also increase bleeding at the surgical site.    Ephedra (ma elliott): Stop at least one day prior to surgery and may restart one day after surgery.  Ephedra may increase the risk of heart attack and stroke. This medication can also increase bleeding at the surgical site.    Crocker's Wort: Stop at least five days before surgery and may restart one day after surgery. Sharron's wort may diminish the effects of several drugs that are given during surgery.    Ginseng: Stop at least one week prior to surgery and may restart one day after surgery.  Ginseng lowers blood sugar and may increase bleeding at the surgical site.    Ginkgo: Stop 36 hours before surgery and may restart one day after surgery. Ginkgo may increase bleeding at the surgical site.    Garlic: Stop at least one week prior to surgery and may restart one day after. Garlic may increase bleeding at the surgery site.    Valerian: Do a slow steady decrease in your daily dose over a period of 2-3 weeks before surgery to decrease the chance of withdrawal symptoms. Valerian may increase the sedative effect of anesthetics given  during the operation.    Echinacea: There is no data on stopping echinacea prior to surgery. This medication though can be associated with allergic reactions and suppression of your immune system.    Vitamin E, Omega-3, Flax, Fish Oil, Glucosamine and Chondroitin: Stop 2 weeks prior to surgery and may restart one day after. These herbal medications can increase risk of bleeding at surgical site.     POTENTIAL COMPLICATIONS OF FOOT & ANKLE SURGERY  Undergoing a surgical procedure involves a certain amount of risk. Risks of complications vary depending on the complexity of the surgery and how you take care of the surgical area during the healing process. Complications can range from minor infection to death. Some complications are temporary while others will be permanent.  Your surgeon weighs the risk of complications vs the potential benefit of undergoing surgery. You need to consider your tolerance for unexpected problems as you decide whether to undergo surgery.    Foot and Ankle surgery involves cutting skin, bone, ligaments, blood vessels and joints.  These structures heal well but not without consequence. Any break to the skin can lead to infection. A deep infection involves bones or joints which can be devastating. Deep infection can lead to amputation or could spread to other parts of your body. Most infections are minor and easily treated with oral antibiotics. Infections are often times from bacteria already present on your skin. Proper care of the surgical site is an essential component of avoiding infection. Do not get the bandage wet and take proper care of external pins to avoid these problems.     Joint stiffness is inherent to any foot or ankle surgery. Joint surgery is a major component of reconstructive foot and ankle procedures. The ligaments and tissues around the joint are cut, and later repaired. Scare tissue limits joint mobility. This can be permanent but generally improves over the course of  one year.    Surgery involves dissection around nerves. Visible nerves are moved out of the way while very small nerves are cut. Scar tissue develops around nerves and can lead to nerve pain, numbness, or neuromas. Nerve symptoms can be permanent. This can lead to numbness or sometimes hypersensitivity to touch and problems wearing shoes.    Bones do not always heal after surgery. Poor healing after a bone cut or joint fusion can lead to an extended period of casting or repeat surgery. Electronic bone stimulators are sometimes used to stimulate poor healing of bone. Nonunion is when joint fusion does not take.  This can occur as often as 10% of the time. Smoking doubles your risk of poor bone healing to 20%.    Bone grafting is sometimes necessary during the original or subsequent surgery. Bone is sometimes taken from other parts of your body or freeze dried bone from a bone bank from a bone bank or synthetic bone material might be used.    A scar is always present after foot and ankle surgery. The scar will be visible and could be sensitive. Some people develop excessive scarring, which cannot be controlled by the surgeon. Scars can be unsightly and can restrict joint mobility.    Blood clots can develop in the calf after surgery. Foot and ankle surgery is a predisposing factor for blood clots. The blood clot could break and travel to your lung.  This condition can lead to death. Early warning signs could include calf swelling and pain, chest pain or shortness of breath. This is an emergency that requires immediate attention by a medical doctor!    Surgery will not necessarily create a pain-free foot. Even normal feet hurt. Crooked toes, bunions, neuromas, flat feet and arthritis should all be considered permanent conditions.  Ankle pain commonly requires multiple surgeries over a lifetime. Do not assume that having surgery will permanently fix your condition. You may need permanent alteration in shoes and  activities to accommodate your foot and ankle problem.    Careful attention to post-operative recommendations will dramatically reduce your risk of complications. Proper dressing, wound care, elevation and rest will be essential to get the wound healed and minimize scarring. Strict attention to activity restrictions, such as non-weight bearing, or partial weight bearing is essential. Internal fixation devices may not resist the stress of walking. Some select surgeries allow the patient to walk, however this should be very minimal.    Despite these concerns, foot and ankle surgery leads to a high level of patient satisfaction. Your surgeon would not recommend surgery if he/she did not expect your foot to improve. Talk to your surgeon about any of the above issues.    POST OPERATIVE HOME CARE INSTRUCTIONS  Activities: You should rest today. Stay off your feet as much as possible and keep your foot elevated above the level of your heart (about two pillow height). Wear your surgical shoe at all times when up. Limit walking to 5 to 10 minutes per hour over the next few days if your doctor has previously told you that you can put some weight on the foot after surgery, although limit the weight to your heel. If you are supposed to be non-weight bearing, that means NO WEIGHT AT ALL ON THE FOOT. Use an ice pack on the ankle while awake 20 minutes per hour to help decrease pain and swelling.     Discomfort: If a prescription for pain was given, take as directed. If no pain medication was ordered, you may take a non-prescription, non-aspirin pain medication. If the pain is not relieved by pain medication, call the clinic.     Incision and Dressing: Your surgical dressing is a sterile dressing and should be left in place until removed by your physician. Keep the dressing dry by covering it with a plastic bag for showers, taking baths with the surgical foot out of the tub, or sponge bathing. Some bleeding on the dressing should  be expected. If however, you notice active or excessive bleeding or a temperature over 100 degrees by mouth, call the clinic. Do not change dressing by yourself.  If the dressing becomes wet or dirty, please call the clinic as it may need a new sterile dressing applied. You may start getting the foot wet after the stitches are removed.     Do not wear regular shoes with a surgical bandage and/or external pins in your foot. Wear loose fitting clothing that easily will slip over the bandage and/or pins. Do not cover your surgical foot with blankets as they may damage the dressing/pins. Also, remember that dogs are not aware of your surgery. Please keep them away from the bandage/pins.   If your surgeon places external pins in your foot, you must keep the foot dry until the pins are removed at 6-8 weeks after the surgery. Pins should be covered with a dressing for protection. You should examine the pins and your skin often. Check for any spreading redness or yellow drainage from the pin areas. Do not apply ointment around the pins. Do not push a loose pin back into your foot. Please call the clinic if the pin is spinning or moving in and out. If the pins are bumped or loosened they may need to be removed early. This may affect your surgical outcome.   Please call the clinic if you feel there is a problem with your pins and/or surgical bandage.    TIPS FOR SUCCESSFUL HEALING  How you care for the surgical site is critically important to achieve a successful result after surgery. Avoidance of injury, infection, excess swelling, scar tissue and stiffness are highly dependent on the care you provide over the next six weeks. Please do not hesitate to call if you have questions or concerns.   Your foot requires significant rest and elevation. Sitting for long hours with your foot elevated, however, will create its own problems. Expect muscle aches, back pain, cramps, etc. Optimal posture, lumbar support, back exercises, ice  and heat may all help with your new aches and pains. Do not apply a heating pad to your foot or leg as this can cause increase swelling and pain. Rather use ice in those areas.   Pain medications cause drowsiness. You may frequently sleep during the day and then have trouble sleeping at night. Over the counter sleep aids might be more effective than narcotic pain medication to achieve a reasonable night's sleep.    Narcotic pain medications and inactivity lead to constipation. Limiting use of narcotics will help minimize this problem. The pain medications will not completely alleviate your pain. The purpose of pain pills is to take the edge off and help you get through the first few days. You can substitute Extra Strength Tylenol if pain is mild. Please note that narcotic pain pills usually contain acetaminophen (the active ingredient in Tylenol) so be careful to avoid the maximum dose of acetaminophen. You should take measures to avoid constipation by drinking plenty of water, eating lots of fruit and vegetables and taking the recommended dose of Metamucil or a similar fiber supplement. These measures should be continued for as long as you require narcotic pain medications and are inactive.     Showering is a major challenge. Your incision requires about three days to become sealed from water. Your bandage should not get wet and should not be removed. Do not attempt showering for the first three days. A sponge bath is preferred. You may attempt to shower on the fourth day after the operation. Your foot should be covered with a bag, tape and rubber bands. Double bagging is preferred. Standing in the shower with a bag on your foot is quite hazardous. A portable shower stool would be ideal. The bandage will need to be changed in the office if it becomes moistened. A moist bandage will not dry on its own. A moist dressing may lead to infection.   Stiffness will develop after any operation due to scarring. The scar  tissue begins to form immediately after the surgery. Inactivity can cause excess stiffness and may lead to blood clots in your legs. Frequent range of motion exercises will help decrease stiffness, blood clots, scar tissue and adhesions. Please call if you are unsure about these recommendations.   Good luck and best wishes on a prompt recovery. Healing is slow but an important step in your recovery. You are in control of the final result. Please use this time wisely. Please do not hesitate to call if you have questions, concerns or comments.    * If you have any post-operative questions or concerns regarding your procedure, call our triage team at the [facility] at [phone] (option 3).          Flu vaccines are now available at all [facility] clinics and retail pharmacies across the [facility]. Appointments are required for clinic locations. To schedule an appointment online, please log into [employer] or create an account if you are a new user. You can also call [phone], or simply walk in at one of the [facility] retail pharmacy locations.

## 2022-02-01 NOTE — LETTER
2/1/2022         RE: Ry Horner  [address]  [facility] [address]        Dear Colleague,    Thank you for referring your patient, Ry Horner, to the [facility] PODIATRY. Please see a copy of my visit note below.    Podiatry / Foot and Ankle Surgery Progress Note    February 1, 2022    Subject: Patient was seen for follow-up on MRI of the left foot.  Here with his wife.  Patient denies fever, nausea, vomiting.  They have been doing Iodoflex dressing changes daily.  He notes that a calcified chunk of something fell out of the wound a few days ago and it seems to have been getting better since then.  Here to discuss surgery.    Objective:  Vitals: /86   Ht 1.829 m (6')   Wt 121.6 kg (268 lb)   BMI 36.35 kg/m    BMI= Body mass index is 36.35 kg/m .    A1C: 8.1 (1/2022)     General:  Patient is alert and orientated.  NAD      Dermatologic: Full-thickness ulceration to the plantar aspect of the left second metatarsal.  This measures approximately 2.5 cm x 2.0 cm x 2.5 cm in depth.  No purulent drainage or surrounding redness but heavy amounts of clear serous drainage with maceration noted around the wounds.        Vascular: DP & PT pulses are intact & regular bilaterally.   edema but no varicosities noted.  CFT and skin temperature is normal to both lower extremities.     Neurologic: Lower extremity sensation is absent to feet.     Musculoskeletal: BKA on the right leg and partial left first ray amputation.     Imaging: Previous amputation of the first ray at the proximal  metatarsal and amputation of the second toe. These findings are  unchanged. No lytic or destructive osseous lesions to suggest  osteomyelitis. Soft tissue swelling of the foot.    MRI left foot:  Osteomyelitis of the second metatarsal extending to the level of  metatarsal base.     2. Mild edema and enhancement with subtle loss of T1 marrow signal in  the third metatarsal extending to the level of the  metatarsal base and  within the base of the third proximal phalanx, which may represent  reactive osteitis versus early osteomyelitis.     3. Extensive soft tissue edema about the forefoot, most prominent in  the musculature surrounding the second metatarsal shaft, consistent  with cellulitis and fasciitis with myositis. No abscess or drainable  fluid collection.     4. Small joint effusions with enhancement at the level of the second  and third tarsometatarsal joints with fluid extending into the  intercuneiform joints. Question possible midfoot septic arthritis.     Assessment:     Open wound of plantar aspect of foot, left, subsequent encounter  Type 2 diabetes mellitus with diabetic polyneuropathy, without long-term current use of insulin (H)  Morbid obesity (H)  Hx of BKA, right (H)  Amputation of left great toe (H)    Medical Decision Making/Plan: Had a long discussion with patient and patient's wife.  Reviewed the MRI results.  They show infection in the second metatarsal and possibly the third metatarsal.  Discussed that we can go in and remove just those bones however my concern is that this would cause ulceration of the fourth and fifth toe areas shortly and I recommend doing a transmetatarsal amputation to give the foot more of an even surface to try to prevent wounds in the future.Talked about risks including infection, numbness, continued pain, non union , recurrence, need for further surgery, blood loss, blood clotting. You will scar.  Discussed that he is at risk of a below the knee amputation on the side as well but given that he has 1 on the right I would try to do this first to save the foot if we can.  I feel this would give him a better long-term outcome if this heals.  They would like to proceed with this.  We will send in an order for a walking crutch instead of a knee roller per patient request.  We will have my surgery scheduler call to schedule him.  Discussed that we will try to do this as  a same-day procedure and have him follow-up the next day at the wound center for dressing change.  If he develops fevers, nausea, vomiting or other signs of systemic infection that we want him to get to the hospital.  Patient and patient's wife verbalizes understanding.  All questions were answered to patient satisfaction and he will call for the questions or concerns.          Patient Risk Factor:  Patient is a high risk factor for infection.     Milena Cevallos DPM, Podiatry/Foot and Ankle Surgery          Again, thank you for allowing me to participate in the care of your patient.        Sincerely,        Milena Cevallos DPM, Podiatry/Foot and Ankle Surgery

## 2022-02-01 NOTE — TELEPHONE ENCOUNTER
Called the [employer] rep, Tete Weber.    She said that for the [employer] knee crutch it would be an out-of-pocket expense of $160 for the crutch and $45 for the fitting.  If he would like to proceed with this he should call her at the number below to set up the fitting.    Her number is [phone].    Please let the patient know.    Thanks,    Milena Cevallos DPM

## 2022-02-01 NOTE — PROGRESS NOTES
Podiatry / Foot and Ankle Surgery Progress Note    February 1, 2022    Subject: Patient was seen for follow-up on MRI of the left foot.  Here with his wife.  Patient denies fever, nausea, vomiting.  They have been doing Iodoflex dressing changes daily.  He notes that a calcified chunk of something fell out of the wound a few days ago and it seems to have been getting better since then.  Here to discuss surgery.    Objective:  Vitals: /86   Ht 1.829 m (6')   Wt 121.6 kg (268 lb)   BMI 36.35 kg/m    BMI= Body mass index is 36.35 kg/m .    A1C: 8.1 (1/2022)     General:  Patient is alert and orientated.  NAD      Dermatologic: Full-thickness ulceration to the plantar aspect of the left second metatarsal.  This measures approximately 2.5 cm x 2.0 cm x 2.5 cm in depth.  No purulent drainage or surrounding redness but heavy amounts of clear serous drainage with maceration noted around the wounds.        Vascular: DP & PT pulses are intact & regular bilaterally.   edema but no varicosities noted.  CFT and skin temperature is normal to both lower extremities.     Neurologic: Lower extremity sensation is absent to feet.     Musculoskeletal: BKA on the right leg and partial left first ray amputation.     Imaging: Previous amputation of the first ray at the proximal  metatarsal and amputation of the second toe. These findings are  unchanged. No lytic or destructive osseous lesions to suggest  osteomyelitis. Soft tissue swelling of the foot.    MRI left foot:  Osteomyelitis of the second metatarsal extending to the level of  metatarsal base.     2. Mild edema and enhancement with subtle loss of T1 marrow signal in  the third metatarsal extending to the level of the metatarsal base and  within the base of the third proximal phalanx, which may represent  reactive osteitis versus early osteomyelitis.     3. Extensive soft tissue edema about the forefoot, most prominent in  the musculature surrounding the second metatarsal  shaft, consistent  with cellulitis and fasciitis with myositis. No abscess or drainable  fluid collection.     4. Small joint effusions with enhancement at the level of the second  and third tarsometatarsal joints with fluid extending into the  intercuneiform joints. Question possible midfoot septic arthritis.     Assessment:     Open wound of plantar aspect of foot, left, subsequent encounter  Type 2 diabetes mellitus with diabetic polyneuropathy, without long-term current use of insulin (H)  Morbid obesity (H)  Hx of BKA, right (H)  Amputation of left great toe (H)    Medical Decision Making/Plan: Had a long discussion with patient and patient's wife.  Reviewed the MRI results.  They show infection in the second metatarsal and possibly the third metatarsal.  Discussed that we can go in and remove just those bones however my concern is that this would cause ulceration of the fourth and fifth toe areas shortly and I recommend doing a transmetatarsal amputation to give the foot more of an even surface to try to prevent wounds in the future.Talked about risks including infection, numbness, continued pain, non union , recurrence, need for further surgery, blood loss, blood clotting. You will scar.  Discussed that he is at risk of a below the knee amputation on the side as well but given that he has 1 on the right I would try to do this first to save the foot if we can.  I feel this would give him a better long-term outcome if this heals.  They would like to proceed with this.  We will send in an order for a walking crutch instead of a knee roller per patient request.  We will have my surgery scheduler call to schedule him.  Discussed that we will try to do this as a same-day procedure and have him follow-up the next day at the wound center for dressing change.  If he develops fevers, nausea, vomiting or other signs of systemic infection that we want him to get to the hospital.  Patient and patient's wife verbalizes  understanding.  All questions were answered to patient satisfaction and he will call for the questions or concerns.          Patient Risk Factor:  Patient is a high risk factor for infection.     Milena Cevallos DPM, Podiatry/Foot and Ankle Surgery

## 2022-02-06 ENCOUNTER — LAB (OUTPATIENT)
Dept: URGENT CARE | Facility: URGENT CARE | Age: 57
End: 2022-02-06
Payer: COMMERCIAL

## 2022-02-06 DIAGNOSIS — Z11.59 ENCOUNTER FOR SCREENING FOR OTHER VIRAL DISEASES: ICD-10-CM

## 2022-02-06 PROCEDURE — U0005 INFEC AGEN DETEC AMPLI PROBE: HCPCS

## 2022-02-06 PROCEDURE — U0003 INFECTIOUS AGENT DETECTION BY NUCLEIC ACID (DNA OR RNA); SEVERE ACUTE RESPIRATORY SYNDROME CORONAVIRUS 2 (SARS-COV-2) (CORONAVIRUS DISEASE [COVID-19]), AMPLIFIED PROBE TECHNIQUE, MAKING USE OF HIGH THROUGHPUT TECHNOLOGIES AS DESCRIBED BY CMS-2020-01-R: HCPCS

## 2022-02-07 ENCOUNTER — OFFICE VISIT (OUTPATIENT)
Dept: FAMILY MEDICINE | Facility: CLINIC | Age: 57
End: 2022-02-07
Payer: COMMERCIAL

## 2022-02-07 VITALS
HEART RATE: 116 BPM | WEIGHT: 264 LBS | HEIGHT: 72 IN | DIASTOLIC BLOOD PRESSURE: 96 MMHG | SYSTOLIC BLOOD PRESSURE: 145 MMHG | TEMPERATURE: 97.1 F | OXYGEN SATURATION: 97 % | BODY MASS INDEX: 35.76 KG/M2 | RESPIRATION RATE: 18 BRPM

## 2022-02-07 DIAGNOSIS — E11.42 TYPE 2 DIABETES MELLITUS WITH DIABETIC POLYNEUROPATHY, WITHOUT LONG-TERM CURRENT USE OF INSULIN (H): ICD-10-CM

## 2022-02-07 DIAGNOSIS — Z01.818 PREOP GENERAL PHYSICAL EXAM: Primary | ICD-10-CM

## 2022-02-07 DIAGNOSIS — N18.32 STAGE 3B CHRONIC KIDNEY DISEASE (H): ICD-10-CM

## 2022-02-07 DIAGNOSIS — I10 HYPERTENSION, UNSPECIFIED TYPE: ICD-10-CM

## 2022-02-07 DIAGNOSIS — M86.9 OSTEOMYELITIS OF LEFT FOOT, UNSPECIFIED TYPE (H): ICD-10-CM

## 2022-02-07 DIAGNOSIS — E78.5 HYPERLIPIDEMIA LDL GOAL <100: ICD-10-CM

## 2022-02-07 LAB
BUN SERPL-MCNC: 41 MG/DL (ref 7–30)
CREAT SERPL-MCNC: 1.71 MG/DL (ref 0.66–1.25)
GFR SERPL CREATININE-BSD FRML MDRD: 47 ML/MIN/1.73M2
HGB BLD-MCNC: 12.5 G/DL (ref 13.3–17.7)
POTASSIUM BLD-SCNC: 4.7 MMOL/L (ref 3.4–5.3)
SARS-COV-2 RNA RESP QL NAA+PROBE: NEGATIVE
SODIUM SERPL-SCNC: 137 MMOL/L (ref 133–144)

## 2022-02-07 PROCEDURE — 84520 ASSAY OF UREA NITROGEN: CPT | Performed by: INTERNAL MEDICINE

## 2022-02-07 PROCEDURE — 99214 OFFICE O/P EST MOD 30 MIN: CPT | Performed by: INTERNAL MEDICINE

## 2022-02-07 PROCEDURE — 36415 COLL VENOUS BLD VENIPUNCTURE: CPT | Performed by: INTERNAL MEDICINE

## 2022-02-07 PROCEDURE — 93000 ELECTROCARDIOGRAM COMPLETE: CPT | Performed by: INTERNAL MEDICINE

## 2022-02-07 PROCEDURE — 84132 ASSAY OF SERUM POTASSIUM: CPT | Performed by: INTERNAL MEDICINE

## 2022-02-07 PROCEDURE — 85018 HEMOGLOBIN: CPT | Performed by: INTERNAL MEDICINE

## 2022-02-07 PROCEDURE — 82565 ASSAY OF CREATININE: CPT | Performed by: INTERNAL MEDICINE

## 2022-02-07 PROCEDURE — 84295 ASSAY OF SERUM SODIUM: CPT | Performed by: INTERNAL MEDICINE

## 2022-02-07 RX ORDER — INSULIN GLARGINE 100 [IU]/ML
50 INJECTION, SOLUTION SUBCUTANEOUS EVERY 12 HOURS
Qty: 99 ML | Refills: 11 | Status: SHIPPED | OUTPATIENT
Start: 2022-02-07 | End: 2022-08-17

## 2022-02-07 ASSESSMENT — MIFFLIN-ST. JEOR: SCORE: 2075.5

## 2022-02-07 ASSESSMENT — PAIN SCALES - GENERAL: PAINLEVEL: NO PAIN (0)

## 2022-02-07 NOTE — H&P (VIEW-ONLY)
[facility]  [address] [facility], [address]  [facility] [address]  Phone: [phone]  Primary Provider: Rodrigue Joshi  Pre-op Performing Provider: RODRIGUE JOSHI      PREOPERATIVE EVALUATION:  Today's date: 2/7/2022    Ry Horner is a 56 year old male who presents for a preoperative evaluation.    Surgical Information:  Surgery/Procedure: Partial left foot amputation  Surgery Location: [facility]  Surgeon: Milena Cevallos DPM, Podiatry/Foot and Ankle Surgery  Surgery Date: 2/10/22  Time of Surgery: 1:00 PM  Where patient plans to recover: At home with family  Fax number for surgical facility: Note does not need to be faxed, will be available electronically in Epic.    Type of Anesthesia Anticipated: Combined General with Popliteal Block    Assessment & Plan     The proposed surgical procedure is considered INTERMEDIATE risk.    Preop general physical exam    - EKG 12-lead complete w/read - Clinics    Osteomyelitis of left foot, unspecified type (H)  Suitable candidate for planned amputation if labs stable     Type 2 diabetes mellitus with diabetic polyneuropathy, without long-term current use of insulin (H)  Reduce Basaglar dose from 56 BID to 50 BID due to most AM fasting sugars being below 100 and some being as low as 70's and one reading of 48; plan to recheck A1c in April; if it remains greater than 8 then, we must add with meals insulin; discussed at length with patient     Stage 3b chronic kidney disease (H)  Recheck labs; pending GFR may need to stop metformin   - Hemoglobin; Future  - Creatinine; Future  - Urea nitrogen; Future  - Potassium; Future  - Sodium; Future    Hypertension, unspecified type  Home readings OK; discussed diet interventions to help with mildly elevated diastolic reading     Hyperlipidemia LDL goal <100  Statin intolerant; on zeita            Risks and Recommendations:  The patient has the following additional risks and recommendations  for perioperative complications:   - No identified additional risk factors other than previously addressed    Medication Instructions:  Patient is to take all scheduled medications on the day of surgery EXCEPT for modifications listed below:   - aspirin: Discontinue aspirin 7-10 days prior to procedure to reduce bleeding risk. It should be resumed postoperatively.    - Long acting insulin (e.g. glargine, detemir): Take 50% of the usual evening or morning dose before surgery.   - metformin: HOLD day of surgery.   - DPP-4 Inhibitor (e.g. sitagliptin [Januvia], linagliptin [Tradjenta]): Continue without modification.     RECOMMENDATION:  APPROVAL GIVEN to proceed with proposed procedure, without further diagnostic evaluation.              31 minutes spent on the date of the encounter doing chart review, history and exam, documentation and further activities per the note        Subjective     HPI related to upcoming procedure: Needs toes amputated due to concern for osteomyelitis.  This patient reports being able to perform 4 METS of physical activity without chest pain or dyspnea.  Home blood pressure readings much better than those measured in clinic typically 120's over 80's some diastolic in low 90's.        Preop Questions 2/7/2022   1. Have you ever had a heart attack or stroke? No   2. Have you ever had surgery on your heart or blood vessels, such as a stent placement, a coronary artery bypass, or surgery on an artery in your head, neck, heart, or legs? No   3. Do you have chest pain with activity? No   4. Do you have a history of  heart failure? No   5. Do you currently have a cold, bronchitis or symptoms of other infection? No   6. Do you have a cough, shortness of breath, or wheezing? No   7. Do you or anyone in your family have previous history of blood clots? No   8. Do you or does anyone in your family have a serious bleeding problem such as prolonged bleeding following surgeries or cuts? No   9. Have you  ever had problems with anemia or been told to take iron pills? No   10. Have you had any abnormal blood loss such as black, tarry or bloody stools? No   11. Have you ever had a blood transfusion? No   12. Are you willing to have a blood transfusion if it is medically needed before, during, or after your surgery? Yes   13. Have you or any of your relatives ever had problems with anesthesia? No   14. Do you have sleep apnea, excessive snoring or daytime drowsiness? No   15. Do you have any artifical heart valves or other implanted medical devices like a pacemaker, defibrillator, or continuous glucose monitor? No   16. Do you have artificial joints? No   17. Are you allergic to latex? No       Review of Systems  Constitutional, neuro, ENT, endocrine, pulmonary, cardiac, gastrointestinal, genitourinary, musculoskeletal, integument and psychiatric systems are negative, except as otherwise noted.    Patient Active Problem List    Diagnosis Date Noted     Open wound of plantar aspect of foot, left, subsequent encounter 10/01/2021     Priority: Medium     Chronic kidney disease, stage 3 (H) 07/01/2021     Priority: Medium     Morbid obesity (H) 02/22/2021     Priority: Medium     Cellulitis in diabetic foot (H) 02/03/2020     Priority: Medium     Type 2 diabetes mellitus with diabetic polyneuropathy, without long-term current use of insulin (H) 07/11/2019     Priority: Medium     Status post below knee amputation, right (H) 09/14/2017     Priority: Medium     Open wound of right foot 08/28/2017     Priority: Medium     Non compliance w medication regimen 02/09/2015     Priority: Medium     Leukocytosis 02/06/2015     Priority: Medium     SIRS due to infectious process without acute organ dysfunction 02/06/2015     Priority: Medium     Diabetic foot infection (H) 02/06/2015     Priority: Medium     Cellulitis of eyelid 06/27/2014     Priority: Medium     Cellulitis and abscess of buttock 10/30/2011     Priority: Medium      Hypertension 10/30/2011     Priority: Medium     Tachycardia 10/30/2011     Priority: Medium     Diabetes mellitus, type 2 (H) 04/04/2007     Priority: Medium     Problem list name updated by automated process. Provider to review    Formatting of this note might be different from the original.  a system change updated this record. This will not affect patient care or billing. This comment can be deleted.       Essential hypertension, benign 04/04/2007     Priority: Medium     Hyperlipidemia LDL goal <100 04/04/2007     Priority: Medium     Problem list name updated by automated process. Provider to review       Impotence of organic origin 04/04/2007     Priority: Medium      Past Medical History:   Diagnosis Date     BENIGN HYPERTENSION 4/4/2007     DIABETES MELLITUS TYPE II-UNCOMPL 4/4/2007     HYPERLIPIDEMIA NEC/NOS 4/4/2007     Tobacco use disorder 4/4/2007     Past Surgical History:   Procedure Laterality Date     AMPUTATE FOOT Left 11/17/2019    Procedure: LEFT PARTIAL FOOT AMPUTATION;  Surgeon: Antoine Pena DPM;  Location: [facility]     AMPUTATE FOOT Left 12/4/2019    Procedure: LEFT PARTIAL FOOT AMPUTATION;  Surgeon: Tim Douglas DPM;  Location: [facility]     AMPUTATE FOOT Left 12/11/2019    Procedure: POSSIBLE PARTIAL FOOT AMPUTATION;  Surgeon: Tim Douglas DPM;  Location: [facility]     AMPUTATE LEG BELOW KNEE Right 9/1/2017    Procedure: AMPUTATE LEG BELOW KNEE;  RIGHT BELOW KNEE AMPUTATION ;  Surgeon: Nikolas Nascimento MD;  Location: [facility]     AMPUTATE TOE(S) Left 2/3/2020    Procedure: LEFT SECOND TOE AMPUTATION;  Surgeon: Milena Cevallos DPM, Podiatry/Foot and Ankle Surgery;  Location: [facility]     APPENDECTOMY       APPLY WOUND VAC Right 3/2/2015    Procedure: APPLY WOUND VAC;  Surgeon: Milena Cevallos DPM, Pod;  Location: [facility]     BIOPSY BONE FOOT Right 7/15/2016    Procedure: BIOPSY BONE FOOT;  Surgeon: Tim Douglas DPM;  Location: [facility]     BIOPSY BONE TOE Left 12/4/2019     Procedure: BONE BIOPSY LEFT SECOND TOE;  Surgeon: Tim Douglas DPM;  Location: [facility]     IRRIGATION AND DEBRIDEMENT FOOT, COMBINED Right 3/2/2015    Procedure: COMBINED IRRIGATION AND DEBRIDEMENT FOOT;  Surgeon: Milena Cevallos DPM, Pod;  Location: [facility]     IRRIGATION AND DEBRIDEMENT FOOT, COMBINED Right 7/15/2016    Procedure: COMBINED IRRIGATION AND DEBRIDEMENT FOOT;  Surgeon: Tim Douglas DPM;  Location: [facility]     IRRIGATION AND DEBRIDEMENT FOOT, COMBINED Right 7/20/2016    Procedure: COMBINED IRRIGATION AND DEBRIDEMENT FOOT;  Surgeon: Tim Douglas DPM;  Location: [facility]     IRRIGATION AND DEBRIDEMENT FOOT, COMBINED Left 11/19/2019    Procedure: REVISIONAL IRRIGATION AND DEBRIDEMENT LEFT FOOT AND BONE DEBRIDEMENT;  Surgeon: Joseph Randhawa DPM;  Location: [facility]     IRRIGATION AND DEBRIDEMENT FOOT, COMBINED Left 12/4/2019    Procedure: EXCISIONAL DEBRIDEMENT LEFT FOOT;  Surgeon: Tim Douglas DPM;  Location: [facility]     IRRIGATION AND DEBRIDEMENT FOOT, COMBINED Left 12/11/2019    Procedure: IRRIGATION AND DEBRIDEMENT FOOT, Partial osteotomy left first metatarsal;  Surgeon: Tim Douglas DPM;  Location: [facility]     IRRIGATION AND DEBRIDEMENT FOOT, COMBINED Left 2/5/2020    Procedure: IRRIGATION AND DEBRIDEMENT LEFT FOOT;  Surgeon: Tim Douglas DPM;  Location:  OR     ORTHOPEDIC SURGERY       Current Outpatient Medications   Medication Sig Dispense Refill     amoxicillin-clavulanate (AUGMENTIN) 875-125 MG tablet Take 1 tablet by mouth 2 times daily for 14 days 28 tablet 0     aspirin 81 MG chewable tablet Take 1 tablet (81 mg) by mouth daily 108 tablet 3     blood glucose (NO BRAND SPECIFIED) lancets standard Use to test blood sugar FOUR times daily, to match lancing device per insurance 400 each 1     blood glucose (NO BRAND SPECIFIED) test strip 120 strips by In Vitro route 4 times daily Use to test blood sugar up to 4 times daily or as directed. 360 strip 3     blood  "glucose monitoring (NO BRAND SPECIFIED) meter device kit Use to test blood sugar FOUR times daily, brand per insurance 1 kit 0     Continuous Blood Gluc Sensor (DEXCOM G6 SENSOR) MISC 1 each every 10 days Change every 10 days. 3 each 11     empagliflozin (JARDIANCE) 10 MG TABS tablet Take 1 tablet (10 mg) by mouth daily 90 tablet 3     ezetimibe (ZETIA) 10 MG tablet TAKE ONE TABLET BY MOUTH EVERY DAY 90 tablet 3     insulin glargine (BASAGLAR KWIKPEN) 100 UNIT/ML pen Inject 56 Units Subcutaneous every 12 hours 99 mL 11     insulin pen needle (BD PEN NEEDLE MEGAN 2ND GEN) 32G X 4 MM miscellaneous USE TWICE DAILY AS DIRECTED WITH BASAGLAR 100 each 1     ipratropium (ATROVENT) 0.06 % nasal spray INSTILL 2 SPRAYS INTO EACH NOSTRIL FOUR TIMES A DAY AS NEEDED FOR RHINITIS 15 mL 0     ketorolac (ACULAR) 0.5 % ophthalmic solution        Lancets (ONETOUCH DELICA PLUS [id number]) MISC USE TO TEST FOUR TIMES A DAY       lisinopril (ZESTRIL) 40 MG tablet Take 1 tablet (40 mg) by mouth daily 90 tablet 3     metFORMIN (GLUCOPHAGE) 500 MG tablet Take 1 tablet (500 mg) by mouth 2 times daily (with meals) 180 tablet 3     multivitamin (CENTRUM SILVER) tablet Take 1 tablet by mouth daily       prednisoLONE acetate (PRED FORTE) 1 % ophthalmic suspension        sitagliptin (JANUVIA) 50 MG tablet Take 1 tablet (50 mg) by mouth daily 90 tablet 3     STATIN NOT PRESCRIBED (INTENTIONAL) Please choose reason not prescribed, below         Allergies   Allergen Reactions     Pravastatin      \"sucked the life blood out of me,\" Indicates this occurs with all statins.        Social History     Tobacco Use     Smoking status: Former Smoker     Packs/day: 0.50     Years: 20.00     Pack years: 10.00     Types: Cigarettes     Start date: 1987     Quit date:      Years since quittin.1     Smokeless tobacco: Never Used   Substance Use Topics     Alcohol use: Yes     Comment: 3-4 drinks per month     Family History   Problem Relation Age of " Onset     Genetic Disorder Other      Genetic Disorder Other      Psychotic Disorder Mother      Diabetes Father      Lung Cancer Father      Hypertension Father      Genetic Disorder Maternal Grandmother      Genetic Disorder Maternal Grandfather      Asthma Sister      Breast Cancer Sister      C.A.D. No family hx of      Hypertension No family hx of      Cerebrovascular Disease No family hx of      Breast Cancer No family hx of      Cancer - colorectal No family hx of      Prostate Cancer No family hx of      Alcohol/Drug No family hx of      History   Drug Use No         Objective     There were no vitals taken for this visit.    Physical Exam    GENERAL APPEARANCE: healthy, alert and no distress     EYES: EOMI,  PERRL     HENT: ear canals and TM's normal and nose and mouth without ulcers or lesions     NECK: no adenopathy, no asymmetry, masses, or scars and thyroid normal to palpation     RESP: lungs clear to auscultation - no rales, rhonchi or wheezes     CV: regular rates and rhythm, normal S1 S2, no S3 or S4 and no murmur, click or rub     ABDOMEN:  soft, nontender, no HSM or masses and bowel sounds normal     MS: Right leg BKA, left foot with dressing over toes that appears clean, dry and intact     SKIN: no suspicious lesions or rashes     NEURO: Normal strength and tone, mentation intact and speech normal     PSYCH: mentation appears normal. and affect normal/bright     LYMPHATICS: No cervical adenopathy    Recent Labs   Lab Test 01/03/22  1428 10/11/21  1504 09/30/21  0803 07/14/21  1033 06/25/21  0820   HGB  --   --   --   --  12.8*   PLT  --   --   --   --  231   NA  --  134 132*   < > 134   POTASSIUM  --  5.0 5.2   < > 5.2   CR  --  1.91* 2.50*   < > 1.51*   A1C 8.1*  --  9.6*  --  8.5*    < > = values in this interval not displayed.        Diagnostics:  Labs pending      EKG:  Sinus tachycardia (he just walked back from the bathroom) rate 113 no obvious ST changes     Revised Cardiac Risk Index  (RCRI):  The patient has the following serious cardiovascular risks for perioperative complications:   - Diabetes Mellitus (on Insulin) = 1 point     RCRI Interpretation: 2 points: Class III (moderate risk - 6.6% complication rate)     Estimated Functional Capacity: Performs 4 METS exercise without symptoms (e.g., light housework, stairs, 4 mph walk, 7 mph bike, slow step dance)           Signed Electronically by: Kody Wing MD  Copy of this evaluation report is provided to requesting physician.

## 2022-02-07 NOTE — PROGRESS NOTES
[facility]  [address] [facility], [address]  [facility] [address]  Phone: [phone]  Primary Provider: Rodrigue Joshi  Pre-op Performing Provider: RODRIGUE JOSHI      PREOPERATIVE EVALUATION:  Today's date: 2/7/2022    Ry Horner is a 56 year old male who presents for a preoperative evaluation.    Surgical Information:  Surgery/Procedure: Partial left foot amputation  Surgery Location: [facility]  Surgeon: Milena Cevallos DPM, Podiatry/Foot and Ankle Surgery  Surgery Date: 2/10/22  Time of Surgery: 1:00 PM  Where patient plans to recover: At home with family  Fax number for surgical facility: Note does not need to be faxed, will be available electronically in Epic.    Type of Anesthesia Anticipated: Combined General with Popliteal Block    Assessment & Plan     The proposed surgical procedure is considered INTERMEDIATE risk.    Preop general physical exam    - EKG 12-lead complete w/read - Clinics    Osteomyelitis of left foot, unspecified type (H)  Suitable candidate for planned amputation if labs stable     Type 2 diabetes mellitus with diabetic polyneuropathy, without long-term current use of insulin (H)  Reduce Basaglar dose from 56 BID to 50 BID due to most AM fasting sugars being below 100 and some being as low as 70's and one reading of 48; plan to recheck A1c in April; if it remains greater than 8 then, we must add with meals insulin; discussed at length with patient     Stage 3b chronic kidney disease (H)  Recheck labs; pending GFR may need to stop metformin   - Hemoglobin; Future  - Creatinine; Future  - Urea nitrogen; Future  - Potassium; Future  - Sodium; Future    Hypertension, unspecified type  Home readings OK; discussed diet interventions to help with mildly elevated diastolic reading     Hyperlipidemia LDL goal <100  Statin intolerant; on zeita            Risks and Recommendations:  The patient has the following additional risks and recommendations  for perioperative complications:   - No identified additional risk factors other than previously addressed    Medication Instructions:  Patient is to take all scheduled medications on the day of surgery EXCEPT for modifications listed below:   - aspirin: Discontinue aspirin 7-10 days prior to procedure to reduce bleeding risk. It should be resumed postoperatively.    - Long acting insulin (e.g. glargine, detemir): Take 50% of the usual evening or morning dose before surgery.   - metformin: HOLD day of surgery.   - DPP-4 Inhibitor (e.g. sitagliptin [Januvia], linagliptin [Tradjenta]): Continue without modification.     RECOMMENDATION:  APPROVAL GIVEN to proceed with proposed procedure, without further diagnostic evaluation.              31 minutes spent on the date of the encounter doing chart review, history and exam, documentation and further activities per the note        Subjective     HPI related to upcoming procedure: Needs toes amputated due to concern for osteomyelitis.  This patient reports being able to perform 4 METS of physical activity without chest pain or dyspnea.  Home blood pressure readings much better than those measured in clinic typically 120's over 80's some diastolic in low 90's.        Preop Questions 2/7/2022   1. Have you ever had a heart attack or stroke? No   2. Have you ever had surgery on your heart or blood vessels, such as a stent placement, a coronary artery bypass, or surgery on an artery in your head, neck, heart, or legs? No   3. Do you have chest pain with activity? No   4. Do you have a history of  heart failure? No   5. Do you currently have a cold, bronchitis or symptoms of other infection? No   6. Do you have a cough, shortness of breath, or wheezing? No   7. Do you or anyone in your family have previous history of blood clots? No   8. Do you or does anyone in your family have a serious bleeding problem such as prolonged bleeding following surgeries or cuts? No   9. Have you  ever had problems with anemia or been told to take iron pills? No   10. Have you had any abnormal blood loss such as black, tarry or bloody stools? No   11. Have you ever had a blood transfusion? No   12. Are you willing to have a blood transfusion if it is medically needed before, during, or after your surgery? Yes   13. Have you or any of your relatives ever had problems with anesthesia? No   14. Do you have sleep apnea, excessive snoring or daytime drowsiness? No   15. Do you have any artifical heart valves or other implanted medical devices like a pacemaker, defibrillator, or continuous glucose monitor? No   16. Do you have artificial joints? No   17. Are you allergic to latex? No       Review of Systems  Constitutional, neuro, ENT, endocrine, pulmonary, cardiac, gastrointestinal, genitourinary, musculoskeletal, integument and psychiatric systems are negative, except as otherwise noted.    Patient Active Problem List    Diagnosis Date Noted     Open wound of plantar aspect of foot, left, subsequent encounter 10/01/2021     Priority: Medium     Chronic kidney disease, stage 3 (H) 07/01/2021     Priority: Medium     Morbid obesity (H) 02/22/2021     Priority: Medium     Cellulitis in diabetic foot (H) 02/03/2020     Priority: Medium     Type 2 diabetes mellitus with diabetic polyneuropathy, without long-term current use of insulin (H) 07/11/2019     Priority: Medium     Status post below knee amputation, right (H) 09/14/2017     Priority: Medium     Open wound of right foot 08/28/2017     Priority: Medium     Non compliance w medication regimen 02/09/2015     Priority: Medium     Leukocytosis 02/06/2015     Priority: Medium     SIRS due to infectious process without acute organ dysfunction 02/06/2015     Priority: Medium     Diabetic foot infection (H) 02/06/2015     Priority: Medium     Cellulitis of eyelid 06/27/2014     Priority: Medium     Cellulitis and abscess of buttock 10/30/2011     Priority: Medium      Hypertension 10/30/2011     Priority: Medium     Tachycardia 10/30/2011     Priority: Medium     Diabetes mellitus, type 2 (H) 04/04/2007     Priority: Medium     Problem list name updated by automated process. Provider to review    Formatting of this note might be different from the original.  a system change updated this record. This will not affect patient care or billing. This comment can be deleted.       Essential hypertension, benign 04/04/2007     Priority: Medium     Hyperlipidemia LDL goal <100 04/04/2007     Priority: Medium     Problem list name updated by automated process. Provider to review       Impotence of organic origin 04/04/2007     Priority: Medium      Past Medical History:   Diagnosis Date     BENIGN HYPERTENSION 4/4/2007     DIABETES MELLITUS TYPE II-UNCOMPL 4/4/2007     HYPERLIPIDEMIA NEC/NOS 4/4/2007     Tobacco use disorder 4/4/2007     Past Surgical History:   Procedure Laterality Date     AMPUTATE FOOT Left 11/17/2019    Procedure: LEFT PARTIAL FOOT AMPUTATION;  Surgeon: Antoine Pena DPM;  Location: [facility] OR     AMPUTATE FOOT Left 12/4/2019    Procedure: LEFT PARTIAL FOOT AMPUTATION;  Surgeon: Tim Douglas DPM;  Location: [facility] OR     AMPUTATE FOOT Left 12/11/2019    Procedure: POSSIBLE PARTIAL FOOT AMPUTATION;  Surgeon: Tim Douglas DPM;  Location: [facility] OR     AMPUTATE LEG BELOW KNEE Right 9/1/2017    Procedure: AMPUTATE LEG BELOW KNEE;  RIGHT BELOW KNEE AMPUTATION ;  Surgeon: Nikolas Nascimento MD;  Location: [facility] OR     AMPUTATE TOE(S) Left 2/3/2020    Procedure: LEFT SECOND TOE AMPUTATION;  Surgeon: Milena Cevallos DPM, Podiatry/Foot and Ankle Surgery;  Location: [facility] OR     APPENDECTOMY       APPLY WOUND VAC Right 3/2/2015    Procedure: APPLY WOUND VAC;  Surgeon: Milena Cevallos DPM, Pod;  Location: [facility] OR     BIOPSY BONE FOOT Right 7/15/2016    Procedure: BIOPSY BONE FOOT;  Surgeon: Tim Douglas DPM;  Location: [facility]     BIOPSY BONE TOE Left 12/4/2019     Procedure: BONE BIOPSY LEFT SECOND TOE;  Surgeon: Tim Douglas DPM;  Location: [facility]     IRRIGATION AND DEBRIDEMENT FOOT, COMBINED Right 3/2/2015    Procedure: COMBINED IRRIGATION AND DEBRIDEMENT FOOT;  Surgeon: Milena Cevallos DPM, Pod;  Location: [facility] OR     IRRIGATION AND DEBRIDEMENT FOOT, COMBINED Right 7/15/2016    Procedure: COMBINED IRRIGATION AND DEBRIDEMENT FOOT;  Surgeon: Tim Douglas DPM;  Location: [facility]     IRRIGATION AND DEBRIDEMENT FOOT, COMBINED Right 7/20/2016    Procedure: COMBINED IRRIGATION AND DEBRIDEMENT FOOT;  Surgeon: Tim Douglas DPM;  Location: [facility]     IRRIGATION AND DEBRIDEMENT FOOT, COMBINED Left 11/19/2019    Procedure: REVISIONAL IRRIGATION AND DEBRIDEMENT LEFT FOOT AND BONE DEBRIDEMENT;  Surgeon: Joseph Randhawa DPM;  Location: [facility]     IRRIGATION AND DEBRIDEMENT FOOT, COMBINED Left 12/4/2019    Procedure: EXCISIONAL DEBRIDEMENT LEFT FOOT;  Surgeon: Tim Douglas DPM;  Location: [facility]     IRRIGATION AND DEBRIDEMENT FOOT, COMBINED Left 12/11/2019    Procedure: IRRIGATION AND DEBRIDEMENT FOOT, Partial osteotomy left first metatarsal;  Surgeon: Tim Douglas DPM;  Location: [facility]     IRRIGATION AND DEBRIDEMENT FOOT, COMBINED Left 2/5/2020    Procedure: IRRIGATION AND DEBRIDEMENT LEFT FOOT;  Surgeon: Tim Douglas DPM;  Location:  OR     ORTHOPEDIC SURGERY       Current Outpatient Medications   Medication Sig Dispense Refill     amoxicillin-clavulanate (AUGMENTIN) 875-125 MG tablet Take 1 tablet by mouth 2 times daily for 14 days 28 tablet 0     aspirin 81 MG chewable tablet Take 1 tablet (81 mg) by mouth daily 108 tablet 3     blood glucose (NO BRAND SPECIFIED) lancets standard Use to test blood sugar FOUR times daily, to match lancing device per insurance 400 each 1     blood glucose (NO BRAND SPECIFIED) test strip 120 strips by In Vitro route 4 times daily Use to test blood sugar up to 4 times daily or as directed. 360 strip 3     blood  "glucose monitoring (NO BRAND SPECIFIED) meter device kit Use to test blood sugar FOUR times daily, brand per insurance 1 kit 0     Continuous Blood Gluc Sensor (DEXCOM G6 SENSOR) MISC 1 each every 10 days Change every 10 days. 3 each 11     empagliflozin (JARDIANCE) 10 MG TABS tablet Take 1 tablet (10 mg) by mouth daily 90 tablet 3     ezetimibe (ZETIA) 10 MG tablet TAKE ONE TABLET BY MOUTH EVERY DAY 90 tablet 3     insulin glargine (BASAGLAR KWIKPEN) 100 UNIT/ML pen Inject 56 Units Subcutaneous every 12 hours 99 mL 11     insulin pen needle (BD PEN NEEDLE MEGAN 2ND GEN) 32G X 4 MM miscellaneous USE TWICE DAILY AS DIRECTED WITH BASAGLAR 100 each 1     ipratropium (ATROVENT) 0.06 % nasal spray INSTILL 2 SPRAYS INTO EACH NOSTRIL FOUR TIMES A DAY AS NEEDED FOR RHINITIS 15 mL 0     ketorolac (ACULAR) 0.5 % ophthalmic solution        Lancets (ONETOUCH DELICA PLUS [id number]) MISC USE TO TEST FOUR TIMES A DAY       lisinopril (ZESTRIL) 40 MG tablet Take 1 tablet (40 mg) by mouth daily 90 tablet 3     metFORMIN (GLUCOPHAGE) 500 MG tablet Take 1 tablet (500 mg) by mouth 2 times daily (with meals) 180 tablet 3     multivitamin (CENTRUM SILVER) tablet Take 1 tablet by mouth daily       prednisoLONE acetate (PRED FORTE) 1 % ophthalmic suspension        sitagliptin (JANUVIA) 50 MG tablet Take 1 tablet (50 mg) by mouth daily 90 tablet 3     STATIN NOT PRESCRIBED (INTENTIONAL) Please choose reason not prescribed, below         Allergies   Allergen Reactions     Pravastatin      \"sucked the life blood out of me,\" Indicates this occurs with all statins.        Social History     Tobacco Use     Smoking status: Former Smoker     Packs/day: 0.50     Years: 20.00     Pack years: 10.00     Types: Cigarettes     Start date: 1987     Quit date:      Years since quittin.1     Smokeless tobacco: Never Used   Substance Use Topics     Alcohol use: Yes     Comment: 3-4 drinks per month     Family History   Problem Relation Age of " Onset     Genetic Disorder Other      Genetic Disorder Other      Psychotic Disorder Mother      Diabetes Father      Lung Cancer Father      Hypertension Father      Genetic Disorder Maternal Grandmother      Genetic Disorder Maternal Grandfather      Asthma Sister      Breast Cancer Sister      C.A.D. No family hx of      Hypertension No family hx of      Cerebrovascular Disease No family hx of      Breast Cancer No family hx of      Cancer - colorectal No family hx of      Prostate Cancer No family hx of      Alcohol/Drug No family hx of      History   Drug Use No         Objective     There were no vitals taken for this visit.    Physical Exam    GENERAL APPEARANCE: healthy, alert and no distress     EYES: EOMI,  PERRL     HENT: ear canals and TM's normal and nose and mouth without ulcers or lesions     NECK: no adenopathy, no asymmetry, masses, or scars and thyroid normal to palpation     RESP: lungs clear to auscultation - no rales, rhonchi or wheezes     CV: regular rates and rhythm, normal S1 S2, no S3 or S4 and no murmur, click or rub     ABDOMEN:  soft, nontender, no HSM or masses and bowel sounds normal     MS: Right leg BKA, left foot with dressing over toes that appears clean, dry and intact     SKIN: no suspicious lesions or rashes     NEURO: Normal strength and tone, mentation intact and speech normal     PSYCH: mentation appears normal. and affect normal/bright     LYMPHATICS: No cervical adenopathy    Recent Labs   Lab Test 01/03/22  1428 10/11/21  1504 09/30/21  0803 07/14/21  1033 06/25/21  0820   HGB  --   --   --   --  12.8*   PLT  --   --   --   --  231   NA  --  134 132*   < > 134   POTASSIUM  --  5.0 5.2   < > 5.2   CR  --  1.91* 2.50*   < > 1.51*   A1C 8.1*  --  9.6*  --  8.5*    < > = values in this interval not displayed.        Diagnostics:  Labs pending      EKG:  Sinus tachycardia (he just walked back from the bathroom) rate 113 no obvious ST changes     Revised Cardiac Risk Index  (RCRI):  The patient has the following serious cardiovascular risks for perioperative complications:   - Diabetes Mellitus (on Insulin) = 1 point     RCRI Interpretation: 2 points: Class III (moderate risk - 6.6% complication rate)     Estimated Functional Capacity: Performs 4 METS exercise without symptoms (e.g., light housework, stairs, 4 mph walk, 7 mph bike, slow step dance)           Signed Electronically by: Kody Wing MD  Copy of this evaluation report is provided to requesting physician.

## 2022-02-07 NOTE — NURSING NOTE
Ry Horner is a 56 year old patient who comes in today for a Blood Pressure check.  Initial BP:  BP (!) 145/96 (BP Location: Right arm, Patient Position: Sitting, Cuff Size: Adult Regular)   Pulse 116   Temp 97.1  F (36.2  C) (Temporal)   Resp 18   Ht 1.829 m (6')   Wt 119.7 kg (264 lb)   SpO2 97%   BMI 35.80 kg/m       116  Disposition: provider notified while patient in the clinic  Viktoria Fabian CMA

## 2022-02-07 NOTE — LETTER
February 7, 2022      Ry SUE   [address]  [place] [address]        Dear ,    We are writing to inform you of your test results.    The following letter pertains to your most recent diagnostic tests:     Good news! The labs and EKG are OK for proceeding with the planned surgery.        Resulted Orders   Hemoglobin   Result Value Ref Range    Hemoglobin 12.5 (L) 13.3 - 17.7 g/dL   Creatinine   Result Value Ref Range    Creatinine 1.71 (H) 0.66 - 1.25 mg/dL    GFR Estimate 47 (L) >60 mL/min/1.73m2      Comment:      Effective December 21, 2021 eGFRcr in adults is calculated using the 2021 CKD-EPI creatinine equation which includes age and gender (Bridgette et al., NEJM, DOI: 10.1056/NOKCvm1302370)   Urea nitrogen   Result Value Ref Range    Urea Nitrogen 41 (H) 7 - 30 mg/dL   Potassium   Result Value Ref Range    Potassium 4.7 3.4 - 5.3 mmol/L   Sodium   Result Value Ref Range    Sodium 137 133 - 144 mmol/L       If you have any questions or concerns, please call the clinic at the number listed above.       Sincerely,      Kody Wing MD

## 2022-02-07 NOTE — RESULT ENCOUNTER NOTE
The following letter pertains to your most recent diagnostic tests:    Good news! The labs and EKG are OK for proceeding with the planned surgery.      Sincerely,    Dr. Wing

## 2022-02-07 NOTE — PATIENT INSTRUCTIONS
-Stop aspirin until appropriate to restart after surgery per surgeon's instruction.  -On PM prior to surgery take 50% of usual Basaglar insulin dose (ie 25 units)  -On AM of surgery take 50% of usual Basalgar insulin dose (ie 25 units)  -On AM of surgery hold metformin, Januvia and Jardiance   -Take all other AM medication on morning of surgery with sip of water

## 2022-02-10 ENCOUNTER — ANESTHESIA (OUTPATIENT)
Dept: SURGERY | Facility: CLINIC | Age: 57
End: 2022-02-10
Payer: COMMERCIAL

## 2022-02-10 ENCOUNTER — ANESTHESIA EVENT (OUTPATIENT)
Dept: SURGERY | Facility: CLINIC | Age: 57
End: 2022-02-10
Payer: COMMERCIAL

## 2022-02-10 ENCOUNTER — APPOINTMENT (OUTPATIENT)
Dept: GENERAL RADIOLOGY | Facility: CLINIC | Age: 57
End: 2022-02-10
Attending: PODIATRIST
Payer: COMMERCIAL

## 2022-02-10 ENCOUNTER — HOSPITAL ENCOUNTER (OUTPATIENT)
Facility: CLINIC | Age: 57
Discharge: HOME | End: 2022-02-10
Attending: PODIATRIST | Admitting: PODIATRIST
Payer: COMMERCIAL

## 2022-02-10 VITALS
SYSTOLIC BLOOD PRESSURE: 121 MMHG | RESPIRATION RATE: 16 BRPM | TEMPERATURE: 97.9 F | DIASTOLIC BLOOD PRESSURE: 80 MMHG | OXYGEN SATURATION: 96 % | HEART RATE: 96 BPM | BODY MASS INDEX: 35.62 KG/M2 | HEIGHT: 72 IN | WEIGHT: 263 LBS

## 2022-02-10 DIAGNOSIS — L08.9 DIABETIC FOOT INFECTION (H): ICD-10-CM

## 2022-02-10 DIAGNOSIS — E11.628 DIABETIC FOOT INFECTION (H): ICD-10-CM

## 2022-02-10 DIAGNOSIS — Z98.890 POST-OPERATIVE STATE: Primary | ICD-10-CM

## 2022-02-10 DIAGNOSIS — M86.272 SUBACUTE OSTEOMYELITIS OF LEFT FOOT (H): ICD-10-CM

## 2022-02-10 DIAGNOSIS — E11.42 TYPE 2 DIABETES MELLITUS WITH DIABETIC POLYNEUROPATHY, WITHOUT LONG-TERM CURRENT USE OF INSULIN (H): ICD-10-CM

## 2022-02-10 LAB
ANION GAP SERPL CALCULATED.3IONS-SCNC: 3 MMOL/L (ref 3–14)
BUN SERPL-MCNC: 37 MG/DL (ref 7–30)
CALCIUM SERPL-MCNC: 9.1 MG/DL (ref 8.5–10.1)
CHLORIDE BLD-SCNC: 105 MMOL/L (ref 94–109)
CO2 SERPL-SCNC: 27 MMOL/L (ref 20–32)
CREAT SERPL-MCNC: 1.58 MG/DL (ref 0.66–1.25)
GFR SERPL CREATININE-BSD FRML MDRD: 52 ML/MIN/1.73M2
GLUCOSE BLD-MCNC: 204 MG/DL (ref 70–99)
GLUCOSE BLDC GLUCOMTR-MCNC: 125 MG/DL (ref 70–99)
POTASSIUM BLD-SCNC: 4.5 MMOL/L (ref 3.4–5.3)
SODIUM SERPL-SCNC: 135 MMOL/L (ref 133–144)

## 2022-02-10 PROCEDURE — 250N000025 HC SEVOFLURANE, PER MIN: Performed by: PODIATRIST

## 2022-02-10 PROCEDURE — 36415 COLL VENOUS BLD VENIPUNCTURE: CPT | Performed by: PODIATRIST

## 2022-02-10 PROCEDURE — 258N000003 HC RX IP 258 OP 636: Performed by: NURSE ANESTHETIST, CERTIFIED REGISTERED

## 2022-02-10 PROCEDURE — 87075 CULTR BACTERIA EXCEPT BLOOD: CPT | Performed by: PODIATRIST

## 2022-02-10 PROCEDURE — 272N000001 HC OR GENERAL SUPPLY STERILE: Performed by: PODIATRIST

## 2022-02-10 PROCEDURE — 999N000065 XR FOOT PORT LEFT 2 VIEWS: Mod: LT

## 2022-02-10 PROCEDURE — 250N000009 HC RX 250: Performed by: NURSE ANESTHETIST, CERTIFIED REGISTERED

## 2022-02-10 PROCEDURE — 250N000011 HC RX IP 250 OP 636: Performed by: ANESTHESIOLOGY

## 2022-02-10 PROCEDURE — 360N000083 HC SURGERY LEVEL 3 W/ FLUORO, PER MIN: Performed by: PODIATRIST

## 2022-02-10 PROCEDURE — 710N000009 HC RECOVERY PHASE 1, LEVEL 1, PER MIN: Performed by: PODIATRIST

## 2022-02-10 PROCEDURE — 999N000141 HC STATISTIC PRE-PROCEDURE NURSING ASSESSMENT: Performed by: PODIATRIST

## 2022-02-10 PROCEDURE — 88311 DECALCIFY TISSUE: CPT | Mod: TC | Performed by: PODIATRIST

## 2022-02-10 PROCEDURE — 370N000017 HC ANESTHESIA TECHNICAL FEE, PER MIN: Performed by: PODIATRIST

## 2022-02-10 PROCEDURE — 250N000011 HC RX IP 250 OP 636: Performed by: PODIATRIST

## 2022-02-10 PROCEDURE — 999N000179 XR SURGERY CARM FLUORO LESS THAN 5 MIN W STILLS

## 2022-02-10 PROCEDURE — 82310 ASSAY OF CALCIUM: CPT | Performed by: PODIATRIST

## 2022-02-10 PROCEDURE — 88305 TISSUE EXAM BY PATHOLOGIST: CPT | Mod: TC | Performed by: PODIATRIST

## 2022-02-10 PROCEDURE — 87077 CULTURE AEROBIC IDENTIFY: CPT | Mod: 59 | Performed by: PODIATRIST

## 2022-02-10 PROCEDURE — 250N000011 HC RX IP 250 OP 636: Performed by: NURSE ANESTHETIST, CERTIFIED REGISTERED

## 2022-02-10 PROCEDURE — 28805 AMPUTATION THRU METATARSAL: CPT | Mod: RT | Performed by: PODIATRIST

## 2022-02-10 PROCEDURE — 82962 GLUCOSE BLOOD TEST: CPT

## 2022-02-10 PROCEDURE — 710N000012 HC RECOVERY PHASE 2, PER MINUTE: Performed by: PODIATRIST

## 2022-02-10 PROCEDURE — 250N000009 HC RX 250: Performed by: PODIATRIST

## 2022-02-10 RX ORDER — CEFAZOLIN SODIUM IN 0.9 % NACL 3 G/100 ML
3 INTRAVENOUS SOLUTION, PIGGYBACK (ML) INTRAVENOUS SEE ADMIN INSTRUCTIONS
Status: DISCONTINUED | OUTPATIENT
Start: 2022-02-10 | End: 2022-02-10 | Stop reason: HOSPADM

## 2022-02-10 RX ORDER — ONDANSETRON 4 MG/1
4-8 TABLET, ORALLY DISINTEGRATING ORAL EVERY 8 HOURS PRN
Qty: 4 TABLET | Refills: 0 | Status: SHIPPED | OUTPATIENT
Start: 2022-02-10 | End: 2022-09-23

## 2022-02-10 RX ORDER — BUPIVACAINE HYDROCHLORIDE 5 MG/ML
INJECTION, SOLUTION EPIDURAL; INTRACAUDAL PRN
Status: DISCONTINUED | OUTPATIENT
Start: 2022-02-10 | End: 2022-02-10 | Stop reason: HOSPADM

## 2022-02-10 RX ORDER — OXYCODONE HYDROCHLORIDE 5 MG/1
5 TABLET ORAL
Status: DISCONTINUED | OUTPATIENT
Start: 2022-02-10 | End: 2022-02-10 | Stop reason: HOSPADM

## 2022-02-10 RX ORDER — OXYCODONE HYDROCHLORIDE 5 MG/1
5-10 TABLET ORAL EVERY 4 HOURS PRN
Qty: 20 TABLET | Refills: 0 | Status: SHIPPED | OUTPATIENT
Start: 2022-02-10 | End: 2022-09-23

## 2022-02-10 RX ORDER — LIDOCAINE HYDROCHLORIDE 20 MG/ML
INJECTION, SOLUTION INFILTRATION; PERINEURAL PRN
Status: DISCONTINUED | OUTPATIENT
Start: 2022-02-10 | End: 2022-02-10

## 2022-02-10 RX ORDER — PROPOFOL 10 MG/ML
INJECTION, EMULSION INTRAVENOUS PRN
Status: DISCONTINUED | OUTPATIENT
Start: 2022-02-10 | End: 2022-02-10

## 2022-02-10 RX ORDER — FENTANYL CITRATE 50 UG/ML
INJECTION, SOLUTION INTRAMUSCULAR; INTRAVENOUS PRN
Status: DISCONTINUED | OUTPATIENT
Start: 2022-02-10 | End: 2022-02-10

## 2022-02-10 RX ORDER — CEFAZOLIN SODIUM IN 0.9 % NACL 3 G/100 ML
3 INTRAVENOUS SOLUTION, PIGGYBACK (ML) INTRAVENOUS
Status: COMPLETED | OUTPATIENT
Start: 2022-02-10 | End: 2022-02-10

## 2022-02-10 RX ORDER — ONDANSETRON 2 MG/ML
INJECTION INTRAMUSCULAR; INTRAVENOUS PRN
Status: DISCONTINUED | OUTPATIENT
Start: 2022-02-10 | End: 2022-02-10

## 2022-02-10 RX ORDER — SODIUM CHLORIDE, SODIUM LACTATE, POTASSIUM CHLORIDE, CALCIUM CHLORIDE 600; 310; 30; 20 MG/100ML; MG/100ML; MG/100ML; MG/100ML
INJECTION, SOLUTION INTRAVENOUS CONTINUOUS PRN
Status: DISCONTINUED | OUTPATIENT
Start: 2022-02-10 | End: 2022-02-10

## 2022-02-10 RX ORDER — PROPOFOL 10 MG/ML
INJECTION, EMULSION INTRAVENOUS CONTINUOUS PRN
Status: DISCONTINUED | OUTPATIENT
Start: 2022-02-10 | End: 2022-02-10

## 2022-02-10 RX ORDER — AMOXICILLIN 250 MG
1-2 CAPSULE ORAL 2 TIMES DAILY
Qty: 30 TABLET | Refills: 0 | Status: SHIPPED | OUTPATIENT
Start: 2022-02-10 | End: 2022-09-23

## 2022-02-10 RX ADMIN — PROPOFOL 200 MG: 10 INJECTION, EMULSION INTRAVENOUS at 13:07

## 2022-02-10 RX ADMIN — MIDAZOLAM 2 MG: 1 INJECTION INTRAMUSCULAR; INTRAVENOUS at 13:03

## 2022-02-10 RX ADMIN — PHENYLEPHRINE HYDROCHLORIDE 100 MCG: 10 INJECTION INTRAVENOUS at 13:29

## 2022-02-10 RX ADMIN — PHENYLEPHRINE HYDROCHLORIDE 200 MCG: 10 INJECTION INTRAVENOUS at 13:55

## 2022-02-10 RX ADMIN — PROPOFOL 30 MCG/KG/MIN: 10 INJECTION, EMULSION INTRAVENOUS at 13:07

## 2022-02-10 RX ADMIN — LIDOCAINE HYDROCHLORIDE 100 MG: 20 INJECTION, SOLUTION INFILTRATION; PERINEURAL at 13:07

## 2022-02-10 RX ADMIN — ONDANSETRON 4 MG: 2 INJECTION INTRAMUSCULAR; INTRAVENOUS at 13:56

## 2022-02-10 RX ADMIN — PHENYLEPHRINE HYDROCHLORIDE 200 MCG: 10 INJECTION INTRAVENOUS at 13:32

## 2022-02-10 RX ADMIN — Medication 8 MCG: at 14:09

## 2022-02-10 RX ADMIN — Medication 3 G: at 13:10

## 2022-02-10 RX ADMIN — PHENYLEPHRINE HYDROCHLORIDE 100 MCG: 10 INJECTION INTRAVENOUS at 13:25

## 2022-02-10 RX ADMIN — SODIUM CHLORIDE, POTASSIUM CHLORIDE, SODIUM LACTATE AND CALCIUM CHLORIDE: 600; 310; 30; 20 INJECTION, SOLUTION INTRAVENOUS at 13:03

## 2022-02-10 RX ADMIN — MIDAZOLAM 2 MG: 1 INJECTION INTRAMUSCULAR; INTRAVENOUS at 11:58

## 2022-02-10 RX ADMIN — PHENYLEPHRINE HYDROCHLORIDE 100 MCG: 10 INJECTION INTRAVENOUS at 13:07

## 2022-02-10 RX ADMIN — PHENYLEPHRINE HYDROCHLORIDE 200 MCG: 10 INJECTION INTRAVENOUS at 13:41

## 2022-02-10 RX ADMIN — FENTANYL CITRATE 50 MCG: 50 INJECTION, SOLUTION INTRAMUSCULAR; INTRAVENOUS at 13:15

## 2022-02-10 ASSESSMENT — MIFFLIN-ST. JEOR: SCORE: 2070.96

## 2022-02-10 ASSESSMENT — LIFESTYLE VARIABLES: TOBACCO_USE: 1

## 2022-02-10 NOTE — ANESTHESIA PROCEDURE NOTES
Airway       Patient location during procedure: OR  Staff -        Anesthesiologist:  Alisa Reddy MD       CRNA: Reny Pierce APRN CRNA       Performed By: CRNA  Consent for Airway        Urgency: elective  Indications and Patient Condition       Indications for airway management: alexander-procedural         Mask difficulty assessment: 0 - not attempted    Final Airway Details       Final airway type: supraglottic airway    Supraglottic Airway Details        Type: LMA       Brand: Ambu AuraGain       LMA size: 5    Post intubation assessment        Placement verified by: capnometry and chest rise        Number of attempts at approach: 1       Secured with: pink tape       Ease of procedure: easy       Dentition: Intact and Unchanged

## 2022-02-10 NOTE — ANESTHESIA POSTPROCEDURE EVALUATION
Patient: Ry Horner    Procedure: Procedure(s):  Partial left foot amputation       Diagnosis:Diabetic polyneuropathy associated with type 2 diabetes mellitus (H) [E11.42]  Hx of BKA, right (H) [Z89.511]  Ulcer of left foot, with necrosis of bone (H) [L97.524]  Amputation of left great toe (H) [S98.112A]  H/O amputation of lesser toe, left (H) [Z89.422]  Diagnosis Additional Information: No value filed.    Anesthesia Type:  General    Note:  Disposition: Admission   Postop Pain Control: Uneventful            Sign Out: Well controlled pain   PONV: No   Neuro/Psych: Uneventful            Sign Out: Acceptable/Baseline neuro status   Airway/Respiratory: Uneventful            Sign Out: Acceptable/Baseline resp. status   CV/Hemodynamics: Uneventful            Sign Out: Acceptable CV status; No obvious hypovolemia; No obvious fluid overload   Other NRE: NONE   DID A NON-ROUTINE EVENT OCCUR? No           Last vitals:  Vitals Value Taken Time   /80 02/10/22 1515   Temp 36.7  C (98  F) 02/10/22 1515   Pulse 96 02/10/22 1515   Resp 16 02/10/22 1515   SpO2 95 % 02/10/22 1515       Electronically Signed By: Alisa Reddy MD  February 10, 2022  5:26 PM

## 2022-02-10 NOTE — DISCHARGE INSTRUCTIONS
**If you have questions or concerns about your procedure,   call Dr Cevallos [phone]**          Same Day Surgery Discharge Instructions for  Sedation and General Anesthesia       It's not unusual to feel dizzy, light-headed or faint for up to 24 hours after surgery or while taking pain medication.  If you have these symptoms: sit for a few minutes before standing and have someone assist you when you get up to walk or use the bathroom.      You should rest and relax for the next 24 hours. We recommend you make arrangements to have an adult stay with you for at least 24 hours after your discharge.  Avoid hazardous and strenuous activity.      DO NOT DRIVE any vehicle or operate mechanical equipment for 24 hours following the end of your surgery.  Even though you may feel normal, your reactions may be affected by the medication you have received.      Do not drink alcoholic beverages for 24 hours following surgery.       Slowly progress to your regular diet as you feel able. It's not unusual to feel nauseated and/or vomit after receiving anesthesia.  If you develop these symptoms, drink clear liquids (apple juice, ginger ale, broth, 7-up, etc. ) until you feel better.  If your nausea and vomiting persists for 24 hours, please notify your surgeon.        All narcotic pain medications, along with inactivity and anesthesia, can cause constipation. Drinking plenty of liquids and increasing fiber intake will help.      For any questions of a medical nature, call your surgeon.      Do not make important decisions for 24 hours.      If you had general anesthesia, you may have a sore throat for a couple of days related to the breathing tube used during surgery.  You may use Cepacol lozenges to help with this discomfort.  If it worsens or if you develop a fever, contact your surgeon.       If you feel your pain is not well managed with the pain medications prescribed by your surgeon, please contact your surgeon's office to let  them know so they can address your concerns.       CoVid 19 Information    We want to give you information regarding Covid. Please consult your primary care provider with any questions you might have.     Patient who have symptoms (cough, fever, or shortness of breath), need to isolate for 7 days from when symptoms started OR 72 hours after fever resolves (without fever reducing medications) AND improvement of respiratory symptoms (whichever is longer).      Isolate yourself at home (in own room/own bathroom if possible)    Do Not allow any visitors    Do Not go to work or school    Do Not go to [employer],  centers, shopping, or other public places.    Do Not shake hands.    Avoid close and intimate contact with others (hugging, kissing).    Follow CDC recommendations for household cleaning of frequently touched services.     After the initial 7 days, continue to isolate yourself from household members as much as possible. To continue decrease the risk of community spread and exposure, you and any members of your household should limit activities in public for 14 days after starting home isolation.     You can reference the following CDC link for helpful home isolation/care tips:  https://www.cdc.gov/coronavirus/2019-ncov/downloads/10Things.pdf    Protect Others:    Cover Your Mouth and Nose with a mask, disposable tissue or wash cloth to avoid spreading germs to others.    Wash your hands and face frequently with soap and water    Call Your Primary Doctor If: Breathing difficulty develops or you become worse.    For more information about COVID19 and options for caring for yourself at home, please visit the CDC website at https://www.cdc.gov/coronavirus/2019-ncov/about/steps-when-sick.html  For more options for care at [facility], please visit our website at [email or web address]          Discharge Instructions: Caring for Your Hemovac Drainage Tube      You have been  discharged with a Hemovac drainage tube. The tube was placed in your incision to remove fluid and is attached to a drain or collection device. It will help healing and reduce the risk of infection. Expect to see fluid and blood in the drain. You may also feel some burning and pulling from the stitch that holds the tube in place. Your drain will be removed when the fluid leaking from it is less than 2 tablespoons each day. There is a bandage at the site where the tube is placed. This is to protect the open area from infection. Your stitches will be taken out 7 to 14 days after surgery. Here's what you need to do to care for your Hemovac drainage tube.   General guidelines    Don t sleep on the same side as the tube.    Secure the tube and bag inside your clothing. This will prevent the tube from being pulled out.    Take a sponge bath to avoid getting your bandage and tube site wet, unless your healthcare provider tells you otherwise.    Ask your provider when can you take a shower or bathe.    Ask your provider about the best way to keep the site dry when bathing or showering.     Empty the drain  Empty your drain at least twice a day. Empty it more often if needed.    Wash your hands. If someone is helping you, have that person wear clean, non-sterile gloves after washing their hands.    Lift the stopper. Don't touch the open port. The drain will expand.    Turn the drain upside down.    Drain the fluid into a measuring cup.    Record the amount of fluid each time you empty the drain. Total the amount daily. Share this information with your doctor on your next visit.    Place the empty drain on a hard surface and press down until it is flat.    Close the cork stopper device.    Wash your hands again.      Follow-up  Make a follow-up appointment, or as directed.            When to call your doctor  Call your doctor right away if you have any of the following:    Pain, swelling, or fluid around the tube    Redness or  warmth around the incision or fluid draining from the incision    Nausea and vomiting    Fever above 100.4  F (38  C) or chills, or as advised    An incision that does not heal; stitches that become infected or loose    A tube that falls out    A foul smell from the incision site    Drainage that changes from light pink to dark red    An increase in the amount of drainage after an initial decrease

## 2022-02-10 NOTE — ANESTHESIA PROCEDURE NOTES
Popliteal and Sciatic Procedure Note    Pre-Procedure   Staff -        Anesthesiologist:  James Evans MD       Performed By: anesthesiologist       Location: pre-op       Pre-Anesthestic Checklist: patient identified, IV checked, site marked, risks and benefits discussed, informed consent, monitors and equipment checked, pre-op evaluation, at physician/surgeon's request and post-op pain management  Timeout:       Correct Patient: Yes        Correct Procedure: Yes        Correct Site: Yes        Correct Position: Yes        Correct Laterality: Yes        Site Marked: Yes  Procedure Documentation  Procedure: Popliteal, Sciatic       Patient Position: supine       Skin prep: Chloraprep       Local skin infiltrated with 1 mL of 1% lidocaine.        Needle Type: insulated       Needle Gauge: 21.        Needle Length (millimeters): 100        Ultrasound guided       1. Ultrasound was used to identify targeted nerve, plexus, vascular marker, or fascial plane and place a needle adjacent to it in real-time.       2. Ultrasound was used to visualize the spread of anesthetic in close proximity to the above referenced structure.       3. A permanent image is entered into the patient's record.       4. The visualized anatomic structures appeared normal.       5. There were no apparent abnormal pathologic findings.    Assessment/Narrative         The placement was negative for: blood aspirated, painful injection and site bleeding       Paresthesias: No.     Bolus given via needle..        Secured via.        Insertion/Infusion Method: Single Shot       Complications: none     Comments:  Bolus via needle, 25 mL of 0.5% ropivacaine with 1:400,000 epinephrine.    Under ultrasound guidance, a 21 gauge needle was inserted and placed in close proximity to the sciatic nerve. Ultrasound was also used to visualize the spread of anesthetic in close proximity to the nerve being blocked. The nerve appeared anatomically normal,  and there were no apparent abnormal pathological findings. Patient tolerated well, was mildly sedated but communicative throughout the procedure. A permanent ultrasound image was saved in the patient's record.    The surgeon has given a verbal order transferring care of this patient to me for the performance of regional analgesia block for post op pain control. It is requested of me because I am uniquely trained and qualified to perform this block and the surgeon is neither trained nor qualified to perform this procedure.

## 2022-02-10 NOTE — ANESTHESIA PROCEDURE NOTES
Adductor canal (targeting saphenous nerve) Procedure Note    Pre-Procedure   Staff -        Anesthesiologist:  James Evans MD       Performed By: anesthesiologist       Location: pre-op       Pre-Anesthestic Checklist: patient identified, IV checked, site marked, risks and benefits discussed, informed consent, monitors and equipment checked, pre-op evaluation, at physician/surgeon's request and post-op pain management  Timeout:       Correct Patient: Yes        Correct Procedure: Yes        Correct Site: Yes        Correct Position: Yes        Correct Laterality: Yes        Site Marked: Yes  Procedure Documentation  Procedure: Adductor canal (targeting saphenous nerve)       Patient Position: supine       Skin prep: Chloraprep       Local skin infiltrated with 1 mL of 1% lidocaine.        Needle Type: insulated       Needle Gauge: 21.        Needle Length (millimeters): 90        Ultrasound guided       1. Ultrasound was used to identify targeted nerve, plexus, vascular marker, or fascial plane and place a needle adjacent to it in real-time.       2. Ultrasound was used to visualize the spread of anesthetic in close proximity to the above referenced structure.       3. A permanent image is entered into the patient's record.       4. The visualized anatomic structures appeared normal.       5. There were no apparent abnormal pathologic findings.    Assessment/Narrative         The placement was negative for: blood aspirated, painful injection and site bleeding       Paresthesias: No.     Bolus given via needle..        Secured via.        Insertion/Infusion Method: Single Shot       Complications: none     Comments:  Bolus via needle, 10 mL of 0.5% ropivacaine with 1:400,000 epinephrine.    Under ultrasound guidance, a 21 gauge needle was inserted and placed in close proximity to the saphenous nerve. Ultrasound was also used to visualize the spread of anesthetic in close proximity to the nerve being  blocked. The nerve appeared anatomically normal, and there were no apparent abnormal pathological findings. Patient tolerated well, was mildly sedated but communicative throughout the procedure. A permanent ultrasound image was saved in the patient's record.    The surgeon has given a verbal order transferring care of this patient to me for the performance of regional analgesia block for post op pain control. It is requested of me because I am uniquely trained and qualified to perform this block and the surgeon is neither trained nor qualified to perform this procedure.

## 2022-02-10 NOTE — ANESTHESIA CARE TRANSFER NOTE
Patient: Ry Horner    Procedure: Procedure(s):  Partial left foot amputation       Diagnosis: Diabetic polyneuropathy associated with type 2 diabetes mellitus (H) [E11.42]  Hx of BKA, right (H) [Z89.511]  Ulcer of left foot, with necrosis of bone (H) [L97.524]  Amputation of left great toe (H) [S98.112A]  H/O amputation of lesser toe, left (H) [Z89.422]  Diagnosis Additional Information: No value filed.    Anesthesia Type:   General     Note:    Oropharynx: oropharynx clear of all foreign objects and spontaneously breathing  Level of Consciousness: awake  Oxygen Supplementation: face mask    Independent Airway: airway patency satisfactory and stable  Dentition: dentition unchanged  Vital Signs Stable: post-procedure vital signs reviewed and stable  Report to RN Given: handoff report given  Patient transferred to: PACU    Handoff Report: Identifed the Patient, Identified the Reponsible Provider, Reviewed the pertinent medical history, Discussed the surgical course, Reviewed Intra-OP anesthesia mangement and issues during anesthesia, Set expectations for post-procedure period and Allowed opportunity for questions and acknowledgement of understanding      Vitals:  Vitals Value Taken Time   BP 90/62 02/10/22 1423   Temp     Pulse 87 02/10/22 1425   Resp 12 02/10/22 1425   SpO2 100 % 02/10/22 1425   Vitals shown include unvalidated device data.    Electronically Signed By: LILIANA Shah CRNA  February 10, 2022  2:26 PM

## 2022-02-10 NOTE — OR NURSING
Pt dressed, up in recliner and transported to Phase 2.     Hemovac drain instructions given to patient. Pt awake and A&Ox4.  Plan for drain to be taken out tomorrow in Dr Cevallos office.

## 2022-02-10 NOTE — OP NOTE
Procedure Date: 02/10/2022    SURGEON:  Milena Cevallos DPM    FIRST ASSISTANT:  Edyta Ramirez DPM    PREOPERATIVE DIAGNOSES:  1.  Diabetic with neuropathy.  2.  Ulceration, left foot.  3.  Osteomyelitis, left foot.  4.  Below-the-knee amputation on the right.    POSTOPERATIVE DIAGNOSES:  1.  Diabetic with neuropathy.  2.  Ulceration, left foot.  3.  Osteomyelitis, left foot.  4.  Below-the-knee amputation on the right.    PROCEDURE PERFORMED:  Transmetatarsal amputation, left foot.    ANESTHESIA:  General with popliteal block.    HEMOSTASIS:  Electrocautery.    ESTIMATED BLOOD LOSS:  100 mL.    SPECIMENS:  Left forefoot for pathology and ulcer for cultures and proximal margin.    MATERIALS:  Hemovac drain.    INDICATIONS FOR PROCEDURE:  Mr. Horner is a 56-year-old diabetic male that we have been seeing in clinic for ulceration to his left foot.  We have tried multiple conservative treatments.  The wound has not been healing and an MRI was obtained.  This showed osteomyelitis in the second metatarsal.  He has had previous left first partial ray and second toe amputations.  Discussed with patient going in and removing the second metatarsal; however, I was concerned that this seems to be causing pressure to continue to move laterally and I recommend removing all of the toes at this time to give it a more even based and try to prevent further ulceration.  Given that he has a below-the-knee amputation on the right foot, this would be a salvage procedure of the left foot.  Risks, benefits, and complications were discussed with the patient.  No guarantees were made.  The patient wished to proceed with surgery.    DESCRIPTION OF PROCEDURE:  The patient was brought to the operating room and placed on the operating table in supine position.  Anesthesia was administered and local was injected.  The foot was prepped and draped using sterile technique.  Attention was directed to the distal aspect of the left foot.  CHINO dumont  incision was made around the distal aspect of the foot full thickness down to bone with a 15 blade.  This was carried dorsally over the metatarsophalangeal joints as well as plantarly.  A triangular wedge incision was then performed on the plantar aspect, excising the ulceration in this area.  The toes were disarticulated at the metatarsophalangeal joint and the ulcer tissue was excised.  This was sent for path and culture.  A McGlamry elevator was then used to release the soft tissue attachments around the distal 2/3 of the metatarsals 2 through 5.  Bleeding was controlled with electrocautery and pressure.  A sagittal saw was used to remove the distal 2/3 of the metatarsals 2 through 5.  These were also sent for pathology.  The wound was flushed with copious amounts of antibiotic solution.  A small amount of bone using a clean rongeur of the second metatarsal was then removed and sent for proximal margin.  There was no further purulent drainage noted.  The skin was then reapproximated using 3-0 Prolene and a Hemovac drain was placed to try to prevent hematoma under the skin incision.  The foot was placed in a dry sterile dressing.  The patient tolerated the procedure and anesthesia well and was transferred to recovery with vital signs stable and vascular status intact.  The patient will be nonweightbearing on the left foot.    Milena Cevallos DPM        D: 02/10/2022   T: 02/10/2022   MT: [id number]    Name:     YNES YBARRA  MRN:      [id number]        Account:        [id number]   :      1965           Procedure Date: 02/10/2022     Document: [id number]

## 2022-02-10 NOTE — ANESTHESIA PREPROCEDURE EVALUATION
Anesthesia Pre-Procedure Evaluation    Patient: Ry Horner   MRN: [id number] : 1965        Preoperative Diagnosis: Diabetic polyneuropathy associated with type 2 diabetes mellitus (H) [E11.42]  Hx of BKA, right (H) [Z89.511]  Ulcer of left foot, with necrosis of bone (H) [L97.524]  Amputation of left great toe (H) [S98.112A]  H/O amputation of lesser toe, left (H) [Z89.422]    Procedure : Procedure(s):  Partial left foot amputation          Past Medical History:   Diagnosis Date     BENIGN HYPERTENSION 2007     DIABETES MELLITUS TYPE II-UNCOMPL 2007     HYPERLIPIDEMIA NEC/NOS 2007     Tobacco use disorder 2007      Past Surgical History:   Procedure Laterality Date     AMPUTATE FOOT Left 2019    Procedure: LEFT PARTIAL FOOT AMPUTATION;  Surgeon: Antoine Pena DPM;  Location: [facility] OR     AMPUTATE FOOT Left 2019    Procedure: LEFT PARTIAL FOOT AMPUTATION;  Surgeon: Tim Douglas DPM;  Location: [facility] OR     AMPUTATE FOOT Left 2019    Procedure: POSSIBLE PARTIAL FOOT AMPUTATION;  Surgeon: Tim Douglas DPM;  Location: [facility] OR     AMPUTATE LEG BELOW KNEE Right 2017    Procedure: AMPUTATE LEG BELOW KNEE;  RIGHT BELOW KNEE AMPUTATION ;  Surgeon: Nikolas Nascimento MD;  Location: [facility] OR     AMPUTATE TOE(S) Left 2/3/2020    Procedure: LEFT SECOND TOE AMPUTATION;  Surgeon: Milena Cevallos DPM, Podiatry/Foot and Ankle Surgery;  Location: [facility] OR     APPENDECTOMY       APPLY WOUND VAC Right 3/2/2015    Procedure: APPLY WOUND VAC;  Surgeon: Milena Cevallos DPM, Pod;  Location: [facility] OR     BIOPSY BONE FOOT Right 7/15/2016    Procedure: BIOPSY BONE FOOT;  Surgeon: Tim Douglas DPM;  Location: [facility]     BIOPSY BONE TOE Left 2019    Procedure: BONE BIOPSY LEFT SECOND TOE;  Surgeon: Tim Douglas DPM;  Location: [facility]     IRRIGATION AND DEBRIDEMENT FOOT, COMBINED Right 3/2/2015    Procedure: COMBINED IRRIGATION AND DEBRIDEMENT FOOT;  Surgeon: Milena Cevallos  "J, DPM, Pod;  Location: [facility] OR     IRRIGATION AND DEBRIDEMENT FOOT, COMBINED Right 7/15/2016    Procedure: COMBINED IRRIGATION AND DEBRIDEMENT FOOT;  Surgeon: Tim Douglas DPM;  Location: [facility]     IRRIGATION AND DEBRIDEMENT FOOT, COMBINED Right 2016    Procedure: COMBINED IRRIGATION AND DEBRIDEMENT FOOT;  Surgeon: Tim Douglas DPM;  Location: [facility]     IRRIGATION AND DEBRIDEMENT FOOT, COMBINED Left 2019    Procedure: REVISIONAL IRRIGATION AND DEBRIDEMENT LEFT FOOT AND BONE DEBRIDEMENT;  Surgeon: Joseph Randhawa DPM;  Location: [facility]     IRRIGATION AND DEBRIDEMENT FOOT, COMBINED Left 2019    Procedure: EXCISIONAL DEBRIDEMENT LEFT FOOT;  Surgeon: Tim Douglas DPM;  Location: [facility]     IRRIGATION AND DEBRIDEMENT FOOT, COMBINED Left 2019    Procedure: IRRIGATION AND DEBRIDEMENT FOOT, Partial osteotomy left first metatarsal;  Surgeon: Tim Douglas DPM;  Location: [facility]     IRRIGATION AND DEBRIDEMENT FOOT, COMBINED Left 2020    Procedure: IRRIGATION AND DEBRIDEMENT LEFT FOOT;  Surgeon: Tim Douglas DPM;  Location: [facility]     ORTHOPEDIC SURGERY        Allergies   Allergen Reactions     Pravastatin      \"sucked the life blood out of me,\" Indicates this occurs with all statins.      Social History     Tobacco Use     Smoking status: Former Smoker     Packs/day: 0.50     Years: 20.00     Pack years: 10.00     Types: Cigarettes     Start date: 1987     Quit date: 2006     Years since quittin.1     Smokeless tobacco: Never Used   Substance Use Topics     Alcohol use: Yes     Comment: 3-4 drinks per month      Wt Readings from Last 1 Encounters:   22 119.7 kg (264 lb)        Anesthesia Evaluation   Pt has had prior anesthetic. Type: General.    No history of anesthetic complications       ROS/MED HX  ENT/Pulmonary:     (+) tobacco use (Quit in '06), Past use, 10  Pack-Year Hx,      Neurologic:     (+) peripheral neuropathy, - LLE.     Cardiovascular: "     (+) Dyslipidemia hypertension-----    METS/Exercise Tolerance:     Hematologic:       Musculoskeletal: Comment: Hx of R BKA  (+) arthritis,     GI/Hepatic:       Renal/Genitourinary:     (+) renal disease (Stage 3), type: CRI,     Endo:     (+) type II DM, Not using insulin, Diabetic complications: neuropathy. Obesity (Class 2),     Psychiatric/Substance Use:       Infectious Disease: Comment: Osteomyelitis      Malignancy:       Other:            Physical Exam    Airway        Mallampati: I   TM distance: > 3 FB   Neck ROM: full   Mouth opening: > 3 cm    Respiratory Devices and Support         Dental  no notable dental history     (+) caps      Cardiovascular          Rhythm and rate: regular and normal     Pulmonary   pulmonary exam normal                OUTSIDE LABS:  CBC:   Lab Results   Component Value Date    WBC 10.5 06/25/2021    WBC 7.7 02/06/2020    HGB 12.5 (L) 02/07/2022    HGB 12.8 (L) 06/25/2021    HCT 35.7 (L) 06/25/2021    HCT 27.1 (L) 02/06/2020     06/25/2021     02/06/2020     BMP:   Lab Results   Component Value Date     02/07/2022     10/11/2021    POTASSIUM 4.7 02/07/2022    POTASSIUM 5.0 10/11/2021    CHLORIDE 104 10/11/2021    CHLORIDE 105 09/30/2021    CO2 20 10/11/2021    CO2 21 09/30/2021    BUN 41 (H) 02/07/2022    BUN 54 (H) 10/11/2021    CR 1.71 (H) 02/07/2022    CR 1.91 (H) 10/11/2021     (H) 10/11/2021     (H) 09/30/2021     COAGS: No results found for: PTT, INR, FIBR  POC:   Lab Results   Component Value Date     (H) 02/07/2020     HEPATIC:   Lab Results   Component Value Date    ALBUMIN 3.7 06/25/2021    PROTTOTAL 7.8 06/25/2021    ALT 52 06/25/2021    AST 19 06/25/2021    ALKPHOS 84 06/25/2021    BILITOTAL 0.4 06/25/2021     OTHER:   Lab Results   Component Value Date    LACT 1.0 02/02/2020    A1C 8.1 (H) 01/03/2022    FERNANDO 9.7 10/11/2021    PHOS 3.7 02/06/2020    TSH 0.94 12/19/2019    .0 (H) 02/02/2020    SED 88 (H)  12/03/2019       Anesthesia Plan    ASA Status:  2      Anesthesia Type: General.     - Airway: LMA   Induction: Intravenous.   Maintenance: Balanced.        Consents    Anesthesia Plan(s) and associated risks, benefits, and realistic alternatives discussed. Questions answered and patient/representative(s) expressed understanding.    - Discussed:     - Discussed with:  Patient         Postoperative Care    Pain management: IV analgesics, Multi-modal analgesia, Peripheral nerve block (Single Shot).   PONV prophylaxis: Ondansetron (or other 5HT-3), Background Propofol Infusion     Comments:                James Evans MD

## 2022-02-10 NOTE — BRIEF OP NOTE
[facility]    Brief Operative Note    Pre-operative diagnosis: Diabetic polyneuropathy associated with type 2 diabetes mellitus (H) [E11.42]  Hx of BKA, right (H) [Z89.511]  Ulcer of left foot, with necrosis of bone (H) [L97.524]  Amputation of left great toe (H) [S98.112A]  H/O amputation of lesser toe, left (H) [Z89.422]  Post-operative diagnosis Same as pre-operative diagnosis    Procedure: Procedure(s):  Partial left foot amputation  Surgeon: Surgeon(s) and Role:     * Milena Cevallos DPM, Podiatry/Foot and Ankle Surgery - Primary   First Assist:  Jose Ramirez DPM  Anesthesia: Combined General with Popliteal Block   Estimated Blood Loss: 100 mL from 2/10/2022  1:03 PM to 2/10/2022  2:20 PM      Drains: Hemovac  Specimens:   ID Type Source Tests Collected by Time Destination   1 : Left Foot Tissue Foot, Left SURGICAL PATHOLOGY EXAM Milena Cevallos DPM, Podiatry/Foot and Ankle Surgery 2/10/2022  1:44 PM    2 : Left Foot Bone Proxmial Margin Bone Resection Foot, Left SURGICAL PATHOLOGY EXAM Milena Cevallos DPM, Podiatry/Foot and Ankle Surgery 2/10/2022  1:46 PM    A : Left Foot Ulcer Tissue Foot, Left ANAEROBIC BACTERIAL CULTURE ROUTINE, AEROBIC BACTERIAL CULTURE ROUTINE Milena Cevallos DPM, Podiatry/Foot and Ankle Surgery 2/10/2022  1:32 PM      Findings:   None.  Complications: None.  Implants: * No implants in log *

## 2022-02-11 ENCOUNTER — HOSPITAL ENCOUNTER (OUTPATIENT)
Dept: WOUND CARE | Facility: CLINIC | Age: 57
End: 2022-02-11
Attending: PODIATRIST
Payer: COMMERCIAL

## 2022-02-11 VITALS
TEMPERATURE: 97.2 F | HEART RATE: 101 BPM | DIASTOLIC BLOOD PRESSURE: 84 MMHG | RESPIRATION RATE: 16 BRPM | SYSTOLIC BLOOD PRESSURE: 143 MMHG

## 2022-02-11 DIAGNOSIS — Z98.890 POSTOPERATIVE STATE: ICD-10-CM

## 2022-02-11 DIAGNOSIS — T87.43 RIGHT BKA INFECTION (H): ICD-10-CM

## 2022-02-11 DIAGNOSIS — Z89.432 PARTIAL NONTRAUMATIC AMPUTATION OF LEFT FOOT (H): ICD-10-CM

## 2022-02-11 DIAGNOSIS — S91.302D OPEN WOUND OF PLANTAR ASPECT OF FOOT, LEFT, SUBSEQUENT ENCOUNTER: ICD-10-CM

## 2022-02-11 DIAGNOSIS — E11.42 DIABETIC POLYNEUROPATHY ASSOCIATED WITH TYPE 2 DIABETES MELLITUS (H): ICD-10-CM

## 2022-02-11 PROCEDURE — 99024 POSTOP FOLLOW-UP VISIT: CPT | Performed by: PODIATRIST

## 2022-02-11 NOTE — DISCHARGE INSTRUCTIONS
Ry Horner      1965    Wound Dressing Change: Left foot  Keep dressing clean and dry until your next follow up    Ok to put weight on your left foot heel if needed for balance, otherwise no weight bearing on the left foot     Milena Cevallos D.P.M. February 11, 2022    Call us at [phone] if you have any questions about your wounds, have redness or swelling around your wound, have a fever of 101 or greater or if you have any other problems or concerns. We answer the phone Monday through Friday 8 am to 4 pm, please leave a message as we check the voicemail frequently throughout the day.     If you had a positive experience please indicate on your patient satisfaction survey form that [employer] will be sending you.    If you have any billing related questions please call the [employer] Business office at [phone]. The clinic staff does not handle billing related matters.

## 2022-02-13 LAB — BACTERIA TISS BX CULT: NORMAL

## 2022-02-15 LAB
BACTERIA TISS BX CULT: ABNORMAL
PATH REPORT.COMMENTS IMP SPEC: NORMAL
PATH REPORT.COMMENTS IMP SPEC: NORMAL
PATH REPORT.FINAL DX SPEC: NORMAL
PATH REPORT.GROSS SPEC: NORMAL
PATH REPORT.MICROSCOPIC SPEC OTHER STN: NORMAL
PATH REPORT.RELEVANT HX SPEC: NORMAL
PHOTO IMAGE: NORMAL

## 2022-02-15 PROCEDURE — 88311 DECALCIFY TISSUE: CPT | Mod: 26 | Performed by: PATHOLOGY

## 2022-02-15 PROCEDURE — 88307 TISSUE EXAM BY PATHOLOGIST: CPT | Mod: 26 | Performed by: PATHOLOGY

## 2022-02-15 PROCEDURE — 88305 TISSUE EXAM BY PATHOLOGIST: CPT | Mod: 26 | Performed by: PATHOLOGY

## 2022-02-16 ENCOUNTER — MYC REFILL (OUTPATIENT)
Dept: FAMILY MEDICINE | Facility: CLINIC | Age: 57
End: 2022-02-16
Payer: COMMERCIAL

## 2022-02-16 DIAGNOSIS — R09.89 RUNNY NOSE: ICD-10-CM

## 2022-02-16 RX ORDER — IPRATROPIUM BROMIDE 42 UG/1
SPRAY, METERED NASAL
Qty: 15 ML | Refills: 0 | Status: CANCELLED | OUTPATIENT
Start: 2022-02-16

## 2022-02-17 RX ORDER — IPRATROPIUM BROMIDE 42 UG/1
SPRAY, METERED NASAL
Qty: 15 ML | Refills: 1 | Status: SHIPPED | OUTPATIENT
Start: 2022-02-17 | End: 2022-06-30

## 2022-02-18 ENCOUNTER — HOSPITAL ENCOUNTER (OUTPATIENT)
Dept: WOUND CARE | Facility: CLINIC | Age: 57
Discharge: HOME | End: 2022-02-18
Attending: PODIATRIST | Admitting: PODIATRIST
Payer: COMMERCIAL

## 2022-02-18 VITALS
SYSTOLIC BLOOD PRESSURE: 148 MMHG | HEART RATE: 103 BPM | RESPIRATION RATE: 16 BRPM | DIASTOLIC BLOOD PRESSURE: 90 MMHG | TEMPERATURE: 97.3 F

## 2022-02-18 DIAGNOSIS — T87.43 RIGHT BKA INFECTION (H): ICD-10-CM

## 2022-02-18 DIAGNOSIS — S91.302D OPEN WOUND OF PLANTAR ASPECT OF FOOT, LEFT, SUBSEQUENT ENCOUNTER: ICD-10-CM

## 2022-02-18 DIAGNOSIS — Z98.890 POSTOPERATIVE STATE: ICD-10-CM

## 2022-02-18 DIAGNOSIS — E11.42 DIABETIC POLYNEUROPATHY ASSOCIATED WITH TYPE 2 DIABETES MELLITUS (H): ICD-10-CM

## 2022-02-18 DIAGNOSIS — Z89.432 PARTIAL NONTRAUMATIC AMPUTATION OF LEFT FOOT (H): ICD-10-CM

## 2022-02-18 PROCEDURE — 97602 WOUND(S) CARE NON-SELECTIVE: CPT

## 2022-02-18 PROCEDURE — 99024 POSTOP FOLLOW-UP VISIT: CPT | Performed by: PODIATRIST

## 2022-02-18 PROCEDURE — G0463 HOSPITAL OUTPT CLINIC VISIT: HCPCS

## 2022-02-18 NOTE — PROGRESS NOTES
[facility] [place] Progress Note    Subject: Patient was seen at wound center. Patient presents to clinic  1 week s/p left TMA. Notes he is doing well. Denies fever, chills, nausa. no pain    PMH:   Past Medical History:   Diagnosis Date     BENIGN HYPERTENSION 2007     DIABETES MELLITUS TYPE II-UNCOMPL 2007     HYPERLIPIDEMIA NEC/NOS 2007     Tobacco use disorder 2007       Social Hx:   Social History     Socioeconomic History     Marital status: Single     Spouse name: Not on file     Number of children: Not on file     Years of education: Not on file     Highest education level: Not on file   Occupational History     Not on file   Tobacco Use     Smoking status: Former Smoker     Packs/day: 0.50     Years: 20.00     Pack years: 10.00     Types: Cigarettes     Start date: 1987     Quit date:      Years since quittin.1     Smokeless tobacco: Never Used   Substance and Sexual Activity     Alcohol use: Yes     Comment: 3-4 drinks per month     Drug use: No     Sexual activity: Yes     Partners: Female   Other Topics Concern      Service Yes     Blood Transfusions No     Caffeine Concern No     Occupational Exposure No     Hobby Hazards No     Sleep Concern No     Stress Concern No     Weight Concern Yes     Comment: Would like to lose some weight     Special Diet No     Back Care No     Exercise Yes     Bike Helmet No     Seat Belt Yes     Self-Exams Yes     Parent/sibling w/ CABG, MI or angioplasty before 65F 55M? No   Social History Narrative     Not on file     Social Determinants of Health     Financial Resource Strain: Not on file   Food Insecurity: Not on file   Transportation Needs: No Transportation Needs     Lack of Transportation (Medical): No     Lack of Transportation (Non-Medical): No   Physical Activity: Not on file   Stress: Not on file   Social Connections: Not on file   Intimate Partner Violence: Not on file   Housing Stability: Not on file        Surgical Hx:   Past Surgical History:   Procedure Laterality Date     AMPUTATE FOOT Left 11/17/2019    Procedure: LEFT PARTIAL FOOT AMPUTATION;  Surgeon: Antoine Pena DPM;  [facility]     AMPUTATE FOOT Left 12/4/2019    Procedure: LEFT PARTIAL FOOT AMPUTATION;  Surgeon: Tim Douglas DPM;  [facility]     AMPUTATE FOOT Left 12/11/2019    Procedure: POSSIBLE PARTIAL FOOT AMPUTATION;  Surgeon: Tim Douglas DPM;  [facility]     AMPUTATE FOOT Left 2/10/2022    Procedure: Partial left foot amputation;  Surgeon: Milena Cevallos DPM, Podiatry/Foot and Ankle Surgery;  [facility]     AMPUTATE LEG BELOW KNEE Right 9/1/2017    Procedure: AMPUTATE LEG BELOW KNEE;  RIGHT BELOW KNEE AMPUTATION ;  Surgeon: Nikolas Nascimento MD;  [facility]     AMPUTATE TOE(S) Left 2/3/2020    Procedure: LEFT SECOND TOE AMPUTATION;  Surgeon: Milena Cevallos DPM, Podiatry/Foot and Ankle Surgery;  [facility]     APPENDECTOMY       APPLY WOUND VAC Right 3/2/2015    Procedure: APPLY WOUND VAC;  Surgeon: Milena Cevallos DPM, Pod;  Location: [facility]     BIOPSY BONE FOOT Right 7/15/2016    Procedure: BIOPSY BONE FOOT;  Surgeon: Tim Douglas DPM;  [facility]     BIOPSY BONE TOE Left 12/4/2019    Procedure: BONE BIOPSY LEFT SECOND TOE;  Surgeon: Tim Douglas DPM;  [facility]     IRRIGATION AND DEBRIDEMENT FOOT, COMBINED Right 3/2/2015    Procedure: COMBINED IRRIGATION AND DEBRIDEMENT FOOT;  Surgeon: Milena Cevallos DPM, Pod;  [facility]     IRRIGATION AND DEBRIDEMENT FOOT, COMBINED Right 7/15/2016    Procedure: COMBINED IRRIGATION AND DEBRIDEMENT FOOT;  Surgeon: Tim Douglas DPM;  [facility]     IRRIGATION AND DEBRIDEMENT FOOT, COMBINED Right 7/20/2016    Procedure: COMBINED IRRIGATION AND DEBRIDEMENT FOOT;  Surgeon: Tim Douglas DPM;  [facility]     IRRIGATION AND DEBRIDEMENT FOOT, COMBINED Left 11/19/2019    Procedure: REVISIONAL IRRIGATION  "AND DEBRIDEMENT LEFT FOOT AND BONE DEBRIDEMENT;  Surgeon: Joseph Randhawa DPM;  [facility]     IRRIGATION AND DEBRIDEMENT FOOT, COMBINED Left 12/4/2019    Procedure: EXCISIONAL DEBRIDEMENT LEFT FOOT;  Surgeon: Tim Douglas DPM;  [facility]     IRRIGATION AND DEBRIDEMENT FOOT, COMBINED Left 12/11/2019    Procedure: IRRIGATION AND DEBRIDEMENT FOOT, Partial osteotomy left first metatarsal;  Surgeon: Tim Douglas DPM;  [facility]     IRRIGATION AND DEBRIDEMENT FOOT, COMBINED Left 2/5/2020    Procedure: IRRIGATION AND DEBRIDEMENT LEFT FOOT;  Surgeon: Tim Douglas DPM;  [facility]     ORTHOPEDIC SURGERY         Allergies:    Allergies   Allergen Reactions     Pravastatin      \"sucked the life blood out of me,\" Indicates this occurs with all statins.       Medications:   Current Outpatient Medications   Medication     amoxicillin-clavulanate (AUGMENTIN) 875-125 MG tablet     aspirin 81 MG chewable tablet     blood glucose (NO BRAND SPECIFIED) lancets standard     blood glucose (NO BRAND SPECIFIED) test strip     blood glucose monitoring (NO BRAND SPECIFIED) meter device kit     Continuous Blood Gluc Sensor (DEXCOM G6 SENSOR) MISC     empagliflozin (JARDIANCE) 10 MG TABS tablet     ezetimibe (ZETIA) 10 MG tablet     insulin glargine (BASAGLAR KWIKPEN) 100 UNIT/ML pen     insulin pen needle (BD PEN NEEDLE MEGAN 2ND GEN) 32G X 4 MM miscellaneous     ipratropium (ATROVENT) 0.06 % nasal spray     ketorolac (ACULAR) 0.5 % ophthalmic solution     Lancets (ONETOUCH DELICA PLUS [id number]) MISC     lisinopril (ZESTRIL) 40 MG tablet     metFORMIN (GLUCOPHAGE) 500 MG tablet     multivitamin (CENTRUM SILVER) tablet     ondansetron (ZOFRAN-ODT) 4 MG ODT tab     oxyCODONE (ROXICODONE) 5 MG tablet     prednisoLONE acetate (PRED FORTE) 1 % ophthalmic suspension     senna-docusate (SENOKOT-S/PERICOLACE) 8.6-50 MG tablet     sitagliptin (JANUVIA) 50 MG tablet     STATIN NOT PRESCRIBED " (INTENTIONAL)     No current facility-administered medications for this encounter.       Objective:  Vitals:  BP (!) 148/90 (BP Location: Left arm)   Pulse 103   Temp 97.3  F (36.3  C) (Temporal)   Resp 16       A1C: 8.5 (1/2022)     General:  Patient is alert and orientated.  NAD      Dermatologic: Dressings clean dry and intact.  Bloody strikethrough noted to Ace bandage.  Sutures are intact.  Hemovac is intact but clotted off.  No redness, dehiscence or signs of acute infection noted.     Vascular: DP & PT pulses are intact & regular bilaterally.  No significant edema or varicosities noted.  CFT and skin temperature is normal to both lower extremities.     Neurologic: Lower extremity sensation is absent to feet..     Musculoskeletal: Below-knee amputation on the right with partial left foot amputation now.     Assessment: Open wound of plantar aspect of foot, left, subsequent encounter     Plan: At this time the dressing was changed.  We will have him keep the foot dressing dry.  He will continue to be nonweightbearing.  We will follow-up in 3 weeks for dressing change and reassessment.  All questions were answered to patient and patient's wife satisfaction and they will call for the questions or concerns.    Milena Cevallos DPM, Podiatry/Foot and Ankle Surgery              Further instructions from your care team       Ry Horner      1965    At least once a week rewrap the foot with 4x4 gauze, kerlix, ace wrap.    Ok to put weight on your left foot heel if needed for balance, otherwise no weight  bearing on the left foot       BEREKET Pierre.P.M. February 18, 2022    Call us at [phone] if you have any questions about your wounds, have redness or swelling around your wound, have a fever of 101 or greater or if you have any other problems or concerns. We answer the phone Monday through Friday 8 am to 4 pm, please leave a message as we check the voicemail frequently throughout the day.     If you had  a positive experience please indicate on your patient satisfaction survey form that [employer] will be sending you.    If you have any billing related questions please call the  [employer] Business office at [phone]. The clinic staff does not handle billing related matters.

## 2022-02-18 NOTE — DISCHARGE INSTRUCTIONS
Ry Horner      1965    At least once a week rewrap the foot with 4x4 gauze, kerlix, ace wrap.    Ok to put weight on your left foot heel if needed for balance, otherwise no weight  bearing on the left foot       Milena Cevallos D.P.M. February 18, 2022    Call us at [phone] if you have any questions about your wounds, have redness or swelling around your wound, have a fever of 101 or greater or if you have any other problems or concerns. We answer the phone Monday through Friday 8 am to 4 pm, please leave a message as we check the voicemail frequently throughout the day.     If you had a positive experience please indicate on your patient satisfaction survey form that [facility] will be sending you.    If you have any billing related questions please call the [facility] Business office at [phone]. The clinic staff does not handle billing related matters.

## 2022-03-11 ENCOUNTER — HOSPITAL ENCOUNTER (OUTPATIENT)
Dept: WOUND CARE | Facility: CLINIC | Age: 57
Discharge: HOME | End: 2022-03-11
Attending: PODIATRIST | Admitting: PODIATRIST
Payer: COMMERCIAL

## 2022-03-11 VITALS
SYSTOLIC BLOOD PRESSURE: 154 MMHG | DIASTOLIC BLOOD PRESSURE: 100 MMHG | TEMPERATURE: 97 F | HEART RATE: 103 BPM | RESPIRATION RATE: 18 BRPM

## 2022-03-11 DIAGNOSIS — S91.302D OPEN WOUND OF PLANTAR ASPECT OF FOOT, LEFT, SUBSEQUENT ENCOUNTER: ICD-10-CM

## 2022-03-11 DIAGNOSIS — E11.42 TYPE 2 DIABETES MELLITUS WITH DIABETIC POLYNEUROPATHY, WITHOUT LONG-TERM CURRENT USE OF INSULIN (H): ICD-10-CM

## 2022-03-11 DIAGNOSIS — T81.30XA WOUND DEHISCENCE: ICD-10-CM

## 2022-03-11 DIAGNOSIS — Z89.432 PARTIAL NONTRAUMATIC AMPUTATION OF LEFT FOOT (H): ICD-10-CM

## 2022-03-11 DIAGNOSIS — Z89.511 HX OF BKA, RIGHT (H): ICD-10-CM

## 2022-03-11 DIAGNOSIS — E66.01 MORBID OBESITY (H): ICD-10-CM

## 2022-03-11 PROCEDURE — 99024 POSTOP FOLLOW-UP VISIT: CPT | Performed by: PODIATRIST

## 2022-03-11 PROCEDURE — 97602 WOUND(S) CARE NON-SELECTIVE: CPT

## 2022-03-11 NOTE — DISCHARGE INSTRUCTIONS
Ry Horner      1965    Left plantar foot:  Apply betadine (iodine) to incision then wrap with 1 4x4 gauze/1 roll gauze and tape.  Change daily    Ok to put weight on your left foot heel if needed for balance, otherwise no weight  bearing on the left foot     Milena Cevallos D.P.M. March 11, 2022    Call us at [phone] if you have any questions about your wounds, have redness or swelling around your wound, have a fever of 101 or greater or if you have any other problems or concerns. We answer the phone Monday through Friday 8 am to 4 pm, please leave a message as we check the voicemail frequently throughout the day.     If you had a positive experience please indicate on your patient satisfaction survey form that [facility] will be sending you.    If you have any billing related questions please call the [facility] Business office at [phone]. The clinic staff does not handle billing related matters.

## 2022-03-11 NOTE — PROGRESS NOTES
[facility] [place] Progress Note    Subject: Patient was seen at wound center.  Patient presents to clinic  3 week s/p left TMA. Notes he is doing well. Denies fever, chills, nausa. no pain     PMH:   Past Medical History:   Diagnosis Date     BENIGN HYPERTENSION 2007     DIABETES MELLITUS TYPE II-UNCOMPL 2007     HYPERLIPIDEMIA NEC/NOS 2007     Tobacco use disorder 2007       Social Hx:   Social History     Socioeconomic History     Marital status: Single     Spouse name: Not on file     Number of children: Not on file     Years of education: Not on file     Highest education level: Not on file   Occupational History     Not on file   Tobacco Use     Smoking status: Former Smoker     Packs/day: 0.50     Years: 20.00     Pack years: 10.00     Types: Cigarettes     Start date: 1987     Quit date:      Years since quittin.2     Smokeless tobacco: Never Used   Substance and Sexual Activity     Alcohol use: Yes     Comment: 3-4 drinks per month     Drug use: No     Sexual activity: Yes     Partners: Female   Other Topics Concern      Service Yes     Blood Transfusions No     Caffeine Concern No     Occupational Exposure No     Hobby Hazards No     Sleep Concern No     Stress Concern No     Weight Concern Yes     Comment: Would like to lose some weight     Special Diet No     Back Care No     Exercise Yes     Bike Helmet No     Seat Belt Yes     Self-Exams Yes     Parent/sibling w/ CABG, MI or angioplasty before 65F 55M? No   Social History Narrative     Not on file     Social Determinants of Health     Financial Resource Strain: Not on file   Food Insecurity: Not on file   Transportation Needs: No Transportation Needs     Lack of Transportation (Medical): No     Lack of Transportation (Non-Medical): No   Physical Activity: Not on file   Stress: Not on file   Social Connections: Not on file   Intimate Partner Violence: Not on file   Housing Stability: Not on file        Surgical Hx:   Past Surgical History:   Procedure Laterality Date     AMPUTATE FOOT Left 11/17/2019    Procedure: LEFT PARTIAL FOOT AMPUTATION;  Surgeon: Antoine Pena DPM;  [facility]     AMPUTATE FOOT Left 12/4/2019    Procedure: LEFT PARTIAL FOOT AMPUTATION;  Surgeon: Tim Douglas DPM;  [facility]     AMPUTATE FOOT Left 12/11/2019    Procedure: POSSIBLE PARTIAL FOOT AMPUTATION;  Surgeon: Tim Douglas DPM;  [facility]     AMPUTATE FOOT Left 2/10/2022    Procedure: Partial left foot amputation;  Surgeon: Milena Cevallos DPM, Podiatry/Foot and Ankle Surgery;  [facility]     AMPUTATE LEG BELOW KNEE Right 9/1/2017    Procedure: AMPUTATE LEG BELOW KNEE;  RIGHT BELOW KNEE AMPUTATION ;  Surgeon: Nikolas Nascimento MD;  [facility]     AMPUTATE TOE(S) Left 2/3/2020    Procedure: LEFT SECOND TOE AMPUTATION;  Surgeon: Milena Cevallos DPM, Podiatry/Foot and Ankle Surgery;  [facility]     APPENDECTOMY       APPLY WOUND VAC Right 3/2/2015    Procedure: APPLY WOUND VAC;  Surgeon: Milena Cevallos DPM, Pod;  [facility]: [facility]     BIOPSY BONE FOOT Right 7/15/2016    Procedure: BIOPSY BONE FOOT;  Surgeon: Tim Douglas DPM;  [facility]     BIOPSY BONE TOE Left 12/4/2019    Procedure: BONE BIOPSY LEFT SECOND TOE;  Surgeon: Tim Douglas DPM;  [facility]     IRRIGATION AND DEBRIDEMENT FOOT, COMBINED Right 3/2/2015    Procedure: COMBINED IRRIGATION AND DEBRIDEMENT FOOT;  Surgeon: Milena Cevallos DPM, Pod;  [facility]: [facility]     IRRIGATION AND DEBRIDEMENT FOOT, COMBINED Right 7/15/2016    Procedure: COMBINED IRRIGATION AND DEBRIDEMENT FOOT;  Surgeon: Tim Douglas DPM;  [facility]     IRRIGATION AND DEBRIDEMENT FOOT, COMBINED Right 7/20/2016    Procedure: COMBINED IRRIGATION AND DEBRIDEMENT FOOT;  Surgeon: Tim Douglas DPM;  [facility]     IRRIGATION AND DEBRIDEMENT FOOT, COMBINED Left 11/19/2019    Procedure: REVISIONAL IRRIGATION  "AND DEBRIDEMENT LEFT FOOT AND BONE DEBRIDEMENT;  Surgeon: Joseph Randhawa DPM;  [facility]     IRRIGATION AND DEBRIDEMENT FOOT, COMBINED Left 12/4/2019    Procedure: EXCISIONAL DEBRIDEMENT LEFT FOOT;  Surgeon: Tim Douglas DPM;  [facility]     IRRIGATION AND DEBRIDEMENT FOOT, COMBINED Left 12/11/2019    Procedure: IRRIGATION AND DEBRIDEMENT FOOT, Partial osteotomy left first metatarsal;  Surgeon: Tim Douglas DPM;  [facility]     IRRIGATION AND DEBRIDEMENT FOOT, COMBINED Left 2/5/2020    Procedure: IRRIGATION AND DEBRIDEMENT LEFT FOOT;  Surgeon: Tim Douglas DPM;  [facility]     ORTHOPEDIC SURGERY         Allergies:    Allergies   Allergen Reactions     Pravastatin      \"sucked the life blood out of me,\" Indicates this occurs with all statins.       Medications:   Current Outpatient Medications   Medication     aspirin 81 MG chewable tablet     blood glucose (NO BRAND SPECIFIED) lancets standard     blood glucose (NO BRAND SPECIFIED) test strip     blood glucose monitoring (NO BRAND SPECIFIED) meter device kit     Continuous Blood Gluc Sensor (DEXCOM G6 SENSOR) MISC     empagliflozin (JARDIANCE) 10 MG TABS tablet     ezetimibe (ZETIA) 10 MG tablet     insulin glargine (BASAGLAR KWIKPEN) 100 UNIT/ML pen     insulin pen needle (BD PEN NEEDLE MEGAN 2ND GEN) 32G X 4 MM miscellaneous     ipratropium (ATROVENT) 0.06 % nasal spray     ketorolac (ACULAR) 0.5 % ophthalmic solution     Lancets (ONETOUCH DELICA PLUS [id number]) MISC     lisinopril (ZESTRIL) 40 MG tablet     metFORMIN (GLUCOPHAGE) 500 MG tablet     multivitamin (CENTRUM SILVER) tablet     ondansetron (ZOFRAN-ODT) 4 MG ODT tab     oxyCODONE (ROXICODONE) 5 MG tablet     prednisoLONE acetate (PRED FORTE) 1 % ophthalmic suspension     senna-docusate (SENOKOT-S/PERICOLACE) 8.6-50 MG tablet     sitagliptin (JANUVIA) 50 MG tablet     STATIN NOT PRESCRIBED (INTENTIONAL)     No current facility-administered medications for " this encounter.         Objective:  Vitals:  BP (!) 154/100 (BP Location: Right arm, Patient Position: Sitting, Cuff Size: Adult Regular)   Pulse 103   Temp 97  F (36.1  C) (Temporal)   Resp 18     A1C: 8.1 (1/2022)     General:  Patient is alert and orientated.  NAD      Dermatologic: Dressings clean dry and intact.  Bloody strikethrough noted to Ace bandage.  Sutures mostly intact.  There is some dehiscence of the plantar aspect of the incision approximately 2.2 cm x 0.2 cm by 0.1 cm.  These are noted is granular.  No purulent drainage or signs of infection noted.     Vascular: DP & PT pulses are intact & regular bilaterally.  No significant edema or varicosities noted.  CFT and skin temperature is normal to both lower extremities.     Neurologic: Lower extremity sensation is absent to feet..     Musculoskeletal: Below-knee amputation on the right with partial left foot amputation now.     Assessment:    Open wound of plantar aspect of foot, left, subsequent encounter  Morbid obesity (H)  Wound dehiscence  Type 2 diabetes mellitus with diabetic polyneuropathy, without long-term current use of insulin (H)  Partial nontraumatic amputation of left foot (H)  Hx of BKA, right (H)       Plan: At this time the dressing was changed.  We will have him keep the foot dressing dry.   We will have him ambulate dressings dialysis 3 times a week likely diverticular.  He will continue to be nonweightbearing.  We will follow-up in 2 weeks for dressing change and reassessment.  All questions were answered to patient and patient's wife satisfaction and they will call for the questions or concerns.    Milena Cevallos DPM, Podiatry/Foot and Ankle Surgery              Further instructions from your care team       Ry Horner      1965    Left plantar foot:  Apply betadine (iodine) to incision then wrap with 1 4x4 gauze/1 roll gauze and tape.  Change daily    Ok to put weight on your left foot heel if needed for balance,  otherwise no weight  bearing on the left foot     Milena Cevallos D.P.M. March 11, 2022    Call us at [phone] if you have any questions about your wounds, have redness or swelling around your wound, have a fever of 101 or greater or if you have any other problems or concerns. We answer the phone Monday through Friday 8 am to 4 pm, please leave a message as we check the voicemail frequently throughout the day.     If you had a positive experience please indicate on your patient satisfaction survey form that [facility] will be sending you.    If you have any billing related questions please call the [facility] Business office at [phone]. The clinic staff does not handle billing related matters.

## 2022-03-21 DIAGNOSIS — E11.9 DIABETES MELLITUS, TYPE 2 (H): Primary | ICD-10-CM

## 2022-03-24 RX ORDER — LANCETS 33 GAUGE
EACH MISCELLANEOUS
Qty: 400 EACH | Refills: 1 | Status: SHIPPED | OUTPATIENT
Start: 2022-03-24 | End: 2022-08-16

## 2022-03-25 ENCOUNTER — HOSPITAL ENCOUNTER (OUTPATIENT)
Dept: WOUND CARE | Facility: CLINIC | Age: 57
Discharge: HOME | End: 2022-03-25
Attending: PODIATRIST | Admitting: PODIATRIST
Payer: COMMERCIAL

## 2022-03-25 VITALS
DIASTOLIC BLOOD PRESSURE: 104 MMHG | HEART RATE: 108 BPM | RESPIRATION RATE: 18 BRPM | TEMPERATURE: 97.2 F | SYSTOLIC BLOOD PRESSURE: 163 MMHG

## 2022-03-25 DIAGNOSIS — E11.42 DIABETIC POLYNEUROPATHY ASSOCIATED WITH TYPE 2 DIABETES MELLITUS (H): ICD-10-CM

## 2022-03-25 DIAGNOSIS — T81.30XA WOUND DEHISCENCE: ICD-10-CM

## 2022-03-25 DIAGNOSIS — S91.302D OPEN WOUND OF PLANTAR ASPECT OF FOOT, LEFT, SUBSEQUENT ENCOUNTER: ICD-10-CM

## 2022-03-25 DIAGNOSIS — Z89.511 HX OF BKA, RIGHT (H): ICD-10-CM

## 2022-03-25 DIAGNOSIS — Z89.432 PARTIAL NONTRAUMATIC AMPUTATION OF LEFT FOOT (H): ICD-10-CM

## 2022-03-25 PROCEDURE — 11042 DBRDMT SUBQ TIS 1ST 20SQCM/<: CPT | Performed by: PODIATRIST

## 2022-03-25 PROCEDURE — 99024 POSTOP FOLLOW-UP VISIT: CPT | Performed by: PODIATRIST

## 2022-03-25 PROCEDURE — 11042 DBRDMT SUBQ TIS 1ST 20SQCM/<: CPT | Mod: 58 | Performed by: PODIATRIST

## 2022-03-25 NOTE — PROGRESS NOTES
[facility] [place] Progress Note    Subject: Patient was seen at wound center.  Here with his wife.  Follow-up in 6 weeks status post left partial foot amputation.  Patient denies fever, nausea, vomiting.  No pain but has baseline neuropathy.  Has not noted much drainage from the foot.    PMH:   Past Medical History:   Diagnosis Date     BENIGN HYPERTENSION 2007     DIABETES MELLITUS TYPE II-UNCOMPL 2007     HYPERLIPIDEMIA NEC/NOS 2007     Tobacco use disorder 2007       Social Hx:   Social History     Socioeconomic History     Marital status: Single     Spouse name: Not on file     Number of children: Not on file     Years of education: Not on file     Highest education level: Not on file   Occupational History     Not on file   Tobacco Use     Smoking status: Former Smoker     Packs/day: 0.50     Years: 20.00     Pack years: 10.00     Types: Cigarettes     Start date: 1987     Quit date:      Years since quittin.2     Smokeless tobacco: Never Used   Substance and Sexual Activity     Alcohol use: Yes     Comment: 3-4 drinks per month     Drug use: No     Sexual activity: Yes     Partners: Female   Other Topics Concern      Service Yes     Blood Transfusions No     Caffeine Concern No     Occupational Exposure No     Hobby Hazards No     Sleep Concern No     Stress Concern No     Weight Concern Yes     Comment: Would like to lose some weight     Special Diet No     Back Care No     Exercise Yes     Bike Helmet No     Seat Belt Yes     Self-Exams Yes     Parent/sibling w/ CABG, MI or angioplasty before 65F 55M? No   Social History Narrative     Not on file     Social Determinants of Health     Financial Resource Strain: Not on file   Food Insecurity: Not on file   Transportation Needs: Not on file   Physical Activity: Not on file   Stress: Not on file   Social Connections: Not on file   Intimate Partner Violence: Not on file   Housing Stability: Not on file        Surgical Hx:   Past Surgical History:   Procedure Laterality Date     AMPUTATE FOOT Left 11/17/2019    Procedure: LEFT PARTIAL FOOT AMPUTATION;  Surgeon: Antoine Pena DPM;  [facility]     AMPUTATE FOOT Left 12/4/2019    Procedure: LEFT PARTIAL FOOT AMPUTATION;  Surgeon: Tim Douglas DPM;  [facility]     AMPUTATE FOOT Left 12/11/2019    Procedure: POSSIBLE PARTIAL FOOT AMPUTATION;  Surgeon: Tim Douglas DPM;  [facility]     AMPUTATE FOOT Left 2/10/2022    Procedure: Partial left foot amputation;  Surgeon: Milena Cevallos DPM, Podiatry/Foot and Ankle Surgery;  [facility]     AMPUTATE LEG BELOW KNEE Right 9/1/2017    Procedure: AMPUTATE LEG BELOW KNEE;  RIGHT BELOW KNEE AMPUTATION ;  Surgeon: Nikolas Nascimento MD;  [facility]     AMPUTATE TOE(S) Left 2/3/2020    Procedure: LEFT SECOND TOE AMPUTATION;  Surgeon: Milena Cevallos DPM, Podiatry/Foot and Ankle Surgery;  [facility]     APPENDECTOMY       APPLY WOUND VAC Right 3/2/2015    Procedure: APPLY WOUND VAC;  Surgeon: Milena Cevallos DPM, Pod;  [facility]: [facility]     BIOPSY BONE FOOT Right 7/15/2016    Procedure: BIOPSY BONE FOOT;  Surgeon: Tim Douglas DPM;  [facility]     BIOPSY BONE TOE Left 12/4/2019    Procedure: BONE BIOPSY LEFT SECOND TOE;  Surgeon: iTm Douglas DPM;  [facility]     IRRIGATION AND DEBRIDEMENT FOOT, COMBINED Right 3/2/2015    Procedure: COMBINED IRRIGATION AND DEBRIDEMENT FOOT;  Surgeon: Milena Cevallos DPM, Pod;  [facility]     IRRIGATION AND DEBRIDEMENT FOOT, COMBINED Right 7/15/2016    Procedure: COMBINED IRRIGATION AND DEBRIDEMENT FOOT;  Surgeon: Tim Douglas DPM;  [facility]     IRRIGATION AND DEBRIDEMENT FOOT, COMBINED Right 7/20/2016    Procedure: COMBINED IRRIGATION AND DEBRIDEMENT FOOT;  Surgeon: Tim Douglas DPM;  [facility]     IRRIGATION AND DEBRIDEMENT FOOT, COMBINED Left 11/19/2019    Procedure: REVISIONAL IRRIGATION  "AND DEBRIDEMENT LEFT FOOT AND BONE DEBRIDEMENT;  Surgeon: Joseph Randhawa DPM;  [facility]     IRRIGATION AND DEBRIDEMENT FOOT, COMBINED Left 12/4/2019    Procedure: EXCISIONAL DEBRIDEMENT LEFT FOOT;  Surgeon: Tim Douglas DPM;  [facility]     IRRIGATION AND DEBRIDEMENT FOOT, COMBINED Left 12/11/2019    Procedure: IRRIGATION AND DEBRIDEMENT FOOT, Partial osteotomy left first metatarsal;  Surgeon: Tim Douglas DPM;  [facility]     IRRIGATION AND DEBRIDEMENT FOOT, COMBINED Left 2/5/2020    Procedure: IRRIGATION AND DEBRIDEMENT LEFT FOOT;  Surgeon: Tim Douglas DPM;  [facility]     ORTHOPEDIC SURGERY         Allergies:    Allergies   Allergen Reactions     Pravastatin      \"sucked the life blood out of me,\" Indicates this occurs with all statins.       Medications:   Current Outpatient Medications   Medication     aspirin 81 MG chewable tablet     blood glucose (NO BRAND SPECIFIED) lancets standard     blood glucose (NO BRAND SPECIFIED) test strip     blood glucose monitoring (NO BRAND SPECIFIED) meter device kit     Continuous Blood Gluc Sensor (DEXCOM G6 SENSOR) MISC     empagliflozin (JARDIANCE) 10 MG TABS tablet     ezetimibe (ZETIA) 10 MG tablet     insulin glargine (BASAGLAR KWIKPEN) 100 UNIT/ML pen     insulin pen needle (BD PEN NEEDLE MEGAN 2ND GEN) 32G X 4 MM miscellaneous     ipratropium (ATROVENT) 0.06 % nasal spray     ketorolac (ACULAR) 0.5 % ophthalmic solution     Lancets (ONETOUCH DELICA PLUS [id number]) MISC     lisinopril (ZESTRIL) 40 MG tablet     metFORMIN (GLUCOPHAGE) 500 MG tablet     multivitamin (CENTRUM SILVER) tablet     ondansetron (ZOFRAN-ODT) 4 MG ODT tab     oxyCODONE (ROXICODONE) 5 MG tablet     prednisoLONE acetate (PRED FORTE) 1 % ophthalmic suspension     senna-docusate (SENOKOT-S/PERICOLACE) 8.6-50 MG tablet     sitagliptin (JANUVIA) 50 MG tablet     STATIN NOT PRESCRIBED (INTENTIONAL)     No current facility-administered medications for " this encounter.         Objective:  Vitals:  BP (!) 163/104 (BP Location: Left arm, Patient Position: Sitting, Cuff Size: Adult Large)   Pulse 108   Temp 97.2  F (36.2  C)   Resp 18     A1C: 8.1 (1/2022)    General:  Patient is alert and orientated.  NAD     Dermatologic: Dressings clean dry and intact.  Sutures are intact.  Small area on the plantar aspect incision that has dehisced a little.  Please see measurements below.  Base of the wound is granular.  No redness, purulent drainage or signs of acute infection noted.    .   Wound (used by OP [facility] only) 10/01/21 1414 Left neuropathic ulcer (Active)   Thickness/Stage full thickness 03/25/22 0825   Periwound intact 03/25/22 0825   Periwound Temperature warm 03/25/22 0825   Periwound Skin Turgor soft 03/25/22 0825   Edges open 03/25/22 0825   Length (cm) 1.5 03/25/22 0825   Width (cm) 0.1 03/25/22 0825   Depth (cm) 0.4 03/25/22 0825   Wound (cm^2) 0.15 cm^2 03/25/22 0825   Wound Volume (cm^3) 0.06 cm^3 03/25/22 0825   Wound healing % 98 03/25/22 0825   Drainage Characteristics/Odor serosanguineous 03/25/22 0825   Drainage Amount moderate 03/25/22 0825   Care, Wound non-select wound debridement performed 03/25/22 0825     Vascular: DP & PT pulses are intact & regular bilaterally.  Moderate edema but no varicosities noted.  CFT and skin temperature is normal to both lower extremities.     Neurologic: Lower extremity sensation is absent to feet.     Musculoskeletal: Right leg amputation.  Partial left foot amputation.    Assessment:    Open wound of plantar aspect of foot, left, subsequent encounter  Diabetic polyneuropathy associated with type 2 diabetes mellitus (H)  Wound dehiscence  Hx of BKA, right (H)  Partial nontraumatic amputation of left foot (H)    Plan: At this time the sutures are removed.  We will have him continue to apply iodine or Band-Aid to the dehisced area on the left foot.  He can continue minimal weightbearing in his postop shoe until he gets  his new diabetic shoes and inserts.  We will have him follow-up in 1 month for reassessment.   he can wash the foot with soap and water and dry it well at this time.  All questions were answered to patient satisfaction and they will call for the questions or concerns.    Procedure:  After verbal consent, per protocol lidocaine was applied to the wound. Excisional debridement was performed on ulcer.   #15 blade was used to debride ulcer down to and including subcutaneous tissue. Bleeding controlled with light pressure.  No drainage noted.   < 20sq cm debrided.  Dry dressing applied to foot.  Patient tolerated procedure well.    Milena Cevallos DPM, Podiatry/Foot and Ankle Surgery              Further instructions from your care team       Ry Horner      1965    Left plantar foot:  Apply betadine (iodine) to incision then wrap with 1 4x4 gauze/1 roll gauze and tape.  Change daily  Ok to put weight on your left foot heel if needed for balance, otherwise no weight bearing on the left foot       Milena Cevallos, D.P.M. March 25, 2022    Call us at [phone] if you have any questions about your wounds, have redness or swelling around your wound, have a fever of 101 or greater or if you have any other problems or concerns. We answer the phone Monday through Friday 8 am to 4 pm, please leave a message as we check the voicemail frequently throughout the day.     If you had a positive experience please indicate on your patient satisfaction survey form that [facility] will be sending you.    If you have any billing related questions please call the [facility] Business office at [phone]. The clinic staff does not handle billing related matters.

## 2022-03-25 NOTE — DISCHARGE INSTRUCTIONS
Ry Horner      1965    Left plantar foot:  Apply betadine (iodine) to incision then wrap with 1 4x4 gauze/1 roll gauze and tape.  Change daily  Ok to put weight on your left foot heel if needed for balance, otherwise no weight bearing on the left foot       Milena Cevallos D.P.M. March 25, 2022    Call us at [phone] if you have any questions about your wounds, have redness or swelling around your wound, have a fever of 101 or greater or if you have any other problems or concerns. We answer the phone Monday through Friday 8 am to 4 pm, please leave a message as we check the voicemail frequently throughout the day.     If you had a positive experience please indicate on your patient satisfaction survey form that [facility] will be sending you.    If you have any billing related questions please call the [facility] Business office at [phone]. The clinic staff does not handle billing related matters.

## 2022-04-01 ENCOUNTER — MEDICAL CORRESPONDENCE (OUTPATIENT)
Dept: HEALTH INFORMATION MANAGEMENT | Facility: CLINIC | Age: 57
End: 2022-04-01
Payer: COMMERCIAL

## 2022-04-04 ENCOUNTER — LAB (OUTPATIENT)
Dept: LAB | Facility: CLINIC | Age: 57
End: 2022-04-04
Payer: COMMERCIAL

## 2022-04-04 DIAGNOSIS — E11.42 TYPE 2 DIABETES MELLITUS WITH DIABETIC POLYNEUROPATHY, WITHOUT LONG-TERM CURRENT USE OF INSULIN (H): ICD-10-CM

## 2022-04-04 LAB
CREAT SERPL-MCNC: 1.79 MG/DL (ref 0.66–1.25)
FASTING STATUS PATIENT QL REPORTED: YES
GFR SERPL CREATININE-BSD FRML MDRD: 44 ML/MIN/1.73M2
HBA1C MFR BLD: 7.7 % (ref 0–5.6)
TRIGL SERPL-MCNC: 489 MG/DL

## 2022-04-04 PROCEDURE — 83036 HEMOGLOBIN GLYCOSYLATED A1C: CPT

## 2022-04-04 PROCEDURE — 84478 ASSAY OF TRIGLYCERIDES: CPT

## 2022-04-04 PROCEDURE — 82565 ASSAY OF CREATININE: CPT

## 2022-04-04 PROCEDURE — 36415 COLL VENOUS BLD VENIPUNCTURE: CPT

## 2022-04-04 NOTE — RESULT ENCOUNTER NOTE
The following letter pertains to your most recent diagnostic tests:    These numbers look better!  Nice work.  Continue your current medications.    We should recheck in 6 months.      Sincerely,    Dr. Wing

## 2022-04-22 DIAGNOSIS — E11.42 TYPE 2 DIABETES MELLITUS WITH DIABETIC POLYNEUROPATHY, WITHOUT LONG-TERM CURRENT USE OF INSULIN (H): ICD-10-CM

## 2022-04-26 RX ORDER — PEN NEEDLE, DIABETIC 32GX 5/32"
NEEDLE, DISPOSABLE MISCELLANEOUS
Qty: 90 EACH | Refills: 11 | Status: SHIPPED | OUTPATIENT
Start: 2022-04-26 | End: 2023-05-15

## 2022-04-29 ENCOUNTER — HOSPITAL ENCOUNTER (OUTPATIENT)
Dept: WOUND CARE | Facility: CLINIC | Age: 57
Discharge: HOME | End: 2022-04-29
Attending: PODIATRIST | Admitting: PODIATRIST
Payer: COMMERCIAL

## 2022-04-29 VITALS — TEMPERATURE: 97 F | HEART RATE: 125 BPM | SYSTOLIC BLOOD PRESSURE: 164 MMHG | DIASTOLIC BLOOD PRESSURE: 97 MMHG

## 2022-04-29 DIAGNOSIS — E11.42 DIABETIC POLYNEUROPATHY ASSOCIATED WITH TYPE 2 DIABETES MELLITUS (H): ICD-10-CM

## 2022-04-29 DIAGNOSIS — L97.522 ULCER OF LEFT FOOT WITH FAT LAYER EXPOSED (H): ICD-10-CM

## 2022-04-29 DIAGNOSIS — Z89.432 HISTORY OF TRANSMETATARSAL AMPUTATION OF LEFT FOOT (H): ICD-10-CM

## 2022-04-29 DIAGNOSIS — S91.302D OPEN WOUND OF PLANTAR ASPECT OF FOOT, LEFT, SUBSEQUENT ENCOUNTER: Primary | ICD-10-CM

## 2022-04-29 DIAGNOSIS — Z89.511 HX OF BKA, RIGHT (H): ICD-10-CM

## 2022-04-29 PROCEDURE — 11042 DBRDMT SUBQ TIS 1ST 20SQCM/<: CPT | Performed by: PODIATRIST

## 2022-04-29 PROCEDURE — 99213 OFFICE O/P EST LOW 20 MIN: CPT | Mod: 25 | Performed by: PODIATRIST

## 2022-04-29 NOTE — PROGRESS NOTES
[facility] [place] Progress Note    Subject: Patient was seen at wound center.  Patient presents to the clinic 2 months status post partial left foot amputation.  He notes that his shoe caused wound on his left foot.  Has been there for about 2 weeks.  He has been putting iodine on it.  Denies fever, nausea, vomiting.  Gets his new custom fitted shoes and inserts today.  No pain but has baseline neuropathy.    PMH:   Past Medical History:   Diagnosis Date     BENIGN HYPERTENSION 2007     DIABETES MELLITUS TYPE II-UNCOMPL 2007     HYPERLIPIDEMIA NEC/NOS 2007     Tobacco use disorder 2007       Social Hx:   Social History     Socioeconomic History     Marital status: Single     Spouse name: Not on file     Number of children: Not on file     Years of education: Not on file     Highest education level: Not on file   Occupational History     Not on file   Tobacco Use     Smoking status: Former Smoker     Packs/day: 0.50     Years: 20.00     Pack years: 10.00     Types: Cigarettes     Start date: 1987     Quit date:      Years since quittin.3     Smokeless tobacco: Never Used   Substance and Sexual Activity     Alcohol use: Yes     Comment: 3-4 drinks per month     Drug use: No     Sexual activity: Yes     Partners: Female   Other Topics Concern      Service Yes     Blood Transfusions No     Caffeine Concern No     Occupational Exposure No     Hobby Hazards No     Sleep Concern No     Stress Concern No     Weight Concern Yes     Comment: Would like to lose some weight     Special Diet No     Back Care No     Exercise Yes     Bike Helmet No     Seat Belt Yes     Self-Exams Yes     Parent/sibling w/ CABG, MI or angioplasty before 65F 55M? No   Social History Narrative     Not on file     Social Determinants of Health     Financial Resource Strain: Not on file   Food Insecurity: Not on file   Transportation Needs: Not on file   Physical Activity: Not on file   Stress: Not  on file   Social Connections: Not on file   Intimate Partner Violence: Not on file   Housing Stability: Not on file       Surgical Hx:   Past Surgical History:   Procedure Laterality Date     AMPUTATE FOOT Left 11/17/2019    Procedure: LEFT PARTIAL FOOT AMPUTATION;  Surgeon: Antoine Pena DPM;  [facility]     AMPUTATE FOOT Left 12/4/2019    Procedure: LEFT PARTIAL FOOT AMPUTATION;  Surgeon: Tim Douglas DPM;  [facility]     AMPUTATE FOOT Left 12/11/2019    Procedure: POSSIBLE PARTIAL FOOT AMPUTATION;  Surgeon: Tim Douglas DPM;  [facility]     AMPUTATE FOOT Left 2/10/2022    Procedure: Partial left foot amputation;  Surgeon: Milena Cevallos DPM, Podiatry/Foot and Ankle Surgery;  [facility]     AMPUTATE LEG BELOW KNEE Right 9/1/2017    Procedure: AMPUTATE LEG BELOW KNEE;  RIGHT BELOW KNEE AMPUTATION ;  Surgeon: Nikolas Nascimento MD;  [facility]     AMPUTATE TOE(S) Left 2/3/2020    Procedure: LEFT SECOND TOE AMPUTATION;  Surgeon: Milena Cevallos DPM, Podiatry/Foot and Ankle Surgery;  [facility]     APPENDECTOMY       APPLY WOUND VAC Right 3/2/2015    Procedure: APPLY WOUND VAC;  Surgeon: Milena Cevallos DPM, Pod;  [facility]     BIOPSY BONE FOOT Right 7/15/2016    Procedure: BIOPSY BONE FOOT;  Surgeon: Tim Douglas DPM;  [facility]     BIOPSY BONE TOE Left 12/4/2019    Procedure: BONE BIOPSY LEFT SECOND TOE;  Surgeon: Tim Douglas DPM;  [facility]     IRRIGATION AND DEBRIDEMENT FOOT, COMBINED Right 3/2/2015    Procedure: COMBINED IRRIGATION AND DEBRIDEMENT FOOT;  Surgeon: Milena Cevallos DPM, Pod;  [facility]     IRRIGATION AND DEBRIDEMENT FOOT, COMBINED Right 7/15/2016    Procedure: COMBINED IRRIGATION AND DEBRIDEMENT FOOT;  Surgeon: Tim Douglas DPM;  [facility]     IRRIGATION AND DEBRIDEMENT FOOT, COMBINED Right 7/20/2016    Procedure: COMBINED IRRIGATION AND DEBRIDEMENT FOOT;  Surgeon: Tim Douglas DPM;   "[facility]     IRRIGATION AND DEBRIDEMENT FOOT, COMBINED Left 11/19/2019    Procedure: REVISIONAL IRRIGATION AND DEBRIDEMENT LEFT FOOT AND BONE DEBRIDEMENT;  Surgeon: Joseph Randhawa DPM;  [facility]     IRRIGATION AND DEBRIDEMENT FOOT, COMBINED Left 12/4/2019    Procedure: EXCISIONAL DEBRIDEMENT LEFT FOOT;  Surgeon: Tim Douglas DPM;  [facility]     IRRIGATION AND DEBRIDEMENT FOOT, COMBINED Left 12/11/2019    Procedure: IRRIGATION AND DEBRIDEMENT FOOT, Partial osteotomy left first metatarsal;  Surgeon: Tim Douglas DPM;  [facility]     IRRIGATION AND DEBRIDEMENT FOOT, COMBINED Left 2/5/2020    Procedure: IRRIGATION AND DEBRIDEMENT LEFT FOOT;  Surgeon: Tim Douglas DPM;  [facility]     ORTHOPEDIC SURGERY         Allergies:    Allergies   Allergen Reactions     Pravastatin      \"sucked the life blood out of me,\" Indicates this occurs with all statins.       Medications:   Current Outpatient Medications   Medication     aspirin 81 MG chewable tablet     blood glucose (NO BRAND SPECIFIED) lancets standard     blood glucose (NO BRAND SPECIFIED) test strip     blood glucose monitoring (NO BRAND SPECIFIED) meter device kit     Continuous Blood Gluc Sensor (DEXCOM G6 SENSOR) MISC     empagliflozin (JARDIANCE) 10 MG TABS tablet     ezetimibe (ZETIA) 10 MG tablet     insulin glargine (BASAGLAR KWIKPEN) 100 UNIT/ML pen     insulin pen needle (BD PEN NEEDLE MEGAN 2ND GEN) 32G X 4 MM miscellaneous     ipratropium (ATROVENT) 0.06 % nasal spray     ketorolac (ACULAR) 0.5 % ophthalmic solution     Lancets (ONETOUCH DELICA PLUS [id number]) MISC     lisinopril (ZESTRIL) 40 MG tablet     metFORMIN (GLUCOPHAGE) 500 MG tablet     multivitamin (CENTRUM SILVER) tablet     ondansetron (ZOFRAN-ODT) 4 MG ODT tab     oxyCODONE (ROXICODONE) 5 MG tablet     prednisoLONE acetate (PRED FORTE) 1 % ophthalmic suspension     senna-docusate (SENOKOT-S/PERICOLACE) 8.6-50 MG tablet     sitagliptin " (JANUVIA) 50 MG tablet     STATIN NOT PRESCRIBED (INTENTIONAL)     No current facility-administered medications for this encounter.         Objective:  Vitals:  BP (!) 164/97 (BP Location: Left arm, Patient Position: Chair)   Pulse (!) 125   Temp 97  F (36.1  C) (Temporal)     A1C:  7.7 (4/2022)    General:  NAD    Derm: Full-thickness ulceration to the plantar aspect of the left remaining first metatarsal shaft.  Please see measurements below after debridement.  Base of the wound is granular.  No redness, purulent drainage or malodor noted.  Heavy copious amounts of clear serous drainage.  .   Wound (used by OP [facility] only) 10/01/21 1414 Left neuropathic ulcer (Active)   Thickness/Stage full thickness 04/29/22 0829   Periwound intact 04/29/22 0829   Periwound Temperature warm 04/29/22 0829   Periwound Skin Turgor soft 04/29/22 0829   Edges open 04/29/22 0829   Length (cm) 1.1 04/29/22 0829   Width (cm) 0.9 04/29/22 0829   Depth (cm) 0.4 04/29/22 0829   Wound (cm^2) 0.99 cm^2 04/29/22 0829   Wound Volume (cm^3) 0.4 cm^3 04/29/22 0829   Wound healing % 86.8 04/29/22 0829   Undermining [Depth (cm)/Location] 12-12 of 0.4 04/29/22 0829   Drainage Characteristics/Odor serosanguineous 04/29/22 0829   Drainage Amount moderate 04/29/22 0829   Care, Wound debrided 04/29/22 0829     Vascular: DP & PT pulses are intact & regular bilaterally.  edema but no varicosities noted.  CFT and skin temperature is normal to both lower extremities.     Neurologic: Lower extremity sensation is absent to feet.     Musculoskeletal: Below-knee amputation on the right leg.  Partial left foot amputation.    Assessment:    Open wound of plantar aspect of foot, left, subsequent encounter  Ulcer of left foot with fat layer exposed (H)  Hx of BKA, right (H)  Diabetic polyneuropathy associated with type 2 diabetes mellitus (H)  History of transmetatarsal amputation of left foot (H)    Plan: At this time the ulcer was debrided.  Please see procedure  note below.  We will have him do flex dressing changes to the foot daily with a 2 x 2 gauze and a large Band-Aid.  We we will let him wear his new diabetic shoes and inserts as this will hopefully offload the area.  We will also get him a baseline x-ray to see if there is any bone spurs occurring from his transmetatarsal amputation.  We will call him with the results.  Discussed that if he develops any fevers, nausea, signs of systemic infection to call or go to the ED.  We will have him follow-up in 5 weeks.  All questions were answered to patient satisfaction and he will call for the questions or concerns.    Procedure:  After verbal consent, per protocol lidocaine was applied to the wound. Excisional debridement was performed on ulcer.   #15 blade was used to debride ulcer down to and including subcutaneous tissue. Bleeding controlled with light pressure.  No drainage noted.   < 20sq cm debrided.  Dry dressing applied to foot.  Patient tolerated procedure well.    Milena Cevallos DPM, Podiatry/Foot and Ankle Surgery              Further instructions from your care team       Ry Horner      1965    Left plantar foot:  - Clean with mild soap and water  - Apply 1/8 Ioplex, cut-to-fit size of wound  - Cover with 2x2 gauze then large BandAid  Change daily    Continue to keep blood sugars controlled    Ok to weight bear in new shoes!  Wear them all the time to continue offloading.     BEREKET Pierre.P.M. April 29, 2022    Call us at [phone] if you have any questions about your wounds, have redness or swelling around your wound, have a fever of 101 or greater or if you have any other problems or concerns. We answer the phone Monday through Friday 8 am to 4 pm, please leave a message as we check the voicemail frequently throughout the day.     If you had a positive experience please indicate on your patient satisfaction survey form that  [employer] will be sending you.    If you have any billing related  questions please call the [facility] Business office at [phone]. The clinic staff does not handle billing related matters.

## 2022-04-29 NOTE — DISCHARGE INSTRUCTIONS
Ry Horner      1965    Left plantar foot:  - Clean with mild soap and water  - Apply 1/8 Ioplex, cut-to-fit size of wound  - Cover with 2x2 gauze then large BandAid  Change daily    Continue to keep blood sugars controlled    Ok to weight bear in new shoes!  Wear them all the time to continue offloading.     STEPHEN PierreP.JENNIE. April 29, 2022    Call us at [phone] if you have any questions about your wounds, have redness or swelling around your wound, have a fever of 101 or greater or if you have any other problems or concerns. We answer the phone Monday through Friday 8 am to 4 pm, please leave a message as we check the voicemail frequently throughout the day.     If you had a positive experience please indicate on your patient satisfaction survey form that  [employer] [place] will be sending you.    If you have any billing related questions please call the [facility] Business office at [phone]. The clinic staff does not handle billing related matters.

## 2022-05-12 DIAGNOSIS — I10 ESSENTIAL HYPERTENSION, BENIGN: ICD-10-CM

## 2022-05-12 DIAGNOSIS — E78.5 HYPERLIPIDEMIA LDL GOAL <100: ICD-10-CM

## 2022-05-13 RX ORDER — EZETIMIBE 10 MG/1
TABLET ORAL
Qty: 90 TABLET | Refills: 1 | Status: SHIPPED | OUTPATIENT
Start: 2022-05-13 | End: 2022-11-09

## 2022-05-13 RX ORDER — LISINOPRIL 40 MG/1
TABLET ORAL
Qty: 90 TABLET | Refills: 3 | Status: ON HOLD | OUTPATIENT
Start: 2022-05-13 | End: 2022-10-03

## 2022-05-13 NOTE — TELEPHONE ENCOUNTER
Routing refill request to provider for review/approval because:  Labs out of range:    BP Readings from Last 3 Encounters:   04/29/22 (!) 164/97   03/25/22 (!) 163/104   03/11/22 (!) 154/100     Creatinine   Date Value Ref Range Status   04/04/2022 1.79 (H) 0.66 - 1.25 mg/dL Final   06/25/2021 1.51 (H) 0.66 - 1.25 mg/dL Final       Kristina Nunn RN

## 2022-06-03 ENCOUNTER — HOSPITAL ENCOUNTER (OUTPATIENT)
Dept: WOUND CARE | Facility: CLINIC | Age: 57
Discharge: HOME | End: 2022-06-03
Attending: PODIATRIST
Payer: COMMERCIAL

## 2022-06-03 VITALS — SYSTOLIC BLOOD PRESSURE: 158 MMHG | DIASTOLIC BLOOD PRESSURE: 107 MMHG | TEMPERATURE: 97.3 F | HEART RATE: 112 BPM

## 2022-06-03 DIAGNOSIS — L97.522 DIABETIC ULCER OF OTHER PART OF LEFT FOOT ASSOCIATED WITH TYPE 2 DIABETES MELLITUS, WITH FAT LAYER EXPOSED (H): ICD-10-CM

## 2022-06-03 DIAGNOSIS — E11.621 DIABETIC ULCER OF OTHER PART OF LEFT FOOT ASSOCIATED WITH TYPE 2 DIABETES MELLITUS, WITH FAT LAYER EXPOSED (H): ICD-10-CM

## 2022-06-03 DIAGNOSIS — S91.302D OPEN WOUND OF PLANTAR ASPECT OF FOOT, LEFT, SUBSEQUENT ENCOUNTER: Primary | ICD-10-CM

## 2022-06-03 DIAGNOSIS — Z89.511 HX OF BKA, RIGHT (H): ICD-10-CM

## 2022-06-03 DIAGNOSIS — E11.42 TYPE 2 DIABETES MELLITUS WITH DIABETIC POLYNEUROPATHY, WITHOUT LONG-TERM CURRENT USE OF INSULIN (H): ICD-10-CM

## 2022-06-03 DIAGNOSIS — Z89.432 HISTORY OF TRANSMETATARSAL AMPUTATION OF LEFT FOOT (H): ICD-10-CM

## 2022-06-03 PROCEDURE — 99213 OFFICE O/P EST LOW 20 MIN: CPT | Mod: 25 | Performed by: PODIATRIST

## 2022-06-03 NOTE — PROGRESS NOTES
[facility] [place] Progress Note    Subject: Patient was seen at wound center.  Patient here for follow-up on left foot ulcer.  Notes he has been too busy to get the x-ray.  He is very busy at work as 2 people are out.  Denies fever, nausea, vomiting.  Notes has been on his foot a lot more.  No pain but has baseline neuropathy.  Has been doing Iodoflex dressing changes daily.    PMH:   Past Medical History:   Diagnosis Date     BENIGN HYPERTENSION 2007     DIABETES MELLITUS TYPE II-UNCOMPL 2007     HYPERLIPIDEMIA NEC/NOS 2007     Tobacco use disorder 2007       Social Hx:   Social History     Socioeconomic History     Marital status: Single     Spouse name: Not on file     Number of children: Not on file     Years of education: Not on file     Highest education level: Not on file   Occupational History     Not on file   Tobacco Use     Smoking status: Former Smoker     Packs/day: 0.50     Years: 20.00     Pack years: 10.00     Types: Cigarettes     Start date: 1987     Quit date:      Years since quittin.4     Smokeless tobacco: Never Used   Substance and Sexual Activity     Alcohol use: Yes     Comment: 3-4 drinks per month     Drug use: No     Sexual activity: Yes     Partners: Female   Other Topics Concern      Service Yes     Blood Transfusions No     Caffeine Concern No     Occupational Exposure No     Hobby Hazards No     Sleep Concern No     Stress Concern No     Weight Concern Yes     Comment: Would like to lose some weight     Special Diet No     Back Care No     Exercise Yes     Bike Helmet No     Seat Belt Yes     Self-Exams Yes     Parent/sibling w/ CABG, MI or angioplasty before 65F 55M? No   Social History Narrative     Not on file     Social Determinants of Health     Financial Resource Strain: Not on file   Food Insecurity: Not on file   Transportation Needs: Not on file   Physical Activity: Not on file   Stress: Not on file   Social Connections:  Not on file   Intimate Partner Violence: Not on file   Housing Stability: Not on file       Surgical Hx:   Past Surgical History:   Procedure Laterality Date     AMPUTATE FOOT Left 11/17/2019    Procedure: LEFT PARTIAL FOOT AMPUTATION;  Surgeon: Antoine Pena DPM;  Location: [facility]     AMPUTATE FOOT Left 12/4/2019    Procedure: LEFT PARTIAL FOOT AMPUTATION;  Surgeon: Tim Douglas DPM;  Location: [facility]     AMPUTATE FOOT Left 12/11/2019    Procedure: POSSIBLE PARTIAL FOOT AMPUTATION;  Surgeon: Tim Douglas DPM;  Location: [facility]     AMPUTATE FOOT Left 2/10/2022    Procedure: Partial left foot amputation;  Surgeon: Milena Cevallos DPM, Podiatry/Foot and Ankle Surgery;  Location: [facility]     AMPUTATE LEG BELOW KNEE Right 9/1/2017    Procedure: AMPUTATE LEG BELOW KNEE;  RIGHT BELOW KNEE AMPUTATION ;  Surgeon: Nikolas Nascimento MD;  Location: [facility]     AMPUTATE TOE(S) Left 2/3/2020    Procedure: LEFT SECOND TOE AMPUTATION;  Surgeon: Milena Cevallos DPM, Podiatry/Foot and Ankle Surgery;  Location: [facility]     APPENDECTOMY       APPLY WOUND VAC Right 3/2/2015    Procedure: APPLY WOUND VAC;  Surgeon: Milena Cevallos DPM, Pod;  Location: [facility]     BIOPSY BONE FOOT Right 7/15/2016    Procedure: BIOPSY BONE FOOT;  Surgeon: Tim Douglas DPM;  [facility]: [facility]     BIOPSY BONE TOE Left 12/4/2019    Procedure: BONE BIOPSY LEFT SECOND TOE;  Surgeon: Tim Douglas DPM;  Location: [facility]     IRRIGATION AND DEBRIDEMENT FOOT, COMBINED Right 3/2/2015    Procedure: COMBINED IRRIGATION AND DEBRIDEMENT FOOT;  Surgeon: Milena Cevallos DPM, Pod;  Location: [facility]     IRRIGATION AND DEBRIDEMENT FOOT, COMBINED Right 7/15/2016    Procedure: COMBINED IRRIGATION AND DEBRIDEMENT FOOT;  Surgeon: Tim Douglas DPM;  Location: [facility]     IRRIGATION AND DEBRIDEMENT FOOT, COMBINED Right 7/20/2016    Procedure: COMBINED IRRIGATION AND DEBRIDEMENT FOOT;  Surgeon: Tim Douglas DPM;  Location: [facility]     IRRIGATION  "AND DEBRIDEMENT FOOT, COMBINED Left 11/19/2019    Procedure: REVISIONAL IRRIGATION AND DEBRIDEMENT LEFT FOOT AND BONE DEBRIDEMENT;  Surgeon: Joseph Randhawa DPM;  [facility]: [facility]     IRRIGATION AND DEBRIDEMENT FOOT, COMBINED Left 12/4/2019    Procedure: EXCISIONAL DEBRIDEMENT LEFT FOOT;  Surgeon: Tim Douglas DPM;  [facility]: [facility]     IRRIGATION AND DEBRIDEMENT FOOT, COMBINED Left 12/11/2019    Procedure: IRRIGATION AND DEBRIDEMENT FOOT, Partial osteotomy left first metatarsal;  Surgeon: Tim Douglas DPM;  [facility]: [facility]     IRRIGATION AND DEBRIDEMENT FOOT, COMBINED Left 2/5/2020    Procedure: IRRIGATION AND DEBRIDEMENT LEFT FOOT;  Surgeon: Tim Douglas DPM;  Location: [facility]     ORTHOPEDIC SURGERY         Allergies:    Allergies   Allergen Reactions     Pravastatin      \"sucked the life blood out of me,\" Indicates this occurs with all statins.       Medications:   Current Outpatient Medications   Medication     aspirin 81 MG chewable tablet     blood glucose (NO BRAND SPECIFIED) lancets standard     blood glucose (NO BRAND SPECIFIED) test strip     blood glucose monitoring (NO BRAND SPECIFIED) meter device kit     Continuous Blood Gluc Sensor (DEXCOM G6 SENSOR) MISC     empagliflozin (JARDIANCE) 10 MG TABS tablet     ezetimibe (ZETIA) 10 MG tablet     insulin glargine (BASAGLAR KWIKPEN) 100 UNIT/ML pen     insulin pen needle (BD PEN NEEDLE MEGAN 2ND GEN) 32G X 4 MM miscellaneous     ipratropium (ATROVENT) 0.06 % nasal spray     ketorolac (ACULAR) 0.5 % ophthalmic solution     Lancets (ONETOUCH DELICA PLUS YHTEMQ49L) MISC     lisinopril (ZESTRIL) 40 MG tablet     metFORMIN (GLUCOPHAGE) 500 MG tablet     multivitamin (CENTRUM SILVER) tablet     ondansetron (ZOFRAN-ODT) 4 MG ODT tab     oxyCODONE (ROXICODONE) 5 MG tablet     prednisoLONE acetate (PRED FORTE) 1 % ophthalmic suspension     senna-docusate (SENOKOT-S/PERICOLACE) 8.6-50 MG tablet     sitagliptin (JANUVIA) 50 MG tablet     STATIN " NOT PRESCRIBED (INTENTIONAL)     No current facility-administered medications for this encounter.         Objective:  Vitals: BP (!) 158/107 (BP Location: Left arm)   Pulse 112   Temp 97.3  F (36.3  C) (Temporal)       A1C:  7.7 (4/2022)     General:  NAD     Derm: Full-thickness ulceration to the plantar aspect of the left remaining first metatarsal shaft.  Large amounts of thickened preulcerative hyperkeratotic tissue around the ulcer. Please see measurements below after debridement.  Base of the wound is granular.  No redness, purulent drainage or malodor noted.  Heavy copious amounts of clear serous drainage.  Maceration noted around the wound edges.    Wound (used by OP [facility] only) 10/01/21 1414 Left neuropathic ulcer (Active)   Thickness/Stage full thickness 06/03/22 0829   Base moist 06/03/22 0829   Periwound intact 06/03/22 0829   Periwound Temperature warm 06/03/22 0829   Periwound Skin Turgor soft 06/03/22 0829   Edges open 06/03/22 0829   Length (cm) 3 06/03/22 0829   Width (cm) 3 06/03/22 0829   Depth (cm) 1 06/03/22 0829   Wound (cm^2) 9 cm^2 06/03/22 0829   Wound Volume (cm^3) 9 cm^3 06/03/22 0829   Wound healing % -20 06/03/22 0829   Drainage Characteristics/Odor serosanguineous 06/03/22 0829   Drainage Amount moderate 06/03/22 0829   Care, Wound debrided;chemical cautery applied 06/03/22 0829      Vascular: DP & PT pulses are intact & regular bilaterally.  edema but no varicosities noted.  CFT and skin temperature is normal to both lower extremities.     Neurologic: Lower extremity sensation is absent to feet.     Musculoskeletal: Below-knee amputation on the right leg.  Partial left foot amputation.     Assessment:    Open wound of plantar aspect of foot, left, subsequent encounter  Ulcer of left foot with fat layer exposed (H)  Hx of BKA, right (H)  Diabetic polyneuropathy associated with type 2 diabetes mellitus (H)  History of transmetatarsal amputation of left foot (H)     Plan: At this time the  ulcer was debrided.  Please see procedure note below.  We will have him switch to Hydrofera Blue as there seems to be a lot more serous drainage noted.  We will cover this with 2 x 2 gauze and a large Band-Aid.  will do daily dressing changes..  His diabetic inserts were modified to cut out under the ulcer to help with pressure relief.   Encouraged him to get the get him a baseline x-ray to see if there is any bone spurs occurring from his transmetatarsal amputation.  We will call him with the results.  Discussed that if he develops any fevers, nausea, signs of systemic infection to call or go to the ED.  We will have him follow-up in 5 weeks.  All questions were answered to patient satisfaction and he will call for the questions or concerns.     Procedure:  After verbal consent, per protocol lidocaine was applied to the wound. Excisional debridement was performed on ulcer.   #15 blade was used to debride ulcer down to and including subcutaneous tissue. Bleeding controlled with light pressure.  No drainage noted.   < 20sq cm debrided.  Dry dressing applied to foot.  Patient tolerated procedure well.    Milena Cevallos DPM, Podiatry/Foot and Ankle Surgery        Durable Medical Equipment Wound Care Orders     Wound Care Order for DME - ONLY FOR DME   As directed      DME Provider: [facility]    Wound Supply Order Options: Complex Wound    Optional: .dmewound can be used to pull in order specific information into documentation    Wound Number: Wound 1    Wound 1 Location: left plantar great toe    Wound 1 Dressing Change Frequency: Daily    Wound 1 Length of Need: 30 days    Wound 1 - Dressing Supplies: Primary    Wound 1 - Primary Dressing Dispensing Instructions: Ok to Substitute    Wound 1 - Primary Dressing Types: Foam    Wound 1 - Foam Types: Hydrofera Blue Ready Transfer []    Wound 1 - Hydrofera Blue Ready Transfer Size: 4 x 5    Wound 1 - Hydrofera Blue Ready Transfer Quantity: Other  (Comment) Comment - 2    Open wound of plantar aspect of foot, left, subsequent encounter            Further instructions from your care team       Ry Horner      1965    Left plantar foot:  - Clean with mild soap and water  - Apply 1/8 Hydrofera Blue Ready Transfer, cut-to-fit size of wound  - Cover with 2x2 gauze then large BandAid  Change daily    Continue to keep blood sugars controlled  Ok to weight bear in new shoes! Wear them all the time to continue offloading.       BEREKET Pierre.P.M. Ruthann 3, 2022    Call us at [phone] if you have any questions about your wounds, have redness or swelling around your wound, have a fever of 101 or greater or if you have any other problems or concerns. We answer the phone Monday through Friday 8 am to 4 pm, please leave a message as we check the voicemail frequently throughout the day.     If you had a positive experience please indicate that on your patient satisfaction survey form that [facility] will be sending you.    It was a pleasure meeting with you today.  Thank you for allowing me and my team the privilege of caring for you today.  YOU are the reason we are here, and I truly hope we provided you with the excellent service you deserve.  Please let us know if there is anything else we can do for you so that we can be sure you are leaving completely satisfied with your care experience.      If you have any billing related questions please call the [facility] Business office at [phone]. The clinic staff does not handle billing related matters.

## 2022-06-03 NOTE — DISCHARGE INSTRUCTIONS
Ry Horner      1965    Left plantar foot:  - Clean with mild soap and water  - Apply 1/8 Hydrofera Blue Ready Transfer, cut-to-fit size of wound  - Cover with 2x2 gauze then large BandAid  Change daily    Continue to keep blood sugars controlled  Ok to weight bear in new shoes! Wear them all the time to continue offloading.    If you have not heard from the scheduling  in two business days, please call [facility] [phone] ([facility] [address] [place], [place]) to schedule your foot xray.     Milena Cevallos D.P.M. Ruthann 3, 2022    Call us at [phone] if you have any questions about your wounds, have redness or swelling around your wound, have a fever of 101 or greater or if you have any other problems or concerns. We answer the phone Monday through Friday 8 am to 4 pm, please leave a message as we check the voicemail frequently throughout the day.     If you had a positive experience please indicate that on your patient satisfaction survey form that [facility] will be sending you.    It was a pleasure meeting with you today.  Thank you for allowing me and my team the privilege of caring for you today.  YOU are the reason we are here, and I truly hope we provided you with the excellent service you deserve.  Please let us know if there is anything else we can do for you so that we can be sure you are leaving completely satisfied with your care experience.      If you have any billing related questions please call the [facility] Business office at [phone]. The clinic staff does not handle billing related matters.

## 2022-06-17 ENCOUNTER — ANCILLARY PROCEDURE (OUTPATIENT)
Dept: GENERAL RADIOLOGY | Facility: CLINIC | Age: 57
End: 2022-06-17
Attending: PODIATRIST
Payer: COMMERCIAL

## 2022-06-17 DIAGNOSIS — Z89.432 HISTORY OF TRANSMETATARSAL AMPUTATION OF LEFT FOOT (H): ICD-10-CM

## 2022-06-17 PROCEDURE — 73630 X-RAY EXAM OF FOOT: CPT | Mod: LT

## 2022-06-21 ENCOUNTER — TELEPHONE (OUTPATIENT)
Dept: WOUND CARE | Facility: CLINIC | Age: 57
End: 2022-06-21
Payer: COMMERCIAL

## 2022-06-21 NOTE — TELEPHONE ENCOUNTER
Message received from Dr. Cevallos:  Xrays do no show signs of bone infection. Please let patient know.     THanks,     PATRICIA PierreM    Patient called and informed him of above. Patient verbalized understanding. No further questions or concerns.

## 2022-06-29 DIAGNOSIS — R09.89 RUNNY NOSE: ICD-10-CM

## 2022-06-30 RX ORDER — IPRATROPIUM BROMIDE 42 UG/1
SPRAY, METERED NASAL
Qty: 15 ML | Refills: 1 | Status: SHIPPED | OUTPATIENT
Start: 2022-06-30 | End: 2022-08-17

## 2022-06-30 NOTE — TELEPHONE ENCOUNTER
Prescription approved per [facility] Refill Protocol.  Christine Lema RN  [facility]

## 2022-07-01 ENCOUNTER — HOSPITAL ENCOUNTER (OUTPATIENT)
Dept: WOUND CARE | Facility: CLINIC | Age: 57
Discharge: HOME | End: 2022-07-01
Attending: PODIATRIST | Admitting: PODIATRIST
Payer: COMMERCIAL

## 2022-07-01 VITALS — DIASTOLIC BLOOD PRESSURE: 89 MMHG | HEART RATE: 119 BPM | SYSTOLIC BLOOD PRESSURE: 133 MMHG | TEMPERATURE: 98.1 F

## 2022-07-01 DIAGNOSIS — Z89.511 HX OF BKA, RIGHT (H): ICD-10-CM

## 2022-07-01 DIAGNOSIS — S91.302D OPEN WOUND OF PLANTAR ASPECT OF FOOT, LEFT, SUBSEQUENT ENCOUNTER: Primary | ICD-10-CM

## 2022-07-01 DIAGNOSIS — Z89.432 HISTORY OF TRANSMETATARSAL AMPUTATION OF LEFT FOOT (H): ICD-10-CM

## 2022-07-01 DIAGNOSIS — E11.42 DIABETIC POLYNEUROPATHY ASSOCIATED WITH TYPE 2 DIABETES MELLITUS (H): ICD-10-CM

## 2022-07-01 DIAGNOSIS — M89.372 HYPERTROPHY OF BONE, LEFT ANKLE AND FOOT: ICD-10-CM

## 2022-07-01 DIAGNOSIS — L97.522 ULCER OF LEFT FOOT WITH FAT LAYER EXPOSED (H): ICD-10-CM

## 2022-07-01 PROCEDURE — 11042 DBRDMT SUBQ TIS 1ST 20SQCM/<: CPT | Performed by: PODIATRIST

## 2022-07-01 PROCEDURE — 99213 OFFICE O/P EST LOW 20 MIN: CPT | Mod: 25 | Performed by: PODIATRIST

## 2022-07-01 NOTE — DISCHARGE INSTRUCTIONS
Ry SUE       1965    Left plantar foot:  - Clean with mild soap and water  - Apply 1/8 Hydrofera Blue Ready Transfer, cut-to-fit size of wound  - Cover with 2x2 gauze then large BandAid  Change daily    Continue to keep blood sugars controlled  Ok to weight bear in new shoes! Wear them all the time to continue offloading.     STEPHEN PierreP.JENNIE. July 1, 2022    Call us at [phone] if you have any questions about your wounds, have redness or swelling around your wound, have a fever of 101 or greater or if you have any other problems or concerns. We answer the phone Monday through Friday 8 am to 4 pm, please leave a message as we check the voicemail frequently throughout the day.     If you had a positive experience please indicate that on your patient satisfaction survey form that [facility] will be sending you.    It was a pleasure meeting with you today.  Thank you for allowing me and my team the privilege of caring for you today.  YOU are the reason we are here, and I truly hope we provided you with the excellent service you deserve.  Please let us know if there is anything else we can do for you so that we can be sure you are leaving completely satisfied with your care experience.      If you have any billing related questions please call the [facility] Business office at [phone]. The clinic staff does not handle billing related matters.

## 2022-07-01 NOTE — PROGRESS NOTES
[facility] [place] Progress Note    Subject: Patient was seen at wound center.  Patient presents to clinic for follow-up on left foot ulcer and x-ray results.  Here with his wife.  Denies fever, nausea, vomiting.  Notes that the foot is doing much better.  Is not as red.  It still draining but not as much.  He has been doing Hydrofera Blue dressing changes every day.  Notes that he is almost out of them.    PMH:   Past Medical History:   Diagnosis Date     BENIGN HYPERTENSION 2007     DIABETES MELLITUS TYPE II-UNCOMPL 2007     HYPERLIPIDEMIA NEC/NOS 2007     Tobacco use disorder 2007       Social Hx:   Social History     Socioeconomic History     Marital status: Single     Spouse name: Not on file     Number of children: Not on file     Years of education: Not on file     Highest education level: Not on file   Occupational History     Not on file   Tobacco Use     Smoking status: Former Smoker     Packs/day: 0.50     Years: 20.00     Pack years: 10.00     Types: Cigarettes     Start date: 1987     Quit date:      Years since quittin.5     Smokeless tobacco: Never Used   Substance and Sexual Activity     Alcohol use: Yes     Comment: 3-4 drinks per month     Drug use: No     Sexual activity: Yes     Partners: Female   Other Topics Concern      Service Yes     Blood Transfusions No     Caffeine Concern No     Occupational Exposure No     Hobby Hazards No     Sleep Concern No     Stress Concern No     Weight Concern Yes     Comment: Would like to lose some weight     Special Diet No     Back Care No     Exercise Yes     Bike Helmet No     Seat Belt Yes     Self-Exams Yes     Parent/sibling w/ CABG, MI or angioplasty before 65F 55M? No   Social History Narrative     Not on file     Social Determinants of Health     Financial Resource Strain: Not on file   Food Insecurity: Not on file   Transportation Needs: Not on file   Physical Activity: Not on file   Stress: Not on  file   Social Connections: Not on file   Intimate Partner Violence: Not on file   Housing Stability: Not on file       Surgical Hx:   Past Surgical History:   Procedure Laterality Date     AMPUTATE FOOT Left 11/17/2019    Procedure: LEFT PARTIAL FOOT AMPUTATION;  Surgeon: Antoine Pena DPM;  Location: [facility] OR     AMPUTATE FOOT Left 12/4/2019    Procedure: LEFT PARTIAL FOOT AMPUTATION;  Surgeon: Tim Douglas DPM;  Location: [facility] OR     AMPUTATE FOOT Left 12/11/2019    Procedure: POSSIBLE PARTIAL FOOT AMPUTATION;  Surgeon: Tim Douglas DPM;  Location: [facility]     AMPUTATE FOOT Left 2/10/2022    Procedure: Partial left foot amputation;  Surgeon: Milena Cevallos DPM, Podiatry/Foot and Ankle Surgery;  Location: [facility] OR     AMPUTATE LEG BELOW KNEE Right 9/1/2017    Procedure: AMPUTATE LEG BELOW KNEE;  RIGHT BELOW KNEE AMPUTATION ;  Surgeon: Nikolas Nascimento MD;  Location: [facility]     AMPUTATE TOE(S) Left 2/3/2020    Procedure: LEFT SECOND TOE AMPUTATION;  Surgeon: Milena Cevallos DPM, Podiatry/Foot and Ankle Surgery;  Location: [facility]     APPENDECTOMY       APPLY WOUND VAC Right 3/2/2015    Procedure: APPLY WOUND VAC;  Surgeon: Milena Cevallos DPM, Pod;  Location: [facility] OR     BIOPSY BONE FOOT Right 7/15/2016    Procedure: BIOPSY BONE FOOT;  Surgeon: Tim Douglas DPM;  Location: [facility]     BIOPSY BONE TOE Left 12/4/2019    Procedure: BONE BIOPSY LEFT SECOND TOE;  Surgeon: Tim Douglas DPM;  Location: [facility]     IRRIGATION AND DEBRIDEMENT FOOT, COMBINED Right 3/2/2015    Procedure: COMBINED IRRIGATION AND DEBRIDEMENT FOOT;  Surgeon: Milena Cevallos DPM, Pod;  Location: [facility]     IRRIGATION AND DEBRIDEMENT FOOT, COMBINED Right 7/15/2016    Procedure: COMBINED IRRIGATION AND DEBRIDEMENT FOOT;  Surgeon: Tim Douglas DPM;  Location: [facility]     IRRIGATION AND DEBRIDEMENT FOOT, COMBINED Right 7/20/2016    Procedure: COMBINED IRRIGATION AND DEBRIDEMENT FOOT;  Surgeon: Tim Douglas DPM;   "Location: [facility]     IRRIGATION AND DEBRIDEMENT FOOT, COMBINED Left 11/19/2019    Procedure: REVISIONAL IRRIGATION AND DEBRIDEMENT LEFT FOOT AND BONE DEBRIDEMENT;  Surgeon: Joseph Randhawa DPM;  Location: [facility]     IRRIGATION AND DEBRIDEMENT FOOT, COMBINED Left 12/4/2019    Procedure: EXCISIONAL DEBRIDEMENT LEFT FOOT;  Surgeon: Tim Douglas DPM;  Location: [facility]     IRRIGATION AND DEBRIDEMENT FOOT, COMBINED Left 12/11/2019    Procedure: IRRIGATION AND DEBRIDEMENT FOOT, Partial osteotomy left first metatarsal;  Surgeon: Tim Douglas DPM;  Location: [facility]     IRRIGATION AND DEBRIDEMENT FOOT, COMBINED Left 2/5/2020    Procedure: IRRIGATION AND DEBRIDEMENT LEFT FOOT;  Surgeon: Tim Douglas DPM;  Location: [facility]     ORTHOPEDIC SURGERY         Allergies:    Allergies   Allergen Reactions     Pravastatin      \"sucked the life blood out of me,\" Indicates this occurs with all statins.       Medications:   Current Outpatient Medications   Medication     aspirin 81 MG chewable tablet     blood glucose (NO BRAND SPECIFIED) lancets standard     blood glucose (NO BRAND SPECIFIED) test strip     blood glucose monitoring (NO BRAND SPECIFIED) meter device kit     Continuous Blood Gluc Sensor (DEXCOM G6 SENSOR) MISC     ezetimibe (ZETIA) 10 MG tablet     insulin glargine (BASAGLAR KWIKPEN) 100 UNIT/ML pen     insulin pen needle (BD PEN NEEDLE MEGAN 2ND GEN) 32G X 4 MM miscellaneous     ipratropium (ATROVENT) 0.06 % nasal spray     JARDIANCE 10 MG TABS tablet     ketorolac (ACULAR) 0.5 % ophthalmic solution     Lancets (ONETOUCH DELICA PLUS [id number]) MISC     lisinopril (ZESTRIL) 40 MG tablet     metFORMIN (GLUCOPHAGE) 500 MG tablet     multivitamin (CENTRUM SILVER) tablet     ondansetron (ZOFRAN-ODT) 4 MG ODT tab     oxyCODONE (ROXICODONE) 5 MG tablet     prednisoLONE acetate (PRED FORTE) 1 % ophthalmic suspension     senna-docusate (SENOKOT-S/PERICOLACE) 8.6-50 MG tablet     sitagliptin (JANUVIA) 50 MG " tablet     STATIN NOT PRESCRIBED (INTENTIONAL)     No current facility-administered medications for this encounter.         Objective:  Vitals:  /89 (BP Location: Left arm, Patient Position: Sitting)   Pulse 119   Temp 98.1  F (36.7  C) (Temporal)       A1C:  7.7 (4/2022)     General:  NAD     Derm: Full-thickness ulceration to the plantar aspect of the left remaining first metatarsal shaft.  Large amounts of thickened preulcerative hyperkeratotic tissue around the ulcer. Please see measurements below after debridement.  Base of the wound is granular.  No redness, purulent drainage or malodor noted.  Heavy copious amounts of clear serous drainage.  Maceration noted around the wound edges.    Wound (used by OP [facility] only) 10/01/21 1414 Left plantar foot neuropathic ulcer (Active)   Thickness/Stage full thickness 07/01/22 1008   Base granulating 07/01/22 1008   Periwound intact 07/01/22 1008   Periwound Temperature warm 07/01/22 1008   Periwound Skin Turgor soft 07/01/22 1008   Edges open 07/01/22 1008   Length (cm) 2.2 07/01/22 1008   Width (cm) 2.4 07/01/22 1008   Depth (cm) 0.4 07/01/22 1008   Wound (cm^2) 5.28 cm^2 07/01/22 1008   Wound Volume (cm^3) 2.11 cm^3 07/01/22 1008   Wound healing % 29.6 07/01/22 1008   Drainage Characteristics/Odor serosanguineous 07/01/22 1008   Drainage Amount moderate 07/01/22 1008   Care, Wound debrided 07/01/22 1008      Vascular: DP & PT pulses are intact & regular bilaterally.  edema but no varicosities noted.  CFT and skin temperature is normal to both lower extremities.     Neurologic: Lower extremity sensation is absent to feet.     Musculoskeletal: Below-knee amputation on the right leg.  Partial left foot amputation.    Left foot xray - Postoperative changes transmetatarsal amputation. There is bone formation along the resection margin of the first through fifth metatarsal shafts MR including a somewhat prominent area of bony proliferative change dorsally as seen on the    lateral view.     No definitive cortical destructive change to suggest osteomyelitis however there is soft tissue swelling and potential ulceration along the entire aspect of the soft tissue stump. If further evaluation is required, recommend MRI.     Assessment:     Open wound of plantar aspect of foot, left, subsequent encounter  Ulcer of left foot with fat layer exposed (H)  Diabetic polyneuropathy associated with type 2 diabetes mellitus (H)  History of transmetatarsal amputation of left foot (H)  Hx of BKA, right (H)  Hypertrophy of bone, left ankle and foot     Plan: At this time the ulcer was debrided.  Please see procedure note below.  We will have him continue with Hydrofera Blue as there seems to be a lot more serous drainage noted.  We will cover this with 2 x 2 gauze and a large Band-Aid.  will do daily dressing changes..   Reviewed and discussed x-rays.  Currently there is no signs of bone infection however there is some hypertrophy of bone.  We will just monitor this for now.   Discussed that if he develops any fevers, nausea, signs of systemic infection to call or go to the ED.  We will have him follow-up in 5 weeks.  All questions were answered to patient satisfaction and he will call for the questions or concerns.     Procedure:  After verbal consent, per protocol lidocaine was applied to the wound. Excisional debridement was performed on ulcer.   #15 blade was used to debride ulcer down to and including subcutaneous tissue. Bleeding controlled with light pressure.  No drainage noted.   < 20sq cm debrided.  Dry dressing applied to foot.  Patient tolerated procedure well.    Milena Cevallos DPM, Podiatry/Foot and Ankle Surgery            Further instructions from your care team       Ry Horner      1965    Left plantar foot:  - Clean with mild soap and water  - Apply 1/8 Hydrofera Blue Ready Transfer, cut-to-fit size of wound  - Cover with 2x2 gauze then large BandAid  Change  daily    Continue to keep blood sugars controlled  Ok to weight bear in new shoes! Wear them all the time to continue offloading.     Milena Cevallos D.P.M. July 1, 2022    Call us at [phone] if you have any questions about your wounds, have redness or swelling around your wound, have a fever of 101 or greater or if you have any other problems or concerns. We answer the phone Monday through Friday 8 am to 4 pm, please leave a message as we check the voicemail frequently throughout the day.     If you had a positive experience please indicate that on your patient satisfaction survey form that [facility] will be sending you.    It was a pleasure meeting with you today.  Thank you for allowing me and my team the privilege of caring for you today.  YOU are the reason we are here, and I truly hope we provided you with the excellent service you deserve.  Please let us know if there is anything else we can do for you so that we can be sure you are leaving completely satisfied with your care experience.      If you have any billing related questions please call the [facility] Business office at [phone]. The clinic staff does not handle billing related matters.

## 2022-08-09 NOTE — PROGRESS NOTES
S: Ry Horner  presents 1 week  post op.      POSTOPERATIVE DIAGNOSES:     1.  Diabetes with peripheral neuropathy.   2.  Ulceration, left second toe.   3.  Gas gangrene, left second toe.      PROCEDURE:  Irrigation and excisional debridement, left foot, status post amputation of the left second toe on 02/03/2020.      INDICATIONS FOR SURGERY:  Ry Horner is a 54-year-old male with type 2 diabetes, peripheral neuropathy, status post right below-knee amputation and a left partial first ray amputation, who was admitted to the hospital on 02/02/2020 due to concerning changes of his left second toe.  He was found to have gas gangrene.  He was brought to the operating room by Dr. Cevallos for an amputation of the toe.  Retention sutures and packing dressing were placed.  His foot was monitored and the plan was to bring him back to the operating room in 48 hours for additional debridement and irrigation.  Prior to surgery, I reviewed the goals with the patient and his wife.  Goals include treating the infection as well as achieving primary closure of the wound.  I explained that closure would depend on intraoperative findings.  No guarantees were given.       O:   Vascular:  Pedal pulses are palpable.  Moderate left forefoot edema.    Derm: The incision is coapted.  Sutures are intact.  No significant alexander-incisional erythema.  There is an area in the mid incision that shows some macerated skin and granulation tissue.       ASSESSMENT:  1) s/p above noted surgery.  No clinical signs of infection.  Pain is controlled.  Encounter Diagnoses   Name Primary?     Surgery follow-up examination Yes     Status post partial amputation of left foot (H)      Type 2 diabetes mellitus with diabetic polyneuropathy, without long-term current use of insulin (H)        PLAN:  I did a sterile redress of his left foot.   He and his wife are to continue with daily dressing changes.  This involves gently cleansing, blotting dry, applying Betadine,  and dry gauze dressing.   He is to remain strictly nonweightbearing  He is to complete his oral antibiotic  I did express some concern about the mid incision area that is not dry.  We will plan on leaving the sutures in for a total of 4 weeks  Thereafter the plan will be diabetic shoes, custom orthotics and prevention of future complications.  He is to follow-up in 2 weeks, and call if he has any concerns before and call if he has any concerns before then    Tim Douglas DPM, FACTHAI, MS [place] Department of Podiatry/Foot & Ankle Surgery     Do Not Resuscitate (DNR)

## 2022-08-11 DIAGNOSIS — E11.9 DIABETES MELLITUS, TYPE 2 (H): ICD-10-CM

## 2022-08-15 ENCOUNTER — HOSPITAL ENCOUNTER (EMERGENCY)
Facility: CLINIC | Age: 57
Discharge: HOME | End: 2022-08-15
Attending: EMERGENCY MEDICINE | Admitting: EMERGENCY MEDICINE
Payer: COMMERCIAL

## 2022-08-15 VITALS
RESPIRATION RATE: 20 BRPM | BODY MASS INDEX: 33.86 KG/M2 | HEIGHT: 72 IN | SYSTOLIC BLOOD PRESSURE: 142 MMHG | WEIGHT: 250 LBS | OXYGEN SATURATION: 97 % | HEART RATE: 87 BPM | DIASTOLIC BLOOD PRESSURE: 97 MMHG

## 2022-08-15 DIAGNOSIS — E16.2 HYPOGLYCEMIA: ICD-10-CM

## 2022-08-15 LAB
ANION GAP SERPL CALCULATED.3IONS-SCNC: 6 MMOL/L (ref 3–14)
BASOPHILS # BLD AUTO: 0 10E3/UL (ref 0–0.2)
BASOPHILS NFR BLD AUTO: 0 %
BUN SERPL-MCNC: 47 MG/DL (ref 7–30)
CALCIUM SERPL-MCNC: 9.4 MG/DL (ref 8.5–10.1)
CHLORIDE BLD-SCNC: 108 MMOL/L (ref 94–109)
CO2 SERPL-SCNC: 25 MMOL/L (ref 20–32)
CREAT SERPL-MCNC: 1.71 MG/DL (ref 0.66–1.25)
EOSINOPHIL # BLD AUTO: 0.2 10E3/UL (ref 0–0.7)
EOSINOPHIL NFR BLD AUTO: 1 %
ERYTHROCYTE [DISTWIDTH] IN BLOOD BY AUTOMATED COUNT: 12 % (ref 10–15)
GFR SERPL CREATININE-BSD FRML MDRD: 47 ML/MIN/1.73M2
GLUCOSE BLD-MCNC: 141 MG/DL (ref 70–99)
GLUCOSE BLDC GLUCOMTR-MCNC: 133 MG/DL (ref 70–99)
GLUCOSE BLDC GLUCOMTR-MCNC: 175 MG/DL (ref 70–99)
HCT VFR BLD AUTO: 38.4 % (ref 40–53)
HGB BLD-MCNC: 12.5 G/DL (ref 13.3–17.7)
IMM GRANULOCYTES # BLD: 0.1 10E3/UL
IMM GRANULOCYTES NFR BLD: 1 %
LYMPHOCYTES # BLD AUTO: 1.1 10E3/UL (ref 0.8–5.3)
LYMPHOCYTES NFR BLD AUTO: 10 %
MCH RBC QN AUTO: 27.8 PG (ref 26.5–33)
MCHC RBC AUTO-ENTMCNC: 32.6 G/DL (ref 31.5–36.5)
MCV RBC AUTO: 85 FL (ref 78–100)
MONOCYTES # BLD AUTO: 0.8 10E3/UL (ref 0–1.3)
MONOCYTES NFR BLD AUTO: 8 %
NEUTROPHILS # BLD AUTO: 8.3 10E3/UL (ref 1.6–8.3)
NEUTROPHILS NFR BLD AUTO: 80 %
NRBC # BLD AUTO: 0 10E3/UL
NRBC BLD AUTO-RTO: 0 /100
PLATELET # BLD AUTO: 335 10E3/UL (ref 150–450)
POTASSIUM BLD-SCNC: 4.1 MMOL/L (ref 3.4–5.3)
RBC # BLD AUTO: 4.5 10E6/UL (ref 4.4–5.9)
SODIUM SERPL-SCNC: 139 MMOL/L (ref 133–144)
WBC # BLD AUTO: 10.4 10E3/UL (ref 4–11)

## 2022-08-15 PROCEDURE — 80048 BASIC METABOLIC PNL TOTAL CA: CPT | Performed by: EMERGENCY MEDICINE

## 2022-08-15 PROCEDURE — 85025 COMPLETE CBC W/AUTO DIFF WBC: CPT | Performed by: EMERGENCY MEDICINE

## 2022-08-15 PROCEDURE — 99283 EMERGENCY DEPT VISIT LOW MDM: CPT

## 2022-08-15 PROCEDURE — 36415 COLL VENOUS BLD VENIPUNCTURE: CPT | Performed by: EMERGENCY MEDICINE

## 2022-08-15 ASSESSMENT — ENCOUNTER SYMPTOMS
ABDOMINAL PAIN: 0
DIAPHORESIS: 1
NERVOUS/ANXIOUS: 1

## 2022-08-15 ASSESSMENT — ACTIVITIES OF DAILY LIVING (ADL): ADLS_ACUITY_SCORE: 35

## 2022-08-15 NOTE — ED PROVIDER NOTES
History   Chief Complaint:  Hypoglycemia     The history is provided by the patient.      Ry Horner is a 57 year old male with history of CKD, morbid obesity, type 2 diabetes, hypertension, and hyperlipidemia who presents with hypoglycemia. The patient states she woke up around 0600 today with no issues. He started doing work and some chores. Then he took his medications around 0745 with only water and some coffee, but no food. He states he took his blood pressure before his insulin and it was around 120 for his systolic. He then reports he woke up with people around him. He endorses sweating and a nervousness feeling earlier today. He states he took about 52 units of long term insulin and no short-term insulin this morning. The patient states he has been an insulin-dependent diabetic for over a decade. He reports no abdominal pain and states he feels better at this time.    Review of Systems   Constitutional: Positive for diaphoresis.   Gastrointestinal: Negative for abdominal pain.   Psychiatric/Behavioral: The patient is nervous/anxious.    All other systems reviewed and are negative.      Allergies:  Pravastatin  Aspirin    Medications:  Zetia  Insulin  Atrovent nasal spray  Jardiance  Zestril  Metformin  Zofran  Oxycodone  Senokot  Januvia  Statin not prescribed  Aspirin    Past Medical History:     Non-compliance with medication regimen  SIRS due to infectious process without acute organ dysfunction   Leukocytosis  Cellulitis  Type 2 diabetes  Neuropathy  Tachycardia  Hypertension  Hyperlipidemia   Necrotizing fasciitis  Seasonal allergies  Tobacco use disorder  Morbid obesity  CKD  Impotence     Past Surgical History:    BKA of right leg  Left partial foot amputation x5  Right wound vac  Right bone biopsy  Left bone biopsy  Right I&D of foot x3  Left I&D of foot x4  Orthopedic surgery  Appendectomy     Family History:    Mother: psychotic disorder  Father: diabetes, lung cancer, hypertension  Sister:  asthma, breast cancer    Social History:  The patient presents to the ED alone  PCP: Kody Wing     Physical Exam     Patient Vitals for the past 24 hrs:   BP Pulse Resp SpO2 Height Weight   08/15/22 1200 (!) 142/97 -- -- 97 % -- --   08/15/22 1051 137/82 87 20 98 % 1.829 m (6') 113.4 kg (250 lb)       Physical Exam  Eye:  Pupils are equal, round, and reactive.  Extraocular movements intact.    ENT:  No rhinorrhea.  Moist mucus membranes.  Normal tongue and tonsil.    Cardiac:  Regular rate and rhythm.  No murmurs, gallops, or rubs.    Pulmonary:  Clear to auscultation bilaterally.  No wheezes, rales, or rhonchi.    Abdomen:  Positive bowel sounds.  Abdomen is soft and non-distended, without focal tenderness.    Musculoskeletal:  Normal movement of all extremities without evidence for deficit. He is BKA right leg.     Skin:  Warm and dry without rashes.    Neurologic:  Non-focal exam without asymmetric weakness or numbness.     Psychiatric:  Normal affect with appropriate interaction with examiner.    Emergency Department Course     Laboratory:  Labs Ordered and Resulted from Time of ED Arrival to Time of ED Departure   BASIC METABOLIC PANEL - Abnormal       Result Value    Sodium 139      Potassium 4.1      Chloride 108      Carbon Dioxide (CO2) 25      Anion Gap 6      Urea Nitrogen 47 (*)     Creatinine 1.71 (*)     Calcium 9.4      Glucose 141 (*)     GFR Estimate 47 (*)    CBC WITH PLATELETS AND DIFFERENTIAL - Abnormal    WBC Count 10.4      RBC Count 4.50      Hemoglobin 12.5 (*)     Hematocrit 38.4 (*)     MCV 85      MCH 27.8      MCHC 32.6      RDW 12.0      Platelet Count 335      % Neutrophils 80      % Lymphocytes 10      % Monocytes 8      % Eosinophils 1      % Basophils 0      % Immature Granulocytes 1      NRBCs per 100 WBC 0      Absolute Neutrophils 8.3      Absolute Lymphocytes 1.1      Absolute Monocytes 0.8      Absolute Eosinophils 0.2      Absolute Basophils 0.0      Absolute Immature  Granulocytes 0.1      Absolute NRBCs 0.0     GLUCOSE BY METER - Abnormal    GLUCOSE BY METER POCT 133 (*)    GLUCOSE BY METER - Abnormal    GLUCOSE BY METER POCT 175 (*)       Emergency Department Course:     Reviewed:  I reviewed nursing notes, vitals, past medical history and Care Everywhere    Assessments:  1054 I obtained history and examined the patient as noted above.   1145 I rechecked the patient and explained findings.     Disposition:  The patient was discharged to home.     Impression & Plan     Medical Decision Making:  This 77-year-old man with a history of insulin-dependent diabetes presents to us with hyperglycemia.  He recalls giving himself his standard 51 units of insulin this morning, but then losing track of time by doing some work and not eating.  He started to have the typical symptoms of hypoglycemia, and despite trying to drink a protein shake, he became altered and his wife called EMS, finding him to have a blood sugar of 35.  He was given supplementation of sugar through the IV and brought to the emergency department.  He is awake and alert for me.  He ate a sandwich.  He had some further crackers.  His blood sugar is now stabilized.  He seems very in tune with his blood sugars and insulin use and is eager to be discharged.  I see no indication to keep him here longer now that he has stabilized his blood sugars and eaten a meal.  He was advised to continue to work closely with his endocrinologist to maximize his medical care.  He was invited back to our facility at any point for worsening of condition or other emergent concerns.    Diagnosis:    ICD-10-CM    1. Hypoglycemia  E16.2      Scribe Disclosure:  I, Shirleydonald Beck, am serving as a scribe at 10:54 AM on 8/15/2022 to document services personally performed by Trierweiler, Chad A, MD based on my observations and the provider's statements to me.            Trierweiler, Chad A, MD  08/15/22 2043

## 2022-08-15 NOTE — ED NOTES
Bed: ED07  Expected date:   Expected time:   Means of arrival:   Comments:  [place] 57 M hypoglycemic eta 1040

## 2022-08-15 NOTE — ED TRIAGE NOTES
EMs report: from home; initial BG 34, confused/altered. 20g PIV R AC. 5grams D10 --> . 51 units of bagsular insulin (long acting) at 0800, did not eat until 0930. DC'ed D10 at BG 82 - restarted D10 infusion. VSS.     Triage Assessment     Row Name 08/15/22 1046       Triage Assessment (Adult)    Airway WDL WDL       Respiratory WDL    Respiratory WDL WDL       Cardiac WDL    Cardiac WDL WDL       Peripheral/Neurovascular WDL    Peripheral Neurovascular WDL X  diaphoretic       Cognitive/Neuro/Behavioral WDL    Cognitive/Neuro/Behavioral WDL WDL

## 2022-08-16 ENCOUNTER — PATIENT OUTREACH (OUTPATIENT)
Dept: FAMILY MEDICINE | Facility: CLINIC | Age: 57
End: 2022-08-16

## 2022-08-16 RX ORDER — LANCETS 33 GAUGE
EACH MISCELLANEOUS
Qty: 400 EACH | Refills: 1 | Status: SHIPPED | OUTPATIENT
Start: 2022-08-16

## 2022-08-16 NOTE — TELEPHONE ENCOUNTER
Patient has appt tomorrow   8/17/2022 1:00 PM (Arrive by 12:40 PM) Kody Wing MD [facility]     Routing to provider to address refill at appointment.    Michelle Brooks RN  [facility]

## 2022-08-16 NOTE — TELEPHONE ENCOUNTER
What type of discharge? Emergency Department  Risk of Hospital admission or ED visit: 67%  Is a TCM episode required? Yes  When should the patient follow up with PCP? within 30 days of discharge.      Date: 8/15/2022  Diagnosis: Hypoglycemia  Is patient active in care coordination? No  Was patient in TCU? No      Krystal Washburn RN  [facility]

## 2022-08-17 ENCOUNTER — OFFICE VISIT (OUTPATIENT)
Dept: FAMILY MEDICINE | Facility: CLINIC | Age: 57
End: 2022-08-17
Payer: COMMERCIAL

## 2022-08-17 VITALS
TEMPERATURE: 96.8 F | DIASTOLIC BLOOD PRESSURE: 70 MMHG | RESPIRATION RATE: 20 BRPM | HEART RATE: 104 BPM | OXYGEN SATURATION: 97 % | SYSTOLIC BLOOD PRESSURE: 138 MMHG | HEIGHT: 72 IN | WEIGHT: 264.4 LBS | BODY MASS INDEX: 35.81 KG/M2

## 2022-08-17 DIAGNOSIS — E16.2 HYPOGLYCEMIA: Primary | ICD-10-CM

## 2022-08-17 DIAGNOSIS — E11.42 TYPE 2 DIABETES MELLITUS WITH DIABETIC POLYNEUROPATHY, WITHOUT LONG-TERM CURRENT USE OF INSULIN (H): ICD-10-CM

## 2022-08-17 PROCEDURE — 99214 OFFICE O/P EST MOD 30 MIN: CPT | Performed by: INTERNAL MEDICINE

## 2022-08-17 RX ORDER — INSULIN DEGLUDEC INJECTION 100 U/ML
35 INJECTION, SOLUTION SUBCUTANEOUS 2 TIMES DAILY
Qty: 30 ML | Refills: 11 | Status: ON HOLD | OUTPATIENT
Start: 2022-08-17 | End: 2022-10-03

## 2022-08-17 RX ORDER — IPRATROPIUM BROMIDE 42 UG/1
SPRAY, METERED NASAL
Qty: 15 ML | Refills: 11 | Status: SHIPPED | OUTPATIENT
Start: 2022-08-17 | End: 2023-08-02

## 2022-08-17 ASSESSMENT — PAIN SCALES - GENERAL: PAINLEVEL: NO PAIN (0)

## 2022-08-17 NOTE — PROGRESS NOTES
Assessment & Plan     Hypoglycemia  Probably from missing a meal, resolved, advised to decrease basal insulin from 50 units twice daily to 35 units twice daily and slowly increase the basal insulin dose by 2 units every 3 days to a goal of a.m. fasting blood sugars less than 150 but greater than 120.  If A1c remains greater than 8 in a 3-month interval, consider with meal short acting insulin.  Discussed with patient in detail.    Type 2 diabetes mellitus with diabetic polyneuropathy, without long-term current use of insulin (H)  Unfortunately, he has still not been able to obtain a continuous glucose monitor from his insurance company.  I will ask diabetes education to help me with the bureaucratic process to get this covered again.  Again, we plan to check the hemoglobin A1c at a 3-month interval.    Finally, it seems that his current basal insulin is not covered by his insurance company.  He will need to switch to formulary alternative.  I will asked the pharmacist to help us out with this.  - AMB Adult Diabetes Educator Referral; Future  - Hemoglobin A1c; Future      23 minutes spent on the date of the encounter doing chart review, history and exam, documentation and further activities per the note           Return for Non-fasting Lab Only Visit.    Kody Wing MD  [facility] SARANYA Raza is a 57 year old, presenting for the following health issues:  ER F/U (Medication consult)      HPI     ED/UC Followup:    Facility:  [facility] Emergency Dept  Date of visit: 8/15/22  Reason for visit: Hypoglycemia   Current Status: better     Nice man with insulin-dependent type 2 diabetes that has just recently come under good control.  He states that he has been noticing some lower blood sugar readings over the last several months particularly in the early morning hours.  He attributes this to improving his diet.  He is eating more low-carb snacks recently.  On the morning  of August 15 he forgot to eat breakfast and his significant other became alarmed when he did not respond to attacks.  She found him in his office confused and barely responsive.  His blood glucose was noted to be in the 30s.  He tells me that his a.m. fasting blood sugars have generally been in the 100s to 110s.    Review of Systems         Objective    /70 (BP Location: Right arm, Patient Position: Sitting, Cuff Size: Adult Large)   Pulse 104   Temp 96.8  F (36  C) (Temporal)   Resp 20   Ht 1.829 m (6')   Wt 119.9 kg (264 lb 6.4 oz)   SpO2 97%   BMI 35.86 kg/m    Body mass index is 35.86 kg/m .  Physical Exam   General: This is a well-appearing man in no acute distress.  He has a right below the knee amputation.            .  ..

## 2022-08-17 NOTE — Clinical Note
Can you help me with the dosing of the formulary acceptable basal insulin for this man.  He tells me that his current Basaglar is no longer covered.  Can you help me with any unit adjustment that might need to be made for the covered insulin?

## 2022-08-19 ENCOUNTER — TELEPHONE (OUTPATIENT)
Dept: FAMILY MEDICINE | Facility: CLINIC | Age: 57
End: 2022-08-19

## 2022-08-19 DIAGNOSIS — E11.42 TYPE 2 DIABETES MELLITUS WITH DIABETIC POLYNEUROPATHY, WITHOUT LONG-TERM CURRENT USE OF INSULIN (H): Primary | ICD-10-CM

## 2022-08-19 NOTE — TELEPHONE ENCOUNTER
Outpatient Medication Detail     Disp Refills Start End SABINA   Insulin Degludec (TRESIBA) 100 UNIT/ML SOLN 30 mL 11 8/17/2022  --   Sig - Route: Inject 35 Units Subcutaneous 2 times daily - Subcutaneous     Fax from pharmacy - they are stating patient needs insulin syringes to go along with with Rx for Tresiba vials.    See note below from patient call - he would prefer pen style, not the vials.      LOV 8- RT Kalina (R)

## 2022-08-19 NOTE — TELEPHONE ENCOUNTER
Patient called in new insulin script from 8/17 visit was send as vials patient does not have the supplies for this but is set up for pens,     repended script as pens for review

## 2022-08-19 NOTE — TELEPHONE ENCOUNTER
OK I sent the order for the pen  I also realized that the dose of tresiba has to be decreased when switching from Basaglar, so he should start with 28 units twice daily rather than 35 units twice daily as previously discussed, the 28 unit dose was witting on the prescription

## 2022-08-19 NOTE — TELEPHONE ENCOUNTER
Call to patient. Patient saw Dr. Wing for in office visit on 8/17/2022. Patient denies any questions or concerns.     Krystal Washburn, RN BSN MSN  [facility]

## 2022-08-19 NOTE — TELEPHONE ENCOUNTER
Call to patient. Message left for return to call to clinic.     Krystal Washburn RN BSN MSN  [facility]

## 2022-09-12 ENCOUNTER — TELEPHONE (OUTPATIENT)
Dept: FAMILY MEDICINE | Facility: CLINIC | Age: 57
End: 2022-09-12

## 2022-09-12 NOTE — TELEPHONE ENCOUNTER
Claudette, patient's significant other, calling to inform that the patient is in the ICU at hospital in [facility] (Julio reddy, case # [id number]).    Patient was on a cruise and his left foot and leg became infected. Claudette states that foot care supplies were in the patient's luggage that got lost.    Patient has had surgery to drain his foot and leg. As soon as he is stable, [place].    Advised that the patient sign KENDRA so that records can be transferred back to [facility]. Gave Claudette number for our HIM department.    Routing to Dr. Wing and Dr. Cevallos as FYI per Claudette's request.  Tiny Ruvalcaba RN

## 2022-09-14 ENCOUNTER — TELEPHONE (OUTPATIENT)
Dept: WOUND CARE | Facility: CLINIC | Age: 57
End: 2022-09-14

## 2022-09-14 NOTE — TELEPHONE ENCOUNTER
[facility] Wound    Who is the name of the provider?:  Ban     What is the location you see this provider at?: [place]    Reason for call:  FYI:  Patient is inpatient in hospital in [facility].  Had surgery and being treated for infection and abscesses left foot and lower leg, with open surgical wounds with exposed  bone.  Cancelled 9/16 appointment.  Will probably be admitted to [facility] when he returns.  Can send pictures.     Can we leave a detailed message on this number?  YES

## 2022-09-16 ENCOUNTER — TELEPHONE (OUTPATIENT)
Dept: EDUCATION SERVICES | Facility: OTHER | Age: 57
End: 2022-09-16

## 2022-09-16 NOTE — TELEPHONE ENCOUNTER
Noted pt in [place] in hospital.  Will cancel Diabetes Education appointment (virtual) for today at 2:30 pm.      Will send EPIS message to pt and ask scheduling to reach out to pt at future date.    Deonna Palacios RD [facility]  [phone]

## 2022-09-19 ENCOUNTER — TELEPHONE (OUTPATIENT)
Dept: FAMILY MEDICINE | Facility: CLINIC | Age: 57
End: 2022-09-19

## 2022-09-19 NOTE — TELEPHONE ENCOUNTER
Patient Returning Call    Reason for call:  Pt is in [place] at a hospital with a staff infection. He needs to talk to a nurse about transferring hospitals. Please call patient ASAP    Information relayed to patient:      Patient has additional questions:  Yes    What are your questions/concerns:      Could we send this information to you in [facility] or would you prefer to receive a phone call?:   Patient would prefer a phone call   Okay to leave a detailed message?: Yes at Cell number on file:    Telephone Information:   Mobile [phone]

## 2022-09-19 NOTE — TELEPHONE ENCOUNTER
Call to patient. Patient informed that this RN cannot triage patient out of state and nursing is not able to set up a pre-arranged admission to hospital for him.   This RN informed patient that once he arrives back in [place], he would need to be seen in ED for evaluation. Patient expressed frustration and requested this RN send message to Dr. Wing as FYI so that provider is aware that he will be in hospital.     Krystal Washburn, RN BSN MSN  [facility]

## 2022-09-19 NOTE — TELEPHONE ENCOUNTER
Pt's PCP is Dr. Wing at [facility]. Routed to wrong RN pool. Will forward to appropriate triage pool for follow up.    Karen PERRY RN

## 2022-09-20 NOTE — TELEPHONE ENCOUNTER
Per below message sent by RN to PCP as an FYI and recommendation to be seen in ER when patient is back in [place] was reviewed.     No further interventions indicated at this time, signing encounter.     Michelle Brooks RN  [facility]

## 2022-09-23 ENCOUNTER — APPOINTMENT (OUTPATIENT)
Dept: ULTRASOUND IMAGING | Facility: CLINIC | Age: 57
DRG: 617 | End: 2022-09-23
Attending: SURGERY
Payer: COMMERCIAL

## 2022-09-23 ENCOUNTER — APPOINTMENT (OUTPATIENT)
Dept: MRI IMAGING | Facility: CLINIC | Age: 57
DRG: 617 | End: 2022-09-23
Attending: PODIATRIST
Payer: COMMERCIAL

## 2022-09-23 ENCOUNTER — HOSPITAL ENCOUNTER (INPATIENT)
Facility: CLINIC | Age: 57
LOS: 10 days | Discharge: HOME | DRG: 617 | End: 2022-10-03
Attending: EMERGENCY MEDICINE | Admitting: STUDENT IN AN ORGANIZED HEALTH CARE EDUCATION/TRAINING PROGRAM
Payer: COMMERCIAL

## 2022-09-23 DIAGNOSIS — L97.525 DIABETIC ULCER OF LEFT FOOT ASSOCIATED WITH TYPE 2 DIABETES MELLITUS, WITH MUSCLE INVOLVEMENT WITHOUT EVIDENCE OF NECROSIS, UNSPECIFIED PART OF FOOT (H): ICD-10-CM

## 2022-09-23 DIAGNOSIS — L08.9 DIABETIC FOOT INFECTION (H): Primary | ICD-10-CM

## 2022-09-23 DIAGNOSIS — L03.116 CELLULITIS OF LEFT LOWER EXTREMITY: ICD-10-CM

## 2022-09-23 DIAGNOSIS — E11.628 DIABETIC FOOT INFECTION (H): Primary | ICD-10-CM

## 2022-09-23 DIAGNOSIS — E11.621 DIABETIC ULCER OF LEFT FOOT ASSOCIATED WITH TYPE 2 DIABETES MELLITUS, WITH MUSCLE INVOLVEMENT WITHOUT EVIDENCE OF NECROSIS, UNSPECIFIED PART OF FOOT (H): ICD-10-CM

## 2022-09-23 DIAGNOSIS — E11.42 TYPE 2 DIABETES MELLITUS WITH DIABETIC POLYNEUROPATHY, WITHOUT LONG-TERM CURRENT USE OF INSULIN (H): ICD-10-CM

## 2022-09-23 LAB
ALBUMIN SERPL-MCNC: 2.3 G/DL (ref 3.4–5)
ALP SERPL-CCNC: 102 U/L (ref 40–150)
ALT SERPL W P-5'-P-CCNC: 58 U/L (ref 0–70)
ANION GAP SERPL CALCULATED.3IONS-SCNC: 9 MMOL/L (ref 3–14)
AST SERPL W P-5'-P-CCNC: 39 U/L (ref 0–45)
BASOPHILS # BLD AUTO: 0.1 10E3/UL (ref 0–0.2)
BASOPHILS NFR BLD AUTO: 1 %
BILIRUB SERPL-MCNC: 0.4 MG/DL (ref 0.2–1.3)
BUN SERPL-MCNC: 48 MG/DL (ref 7–30)
CALCIUM SERPL-MCNC: 8.6 MG/DL (ref 8.5–10.1)
CHLORIDE BLD-SCNC: 99 MMOL/L (ref 94–109)
CO2 SERPL-SCNC: 27 MMOL/L (ref 20–32)
CREAT SERPL-MCNC: 2.33 MG/DL (ref 0.66–1.25)
EOSINOPHIL # BLD AUTO: 0.1 10E3/UL (ref 0–0.7)
EOSINOPHIL NFR BLD AUTO: 1 %
ERYTHROCYTE [DISTWIDTH] IN BLOOD BY AUTOMATED COUNT: 12.9 % (ref 10–15)
GFR SERPL CREATININE-BSD FRML MDRD: 32 ML/MIN/1.73M2
GLUCOSE BLD-MCNC: 176 MG/DL (ref 70–99)
GLUCOSE BLDC GLUCOMTR-MCNC: 105 MG/DL (ref 70–99)
GLUCOSE BLDC GLUCOMTR-MCNC: 160 MG/DL (ref 70–99)
GLUCOSE BLDC GLUCOMTR-MCNC: 186 MG/DL (ref 70–99)
HCO3 BLDV-SCNC: 28 MMOL/L (ref 21–28)
HCT VFR BLD AUTO: 32 % (ref 40–53)
HGB BLD-MCNC: 10.1 G/DL (ref 13.3–17.7)
HOLD SPECIMEN: NORMAL
IMM GRANULOCYTES # BLD: 0.1 10E3/UL
IMM GRANULOCYTES NFR BLD: 1 %
LACTATE BLD-SCNC: 1.4 MMOL/L
LACTATE SERPL-SCNC: 1.3 MMOL/L (ref 0.7–2)
LYMPHOCYTES # BLD AUTO: 2.5 10E3/UL (ref 0.8–5.3)
LYMPHOCYTES NFR BLD AUTO: 18 %
MCH RBC QN AUTO: 27.1 PG (ref 26.5–33)
MCHC RBC AUTO-ENTMCNC: 31.6 G/DL (ref 31.5–36.5)
MCV RBC AUTO: 86 FL (ref 78–100)
MONOCYTES # BLD AUTO: 1 10E3/UL (ref 0–1.3)
MONOCYTES NFR BLD AUTO: 7 %
NEUTROPHILS # BLD AUTO: 9.8 10E3/UL (ref 1.6–8.3)
NEUTROPHILS NFR BLD AUTO: 72 %
NRBC # BLD AUTO: 0 10E3/UL
NRBC BLD AUTO-RTO: 0 /100
PCO2 BLDV: 46 MM HG (ref 40–50)
PH BLDV: 7.39 [PH] (ref 7.32–7.43)
PLATELET # BLD AUTO: 377 10E3/UL (ref 150–450)
PO2 BLDV: 26 MM HG (ref 25–47)
POTASSIUM BLD-SCNC: 4.8 MMOL/L (ref 3.4–5.3)
PROT SERPL-MCNC: 8.1 G/DL (ref 6.8–8.8)
RBC # BLD AUTO: 3.73 10E6/UL (ref 4.4–5.9)
SAO2 % BLDV: 46 % (ref 94–100)
SARS-COV-2 RNA RESP QL NAA+PROBE: NEGATIVE
SODIUM SERPL-SCNC: 135 MMOL/L (ref 133–144)
TROPONIN I SERPL HS-MCNC: 7 NG/L
WBC # BLD AUTO: 13.6 10E3/UL (ref 4–11)

## 2022-09-23 PROCEDURE — G0463 HOSPITAL OUTPT CLINIC VISIT: HCPCS

## 2022-09-23 PROCEDURE — 258N000003 HC RX IP 258 OP 636: Performed by: STUDENT IN AN ORGANIZED HEALTH CARE EDUCATION/TRAINING PROGRAM

## 2022-09-23 PROCEDURE — 96361 HYDRATE IV INFUSION ADD-ON: CPT

## 2022-09-23 PROCEDURE — 36415 COLL VENOUS BLD VENIPUNCTURE: CPT | Performed by: EMERGENCY MEDICINE

## 2022-09-23 PROCEDURE — 250N000011 HC RX IP 250 OP 636: Performed by: EMERGENCY MEDICINE

## 2022-09-23 PROCEDURE — 99222 1ST HOSP IP/OBS MODERATE 55: CPT | Mod: GC | Performed by: PODIATRIST

## 2022-09-23 PROCEDURE — 250N000011 HC RX IP 250 OP 636: Performed by: STUDENT IN AN ORGANIZED HEALTH CARE EDUCATION/TRAINING PROGRAM

## 2022-09-23 PROCEDURE — 99223 1ST HOSP IP/OBS HIGH 75: CPT | Performed by: INTERNAL MEDICINE

## 2022-09-23 PROCEDURE — 250N000013 HC RX MED GY IP 250 OP 250 PS 637: Performed by: STUDENT IN AN ORGANIZED HEALTH CARE EDUCATION/TRAINING PROGRAM

## 2022-09-23 PROCEDURE — 120N000001 HC R&B MED SURG/OB

## 2022-09-23 PROCEDURE — U0005 INFEC AGEN DETEC AMPLI PROBE: HCPCS | Performed by: EMERGENCY MEDICINE

## 2022-09-23 PROCEDURE — 250N000013 HC RX MED GY IP 250 OP 250 PS 637: Performed by: HOSPITALIST

## 2022-09-23 PROCEDURE — 73721 MRI JNT OF LWR EXTRE W/O DYE: CPT | Mod: LT

## 2022-09-23 PROCEDURE — 84484 ASSAY OF TROPONIN QUANT: CPT | Performed by: EMERGENCY MEDICINE

## 2022-09-23 PROCEDURE — C9803 HOPD COVID-19 SPEC COLLECT: HCPCS

## 2022-09-23 PROCEDURE — 96366 THER/PROPH/DIAG IV INF ADDON: CPT

## 2022-09-23 PROCEDURE — 85025 COMPLETE CBC W/AUTO DIFF WBC: CPT | Performed by: EMERGENCY MEDICINE

## 2022-09-23 PROCEDURE — 73718 MRI LOWER EXTREMITY W/O DYE: CPT | Mod: LT

## 2022-09-23 PROCEDURE — 82803 BLOOD GASES ANY COMBINATION: CPT

## 2022-09-23 PROCEDURE — 96365 THER/PROPH/DIAG IV INF INIT: CPT

## 2022-09-23 PROCEDURE — 93922 UPR/L XTREMITY ART 2 LEVELS: CPT

## 2022-09-23 PROCEDURE — 258N000003 HC RX IP 258 OP 636: Performed by: EMERGENCY MEDICINE

## 2022-09-23 PROCEDURE — 99207 PR NO BILLABLE SERVICE THIS VISIT: CPT | Performed by: HOSPITALIST

## 2022-09-23 PROCEDURE — 99285 EMERGENCY DEPT VISIT HI MDM: CPT | Mod: 25

## 2022-09-23 PROCEDURE — 80053 COMPREHEN METABOLIC PANEL: CPT | Performed by: EMERGENCY MEDICINE

## 2022-09-23 PROCEDURE — 99223 1ST HOSP IP/OBS HIGH 75: CPT | Mod: AI | Performed by: STUDENT IN AN ORGANIZED HEALTH CARE EDUCATION/TRAINING PROGRAM

## 2022-09-23 PROCEDURE — 99221 1ST HOSP IP/OBS SF/LOW 40: CPT | Performed by: SURGERY

## 2022-09-23 PROCEDURE — 96367 TX/PROPH/DG ADDL SEQ IV INF: CPT

## 2022-09-23 PROCEDURE — 250N000012 HC RX MED GY IP 250 OP 636 PS 637: Performed by: HOSPITALIST

## 2022-09-23 PROCEDURE — 83605 ASSAY OF LACTIC ACID: CPT | Performed by: EMERGENCY MEDICINE

## 2022-09-23 RX ORDER — DEXTROSE MONOHYDRATE 25 G/50ML
25-50 INJECTION, SOLUTION INTRAVENOUS
Status: DISCONTINUED | OUTPATIENT
Start: 2022-09-23 | End: 2022-10-03 | Stop reason: HOSPADM

## 2022-09-23 RX ORDER — ACETAMINOPHEN 650 MG/1
650 SUPPOSITORY RECTAL EVERY 6 HOURS PRN
Status: DISCONTINUED | OUTPATIENT
Start: 2022-09-23 | End: 2022-09-29

## 2022-09-23 RX ORDER — HEPARIN SODIUM 5000 [USP'U]/.5ML
5000 INJECTION, SOLUTION INTRAVENOUS; SUBCUTANEOUS EVERY 12 HOURS
Status: DISCONTINUED | OUTPATIENT
Start: 2022-09-23 | End: 2022-09-29

## 2022-09-23 RX ORDER — INSULIN GLARGINE 100 [IU]/ML
51 INJECTION, SOLUTION SUBCUTANEOUS 2 TIMES DAILY
COMMUNITY
End: 2022-09-23

## 2022-09-23 RX ORDER — ACETAMINOPHEN 325 MG/1
650 TABLET ORAL EVERY 6 HOURS PRN
Status: DISCONTINUED | OUTPATIENT
Start: 2022-09-23 | End: 2022-09-29

## 2022-09-23 RX ORDER — SODIUM CHLORIDE 9 MG/ML
INJECTION, SOLUTION INTRAVENOUS CONTINUOUS
Status: DISCONTINUED | OUTPATIENT
Start: 2022-09-23 | End: 2022-09-25

## 2022-09-23 RX ORDER — ONDANSETRON 2 MG/ML
4 INJECTION INTRAMUSCULAR; INTRAVENOUS EVERY 6 HOURS PRN
Status: DISCONTINUED | OUTPATIENT
Start: 2022-09-23 | End: 2022-10-03 | Stop reason: HOSPADM

## 2022-09-23 RX ORDER — ONDANSETRON 4 MG/1
4 TABLET, ORALLY DISINTEGRATING ORAL EVERY 6 HOURS PRN
Status: DISCONTINUED | OUTPATIENT
Start: 2022-09-23 | End: 2022-10-03 | Stop reason: HOSPADM

## 2022-09-23 RX ORDER — LIDOCAINE 40 MG/G
CREAM TOPICAL
Status: DISCONTINUED | OUTPATIENT
Start: 2022-09-23 | End: 2022-09-29

## 2022-09-23 RX ORDER — PIPERACILLIN SODIUM, TAZOBACTAM SODIUM 3; .375 G/15ML; G/15ML
3.38 INJECTION, POWDER, LYOPHILIZED, FOR SOLUTION INTRAVENOUS EVERY 6 HOURS
Status: DISCONTINUED | OUTPATIENT
Start: 2022-09-23 | End: 2022-10-02

## 2022-09-23 RX ORDER — NICOTINE POLACRILEX 4 MG
15-30 LOZENGE BUCCAL
Status: DISCONTINUED | OUTPATIENT
Start: 2022-09-23 | End: 2022-10-03 | Stop reason: HOSPADM

## 2022-09-23 RX ADMIN — SODIUM CHLORIDE 500 ML: 9 INJECTION, SOLUTION INTRAVENOUS at 03:29

## 2022-09-23 RX ADMIN — SODIUM CHLORIDE: 9 INJECTION, SOLUTION INTRAVENOUS at 11:28

## 2022-09-23 RX ADMIN — HEPARIN SODIUM 5000 UNITS: 5000 INJECTION, SOLUTION INTRAVENOUS; SUBCUTANEOUS at 18:29

## 2022-09-23 RX ADMIN — PIPERACILLIN AND TAZOBACTAM 3.38 G: 3; .375 INJECTION, POWDER, FOR SOLUTION INTRAVENOUS at 10:19

## 2022-09-23 RX ADMIN — INSULIN DEGLUDEC INJECTION 20 UNITS: 100 INJECTION, SOLUTION SUBCUTANEOUS at 23:09

## 2022-09-23 RX ADMIN — VANCOMYCIN HYDROCHLORIDE 2000 MG: 10 INJECTION, POWDER, LYOPHILIZED, FOR SOLUTION INTRAVENOUS at 04:43

## 2022-09-23 RX ADMIN — INSULIN ASPART 1 UNITS: 100 INJECTION, SOLUTION INTRAVENOUS; SUBCUTANEOUS at 19:41

## 2022-09-23 RX ADMIN — ACETAMINOPHEN 650 MG: 325 TABLET ORAL at 06:52

## 2022-09-23 RX ADMIN — PIPERACILLIN AND TAZOBACTAM 3.38 G: 3; .375 INJECTION, POWDER, FOR SOLUTION INTRAVENOUS at 17:42

## 2022-09-23 RX ADMIN — PIPERACILLIN AND TAZOBACTAM 3.38 G: 3; .375 INJECTION, POWDER, FOR SOLUTION INTRAVENOUS at 04:02

## 2022-09-23 RX ADMIN — HEPARIN SODIUM 5000 UNITS: 5000 INJECTION, SOLUTION INTRAVENOUS; SUBCUTANEOUS at 08:31

## 2022-09-23 RX ADMIN — SODIUM CHLORIDE: 9 INJECTION, SOLUTION INTRAVENOUS at 17:45

## 2022-09-23 ASSESSMENT — ACTIVITIES OF DAILY LIVING (ADL)
ADLS_ACUITY_SCORE: 35

## 2022-09-23 ASSESSMENT — ENCOUNTER SYMPTOMS
DYSURIA: 0
FEVER: 0
WOUND: 1
ABDOMINAL PAIN: 0
SHORTNESS OF BREATH: 0

## 2022-09-23 NOTE — CONSULTS
[facility]    Infectious Disease Consultation     Date of Admission:  9/23/2022  Date of Consult (When I saw the patient): 09/23/22    Assessment & Plan   Ry Horner is a 57 year old male who was admitted on 9/23/2022.     Impression:  1. 57 y.o male with diabetes, HTN, dyslipidemia, CKD, possible vascular disease.   2. History of prior non healing wounds, prior history of amputation.   3. Admitted with LE wounds ongoing for past few weeks.   4. Given a dose of vanco now on zosyn   5. Pending vascular surgery consult   6. Had strep parasanguinis in the wound culture in [place]   7. Was treated with broad spectrum antibiotics while in [place]     Recommendations:   Continue on zosyn alone   Follow up on surgery plans   MRI pending will follow         Franklin Maza MD    Reason for Consult   Reason for consult: I was asked to evaluate this patient for leg wounds.    Primary Care Physician   Kody Wing    Chief Complaint   Leg wounds     History is obtained from the patient and medical records    History of Present Illness   Ry Horner is a 57 year old male with diabetes, HTN, dyslipidemia, CKD, possible vascular disease.   History of prior non healing wounds, prior history of amputation.   Admitted with LE wounds ongoing for past few weeks which got worst while on the cruise in [place]         Past Medical History   I have reviewed this patient's medical history and updated it with pertinent information if needed.   Past Medical History:   Diagnosis Date     BENIGN HYPERTENSION 4/4/2007     DIABETES MELLITUS TYPE II-UNCOMPL 4/4/2007     HYPERLIPIDEMIA NEC/NOS 4/4/2007     Tobacco use disorder 4/4/2007       Past Surgical History   I have reviewed this patient's surgical history and updated it with pertinent information if needed.  Past Surgical History:   Procedure Laterality Date     AMPUTATE FOOT Left 11/17/2019    Procedure: LEFT PARTIAL FOOT AMPUTATION;  Surgeon: Antoine Pena DPM;   Location: [facility] OR     AMPUTATE FOOT Left 12/4/2019    Procedure: LEFT PARTIAL FOOT AMPUTATION;  Surgeon: Tim Douglas DPM;  Location: [facility] OR     AMPUTATE FOOT Left 12/11/2019    Procedure: POSSIBLE PARTIAL FOOT AMPUTATION;  Surgeon: Tim Douglas DPM;  Location: [facility] OR     AMPUTATE FOOT Left 2/10/2022    Procedure: Partial left foot amputation;  Surgeon: Milena Cevallos DPM, Podiatry/Foot and Ankle Surgery;  Location: [facility] OR     AMPUTATE LEG BELOW KNEE Right 9/1/2017    Procedure: AMPUTATE LEG BELOW KNEE;  RIGHT BELOW KNEE AMPUTATION ;  Surgeon: Nikolas Nascimento MD;  Location: [facility] OR     AMPUTATE TOE(S) Left 2/3/2020    Procedure: LEFT SECOND TOE AMPUTATION;  Surgeon: Milena Cevallos DPM, Podiatry/Foot and Ankle Surgery;  Location: [facility] OR     APPENDECTOMY       APPLY WOUND VAC Right 3/2/2015    Procedure: APPLY WOUND VAC;  Surgeon: Milena Cevallos DPM, Pod;  Location: [facility] OR     BIOPSY BONE FOOT Right 7/15/2016    Procedure: BIOPSY BONE FOOT;  Surgeon: Tim Douglas DPM;  Location: [facility]     BIOPSY BONE TOE Left 12/4/2019    Procedure: BONE BIOPSY LEFT SECOND TOE;  Surgeon: Tim Douglas DPM;  Location: [facility]     IRRIGATION AND DEBRIDEMENT FOOT, COMBINED Right 3/2/2015    Procedure: COMBINED IRRIGATION AND DEBRIDEMENT FOOT;  Surgeon: Milena Cevallos DPM, Pod;  Location: [facility] OR     IRRIGATION AND DEBRIDEMENT FOOT, COMBINED Right 7/15/2016    Procedure: COMBINED IRRIGATION AND DEBRIDEMENT FOOT;  Surgeon: Tim Douglas DPM;  Location: [facility]     IRRIGATION AND DEBRIDEMENT FOOT, COMBINED Right 7/20/2016    Procedure: COMBINED IRRIGATION AND DEBRIDEMENT FOOT;  Surgeon: Tim Douglas DPM;  Location: [facility]     IRRIGATION AND DEBRIDEMENT FOOT, COMBINED Left 11/19/2019    Procedure: REVISIONAL IRRIGATION AND DEBRIDEMENT LEFT FOOT AND BONE DEBRIDEMENT;  Surgeon: Joseph Randhawa DPM;  Location: [facility]     IRRIGATION AND DEBRIDEMENT FOOT, COMBINED Left 12/4/2019    Procedure: EXCISIONAL  DEBRIDEMENT LEFT FOOT;  Surgeon: Tim Douglas DPM;  Location: [facility]     IRRIGATION AND DEBRIDEMENT FOOT, COMBINED Left 2019    Procedure: IRRIGATION AND DEBRIDEMENT FOOT, Partial osteotomy left first metatarsal;  Surgeon: Tim Douglas DPM;  [facility]: [facility]     IRRIGATION AND DEBRIDEMENT FOOT, COMBINED Left 2020    Procedure: IRRIGATION AND DEBRIDEMENT LEFT FOOT;  Surgeon: Tim Douglas DPM;  Location: [facility]     ORTHOPEDIC SURGERY         Prior to Admission Medications   Prior to Admission Medications   Prescriptions Last Dose Informant Patient Reported? Taking?   Continuous Blood Gluc Sensor (DEXCOM G6 SENSOR) MISC   No No   Si each every 10 days Change every 10 days.   Insulin Degludec (TRESIBA) 100 UNIT/ML SOLN Not Taking at Unknown time  No No   Sig: Inject 35 Units Subcutaneous 2 times daily   Patient not taking: Reported on 2022   JARDIANCE 10 MG TABS tablet Past Month at Unknown time  No Yes   Sig: TAKE ONE TABLET BY MOUTH EVERY DAY   Lancets (ONETOUCH DELICA PLUS [id number]) MISC   No No   Sig: USE TO TEST BLOOD SUGAR FOUR TIMES A DAY   STATIN NOT PRESCRIBED (INTENTIONAL)  Self No No   Sig: Please choose reason not prescribed, below   aspirin 81 MG chewable tablet Past Month at Unknown time Self Yes Yes   Sig: Take 1 tablet (81 mg) by mouth daily   blood glucose (NO BRAND SPECIFIED) lancets standard   No No   Sig: Use to test blood sugar FOUR times daily, to match lancing device per insurance   blood glucose (NO BRAND SPECIFIED) test strip   No No   Si strips by In Vitro route 4 times daily Use to test blood sugar up to 4 times daily or as directed.   blood glucose monitoring (NO BRAND SPECIFIED) meter device kit   No No   Sig: Use to test blood sugar FOUR times daily, brand per insurance   ezetimibe (ZETIA) 10 MG tablet Past Month at Unknown time  No Yes   Sig: TAKE ONE TABLET BY MOUTH EVERY DAY   insulin degludec (TRESIBA) 100 UNIT/ML pen has not started yet  No  "No   Sig: Inject 28 Units Subcutaneous 2 times daily   insulin pen needle (BD PEN NEEDLE MEGAN 2ND GEN) 32G X 4 MM miscellaneous   No No   Sig: USE WITH INSULIN INJECTIONS UNDER THE SKIN TWO TIMES A DAY AS DIRECTED .   ipratropium (ATROVENT) 0.06 % nasal spray 9/22/2022 at Unknown time  No Yes   Sig: INSTILL TWO SPRAYS INTO EACH NOSTRIL FOUR TIMES A DAY AS NEEDED FOR RHINITIS   ketorolac (ACULAR) 0.5 % ophthalmic solution Past Week at Unknown time  Yes Yes   Sig: Place 1 drop into the right eye 4 times daily   lisinopril (ZESTRIL) 40 MG tablet 9/22/2022 at Unknown time  No Yes   Sig: TAKE ONE TABLET BY MOUTH EVERY DAY   metFORMIN (GLUCOPHAGE) 500 MG tablet Past Month at Unknown time  No Yes   Sig: Take 1 tablet (500 mg) by mouth 2 times daily (with meals)   multivitamin (CENTRUM SILVER) tablet Past Month at Unknown time Self Yes Yes   Sig: Take 1 tablet by mouth daily \"Sentry Adult\"   prednisoLONE acetate (PRED FORTE) 1 % ophthalmic suspension   Yes No   Sig: Place 1 drop into the right eye 4 times daily   sitagliptin (JANUVIA) 50 MG tablet Past Month at Unknown time  No Yes   Sig: Take 1 tablet (50 mg) by mouth daily      Facility-Administered Medications: None     Allergies   Allergies   Allergen Reactions     Pravastatin      \"sucked the life blood out of me,\" Indicates this occurs with all statins.       Immunization History   Immunization History   Administered Date(s) Administered     COVID-19,PF,Sean 04/08/2021, 07/20/2022     Influenza (H1N1) 01/19/2010     Influenza (IIV3) PF 11/01/2010, 09/22/2011     Pneumococcal 23 valent 10/31/2011, 07/16/2016     TDAP Vaccine (Adacel) 10/28/2014       Social History   I have reviewed this patient's social history and updated it with pertinent information if needed. Ry Horner  reports that he quit smoking about 16 years ago. His smoking use included cigarettes. He started smoking about 35 years ago. He has a 10.00 pack-year smoking history. He has never used " smokeless tobacco. He reports current alcohol use. He reports that he does not use drugs.    Family History   I have reviewed this patient's family history and updated it with pertinent information if needed.   Family History   Problem Relation Age of Onset     Genetic Disorder Other      Genetic Disorder Other      Psychotic Disorder Mother      Diabetes Father      Lung Cancer Father      Hypertension Father      Genetic Disorder Maternal Grandmother      Genetic Disorder Maternal Grandfather      Asthma Sister      Breast Cancer Sister      C.A.D. No family hx of      Hypertension No family hx of      Cerebrovascular Disease No family hx of      Breast Cancer No family hx of      Cancer - colorectal No family hx of      Prostate Cancer No family hx of      Alcohol/Drug No family hx of        Review of Systems   The 10 point Review of Systems is negative other than noted in the HPI or here.     Physical Exam   Temp: 97.8  F (36.6  C) Temp src: Temporal BP: 127/85 Pulse: 94   Resp: 14 SpO2: 99 % O2 Device: None (Room air)    Vital Signs with Ranges  Temp:  [97.8  F (36.6  C)] 97.8  F (36.6  C)  Pulse:  [] 94  Resp:  [14-18] 14  BP: (104-127)/(66-85) 127/85  SpO2:  [99 %] 99 %  250 lbs 0 oz  Body mass index is 33.91 kg/m .    GENERAL APPEARANCE:  awake  EYES: Eyes grossly normal to inspection  NECK: no adenopathy  RESP: lungs clear   CV: regular rates and rhythm  LYMPHATICS: normal ant/post cervical and supraclavicular nodes  ABDOMEN: soft, nontender  MS: left shin with multiple open wounds with surrounding redness and induration   SKIN: no suspicious lesions or rashes        Data   Lab Results   Component Value Date    WBC 13.6 (H) 09/23/2022    HGB 10.1 (L) 09/23/2022    HCT 32.0 (L) 09/23/2022     09/23/2022     09/23/2022    POTASSIUM 4.8 09/23/2022    CHLORIDE 99 09/23/2022    CO2 27 09/23/2022    BUN 48 (H) 09/23/2022    CR 2.33 (H) 09/23/2022     (H) 09/23/2022    SED 88 (H)  12/03/2019    AST 39 09/23/2022    ALT 58 09/23/2022    ALKPHOS 102 09/23/2022    BILITOTAL 0.4 09/23/2022     No results for input(s): CULT in the last 168 hours.  Recent Labs   Lab Test 02/03/20  1324 02/03/20  0007 02/02/20  2250 12/04/19  1619 11/19/19  1829 11/17/19  1525 11/17/19  1419 11/17/19  1411 08/28/17  1136   CULT Heavy growth  beta hemolytic   Streptococcus constellatus  *  Light growth  Staphylococcus aureus  * No growth No growth No anaerobes isolated  No growth Light growth  Bacteroides fragilis  *  Light growth  Parvimonas micra  *  Susceptibility testing not routinely done  On day 2, isolated in broth only:  beta hemolytic   Streptococcus constellatus  * Heavy growth  beta hemolytic   Streptococcus constellatus  Susceptibility testing done on previous specimen  *  Light growth  Alcaligenes faecalis  *  Light growth  Staphylococcus aureus  *  On day 1, isolated in broth only:  Anaerobic gram negative rods  See anaerobic report for identification  * Heavy growth  Bacteroides fragilis  Susceptibility testing not routinely done  *  Heavy growth  Peptoniphilus asaccharolyticus  Susceptibility testing not routinely done  *  Moderate growth  beta hemolytic   Streptococcus constellatus  *  On day 1, isolated in broth only:  Anaerobic gram negative rods  See anaerobic report for identification  * Heavy growth  Bacteroides fragilis  *  Heavy growth  Parvimonas micra  *  Heavy growth  Peptoniphilus asaccharolyticus  *  Heavy growth  Mixed aerobic and anaerobic rosa  *  Susceptibility testing not routinely done No growth

## 2022-09-23 NOTE — ED PROVIDER NOTES
History   Chief Complaint:  Wound Check       HPI   Ry Horner is a 57 year old male with history of type II diabetes, hypertension, open wound of left foot, left foot partial amputation, right below the knee amputation who presents with a wound check. The patient reports that he flew to [facility] to go on a cruise. He had a diabetic ulcer on his left foot that he brought wound care stuff with however he lost his suitcase with his wound care supplies. On 9/10 his foot became swollen and infected and he was placed in the [facility]. He was then admitted to a hospital in [facility] and stayed there until 9/23 where he was treated for a staph infection in his left leg and foot. He had numerous vascular surgeries and debridements done. He has 4 wounds to his left foot. He was on IV antibiotics while in the hospital. The patient [place] have [place] and so he left the hospital after speaking with his primary [place] a [place] ago. He was switched to PO Ciprofloxacin 500 mg twice a day from Celaxan 40 mg every 24 hours. His last dose of antibiotics was 3-4 hours ago. He denies other concerns.     Review of Systems   Constitutional: Negative for fever.   HENT: Negative for hearing loss.    Eyes: Negative for visual disturbance.   Respiratory: Negative for shortness of breath.    Cardiovascular: Negative for chest pain.   Gastrointestinal: Negative for abdominal pain.   Genitourinary: Negative for dysuria.   Skin: Positive for wound (Wounds to left foot ).     Allergies:  Pravastatin    Medications:  Aspirin 81 mg  Zetia  Jardiance  Lisinopril   Metformin   Oxycodone   Senokot   Januvia    Past Medical History:     Type II diabetes  Hypertension  Hyperlipidemia   Below knee amputee, right  Morbid obesity   CKD stage 3   Necrotizing fasciitis   Open wound of left foot  Cellulitis and abscess of buttock      Past Surgical History:    Amputate foot, left    Amputate leg below knee, right   Amputate toes, left  Appendectomy   Apply wound vac, right   Biopsy bone foot, right   Biopsy bone toe, left  I & D foot, right x 3  I & D foot, left x 4  Orthopedic surgery      Family History:    Mother: psychotic disorder  Father: diabetes, lung cancer, hypertension     Social History:  The patient presents to the ED alone  PCP: Kody Wing     Physical Exam     Patient Vitals for the past 24 hrs:   BP Temp Temp src Pulse Resp SpO2 Height Weight   09/23/22 0150 104/71 -- -- 93 18 99 % -- --   09/23/22 0038 108/66 97.8  F (36.6  C) Temporal 91 18 99 % 1.829 m (6') 113.4 kg (250 lb)       Physical Exam  General: Laying on the ED stretcher, no distress  HEENT: Normocephalic, atraumatic  Cardiac: Radial pulses 2+, regular rate and rhythm  Pulm: Breathing comfortably, no accessory muscle usage, no conversational dyspnea  GI: Abdomen soft, nontender  MSK: Left lower extremity with 3 wounds over the distal leg including an exposed tibialis anterior tendon, and a wound over the distal foot.  All wounds with packing in place.  Skin: Wounds to the left lower extremity as above  Neuro: Moves all extremities x4  Psych: Normal mood and affect    Emergency Department Course   Laboratory:  Labs Ordered and Resulted from Time of ED Arrival to Time of ED Departure   COMPREHENSIVE METABOLIC PANEL - Abnormal       Result Value    Sodium 135      Potassium 4.8      Chloride 99      Carbon Dioxide (CO2) 27      Anion Gap 9      Urea Nitrogen 48 (*)     Creatinine 2.33 (*)     Calcium 8.6      Glucose 176 (*)     Alkaline Phosphatase 102      AST 39      ALT 58      Protein Total 8.1      Albumin 2.3 (*)     Bilirubin Total 0.4      GFR Estimate 32 (*)    CBC WITH PLATELETS AND DIFFERENTIAL - Abnormal    WBC Count 13.6 (*)     RBC Count 3.73 (*)     Hemoglobin 10.1 (*)     Hematocrit 32.0 (*)     MCV 86      MCH 27.1      MCHC 31.6      RDW 12.9      Platelet Count 377      % Neutrophils 72       % Lymphocytes 18      % Monocytes 7      % Eosinophils 1      % Basophils 1      % Immature Granulocytes 1      NRBCs per 100 WBC 0      Absolute Neutrophils 9.8 (*)     Absolute Lymphocytes 2.5      Absolute Monocytes 1.0      Absolute Eosinophils 0.1      Absolute Basophils 0.1      Absolute Immature Granulocytes 0.1      Absolute NRBCs 0.0     ISTAT GASES LACTATE VENOUS POCT - Abnormal    Lactic Acid POCT 1.4      Bicarbonate Venous POCT 28      O2 Sat, Venous POCT 46 (*)     pCO2V Venous POCT 46      pH Venous POCT 7.39      pO2 Venous POCT 26     TROPONIN I - Normal    Troponin I High Sensitivity 7     LACTIC ACID WHOLE BLOOD      Emergency Department Course:       ED Course as of 22 0315   Fri Sep 23, 2022   0305 Lactate not elevated.  Leukocytosis, no acute anemia.  JOHNATHAN compared to prior, no acute electrolyte disturbance.  Troponin not elevated.       Reviewed:  I reviewed nursing notes, vitals, past medical history and Care Everywhere    Assessments:  0220 I obtained history and examined the patient as noted above. Plan explained at this time.     Consults:  235 I spoke with Dr. Shukla of the Hospitalist service, regarding patient's presentation, findings, and plan of care.     Interventions:  None     Disposition:  The patient was admitted to the hospital under the care of Dr. Shukla.     Impression & Plan   Medical Decision Makin-year-old male with history of diabetes presents with multiple open wounds to his left lower extremity in the context of cellulitis treated surgically at the hospital in [facility].  The patient was not ready for discharge from the hospital there, but rather was stabilized until he was appropriate for repatriation back to [place].  He is not acutely systemically ill.  Plan is for admit to the hospital service for further antibiotics and treatment of his multiple open wounds.    Critical Care Time: was 0 minutes for this patient excluding procedures    Diagnosis:     ICD-10-CM    1. Cellulitis of left lower extremity  L03.116    2. Diabetic ulcer of left foot associated with type 2 diabetes mellitus, with muscle involvement without evidence of necrosis, unspecified part of foot (H)  E11.621     L97.525          Scribe Disclosure:  I, Robert Jovel, am serving as a scribe at 1:57 AM on 9/23/2022 to document services personally performed by Ba Flores MD based on my observations and the provider's statements to me.            Ba Flores MD  09/23/22 [id number]

## 2022-09-23 NOTE — PHARMACY-ADMISSION MEDICATION HISTORY
Pharmacy Medication History  Admission medication history interview status for the 9/23/2022  admission is complete. See EPIC admission navigator for prior to admission medications     Location of Interview: Patient room  Medication history sources: Patient and Surescripts    Significant changes made to the medication list:  1) Removed completed Zofran, oxycodone & Senna S Rxs  2) Updated sigs on eye drops      Additional medication history information:   1) Patient was to start Tresiba 28 units BID. He states he currently has a supply of Basaglar at home and was planning to use this up first. He has been using 51 units BID.   2) Patient states he has been hospitalized over the last few weeks so has only been getting home medications that he was given inpatient.     Medication reconciliation completed by provider prior to medication history? No    Time spent in this activity: 20 minutes    Prior to Admission medications    Medication Sig Last Dose Taking? Auth Provider Long Term End Date   aspirin 81 MG chewable tablet Take 1 tablet (81 mg) by mouth daily Past Month at Unknown time Yes Kody Wing MD     ezetimibe (ZETIA) 10 MG tablet TAKE ONE TABLET BY MOUTH EVERY DAY Past Month at Unknown time Yes Kody Wing MD Yes    ipratropium (ATROVENT) 0.06 % nasal spray INSTILL TWO SPRAYS INTO EACH NOSTRIL FOUR TIMES A DAY AS NEEDED FOR RHINITIS 9/22/2022 at Unknown time Yes Kody Wing MD     JARDIANCE 10 MG TABS tablet TAKE ONE TABLET BY MOUTH EVERY DAY Past Month at Unknown time Yes Ness Juan MD     ketorolac (ACULAR) 0.5 % ophthalmic solution Place 1 drop into the right eye 4 times daily Past Week at Unknown time Yes Reported, Patient     lisinopril (ZESTRIL) 40 MG tablet TAKE ONE TABLET BY MOUTH EVERY DAY 9/22/2022 at Unknown time Yes Kody Wing MD Yes    metFORMIN (GLUCOPHAGE) 500 MG tablet Take 1 tablet (500 mg) by mouth 2 times daily (with meals) Past Month at Unknown time Yes Kody Wing  "MD VANE Yes    multivitamin (CENTRUM SILVER) tablet Take 1 tablet by mouth daily \"S[facility] Adult\" Past Month at Unknown time Yes Unknown, Entered By History     sitagliptin (JANUVIA) 50 MG tablet Take 1 tablet (50 mg) by mouth daily Past Month at Unknown time Yes Kody Wing MD Yes    blood glucose (NO BRAND SPECIFIED) lancets standard Use to test blood sugar FOUR times daily, to match lancing device per insurance   oKdy Wing MD     blood glucose (NO BRAND SPECIFIED) test strip 120 strips by In Vitro route 4 times daily Use to test blood sugar up to 4 times daily or as directed.   Kody Wing MD     blood glucose monitoring (NO BRAND SPECIFIED) meter device kit Use to test blood sugar FOUR times daily, brand per insurance   Kody Wing MD     Continuous Blood Gluc Sensor (DEXCOM G6 SENSOR) MISC 1 each every 10 days Change every 10 days.   Kody Wing MD     insulin degludec (TRESIBA) 100 UNIT/ML pen Inject 28 Units Subcutaneous 2 times daily has not started yet  Kody Wing MD     Insulin Degludec (TRESIBA) 100 UNIT/ML SOLN Inject 35 Units Subcutaneous 2 times daily  Patient not taking: Reported on 9/23/2022 Not Taking at Unknown time  Kody Wing MD     insulin pen needle (BD PEN NEEDLE MEGAN 2ND GEN) 32G X 4 MM miscellaneous USE WITH INSULIN INJECTIONS UNDER THE SKIN TWO TIMES A DAY AS DIRECTED .   Kody Wing MD     Lancets (ONETOUCH DELICA PLUS [id number]) MISC USE TO TEST BLOOD SUGAR FOUR TIMES A DAY   Kody Wing MD     prednisoLONE acetate (PRED FORTE) 1 % ophthalmic suspension Place 1 drop into the right eye 4 times daily   Reported, Patient     STATIN NOT PRESCRIBED (INTENTIONAL) Please choose reason not prescribed, below   Kody Wing MD         The information provided in this note is only as accurate as the sources available at the time of update(s)     "

## 2022-09-23 NOTE — ED TRIAGE NOTES
[facility]  ED Arrival Note    Arrives through triage. ABC's intact. A &O X4. . Pt arrives with c/o needing to be admitted for specialty consults. Patient presents with a long story that started during his international vacation. He was hospitalized with a diabetic infected ulcer in his L foot and became septic. After his ICU admission and recovery, he was cleared to travel and his PCP, Kody Tristan, recommended patient go to the ED upon arrival for admission and consults to bridge cares with the  healthcare system.     R leg amputation. L leg wounds are covered at this time. Patient is taking PO abxs. Has copies of imaging and hospital reports from his admission in [place].       Visitors during triage: Spouse      Triage Interventions: IV and labs    Ambulatory: Yes    Meets Stroke Criteria?: No    Meets Trauma Criteria?: No    Directed to: Triage Lobby    Pronouns: he/him       Triage Assessment     Row Name 09/23/22 0050       Triage Assessment (Adult)    Airway WDL WDL       Respiratory WDL    Respiratory WDL WDL       Skin Circulation/Temperature WDL    Skin Circulation/Temperature WDL WDL       Cardiac WDL    Cardiac WDL WDL       Peripheral/Neurovascular WDL    Peripheral Neurovascular WDL WDL       Cognitive/Neuro/Behavioral WDL    Cognitive/Neuro/Behavioral WDL WDL

## 2022-09-23 NOTE — PROGRESS NOTES
Agree with H&P done this morning by Dr. Shukla    Podiatry and vascular surgery evaluated the patient today after he was admitted to the floor.  MRI of the foot ordered to evaluate for underlying osteomyelitis.  Further surgical plan to be made based on imaging findings.  Vascular surgery ordered ABIs.  Wound care consult placed.  In the meantime continue on IV Zosyn.

## 2022-09-23 NOTE — CONSULTS
Seen and examined the patient myself.  I reviewed the chart extensively.  I have met with the patient and looked at the wound myself.  I also met with his significant other.  Patient is a very pleasant 57-year-old diabetic male with previous right below the knee amputation.  He tells me that after multiple surgeries his right fourth and ankle were deemed nonsalvageable and he ended up with a right below the knee amputation.  He has wounds on the left lower extremity and developed an abscess while he was traveling in [place] and underwent extensive surgical debridement and washout over the period he was told by the surgery team explained that he does not have any vascular insufficiency.  He does not recall very well what kind of vascular surgical evaluation was performed at the time.    On my examination he has a strong biphasic left dorsalis pedis signal and a triphasic posterior tibial signal.  Multiple wounds are seen in the left lower extremity.  The tibialis anterior tendon is exposed to the left shin wound.    I have ordered ABIs with the caveat that the blood pressure cuff be placed below his knee and not above the ankle.  I would also like to see the Doppler waveforms of the dorsalis pedis and posterior tibial arteries at the level of the ankle.  From my clinical examination, his assessment by a vascular surgeon in [facility] and the anticipated results of the noninvasive studies I do not think arterial insufficiency is a major contributing factor to his wounds.    It may just be based on the wounds along that his extremity would be unsalvageable.  Patient and his significant other are mentally prepared for that as well.  I will discuss the salvageability or potential salvageability of the left lower extremity with Dr. Lainez from podiatry and give my final recommendations to the patient.  He has verbalized her understanding.

## 2022-09-23 NOTE — PROGRESS NOTES
RECEIVING UNIT ED HANDOFF REVIEW    ED Nurse Handoff Report was reviewed by: Ezekiel Douglass RN on September 23, 2022 at 9:37 AM

## 2022-09-23 NOTE — CONSULTS
[facility]  CONSULTATION NOTE    CHIEF COMPLAINT:      I was asked by Deb Shukla to evaluate this patient for left foot    PATIENT HISTORY:  Ry Horner is a 57 year old male  with a past medical history significant for what's listed below. He's been seen by Dr. Cevallos for a left foot ulcer, following a transmetatarsal amputation in February of this year. Most recently, he went [place] a [place] and developed significant swelling and an infection. He was hospitalized in [place] for several weeks and had several debridements, now resulting in leg ulcers with exposed tendons. He denies pain to sites and states overall he feels better. He does state that he wants whatever needs to be done, done to save his foot, but also that he's open to a below knee amputation if that's what's recommended. He had a right sided BKA in 2017 and ambulates with a prosthetic.         Review of Systems:  A 10 point review of systems was performed and is positive for that noted above in the patient history.  All other areas are negative.     PAST MEDICAL HISTORY:   Past Medical History:   Diagnosis Date     BENIGN HYPERTENSION 4/4/2007     DIABETES MELLITUS TYPE II-UNCOMPL 4/4/2007     HYPERLIPIDEMIA NEC/NOS 4/4/2007     Tobacco use disorder 4/4/2007        PAST SURGICAL HISTORY:   Past Surgical History:   Procedure Laterality Date     AMPUTATE FOOT Left 11/17/2019    Procedure: LEFT PARTIAL FOOT AMPUTATION;  Surgeon: Antoine Pena DPM;  Location: [facility] OR     AMPUTATE FOOT Left 12/4/2019    Procedure: LEFT PARTIAL FOOT AMPUTATION;  Surgeon: Tim Douglas DPM;  Location: [facility]     AMPUTATE FOOT Left 12/11/2019    Procedure: POSSIBLE PARTIAL FOOT AMPUTATION;  Surgeon: Tim Douglas DPM;  Location: [facility]     AMPUTATE FOOT Left 2/10/2022    Procedure: Partial left foot amputation;  Surgeon: Milena Cevallos DPM, Podiatry/Foot and Ankle Surgery;  Location: [facility] OR     AMPUTATE LEG BELOW KNEE Right  9/1/2017    Procedure: AMPUTATE LEG BELOW KNEE;  RIGHT BELOW KNEE AMPUTATION ;  Surgeon: Nikolas Nascimento MD;  Location: [facility]     AMPUTATE TOE(S) Left 2/3/2020    Procedure: LEFT SECOND TOE AMPUTATION;  Surgeon: Milena Cevallos DPM, Podiatry/Foot and Ankle Surgery;  Location: [facility] OR     APPENDECTOMY       APPLY WOUND VAC Right 3/2/2015    Procedure: APPLY WOUND VAC;  Surgeon: Milena Cevallos DPM, Pod;  Location: [facility] OR     BIOPSY BONE FOOT Right 7/15/2016    Procedure: BIOPSY BONE FOOT;  Surgeon: Tim Douglas DPM;  Location: [facility]     BIOPSY BONE TOE Left 12/4/2019    Procedure: BONE BIOPSY LEFT SECOND TOE;  Surgeon: Tim Douglas DPM;  Location: [facility]     IRRIGATION AND DEBRIDEMENT FOOT, COMBINED Right 3/2/2015    Procedure: COMBINED IRRIGATION AND DEBRIDEMENT FOOT;  Surgeon: Milena Cevallos DPM, Pod;  Location: [facility]     IRRIGATION AND DEBRIDEMENT FOOT, COMBINED Right 7/15/2016    Procedure: COMBINED IRRIGATION AND DEBRIDEMENT FOOT;  Surgeon: Tim Douglas DPM;  Location: [facility]     IRRIGATION AND DEBRIDEMENT FOOT, COMBINED Right 7/20/2016    Procedure: COMBINED IRRIGATION AND DEBRIDEMENT FOOT;  Surgeon: Tim Douglas DPM;  Location: [facility]     IRRIGATION AND DEBRIDEMENT FOOT, COMBINED Left 11/19/2019    Procedure: REVISIONAL IRRIGATION AND DEBRIDEMENT LEFT FOOT AND BONE DEBRIDEMENT;  Surgeon: Joseph Randhawa DPM;  Location: [facility]     IRRIGATION AND DEBRIDEMENT FOOT, COMBINED Left 12/4/2019    Procedure: EXCISIONAL DEBRIDEMENT LEFT FOOT;  Surgeon: Tim Douglas DPM;  Location: [facility]     IRRIGATION AND DEBRIDEMENT FOOT, COMBINED Left 12/11/2019    Procedure: IRRIGATION AND DEBRIDEMENT FOOT, Partial osteotomy left first metatarsal;  Surgeon: Tim Douglas DPM;  Location: [facility]     IRRIGATION AND DEBRIDEMENT FOOT, COMBINED Left 2/5/2020    Procedure: IRRIGATION AND DEBRIDEMENT LEFT FOOT;  Surgeon: Tim Douglas DPM;  Location:  OR     ORTHOPEDIC SURGERY         "  MEDICATIONS:  Reviewed in Epic. Current.     ALLERGIES:    Allergies   Allergen Reactions     Pravastatin      \"sucked the life blood out of me,\" Indicates this occurs with all statins.        SOCIAL HISTORY:   Social History     Socioeconomic History     Marital status: Single     Spouse name: Not on file     Number of children: Not on file     Years of education: Not on file     Highest education level: Not on file   Occupational History     Not on file   Tobacco Use     Smoking status: Former Smoker     Packs/day: 0.50     Years: 20.00     Pack years: 10.00     Types: Cigarettes     Start date: 1987     Quit date:      Years since quittin.7     Smokeless tobacco: Never Used   Substance and Sexual Activity     Alcohol use: Yes     Comment: 3-4 drinks per month     Drug use: No     Sexual activity: Yes     Partners: Female   Other Topics Concern      Service Yes     Blood Transfusions No     Caffeine Concern No     Occupational Exposure No     Hobby Hazards No     Sleep Concern No     Stress Concern No     Weight Concern Yes     Comment: Would like to lose some weight     Special Diet No     Back Care No     Exercise Yes     Bike Helmet No     Seat Belt Yes     Self-Exams Yes     Parent/sibling w/ CABG, MI or angioplasty before 65F 55M? No   Social History Narrative     Not on file     Social Determinants of Health     Financial Resource Strain: Not on file   Food Insecurity: Not on file   Transportation Needs: Not on file   Physical Activity: Not on file   Stress: Not on file   Social Connections: Not on file   Intimate Partner Violence: Not on file   Housing Stability: Not on file        FAMILY HISTORY:   Family History   Problem Relation Age of Onset     Genetic Disorder Other      Genetic Disorder Other      Psychotic Disorder Mother      Diabetes Father      Lung Cancer Father      Hypertension Father      Genetic Disorder Maternal Grandmother      Genetic Disorder Maternal Grandfather  "     Asthma Sister      Breast Cancer Sister      C.A.D. No family hx of      Hypertension No family hx of      Cerebrovascular Disease No family hx of      Breast Cancer No family hx of      Cancer - colorectal No family hx of      Prostate Cancer No family hx of      Alcohol/Drug No family hx of         EXAM:Vitals: /85   Pulse 94   Temp 97.8  F (36.6  C) (Temporal)   Resp 14   Ht 1.829 m (6')   Wt 113.4 kg (250 lb)   SpO2 99%   BMI 33.91 kg/m    BMI= Body mass index is 33.91 kg/m .    LABS:     Hba1c: 7.7 on 4/2022    Last Comprehensive Metabolic Panel:  Sodium   Date Value Ref Range Status   09/23/2022 135 133 - 144 mmol/L Final   06/25/2021 134 133 - 144 mmol/L Final     Potassium   Date Value Ref Range Status   09/23/2022 4.8 3.4 - 5.3 mmol/L Final     Comment:     Specimen slightly hemolyzed, potassium may be falsely elevated.   06/25/2021 5.2 3.4 - 5.3 mmol/L Final     Chloride   Date Value Ref Range Status   09/23/2022 99 94 - 109 mmol/L Final   06/25/2021 103 94 - 109 mmol/L Final     Carbon Dioxide   Date Value Ref Range Status   06/25/2021 29 20 - 32 mmol/L Final     Carbon Dioxide (CO2)   Date Value Ref Range Status   09/23/2022 27 20 - 32 mmol/L Final     Anion Gap   Date Value Ref Range Status   09/23/2022 9 3 - 14 mmol/L Final   06/25/2021 2 (L) 3 - 14 mmol/L Final     Glucose   Date Value Ref Range Status   09/23/2022 176 (H) 70 - 99 mg/dL Final   06/25/2021 181 (H) 70 - 99 mg/dL Final     Comment:     Fasting specimen     GLUCOSE BY METER POCT   Date Value Ref Range Status   09/23/2022 105 (H) 70 - 99 mg/dL Final     Urea Nitrogen   Date Value Ref Range Status   09/23/2022 48 (H) 7 - 30 mg/dL Final   06/25/2021 41 (H) 7 - 30 mg/dL Final     Creatinine   Date Value Ref Range Status   09/23/2022 2.33 (H) 0.66 - 1.25 mg/dL Final   06/25/2021 1.51 (H) 0.66 - 1.25 mg/dL Final     GFR Estimate   Date Value Ref Range Status   09/23/2022 32 (L) >60 mL/min/1.73m2 Final     Comment:     Effective  December 21, 2021 eGFRcr in adults is calculated using the 2021 CKD-EPI creatinine equation which includes age and gender (Bridgette et al., NEJM, DOI: 10.1056/SRRAqx3265274)   06/25/2021 52 (L) >60 mL/min/[1.73_m2] Final     Comment:     Non  GFR Calc  Starting 12/18/2018, serum creatinine based estimated GFR (eGFR) will be   calculated using the Chronic Kidney Disease Epidemiology Collaboration   (CKD-EPI) equation.       Calcium   Date Value Ref Range Status   09/23/2022 8.6 8.5 - 10.1 mg/dL Final   06/25/2021 9.6 8.5 - 10.1 mg/dL Final     Lab Results   Component Value Date    WBC 13.6 09/23/2022    WBC 10.5 06/25/2021     Lab Results   Component Value Date    RBC 3.73 09/23/2022    RBC 4.18 06/25/2021     Lab Results   Component Value Date    HGB 10.1 09/23/2022    HGB 12.8 06/25/2021     Lab Results   Component Value Date    HCT 32.0 09/23/2022    HCT 35.7 06/25/2021     Lab Results   Component Value Date     09/23/2022     06/25/2021      No results found for: INR     General appearance: Patient is alert and fully cooperative with history & exam.  No sign of distress is noted during the visit.      Respiratory: Breathing is regular & unlabored while sitting.      HEENT: Hearing is intact to spoken word.  Speech is clear.  No gross evidence of visual impairment that would impact ambulation.      Dermatologic: Ulcers as pictured below. Left foot submet one ulcer that extends dorsally. Anterior leg ulcer with exposed anterior tibial tendon. 2 other ulcers to lateral legs with depth to subcutaneous tissue. No purulence or cellulitis. No significant edema.      Vascular: Dorsalis pedis and posterior tibial pulses are intact & regular to LLE.       Neurologic: Lower extremity sensation is diminished, bilateral foot, to light touch.  No evidence of neurological-based weakness or contracture in the lower extremities.       Musculoskeletal: Patient is ambulatory with use of prosthetic to RLE.  S/p right sided BKA. S/p LLE TMA.     Psychiatric: Affect is pleasant & appropriate.                All cultures:  No results for input(s): CULTURE in the last 168 hours.     IMAGING:   I personally reviewed the images and agree with the reports as stated. Noted heterotropic ossification.     Xrays from June:   IMPRESSION: Postoperative changes transmetatarsal amputation. There is bone formation along the resection margin of the first through fifth metatarsal shafts MR including a somewhat prominent area of bony proliferative change dorsally as seen on the   lateral view.     No definitive cortical destructive change to suggest osteomyelitis however there is soft tissue swelling and potential ulceration along the entire aspect of the soft tissue stump. If further evaluation is required, recommend MRI.    ASSESSMENT:  1. Left foot/leg ulcers with exposed tendon, s/p TMA   2. Right sided BKA  3. Diabetes Mellitus --> Hba1c: 7.7     MEDICAL DECISION MAKING:   -Discussed all findings with patient and significant other, including options for limb salvage versus below knee amputation. MRIs ordered to evaluate for osteomyelitis. If significant osteomyelitis present, will make limb salvage more complicated. If it's not, all sites would need to be debrided, including removal of the TA tendon. This would limit dorsiflexion post op and patient would need to use bracing  in order to continue ambulation. He states he's agreeable to this if needed. He does also state that he's agreeable to a below knee amputation if that's the recommendation.     -Will discuss with Dr. Guevara, who will see patient this weekend.     -No surgical plans today. Okay for patient to eat. Agree with vascular evaluation.     -WBAT to LLE.       Thank you for the consultation request and the opportunity to participate in the care of Ry Lainez DPM   [facility]le Surgery  [phone]

## 2022-09-23 NOTE — ED NOTES
"[facility]  ED Nurse Handoff Report    ED Chief complaint: Wound Check      ED Diagnosis:   Final diagnoses:   Cellulitis of left lower extremity   Diabetic ulcer of left foot associated with type 2 diabetes mellitus, with muscle involvement without evidence of necrosis, unspecified part of foot (H)       Code Status: admitting physician to determine     Allergies:   Allergies   Allergen Reactions     Pravastatin      \"sucked the life blood out of me,\" Indicates this occurs with all statins.       Patient Story:   Arrives through triage. ABC's intact. A &O X4. . Pt arrives with c/o needing to be admitted for specialty consults. Patient presents with a long story that started during his international vacation. He was hospitalized with a diabetic infected ulcer in his L foot and became septic. After his ICU admission and recovery, he was cleared to travel and his PCP, Kody Tristan, recommended patient go to the ED upon arrival for admission and consults to bridge cares with the  healthcare system.      R leg amputation. L leg wounds are covered at this time. Patient is taking PO abxs. Has copies of imaging and hospital reports from his admission in [place].   Focused Assessment:    Labs Ordered and Resulted from Time of ED Arrival to Time of ED Departure   COMPREHENSIVE METABOLIC PANEL - Abnormal       Result Value    Sodium 135      Potassium 4.8      Chloride 99      Carbon Dioxide (CO2) 27      Anion Gap 9      Urea Nitrogen 48 (*)     Creatinine 2.33 (*)     Calcium 8.6      Glucose 176 (*)     Alkaline Phosphatase 102      AST 39      ALT 58      Protein Total 8.1      Albumin 2.3 (*)     Bilirubin Total 0.4      GFR Estimate 32 (*)    CBC WITH PLATELETS AND DIFFERENTIAL - Abnormal    WBC Count 13.6 (*)     RBC Count 3.73 (*)     Hemoglobin 10.1 (*)     Hematocrit 32.0 (*)     MCV 86      MCH 27.1      MCHC 31.6      RDW 12.9      Platelet Count 377      % Neutrophils 72      % Lymphocytes 18   "    % Monocytes 7      % Eosinophils 1      % Basophils 1      % Immature Granulocytes 1      NRBCs per 100 WBC 0      Absolute Neutrophils 9.8 (*)     Absolute Lymphocytes 2.5      Absolute Monocytes 1.0      Absolute Eosinophils 0.1      Absolute Basophils 0.1      Absolute Immature Granulocytes 0.1      Absolute NRBCs 0.0     ISTAT GASES LACTATE VENOUS POCT - Abnormal    Lactic Acid POCT 1.4      Bicarbonate Venous POCT 28      O2 Sat, Venous POCT 46 (*)     pCO2V Venous POCT 46      pH Venous POCT 7.39      pO2 Venous POCT 26     TROPONIN I - Normal    Troponin I High Sensitivity 7     LACTIC ACID WHOLE BLOOD     No orders to display         Treatments and/or interventions provided: Monitor  Patient's response to treatments and/or interventions: Monitor     To be done/followed up on inpatient unit:      Does this patient have any cognitive concerns?: NA\    Activity level - Baseline/Home:  Unknown  Activity Level - Current:   Unknown    Patient's Preferred language: English   Needed?: No    Isolation: None  Infection: Not Applicable  Patient tested for COVID 19 prior to admission: YES  Bariatric?: No    Vital Signs:   Vitals:    09/23/22 0038 09/23/22 0150   BP: 108/66 104/71   Pulse: 91 93   Resp: 18 18   Temp: 97.8  F (36.6  C)    TempSrc: Temporal    SpO2: 99% 99%   Weight: 113.4 kg (250 lb)    Height: 1.829 m (6')        Cardiac Rhythm:     Was the PSS-3 completed:   Yes  What interventions are required if any?               For the majority of the shift this patient's behavior was Green.   Behavioral interventions performed were NA  .    ED NURSE PHONE NUMBER: *[phone]

## 2022-09-23 NOTE — PHARMACY-VANCOMYCIN DOSING SERVICE
"Pharmacy Vancomycin Initial Note  Date of Service 2022  Patient's  1965  57 year old, male    Indication: Skin and Soft Tissue Infection    Current estimated CrCl = Estimated Creatinine Clearance: 46.6 mL/min (A) (based on SCr of 2.33 mg/dL (H)).    Creatinine for last 3 days  2022:  1:06 AM Creatinine 2.33 mg/dL    Recent Vancomycin Level(s) for last 3 days  No results found for requested labs within last 72 hours.      Vancomycin IV Administrations (past 72 hours)                   vancomycin 2000 mg in 0.9% NaCl 500 ml intermittent infusion 2,000 mg (mg) 2,000 mg New Bag 22 0443                Nephrotoxins and other renal medications (From now, onward)    Start     Dose/Rate Route Frequency Ordered Stop    22 1115  vancomycin place funes - receiving intermittent dosing         1 each Intravenous SEE ADMIN INSTRUCTIONS 22 1115      22 0400  piperacillin-tazobactam (ZOSYN) 3.375 g vial to attach to  mL bag        Note to Pharmacy: For [facility], [facility] and [facility]: For Zosyn-naive patients, use the \"Zosyn initial dose + extended infusion\" order panel.    3.375 g  over 30 Minutes Intravenous EVERY 6 HOURS 22 0332            Contrast Orders - past 72 hours (72h ago, onward)    None          I      Plan:  1. Start vancomycin  2gm x1, then dosed based on levels.     2. Vancomycin monitoring method: AUC  3. Vancomycin therapeutic monitoring goal: 400-600 mg*h/L  4. Pharmacy will check vancomycin levels as appropriate in 1-3 Days.    5. Serum creatinine levels will be ordered daily for the first week of therapy and at least twice weekly for subsequent weeks.      Olinda Morfin [facility]    "

## 2022-09-23 NOTE — PROVIDER NOTIFICATION
Paged hospitalist  1) Pt is diabetic please order appropriate accuchecks and medications for glucose control. Pt was just changed from NPO to a diet.   2) Please provide detailed wound care orders.

## 2022-09-24 LAB
ANION GAP SERPL CALCULATED.3IONS-SCNC: 3 MMOL/L (ref 3–14)
BUN SERPL-MCNC: 31 MG/DL (ref 7–30)
CALCIUM SERPL-MCNC: 8.6 MG/DL (ref 8.5–10.1)
CHLORIDE BLD-SCNC: 104 MMOL/L (ref 94–109)
CO2 SERPL-SCNC: 28 MMOL/L (ref 20–32)
CREAT SERPL-MCNC: 1.48 MG/DL (ref 0.66–1.25)
ERYTHROCYTE [DISTWIDTH] IN BLOOD BY AUTOMATED COUNT: 12.6 % (ref 10–15)
GFR SERPL CREATININE-BSD FRML MDRD: 56 ML/MIN/1.73M2
GLUCOSE BLD-MCNC: 160 MG/DL (ref 70–99)
GLUCOSE BLDC GLUCOMTR-MCNC: 153 MG/DL (ref 70–99)
GLUCOSE BLDC GLUCOMTR-MCNC: 200 MG/DL (ref 70–99)
GLUCOSE BLDC GLUCOMTR-MCNC: 355 MG/DL (ref 70–99)
HCT VFR BLD AUTO: 28.6 % (ref 40–53)
HGB BLD-MCNC: 9.1 G/DL (ref 13.3–17.7)
MCH RBC QN AUTO: 26.8 PG (ref 26.5–33)
MCHC RBC AUTO-ENTMCNC: 31.8 G/DL (ref 31.5–36.5)
MCV RBC AUTO: 84 FL (ref 78–100)
PLATELET # BLD AUTO: 331 10E3/UL (ref 150–450)
POTASSIUM BLD-SCNC: 4.3 MMOL/L (ref 3.4–5.3)
RBC # BLD AUTO: 3.4 10E6/UL (ref 4.4–5.9)
SODIUM SERPL-SCNC: 135 MMOL/L (ref 133–144)
WBC # BLD AUTO: 8.7 10E3/UL (ref 4–11)

## 2022-09-24 PROCEDURE — 36415 COLL VENOUS BLD VENIPUNCTURE: CPT | Performed by: STUDENT IN AN ORGANIZED HEALTH CARE EDUCATION/TRAINING PROGRAM

## 2022-09-24 PROCEDURE — 120N000001 HC R&B MED SURG/OB

## 2022-09-24 PROCEDURE — 250N000011 HC RX IP 250 OP 636: Performed by: STUDENT IN AN ORGANIZED HEALTH CARE EDUCATION/TRAINING PROGRAM

## 2022-09-24 PROCEDURE — 250N000009 HC RX 250: Performed by: HOSPITALIST

## 2022-09-24 PROCEDURE — 258N000003 HC RX IP 258 OP 636: Performed by: STUDENT IN AN ORGANIZED HEALTH CARE EDUCATION/TRAINING PROGRAM

## 2022-09-24 PROCEDURE — 250N000013 HC RX MED GY IP 250 OP 250 PS 637: Performed by: HOSPITALIST

## 2022-09-24 PROCEDURE — 99232 SBSQ HOSP IP/OBS MODERATE 35: CPT | Performed by: PODIATRIST

## 2022-09-24 PROCEDURE — 80048 BASIC METABOLIC PNL TOTAL CA: CPT | Performed by: STUDENT IN AN ORGANIZED HEALTH CARE EDUCATION/TRAINING PROGRAM

## 2022-09-24 PROCEDURE — 99233 SBSQ HOSP IP/OBS HIGH 50: CPT | Performed by: HOSPITALIST

## 2022-09-24 PROCEDURE — 85027 COMPLETE CBC AUTOMATED: CPT | Performed by: STUDENT IN AN ORGANIZED HEALTH CARE EDUCATION/TRAINING PROGRAM

## 2022-09-24 RX ORDER — KETOROLAC TROMETHAMINE 5 MG/ML
1 SOLUTION OPHTHALMIC 4 TIMES DAILY
Status: DISCONTINUED | OUTPATIENT
Start: 2022-09-24 | End: 2022-10-03 | Stop reason: HOSPADM

## 2022-09-24 RX ORDER — ASPIRIN 81 MG/1
81 TABLET, CHEWABLE ORAL DAILY
Status: DISCONTINUED | OUTPATIENT
Start: 2022-09-24 | End: 2022-10-03 | Stop reason: HOSPADM

## 2022-09-24 RX ORDER — EZETIMIBE 10 MG/1
10 TABLET ORAL DAILY
Status: DISCONTINUED | OUTPATIENT
Start: 2022-09-24 | End: 2022-10-03 | Stop reason: HOSPADM

## 2022-09-24 RX ORDER — LISINOPRIL 40 MG/1
40 TABLET ORAL DAILY
Status: DISCONTINUED | OUTPATIENT
Start: 2022-09-24 | End: 2022-10-03 | Stop reason: HOSPADM

## 2022-09-24 RX ORDER — PREDNISOLONE ACETATE 10 MG/ML
1 SUSPENSION/ DROPS OPHTHALMIC 4 TIMES DAILY
Status: DISCONTINUED | OUTPATIENT
Start: 2022-09-24 | End: 2022-10-03 | Stop reason: HOSPADM

## 2022-09-24 RX ADMIN — PIPERACILLIN AND TAZOBACTAM 3.38 G: 3; .375 INJECTION, POWDER, FOR SOLUTION INTRAVENOUS at 00:00

## 2022-09-24 RX ADMIN — PREDNISOLONE ACETATE 1 DROP: 10 SUSPENSION/ DROPS OPHTHALMIC at 12:39

## 2022-09-24 RX ADMIN — SODIUM CHLORIDE: 9 INJECTION, SOLUTION INTRAVENOUS at 22:50

## 2022-09-24 RX ADMIN — LISINOPRIL 40 MG: 40 TABLET ORAL at 10:13

## 2022-09-24 RX ADMIN — SODIUM CHLORIDE: 9 INJECTION, SOLUTION INTRAVENOUS at 11:15

## 2022-09-24 RX ADMIN — INSULIN ASPART 1 UNITS: 100 INJECTION, SOLUTION INTRAVENOUS; SUBCUTANEOUS at 08:19

## 2022-09-24 RX ADMIN — KETOROLAC TROMETHAMINE 1 DROP: 5 SOLUTION OPHTHALMIC at 12:38

## 2022-09-24 RX ADMIN — PIPERACILLIN AND TAZOBACTAM 3.38 G: 3; .375 INJECTION, POWDER, FOR SOLUTION INTRAVENOUS at 23:04

## 2022-09-24 RX ADMIN — PIPERACILLIN AND TAZOBACTAM 3.38 G: 3; .375 INJECTION, POWDER, FOR SOLUTION INTRAVENOUS at 12:35

## 2022-09-24 RX ADMIN — SODIUM CHLORIDE: 9 INJECTION, SOLUTION INTRAVENOUS at 01:41

## 2022-09-24 RX ADMIN — PIPERACILLIN AND TAZOBACTAM 3.38 G: 3; .375 INJECTION, POWDER, FOR SOLUTION INTRAVENOUS at 06:21

## 2022-09-24 RX ADMIN — INSULIN ASPART 2 UNITS: 100 INJECTION, SOLUTION INTRAVENOUS; SUBCUTANEOUS at 17:33

## 2022-09-24 RX ADMIN — KETOROLAC TROMETHAMINE 1 DROP: 5 SOLUTION OPHTHALMIC at 10:14

## 2022-09-24 RX ADMIN — INSULIN DEGLUDEC INJECTION 20 UNITS: 100 INJECTION, SOLUTION SUBCUTANEOUS at 21:30

## 2022-09-24 RX ADMIN — PREDNISOLONE ACETATE 1 DROP: 10 SUSPENSION/ DROPS OPHTHALMIC at 21:27

## 2022-09-24 RX ADMIN — HEPARIN SODIUM 5000 UNITS: 5000 INJECTION, SOLUTION INTRAVENOUS; SUBCUTANEOUS at 18:55

## 2022-09-24 RX ADMIN — HEPARIN SODIUM 5000 UNITS: 5000 INJECTION, SOLUTION INTRAVENOUS; SUBCUTANEOUS at 06:23

## 2022-09-24 RX ADMIN — KETOROLAC TROMETHAMINE 1 DROP: 5 SOLUTION OPHTHALMIC at 17:33

## 2022-09-24 RX ADMIN — INSULIN ASPART 1 UNITS: 100 INJECTION, SOLUTION INTRAVENOUS; SUBCUTANEOUS at 12:38

## 2022-09-24 RX ADMIN — PREDNISOLONE ACETATE 1 DROP: 10 SUSPENSION/ DROPS OPHTHALMIC at 17:34

## 2022-09-24 RX ADMIN — EZETIMIBE 10 MG: 10 TABLET ORAL at 10:13

## 2022-09-24 RX ADMIN — ASPIRIN 81 MG CHEWABLE TABLET 81 MG: 81 TABLET CHEWABLE at 10:13

## 2022-09-24 RX ADMIN — INSULIN DEGLUDEC INJECTION 20 UNITS: 100 INJECTION, SOLUTION SUBCUTANEOUS at 08:19

## 2022-09-24 RX ADMIN — PIPERACILLIN AND TAZOBACTAM 3.38 G: 3; .375 INJECTION, POWDER, FOR SOLUTION INTRAVENOUS at 17:30

## 2022-09-24 RX ADMIN — KETOROLAC TROMETHAMINE 1 DROP: 5 SOLUTION OPHTHALMIC at 21:25

## 2022-09-24 RX ADMIN — PREDNISOLONE ACETATE 1 DROP: 10 SUSPENSION/ DROPS OPHTHALMIC at 10:13

## 2022-09-24 ASSESSMENT — ACTIVITIES OF DAILY LIVING (ADL)
ADLS_ACUITY_SCORE: 36
ADLS_ACUITY_SCORE: 35
ADLS_ACUITY_SCORE: 37
ADLS_ACUITY_SCORE: 37
ADLS_ACUITY_SCORE: 36
ADLS_ACUITY_SCORE: 37
ADLS_ACUITY_SCORE: 35

## 2022-09-24 NOTE — CONSULTS
[facility] Nurse Inpatient Assessment     Consulted for: LLE    Patient History (according to provider note(s):         Ry Horner is a 57 year old male with past medical history significant for type II DM, hypertension, dyslipidemia, CKD III, non-healing diabetic foot wounds with multiple prior amputations admitted on 9/23/2022 with left lower extremity wounds.      Multiple left lower extremity wounds   Hx of non-healing diabetic ulcers   S/p R BKA   S/p transmetatarsal amputation of the left foot   Leukocytosis   Pt has a hx of non-healing diabetic ulceration of the plantar aspect of th left foot. He has been following with wound clinic and podiatry for this. He went on a cruise to [facility] and developed a bad infection of the ulceration that apparently spread up his leg to include his shin. He was ultimately hospitalized in [place] and for several days on IV antibitotics (it is unclear what antibiotics he was on in the hospital). He needed multiple surgical debridements during this hospitalization. It is unclear how deep the infection is or if there is any bony involvement. It looks like one culture grew strep parasanguinosis. Ultimately he wanted to come back to the [place] so was placed on PO ciprofloxacin to take while traveling and was instructed to come straight to the hospital after landing.   * He does have a mild leukocytosis, but is otherwise non-toxic and non-septic appearing.  * Lower extremity wounds are pictured below.   - Vascular surgery consult   - Podiatry consult   - Infectious disease consult   - Defer imaging to vascular/podiatry   - Will start vancomycin and zosyn tonight   - NPO in case additional debridement is needed in AM     Areas Assessed:      Areas visualized during today's visit: LLE    Wound location: Left  shin    Last photo: 9/23/22        Wound due to: Surgical Wound  Wound history/plan of care: Pt had surgery in [place] with I&D and had penrose drains placed  using betadine saturated gauze. Pt being followed by vascular and Podiatry.  Wound base: Proximal 100% deep red moist tissue, Distal 80 % pale pink tissue with tan fibrinous tissue overlaying and filmy appearance. , 20 % white/yellow tendon     Palpation of the wound bed: normal      Drainage: small     Description of drainage: serosanguinous     Measurements (length x width x depth, in cm): Proximal 5.3 x 1.5 x 1cm   Distal 8.5  x 5.5  x  1 cm      Tunneling: up to 5 cm from 2 o'clock     Undermining: up to 3 cm from12- 6 o'clock  Periwound skin: Intact and Erythema- blanchable      Color: pink      Temperature: normal   Odor: none  Pain: mild, aching, intermittent and sharp  Pain interventions prior to dressing change: slow and gentle cares   Treatment goal: Infection control/prevention, Increase granulation and Maintain (prevention of deterioration)  STATUS: initial assessment  Supplies ordered: at bedside     Wound location: Left Foot     Last photo: 9/23/22  Wound due to: Diabetic Ulcer  Wound history/plan of care: Chronic non healing foot wound, follows Dr. Cevallos  Wound base: 100 % pale pink non granular tissue     Palpation of the wound bed: normal      Drainage: scant     Description of drainage: serosanguinous     Measurements (length x width x depth, in cm): 6.2  x 1.8  x  0.4 cm      Tunneling: N/A     Undermining: N/A  Periwound skin: Dry/scaly      Color: normal and consistent with surrounding tissue      Temperature: normal   Odor: none  Pain: denies , none  Pain interventions prior to dressing change: N/A  Treatment goal: Heal , Infection control/prevention and Increase granulation  STATUS: initial assessment  Supplies ordered: at bedside    Wound location: Left lateral ankle    Last photo: 9/23/22        Wound due to: Surgical Wound  Wound history/plan of care: Pt had surgery in [place] with I&D and had penrose drains placed using betadine saturated gauze. Pt being followed by vascular and  Podiatry.  Wound base: 100 % deep red non granular tissue     Palpation of the wound bed: normal      Drainage: small     Description of drainage: serosanguinous     Measurements (length x width x depth, in cm): 2.3  x 1.2  x  1 cm      Tunneling: N/A     Undermining: N/A  Periwound skin: Intact and Erythema- blanchable      Color: pink      Temperature: normal   Odor: none  Pain: denies , none  Pain interventions prior to dressing change: N/A  Treatment goal: Heal   STATUS: initial assessment  Supplies ordered: at bedside      Treatment Plan:     Left LE wound(s): BID   1. Place Blue pad under leg and pour Vashe #([id number]) over all wounds and leg. Take Gauze and cleanse leg with Vashe. Then Blot dry with dry gauze  2. Moisten gauze or kerlix with vashe, wring out excess so it is damp and gently pack into wound (this will remain in wound as primary dressing)  3. Apply Cavilon No Sting Skin Prep (#[id number]) to periwound and allow to dry.  4. Apply Sween to all intact tissue.  5.  Cover wound with ABD and secure with kerlix and tape  6. Time and date dressing change         Orders: Written    RECOMMEND PRIMARY TEAM ORDER: None, at this time  Education provided: importance of repositioning, plan of care and Infection prevention   Discussed plan of care with: Patient and Family  WOC nurse follow-up plan: twice weekly  Notify WOC if wound(s) deteriorate.  Nursing to notify the Provider(s) and re-consult the WOC Nurse if new skin concern.    DATA:     Current support surface: Standard  Atmos Air mattress  Containment of urine/stool: Continent of bladder and Continent of bowel  BMI: Body mass index is 33.91 kg/m .   Active diet order: Orders Placed This Encounter      Moderate Consistent Carb (60 g CHO per Meal) Diet     Output: No intake/output data recorded.     Labs: Recent Labs   Lab 09/23/22  0106   ALBUMIN 2.3*   HGB 10.1*   WBC 13.6*     Pressure injury risk assessment:   Sensory Perception: 4-->no  impairment  Moisture: 4-->rarely moist  Activity: 3-->walks occasionally  Mobility: 3-->slightly limited  Nutrition: 3-->adequate  Friction and Shear: 3-->no apparent problem  Reji Score: 20    Salvatore Paul RN CWOCN  Dept. Pager: [phone]  Dept. Office Number: [phone]

## 2022-09-24 NOTE — PLAN OF CARE
Goal Outcome Evaluation:  Shift Summary [id number]    Admitting Diagnosis: Cellulitis of left lower extremity [L03.116]  Diabetic ulcer of left foot associated with type 2 diabetes mellitus, with muscle involvement without evidence of necrosis, unspecified part of foot (H) [E11.621, L97.525]   Vitals : STABLE.   Pain 0/10.   A&Ox4  Voiding : BEDSIDE Urinal.   Mobility : NWB on LLE. Right BKA with prosthetic. Ax1 transfer in WC.     CMS : NUMBNESS IN lle.   Lung Sounds CLEAR on ROOM AIR   GI :wdl.  Dressing : WOC NURSE managing wound on LLE.     Orders Placed This Encounter      Moderate Consistent Carb (60 g CHO per Meal) Diet       Plan: Vascular and ID team following patient. US done tonight. [facility]RI ordered as well.

## 2022-09-24 NOTE — PROVIDER NOTIFICATION
Hospitalist service cross cover:    Contacted by radiologist regarding osteo and subcutaneous air seen on L foot/ankle MRI with c/f necrotizing infection.      PLAN:  -- paged vascular surgery fellow Dr. ERICA Kinsey, who is on-call, updated him on patient's case, patient appears stable per chart review, will see patient in the morning.      Please page with additional concerns,     LILIANA Bacon, CNP  Hospitalist  Text Page

## 2022-09-24 NOTE — PROVIDER NOTIFICATION
MD Notification    Notified Person: NP     Notified Person Name: Mariia Alvares    Notification Date/Time:  2245 9/23/22    Notification Interaction: Paged    Purpose of Notification: 2413 EP  [facility] will like a call back on [phone]    Orders Received:    Comments:

## 2022-09-24 NOTE — PROGRESS NOTES
INPATIENT PROGRESS NOTE  [facility] PODIATRY / FOOT & ANKLE SURGERY  Attending Physician: Deb Shukla   Admission Date:  9/23/2022  Today's Date:  9/24/2022   PCP: Kody Wing        Assessment:    Cellulitis of left lower extremity [L03.116]  Diabetic ulcer of left foot associated with type 2 diabetes mellitus, with muscle involvement without evidence of necrosis, unspecified part of foot (H) [E11.621, L97.525]     Active Problems:    Cellulitis of left lower extremity      Diabetic ulcer of left foot associated with type 2 diabetes mellitus, with muscle involvement without evidence of necrosis, unspecified part of foot (H)        Plan:  Recommend BKA.  Discussed this with patient, and Vascular would be one to perform      -Incision/wound:  Re pacl  -WB:  WB left lower extremity in surgical shoe  -Abx:  Continue current   -Labs:  Needs updated ESR, CRP  -Imaging:  Needs updated x-rays if going to attempt foot salvage      Diane Guevara DPM          24-Hr Events & Subjective:   Up in bed  States MRI was done yesterday  Saw vascular yesterday    R BKA - took him 3 yrs to heal from per pt    Review of Systems:  A 10-point ROS was conducted.  Negative except infection L lower extremity     Problem List:  Hospital:  Active Problems:    Cellulitis of left lower extremity (9/23/2022)    Diabetic ulcer of left foot associated with type 2 diabetes mellitus, with muscle involvement without evidence of necrosis, unspecified part of foot (H) (9/23/2022)      Full:  Patient Active Problem List    Diagnosis Date Noted     Cellulitis of left lower extremity 09/23/2022     Priority: Medium     Diabetic ulcer of left foot associated with type 2 diabetes mellitus, with muscle involvement without evidence of necrosis, unspecified part of foot (H) 09/23/2022     Priority: Medium     Open wound of plantar aspect of foot, left, subsequent encounter 10/01/2021     Priority: Medium     Chronic kidney disease,  stage 3 (H) 07/01/2021     Priority: Medium     Morbid obesity (H) 02/22/2021     Priority: Medium     Cellulitis in diabetic foot (H) 02/03/2020     Priority: Medium     Type 2 diabetes mellitus with diabetic polyneuropathy, without long-term current use of insulin (H) 07/11/2019     Priority: Medium     Status post below knee amputation, right (H) 09/14/2017     Priority: Medium     Open wound of right foot 08/28/2017     Priority: Medium     Non compliance w medication regimen 02/09/2015     Priority: Medium     Leukocytosis 02/06/2015     Priority: Medium     SIRS due to infectious process without acute organ dysfunction 02/06/2015     Priority: Medium     Diabetic foot infection (H) 02/06/2015     Priority: Medium     Cellulitis of eyelid 06/27/2014     Priority: Medium     Cellulitis and abscess of buttock 10/30/2011     Priority: Medium     Hypertension 10/30/2011     Priority: Medium     Tachycardia 10/30/2011     Priority: Medium     Diabetes mellitus, type 2 (H) 04/04/2007     Priority: Medium     Problem list name updated by automated process. Provider to review    Formatting of this note might be different from the original.  a system change updated this record. This will not affect patient care or billing. This comment can be deleted.       Essential hypertension, benign 04/04/2007     Priority: Medium     Hyperlipidemia LDL goal <100 04/04/2007     Priority: Medium     Problem list name updated by automated process. Provider to review       Impotence of organic origin 04/04/2007     Priority: Medium        PMH:  Past Medical History:   Diagnosis Date     BENIGN HYPERTENSION 4/4/2007     DIABETES MELLITUS TYPE II-UNCOMPL 4/4/2007     HYPERLIPIDEMIA NEC/NOS 4/4/2007     Tobacco use disorder 4/4/2007        PSH:  Past Surgical History:   Procedure Laterality Date     AMPUTATE FOOT Left 11/17/2019    Procedure: LEFT PARTIAL FOOT AMPUTATION;  Surgeon: Antoine Pena DPM;  Location: [facility] OR     AMPUTATE  FOOT Left 12/4/2019    Procedure: LEFT PARTIAL FOOT AMPUTATION;  Surgeon: Tim Douglas DPM;  Location: [facility]     AMPUTATE FOOT Left 12/11/2019    Procedure: POSSIBLE PARTIAL FOOT AMPUTATION;  Surgeon: Tim Douglas DPM;  Location: [facility] OR     AMPUTATE FOOT Left 2/10/2022    Procedure: Partial left foot amputation;  Surgeon: Milena Cevallos DPM, Podiatry/Foot and Ankle Surgery;  Location: [facility] OR     AMPUTATE LEG BELOW KNEE Right 9/1/2017    Procedure: AMPUTATE LEG BELOW KNEE;  RIGHT BELOW KNEE AMPUTATION ;  Surgeon: Nikolas Nascimento MD;  Location: [facility] OR     AMPUTATE TOE(S) Left 2/3/2020    Procedure: LEFT SECOND TOE AMPUTATION;  Surgeon: Milena Cevallos DPM, Podiatry/Foot and Ankle Surgery;  Location: [facility] OR     APPENDECTOMY       APPLY WOUND VAC Right 3/2/2015    Procedure: APPLY WOUND VAC;  Surgeon: Milena Cevallos DPM, Pod;  Location: [facility] OR     BIOPSY BONE FOOT Right 7/15/2016    Procedure: BIOPSY BONE FOOT;  Surgeon: Tim Douglas DPM;  Location: [facility]     BIOPSY BONE TOE Left 12/4/2019    Procedure: BONE BIOPSY LEFT SECOND TOE;  Surgeon: Tim Douglas DPM;  Location: [facility]     IRRIGATION AND DEBRIDEMENT FOOT, COMBINED Right 3/2/2015    Procedure: COMBINED IRRIGATION AND DEBRIDEMENT FOOT;  Surgeon: Milena Cevallos DPM, Pod;  Location: [facility] OR     IRRIGATION AND DEBRIDEMENT FOOT, COMBINED Right 7/15/2016    Procedure: COMBINED IRRIGATION AND DEBRIDEMENT FOOT;  Surgeon: Tim Douglas DPM;  Location: [facility]     IRRIGATION AND DEBRIDEMENT FOOT, COMBINED Right 7/20/2016    Procedure: COMBINED IRRIGATION AND DEBRIDEMENT FOOT;  Surgeon: Tim Douglas DPM;  Location: [facility]     IRRIGATION AND DEBRIDEMENT FOOT, COMBINED Left 11/19/2019    Procedure: REVISIONAL IRRIGATION AND DEBRIDEMENT LEFT FOOT AND BONE DEBRIDEMENT;  Surgeon: Joseph Randhawa DPM;  Location: [facility]     IRRIGATION AND DEBRIDEMENT FOOT, COMBINED Left 12/4/2019    Procedure: EXCISIONAL DEBRIDEMENT LEFT FOOT;   Surgeon: Tim Douglas DPM;  Location: [facility]     IRRIGATION AND DEBRIDEMENT FOOT, COMBINED Left 12/11/2019    Procedure: IRRIGATION AND DEBRIDEMENT FOOT, Partial osteotomy left first metatarsal;  Surgeon: Tim Douglas DPM;  Location: [facility]     IRRIGATION AND DEBRIDEMENT FOOT, COMBINED Left 2/5/2020    Procedure: IRRIGATION AND DEBRIDEMENT LEFT FOOT;  Surgeon: Tim Douglas DPM;  Location:  OR     ORTHOPEDIC SURGERY         Medications:    Current Facility-Administered Medications:      acetaminophen (TYLENOL) tablet 650 mg, 650 mg, Oral, Q6H PRN, 650 mg at 09/23/22 0652 **OR** acetaminophen (TYLENOL) Suppository 650 mg, 650 mg, Rectal, Q6H PRN, Deb Shukla     aspirin (ASA) chewable tablet 81 mg, 81 mg, Oral, Daily, Gloria Chester MD, 81 mg at 09/24/22 1013     glucose gel 15-30 g, 15-30 g, Oral, Q15 Min PRN **OR** dextrose 50 % injection 25-50 mL, 25-50 mL, Intravenous, Q15 Min PRN **OR** glucagon injection 1 mg, 1 mg, Subcutaneous, Q15 Min PRN, Gloria Chester MD     ezetimibe (ZETIA) tablet 10 mg, 10 mg, Oral, Daily, Gloria Chester MD, 10 mg at 09/24/22 1013     heparin ANTICOAGULANT injection 5,000 Units, 5,000 Units, Subcutaneous, Q12H, Deb Shukla, 5,000 Units at 09/24/22 0623     insulin aspart (NovoLOG) injection (RAPID ACTING), 1-7 Units, Subcutaneous, TID AC, Gloria Chester MD, 1 Units at 09/24/22 1238     insulin aspart (NovoLOG) injection (RAPID ACTING), 1-5 Units, Subcutaneous, At Bedtime, Gloria Chester MD     insulin degludec (TRESIBA) 100 UNIT/ML injection 20 Units, 20 Units, Subcutaneous, BID, Gloria Chester MD, 20 Units at 09/24/22 0819     ketorolac (ACULAR) 0.5 % ophthalmic solution 1 drop, 1 drop, Right Eye, 4x Daily, Gloria Chester MD, 1 drop at 09/24/22 1238     lidocaine (LMX4) cream, , Topical, Q1H PRN, Deb Shukla     lidocaine 1 % 0.1-1 mL, 0.1-1 mL, Other, Q1H PRN, Deb Shukla     lisinopril (ZESTRIL) tablet 40  "mg, 40 mg, Oral, Daily, Gloria Chester MD, 40 mg at 22 1013     melatonin tablet 1 mg, 1 mg, Oral, At Bedtime PRN, Deb Shukla     ondansetron (ZOFRAN ODT) ODT tab 4 mg, 4 mg, Oral, Q6H PRN **OR** ondansetron (ZOFRAN) injection 4 mg, 4 mg, Intravenous, Q6H PRN, Deb Shukla     piperacillin-tazobactam (ZOSYN) 3.375 g vial to attach to  mL bag, 3.375 g, Intravenous, Q6H, Deb Shukla, Last Rate: 0 mL/hr at 22 0442, 3.375 g at 22 1235     prednisoLONE acetate (PRED FORTE) 1 % ophthalmic susp 1 drop, 1 drop, Right Eye, 4x Daily, Gloria Chester MD, 1 drop at 22 1239     sodium chloride (PF) 0.9% PF flush 3 mL, 3 mL, Intracatheter, Q8H, Deb Shukla, 3 mL at 22 2307     sodium chloride (PF) 0.9% PF flush 3 mL, 3 mL, Intracatheter, q1 min prn, Deb Shukla     [COMPLETED] 0.9% sodium chloride BOLUS, 500 mL, Intravenous, Once, Stopped at 22 0442 **FOLLOWED BY** sodium chloride 0.9% infusion, , Intravenous, Continuous, Deb Shukla, Last Rate: 100 mL/hr at 22 1115, New Bag at 22 1115     Allergies:      Allergies   Allergen Reactions     Pravastatin      \"sucked the life blood out of me,\" Indicates this occurs with all statins.       Social History:    Tobacco Use      Smoking status: Former Smoker        Packs/day: 0.50        Years: 20.00        Pack years: 10        Types: Cigarettes        Start date: 1987        Quit date: 2006        Years since quittin.7      Smokeless tobacco: Never Used     History   Drug Use No     Social History    Substance and Sexual Activity      Alcohol use: Yes        Comment: 3-4 drinks per month      Family History:  Family History   Problem Relation Age of Onset     Genetic Disorder Other      Genetic Disorder Other      Psychotic Disorder Mother      Diabetes Father      Lung Cancer Father      Hypertension Father      Genetic Disorder Maternal Grandmother      Genetic Disorder " Maternal Grandfather      Asthma Sister      Breast Cancer Sister      C.A.D. No family hx of      Hypertension No family hx of      Cerebrovascular Disease No family hx of      Breast Cancer No family hx of      Cancer - colorectal No family hx of      Prostate Cancer No family hx of      Alcohol/Drug No family hx of        PHYSICAL EXAMINATION  Vital signs:  Temp: 98.2  F (36.8  C) Temp src: Oral BP: (!) 140/81 Pulse: 93   Resp: 16 SpO2: 97 % O2 Device: None (Room air)   Height: 182.9 cm (6') Weight: 113.4 kg (250 lb)  Estimated body mass index is 33.91 kg/m  as calculated from the following:    Height as of this encounter: 1.829 m (6').    Weight as of this encounter: 113.4 kg (250 lb).    Constitutional:  WD/WN, in NAD.  Psych:  A&O x3.  Normal affect and judgement.  Eyes: PERRLA, EOMFI.      ENMT: Dentition intact.  Hearing is intact to spoken word.  Resp:  Regular rate and effort.   Lower Extremity:  Dermatologic:    Skin is intact, open lesions present L foot TMA, ulcers distal leg, L.   Exposed extensor tendons anterior distal leg, L  Skin texture, turgor is abnormal, indurated.  Vascular:  Pulses no tpalpable left, due to edema.  Skin temperature is warm left.  Generalized edema- pitting left.  Focal edema- marked  Lower extremity left.  Neurologic:    Gross sensation normal.  Gait and balance abnormal, BKA R.  Musculoskeletal:  No pain to palpation of foot & ankle  aROM intact to foot & ankle  Muscle strength intact foot & ankle    Prior amputation(s):   TMA L, BKA R        Labs/Imaging:        CBC RESULTS:   Recent Labs   Lab Test 09/24/22  0639   WBC 8.7   RBC 3.40*   HGB 9.1*   HCT 28.6*   MCV 84   MCH 26.8   MCHC 31.8   RDW 12.6            Sed Rate   Date Value Ref Range Status   12/03/2019 88 (H) 0 - 20 mm/h Final       CRP Inflammation   Date Value Ref Range Status   02/02/2020 125.0 (H) 0.0 - 8.0 mg/L Final         Hemoglobin A1C   Date Value Ref Range Status   04/04/2022 7.7 (H) 0.0 - 5.6 %  Final     Comment:     Normal <5.7%   Prediabetes 5.7-6.4%    Diabetes 6.5% or higher     Note: Adopted from ADA consensus guidelines.   06/25/2021 8.5 (H) 0 - 5.6 % Final     Comment:     Normal <5.7% Prediabetes 5.7-6.4%  Diabetes 6.5% or higher - adopted from ADA   consensus guidelines.  Reviewed: OK with previous          All cultures:  No results for input(s): CULTURE in the last 168 hours.    Pathology:  No results found for this or any previous visit (from the past 8760 hour(s)).     Imaging:  sonogram independently reviewed and interpreted by myself today.   left ankle dated 9/23/22, reveal T1 marrow edema into Cuneiform bones, diffuse circumferential cellulitis to rearfoot, ulcers anterior distal leg.      x-ray independently reviewed and interpreted by myself today.  Non-Weight-bearing views left foot dated 6/17/22, reveal TMA left

## 2022-09-24 NOTE — PROGRESS NOTES
[facility]    Hospitalist Progress Note    Interval History   Patient is awake and alert.  No acute events overnight.  Left lower extremity dressing in place.  No significant pain.  Afebrile.  Underwent MRI yesterday.  R  -Data reviewed today: I reviewed all new labs and imaging results over the last 24 hours. I personally reviewed the MRI left foot and left ankle image(s) showing As reported below.    Physical Exam   Temp: 98.2  F (36.8  C) Temp src: Oral BP: (!) 140/81 Pulse: 93   Resp: 16 SpO2: 97 % O2 Device: None (Room air)    Vitals:    09/23/22 0038   Weight: 113.4 kg (250 lb)     Vital Signs with Ranges  Temp:  [98.2  F (36.8  C)-99  F (37.2  C)] 98.2  F (36.8  C)  Pulse:  [] 93  Resp:  [16] 16  BP: (137-140)/(81-86) 140/81  SpO2:  [96 %-99 %] 97 %  I/O last 3 completed shifts:  In: -   Out: 1050 [Urine:1050]    Physical Exam  Constitutional:       Appearance: Normal appearance.   HENT:      Head: Normocephalic.   Eyes:      Pupils: Pupils are equal, round, and reactive to light.   Cardiovascular:      Rate and Rhythm: Normal rate and regular rhythm.      Pulses: Normal pulses.      Heart sounds: Normal heart sounds.   Pulmonary:      Effort: Pulmonary effort is normal. No respiratory distress.      Breath sounds: Normal breath sounds.   Abdominal:      General: Abdomen is flat. Bowel sounds are normal. There is no distension.      Tenderness: There is no abdominal tenderness. There is no guarding.   Musculoskeletal:         General: Normal range of motion.      Cervical back: Normal range of motion.      Comments: Right below-knee amputation.  Left lower extremity transmetatarsal amputation with wound VAC therapy successful at and multiple wounds in the ankle and lower leg sites.  Pictures in H&P.  Currently dressing in place.   Skin:     General: Skin is warm and dry.   Neurological:      General: No focal deficit present.   Psychiatric:         Mood and Affect: Mood normal.            Medications     sodium chloride 100 mL/hr at 09/24/22 1115       aspirin  81 mg Oral Daily     ezetimibe  10 mg Oral Daily     heparin ANTICOAGULANT  5,000 Units Subcutaneous Q12H     insulin aspart  1-7 Units Subcutaneous TID AC     insulin aspart  1-5 Units Subcutaneous At Bedtime     insulin degludec  20 Units Subcutaneous BID     ketorolac  1 drop Right Eye 4x Daily     lisinopril  40 mg Oral Daily     piperacillin-tazobactam  3.375 g Intravenous Q6H     prednisoLONE acetate  1 drop Right Eye 4x Daily     sodium chloride (PF)  3 mL Intracatheter Q8H       Data   Recent Labs   Lab 09/24/22  0726 09/24/22  0639 09/23/22  2115 09/23/22  1137 09/23/22  0106   WBC  --  8.7  --   --  13.6*   HGB  --  9.1*  --   --  10.1*   MCV  --  84  --   --  86   PLT  --  331  --   --  377   NA  --  135  --   --  135   POTASSIUM  --  4.3  --   --  4.8   CHLORIDE  --  104  --   --  99   CO2  --  28  --   --  27   BUN  --  31*  --   --  48*   CR  --  1.48*  --   --  2.33*   ANIONGAP  --  3  --   --  9   FERNANDO  --  8.6  --   --  8.6   * 160* 160*   < > 176*   ALBUMIN  --   --   --   --  2.3*   PROTTOTAL  --   --   --   --  8.1   BILITOTAL  --   --   --   --  0.4   ALKPHOS  --   --   --   --  102   ALT  --   --   --   --  58   AST  --   --   --   --  39    < > = values in this interval not displayed.       Recent Results (from the past 24 hour(s))   US KAREN Doppler No Exercise    Narrative    EXAM: RESTING ANKLE-BRACHIAL INDICES (ABIs)  LOCATION: [facility]  DATE/TIME: 9/23/2022 4:45 PM    INDICATION: non healing wound  LEFT LOWER EXTREMITY ONLY  COMPARISON: None.    KAREN FINDINGS:    LEFT  Brachial: 134  Ankle (PT): 203 Index: 1.5  Ankle (DP): Not obtainable Index: Not obtainable due to bandages/wound    The left KAREN at rest is 1.5.      WAVEFORMS: The dorsalis pedis and posterior tibial arteries are triphasic in the left PTA..      Impression    IMPRESSION:    1.  LEFT LOWER EXTREMITY: Left leg  resting KAREN of 1.5 is elevated likely due to poorly compressible calcified arteries. The waveforms in the left PTA is biphasic. Unable to obtain pressures in the ankle and toes due to overlying bandages and wounds.   MR Foot Left w/o Contrast    Narrative    FINAL REPORT:     FINDINGS:    BONES AND JOINTS:  -First ray and second-fourth transmetatarsal amputations.  -Reticulated edema type signal in the navicular, medial cuneiform, intermediate cuneiform, lateral cuneiform, residual second metatarsal, and residual third metatarsal. Subtle confluent decreased T1 signal in the residual second metatarsal.  -Foci of heterotopic ossification at the amputation margin.    MUSCLES AND SOFT TISSUES:   -Soft tissue attenuation as ulceration in the anterior ankle, abutting the tibialis anterior tendon. Moderate heterogeneous fluid (with susceptibility artifact) within the tibialis anterior tendon sheath.  -Soft tissue ulcer in the anterolateral ankle this measures 15 mm in width and extends into the superficial extensor tendon fascia.  -Heterogeneous fluid (with susceptibility artifact) in the soft tissues of the dorsal midfoot near the tibialis anterior insertion. This measures 37 x 39 x 21 mm (AP, LR, SI).  -Soft tissue ulcer in the medial aspect of the amputation stump. The musculature deep is heterogeneous and demonstrates increased signal. This area measures 33 x 45 x 44 mm.  -Soft tissue fluid collection (with a dependent focus of susceptibility artifact) in the dorsal-lateral mid foot (level of the residual second metatarsal). This measures 18 mm in greatest dimension.  -Generalized subcutaneous edema in the foot.      Impression    IMPRESSION:  1.  First ray and second-fifth transmetatarsal amputations.  2.  Probable second metatarsal osteomyelitis.  3.  Osteitis of the navicular, medial cuneiform, intermediate cuneiform, lateral cuneiform, and residual third metatarsal. These areas are at high risk of osteomyelitis  development.  4.  Soft tissue ulcers in the anterior ankle, anterolateral ankle, and anteromedial amputation stump.  5.  Exposed tibialis anterior tendon in the anterior ankle, with extensive tibialis anterior emphysematous tenosynovitis. Abscess and emphysematous infection at the tibialis anterior tendon insertion.  6.  Extensive myositis in the medial amputation stump.  7.  Soft tissue abscess in the dorsal-lateral mid foot (at the level of the second metatarsal). This contains a focus of gas.  8.  I agree with the preliminary report.    Final Interpretation Dictated By: Kody Peters MD  Date: 09/24/2022         PRELIMINARY REPORT     EXAM: MR FOOT LEFT W/O CONTRAST  LOCATION: [facility]  DATE/TIME: 9/23/2022 10:16 PM    INDICATION: Plantar medial ulcer, evaluate for osteomyelitis.  COMPARISON: MRI of the foot with and without contrast performed 1/25/2022.  TECHNIQUE: Unenhanced.    IMPRESSION:  1.  Areas of low T1 and elevated T2 and STIR signal in the residual fore and midfoot involving the residual first and second metatarsal and to a less extent the cuneiform bones consistent with osteomyelitis. Mildly elevated STIR and T2 signal in the   navicular without corresponding T1 hypointensity, may reflect bony edema, correlate to exclude early evolving osteomyelitis. Consider correlation with plain film.  2.  1.8 x 1.4 cm air-fluid collection within the dorsal soft tissues of the residual forefoot consistent with abscess.  3.  Marked edema of the soft tissues overlying a transmetatarsal amputation of the left foot with punctate foci of subcutaneous emphysema seen along the dorsal soft tissues of the mid foot and anterior ankle, correlate to exclude necrotizing infection.   Soft tissue defect along the medial aspect of the amputation site, correlate to exclude wound dehiscence or ulcer.    Preliminary Interpretation Dictated By: John F. Brunner, MD  Date: 9/23/2022 10:49 PM     MR Ankle  Left w/o Contrast    Narrative    FINAL REPORT    FINDINGS:    BONES AND JOINTS:  -First ray and second-fourth transmetatarsal amputations.  -Reticulated edema-type signal in the navicular, medial cuneiform, intermediate cuneiform, lateral cuneiform, residual second metatarsal, and residual third metatarsal. Subtle confluent decreased T1 signal in the residual second metatarsal.  -Foci of heterotopic ossification at the amputation margin.    MUSCLES AND SOFT TISSUES:   -Soft tissue attenuation-ulceration in the anterior ankle, abutting the tibialis anterior tendon. Moderate heterogeneous fluid (with susceptibility artifact) within the tibialis anterior tendon sheath.  -Soft tissue ulcer in the anterolateral ankle. This measures 15 mm in width and extends into the superficial extensor tendon fascia.  -Heterogeneous fluid (with susceptibility artifact) in the soft tissues of the dorsal midfoot near the tibialis anterior insertion. This measures 37 x 39 x 21 mm (AP, LR, SI).  -Soft tissue ulcer in the medial aspect of the amputation stump. The musculature deep is heterogeneous and demonstrates increased signal. This area measures 33 x 45 x 44 mm.  -Soft tissue fluid collection (with a dependent focus of susceptibility artifact) in the dorsal-lateral mid foot (level of the residual second metatarsal). This measures 18 mm in greatest dimension.  -Generalized subcutaneous edema in the foot.      Impression    IMPRESSION:  1.  First ray and second-fifth transmetatarsal amputations.  2.  Probable second metatarsal osteomyelitis.  3.  Osteitis of the navicular, medial cuneiform, intermediate cuneiform, lateral cuneiform, and residual third metatarsal. These areas are at high risk of osteomyelitis development.  4.  Soft tissue ulcers in the anterior ankle, anterolateral ankle, and anteromedial amputation stump.  5.  Exposed tibialis anterior tendon in the anterior ankle, with extensive tibialis anterior emphysematous  tenosynovitis. Abscess and emphysematous infection at the tibialis anterior tendon insertion.  6.  Extensive myositis in the medial amputation stump.  7.  Soft tissue abscess in the dorsal-lateral mid foot (at the level of the second metatarsal). This contains a focus of gas.  8.  I agree with the preliminary report.    Final Interpretation Dictated By: Kody Peters MD  Date: 09/24/2022         PRELIMINARY REPORT     EXAM: MR FOOT LEFT W/O CONTRAST  LOCATION: [facility]  DATE/TIME: 9/23/2022 10:16 PM    INDICATION: Plantar medial ulcer, evaluate for osteomyelitis.  COMPARISON: MRI of the foot with and without contrast performed 1/25/2022.  TECHNIQUE: Unenhanced.    IMPRESSION:  1.  Areas of low T1 and elevated T2 and STIR signal in the residual fore and midfoot involving the residual first and second metatarsal and to a less extent the cuneiform bones consistent with osteomyelitis. Mildly elevated STIR and T2 signal in the   navicular without corresponding T1 hypointensity, may reflect bony edema, correlate to exclude early evolving osteomyelitis. Consider correlation with plain film.  2.  1.8 x 1.4 cm air-fluid collection within the dorsal soft tissues of the residual forefoot consistent with abscess.  3.  Marked edema of the soft tissues overlying a transmetatarsal amputation of the left foot with punctate foci of subcutaneous emphysema seen along the dorsal soft tissues of the mid foot and anterior ankle, correlate to exclude necrotizing infection.   Soft tissue defect along the medial aspect of the amputation site, correlate to exclude wound dehiscence or ulcer.    PRELIMINARY REPORT    EXAM: MR ANKLE LEFT W/O CONTRAST  LOCATION: [facility]  DATE/TIME: 9/23/2022 10:43 PM    INDICATION: Status post left leg infection I and D, now with exposed tendon. Evaluate for osteomyelitis.  COMPARISON: None.  TECHNIQUE: Unenhanced.    IMPRESSION:  1.  Please, see MRI  without contrast of the left foot performed same day. Again seen is irregular high T2 and STIR signal with low T1 signal involving the residual first and second metatarsals, cuneiform bones and, to a lesser extent, the navicular,   correlate to exclude osteomyelitis.  2.  Extensive soft tissue swelling with subcutaneous emphysema involving the dorsal aspects of the forefoot and proximal anterior ankle with evidence of myositis and subcutaneous emphysema, correlate to exclude deep space infection. Additionally seen is   a soft tissue anterolaterally just cranial to the lateral malleolus.    Preliminary Interpretation Dictated By: John F. Brunner, MD  Date: 9/23/2022 11:03 PM           Assessment & Plan      Ry Horner is a 57 year old male with past medical history significant for type II DM, hypertension, dyslipidemia, CKD III, non-healing diabetic foot wounds with multiple prior amputations admitted on 9/23/2022 with left lower extremity wounds.      Multiple left lower extremity wounds   Hx of non-healing diabetic ulcers   S/p R BKA   S/p transmetatarsal amputation of the left foot   Leukocytosis   Pt has a hx of non-healing diabetic ulceration of the plantar aspect of th left foot. He has been following with wound clinic and podiatry for this. He went on a cruise to [facility] and developed a bad infection of the ulceration that apparently spread up his leg to include his shin. He was ultimately hospitalized in [place] and for several days on IV antibitotics (it is unclear what antibiotics he was on in the hospital). He needed multiple surgical debridements during this hospitalization. It is unclear how deep the infection is or if there is any bony involvement. It looks like one culture grew strep parasanguinosis. Ultimately he wanted to come back to the [place] so was placed on PO ciprofloxacin to take while traveling and was instructed to come straight to the hospital after landing.   * He does have a mild leukocytosis,  but is otherwise non-toxic and non-septic appearing.  * Lower extremity wounds are pictured below.  Wound care consult placed.  - Vascular surgery consult.  KAREN of the left leg ordered.  Shows an elevated resting KAREN of 1.5 likely due to poorly compressible calcified arteries.  Waveform is biphasic.  Pressures could not be obtained due to overlying bandages.  Awaiting final recommendations.  - Podiatry consult .  MRI left foot and ankle obtained.  Shows underlying osteomyelitis and osteitis with early abscess on the dorsum of the foot.  Please review image report above.  Awaiting final podiatry recommendations.  - Infectious disease consult .recommended continuing Zosyn for now.  Awaiting surgery recommendations.    - Defer imaging to vascular/podiatry   - Will start vancomycin and zosyn tonight   - NPO in case additional debridement is needed in AM      Type II DM  Diabetic polyneuropathy and diabetic nephropathy   Hgb A1C was 7.7% in April 2022   - MDSSI .  Carb controlled diet.  - Hold jardiance, januvia, Tresiba and metformin for now      JOHNATHAN on CKD stage III   Baseline creatinine appears to be around 1.7. Creatinine on admission is 2.33 with BUN of 48 and GFR of 32. Pt reports not drinking much water throughout the course of his flight.   - Continue IV Fluids  - Monitor renal function   - Avoid nephrotoxins      HTN   - Hold PTA lisinopril due to JOHNATHAN and current soft blood pressures      Dyslipidemia   - Continue PTA ASA, Ezetimibe when verified. Not on statin due to allergy.      Chronic Anemia   Hemoglobin on admission is 10.1. This is down slightly from his baseline of around 12.   - Monitor hemoglobin.      Hypoalbuminemia: Albumin = 2.3 g/dL (Ref range: 3.4 - 5.0 g/dL) on admission, will monitor as appropriate.    Obesity: Estimated body mass index is 33.91 kg/m  as calculated from the following:    Height as of this encounter: 1.829 m (6').    Weight as of this encounter: 113.4 kg (250  lb).        Diet:diabetic diet    DVT Prophylaxis: Heparin SQ  [place] Catheter: Not present  Central Lines: None  Cardiac Monitoring: None  Code Status: Full Code              Clinically Significant Risk Factors Present on Admission               # Obesity: Estimated body mass index is 33.91 kg/m  as calculated from the following:    Height as of this encounter: 1.829 m (6').    Weight as of this encounter: 113.4 kg (250 lb).        Gloria Chester MD, MD  [phone](p)

## 2022-09-24 NOTE — PROGRESS NOTES
Infectious disease brief note:   Chart reviewed   No new micro   Vital signs:  Temp: 98.2  F (36.8  C) Temp src: Oral BP: (!) 140/81 Pulse: 93   Resp: 16 SpO2: 97 % O2 Device: None (Room air)   Height: 182.9 cm (6') Weight: 113.4 kg (250 lb)  Estimated body mass index is 33.91 kg/m  as calculated from the following:    Height as of this encounter: 1.829 m (6').    Weight as of this encounter: 113.4 kg (250 lb).    MRI reviewed  IMPRESSION:  1.  First ray and second-fifth transmetatarsal amputations.  2.  Probable second metatarsal osteomyelitis.  3.  Osteitis of the navicular, medial cuneiform, intermediate cuneiform, lateral cuneiform, and residual third metatarsal. These areas are at high risk of osteomyelitis development.  4.  Soft tissue ulcers in the anterior ankle, anterolateral ankle, and anteromedial amputation stump.  5.  Exposed tibialis anterior tendon in the anterior ankle, with extensive tibialis anterior emphysematous tenosynovitis. Abscess and emphysematous infection at the tibialis anterior tendon insertion.  6.  Extensive myositis in the medial amputation stump.  7.  Soft tissue abscess in the dorsal-lateral mid foot (at the level of the second metatarsal). This contains a focus of gas.  8.  I agree with the preliminary report.     On zosyn to continue   Will follow up on the surgical plan    [place] Maza MD  Infectious Disease

## 2022-09-24 NOTE — PROGRESS NOTES
I have reviewed the noninvasive arterial studies.  As per my clinical examination, history as detailed by the patient that he was examined and evaluated by vascular surgeon in [place] and findings of the noninvasive study that were done today I do not suspect arterial ischemia as a significant contributor to the development of nonhealing of the wounds in the left lower extremity.    I had a brief discussion with Dr. Lainez and we are awaiting the results of the MRI for further plans.  At least at this point I do not feel that the patient needs any further arterial evaluation or an arteriogram.

## 2022-09-24 NOTE — PROGRESS NOTES
Patient A/O X4, calm this shift. No fever or chills, ongoing antibiotic therapy. Left lower leg wound wrapped with kirlex, patient denies pain despite asserting he has intact sensation. Super nice man, always in a good spirit despite health concern. Eagerly waiting to discuss MRI result with providers to determine course of action.

## 2022-09-24 NOTE — PROGRESS NOTES
19:00-23:30    Pt is A&O x4. VSS. .9 NS infusing at 100 ml/hr. Pt denies pain. Serous drainage noted on the Left foot dressing. Pt transferred to wheelchair to [facility] for an MRI. Pt states numbness of the Left foot. Pt is voiding adequately per bedside urinal. Tolerating a Mod Carb diet. Needs assistance of one for transfers with use of R leg prosthetic. Nio weight bearing of the Left leg.

## 2022-09-25 LAB
ANION GAP SERPL CALCULATED.3IONS-SCNC: 4 MMOL/L (ref 3–14)
BUN SERPL-MCNC: 22 MG/DL (ref 7–30)
CALCIUM SERPL-MCNC: 8.4 MG/DL (ref 8.5–10.1)
CHLORIDE BLD-SCNC: 106 MMOL/L (ref 94–109)
CO2 SERPL-SCNC: 28 MMOL/L (ref 20–32)
CREAT SERPL-MCNC: 1.26 MG/DL (ref 0.66–1.25)
CRP SERPL HS-MCNC: 53.5 MG/L
ERYTHROCYTE [SEDIMENTATION RATE] IN BLOOD BY WESTERGREN METHOD: 85 MM/HR (ref 0–20)
GFR SERPL CREATININE-BSD FRML MDRD: 67 ML/MIN/1.73M2
GLUCOSE BLD-MCNC: 182 MG/DL (ref 70–99)
GLUCOSE BLDC GLUCOMTR-MCNC: 150 MG/DL (ref 70–99)
GLUCOSE BLDC GLUCOMTR-MCNC: 182 MG/DL (ref 70–99)
GLUCOSE BLDC GLUCOMTR-MCNC: 209 MG/DL (ref 70–99)
GLUCOSE BLDC GLUCOMTR-MCNC: 222 MG/DL (ref 70–99)
GLUCOSE BLDC GLUCOMTR-MCNC: 290 MG/DL (ref 70–99)
POTASSIUM BLD-SCNC: 4.2 MMOL/L (ref 3.4–5.3)
SODIUM SERPL-SCNC: 138 MMOL/L (ref 133–144)
TRIGL SERPL-MCNC: 164 MG/DL

## 2022-09-25 PROCEDURE — 99233 SBSQ HOSP IP/OBS HIGH 50: CPT | Performed by: HOSPITALIST

## 2022-09-25 PROCEDURE — 86141 C-REACTIVE PROTEIN HS: CPT | Performed by: HOSPITALIST

## 2022-09-25 PROCEDURE — 99232 SBSQ HOSP IP/OBS MODERATE 35: CPT | Performed by: SURGERY

## 2022-09-25 PROCEDURE — 999N000128 HC STATISTIC PERIPHERAL IV START W/O US GUIDANCE

## 2022-09-25 PROCEDURE — 120N000001 HC R&B MED SURG/OB

## 2022-09-25 PROCEDURE — 84478 ASSAY OF TRIGLYCERIDES: CPT | Performed by: HOSPITALIST

## 2022-09-25 PROCEDURE — 82310 ASSAY OF CALCIUM: CPT | Performed by: HOSPITALIST

## 2022-09-25 PROCEDURE — 250N000013 HC RX MED GY IP 250 OP 250 PS 637: Performed by: HOSPITALIST

## 2022-09-25 PROCEDURE — 85652 RBC SED RATE AUTOMATED: CPT | Performed by: HOSPITALIST

## 2022-09-25 PROCEDURE — 36415 COLL VENOUS BLD VENIPUNCTURE: CPT | Performed by: HOSPITALIST

## 2022-09-25 PROCEDURE — 250N000011 HC RX IP 250 OP 636: Performed by: STUDENT IN AN ORGANIZED HEALTH CARE EDUCATION/TRAINING PROGRAM

## 2022-09-25 RX ORDER — IPRATROPIUM BROMIDE 21 UG/1
2 SPRAY, METERED NASAL EVERY 12 HOURS
Status: DISCONTINUED | OUTPATIENT
Start: 2022-09-25 | End: 2022-10-03 | Stop reason: HOSPADM

## 2022-09-25 RX ADMIN — PREDNISOLONE ACETATE 1 DROP: 10 SUSPENSION/ DROPS OPHTHALMIC at 08:35

## 2022-09-25 RX ADMIN — LISINOPRIL 40 MG: 40 TABLET ORAL at 08:32

## 2022-09-25 RX ADMIN — HEPARIN SODIUM 5000 UNITS: 5000 INJECTION, SOLUTION INTRAVENOUS; SUBCUTANEOUS at 06:08

## 2022-09-25 RX ADMIN — PIPERACILLIN AND TAZOBACTAM 3.38 G: 3; .375 INJECTION, POWDER, FOR SOLUTION INTRAVENOUS at 17:31

## 2022-09-25 RX ADMIN — INSULIN ASPART 1 UNITS: 100 INJECTION, SOLUTION INTRAVENOUS; SUBCUTANEOUS at 12:06

## 2022-09-25 RX ADMIN — KETOROLAC TROMETHAMINE 1 DROP: 5 SOLUTION OPHTHALMIC at 08:33

## 2022-09-25 RX ADMIN — INSULIN DEGLUDEC INJECTION 20 UNITS: 100 INJECTION, SOLUTION SUBCUTANEOUS at 08:33

## 2022-09-25 RX ADMIN — INSULIN ASPART 2 UNITS: 100 INJECTION, SOLUTION INTRAVENOUS; SUBCUTANEOUS at 18:25

## 2022-09-25 RX ADMIN — PREDNISOLONE ACETATE 1 DROP: 10 SUSPENSION/ DROPS OPHTHALMIC at 12:06

## 2022-09-25 RX ADMIN — IPRATROPIUM BROMIDE 2 SPRAY: 21 SPRAY, METERED NASAL at 09:52

## 2022-09-25 RX ADMIN — KETOROLAC TROMETHAMINE 1 DROP: 5 SOLUTION OPHTHALMIC at 12:08

## 2022-09-25 RX ADMIN — PIPERACILLIN AND TAZOBACTAM 3.38 G: 3; .375 INJECTION, POWDER, FOR SOLUTION INTRAVENOUS at 23:29

## 2022-09-25 RX ADMIN — HEPARIN SODIUM 5000 UNITS: 5000 INJECTION, SOLUTION INTRAVENOUS; SUBCUTANEOUS at 18:26

## 2022-09-25 RX ADMIN — KETOROLAC TROMETHAMINE 1 DROP: 5 SOLUTION OPHTHALMIC at 18:24

## 2022-09-25 RX ADMIN — PREDNISOLONE ACETATE 1 DROP: 10 SUSPENSION/ DROPS OPHTHALMIC at 21:44

## 2022-09-25 RX ADMIN — KETOROLAC TROMETHAMINE 1 DROP: 5 SOLUTION OPHTHALMIC at 21:44

## 2022-09-25 RX ADMIN — INSULIN DEGLUDEC INJECTION 20 UNITS: 100 INJECTION, SOLUTION SUBCUTANEOUS at 22:38

## 2022-09-25 RX ADMIN — PREDNISOLONE ACETATE 1 DROP: 10 SUSPENSION/ DROPS OPHTHALMIC at 18:28

## 2022-09-25 RX ADMIN — PIPERACILLIN AND TAZOBACTAM 3.38 G: 3; .375 INJECTION, POWDER, FOR SOLUTION INTRAVENOUS at 05:47

## 2022-09-25 RX ADMIN — ASPIRIN 81 MG CHEWABLE TABLET 81 MG: 81 TABLET CHEWABLE at 08:32

## 2022-09-25 RX ADMIN — PIPERACILLIN AND TAZOBACTAM 3.38 G: 3; .375 INJECTION, POWDER, FOR SOLUTION INTRAVENOUS at 11:35

## 2022-09-25 RX ADMIN — EZETIMIBE 10 MG: 10 TABLET ORAL at 08:32

## 2022-09-25 RX ADMIN — INSULIN ASPART 1 UNITS: 100 INJECTION, SOLUTION INTRAVENOUS; SUBCUTANEOUS at 08:34

## 2022-09-25 ASSESSMENT — ACTIVITIES OF DAILY LIVING (ADL)
ADLS_ACUITY_SCORE: 36

## 2022-09-25 NOTE — PROGRESS NOTES
Pt is A&O x4. VSS. Needs assistance of one with use of a Prosthetic of the R leg for transfers to the wheelchair. Ace bandages and dressings CDI of the Left foot and Lower left leg. Denies pain. IV .9NS infusing at 100 ml/hr. Evening BS was 355. Pt stated he had overeaten food brought in from home.  Denies numbness or tingling. Urinating adequately per bedside urinal.

## 2022-09-25 NOTE — PROGRESS NOTES
[facility]    Hospitalist Progress Note    Interval History   Patient is awake and alert.  No acute events overnight.    -Data reviewed today: I reviewed all new labs and imaging results over the last 24 hours. I personally reviewed the MRI left foot and left ankle image(s) showing As reported below.    Physical Exam   Temp: 98.9  F (37.2  C) Temp src: Oral BP: (!) 141/86 Pulse: 89   Resp: 16 SpO2: 97 % O2 Device: None (Room air)    Vitals:    09/23/22 0038   Weight: 113.4 kg (250 lb)     Vital Signs with Ranges  Temp:  [98.2  F (36.8  C)-98.9  F (37.2  C)] 98.9  F (37.2  C)  Pulse:  [89-93] 89  Resp:  [16] 16  BP: (111-141)/(84-89) 141/86  SpO2:  [96 %-99 %] 97 %  I/O last 3 completed shifts:  In: 720 [P.O.:720]  Out: 4150 [Urine:4150]    Physical Exam  Constitutional:       Appearance: Normal appearance.   HENT:      Head: Normocephalic.   Eyes:      Pupils: Pupils are equal, round, and reactive to light.   Cardiovascular:      Rate and Rhythm: Normal rate and regular rhythm.      Pulses: Normal pulses.      Heart sounds: Normal heart sounds.   Pulmonary:      Effort: Pulmonary effort is normal. No respiratory distress.      Breath sounds: Normal breath sounds.   Abdominal:      General: Abdomen is flat. Bowel sounds are normal. There is no distension.      Tenderness: There is no abdominal tenderness. There is no guarding.   Musculoskeletal:         General: Normal range of motion.      Cervical back: Normal range of motion.      Comments: Right below-knee amputation.  Left lower extremity transmetatarsal amputation with wound VAC therapy successful at and multiple wounds in the ankle and lower leg sites.  Pictures in H&P.  Currently dressing in place.   Skin:     General: Skin is warm and dry.   Neurological:      General: No focal deficit present.   Psychiatric:         Mood and Affect: Mood normal.           Medications       aspirin  81 mg Oral Daily     ezetimibe  10 mg Oral Daily      heparin ANTICOAGULANT  5,000 Units Subcutaneous Q12H     insulin aspart  1-7 Units Subcutaneous TID AC     insulin aspart  1-5 Units Subcutaneous At Bedtime     insulin degludec  20 Units Subcutaneous BID     ipratropium  2 spray Both Nostrils Q12H     ketorolac  1 drop Right Eye 4x Daily     lisinopril  40 mg Oral Daily     piperacillin-tazobactam  3.375 g Intravenous Q6H     prednisoLONE acetate  1 drop Right Eye 4x Daily     sodium chloride (PF)  3 mL Intracatheter Q8H       Data   Recent Labs   Lab 09/25/22  1134 09/25/22  0909 09/25/22  0758 09/24/22  0726 09/24/22  0639 09/23/22  1137 09/23/22  0106   WBC  --   --   --   --  8.7  --  13.6*   HGB  --   --   --   --  9.1*  --  10.1*   MCV  --   --   --   --  84  --  86   PLT  --   --   --   --  331  --  377   NA  --  138  --   --  135  --  135   POTASSIUM  --  4.2  --   --  4.3  --  4.8   CHLORIDE  --  106  --   --  104  --  99   CO2  --  28  --   --  28  --  27   BUN  --  22  --   --  31*  --  48*   CR  --  1.26*  --   --  1.48*  --  2.33*   ANIONGAP  --  4  --   --  3  --  9   FERNANDO  --  8.4*  --   --  8.6  --  8.6   * 182* 182*   < > 160*   < > 176*   ALBUMIN  --   --   --   --   --   --  2.3*   PROTTOTAL  --   --   --   --   --   --  8.1   BILITOTAL  --   --   --   --   --   --  0.4   ALKPHOS  --   --   --   --   --   --  102   ALT  --   --   --   --   --   --  58   AST  --   --   --   --   --   --  39    < > = values in this interval not displayed.       No results found for this or any previous visit (from the past 24 hour(s)).      Assessment & Plan      Ry Horner is a 57 year old male with past medical history significant for type II DM, hypertension, dyslipidemia, CKD III, non-healing diabetic foot wounds with multiple prior amputations admitted on 9/23/2022 with left lower extremity wounds.      Multiple left lower extremity wounds   Hx of non-healing diabetic ulcers   S/p R BKA   S/p transmetatarsal amputation of the left foot    Leukocytosis   Pt has a hx of non-healing diabetic ulceration of the plantar aspect of th left foot. He has been following with wound clinic and podiatry for this. He went on a cruise to [facility] and developed a bad infection of the ulceration that apparently spread up his leg to include his shin. He was ultimately hospitalized in [place] and for several days on IV antibitotics (it is unclear what antibiotics he was on in the hospital). He needed multiple surgical debridements during this hospitalization. It is unclear how deep the infection is or if there is any bony involvement. It looks like one culture grew strep parasanguinosis. Ultimately [place] wanted to [place] back [place] the [place] so was placed on PO ciprofloxacin to take while traveling and was instructed to come straight to the hospital after landing.   * He does have a mild leukocytosis, but is otherwise non-toxic and non-septic appearing.  * Lower extremity wounds are pictured below.  Wound care consult placed.  - Vascular surgery consult.  KAREN of the left leg ordered.  Shows an elevated resting KAREN of 1.5 likely due to poorly compressible calcified arteries.  Waveform is biphasic.  Pressures could not be obtained due to overlying bandages.  Awaiting final recommendations.  Surgery has not evaluated the patient yesterday.  Will reach out to them today regarding final surgical plans.  - Podiatry consult .  MRI left foot and ankle obtained.  Shows underlying osteomyelitis and osteitis with early abscess on the dorsum of the foot.  Please review image report above.  Podiatry recommended below-knee amputation based on MRI results.  This would be performed by vascular surgery.    - Infectious disease consult .recommended continuing Zosyn for now.  Awaiting surgery recommendations.  Did receive one-time dose of vancomycin.       Type II DM  Diabetic polyneuropathy and diabetic nephropathy   Hgb A1C was 7.7% in April 2022   - MDSSI .  Carb controlled diet.  - Hold jardiance,  januvia, Tresiba and metformin for now      JOHNATHAN on CKD stage III   Baseline creatinine appears to be around 1.7. Creatinine on admission is 2.33 with BUN of 48 and GFR of 32. Pt reports not drinking much water throughout the course of his flight.   -Creatinine corrected and is down to 1.26 today.  Discontinued IV fluids.  Having good oral intake.  - Monitor renal function   - Avoid nephrotoxins      HTN   -Blood pressure improved.  Continue to hold lisinopril until further surgical plans are made since he did have some JOHNATHAN on admission.  If he starts getting very hypertensive will restart this.     Dyslipidemia   - Continue PTA ASA, Ezetimibe. Not on statin due to allergy.  -Has history of hypertriglyceridemia.  His last triglycerides were at 49.  Will reorder a level.     Chronic Anemia   Hemoglobin on admission is 10.1. This is down slightly from his baseline of around 12.   - Monitor hemoglobin.      Hypoalbuminemia: Albumin = 2.3 g/dL (Ref range: 3.4 - 5.0 g/dL) on admission, will monitor as appropriate.    Obesity: Estimated body mass index is 33.91 kg/m  as calculated from the following:    Height as of this encounter: 1.829 m (6').    Weight as of this encounter: 113.4 kg (250 lb).        Diet:diabetic diet    DVT Prophylaxis: Heparin SQ  Corrales Catheter: Not present  Central Lines: None  Cardiac Monitoring: None  Code Status: Full Code         Gloria Chester MD, MD  [phone](p)

## 2022-09-25 NOTE — PROGRESS NOTES
Pt A&O x4, up x1 with w/c. BG monitor and covered with insulin. VSS. RA. NS infusing at 100 ml/hr. Denied pain. Podiatrist changed dressing to LLE wound, dry and intact.

## 2022-09-25 NOTE — PROGRESS NOTES
I have reviewed the chart and imaging. I have also reviewed the note by Dr. Guevara from the podiatry team.  There is extensive infection of the left lower extremity with severe tenosynovitis of the tibialis anterior tendon which is exposed.    We will proceed with left lower extremity amputation.  We will start with a guillotine amputation.  I believe it is also better when there is chronic infection such as this clinical scenario.  This was explained to the patient.  I have tentatively have him scheduled for a left lower extremity guillotine amputation for tomorrow.  I discussed all of this with the patient.  He is in agreement.    N.p.o. after midnight.  Orders have been placed to that effect.

## 2022-09-26 ENCOUNTER — TRANSFERRED RECORDS (OUTPATIENT)
Dept: HEALTH INFORMATION MANAGEMENT | Facility: CLINIC | Age: 57
End: 2022-09-26

## 2022-09-26 DIAGNOSIS — L97.525 DIABETIC ULCER OF LEFT FOOT ASSOCIATED WITH TYPE 2 DIABETES MELLITUS, WITH MUSCLE INVOLVEMENT WITHOUT EVIDENCE OF NECROSIS, UNSPECIFIED PART OF FOOT (H): Primary | ICD-10-CM

## 2022-09-26 DIAGNOSIS — E11.621 DIABETIC ULCER OF LEFT FOOT ASSOCIATED WITH TYPE 2 DIABETES MELLITUS, WITH MUSCLE INVOLVEMENT WITHOUT EVIDENCE OF NECROSIS, UNSPECIFIED PART OF FOOT (H): Primary | ICD-10-CM

## 2022-09-26 LAB
ABO/RH(D): NORMAL
ANTIBODY SCREEN: NEGATIVE
GLUCOSE BLDC GLUCOMTR-MCNC: 122 MG/DL (ref 70–99)
GLUCOSE BLDC GLUCOMTR-MCNC: 184 MG/DL (ref 70–99)
GLUCOSE BLDC GLUCOMTR-MCNC: 251 MG/DL (ref 70–99)
GLUCOSE BLDC GLUCOMTR-MCNC: 273 MG/DL (ref 70–99)
GLUCOSE BLDC GLUCOMTR-MCNC: 327 MG/DL (ref 70–99)
PLATELET # BLD AUTO: 319 10E3/UL (ref 150–450)
SPECIMEN EXPIRATION DATE: NORMAL

## 2022-09-26 PROCEDURE — 96372 THER/PROPH/DIAG INJ SC/IM: CPT | Mod: 59

## 2022-09-26 PROCEDURE — 86850 RBC ANTIBODY SCREEN: CPT | Performed by: SURGERY

## 2022-09-26 PROCEDURE — 250N000013 HC RX MED GY IP 250 OP 250 PS 637: Performed by: HOSPITALIST

## 2022-09-26 PROCEDURE — 99232 SBSQ HOSP IP/OBS MODERATE 35: CPT | Performed by: INTERNAL MEDICINE

## 2022-09-26 PROCEDURE — 36415 COLL VENOUS BLD VENIPUNCTURE: CPT | Performed by: STUDENT IN AN ORGANIZED HEALTH CARE EDUCATION/TRAINING PROGRAM

## 2022-09-26 PROCEDURE — 85049 AUTOMATED PLATELET COUNT: CPT | Performed by: STUDENT IN AN ORGANIZED HEALTH CARE EDUCATION/TRAINING PROGRAM

## 2022-09-26 PROCEDURE — 99232 SBSQ HOSP IP/OBS MODERATE 35: CPT | Performed by: HOSPITALIST

## 2022-09-26 PROCEDURE — 120N000001 HC R&B MED SURG/OB

## 2022-09-26 PROCEDURE — 250N000011 HC RX IP 250 OP 636: Performed by: STUDENT IN AN ORGANIZED HEALTH CARE EDUCATION/TRAINING PROGRAM

## 2022-09-26 PROCEDURE — 86901 BLOOD TYPING SEROLOGIC RH(D): CPT | Performed by: SURGERY

## 2022-09-26 RX ADMIN — INSULIN ASPART 3 UNITS: 100 INJECTION, SOLUTION INTRAVENOUS; SUBCUTANEOUS at 18:40

## 2022-09-26 RX ADMIN — PREDNISOLONE ACETATE 1 DROP: 10 SUSPENSION/ DROPS OPHTHALMIC at 09:13

## 2022-09-26 RX ADMIN — KETOROLAC TROMETHAMINE 1 DROP: 5 SOLUTION OPHTHALMIC at 18:39

## 2022-09-26 RX ADMIN — PREDNISOLONE ACETATE 1 DROP: 10 SUSPENSION/ DROPS OPHTHALMIC at 18:37

## 2022-09-26 RX ADMIN — LISINOPRIL 40 MG: 40 TABLET ORAL at 09:12

## 2022-09-26 RX ADMIN — EZETIMIBE 10 MG: 10 TABLET ORAL at 09:12

## 2022-09-26 RX ADMIN — KETOROLAC TROMETHAMINE 1 DROP: 5 SOLUTION OPHTHALMIC at 09:11

## 2022-09-26 RX ADMIN — PIPERACILLIN AND TAZOBACTAM 3.38 G: 3; .375 INJECTION, POWDER, FOR SOLUTION INTRAVENOUS at 23:20

## 2022-09-26 RX ADMIN — PIPERACILLIN AND TAZOBACTAM 3.38 G: 3; .375 INJECTION, POWDER, FOR SOLUTION INTRAVENOUS at 18:37

## 2022-09-26 RX ADMIN — PIPERACILLIN AND TAZOBACTAM 3.38 G: 3; .375 INJECTION, POWDER, FOR SOLUTION INTRAVENOUS at 05:53

## 2022-09-26 RX ADMIN — PIPERACILLIN AND TAZOBACTAM 3.38 G: 3; .375 INJECTION, POWDER, FOR SOLUTION INTRAVENOUS at 11:00

## 2022-09-26 ASSESSMENT — ACTIVITIES OF DAILY LIVING (ADL)
ADLS_ACUITY_SCORE: 36

## 2022-09-26 NOTE — PROGRESS NOTES
Pt A&O x4, up x1 to w/c. VSS, except BP is slightly high. Denied pain. Saline lock with intermittent abx. BG monitor and covered with insulin. Pt has BKA tomorrow, NPO after midnight.

## 2022-09-26 NOTE — PROGRESS NOTES
[facility]    Hospitalist Progress Note    Interval History   Patient is awake and alert.  No acute events overnight.  Waiting for surgery.    -Data reviewed today: I reviewed all new labs and imaging results over the last 24 hours. I personally reviewed the MRI left foot and left ankle image(s) showing As reported below.    Physical Exam   Temp: 97.8  F (36.6  C) Temp src: Oral BP: (!) 145/84 Pulse: 79   Resp: 20 SpO2: 98 % O2 Device: None (Room air)    Vitals:    09/23/22 0038   Weight: 113.4 kg (250 lb)     Vital Signs with Ranges  Temp:  [97.6  F (36.4  C)-98.5  F (36.9  C)] 97.8  F (36.6  C)  Pulse:  [79-87] 79  Resp:  [16-20] 20  BP: (144-149)/(84-93) 145/84  SpO2:  [96 %-98 %] 98 %  I/O last 3 completed shifts:  In: 1080 [P.O.:1080]  Out: 2300 [Urine:2300]    Physical Exam  Constitutional:       Appearance: Normal appearance.   HENT:      Head: Normocephalic.   Eyes:      Pupils: Pupils are equal, round, and reactive to light.   Cardiovascular:      Rate and Rhythm: Normal rate and regular rhythm.      Pulses: Normal pulses.      Heart sounds: Normal heart sounds.   Pulmonary:      Effort: Pulmonary effort is normal. No respiratory distress.      Breath sounds: Normal breath sounds.   Abdominal:      General: Abdomen is flat. Bowel sounds are normal. There is no distension.      Tenderness: There is no abdominal tenderness. There is no guarding.   Musculoskeletal:         General: Normal range of motion.      Cervical back: Normal range of motion.      Comments: Right below-knee amputation.  Left lower extremity transmetatarsal amputation with wound VAC therapy successful at and multiple wounds in the ankle and lower leg sites.  Pictures in H&P.  Currently dressing in place.   Skin:     General: Skin is warm and dry.   Neurological:      General: No focal deficit present.   Psychiatric:         Mood and Affect: Mood normal.           Medications       aspirin  81 mg Oral Daily      ezetimibe  10 mg Oral Daily     [Held by provider] heparin ANTICOAGULANT  5,000 Units Subcutaneous Q12H     insulin aspart  1-7 Units Subcutaneous TID AC     insulin aspart  1-5 Units Subcutaneous At Bedtime     insulin degludec  20 Units Subcutaneous BID     ipratropium  2 spray Both Nostrils Q12H     ketorolac  1 drop Right Eye 4x Daily     lisinopril  40 mg Oral Daily     piperacillin-tazobactam  3.375 g Intravenous Q6H     prednisoLONE acetate  1 drop Right Eye 4x Daily     sodium chloride (PF)  3 mL Intracatheter Q8H       Data   Recent Labs   Lab 09/26/22  0747 09/26/22  0623 09/26/22  0220 09/25/22  2157 09/25/22  1134 09/25/22  0909 09/24/22  0726 09/24/22  0639 09/23/22  1137 09/23/22  0106   WBC  --   --   --   --   --   --   --  8.7  --  13.6*   HGB  --   --   --   --   --   --   --  9.1*  --  10.1*   MCV  --   --   --   --   --   --   --  84  --  86   PLT  --  319  --   --   --   --   --  331  --  377   NA  --   --   --   --   --  138  --  135  --  135   POTASSIUM  --   --   --   --   --  4.2  --  4.3  --  4.8   CHLORIDE  --   --   --   --   --  106  --  104  --  99   CO2  --   --   --   --   --  28  --  28  --  27   BUN  --   --   --   --   --  22  --  31*  --  48*   CR  --   --   --   --   --  1.26*  --  1.48*  --  2.33*   ANIONGAP  --   --   --   --   --  4  --  3  --  9   FERNANDO  --   --   --   --   --  8.4*  --  8.6  --  8.6   *  --  251* 222*   < > 182*   < > 160*   < > 176*   ALBUMIN  --   --   --   --   --   --   --   --   --  2.3*   PROTTOTAL  --   --   --   --   --   --   --   --   --  8.1   BILITOTAL  --   --   --   --   --   --   --   --   --  0.4   ALKPHOS  --   --   --   --   --   --   --   --   --  102   ALT  --   --   --   --   --   --   --   --   --  58   AST  --   --   --   --   --   --   --   --   --  39    < > = values in this interval not displayed.       No results found for this or any previous visit (from the past 24 hour(s)).      Assessment & Plan      Ry Horner is a 57  year old male with past medical history significant for type II DM, hypertension, dyslipidemia, CKD III, non-healing diabetic foot wounds with multiple prior amputations admitted on 9/23/2022 with left lower extremity wounds.      Multiple left lower extremity wounds   Hx of non-healing diabetic ulcers   S/p R BKA   S/p transmetatarsal amputation of the left foot   Leukocytosis   Pt has a hx of non-healing diabetic ulceration of the plantar aspect of th left foot. He has been following with wound clinic and podiatry for this. He went on a cruise to [facility] and developed a bad infection of the ulceration that apparently spread up his leg to include his shin. He was ultimately hospitalized in [place] and for several days on IV antibitotics (it is unclear what antibiotics he was on in the hospital). He needed multiple surgical debridements during this hospitalization. It is unclear how deep the infection is or if there is any bony involvement. It looks like one culture grew strep parasanguinosis. Ultimately [place] the [place] so was [place] on PO ciprofloxacin to take while traveling and was instructed to come straight to the hospital after landing.   * He does have a mild leukocytosis, but is otherwise non-toxic and non-septic appearing.  * Lower extremity wounds are pictured below.  Wound care consult placed.  - Vascular surgery consult.  KAREN of the left leg ordered.  Shows an elevated resting KAREN of 1.5 likely due to poorly compressible calcified arteries.  Waveform is biphasic.  Pressures could not be obtained due to overlying bandages.  Awaiting final recommendations.  Surgery has not evaluated the patient yesterday.  Will reach out to them today regarding final surgical plans.  - Podiatry consult .  MRI left foot and ankle obtained.  Shows underlying osteomyelitis and osteitis with early abscess on the dorsum of the foot.  Please review image report above.  Podiatry recommended below-knee amputation  based on MRI results.  This would be performed by vascular surgery.  Plan on proceeding for surgery today.  N.p.o. since midnight.  Plan on proceeding with huber amputation  - Infectious disease consult .recommended continuing Zosyn for now.  Awaiting surgery recommendations.  Did receive one-time dose of vancomycin.       Type II DM  Diabetic polyneuropathy and diabetic nephropathy   Hgb A1C was 7.7% in April 2022   - MDSSI .  Carb controlled diet.  - Hold jardiance, januvia, Tresiba and metformin for now      JOHNATHAN on CKD stage III   Baseline creatinine appears to be around 1.7. Creatinine on admission is 2.33 with BUN of 48 and GFR of 32. Pt reports not drinking much water throughout the course of his flight.   -Creatinine corrected and is down to 1.26 today.  Discontinued IV fluids.  Having good oral intake.  - Monitor renal function   - Avoid nephrotoxins      HTN   -Blood pressure improved.  Restart lisinopril.     Dyslipidemia   - Continue PTA ASA, Ezetimibe. Not on statin due to allergy.  -Has history of hypertriglyceridemia.  His last triglycerides were at 489.  Recheck improved at 164     Chronic Anemia   Hemoglobin on admission is 10.1. This is down slightly from his baseline of around 12.   - Monitor hemoglobin.      Hypoalbuminemia: Albumin = 2.3 g/dL (Ref range: 3.4 - 5.0 g/dL) on admission, will monitor as appropriate.    Obesity: Estimated body mass index is 33.91 kg/m  as calculated from the following:    Height as of this encounter: 1.829 m (6').    Weight as of this encounter: 113.4 kg (250 lb).        Diet:diabetic diet    DVT Prophylaxis: Heparin SQ  Corrales Catheter: Not present  Central Lines: None  Cardiac Monitoring: None  Code Status: Full Code         Gloria Chester MD, MD  [phone](p)

## 2022-09-26 NOTE — PROGRESS NOTES
[facility]    Infectious Disease Progress Note    Date of Service (when I saw the patient): 09/26/2022     Assessment & Plan   Ry Horner is a 57 year old male who was admitted on 9/23/2022.     Impression:  1. 57 y.o male with diabetes, HTN, dyslipidemia, CKD, possible vascular disease.   2. History of prior non healing wounds, prior history of amputation.   3. Admitted with LE wounds ongoing for past few weeks.   4. Given a dose of vanco now on zosyn   5. Pending vascular surgery consult   6. Had strep parasanguinis in the wound culture in [place]   7. Was treated with broad spectrum antibiotics while in [place]      Recommendations:   Continue on zosyn   MRI report reviewed and noted for extensive infection. ( see below)    Plan for amputation also noted.             Franklin Maza MD    Interval History   Tolerating antibiotics ok   No new rashes or issues with antibiotics   Labs reviewed   No changes to past medical, social or family history   Afebrile   A 10 point ROS was done and is negative other than noted in the interval history above     Physical Exam   Temp: 97.8  F (36.6  C) Temp src: Oral BP: (!) 145/84 Pulse: 79   Resp: 20 SpO2: 98 % O2 Device: None (Room air)    Vitals:    09/23/22 0038   Weight: 113.4 kg (250 lb)     Vital Signs with Ranges  Temp:  [97.6  F (36.4  C)-97.8  F (36.6  C)] 97.8  F (36.6  C)  Pulse:  [79-86] 79  Resp:  [16-20] 20  BP: (144-145)/(84-93) 145/84  SpO2:  [96 %-98 %] 98 %    Constitutional: Awake, alert, cooperative, no apparent distress  Lungs: Clear to auscultation bilaterally, no crackles or wheezing  Cardiovascular: Regular rate and rhythm, normal S1 and S2, and no murmur noted  Abdomen: Normal bowel sounds, soft, non-distended, non-tender  Skin: No rashes, no cyanosis, no edema  Other:    Medications       aspirin  81 mg Oral Daily     ezetimibe  10 mg Oral Daily     [Held by provider] heparin ANTICOAGULANT  5,000 Units Subcutaneous Q12H     insulin  aspart  1-7 Units Subcutaneous TID AC     insulin aspart  1-5 Units Subcutaneous At Bedtime     insulin degludec  20 Units Subcutaneous BID     ipratropium  2 spray Both Nostrils Q12H     ketorolac  1 drop Right Eye 4x Daily     [Held by provider] lisinopril  40 mg Oral Daily     piperacillin-tazobactam  3.375 g Intravenous Q6H     prednisoLONE acetate  1 drop Right Eye 4x Daily     sodium chloride (PF)  3 mL Intracatheter Q8H       Data   All microbiology laboratory data reviewed.  Recent Labs   Lab Test 09/26/22  0623 09/24/22  0639 09/23/22  0106 08/15/22  1054   WBC  --  8.7 13.6* 10.4   HGB  --  9.1* 10.1* 12.5*   HCT  --  28.6* 32.0* 38.4*   MCV  --  84 86 85    331 377 335     Recent Labs   Lab Test 09/25/22  0909 09/24/22  0639 09/23/22  0106   CR 1.26* 1.48* 2.33*     Recent Labs   Lab Test 09/25/22  1529   SED 85*     Recent Labs   Lab Test 02/03/20  1324 02/03/20  0007 02/02/20  2250 12/04/19  1619 11/19/19  1829 11/17/19  1525 11/17/19  1419 11/17/19  1411 08/28/17  1136   CULT Heavy growth  beta hemolytic   Streptococcus constellatus  *  Light growth  Staphylococcus aureus  * No growth No growth No anaerobes isolated  No growth Light growth  Bacteroides fragilis  *  Light growth  Parvimonas micra  *  Susceptibility testing not routinely done  On day 2, isolated in broth only:  beta hemolytic   Streptococcus constellatus  * Heavy growth  beta hemolytic   Streptococcus constellatus  Susceptibility testing done on previous specimen  *  Light growth  Alcaligenes faecalis  *  Light growth  Staphylococcus aureus  *  On day 1, isolated in broth only:  Anaerobic gram negative rods  See anaerobic report for identification  * Heavy growth  Bacteroides fragilis  Susceptibility testing not routinely done  *  Heavy growth  Peptoniphilus asaccharolyticus  Susceptibility testing not routinely done  *  Moderate growth  beta hemolytic   Streptococcus constellatus  *  On day 1, isolated in broth  only:  Anaerobic gram negative rods  See anaerobic report for identification  * Heavy growth  Bacteroides fragilis  *  Heavy growth  Parvimonas micra  *  Heavy growth  Peptoniphilus asaccharolyticus  *  Heavy growth  Mixed aerobic and anaerobic rosa  *  Susceptibility testing not routinely done No growth       All cultures:  No results for input(s): CULTURE in the last 168 hours.   Blood culture:  Results for orders placed or performed during the hospital encounter of 02/02/20   Blood culture    Specimen: Blood    Right Arm   Result Value Ref Range    Specimen Description Blood Right Arm     Culture Micro No growth    Blood culture    Specimen: Blood    Right Arm   Result Value Ref Range    Specimen Description Blood Right Arm     Culture Micro No growth    Results for orders placed or performed during the hospital encounter of 08/28/17   Blood culture    Specimen: Arm, Right; Blood    Right Arm   Result Value Ref Range    Specimen Description Blood Right Arm     Special Requests Aerobic and anaerobic bottles received     Culture Micro No growth    Blood culture    Specimen: Arm, Right; Blood    Right Arm   Result Value Ref Range    Specimen Description Blood Right Arm     Special Requests Aerobic and anaerobic bottles received     Culture Micro No growth    Results for orders placed or performed during the hospital encounter of 07/14/16   Blood culture    Specimen: Blood   Result Value Ref Range    Specimen Description Blood Left Arm     Special Requests Aerobic and anaerobic bottles received     Culture Micro No growth     Micro Report Status FINAL 07/20/2016    Blood culture    Specimen: Blood   Result Value Ref Range    Specimen Description Blood Right Arm     Special Requests Aerobic and anaerobic bottles received     Culture Micro No growth     Micro Report Status FINAL 07/20/2016       Urine culture:  No results found for this or any previous visit.       MRI reviewed  IMPRESSION:  1.  First ray and  second-fifth transmetatarsal amputations.  2.  Probable second metatarsal osteomyelitis.  3.  Osteitis of the navicular, medial cuneiform, intermediate cuneiform, lateral cuneiform, and residual third metatarsal. These areas are at high risk of osteomyelitis development.  4.  Soft tissue ulcers in the anterior ankle, anterolateral ankle, and anteromedial amputation stump.  5.  Exposed tibialis anterior tendon in the anterior ankle, with extensive tibialis anterior emphysematous tenosynovitis. Abscess and emphysematous infection at the tibialis anterior tendon insertion.  6.  Extensive myositis in the medial amputation stump.  7.  Soft tissue abscess in the dorsal-lateral mid foot (at the level of the second metatarsal). This contains a focus of gas.  8.  I agree with the preliminary report

## 2022-09-26 NOTE — PROGRESS NOTES
Pt A&O x4, up x1 to w/c. VSS, except BP is slightly high. Uses urinal. BG monitor. Surgery tomorrow. NPO after midnight

## 2022-09-26 NOTE — PROGRESS NOTES
Pt is A&O x4. VSS. Pt NPO for surgery 9/26/22. Denies pain. Left leg ace wrap is CDI. Needs standby assistance for transfers to the wheelchair with use of R leg prosthetic.  Saline Lock intact. Denies numbness or tingling. Voiding adequately with bedside urinal.

## 2022-09-27 ENCOUNTER — ANESTHESIA EVENT (OUTPATIENT)
Dept: SURGERY | Facility: CLINIC | Age: 57
DRG: 617 | End: 2022-09-27
Payer: COMMERCIAL

## 2022-09-27 ENCOUNTER — ANESTHESIA (OUTPATIENT)
Dept: SURGERY | Facility: CLINIC | Age: 57
DRG: 617 | End: 2022-09-27
Payer: COMMERCIAL

## 2022-09-27 LAB
GLUCOSE BLDC GLUCOMTR-MCNC: 149 MG/DL (ref 70–99)
GLUCOSE BLDC GLUCOMTR-MCNC: 163 MG/DL (ref 70–99)
GLUCOSE BLDC GLUCOMTR-MCNC: 190 MG/DL (ref 70–99)
GLUCOSE BLDC GLUCOMTR-MCNC: 196 MG/DL (ref 70–99)
GLUCOSE BLDC GLUCOMTR-MCNC: 210 MG/DL (ref 70–99)

## 2022-09-27 PROCEDURE — 0Y6J0Z3 DETACHMENT AT LEFT LOWER LEG, LOW, OPEN APPROACH: ICD-10-PCS | Performed by: SURGERY

## 2022-09-27 PROCEDURE — 710N000009 HC RECOVERY PHASE 1, LEVEL 1, PER MIN: Performed by: SURGERY

## 2022-09-27 PROCEDURE — 250N000011 HC RX IP 250 OP 636: Performed by: SURGERY

## 2022-09-27 PROCEDURE — 27882 AMPUTATION OF LOWER LEG: CPT | Mod: LT | Performed by: SURGERY

## 2022-09-27 PROCEDURE — 999N000197 HC STATISTIC WOC PT EDUCATION, 0-15 MIN

## 2022-09-27 PROCEDURE — 999N000141 HC STATISTIC PRE-PROCEDURE NURSING ASSESSMENT: Performed by: SURGERY

## 2022-09-27 PROCEDURE — 250N000009 HC RX 250: Performed by: NURSE ANESTHETIST, CERTIFIED REGISTERED

## 2022-09-27 PROCEDURE — 258N000003 HC RX IP 258 OP 636: Performed by: ANESTHESIOLOGY

## 2022-09-27 PROCEDURE — 250N000009 HC RX 250: Performed by: SURGERY

## 2022-09-27 PROCEDURE — 250N000011 HC RX IP 250 OP 636: Performed by: STUDENT IN AN ORGANIZED HEALTH CARE EDUCATION/TRAINING PROGRAM

## 2022-09-27 PROCEDURE — 258N000003 HC RX IP 258 OP 636: Performed by: NURSE ANESTHETIST, CERTIFIED REGISTERED

## 2022-09-27 PROCEDURE — 250N000011 HC RX IP 250 OP 636: Performed by: ANESTHESIOLOGY

## 2022-09-27 PROCEDURE — 250N000013 HC RX MED GY IP 250 OP 250 PS 637: Performed by: STUDENT IN AN ORGANIZED HEALTH CARE EDUCATION/TRAINING PROGRAM

## 2022-09-27 PROCEDURE — 250N000013 HC RX MED GY IP 250 OP 250 PS 637: Performed by: HOSPITALIST

## 2022-09-27 PROCEDURE — 370N000017 HC ANESTHESIA TECHNICAL FEE, PER MIN: Performed by: SURGERY

## 2022-09-27 PROCEDURE — 250N000011 HC RX IP 250 OP 636: Performed by: NURSE ANESTHETIST, CERTIFIED REGISTERED

## 2022-09-27 PROCEDURE — 88305 TISSUE EXAM BY PATHOLOGIST: CPT | Mod: TC | Performed by: SURGERY

## 2022-09-27 PROCEDURE — 360N000076 HC SURGERY LEVEL 3, PER MIN: Performed by: SURGERY

## 2022-09-27 PROCEDURE — 272N000001 HC OR GENERAL SUPPLY STERILE: Performed by: SURGERY

## 2022-09-27 PROCEDURE — 99233 SBSQ HOSP IP/OBS HIGH 50: CPT | Performed by: HOSPITALIST

## 2022-09-27 PROCEDURE — 250N000025 HC SEVOFLURANE, PER MIN: Performed by: SURGERY

## 2022-09-27 PROCEDURE — 250N000009 HC RX 250: Performed by: ANESTHESIOLOGY

## 2022-09-27 PROCEDURE — 120N000001 HC R&B MED SURG/OB

## 2022-09-27 RX ORDER — ONDANSETRON 2 MG/ML
INJECTION INTRAMUSCULAR; INTRAVENOUS PRN
Status: DISCONTINUED | OUTPATIENT
Start: 2022-09-27 | End: 2022-09-27

## 2022-09-27 RX ORDER — NALOXONE HYDROCHLORIDE 0.4 MG/ML
0.4 INJECTION, SOLUTION INTRAMUSCULAR; INTRAVENOUS; SUBCUTANEOUS
Status: DISCONTINUED | OUTPATIENT
Start: 2022-09-27 | End: 2022-10-03 | Stop reason: HOSPADM

## 2022-09-27 RX ORDER — NALOXONE HYDROCHLORIDE 0.4 MG/ML
0.2 INJECTION, SOLUTION INTRAMUSCULAR; INTRAVENOUS; SUBCUTANEOUS
Status: DISCONTINUED | OUTPATIENT
Start: 2022-09-27 | End: 2022-10-03 | Stop reason: HOSPADM

## 2022-09-27 RX ORDER — OXYCODONE HYDROCHLORIDE 5 MG/1
5 TABLET ORAL EVERY 4 HOURS PRN
Status: DISCONTINUED | OUTPATIENT
Start: 2022-09-27 | End: 2022-09-30

## 2022-09-27 RX ORDER — SODIUM CHLORIDE, SODIUM LACTATE, POTASSIUM CHLORIDE, CALCIUM CHLORIDE 600; 310; 30; 20 MG/100ML; MG/100ML; MG/100ML; MG/100ML
INJECTION, SOLUTION INTRAVENOUS CONTINUOUS
Status: DISCONTINUED | OUTPATIENT
Start: 2022-09-27 | End: 2022-09-27 | Stop reason: HOSPADM

## 2022-09-27 RX ORDER — PROPOFOL 10 MG/ML
INJECTION, EMULSION INTRAVENOUS PRN
Status: DISCONTINUED | OUTPATIENT
Start: 2022-09-27 | End: 2022-09-27

## 2022-09-27 RX ORDER — OXYCODONE HYDROCHLORIDE 5 MG/1
5 TABLET ORAL EVERY 4 HOURS PRN
Status: DISCONTINUED | OUTPATIENT
Start: 2022-09-27 | End: 2022-09-27 | Stop reason: HOSPADM

## 2022-09-27 RX ORDER — ONDANSETRON 2 MG/ML
4 INJECTION INTRAMUSCULAR; INTRAVENOUS EVERY 30 MIN PRN
Status: DISCONTINUED | OUTPATIENT
Start: 2022-09-27 | End: 2022-09-27 | Stop reason: HOSPADM

## 2022-09-27 RX ORDER — LIDOCAINE HYDROCHLORIDE 20 MG/ML
INJECTION, SOLUTION INFILTRATION; PERINEURAL PRN
Status: DISCONTINUED | OUTPATIENT
Start: 2022-09-27 | End: 2022-09-27

## 2022-09-27 RX ORDER — GLYCOPYRROLATE 0.2 MG/ML
INJECTION, SOLUTION INTRAMUSCULAR; INTRAVENOUS PRN
Status: DISCONTINUED | OUTPATIENT
Start: 2022-09-27 | End: 2022-09-27

## 2022-09-27 RX ORDER — ONDANSETRON 4 MG/1
4 TABLET, ORALLY DISINTEGRATING ORAL EVERY 30 MIN PRN
Status: DISCONTINUED | OUTPATIENT
Start: 2022-09-27 | End: 2022-09-27 | Stop reason: HOSPADM

## 2022-09-27 RX ORDER — FENTANYL CITRATE 50 UG/ML
INJECTION, SOLUTION INTRAMUSCULAR; INTRAVENOUS PRN
Status: DISCONTINUED | OUTPATIENT
Start: 2022-09-27 | End: 2022-09-27

## 2022-09-27 RX ORDER — HYDROMORPHONE HCL IN WATER/PF 6 MG/30 ML
0.2 PATIENT CONTROLLED ANALGESIA SYRINGE INTRAVENOUS EVERY 5 MIN PRN
Status: DISCONTINUED | OUTPATIENT
Start: 2022-09-27 | End: 2022-09-27 | Stop reason: HOSPADM

## 2022-09-27 RX ORDER — NEOSTIGMINE METHYLSULFATE 1 MG/ML
VIAL (ML) INJECTION PRN
Status: DISCONTINUED | OUTPATIENT
Start: 2022-09-27 | End: 2022-09-27

## 2022-09-27 RX ORDER — FENTANYL CITRATE 0.05 MG/ML
25 INJECTION, SOLUTION INTRAMUSCULAR; INTRAVENOUS EVERY 5 MIN PRN
Status: DISCONTINUED | OUTPATIENT
Start: 2022-09-27 | End: 2022-09-27 | Stop reason: HOSPADM

## 2022-09-27 RX ADMIN — OXYCODONE HYDROCHLORIDE 5 MG: 5 TABLET ORAL at 22:50

## 2022-09-27 RX ADMIN — MIDAZOLAM 2 MG: 1 INJECTION INTRAMUSCULAR; INTRAVENOUS at 12:53

## 2022-09-27 RX ADMIN — PHENYLEPHRINE HYDROCHLORIDE 100 MCG: 10 INJECTION INTRAVENOUS at 13:42

## 2022-09-27 RX ADMIN — PIPERACILLIN AND TAZOBACTAM 3.38 G: 3; .375 INJECTION, POWDER, FOR SOLUTION INTRAVENOUS at 13:14

## 2022-09-27 RX ADMIN — PHENYLEPHRINE HYDROCHLORIDE 100 MCG: 10 INJECTION INTRAVENOUS at 13:23

## 2022-09-27 RX ADMIN — ONDANSETRON 4 MG: 2 INJECTION INTRAMUSCULAR; INTRAVENOUS at 13:46

## 2022-09-27 RX ADMIN — ROCURONIUM BROMIDE 50 MG: 50 INJECTION, SOLUTION INTRAVENOUS at 13:05

## 2022-09-27 RX ADMIN — FENTANYL CITRATE 100 MCG: 50 INJECTION, SOLUTION INTRAMUSCULAR; INTRAVENOUS at 13:05

## 2022-09-27 RX ADMIN — LIDOCAINE HYDROCHLORIDE 100 MG: 20 INJECTION, SOLUTION INFILTRATION; PERINEURAL at 13:05

## 2022-09-27 RX ADMIN — Medication 15 ML: at 12:38

## 2022-09-27 RX ADMIN — GLYCOPYRROLATE 0.8 MG: 0.2 INJECTION, SOLUTION INTRAMUSCULAR; INTRAVENOUS at 14:06

## 2022-09-27 RX ADMIN — FENTANYL CITRATE 50 MCG: 50 INJECTION, SOLUTION INTRAMUSCULAR; INTRAVENOUS at 14:14

## 2022-09-27 RX ADMIN — PROPOFOL 160 MG: 10 INJECTION, EMULSION INTRAVENOUS at 13:05

## 2022-09-27 RX ADMIN — FENTANYL CITRATE 25 MCG: 50 INJECTION, SOLUTION INTRAMUSCULAR; INTRAVENOUS at 14:51

## 2022-09-27 RX ADMIN — SODIUM CHLORIDE, POTASSIUM CHLORIDE, SODIUM LACTATE AND CALCIUM CHLORIDE: 600; 310; 30; 20 INJECTION, SOLUTION INTRAVENOUS at 12:40

## 2022-09-27 RX ADMIN — PHENYLEPHRINE HYDROCHLORIDE 100 MCG: 10 INJECTION INTRAVENOUS at 13:52

## 2022-09-27 RX ADMIN — PIPERACILLIN AND TAZOBACTAM 3.38 G: 3; .375 INJECTION, POWDER, FOR SOLUTION INTRAVENOUS at 05:12

## 2022-09-27 RX ADMIN — PIPERACILLIN AND TAZOBACTAM 3.38 G: 3; .375 INJECTION, POWDER, FOR SOLUTION INTRAVENOUS at 23:12

## 2022-09-27 RX ADMIN — NEOSTIGMINE METHYLSULFATE 5 MG: 1 INJECTION, SOLUTION INTRAVENOUS at 14:06

## 2022-09-27 RX ADMIN — PHENYLEPHRINE HYDROCHLORIDE 100 MCG: 10 INJECTION INTRAVENOUS at 13:26

## 2022-09-27 RX ADMIN — FENTANYL CITRATE 25 MCG: 50 INJECTION, SOLUTION INTRAMUSCULAR; INTRAVENOUS at 14:36

## 2022-09-27 RX ADMIN — PIPERACILLIN AND TAZOBACTAM 3.38 G: 3; .375 INJECTION, POWDER, FOR SOLUTION INTRAVENOUS at 17:28

## 2022-09-27 RX ADMIN — ACETAMINOPHEN 650 MG: 325 TABLET ORAL at 22:50

## 2022-09-27 RX ADMIN — EZETIMIBE 10 MG: 10 TABLET ORAL at 08:30

## 2022-09-27 RX ADMIN — INSULIN DEGLUDEC INJECTION 20 UNITS: 100 INJECTION, SOLUTION SUBCUTANEOUS at 21:21

## 2022-09-27 RX ADMIN — Medication 1 MG: at 23:20

## 2022-09-27 ASSESSMENT — ACTIVITIES OF DAILY LIVING (ADL)
ADLS_ACUITY_SCORE: 36

## 2022-09-27 NOTE — ANESTHESIA PREPROCEDURE EVALUATION
"Prior to Admission medications    Medication Sig Start Date End Date Taking? Authorizing Provider   aspirin 81 MG chewable tablet Take 1 tablet (81 mg) by mouth daily 10/13/17  Yes Kody Wing MD   ezetimibe (ZETIA) 10 MG tablet TAKE ONE TABLET BY MOUTH EVERY DAY 5/13/22  Yes Kody Wing MD   ipratropium (ATROVENT) 0.06 % nasal spray INSTILL TWO SPRAYS INTO EACH NOSTRIL FOUR TIMES A DAY AS NEEDED FOR RHINITIS 8/17/22  Yes Kody Wing MD   JARDIANCE 10 MG TABS tablet TAKE ONE TABLET BY MOUTH EVERY DAY 6/14/22  Yes Ness Juan MD   ketorolac (ACULAR) 0.5 % ophthalmic solution Place 1 drop into the right eye 4 times daily 6/9/21  Yes Reported, Patient   lisinopril (ZESTRIL) 40 MG tablet TAKE ONE TABLET BY MOUTH EVERY DAY 5/13/22  Yes Kody Wing MD   metFORMIN (GLUCOPHAGE) 500 MG tablet Take 1 tablet (500 mg) by mouth 2 times daily (with meals) 10/12/21  Yes Kody Wing MD   multivitamin (CENTRUM SILVER) tablet Take 1 tablet by mouth daily \"[facility] Adult\"   Yes Unknown, Entered By History   sitagliptin (JANUVIA) 50 MG tablet Take 1 tablet (50 mg) by mouth daily 10/12/21  Yes Kody Wing MD   blood glucose (NO BRAND SPECIFIED) lancets standard Use to test blood sugar FOUR times daily, to match lancing device per insurance 11/2/21   Kody Wing MD   blood glucose (NO BRAND SPECIFIED) test strip 120 strips by In Vitro route 4 times daily Use to test blood sugar up to 4 times daily or as directed. 11/1/21   Kody Wing MD   blood glucose monitoring (NO BRAND SPECIFIED) meter device kit Use to test blood sugar FOUR times daily, brand per insurance 11/2/21   Kody Wing MD   Continuous Blood Gluc Sensor (DEXCOM G6 SENSOR) MISC 1 each every 10 days Change every 10 days. 2/23/21   Kody Wing MD   insulin degludec (TRESIBA) 100 UNIT/ML pen Inject 28 Units Subcutaneous 2 times daily 8/19/22   Kody Wing MD   Insulin Degludec (TRESIBA) 100 UNIT/ML SOLN Inject 35 Units " Subcutaneous 2 times daily  Patient not taking: Reported on 9/23/2022 8/17/22   Kody Wing MD   insulin pen needle (BD PEN NEEDLE MEGAN 2ND GEN) 32G X 4 MM miscellaneous USE WITH INSULIN INJECTIONS UNDER THE SKIN TWO TIMES A DAY AS DIRECTED . 4/26/22   Kody Wing MD   Lancets (ONETOUCH DELICA PLUS [id number]) MISC USE TO TEST BLOOD SUGAR FOUR TIMES A DAY 8/16/22   Kody Wing MD   prednisoLONE acetate (PRED FORTE) 1 % ophthalmic suspension Place 1 drop into the right eye 4 times daily 6/9/21   Reported, Patient   STATIN NOT PRESCRIBED (INTENTIONAL) Please choose reason not prescribed, below 7/13/19   Kody Wing MD     Current Facility-Administered Medications Ordered in Epic   Medication Dose Route Frequency Last Rate Last Admin     [Auto Hold] acetaminophen (TYLENOL) tablet 650 mg  650 mg Oral Q6H PRN   650 mg at 09/23/22 0652    Or     [Auto Hold] acetaminophen (TYLENOL) Suppository 650 mg  650 mg Rectal Q6H PRN         [Auto Hold] aspirin (ASA) chewable tablet 81 mg  81 mg Oral Daily   81 mg at 09/25/22 0832     [Auto Hold] glucose gel 15-30 g  15-30 g Oral Q15 Min PRN        Or     [Auto Hold] dextrose 50 % injection 25-50 mL  25-50 mL Intravenous Q15 Min PRN        Or     [Auto Hold] glucagon injection 1 mg  1 mg Subcutaneous Q15 Min PRN         [Auto Hold] ezetimibe (ZETIA) tablet 10 mg  10 mg Oral Daily   10 mg at 09/27/22 0830     [Held by provider] heparin ANTICOAGULANT injection 5,000 Units  5,000 Units Subcutaneous Q12H   5,000 Units at 09/25/22 1826     [Auto Hold] insulin aspart (NovoLOG) injection (RAPID ACTING)  1-7 Units Subcutaneous TID AC   3 Units at 09/26/22 1840     [Auto Hold] insulin aspart (NovoLOG) injection (RAPID ACTING)  1-5 Units Subcutaneous At Bedtime   3 Units at 09/26/22 2131     [Auto Hold] insulin degludec (TRESIBA) 100 UNIT/ML injection 20 Units  20 Units Subcutaneous BID   20 Units at 09/25/22 2238     [Auto Hold] ipratropium (ATROVENT) 0.03 % spray 2 spray   2 spray Both Nostrils Q12H   2 spray at 09/25/22 0952     [Auto Hold] ketorolac (ACULAR) 0.5 % ophthalmic solution 1 drop  1 drop Right Eye 4x Daily   1 drop at 09/26/22 1839     lactated ringers infusion   Intravenous Continuous         [Auto Hold] lidocaine (LMX4) cream   Topical Q1H PRN         lidocaine 1 % 0.1-1 mL  0.1-1 mL Other Q1H PRN         [Auto Hold] lidocaine 1 % 0.1-1 mL  0.1-1 mL Other Q1H PRN         [Held by provider] lisinopril (ZESTRIL) tablet 40 mg  40 mg Oral Daily   40 mg at 09/26/22 0912     [Auto Hold] melatonin tablet 1 mg  1 mg Oral At Bedtime PRN         [Auto Hold] ondansetron (ZOFRAN ODT) ODT tab 4 mg  4 mg Oral Q6H PRN        Or     [Auto Hold] ondansetron (ZOFRAN) injection 4 mg  4 mg Intravenous Q6H PRN         Patient RECEIVING antibiotic to treat a different condition and it provides ADEQUATE COVERAGE for this surgical procedure.  1 each As instructed Continuous         [Auto Hold] piperacillin-tazobactam (ZOSYN) 3.375 g vial to attach to  mL bag  3.375 g Intravenous Q6H 0 mL/hr at 09/23/22 0442 3.375 g at 09/27/22 0512     [Auto Hold] prednisoLONE acetate (PRED FORTE) 1 % ophthalmic susp 1 drop  1 drop Right Eye 4x Daily   1 drop at 09/26/22 1837     [Auto Hold] sodium chloride (PF) 0.9% PF flush 3 mL  3 mL Intracatheter Q8H   3 mL at 09/26/22 2323     [Auto Hold] sodium chloride (PF) 0.9% PF flush 3 mL  3 mL Intracatheter q1 min prn         No current [facility]-ordered outpatient medications on file.       lactated ringers       Patient RECEIVING antibiotic to treat a different condition and it provides ADEQUATE COVERAGE for this surgical procedure.       No results for input(s): ABO, RH in the last 00704 hours.  No results for input(s): HCG in the last 96901 hours.  Recent Results (from the past 744 hour(s))   US KAREN Doppler No Exercise    Narrative    EXAM: RESTING ANKLE-BRACHIAL INDICES (ABIs)  LOCATION: [facility]  DATE/TIME: 9/23/2022 4:45  PM    INDICATION: non healing wound  LEFT LOWER EXTREMITY ONLY  COMPARISON: None.    KAREN FINDINGS:    LEFT  Brachial: 134  Ankle (PT): 203 Index: 1.5  Ankle (DP): Not obtainable Index: Not obtainable due to bandages/wound    The left KAREN at rest is 1.5.      WAVEFORMS: The dorsalis pedis and posterior tibial arteries are triphasic in the left PTA..      Impression    IMPRESSION:    1.  LEFT LOWER EXTREMITY: Left leg resting KAREN of 1.5 is elevated likely due to poorly compressible calcified arteries. The waveforms in the left PTA is biphasic. Unable to obtain pressures in the ankle and toes due to overlying bandages and wounds.   MR Foot Left w/o Contrast    Narrative    FINAL REPORT:     FINDINGS:    BONES AND JOINTS:  -First ray and second-fourth transmetatarsal amputations.  -Reticulated edema type signal in the navicular, medial cuneiform, intermediate cuneiform, lateral cuneiform, residual second metatarsal, and residual third metatarsal. Subtle confluent decreased T1 signal in the residual second metatarsal.  -Foci of heterotopic ossification at the amputation margin.    MUSCLES AND SOFT TISSUES:   -Soft tissue attenuation as ulceration in the anterior ankle, abutting the tibialis anterior tendon. Moderate heterogeneous fluid (with susceptibility artifact) within the tibialis anterior tendon sheath.  -Soft tissue ulcer in the anterolateral ankle this measures 15 mm in width and extends into the superficial extensor tendon fascia.  -Heterogeneous fluid (with susceptibility artifact) in the soft tissues of the dorsal midfoot near the tibialis anterior insertion. This measures 37 x 39 x 21 mm (AP, LR, SI).  -Soft tissue ulcer in the medial aspect of the amputation stump. The musculature deep is heterogeneous and demonstrates increased signal. This area measures 33 x 45 x 44 mm.  -Soft tissue fluid collection (with a dependent focus of susceptibility artifact) in the dorsal-lateral mid foot (level of the residual  second metatarsal). This measures 18 mm in greatest dimension.  -Generalized subcutaneous edema in the foot.      Impression    IMPRESSION:  1.  First ray and second-fifth transmetatarsal amputations.  2.  Probable second metatarsal osteomyelitis.  3.  Osteitis of the navicular, medial cuneiform, intermediate cuneiform, lateral cuneiform, and residual third metatarsal. These areas are at high risk of osteomyelitis development.  4.  Soft tissue ulcers in the anterior ankle, anterolateral ankle, and anteromedial amputation stump.  5.  Exposed tibialis anterior tendon in the anterior ankle, with extensive tibialis anterior emphysematous tenosynovitis. Abscess and emphysematous infection at the tibialis anterior tendon insertion.  6.  Extensive myositis in the medial amputation stump.  7.  Soft tissue abscess in the dorsal-lateral mid foot (at the level of the second metatarsal). This contains a focus of gas.  8.  I agree with the preliminary report.    Final Interpretation Dictated By: Kody Peters MD  Date: 09/24/2022         PRELIMINARY REPORT     EXAM: MR FOOT LEFT W/O CONTRAST  LOCATION: [facility]  DATE/TIME: 9/23/2022 10:16 PM    INDICATION: Plantar medial ulcer, evaluate for osteomyelitis.  COMPARISON: MRI of the foot with and without contrast performed 1/25/2022.  TECHNIQUE: Unenhanced.    IMPRESSION:  1.  Areas of low T1 and elevated T2 and STIR signal in the residual fore and midfoot involving the residual first and second metatarsal and to a less extent the cuneiform bones consistent with osteomyelitis. Mildly elevated STIR and T2 signal in the   navicular without corresponding T1 hypointensity, may reflect bony edema, correlate to exclude early evolving osteomyelitis. Consider correlation with plain film.  2.  1.8 x 1.4 cm air-fluid collection within the dorsal soft tissues of the residual forefoot consistent with abscess.  3.  Marked edema of the soft tissues overlying a  transmetatarsal amputation of the left foot with punctate foci of subcutaneous emphysema seen along the dorsal soft tissues of the mid foot and anterior ankle, correlate to exclude necrotizing infection.   Soft tissue defect along the medial aspect of the amputation site, correlate to exclude wound dehiscence or ulcer.    Preliminary Interpretation Dictated By: John F. Brunner, MD  Date: 9/23/2022 10:49 PM     MR Ankle Left w/o Contrast    Narrative    FINAL REPORT    FINDINGS:    BONES AND JOINTS:  -First ray and second-fourth transmetatarsal amputations.  -Reticulated edema-type signal in the navicular, medial cuneiform, intermediate cuneiform, lateral cuneiform, residual second metatarsal, and residual third metatarsal. Subtle confluent decreased T1 signal in the residual second metatarsal.  -Foci of heterotopic ossification at the amputation margin.    MUSCLES AND SOFT TISSUES:   -Soft tissue attenuation-ulceration in the anterior ankle, abutting the tibialis anterior tendon. Moderate heterogeneous fluid (with susceptibility artifact) within the tibialis anterior tendon sheath.  -Soft tissue ulcer in the anterolateral ankle. This measures 15 mm in width and extends into the superficial extensor tendon fascia.  -Heterogeneous fluid (with susceptibility artifact) in the soft tissues of the dorsal midfoot near the tibialis anterior insertion. This measures 37 x 39 x 21 mm (AP, LR, SI).  -Soft tissue ulcer in the medial aspect of the amputation stump. The musculature deep is heterogeneous and demonstrates increased signal. This area measures 33 x 45 x 44 mm.  -Soft tissue fluid collection (with a dependent focus of susceptibility artifact) in the dorsal-lateral mid foot (level of the residual second metatarsal). This measures 18 mm in greatest dimension.  -Generalized subcutaneous edema in the foot.      Impression    IMPRESSION:  1.  First ray and second-fifth transmetatarsal amputations.  2.  Probable second  metatarsal osteomyelitis.  3.  Osteitis of the navicular, medial cuneiform, intermediate cuneiform, lateral cuneiform, and residual third metatarsal. These areas are at high risk of osteomyelitis development.  4.  Soft tissue ulcers in the anterior ankle, anterolateral ankle, and anteromedial amputation stump.  5.  Exposed tibialis anterior tendon in the anterior ankle, with extensive tibialis anterior emphysematous tenosynovitis. Abscess and emphysematous infection at the tibialis anterior tendon insertion.  6.  Extensive myositis in the medial amputation stump.  7.  Soft tissue abscess in the dorsal-lateral mid foot (at the level of the second metatarsal). This contains a focus of gas.  8.  I agree with the preliminary report.    Final Interpretation Dictated By: Kody Peters MD  Date: 09/24/2022         PRELIMINARY REPORT     EXAM: MR FOOT LEFT W/O CONTRAST  LOCATION: [facility]  DATE/TIME: 9/23/2022 10:16 PM    INDICATION: Plantar medial ulcer, evaluate for osteomyelitis.  COMPARISON: MRI of the foot with and without contrast performed 1/25/2022.  TECHNIQUE: Unenhanced.    IMPRESSION:  1.  Areas of low T1 and elevated T2 and STIR signal in the residual fore and midfoot involving the residual first and second metatarsal and to a less extent the cuneiform bones consistent with osteomyelitis. Mildly elevated STIR and T2 signal in the   navicular without corresponding T1 hypointensity, may reflect bony edema, correlate to exclude early evolving osteomyelitis. Consider correlation with plain film.  2.  1.8 x 1.4 cm air-fluid collection within the dorsal soft tissues of the residual forefoot consistent with abscess.  3.  Marked edema of the soft tissues overlying a transmetatarsal amputation of the left foot with punctate foci of subcutaneous emphysema seen along the dorsal soft tissues of the mid foot and anterior ankle, correlate to exclude necrotizing infection.   Soft tissue defect  along the medial aspect of the amputation site, correlate to exclude wound dehiscence or ulcer.    PRELIMINARY REPORT    EXAM: MR ANKLE LEFT W/O CONTRAST  LOCATION: [facility]  DATE/TIME: 2022 10:43 PM    INDICATION: Status post left leg infection I and D, now with exposed tendon. Evaluate for osteomyelitis.  COMPARISON: None.  TECHNIQUE: Unenhanced.    IMPRESSION:  1.  Please, see MRI without contrast of the left foot performed same day. Again seen is irregular high T2 and STIR signal with low T1 signal involving the residual first and second metatarsals, cuneiform bones and, to a lesser extent, the navicular,   correlate to exclude osteomyelitis.  2.  Extensive soft tissue swelling with subcutaneous emphysema involving the dorsal aspects of the forefoot and proximal anterior ankle with evidence of myositis and subcutaneous emphysema, correlate to exclude deep space infection. Additionally seen is   a soft tissue anterolaterally just cranial to the lateral malleolus.    Preliminary Interpretation Dictated By: John F. Brunner, MD  Date: 2022 11:03 PM     Anesthesia Pre-Procedure Evaluation    Patient: Ry Horner   MRN: [id number] : 1965        Procedure : Procedure(s):  AMPUTATION BELOW KNEE          Past Medical History:   Diagnosis Date     BENIGN HYPERTENSION 2007     DIABETES MELLITUS TYPE II-UNCOMPL 2007     HYPERLIPIDEMIA NEC/NOS 2007     Tobacco use disorder 2007      Past Surgical History:   Procedure Laterality Date     AMPUTATE FOOT Left 2019    Procedure: LEFT PARTIAL FOOT AMPUTATION;  Surgeon: Antoine Pena DPM;  Location: [facility]     AMPUTATE FOOT Left 2019    Procedure: LEFT PARTIAL FOOT AMPUTATION;  Surgeon: Tim Douglas DPM;  Location: [facility]     AMPUTATE FOOT Left 2019    Procedure: POSSIBLE PARTIAL FOOT AMPUTATION;  Surgeon: Tim Douglas DPM;  Location: [facility]     AMPUTATE FOOT Left 2/10/2022     Procedure: Partial left foot amputation;  Surgeon: Milena Cevallos DPM, Podiatry/Foot and Ankle Surgery;  Location: [facility]     AMPUTATE LEG BELOW KNEE Right 9/1/2017    Procedure: AMPUTATE LEG BELOW KNEE;  RIGHT BELOW KNEE AMPUTATION ;  Surgeon: Nikolas Nascimento MD;  Location: [facility]     AMPUTATE TOE(S) Left 2/3/2020    Procedure: LEFT SECOND TOE AMPUTATION;  Surgeon: Milena Cevallos DPM, Podiatry/Foot and Ankle Surgery;  Location: [facility]     APPENDECTOMY       APPLY WOUND VAC Right 3/2/2015    Procedure: APPLY WOUND VAC;  Surgeon: Milena Cevallos DPM, Pod;  Location: [facility]     BIOPSY BONE FOOT Right 7/15/2016    Procedure: BIOPSY BONE FOOT;  Surgeon: Tim Douglas DPM;  Location: [facility]     BIOPSY BONE TOE Left 12/4/2019    Procedure: BONE BIOPSY LEFT SECOND TOE;  Surgeon: Tim Douglas DPM;  Location: [facility]     IRRIGATION AND DEBRIDEMENT FOOT, COMBINED Right 3/2/2015    Procedure: COMBINED IRRIGATION AND DEBRIDEMENT FOOT;  Surgeon: Milena Cevallos DPM, Pod;  Location: [facility]     IRRIGATION AND DEBRIDEMENT FOOT, COMBINED Right 7/15/2016    Procedure: COMBINED IRRIGATION AND DEBRIDEMENT FOOT;  Surgeon: Tim Douglas DPM;  Location: [facility]     IRRIGATION AND DEBRIDEMENT FOOT, COMBINED Right 7/20/2016    Procedure: COMBINED IRRIGATION AND DEBRIDEMENT FOOT;  Surgeon: Tim Douglas DPM;  Location: [facility]     IRRIGATION AND DEBRIDEMENT FOOT, COMBINED Left 11/19/2019    Procedure: REVISIONAL IRRIGATION AND DEBRIDEMENT LEFT FOOT AND BONE DEBRIDEMENT;  Surgeon: Joseph Randhawa DPM;  Location: [facility]     IRRIGATION AND DEBRIDEMENT FOOT, COMBINED Left 12/4/2019    Procedure: EXCISIONAL DEBRIDEMENT LEFT FOOT;  Surgeon: Tim Douglas DPM;  Location: [facility]     IRRIGATION AND DEBRIDEMENT FOOT, COMBINED Left 12/11/2019    Procedure: IRRIGATION AND DEBRIDEMENT FOOT, Partial osteotomy left first metatarsal;  Surgeon: Tim Douglas DPM;  Location: [facility]     IRRIGATION AND DEBRIDEMENT FOOT,  "COMBINED Left 2020    Procedure: IRRIGATION AND DEBRIDEMENT LEFT FOOT;  Surgeon: Tim Douglas DPM;  Location: [facility]     ORTHOPEDIC SURGERY        Allergies   Allergen Reactions     Pravastatin      \"sucked the life blood out of me,\" Indicates this occurs with all statins.      Social History     Tobacco Use     Smoking status: Former Smoker     Packs/day: 0.50     Years: 20.00     Pack years: 10.00     Types: Cigarettes     Start date: 1987     Quit date:      Years since quittin.7     Smokeless tobacco: Never Used   Substance Use Topics     Alcohol use: Yes     Comment: 3-4 drinks per month      Wt Readings from Last 1 Encounters:   22 113.4 kg (250 lb)        Anesthesia Evaluation            ROS/MED HX  ENT/Pulmonary:       Neurologic:       Cardiovascular:     (+) hypertension-Peripheral Vascular Disease----    METS/Exercise Tolerance:     Hematologic:       Musculoskeletal:       GI/Hepatic:       Renal/Genitourinary:     (+) renal disease, type: CRI,     Endo:     (+) type II DM, Diabetic complications: nephropathy neuropathy. Obesity,     Psychiatric/Substance Use:       Infectious Disease:       Malignancy:       Other:            Physical Exam    Airway        Mallampati: II    Neck ROM: full     Respiratory Devices and Support         Dental  no notable dental history     (+) caps      Cardiovascular   cardiovascular exam normal          Pulmonary   pulmonary exam normal                OUTSIDE LABS:  CBC:   Lab Results   Component Value Date    WBC 8.7 2022    WBC 13.6 (H) 2022    HGB 9.1 (L) 2022    HGB 10.1 (L) 2022    HCT 28.6 (L) 2022    HCT 32.0 (L) 2022     2022     2022     BMP:   Lab Results   Component Value Date     2022     2022    POTASSIUM 4.2 2022    POTASSIUM 4.3 2022    CHLORIDE 106 2022    CHLORIDE 104 2022    CO2 28 2022    CO2 28 2022    " BUN 22 09/25/2022    BUN 31 (H) 09/24/2022    CR 1.26 (H) 09/25/2022    CR 1.48 (H) 09/24/2022     (H) 09/27/2022     (H) 09/27/2022     COAGS: No results found for: PTT, INR, FIBR  POC:   Lab Results   Component Value Date     (H) 02/07/2020     HEPATIC:   Lab Results   Component Value Date    ALBUMIN 2.3 (L) 09/23/2022    PROTTOTAL 8.1 09/23/2022    ALT 58 09/23/2022    AST 39 09/23/2022    ALKPHOS 102 09/23/2022    BILITOTAL 0.4 09/23/2022     OTHER:   Lab Results   Component Value Date    PH 7.39 09/23/2022    LACT 1.3 09/23/2022    A1C 7.7 (H) 04/04/2022    FERNANDO 8.4 (L) 09/25/2022    PHOS 3.7 02/06/2020    TSH 0.94 12/19/2019    CRP 53.50 09/25/2022    SED 85 (H) 09/25/2022       Anesthesia Plan    ASA Status:  3   NPO Status:  NPO Appropriate    Anesthesia Type: General.     - Airway: ETT   Induction: Intravenous.   Maintenance: Balanced.   Techniques and Equipment:     - Airway: Video-Laryngoscope         Consents    Anesthesia Plan(s) and associated risks, benefits, and realistic alternatives discussed. Questions answered and patient/representative(s) expressed understanding.    - Discussed:     - Discussed with:  Patient         Postoperative Care    Pain management: IV analgesics, Peripheral nerve block (Single Shot).   PONV prophylaxis: Ondansetron (or other 5HT-3)     Comments:    Other Comments: Fem-sciatic nerve blocks            Krishan Herrera MD

## 2022-09-27 NOTE — PROGRESS NOTES
"[facility] Nurse Inpatient Assessment     Consulted for: LLE    S-(situation): consulted for LLE this admission     B-(background): per MD charting, \"extensive infection of the left lower extremity with severe tenosynovitis of the tibialis anterior tendon which is exposed\"     A-(assessment): charting reviewed, patient not assessed in person, plans for patient to have LLE amputation 9/27 today     R-(recommendations): [facility] team will sign off at this time. Please reconsult if needed.       Lucrecia JONES   Dept. Pager: [phone]  Dept. Office Number: [phone]          "

## 2022-09-27 NOTE — ANESTHESIA PROCEDURE NOTES
Femoral Procedure Note    Pre-Procedure   Staff -        Anesthesiologist:  Krishan Herrera MD       Performed By: Anesthesiologist       Location: pre-op       Pre-Anesthestic Checklist: patient identified, IV checked, site marked, risks and benefits discussed, informed consent, monitors and equipment checked, pre-op evaluation, at physician/surgeon's request and post-op pain management  Timeout:       Correct Patient: Yes        Correct Procedure: Yes        Correct Site: Yes        Correct Position: Yes        Correct Laterality: Yes        Site Marked: Yes  Procedure Documentation  Procedure: Femoral       Patient Position: supine       Patient Prep/Sterile Barriers: mask, no-touch technique utilized       Skin prep: Chloraprep       Local skin infiltrated with 3 mL of 1% lidocaine.        Needle Type: insulated and short bevel       Needle Gauge: 20.        Needle Length (millimeters): 100        Ultrasound guided       1. Ultrasound was used to identify targeted nerve, plexus, vascular marker, or fascial plane and place a needle adjacent to it in real-time.       2. Ultrasound was used to visualize the spread of anesthetic in close proximity to the above referenced structure.       3. A permanent image is entered into the patient's record.       4. The visualized anatomic structures appeared normal.       5. There were no apparent abnormal pathologic findings.    Assessment/Narrative         The placement was negative for: blood aspirated, painful injection and site bleeding       Paresthesias: No.       Test dose of mL at.         Test dose negative, 3 minutes after injection, for signs of intravascular, subdural, or intrathecal injection.       Bolus given via needle..        Secured via.        Insertion/Infusion Method: Single Shot       Complications: none    Medication(s) Administered   Ropivacaine 0.5% w/ 1:400K Epi (Injection) - Injection   15 mL - 9/27/2022 12:39:00 PM   Comments:  Ropivacaine injected  on the anterior side of the femoral artery, in close proximity to the femoral nerve     Pt tolerated well.    No complications.      The surgeon has given a verbal order transferring care of this patient to me for the performance of a regional analgesia block for post-op pain control. It is requested of me because I am uniquely trained and qualified to perform this block and the surgeon is neither trained nor qualified to perform this procedure.

## 2022-09-27 NOTE — BRIEF OP NOTE
[facility]    Brief Operative Note    Pre-operative diagnosis: Diabetic ulcer of left foot associated with type 2 diabetes mellitus, with muscle involvement without evidence of necrosis, unspecified part of foot (H) [E11.621, L97.525]  Post-operative diagnosis Same as pre-operative diagnosis    Procedure: Procedure(s):  AMPUTATION BELOW KNEE  Surgeon: Surgeon(s) and Role:     * Neal Blackman MD - Primary     * Mroro Mcfarland MD - Resident - Assisting  Anesthesia: General   Estimated Blood Loss: 75 ml    Drains:  Wound vac applied  Specimens:   ID Type Source Tests Collected by Time Destination   1 : LEFT FOOT AND ANKLE Amputation, Non-Traumatic Foot, Left SURGICAL PATHOLOGY EXAM Neal Blackman MD 9/27/2022  1:41 PM      Findings:   Chronic wounds with associated tenosynovitis of the left lower extremity. Please see operative report for full details.  Complications: None.  Implants: * No implants in log *     Plan:  - Ok to stop antibiotics from surgical perspective  - Ok for IV analgesics prn    Morro Mcfarland MD on 9/27/2022 at 2:13 PM  p:[phone]

## 2022-09-27 NOTE — ANESTHESIA PROCEDURE NOTES
Airway       Patient location during procedure: OR       Procedure Start/Stop Times: 9/27/2022 1:08 PM  Staff -        Performed By: anesthesiologist and CRNA  Consent for Airway        Urgency: elective  Indications and Patient Condition       Indications for airway management: alexander-procedural       Induction type:intravenous       Mask difficulty assessment: 2 - vent by mask + OA or adjuvant +/- NMBA    Final Airway Details       Final airway type: endotracheal airway       Successful airway: ETT - single  Endotracheal Airway Details        ETT size (mm): 8.0       Cuffed: yes       Successful intubation technique: video laryngoscopy       VL Blade Size: Glidescope 4       Grade View of Cords: 1       Adjucts: stylet       Position: Right       Measured from: gums/teeth       Secured at (cm): 23       Bite block used: None    Post intubation assessment        Placement verified by: capnometry, equal breath sounds and chest rise        Number of attempts at approach: 1       Number of other approaches attempted: 0       Secured with: silk tape       Ease of procedure: easy       Dentition: Intact and Unchanged    Medication(s) Administered   Medication Administration Time: 9/27/2022 1:08 PM

## 2022-09-27 NOTE — ANESTHESIA PROCEDURE NOTES
Popliteal and Sciatic Procedure Note    Pre-Procedure   Staff -        Anesthesiologist:  Krishan Herrera MD       Performed By: Anesthesiologist       Location: pre-op       Pre-Anesthestic Checklist: patient identified, IV checked, site marked, risks and benefits discussed, informed consent, monitors and equipment checked, pre-op evaluation, at physician/surgeon's request and post-op pain management  Timeout:       Correct Patient: Yes        Correct Procedure: Yes        Correct Site: Yes        Correct Position: Yes        Correct Laterality: Yes        Site Marked: Yes  Procedure Documentation  Procedure: Popliteal, Sciatic       Laterality: left       Patient Position: supine       Patient Prep/Sterile Barriers: mask, no-touch technique       Skin prep: Chloraprep       Local skin infiltrated with 3 mL of 1% lidocaine.        Needle Type: insulated and short bevel       Needle Gauge: 21.        Needle Length (millimeters): 100        Ultrasound guided       1. Ultrasound was used to identify targeted nerve, plexus, vascular marker, or fascial plane and place a needle adjacent to it in real-time.       2. Ultrasound was used to visualize the spread of anesthetic in close proximity to the above referenced structure.       3. A permanent image is entered into the patient's record.       4. The visualized anatomic structures appeared normal.       5. There were no apparent abnormal pathologic findings.    Assessment/Narrative         The placement was negative for: blood aspirated, painful injection and site bleeding       Paresthesias: No.       Test dose of mL at.         Test dose negative, 3 minutes after injection, for signs of intravascular, subdural, or intrathecal injection.       Bolus given via needle..        Secured via.        Insertion/Infusion Method: Single Shot       Complications: none    Medication(s) Administered   Ropivacaine 0.5% w/ 1:400K Epi (Injection) - Injection   15 mL - 9/27/2022  12:38:00 PM   Comments:  Ropivacaine with epinephrine (1:400K) injected in close proximity to the sciatic nerve via the popliteal approach.      Pt tolerated well.    No complications.      The surgeon has given a verbal order transferring care of this patient to me for the performance of a regional analgesia block for post-op pain control. It is requested of me because I am uniquely trained and qualified to perform this block and the surgeon is neither trained nor qualified to perform this procedure.

## 2022-09-27 NOTE — PROGRESS NOTES
A/O X4, VSS on RA.BKA on L leg today, wound VAC present, draining. Pt denies pain. Mod carb diet, BS checks. Uses urinal, due to void.

## 2022-09-27 NOTE — ANESTHESIA CARE TRANSFER NOTE
Patient: Ry Horner    Procedure: Procedure(s):  AMPUTATION BELOW KNEE       Diagnosis: Diabetic ulcer of left foot associated with type 2 diabetes mellitus, with muscle involvement without evidence of necrosis, unspecified part of foot (H) [E11.621, L97.525]  Diagnosis Additional Information: No value filed.    Anesthesia Type:   General     Note:    Oropharynx: oropharynx clear of all foreign objects and spontaneously breathing  Level of Consciousness: awake  Oxygen Supplementation: face mask  Level of Supplemental Oxygen (L/min / FiO2): 6  Independent Airway: airway patency satisfactory and stable  Dentition: dentition unchanged  Vital Signs Stable: post-procedure vital signs reviewed and stable  Report to RN Given: handoff report given  Patient transferred to: PACU  Comments: Pt to PACU with O2 via mask, airway patent, VSS. Report to RN.  Handoff Report: Identifed the Patient, Identified the Reponsible Provider, Reviewed the pertinent medical history, Discussed the surgical course, Reviewed Intra-OP anesthesia mangement and issues during anesthesia, Set expectations for post-procedure period and Allowed opportunity for questions and acknowledgement of understanding      Vitals:  Vitals Value Taken Time   BP     Temp     Pulse     Resp     SpO2         Electronically Signed By: LILIANA Hui CRNA  September 27, 2022  2:23 PM

## 2022-09-27 NOTE — ANESTHESIA POSTPROCEDURE EVALUATION
Patient: Ry Horner    Procedure: Procedure(s):  AMPUTATION BELOW KNEE       Anesthesia Type:  General    Note:  Disposition: Inpatient   Postop Pain Control: Uneventful            Sign Out: Well controlled pain   PONV: No   Neuro/Psych: Uneventful            Sign Out: Acceptable/Baseline neuro status   Airway/Respiratory: Uneventful            Sign Out: Acceptable/Baseline resp. status   CV/Hemodynamics: Uneventful            Sign Out: Acceptable CV status   Other NRE: NONE   DID A NON-ROUTINE EVENT OCCUR? No           Last vitals:  Vitals Value Taken Time   /82 09/27/22 1530   Temp 36.6  C (97.8  F) 09/27/22 1500   Pulse 81 09/27/22 1540   Resp 13 09/27/22 1540   SpO2 99 % 09/27/22 1540   Vitals shown include unvalidated device data.    Electronically Signed By: Leobardo Laughlin MD  September 27, 2022  3:45 PM

## 2022-09-27 NOTE — PROGRESS NOTES
[facility]    Hospitalist Progress Note    Interval History     Patient awake and alert.  No acute events overnight.  Initially surgery was planned for yesterday but that got rescheduled to today.  Has been n.p.o. since midnight.  Refusing Tresiba right now due to concern for hypoglycemia with his n.p.o. status.  He did get significantly hyperglycemic last night after eating rice and potatoes.    -Data reviewed today: I reviewed all new labs and imaging results over the last 24 hours. I personally reviewed the MRI left foot and left ankle image(s) showing As reported below.    Physical Exam   Temp: 98.2  F (36.8  C) Temp src: Oral BP: (!) 144/90 Pulse: 96   Resp: 21 SpO2: 98 % O2 Device: None (Room air)    Vitals:    09/23/22 0038   Weight: 113.4 kg (250 lb)     Vital Signs with Ranges  Temp:  [97.9  F (36.6  C)-98.9  F (37.2  C)] 98.2  F (36.8  C)  Pulse:  [] 96  Resp:  [18-21] 21  BP: (113-158)/(71-91) 144/90  SpO2:  [97 %-98 %] 98 %  I/O last 3 completed shifts:  In: 600 [P.O.:600]  Out: 800 [Urine:800]    Physical Exam  Constitutional:       Appearance: Normal appearance.   HENT:      Head: Normocephalic.   Eyes:      Pupils: Pupils are equal, round, and reactive to light.   Cardiovascular:      Rate and Rhythm: Normal rate and regular rhythm.      Pulses: Normal pulses.      Heart sounds: Normal heart sounds.   Pulmonary:      Effort: Pulmonary effort is normal. No respiratory distress.      Breath sounds: Normal breath sounds.   Abdominal:      General: Abdomen is flat. Bowel sounds are normal. There is no distension.      Tenderness: There is no abdominal tenderness. There is no guarding.   Musculoskeletal:         General: Normal range of motion.      Cervical back: Normal range of motion.      Comments: Right below-knee amputation.  Left lower extremity transmetatarsal amputation with wound VAC therapy successful at and multiple wounds in the ankle and lower leg sites.   Pictures in H&P.  Currently dressing in place.   Skin:     General: Skin is warm and dry.   Neurological:      General: No focal deficit present.   Psychiatric:         Mood and Affect: Mood normal.           Medications     lactated ringers 25 mL/hr at 09/27/22 1240     Patient RECEIVING antibiotic to treat a different condition and it provides ADEQUATE COVERAGE for this surgical procedure.         [Auto Hold] aspirin  81 mg Oral Daily     [Auto Hold] ezetimibe  10 mg Oral Daily     [Held by provider] heparin ANTICOAGULANT  5,000 Units Subcutaneous Q12H     [Auto Hold] insulin aspart  1-7 Units Subcutaneous TID AC     [Auto Hold] insulin aspart  1-5 Units Subcutaneous At Bedtime     [Auto Hold] insulin degludec  20 Units Subcutaneous BID     [Auto Hold] ipratropium  2 spray Both Nostrils Q12H     [Auto Hold] ketorolac  1 drop Right Eye 4x Daily     [Held by provider] lisinopril  40 mg Oral Daily     [Auto Hold] piperacillin-tazobactam  3.375 g Intravenous Q6H     [Auto Hold] prednisoLONE acetate  1 drop Right Eye 4x Daily     [Auto Hold] sodium chloride (PF)  3 mL Intracatheter Q8H       Data   Recent Labs   Lab 09/27/22  1136 09/27/22  0737 09/27/22  0636 09/26/22  0747 09/26/22  0623 09/25/22  1134 09/25/22  0909 09/24/22  0726 09/24/22  0639 09/23/22  1137 09/23/22  0106   WBC  --   --   --   --   --   --   --   --  8.7  --  13.6*   HGB  --   --   --   --   --   --   --   --  9.1*  --  10.1*   MCV  --   --   --   --   --   --   --   --  84  --  86   PLT  --   --   --   --  319  --   --   --  331  --  377   NA  --   --   --   --   --   --  138  --  135  --  135   POTASSIUM  --   --   --   --   --   --  4.2  --  4.3  --  4.8   CHLORIDE  --   --   --   --   --   --  106  --  104  --  99   CO2  --   --   --   --   --   --  28  --  28  --  27   BUN  --   --   --   --   --   --  22  --  31*  --  48*   CR  --   --   --   --   --   --  1.26*  --  1.48*  --  2.33*   ANIONGAP  --   --   --   --   --   --  4  --  3  --  9    FERNANDO  --   --   --   --   --   --  8.4*  --  8.6  --  8.6   * 210* 196*   < >  --    < > 182*   < > 160*   < > 176*   ALBUMIN  --   --   --   --   --   --   --   --   --   --  2.3*   PROTTOTAL  --   --   --   --   --   --   --   --   --   --  8.1   BILITOTAL  --   --   --   --   --   --   --   --   --   --  0.4   ALKPHOS  --   --   --   --   --   --   --   --   --   --  102   ALT  --   --   --   --   --   --   --   --   --   --  58   AST  --   --   --   --   --   --   --   --   --   --  39    < > = values in this interval not displayed.       No results found for this or any previous visit (from the past 24 hour(s)).      Assessment & Plan      Ry Horner is a 57 year old male with past medical history significant for type II DM, hypertension, dyslipidemia, CKD III, non-healing diabetic foot wounds with multiple prior amputations admitted on 9/23/2022 with left lower extremity wounds.      Multiple left lower extremity wounds   Hx of non-healing diabetic ulcers   S/p R BKA   S/p transmetatarsal amputation of the left foot   Leukocytosis   Pt has a hx of non-healing diabetic ulceration of the plantar aspect of th left foot. He has been following with wound clinic and podiatry for this. He went on a cruise to [facility] and developed a bad infection of the ulceration that apparently spread up his leg to include his shin. He was ultimately hospitalized in [place] and for several days on IV antibitotics (it is unclear what antibiotics he was on in the hospital). He needed multiple surgical debridements during this hospitalization. It is unclear how deep the infection is or if there is any bony involvement. It looks like one culture grew strep parasanguinosis. Ultimately [place] wanted to come back to the [place] so was placed on PO ciprofloxacin to take while traveling and was instructed to come straight to the hospital after landing.   * He does have a mild leukocytosis, but is otherwise non-toxic and non-septic appearing.  *  Lower extremity wounds are pictured below.  Wound care consult placed.  - Vascular surgery consult.  KAREN of the left leg ordered.  Shows an elevated resting KAREN of 1.5 likely due to poorly compressible calcified arteries.  Waveform is biphasic.  Pressures could not be obtained due to overlying bandages.  Awaiting final recommendations.  Surgery has not evaluated the patient yesterday.  Will reach out to them today regarding final surgical plans.  - Podiatry consult .  MRI left foot and ankle obtained.  Shows underlying osteomyelitis and osteitis with early abscess on the dorsum of the foot.  Please review image report above.  Podiatry recommended below-knee amputation based on MRI results.  This would be performed by vascular surgery.  Plan on proceeding for surgery today.  N.p.o. since midnight.  Plan on proceeding with guillotine amputation  - Infectious disease consult .recommended continuing Zosyn for now.   Did receive one-time dose of vancomycin.  - undergoing left BKA today        Type II DM  Diabetic polyneuropathy and diabetic nephropathy   Hgb A1C was 7.7% in April 2022   - MDSSI .  Carb controlled diet. Continue home triseba . Pt refused dose this morning since he is npo and is concerned about hypoglycemia  - Hold jardiance, januvia, and metformin for now      JOHNATHAN on CKD stage III   Baseline creatinine appears to be around 1.7. Creatinine on admission is 2.33 with BUN of 48 and GFR of 32. Pt reports not drinking much water throughout the course of his flight.   -Creatinine corrected and is down to 1.26 today.  Discontinued IV fluids.  Having good oral intake.  - Monitor renal function   - Avoid nephrotoxins      HTN   -Blood pressure improved.  Restart lisinopril.     Dyslipidemia   - Continue PTA ASA, Ezetimibe. Not on statin due to allergy.  -Has history of hypertriglyceridemia.  His last triglycerides were at 489.  Recheck improved at 164     Chronic Anemia   Hemoglobin on admission is 10.1. This is  down slightly from his baseline of around 12.   - Monitor hemoglobin.      Hypoalbuminemia: Albumin = 2.3 g/dL (Ref range: 3.4 - 5.0 g/dL) on admission, will monitor as appropriate.    Obesity: Estimated body mass index is 33.91 kg/m  as calculated from the following:    Height as of this encounter: 1.829 m (6').    Weight as of this encounter: 113.4 kg (250 lb).        Diet:diabetic diet    DVT Prophylaxis: Heparin SQ  [place] Catheter: Not present  Central Lines: None  Cardiac Monitoring: None  Code Status: Full Code         Gloria Chester MD, MD  [phone](p)

## 2022-09-27 NOTE — PLAN OF CARE
Goal Outcome Evaluation:    Alert and oriented X4, VSS on RA, increased BS and correction given.  NPO after midnight for surgery on 9/27/22. viding well useing urinal by bedside.  Pt refused tresiba insulin at bedtime. Pt denies pain.

## 2022-09-28 ENCOUNTER — ANESTHESIA EVENT (OUTPATIENT)
Dept: SURGERY | Facility: CLINIC | Age: 57
DRG: 617 | End: 2022-09-28
Payer: COMMERCIAL

## 2022-09-28 LAB
ANION GAP SERPL CALCULATED.3IONS-SCNC: 6 MMOL/L (ref 3–14)
BUN SERPL-MCNC: 23 MG/DL (ref 7–30)
CALCIUM SERPL-MCNC: 8.6 MG/DL (ref 8.5–10.1)
CHLORIDE BLD-SCNC: 102 MMOL/L (ref 94–109)
CO2 SERPL-SCNC: 27 MMOL/L (ref 20–32)
CREAT SERPL-MCNC: 1.73 MG/DL (ref 0.66–1.25)
ERYTHROCYTE [DISTWIDTH] IN BLOOD BY AUTOMATED COUNT: 12.9 % (ref 10–15)
GFR SERPL CREATININE-BSD FRML MDRD: 46 ML/MIN/1.73M2
GLUCOSE BLD-MCNC: 187 MG/DL (ref 70–99)
GLUCOSE BLDC GLUCOMTR-MCNC: 140 MG/DL (ref 70–99)
GLUCOSE BLDC GLUCOMTR-MCNC: 147 MG/DL (ref 70–99)
GLUCOSE BLDC GLUCOMTR-MCNC: 154 MG/DL (ref 70–99)
GLUCOSE BLDC GLUCOMTR-MCNC: 163 MG/DL (ref 70–99)
GLUCOSE BLDC GLUCOMTR-MCNC: 192 MG/DL (ref 70–99)
GLUCOSE BLDC GLUCOMTR-MCNC: 255 MG/DL (ref 70–99)
HCT VFR BLD AUTO: 28.3 % (ref 40–53)
HGB BLD-MCNC: 9.2 G/DL (ref 13.3–17.7)
MCH RBC QN AUTO: 27.3 PG (ref 26.5–33)
MCHC RBC AUTO-ENTMCNC: 32.5 G/DL (ref 31.5–36.5)
MCV RBC AUTO: 84 FL (ref 78–100)
PLATELET # BLD AUTO: 335 10E3/UL (ref 150–450)
POTASSIUM BLD-SCNC: 3.7 MMOL/L (ref 3.4–5.3)
RBC # BLD AUTO: 3.37 10E6/UL (ref 4.4–5.9)
SODIUM SERPL-SCNC: 135 MMOL/L (ref 133–144)
WBC # BLD AUTO: 9 10E3/UL (ref 4–11)

## 2022-09-28 PROCEDURE — 250N000011 HC RX IP 250 OP 636: Performed by: STUDENT IN AN ORGANIZED HEALTH CARE EDUCATION/TRAINING PROGRAM

## 2022-09-28 PROCEDURE — 258N000003 HC RX IP 258 OP 636: Performed by: SURGERY

## 2022-09-28 PROCEDURE — 250N000013 HC RX MED GY IP 250 OP 250 PS 637: Performed by: HOSPITALIST

## 2022-09-28 PROCEDURE — 258N000003 HC RX IP 258 OP 636: Performed by: HOSPITALIST

## 2022-09-28 PROCEDURE — 250N000011 HC RX IP 250 OP 636: Performed by: SURGERY

## 2022-09-28 PROCEDURE — 99233 SBSQ HOSP IP/OBS HIGH 50: CPT | Performed by: HOSPITALIST

## 2022-09-28 PROCEDURE — 250N000013 HC RX MED GY IP 250 OP 250 PS 637: Performed by: SURGERY

## 2022-09-28 PROCEDURE — 85014 HEMATOCRIT: CPT | Performed by: HOSPITALIST

## 2022-09-28 PROCEDURE — 82310 ASSAY OF CALCIUM: CPT | Performed by: HOSPITALIST

## 2022-09-28 PROCEDURE — 36415 COLL VENOUS BLD VENIPUNCTURE: CPT | Performed by: HOSPITALIST

## 2022-09-28 PROCEDURE — 120N000001 HC R&B MED SURG/OB

## 2022-09-28 PROCEDURE — 250N000013 HC RX MED GY IP 250 OP 250 PS 637: Performed by: STUDENT IN AN ORGANIZED HEALTH CARE EDUCATION/TRAINING PROGRAM

## 2022-09-28 PROCEDURE — 99024 POSTOP FOLLOW-UP VISIT: CPT

## 2022-09-28 RX ORDER — POLYETHYLENE GLYCOL 3350 17 G/17G
17 POWDER, FOR SOLUTION ORAL DAILY
Status: DISCONTINUED | OUTPATIENT
Start: 2022-09-28 | End: 2022-10-03 | Stop reason: HOSPADM

## 2022-09-28 RX ORDER — AMOXICILLIN 250 MG
2 CAPSULE ORAL 2 TIMES DAILY
Status: DISCONTINUED | OUTPATIENT
Start: 2022-09-28 | End: 2022-10-03 | Stop reason: HOSPADM

## 2022-09-28 RX ORDER — AMOXICILLIN 250 MG
1 CAPSULE ORAL 2 TIMES DAILY
Status: DISCONTINUED | OUTPATIENT
Start: 2022-09-28 | End: 2022-10-03 | Stop reason: HOSPADM

## 2022-09-28 RX ORDER — SODIUM CHLORIDE 9 MG/ML
INJECTION, SOLUTION INTRAVENOUS CONTINUOUS
Status: DISCONTINUED | OUTPATIENT
Start: 2022-09-28 | End: 2022-10-01 | Stop reason: ALTCHOICE

## 2022-09-28 RX ORDER — HYDROMORPHONE HYDROCHLORIDE 1 MG/ML
0.3 INJECTION, SOLUTION INTRAMUSCULAR; INTRAVENOUS; SUBCUTANEOUS
Status: DISCONTINUED | OUTPATIENT
Start: 2022-09-28 | End: 2022-09-30

## 2022-09-28 RX ADMIN — HYDROMORPHONE HYDROCHLORIDE 0.3 MG: 1 INJECTION, SOLUTION INTRAMUSCULAR; INTRAVENOUS; SUBCUTANEOUS at 02:05

## 2022-09-28 RX ADMIN — SENNOSIDES AND DOCUSATE SODIUM 1 TABLET: 50; 8.6 TABLET ORAL at 20:27

## 2022-09-28 RX ADMIN — INSULIN DEGLUDEC INJECTION 20 UNITS: 100 INJECTION, SOLUTION SUBCUTANEOUS at 09:30

## 2022-09-28 RX ADMIN — INSULIN ASPART 1 UNITS: 100 INJECTION, SOLUTION INTRAVENOUS; SUBCUTANEOUS at 09:29

## 2022-09-28 RX ADMIN — PIPERACILLIN AND TAZOBACTAM 3.38 G: 3; .375 INJECTION, POWDER, FOR SOLUTION INTRAVENOUS at 23:50

## 2022-09-28 RX ADMIN — ASPIRIN 81 MG CHEWABLE TABLET 81 MG: 81 TABLET CHEWABLE at 09:32

## 2022-09-28 RX ADMIN — INSULIN ASPART 1 UNITS: 100 INJECTION, SOLUTION INTRAVENOUS; SUBCUTANEOUS at 12:48

## 2022-09-28 RX ADMIN — SENNOSIDES AND DOCUSATE SODIUM 1 TABLET: 50; 8.6 TABLET ORAL at 09:33

## 2022-09-28 RX ADMIN — POLYETHYLENE GLYCOL 3350 17 G: 17 POWDER, FOR SOLUTION ORAL at 09:32

## 2022-09-28 RX ADMIN — PIPERACILLIN AND TAZOBACTAM 3.38 G: 3; .375 INJECTION, POWDER, FOR SOLUTION INTRAVENOUS at 11:56

## 2022-09-28 RX ADMIN — Medication: at 08:59

## 2022-09-28 RX ADMIN — INSULIN ASPART 2 UNITS: 100 INJECTION, SOLUTION INTRAVENOUS; SUBCUTANEOUS at 17:44

## 2022-09-28 RX ADMIN — HYDROMORPHONE HYDROCHLORIDE 0.3 MG: 1 INJECTION, SOLUTION INTRAMUSCULAR; INTRAVENOUS; SUBCUTANEOUS at 07:04

## 2022-09-28 RX ADMIN — PIPERACILLIN AND TAZOBACTAM 3.38 G: 3; .375 INJECTION, POWDER, FOR SOLUTION INTRAVENOUS at 05:21

## 2022-09-28 RX ADMIN — EZETIMIBE 10 MG: 10 TABLET ORAL at 09:33

## 2022-09-28 RX ADMIN — SODIUM CHLORIDE, PRESERVATIVE FREE: 5 INJECTION INTRAVENOUS at 05:27

## 2022-09-28 RX ADMIN — PIPERACILLIN AND TAZOBACTAM 3.38 G: 3; .375 INJECTION, POWDER, FOR SOLUTION INTRAVENOUS at 18:56

## 2022-09-28 RX ADMIN — OXYCODONE HYDROCHLORIDE 5 MG: 5 TABLET ORAL at 04:03

## 2022-09-28 RX ADMIN — SODIUM CHLORIDE 500 ML: 9 INJECTION, SOLUTION INTRAVENOUS at 17:48

## 2022-09-28 ASSESSMENT — ACTIVITIES OF DAILY LIVING (ADL)
ADLS_ACUITY_SCORE: 36
ADLS_ACUITY_SCORE: 38
DEPENDENT_IADLS:: INDEPENDENT
ADLS_ACUITY_SCORE: 36
ADLS_ACUITY_SCORE: 38
ADLS_ACUITY_SCORE: 38
ADLS_ACUITY_SCORE: 36

## 2022-09-28 NOTE — CONSULTS
"Care Management Initial Consult    General Information  Assessment completed with: Patient,    Type of CM/SW Visit: Initial Assessment    Primary Care Provider verified and updated as needed:     Readmission within the last 30 days:        Reason for Consult: discharge planning  Advance Care Planning: Advance Care Planning Reviewed: no concerns identified          Communication Assessment  Patient's communication style: spoken language (English or Bilingual)             Cognitive  Cognitive/Neuro/Behavioral: WDL                      Living Environment:   People in home: significant other  Sylwia  Current living Arrangements: house     Ry reports his SO is getting everything ready on one level.    Able to return to prior arrangements: yes       Family/Social Support:  Care provided by: self, spouse/significant other  Provides care for: no one  Marital Status: Lives with Significant Other  Significant Other, Children (grandchildren)       sylwia  Description of Support System: Supportive, Involved    Support Assessment: Adequate family and caregiver support, Adequate social supports    Current Resources:   Patient receiving home care services: No  Pt reports he is not interested in home care. He prefers to go out for appointments, including therapy or wound care if needed. Says he would consider it as \"a last resort\".  Community Resources: None  Equipment currently used at home:    Supplies currently used at home:      Employment/Financial:  Employment Status: employed full-time     Employment/ Comments: In the  but does not receive benefits or get care through the [facility]  Financial Concerns: No concerns identified   Referral to Financial Worker: No       Lifestyle & Psychosocial Needs:  Social Determinants of Health     Tobacco Use: Medium Risk     Smoking Tobacco Use: Former Smoker     Smokeless Tobacco Use: Never Used   Alcohol Use: Not on file   Financial Resource Strain: Not on file   Food Insecurity: " Not on file   Transportation Needs: Not on file   Physical Activity: Not on file   Stress: Not on file   Social Connections: Not on file   Intimate Partner Violence: Not on file   Depression: Not at risk     PHQ-2 Score: 0   Housing Stability: Not on file       Functional Status:  Prior to admission patient needed assistance:   Dependent ADLs::  (prosthesis)  Dependent IADLs:: Independent       Mental Health Status:  Mental Health Status: No Current Concerns       Chemical Dependency Status:  Chemical Dependency Status: No Current Concerns             Values/Beliefs:  Spiritual, Cultural Beliefs, Holiness Practices, Values that affect care: no               Additional Information:  Writer met with Ry at the bedside. Introduced self and role. CC will continue to follow for disposition planning, WOCN recommendations, IV abx, and any DME needs.     Mariel Guan RN  Inpatient Care Coordinator

## 2022-09-28 NOTE — PROGRESS NOTES
"SPIRITUAL HEALTH SERVICES Progress Note  [facility] - [address]    Referral: Length-of-Stay.    Upon my introduction, Ry said he was coping well, as this \"wasn't his first time.\" He said he's \"grateful to be alive.\" He named his girlfriend as his main support; \"she's his rock.\"    Ry enjoys his career as a , and he likes to restore old cars.     He said he \"goes with the flow\" and is looking forward to \"getting on with it.\" He asked about connecting with a Care Coordinator, naming awareness of his needs at home upon discharge.    I shared words of affirmation and encouragement, and said a blessing.    I then informed the Care Manager that Ry was interested in meeting soon.    SH remains available.    Rev Reny Gallagher  Associate Chaplain [facility] Phone Line [phone]  [place] (Tuesdays & Thursdays) [phone]    "

## 2022-09-28 NOTE — PROGRESS NOTES
L BKA 9/27/22. Pt. Is A&O x4. VSS. Pt rated L leg pain 8/10. Medicated with IV Dilaudid and Oxycodone. Wound VAC intact with continuous 125 mmHG suction. Serosanguinous drainage noted in Wound VAC. L leg dressings is CDI. Pt is drinking fluids well. Saline Lock patent. Voiding adequately. Attempted to start Dilaudid PCA pump. PCA pump is not functioning. Waiting for new PAC Pump to arrive from [facility]. Pt was medicated with IV Dilaudid 0.3 mg while waiting for pump. Pt states His L Leg pain has decreased to a 5.

## 2022-09-28 NOTE — PROGRESS NOTES
VASCULAR SURGERY PROGRESS NOTE    Subjective:  Patient resting comfortably in bed. Wound vac on. Reports cramp by left knee, pain medication adequately controlling pain.     Objective:  Intake/Output Summary (Last 24 hours) at 9/28/2022 1011  Last data filed at 9/28/2022 0600  Gross per 24 hour   Intake 1049 ml   Output 1550 ml   Net -501 ml     PHYSICAL EXAM:  /70 (BP Location: Right arm)   Pulse 92   Temp 99.1  F (37.3  C) (Oral)   Resp 20   Ht 1.829 m (6')   Wt 113.4 kg (250 lb)   SpO2 97%   BMI 33.91 kg/m    Alert and oriented x4  Wound vac intact    ASSESSMENT:  Mr. Horner is a 57 year old male who underwent a left lower extremity transtibial guillotine amputation with wound vac placement on 9/27.     PLAN:  -continue pain regimen  -per patient, OR on Friday 9/30 for closure    Antonieta Perdomo NP  VASCULAR SURGERY

## 2022-09-28 NOTE — OP NOTE
Preoperative diagnosis: Diabetic foot infection with multiple wounds with osteomyelitis and tenosynovitis with failure of conservative management.    Postoperative diagnosis: Same.    Procedure: 1.  Left lower extremity transtibial guillotine amputation.  Application of wound VAC sponge.    Surgeon: Neal Blackman M.D.   Assistant: Morro Mcfarland M.D.     Anesthesia: General plus regional.  Estimated blood loss: 100 mL.  Specimens: Left lower extremity.  Complication: None.    Indication for procedure: This is a 57-year-old diabetic male with chronic left lower extremity wounds.  He has had a previous right below the knee amputation.  After multiple discussions between myself, medical team and podiatry it was deemed that his left lower extremity is nonsalvageable.  Patient wishes for an amputation so he can get a prosthesis.  Because of the chronic infection I decided that we will proceed with staged amputation.  He was consented for a guillotine on the left lower extremity.    Procedure details: Patient received a block in his left lower extremity.  He was taken to the operating room and placed in supine position.  General anesthesia was induced.  A thigh tourniquet was placed on the left side but not inflated.  Left lower extremity was prepped and a sterile surgical field was created.  Preprocedure timeout was conducted.  Preoperative intravenous antibiotics were administered.    The tourniquet was inflated after we adequately asanguinated the left lower extremity with an Esmarch bandage.  10 cm proximal to the ankle joint a circular incision was made with a #10 blade.  All the soft tissue was divided sharply.  The periosteum was lifted from the fibula and the tibia and the bones were divided with a Gigli saw.  The individual anterior compartment, lateral compartment and posterior compartment neurovascular structures were individually suture ligated with 2-0 Prolene suture.  Tourniquet was taken down.  There were  multiple other bleeders which were adequately controlled with suture and electrocautery.  Adequate hemostasis was confirmed.  Wound VAC sponge was then applied and activated with good seal.    Counts of instruments, sponges and needles were noted to be correct.    Patient was awakened and extubated and taken to the recovery room in stable condition.    I called his significant other and discussed this with her.

## 2022-09-28 NOTE — PROGRESS NOTES
Pt continues to have L lower leg pain. Rates 8/10. Pt had been given Oxycodone and Tylenol for pain. Writer messaged Dr. Neal Blackman at 01:20 via [employer]. Waiting for return call.

## 2022-09-28 NOTE — PROGRESS NOTES
[facility]    Hospitalist Progress Note    Interval History     Patient awake and alert.  Underwent left below-knee amputation yesterday.  Has wound VAC over the stump.  Plan on closing this on Friday.  Had significant pain yesterday and had to be started on Dilaudid PCA.  Currently well controlled.    -Data reviewed today: I reviewed all new labs and imaging results over the last 24 hours. I personally reviewed the MRI left foot and left ankle image(s) showing As reported below.    Physical Exam   Temp: 98.4  F (36.9  C) Temp src: Oral BP: 112/72 Pulse: 94   Resp: 18 SpO2: 98 % O2 Device: None (Room air)    Vitals:    09/23/22 0038   Weight: 113.4 kg (250 lb)     Vital Signs with Ranges  Temp:  [97.8  F (36.6  C)-99.1  F (37.3  C)] 98.4  F (36.9  C)  Pulse:  [] 94  Resp:  [10-21] 18  BP: (105-144)/(65-90) 112/72  SpO2:  [91 %-99 %] 98 %  I/O last 3 completed shifts:  In: 1049 [P.O.:249; I.V.:800]  Out: 2563 [Urine:2363; Drains:200]    Physical Exam  Constitutional:       Appearance: Normal appearance.   HENT:      Head: Normocephalic.   Eyes:      Pupils: Pupils are equal, round, and reactive to light.   Cardiovascular:      Rate and Rhythm: Normal rate and regular rhythm.      Pulses: Normal pulses.      Heart sounds: Normal heart sounds.   Pulmonary:      Effort: Pulmonary effort is normal. No respiratory distress.      Breath sounds: Normal breath sounds.   Abdominal:      General: Abdomen is flat. Bowel sounds are normal. There is no distension.      Tenderness: There is no abdominal tenderness. There is no guarding.   Musculoskeletal:         General: Normal range of motion.      Cervical back: Normal range of motion.      Comments: Old right below-knee amputation.  Left below-knee amputation with wound VAC in place over the stump with bloody discharge.   Skin:     General: Skin is warm and dry.   Neurological:      General: No focal deficit present.   Psychiatric:         Mood  and Affect: Mood normal.           Medications     HYDROmorphone       sodium chloride 10 mL/hr at 09/28/22 0527       aspirin  81 mg Oral Daily     ezetimibe  10 mg Oral Daily     [Held by provider] heparin ANTICOAGULANT  5,000 Units Subcutaneous Q12H     insulin aspart  1-7 Units Subcutaneous TID AC     insulin aspart  1-5 Units Subcutaneous At Bedtime     insulin degludec  20 Units Subcutaneous BID     ipratropium  2 spray Both Nostrils Q12H     ketorolac  1 drop Right Eye 4x Daily     [Held by provider] lisinopril  40 mg Oral Daily     piperacillin-tazobactam  3.375 g Intravenous Q6H     polyethylene glycol  17 g Oral Daily     prednisoLONE acetate  1 drop Right Eye 4x Daily     senna-docusate  1 tablet Oral BID    Or     senna-docusate  2 tablet Oral BID     sodium chloride (PF)  3 mL Intracatheter Q8H       Data   Recent Labs   Lab 09/28/22  1247 09/28/22  0927 09/28/22  0800 09/26/22  0747 09/26/22  0623 09/25/22  1134 09/25/22  0909 09/24/22  0726 09/24/22  0639 09/23/22  1137 09/23/22  0106   WBC  --   --   --   --   --   --   --   --  8.7  --  13.6*   HGB  --   --   --   --   --   --   --   --  9.1*  --  10.1*   MCV  --   --   --   --   --   --   --   --  84  --  86   PLT  --   --   --   --  319  --   --   --  331  --  377   NA  --   --   --   --   --   --  138  --  135  --  135   POTASSIUM  --   --   --   --   --   --  4.2  --  4.3  --  4.8   CHLORIDE  --   --   --   --   --   --  106  --  104  --  99   CO2  --   --   --   --   --   --  28  --  28  --  27   BUN  --   --   --   --   --   --  22  --  31*  --  48*   CR  --   --   --   --   --   --  1.26*  --  1.48*  --  2.33*   ANIONGAP  --   --   --   --   --   --  4  --  3  --  9   FERNANDO  --   --   --   --   --   --  8.4*  --  8.6  --  8.6   * 147* 140*   < >  --    < > 182*   < > 160*   < > 176*   ALBUMIN  --   --   --   --   --   --   --   --   --   --  2.3*   PROTTOTAL  --   --   --   --   --   --   --   --   --   --  8.1   BILITOTAL  --   --   --    --   --   --   --   --   --   --  0.4   ALKPHOS  --   --   --   --   --   --   --   --   --   --  102   ALT  --   --   --   --   --   --   --   --   --   --  58   AST  --   --   --   --   --   --   --   --   --   --  39    < > = values in this interval not displayed.       No results found for this or any previous visit (from the past 24 hour(s)).      Assessment & Plan      Ry Horner is a 57 year old male with past medical history significant for type II DM, hypertension, dyslipidemia, CKD III, non-healing diabetic foot wounds with multiple prior amputations admitted on 9/23/2022 with left lower extremity wounds.      Multiple left lower extremity wounds   Hx of non-healing diabetic ulcers   S/p R BKA   S/p transmetatarsal amputation of the left foot   Leukocytosis   Pt has a hx of non-healing diabetic ulceration of the plantar aspect of th left foot. He has been following with wound clinic and podiatry for this. He went on a cruise to \[facility]\"" and developed a bad infection of the ulceration that apparently spread up his leg to include his shin. He was ultimately hospitalized in [facility] and for several days on IV antibitotics (it is unclear what antibiotics he was on in the hospital). He needed multiple surgical debridements during this hospitalization. It is unclear how deep the infection is or if there is any bony involvement. It looks like one culture grew strep parasanguinosis. Ultimately he wanted to come back to the [place] so was placed on PO ciprofloxacin to take while traveling and was instructed to come straight to the hospital after landing.   * He does have a mild leukocytosis, but is otherwise non-toxic and non-septic appearing.  * Lower extremity wounds are pictured below.  Wound care consult placed.  - Vascular surgery consult.  KAREN of the left leg ordered.  Shows an elevated resting KAREN of 1.5 likely due to poorly compressible calcified arteries.  Waveform is biphasic.  Pressures could not be obtained due  to overlying bandages.  Awaiting final recommendations.  Surgery has not evaluated the patient yesterday.  Will reach out to them today regarding final surgical plans.  - Podiatry consult .  MRI left foot and ankle obtained.  Shows underlying osteomyelitis and osteitis with early abscess on the dorsum of the foot.  Please review image report above.  Podiatry recommended below-knee amputation based on MRI results.  This would be performed by vascular surgery.  Plan on proceeding for surgery today.  N.p.o. since midnight.  Plan on proceeding with guillotine amputation  - Infectious disease consult .recommended continuing Zosyn for now.  Now that he is post left below-knee amputation might be able to discontinue Zosyn since all infectious tissue was removed.  Will await ID recommendations.  Did receive one-time dose of vancomycin.  -Underwent left below-knee amputation yesterday.  Wound VAC in place.  Planning for closure tomorrow.  Management per vascular surgery.  -Pain control per surgery.  Currently on Dilaudid PCA at 0.2 mg every 10minutes with a 1 hour limit of 1.2.     Type II DM  Diabetic polyneuropathy and diabetic nephropathy   Hgb A1C was 7.7% in April 2022   - MDSSI .  Carb controlled diet. Continue home triseba .  Did have intermittent hyperglycemia when he refused his Tresiba but is well controlled currently.  - Hold jardiance, januvia, and metformin for now      JOHNATHAN on CKD stage III   Baseline creatinine appears to be around 1.7. Creatinine on admission is 2.33 with BUN of 48 and GFR of 32. Pt reports not drinking much water throughout the course of his flight.   -Creatinine corrected and is down to 1.26..  Discontinued IV fluids.  Having good oral intake.  - Monitor renal function.  Repeat BMP pending.  - Avoid nephrotoxins      HTN   -Blood pressure improved.  Restart lisinopril.     Dyslipidemia   - Continue PTA ASA, Ezetimibe. Not on statin due to allergy.  -Has history of hypertriglyceridemia.  His  last triglycerides were at 489.  Recheck improved at 164     Chronic Anemia   Hemoglobin on admission is 10.1. This is down slightly from his baseline of around 12.   - Monitor hemoglobin.   Repeat CBC pending this morning.     Hypoalbuminemia: Albumin = 2.3 g/dL (Ref range: 3.4 - 5.0 g/dL) on admission, will monitor as appropriate.    Obesity: Estimated body mass index is 33.91 kg/m  as calculated from the following:    Height as of this encounter: 1.829 m (6').    Weight as of this encounter: 113.4 kg (250 lb).        Diet:diabetic diet    DVT Prophylaxis: Heparin SQ  [place] Catheter: Not present  Central Lines: None  Cardiac Monitoring: None  Code Status: Full Code         Gloria Chester MD, MD  [phone](p)

## 2022-09-28 NOTE — PROVIDER NOTIFICATION
MD Notification     Notified Person: Harley Jackson    Notified Person Name:    Notification Date/Time: 9/28/22 01:54    Notification Interaction:    Purpose of Notification:  Pt continues to have pain after Oxycodone and Tylenol given.     Orders Received: Hydromorphone 0.3 mg IV every 2 hours PRN    Comments:

## 2022-09-28 NOTE — PLAN OF CARE
Goal Outcome Evaluation:    Denies CP, SOB. PCA pump started late due to needing new cannister. All pt needs met at this time. Possible procedure tomorrow for LLE. NPO at midnight. Q4 bg checks at midnight.

## 2022-09-29 ENCOUNTER — ANESTHESIA (OUTPATIENT)
Dept: SURGERY | Facility: CLINIC | Age: 57
DRG: 617 | End: 2022-09-29
Payer: COMMERCIAL

## 2022-09-29 LAB
ABO/RH(D): NORMAL
ANION GAP SERPL CALCULATED.3IONS-SCNC: 7 MMOL/L (ref 3–14)
ANTIBODY SCREEN: NEGATIVE
BASOPHILS # BLD AUTO: 0.1 10E3/UL (ref 0–0.2)
BASOPHILS NFR BLD AUTO: 1 %
BUN SERPL-MCNC: 22 MG/DL (ref 7–30)
CALCIUM SERPL-MCNC: 8.6 MG/DL (ref 8.5–10.1)
CHLORIDE BLD-SCNC: 104 MMOL/L (ref 94–109)
CO2 SERPL-SCNC: 25 MMOL/L (ref 20–32)
CREAT SERPL-MCNC: 1.44 MG/DL (ref 0.66–1.25)
EOSINOPHIL # BLD AUTO: 0.2 10E3/UL (ref 0–0.7)
EOSINOPHIL NFR BLD AUTO: 2 %
ERYTHROCYTE [DISTWIDTH] IN BLOOD BY AUTOMATED COUNT: 13.2 % (ref 10–15)
GFR SERPL CREATININE-BSD FRML MDRD: 57 ML/MIN/1.73M2
GLUCOSE BLD-MCNC: 198 MG/DL (ref 70–99)
GLUCOSE BLDC GLUCOMTR-MCNC: 128 MG/DL (ref 70–99)
GLUCOSE BLDC GLUCOMTR-MCNC: 148 MG/DL (ref 70–99)
GLUCOSE BLDC GLUCOMTR-MCNC: 158 MG/DL (ref 70–99)
GLUCOSE BLDC GLUCOMTR-MCNC: 202 MG/DL (ref 70–99)
GLUCOSE BLDC GLUCOMTR-MCNC: 233 MG/DL (ref 70–99)
GLUCOSE BLDC GLUCOMTR-MCNC: 251 MG/DL (ref 70–99)
GLUCOSE BLDC GLUCOMTR-MCNC: 285 MG/DL (ref 70–99)
HCT VFR BLD AUTO: 24.3 % (ref 40–53)
HGB BLD-MCNC: 8.1 G/DL (ref 13.3–17.7)
HGB BLD-MCNC: 8.1 G/DL (ref 13.3–17.7)
IMM GRANULOCYTES # BLD: 0.1 10E3/UL
IMM GRANULOCYTES NFR BLD: 1 %
LYMPHOCYTES # BLD AUTO: 1.7 10E3/UL (ref 0.8–5.3)
LYMPHOCYTES NFR BLD AUTO: 17 %
MCH RBC QN AUTO: 27.3 PG (ref 26.5–33)
MCHC RBC AUTO-ENTMCNC: 31.9 G/DL (ref 31.5–36.5)
MCV RBC AUTO: 84 FL (ref 78–100)
MONOCYTES # BLD AUTO: 1.1 10E3/UL (ref 0–1.3)
MONOCYTES NFR BLD AUTO: 10 %
NEUTROPHILS # BLD AUTO: 7.3 10E3/UL (ref 1.6–8.3)
NEUTROPHILS NFR BLD AUTO: 69 %
NRBC # BLD AUTO: 0 10E3/UL
NRBC BLD AUTO-RTO: 0 /100
PATH REPORT.COMMENTS IMP SPEC: NORMAL
PATH REPORT.COMMENTS IMP SPEC: NORMAL
PATH REPORT.FINAL DX SPEC: NORMAL
PATH REPORT.GROSS SPEC: NORMAL
PATH REPORT.MICROSCOPIC SPEC OTHER STN: NORMAL
PATH REPORT.RELEVANT HX SPEC: NORMAL
PHOTO IMAGE: NORMAL
PLATELET # BLD AUTO: 300 10E3/UL (ref 150–450)
PLATELET # BLD AUTO: 314 10E3/UL (ref 150–450)
POTASSIUM BLD-SCNC: 3.9 MMOL/L (ref 3.4–5.3)
RBC # BLD AUTO: 2.89 10E6/UL (ref 4.4–5.9)
SODIUM SERPL-SCNC: 136 MMOL/L (ref 133–144)
SPECIMEN EXPIRATION DATE: NORMAL
WBC # BLD AUTO: 10.3 10E3/UL (ref 4–11)

## 2022-09-29 PROCEDURE — 36415 COLL VENOUS BLD VENIPUNCTURE: CPT | Performed by: SURGERY

## 2022-09-29 PROCEDURE — 999N000141 HC STATISTIC PRE-PROCEDURE NURSING ASSESSMENT: Performed by: SURGERY

## 2022-09-29 PROCEDURE — 258N000003 HC RX IP 258 OP 636: Performed by: SURGERY

## 2022-09-29 PROCEDURE — 250N000009 HC RX 250: Performed by: ANESTHESIOLOGY

## 2022-09-29 PROCEDURE — 250N000011 HC RX IP 250 OP 636: Performed by: SURGERY

## 2022-09-29 PROCEDURE — 88307 TISSUE EXAM BY PATHOLOGIST: CPT | Mod: TC | Performed by: SURGERY

## 2022-09-29 PROCEDURE — 250N000011 HC RX IP 250 OP 636: Performed by: STUDENT IN AN ORGANIZED HEALTH CARE EDUCATION/TRAINING PROGRAM

## 2022-09-29 PROCEDURE — 250N000009 HC RX 250: Performed by: NURSE ANESTHETIST, CERTIFIED REGISTERED

## 2022-09-29 PROCEDURE — 88307 TISSUE EXAM BY PATHOLOGIST: CPT | Mod: 26 | Performed by: PATHOLOGY

## 2022-09-29 PROCEDURE — 88311 DECALCIFY TISSUE: CPT | Mod: 26 | Performed by: PATHOLOGY

## 2022-09-29 PROCEDURE — 99232 SBSQ HOSP IP/OBS MODERATE 35: CPT | Performed by: HOSPITALIST

## 2022-09-29 PROCEDURE — 370N000017 HC ANESTHESIA TECHNICAL FEE, PER MIN: Performed by: SURGERY

## 2022-09-29 PROCEDURE — 250N000011 HC RX IP 250 OP 636: Performed by: NURSE ANESTHETIST, CERTIFIED REGISTERED

## 2022-09-29 PROCEDURE — 86901 BLOOD TYPING SEROLOGIC RH(D): CPT | Performed by: SURGERY

## 2022-09-29 PROCEDURE — 85049 AUTOMATED PLATELET COUNT: CPT | Performed by: SURGERY

## 2022-09-29 PROCEDURE — 258N000003 HC RX IP 258 OP 636: Performed by: NURSE ANESTHETIST, CERTIFIED REGISTERED

## 2022-09-29 PROCEDURE — 36415 COLL VENOUS BLD VENIPUNCTURE: CPT | Performed by: STUDENT IN AN ORGANIZED HEALTH CARE EDUCATION/TRAINING PROGRAM

## 2022-09-29 PROCEDURE — 272N000001 HC OR GENERAL SUPPLY STERILE: Performed by: SURGERY

## 2022-09-29 PROCEDURE — 710N000009 HC RECOVERY PHASE 1, LEVEL 1, PER MIN: Performed by: SURGERY

## 2022-09-29 PROCEDURE — 250N000009 HC RX 250: Performed by: SURGERY

## 2022-09-29 PROCEDURE — 86923 COMPATIBILITY TEST ELECTRIC: CPT | Performed by: HOSPITALIST

## 2022-09-29 PROCEDURE — 88305 TISSUE EXAM BY PATHOLOGIST: CPT | Mod: 26 | Performed by: PATHOLOGY

## 2022-09-29 PROCEDURE — 120N000001 HC R&B MED SURG/OB

## 2022-09-29 PROCEDURE — 250N000025 HC SEVOFLURANE, PER MIN: Performed by: SURGERY

## 2022-09-29 PROCEDURE — 360N000076 HC SURGERY LEVEL 3, PER MIN: Performed by: SURGERY

## 2022-09-29 PROCEDURE — 80048 BASIC METABOLIC PNL TOTAL CA: CPT | Performed by: HOSPITALIST

## 2022-09-29 PROCEDURE — 27886 AMPUTATION FOLLOW-UP SURGERY: CPT | Mod: 58 | Performed by: SURGERY

## 2022-09-29 PROCEDURE — 85018 HEMOGLOBIN: CPT | Performed by: SURGERY

## 2022-09-29 PROCEDURE — 85049 AUTOMATED PLATELET COUNT: CPT | Performed by: STUDENT IN AN ORGANIZED HEALTH CARE EDUCATION/TRAINING PROGRAM

## 2022-09-29 PROCEDURE — 36415 COLL VENOUS BLD VENIPUNCTURE: CPT | Performed by: HOSPITALIST

## 2022-09-29 PROCEDURE — 250N000011 HC RX IP 250 OP 636: Performed by: ANESTHESIOLOGY

## 2022-09-29 PROCEDURE — 0Y6J0Z1 DETACHMENT AT LEFT LOWER LEG, HIGH, OPEN APPROACH: ICD-10-PCS | Performed by: SURGERY

## 2022-09-29 PROCEDURE — 250N000013 HC RX MED GY IP 250 OP 250 PS 637: Performed by: HOSPITALIST

## 2022-09-29 PROCEDURE — 258N000003 HC RX IP 258 OP 636: Performed by: ANESTHESIOLOGY

## 2022-09-29 PROCEDURE — 99232 SBSQ HOSP IP/OBS MODERATE 35: CPT | Performed by: INTERNAL MEDICINE

## 2022-09-29 RX ORDER — CEFAZOLIN SODIUM/WATER 2 G/20 ML
2 SYRINGE (ML) INTRAVENOUS SEE ADMIN INSTRUCTIONS
Status: DISCONTINUED | OUTPATIENT
Start: 2022-09-29 | End: 2022-09-29 | Stop reason: HOSPADM

## 2022-09-29 RX ORDER — HEPARIN SODIUM 5000 [USP'U]/.5ML
5000 INJECTION, SOLUTION INTRAVENOUS; SUBCUTANEOUS EVERY 8 HOURS
Status: DISCONTINUED | OUTPATIENT
Start: 2022-09-30 | End: 2022-10-03 | Stop reason: HOSPADM

## 2022-09-29 RX ORDER — ONDANSETRON 4 MG/1
4 TABLET, ORALLY DISINTEGRATING ORAL EVERY 30 MIN PRN
Status: DISCONTINUED | OUTPATIENT
Start: 2022-09-29 | End: 2022-09-29 | Stop reason: HOSPADM

## 2022-09-29 RX ORDER — LIDOCAINE 40 MG/G
CREAM TOPICAL
Status: DISCONTINUED | OUTPATIENT
Start: 2022-09-29 | End: 2022-09-29 | Stop reason: HOSPADM

## 2022-09-29 RX ORDER — LIDOCAINE HYDROCHLORIDE 20 MG/ML
INJECTION, SOLUTION INFILTRATION; PERINEURAL PRN
Status: DISCONTINUED | OUTPATIENT
Start: 2022-09-29 | End: 2022-09-29

## 2022-09-29 RX ORDER — CEFAZOLIN SODIUM/WATER 2 G/20 ML
2 SYRINGE (ML) INTRAVENOUS
Status: COMPLETED | OUTPATIENT
Start: 2022-09-29 | End: 2022-09-29

## 2022-09-29 RX ORDER — FENTANYL CITRATE 50 UG/ML
50 INJECTION, SOLUTION INTRAMUSCULAR; INTRAVENOUS
Status: COMPLETED | OUTPATIENT
Start: 2022-09-29 | End: 2022-09-29

## 2022-09-29 RX ORDER — FENTANYL CITRATE 50 UG/ML
25 INJECTION, SOLUTION INTRAMUSCULAR; INTRAVENOUS EVERY 5 MIN PRN
Status: DISCONTINUED | OUTPATIENT
Start: 2022-09-29 | End: 2022-09-29 | Stop reason: HOSPADM

## 2022-09-29 RX ORDER — PROPOFOL 10 MG/ML
INJECTION, EMULSION INTRAVENOUS PRN
Status: DISCONTINUED | OUTPATIENT
Start: 2022-09-29 | End: 2022-09-29

## 2022-09-29 RX ORDER — HYDROMORPHONE HCL IN WATER/PF 6 MG/30 ML
0.2 PATIENT CONTROLLED ANALGESIA SYRINGE INTRAVENOUS EVERY 5 MIN PRN
Status: DISCONTINUED | OUTPATIENT
Start: 2022-09-29 | End: 2022-09-29 | Stop reason: HOSPADM

## 2022-09-29 RX ORDER — LIDOCAINE 40 MG/G
CREAM TOPICAL
Status: DISCONTINUED | OUTPATIENT
Start: 2022-09-29 | End: 2022-10-03 | Stop reason: HOSPADM

## 2022-09-29 RX ORDER — SODIUM CHLORIDE, SODIUM LACTATE, POTASSIUM CHLORIDE, CALCIUM CHLORIDE 600; 310; 30; 20 MG/100ML; MG/100ML; MG/100ML; MG/100ML
INJECTION, SOLUTION INTRAVENOUS CONTINUOUS
Status: DISCONTINUED | OUTPATIENT
Start: 2022-09-29 | End: 2022-09-29 | Stop reason: HOSPADM

## 2022-09-29 RX ORDER — OXYCODONE HYDROCHLORIDE 5 MG/1
5 TABLET ORAL EVERY 4 HOURS PRN
Status: DISCONTINUED | OUTPATIENT
Start: 2022-09-29 | End: 2022-09-29 | Stop reason: HOSPADM

## 2022-09-29 RX ORDER — ONDANSETRON 2 MG/ML
4 INJECTION INTRAMUSCULAR; INTRAVENOUS EVERY 30 MIN PRN
Status: DISCONTINUED | OUTPATIENT
Start: 2022-09-29 | End: 2022-09-29 | Stop reason: HOSPADM

## 2022-09-29 RX ORDER — CEFAZOLIN SODIUM 2 G/100ML
2 INJECTION, SOLUTION INTRAVENOUS EVERY 8 HOURS
Status: COMPLETED | OUTPATIENT
Start: 2022-09-29 | End: 2022-09-30

## 2022-09-29 RX ORDER — GLYCOPYRROLATE 0.2 MG/ML
INJECTION, SOLUTION INTRAMUSCULAR; INTRAVENOUS PRN
Status: DISCONTINUED | OUTPATIENT
Start: 2022-09-29 | End: 2022-09-29

## 2022-09-29 RX ORDER — ACETAMINOPHEN 325 MG/1
650 TABLET ORAL EVERY 6 HOURS
Status: DISCONTINUED | OUTPATIENT
Start: 2022-09-29 | End: 2022-10-03 | Stop reason: HOSPADM

## 2022-09-29 RX ORDER — NEOSTIGMINE METHYLSULFATE 1 MG/ML
VIAL (ML) INJECTION PRN
Status: DISCONTINUED | OUTPATIENT
Start: 2022-09-29 | End: 2022-09-29

## 2022-09-29 RX ORDER — MAGNESIUM HYDROXIDE 1200 MG/15ML
LIQUID ORAL PRN
Status: DISCONTINUED | OUTPATIENT
Start: 2022-09-29 | End: 2022-09-29 | Stop reason: HOSPADM

## 2022-09-29 RX ADMIN — PIPERACILLIN AND TAZOBACTAM 3.38 G: 3; .375 INJECTION, POWDER, FOR SOLUTION INTRAVENOUS at 05:16

## 2022-09-29 RX ADMIN — SODIUM CHLORIDE, POTASSIUM CHLORIDE, SODIUM LACTATE AND CALCIUM CHLORIDE: 600; 310; 30; 20 INJECTION, SOLUTION INTRAVENOUS at 13:59

## 2022-09-29 RX ADMIN — INSULIN ASPART 2 UNITS: 100 INJECTION, SOLUTION INTRAVENOUS; SUBCUTANEOUS at 08:48

## 2022-09-29 RX ADMIN — Medication: at 18:52

## 2022-09-29 RX ADMIN — PHENYLEPHRINE HYDROCHLORIDE 100 MCG: 10 INJECTION INTRAVENOUS at 15:18

## 2022-09-29 RX ADMIN — SODIUM CHLORIDE, PRESERVATIVE FREE: 5 INJECTION INTRAVENOUS at 05:14

## 2022-09-29 RX ADMIN — ROCURONIUM BROMIDE 50 MG: 50 INJECTION, SOLUTION INTRAVENOUS at 14:36

## 2022-09-29 RX ADMIN — PROPOFOL 200 MG: 10 INJECTION, EMULSION INTRAVENOUS at 14:35

## 2022-09-29 RX ADMIN — PHENYLEPHRINE HYDROCHLORIDE 100 MCG: 10 INJECTION INTRAVENOUS at 16:37

## 2022-09-29 RX ADMIN — PHENYLEPHRINE HYDROCHLORIDE 100 MCG: 10 INJECTION INTRAVENOUS at 15:40

## 2022-09-29 RX ADMIN — PIPERACILLIN AND TAZOBACTAM 3.38 G: 3; .375 INJECTION, POWDER, FOR SOLUTION INTRAVENOUS at 18:40

## 2022-09-29 RX ADMIN — MIDAZOLAM HYDROCHLORIDE 2 MG: 1 INJECTION, SOLUTION INTRAMUSCULAR; INTRAVENOUS at 14:08

## 2022-09-29 RX ADMIN — INSULIN DEGLUDEC INJECTION 20 UNITS: 100 INJECTION, SOLUTION SUBCUTANEOUS at 22:16

## 2022-09-29 RX ADMIN — NEOSTIGMINE METHYLSULFATE 5 MG: 1 INJECTION, SOLUTION INTRAVENOUS at 16:27

## 2022-09-29 RX ADMIN — GLYCOPYRROLATE 1 MG: 0.2 INJECTION, SOLUTION INTRAMUSCULAR; INTRAVENOUS at 16:27

## 2022-09-29 RX ADMIN — EZETIMIBE 10 MG: 10 TABLET ORAL at 08:51

## 2022-09-29 RX ADMIN — PHENYLEPHRINE HYDROCHLORIDE 100 MCG: 10 INJECTION INTRAVENOUS at 14:55

## 2022-09-29 RX ADMIN — FENTANYL CITRATE 100 MCG: 50 INJECTION, SOLUTION INTRAMUSCULAR; INTRAVENOUS at 14:08

## 2022-09-29 RX ADMIN — PHENYLEPHRINE HYDROCHLORIDE 100 MCG: 10 INJECTION INTRAVENOUS at 16:31

## 2022-09-29 RX ADMIN — LIDOCAINE HYDROCHLORIDE 60 MG: 20 INJECTION, SOLUTION INFILTRATION; PERINEURAL at 14:35

## 2022-09-29 RX ADMIN — PIPERACILLIN AND TAZOBACTAM 3.38 G: 3; .375 INJECTION, POWDER, FOR SOLUTION INTRAVENOUS at 11:45

## 2022-09-29 RX ADMIN — PHENYLEPHRINE HYDROCHLORIDE 0.4 MCG/KG/MIN: 10 INJECTION INTRAVENOUS at 15:18

## 2022-09-29 RX ADMIN — Medication 30 ML: at 14:18

## 2022-09-29 RX ADMIN — Medication 2 G: at 14:26

## 2022-09-29 RX ADMIN — CEFAZOLIN SODIUM 2 G: 2 INJECTION, SOLUTION INTRAVENOUS at 22:16

## 2022-09-29 RX ADMIN — PHENYLEPHRINE HYDROCHLORIDE 150 MCG: 10 INJECTION INTRAVENOUS at 15:10

## 2022-09-29 ASSESSMENT — ACTIVITIES OF DAILY LIVING (ADL)
ADLS_ACUITY_SCORE: 39
ADLS_ACUITY_SCORE: 38
ADLS_ACUITY_SCORE: 39
ADLS_ACUITY_SCORE: 39
ADLS_ACUITY_SCORE: 38
ADLS_ACUITY_SCORE: 38
ADLS_ACUITY_SCORE: 39
ADLS_ACUITY_SCORE: 38

## 2022-09-29 NOTE — ANESTHESIA PROCEDURE NOTES
Sciatic Procedure Note    Pre-Procedure   Staff -        Anesthesiologist:  Felipe Mcdowell DO       Performed By: anesthesiologist       Location: pre-op       Pre-Anesthestic Checklist: patient identified, IV checked, site marked, risks and benefits discussed, informed consent, monitors and equipment checked, pre-op evaluation, at physician/surgeon's request and post-op pain management  Timeout:       Correct Patient: Yes        Correct Procedure: Yes        Correct Site: Yes        Correct Position: Yes        Correct Laterality: Yes        Site Marked: Yes  Procedure Documentation  Procedure: Sciatic       Laterality: left       Patient Position: supine       Patient Prep/Sterile Barriers: sterile gloves, mask, patient draped       Skin prep: Chloraprep       Local skin infiltrated with 3 mL of 1% lidocaine.  (popliteal approach).       Needle Type: insulated and short bevel       Needle Gauge: 21.        Needle Length (Inches): 4        Ultrasound guided       1. Ultrasound was used to identify targeted nerve, plexus, vascular marker, or fascial plane and place a needle adjacent to it in real-time.       2. Ultrasound was used to visualize the spread of anesthetic in close proximity to the above referenced structure.       3. A permanent image is entered into the patient's record.    Assessment/Narrative         The placement was negative for: blood aspirated, painful injection and site bleeding       Paresthesias: No.       Test dose of mL at.         Test dose negative, 3 minutes after injection, for signs of intravascular, subdural, or intrathecal injection.       Bolus given via needle..        Secured via.        Insertion/Infusion Method: Single Shot       Complications: none    Medication(s) Administered   Bupivacaine 0.5% w/ 1:400K Epi (Injection) - Injection   30 mL - 9/29/2022 2:18:00 PM   Comments:  Ultrasound Interpretation, peripheral nerve block    1.  Under ultrasound guidance, the  needle was inserted and placed in close proximity to the target nerve(s).  2. Ultrasound was also used to visualize the spread of the anesthetic in close proximity to the nerve(s) being blocked.  30 ml of 0.5% Bupivacaine w/ 1:400K Epi, in total, was administered in incremental doses, with intermittent negative aspiration.     3. The nerve(s) appeared anatomically normal.  4. There were no apparent abnormal pathological findings.  5. A permanent ultrasound image was saved in the patient's record.    Pt tolerated the procedure well.     The surgeon has given a verbal order transferring care of this patient to me for the performance of a regional analgesia block for post-op pain control. It is requested of me because I am uniquely trained and qualified to perform this block and the surgeon is neither trained nor qualified to perform this procedure.

## 2022-09-29 NOTE — ANESTHESIA CARE TRANSFER NOTE
Patient: Ry Horner    Procedure: Procedure(s):  LEFT SIDE COMPLETION BELOW THE KNEE AMPUTATION       Diagnosis: Diabetic foot infection (H) [E11.628, L08.9]  Diagnosis Additional Information: No value filed.    Anesthesia Type:   General     Note:    Oropharynx: oropharynx clear of all foreign objects and spontaneously breathing  Level of Consciousness: awake  Oxygen Supplementation: nasal cannula  Level of Supplemental Oxygen (L/min / FiO2): 4  Independent Airway: airway patency satisfactory and stable  Dentition: dentition unchanged  Vital Signs Stable: post-procedure vital signs reviewed and stable  Report to RN Given: handoff report given  Patient transferred to: PACU  Comments: Neuromuscular blockade reversed after TOF 4/4, spontaneous respirations, adequate tidal volumes, followed commands to voice, extubated atraumatically, airway patent after extubation.  Oxygen via nasal cannula at 4 liters per minute to PACU. Oxygen tubing connected to wall O2 in PACU, SpO2, NiBP, and EKG monitors and alarms on and functioning, report on patient's clinical status given to PACU RN, RN questions answered.     Handoff Report: Identifed the Patient, Identified the Reponsible Provider, Reviewed the pertinent medical history, Discussed the surgical course, Reviewed Intra-OP anesthesia mangement and issues during anesthesia, Set expectations for post-procedure period and Allowed opportunity for questions and acknowledgement of understanding      Vitals:  Vitals Value Taken Time   /71 09/29/22 1655   Temp     Pulse 99 09/29/22 1659   Resp 18 09/29/22 1659   SpO2 90 % 09/29/22 1659   Vitals shown include unvalidated device data.    Electronically Signed By: LILIANA Kong CRNA  September 29, 2022  4:59 PM

## 2022-09-29 NOTE — PROGRESS NOTES
Pt is A&O x4. VSS. Dilaudid PCA pump infusing as ordered. Pt states better pain control. L leg foam dressing CDI. Wound vac intact with continuous 125 mmHG suction. Denies numbness or tingling. Pt has not transferred out of bed. Pt is using bedside urinal and is voiding adequately. Pt is NPO after midnight for scheduled completion of L lower extremity below the knee amputation surgery at 1440. BS checks every 4 hours.

## 2022-09-29 NOTE — ANESTHESIA POSTPROCEDURE EVALUATION
Patient: Ry Horner    Procedure: Procedure(s):  LEFT SIDE COMPLETION BELOW THE KNEE AMPUTATION       Anesthesia Type:  General    Note:  Disposition: Inpatient   Postop Pain Control: Uneventful            Sign Out: Well controlled pain   PONV: No   Neuro/Psych: Uneventful            Sign Out: Acceptable/Baseline neuro status   Airway/Respiratory: Uneventful            Sign Out: Acceptable/Baseline resp. status   CV/Hemodynamics: Uneventful            Sign Out: Acceptable CV status; No obvious hypovolemia; No obvious fluid overload   Other NRE: NONE   DID A NON-ROUTINE EVENT OCCUR? No           Last vitals:  Vitals Value Taken Time   BP 88/56 09/29/22 1710   Temp     Pulse 96 09/29/22 1712   Resp 26 09/29/22 1712   SpO2 100 % 09/29/22 1712   Vitals shown include unvalidated device data.    Electronically Signed By: Felipe Mcdowell DO, DO  September 29, 2022  5:14 PM

## 2022-09-29 NOTE — ANESTHESIA PROCEDURE NOTES
Saphenous Procedure Note    Pre-Procedure   Staff -        Anesthesiologist:  Felipe Mcdowell DO       Performed By: anesthesiologist       Location: pre-op       Pre-Anesthestic Checklist: patient identified, IV checked, site marked, risks and benefits discussed, informed consent, monitors and equipment checked, pre-op evaluation, at physician/surgeon's request and post-op pain management  Timeout:       Correct Patient: Yes        Correct Procedure: Yes        Correct Site: Yes        Correct Position: Yes        Correct Laterality: Yes        Site Marked: Yes  Procedure Documentation  Procedure: Saphenous       Laterality: left       Patient Position: supine       Patient Prep/Sterile Barriers: sterile gloves, mask, patient draped       Skin prep: Chloraprep       Local skin infiltrated with 3 mL of 1% lidocaine.        Needle Type: insulated and short bevel       Needle Gauge: 21.        Needle Length (Inches): 4        Ultrasound guided       1. Ultrasound was used to identify targeted nerve, plexus, vascular marker, or fascial plane and place a needle adjacent to it in real-time.       2. Ultrasound was used to visualize the spread of anesthetic in close proximity to the above referenced structure.       3. A permanent image is entered into the patient's record.    Assessment/Narrative         The placement was negative for: blood aspirated, painful injection and site bleeding       Paresthesias: No.       Test dose of mL at.         Test dose negative, 3 minutes after injection, for signs of intravascular, subdural, or intrathecal injection.       Bolus given via needle..        Secured via.        Insertion/Infusion Method: Single Shot       Complications: none    Medication(s) Administered   Bupivacaine 0.5% w/ 1:400K Epi (Injection) - Injection   10 mL - 9/29/2022 2:18:00 PM   Comments:  Ultrasound Interpretation, peripheral nerve block    1.  Under ultrasound guidance, the needle was inserted  and placed in close proximity to the target nerve(s).  2. Ultrasound was also used to visualize the spread of the anesthetic in close proximity to the nerve(s) being blocked.  10 ml of 0.5% Bupivacaine w/ 1:400K Epi, in total, was administered in incremental doses, with intermittent negative aspiration.     3. The nerve(s) appeared anatomically normal.  4. There were no apparent abnormal pathological findings.  5. A permanent ultrasound image was saved in the patient's record.    Pt tolerated the procedure well.     The surgeon has given a verbal order transferring care of this patient to me for the performance of a regional analgesia block for post-op pain control. It is requested of me because I am uniquely trained and qualified to perform this block and the surgeon is neither trained nor qualified to perform this procedure.

## 2022-09-29 NOTE — ANESTHESIA PROCEDURE NOTES
Airway       Patient location during procedure: OR       Procedure Start/Stop Times: 9/29/2022 2:38 PM  Staff -        Anesthesiologist:  Felipe Mcdowell DO       CRNA: Lindy Jordan APRN CRNA       Performed By: CRNA  Consent for Airway        Urgency: elective  Indications and Patient Condition       Indications for airway management: alexander-procedural       Induction type:intravenous       Mask difficulty assessment: 2 - vent by mask + OA or adjuvant +/- NMBA    Final Airway Details       Final airway type: endotracheal airway       Successful airway: ETT - single  Endotracheal Airway Details        ETT size (mm): 8.0       Cuffed: yes       Successful intubation technique: video laryngoscopy       Grade View of Cords: 1       Adjucts: stylet       Position: Right       Measured from: gums/teeth       Secured at (cm): 24       Bite block used: None    Post intubation assessment        Placement verified by: capnometry, equal breath sounds and chest rise        Number of attempts at approach: 1       Number of other approaches attempted: 0       Secured with: silk tape       Ease of procedure: easy       Dentition: Unchanged    Medication(s) Administered   Medication Administration Time: 9/29/2022 2:38 PM

## 2022-09-29 NOTE — BRIEF OP NOTE
[facility]    Brief Operative Note    Pre-operative diagnosis: Diabetic foot infection (H) [E11.628, L08.9]  Post-operative diagnosis Same as pre-operative diagnosis    Procedure: Procedure(s):  LEFT SIDE COMPLETION BELOW THE KNEE AMPUTATION  Surgeon: Surgeon(s) and Role:     * Reny Child MD - Primary     * Aimee Peterson MD - Fellow - Assisting  Anesthesia: General   Estimated Blood Loss: 800 mL from 9/29/2022  2:26 PM to 9/29/2022  4:52 PM      Drains: None  Specimens:   ID Type Source Tests Collected by Time Destination   1 : LEFT BELOW KNEE COMPLETION AMPUTATION Amputation, Non-Traumatic Leg, Below Knee, Left SURGICAL PATHOLOGY EXAM Reny Child MD 9/29/2022  3:27 PM      Findings:   well vascularized stump, viable muscle; see op note for full details.  Complications: None.  Implants: * No implants in log *      Aimee Peterson MD  09/29/22  4:54 PM

## 2022-09-29 NOTE — ANESTHESIA PREPROCEDURE EVALUATION
Anesthesia Pre-Procedure Evaluation    Patient: Ry Horner   MRN: [id number] : 1965        Procedure : Procedure(s):  LEFT SIDE COMPLETION BELOW THE KNEE AMPUTATION          Past Medical History:   Diagnosis Date     BENIGN HYPERTENSION 2007     DIABETES MELLITUS TYPE II-UNCOMPL 2007     HYPERLIPIDEMIA NEC/NOS 2007     Tobacco use disorder 2007      Past Surgical History:   Procedure Laterality Date     AMPUTATE FOOT Left 2019    Procedure: LEFT PARTIAL FOOT AMPUTATION;  Surgeon: Antoine Pena DPM;  [facility]     AMPUTATE FOOT Left 2019    Procedure: LEFT PARTIAL FOOT AMPUTATION;  Surgeon: Tim Douglas DPM;  Location[facility]     AMPUTATE FOOT Left 2019    Procedure: POSSIBLE PARTIAL FOOT AMPUTATION;  Surgeon: Tim Douglas DPM;  [facility]     AMPUTATE FOOT Left 2/10/2022    Procedure: Partial left foot amputation;  Surgeon: Milena Cevallos DPM, Podiatry/Foot and Ankle Surgery;  Location: [facility]     AMPUTATE LEG BELOW KNEE Right 2017    Procedure: AMPUTATE LEG BELOW KNEE;  RIGHT BELOW KNEE AMPUTATION ;  Surgeon: Nikolas Nascimento MD;  Location[facility]     AMPUTATE LEG BELOW KNEE Left 2022    Procedure: AMPUTATION BELOW KNEE;  Surgeon: Neal Blackman MD;  [facility]     AMPUTATE TOE(S) Left 2/3/2020    Procedure: LEFT SECOND TOE AMPUTATION;  Surgeon: Milena Cevallos DPM, Podiatry/Foot and Ankle Surgery;  [facility]     APPENDECTOMY       APPLY WOUND VAC Right 3/2/2015    Procedure: APPLY WOUND VAC;  Surgeon: Milena Cevallos DPM, Pod;  Location: [facility]     BIOPSY BONE FOOT Right 7/15/2016    Procedure: BIOPSY BONE FOOT;  Surgeon: Tim Douglas DPM;  [facility]     BIOPSY BONE TOE Left 2019    Procedure: BONE BIOPSY LEFT SECOND TOE;  Surgeon: Tim Douglas DPM;  [facility]     IRRIGATION AND DEBRIDEMENT FOOT, COMBINED Right 3/2/2015    Procedure: COMBINED IRRIGATION AND DEBRIDEMENT FOOT;   "Surgeon: Milena Cevallos DPM, Pod;  Location: [facility]     IRRIGATION AND DEBRIDEMENT FOOT, COMBINED Right 7/15/2016    Procedure: COMBINED IRRIGATION AND DEBRIDEMENT FOOT;  Surgeon: Tim Douglas DPM;  [facility]     IRRIGATION AND DEBRIDEMENT FOOT, COMBINED Right 2016    Procedure: COMBINED IRRIGATION AND DEBRIDEMENT FOOT;  Surgeon: Tim Douglas DPM;  [facility]     IRRIGATION AND DEBRIDEMENT FOOT, COMBINED Left 2019    Procedure: REVISIONAL IRRIGATION AND DEBRIDEMENT LEFT FOOT AND BONE DEBRIDEMENT;  Surgeon: Joseph Randhawa DPM;  [facility]     IRRIGATION AND DEBRIDEMENT FOOT, COMBINED Left 2019    Procedure: EXCISIONAL DEBRIDEMENT LEFT FOOT;  Surgeon: Tim Douglas DPM;  [facility]     IRRIGATION AND DEBRIDEMENT FOOT, COMBINED Left 2019    Procedure: IRRIGATION AND DEBRIDEMENT FOOT, Partial osteotomy left first metatarsal;  Surgeon: Tim Douglas DPM;  [facility]     IRRIGATION AND DEBRIDEMENT FOOT, COMBINED Left 2020    Procedure: IRRIGATION AND DEBRIDEMENT LEFT FOOT;  Surgeon: Tim Douglas DPM;  [facility]     ORTHOPEDIC SURGERY        Allergies   Allergen Reactions     Pravastatin      \"sucked the life blood out of me,\" Indicates this occurs with all statins.      Social History     Tobacco Use     Smoking status: Former Smoker     Packs/day: 0.50     Years: 20.00     Pack years: 10.00     Types: Cigarettes     Start date: 1987     Quit date: 2006     Years since quittin.7     Smokeless tobacco: Never Used   Substance Use Topics     Alcohol use: Yes     Comment: 3-4 drinks per month      Wt Readings from Last 1 Encounters:   22 113.4 kg (250 lb)        Anesthesia Evaluation            ROS/MED HX  ENT/Pulmonary:       Neurologic:       Cardiovascular:     (+) hypertension-Peripheral Vascular Disease----    METS/Exercise Tolerance:     Hematologic:       Musculoskeletal:       GI/Hepatic:     "   Renal/Genitourinary:     (+) renal disease, type: CRI,     Endo:     (+) type II DM, Diabetic complications: nephropathy neuropathy. Obesity,     Psychiatric/Substance Use:       Infectious Disease:       Malignancy:       Other:            Physical Exam    Airway        Mallampati: II   TM distance: > 3 FB   Neck ROM: full   Mouth opening: > 3 cm    Respiratory Devices and Support         Dental  no notable dental history     (+) caps      Cardiovascular   cardiovascular exam normal          Pulmonary   pulmonary exam normal                OUTSIDE LABS:  CBC:   Lab Results   Component Value Date    WBC 9.0 09/28/2022    WBC 8.7 09/24/2022    HGB 9.2 (L) 09/28/2022    HGB 9.1 (L) 09/24/2022    HCT 28.3 (L) 09/28/2022    HCT 28.6 (L) 09/24/2022     09/28/2022     09/26/2022     BMP:   Lab Results   Component Value Date     09/28/2022     09/25/2022    POTASSIUM 3.7 09/28/2022    POTASSIUM 4.2 09/25/2022    CHLORIDE 102 09/28/2022    CHLORIDE 106 09/25/2022    CO2 27 09/28/2022    CO2 28 09/25/2022    BUN 23 09/28/2022    BUN 22 09/25/2022    CR 1.73 (H) 09/28/2022    CR 1.26 (H) 09/25/2022     (H) 09/28/2022     (H) 09/28/2022     COAGS: No results found for: PTT, INR, FIBR  POC:   Lab Results   Component Value Date     (H) 02/07/2020     HEPATIC:   Lab Results   Component Value Date    ALBUMIN 2.3 (L) 09/23/2022    PROTTOTAL 8.1 09/23/2022    ALT 58 09/23/2022    AST 39 09/23/2022    ALKPHOS 102 09/23/2022    BILITOTAL 0.4 09/23/2022     OTHER:   Lab Results   Component Value Date    PH 7.39 09/23/2022    LACT 1.3 09/23/2022    A1C 7.7 (H) 04/04/2022    FERNANDO 8.6 09/28/2022    PHOS 3.7 02/06/2020    TSH 0.94 12/19/2019    CRP 53.50 09/25/2022    SED 85 (H) 09/25/2022       Anesthesia Plan    ASA Status:  3   NPO Status:  NPO Appropriate    Anesthesia Type: General.     - Airway: ETT   Induction: Intravenous.   Maintenance: Balanced.   Techniques and Equipment:     -  Airway: Video-Laryngoscope         Consents    Anesthesia Plan(s) and associated risks, benefits, and realistic alternatives discussed. Questions answered and patient/representative(s) expressed understanding.    - Discussed:     - Discussed with:  Patient         Postoperative Care    Pain management: IV analgesics, Peripheral nerve block (Single Shot).   PONV prophylaxis: Ondansetron (or other 5HT-3)     Comments:    Other Comments: Fem-sciatic nerve blocks            Felipe Mcdowell DO, DO

## 2022-09-29 NOTE — PROGRESS NOTES
[facility]    Infectious Disease Progress Note    Date of Service (when I saw the patient): 09/29/2022     Assessment & Plan   Ry Horner is a 57 year old male who was admitted on 9/23/2022.     Impression:  1. 57 y.o male with diabetes, HTN, dyslipidemia, CKD, possible vascular disease.   2. History of prior non healing wounds, prior history of amputation.   3. Admitted with LE wounds ongoing for past few weeks.   4. Given a dose of vanco now on zosyn   5. Pending vascular surgery consult   6. Had strep parasanguinis in the wound culture in [place]   7. Was treated with broad spectrum antibiotics while in [place]      Recommendations:   Continue on zosyn   MRI report reviewed and noted for extensive infection. ( see below)    S/p below knee amputation   Plan for completion soon   Can stop antibiotics 48 hours after completion of the procedure   Will sign off please call if ques             Franklin Maza MD    Interval History   Tolerating antibiotics ok   No new rashes or issues with antibiotics   Labs reviewed   No changes to past medical, social or family history   Afebrile   A 10 point ROS was done and is negative other than noted in the interval history above     Physical Exam   Temp: 97.9  F (36.6  C) Temp src: Oral BP: 125/85 Pulse: 103   Resp: 18 SpO2: 94 % O2 Device: None (Room air)    Vitals:    09/23/22 0038   Weight: 113.4 kg (250 lb)     Vital Signs with Ranges  Temp:  [97.9  F (36.6  C)-99  F (37.2  C)] 97.9  F (36.6  C)  Pulse:  [] 103  Resp:  [16-24] 18  BP: (112-149)/(72-93) 125/85  SpO2:  [94 %-98 %] 94 %    Constitutional: Awake, alert, cooperative, no apparent distress  Lungs: Clear to auscultation bilaterally, no crackles or wheezing  Cardiovascular: Regular rate and rhythm, normal S1 and S2, and no murmur noted  Abdomen: Normal bowel sounds, soft, non-distended, non-tender  Skin: No rashes, no cyanosis, no edema  Other:    Medications     HYDROmorphone       sodium  chloride 10 mL/hr at 09/29/22 0514       aspirin  81 mg Oral Daily     ezetimibe  10 mg Oral Daily     [Held by provider] heparin ANTICOAGULANT  5,000 Units Subcutaneous Q12H     insulin aspart  1-7 Units Subcutaneous TID AC     insulin aspart  1-5 Units Subcutaneous At Bedtime     insulin degludec  20 Units Subcutaneous BID     ipratropium  2 spray Both Nostrils Q12H     ketorolac  1 drop Right Eye 4x Daily     [Held by provider] lisinopril  40 mg Oral Daily     piperacillin-tazobactam  3.375 g Intravenous Q6H     polyethylene glycol  17 g Oral Daily     prednisoLONE acetate  1 drop Right Eye 4x Daily     senna-docusate  1 tablet Oral BID    Or     senna-docusate  2 tablet Oral BID     sodium chloride (PF)  3 mL Intracatheter Q8H       Data   All microbiology laboratory data reviewed.  Recent Labs   Lab Test 09/29/22  0612 09/28/22  1535 09/26/22  0623 09/24/22  0639 09/23/22  0106   WBC  --  9.0  --  8.7 13.6*   HGB  --  9.2*  --  9.1* 10.1*   HCT  --  28.3*  --  28.6* 32.0*   MCV  --  84  --  84 86    335 319 331 377     Recent Labs   Lab Test 09/29/22  1051 09/28/22  1535 09/25/22  0909   CR 1.44* 1.73* 1.26*     Recent Labs   Lab Test 09/25/22  1529   SED 85*     Recent Labs   Lab Test 02/03/20  1324 02/03/20  0007 02/02/20  2250 12/04/19  1619 11/19/19  1829 11/17/19  1525 11/17/19  1419 11/17/19  1411 08/28/17  1136   CULT Heavy growth  beta hemolytic   Streptococcus constellatus  *  Light growth  Staphylococcus aureus  * No growth No growth No anaerobes isolated  No growth Light growth  Bacteroides fragilis  *  Light growth  Parvimonas micra  *  Susceptibility testing not routinely done  On day 2, isolated in broth only:  beta hemolytic   Streptococcus constellatus  * Heavy growth  beta hemolytic   Streptococcus constellatus  Susceptibility testing done on previous specimen  *  Light growth  Alcaligenes faecalis  *  Light growth  Staphylococcus aureus  *  On day 1, isolated in broth  only:  Anaerobic gram negative rods  See anaerobic report for identification  * Heavy growth  Bacteroides fragilis  Susceptibility testing not routinely done  *  Heavy growth  Peptoniphilus asaccharolyticus  Susceptibility testing not routinely done  *  Moderate growth  beta hemolytic   Streptococcus constellatus  *  On day 1, isolated in broth only:  Anaerobic gram negative rods  See anaerobic report for identification  * Heavy growth  Bacteroides fragilis  *  Heavy growth  Parvimonas micra  *  Heavy growth  Peptoniphilus asaccharolyticus  *  Heavy growth  Mixed aerobic and anaerobic rosa  *  Susceptibility testing not routinely done No growth       All cultures:  No results for input(s): CULTURE in the last 168 hours.   Blood culture:  Results for orders placed or performed during the hospital encounter of 02/02/20   Blood culture    Specimen: Blood    Right Arm   Result Value Ref Range    Specimen Description Blood Right Arm     Culture Micro No growth    Blood culture    Specimen: Blood    Right Arm   Result Value Ref Range    Specimen Description Blood Right Arm     Culture Micro No growth    Results for orders placed or performed during the hospital encounter of 08/28/17   Blood culture    Specimen: Arm, Right; Blood    Right Arm   Result Value Ref Range    Specimen Description Blood Right Arm     Special Requests Aerobic and anaerobic bottles received     Culture Micro No growth    Blood culture    Specimen: Arm, Right; Blood    Right Arm   Result Value Ref Range    Specimen Description Blood Right Arm     Special Requests Aerobic and anaerobic bottles received     Culture Micro No growth    Results for orders placed or performed during the hospital encounter of 07/14/16   Blood culture    Specimen: Blood   Result Value Ref Range    Specimen Description Blood Left Arm     Special Requests Aerobic and anaerobic bottles received     Culture Micro No growth     Micro Report Status FINAL 07/20/2016     Blood culture    Specimen: Blood   Result Value Ref Range    Specimen Description Blood Right Arm     Special Requests Aerobic and anaerobic bottles received     Culture Micro No growth     Micro Report Status FINAL 07/20/2016       Urine culture:  No results found for this or any previous visit.       MRI reviewed  IMPRESSION:  1.  First ray and second-fifth transmetatarsal amputations.  2.  Probable second metatarsal osteomyelitis.  3.  Osteitis of the navicular, medial cuneiform, intermediate cuneiform, lateral cuneiform, and residual third metatarsal. These areas are at high risk of osteomyelitis development.  4.  Soft tissue ulcers in the anterior ankle, anterolateral ankle, and anteromedial amputation stump.  5.  Exposed tibialis anterior tendon in the anterior ankle, with extensive tibialis anterior emphysematous tenosynovitis. Abscess and emphysematous infection at the tibialis anterior tendon insertion.  6.  Extensive myositis in the medial amputation stump.  7.  Soft tissue abscess in the dorsal-lateral mid foot (at the level of the second metatarsal). This contains a focus of gas.  8.  I agree with the preliminary report

## 2022-09-29 NOTE — PLAN OF CARE
Goal Outcome Evaluation:    Denies CP SOB. Preop RN said pt could be clears liquid until 1000. Pt aware. Plan for surgery at 1440. All pt needs met at this time. Ace wrap with brace on leg. PCA pump restarted per order.    40

## 2022-09-29 NOTE — PROGRESS NOTES
[facility]    Hospitalist Progress Note    Interval History   Records reviewed, he is awaiting completion of this left BKA, npo at midnight  Pain better controlled apparently on the PCA  Very difficult lab stick, creatinine close to baseline, encourage oral hydration, would repeat bmp in the AM    -Data reviewed today: I reviewed all new labs and imaging results over the last 24 hours. I personally reviewed the MRI left foot and left ankle image(s) showing As reported below.    Physical Exam   Temp: 98.7  F (37.1  C) Temp src: Oral BP: (!) 149/86 Pulse: 100   Resp: 16 SpO2: 96 % O2 Device: None (Room air)    Vitals:    09/23/22 0038   Weight: 113.4 kg (250 lb)     Vital Signs with Ranges  Temp:  [98.4  F (36.9  C)-99.1  F (37.3  C)] 98.7  F (37.1  C)  Pulse:  [] 100  Resp:  [16-24] 16  BP: (112-149)/(70-93) 149/86  SpO2:  [93 %-98 %] 96 %  I/O last 3 completed shifts:  In: 25 [I.V.:25]  Out: 1150 [Urine:1100; Drains:50]    Physical Exam  Constitutional:       Appearance: Normal appearance.   HENT:      Head: Normocephalic.   Eyes:      Pupils: Pupils are equal, round, and reactive to light.   Cardiovascular:      Rate and Rhythm: Normal rate and regular rhythm.      Pulses: Normal pulses.      Heart sounds: Normal heart sounds.   Pulmonary:      Effort: Pulmonary effort is normal. No respiratory distress.      Breath sounds: Normal breath sounds.   Abdominal:      General: Abdomen is flat. Bowel sounds are normal. There is no distension.      Tenderness: There is no abdominal tenderness. There is no guarding.   Musculoskeletal:         General: Normal range of motion.      Cervical back: Normal range of motion.      Comments: Old right below-knee amputation.  Left below-knee amputation with wound VAC in place over the stump with bloody discharge.   Skin:     General: Skin is warm and dry.   Neurological:      General: No focal deficit present.   Psychiatric:         Mood and Affect:  Mood normal.           Medications     HYDROmorphone       sodium chloride 10 mL/hr at 09/29/22 0514       aspirin  81 mg Oral Daily     ezetimibe  10 mg Oral Daily     [Held by provider] heparin ANTICOAGULANT  5,000 Units Subcutaneous Q12H     insulin aspart  1-7 Units Subcutaneous TID AC     insulin aspart  1-5 Units Subcutaneous At Bedtime     insulin degludec  20 Units Subcutaneous BID     ipratropium  2 spray Both Nostrils Q12H     ketorolac  1 drop Right Eye 4x Daily     [Held by provider] lisinopril  40 mg Oral Daily     piperacillin-tazobactam  3.375 g Intravenous Q6H     polyethylene glycol  17 g Oral Daily     prednisoLONE acetate  1 drop Right Eye 4x Daily     senna-docusate  1 tablet Oral BID    Or     senna-docusate  2 tablet Oral BID     sodium chloride (PF)  3 mL Intracatheter Q8H       Data   Recent Labs   Lab 09/29/22  0612 09/29/22  0556 09/29/22  0157 09/28/22  2105 09/28/22  1709 09/28/22  1535 09/26/22  0747 09/26/22  0623 09/25/22  1134 09/25/22  0909 09/24/22  0726 09/24/22  0639 09/23/22  1137 09/23/22  0106   WBC  --   --   --   --   --  9.0  --   --   --   --   --  8.7  --  13.6*   HGB  --   --   --   --   --  9.2*  --   --   --   --   --  9.1*  --  10.1*   MCV  --   --   --   --   --  84  --   --   --   --   --  84  --  86     --   --   --   --  335  --  319  --   --   --  331  --  377   NA  --   --   --   --   --  135  --   --   --  138  --  135  --  135   POTASSIUM  --   --   --   --   --  3.7  --   --   --  4.2  --  4.3  --  4.8   CHLORIDE  --   --   --   --   --  102  --   --   --  106  --  104  --  99   CO2  --   --   --   --   --  27  --   --   --  28  --  28  --  27   BUN  --   --   --   --   --  23  --   --   --  22  --  31*  --  48*   CR  --   --   --   --   --  1.73*  --   --   --  1.26*  --  1.48*  --  2.33*   ANIONGAP  --   --   --   --   --  6  --   --   --  4  --  3  --  9   FERNANDO  --   --   --   --   --  8.6  --   --   --  8.4*  --  8.6  --  8.6   GLC  --  251* 285*  255*   < > 187*   < >  --    < > 182*   < > 160*   < > 176*   ALBUMIN  --   --   --   --   --   --   --   --   --   --   --   --   --  2.3*   PROTTOTAL  --   --   --   --   --   --   --   --   --   --   --   --   --  8.1   BILITOTAL  --   --   --   --   --   --   --   --   --   --   --   --   --  0.4   ALKPHOS  --   --   --   --   --   --   --   --   --   --   --   --   --  102   ALT  --   --   --   --   --   --   --   --   --   --   --   --   --  58   AST  --   --   --   --   --   --   --   --   --   --   --   --   --  39    < > = values in this interval not displayed.       No results found for this or any previous visit (from the past 24 hour(s)).      Assessment & Plan      Ry Horner is a 57 year old male with past medical history significant for type II DM, hypertension, dyslipidemia, CKD III, non-healing diabetic foot wounds with multiple prior amputations admitted on 9/23/2022 with left lower extremity wounds.      Multiple left lower extremity wounds   Hx of non-healing diabetic ulcers   S/p R BKA   S/p transmetatarsal amputation of the left foot   Leukocytosis   Pt has a hx of non-healing diabetic ulceration of the plantar aspect of th left foot. He has been following with wound clinic and podiatry for this. He went on a cruise to [facility] and developed a bad infection of the ulceration that apparently spread up his leg to include his shin. He was ultimately hospitalized in [place] and for several days on IV antibitotics (it is unclear what antibiotics he was on in the hospital). He needed multiple surgical debridements during this hospitalization. It is unclear how deep the infection is or if there is any bony involvement. It looks like one culture grew strep parasanguinosis. Ultimately he wanted to come back to the [place] so was placed on PO ciprofloxacin to take while traveling and was instructed to come straight to the hospital after landing.   * Lower extremity wounds are pictured below.  Wound care consult  placed.   * Vascular surgery consult.  KAREN of the left leg ordered.  Shows an elevated resting KAREN of 1.5 likely due to poorly compressible calcified arteries.  Waveform is biphasic.  Pressures could not be obtained due to overlying bandages.   * Podiatry consult .  MRI left foot and ankle obtained.  Shows underlying osteomyelitis and osteitis with early abscess on the dorsum of the foot.  Please review image report above.  Podiatry recommended below-knee amputation based on MRI results.  This would be performed by vascular surgery.   * underwent initial guillotine amputation on 9/27  * started on PCA for better pain control on 9/28  Plan  - awaiting completion of the left BKA scheduled for pm on 9/29  - continue PCA, at this point, defer to vascular surgery unless they ask for assistance  - on zosyn with ID following, may be able to stop 1-2 postop, but since ID following, will await their recommendations     Type II DM  Diabetic polyneuropathy and diabetic nephropathy   Hgb A1C was 7.7% in April 2022   - MDSSI .  Carb controlled diet. Continue home triseba .  Did have intermittent hyperglycemia when he refused his Tresiba but is well controlled currently.  - Hold jardiance, januvia, and metformin for now      JOHNATHAN on CKD stage III   Baseline creatinine appears to be around 1.7. Creatinine on admission is 2.33 with BUN of 48 and GFR of 32. Pt reports not drinking much water throughout the course of his flight.   -Creatinine corrected and is down to 1.26..  Discontinued IV fluids.  Having good oral intake.  - Monitor renal function.  Repeat BMP pending.  - Avoid nephrotoxins      HTN   -Blood pressure improved.  Restart lisinopril.     Dyslipidemia   - Continue PTA ASA, Ezetimibe. Not on statin due to allergy.  -Has history of hypertriglyceridemia.  His last triglycerides were at 489.  Recheck improved at 164     Chronic Anemia   Hemoglobin on admission is 10.1. This is down slightly from his baseline of around 12.    - Monitor hemoglobin.   Repeat CBC pending this morning.     Hypoalbuminemia: Albumin = 2.3 g/dL (Ref range: 3.4 - 5.0 g/dL) on admission, will monitor as appropriate.    Obesity: Estimated body mass index is 33.91 kg/m  as calculated from the following:    Height as of this encounter: 1.829 m (6').    Weight as of this encounter: 113.4 kg (250 lb).        Diet:diabetic diet    DVT Prophylaxis: Heparin SQ  [place] Catheter: Not present  Central Lines: None  Cardiac Monitoring: None  Code Status: Full Code       Ry Ojeda DO  Hospitalist [facility]  Pager: [phone]

## 2022-09-30 DIAGNOSIS — E11.42 TYPE 2 DIABETES MELLITUS WITH DIABETIC POLYNEUROPATHY, WITHOUT LONG-TERM CURRENT USE OF INSULIN (H): ICD-10-CM

## 2022-09-30 LAB
ANION GAP SERPL CALCULATED.3IONS-SCNC: 9 MMOL/L (ref 3–14)
BUN SERPL-MCNC: 20 MG/DL (ref 7–30)
CALCIUM SERPL-MCNC: 8.5 MG/DL (ref 8.5–10.1)
CHLORIDE BLD-SCNC: 102 MMOL/L (ref 94–109)
CO2 SERPL-SCNC: 22 MMOL/L (ref 20–32)
CREAT SERPL-MCNC: 1.68 MG/DL (ref 0.66–1.25)
ERYTHROCYTE [DISTWIDTH] IN BLOOD BY AUTOMATED COUNT: 13.2 % (ref 10–15)
GFR SERPL CREATININE-BSD FRML MDRD: 48 ML/MIN/1.73M2
GLUCOSE BLD-MCNC: 240 MG/DL (ref 70–99)
GLUCOSE BLD-MCNC: 240 MG/DL (ref 70–99)
GLUCOSE BLDC GLUCOMTR-MCNC: 188 MG/DL (ref 70–99)
GLUCOSE BLDC GLUCOMTR-MCNC: 219 MG/DL (ref 70–99)
GLUCOSE BLDC GLUCOMTR-MCNC: 225 MG/DL (ref 70–99)
GLUCOSE BLDC GLUCOMTR-MCNC: 231 MG/DL (ref 70–99)
GLUCOSE BLDC GLUCOMTR-MCNC: 314 MG/DL (ref 70–99)
GLUCOSE BLDC GLUCOMTR-MCNC: 366 MG/DL (ref 70–99)
HCT VFR BLD AUTO: 23.9 % (ref 40–53)
HGB BLD-MCNC: 7.9 G/DL (ref 13.3–17.7)
LACTATE SERPL-SCNC: 0.8 MMOL/L (ref 0.7–2)
MCH RBC QN AUTO: 27.3 PG (ref 26.5–33)
MCHC RBC AUTO-ENTMCNC: 33.1 G/DL (ref 31.5–36.5)
MCV RBC AUTO: 83 FL (ref 78–100)
PLATELET # BLD AUTO: 301 10E3/UL (ref 150–450)
POTASSIUM BLD-SCNC: 3.9 MMOL/L (ref 3.4–5.3)
RBC # BLD AUTO: 2.89 10E6/UL (ref 4.4–5.9)
SODIUM SERPL-SCNC: 133 MMOL/L (ref 133–144)
WBC # BLD AUTO: 16 10E3/UL (ref 4–11)

## 2022-09-30 PROCEDURE — 99233 SBSQ HOSP IP/OBS HIGH 50: CPT | Performed by: HOSPITALIST

## 2022-09-30 PROCEDURE — 36415 COLL VENOUS BLD VENIPUNCTURE: CPT | Performed by: HOSPITALIST

## 2022-09-30 PROCEDURE — 250N000013 HC RX MED GY IP 250 OP 250 PS 637: Performed by: SURGERY

## 2022-09-30 PROCEDURE — 999N000128 HC STATISTIC PERIPHERAL IV START W/O US GUIDANCE

## 2022-09-30 PROCEDURE — 36415 COLL VENOUS BLD VENIPUNCTURE: CPT | Performed by: SURGERY

## 2022-09-30 PROCEDURE — 258N000003 HC RX IP 258 OP 636: Performed by: HOSPITALIST

## 2022-09-30 PROCEDURE — 250N000011 HC RX IP 250 OP 636: Performed by: SURGERY

## 2022-09-30 PROCEDURE — 250N000011 HC RX IP 250 OP 636: Performed by: HOSPITALIST

## 2022-09-30 PROCEDURE — 83605 ASSAY OF LACTIC ACID: CPT | Performed by: HOSPITALIST

## 2022-09-30 PROCEDURE — 120N000001 HC R&B MED SURG/OB

## 2022-09-30 PROCEDURE — 250N000013 HC RX MED GY IP 250 OP 250 PS 637: Performed by: HOSPITALIST

## 2022-09-30 PROCEDURE — 80048 BASIC METABOLIC PNL TOTAL CA: CPT | Performed by: SURGERY

## 2022-09-30 PROCEDURE — 85027 COMPLETE CBC AUTOMATED: CPT | Performed by: SURGERY

## 2022-09-30 RX ORDER — HYDROMORPHONE HYDROCHLORIDE 1 MG/ML
0.5 INJECTION, SOLUTION INTRAMUSCULAR; INTRAVENOUS; SUBCUTANEOUS
Status: DISCONTINUED | OUTPATIENT
Start: 2022-09-30 | End: 2022-10-03 | Stop reason: HOSPADM

## 2022-09-30 RX ORDER — HYDROMORPHONE HYDROCHLORIDE 2 MG/1
2-4 TABLET ORAL
Status: DISCONTINUED | OUTPATIENT
Start: 2022-09-30 | End: 2022-10-02

## 2022-09-30 RX ORDER — SODIUM CHLORIDE, SODIUM LACTATE, POTASSIUM CHLORIDE, CALCIUM CHLORIDE 600; 310; 30; 20 MG/100ML; MG/100ML; MG/100ML; MG/100ML
INJECTION, SOLUTION INTRAVENOUS CONTINUOUS
Status: DISCONTINUED | OUTPATIENT
Start: 2022-09-30 | End: 2022-10-01

## 2022-09-30 RX ADMIN — ACETAMINOPHEN 650 MG: 325 TABLET, FILM COATED ORAL at 05:57

## 2022-09-30 RX ADMIN — ASPIRIN 81 MG CHEWABLE TABLET 81 MG: 81 TABLET CHEWABLE at 08:19

## 2022-09-30 RX ADMIN — HYDROMORPHONE HYDROCHLORIDE 0.5 MG: 1 INJECTION, SOLUTION INTRAMUSCULAR; INTRAVENOUS; SUBCUTANEOUS at 17:46

## 2022-09-30 RX ADMIN — HEPARIN SODIUM 5000 UNITS: 5000 INJECTION, SOLUTION INTRAVENOUS; SUBCUTANEOUS at 21:34

## 2022-09-30 RX ADMIN — HYDROMORPHONE HYDROCHLORIDE 4 MG: 2 TABLET ORAL at 23:49

## 2022-09-30 RX ADMIN — PIPERACILLIN AND TAZOBACTAM 3.38 G: 3; .375 INJECTION, POWDER, FOR SOLUTION INTRAVENOUS at 23:49

## 2022-09-30 RX ADMIN — PIPERACILLIN AND TAZOBACTAM 3.38 G: 3; .375 INJECTION, POWDER, FOR SOLUTION INTRAVENOUS at 05:58

## 2022-09-30 RX ADMIN — SODIUM CHLORIDE, POTASSIUM CHLORIDE, SODIUM LACTATE AND CALCIUM CHLORIDE: 600; 310; 30; 20 INJECTION, SOLUTION INTRAVENOUS at 11:17

## 2022-09-30 RX ADMIN — PIPERACILLIN AND TAZOBACTAM 3.38 G: 3; .375 INJECTION, POWDER, FOR SOLUTION INTRAVENOUS at 17:47

## 2022-09-30 RX ADMIN — INSULIN DEGLUDEC INJECTION 20 UNITS: 100 INJECTION, SOLUTION SUBCUTANEOUS at 20:49

## 2022-09-30 RX ADMIN — SENNOSIDES AND DOCUSATE SODIUM 2 TABLET: 50; 8.6 TABLET ORAL at 20:49

## 2022-09-30 RX ADMIN — PIPERACILLIN AND TAZOBACTAM 3.38 G: 3; .375 INJECTION, POWDER, FOR SOLUTION INTRAVENOUS at 13:00

## 2022-09-30 RX ADMIN — HYDROMORPHONE HYDROCHLORIDE 4 MG: 2 TABLET ORAL at 20:50

## 2022-09-30 RX ADMIN — INSULIN DEGLUDEC INJECTION 20 UNITS: 100 INJECTION, SOLUTION SUBCUTANEOUS at 08:22

## 2022-09-30 RX ADMIN — INSULIN ASPART 2 UNITS: 100 INJECTION, SOLUTION INTRAVENOUS; SUBCUTANEOUS at 08:20

## 2022-09-30 RX ADMIN — EZETIMIBE 10 MG: 10 TABLET ORAL at 08:18

## 2022-09-30 RX ADMIN — SENNOSIDES AND DOCUSATE SODIUM 1 TABLET: 50; 8.6 TABLET ORAL at 13:02

## 2022-09-30 RX ADMIN — PIPERACILLIN AND TAZOBACTAM 3.38 G: 3; .375 INJECTION, POWDER, FOR SOLUTION INTRAVENOUS at 00:23

## 2022-09-30 RX ADMIN — POLYETHYLENE GLYCOL 3350 17 G: 17 POWDER, FOR SOLUTION ORAL at 13:02

## 2022-09-30 RX ADMIN — ACETAMINOPHEN 650 MG: 325 TABLET, FILM COATED ORAL at 17:55

## 2022-09-30 RX ADMIN — INSULIN ASPART 1 UNITS: 100 INJECTION, SOLUTION INTRAVENOUS; SUBCUTANEOUS at 12:59

## 2022-09-30 RX ADMIN — ACETAMINOPHEN 650 MG: 325 TABLET, FILM COATED ORAL at 13:01

## 2022-09-30 RX ADMIN — HEPARIN SODIUM 5000 UNITS: 5000 INJECTION, SOLUTION INTRAVENOUS; SUBCUTANEOUS at 13:18

## 2022-09-30 RX ADMIN — ACETAMINOPHEN 650 MG: 325 TABLET, FILM COATED ORAL at 23:49

## 2022-09-30 RX ADMIN — INSULIN ASPART 2 UNITS: 100 INJECTION, SOLUTION INTRAVENOUS; SUBCUTANEOUS at 16:53

## 2022-09-30 RX ADMIN — CEFAZOLIN SODIUM 2 G: 2 INJECTION, SOLUTION INTRAVENOUS at 06:33

## 2022-09-30 ASSESSMENT — ACTIVITIES OF DAILY LIVING (ADL)
ADLS_ACUITY_SCORE: 39
ADLS_ACUITY_SCORE: 43
ADLS_ACUITY_SCORE: 43
ADLS_ACUITY_SCORE: 39
ADLS_ACUITY_SCORE: 43
ADLS_ACUITY_SCORE: 39

## 2022-09-30 NOTE — PROGRESS NOTES
VASCULAR SURGERY PROGRESS NOTE    Mr. Horner is a 58yo male with left lower extremity wounds now POD1 s/p staged BKA.    NAEON. Pain controlled. Tolerating diet. Voiding spontaneously.    Vitals reviewed.    On exam, he is resting comfortably. Knee immobilizer in place. Dressing c/d/i.    Mr. Horner is a 58yo male with left lower extremity wounds now POD1 s/p staged BKA, recovering well.    - PT/OT  - keep knee immobilizer in place  - vascular will change dressing over the weekend    Aimee Peterson MD  09/30/22  8:03 AM

## 2022-09-30 NOTE — PROGRESS NOTES
[facility]    Hospitalist Progress Note    Interval History    Status post completion of the BKA, agreeable now for insulin.doing ok. Having fever and feels warm.not hypoxemic, but has o2 on for the PCA but has no respiratory complaints  No cp, sob, nausea, vomiting, or diarrhea    -Data reviewed today: I reviewed all new labs and imaging results over the last 24 hours. I personally reviewed the MRI left foot and left ankle image(s) showing As reported below.    Physical Exam   Temp: (!) 100.5  F (38.1  C) Temp src: Oral BP: (!) 134/90 Pulse: (!) 128   Resp: 18 SpO2: 95 % O2 Device: None (Room air) Oxygen Delivery: 1 LPM  Vitals:    09/23/22 0038   Weight: 113.4 kg (250 lb)     Vital Signs with Ranges  Temp:  [97.8  F (36.6  C)-100.5  F (38.1  C)] 100.5  F (38.1  C)  Pulse:  [] 128  Resp:  [16-24] 18  BP: ()/(56-96) 134/90  SpO2:  [92 %-100 %] 95 %  I/O last 3 completed shifts:  In: 1213 [P.O.:240; I.V.:973]  Out: 3600 [Urine:2800; Blood:800]    Physical Exam  Constitutional:       Appearance: Normal appearance.   HENT:      Head: Normocephalic.   Eyes:      Pupils: Pupils are equal, round, and reactive to light.   Cardiovascular:      Rate and Rhythm: Normal rate and regular rhythm.      Pulses: Normal pulses.      Heart sounds: Normal heart sounds.   Pulmonary:      Effort: Pulmonary effort is normal. No respiratory distress.      Breath sounds: Normal breath sounds.   Abdominal:      General: Abdomen is flat. Bowel sounds are normal. There is no distension.      Tenderness: There is no abdominal tenderness. There is no guarding.   Musculoskeletal:         General: Normal range of motion.      Cervical back: Normal range of motion.      Comments: Old right below-knee amputation.  Left below-knee amputation with wound VAC in place over the stump with bloody discharge.   Skin:     General: Skin is warm and dry.   Neurological:      General: No focal deficit present.    Psychiatric:         Mood and Affect: Mood normal.           Medications     HYDROmorphone       sodium chloride 10 mL/hr at 09/29/22 1907       acetaminophen  650 mg Oral Q6H     aspirin  81 mg Oral Daily     ezetimibe  10 mg Oral Daily     heparin ANTICOAGULANT  5,000 Units Subcutaneous Q8H     insulin aspart  1-7 Units Subcutaneous TID AC     insulin aspart  1-5 Units Subcutaneous At Bedtime     insulin degludec  20 Units Subcutaneous BID     ipratropium  2 spray Both Nostrils Q12H     ketorolac  1 drop Right Eye 4x Daily     [Held by provider] lisinopril  40 mg Oral Daily     piperacillin-tazobactam  3.375 g Intravenous Q6H     polyethylene glycol  17 g Oral Daily     prednisoLONE acetate  1 drop Right Eye 4x Daily     senna-docusate  1 tablet Oral BID    Or     senna-docusate  2 tablet Oral BID     sodium chloride (PF)  3 mL Intracatheter Q8H       Data   Recent Labs   Lab 09/30/22  0731 09/30/22  0708 09/30/22  0150 09/29/22  1727 09/29/22  1714 09/29/22  1155 09/29/22  1051 09/29/22  0847 09/29/22  0612 09/28/22  1709 09/28/22  1535 09/24/22  0726 09/24/22  0639   WBC  --   --   --   --  10.3  --   --   --   --   --  9.0  --  8.7   HGB  --   --   --   --  8.1*  8.1*  --   --   --   --   --  9.2*  --  9.1*   MCV  --   --   --   --  84  --   --   --   --   --  84  --  84   PLT  --   --   --   --  314  --   --   --  300  --  335   < > 331   NA  --  133  --   --   --   --  136  --   --   --  135   < > 135   POTASSIUM  --  3.9  --   --   --   --  3.9  --   --   --  3.7   < > 4.3   CHLORIDE  --  102  --   --   --   --  104  --   --   --  102   < > 104   CO2  --  22  --   --   --   --  25  --   --   --  27   < > 28   BUN  --  20  --   --   --   --  22  --   --   --  23   < > 31*   CR  --  1.68*  --   --   --   --  1.44*  --   --   --  1.73*   < > 1.48*   ANIONGAP  --  9  --   --   --   --  7  --   --   --  6   < > 3   FERNANDO  --  8.5  --   --   --   --  8.6  --   --   --  8.6   < > 8.6   * 240*  240* 219*    < >  --    < > 198*   < >  --    < > 187*   < > 160*    < > = values in this interval not displayed.       No results found for this or any previous visit (from the past 24 hour(s)).      Assessment & Plan      yR Horner is a 57 year old male with past medical history significant for type II DM, hypertension, dyslipidemia, CKD III, non-healing diabetic foot wounds with multiple prior amputations admitted on 9/23/2022 with left lower extremity wounds.      Multiple left lower extremity wounds   Hx of non-healing diabetic ulcers   S/p R BKA   S/p transmetatarsal amputation of the left foot   Leukocytosis   Pt has a hx of non-healing diabetic ulceration of the plantar aspect of th left foot. He has been following with wound clinic and podiatry for this. He went on a cruise to [facility] and developed a bad infection of the ulceration that apparently spread up his leg to include his shin. He was ultimately hospitalized in [facility] and for several days on IV antibitotics (it is unclear what antibiotics he was on in the hospital). He needed multiple surgical debridements during this hospitalization. It is unclear how deep the infection is or if there is any bony involvement. It looks like one culture grew strep parasanguinosis. Ultimately [place] the [place] so was placed on PO ciprofloxacin to take while traveling and was instructed to come straight to the hospital after landing.   * Lower extremity wounds are pictured below.  Wound care consult placed.   * Vascular surgery consult.  KAREN of the left leg ordered.  Shows an elevated resting KAREN of 1.5 likely due to poorly compressible calcified arteries.  Waveform is biphasic.  Pressures could not be obtained due to overlying bandages.   * Podiatry consult .  MRI left foot and ankle obtained.  Shows underlying osteomyelitis and osteitis with early abscess on the dorsum of the foot.  Please review image report above.  Podiatry recommended below-knee amputation based  on MRI results.  This would be performed by vascular surgery.   * underwent initial guillotine amputation on 9/27  * started on PCA for better pain control on 9/28  Plan  - defer pain control and DVTppx for surgery.   - on zosyn with ID following, stop 48 hours after completion of BKA    SIRS postoperative  Fever and tachycardia on 9/30  EBL of 800 per the Op report  Denies respiratory complaints, feels well except diaphoresis, no chest pain or sob  Suspect due to postoperative state, if further symptoms develop will broaden workup  Plan  - gentle IVF today  - tylenol  - can check lactic acid  - consider blood transfusion pending CBC  - continue zosyn       Type II DM  Diabetic polyneuropathy and diabetic nephropathy   Hgb A1C was 7.7% in April 2022   - MDSSI .  Carb controlled diet. Continue home triseba .  Did have intermittent hyperglycemia when he refused his Tresiba but is well controlled currently.  - Hold jardiance, januvia, and metformin for now      JOHNATHAN on CKD stage III   Baseline creatinine appears to be around 1.7. Creatinine on admission is 2.33 with BUN of 48 and GFR of 32. Pt reports not drinking much water throughout the course of his flight.   -Creatinine corrected and is down to 1.26..  Discontinued IV fluids.  Having good oral intake.  - Monitor renal function.  Repeat BMP pending.  - Avoid nephrotoxins      HTN   -Blood pressure improved.  Restart lisinopril.     Dyslipidemia   - Continue PTA ASA, Ezetimibe. Not on statin due to allergy.  -Has history of hypertriglyceridemia.  His last triglycerides were at 489.  Recheck improved at 164     Chronic Anemia   Acute Blood Loss Anemia  Hemoglobin on admission is 10.1. This is down slightly from his baseline of around 12.   - Monitor hemoglobin.   Repeat CBC pending this morning.     Hypoalbuminemia: Albumin = 2.3 g/dL (Ref range: 3.4 - 5.0 g/dL) on admission, will monitor as appropriate.    Obesity: Estimated body mass index is 33.91 kg/m  as  calculated from the following:    Height as of this encounter: 1.829 m (6').    Weight as of this encounter: 113.4 kg (250 lb).        Diet:diabetic diet    DVT Prophylaxis: Heparin SQ  Corrales Catheter: Not present  Central Lines: None  Cardiac Monitoring: None  Code Status: Full Code       Ry Ojeda DO  Hospitalist [facility]  Pager: [phone]

## 2022-09-30 NOTE — PROGRESS NOTES
MD Notification    Notified Person: MD    Notified Person Name: Dr. Farrjohn    Notification Date/Time: 9/30 at 0818    Notification Interaction: Juana bowman    Purpose of Notification: Pt w/ T of 101.9. Already received scheduled Tylenol at 6 am. Sepsis protocol fired.     Orders Received: LR 75 mL/hr. Continue to monitor temp. Any SOB, increased O2 demand update provider.     Comments:

## 2022-09-30 NOTE — PROGRESS NOTES
Pt A/Ox4, VSS on RA except tachy at times, Dilaudid PCA pump, CMS intact, immobilizer on L leg, not oob, urinating in urinal at bedside, & is on IV Abx.

## 2022-09-30 NOTE — PROVIDER NOTIFICATION
MD Ry Taylor paged regarding patient request for oral pain medication and PCA pump to be turned off due to IV infiltration. Patient refused new IV replacement. Awaiting responses.

## 2022-09-30 NOTE — PLAN OF CARE
Goal Outcome Evaluation:    Pt A&Ox4. Temp of 101.9 & elevated pulse this a.m. Provider notified. Continued w/ scheduled Tylenol, current IV abx & continue to monitor temp per provider orders. Lactic acid WNL. Temp since resolved. LR infusing @ 75 mL/hr. C/o 3-4 out of 10 pain to L BKA; PCA pump utilized. ACE wrap & immobilizer intact post op. CMS intact. Mod carb diet; tolerating well, appetite appropriate. BG checks AC & HS. Urinal at bedside. Discharge pending.

## 2022-10-01 LAB
ANION GAP SERPL CALCULATED.3IONS-SCNC: 7 MMOL/L (ref 3–14)
BLD PROD TYP BPU: NORMAL
BLOOD COMPONENT TYPE: NORMAL
BUN SERPL-MCNC: 21 MG/DL (ref 7–30)
CALCIUM SERPL-MCNC: 8.6 MG/DL (ref 8.5–10.1)
CHLORIDE BLD-SCNC: 102 MMOL/L (ref 94–109)
CO2 SERPL-SCNC: 25 MMOL/L (ref 20–32)
CODING SYSTEM: NORMAL
CREAT SERPL-MCNC: 1.63 MG/DL (ref 0.66–1.25)
CROSSMATCH: NORMAL
ERYTHROCYTE [DISTWIDTH] IN BLOOD BY AUTOMATED COUNT: 13.4 % (ref 10–15)
FERRITIN SERPL-MCNC: 320 NG/ML (ref 26–388)
GFR SERPL CREATININE-BSD FRML MDRD: 49 ML/MIN/1.73M2
GLUCOSE BLD-MCNC: 266 MG/DL (ref 70–99)
GLUCOSE BLDC GLUCOMTR-MCNC: 229 MG/DL (ref 70–99)
GLUCOSE BLDC GLUCOMTR-MCNC: 230 MG/DL (ref 70–99)
GLUCOSE BLDC GLUCOMTR-MCNC: 277 MG/DL (ref 70–99)
GLUCOSE BLDC GLUCOMTR-MCNC: 282 MG/DL (ref 70–99)
GLUCOSE BLDC GLUCOMTR-MCNC: 287 MG/DL (ref 70–99)
GLUCOSE BLDC GLUCOMTR-MCNC: 305 MG/DL (ref 70–99)
HCT VFR BLD AUTO: 21.1 % (ref 40–53)
HGB BLD-MCNC: 6.8 G/DL (ref 13.3–17.7)
HGB BLD-MCNC: 7.1 G/DL (ref 13.3–17.7)
IRON SATN MFR SERPL: 8 % (ref 15–46)
IRON SERPL-MCNC: 11 UG/DL (ref 35–180)
ISSUE DATE AND TIME: NORMAL
LACTATE SERPL-SCNC: 0.6 MMOL/L (ref 0.7–2)
MCH RBC QN AUTO: 26.6 PG (ref 26.5–33)
MCHC RBC AUTO-ENTMCNC: 32.2 G/DL (ref 31.5–36.5)
MCV RBC AUTO: 82 FL (ref 78–100)
PLATELET # BLD AUTO: 269 10E3/UL (ref 150–450)
POTASSIUM BLD-SCNC: 3.8 MMOL/L (ref 3.4–5.3)
RBC # BLD AUTO: 2.56 10E6/UL (ref 4.4–5.9)
SODIUM SERPL-SCNC: 134 MMOL/L (ref 133–144)
TIBC SERPL-MCNC: 146 UG/DL (ref 240–430)
UNIT ABO/RH: NORMAL
UNIT NUMBER: NORMAL
UNIT STATUS: NORMAL
UNIT TYPE ISBT: 5100
WBC # BLD AUTO: 12.1 10E3/UL (ref 4–11)

## 2022-10-01 PROCEDURE — 85018 HEMOGLOBIN: CPT | Performed by: HOSPITALIST

## 2022-10-01 PROCEDURE — 82728 ASSAY OF FERRITIN: CPT | Performed by: HOSPITALIST

## 2022-10-01 PROCEDURE — 36415 COLL VENOUS BLD VENIPUNCTURE: CPT | Performed by: HOSPITALIST

## 2022-10-01 PROCEDURE — 250N000013 HC RX MED GY IP 250 OP 250 PS 637: Performed by: SURGERY

## 2022-10-01 PROCEDURE — 83550 IRON BINDING TEST: CPT | Performed by: HOSPITALIST

## 2022-10-01 PROCEDURE — 83605 ASSAY OF LACTIC ACID: CPT | Performed by: HOSPITALIST

## 2022-10-01 PROCEDURE — 80048 BASIC METABOLIC PNL TOTAL CA: CPT | Performed by: HOSPITALIST

## 2022-10-01 PROCEDURE — 99232 SBSQ HOSP IP/OBS MODERATE 35: CPT | Performed by: HOSPITALIST

## 2022-10-01 PROCEDURE — 250N000013 HC RX MED GY IP 250 OP 250 PS 637: Performed by: HOSPITALIST

## 2022-10-01 PROCEDURE — P9016 RBC LEUKOCYTES REDUCED: HCPCS | Performed by: HOSPITALIST

## 2022-10-01 PROCEDURE — 250N000011 HC RX IP 250 OP 636: Performed by: SURGERY

## 2022-10-01 PROCEDURE — 999N000128 HC STATISTIC PERIPHERAL IV START W/O US GUIDANCE

## 2022-10-01 PROCEDURE — 258N000003 HC RX IP 258 OP 636: Performed by: HOSPITALIST

## 2022-10-01 PROCEDURE — 120N000001 HC R&B MED SURG/OB

## 2022-10-01 RX ORDER — FERROUS SULFATE 325(65) MG
325 TABLET ORAL 2 TIMES DAILY WITH MEALS
Status: DISCONTINUED | OUTPATIENT
Start: 2022-10-01 | End: 2022-10-03 | Stop reason: HOSPADM

## 2022-10-01 RX ADMIN — HYDROMORPHONE HYDROCHLORIDE 4 MG: 2 TABLET ORAL at 02:46

## 2022-10-01 RX ADMIN — EZETIMIBE 10 MG: 10 TABLET ORAL at 10:50

## 2022-10-01 RX ADMIN — SODIUM CHLORIDE, POTASSIUM CHLORIDE, SODIUM LACTATE AND CALCIUM CHLORIDE: 600; 310; 30; 20 INJECTION, SOLUTION INTRAVENOUS at 00:48

## 2022-10-01 RX ADMIN — HYDROMORPHONE HYDROCHLORIDE 4 MG: 2 TABLET ORAL at 12:20

## 2022-10-01 RX ADMIN — INSULIN ASPART 2 UNITS: 100 INJECTION, SOLUTION INTRAVENOUS; SUBCUTANEOUS at 08:28

## 2022-10-01 RX ADMIN — SENNOSIDES AND DOCUSATE SODIUM 2 TABLET: 50; 8.6 TABLET ORAL at 10:50

## 2022-10-01 RX ADMIN — PIPERACILLIN AND TAZOBACTAM 3.38 G: 3; .375 INJECTION, POWDER, FOR SOLUTION INTRAVENOUS at 12:00

## 2022-10-01 RX ADMIN — HEPARIN SODIUM 5000 UNITS: 5000 INJECTION, SOLUTION INTRAVENOUS; SUBCUTANEOUS at 15:47

## 2022-10-01 RX ADMIN — PIPERACILLIN AND TAZOBACTAM 3.38 G: 3; .375 INJECTION, POWDER, FOR SOLUTION INTRAVENOUS at 19:42

## 2022-10-01 RX ADMIN — INSULIN ASPART 2 UNITS: 100 INJECTION, SOLUTION INTRAVENOUS; SUBCUTANEOUS at 18:36

## 2022-10-01 RX ADMIN — FERROUS SULFATE TAB 325 MG (65 MG ELEMENTAL FE) 325 MG: 325 (65 FE) TAB at 18:36

## 2022-10-01 RX ADMIN — ASPIRIN 81 MG CHEWABLE TABLET 81 MG: 81 TABLET CHEWABLE at 10:50

## 2022-10-01 RX ADMIN — HYDROMORPHONE HYDROCHLORIDE 4 MG: 2 TABLET ORAL at 18:36

## 2022-10-01 RX ADMIN — HEPARIN SODIUM 5000 UNITS: 5000 INJECTION, SOLUTION INTRAVENOUS; SUBCUTANEOUS at 22:27

## 2022-10-01 RX ADMIN — HYDROMORPHONE HYDROCHLORIDE 4 MG: 2 TABLET ORAL at 09:19

## 2022-10-01 RX ADMIN — HYDROMORPHONE HYDROCHLORIDE 4 MG: 2 TABLET ORAL at 05:45

## 2022-10-01 RX ADMIN — HYDROMORPHONE HYDROCHLORIDE 4 MG: 2 TABLET ORAL at 22:28

## 2022-10-01 RX ADMIN — INSULIN DEGLUDEC INJECTION 20 UNITS: 100 INJECTION, SOLUTION SUBCUTANEOUS at 08:28

## 2022-10-01 RX ADMIN — HYDROMORPHONE HYDROCHLORIDE 4 MG: 2 TABLET ORAL at 15:28

## 2022-10-01 RX ADMIN — INSULIN ASPART 3 UNITS: 100 INJECTION, SOLUTION INTRAVENOUS; SUBCUTANEOUS at 12:00

## 2022-10-01 RX ADMIN — ACETAMINOPHEN 650 MG: 325 TABLET, FILM COATED ORAL at 12:01

## 2022-10-01 RX ADMIN — ACETAMINOPHEN 650 MG: 325 TABLET, FILM COATED ORAL at 05:45

## 2022-10-01 RX ADMIN — FERROUS SULFATE TAB 325 MG (65 MG ELEMENTAL FE) 325 MG: 325 (65 FE) TAB at 10:50

## 2022-10-01 RX ADMIN — HEPARIN SODIUM 5000 UNITS: 5000 INJECTION, SOLUTION INTRAVENOUS; SUBCUTANEOUS at 05:03

## 2022-10-01 RX ADMIN — PIPERACILLIN AND TAZOBACTAM 3.38 G: 3; .375 INJECTION, POWDER, FOR SOLUTION INTRAVENOUS at 05:03

## 2022-10-01 RX ADMIN — ACETAMINOPHEN 650 MG: 325 TABLET, FILM COATED ORAL at 18:36

## 2022-10-01 ASSESSMENT — ACTIVITIES OF DAILY LIVING (ADL)
ADLS_ACUITY_SCORE: 39
ADLS_ACUITY_SCORE: 39
ADLS_ACUITY_SCORE: 43
ADLS_ACUITY_SCORE: 39
ADLS_ACUITY_SCORE: 43
ADLS_ACUITY_SCORE: 43
ADLS_ACUITY_SCORE: 39
ADLS_ACUITY_SCORE: 43
ADLS_ACUITY_SCORE: 39
ADLS_ACUITY_SCORE: 43

## 2022-10-01 NOTE — PROGRESS NOTES
VASCULAR SURGERY PROGRESS NOTE    Mr. Horner is a 56yo male with left lower extremity wounds now POD2 s/p staged BKA.    NAEON. Pain controlled. Tolerating diet.     Vitals reviewed.    On exam, he is resting comfortably. Knee immobilizer in place. Dressing changed and c/d/i. Small blisters near the suture line. Skin viable and well approximated.    Labs reviewed. Hgb 7.1 from 7.9.    Mr. Horner is a 56yo male with left lower extremity wounds now POD2 s/p staged BKA, recovering well.    - PT/OT  - keep knee immobilizer in place  - every other day dressing changes    Aimee Peterson MD  10/01/22  9:36 AM

## 2022-10-01 NOTE — CONSULTS
Please see consult completed on 9/28/22 at 3:58pm. SW/PARVIZ following for discharge planning.     GLORIA Spence

## 2022-10-01 NOTE — PLAN OF CARE
Goal Outcome Evaluation:      Shift Summary [id number]    Admitting Diagnosis: Cellulitis of left lower extremity [L03.116]  Diabetic ulcer of left foot associated with type 2 diabetes mellitus, with muscle involvement without evidence of necrosis, unspecified part of foot (H) [E11.621, L97.525]   Vitals stable on RA   Pain managed with PRN PO Dilaudid and scheduled tylenol  A&Ox4  Voiding Urinal   Mobility A1-2 with either lift or pivot to bedside commode  CMS intact   Dressing CDI, immobilizer on     Reg diet

## 2022-10-01 NOTE — OP NOTE
Vascular Surgery Operative Note     PREOPERATIVE DIAGNOSIS:  Diabetic foot infections of the left leg, status post guillotine left foot amptuation     POSTOPERATIVE DIAGNOSIS:  Same     PROCEDURE: Completion / formalization of left below-knee amputation     SURGEON:  Reny Child MD     ASSISTANT:  Aimee Peterson MD     ANESTHESIA:  General Endotracheal     ESTIMATED BLOOD LOSS:  800 mL    SPECIMENS: Residual tissue from left lower leg      INDICATIONS: Mr. Horner is a 57M with chronic left lower extremity wounds, felt to be nonsalvagable after multiple debridements. He underwent guillotine BKA on 9/27/22 and was brought to the OR today to undergo completion.      INTRAOPERATIVE FINDINGS:    Tissue edema with grossly viable muscle and blood flow at the level of amputation.      DESCRIPTION OF TECHNIQUE:   Mr. Horner was brought into the operating room and laid on the operating table in the supine position. After appropriate identification, anesthesia was uneventfully initiated. A time-out procedure was completed. The left leg was prepped and draped to the level of the thigh in standard sterile fashion.     The skin incision was marked approximately 10cm distal to the tibial tubercle, with extension into a long posterior flap to the level of the prior guillotine amputation. Sharp and bovie electrocautery dissection were used to divide the skin and subcutaneous tissues. The muscle bundles were elevated and divided anterolaterally and anteromedially. Bovie and blunt dissection with a periosteal elevator were used to clear overlying muscle and tendinous attachments to the tibia. Dissection proceeded circumferentially, taking care to stay close to the bone. The posterior tibial neurovascular bundle was identified, divided, and suture ligated. The anterior tibial vascular bundle was identified, divided, and suture ligated. A lap pad was passed posterior to the tibia and this was divided with an oscillating saw. The  thigh was elevated and dissection of muscle and fascia continued posteriorly; the peroneal vascular bundle was identified, divided, and suture-ligated. The fibula was circumferentially cleared of muscle and tendinous attachments and divided with the oscillating saw.     Cautery was used to separate and partially debulk the remaining posterior muscle and soft tissues from the amputation stump. The right foot and lower leg were passed of the table as a specimen and the site was evaluated for residual bleeding. The anterior portion of the tibia was smoothed using a rasp. The posterior flap muscle edges were cauterized and suture ligated as needed to stop bleeding. The flap and amputation site were thoroughly irrigated with saline, with red-pink, healthy muscle tissue and viable-appearing skin edges throughout.    The posterior flap was secured to the anterior incision via interrupted [id number] polysorb sutures, effectively closing the stump. The skin was closed with interrupted deep dermal polysorb sutures, nylon mattress sutures, and staples. The skin of the incision and leg was cleaned and dried, and a xeroform and gauze dressing applied. A knee immobilizer was used to secure the left leg in an extended position.     Mr. Horner tolerated the procedure well. He was awoken from anesthesia and brought to the PACU in stable condition. At the end of the case all needle, sponge and instrument counts were correct.      Reny Child MD  Vascular Surgery

## 2022-10-01 NOTE — PLAN OF CARE
Goal Outcome Evaluation:      Patient is alert and oriented X4. VSS exp HR slightly elevated  on RA. Up with assist of 2 with gait belt and walker. On mod carb diet with good appetite. IV LR infusing 75ml/hr. Patient reports pain ranting  9/10, scheduled acetaminophen, IV dilaudid and oral dilaudid  was given with effective results. Dressing dry and intact  With knee immbolizer.  BS monitored and sliding scale insulin was given. Continent of B&B, uses urinal and bedside commode for BM. Patient had medium soft stools this shift. Resting comfortably in bed. Continue to monitor.

## 2022-10-01 NOTE — PROGRESS NOTES
[facility]    Hospitalist Progress Note    Interval History    Improving. No acute concerns  No cp, sob, nausea, vomiting, or diarrhea    -Data reviewed today: I reviewed all new labs and imaging results over the last 24 hours. I personally reviewed the MRI left foot and left ankle image(s) showing As reported below.    Physical Exam   Temp: 98.9  F (37.2  C) Temp src: Oral BP: 125/76 Pulse: 100   Resp: 16 SpO2: 92 % O2 Device: None (Room air) Oxygen Delivery: 1 LPM  Vitals:    09/23/22 0038 10/01/22 0549   Weight: 113.4 kg (250 lb) 112.3 kg (247 lb 9.2 oz)     Vital Signs with Ranges  Temp:  [98.7  F (37.1  C)-100.4  F (38  C)] 98.9  F (37.2  C)  Pulse:  [100-117] 100  Resp:  [16-18] 16  BP: ()/(61-92) 125/76  SpO2:  [92 %-98 %] 92 %  I/O last 3 completed shifts:  In: 1453 [P.O.:1440; I.V.:13]  Out: 2100 [Urine:2100]    Physical Exam  Constitutional:       Appearance: Normal appearance.   HENT:      Head: Normocephalic.   Eyes:      Pupils: Pupils are equal, round, and reactive to light.   Cardiovascular:      Rate and Rhythm: Normal rate and regular rhythm.      Pulses: Normal pulses.      Heart sounds: Normal heart sounds.   Pulmonary:      Effort: Pulmonary effort is normal. No respiratory distress.      Breath sounds: Normal breath sounds.   Abdominal:      General: Abdomen is flat. Bowel sounds are normal. There is no distension.      Tenderness: There is no abdominal tenderness. There is no guarding.   Musculoskeletal:         General: Normal range of motion.      Cervical back: Normal range of motion.      Comments: Old right below-knee amputation.  Left below-knee amputation with wound VAC in place over the stump with bloody discharge.   Skin:     General: Skin is warm and dry.   Neurological:      General: No focal deficit present.   Psychiatric:         Mood and Affect: Mood normal.           Medications       acetaminophen  650 mg Oral Q6H     aspirin  81 mg Oral Daily      ezetimibe  10 mg Oral Daily     heparin ANTICOAGULANT  5,000 Units Subcutaneous Q8H     insulin aspart  1-7 Units Subcutaneous TID AC     insulin aspart  1-5 Units Subcutaneous At Bedtime     insulin degludec  20 Units Subcutaneous BID     ipratropium  2 spray Both Nostrils Q12H     ketorolac  1 drop Right Eye 4x Daily     [Held by provider] lisinopril  40 mg Oral Daily     piperacillin-tazobactam  3.375 g Intravenous Q6H     polyethylene glycol  17 g Oral Daily     prednisoLONE acetate  1 drop Right Eye 4x Daily     senna-docusate  1 tablet Oral BID    Or     senna-docusate  2 tablet Oral BID     sodium chloride (PF)  3 mL Intracatheter Q8H       Data   Recent Labs   Lab 10/01/22  0744 10/01/22  0502 10/01/22  0248 09/30/22  2133 09/30/22  1107 09/30/22  0859 09/30/22  0731 09/30/22  0708 09/29/22  1727 09/29/22  1714 09/29/22  1155 09/29/22  1051 09/29/22  0847 09/29/22  0612 09/28/22  1709 09/28/22  1535   WBC  --   --   --   --   --  16.0*  --   --   --  10.3  --   --   --   --   --  9.0   HGB 7.1*  --   --   --   --  7.9*  --   --   --  8.1*  8.1*  --   --   --   --   --  9.2*   MCV  --   --   --   --   --  83  --   --   --  84  --   --   --   --   --  84   PLT  --   --   --   --   --  301  --   --   --  314  --   --   --  300  --  335     --   --   --   --   --   --  133  --   --   --  136  --   --   --  135   POTASSIUM 3.8  --   --   --   --   --   --  3.9  --   --   --  3.9  --   --   --  3.7   CHLORIDE 102  --   --   --   --   --   --  102  --   --   --  104  --   --   --  102   CO2  --   --   --   --   --   --   --  22  --   --   --  25  --   --   --  27   BUN  --   --   --   --   --   --   --  20  --   --   --  22  --   --   --  23   CR  --   --   --   --   --   --   --  1.68*  --   --   --  1.44*  --   --   --  1.73*   ANIONGAP  --   --   --   --   --   --   --  9  --   --   --  7  --   --   --  6   FERNANDO  --   --   --   --   --   --   --  8.5  --   --   --  8.6  --   --   --  8.6   GLC  --  287*  305* 366*   < >  --    < > 240*  240*   < >  --    < > 198*   < >  --    < > 187*    < > = values in this interval not displayed.       No results found for this or any previous visit (from the past 24 hour(s)).      Assessment & Plan      Ry Horner is a 57 year old male with past medical history significant for type II DM, hypertension, dyslipidemia, CKD III, non-healing diabetic foot wounds with multiple prior amputations admitted on 9/23/2022 with left lower extremity wounds.      Multiple left lower extremity wounds   Hx of non-healing diabetic ulcers   S/p R BKA   S/p transmetatarsal amputation of the left foot   Leukocytosis   Pt has a hx of non-healing diabetic ulceration of the plantar aspect of th left foot. He has been following with wound clinic and podiatry for this. He went on a cruise to [facility] and developed a bad infection of the ulceration that apparently spread up his leg to include his shin. He was ultimately hospitalized in [facility] and for several days on IV antibitotics (it is unclear what antibiotics he was on in the hospital). He needed multiple surgical debridements during this hospitalization. It is unclear how deep the infection is or if there is any bony involvement. It looks like one culture grew strep parasanguinosis. Ultimately [place] the [place] so was placed on PO ciprofloxacin to take while traveling and was instructed to come straight to the hospital after landing.   * Lower extremity wounds are pictured below.  Wound care consult placed.   * Vascular surgery consult.  KAREN of the left leg ordered.  Shows an elevated resting KAREN of 1.5 likely due to poorly compressible calcified arteries.  Waveform is biphasic.  Pressures could not be obtained due to overlying bandages.   * Podiatry consult .  MRI left foot and ankle obtained.  Shows underlying osteomyelitis and osteitis with early abscess on the dorsum of the foot.  Please review image report above.  Podiatry  recommended below-knee amputation based on MRI results.  This would be performed by vascular surgery.   * underwent initial guillotine amputation on 9/27  * started on PCA for better pain control on 9/28  Plan  - heparin for dvt ppx  - stop zosyn this evening  - PT and OT  - vascular surgery following  - potentially discharge on Monday pending safe disposition, patient wants home  - care coordination consultation    SIRS postoperative  Fever and tachycardia on 9/30  EBL of 800 per the Op report  Denies respiratory complaints, feels well except diaphoresis, no chest pain or sob  Suspect due to postoperative state, if further symptoms develop will broaden workup  Plan  - stop IVF  - tylenol  - lactic acid negative x2 for sepsis protocol. Difficult stick for labs, can hold off on additional pending change  - continue zosyn       Type II DM  Diabetic polyneuropathy and diabetic nephropathy   Hgb A1C was 7.7% in April 2022   - MDSSI .  Carb controlled diet. Continue home triseba .  Did have intermittent hyperglycemia when he refused his Tresiba but is well controlled currently.  - Hold jardiance, januvia, and metformin for now      JOHNATHAN on CKD stage III   Baseline creatinine appears to be around 1.7. Creatinine on admission is 2.33 with BUN of 48 and GFR of 32. Pt reports not drinking much water throughout the course of his flight.   -Creatinine corrected and is down to 1.26..  Discontinued IV fluids.  Having good oral intake.  - Monitor renal function.  Repeat BMP pending.  - Avoid nephrotoxins      HTN   -Blood pressure improved.  Restart lisinopril.     Dyslipidemia   - Continue PTA ASA, Ezetimibe. Not on statin due to allergy.  -Has history of hypertriglyceridemia.  His last triglycerides were at 489.  Recheck improved at 164     Chronic Anemia   Acute Blood Loss Anemia  Hemoglobin on admission is 10.1. This is down slightly from his baseline of around 12.   - repeat hgb this afternoon  - potentially blood  transfusion     Hypoalbuminemia: Albumin = 2.3 g/dL (Ref range: 3.4 - 5.0 g/dL) on admission, will monitor as appropriate.    Obesity: Estimated body mass index is 33.91 kg/m  as calculated from the following:    Height as of this encounter: 1.829 m (6').    Weight as of this encounter: 113.4 kg (250 lb).        Diet:diabetic diet    DVT Prophylaxis: Heparin SQ  Corrales Catheter: Not present  Central Lines: None  Cardiac Monitoring: None  Code Status: Full Code       Ry Ojeda DO  Hospitalist [facility]  Pager: [phone]

## 2022-10-02 ENCOUNTER — APPOINTMENT (OUTPATIENT)
Dept: PHYSICAL THERAPY | Facility: CLINIC | Age: 57
DRG: 617 | End: 2022-10-02
Attending: HOSPITALIST
Payer: COMMERCIAL

## 2022-10-02 LAB
ANION GAP SERPL CALCULATED.3IONS-SCNC: 6 MMOL/L (ref 3–14)
BLD PROD TYP BPU: NORMAL
BLOOD COMPONENT TYPE: NORMAL
BUN SERPL-MCNC: 19 MG/DL (ref 7–30)
CALCIUM SERPL-MCNC: 8.7 MG/DL (ref 8.5–10.1)
CHLORIDE BLD-SCNC: 103 MMOL/L (ref 94–109)
CO2 SERPL-SCNC: 25 MMOL/L (ref 20–32)
CODING SYSTEM: NORMAL
CREAT SERPL-MCNC: 1.5 MG/DL (ref 0.66–1.25)
CROSSMATCH: NORMAL
ERYTHROCYTE [DISTWIDTH] IN BLOOD BY AUTOMATED COUNT: 13.2 % (ref 10–15)
GFR SERPL CREATININE-BSD FRML MDRD: 55 ML/MIN/1.73M2
GLUCOSE BLD-MCNC: 224 MG/DL (ref 70–99)
GLUCOSE BLDC GLUCOMTR-MCNC: 199 MG/DL (ref 70–99)
GLUCOSE BLDC GLUCOMTR-MCNC: 208 MG/DL (ref 70–99)
GLUCOSE BLDC GLUCOMTR-MCNC: 229 MG/DL (ref 70–99)
GLUCOSE BLDC GLUCOMTR-MCNC: 251 MG/DL (ref 70–99)
GLUCOSE BLDC GLUCOMTR-MCNC: 266 MG/DL (ref 70–99)
HCT VFR BLD AUTO: 22 % (ref 40–53)
HGB BLD-MCNC: 7.1 G/DL (ref 13.3–17.7)
HGB BLD-MCNC: 7.3 G/DL (ref 13.3–17.7)
MCH RBC QN AUTO: 27.3 PG (ref 26.5–33)
MCHC RBC AUTO-ENTMCNC: 32.3 G/DL (ref 31.5–36.5)
MCV RBC AUTO: 85 FL (ref 78–100)
PLATELET # BLD AUTO: 297 10E3/UL (ref 150–450)
POTASSIUM BLD-SCNC: 3.7 MMOL/L (ref 3.4–5.3)
RBC # BLD AUTO: 2.6 10E6/UL (ref 4.4–5.9)
SODIUM SERPL-SCNC: 134 MMOL/L (ref 133–144)
UNIT ABO/RH: NORMAL
UNIT NUMBER: NORMAL
UNIT STATUS: NORMAL
UNIT TYPE ISBT: 5100
WBC # BLD AUTO: 10 10E3/UL (ref 4–11)

## 2022-10-02 PROCEDURE — 250N000011 HC RX IP 250 OP 636: Performed by: SURGERY

## 2022-10-02 PROCEDURE — 80048 BASIC METABOLIC PNL TOTAL CA: CPT | Performed by: HOSPITALIST

## 2022-10-02 PROCEDURE — 250N000013 HC RX MED GY IP 250 OP 250 PS 637: Performed by: SURGERY

## 2022-10-02 PROCEDURE — 999N000111 HC STATISTIC OT IP EVAL DEFER

## 2022-10-02 PROCEDURE — 36415 COLL VENOUS BLD VENIPUNCTURE: CPT | Performed by: HOSPITALIST

## 2022-10-02 PROCEDURE — 97161 PT EVAL LOW COMPLEX 20 MIN: CPT | Mod: GP

## 2022-10-02 PROCEDURE — 99232 SBSQ HOSP IP/OBS MODERATE 35: CPT | Performed by: HOSPITALIST

## 2022-10-02 PROCEDURE — 85027 COMPLETE CBC AUTOMATED: CPT | Performed by: HOSPITALIST

## 2022-10-02 PROCEDURE — 85018 HEMOGLOBIN: CPT | Performed by: HOSPITALIST

## 2022-10-02 PROCEDURE — 97530 THERAPEUTIC ACTIVITIES: CPT | Mod: GP

## 2022-10-02 PROCEDURE — 120N000001 HC R&B MED SURG/OB

## 2022-10-02 PROCEDURE — 250N000013 HC RX MED GY IP 250 OP 250 PS 637: Performed by: HOSPITALIST

## 2022-10-02 RX ORDER — OXYCODONE HYDROCHLORIDE 5 MG/1
5-10 TABLET ORAL EVERY 4 HOURS PRN
Status: DISCONTINUED | OUTPATIENT
Start: 2022-10-02 | End: 2022-10-03 | Stop reason: HOSPADM

## 2022-10-02 RX ADMIN — HEPARIN SODIUM 5000 UNITS: 5000 INJECTION, SOLUTION INTRAVENOUS; SUBCUTANEOUS at 22:18

## 2022-10-02 RX ADMIN — HYDROMORPHONE HYDROCHLORIDE 4 MG: 2 TABLET ORAL at 19:48

## 2022-10-02 RX ADMIN — HYDROMORPHONE HYDROCHLORIDE 2 MG: 2 TABLET ORAL at 14:27

## 2022-10-02 RX ADMIN — ASPIRIN 81 MG CHEWABLE TABLET 81 MG: 81 TABLET CHEWABLE at 09:08

## 2022-10-02 RX ADMIN — FERROUS SULFATE TAB 325 MG (65 MG ELEMENTAL FE) 325 MG: 325 (65 FE) TAB at 09:08

## 2022-10-02 RX ADMIN — ACETAMINOPHEN 650 MG: 325 TABLET, FILM COATED ORAL at 12:07

## 2022-10-02 RX ADMIN — HEPARIN SODIUM 5000 UNITS: 5000 INJECTION, SOLUTION INTRAVENOUS; SUBCUTANEOUS at 06:10

## 2022-10-02 RX ADMIN — ACETAMINOPHEN 650 MG: 325 TABLET, FILM COATED ORAL at 06:09

## 2022-10-02 RX ADMIN — PIPERACILLIN AND TAZOBACTAM 3.38 G: 3; .375 INJECTION, POWDER, FOR SOLUTION INTRAVENOUS at 01:49

## 2022-10-02 RX ADMIN — ACETAMINOPHEN 650 MG: 325 TABLET, FILM COATED ORAL at 19:43

## 2022-10-02 RX ADMIN — PIPERACILLIN AND TAZOBACTAM 3.38 G: 3; .375 INJECTION, POWDER, FOR SOLUTION INTRAVENOUS at 06:43

## 2022-10-02 RX ADMIN — INSULIN ASPART 2 UNITS: 100 INJECTION, SOLUTION INTRAVENOUS; SUBCUTANEOUS at 09:10

## 2022-10-02 RX ADMIN — FERROUS SULFATE TAB 325 MG (65 MG ELEMENTAL FE) 325 MG: 325 (65 FE) TAB at 19:43

## 2022-10-02 RX ADMIN — INSULIN DEGLUDEC INJECTION 26 UNITS: 100 INJECTION, SOLUTION SUBCUTANEOUS at 09:10

## 2022-10-02 RX ADMIN — HEPARIN SODIUM 5000 UNITS: 5000 INJECTION, SOLUTION INTRAVENOUS; SUBCUTANEOUS at 12:07

## 2022-10-02 RX ADMIN — HYDROMORPHONE HYDROCHLORIDE 4 MG: 2 TABLET ORAL at 09:50

## 2022-10-02 RX ADMIN — INSULIN ASPART 2 UNITS: 100 INJECTION, SOLUTION INTRAVENOUS; SUBCUTANEOUS at 19:42

## 2022-10-02 RX ADMIN — EZETIMIBE 10 MG: 10 TABLET ORAL at 09:08

## 2022-10-02 RX ADMIN — INSULIN ASPART 3 UNITS: 100 INJECTION, SOLUTION INTRAVENOUS; SUBCUTANEOUS at 12:06

## 2022-10-02 RX ADMIN — HYDROMORPHONE HYDROCHLORIDE 4 MG: 2 TABLET ORAL at 01:43

## 2022-10-02 RX ADMIN — ACETAMINOPHEN 650 MG: 325 TABLET, FILM COATED ORAL at 00:55

## 2022-10-02 RX ADMIN — INSULIN DEGLUDEC INJECTION 26 UNITS: 100 INJECTION, SOLUTION SUBCUTANEOUS at 22:38

## 2022-10-02 RX ADMIN — HYDROMORPHONE HYDROCHLORIDE 4 MG: 2 TABLET ORAL at 06:09

## 2022-10-02 ASSESSMENT — ACTIVITIES OF DAILY LIVING (ADL)
ADLS_ACUITY_SCORE: 39

## 2022-10-02 NOTE — PROGRESS NOTES
Postop day 3 from completion left below the knee amputation.  Pain is well controlled.  Blood sugar still trending on the higher side.    Dressing not changed today.    No need to continue intravenous antibiotics.  Glycemic control is still less than ideal and significantly increases risk of surgical site complications including breakdown and infection.

## 2022-10-02 NOTE — PLAN OF CARE
Occupational Therapy: Orders received. Chart reviewed and discussed with care team, including IP PT. Per discussion, Pt demonstrates no concerns for ADLs and able to complete dressing and functional transfers (toilet) with no concerns. Pt plans on using wheelchair (as opposed to ambulating) upon discharge until appropriate for prosthetic.  All therapy needs are being met with IP PT. Will complete orders.

## 2022-10-02 NOTE — PLAN OF CARE
Physical Therapy Discharge Summary    Reason for therapy discharge:    All goals and outcomes met, no further needs identified.    Progress towards therapy goal(s). See goals on Care Plan in [facility] electronic health record for goal details.  Goals met    Therapy recommendation(s):    Assist x2 from sons to bump patient in wheelchair up 2 steps into house.  Significant other to provide Ax1 for IADLs as needed.

## 2022-10-02 NOTE — PROGRESS NOTES
[facility]    Hospitalist Progress Note    Interval History    Improving. No acute concerns  No cp, sob, nausea, vomiting, or diarrhea  Discussed additional blood transfusion for hgb of 7.1 but he has some crackles and is agreeable to wait for recheck. No cardiopulm complaints    -Data reviewed today: I reviewed all new labs and imaging results over the last 24 hours. I personally reviewed the MRI left foot and left ankle image(s) showing As reported below.    Physical Exam   Temp: 98.8  F (37.1  C) Temp src: Oral BP: (!) 152/92 Pulse: 95   Resp: 16 SpO2: 97 % O2 Device: None (Room air)    Vitals:    09/23/22 0038 10/01/22 0549   Weight: 113.4 kg (250 lb) 112.3 kg (247 lb 9.2 oz)     Vital Signs with Ranges  Temp:  [98.8  F (37.1  C)-100.1  F (37.8  C)] 98.8  F (37.1  C)  Pulse:  [] 95  Resp:  [15-16] 16  BP: (113-152)/(75-92) 152/92  SpO2:  [93 %-98 %] 97 %  I/O last 3 completed shifts:  In: 2170 [P.O.:1800]  Out: -     Physical Exam  Constitutional:       Appearance: Normal appearance.   HENT:      Head: Normocephalic.   Eyes:      Pupils: Pupils are equal, round, and reactive to light.   Cardiovascular:      Rate and Rhythm: Normal rate and regular rhythm.      Pulses: Normal pulses.      Heart sounds: Normal heart sounds.   Pulmonary:      Effort: Pulmonary effort is normal. No respiratory distress.      Breath sounds: Normal breath sounds.   Abdominal:      General: Abdomen is flat. Bowel sounds are normal. There is no distension.      Tenderness: There is no abdominal tenderness. There is no guarding.   Musculoskeletal:         General: Normal range of motion.      Cervical back: Normal range of motion.      Comments: Old right below-knee amputation.  Left below-knee amputation with wound VAC in place over the stump with bloody discharge.   Skin:     General: Skin is warm and dry.   Neurological:      General: No focal deficit present.   Psychiatric:         Mood and Affect:  Mood normal.           Medications       acetaminophen  650 mg Oral Q6H     aspirin  81 mg Oral Daily     ezetimibe  10 mg Oral Daily     ferrous sulfate  325 mg Oral BID w/meals     heparin ANTICOAGULANT  5,000 Units Subcutaneous Q8H     insulin aspart  1-7 Units Subcutaneous TID AC     insulin aspart  1-5 Units Subcutaneous At Bedtime     insulin degludec  26 Units Subcutaneous BID     ipratropium  2 spray Both Nostrils Q12H     ketorolac  1 drop Right Eye 4x Daily     [Held by provider] lisinopril  40 mg Oral Daily     polyethylene glycol  17 g Oral Daily     prednisoLONE acetate  1 drop Right Eye 4x Daily     senna-docusate  1 tablet Oral BID    Or     senna-docusate  2 tablet Oral BID     sodium chloride (PF)  3 mL Intracatheter Q8H       Data   Recent Labs   Lab 10/02/22  0748 10/02/22  0706 10/02/22  0229 10/01/22  1705 10/01/22  1229 10/01/22  0810 10/01/22  0744 09/30/22  1107 09/30/22  0859 09/30/22  0731 09/30/22  0708   WBC  --  10.0  --   --  12.1*  --   --   --  16.0*  --   --    HGB  --  7.1*  --   --  6.8*  --  7.1*  --  7.9*  --   --    MCV  --  85  --   --  82  --   --   --  83  --   --    PLT  --  297  --   --  269  --   --   --  301  --   --    NA  --  134  --   --   --   --  134  --   --   --  133   POTASSIUM  --  3.7  --   --   --   --  3.8  --   --   --  3.9   CHLORIDE  --  103  --   --   --   --  102  --   --   --  102   CO2  --  25  --   --   --   --  25  --   --   --  22   BUN  --  19  --   --   --   --  21  --   --   --  20   CR  --  1.50*  --   --   --   --  1.63*  --   --   --  1.68*   ANIONGAP  --  6  --   --   --   --  7  --   --   --  9   FERNANDO  --  8.7  --   --   --   --  8.6  --   --   --  8.5   * 224* 266*   < >  --    < > 266*   < >  --    < > 240*  240*    < > = values in this interval not displayed.       No results found for this or any previous visit (from the past 24 hour(s)).      Assessment & Plan      Ry Horner is a 57 year old male with past medical history  significant for type II DM, hypertension, dyslipidemia, CKD III, non-healing diabetic foot wounds with multiple prior amputations admitted on 9/23/2022 with left lower extremity wounds.      Multiple left lower extremity wounds   Hx of non-healing diabetic ulcers   S/p R BKA   S/p transmetatarsal amputation of the left foot   Leukocytosis   Pt has a hx of non-healing diabetic ulceration of the plantar aspect of th left foot. He has been following with wound clinic and podiatry for this. He went on a cruise to [place] and developed a bad infection of the ulceration that apparently spread up his leg to include his shin. He was ultimately hospitalized in [facility] and for several days on IV antibitotics (it is unclear what antibiotics he was on in the hospital). He needed multiple surgical debridements during this hospitalization. It is unclear how deep the infection is or if there is any bony involvement. It looks like one culture grew strep parasanguinosis. Ultimately [place] the [place] so was placed on PO ciprofloxacin to take while traveling and was instructed to come straight to the hospital after landing.   * Lower extremity wounds are pictured below.  Wound care consult placed.   * Vascular surgery consult.  KAREN of the left leg ordered.  Shows an elevated resting KAREN of 1.5 likely due to poorly compressible calcified arteries.  Waveform is biphasic.  Pressures could not be obtained due to overlying bandages.   * Podiatry consult .  MRI left foot and ankle obtained.  Shows underlying osteomyelitis and osteitis with early abscess on the dorsum of the foot.  Please review image report above.  Podiatry recommended below-knee amputation based on MRI results.  This would be performed by vascular surgery.   * underwent initial guillotine amputation on 9/27  * started on PCA for better pain control on 9/28  Plan  - heparin for dvt ppx  - PT and OT  - vascular surgery following  - discharge in next day or so  pending safe discharge plan. We are awaiting therapy and care coordination work    SIRS postoperative  Fever and tachycardia on 9/30  EBL of 800 per the Op report  Denies respiratory complaints, feels well except diaphoresis, no chest pain or sob  Suspect due to postoperative state, if further symptoms develop will broaden workup  Plan  - resolving       Type II DM  Diabetic polyneuropathy and diabetic nephropathy   Hgb A1C was 7.7% in April 2022   - MDSSI .  Carb controlled diet. Continue home triseba, he tends to dictate the dosing he will take, it appears due to fear of hypoglycemia.   - Hold jardiance, januvia, and metformin for now      JOHNATHAN on CKD stage III   Baseline creatinine appears to be around 1.7. Creatinine on admission is 2.33 with BUN of 48 and GFR of 32. Pt reports not drinking much water throughout the course of his flight.   -resolved with treatment     HTN   -Blood pressure improved.  Restart lisinopril.     Dyslipidemia   - Continue PTA ASA, Ezetimibe. Not on statin due to allergy.  -Has history of hypertriglyceridemia.  His last triglycerides were at 489.  Recheck improved at 164     Chronic Anemia   Acute Blood Loss Anemia  Hemoglobin on admission is 10.1. This is down slightly from his baseline of around 12.   - repeat hgb this afternoon, transfuse if < 7     Hypoalbuminemia: Albumin = 2.3 g/dL (Ref range: 3.4 - 5.0 g/dL) on admission, will monitor as appropriate.    Obesity: Estimated body mass index is 33.91 kg/m  as calculated from the following:    Height as of this encounter: 1.829 m (6').    Weight as of this encounter: 113.4 kg (250 lb).        Diet:diabetic diet    DVT Prophylaxis: Heparin SQ  Corrales Catheter: Not present  Central Lines: None  Cardiac Monitoring: None  Code Status: Full Code       Ry Ojeda DO  Hospitalist [facility]  Pager: [phone]

## 2022-10-02 NOTE — PLAN OF CARE
A&Ox4. Pain controlled w/ PRN Dilaudid PO. HGB 7.1 this am, recheck 7.3 this afternoon. BG stable, appetite good. CMS intact. Dressing to RLE cdi. Independent with transfers. Likely discharge home tomorrow pending HGB stability, and wheelchair.

## 2022-10-02 NOTE — PROGRESS NOTES
Alert and oriented X4. Left BKA r/t osteomyelitis.POD 2.  Previous history of Right BKA. Wears prosthetics on the right stump for ambulation. Hgb was at 6.9. Transfused a unit of blood. Continues to receive frequent Dilaudid for pain. Blood glucose in the 200s. Iv ABX will transition over to oral abx by tomorrow.

## 2022-10-02 NOTE — PROGRESS NOTES
10/02/22 0815   Quick Adds   Type of Visit Initial PT Evaluation   Living Environment   People in Home spouse   Current Living Arrangements house   Home Accessibility stairs to enter home   Number of Stairs, Main Entrance 4;3  (2 sets of 2 steps or 3 steps to enter)   Transportation Anticipated family or friend will provide   Living Environment Comments Patient will be able to stay on main level once inside the house. His sons were able to bump him up the steps in a wheelchair after his last amputation.   Self-Care   Usual Activity Tolerance good   Regular Exercise No   Equipment Currently Used at Home prosthesis;walker, rolling;shower chair;grab bar, tub/shower;grab bar, toilet  (R BKA prosthesis)   Fall history within last six months no   Activity/Exercise/Self-Care Comment Patient was modified independent with mobilty prior to admission using R prosthesis   General Information   Onset of Illness/Injury or Date of Surgery 09/29/22  (DOS)   Referring Physician Ry Ojeda, DO   Patient/Family Therapy Goals Statement (PT) to go home today or tomorow   Pertinent History of Current Problem (include personal factors and/or comorbidities that impact the POC) Patient is 56 YO who presented to the hospital on 9/23/22 with L LE wounds. He was diagnosed diabetic foot infections and limb was felt to be unsalvageable after multiple debridements. He is s/p L transtibial guillotine  amputation on 9/27/22 with formalization of L BKA on 9/29/22. PMH Including R BKA, DM2 with polyneuropathy and nephropathy, CKD Stage III, HTN, and obesity.   Existing Precautions/Restrictions fall  (KI on L LE)   Weight-Bearing Status - LLE nonweight-bearing  (with KI)   General Observations Patient in bed with charge RALPH Villafuerte at bedside. Patient agreeable to PT.   Cognition   Affect/Mental Status (Cognition) WFL   Orientation Status (Cognition) oriented x 4   Follows Commands (Cognition) WFL   Pain Assessment   Patient Currently in  Pain Yes, see Vital Sign flowsheet   Posture    Posture Not impaired   Range of Motion (ROM)   Range of Motion ROM is WFL   ROM Comment L knee not assessed due to KI in place   Strength (Manual Muscle Testing)   Strength (Manual Muscle Testing) strength is WFL   Bed Mobility   Bed Mobility no deficits identified   Transfers   Transfers wheelchair transfer   Wheelchair Transfer   Jay Level (Wheelchair Transfer) supervision   Type (Wheelchair Transfer) squat pivot   Assistive Device (Wheelchair Transfer) wheelchair   Gait/Stairs (Locomotion)   Comment, (Gait/Stairs) Deferrred ambulation as patient does not intend to attempt this until he is fit for his L prosthesis   Balance   Balance Comments Independent sitting balance   Sensory Examination   Sensory Perception patient reports no sensory changes   Clinical Impression   Criteria for Skilled Therapeutic Intervention Yes, treatment indicated   PT Diagnosis (PT) Impaired mobility   Influenced by the following impairments new L BKA   Functional limitations due to impairments Increased difficulty with transfers and ambulation   Clinical Presentation (PT Evaluation Complexity) Stable/Uncomplicated   Clinical Presentation Rationale Clinical judgement, controlled pain   Clinical Decision Making (Complexity) low complexity   Planned Therapy Interventions (PT) home exercise program;wheelchair management/propulsion training;progressive activity/exercise;patient/family education;transfer training   Anticipated Equipment Needs at Discharge (PT) wheelchair   Risk & Benefits of therapy have been explained evaluation/treatment results reviewed;care plan/treatment goals reviewed;risks/benefits reviewed;current/potential barriers reviewed;participants voiced agreement with care plan;participants included;patient   PT Discharge Planning   PT Discharge Recommendation (DC Rec) home with assist   PT Rationale for DC Rec Patient is below baseline level of independence due to new L  BKA in addition to his prior R BKA. Patient has excellent home set up from prior amputations and strong family support to return home. He demonstrates excellent technique with pivot transfers to a wheelchair. Patient with adequate mobility to return home at w/c level mobility with spouse to assist with IADLs and sons to provide Ax2 to bump patient into house with wheelchair. Patient looking to have ramped entrance built as well for improved access in/out of home. Patient will need a wheelchair prior to discharging home (elevating leg rests, removeable arms). No further acute PT needs.   Total Evaluation Time   Total Evaluation Time (Minutes) 12   Physical Therapy Goals   PT Frequency One time eval and treatment only   PT Predicted Duration/Target Date for Goal Attainment 10/02/22   PT Goals Transfers;Stairs;Wheelchair Mobility   PT: Transfers Modified independent;Bed to/from chair;Goal Met;Completed   PT: Stairs Goal Met;Completed  (Patient will verbalize safe technique for navigating stairs to enter his home.)   PT: Wheelchair Mobility manual wheelchair;150 feet;Caregiver SBA;Goal Met;Completed

## 2022-10-02 NOTE — PROGRESS NOTES
Today is POD #3 of left BKA, patient reports intact sensation. Stump wrapped with ace and has immobilizer on. Persistent pain treated with 4 mg oral dilaudid with good relief. Continues on antibiotic every 6 hours, waiting to ask providers why therapy has not stopped yet. Said he was told antibiotic therapy will stop 48 hrs after procedure. Overnight blood glucose was 274, no intervention needed. Up to bedside commode at the start of NOC, had a bowel movement. Otherwise voiding adequately in urinal. Also declined senna, said he didn't need it. Hemoglobin recheck scheduled for this morning. Patient had crackles after blood transfusion but no respiratory symptoms.

## 2022-10-03 ENCOUNTER — TELEPHONE (OUTPATIENT)
Dept: OTHER | Facility: CLINIC | Age: 57
End: 2022-10-03

## 2022-10-03 VITALS
TEMPERATURE: 98.7 F | DIASTOLIC BLOOD PRESSURE: 89 MMHG | RESPIRATION RATE: 18 BRPM | HEIGHT: 72 IN | SYSTOLIC BLOOD PRESSURE: 144 MMHG | WEIGHT: 242.51 LBS | BODY MASS INDEX: 32.85 KG/M2 | OXYGEN SATURATION: 98 % | HEART RATE: 112 BPM

## 2022-10-03 LAB
ERYTHROCYTE [DISTWIDTH] IN BLOOD BY AUTOMATED COUNT: 13.4 % (ref 10–15)
GLUCOSE BLDC GLUCOMTR-MCNC: 128 MG/DL (ref 70–99)
GLUCOSE BLDC GLUCOMTR-MCNC: 187 MG/DL (ref 70–99)
GLUCOSE BLDC GLUCOMTR-MCNC: 192 MG/DL (ref 70–99)
HCT VFR BLD AUTO: 23.2 % (ref 40–53)
HGB BLD-MCNC: 7.6 G/DL (ref 13.3–17.7)
MCH RBC QN AUTO: 27 PG (ref 26.5–33)
MCHC RBC AUTO-ENTMCNC: 32.8 G/DL (ref 31.5–36.5)
MCV RBC AUTO: 82 FL (ref 78–100)
PLATELET # BLD AUTO: 420 10E3/UL (ref 150–450)
RBC # BLD AUTO: 2.82 10E6/UL (ref 4.4–5.9)
WBC # BLD AUTO: 9.1 10E3/UL (ref 4–11)

## 2022-10-03 PROCEDURE — 99239 HOSP IP/OBS DSCHRG MGMT >30: CPT | Performed by: HOSPITALIST

## 2022-10-03 PROCEDURE — 250N000013 HC RX MED GY IP 250 OP 250 PS 637: Performed by: INTERNAL MEDICINE

## 2022-10-03 PROCEDURE — 250N000011 HC RX IP 250 OP 636: Performed by: SURGERY

## 2022-10-03 PROCEDURE — 250N000013 HC RX MED GY IP 250 OP 250 PS 637: Performed by: SURGERY

## 2022-10-03 PROCEDURE — 999N000198 HC STATISTIC WOC PT EDUCATION, 16-30 MIN

## 2022-10-03 PROCEDURE — 250N000013 HC RX MED GY IP 250 OP 250 PS 637: Performed by: HOSPITALIST

## 2022-10-03 PROCEDURE — 85027 COMPLETE CBC AUTOMATED: CPT | Performed by: HOSPITALIST

## 2022-10-03 PROCEDURE — 36415 COLL VENOUS BLD VENIPUNCTURE: CPT | Performed by: HOSPITALIST

## 2022-10-03 RX ORDER — POLYETHYLENE GLYCOL 3350 17 G/17G
17 POWDER, FOR SOLUTION ORAL DAILY
Qty: 510 G | Refills: 0 | Status: SHIPPED | OUTPATIENT
Start: 2022-10-03 | End: 2022-10-03

## 2022-10-03 RX ORDER — FERROUS SULFATE 325(65) MG
325 TABLET ORAL 2 TIMES DAILY WITH MEALS
Qty: 120 TABLET | Refills: 0 | Status: SHIPPED | OUTPATIENT
Start: 2022-10-03 | End: 2022-10-03

## 2022-10-03 RX ORDER — OXYCODONE HYDROCHLORIDE 5 MG/1
5-10 TABLET ORAL EVERY 4 HOURS PRN
Qty: 60 TABLET | Refills: 0 | Status: SHIPPED | OUTPATIENT
Start: 2022-10-03 | End: 2022-11-09

## 2022-10-03 RX ORDER — ACETAMINOPHEN 325 MG/1
650 TABLET ORAL EVERY 6 HOURS
Qty: 240 TABLET | Refills: 0 | Status: SHIPPED | OUTPATIENT
Start: 2022-10-03 | End: 2023-06-24

## 2022-10-03 RX ORDER — SITAGLIPTIN 50 MG/1
TABLET, FILM COATED ORAL
Qty: 90 TABLET | Refills: 0 | Status: SHIPPED | OUTPATIENT
Start: 2022-10-03 | End: 2022-12-22

## 2022-10-03 RX ORDER — FERROUS SULFATE 325(65) MG
325 TABLET ORAL 2 TIMES DAILY WITH MEALS
Qty: 120 TABLET | Refills: 0 | Status: SHIPPED | OUTPATIENT
Start: 2022-10-03 | End: 2023-06-24

## 2022-10-03 RX ORDER — ACETAMINOPHEN 325 MG/1
650 TABLET ORAL EVERY 6 HOURS
Qty: 240 TABLET | Refills: 0 | Status: SHIPPED | OUTPATIENT
Start: 2022-10-03 | End: 2022-10-03

## 2022-10-03 RX ORDER — POLYETHYLENE GLYCOL 3350 17 G/17G
17 POWDER, FOR SOLUTION ORAL DAILY
Qty: 510 G | Refills: 0 | Status: SHIPPED | OUTPATIENT
Start: 2022-10-03 | End: 2023-06-24

## 2022-10-03 RX ADMIN — INSULIN ASPART 1 UNITS: 100 INJECTION, SOLUTION INTRAVENOUS; SUBCUTANEOUS at 12:05

## 2022-10-03 RX ADMIN — ASPIRIN 81 MG CHEWABLE TABLET 81 MG: 81 TABLET CHEWABLE at 08:08

## 2022-10-03 RX ADMIN — HEPARIN SODIUM 5000 UNITS: 5000 INJECTION, SOLUTION INTRAVENOUS; SUBCUTANEOUS at 06:35

## 2022-10-03 RX ADMIN — FERROUS SULFATE TAB 325 MG (65 MG ELEMENTAL FE) 325 MG: 325 (65 FE) TAB at 08:08

## 2022-10-03 RX ADMIN — EZETIMIBE 10 MG: 10 TABLET ORAL at 08:08

## 2022-10-03 RX ADMIN — ACETAMINOPHEN 650 MG: 325 TABLET, FILM COATED ORAL at 08:08

## 2022-10-03 RX ADMIN — OXYCODONE HYDROCHLORIDE 10 MG: 5 TABLET ORAL at 02:04

## 2022-10-03 RX ADMIN — ACETAMINOPHEN 650 MG: 325 TABLET, FILM COATED ORAL at 14:45

## 2022-10-03 RX ADMIN — INSULIN DEGLUDEC INJECTION 26 UNITS: 100 INJECTION, SOLUTION SUBCUTANEOUS at 08:10

## 2022-10-03 RX ADMIN — ACETAMINOPHEN 650 MG: 325 TABLET, FILM COATED ORAL at 01:58

## 2022-10-03 ASSESSMENT — ACTIVITIES OF DAILY LIVING (ADL)
ADLS_ACUITY_SCORE: 39

## 2022-10-03 NOTE — PROGRESS NOTES
[facility]    Hospitalist Progress Note    Interval History    Stable for discharge, pending home equipment and finalized wound plan. WOC consulted for home wound nurse recommendations.    -Data reviewed today: I reviewed all new labs and imaging results over the last 24 hours. I personally reviewed the MRI left foot and left ankle image(s) showing As reported below.    Physical Exam   Temp: 98.7  F (37.1  C) Temp src: Oral BP: (!) 144/89 Pulse: 112   Resp: 18 SpO2: 98 % O2 Device: None (Room air)    Vitals:    09/23/22 0038 10/01/22 0549 10/03/22 0700   Weight: 113.4 kg (250 lb) 112.3 kg (247 lb 9.2 oz) 110 kg (242 lb 8.1 oz)     Vital Signs with Ranges  Temp:  [98.4  F (36.9  C)-99.2  F (37.3  C)] 98.7  F (37.1  C)  Pulse:  [] 112  Resp:  [16-18] 18  BP: (144-154)/(89-98) 144/89  SpO2:  [97 %-98 %] 98 %  I/O last 3 completed shifts:  In: -   Out: 850 [Urine:850]    Physical Exam  Constitutional:       Appearance: Normal appearance.   HENT:      Head: Normocephalic.   Eyes:      Pupils: Pupils are equal, round, and reactive to light.   Cardiovascular:      Rate and Rhythm: Normal rate and regular rhythm.      Pulses: Normal pulses.      Heart sounds: Normal heart sounds.   Pulmonary:      Effort: Pulmonary effort is normal. No respiratory distress.      Breath sounds: Normal breath sounds.   Abdominal:      General: Abdomen is flat. Bowel sounds are normal. There is no distension.      Tenderness: There is no abdominal tenderness. There is no guarding.   Musculoskeletal:         General: Normal range of motion.      Cervical back: Normal range of motion.      Comments: Old right below-knee amputation.  Left below-knee amputation dressed   Skin:     General: Skin is warm and dry.   Neurological:      General: No focal deficit present.   Psychiatric:         Mood and Affect: Mood normal.           Medications       acetaminophen  650 mg Oral Q6H     aspirin  81 mg Oral Daily      ezetimibe  10 mg Oral Daily     ferrous sulfate  325 mg Oral BID w/meals     heparin ANTICOAGULANT  5,000 Units Subcutaneous Q8H     insulin aspart  1-7 Units Subcutaneous TID AC     insulin aspart  1-5 Units Subcutaneous At Bedtime     insulin degludec  26 Units Subcutaneous BID     ipratropium  2 spray Both Nostrils Q12H     ketorolac  1 drop Right Eye 4x Daily     [Held by provider] lisinopril  40 mg Oral Daily     polyethylene glycol  17 g Oral Daily     prednisoLONE acetate  1 drop Right Eye 4x Daily     senna-docusate  1 tablet Oral BID    Or     senna-docusate  2 tablet Oral BID     sodium chloride (PF)  3 mL Intracatheter Q8H       Data   Recent Labs   Lab 10/03/22  1203 10/03/22  0727 10/03/22  0642 10/02/22  2211 10/02/22  1941 10/02/22  1529 10/02/22  0748 10/02/22  0706 10/01/22  1705 10/01/22  1229 10/01/22  0810 10/01/22  0744 09/30/22  0731 09/30/22  0708   WBC  --  9.1  --   --   --   --   --  10.0  --  12.1*  --   --    < >  --    HGB  --  7.6*  --   --   --  7.3*  --  7.1*  --  6.8*  --  7.1*   < >  --    MCV  --  82  --   --   --   --   --  85  --  82  --   --    < >  --    PLT  --  420  --   --   --   --   --  297  --  269  --   --    < >  --    NA  --   --   --   --   --   --   --  134  --   --   --  134  --  133   POTASSIUM  --   --   --   --   --   --   --  3.7  --   --   --  3.8  --  3.9   CHLORIDE  --   --   --   --   --   --   --  103  --   --   --  102  --  102   CO2  --   --   --   --   --   --   --  25  --   --   --  25  --  22   BUN  --   --   --   --   --   --   --  19  --   --   --  21  --  20   CR  --   --   --   --   --   --   --  1.50*  --   --   --  1.63*  --  1.68*   ANIONGAP  --   --   --   --   --   --   --  6  --   --   --  7  --  9   FERNANDO  --   --   --   --   --   --   --  8.7  --   --   --  8.6  --  8.5   *  --  128* 229*   < >  --    < > 224*   < >  --    < > 266*   < > 240*  240*    < > = values in this interval not displayed.       No results found for this or any  previous visit (from the past 24 hour(s)).      Assessment & Plan      Ry Horner is a 57 year old male with past medical history significant for type II DM, hypertension, dyslipidemia, CKD III, non-healing diabetic foot wounds with multiple prior amputations admitted on 9/23/2022 with left lower extremity wounds.      Multiple left lower extremity wounds   Hx of non-healing diabetic ulcers   S/p R BKA   S/p transmetatarsal amputation of the left foot   Leukocytosis   Pt has a hx of non-healing diabetic ulceration of the plantar aspect of th left foot. He has been following with wound clinic and podiatry for this. He went [place] and developed a bad infection of the ulceration that apparently spread up his leg to include his shin. He was ultimately hospitalized in [place] and for several days on IV antibitotics (it is unclear what antibiotics he was on in the hospital). He needed multiple surgical debridements during this hospitalization. It is unclear how deep the infection is or if there is any bony involvement. It looks like one culture grew strep parasanguinosis. Ultimately [place] wanted [place] so was placed on PO ciprofloxacin to take while traveling and was instructed to come straight to the hospital after landing.   * Lower extremity wounds are pictured below.  Wound care consult placed.   * Vascular surgery consult.  KAREN of the left leg ordered.  Shows an elevated resting KAREN of 1.5 likely due to poorly compressible calcified arteries.  Waveform is biphasic.  Pressures could not be obtained due to overlying bandages.   * Podiatry consult .  MRI left foot and ankle obtained.  Shows underlying osteomyelitis and osteitis with early abscess on the dorsum of the foot.  Please review image report above.  Podiatry recommended below-knee amputation based on MRI results.  This would be performed by vascular surgery.   * underwent initial guillotine amputation on 9/27  * started on PCA for  better pain control on 9/28  Plan  - heparin for dvt ppx  - PT and OT, declines home care  - vascular surgery following  - discharge pending finalized equipment needs and wound care recommendations    SIRS postoperative  Fever and tachycardia on 9/30  EBL of 800 per the Op report  Denies respiratory complaints, feels well except diaphoresis, no chest pain or sob  Suspect due to postoperative state, if further symptoms develop will broaden workup  Plan  - resolving       Type II DM  Diabetic polyneuropathy and diabetic nephropathy   Hgb A1C was 7.7% in April 2022   - MDSSI .  Carb controlled diet. Continue home triseba, he tends to dictate the dosing he will take, it appears due to fear of hypoglycemia.   - Hold jardiance, januvia, and metformin for now      JOHNATHAN on CKD stage III   Baseline creatinine appears to be around 1.7. Creatinine on admission is 2.33 with BUN of 48 and GFR of 32. Pt reports not drinking much water throughout the course of his flight.   -resolved with treatment     HTN   -Blood pressure improved.  Restart lisinopril.     Dyslipidemia   - Continue PTA ASA, Ezetimibe. Not on statin due to allergy.  -Has history of hypertriglyceridemia.  His last triglycerides were at 489.  Recheck improved at 164     Chronic Anemia   Acute Blood Loss Anemia  Hemoglobin on admission is 10.1. This is down slightly from his baseline of around 12.   - repeat hgb this afternoon, transfuse if < 7     Hypoalbuminemia: Albumin = 2.3 g/dL (Ref range: 3.4 - 5.0 g/dL) on admission, will monitor as appropriate.    Obesity: Estimated body mass index is 33.91 kg/m  as calculated from the following:    Height as of this encounter: 1.829 m (6').    Weight as of this encounter: 113.4 kg (250 lb).        Diet:diabetic diet    DVT Prophylaxis: Heparin SQ  Corrales Catheter: Not present  Central Lines: None  Cardiac Monitoring: None  Code Status: Full Code       Ry Ojeda DO  Hospitalist [facility]  Pager: [phone]

## 2022-10-03 NOTE — TELEPHONE ENCOUNTER
----- Message from Neal Blackman MD sent at 10/3/2022 12:40 PM CDT -----  Regarding: follow up  Please follow up in 2 weeks for wound check  Ashutosh     Routing to  to coordinate 2 week in clinic OV f/u from 10/3/22 with Dr. Blackman.     Appt note: 2 wk f/u for wound check. S/p left below knee amputation.     MILLY BowersN, RN  [facility]  Office:  [phone] Fax: [phone]

## 2022-10-03 NOTE — TELEPHONE ENCOUNTER
[facility] VASCULAR [facility]    Who is the name of the provider?:  Yehuda    What is the location you see this provider at/preferred location?: [place]  Person calling: Ry  Phone number:  [phone]  Nurse call back needed:  NO     Reason for call:   Patient was not aware of the 11/14/22 appointment.  He understood there should be a 4-week appointment.      Please review and advise if he should be seen on 11/14/22 or on another date.    A clinic scheduler can call him back.    Pharmacy location:    Outside Imaging: Not Applicable   Can we leave a detailed message on this number?  YES

## 2022-10-03 NOTE — TELEPHONE ENCOUNTER
Pt needs to be seen on 10/17/22 as the previous tele encounter today shows.   Recommending pt leave the 11/14/22 OV as scheduled, if that appt needs to be changed after the 10/17/22 OV with Dr. Blackman then we can do so at the next visit.     MILLY BowersN, RN  [facility]  Office:  [phone] Fax: [phone]

## 2022-10-03 NOTE — CONSULTS
WOC consult for left BKA:      Pt s/p left BKA 9-29-22 Dr. Child.  Site redressed by Fellow today 10/3 and is clean/dry/intact, dressing not taken down by WOC.  WOC spoke with pt and nurse; pt looking for further advice on cleansing and dressing wound, especially with the small amount of blistering near incision.  Nursing placing call to Vascular Surgery to clarify if they want to place their own wound care orders or defer to WOC, as WOC consult came from Hospitalist and not Surgery team.     10-3-22 left BKA - photo taken by Vascular Surgery      Plan: Defer to Vascular Surgery for wound cares.  If no specific orders from VS, or if they defer to WOC, would recommend cleansing area with Vashe, allow to air dry fully, apply a layer of Xeroform over the incision and blistered area, then cover with gauze/ABD and secure with roll gauze, change every other day and prn.     [facility] office phone: [phone]  [facility] dept pager: [phone]

## 2022-10-03 NOTE — PROGRESS NOTES
"Care Management Follow Up    Length of Stay (days): 10  Expected Discharge Date: 10/03/2022  Concerns to be Addressed: discharge planning     Patient plan of care discussed at interdisciplinary rounds: Yes  Anticipated Discharge Disposition: Outpatient Rehab (PT, OT, SLP, Cardiac or Pulmonary)  Anticipated Discharge Services: None  Anticipated Discharge DME: Wheelchair  Patient/family educated on Medicare website which has current facility and service quality ratings: no  Education Provided on the Discharge Plan:    Patient/Family in Agreement with the Plan: yes  Referrals Placed by PARVIZ/XOCHITL:  [employer]  Private pay costs discussed: N/A    Additional Information:  PARVIZ-RN reached out to [employer] to request order forms for standard wheelchair. Pt has established account w/[employer] from 2019 for wound supplies. Urgent request sent to [employer]'s  (QA) team for order documents. Cm [employer] rep, reports the \"first working day of the month\" is their busiest day (up to 800 inquiries) and he cannot guarantee we will hear back or receive order forms today.     CM to reach out tomorrow for update on order forms.       12:08 PM addendum:   Met pt at bedside to provide update as he was under the impression he would be discharging today. Education provided on DME ordering process and length of time typically required (excluding weekends). Pt expressed frustration and strong desire to return home today. Pt asked if insurance would reimburse if he paid for w/c with credit card; advised he reach out to insurance company with that request. Home care offered again; pt continues to decline, as he prefers to either do own wound care or go to clinic.   Order documents received from Carmela at [facility]. Medical records, signed order form, and MD medical necessity note faxed to [phone].      Sole Dewitt, RN, BSN, PHN, CMSRN  Care Coordination  [facility]         "

## 2022-10-03 NOTE — PROVIDER NOTIFICATION
MD Notification    Notified Person: MD    Notified Person Name:    Notification Date/Time:    Notification Interaction:    Purpose of Notification:  Pt is concerned that he is reacting to PO Dilaudid, states he has been sweating & feeling warm after each dose. temp was normal, but clothing soaked. Can patient try something else for pain?    Orders Received:    Comments:

## 2022-10-03 NOTE — PLAN OF CARE
Goal Outcome Evaluation:     Shift Summary 1900-0700     Admitting Diagnosis: Cellulitis of left lower extremity [L03.116]  Diabetic ulcer of left foot associated with type 2 diabetes mellitus, with muscle involvement without evidence of necrosis, unspecified part of foot (H) [E11.621, L97.525]   Vitals stable on RA   Pain managed with PRN PO Dilaudid but pt claims he was sweating after taking it, so now oxycodone is ordered for pain management   A&Ox4  Voiding Urinal   Mobility Ind- A1  CMS intact   Dressing CDI, immobilizer on      Reg diet

## 2022-10-03 NOTE — TELEPHONE ENCOUNTER
"Routing refill request to provider for review/approval because:  Januvia: Labs not current/out of range:    Creatinine   Date Value Ref Range Status   10/02/2022 1.50 (H) 0.66 - 1.25 mg/dL Final   06/25/2021 1.51 (H) 0.66 - 1.25 mg/dL Final     Metformin discontinued at discharge on 10/3/22: \"- stop metformin on discharge given the CKD\"    Please review pended medications.      Thank you,  Gucci Ramirez RN  "

## 2022-10-03 NOTE — PROGRESS NOTES
Pt motivated to discharge. He had significant other purchase a wheelchair and bring it in in order to expedite things.

## 2022-10-03 NOTE — PROGRESS NOTES
VASCULAR SURGERY PROGRESS NOTE    Mr. Horner is a 58yo male with left lower extremity wounds now POD4 s/p staged BKA.    NAEON. Pain controlled. Tolerating diet.     Vitals reviewed.    On exam, he is resting comfortably. Knee immobilizer in place. Dressing changed and c/d/i. Skin viable and well approximated.          Labs reviewed. Hgb 7.6 from 7.3.    Mr. Horner is a 58yo male with left lower extremity wounds now POD4 s/p staged BKA, recovering well.    - PT/OT  - keep knee immobilizer in place, okay to remove for short periods for comfort  - every other day dressing changes  - follow up scheduled on November 14 with Dr. Yehuda tristan to discharge from vascular surgery standpoint    Aimee Peterson MD  10/03/22  10:00 AM

## 2022-10-03 NOTE — PLAN OF CARE
Goal Outcome Evaluation:      Pt. Discharge today at 1700. Oxy script given to pt. Discharge instructions explained. Pt verbalized understanding. Pt received wound care equipment from [facility] nurse. Pt reported having all belongings.

## 2022-10-03 NOTE — PROGRESS NOTES
Heavy duty manual wheelchair medically necessity    Need: new bka on the left with history of prior bka (below knee amputation on the right). Currently has prosthesis on the right lower elg  Alternate: unable to compensate with just walker/cane given he has 2 below knee amputations  Use: the patients home provides access, maneuvering space and surfaces and the patient can self propel and has good  Family assistance to use a wheelchair  Benefit: he will benefit from using his wheelchair or else would be confined to very short distances around his bed  Heavy duty: he weighs around to 250 lbs  Accessories: he needs height adjustable arms given he will spend multiple hours in a day and will need adjustable arms to ensure safe transfer to whatever he was transferring to.    ICD 9 codes  S88.112A  Z89.511  E11.628    Ry Ojeda DO  Hospitalist [facility]  Pager: [phone]

## 2022-10-03 NOTE — DISCHARGE SUMMARY
[facility]  Hospitalist Discharge Summary      Date of Admission:  9/23/2022  Date of Discharge:  10/3/2022  Discharging Provider: Ry Ojeda DO  Discharge Service: Hospitalist Service    Discharge Diagnoses   Multiple left lower extremity wounds   Hx of non-healing diabetic ulcers   S/p R BKA   S/p transmetatarsal amputation of the left foot   Type II DM  Diabetic polyneuropathy and diabetic nephropathy   JOHNATHAN on CKD stage III     Follow-ups Needed After Discharge   Follow-up Appointments     Follow-up and recommended labs and tests       Follow up with primary care provider, Kody Wing, within 7 days to   evaluate medication change and for hospital follow- up.  Repeat cbc   recommended    Follow-up with vascular surgery clinic in 1 month    November 14 with Dr. Blackman             Unresulted Labs Ordered in the Past 30 Days of this Admission     Date and Time Order Name Status Description    10/2/2022  8:15 AM Prepare red blood cells (unit) Preliminary     9/29/2022  3:44 PM Surgical Pathology Exam In process       These results will be followed up by PCP    Discharge Disposition   Discharged to home  Condition at discharge: Stable      Hospital Course   Multiple left lower extremity wounds   Hx of non-healing diabetic ulcers   S/p R BKA   S/p transmetatarsal amputation of the left foot   Leukocytosis   Pt has a hx of non-healing diabetic ulceration of the plantar aspect of th left foot. He has been following with wound clinic and podiatry for this. He went on a cruise to [facility] and developed a bad infection of the ulceration that apparently spread up his leg to include his shin. He was ultimately hospitalized in [place] and for several days on IV antibitotics (it is unclear what antibiotics he was on in the hospital). He needed multiple surgical debridements during this hospitalization. It is unclear how deep the infection is or if there is any bony involvement. It looks like  one culture grew strep parasanguinosis. Ultimately he wanted to come back to the [place] so was placed on PO ciprofloxacin to take while traveling and was instructed to come straight to the hospital after landing.   * Lower extremity wounds are pictured below.  Wound care consult placed.   * Vascular surgery consult.  KAREN of the left leg ordered.  Shows an elevated resting KAREN of 1.5 likely due to poorly compressible calcified arteries.  Waveform is biphasic.  Pressures could not be obtained due to overlying bandages.   * Podiatry consult .  MRI left foot and ankle obtained.  Shows underlying osteomyelitis and osteitis with early abscess on the dorsum of the foot.  Please review image report above.  Podiatry recommended below-knee amputation based on MRI results.  This would be performed by vascular surgery.   * underwent initial guillotine amputation on 9/27  * started on PCA for better pain control on 9/28  Plan  - PT and OT, declines home care  - vascular surgery follow up in 1 month  - wound cares on discharge  - wheelchair     SIRS postoperative, noninfectious  Fever and tachycardia on 9/30  EBL of 800 per the Op report  Denies respiratory complaints, feels well except diaphoresis, no chest pain or sob  Suspect due to postoperative state, if further symptoms develop will broaden workup  Plan  - resolvied        Type II DM  Diabetic polyneuropathy and diabetic nephropathy   Hgb A1C was 7.7% in April 2022   - MDSSI .  Carb controlled diet. Continue home triseba, he tends to dictate the dosing he will take, it appears due to fear of hypoglycemia.   - resume jardiance, januvia  - stop metformin on discharge given the CKD     JOHNATHAN on CKD stage III   Baseline creatinine appears to be around 1.7. Creatinine on admission is 2.33 with BUN of 48 and GFR of 32. Pt reports not drinking much water throughout the course of his flight.   -resolved with treatment     HTN   -Blood pressure improved.  hold lisinopril pending  outpatient fu     Dyslipidemia   - Continue PTA ASA, Ezetimibe. Not on statin due to allergy.  -Has history of hypertriglyceridemia.  His last triglycerides were at 489.  Recheck improved at 164     Chronic Anemia   Acute Blood Loss Anemia  Hemoglobin on admission is 10.1. This is down slightly from his baseline of around 12.   - stable, on oral iron replacement     Hypoalbuminemia: Albumin = 2.3 g/dL (Ref range: 3.4 - 5.0 g/dL) on admission, will monitor as appropriate.     Obesity: Estimated body mass index is 33.91 kg/m  as calculated from the following:    Height as of this encounter: 1.829 m (6').    Weight as of this encounter: 113.4 kg (250 lb).       Consultations This Hospital Stay   PHARMACY TO DOSE VANCO  VASCULAR SURGERY IP CONSULT  PODIATRY IP CONSULT  INFECTIOUS DISEASES IP CONSULT  WOUND OSTOMY CONTINENCE NURSE  IP CONSULT  VASCULAR ACCESS ADULT IP CONSULT  CARE MANAGEMENT / SOCIAL WORK IP CONSULT  SMOKING CESSATION PROGRAM IP CONSULT  VASCULAR ACCESS ADULT IP CONSULT  VASCULAR ACCESS ADULT IP CONSULT  PHYSICAL THERAPY ADULT IP CONSULT  OCCUPATIONAL THERAPY ADULT IP CONSULT  CARE MANAGEMENT / SOCIAL WORK IP CONSULT  WOUND OSTOMY CONTINENCE NURSE  IP CONSULT    Code Status   Full Code    Time Spent on this Encounter   I, Ry Ojeda DO, personally saw the patient today and spent greater than 30 minutes discharging this patient.       Ry Ojeda DO  [facility]  [address] [address]  Phone: [phone]  Fax: [phone]  ______________________________________________________________________    Physical Exam   Vital Signs: Temp: 98.7  F (37.1  C) Temp src: Oral BP: (!) 144/89 Pulse: 112   Resp: 18 SpO2: 98 % O2 Device: None (Room air)    Weight: 242 lbs 8.1 oz    Constitutional:       Appearance: Normal appearance.   HENT:      Head: Normocephalic.   Eyes:      Pupils: Pupils are equal, round, and reactive to light.    Cardiovascular:      Rate and Rhythm: Normal rate and regular rhythm.      Pulses: Normal pulses.      Heart sounds: Normal heart sounds.   Pulmonary:      Effort: Pulmonary effort is normal. No respiratory distress.      Breath sounds: Normal breath sounds.   Abdominal:      General: Abdomen is flat. Bowel sounds are normal. There is no distension.      Tenderness: There is no abdominal tenderness. There is no guarding.   Musculoskeletal:         General: Normal range of motion.      Cervical back: Normal range of motion.      Comments: Old right below-knee amputation.  Left below-knee amputation dressed   Skin:     General: Skin is warm and dry.   Neurological:      General: No focal deficit present.   Psychiatric:         Mood and Affect: Mood normal.                       Primary Care Physician   Kody Wing    Discharge Orders      Brief Discharge Instructions    Wound care instructions: Change the dressing at least every other day. Apply nonadherent gauze (eg Xeroform, Adaptic), cover with absorptive gauze, and wrap with an ACE bandage. Keep the knee immobilizer in place. Okay to remove for short periods throughout the day for comfort. Okay to shower and wash the area gently with soap and water. Ensure the wound is dried thoroughly prior to replacing dressing.     Reason for your hospital stay    Diabetic wound infection status post below knee amputation     Follow-up and recommended labs and tests     Follow up with primary care provider, Kody Wing, within 7 days to evaluate medication change and for hospital follow- up.  Repeat cbc recommended    Follow-up with vascular surgery clinic in 1 month    November 14 with Dr. Yehuda Sommers    Your activity upon discharge: activity as tolerated     Diet    Follow this diet upon discharge: Orders Placed This Encounter      Advance Diet as Tolerated: Regular Diet Adult; Moderate Consistent Carb (60 g CHO per Meal) Diet; Low Saturated Fat Diet        Significant Results and Procedures   Most Recent 3 CBC's:Recent Labs   Lab Test 10/03/22  0727 10/02/22  1529 10/02/22  0706 10/01/22  1229   WBC 9.1  --  10.0 12.1*   HGB 7.6* 7.3* 7.1* 6.8*   MCV 82  --  85 82     --  297 269     Most Recent 3 BMP's:Recent Labs   Lab Test 10/03/22  1203 10/03/22  0642 10/02/22  2211 10/02/22  0748 10/02/22  0706 10/01/22  0810 10/01/22  0744 09/30/22  0731 09/30/22  0708   NA  --   --   --   --  134  --  134  --  133   POTASSIUM  --   --   --   --  3.7  --  3.8  --  3.9   CHLORIDE  --   --   --   --  103  --  102  --  102   CO2  --   --   --   --  25  --  25  --  22   BUN  --   --   --   --  19  --  21  --  20   CR  --   --   --   --  1.50*  --  1.63*  --  1.68*   ANIONGAP  --   --   --   --  6  --  7  --  9   FERNANDO  --   --   --   --  8.7  --  8.6  --  8.5   * 128* 229*   < > 224*   < > 266*   < > 240*  240*    < > = values in this interval not displayed.     Most Recent 2 LFT's:Recent Labs   Lab Test 09/23/22  0106 06/25/21  0820   AST 39 19   ALT 58 52   ALKPHOS 102 84   BILITOTAL 0.4 0.4   ,   Results for orders placed or performed during the hospital encounter of 09/23/22   US KAREN Doppler No Exercise    Narrative    EXAM: RESTING ANKLE-BRACHIAL INDICES (ABIs)  LOCATION: [facility]  DATE/TIME: 9/23/2022 4:45 PM    INDICATION: non healing wound  LEFT LOWER EXTREMITY ONLY  COMPARISON: None.    KAREN FINDINGS:    LEFT  Brachial: 134  Ankle (PT): 203 Index: 1.5  Ankle (DP): Not obtainable Index: Not obtainable due to bandages/wound    The left KAREN at rest is 1.5.      WAVEFORMS: The dorsalis pedis and posterior tibial arteries are triphasic in the left PTA..      Impression    IMPRESSION:    1.  LEFT LOWER EXTREMITY: Left leg resting KAREN of 1.5 is elevated likely due to poorly compressible calcified arteries. The waveforms in the left PTA is biphasic. Unable to obtain pressures in the ankle and toes due to overlying bandages and wounds.   MR  Foot Left w/o Contrast    Narrative    FINAL REPORT:     FINDINGS:    BONES AND JOINTS:  -First ray and second-fourth transmetatarsal amputations.  -Reticulated edema type signal in the navicular, medial cuneiform, intermediate cuneiform, lateral cuneiform, residual second metatarsal, and residual third metatarsal. Subtle confluent decreased T1 signal in the residual second metatarsal.  -Foci of heterotopic ossification at the amputation margin.    MUSCLES AND SOFT TISSUES:   -Soft tissue attenuation as ulceration in the anterior ankle, abutting the tibialis anterior tendon. Moderate heterogeneous fluid (with susceptibility artifact) within the tibialis anterior tendon sheath.  -Soft tissue ulcer in the anterolateral ankle this measures 15 mm in width and extends into the superficial extensor tendon fascia.  -Heterogeneous fluid (with susceptibility artifact) in the soft tissues of the dorsal midfoot near the tibialis anterior insertion. This measures 37 x 39 x 21 mm (AP, LR, SI).  -Soft tissue ulcer in the medial aspect of the amputation stump. The musculature deep is heterogeneous and demonstrates increased signal. This area measures 33 x 45 x 44 mm.  -Soft tissue fluid collection (with a dependent focus of susceptibility artifact) in the dorsal-lateral mid foot (level of the residual second metatarsal). This measures 18 mm in greatest dimension.  -Generalized subcutaneous edema in the foot.      Impression    IMPRESSION:  1.  First ray and second-fifth transmetatarsal amputations.  2.  Probable second metatarsal osteomyelitis.  3.  Osteitis of the navicular, medial cuneiform, intermediate cuneiform, lateral cuneiform, and residual third metatarsal. These areas are at high risk of osteomyelitis development.  4.  Soft tissue ulcers in the anterior ankle, anterolateral ankle, and anteromedial amputation stump.  5.  Exposed tibialis anterior tendon in the anterior ankle, with extensive tibialis anterior emphysematous  tenosynovitis. Abscess and emphysematous infection at the tibialis anterior tendon insertion.  6.  Extensive myositis in the medial amputation stump.  7.  Soft tissue abscess in the dorsal-lateral mid foot (at the level of the second metatarsal). This contains a focus of gas.  8.  I agree with the preliminary report.    Final Interpretation Dictated By: Kody Peters MD  Date: 09/24/2022         PRELIMINARY REPORT     EXAM: MR FOOT LEFT W/O CONTRAST  LOCATION: [facility]  DATE/TIME: 9/23/2022 10:16 PM    INDICATION: Plantar medial ulcer, evaluate for osteomyelitis.  COMPARISON: MRI of the foot with and without contrast performed 1/25/2022.  TECHNIQUE: Unenhanced.    IMPRESSION:  1.  Areas of low T1 and elevated T2 and STIR signal in the residual fore and midfoot involving the residual first and second metatarsal and to a less extent the cuneiform bones consistent with osteomyelitis. Mildly elevated STIR and T2 signal in the   navicular without corresponding T1 hypointensity, may reflect bony edema, correlate to exclude early evolving osteomyelitis. Consider correlation with plain film.  2.  1.8 x 1.4 cm air-fluid collection within the dorsal soft tissues of the residual forefoot consistent with abscess.  3.  Marked edema of the soft tissues overlying a transmetatarsal amputation of the left foot with punctate foci of subcutaneous emphysema seen along the dorsal soft tissues of the mid foot and anterior ankle, correlate to exclude necrotizing infection.   Soft tissue defect along the medial aspect of the amputation site, correlate to exclude wound dehiscence or ulcer.    Preliminary Interpretation Dictated By: John F. Brunner, MD  Date: 9/23/2022 10:49 PM     MR Ankle Left w/o Contrast    Narrative    FINAL REPORT    FINDINGS:    BONES AND JOINTS:  -First ray and second-fourth transmetatarsal amputations.  -Reticulated edema-type signal in the navicular, medial cuneiform, intermediate  cuneiform, lateral cuneiform, residual second metatarsal, and residual third metatarsal. Subtle confluent decreased T1 signal in the residual second metatarsal.  -Foci of heterotopic ossification at the amputation margin.    MUSCLES AND SOFT TISSUES:   -Soft tissue attenuation-ulceration in the anterior ankle, abutting the tibialis anterior tendon. Moderate heterogeneous fluid (with susceptibility artifact) within the tibialis anterior tendon sheath.  -Soft tissue ulcer in the anterolateral ankle. This measures 15 mm in width and extends into the superficial extensor tendon fascia.  -Heterogeneous fluid (with susceptibility artifact) in the soft tissues of the dorsal midfoot near the tibialis anterior insertion. This measures 37 x 39 x 21 mm (AP, LR, SI).  -Soft tissue ulcer in the medial aspect of the amputation stump. The musculature deep is heterogeneous and demonstrates increased signal. This area measures 33 x 45 x 44 mm.  -Soft tissue fluid collection (with a dependent focus of susceptibility artifact) in the dorsal-lateral mid foot (level of the residual second metatarsal). This measures 18 mm in greatest dimension.  -Generalized subcutaneous edema in the foot.      Impression    IMPRESSION:  1.  First ray and second-fifth transmetatarsal amputations.  2.  Probable second metatarsal osteomyelitis.  3.  Osteitis of the navicular, medial cuneiform, intermediate cuneiform, lateral cuneiform, and residual third metatarsal. These areas are at high risk of osteomyelitis development.  4.  Soft tissue ulcers in the anterior ankle, anterolateral ankle, and anteromedial amputation stump.  5.  Exposed tibialis anterior tendon in the anterior ankle, with extensive tibialis anterior emphysematous tenosynovitis. Abscess and emphysematous infection at the tibialis anterior tendon insertion.  6.  Extensive myositis in the medial amputation stump.  7.  Soft tissue abscess in the dorsal-lateral mid foot (at the level of the  second metatarsal). This contains a focus of gas.  8.  I agree with the preliminary report.    Final Interpretation Dictated By: Kody Peters MD  Date: 09/24/2022         PRELIMINARY REPORT     EXAM: MR FOOT LEFT W/O CONTRAST  LOCATION: [facility]  DATE/TIME: 9/23/2022 10:16 PM    INDICATION: Plantar medial ulcer, evaluate for osteomyelitis.  COMPARISON: MRI of the foot with and without contrast performed 1/25/2022.  TECHNIQUE: Unenhanced.    IMPRESSION:  1.  Areas of low T1 and elevated T2 and STIR signal in the residual fore and midfoot involving the residual first and second metatarsal and to a less extent the cuneiform bones consistent with osteomyelitis. Mildly elevated STIR and T2 signal in the   navicular without corresponding T1 hypointensity, may reflect bony edema, correlate to exclude early evolving osteomyelitis. Consider correlation with plain film.  2.  1.8 x 1.4 cm air-fluid collection within the dorsal soft tissues of the residual forefoot consistent with abscess.  3.  Marked edema of the soft tissues overlying a transmetatarsal amputation of the left foot with punctate foci of subcutaneous emphysema seen along the dorsal soft tissues of the mid foot and anterior ankle, correlate to exclude necrotizing infection.   Soft tissue defect along the medial aspect of the amputation site, correlate to exclude wound dehiscence or ulcer.    PRELIMINARY REPORT    EXAM: MR ANKLE LEFT W/O CONTRAST  LOCATION: [facility]  DATE/TIME: 9/23/2022 10:43 PM    INDICATION: Status post left leg infection I and D, now with exposed tendon. Evaluate for osteomyelitis.  COMPARISON: None.  TECHNIQUE: Unenhanced.    IMPRESSION:  1.  Please, see MRI without contrast of the left foot performed same day. Again seen is irregular high T2 and STIR signal with low T1 signal involving the residual first and second metatarsals, cuneiform bones and, to a lesser extent, the navicular,    correlate to exclude osteomyelitis.  2.  Extensive soft tissue swelling with subcutaneous emphysema involving the dorsal aspects of the forefoot and proximal anterior ankle with evidence of myositis and subcutaneous emphysema, correlate to exclude deep space infection. Additionally seen is   a soft tissue anterolaterally just cranial to the lateral malleolus.    Preliminary Interpretation Dictated By: John F. Brunner, MD  Date: 9/23/2022 11:03 PM           Discharge Medications   Current Discharge Medication List      START taking these medications    Details   acetaminophen (TYLENOL) 325 MG tablet Take 2 tablets (650 mg) by mouth every 6 hours  Qty: 240 tablet, Refills: 0    Associated Diagnoses: Diabetic foot infection (H)      ferrous sulfate (FEROSUL) 325 (65 Fe) MG tablet Take 1 tablet (325 mg) by mouth 2 times daily (with meals)  Qty: 120 tablet, Refills: 0    Associated Diagnoses: Diabetic foot infection (H)      oxyCODONE (ROXICODONE) 5 MG tablet Take 1-2 tablets (5-10 mg) by mouth every 4 hours as needed for moderate to severe pain  Qty: 60 tablet, Refills: 0    Associated Diagnoses: Diabetic foot infection (H)      polyethylene glycol (MIRALAX) 17 GM/Dose powder Take 17 g by mouth daily  Qty: 510 g, Refills: 0    Associated Diagnoses: Diabetic foot infection (H)         CONTINUE these medications which have CHANGED    Details   insulin degludec (TRESIBA) 100 UNIT/ML pen Inject 28 Units Subcutaneous 2 times daily  Qty: 30 mL, Refills: 11    Associated Diagnoses: Type 2 diabetes mellitus with diabetic polyneuropathy, without long-term current use of insulin (H)         CONTINUE these medications which have NOT CHANGED    Details   aspirin 81 MG chewable tablet Take 1 tablet (81 mg) by mouth daily  Qty: 108 tablet, Refills: 3    Associated Diagnoses: Type 2 diabetes mellitus with diabetic polyneuropathy, without long-term current use of insulin (H)      ezetimibe (ZETIA) 10 MG tablet TAKE ONE TABLET BY  "MOUTH EVERY DAY  Qty: 90 tablet, Refills: 1    Associated Diagnoses: Hyperlipidemia LDL goal <100      ipratropium (ATROVENT) 0.06 % nasal spray INSTILL TWO SPRAYS INTO EACH NOSTRIL FOUR TIMES A DAY AS NEEDED FOR RHINITIS  Qty: 15 mL, Refills: 11      JARDIANCE 10 MG TABS tablet TAKE ONE TABLET BY MOUTH EVERY DAY  Qty: 90 tablet, Refills: 3    Associated Diagnoses: Type 2 diabetes mellitus with diabetic polyneuropathy, without long-term current use of insulin (H)      ketorolac (ACULAR) 0.5 % ophthalmic solution Place 1 drop into the right eye 4 times daily      multivitamin (CENTRUM SILVER) tablet Take 1 tablet by mouth daily \"[facility] Adult\"      sitagliptin (JANUVIA) 50 MG tablet Take 1 tablet (50 mg) by mouth daily  Qty: 90 tablet, Refills: 3    Associated Diagnoses: Type 2 diabetes mellitus with diabetic polyneuropathy, without long-term current use of insulin (H)      blood glucose (NO BRAND SPECIFIED) lancets standard Use to test blood sugar FOUR times daily, to match lancing device per insurance  Qty: 400 each, Refills: 1    Comments: To match lancing device per insurance  Associated Diagnoses: Type 2 diabetes mellitus with diabetic polyneuropathy, without long-term current use of insulin (H)      blood glucose (NO BRAND SPECIFIED) test strip 120 strips by In Vitro route 4 times daily Use to test blood sugar up to 4 times daily or as directed.  Qty: 360 strip, Refills: 3    Associated Diagnoses: Type 2 diabetes mellitus with diabetic polyneuropathy, without long-term current use of insulin (H)      blood glucose monitoring (NO BRAND SPECIFIED) meter device kit Use to test blood sugar FOUR times daily, brand per insurance  Qty: 1 kit, Refills: 0    Comments: Brand per insurance  Associated Diagnoses: Type 2 diabetes mellitus with diabetic polyneuropathy, without long-term current use of insulin (H)      Continuous Blood Gluc Sensor (DEXCOM G6 SENSOR) MISC 1 each every 10 days Change every 10 days.  Qty: 3 each, " "Refills: 11    Associated Diagnoses: Type 2 diabetes mellitus with diabetic polyneuropathy, without long-term current use of insulin (H)      insulin pen needle (BD PEN NEEDLE MEGAN 2ND GEN) 32G X 4 MM miscellaneous USE WITH INSULIN INJECTIONS UNDER THE SKIN TWO TIMES A DAY AS DIRECTED .  Qty: 90 each, Refills: 11    Associated Diagnoses: Type 2 diabetes mellitus with diabetic polyneuropathy, without long-term current use of insulin (H)      Lancets (ONETOUCH DELICA PLUS [id number]) MISC USE TO TEST BLOOD SUGAR FOUR TIMES A DAY  Qty: 400 each, Refills: 1    Associated Diagnoses: Diabetes mellitus, type 2 (H)      prednisoLONE acetate (PRED FORTE) 1 % ophthalmic suspension Place 1 drop into the right eye 4 times daily      STATIN NOT PRESCRIBED (INTENTIONAL) Please choose reason not prescribed, below    Associated Diagnoses: Hyperlipidemia LDL goal <100         STOP taking these medications       Insulin Degludec (TRESIBA) 100 UNIT/ML SOLN Comments:   Reason for Stopping:         lisinopril (ZESTRIL) 40 MG tablet Comments:   Reason for Stopping:         metFORMIN (GLUCOPHAGE) 500 MG tablet Comments:   Reason for Stopping:             Allergies   Allergies   Allergen Reactions     Pravastatin      \"sucked the life blood out of me,\" Indicates this occurs with all statins.     "

## 2022-10-04 LAB
PATH REPORT.ADDENDUM SPEC: NORMAL
PATH REPORT.COMMENTS IMP SPEC: NORMAL
PATH REPORT.COMMENTS IMP SPEC: NORMAL
PATH REPORT.FINAL DX SPEC: NORMAL
PATH REPORT.GROSS SPEC: NORMAL
PATH REPORT.MICROSCOPIC SPEC OTHER STN: NORMAL
PATH REPORT.RELEVANT HX SPEC: NORMAL
PHOTO IMAGE: NORMAL

## 2022-10-04 NOTE — TELEPHONE ENCOUNTER
Spoke to pt and clarified the follow up appointments. Pt is aware of his appointment on 10/17/22 with Dr. Blackman and follow up on 11/14/22 will be advised at that appt.       Joie Pate, Surgery Scheduling   [facility]  Vascular [facility]

## 2022-10-05 ENCOUNTER — PATIENT OUTREACH (OUTPATIENT)
Dept: CARE COORDINATION | Facility: CLINIC | Age: 57
End: 2022-10-05

## 2022-10-05 NOTE — PROGRESS NOTES
Clinic Care Coordination Contact  [facility]/Voicemail       Clinical Data: Care Coordinator Outreach  Outreach attempted x 2.  No answer, unable to lvm    Plan:  Care Coordinator will do no further outreaches at this time.      JAYLIN Salas  [phone]  [facility]

## 2022-10-12 ENCOUNTER — VIRTUAL VISIT (OUTPATIENT)
Dept: EDUCATION SERVICES | Facility: CLINIC | Age: 57
End: 2022-10-12
Attending: INTERNAL MEDICINE
Payer: COMMERCIAL

## 2022-10-12 DIAGNOSIS — E11.42 TYPE 2 DIABETES MELLITUS WITH DIABETIC POLYNEUROPATHY, WITHOUT LONG-TERM CURRENT USE OF INSULIN (H): ICD-10-CM

## 2022-10-12 PROCEDURE — G0108 DIAB MANAGE TRN  PER INDIV: HCPCS | Performed by: DIETITIAN, REGISTERED

## 2022-10-12 RX ORDER — BLOOD-GLUCOSE SENSOR
1 EACH MISCELLANEOUS
Qty: 2 EACH | Refills: 5 | Status: SHIPPED | OUTPATIENT
Start: 2022-10-12 | End: 2023-05-19

## 2022-10-12 NOTE — LETTER
10/12/2022         RE: Ry Horner  [address]  [facility] [address]        Dear Colleague,    Thank you for referring your patient, Ry Horner, to the [facility] [place]. Please see a copy of my visit note below.    Diabetes Self-Management Education & Support    Presents for: Individual review    Type of service:  Video Visit    If the video visit is dropped, the video visit invitation should be resent by: Send to e-mail at: [email or web address]    Originating Location (pt. Location): Home  Distant Location (provider location): Home  Mode of Communication:  Video Conference via [email or web address]    Video Start Time: 8:30a  Video End Time (time video stopped): 9:06a    How would patient like to obtain AVS? MyChart      Assessment Type:   ASSESSMENT:  Patient with recent below knee amputation, coping ok with this change. Blood sugars are improving and he is titrating his insulin to shoot for ~120 mg/dL in the AM. He is very interested in getting a CGM to help monitor blood sugars so focus of today's visit was choosing with CGM to order.     Patient's most recent   Lab Results   Component Value Date    A1C 7.7 04/04/2022    A1C 8.5 06/25/2021     is not meeting goal of <7.0    Diabetes knowledge and skills assessment:   Patient is knowledgeable in diabetes management concepts related to: Healthy Eating, Being Active, Monitoring and Taking Medication    Continue education with the following diabetes management concepts: Monitoring, Taking Medication, Problem Solving, Reducing Risks and Healthy Coping    Based on learning assessment above, most appropriate setting for further diabetes education would be: Group class or Individual setting.      PLAN    Start Sj 3 CGMS with [employer] dionisio  Continue current insulin dosing, no change in regimen today    Topics to cover at upcoming visits: Taking Medication, Problem Solving, Reducing Risks and Healthy Coping    Follow-up: 11/21/22    See Care  Plan for co-developed, patient-state behavior change goals.  AVS provided for patient today.    Education Materials Provided:  No new materials provided today      SUBJECTIVE/OBJECTIVE:  Presents for: Individual review  Accompanied by: Self  Diabetes education in the past 24mo: No  Focus of Visit: Monitoring  Diabetes type: Type 2  Disease course: Stable  How confident are you filling out medical forms by yourself:: Not Assessed  Other concerns:: None  Cultural Influences/Ethnic Background:  Choose not to answer      Diabetes Symptoms & Complications:  Neuropathy: Yes  Complications assessed today?: No  Peripheral neuropathy: Yes    Patient Problem List and Family Medical History reviewed for relevant medical history, current medical status, and diabetes risk factors.    Vitals:  There were no vitals taken for this visit.  Estimated body mass index is 32.89 kg/m  as calculated from the following:    Height as of 9/23/22: 1.829 m (6').    Weight as of 10/3/22: 110 kg (242 lb 8.1 oz).   Last 3 BP:   BP Readings from Last 3 Encounters:   10/03/22 (!) 144/89   08/17/22 138/70   08/15/22 (!) 142/97       History   Smoking Status     Former     Packs/day: 0.50     Years: 20.00     Types: Cigarettes     Start date: 5/7/1987     Quit date: 2006   Smokeless Tobacco     Never       Labs:  Lab Results   Component Value Date    A1C 7.7 04/04/2022    A1C 8.5 06/25/2021     Lab Results   Component Value Date     10/03/2022     10/02/2022     06/25/2021     Lab Results   Component Value Date    LDL  09/30/2021      Comment:      Cannot estimate LDL when triglyceride exceeds 400 mg/dL    LDL 92 09/30/2021    LDL  06/25/2021     Cannot estimate LDL when triglyceride exceeds 400 mg/dL    LDL 90 06/25/2021     HDL Cholesterol   Date Value Ref Range Status   06/25/2021 30 (L) >39 mg/dL Final     Direct Measure HDL   Date Value Ref Range Status   09/30/2021 27 (L) >=40 mg/dL Final   ]  GFR Estimate   Date Value Ref  Range Status   10/02/2022 55 (L) >60 mL/min/1.73m2 Final     Comment:     Effective December 21, 2021 eGFRcr in adults is calculated using the 2021 CKD-EPI creatinine equation which includes age and gender (Bridgette tracy al., NEJM, DOI: 10.1056/WHNLdr0471323)   06/25/2021 52 (L) >60 mL/min/[1.73_m2] Final     Comment:     Non  GFR Calc  Starting 12/18/2018, serum creatinine based estimated GFR (eGFR) will be   calculated using the Chronic Kidney Disease Epidemiology Collaboration   (CKD-EPI) equation.       GFR Estimate If Black   Date Value Ref Range Status   06/25/2021 60 (L) >60 mL/min/[1.73_m2] Final     Comment:      GFR Calc  Starting 12/18/2018, serum creatinine based estimated GFR (eGFR) will be   calculated using the Chronic Kidney Disease Epidemiology Collaboration   (CKD-EPI) equation.       Lab Results   Component Value Date    CR 1.50 10/02/2022    CR 1.51 06/25/2021     No results found for: MICROALBUMIN    Healthy Eating:  Healthy Eating Assessed Today: No  Cultural/Orthodox diet restrictions?: No  Beverages: Coffee, Water (coffee with sweetener in AM, otherwise black )  Has patient met with a dietitian in the past?: Yes    Being Active:  Being Active Assessed Today: No  Exercise:: Unable to exercise  Barrier to exercise: Physical limitation    Monitoring:  Monitoring Assessed Today: Yes  Did patient bring glucose meter to appointment? : Yes  Times checking blood sugar at home (number): 2  Times checking blood sugar at home (per): Day  Blood glucose trend: Decreasing    Mid-100s in AM, shooting for 120    Taking Medications:  Diabetes Medication(s)     Biguanides       metFORMIN (GLUCOPHAGE) 500 MG tablet    TAKE ONE TABLET BY MOUTH TWICE A DAY WITH MEALS     Patient not taking: Reported on 10/12/2022    Dipeptidyl Peptidase-4 (DPP-4) Inhibitors       JANUVIA 50 MG tablet    TAKE ONE TABLET BY MOUTH EVERY DAY    Insulin       insulin degludec (TRESIBA) 100 UNIT/ML pen     Inject 28 Units Subcutaneous 2 times daily    Sodium-Glucose Co-Transporter 2 (SGLT2) Inhibitors       JARDIANCE 10 MG TABS tablet    TAKE ONE TABLET BY MOUTH EVERY DAY          Taking Medication Assessed Today: Yes  Current Treatments: Insulin Injections  Dose schedule: Pre-breakfast, Pre-dinner  Given by: Patient  Problems taking diabetes medications regularly?: No  Diabetes medication side effects?: No (occasional low blood sugar )    Problem Solving:  Problem Solving Assessed Today: No  Hypoglycemia Frequency: Rarely  Hypoglycemia Treatment: Other food  Patient carries a carbohydrate source: Yes    Hypoglycemia symptoms  Dizziness or Light-Headedness: Yes  Sweats: Yes  Feeling shaky: Yes         Reducing Risks:  Reducing Risks Assessed Today: No  Diabetes Risks: Age over 45 years  CAD Risks: Diabetes Mellitus, Male sex, Obesity, Hypertension  Has dilated eye exam at least once a year?: No  Sees dentist every 6 months?: Yes  Feet checked by healthcare provider in the last year?: No    Healthy Coping:  Healthy Coping Assessed Today: Yes  Emotional response to diabetes: Concern for health and well-being, Acceptance  Informal Support system:: Significant other, Children  Stage of change: ACTION (Actively working towards change)  Support resources: None  Patient Activation Measure Survey Score:  No flowsheet data found.      Care Plan and Education Provided:  Care Plan: Diabetes   Updates made by Adrianna Peterson RD since 10/12/2022 12:00 AM      Problem: HbA1C Not In Goal       Goal: Establish Regular Follow-Ups with PCP       Task: Discuss with PCP the recommended timing for patient's next follow up visit(s)    Responsible User: Adrianna Peterson RD      Task: Discuss schedule for PCP visits with patient    Responsible User: Adrianna Peterson RD      Goal: Get HbA1C Level in Goal       Task: Educate patient on diabetes education self-management topics    Responsible User: Adrianna Peterson RD      Task: Educate patient on  benefits of regular glucose monitoring    Responsible User: Adrianna Peterson RD      Task: Refer patient to appropriate extended care team member, as needed (Medication Therapy Management, Behavioral Health, Physical Therapy, etc.)    Responsible User: Adrianna Peterson RD      Task: Discuss diabetes treatment plan with patient    Responsible User: Adrianna Peterson RD      Problem: Diabetes Self-Management Education Needed to Optimize Self-Care Behaviors       Goal: Understand diabetes pathophysiology and disease progression       Task: Provide education on diabetes pathophysiology and disease progression specfic to patient's diabetes type    Responsible User: Adrianna Peterson RD      Goal: Healthy Eating - follow a healthy eating pattern for diabetes       Task: Provide education on portion control and consistency in amount, composition and timing of food intake    Responsible User: Adrianna Peterson RD      Task: Provide education on managing carbohydrate intake (carbohydrate counting, plate planning method, etc.)    Responsible User: Adrianna Peterson RD      Task: Provide education on weight management    Responsible User: Adrianna Peterson RD      Task: Provide education on heart healthy eating    Responsible User: Adrianna Peterson RD      Task: Provide education on eating out    Responsible User: Adrianna Peterson RD      Task: Develop individualized healthy eating plan with patient    Responsible User: Adrianna Peterson RD      Goal: Being Active - get regular physical activity, working up to at least 150 minutes per week       Task: Provide education on relationship of activity to glucose and precautions to take if at risk for low glucose    Responsible User: Adrianna Peterson RD      Task: Discuss barriers to physical activity with patient    Responsible User: Adrianna Peterson RD      Task: Develop physical activity plan with patient    Responsible User: Adrianna Peterson RD      Task: Explore community resources including walking groups,  assistance programs, and home videos    Responsible User: Adrianna Peterson RD      Goal: Monitoring - monitor glucose and ketones as directed    This Visit's Progress: 0%   Note:    I will check my blood sugar before & 2 hrs after meals daily using Js 3 continuous glucose monitor.      Task: Provide education on blood glucose monitoring (purpose, proper technique, frequency, glucose targets, interpreting results, when to use glucose control solution, sharps disposal)    Responsible User: Adrianna Peterson RD      Task: Provide education on continuous glucose monitoring (sensor placement, use of dionisio or /reader, understanding glucose trends, alerts and alarms, differences between sensor glucose and blood glucose) Completed 10/12/2022   Responsible User: Adrianna Peterson RD      Task: Provide education on ketone monitoring (when to monitor, frequency, etc.)    Responsible User: Adrianna Peterson RD      Goal: Taking Medication - patient is consistently taking medications as directed       Task: Provide education on action of prescribed medication, including when to take and possible side effects    Responsible User: Adrianna Peterson RD      Task: Provide education on insulin and injectable diabetes medications, including administration, storage, site selection and rotation for injection sites    Responsible User: Adrianna Peterson RD      Task: Discuss barriers to medication adherence with patient and provide management technique ideas as appropriate    Responsible User: Adrianna Peterson RD      Task: Provide education on frequency and refill details of medications    Responsible User: Adrianna Peterson RD      Goal: Problem Solving - know how to prevent and manage short-term diabetes complications       Task: Provide education on high blood glucose - causes, signs/symptoms, prevention and treatment    Responsible User: Adrianna Peterson RD      Task: Provide education on low blood glucose - causes, signs/symptoms, prevention,  treatment, carrying a carbohydrate source at all times, and medical identification    Responsible User: Adrianna Peterson RD      Task: Provide education on safe travel with diabetes    Responsible User: Adrianna Peterson RD      Task: Provide education on how to care for diabetes on sick days    Responsible User: Adrianna Peterson RD      Task: Provide education on when to call a health care provider    Responsible User: Adrianna Peterson RD      Goal: Reducing Risks - know how to prevent and treat long-term diabetes complications       Task: Provide education on major complications of diabetes, prevention, early diagnostic measures and treatment of complications    Responsible User: Adrianna Peterson RD      Task: Provide education on recommended care for dental, eye and foot health    Responsible User: Adrianna Peterson RD      Task: Provide education on Hemoglobin A1c - goals and relationship to blood glucose levels    Responsible User: Adrianna Peterson RD      Task: Provide education on recommendations for heart health - lipid levels and goals, blood pressure and goals, and aspirin therapy, if indicated    Responsible User: Adrianna Peterson RD      Task: Provide education on tobacco cessation    Responsible User: Adrianna Peterson RD      Goal: Healthy Coping - use available resources to cope with the challenges of managing diabetes       Task: Discuss recognizing feelings about having diabetes    Responsible User: Adrianna Peterson RD      Task: Provide education on the benefits of making appropriate lifestyle changes    Responsible User: Adrianna Peterson RD      Task: Provide education on benefits of utilizing support systems    Responsible User: Adrianna Peterson RD      Task: Discuss methods for coping with stress    Responsible User: Adrianna Peterson RD      Task: Provide education on when to seek professional counseling    Responsible User: Adrianna Peterson RD Elise Graham, RD, LD, CDCES     Time Spent: 36 minutes  Encounter Type:  Individual    Any diabetes medication dose changes were made via the CDE Protocol per the patient's referring provider. A copy of this encounter was shared with the provider.

## 2022-10-12 NOTE — PROGRESS NOTES
Diabetes Self-Management Education & Support    Presents for: Individual review    Type of service:  Video Visit    If the video visit is dropped, the video visit invitation should be resent by: Send to e-mail at: [email or web address]    Originating Location (pt. Location): Home  Distant Location (provider location): Home  Mode of Communication:  Video Conference via thesweetlink    Video Start Time: 8:30a  Video End Time (time video stopped): 9:06a    How would patient like to obtain AVS? MyChart      Assessment Type:   ASSESSMENT:  Patient with recent below knee amputation, coping ok with this change. Blood sugars are improving and he is titrating his insulin to shoot for ~120 mg/dL in the AM. He is very interested in getting a CGM to help monitor blood sugars so focus of today's visit was choosing with CGM to order.     Patient's most recent   Lab Results   Component Value Date    A1C 7.7 04/04/2022    A1C 8.5 06/25/2021     is not meeting goal of <7.0    Diabetes knowledge and skills assessment:   Patient is knowledgeable in diabetes management concepts related to: Healthy Eating, Being Active, Monitoring and Taking Medication    Continue education with the following diabetes management concepts: Monitoring, Taking Medication, Problem Solving, Reducing Risks and Healthy Coping    Based on learning assessment above, most appropriate setting for further diabetes education would be: Group class or Individual setting.      PLAN    Start Sj 3 CGMS with Clothes Horse 3 dionisio  Continue current insulin dosing, no change in regimen today    Topics to cover at upcoming visits: Taking Medication, Problem Solving, Reducing Risks and Healthy Coping    Follow-up: 11/21/22    See Care Plan for co-developed, patient-state behavior change goals.  AVS provided for patient today.    Education Materials Provided:  No new materials provided today      SUBJECTIVE/OBJECTIVE:  Presents for: Individual review  Accompanied by: Self  Diabetes  education in the past 24mo: No  Focus of Visit: Monitoring  Diabetes type: Type 2  Disease course: Stable  How confident are you filling out medical forms by yourself:: Not Assessed  Other concerns:: None  Cultural Influences/Ethnic Background:  Choose not to answer      Diabetes Symptoms & Complications:  Neuropathy: Yes  Complications assessed today?: No  Peripheral neuropathy: Yes    Patient Problem List and Family Medical History reviewed for relevant medical history, current medical status, and diabetes risk factors.    Vitals:  There were no vitals taken for this visit.  Estimated body mass index is 32.89 kg/m  as calculated from the following:    Height as of 9/23/22: 1.829 m (6').    Weight as of 10/3/22: 110 kg (242 lb 8.1 oz).   Last 3 BP:   BP Readings from Last 3 Encounters:   10/03/22 (!) 144/89   08/17/22 138/70   08/15/22 (!) 142/97       History   Smoking Status     Former     Packs/day: 0.50     Years: 20.00     Types: Cigarettes     Start date: 5/7/1987     Quit date: 2006   Smokeless Tobacco     Never       Labs:  Lab Results   Component Value Date    A1C 7.7 04/04/2022    A1C 8.5 06/25/2021     Lab Results   Component Value Date     10/03/2022     10/02/2022     06/25/2021     Lab Results   Component Value Date    LDL  09/30/2021      Comment:      Cannot estimate LDL when triglyceride exceeds 400 mg/dL    LDL 92 09/30/2021    LDL  06/25/2021     Cannot estimate LDL when triglyceride exceeds 400 mg/dL    LDL 90 06/25/2021     HDL Cholesterol   Date Value Ref Range Status   06/25/2021 30 (L) >39 mg/dL Final     Direct Measure HDL   Date Value Ref Range Status   09/30/2021 27 (L) >=40 mg/dL Final   ]  GFR Estimate   Date Value Ref Range Status   10/02/2022 55 (L) >60 mL/min/1.73m2 Final     Comment:     Effective December 21, 2021 eGFRcr in adults is calculated using the 2021 CKD-EPI creatinine equation which includes age and gender (Bridgette et al., NEJ, DOI:  10.1056/AWOJsa3855243)   06/25/2021 52 (L) >60 mL/min/[1.73_m2] Final     Comment:     Non  GFR Calc  Starting 12/18/2018, serum creatinine based estimated GFR (eGFR) will be   calculated using the Chronic Kidney Disease Epidemiology Collaboration   (CKD-EPI) equation.       GFR Estimate If Black   Date Value Ref Range Status   06/25/2021 60 (L) >60 mL/min/[1.73_m2] Final     Comment:      GFR Calc  Starting 12/18/2018, serum creatinine based estimated GFR (eGFR) will be   calculated using the Chronic Kidney Disease Epidemiology Collaboration   (CKD-EPI) equation.       Lab Results   Component Value Date    CR 1.50 10/02/2022    CR 1.51 06/25/2021     No results found for: MICROALBUMIN    Healthy Eating:  Healthy Eating Assessed Today: No  Cultural/Congregational diet restrictions?: No  Beverages: Coffee, Water (coffee with sweetener in AM, otherwise black )  Has patient met with a dietitian in the past?: Yes    Being Active:  Being Active Assessed Today: No  Exercise:: Unable to exercise  Barrier to exercise: Physical limitation    Monitoring:  Monitoring Assessed Today: Yes  Did patient bring glucose meter to appointment? : Yes  Times checking blood sugar at home (number): 2  Times checking blood sugar at home (per): Day  Blood glucose trend: Decreasing    Mid-100s in AM, shooting for 120    Taking Medications:  Diabetes Medication(s)     Biguanides       metFORMIN (GLUCOPHAGE) 500 MG tablet    TAKE ONE TABLET BY MOUTH TWICE A DAY WITH MEALS     Patient not taking: Reported on 10/12/2022    Dipeptidyl Peptidase-4 (DPP-4) Inhibitors       JANUVIA 50 MG tablet    TAKE ONE TABLET BY MOUTH EVERY DAY    Insulin       insulin degludec (TRESIBA) 100 UNIT/ML pen    Inject 28 Units Subcutaneous 2 times daily    Sodium-Glucose Co-Transporter 2 (SGLT2) Inhibitors       JARDIANCE 10 MG TABS tablet    TAKE ONE TABLET BY MOUTH EVERY DAY          Taking Medication Assessed Today: Yes  Current  Treatments: Insulin Injections  Dose schedule: Pre-breakfast, Pre-dinner  Given by: Patient  Problems taking diabetes medications regularly?: No  Diabetes medication side effects?: No (occasional low blood sugar )    Problem Solving:  Problem Solving Assessed Today: No  Hypoglycemia Frequency: Rarely  Hypoglycemia Treatment: Other food  Patient carries a carbohydrate source: Yes    Hypoglycemia symptoms  Dizziness or Light-Headedness: Yes  Sweats: Yes  Feeling shaky: Yes         Reducing Risks:  Reducing Risks Assessed Today: No  Diabetes Risks: Age over 45 years  CAD Risks: Diabetes Mellitus, Male sex, Obesity, Hypertension  Has dilated eye exam at least once a year?: No  Sees dentist every 6 months?: Yes  Feet checked by healthcare provider in the last year?: No    Healthy Coping:  Healthy Coping Assessed Today: Yes  Emotional response to diabetes: Concern for health and well-being, Acceptance  Informal Support system:: Significant other, Children  Stage of change: ACTION (Actively working towards change)  Support resources: None  Patient Activation Measure Survey Score:  No flowsheet data found.      Care Plan and Education Provided:  Care Plan: Diabetes   Updates made by Adrianna Peterson RD since 10/12/2022 12:00 AM      Problem: HbA1C Not In Goal       Goal: Establish Regular Follow-Ups with PCP       Task: Discuss with PCP the recommended timing for patient's next follow up visit(s)    Responsible User: Adrianna Peterson RD      Task: Discuss schedule for PCP visits with patient    Responsible User: Adrianna Peterson RD      Goal: Get HbA1C Level in Goal       Task: Educate patient on diabetes education self-management topics    Responsible User: Adrianna Peterson RD      Task: Educate patient on benefits of regular glucose monitoring    Responsible User: Adrianna Peterson RD      Task: Refer patient to appropriate extended care team member, as needed (Medication Therapy Management, Behavioral Health, Physical Therapy,  etc.)    Responsible User: Adrianna Peterson RD      Task: Discuss diabetes treatment plan with patient    Responsible User: Adrianna Peterson RD      Problem: Diabetes Self-Management Education Needed to Optimize Self-Care Behaviors       Goal: Understand diabetes pathophysiology and disease progression       Task: Provide education on diabetes pathophysiology and disease progression specfic to patient's diabetes type    Responsible User: Adrianna Peterson RD      Goal: Healthy Eating - follow a healthy eating pattern for diabetes       Task: Provide education on portion control and consistency in amount, composition and timing of food intake    Responsible User: Adrianna Peterson RD      Task: Provide education on managing carbohydrate intake (carbohydrate counting, plate planning method, etc.)    Responsible User: Adrianna Peterson RD      Task: Provide education on weight management    Responsible User: Adrianna Peterson RD      Task: Provide education on heart healthy eating    Responsible User: Adrianna Peterson RD      Task: Provide education on eating out    Responsible User: Adrianna Peterson RD      Task: Develop individualized healthy eating plan with patient    Responsible User: Adrianna Peterson RD      Goal: Being Active - get regular physical activity, working up to at least 150 minutes per week       Task: Provide education on relationship of activity to glucose and precautions to take if at risk for low glucose    Responsible User: Adrianna Peterson RD      Task: Discuss barriers to physical activity with patient    Responsible User: Adrianna Peterson RD      Task: Develop physical activity plan with patient    Responsible User: Adrianna Peterson RD      Task: Explore community resources including walking groups, assistance programs, and home videos    Responsible User: Adrianna Peterson RD      Goal: Monitoring - monitor glucose and ketones as directed    This Visit's Progress: 0%   Note:    I will check my blood sugar before & 2 hrs  after meals daily using Sj 3 continuous glucose monitor.      Task: Provide education on blood glucose monitoring (purpose, proper technique, frequency, glucose targets, interpreting results, when to use glucose control solution, sharps disposal)    Responsible User: Adrianna Peterson RD      Task: Provide education on continuous glucose monitoring (sensor placement, use of dionisio or /reader, understanding glucose trends, alerts and alarms, differences between sensor glucose and blood glucose) Completed 10/12/2022   Responsible User: Adrianna Peterson RD      Task: Provide education on ketone monitoring (when to monitor, frequency, etc.)    Responsible User: Adrianna Peterson RD      Goal: Taking Medication - patient is consistently taking medications as directed       Task: Provide education on action of prescribed medication, including when to take and possible side effects    Responsible User: Adrianna Peterson RD      Task: Provide education on insulin and injectable diabetes medications, including administration, storage, site selection and rotation for injection sites    Responsible User: Adrianna Peterson RD      Task: Discuss barriers to medication adherence with patient and provide management technique ideas as appropriate    Responsible User: Adrianna Peterson RD      Task: Provide education on frequency and refill details of medications    Responsible User: Adrianna Peterson RD      Goal: Problem Solving - know how to prevent and manage short-term diabetes complications       Task: Provide education on high blood glucose - causes, signs/symptoms, prevention and treatment    Responsible User: Adrianna Peterson RD      Task: Provide education on low blood glucose - causes, signs/symptoms, prevention, treatment, carrying a carbohydrate source at all times, and medical identification    Responsible User: Adrianna Peterson RD      Task: Provide education on safe travel with diabetes    Responsible User: Adrianna Peterson RD       Task: Provide education on how to care for diabetes on sick days    Responsible User: Adrianna Peterson RD      Task: Provide education on when to call a health care provider    Responsible User: Adrianna Peterson RD      Goal: Reducing Risks - know how to prevent and treat long-term diabetes complications       Task: Provide education on major complications of diabetes, prevention, early diagnostic measures and treatment of complications    Responsible User: Adrianna Peterson RD      Task: Provide education on recommended care for dental, eye and foot health    Responsible User: Adrianna Peterson RD      Task: Provide education on Hemoglobin A1c - goals and relationship to blood glucose levels    Responsible User: Adrianna Peterson RD      Task: Provide education on recommendations for heart health - lipid levels and goals, blood pressure and goals, and aspirin therapy, if indicated    Responsible User: Adrianna Peterson RD      Task: Provide education on tobacco cessation    Responsible User: Adrianna Peterson RD      Goal: Healthy Coping - use available resources to cope with the challenges of managing diabetes       Task: Discuss recognizing feelings about having diabetes    Responsible User: Adrianna Peterson RD      Task: Provide education on the benefits of making appropriate lifestyle changes    Responsible User: Adrianna Peterson RD      Task: Provide education on benefits of utilizing support systems    Responsible User: Adrianna Peterson RD      Task: Discuss methods for coping with stress    Responsible User: Adrianna Peterson RD      Task: Provide education on when to seek professional counseling    Responsible User: Adrianna Peterson RD Elise Graham, RD, LD, [facility]     Time Spent: 36 minutes  Encounter Type: Individual    Any diabetes medication dose changes were made via the CDE Protocol per the patient's referring provider. A copy of this encounter was shared with the provider.

## 2022-10-17 ENCOUNTER — OFFICE VISIT (OUTPATIENT)
Dept: OTHER | Facility: CLINIC | Age: 57
End: 2022-10-17
Attending: SURGERY
Payer: COMMERCIAL

## 2022-10-17 VITALS — OXYGEN SATURATION: 99 % | HEART RATE: 117 BPM | SYSTOLIC BLOOD PRESSURE: 130 MMHG | DIASTOLIC BLOOD PRESSURE: 91 MMHG

## 2022-10-17 DIAGNOSIS — Z89.512 STATUS POST BELOW-KNEE AMPUTATION OF LEFT LOWER EXTREMITY (H): ICD-10-CM

## 2022-10-17 DIAGNOSIS — T87.89 DELAYED SURGICAL WOUND HEALING OF BELOW-THE-KNEE AMPUTATION STUMP (H): Primary | ICD-10-CM

## 2022-10-17 DIAGNOSIS — T81.89XA DELAYED SURGICAL WOUND HEALING OF BELOW-THE-KNEE AMPUTATION STUMP (H): Primary | ICD-10-CM

## 2022-10-17 PROCEDURE — 99024 POSTOP FOLLOW-UP VISIT: CPT | Performed by: SURGERY

## 2022-10-17 PROCEDURE — G0463 HOSPITAL OUTPT CLINIC VISIT: HCPCS

## 2022-10-17 NOTE — PROGRESS NOTES
Patient is here to discuss follow up.    BP (!) 130/91 (BP Location: Left arm, Patient Position: Chair, Cuff Size: Adult Large)   Pulse 117   SpO2 99%     Questions patient would like addressed today are: N/A.    Refills are needed: No    Has homecare services and agency name:  Nikkie Maki

## 2022-10-19 ENCOUNTER — TELEPHONE (OUTPATIENT)
Dept: OTHER | Facility: CLINIC | Age: 57
End: 2022-10-19

## 2022-10-19 DIAGNOSIS — Z89.512 STATUS POST BELOW KNEE AMPUTATION, LEFT (H): Primary | ICD-10-CM

## 2022-10-19 NOTE — TELEPHONE ENCOUNTER
[facility] VASCULAR [facility]    Who is the name of the provider?:  Yehuda   What is the location you see this provider at/preferred location?: [place]  Person calling: Ry  Phone number:  [phone]  Nurse call back needed:  yes    Reason for call:   Pt calling to request prescription for prosthetics from Dr. Blackman. Pt sates orthopedics needs a written order.      Pharmacy location:Na  Outside Imaging: Na  Can we leave a detailed message on this number? Yes

## 2022-10-20 NOTE — TELEPHONE ENCOUNTER
Pt s/p LEFT below knee amputation on 9/27/22 with Dr. Blackman and completion of Left below knee amputation on 9/29/22 with Dr. Child.      Need order from Dr. Blackman and then send to:   [facility] (sp?) [facility], pt has used him before and requesting him again.     MILLY BowersN, RN  [facility]  Office:  [phone] Fax: [phone]

## 2022-10-20 NOTE — TELEPHONE ENCOUNTER
Verified with azalea Martinez to order prosthetics referral.     Faxed referral, demographics sheet (progress notes not yet completed) October 24, 2022 to [facility] fax number [phone] at 8:34a.MILLY DesaiN, RN  [facility]  Office:  [phone] Fax: [phone]

## 2022-10-24 ENCOUNTER — TELEPHONE (OUTPATIENT)
Dept: FAMILY MEDICINE | Facility: CLINIC | Age: 57
End: 2022-10-24

## 2022-10-24 NOTE — TELEPHONE ENCOUNTER
Prior Authorization Not Needed per Insurance    Medication: Freestyle Sj 3 Sensors  Insurance Company: [employer] - Phone [phone] Fax [phone]  Expected CoPay:      Pharmacy Filling the Rx: [facility] [address]   Pharmacy Notified: Yes  Patient Notified: Yes    Per [employer] Freestyle Sj 3 covered under pharmacy benefits without a PA.   Calling [employer] [phone] to inquire if this needs a medical PA per message from [facility].

## 2022-10-24 NOTE — TELEPHONE ENCOUNTER
Per call to [employer] [phone]     Not covered under pharmacy benefits, she is not sure why this came up as covered.     Transferred me to [employer] medical benefit coverage review to check on coverage and how to start a PA through medical benefits.

## 2022-10-24 NOTE — TELEPHONE ENCOUNTER
Transferred back to pharmacy benefits as this is therapeutic per rep.     Per rep- she received a paid claim on end.  She doesn't see that the pharmacy tried to process through patient's pharmacy benefits (?)    Will contact pharmacy.  She states if they are having issues processing claim they can call the help desk phone number.

## 2022-10-24 NOTE — TELEPHONE ENCOUNTER
MEDICAL    PRIOR AUTHORIZATION REQUIRED - See Reason for Call Comments for specific products. Billing with (A) codes.  CGM: CRISTINA 3  A/K codes:       INSURANCE: [place]   ID: [id number]        [facility] TEAM  PHONE: [phone]  FAX: [phone]    Route determinations back to [facility] pool ([id number])

## 2022-10-25 ENCOUNTER — TELEPHONE (OUTPATIENT)
Dept: FAMILY MEDICINE | Facility: CLINIC | Age: 57
End: 2022-10-25

## 2022-10-25 NOTE — TELEPHONE ENCOUNTER
Calling [facility] again [phone].  Transferred to medical claims.     Per automated prompts- [facility] code  does not require a prior-approval.     Asked to speak to a representative.  Initiated via phone.      Per rep-   [facility] code   Facility NPI [id number]  Dx code E11.42    This does not require a Prior Authorization through medical benefits.

## 2022-10-25 NOTE — TELEPHONE ENCOUNTER
"Per response from pharmacy:     \"NOT PLAN COVERED/[employer] PAYS 100% CASH RATE WILL NOT APPLY TOWARD PLAN ACCUMULATORS MAXIMUM DAY SUPPLY  ALLOWED\"     Meaning, that they basically just act like a discount card that does not apply towards pts deductible or out of pocket and are not actually paying anything at all. Due to this, we need to get it covered under the medical side.         "

## 2022-10-25 NOTE — TELEPHONE ENCOUNTER
Hello!    This message is to notify you that the Diabetes Care Services team has completed their benefit check for this patient's Sj 3. At this time, coverage has been denied as patient does not meet the criteria set by their plan. For coverage of any CGM to be considered, patient must be on basal and prandial insulin. We will notify the patient of the denial. If you have any questions, please let us know!    Thank you!    Diabetes Care Services Team  [facility]  [facility] [place] [address]  Phone: [phone]  Fax; [phone]

## 2022-10-25 NOTE — PROGRESS NOTES
Mr. Horner is a 57 year with diabetic foot infection of the left foot.  He underwent guillotine amputation on 27 September 2022 and completion amputation 29 September 2022.  He has no complaints.  Wound is healing nicely.  Have the staples removed.  He will come back on 14 November for the removal of the sutures and remainder of the staples.

## 2022-10-28 ENCOUNTER — MYC MEDICAL ADVICE (OUTPATIENT)
Dept: OTHER | Facility: CLINIC | Age: 57
End: 2022-10-28

## 2022-11-07 DIAGNOSIS — E78.5 HYPERLIPIDEMIA LDL GOAL <100: ICD-10-CM

## 2022-11-09 ENCOUNTER — OFFICE VISIT (OUTPATIENT)
Dept: FAMILY MEDICINE | Facility: CLINIC | Age: 57
End: 2022-11-09
Payer: COMMERCIAL

## 2022-11-09 VITALS
TEMPERATURE: 98.7 F | HEIGHT: 72 IN | BODY MASS INDEX: 32.89 KG/M2 | OXYGEN SATURATION: 98 % | RESPIRATION RATE: 20 BRPM | DIASTOLIC BLOOD PRESSURE: 80 MMHG | SYSTOLIC BLOOD PRESSURE: 144 MMHG | HEART RATE: 110 BPM

## 2022-11-09 DIAGNOSIS — L97.525 DIABETIC ULCER OF LEFT FOOT ASSOCIATED WITH TYPE 2 DIABETES MELLITUS, WITH MUSCLE INVOLVEMENT WITHOUT EVIDENCE OF NECROSIS, UNSPECIFIED PART OF FOOT (H): Primary | ICD-10-CM

## 2022-11-09 DIAGNOSIS — E11.621 DIABETIC ULCER OF LEFT FOOT ASSOCIATED WITH TYPE 2 DIABETES MELLITUS, WITH MUSCLE INVOLVEMENT WITHOUT EVIDENCE OF NECROSIS, UNSPECIFIED PART OF FOOT (H): Primary | ICD-10-CM

## 2022-11-09 DIAGNOSIS — N18.30 STAGE 3 CHRONIC KIDNEY DISEASE, UNSPECIFIED WHETHER STAGE 3A OR 3B CKD (H): ICD-10-CM

## 2022-11-09 DIAGNOSIS — E78.5 HYPERLIPIDEMIA LDL GOAL <100: ICD-10-CM

## 2022-11-09 DIAGNOSIS — I10 HYPERTENSION, UNSPECIFIED TYPE: ICD-10-CM

## 2022-11-09 DIAGNOSIS — E11.42 TYPE 2 DIABETES MELLITUS WITH DIABETIC POLYNEUROPATHY, WITHOUT LONG-TERM CURRENT USE OF INSULIN (H): ICD-10-CM

## 2022-11-09 LAB
ANION GAP SERPL CALCULATED.3IONS-SCNC: 3 MMOL/L (ref 3–14)
BUN SERPL-MCNC: 39 MG/DL (ref 7–30)
CALCIUM SERPL-MCNC: 9.5 MG/DL (ref 8.5–10.1)
CHLORIDE BLD-SCNC: 102 MMOL/L (ref 94–109)
CHOLEST SERPL-MCNC: 189 MG/DL
CO2 SERPL-SCNC: 30 MMOL/L (ref 20–32)
CREAT SERPL-MCNC: 1.9 MG/DL (ref 0.66–1.25)
CREAT UR-MCNC: 80 MG/DL
FASTING STATUS PATIENT QL REPORTED: NO
GFR SERPL CREATININE-BSD FRML MDRD: 41 ML/MIN/1.73M2
GLUCOSE BLD-MCNC: 260 MG/DL (ref 70–99)
HBA1C MFR BLD: 7.7 % (ref 0–5.6)
HDLC SERPL-MCNC: 37 MG/DL
LDLC SERPL CALC-MCNC: 115 MG/DL
LDLC SERPL CALC-MCNC: ABNORMAL MG/DL
MICROALBUMIN UR-MCNC: 2040 MG/L
MICROALBUMIN/CREAT UR: 2550 MG/G CR (ref 0–17)
NONHDLC SERPL-MCNC: 152 MG/DL
POTASSIUM BLD-SCNC: 4.8 MMOL/L (ref 3.4–5.3)
SODIUM SERPL-SCNC: 135 MMOL/L (ref 133–144)
TRIGL SERPL-MCNC: 412 MG/DL

## 2022-11-09 PROCEDURE — 83036 HEMOGLOBIN GLYCOSYLATED A1C: CPT | Performed by: INTERNAL MEDICINE

## 2022-11-09 PROCEDURE — 82043 UR ALBUMIN QUANTITATIVE: CPT | Performed by: INTERNAL MEDICINE

## 2022-11-09 PROCEDURE — 80048 BASIC METABOLIC PNL TOTAL CA: CPT | Performed by: INTERNAL MEDICINE

## 2022-11-09 PROCEDURE — 80061 LIPID PANEL: CPT | Performed by: INTERNAL MEDICINE

## 2022-11-09 PROCEDURE — 83721 ASSAY OF BLOOD LIPOPROTEIN: CPT | Mod: 59 | Performed by: INTERNAL MEDICINE

## 2022-11-09 PROCEDURE — 36415 COLL VENOUS BLD VENIPUNCTURE: CPT | Performed by: INTERNAL MEDICINE

## 2022-11-09 PROCEDURE — 99214 OFFICE O/P EST MOD 30 MIN: CPT | Performed by: INTERNAL MEDICINE

## 2022-11-09 RX ORDER — LISINOPRIL 5 MG/1
5 TABLET ORAL DAILY
Qty: 90 TABLET | Refills: 3 | Status: ON HOLD | OUTPATIENT
Start: 2022-11-09 | End: 2023-07-28

## 2022-11-09 RX ORDER — EZETIMIBE 10 MG/1
TABLET ORAL
Qty: 90 TABLET | Refills: 1 | Status: SHIPPED | OUTPATIENT
Start: 2022-11-09 | End: 2023-05-01

## 2022-11-09 RX ORDER — INSULIN LISPRO-AABC 100 [IU]/ML
1-2 INJECTION, SOLUTION SUBCUTANEOUS
Qty: 15 ML | Refills: 11 | Status: SHIPPED | OUTPATIENT
Start: 2022-11-09 | End: 2023-02-16

## 2022-11-09 ASSESSMENT — PAIN SCALES - GENERAL: PAINLEVEL: NO PAIN (0)

## 2022-11-09 NOTE — PROGRESS NOTES
Assessment & Plan     Diabetic ulcer of left foot associated with type 2 diabetes mellitus, with muscle involvement without evidence of necrosis, unspecified part of foot (H)  Now post amputation ; follow up with vascular surgery to proceed with prosthesis fitting   - Lipid panel reflex to direct LDL Non-fasting; Future  - Lipid panel reflex to direct LDL Non-fasting    Type 2 diabetes mellitus with diabetic polyneuropathy, without long-term current use of insulin (H)  OK control, but A1c < 7 would be better  Add with meals insulin as below   - Hemoglobin A1c; Future  - Insulin Lispro-aabc, 1 U Dial, (LYUMJEV KWIKPEN) 100 UNIT/ML SOPN; Inject 1-2 Units Subcutaneous 3 times daily (before meals) Use according sliding scale:   Premeal glucose 0-150 = 0 units, premeal glucose 150-200 =1 unit, premeal glucose > 200 = 2 units  - insulin degludec (TRESIBA) 100 UNIT/ML pen; Inject 34 Units Subcutaneous 2 times daily  - Hemoglobin A1c    Stage 3 chronic kidney disease, unspecified whether stage 3a or 3b CKD (H)  Hold off on metformin due to this  Restart lisinopril if Cr stable   - Albumin Random Urine Quantitative with Creat Ratio; Future  - Basic metabolic panel  (Ca, Cl, CO2, Creat, Gluc, K, Na, BUN); Future  - Albumin Random Urine Quantitative with Creat Ratio  - Basic metabolic panel  (Ca, Cl, CO2, Creat, Gluc, K, Na, BUN)    Hypertension, unspecified type  Again add back lisinopril pending Cr    Hyperlipidemia LDL goal <100  Statin intolerant, not willing to re-challenge  On zetia, recheck       32 minutes spent on the date of the encounter doing chart review, history and exam, documentation and further activities per the note      Return in about 3 months (around 2/9/2023) for recheck.  Patient instructed to return to clinic or contact us sooner if symptoms worsen or new symptoms develop.     Kody Wing MD  [facility] SARANYA Raza is a 57 year old accompanied by his spouse,  presenting for the following health issues:  Follow Up      History of Present Illness       Diabetes:   He presents for follow up of diabetes.  He is checking home blood glucose four or more times daily. He checks blood glucose before meals and at bedtime.  Blood glucose is sometimes over 200 and sometimes under 70. He is aware of hypoglycemia symptoms including shakiness, dizziness, weakness and confusion. He is concerned about other.  He is not experiencing numbness or burning in feet, excessive thirst, blurry vision, weight changes or redness, sores or blisters on feet. The patient has not had a diabetic eye exam in the last 12 months.         He eats 4 or more servings of fruits and vegetables daily.He consumes 0 sweetened beverage(s) daily.He exercises with enough effort to increase his heart rate 9 or less minutes per day.  He exercises with enough effort to increase his heart rate 3 or less days per week. He is missing 1 dose(s) of medications per week.         Feels well  Waiting for prosthetic  AM fasting sugars 130's   No low sugars  Wonders if with meal insulin would help with improvement in A!c?      Review of Systems         Objective    BP (!) 144/80 (BP Location: Left arm, Patient Position: Sitting, Cuff Size: Adult Regular)   Pulse 110   Temp 98.7  F (37.1  C) (Oral)   Resp 20   Ht 1.829 m (6')   SpO2 98%   BMI 32.89 kg/m    Body mass index is 32.89 kg/m .  Physical Exam   Well appearing  Bilateral amputations

## 2022-11-09 NOTE — TELEPHONE ENCOUNTER
Routing refill request to provider for review/approval because:      LOV: 11/9/22 with PCP     Alley Berman RN  [facility]

## 2022-11-10 NOTE — RESULT ENCOUNTER NOTE
"The following letter pertains to your most recent diagnostic tests:    -Your micro albumin level is elevated. This means you have trace amounts of protein in the urine. It indicates that your kidneys are being affected by diabetes. Keeping blood pressure low is the best treatment in order to keep this stable. People with microalbuminuria should avoid anti-inflamatory agents such as motrin, alleve and ibuprofen as much as possible because excessive use of these medications can be harmful to the kidneys. Anybody that has microalbuminuria should be on lisinopril or losartan-as you already are-since these medications are protective for the kidney's in this situation.     -Your total cholesterol is 189 which is at your goal of total cholesterol less than 200.    -Your triglycerides are 412 which are above your goal of triglycerides less than 150.  Part of this is from not fasting.      -Your HDL or \"good cholesterol\" is 37 which is below your goal of HDL cholesterol greater than 40.    -Your LDL cholesterol or \"bad cholesterol\" is 115 which is just above your goal of LDL cholesterol less than <100.  Your LDL goal is based on your risk factors for artery disease.    -The metabolic panel shows slightly worse kidney function from last check.      -Your hemoglobin A1c test which is a diabetes blood test that represents and average of your blood sugars over the last 3 months returned at 7.7 which is at your goal of hemoglobin A1c less than 8, but we would like to see numbers less than 7 if possible.       Bottom line:      The kidney function is worse.  I don't think you should take any more metformin.    I would suggest we restart lisinopril, but at a lower dose of 5 mg daily.  This medication can protect your kidneys from further decline in function.   I sent a new prescription to your pharmacy.        Follow up:  Lets check the hemoglobin A1c again in 3 months.   You can schedule a lab appointment for that purpose.  "           Sincerely,    Dr. Wing

## 2022-11-14 ENCOUNTER — DOCUMENTATION ONLY (OUTPATIENT)
Dept: OTHER | Facility: CLINIC | Age: 57
End: 2022-11-14

## 2022-11-14 ENCOUNTER — OFFICE VISIT (OUTPATIENT)
Dept: OTHER | Facility: CLINIC | Age: 57
End: 2022-11-14
Attending: SURGERY
Payer: COMMERCIAL

## 2022-11-14 ENCOUNTER — TELEPHONE (OUTPATIENT)
Dept: FAMILY MEDICINE | Facility: CLINIC | Age: 57
End: 2022-11-14

## 2022-11-14 VITALS — SYSTOLIC BLOOD PRESSURE: 120 MMHG | DIASTOLIC BLOOD PRESSURE: 76 MMHG | OXYGEN SATURATION: 99 % | HEART RATE: 107 BPM

## 2022-11-14 DIAGNOSIS — E11.42 TYPE 2 DIABETES MELLITUS WITH DIABETIC POLYNEUROPATHY, WITHOUT LONG-TERM CURRENT USE OF INSULIN (H): Primary | ICD-10-CM

## 2022-11-14 DIAGNOSIS — Z89.512 STATUS POST BELOW KNEE AMPUTATION, LEFT (H): Primary | ICD-10-CM

## 2022-11-14 DIAGNOSIS — N18.30 STAGE 3 CHRONIC KIDNEY DISEASE, UNSPECIFIED WHETHER STAGE 3A OR 3B CKD (H): ICD-10-CM

## 2022-11-14 PROCEDURE — G0463 HOSPITAL OUTPT CLINIC VISIT: HCPCS

## 2022-11-14 PROCEDURE — 99024 POSTOP FOLLOW-UP VISIT: CPT | Performed by: SURGERY

## 2022-11-14 NOTE — PROGRESS NOTES
Faxed referral to [facility], progress note, demographic sheet  November 14, 2022 to fax number [phone]    Right Fax confirmed at 4:57 PM    ROSALBA Bowers, RN

## 2022-11-14 NOTE — PROGRESS NOTES
Mr. 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  He has no complaints.  Remainder of the staples and sutures were removed.  Stump is healed very nicely.  He can start using his prosthesis once it is fitted.  We will see him in 3 months after he has been wearing his prosthesis to reassess his stump.

## 2022-11-14 NOTE — TELEPHONE ENCOUNTER
----- Message from Kody Wing MD sent at 11/14/2022  8:36 AM CST -----  Looks like he did not check his result letter, can you read him my letter

## 2022-11-14 NOTE — PATIENT INSTRUCTIONS
Follow up in 3 months for in clinic OV with Dr. Blackman.   Begin working with Prosthetics, orders will be sent to [facility] as previously discussed.   If you have any questions please call us.   Thank you,   MILLY BowersN, RN  [facility]  Office:  [phone] Fax: [phone]

## 2022-11-14 NOTE — TELEPHONE ENCOUNTER
Patient Contact    Attempt # 1    Was call answered?  No.  Left message on voicemail with information to call triage back.    On callback - please review PCP' result note from 11/9/22 with patient     Alley Berman RN  [facility]

## 2022-11-14 NOTE — PROGRESS NOTES
Patient is here to discuss post op.    /76 (BP Location: Right arm, Patient Position: Chair, Cuff Size: Adult Regular)   Pulse 107   SpO2 99%     Questions patient would like addressed today are: Work on the orders for Prosthetic.    Refills are needed: No    Has homecare services and agency name:  Nikkie Maki

## 2022-11-15 NOTE — TELEPHONE ENCOUNTER
Iron/iron binding lab added  Jardiance and lisinopril prevent progression of kidney disease  However, the key for him to prevent worsening kidney function is to control blood pressure and keep A1c at least less than 8, but closer to 7 or under 7 would be better

## 2022-11-15 NOTE — TELEPHONE ENCOUNTER
Patient has still not read result letter from 11/9/2022.     Call to patient. Patient pulled up letter in his MyChart and read it while this RN was on the phone with him.     Patient verbalized understanding and reports he has already picked up the Lisinopril and began taking it.   Lab only appointment scheduled for February 2023.     Patient has the below questions for provider:     1. Reports his iron was low after his amputation. Last checked on 10/1/2022 and requesting this be rechecked again when getting his A1C checked in 3 months time.      2. Anything else patient can do to help his kidneys other than Lisinopril?    Lab orders for A1C and iron pended for provider to review and sign if appropriate.       Krystal Washburn, RN BSN MSN  [facility]

## 2022-11-16 NOTE — TELEPHONE ENCOUNTER
Called pt and relayed below response from pt     Future lab already scheduled     Yvette WATKINS, Triage RN  [facility]

## 2022-11-21 ENCOUNTER — VIRTUAL VISIT (OUTPATIENT)
Dept: EDUCATION SERVICES | Facility: CLINIC | Age: 57
End: 2022-11-21
Payer: COMMERCIAL

## 2022-11-21 DIAGNOSIS — E11.42 TYPE 2 DIABETES MELLITUS WITH DIABETIC POLYNEUROPATHY, WITHOUT LONG-TERM CURRENT USE OF INSULIN (H): Primary | ICD-10-CM

## 2022-11-21 PROCEDURE — 98968 PH1 ASSMT&MGMT NQHP 21-30: CPT | Mod: 95 | Performed by: DIETITIAN, REGISTERED

## 2022-11-21 NOTE — LETTER
11/21/2022         RE: Ry Horner  [address]  [place] [address]        Dear Colleague,    Thank you for referring your patient, Ry Horner, to the [facility]. Please see a copy of my visit note below.    Diabetes Self-Management Education & Support    Presents for: CGM Review    Type of service:  Video Visit    If the video visit is dropped, the video visit invitation should be resent by: Send to e-mail at: [email or web address]    Originating Location (pt. Location): Home  Distant Location (provider location): Home  Mode of Communication:  Video Conference via "[employer]"    Video Start Time: 10:37a  Video End Time (time video stopped): 11:00a    How would patient like to obtain AVS? MyChart      Assessment Type:   REPORTS:        Pt verbalized understanding of concepts discussed and recommendations provided today.       Continue education with the following diabetes management concepts: Healthy Eating, Being Active, Monitoring, Taking Medication, Problem Solving, Reducing Risks and Healthy Coping    ASSESSMENT:  Ry just got his Sj 3 sensors recently. Was also started on sliding scale insulin with PCP a few weeks ago. Has been using some already. Reviewed accuracy of Sj 3 vs glucometer as he has been comparing results frequently. Discussed sticking to one vs the other (Sj 3), margin of error, when continuous glucose monitor is less accurate, and importance of listening to his body/knowing s/sx of hypoglycemia and treating appropriately.     Glucose Patterns & Trends:  Hyperglycemia, weekend- postmeal      PLAN    Monitor blood sugars closely with Sj 3 & reach out via [employer] if/when needing blood sugar trend review  Do not take sliding scale insulin closer than every 4 hours   Get A1C rechecked in February     Topics to cover at upcoming visits: Healthy Eating, Being Active, Monitoring, Taking Medication, Problem Solving, Reducing Risks and Healthy  Coping    Follow-up: 2/16/23    See Care Plan for co-developed, patient-state behavior change goals.  AVS provided for patient today.    Education Materials Provided:  No new materials provided today      SUBJECTIVE/OBJECTIVE:  Presents for: CGM Review  Accompanied by: Self  Diabetes education in the past 24mo: No  Focus of Visit: Monitoring, CGM  Type of CGM visit: Personal CGM Follow-up  Diabetes type: Type 2  Disease course: Improving  How confident are you filling out medical forms by yourself:: Not Assessed  Diabetes management related comments/concerns: Finally got the CGM after having PCP prescribed SSI  Transportation concerns: Yes  Difficulty affording diabetes medication?: Sometimes  Difficulty affording diabetes testing supplies?: Sometimes  Other concerns:: Physical impairment  Cultural Influences/Ethnic Background:  Choose not to answer      Diabetes Symptoms & Complications:  Neuropathy: Yes  Complications assessed today?: No  Peripheral neuropathy: Yes    Patient Problem List and Family Medical History reviewed for relevant medical history, current medical status, and diabetes risk factors.    Vitals:  There were no vitals taken for this visit.  Estimated body mass index is 32.89 kg/m  as calculated from the following:    Height as of 11/9/22: 1.829 m (6').    Weight as of 10/3/22: 110 kg (242 lb 8.1 oz).   Last 3 BP:   BP Readings from Last 3 Encounters:   11/14/22 120/76   11/09/22 (!) 144/80   10/17/22 (!) 130/91       History   Smoking Status     Former     Packs/day: 0.50     Years: 20.00     Types: Cigarettes     Start date: 5/7/1987     Quit date: 2006   Smokeless Tobacco     Never       Labs:  Lab Results   Component Value Date    A1C 7.7 11/09/2022    A1C 8.5 06/25/2021     Lab Results   Component Value Date     11/09/2022     06/25/2021     Lab Results   Component Value Date    LDL  11/09/2022      Comment:      Cannot estimate LDL when triglyceride exceeds 400 mg/dL      11/09/2022    LDL  06/25/2021     Cannot estimate LDL when triglyceride exceeds 400 mg/dL    LDL 90 06/25/2021     HDL Cholesterol   Date Value Ref Range Status   06/25/2021 30 (L) >39 mg/dL Final     Direct Measure HDL   Date Value Ref Range Status   11/09/2022 37 (L) >=40 mg/dL Final   ]  GFR Estimate   Date Value Ref Range Status   11/09/2022 41 (L) >60 mL/min/1.73m2 Final     Comment:     Effective December 21, 2021 eGFRcr in adults is calculated using the 2021 CKD-EPI creatinine equation which includes age and gender (Bridgette et al., NEJM, DOI: 10.1056/HUBZqk3173281)   06/25/2021 52 (L) >60 mL/min/[1.73_m2] Final     Comment:     Non  GFR Calc  Starting 12/18/2018, serum creatinine based estimated GFR (eGFR) will be   calculated using the Chronic Kidney Disease Epidemiology Collaboration   (CKD-EPI) equation.       GFR Estimate If Black   Date Value Ref Range Status   06/25/2021 60 (L) >60 mL/min/[1.73_m2] Final     Comment:      GFR Calc  Starting 12/18/2018, serum creatinine based estimated GFR (eGFR) will be   calculated using the Chronic Kidney Disease Epidemiology Collaboration   (CKD-EPI) equation.       Lab Results   Component Value Date    CR 1.90 11/09/2022    CR 1.51 06/25/2021     No results found for: MICROALBUMIN    Healthy Eating:  Healthy Eating Assessed Today: No  Cultural/Hindu diet restrictions?: No  Meal planning/habits: Smaller portions  Beverages: Coffee, Water (coffee with sweetener in AM, otherwise black )  Has patient met with a dietitian in the past?: Yes    Being Active:  Being Active Assessed Today: Yes  Exercise:: Unable to exercise (Getting fitted for prosthesis soon and hopes to increase exercise)  Barrier to exercise: Physical limitation    Monitoring:  Monitoring Assessed Today: Yes  Did patient bring glucose meter to appointment? : Yes  Blood Glucose Meter: CGM  Times checking blood sugar at home (number): 5+  Times checking blood sugar at home  (per): Day  Blood glucose trend: Fluctuating    See above.    Taking Medications:  Diabetes Medication(s)     Dipeptidyl Peptidase-4 (DPP-4) Inhibitors       JANUVIA 50 MG tablet    TAKE ONE TABLET BY MOUTH EVERY DAY    Insulin       insulin degludec (TRESIBA) 100 UNIT/ML pen    Inject 34 Units Subcutaneous 2 times daily     Insulin Lispro-aabc, 1 U Dial, (LYUMJEV KWIKPEN) 100 UNIT/ML SOPN    Inject 1-2 Units Subcutaneous 3 times daily (before meals) Use according sliding scale:   Premeal glucose 0-150 = 0 units, premeal glucose 150-200 =1 unit, premeal glucose > 200 = 2 units    Sodium-Glucose Co-Transporter 2 (SGLT2) Inhibitors       JARDIANCE 10 MG TABS tablet    TAKE ONE TABLET BY MOUTH EVERY DAY          Taking Medication Assessed Today: Yes  Current Treatments: Insulin Injections  Dose schedule: Pre-breakfast, Pre-dinner  Given by: Patient  Problems taking diabetes medications regularly?: No  Diabetes medication side effects?: No (occasional low blood sugar )    Problem Solving:  Problem Solving Assessed Today: Yes  Is the patient at risk for hypoglycemia?: Yes  Hypoglycemia Frequency: Rarely  Hypoglycemia Treatment: Other food  Patient carries a carbohydrate source: Yes  Medical ID: No  Does patient have glucagon emergency kit?: No  Is the patient at risk for DKA?: No  Does patient have severe weather/disaster plan for diabetes management?: No  Does patient have sick day plan for diabetes management?: No    Hypoglycemia symptoms  Dizziness or Light-Headedness: Yes  Nervousness/Anxiety: Yes  Sweats: Yes  Feeling shaky: Yes    Hypoglycemia Complications  Blackouts: Yes  Hospitalization: Yes  Required assistance: Yes    Reducing Risks:  Reducing Risks Assessed Today: No  Diabetes Risks: Age over 45 years  CAD Risks: Diabetes Mellitus, Male sex, Obesity, Hypertension  Has dilated eye exam at least once a year?: No  Sees dentist every 6 months?: Yes  Feet checked by healthcare provider in the last year?:  No    Healthy Coping:  Healthy Coping Assessed Today: Yes  Emotional response to diabetes: Concern for health and well-being, Acceptance  Informal Support system:: Significant other, Children  Stage of change: ACTION (Actively working towards change)  Support resources: None  Patient Activation Measure Survey Score:  No flowsheet data found.      Care Plan and Education Provided:  Care Plan: Diabetes   Updates made by Adrianna Peterson RD since 11/21/2022 12:00 AM      Problem: Diabetes Self-Management Education Needed to Optimize Self-Care Behaviors       Goal: Monitoring - monitor glucose and ketones as directed    This Visit's Progress: 100%   Recent Progress: 0%   Note:    I will check my blood sugar before & 2 hrs after meals daily using Sj 3 continuous glucose monitor.      Task: Provide education on blood glucose monitoring (purpose, proper technique, frequency, glucose targets, interpreting results, when to use glucose control solution, sharps disposal) Completed 11/21/2022   Responsible User: Adrianna Peterson RD      Goal: Taking Medication - patient is consistently taking medications as directed       Task: Provide education on action of prescribed medication, including when to take and possible side effects Completed 11/21/2022   Responsible User: Adrianna Peterson RD      Task: Provide education on insulin and injectable diabetes medications, including administration, storage, site selection and rotation for injection sites Completed 11/21/2022   Responsible User: Adrianna Peterson RD      Goal: Problem Solving - know how to prevent and manage short-term diabetes complications       Task: Provide education on low blood glucose - causes, signs/symptoms, prevention, treatment, carrying a carbohydrate source at all times, and medical identification Completed 11/21/2022   Responsible User: Adrianna Peterson RD      Goal: Reducing Risks - know how to prevent and treat long-term diabetes complications       Task: Provide  education on Hemoglobin A1c - goals and relationship to blood glucose levels Completed 11/21/2022   Responsible User: Adrianna Peterson RD Elise Graham, RD, LD, [email or web address]     Time Spent: 23 minutes  Encounter Type: Individual    Any diabetes medication dose changes were made via the CDE Protocol per the patient's referring provider. A copy of this encounter was shared with the provider.

## 2022-11-21 NOTE — PROGRESS NOTES
Diabetes Self-Management Education & Support    Presents for: CGM Review    Type of service:  Video Visit    If the video visit is dropped, the video visit invitation should be resent by: Send to e-mail at: [email or web address]    Originating Location (pt. Location): Home  Distant Location (provider location): Home  Mode of Communication:  Video Conference via [employer]It    Video Start Time: 10:37a  Video End Time (time video stopped): 11:00a    How would patient like to obtain AVS? MyChart      Assessment Type:   REPORTS:        Pt verbalized understanding of concepts discussed and recommendations provided today.       Continue education with the following diabetes management concepts: Healthy Eating, Being Active, Monitoring, Taking Medication, Problem Solving, Reducing Risks and Healthy Coping    ASSESSMENT:  Ry just got his Sj 3 sensors recently. Was also started on sliding scale insulin with PCP a few weeks ago. Has been using some already. Reviewed accuracy of Sj 3 vs glucometer as he has been comparing results frequently. Discussed sticking to one vs the other (Sj 3), margin of error, when continuous glucose monitor is less accurate, and importance of listening to his body/knowing s/sx of hypoglycemia and treating appropriately.     Glucose Patterns & Trends:  Hyperglycemia, weekend- postmeal      PLAN    Monitor blood sugars closely with Sj 3 & reach out via [employer] if/when needing blood sugar trend review  Do not take sliding scale insulin closer than every 4 hours   Get A1C rechecked in February     Topics to cover at upcoming visits: Healthy Eating, Being Active, Monitoring, Taking Medication, Problem Solving, Reducing Risks and Healthy Coping    Follow-up: 2/16/23    See Care Plan for co-developed, patient-state behavior change goals.  AVS provided for patient today.    Education Materials Provided:  No new materials provided today      SUBJECTIVE/OBJECTIVE:  Presents for: CGM  Review  Accompanied by: Self  Diabetes education in the past 24mo: No  Focus of Visit: Monitoring, CGM  Type of CGM visit: Personal CGM Follow-up  Diabetes type: Type 2  Disease course: Improving  How confident are you filling out medical forms by yourself:: Not Assessed  Diabetes management related comments/concerns: Finally got the CGM after having PCP prescribed SSI  Transportation concerns: Yes  Difficulty affording diabetes medication?: Sometimes  Difficulty affording diabetes testing supplies?: Sometimes  Other concerns:: Physical impairment  Cultural Influences/Ethnic Background:  Choose not to answer      Diabetes Symptoms & Complications:  Neuropathy: Yes  Complications assessed today?: No  Peripheral neuropathy: Yes    Patient Problem List and Family Medical History reviewed for relevant medical history, current medical status, and diabetes risk factors.    Vitals:  There were no vitals taken for this visit.  Estimated body mass index is 32.89 kg/m  as calculated from the following:    Height as of 11/9/22: 1.829 m (6').    Weight as of 10/3/22: 110 kg (242 lb 8.1 oz).   Last 3 BP:   BP Readings from Last 3 Encounters:   11/14/22 120/76   11/09/22 (!) 144/80   10/17/22 (!) 130/91       History   Smoking Status     Former     Packs/day: 0.50     Years: 20.00     Types: Cigarettes     Start date: 5/7/1987     Quit date: 2006   Smokeless Tobacco     Never       Labs:  Lab Results   Component Value Date    A1C 7.7 11/09/2022    A1C 8.5 06/25/2021     Lab Results   Component Value Date     11/09/2022     06/25/2021     Lab Results   Component Value Date    LDL  11/09/2022      Comment:      Cannot estimate LDL when triglyceride exceeds 400 mg/dL     11/09/2022    LDL  06/25/2021     Cannot estimate LDL when triglyceride exceeds 400 mg/dL    LDL 90 06/25/2021     HDL Cholesterol   Date Value Ref Range Status   06/25/2021 30 (L) >39 mg/dL Final     Direct Measure HDL   Date Value Ref Range  Status   11/09/2022 37 (L) >=40 mg/dL Final   ]  GFR Estimate   Date Value Ref Range Status   11/09/2022 41 (L) >60 mL/min/1.73m2 Final     Comment:     Effective December 21, 2021 eGFRcr in adults is calculated using the 2021 CKD-EPI creatinine equation which includes age and gender (Bridgette tracy al., [place], DOI: 10.1056/AJKBsb6989567)   06/25/2021 52 (L) >60 mL/min/[1.73_m2] Final     Comment:     Non  GFR Calc  Starting 12/18/2018, serum creatinine based estimated GFR (eGFR) will be   calculated using the Chronic Kidney Disease Epidemiology Collaboration   (CKD-EPI) equation.       GFR Estimate If Black   Date Value Ref Range Status   06/25/2021 60 (L) >60 mL/min/[1.73_m2] Final     Comment:      GFR Calc  Starting 12/18/2018, serum creatinine based estimated GFR (eGFR) will be   calculated using the Chronic Kidney Disease Epidemiology Collaboration   (CKD-EPI) equation.       Lab Results   Component Value Date    CR 1.90 11/09/2022    CR 1.51 06/25/2021     No results found for: MICROALBUMIN    Healthy Eating:  Healthy Eating Assessed Today: No  Cultural/Orthodox diet restrictions?: No  Meal planning/habits: Smaller portions  Beverages: Coffee, Water (coffee with sweetener in AM, otherwise black )  Has patient met with a dietitian in the past?: Yes    Being Active:  Being Active Assessed Today: Yes  Exercise:: Unable to exercise (Getting fitted for prosthesis soon and hopes to increase exercise)  Barrier to exercise: Physical limitation    Monitoring:  Monitoring Assessed Today: Yes  Did patient bring glucose meter to appointment? : Yes  Blood Glucose Meter: CGM  Times checking blood sugar at home (number): 5+  Times checking blood sugar at home (per): Day  Blood glucose trend: Fluctuating    See above.    Taking Medications:  Diabetes Medication(s)     Dipeptidyl Peptidase-4 (DPP-4) Inhibitors       JANUVIA 50 MG tablet    TAKE ONE TABLET BY MOUTH EVERY DAY    Insulin       insulin  degludec (TRESIBA) 100 UNIT/ML pen    Inject 34 Units Subcutaneous 2 times daily     Insulin Lispro-aabc, 1 U Dial, (LYUMJEV KWIKPEN) 100 UNIT/ML SOPN    Inject 1-2 Units Subcutaneous 3 times daily (before meals) Use according sliding scale:   Premeal glucose 0-150 = 0 units, premeal glucose 150-200 =1 unit, premeal glucose > 200 = 2 units    Sodium-Glucose Co-Transporter 2 (SGLT2) Inhibitors       JARDIANCE 10 MG TABS tablet    TAKE ONE TABLET BY MOUTH EVERY DAY          Taking Medication Assessed Today: Yes  Current Treatments: Insulin Injections  Dose schedule: Pre-breakfast, Pre-dinner  Given by: Patient  Problems taking diabetes medications regularly?: No  Diabetes medication side effects?: No (occasional low blood sugar )    Problem Solving:  Problem Solving Assessed Today: Yes  Is the patient at risk for hypoglycemia?: Yes  Hypoglycemia Frequency: Rarely  Hypoglycemia Treatment: Other food  Patient carries a carbohydrate source: Yes  Medical ID: No  Does patient have glucagon emergency kit?: No  Is the patient at risk for DKA?: No  Does patient have severe weather/disaster plan for diabetes management?: No  Does patient have sick day plan for diabetes management?: No    Hypoglycemia symptoms  Dizziness or Light-Headedness: Yes  Nervousness/Anxiety: Yes  Sweats: Yes  Feeling shaky: Yes    Hypoglycemia Complications  Blackouts: Yes  Hospitalization: Yes  Required assistance: Yes    Reducing Risks:  Reducing Risks Assessed Today: No  Diabetes Risks: Age over 45 years  CAD Risks: Diabetes Mellitus, Male sex, Obesity, Hypertension  Has dilated eye exam at least once a year?: No  Sees dentist every 6 months?: Yes  Feet checked by healthcare provider in the last year?: No    Healthy Coping:  Healthy Coping Assessed Today: Yes  Emotional response to diabetes: Concern for health and well-being, Acceptance  Informal Support system:: Significant other, Children  Stage of change: ACTION (Actively working towards  change)  Support resources: None  Patient Activation Measure Survey Score:  No flowsheet data found.      Care Plan and Education Provided:  Care Plan: Diabetes   Updates made by Adrianna Peterson RD since 11/21/2022 12:00 AM      Problem: Diabetes Self-Management Education Needed to Optimize Self-Care Behaviors       Goal: Monitoring - monitor glucose and ketones as directed    This Visit's Progress: 100%   Recent Progress: 0%   Note:    I will check my blood sugar before & 2 hrs after meals daily using Sj 3 continuous glucose monitor.      Task: Provide education on blood glucose monitoring (purpose, proper technique, frequency, glucose targets, interpreting results, when to use glucose control solution, sharps disposal) Completed 11/21/2022   Responsible User: Adrianna Peterson RD      Goal: Taking Medication - patient is consistently taking medications as directed       Task: Provide education on action of prescribed medication, including when to take and possible side effects Completed 11/21/2022   Responsible User: Adrianna Peterson RD      Task: Provide education on insulin and injectable diabetes medications, including administration, storage, site selection and rotation for injection sites Completed 11/21/2022   Responsible User: Adrianna Peterson RD      Goal: Problem Solving - know how to prevent and manage short-term diabetes complications       Task: Provide education on low blood glucose - causes, signs/symptoms, prevention, treatment, carrying a carbohydrate source at all times, and medical identification Completed 11/21/2022   Responsible User: Adrianna Peterson RD      Goal: Reducing Risks - know how to prevent and treat long-term diabetes complications       Task: Provide education on Hemoglobin A1c - goals and relationship to blood glucose levels Completed 11/21/2022   Responsible User: Adrianna Peterson RD Elise Graham, RD, RONI, [facility]     Time Spent: 23 minutes  Encounter Type: Individual    Any  diabetes medication dose changes were made via the CDE Protocol per the patient's referring provider. A copy of this encounter was shared with the provider.

## 2022-12-13 ENCOUNTER — MYC MEDICAL ADVICE (OUTPATIENT)
Dept: FAMILY MEDICINE | Facility: CLINIC | Age: 57
End: 2022-12-13

## 2022-12-13 DIAGNOSIS — Z89.512 STATUS POST BELOW-KNEE AMPUTATION OF BOTH LOWER EXTREMITIES (H): Primary | ICD-10-CM

## 2022-12-13 DIAGNOSIS — Z89.511 STATUS POST BELOW-KNEE AMPUTATION OF BOTH LOWER EXTREMITIES (H): Primary | ICD-10-CM

## 2022-12-14 ENCOUNTER — NURSE TRIAGE (OUTPATIENT)
Dept: NURSING | Facility: CLINIC | Age: 57
End: 2022-12-14

## 2022-12-14 NOTE — TELEPHONE ENCOUNTER
Can you please call this person at [facility] and find out what need be done?  If an appointment is needed, help him schedule one please.

## 2022-12-14 NOTE — TELEPHONE ENCOUNTER
Called Estefania FERNANDEZ at below number     Patient Contact    Attempt # 1    Was call answered?  No.  Left a detailed message on voicemail with information for Estefania to call back.    Yvette WATKINS, Triage RN  [facility]

## 2022-12-14 NOTE — TELEPHONE ENCOUNTER
See MyChart encounter from 12/13/2022.     Closing this encounter.       Krystal Washburn, RN BSN MSN  [facility]

## 2022-12-14 NOTE — TELEPHONE ENCOUNTER
Estefania with [employer] is calling and states that order for Ry to be sent to [facility] and fax number is [phone].  Order needs to evaluate and treat for new prosthetic.  Please fax order to [facility].      Reason for Disposition    Information only question and nurse able to answer    Additional Information    Negative: Nursing judgment    Negative: Nursing judgment    Negative: Nursing judgment    Negative: Nursing judgment    Protocols used: INFORMATION ONLY CALL - NO TRIAGE-A-OH

## 2022-12-14 NOTE — TELEPHONE ENCOUNTER
Provider, please see message below from Estefania with [facility].     Thank you!      Krystal Washburn, RN BSN MSN  [facility]

## 2022-12-22 DIAGNOSIS — E11.42 TYPE 2 DIABETES MELLITUS WITH DIABETIC POLYNEUROPATHY, WITHOUT LONG-TERM CURRENT USE OF INSULIN (H): ICD-10-CM

## 2022-12-22 RX ORDER — SITAGLIPTIN 50 MG/1
TABLET, FILM COATED ORAL
Qty: 90 TABLET | Refills: 3 | Status: SHIPPED | OUTPATIENT
Start: 2022-12-22 | End: 2023-08-02

## 2022-12-22 NOTE — TELEPHONE ENCOUNTER
DME order faxed as requested.    Estefania Koch with [facility].  fax # [phone]

## 2022-12-26 ENCOUNTER — MEDICAL CORRESPONDENCE (OUTPATIENT)
Dept: HEALTH INFORMATION MANAGEMENT | Facility: CLINIC | Age: 57
End: 2022-12-26

## 2023-01-04 ENCOUNTER — TELEPHONE (OUTPATIENT)
Dept: FAMILY MEDICINE | Facility: CLINIC | Age: 58
End: 2023-01-04

## 2023-01-04 DIAGNOSIS — E11.42 TYPE 2 DIABETES MELLITUS WITH DIABETIC POLYNEUROPATHY, WITHOUT LONG-TERM CURRENT USE OF INSULIN (H): Primary | ICD-10-CM

## 2023-01-04 RX ORDER — INSULIN DETEMIR 100 [IU]/ML
34 INJECTION, SOLUTION SUBCUTANEOUS AT BEDTIME
Qty: 15 ML | Refills: 11 | Status: SHIPPED | OUTPATIENT
Start: 2023-01-04 | End: 2023-01-06

## 2023-01-04 NOTE — TELEPHONE ENCOUNTER
Tresiba insulin is on back order. What is the alternative ?     He has not been on the Tresiba for 3 days .     Continues glucose monitor  Reading for blood sugars are 250-350.      He is under 200 about 25 % of the time.     Will need alternative medication called into pharmacy.     Alex Lema RN  -[facility]

## 2023-01-06 ENCOUNTER — MYC MEDICAL ADVICE (OUTPATIENT)
Dept: FAMILY MEDICINE | Facility: CLINIC | Age: 58
End: 2023-01-06

## 2023-01-06 DIAGNOSIS — E11.42 TYPE 2 DIABETES MELLITUS WITH DIABETIC POLYNEUROPATHY, WITHOUT LONG-TERM CURRENT USE OF INSULIN (H): ICD-10-CM

## 2023-01-06 RX ORDER — INSULIN DETEMIR 100 [IU]/ML
34 INJECTION, SOLUTION SUBCUTANEOUS 2 TIMES DAILY
Qty: 15 ML | Refills: 11 | Status: SHIPPED | OUTPATIENT
Start: 2023-01-06 | End: 2023-02-16 | Stop reason: ALTCHOICE

## 2023-01-07 ENCOUNTER — HEALTH MAINTENANCE LETTER (OUTPATIENT)
Age: 58
End: 2023-01-07

## 2023-02-13 ENCOUNTER — OFFICE VISIT (OUTPATIENT)
Dept: OTHER | Facility: CLINIC | Age: 58
End: 2023-02-13
Attending: SURGERY
Payer: COMMERCIAL

## 2023-02-13 ENCOUNTER — LAB (OUTPATIENT)
Dept: LAB | Facility: CLINIC | Age: 58
End: 2023-02-13
Payer: COMMERCIAL

## 2023-02-13 VITALS — DIASTOLIC BLOOD PRESSURE: 105 MMHG | OXYGEN SATURATION: 99 % | HEART RATE: 107 BPM | SYSTOLIC BLOOD PRESSURE: 165 MMHG

## 2023-02-13 DIAGNOSIS — E11.42 TYPE 2 DIABETES MELLITUS WITH DIABETIC POLYNEUROPATHY, WITHOUT LONG-TERM CURRENT USE OF INSULIN (H): ICD-10-CM

## 2023-02-13 DIAGNOSIS — Z89.511 S/P BKA (BELOW KNEE AMPUTATION) BILATERAL (H): Primary | ICD-10-CM

## 2023-02-13 DIAGNOSIS — N18.30 STAGE 3 CHRONIC KIDNEY DISEASE, UNSPECIFIED WHETHER STAGE 3A OR 3B CKD (H): Primary | ICD-10-CM

## 2023-02-13 DIAGNOSIS — N18.30 STAGE 3 CHRONIC KIDNEY DISEASE, UNSPECIFIED WHETHER STAGE 3A OR 3B CKD (H): ICD-10-CM

## 2023-02-13 DIAGNOSIS — Z89.512 S/P BKA (BELOW KNEE AMPUTATION) BILATERAL (H): Primary | ICD-10-CM

## 2023-02-13 LAB
HBA1C MFR BLD: 10.4 % (ref 0–5.6)
IRON BINDING CAPACITY (ROCHE): 265 UG/DL (ref 240–430)
IRON SATN MFR SERPL: 32 % (ref 15–46)
IRON SERPL-MCNC: 86 UG/DL (ref 61–157)

## 2023-02-13 PROCEDURE — 83540 ASSAY OF IRON: CPT

## 2023-02-13 PROCEDURE — G0463 HOSPITAL OUTPT CLINIC VISIT: HCPCS

## 2023-02-13 PROCEDURE — 99212 OFFICE O/P EST SF 10 MIN: CPT | Performed by: SURGERY

## 2023-02-13 PROCEDURE — 83550 IRON BINDING TEST: CPT

## 2023-02-13 PROCEDURE — 83036 HEMOGLOBIN GLYCOSYLATED A1C: CPT

## 2023-02-13 PROCEDURE — 85018 HEMOGLOBIN: CPT

## 2023-02-13 PROCEDURE — 36415 COLL VENOUS BLD VENIPUNCTURE: CPT

## 2023-02-13 NOTE — PROGRESS NOTES
Patient is here to discuss follow up    BP (!) 165/105 (BP Location: Left arm, Patient Position: Chair, Cuff Size: Adult Regular)   Pulse 107   SpO2 99%     Questions patient would like addressed today are: N/A.    Refills are needed: No    Has homecare services and agency name:  Nikk[facility] NAVARRO

## 2023-02-14 LAB — HGB BLD-MCNC: 14.1 G/DL (ref 13.3–17.7)

## 2023-02-15 NOTE — RESULT ENCOUNTER NOTE
The following letter pertains to your most recent diagnostic tests:      The good news is that your hemoglobin and iron stores are now back to normal.    The bad news is your hemoglobin A1c (diabetes blood test) demonstrates uncontrolled diabetes.        Follow up: Schedule an appointment with me as soon as you can to discuss how to manage your diabetes better.      Sincerely,    Dr. Wing

## 2023-02-16 ENCOUNTER — VIRTUAL VISIT (OUTPATIENT)
Dept: EDUCATION SERVICES | Facility: CLINIC | Age: 58
End: 2023-02-16
Payer: COMMERCIAL

## 2023-02-16 DIAGNOSIS — E11.42 TYPE 2 DIABETES MELLITUS WITH DIABETIC POLYNEUROPATHY, WITHOUT LONG-TERM CURRENT USE OF INSULIN (H): Primary | ICD-10-CM

## 2023-02-16 DIAGNOSIS — E11.42 TYPE 2 DIABETES MELLITUS WITH DIABETIC POLYNEUROPATHY, WITHOUT LONG-TERM CURRENT USE OF INSULIN (H): ICD-10-CM

## 2023-02-16 PROCEDURE — 98967 PH1 ASSMT&MGMT NQHP 11-20: CPT | Mod: VID | Performed by: DIETITIAN, REGISTERED

## 2023-02-16 RX ORDER — INSULIN DEGLUDEC 100 U/ML
34 INJECTION, SOLUTION SUBCUTANEOUS 2 TIMES DAILY
Qty: 30 ML | Refills: 11 | Status: ON HOLD | OUTPATIENT
Start: 2023-02-16 | End: 2023-07-28

## 2023-02-16 RX ORDER — INSULIN LISPRO-AABC 100 [IU]/ML
5 INJECTION, SOLUTION SUBCUTANEOUS
Qty: 30 ML | Refills: 1 | Status: ON HOLD | OUTPATIENT
Start: 2023-02-16 | End: 2023-07-28

## 2023-02-16 NOTE — LETTER
2/16/2023         RE: Ry Horner  [address]  [place] [address]        Dear Colleague,    Thank you for referring your patient, Ry Horner, to the [facility] [place]. Please see a copy of my visit note below.    Diabetes Education Follow-up    Type of service:  Video Visit    If the video visit is dropped, the video visit invitation should be resent by: Send to e-mail at: [email or web address]    Originating Location (pt. Location): Home  Distant Location (provider location): Home  Mode of Communication:  Video Conference via [employer] Start Time: 10:33a  Video End Time (time video stopped): 10:53a    How would patient like to obtain AVS? MyChart      Subjective/Objective:    Ry Horner sent in blood glucose log for review, via [employer]. Last date of communication was: 11/21/22.    Diabetes is being managed with   Lifestyle (diet/activity), Diabetes Medications   Diabetes Medication(s)     Dipeptidyl Peptidase-4 (DPP-4) Inhibitors       JANUVIA 50 MG tablet    TAKE ONE TABLET BY MOUTH EVERY DAY    Insulin       insulin detemir (LEVEMIR FLEXTOUCH) 100 UNIT/ML pen    Inject 34 Units Subcutaneous 2 times daily     Patient taking differently: Inject 50 Units Subcutaneous 2 times daily     Insulin Lispro-aabc, 1 U Dial, (LYUMJEV KWIKPEN) 100 UNIT/ML SOPN    Inject 1-2 Units Subcutaneous 3 times daily (before meals) Use according sliding scale:   Premeal glucose 0-150 = 0 units, premeal glucose 150-200 =1 unit, premeal glucose > 200 = 2 units     Patient taking differently: Inject 5 Units Subcutaneous 3 times daily (before meals) Use according sliding scale:   Premeal glucose 0-150 = 0 units, premeal glucose 150-200 =1 unit, premeal glucose > 200 = 2 units    Sodium-Glucose Co-Transporter 2 (SGLT2) Inhibitors       JARDIANCE 10 MG TABS tablet    TAKE ONE TABLET BY MOUTH EVERY DAY          BG/Food Log:             Assessment:    Patient was taken off of Tresiba because  his pharmacy stated it was on back order. He states he also asked [facility] if they had any and they didn't. He was switched to levemir with dose of 34 units BID and lyumjev sliding scale insulin, consistently taking 5 units with meals. [employer] reports show very high blood sugars and he feels the levemir is not working well for him at all. Reached out to [facility] PharmD who confirms they do have Tresiba U100 & U200 in stock. Agree that patient will likely do better on this. Will reach out to PCP for sign off and get new orders sent to [facility], where patient also fills his CGM supplies. He would benefit from consistent base meal time dose + sliding scale insulin as well, with frequent hyperglycemia. He is agreeable to doing this. Will get sign off from PCP.     Plan/Response:  See Patient Instructions for co-developed, patient-stated behavior change goals.  Recommend switch back to Tresiba U100 - 34 units BID and dicsontinue levemir due to poor tolerance  Increase Lyumjev to base dose 5 units with meals + sliding scale insulin 1u:50 >150 mg/dL  Will reach out to PCP for sign off in separate encounter.      Adrianna Peterson, RD, LD, [facility]     Time spent: 20 minutes     Any diabetes medication dose changes were made via the CDE Protocol and Collaborative Practice Agreement with the patient's referring provider. A copy of this encounter was shared with the provider.

## 2023-02-16 NOTE — TELEPHONE ENCOUNTER
Dr. Wing,   See today's virtual visit encounter. Ry continues to see very high blood sugars, does not think the levemir is working well for him. [facility] who confirmed they have Tresiba U100 in stock. I think we should change back to this and discontinue levemir. I've pended orders for this. I've also pended orders for set dose Lyumjev at meals - 5 units + sliding scale insulin. His numbers are quite high so I think this will help him get things in better control.     Please sign off if you agree or provide an alternative plan.   Thanks,   Adrianna Peterson, RD, LD, [facility]

## 2023-02-16 NOTE — PROGRESS NOTES
Diabetes Education Follow-up    Type of service:  Video Visit    If the video visit is dropped, the video visit invitation should be resent by: Send to e-mail at: [email or web address]    Originating Location (pt. Location): Home  Distant Location (provider location): Home  Mode of Communication:  Video Conference via vocaltap    Video Start Time: 10:33a  Video End Time (time video stopped): 10:53a    How would patient like to obtain AVS? MyChart      Subjective/Objective:    Ry Horner sent in blood glucose log for review, via [employer]. Last date of communication was: 11/21/22.    Diabetes is being managed with   Lifestyle (diet/activity), Diabetes Medications   Diabetes Medication(s)     Dipeptidyl Peptidase-4 (DPP-4) Inhibitors       JANUVIA 50 MG tablet    TAKE ONE TABLET BY MOUTH EVERY DAY    Insulin       insulin detemir (LEVEMIR FLEXTOUCH) 100 UNIT/ML pen    Inject 34 Units Subcutaneous 2 times daily     Patient taking differently: Inject 50 Units Subcutaneous 2 times daily     Insulin Lispro-aabc, 1 U Dial, (LYUMJEV KWIKPEN) 100 UNIT/ML SOPN    Inject 1-2 Units Subcutaneous 3 times daily (before meals) Use according sliding scale:   Premeal glucose 0-150 = 0 units, premeal glucose 150-200 =1 unit, premeal glucose > 200 = 2 units     Patient taking differently: Inject 5 Units Subcutaneous 3 times daily (before meals) Use according sliding scale:   Premeal glucose 0-150 = 0 units, premeal glucose 150-200 =1 unit, premeal glucose > 200 = 2 units    Sodium-Glucose Co-Transporter 2 (SGLT2) Inhibitors       JARDIANCE 10 MG TABS tablet    TAKE ONE TABLET BY MOUTH EVERY DAY          BG/Food Log:             Assessment:    Patient was taken off of [facility] because his pharmacy stated it was on back order. He states he also asked [facility] if they had any and they didn't. He was switched to levemir with dose of 34 units BID and lyumjev sliding scale insulin, consistently taking 5 units with meals. [employer]  reports show very high blood sugars and he feels the levemir is not working well for him at all. Reached out to [facility] PharmD who confirms they do have Tresiba U100 & U200 in stock. Agree that patient will likely do better on this. Will reach out to PCP for sign off and get new orders sent to [facility], where patient also fills his CGM supplies. He would benefit from consistent base meal time dose + sliding scale insulin as well, with frequent hyperglycemia. He is agreeable to doing this. Will get sign off from PCP.     Plan/Response:  See Patient Instructions for co-developed, patient-stated behavior change goals.  Recommend switch back to Tresiba U100 - 34 units BID and dicsontinue levemir due to poor tolerance  Increase Lyumjev to base dose 5 units with meals + sliding scale insulin 1u:50 >150 mg/dL  Will reach out to PCP for sign off in separate encounter.      Adrianna Peterson, RD, LD, [facility]     Time spent: 20 minutes     Any diabetes medication dose changes were made via the CDE Protocol and Collaborative Practice Agreement with the patient's referring provider. A copy of this encounter was shared with the provider.

## 2023-02-17 NOTE — PROGRESS NOTES
Mr. Ry Horner is a very pleasant 57-year-old gentleman with bilateral below-knee amputations.  On September 27, 2022 he underwent a guillotine amputation and then we proceeded with a completion on 29 September 2022.  He has started wearing the prosthesis and has no problems wearing the prosthesis.  He is doing quite well with his rehabilitation from that and is fully active.    On examination his stump site has healed very nicely and I do not see any pressure points on that.    I have reassured him of the same.  He can follow-up as needed.

## 2023-03-15 ENCOUNTER — VIRTUAL VISIT (OUTPATIENT)
Dept: EDUCATION SERVICES | Facility: CLINIC | Age: 58
End: 2023-03-15
Payer: COMMERCIAL

## 2023-03-15 DIAGNOSIS — E11.42 TYPE 2 DIABETES MELLITUS WITH DIABETIC POLYNEUROPATHY, WITHOUT LONG-TERM CURRENT USE OF INSULIN (H): Primary | ICD-10-CM

## 2023-03-15 PROCEDURE — 98967 PH1 ASSMT&MGMT NQHP 11-20: CPT | Mod: VID | Performed by: DIETITIAN, REGISTERED

## 2023-03-15 NOTE — PROGRESS NOTES
Diabetes Education Follow-up    Type of service:  Video Visit    If the video visit is dropped, the video visit invitation should be resent by: Send to e-mail at: [email or web address]    Originating Location (pt. Location): Home  Distant Location (provider location): [facility]  Mode of Communication:  Video Conference via [employer]    Video Start Time: 9:32a  Video End Time (time video stopped): 9:48a    How would patient like to obtain AVS? MyChart      Subjective/Objective:    Ry Horner sent in blood glucose log for review via [employer]. Last date of communication was: 2/16/23.    Diabetes is being managed with   Lifestyle (diet/activity), Diabetes Medications   Diabetes Medication(s)     Dipeptidyl Peptidase-4 (DPP-4) Inhibitors       JANUVIA 50 MG tablet    TAKE ONE TABLET BY MOUTH EVERY DAY    Insulin       insulin degludec (TRESIBA FLEXTOUCH) 100 UNIT/ML pen    Inject 34 Units Subcutaneous 2 times daily     Insulin Lispro-aabc, 1 U Dial, (LYUMJEV KWIKPEN) 100 UNIT/ML SOPN    Inject 5 Units Subcutaneous 3 times daily (before meals) Take 5 units breakfast, 5 units lunch, 5 units dinner + 1u/50mg/dL>150 mg/dL +2 u prime before each dose-total of 30 u/day    Sodium-Glucose Co-Transporter 2 (SGLT2) Inhibitors       JARDIANCE 10 MG TABS tablet    TAKE ONE TABLET BY MOUTH EVERY DAY          BG/Food Log:           Assessment:    Significant improvement with TIR and avergae blood sugar since last visit. Patient has switched back to Tresiba and has slowly increased dose - now taking 40 units BID at breakfast & dinner. He is also taking Lyumjev 5 units TID + sliding scale insulin. He will sometimes take insulin at meal time and then get distracted and forget to eat. This has contributed to some low blood sugars. He also occasionally will forget to take his lunchtime Lyumjev dose and then will see resulting hyperglycemia.  If he remembers doses, he feels his numbers are good and dosing  is appropriate. He is motivated to continue to work on habit forming with all of this.     Plan/Response:  Could consider switching to Tresiba U200 and consolidating to once daily dosing - 80 unit(s) at HS. Patient declines making this change today.   Continue current med dosing & lifestyle adjsutments  Follow up as needed or annually     Adrianna Peterson RD, LD, [facility]     Time spent: 16 minutes     Any diabetes medication dose changes were made via the CDE Protocol and Collaborative Practice Agreement with the patient's referring provider. A copy of this encounter was shared with the provider.

## 2023-03-15 NOTE — LETTER
3/15/2023         RE: Ry Horner  [address]  [facility] [address]        Dear Colleague,    Thank you for referring your patient, Ry Horner, to the [facility]. Please see a copy of my visit note below.    Diabetes Education Follow-up    Type of service:  Video Visit    If the video visit is dropped, the video visit invitation should be resent by: Send to e-mail at: [email or web address]    Originating Location (pt. Location): Home  Distant Location (provider location): [facility]  Mode of Communication:  Video Conference via [employer]    Video Start Time: 9:32a  Video End Time (time video stopped): 9:48a    How would patient like to obtain AVS? MyChart      Subjective/Objective:    Ry Horner sent in blood glucose log for review via [employer]. Last date of communication was: 2/16/23.    Diabetes is being managed with   Lifestyle (diet/activity), Diabetes Medications   Diabetes Medication(s)     Dipeptidyl Peptidase-4 (DPP-4) Inhibitors       JANUVIA 50 MG tablet    TAKE ONE TABLET BY MOUTH EVERY DAY    Insulin       insulin degludec (TRESIBA FLEXTOUCH) 100 UNIT/ML pen    Inject 34 Units Subcutaneous 2 times daily     Insulin Lispro-aabc, 1 U Dial, (LYUMJEV KWIKPEN) 100 UNIT/ML SOPN    Inject 5 Units Subcutaneous 3 times daily (before meals) Take 5 units breakfast, 5 units lunch, 5 units dinner + 1u/50mg/dL>150 mg/dL +2 u prime before each dose-total of 30 u/day    Sodium-Glucose Co-Transporter 2 (SGLT2) Inhibitors       JARDIANCE 10 MG TABS tablet    TAKE ONE TABLET BY MOUTH EVERY DAY          BG/Food Log:           Assessment:    Significant improvement with TIR and avergae blood sugar since last visit. Patient has switched back to Tresiba and has slowly increased dose - now taking 40 units BID at breakfast & dinner. He is also taking Lyumjev 5 units TID + sliding scale insulin. He will sometimes take insulin at meal time and then get  distracted and forget to eat. This has contributed to some low blood sugars. He also occasionally will forget to take his lunchtime Lyumjev dose and then will see resulting hyperglycemia.  If he remembers doses, he feels his numbers are good and dosing is appropriate. He is motivated to continue to work on habit forming with all of this.     Plan/Response:  Could consider switching to Tresiba U200 and consolidating to once daily dosing - 80 unit(s) at HS. Patient declines making this change today.   Continue current med dosing & lifestyle adjsutments  Follow up as needed or annually     Adrianna Peterson RD, LD, [facility]     Time spent: 16 minutes     Any diabetes medication dose changes were made via the CDE Protocol and Collaborative Practice Agreement with the patient's referring provider. A copy of this encounter was shared with the provider.

## 2023-03-17 ENCOUNTER — TELEPHONE (OUTPATIENT)
Dept: FAMILY MEDICINE | Facility: CLINIC | Age: 58
End: 2023-03-17

## 2023-03-17 NOTE — TELEPHONE ENCOUNTER
Please route determinations to the Pharm Diabetes pool ([id number]).      Thank you!    Diabetes Care Services Team   [facility]  [address] [place] [address]

## 2023-03-21 NOTE — TELEPHONE ENCOUNTER
Central Prior Authorization Team   Phone: [phone]      PA Initiation    Medication: Continuous Blood Gluc Sensor (FREESTYLE CRISTINA 3 SENSOR) [place] - [place] INITIATED  Insurance Company: [employer] - Phone [phone] Fax [phone]  Pharmacy Filling the Rx: [facility] - [place], [place] - [address]  Filling Pharmacy Phone: [phone]  Filling Pharmacy Fax:    Start Date: 3/21/2023

## 2023-03-23 NOTE — TELEPHONE ENCOUNTER
Prior Authorization Follow-Up  Per Yecenia at [facility] - received request 3/21, turnaround time of 3/25.

## 2023-03-29 NOTE — TELEPHONE ENCOUNTER
Prior Authorization Approval    Authorization Effective Date: 3/21/2023  Authorization Expiration Date: 3/27/2024  Medication: Continuous Blood Gluc Sensor (FREESTYLE CRISTINA 3 SENSOR) [facility] [place]  Insurance Company: "[employer]" - Phone [phone] Fax [phone]  Which Pharmacy is filling the prescription (Not needed for infusion/clinic administered): [facility] - [place], [place] - [address]  Pharmacy Notified: Yes  Patient Notified: Yes (pharmacy will contact patient to set up delivery)

## 2023-04-22 ENCOUNTER — HEALTH MAINTENANCE LETTER (OUTPATIENT)
Age: 58
End: 2023-04-22

## 2023-04-30 DIAGNOSIS — E78.5 HYPERLIPIDEMIA LDL GOAL <100: ICD-10-CM

## 2023-05-01 RX ORDER — EZETIMIBE 10 MG/1
TABLET ORAL
Qty: 90 TABLET | Refills: 1 | Status: SHIPPED | OUTPATIENT
Start: 2023-05-01 | End: 2023-10-20

## 2023-05-14 DIAGNOSIS — E11.42 TYPE 2 DIABETES MELLITUS WITH DIABETIC POLYNEUROPATHY, WITHOUT LONG-TERM CURRENT USE OF INSULIN (H): ICD-10-CM

## 2023-05-15 RX ORDER — PEN NEEDLE, DIABETIC 32GX 5/32"
NEEDLE, DISPOSABLE MISCELLANEOUS
Qty: 180 EACH | Refills: 11 | Status: SHIPPED | OUTPATIENT
Start: 2023-05-15 | End: 2024-07-01

## 2023-05-19 DIAGNOSIS — E11.42 TYPE 2 DIABETES MELLITUS WITH DIABETIC POLYNEUROPATHY, WITHOUT LONG-TERM CURRENT USE OF INSULIN (H): ICD-10-CM

## 2023-05-19 RX ORDER — BLOOD-GLUCOSE SENSOR
1 EACH MISCELLANEOUS
Qty: 2 EACH | Refills: 5 | Status: SHIPPED | OUTPATIENT
Start: 2023-05-19 | End: 2023-11-14

## 2023-06-24 ENCOUNTER — APPOINTMENT (OUTPATIENT)
Dept: CT IMAGING | Facility: CLINIC | Age: 58
DRG: 463 | End: 2023-06-24
Payer: COMMERCIAL

## 2023-06-24 ENCOUNTER — HOSPITAL ENCOUNTER (INPATIENT)
Facility: CLINIC | Age: 58
LOS: 34 days | Discharge: HOME | DRG: 463 | End: 2023-07-28
Attending: EMERGENCY MEDICINE | Admitting: HOSPITALIST
Payer: COMMERCIAL

## 2023-06-24 ENCOUNTER — APPOINTMENT (OUTPATIENT)
Dept: ULTRASOUND IMAGING | Facility: CLINIC | Age: 58
DRG: 463 | End: 2023-06-24
Attending: EMERGENCY MEDICINE
Payer: COMMERCIAL

## 2023-06-24 ENCOUNTER — APPOINTMENT (OUTPATIENT)
Dept: GENERAL RADIOLOGY | Facility: CLINIC | Age: 58
DRG: 463 | End: 2023-06-24
Attending: EMERGENCY MEDICINE
Payer: COMMERCIAL

## 2023-06-24 DIAGNOSIS — R65.20 SEVERE SEPSIS (H): ICD-10-CM

## 2023-06-24 DIAGNOSIS — E11.42 TYPE 2 DIABETES MELLITUS WITH DIABETIC POLYNEUROPATHY, WITHOUT LONG-TERM CURRENT USE OF INSULIN (H): ICD-10-CM

## 2023-06-24 DIAGNOSIS — L03.116 CELLULITIS OF LEFT LOWER EXTREMITY: ICD-10-CM

## 2023-06-24 DIAGNOSIS — L08.9 DIABETIC FOOT INFECTION (H): ICD-10-CM

## 2023-06-24 DIAGNOSIS — L97.909 ULCER OF AMPUTATION STUMP OF LOWER EXTREMITY (H): ICD-10-CM

## 2023-06-24 DIAGNOSIS — N17.9 ACUTE KIDNEY INJURY (H): ICD-10-CM

## 2023-06-24 DIAGNOSIS — T87.89 ULCER OF AMPUTATION STUMP OF LOWER EXTREMITY (H): ICD-10-CM

## 2023-06-24 DIAGNOSIS — I10 BENIGN ESSENTIAL HYPERTENSION: ICD-10-CM

## 2023-06-24 DIAGNOSIS — E11.621 DIABETIC ULCER OF LEFT FOOT ASSOCIATED WITH TYPE 2 DIABETES MELLITUS, WITH MUSCLE INVOLVEMENT WITHOUT EVIDENCE OF NECROSIS, UNSPECIFIED PART OF FOOT (H): Primary | ICD-10-CM

## 2023-06-24 DIAGNOSIS — L97.525 DIABETIC ULCER OF LEFT FOOT ASSOCIATED WITH TYPE 2 DIABETES MELLITUS, WITH MUSCLE INVOLVEMENT WITHOUT EVIDENCE OF NECROSIS, UNSPECIFIED PART OF FOOT (H): Primary | ICD-10-CM

## 2023-06-24 DIAGNOSIS — A41.9 SEVERE SEPSIS (H): ICD-10-CM

## 2023-06-24 DIAGNOSIS — T87.44 INFECTION OF LEFT BELOW KNEE AMPUTATION (H): ICD-10-CM

## 2023-06-24 DIAGNOSIS — E11.628 DIABETIC FOOT INFECTION (H): ICD-10-CM

## 2023-06-24 LAB
ALBUMIN SERPL BCG-MCNC: 3.4 G/DL (ref 3.5–5.2)
ALP SERPL-CCNC: 84 U/L (ref 40–129)
ALT SERPL W P-5'-P-CCNC: 14 U/L (ref 0–70)
ANION GAP SERPL CALCULATED.3IONS-SCNC: 14 MMOL/L (ref 7–15)
AST SERPL W P-5'-P-CCNC: 13 U/L (ref 0–45)
ATRIAL RATE - MUSE: 107 BPM
BASOPHILS # BLD AUTO: 0 10E3/UL (ref 0–0.2)
BASOPHILS NFR BLD AUTO: 0 %
BILIRUB SERPL-MCNC: 0.9 MG/DL
BUN SERPL-MCNC: 46 MG/DL (ref 6–20)
CALCIUM SERPL-MCNC: 8.8 MG/DL (ref 8.6–10)
CHLORIDE SERPL-SCNC: 99 MMOL/L (ref 98–107)
CREAT SERPL-MCNC: 2.45 MG/DL (ref 0.67–1.17)
CRP SERPL-MCNC: 372.21 MG/L
DEPRECATED HCO3 PLAS-SCNC: 21 MMOL/L (ref 22–29)
DIASTOLIC BLOOD PRESSURE - MUSE: NORMAL MMHG
EOSINOPHIL # BLD AUTO: 0 10E3/UL (ref 0–0.7)
EOSINOPHIL NFR BLD AUTO: 0 %
ERYTHROCYTE [DISTWIDTH] IN BLOOD BY AUTOMATED COUNT: 12 % (ref 10–15)
GFR SERPL CREATININE-BSD FRML MDRD: 30 ML/MIN/1.73M2
GLUCOSE BLDC GLUCOMTR-MCNC: 268 MG/DL (ref 70–99)
GLUCOSE SERPL-MCNC: 270 MG/DL (ref 70–99)
HCO3 BLDV-SCNC: 19 MMOL/L (ref 21–28)
HCT VFR BLD AUTO: 37.6 % (ref 40–53)
HGB BLD-MCNC: 12.8 G/DL (ref 13.3–17.7)
IMM GRANULOCYTES # BLD: 0.4 10E3/UL
IMM GRANULOCYTES NFR BLD: 2 %
INTERPRETATION ECG - MUSE: NORMAL
LACTATE BLD-SCNC: 0.8 MMOL/L
LYMPHOCYTES # BLD AUTO: 1 10E3/UL (ref 0.8–5.3)
LYMPHOCYTES NFR BLD AUTO: 5 %
MCH RBC QN AUTO: 29.4 PG (ref 26.5–33)
MCHC RBC AUTO-ENTMCNC: 34 G/DL (ref 31.5–36.5)
MCV RBC AUTO: 86 FL (ref 78–100)
MONOCYTES # BLD AUTO: 1.9 10E3/UL (ref 0–1.3)
MONOCYTES NFR BLD AUTO: 8 %
NEUTROPHILS # BLD AUTO: 19.5 10E3/UL (ref 1.6–8.3)
NEUTROPHILS NFR BLD AUTO: 85 %
NRBC # BLD AUTO: 0 10E3/UL
NRBC BLD AUTO-RTO: 0 /100
P AXIS - MUSE: 37 DEGREES
PCO2 BLDV: 32 MM HG (ref 40–50)
PH BLDV: 7.39 [PH] (ref 7.32–7.43)
PLATELET # BLD AUTO: 235 10E3/UL (ref 150–450)
PO2 BLDV: 33 MM HG (ref 25–47)
POTASSIUM SERPL-SCNC: 4.3 MMOL/L (ref 3.4–5.3)
PR INTERVAL - MUSE: 158 MS
PROT SERPL-MCNC: 7.3 G/DL (ref 6.4–8.3)
QRS DURATION - MUSE: 72 MS
QT - MUSE: 318 MS
QTC - MUSE: 424 MS
R AXIS - MUSE: 13 DEGREES
RBC # BLD AUTO: 4.36 10E6/UL (ref 4.4–5.9)
SAO2 % BLDV: 64 % (ref 94–100)
SODIUM SERPL-SCNC: 134 MMOL/L (ref 136–145)
SYSTOLIC BLOOD PRESSURE - MUSE: NORMAL MMHG
T AXIS - MUSE: 27 DEGREES
VENTRICULAR RATE- MUSE: 107 BPM
WBC # BLD AUTO: 22.9 10E3/UL (ref 4–11)

## 2023-06-24 PROCEDURE — 258N000003 HC RX IP 258 OP 636: Performed by: EMERGENCY MEDICINE

## 2023-06-24 PROCEDURE — 85025 COMPLETE CBC W/AUTO DIFF WBC: CPT | Performed by: EMERGENCY MEDICINE

## 2023-06-24 PROCEDURE — 99223 1ST HOSP IP/OBS HIGH 75: CPT | Mod: AI | Performed by: HOSPITALIST

## 2023-06-24 PROCEDURE — 120N000001 HC R&B MED SURG/OB

## 2023-06-24 PROCEDURE — 250N000011 HC RX IP 250 OP 636: Performed by: EMERGENCY MEDICINE

## 2023-06-24 PROCEDURE — 82803 BLOOD GASES ANY COMBINATION: CPT

## 2023-06-24 PROCEDURE — 96368 THER/DIAG CONCURRENT INF: CPT

## 2023-06-24 PROCEDURE — 99291 CRITICAL CARE FIRST HOUR: CPT | Mod: 25

## 2023-06-24 PROCEDURE — 258N000003 HC RX IP 258 OP 636: Performed by: HOSPITALIST

## 2023-06-24 PROCEDURE — 87040 BLOOD CULTURE FOR BACTERIA: CPT | Performed by: EMERGENCY MEDICINE

## 2023-06-24 PROCEDURE — 93005 ELECTROCARDIOGRAM TRACING: CPT

## 2023-06-24 PROCEDURE — 86140 C-REACTIVE PROTEIN: CPT | Performed by: HOSPITALIST

## 2023-06-24 PROCEDURE — 73590 X-RAY EXAM OF LOWER LEG: CPT | Mod: LT

## 2023-06-24 PROCEDURE — 80053 COMPREHEN METABOLIC PANEL: CPT | Performed by: EMERGENCY MEDICINE

## 2023-06-24 PROCEDURE — 250N000011 HC RX IP 250 OP 636: Mod: JZ | Performed by: HOSPITALIST

## 2023-06-24 PROCEDURE — 93971 EXTREMITY STUDY: CPT | Mod: LT

## 2023-06-24 PROCEDURE — 96365 THER/PROPH/DIAG IV INF INIT: CPT

## 2023-06-24 PROCEDURE — 73700 CT LOWER EXTREMITY W/O DYE: CPT | Mod: LT

## 2023-06-24 PROCEDURE — 36415 COLL VENOUS BLD VENIPUNCTURE: CPT | Performed by: EMERGENCY MEDICINE

## 2023-06-24 PROCEDURE — 250N000012 HC RX MED GY IP 250 OP 636 PS 637: Performed by: HOSPITALIST

## 2023-06-24 RX ORDER — ONDANSETRON 2 MG/ML
4 INJECTION INTRAMUSCULAR; INTRAVENOUS EVERY 6 HOURS PRN
Status: DISCONTINUED | OUTPATIENT
Start: 2023-06-24 | End: 2023-07-28 | Stop reason: HOSPADM

## 2023-06-24 RX ORDER — PIPERACILLIN SODIUM, TAZOBACTAM SODIUM 4; .5 G/20ML; G/20ML
4.5 INJECTION, POWDER, LYOPHILIZED, FOR SOLUTION INTRAVENOUS EVERY 6 HOURS
Status: DISCONTINUED | OUTPATIENT
Start: 2023-06-24 | End: 2023-07-02

## 2023-06-24 RX ORDER — IBUPROFEN 200 MG
600 TABLET ORAL EVERY 8 HOURS PRN
Status: ON HOLD | COMMUNITY
End: 2023-07-28

## 2023-06-24 RX ORDER — NICOTINE POLACRILEX 4 MG
15-30 LOZENGE BUCCAL
Status: DISCONTINUED | OUTPATIENT
Start: 2023-06-24 | End: 2023-06-25

## 2023-06-24 RX ORDER — ACETAMINOPHEN 325 MG/1
650 TABLET ORAL EVERY 6 HOURS PRN
Status: DISCONTINUED | OUTPATIENT
Start: 2023-06-24 | End: 2023-07-28 | Stop reason: HOSPADM

## 2023-06-24 RX ORDER — DEXTROSE MONOHYDRATE 25 G/50ML
25-50 INJECTION, SOLUTION INTRAVENOUS
Status: DISCONTINUED | OUTPATIENT
Start: 2023-06-24 | End: 2023-06-25

## 2023-06-24 RX ORDER — LIDOCAINE 40 MG/G
CREAM TOPICAL
Status: DISCONTINUED | OUTPATIENT
Start: 2023-06-24 | End: 2023-07-06

## 2023-06-24 RX ORDER — AMOXICILLIN 250 MG
1 CAPSULE ORAL 2 TIMES DAILY PRN
Status: DISCONTINUED | OUTPATIENT
Start: 2023-06-24 | End: 2023-07-28 | Stop reason: HOSPADM

## 2023-06-24 RX ORDER — ACETAMINOPHEN 325 MG/1
650 TABLET ORAL EVERY 6 HOURS PRN
Status: ON HOLD | COMMUNITY
End: 2023-07-28

## 2023-06-24 RX ORDER — NALOXONE HYDROCHLORIDE 0.4 MG/ML
0.2 INJECTION, SOLUTION INTRAMUSCULAR; INTRAVENOUS; SUBCUTANEOUS
Status: DISCONTINUED | OUTPATIENT
Start: 2023-06-24 | End: 2023-07-28 | Stop reason: HOSPADM

## 2023-06-24 RX ORDER — NALOXONE HYDROCHLORIDE 0.4 MG/ML
0.4 INJECTION, SOLUTION INTRAMUSCULAR; INTRAVENOUS; SUBCUTANEOUS
Status: DISCONTINUED | OUTPATIENT
Start: 2023-06-24 | End: 2023-07-28 | Stop reason: HOSPADM

## 2023-06-24 RX ORDER — HYDROMORPHONE HYDROCHLORIDE 1 MG/ML
0.3 INJECTION, SOLUTION INTRAMUSCULAR; INTRAVENOUS; SUBCUTANEOUS EVERY 4 HOURS PRN
Status: DISCONTINUED | OUTPATIENT
Start: 2023-06-24 | End: 2023-07-28 | Stop reason: HOSPADM

## 2023-06-24 RX ORDER — AMOXICILLIN 250 MG
2 CAPSULE ORAL 2 TIMES DAILY PRN
Status: DISCONTINUED | OUTPATIENT
Start: 2023-06-24 | End: 2023-07-28 | Stop reason: HOSPADM

## 2023-06-24 RX ORDER — ONDANSETRON 4 MG/1
4 TABLET, ORALLY DISINTEGRATING ORAL EVERY 6 HOURS PRN
Status: DISCONTINUED | OUTPATIENT
Start: 2023-06-24 | End: 2023-07-28 | Stop reason: HOSPADM

## 2023-06-24 RX ORDER — PIPERACILLIN SODIUM, TAZOBACTAM SODIUM 4; .5 G/20ML; G/20ML
4.5 INJECTION, POWDER, LYOPHILIZED, FOR SOLUTION INTRAVENOUS ONCE
Status: COMPLETED | OUTPATIENT
Start: 2023-06-24 | End: 2023-06-24

## 2023-06-24 RX ORDER — SODIUM CHLORIDE 9 MG/ML
INJECTION, SOLUTION INTRAVENOUS CONTINUOUS
Status: DISCONTINUED | OUTPATIENT
Start: 2023-06-24 | End: 2023-06-26

## 2023-06-24 RX ORDER — ACETAMINOPHEN 650 MG/1
650 SUPPOSITORY RECTAL EVERY 6 HOURS PRN
Status: DISCONTINUED | OUTPATIENT
Start: 2023-06-24 | End: 2023-07-06

## 2023-06-24 RX ORDER — HEPARIN SODIUM 5000 [USP'U]/.5ML
5000 INJECTION, SOLUTION INTRAVENOUS; SUBCUTANEOUS EVERY 12 HOURS
Status: DISCONTINUED | OUTPATIENT
Start: 2023-06-24 | End: 2023-07-06

## 2023-06-24 RX ORDER — VANCOMYCIN HYDROCHLORIDE 1 G/200ML
1000 INJECTION, SOLUTION INTRAVENOUS EVERY 24 HOURS
Status: DISCONTINUED | OUTPATIENT
Start: 2023-06-25 | End: 2023-06-26

## 2023-06-24 RX ADMIN — VANCOMYCIN HYDROCHLORIDE 2500 MG: 10 INJECTION, POWDER, LYOPHILIZED, FOR SOLUTION INTRAVENOUS at 17:46

## 2023-06-24 RX ADMIN — PIPERACILLIN AND TAZOBACTAM 4.5 G: 4; .5 INJECTION, POWDER, FOR SOLUTION INTRAVENOUS at 16:29

## 2023-06-24 RX ADMIN — INSULIN ASPART 3 UNITS: 100 INJECTION, SOLUTION INTRAVENOUS; SUBCUTANEOUS at 23:20

## 2023-06-24 RX ADMIN — PIPERACILLIN AND TAZOBACTAM 4.5 G: 4; .5 INJECTION, POWDER, FOR SOLUTION INTRAVENOUS at 23:11

## 2023-06-24 RX ADMIN — HEPARIN SODIUM 5000 UNITS: 5000 INJECTION, SOLUTION INTRAVENOUS; SUBCUTANEOUS at 23:19

## 2023-06-24 RX ADMIN — SODIUM CHLORIDE, POTASSIUM CHLORIDE, SODIUM LACTATE AND CALCIUM CHLORIDE 1000 ML: 600; 310; 30; 20 INJECTION, SOLUTION INTRAVENOUS at 17:46

## 2023-06-24 RX ADMIN — SODIUM CHLORIDE: 9 INJECTION, SOLUTION INTRAVENOUS at 22:39

## 2023-06-24 ASSESSMENT — ACTIVITIES OF DAILY LIVING (ADL)
ADLS_ACUITY_SCORE: 35
ADLS_ACUITY_SCORE: 37

## 2023-06-24 NOTE — PROGRESS NOTES
RECEIVING UNIT ED HANDOFF REVIEW    ED Nurse Handoff Report was reviewed by: Pura Tirado RN on June 24, 2023 at 6:33 PM

## 2023-06-24 NOTE — H&P
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     Cellulitis involving Lt BKA stump with sepsis  Pain, redness, swelling noted today.  Afebrile.  Marked leukocytosis of 22.9.  Tachycardic to 110s.  X-ray Shows soft tissue swelling, some bone formation along the resection margin of the tibia with periosteal reaction.  -Admit to inpatient  -Continue IV vancomycin and Zosyn started in ER  -Vascular surgery consulted, recommended keeping n.p.o. after midnight. ID consult as well.  - Likely needs further imaging, hold off until vascular surgery/ID eval. Only 1 day of symptom, unlikely has developed any abscess.  -IV hydration, telemetry monitoring at least overnight, pain control.  -Recommend nonweightbearing until vascular surgery eval    Acute kidney injury on CKD stage III  Creatinine 2.45.  Likely baseline is 1.5-1.6.  Although noted it was 1.9 recently.  Suspect related to sepsis. Takes Ibuprofen occasionally.   -Aggressive IV hydration  -Follow intake and output  -Daily BMP  -No NSAIDs, hold lisinopril.    Type II DM, uncontrolled, on insulin  Noted HbA1c was 10.4 in February.  States his blood sugars run in 200s.  -Continue PTA Tresiba, 50% dose tonight since plan for n.p.o. after midnight  -Previously NovoLog while not n.p.o.  -High insulin resistance correctional dose  -Hold PTA oral agents including Jardiance, Januvia.    Mild hyponatremia  Sodium 134, likely due to sepsis and dehydration.  -Follow with fluid resuscitation     Diet:  Moderate consistent CHO, n.p.o. after  midnight  DVT Prophylaxis: Heparin SQ  [place] Catheter: Not present  Lines: None     Cardiac Monitoring: None  Code Status:  Full code    Clinically Significant Risk Factors Present on Admission                        Disposition Plan            Prabhjot Robertson MD  Hospitalist Service  [facility]  Securely message with [employer] (more info)  Text page via [facility] Paging/Directory     ______________________________________________________________________    Chief Complaint   Pain, redness, swelling left BKA stump    History is obtained from the patient, significant other, chart review, discussion with ER MD    History of Present Illness      Ry Horner is a 57 year old male with medical history significant for 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    Patient reports he noted some swelling of the left BKA stump after he woke up around noon today and put his prosthesis sleeve.  He felt some pain/discomfort.  Later when he took it out to shower, he noted there was blood coming out.  Subsequently the pain worsened, swelling and erythema also started to spread and so he came to the ER.  Denies fever or chills.  No dizziness, chest pain, shortness of breath.  Denies any fall or trauma.    In ER, patient was evaluated by Dr. Woo.  Tachycardic to 110s.  Labs showed marked leukocytosis of 22.9.  X-ray of the tibia and fibula showed soft tissue swelling, some bone formation along the resection margin of the tibia with periosteal reaction.  Creatinine was worse than his baseline at 2.45.  Lactic acid was 0.8.  Blood sugar 270.  Blood cultures x2 drawn, IV vancomycin and Zosyn and IV fluid initiated, and hospitalist service contacted for admission.      Past Medical History    Past Medical History:   Diagnosis Date     BENIGN HYPERTENSION 4/4/2007     DIABETES MELLITUS TYPE II-UNCOMPL  4/4/2007     HYPERLIPIDEMIA NEC/NOS 4/4/2007     Tobacco use disorder 4/4/2007       Past Surgical History   Past Surgical History:   Procedure Laterality Date     AMPUTATE FOOT Left 11/17/2019    Procedure: LEFT PARTIAL FOOT AMPUTATION;  Surgeon: Antoine Pena DPM;  Location: [facility]     AMPUTATE FOOT Left 12/4/2019    Procedure: LEFT PARTIAL FOOT AMPUTATION;  Surgeon: Tim Douglas DPM;  Location: [facility]     AMPUTATE FOOT Left 12/11/2019    Procedure: POSSIBLE PARTIAL FOOT AMPUTATION;  Surgeon: Tim Douglas DPM;  Location: [facility]     AMPUTATE FOOT Left 2/10/2022    Procedure: Partial left foot amputation;  Surgeon: Milena Cevallos DPM, Podiatry/Foot and Ankle Surgery;  Location: [facility]     AMPUTATE LEG BELOW KNEE Right 9/1/2017    Procedure: AMPUTATE LEG BELOW KNEE;  RIGHT BELOW KNEE AMPUTATION ;  Surgeon: Nikolas Nascimento MD;  Location: [facility]     AMPUTATE LEG BELOW KNEE Left 9/27/2022    Procedure: AMPUTATION BELOW KNEE;  Surgeon: Neal Blackman MD;  Location: [facility]     AMPUTATE LEG BELOW KNEE Left 9/29/2022    Procedure: LEFT SIDE COMPLETION BELOW THE KNEE AMPUTATION;  Surgeon: Reny Child MD;  Location: [facility]     AMPUTATE TOE(S) Left 2/3/2020    Procedure: LEFT SECOND TOE AMPUTATION;  Surgeon: Milena Cevallos DPM, Podiatry/Foot and Ankle Surgery;  Location: [facility]     APPENDECTOMY       APPLY WOUND VAC Right 3/2/2015    Procedure: APPLY WOUND VAC;  Surgeon: Milena Cevalols DPM, Pod;  Location: [facility] OR     BIOPSY BONE FOOT Right 7/15/2016    Procedure: BIOPSY BONE FOOT;  Surgeon: Tim Douglas DPM;  [facility]     BIOPSY BONE TOE Left 12/4/2019    Procedure: BONE BIOPSY LEFT SECOND TOE;  Surgeon: Tim Douglas DPM;  Location[facility]     IRRIGATION AND DEBRIDEMENT FOOT, COMBINED Right 3/2/2015    Procedure: COMBINED IRRIGATION AND DEBRIDEMENT FOOT;  Surgeon: Milena Cevallos DPM, Pod;  Location: [facility]     IRRIGATION AND DEBRIDEMENT FOOT, COMBINED Right  7/15/2016    Procedure: COMBINED IRRIGATION AND DEBRIDEMENT FOOT;  Surgeon: Tim Douglas DPM;  [facility]: [facility]     IRRIGATION AND DEBRIDEMENT FOOT, COMBINED Right 2016    Procedure: COMBINED IRRIGATION AND DEBRIDEMENT FOOT;  Surgeon: Tim Douglas DPM;  [facility]     IRRIGATION AND DEBRIDEMENT FOOT, COMBINED Left 2019    Procedure: REVISIONAL IRRIGATION AND DEBRIDEMENT LEFT FOOT AND BONE DEBRIDEMENT;  Surgeon: Joseph Randhawa DPM;  [facility]     IRRIGATION AND DEBRIDEMENT FOOT, COMBINED Left 2019    Procedure: EXCISIONAL DEBRIDEMENT LEFT FOOT;  Surgeon: Tim Douglas DPM;  [facility]     IRRIGATION AND DEBRIDEMENT FOOT, COMBINED Left 2019    Procedure: IRRIGATION AND DEBRIDEMENT FOOT, Partial osteotomy left first metatarsal;  Surgeon: Tim Douglas DPM;  [facility]     IRRIGATION AND DEBRIDEMENT FOOT, COMBINED Left 2020    Procedure: IRRIGATION AND DEBRIDEMENT LEFT FOOT;  Surgeon: Tim Douglas DPM;  Location: [facility]     ORTHOPEDIC SURGERY         Prior to Admission Medications   Prior to Admission Medications   Prescriptions Last Dose Informant Patient Reported? Taking?   BD PEN NEEDLE MEGAN 2ND GEN 32G X 4 MM miscellaneous   No No   Sig: USE WITH INSULIN INJECTIONS UNDER THE SKIN TWO TIMES A DAY AS DIRECTED   Continuous Blood Gluc Sensor (FREESTYLE SJ 3 SENSOR) [facility] [place]   No No   Si each every 14 days Use to check blood sugars per  guidelines with Sj 3 dionisio   Insulin Lispro-aabc, 1 U Dial, (LYUMJEV KWIKPEN) 100 UNIT/ML SOPN   No No   Sig: Inject 5 Units Subcutaneous 3 times daily (before meals) Take 5 units breakfast, 5 units lunch, 5 units dinner + 1u/50mg/dL>150 mg/dL +2 u prime before each dose-total of 30 u/day   JANUVIA 50 MG tablet   No No   Sig: TAKE ONE TABLET BY MOUTH EVERY DAY   Lancets (ONETOUCH DELICA PLUS [id number]) MISC   No No   Sig: USE TO TEST BLOOD SUGAR FOUR TIMES A DAY   STATIN NOT PRESCRIBED  "(INTENTIONAL)  Self No No   Sig: Please choose reason not prescribed, below   acetaminophen (TYLENOL) 325 MG tablet   No No   Sig: Take 2 tablets (650 mg) by mouth every 6 hours   aspirin 81 MG chewable tablet  Self Yes No   Sig: Take 1 tablet (81 mg) by mouth daily   blood glucose (NO BRAND SPECIFIED) lancets standard   No No   Sig: Use to test blood sugar FOUR times daily, to match lancing device per insurance   blood glucose (NO BRAND SPECIFIED) test strip   No No   Si strips by In Vitro route 4 times daily Use to test blood sugar up to 4 times daily or as directed.   blood glucose monitoring (NO BRAND SPECIFIED) meter device kit   No No   Sig: Use to test blood sugar FOUR times daily, brand per insurance   empagliflozin (JARDIANCE) 10 MG TABS tablet   No No   Sig: Take 1 tablet (10 mg) by mouth daily   ezetimibe (ZETIA) 10 MG tablet   No No   Sig: TAKE ONE TABLET BY MOUTH EVERY DAY   ferrous sulfate (FEROSUL) 325 (65 Fe) MG tablet   No No   Sig: Take 1 tablet (325 mg) by mouth 2 times daily (with meals)   insulin degludec (TRESIBA FLEXTOUCH) 100 UNIT/ML pen   No No   Sig: Inject 34 Units Subcutaneous 2 times daily   ipratropium (ATROVENT) 0.06 % nasal spray   No No   Sig: INSTILL TWO SPRAYS INTO EACH NOSTRIL FOUR TIMES A DAY AS NEEDED FOR RHINITIS   ketorolac (ACULAR) 0.5 % ophthalmic solution   Yes No   Sig: Place 1 drop into the right eye 4 times daily   lisinopril (ZESTRIL) 5 MG tablet   No No   Sig: Take 1 tablet (5 mg) by mouth daily   multivitamin (CENTRUM SILVER) tablet  Self Yes No   Sig: Take 1 tablet by mouth daily \"[facility] Adult\"   polyethylene glycol (MIRALAX) 17 GM/Dose powder   No No   Sig: Take 17 g by mouth daily   prednisoLONE acetate (PRED FORTE) 1 % ophthalmic suspension   Yes No   Sig: Place 1 drop into the right eye 4 times daily      Facility-Administered Medications: None        Review of Systems    The 10 point Review of Systems is negative other than noted in the HPI or here.  " "    Social History   I have reviewed this patient's social history and updated it with pertinent information if needed.  Social History     Tobacco Use     Smoking status: Former     Packs/day: 0.50     Years: 20.00     Pack years: 10.00     Types: Cigarettes     Start date: 1987     Quit date: 2006     Years since quittin.4     Smokeless tobacco: Never   Substance Use Topics     Alcohol use: Yes     Comment: occ     Drug use: No       Family History   I have reviewed this patient's family history and updated it with pertinent information if needed.  Family History   Problem Relation Age of Onset     Genetic Disorder Other      Genetic Disorder Other      Psychotic Disorder Mother      Diabetes Father      Lung Cancer Father      Hypertension Father      Genetic Disorder Maternal Grandmother      Genetic Disorder Maternal Grandfather      Asthma Sister      Breast Cancer Sister      C.A.D. No family hx of      Hypertension No family hx of      Cerebrovascular Disease No family hx of      Breast Cancer No family hx of      Cancer - colorectal No family hx of      Prostate Cancer No family hx of      Alcohol/Drug No family hx of        Allergies   Allergies   Allergen Reactions     Pravastatin      \"sucked the life blood out of me,\" Indicates this occurs with all statins.        Physical Exam   Vital Signs: Temp: 97.1  F (36.2  C)   BP: 112/71 Pulse: 112   Resp: 16 SpO2: 97 % O2 Device: None (Room air)    Weight: 0 lbs 0 oz    General: AAOx3, very pleasant, appears comfortable.  HEENT: PERRLA EOMI. Mucosa moist.   Lungs: Bilateral equal air entry. Clear to auscultation, normal work of breathing.   CVS: S1S2 regular, no tachycardia or murmur.   Abdomen: Soft, NT, ND. BS heard.  MSK: Rt BKA with prosthesis. Lt BKA with small 1 cm long ulcer with clotted blood, no bleeding.  Surrounding faint erythema spreads up to knee without clear demarcation.  Swelling and tenderness on exam noted.  Neuro: AAOX3. CN 2-12 " normal. Strength symmetrical.  Skin: No rash.       Medical Decision Making       75 MINUTES SPENT BY ME on the date of service doing chart review, history, exam, documentation & further activities per the note.  Medical complexity over the past 24 hours:  - Parenteral (IV) CONTROLLED SUBSTANCES ordered      Data     I have personally reviewed the following data over the past 24 hrs:    22.9 (H)  \   12.8 (L)   / 235     134 (L) 99 46.0 (H) /  270 (H)   4.3 21 (L) 2.45 (H) \       ALT: 14 AST: 13 AP: 84 TBILI: 0.9   ALB: 3.4 (L) TOT PROTEIN: 7.3 LIPASE: N/A       Procal: N/A CRP: N/A Lactic Acid: 0.8         Imaging results reviewed over the past 24 hrs:   Recent Results (from the past 24 hour(s))   XR Tibia and Fibula Left 2 Views    Narrative    EXAM: XR TIBIA AND FIBULA LEFT 2 VIEWS  LOCATION: [facility]  DATE: 6/24/2023    INDICATION: Cellulitis, wound at stump site  COMPARISON: None.      Impression    IMPRESSION: Soft tissue swelling about the stump of the below the knee amputation. There is some bone formation along the resection margin of the tibia where there is some periosteal reaction. This can be seen with postoperative change although   osteomyelitis is difficult to exclude based on radiograph alone. There are no prior studies to compare this to. Atherosclerotic vascular calcifications.    NOTE: ABNORMAL REPORT    THE DICTATION ABOVE DESCRIBES AN ABNORMALITY FOR WHICH FOLLOW-UP IS NEEDED.    US Lower Extremity Venous Duplex Left    Narrative    EXAM: US LOWER EXTREMITY VENOUS DUPLEX LEFT  LOCATION: [facility]  DATE: 6/24/2023    INDICATION: swelling and stump site  COMPARISON: None.  TECHNIQUE: Venous Duplex ultrasound of the left lower extremity with and without compression, augmentation and duplex. Color flow and spectral Doppler with waveform analysis performed.    FINDINGS: Exam includes the common femoral, femoral, popliteal, and contralateral  common femoral veins as well as segmentally visualized deep calf veins and greater saphenous vein.     LEFT: No deep vein thrombosis. No superficial thrombophlebitis. No popliteal cyst.      Impression    IMPRESSION:  1.  No deep venous thrombosis in the left lower extremity.

## 2023-06-24 NOTE — PROGRESS NOTES
Pt states pcp wants labs redone this Friday..fyi   Vascular Surgery Note    Notified by ER of patient being admitted for management of cellultis of L BKA stump with new wound, leukocytosis and JOHNATHAN. Recommend IVF and IV abx. No fluid collection on limited US ( venous duplex). Will obtain CT LLE to rule out any abscess (no contrast given JOHNATHAN). Keep NPO after [place]                     Kia Luevano MD  Fellow

## 2023-06-24 NOTE — PHARMACY-ADMISSION MEDICATION HISTORY
Pharmacist Admission Medication History    Admission medication history is complete. The information provided in this note is only as accurate as the sources available at the time of the update.    Medication reconciliation/reorder completed by provider prior to medication history? No    Information Source(s): Patient and CareEverywhere/SureScripts via in-person    Pertinent Information: Patient is a reliable historian.     Changes made to PTA medication list:    Added: ibuprofen     Deleted: ferrous sulfate 325 mg BID (last filled 10/2022 #60ds, patient previously requested removal), ketorolac 0.5% eye drop R eye 4x daily, pred forte 1% eye drop R eye 4x daily (both eye drops from 6/2022, patient confirms not currently taking)     Changed: APAP to PRN, adjusted name of multivitamin     Medication Affordability: No cost concerns for copays in last 3 months.      Allergies reviewed with patient and updates made in EHR: yes    Medication History Completed By: Virginia Morrell [facility] 6/24/2023 6:22 PM    Medication Sig Last Dose Taking? Auth Provider   acetaminophen (TYLENOL) 325 MG tablet Take 650 mg by mouth every 6 hours as needed for mild pain Alternates use with ibuprofen Unknown at PRN Yes Unknown, Entered By History   aspirin 81 MG chewable tablet Take 1 tablet (81 mg) by mouth daily 6/23/2023 at AM Yes Kody Wing MD   empagliflozin (JARDIANCE) 10 MG TABS tablet Take 1 tablet (10 mg) by mouth daily 6/23/2023 at AM Yes Kody Wing MD   ezetimibe (ZETIA) 10 MG tablet TAKE ONE TABLET BY MOUTH EVERY DAY 6/23/2023 at AM Yes Kody Wing MD   ibuprofen (ADVIL/MOTRIN) 200 MG tablet Take 600 mg by mouth every 8 hours as needed for pain Alternates use with acetaminophen 6/24/2023 at took 1000 mg, normally takes 600 mg Yes Unknown, Entered By History   insulin degludec (TRESIBA FLEXTOUCH) 100 UNIT/ML pen Inject 34 Units Subcutaneous 2 times daily 6/24/2023 at 1200 Yes Kody Wing MD   Insulin  Lispro-aabc, 1 U Dial, (LYUMJEV KWIKPEN) 100 UNIT/ML SOPN Inject 5 Units Subcutaneous 3 times daily (before meals) Take 5 units breakfast, 5 units lunch, 5 units dinner + 1u/50mg/dL>150 mg/dL +2 u prime before each dose-total of 30 u/day 6/24/2023 at 1200 Yes Kody Wing MD   ipratropium (ATROVENT) 0.06 % nasal spray INSTILL TWO SPRAYS INTO EACH NOSTRIL FOUR TIMES A DAY AS NEEDED FOR RHINITIS 6/23/2023 at AM Yes Kody Wing MD   JANUVIA 50 MG tablet TAKE ONE TABLET BY MOUTH EVERY DAY 6/23/2023 at AM Yes Kody Wing MD   lisinopril (ZESTRIL) 5 MG tablet Take 1 tablet (5 mg) by mouth daily 6/23/2023 at AM Yes Kody Wing MD   multivitamin (CENTRUM SILVER) tablet Take 1 tablet by mouth daily 6/23/2023 at AM Yes Unknown, Entered By History   BD PEN NEEDLE MEGAN 2ND GEN 32G X 4 MM miscellaneous USE WITH INSULIN INJECTIONS UNDER THE SKIN TWO TIMES A DAY AS DIRECTED   Kody Wing MD   blood glucose (NO BRAND SPECIFIED) lancets standard Use to test blood sugar FOUR times daily, to match lancing device per insurance   Kody Wing MD   blood glucose (NO BRAND SPECIFIED) test strip 120 strips by In Vitro route 4 times daily Use to test blood sugar up to 4 times daily or as directed.   Kody Wing MD   blood glucose monitoring (NO BRAND SPECIFIED) meter device kit Use to test blood sugar FOUR times daily, brand per insurance   Kody Wing MD   Continuous Blood Gluc Sensor (FREESTYLE SJ 3 SENSOR) [facility] [place] each every 14 days Use to check blood sugars per  guidelines with Sj 3 dionisio   Kody Wing MD   Lancets (ONETOUCH DELICA PLUS [id number]) MISC USE TO TEST BLOOD SUGAR FOUR TIMES A DAY   Kody Wing MD   STATIN NOT PRESCRIBED (INTENTIONAL) Please choose reason not prescribed, below   Kody Wing MD

## 2023-06-24 NOTE — ED NOTES
"[facility]  ED Nurse Handoff Report    ED Chief complaint: Leg Swelling      ED Diagnosis:   Final diagnoses:   Cellulitis of left lower extremity   Ulcer of amputation stump of lower extremity (H)   Acute kidney injury (H)   Severe sepsis (H)       Code Status: Full Code    Allergies:   Allergies   Allergen Reactions     Pravastatin      \"sucked the life blood out of me,\" Indicates this occurs with all statins.       Patient Story: wound on left BKA stump; pt is a bilateral LE amputee; he assesses skin daily; today he noted an open would on the left leg   Focused Assessment:  Pt is a very capable bilateral amputee; he has struggled with non healing wounds which has lead to the amputation.     Treatments and/or interventions provided: abx; fluids   Patient's response to treatments and/or interventions: well    To be done/followed up on inpatient unit:  cont. Care     Does this patient have any cognitive concerns?: no    Activity level - Baseline/Home:  Independent  Activity Level - Current:   Independent and Wheelchair    Patient's Preferred language: English   Needed?: No    Isolation: None  Infection: Not Applicable  Patient tested for COVID 19 prior to admission: NO  Bariatric?: No    Vital Signs:   Vitals:    06/24/23 1534   BP: 112/71   Pulse: 112   Resp: 16   Temp: 97.1  F (36.2  C)   SpO2: 97%       Cardiac Rhythm:     Was the PSS-3 completed:   Yes  What interventions are required if any?               Family Comments: partner at bedside; she has been supportive   OBS brochure/video discussed/provided to patient/family: No              Name of person given brochure if not patient:               Relationship to patient:     For the majority of the shift this patient's behavior was Green.   Behavioral interventions performed were .    ED NURSE PHONE NUMBER: *[phone]           "

## 2023-06-24 NOTE — ED PROVIDER NOTES
History     Chief Complaint:  Leg Swelling       HPI   Ry Horner is a 57 year old male with a history of bilateral BKA who comes today with left stump swelling, redness and a bleeding ulceration or wound.  The patient reports that he normally wears his prosthesis all day long.  He is quite active and they play golf and do other activities.  Over the past few days he has been very fatigued, sleeping many hours during the day which is not normal for him.  He noticed that his left leg felt more swollen than normal but he does not take off his prosthetics except for sleeping and showering.  He did not really look at it.  He went to the bathroom to shower and there was blood in the prosthetic sleeve.  He has trouble seeing the bottom but he has a wound on the bottom near the amputation site.  He does not believe he has had a fever.  No nausea or vomiting.  Given his history he was very concerned and spoke to his sister who is a critical care nurse who also recommended that he come to the emergency department.    Independent Historian:   None - Patient Only    Review of External Notes:   Reviewed office visit note from Dr. Blackman with vascular surgery from 2/13/2023.  Patient is known to have bilateral below the knee amputations.  Initial amputation was on September 27, 2022 and completion on September 29, 2022.    Medications:    acetaminophen (TYLENOL) 325 MG tablet  aspirin 81 MG chewable tablet  BD PEN NEEDLE MEGAN 2ND GEN 32G X 4 MM miscellaneous  blood glucose (NO BRAND SPECIFIED) lancets standard  blood glucose (NO BRAND SPECIFIED) test strip  blood glucose monitoring (NO BRAND SPECIFIED) meter device kit  Continuous Blood Gluc Sensor (FREESTYLE CRISTINA 3 SENSOR) MISC  empagliflozin (JARDIANCE) 10 MG TABS tablet  ezetimibe (ZETIA) 10 MG tablet  ferrous sulfate (FEROSUL) 325 (65 Fe) MG tablet  insulin degludec (TRESIBA FLEXTOUCH) 100 UNIT/ML pen  Insulin Lispro-aabc, 1 U Dial, (LYUMJEV KWIKPEN) 100 UNIT/ML  SOPN  ipratropium (ATROVENT) 0.06 % nasal spray  JANUVIA 50 MG tablet  ketorolac (ACULAR) 0.5 % ophthalmic solution  Lancets (ONETOUCH DELICA PLUS AXSGHM29C) MISC  lisinopril (ZESTRIL) 5 MG tablet  multivitamin (CENTRUM SILVER) tablet  polyethylene glycol (MIRALAX) 17 GM/Dose powder  prednisoLONE acetate (PRED FORTE) 1 % ophthalmic suspension  STATIN NOT PRESCRIBED (INTENTIONAL)      Past Medical History:    Past Medical History:   Diagnosis Date     BENIGN HYPERTENSION 4/4/2007     DIABETES MELLITUS TYPE II-UNCOMPL 4/4/2007     HYPERLIPIDEMIA NEC/NOS 4/4/2007     Tobacco use disorder 4/4/2007     Past Surgical History:    Past Surgical History:   Procedure Laterality Date     AMPUTATE FOOT Left 11/17/2019    Procedure: LEFT PARTIAL FOOT AMPUTATION;  Surgeon: Antoine Pena DPM;  Location: [facility]     AMPUTATE FOOT Left 12/4/2019    Procedure: LEFT PARTIAL FOOT AMPUTATION;  Surgeon: Tim Douglas DPM;  Location: [facility]     AMPUTATE FOOT Left 12/11/2019    Procedure: POSSIBLE PARTIAL FOOT AMPUTATION;  Surgeon: Tim Douglas DPM;  Location: [facility]     AMPUTATE FOOT Left 2/10/2022    Procedure: Partial left foot amputation;  Surgeon: Milena Cevallos DPM, Podiatry/Foot and Ankle Surgery;  Location: [facility]     AMPUTATE LEG BELOW KNEE Right 9/1/2017    Procedure: AMPUTATE LEG BELOW KNEE;  RIGHT BELOW KNEE AMPUTATION ;  Surgeon: Nikolas Nascimento MD;  Location: [facility]     AMPUTATE LEG BELOW KNEE Left 9/27/2022    Procedure: AMPUTATION BELOW KNEE;  Surgeon: Neal Blackman MD;  Location: [facility]     AMPUTATE LEG BELOW KNEE Left 9/29/2022    Procedure: LEFT SIDE COMPLETION BELOW THE KNEE AMPUTATION;  Surgeon: Reny Child MD;  Location: [facility]     AMPUTATE TOE(S) Left 2/3/2020    Procedure: LEFT SECOND TOE AMPUTATION;  Surgeon: Milena Cevallos DPM, Podiatry/Foot and Ankle Surgery;  Location: [facility]     APPENDECTOMY       APPLY WOUND VAC Right 3/2/2015    Procedure: APPLY WOUND VAC;   Surgeon: Milena Cevallos DPM, Pod;  Location: [facility]     BIOPSY BONE FOOT Right 7/15/2016    Procedure: BIOPSY BONE FOOT;  Surgeon: Tim Douglas DPM;  Location: [facility]     BIOPSY BONE TOE Left 12/4/2019    Procedure: BONE BIOPSY LEFT SECOND TOE;  Surgeon: Tim Douglas DPM;  Location: [facility]     IRRIGATION AND DEBRIDEMENT FOOT, COMBINED Right 3/2/2015    Procedure: COMBINED IRRIGATION AND DEBRIDEMENT FOOT;  Surgeon: Milena Cevallos DPM, Pod;  Location: [facility]     IRRIGATION AND DEBRIDEMENT FOOT, COMBINED Right 7/15/2016    Procedure: COMBINED IRRIGATION AND DEBRIDEMENT FOOT;  Surgeon: Tim Douglas DPM;  Location: [facility]     IRRIGATION AND DEBRIDEMENT FOOT, COMBINED Right 7/20/2016    Procedure: COMBINED IRRIGATION AND DEBRIDEMENT FOOT;  Surgeon: Tim Douglas DPM;  Location: [facility]     IRRIGATION AND DEBRIDEMENT FOOT, COMBINED Left 11/19/2019    Procedure: REVISIONAL IRRIGATION AND DEBRIDEMENT LEFT FOOT AND BONE DEBRIDEMENT;  Surgeon: Joseph Randhawa DPM;  Location: [facility]     IRRIGATION AND DEBRIDEMENT FOOT, COMBINED Left 12/4/2019    Procedure: EXCISIONAL DEBRIDEMENT LEFT FOOT;  Surgeon: Tim Douglas DPM;  Location:  OR     IRRIGATION AND DEBRIDEMENT FOOT, COMBINED Left 12/11/2019    Procedure: IRRIGATION AND DEBRIDEMENT FOOT, Partial osteotomy left first metatarsal;  Surgeon: Tim Douglas DPM;  Location:  OR     IRRIGATION AND DEBRIDEMENT FOOT, COMBINED Left 2/5/2020    Procedure: IRRIGATION AND DEBRIDEMENT LEFT FOOT;  Surgeon: Tim Douglas DPM;  Location:  OR     ORTHOPEDIC SURGERY          Physical Exam     Patient Vitals for the past 24 hrs:   BP Temp Pulse Resp SpO2   06/24/23 1534 112/71 97.1  F (36.2  C) 112 16 97 %        Physical Exam  General: Well-nourished, appears to be resting comfortably when I enter the room  Eyes: PERRL, conjunctivae pink no scleral icterus or conjunctival injection  ENT:  Moist mucus membranes, posterior oropharynx clear without  erythema or exudates  Respiratory:  Lungs clear to auscultation bilaterally, no crackles/rubs/wheezes.  Good air movement  CV: Mildly tachycardic rate and regular rhythm, no murmurs/rubs/gallops  GI:  Abdomen soft and non-distended.  Normoactive BS.  No tenderness, guarding or rebound  Skin: Warm, dry.  No rashes or petechiae.  Erythema as seen in the photo below and warmth over the left BKA stump extending up to the thigh.  Increased edema versus the right.  No bulla.  Mildly tender.  Musculoskeletal: Bilateral BKA. Stump ulcer/wound as seen in photos below on left  Neuro: Alert and oriented to person/place/time  Psychiatric: Normal affect                Emergency Department Course   ECG  ECG results from 06/24/23   EKG 12 lead     Value    Systolic Blood Pressure     Diastolic Blood Pressure     Ventricular Rate 107    Atrial Rate 107    ND Interval 158    QRS Duration 72        QTc 424    P Axis 37    R AXIS 13    T Axis 27    Interpretation ECG      Sinus tachycardia  Otherwise normal ECG  When compared with ECG of 20-NOV-2019 00:39,  No significant change was found  Confirmed by GENERATED REPORT, COMPUTER ([id number]),  Christiano Sanchez ([id number]) on 6/24/2023 5:55:26 PM     Interpreted by Dr. Woo at 1615.  No change from previous EKG dated 11/20/2019.    Imaging:  US Lower Extremity Venous Duplex Left   Final Result   IMPRESSION:   1.  No deep venous thrombosis in the left lower extremity.      XR Tibia and Fibula Left 2 Views   Final Result   IMPRESSION: Soft tissue swelling about the stump of the below the knee amputation. There is some bone formation along the resection margin of the tibia where there is some periosteal reaction. This can be seen with postoperative change although    osteomyelitis is difficult to exclude based on radiograph alone. There are no prior studies to compare this to. Atherosclerotic vascular calcifications.      NOTE: ABNORMAL REPORT      THE DICTATION ABOVE DESCRIBES AN  ABNORMALITY FOR WHICH FOLLOW-UP IS NEEDED.          Report per radiology    Laboratory:  Labs Ordered and Resulted from Time of ED Arrival to Time of ED Departure   COMPREHENSIVE METABOLIC PANEL - Abnormal       Result Value    Sodium 134 (*)     Potassium 4.3      Chloride 99      Carbon Dioxide (CO2) 21 (*)     Anion Gap 14      Urea Nitrogen 46.0 (*)     Creatinine 2.45 (*)     Calcium 8.8      Glucose 270 (*)     Alkaline Phosphatase 84      AST 13      ALT 14      Protein Total 7.3      Albumin 3.4 (*)     Bilirubin Total 0.9      GFR Estimate 30 (*)    CBC WITH PLATELETS AND DIFFERENTIAL - Abnormal    WBC Count 22.9 (*)     RBC Count 4.36 (*)     Hemoglobin 12.8 (*)     Hematocrit 37.6 (*)     MCV 86      MCH 29.4      MCHC 34.0      RDW 12.0      Platelet Count 235      % Neutrophils 85      % Lymphocytes 5      % Monocytes 8      % Eosinophils 0      % Basophils 0      % Immature Granulocytes 2      NRBCs per 100 WBC 0      Absolute Neutrophils 19.5 (*)     Absolute Lymphocytes 1.0      Absolute Monocytes 1.9 (*)     Absolute Eosinophils 0.0      Absolute Basophils 0.0      Absolute Immature Granulocytes 0.4      Absolute NRBCs 0.0     ISTAT GASES LACTATE VENOUS POCT - Abnormal    Lactic Acid POCT 0.8      Bicarbonate Venous POCT 19 (*)     O2 Sat, Venous POCT 64 (*)     pCO2 Venous POCT 32 (*)     pH Venous POCT 7.39      pO2 Venous POCT 33     BLOOD CULTURE   BLOOD CULTURE      Emergency Department Course & Assessments:       Interventions:  Medications   sodium chloride (PF) 0.9% PF flush 3 mL (has no administration in time range)   sodium chloride (PF) 0.9% PF flush 3 mL (has no administration in time range)   vancomycin (VANCOCIN) 2,500 mg in 0.9% NaCl 500 mL intermittent infusion (2,500 mg Intravenous $New Bag 6/24/23 1746)   lactated ringers BOLUS 1,000 mL (1,000 mLs Intravenous $New Bag 6/24/23 1746)   piperacillin-tazobactam (ZOSYN) 4.5 g vial to attach to  mL bag (0 g Intravenous Stopped  6/24/23 1750)        Assessments:  I evaluated the patient and discussed plan of care.    Independent Interpretation (X-rays, CTs, rhythm strip):  I reviewed and interpreted the left tib fib x-ray.  I see no evidence of fracture or dislocation.  No free air.    Consultations/Discussion of Management or Tests:    ED Course as of 06/24/23 1759   Sat Jun 24, 2023   1545   I evaluated and assessed the patient and discussed plan of care with him.   1756 I spoke with  are from vascular surgery.  Discussed the findings.  He is aware that they will be on consulted and recommended n.p.o. after midnight as he will likely need a washout.   I discussed with Dr. Robertson who agreed with plan for admission for the patient.    Social Determinants of Health affecting care:   None    Disposition:  The patient was admitted to the hospital under the care of Dr. Robertson.     Impression & Plan    CMS Diagnoses:   The patient has signs of Severe Sepsis        If one the following conditions is present, a 30 mL/kg bolus is recommended as part of the 6 hour bundle (IBW can be used for BMI >30, or document refusal/contraindication):      1.   Initial hypotension  defined as 2 bps < 90 or map < 65 in the 6hrs before or 3hrs after time zero.     2.  Lactate >4.      The patient has signs of Severe Sepsis as evidenced by:    1. 2 SIRS criteria, AND  2. Suspected infection, AND   3. Organ dysfunction: ARF with Cr >2 due to infection    Time severe sepsis diagnosis confirmed: 1703  06/25/23 as this was the time when Lactate resulted, and the level was > 2.0 and Lab results revealing acute organ dysfunction due to infection (Cr, Bili, Plt)    3 Hour Severe Sepsis Bundle Completion:    1. Initial Lactic Acid Result:   Recent Labs   Lab Test 06/24/23  1629 10/01/22  0844 09/30/22  0859   LACT 0.8 0.6* 0.8     2. Blood Cultures before Antibiotics: Yes  3. Broad Spectrum Antibiotics Administered:  yes       Anti-infectives (From admission  "through now)    Start     Dose/Rate Route Frequency Ordered Stop    06/24/23 1555  vancomycin (VANCOCIN) 2,500 mg in 0.9% NaCl 500 mL intermittent infusion         2,500 mg  over 120 Minutes Intravenous ONCE 06/24/23 1552 06/24/23 1946    06/24/23 1550  piperacillin-tazobactam (ZOSYN) 4.5 g vial to attach to  mL bag        Note to Pharmacy: For [facility], [facility] and [facility]: For Zosyn-naive patients, use the \"Zosyn initial dose + extended infusion\" order panel.    4.5 g  over 30 Minutes Intravenous ONCE 06/24/23 1549 06/24/23 1750          4. Is initial hypotension present?     No (IV fluid bolus NOT required). IV Fluid volume administered: target volume 1000ml ordered and administered.  Additional fluids were avoided given that the patient had a normal lactic acid as well as normal blood pressure.                   Severe Sepsis reassessment:  1. Repeat Lactic Acid Level within 6 hours of time zero: Initial lactic acid normal note.  No indication for repeat lactic acid.  2. MAP>65 after initial IVF bolus, will continue to monitor fluid status and vital signs    I attest to having performed a repeat sepsis exam and assessment of perfusion at 1800 and the results demonstrate no change.     Medical Decision Making:  Ry Horner is a 57 year old male who comes today with cellulitis and an open wound in the stump of his left leg where he had a BKA.  He was slightly tachycardic.  Lactic acid was normal and his blood pressure is remained stable.  He was treated for severe sepsis with broad-spectrum antibiotics after cultures were obtained.  He underwent Doppler ultrasound which showed no evidence of a DVT.  X-ray is concerning for possible osteomyelitis.  White blood cell count is significantly elevated, consistent with infection and he does have what appears to be acute kidney injury.  He will be admitted to the hospital under the care of Dr. Ruano.  I consulted with vascular surgery Dr. Chicas who is concerned that he may " have a stump osteomyelitis and recommended that he remain n.p.o. after midnight.  They also ordered a CT scan in preparation for possible washout tomorrow.  Patient was in agreement with the plan for admission.    Critical Care time:  was 30 minutes for this patient excluding procedures.    Diagnosis:    ICD-10-CM    1. Cellulitis of left lower extremity  L03.116       2. Ulcer of amputation stump of lower extremity (H)  T87.89     L97.909       3. Acute kidney injury (H)  N17.9       4. Severe sepsis (H)  A41.9     R65.20            Discharge Medications:  New Prescriptions    No medications on file        6/24/2023   Lina Woo MD Cho, Amy C, MD  06/25/23 0018

## 2023-06-24 NOTE — ED TRIAGE NOTES
Pt reports in last few days pt has not slept well and poor appetite. Woke up today after excessive sleep     Pt noticed that redness swelling and warm to the touch on left leg amputation

## 2023-06-25 LAB
ANION GAP SERPL CALCULATED.3IONS-SCNC: 13 MMOL/L (ref 7–15)
BASOPHILS # BLD AUTO: 0.1 10E3/UL (ref 0–0.2)
BASOPHILS NFR BLD AUTO: 0 %
BUN SERPL-MCNC: 45.6 MG/DL (ref 6–20)
CALCIUM SERPL-MCNC: 8 MG/DL (ref 8.6–10)
CHLORIDE SERPL-SCNC: 101 MMOL/L (ref 98–107)
CREAT SERPL-MCNC: 2.21 MG/DL (ref 0.67–1.17)
DEPRECATED HCO3 PLAS-SCNC: 21 MMOL/L (ref 22–29)
EOSINOPHIL # BLD AUTO: 0 10E3/UL (ref 0–0.7)
EOSINOPHIL NFR BLD AUTO: 0 %
ERYTHROCYTE [DISTWIDTH] IN BLOOD BY AUTOMATED COUNT: 12.1 % (ref 10–15)
GFR SERPL CREATININE-BSD FRML MDRD: 34 ML/MIN/1.73M2
GLUCOSE BLDC GLUCOMTR-MCNC: 157 MG/DL (ref 70–99)
GLUCOSE BLDC GLUCOMTR-MCNC: 162 MG/DL (ref 70–99)
GLUCOSE BLDC GLUCOMTR-MCNC: 184 MG/DL (ref 70–99)
GLUCOSE BLDC GLUCOMTR-MCNC: 195 MG/DL (ref 70–99)
GLUCOSE BLDC GLUCOMTR-MCNC: 206 MG/DL (ref 70–99)
GLUCOSE BLDC GLUCOMTR-MCNC: 262 MG/DL (ref 70–99)
GLUCOSE SERPL-MCNC: 186 MG/DL (ref 70–99)
HCT VFR BLD AUTO: 33.6 % (ref 40–53)
HGB BLD-MCNC: 11.4 G/DL (ref 13.3–17.7)
IMM GRANULOCYTES # BLD: 0.2 10E3/UL
IMM GRANULOCYTES NFR BLD: 1 %
LYMPHOCYTES # BLD AUTO: 0.8 10E3/UL (ref 0.8–5.3)
LYMPHOCYTES NFR BLD AUTO: 4 %
MCH RBC QN AUTO: 29.5 PG (ref 26.5–33)
MCHC RBC AUTO-ENTMCNC: 33.9 G/DL (ref 31.5–36.5)
MCV RBC AUTO: 87 FL (ref 78–100)
MONOCYTES # BLD AUTO: 1.4 10E3/UL (ref 0–1.3)
MONOCYTES NFR BLD AUTO: 8 %
NEUTROPHILS # BLD AUTO: 15.6 10E3/UL (ref 1.6–8.3)
NEUTROPHILS NFR BLD AUTO: 87 %
NRBC # BLD AUTO: 0 10E3/UL
NRBC BLD AUTO-RTO: 0 /100
PLATELET # BLD AUTO: 222 10E3/UL (ref 150–450)
POTASSIUM SERPL-SCNC: 3.8 MMOL/L (ref 3.4–5.3)
RBC # BLD AUTO: 3.86 10E6/UL (ref 4.4–5.9)
SODIUM SERPL-SCNC: 135 MMOL/L (ref 136–145)
WBC # BLD AUTO: 18 10E3/UL (ref 4–11)

## 2023-06-25 PROCEDURE — 250N000013 HC RX MED GY IP 250 OP 250 PS 637: Performed by: INTERNAL MEDICINE

## 2023-06-25 PROCEDURE — 250N000013 HC RX MED GY IP 250 OP 250 PS 637: Performed by: HOSPITALIST

## 2023-06-25 PROCEDURE — 258N000003 HC RX IP 258 OP 636: Performed by: INTERNAL MEDICINE

## 2023-06-25 PROCEDURE — 80048 BASIC METABOLIC PNL TOTAL CA: CPT | Performed by: HOSPITALIST

## 2023-06-25 PROCEDURE — 36415 COLL VENOUS BLD VENIPUNCTURE: CPT | Performed by: HOSPITALIST

## 2023-06-25 PROCEDURE — 99233 SBSQ HOSP IP/OBS HIGH 50: CPT | Performed by: INTERNAL MEDICINE

## 2023-06-25 PROCEDURE — 999N000128 HC STATISTIC PERIPHERAL IV START W/O US GUIDANCE

## 2023-06-25 PROCEDURE — 258N000003 HC RX IP 258 OP 636: Performed by: HOSPITALIST

## 2023-06-25 PROCEDURE — 250N000011 HC RX IP 250 OP 636: Mod: JZ | Performed by: HOSPITALIST

## 2023-06-25 PROCEDURE — 120N000001 HC R&B MED SURG/OB

## 2023-06-25 PROCEDURE — 85025 COMPLETE CBC W/AUTO DIFF WBC: CPT | Performed by: HOSPITALIST

## 2023-06-25 RX ORDER — DEXTROSE MONOHYDRATE 25 G/50ML
25-50 INJECTION, SOLUTION INTRAVENOUS
Status: DISCONTINUED | OUTPATIENT
Start: 2023-06-25 | End: 2023-06-28

## 2023-06-25 RX ORDER — EZETIMIBE 10 MG/1
10 TABLET ORAL DAILY
Status: DISCONTINUED | OUTPATIENT
Start: 2023-06-25 | End: 2023-07-28 | Stop reason: HOSPADM

## 2023-06-25 RX ORDER — NICOTINE POLACRILEX 4 MG
15-30 LOZENGE BUCCAL
Status: DISCONTINUED | OUTPATIENT
Start: 2023-06-25 | End: 2023-06-28

## 2023-06-25 RX ADMIN — HEPARIN SODIUM 5000 UNITS: 5000 INJECTION, SOLUTION INTRAVENOUS; SUBCUTANEOUS at 10:11

## 2023-06-25 RX ADMIN — ACETAMINOPHEN 650 MG: 325 TABLET, FILM COATED ORAL at 06:25

## 2023-06-25 RX ADMIN — PIPERACILLIN AND TAZOBACTAM 4.5 G: 4; .5 INJECTION, POWDER, FOR SOLUTION INTRAVENOUS at 10:11

## 2023-06-25 RX ADMIN — PIPERACILLIN AND TAZOBACTAM 4.5 G: 4; .5 INJECTION, POWDER, FOR SOLUTION INTRAVENOUS at 22:17

## 2023-06-25 RX ADMIN — ACETAMINOPHEN 650 MG: 325 TABLET, FILM COATED ORAL at 21:14

## 2023-06-25 RX ADMIN — HEPARIN SODIUM 5000 UNITS: 5000 INJECTION, SOLUTION INTRAVENOUS; SUBCUTANEOUS at 21:14

## 2023-06-25 RX ADMIN — ONDANSETRON 4 MG: 2 INJECTION INTRAMUSCULAR; INTRAVENOUS at 15:56

## 2023-06-25 RX ADMIN — SODIUM CHLORIDE: 9 INJECTION, SOLUTION INTRAVENOUS at 16:08

## 2023-06-25 RX ADMIN — PIPERACILLIN AND TAZOBACTAM 4.5 G: 4; .5 INJECTION, POWDER, FOR SOLUTION INTRAVENOUS at 17:26

## 2023-06-25 RX ADMIN — INSULIN ASPART 1 UNITS: 100 INJECTION, SOLUTION INTRAVENOUS; SUBCUTANEOUS at 21:16

## 2023-06-25 RX ADMIN — ACETAMINOPHEN 650 MG: 325 TABLET, FILM COATED ORAL at 14:37

## 2023-06-25 RX ADMIN — HYDROMORPHONE HYDROCHLORIDE 1 MG: 2 TABLET ORAL at 21:14

## 2023-06-25 RX ADMIN — SODIUM CHLORIDE: 9 INJECTION, SOLUTION INTRAVENOUS at 06:22

## 2023-06-25 RX ADMIN — PIPERACILLIN AND TAZOBACTAM 4.5 G: 4; .5 INJECTION, POWDER, FOR SOLUTION INTRAVENOUS at 05:15

## 2023-06-25 RX ADMIN — VANCOMYCIN HYDROCHLORIDE 1000 MG: 1 INJECTION, SOLUTION INTRAVENOUS at 18:05

## 2023-06-25 RX ADMIN — INSULIN DEGLUDEC INJECTION 17 UNITS: 100 INJECTION, SOLUTION SUBCUTANEOUS at 00:35

## 2023-06-25 RX ADMIN — EZETIMIBE 10 MG: 10 TABLET ORAL at 14:33

## 2023-06-25 ASSESSMENT — ACTIVITIES OF DAILY LIVING (ADL)
ADLS_ACUITY_SCORE: 33
ADLS_ACUITY_SCORE: 37
ADLS_ACUITY_SCORE: 33
ADLS_ACUITY_SCORE: 37
ADLS_ACUITY_SCORE: 37
ADLS_ACUITY_SCORE: 33
ADLS_ACUITY_SCORE: 33

## 2023-06-25 NOTE — PLAN OF CARE
Goal Outcome Evaluation:      Plan of Care Reviewed With: patient    Overall Patient Progress: improvingOverall Patient Progress: improving    A&OX4. VSS on RA, tele-NS tachy. Bilateral BKA. Denies pain, N/V. Lt BKA swelling/redness. PIV infusing NS at 150ml/hr, int abx. NPO since midnight. Up independantly in room, use wheelchair. Void in urinal at bedside. Vascular surgery/ID consult. Continue monitoring.

## 2023-06-25 NOTE — PROGRESS NOTES
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      Cellulitis involving Lt BKA stump with sepsis  -Patient presented with pain, redness, swelling at his left BKA stump site.  Marked leukocytosis of 22.9.  Tachycardic to 110s.  X-ray Shows soft tissue swelling, some bone formation along the resection margin of the tibia with periosteal reaction.  -Admit to inpatient  -Continue IV vancomycin and Zosyn   -Vascular surgery following, holding off on surgical intervention today with improvement on IV antibiotics  -ID consult for antibiotic management     Acute kidney injury on CKD stage III  Creatinine 2.45.  Likely baseline is 1.5-1.6.  Although noted it was 1.9 recently.  Suspect related to sepsis. Takes Ibuprofen occasionally.   -Continue IV hydration  -Hold lisinopril  -Creatinine slightly better today, will recheck again tomorrow     Type II DM, uncontrolled, on insulin  Noted HbA1c was 10.4 in February.  States his blood sugars run in 200s.  -Continue PTA Tresiba, at a reduced dose of 20 units twice a day  -High insulin resistance correctional dose  -Hold PTA oral agents including Jardiance, Januvia.     Mild hyponatremia  -Follow with fluid resuscitation    Diet: Moderate Consistent Carb (60 g CHO per Meal) Diet     DVT Prophylaxis: Pneumatic Compression Devices   Corrales Catheter: Not present  Code Status: Full Code     Disposition Plan       Expected Discharge Date: 06/26/2023              Entered: Kai Viramontes MD 06/25/2023, 9:40 AM        Clinically Significant Risk Factors  Present on Admission          # Hypocalcemia: Lowest Ca = 8 mg/dL in last 2 days, will monitor and replace as appropriate     # Hypoalbuminemia: Lowest albumin = 3.4 g/dL at 6/24/2023  4:12 PM, will monitor as appropriate   # Drug Induced Platelet Defect: home medication list includes an antiplatelet medication   # Hypertension: Noted on problem list     # DMII: A1C = N/A within past 6 months    # Obesity: Estimated body mass index is 32.29 kg/m  as calculated from the following:    Height as of this encounter: 1.829 m (6').    Weight as of this encounter: 108 kg (238 lb 1.6 oz).               The patient's care was discussed with the Bedside Nurse and Patient.    Medical Decision Making       **CLEAR ALL SELECTIONS**        Labs/Imaging Reviewed:  See Information above and Data section below    Time SPENT BY ME on the date of service doing chart review, history, exam, documentation & further activities per the note:  50 MINUTES    Kai Viramontes MD  Hospitalist Service  [facility] [place]  Text Page 7AM-6PM  Securely message with the Vocera Web Console (learn more here)  Text page via [employer] Paging/Directory    ______________________________________________________________________    Interval History   Afebrile.  Erythema at the left stump site is much improved.  Pain also improving.  Blood sugars adequately controlled.  Renal function slightly better.  Improved leukocytosis.    Data reviewed today: I reviewed all medications, new labs and imaging results over the last 24 hours. I personally reviewed no images or EKG's today.    Physical Exam   Vital signs:  Temp: 99  F (37.2  C) Temp src: Oral BP: 120/80 Pulse: 93   Resp: 16 SpO2: 97 % O2 Device: None (Room air)   Height: 182.9 cm (6') Weight: 108 kg (238 lb 1.6 oz)  Estimated body mass index is 32.29 kg/m  as calculated from the following:    Height as of this encounter: 1.829 m (6').    Weight as of this encounter: 108 kg (238 lb 1.6  oz).      Wt Readings from Last 2 Encounters:   06/24/23 108 kg (238 lb 1.6 oz)   10/03/22 110 kg (242 lb 8.1 oz)       Gen: AAOX3, NAD, comfortable  HEENT: Supple neck, moist oral mucosa, no pallor  Resp: CTA B/L, normal WOB  CVS: RRR, no murmur  Abd/GI: Soft, non-tender. BS- normoactive.    Skin: Warm, dry no rashes  MSK: Left BKA stump site with erythema which is slightly better compared to yesterday, edema+  Neuro- CN- intact. No focal deficits.     Data   Recent Labs   Lab 06/25/23  0821 06/25/23  0636 06/25/23  0548 06/24/23  2215 06/24/23  1612   WBC  --  18.0*  --   --  22.9*   HGB  --  11.4*  --   --  12.8*   MCV  --  87  --   --  86   PLT  --  222  --   --  235   NA  --  135*  --   --  134*   POTASSIUM  --  3.8  --   --  4.3   CHLORIDE  --  101  --   --  99   CO2  --  21*  --   --  21*   BUN  --  45.6*  --   --  46.0*   CR  --  2.21*  --   --  2.45*   ANIONGAP  --  13  --   --  14   FERNANDO  --  8.0*  --   --  8.8   * 186* 195*   < > 270*   ALBUMIN  --   --   --   --  3.4*   PROTTOTAL  --   --   --   --  7.3   BILITOTAL  --   --   --   --  0.9   ALKPHOS  --   --   --   --  84   ALT  --   --   --   --  14   AST  --   --   --   --  13    < > = values in this interval not displayed.       Recent Results (from the past 24 hour(s))   XR Tibia and Fibula Left 2 Views    Narrative    EXAM: XR TIBIA AND FIBULA LEFT 2 VIEWS  LOCATION: [facility]  DATE: 6/24/2023    INDICATION: Cellulitis, wound at stump site  COMPARISON: None.      Impression    IMPRESSION: Soft tissue swelling about the stump of the below the knee amputation. There is some bone formation along the resection margin of the tibia where there is some periosteal reaction. This can be seen with postoperative change although   osteomyelitis is difficult to exclude based on radiograph alone. There are no prior studies to compare this to. Atherosclerotic vascular calcifications.    NOTE: ABNORMAL REPORT    THE DICTATION ABOVE  DESCRIBES AN ABNORMALITY FOR WHICH FOLLOW-UP IS NEEDED.    US Lower Extremity Venous Duplex Left    Narrative    EXAM: US LOWER EXTREMITY VENOUS DUPLEX LEFT  LOCATION: [facility]  DATE: 6/24/2023    INDICATION: swelling and stump site  COMPARISON: None.  TECHNIQUE: Venous Duplex ultrasound of the left lower extremity with and without compression, augmentation and duplex. Color flow and spectral Doppler with waveform analysis performed.    FINDINGS: Exam includes the common femoral, femoral, popliteal, and contralateral common femoral veins as well as segmentally visualized deep calf veins and greater saphenous vein.     LEFT: No deep vein thrombosis. No superficial thrombophlebitis. No popliteal cyst.      Impression    IMPRESSION:  1.  No deep venous thrombosis in the left lower extremity.   CT Tibia Fibula Lower Leg Left wo Contrast    Narrative    EXAM: CT TIBIA FIBULA LOWER LEG LEFT WO CONTRAST  LOCATION: [facility]  DATE: 6/24/2023    INDICATION: Rule out abscess. Non con CT  COMPARISON: None.  TECHNIQUE: Noncontrast. Axial, sagittal and coronal thin-section reconstruction. Dose reduction techniques were used.     FINDINGS: Postoperative changes prior below-the-knee amputation.    Poorly defined soft tissue and fluid along the distal stump does raise concern for abscess. This measures at least 5.9 x 5.3 x 7.8 cm in cross-sectional dimension and extends to the tibial resection margin. There are some faint periosteal bone formation   and erosive change along the tibial resection margin which does raise concern for osteomyelitis. There is a pocket of fluid which tracks along the posterolateral aspect of the tibia along the stump as seen on axial image 89 for example. There is no fluid   abutting the fibular resection margin which appears smooth without evidence for osteomyelitis.    There is considerable surrounding soft tissue fluid and inflammation.    No acute  fracture. Atherosclerotic vascular calcifications.      Impression    IMPRESSION:  1.  Postoperative changes prior below-the-knee amputation.  2.  Poorly defined soft tissue and fluid along the distal stump, presumably an abscess until proven otherwise. This measures at least 5.9 x 5.3 x 7.8 cm. The collection abuts the tibial resection margin, and tracks a short segment proximally along the   posterolateral tibial shaft as described.  3.  There is some faint periosteal bone formation and erosive change along the tibial resection margin which shows raise concern for early osteomyelitis.    NOTE: ABNORMAL REPORT    THE DICTATION ABOVE DESCRIBES AN ABNORMALITY FOR WHICH FOLLOW-UP IS NEEDED.      Medications     sodium chloride 150 mL/hr at 06/25/23 0622       heparin ANTICOAGULANT  5,000 Units Subcutaneous Q12H     insulin aspart  1-10 Units Subcutaneous TID AC     insulin aspart  1-7 Units Subcutaneous At Bedtime     piperacillin-tazobactam  4.5 g Intravenous Q6H     sodium chloride (PF)  3 mL Intracatheter Q8H     vancomycin  1,000 mg Intravenous Q24H

## 2023-06-25 NOTE — H&P (VIEW-ONLY)
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  Workup shows JOHNATHAN, leukocytosis, no evidence of DVT and XR shows some periosteal reaction around tibia. CT shows Poorly defined soft tissue and fluid along the distal stump but no air fluid levels . Unclear if abscess.        Plan:        No emergent indication for intervention     Patient feels clinically better after IVF and IV antibiotics. Will monitor resolution of erythema with medical management. If doesn't improve he may need operative exploration. 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 L BKA was performed in two stages in September 2022    Workup shows JOHNATHAN, leukocytosis, no evidence of DVT and XR shows some periosteal reaction around tibia. CT shows Poorly defined soft tissue and fluid along the distal stump but no air fluid levels . Unclear if abscess.     Past Medical History:  Past Medical History:   Diagnosis Date     BENIGN HYPERTENSION 4/4/2007     DIABETES MELLITUS TYPE II-UNCOMPL 4/4/2007     HYPERLIPIDEMIA NEC/NOS 4/4/2007     Tobacco use disorder 4/4/2007       Past Surgical History:  Past Surgical History:   Procedure Laterality Date     AMPUTATE FOOT Left  11/17/2019    Procedure: LEFT PARTIAL FOOT AMPUTATION;  Surgeon: Antoine Pena DPM;  Location: [facility]     AMPUTATE FOOT Left 12/4/2019    Procedure: LEFT PARTIAL FOOT AMPUTATION;  Surgeon: Tim Douglas DPM;  Location: [facility]     AMPUTATE FOOT Left 12/11/2019    Procedure: POSSIBLE PARTIAL FOOT AMPUTATION;  Surgeon: Tim Douglas DPM;  Location: [facility]     AMPUTATE FOOT Left 2/10/2022    Procedure: Partial left foot amputation;  Surgeon: Milena Cevallos DPM, Podiatry/Foot and Ankle Surgery;  Location: [facility]     AMPUTATE LEG BELOW KNEE Right 9/1/2017    Procedure: AMPUTATE LEG BELOW KNEE;  RIGHT BELOW KNEE AMPUTATION ;  Surgeon: Nikolas Nascimento MD;  Location: [facility]     AMPUTATE LEG BELOW KNEE Left 9/27/2022    Procedure: AMPUTATION BELOW KNEE;  Surgeon: Neal Blackman MD;  Location: [facility]     AMPUTATE LEG BELOW KNEE Left 9/29/2022    Procedure: LEFT SIDE COMPLETION BELOW THE KNEE AMPUTATION;  Surgeon: Reny Child MD;  Location: [facility]     AMPUTATE TOE(S) Left 2/3/2020    Procedure: LEFT SECOND TOE AMPUTATION;  Surgeon: Milena Cevallos DPM, Podiatry/Foot and Ankle Surgery;  Location: [facility]     APPENDECTOMY       APPLY WOUND VAC Right 3/2/2015    Procedure: APPLY WOUND VAC;  Surgeon: Milena Cevallos DPM, Pod;  Location: [facility]     BIOPSY BONE FOOT Right 7/15/2016    Procedure: BIOPSY BONE FOOT;  Surgeon: Tim Douglas DPM;  Location: [facility]     BIOPSY BONE TOE Left 12/4/2019    Procedure: BONE BIOPSY LEFT SECOND TOE;  Surgeon: Tim Douglas DPM;  Location: [facility]     IRRIGATION AND DEBRIDEMENT FOOT, COMBINED Right 3/2/2015    Procedure: COMBINED IRRIGATION AND DEBRIDEMENT FOOT;  Surgeon: Milena Cevallos DPM, Pod;  Location: [facility]     IRRIGATION AND DEBRIDEMENT FOOT, COMBINED Right 7/15/2016    Procedure: COMBINED IRRIGATION AND DEBRIDEMENT FOOT;  Surgeon: Tim Douglas DPM;  Location: [facility]     IRRIGATION AND DEBRIDEMENT FOOT, COMBINED Right 7/20/2016     "Procedure: COMBINED IRRIGATION AND DEBRIDEMENT FOOT;  Surgeon: Tim Douglas DPM;  Location: [facility]     IRRIGATION AND DEBRIDEMENT FOOT, COMBINED Left 11/19/2019    Procedure: REVISIONAL IRRIGATION AND DEBRIDEMENT LEFT FOOT AND BONE DEBRIDEMENT;  Surgeon: Joseph Randhawa DPM;  Location: [facility]     IRRIGATION AND DEBRIDEMENT FOOT, COMBINED Left 12/4/2019    Procedure: EXCISIONAL DEBRIDEMENT LEFT FOOT;  Surgeon: Tim Douglas DPM;  Location: [facility]     IRRIGATION AND DEBRIDEMENT FOOT, COMBINED Left 12/11/2019    Procedure: IRRIGATION AND DEBRIDEMENT FOOT, Partial osteotomy left first metatarsal;  Surgeon: Tim Douglas DPM;  Location: [facility]     IRRIGATION AND DEBRIDEMENT FOOT, COMBINED Left 2/5/2020    Procedure: IRRIGATION AND DEBRIDEMENT LEFT FOOT;  Surgeon: Tim Douglas DPM;  Location: [facility]     ORTHOPEDIC SURGERY         Allergies:     Allergies   Allergen Reactions     Pravastatin      \"sucked the life blood out of me,\" Indicates this occurs with all statins.       Medications:  No current facility-administered medications on file prior to encounter.  acetaminophen (TYLENOL) 325 MG tablet, Take 650 mg by mouth every 6 hours as needed for mild pain Alternates use with ibuprofen  aspirin 81 MG chewable tablet, Take 1 tablet (81 mg) by mouth daily  empagliflozin (JARDIANCE) 10 MG TABS tablet, Take 1 tablet (10 mg) by mouth daily  ezetimibe (ZETIA) 10 MG tablet, TAKE ONE TABLET BY MOUTH EVERY DAY  ibuprofen (ADVIL/MOTRIN) 200 MG tablet, Take 600 mg by mouth every 8 hours as needed for pain Alternates use with acetaminophen  insulin degludec (TRESIBA FLEXTOUCH) 100 UNIT/ML pen, Inject 34 Units Subcutaneous 2 times daily  Insulin Lispro-aabc, 1 U Dial, (LYUMJEV KWIKPEN) 100 UNIT/ML SOPN, Inject 5 Units Subcutaneous 3 times daily (before meals) Take 5 units breakfast, 5 units lunch, 5 units dinner + 1u/50mg/dL>150 mg/dL +2 u prime before each dose-total of 30 u/day  ipratropium (ATROVENT) 0.06 " % nasal spray, INSTILL TWO SPRAYS INTO EACH NOSTRIL FOUR TIMES A DAY AS NEEDED FOR RHINITIS  JANUVIA 50 MG tablet, TAKE ONE TABLET BY MOUTH EVERY DAY  lisinopril (ZESTRIL) 5 MG tablet, Take 1 tablet (5 mg) by mouth daily  multivitamin (CENTRUM SILVER) tablet, Take 1 tablet by mouth daily  BD PEN NEEDLE MEGAN 2ND GEN 32G X 4 MM miscellaneous, USE WITH INSULIN INJECTIONS UNDER THE SKIN TWO TIMES A DAY AS DIRECTED  blood glucose (NO BRAND SPECIFIED) lancets standard, Use to test blood sugar FOUR times daily, to match lancing device per insurance  blood glucose (NO BRAND SPECIFIED) test strip, 120 strips by In Vitro route 4 times daily Use to test blood sugar up to 4 times daily or as directed.  blood glucose monitoring (NO BRAND SPECIFIED) meter device kit, Use to test blood sugar FOUR times daily, brand per insurance  Continuous Blood Gluc Sensor (FREESTYLE CRISTINA 3 SENSOR) MISC, 1 each every 14 days Use to check blood sugars per  guidelines with Cristina 3 dionisio  Lancets (ONETOUCH DELICA PLUS [id number]) MISC, USE TO TEST BLOOD SUGAR FOUR TIMES A DAY  STATIN NOT PRESCRIBED (INTENTIONAL), Please choose reason not prescribed, below        Social History:  Social History     Socioeconomic History     Marital status: Single     Spouse name: Not on file     Number of children: Not on file     Years of education: Not on file     Highest education level: Not on file   Occupational History     Not on file   Tobacco Use     Smoking status: Former     Packs/day: 0.50     Years: 20.00     Pack years: 10.00     Types: Cigarettes     Start date: 1987     Quit date: 2006     Years since quittin.4     Smokeless tobacco: Never   Substance and Sexual Activity     Alcohol use: Yes     Comment: occ     Drug use: No     Sexual activity: Yes     Partners: Female   Other Topics Concern      Service Yes     Blood Transfusions No     Caffeine Concern No     Occupational Exposure No     Hobby Hazards No     Sleep Concern  No     Stress Concern No     Weight Concern Yes     Comment: Would like to lose some weight     Special Diet No     Back Care No     Exercise Yes     Bike Helmet No     Seat Belt Yes     Self-Exams Yes     Parent/sibling w/ CABG, MI or angioplasty before 65F 55M? No   Social History Narrative     Not on file     Social Determinants of Health     Financial Resource Strain: Not on file   Food Insecurity: Not on file   Transportation Needs: No Transportation Needs (3/24/2020)    PRAPARE - Transportation      Lack of Transportation (Medical): No      Lack of Transportation (Non-Medical): No   Physical Activity: Inactive (3/24/2020)    Exercise Vital Sign      Days of Exercise per Week: 0 days      Minutes of Exercise per Session: 0 min   Stress: Not on file   Social Connections: Not on file   Intimate Partner Violence: Not on file   Housing Stability: Not on file       Family History:  Family History   Problem Relation Age of Onset     Genetic Disorder Other      Genetic Disorder Other      Psychotic Disorder Mother      Diabetes Father      Lung Cancer Father      Hypertension Father      Genetic Disorder Maternal Grandmother      Genetic Disorder Maternal Grandfather      Asthma Sister      Breast Cancer Sister      C.A.D. No family hx of      Hypertension No family hx of      Cerebrovascular Disease No family hx of      Breast Cancer No family hx of      Cancer - colorectal No family hx of      Prostate Cancer No family hx of      Alcohol/Drug No family hx of        ROS:    The remainder of the complete ROS was negative unless noted in the HPI.    Exam:  /87 (BP Location: Right arm, Patient Position: Sitting, Cuff Size: Adult Regular)   Pulse 110   Temp 99.3  F (37.4  C) (Oral)   Resp 18   SpO2 97%   General:Alert and oriented with appropriate responses to questions, in NAD.  HEENT: NC/AT, sclera anicteric,  Neck: Supple, no JVD  Resp: NLB RA  Cardiac: regular rate and rhythm, no murmur.  Abdomen: Soft,  nontender, nondistended. No rebound or guarding.  Extremities:RLE BKA stump with no evidence of infection   LLE BKA with edema and cellulitis, non tender, no obvious fluctuance but edematous stump.   Skin: no jaundice or rash  Neuro: Cn II-XII intact, moves all extremities equally    Labs:  Most Recent CBC:   Recent Labs   Lab Test 06/24/23  1612   WBC 22.9*   RBC 4.36*   HGB 12.8*   HCT 37.6*   MCV 86   MCH 29.4   MCHC 34.0   RDW 12.0        Most Recent BMP:   Recent Labs   Lab Test 06/24/23  1612   *   POTASSIUM 4.3   CHLORIDE 99   CO2 21*   BUN 46.0*   CR 2.45*   *           Imaging:  Recent Results (from the past 24 hour(s))   XR Tibia and Fibula Left 2 Views    Narrative    EXAM: XR TIBIA AND FIBULA LEFT 2 VIEWS  LOCATION: [facility]  DATE: 6/24/2023    INDICATION: Cellulitis, wound at stump site  COMPARISON: None.      Impression    IMPRESSION: Soft tissue swelling about the stump of the below the knee amputation. There is some bone formation along the resection margin of the tibia where there is some periosteal reaction. This can be seen with postoperative change although   osteomyelitis is difficult to exclude based on radiograph alone. There are no prior studies to compare this to. Atherosclerotic vascular calcifications.    NOTE: ABNORMAL REPORT    THE DICTATION ABOVE DESCRIBES AN ABNORMALITY FOR WHICH FOLLOW-UP IS NEEDED.    US Lower Extremity Venous Duplex Left    Narrative    EXAM: US LOWER EXTREMITY VENOUS DUPLEX LEFT  LOCATION: [facility]  DATE: 6/24/2023    INDICATION: swelling and stump site  COMPARISON: None.  TECHNIQUE: Venous Duplex ultrasound of the left lower extremity with and without compression, augmentation and duplex. Color flow and spectral Doppler with waveform analysis performed.    FINDINGS: Exam includes the common femoral, femoral, popliteal, and contralateral common femoral veins as well as segmentally  visualized deep calf veins and greater saphenous vein.     LEFT: No deep vein thrombosis. No superficial thrombophlebitis. No popliteal cyst.      Impression    IMPRESSION:  1.  No deep venous thrombosis in the left lower extremity.   CT Tibia Fibula Lower Leg Left wo Contrast    Narrative    EXAM: CT TIBIA FIBULA LOWER LEG LEFT WO CONTRAST  LOCATION: [facility]  DATE: 6/24/2023    INDICATION: Rule out abscess. Non con CT  COMPARISON: None.  TECHNIQUE: Noncontrast. Axial, sagittal and coronal thin-section reconstruction. Dose reduction techniques were used.     FINDINGS: Postoperative changes prior below-the-knee amputation.    Poorly defined soft tissue and fluid along the distal stump does raise concern for abscess. This measures at least 5.9 x 5.3 x 7.8 cm in cross-sectional dimension and extends to the tibial resection margin. There are some faint periosteal bone formation   and erosive change along the tibial resection margin which does raise concern for osteomyelitis. There is a pocket of fluid which tracks along the posterolateral aspect of the tibia along the stump as seen on axial image 89 for example. There is no fluid   abutting the fibular resection margin which appears smooth without evidence for osteomyelitis.    There is considerable surrounding soft tissue fluid and inflammation.    No acute fracture. Atherosclerotic vascular calcifications.      Impression    IMPRESSION:  1.  Postoperative changes prior below-the-knee amputation.  2.  Poorly defined soft tissue and fluid along the distal stump, presumably an abscess until proven otherwise. This measures at least 5.9 x 5.3 x 7.8 cm. The collection abuts the tibial resection margin, and tracks a short segment proximally along the   posterolateral tibial shaft as described.  3.  There is some faint periosteal bone formation and erosive change along the tibial resection margin which shows raise concern for early  osteomyelitis.    NOTE: ABNORMAL REPORT    THE DICTATION ABOVE DESCRIBES AN ABNORMALITY FOR WHICH FOLLOW-UP IS NEEDED.        Assessment/ Plan: See above.     Kia Luevano MD    Vascular Surgery   Pager: [phone]    Pt reviewed with

## 2023-06-25 NOTE — CONSULTS
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  Workup shows JOHNATHAN, leukocytosis, no evidence of DVT and XR shows some periosteal reaction around tibia. CT shows Poorly defined soft tissue and fluid along the distal stump but no air fluid levels . Unclear if abscess.        Plan:        No emergent indication for intervention     Patient feels clinically better after IVF and IV antibiotics. Will monitor resolution of erythema with medical management. If doesn't improve he may need operative exploration. 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 L BKA was performed in two stages in September 2022    Workup shows JOHNATHAN, leukocytosis, no evidence of DVT and XR shows some periosteal reaction around tibia. CT shows Poorly defined soft tissue and fluid along the distal stump but no air fluid levels . Unclear if abscess.     Past Medical History:  Past Medical History:   Diagnosis Date     BENIGN HYPERTENSION 4/4/2007     DIABETES MELLITUS TYPE II-UNCOMPL 4/4/2007     HYPERLIPIDEMIA NEC/NOS 4/4/2007     Tobacco use disorder 4/4/2007       Past Surgical History:  Past Surgical History:   Procedure Laterality Date     AMPUTATE FOOT Left  11/17/2019    Procedure: LEFT PARTIAL FOOT AMPUTATION;  Surgeon: Antoine Pena DPM;  Location: [facility]     AMPUTATE FOOT Left 12/4/2019    Procedure: LEFT PARTIAL FOOT AMPUTATION;  Surgeon: Tim Douglas DPM;  Location: [facility]     AMPUTATE FOOT Left 12/11/2019    Procedure: POSSIBLE PARTIAL FOOT AMPUTATION;  Surgeon: Tim Douglas DPM;  Location: [facility]     AMPUTATE FOOT Left 2/10/2022    Procedure: Partial left foot amputation;  Surgeon: Milena Cevallos DPM, Podiatry/Foot and Ankle Surgery;  Location: [facility]     AMPUTATE LEG BELOW KNEE Right 9/1/2017    Procedure: AMPUTATE LEG BELOW KNEE;  RIGHT BELOW KNEE AMPUTATION ;  Surgeon: Nikolas Nascimento MD;  Location: [facility]     AMPUTATE LEG BELOW KNEE Left 9/27/2022    Procedure: AMPUTATION BELOW KNEE;  Surgeon: Neal Blackman MD;  Location: [facility]     AMPUTATE LEG BELOW KNEE Left 9/29/2022    Procedure: LEFT SIDE COMPLETION BELOW THE KNEE AMPUTATION;  Surgeon: Reny Child MD;  Location: [facility]     AMPUTATE TOE(S) Left 2/3/2020    Procedure: LEFT SECOND TOE AMPUTATION;  Surgeon: Milean Cevallos DPM, Podiatry/Foot and Ankle Surgery;  Location: [facility]     APPENDECTOMY       APPLY WOUND VAC Right 3/2/2015    Procedure: APPLY WOUND VAC;  Surgeon: Milena Cevallos DPM, Pod;  Location: [facility]     BIOPSY BONE FOOT Right 7/15/2016    Procedure: BIOPSY BONE FOOT;  Surgeon: Tim Douglas DPM;  [facility]     BIOPSY BONE TOE Left 12/4/2019    Procedure: BONE BIOPSY LEFT SECOND TOE;  Surgeon: Tim Douglas DPM;  [facility]     IRRIGATION AND DEBRIDEMENT FOOT, COMBINED Right 3/2/2015    Procedure: COMBINED IRRIGATION AND DEBRIDEMENT FOOT;  Surgeon: Milena Cevallos DPM, Pod;  Location: [facility]     IRRIGATION AND DEBRIDEMENT FOOT, COMBINED Right 7/15/2016    Procedure: COMBINED IRRIGATION AND DEBRIDEMENT FOOT;  Surgeon: Tim Douglas DPM;  [facility]     IRRIGATION AND DEBRIDEMENT FOOT, COMBINED Right 7/20/2016     "Procedure: COMBINED IRRIGATION AND DEBRIDEMENT FOOT;  Surgeon: Tim Douglas DPM;  [facility]     IRRIGATION AND DEBRIDEMENT FOOT, COMBINED Left 11/19/2019    Procedure: REVISIONAL IRRIGATION AND DEBRIDEMENT LEFT FOOT AND BONE DEBRIDEMENT;  Surgeon: Joseph Randhawa DPM;  [facility]     IRRIGATION AND DEBRIDEMENT FOOT, COMBINED Left 12/4/2019    Procedure: EXCISIONAL DEBRIDEMENT LEFT FOOT;  Surgeon: Tim Douglas DPM;  [facility]     IRRIGATION AND DEBRIDEMENT FOOT, COMBINED Left 12/11/2019    Procedure: IRRIGATION AND DEBRIDEMENT FOOT, Partial osteotomy left first metatarsal;  Surgeon: Tim Douglas DPM;  [facility]     IRRIGATION AND DEBRIDEMENT FOOT, COMBINED Left 2/5/2020    Procedure: IRRIGATION AND DEBRIDEMENT LEFT FOOT;  Surgeon: Tim Douglas DPM;  Location: [facility]     ORTHOPEDIC SURGERY         Allergies:     Allergies   Allergen Reactions     Pravastatin      \"sucked the life blood out of me,\" Indicates this occurs with all statins.       Medications:  No current facility-administered medications on file prior to encounter.  acetaminophen (TYLENOL) 325 MG tablet, Take 650 mg by mouth every 6 hours as needed for mild pain Alternates use with ibuprofen  aspirin 81 MG chewable tablet, Take 1 tablet (81 mg) by mouth daily  empagliflozin (JARDIANCE) 10 MG TABS tablet, Take 1 tablet (10 mg) by mouth daily  ezetimibe (ZETIA) 10 MG tablet, TAKE ONE TABLET BY MOUTH EVERY DAY  ibuprofen (ADVIL/MOTRIN) 200 MG tablet, Take 600 mg by mouth every 8 hours as needed for pain Alternates use with acetaminophen  insulin degludec (TRESIBA FLEXTOUCH) 100 UNIT/ML pen, Inject 34 Units Subcutaneous 2 times daily  Insulin Lispro-aabc, 1 U Dial, (LYUMJEV KWIKPEN) 100 UNIT/ML SOPN, Inject 5 Units Subcutaneous 3 times daily (before meals) Take 5 units breakfast, 5 units lunch, 5 units dinner + 1u/50mg/dL>150 mg/dL +2 u prime before each dose-total of 30 u/day  ipratropium (ATROVENT) 0.06 " % nasal spray, INSTILL TWO SPRAYS INTO EACH NOSTRIL FOUR TIMES A DAY AS NEEDED FOR RHINITIS  JANUVIA 50 MG tablet, TAKE ONE TABLET BY MOUTH EVERY DAY  lisinopril (ZESTRIL) 5 MG tablet, Take 1 tablet (5 mg) by mouth daily  multivitamin (CENTRUM SILVER) tablet, Take 1 tablet by mouth daily  BD PEN NEEDLE MEGAN 2ND GEN 32G X 4 MM miscellaneous, USE WITH INSULIN INJECTIONS UNDER THE SKIN TWO TIMES A DAY AS DIRECTED  blood glucose (NO BRAND SPECIFIED) lancets standard, Use to test blood sugar FOUR times daily, to match lancing device per insurance  blood glucose (NO BRAND SPECIFIED) test strip, 120 strips by In Vitro route 4 times daily Use to test blood sugar up to 4 times daily or as directed.  blood glucose monitoring (NO BRAND SPECIFIED) meter device kit, Use to test blood sugar FOUR times daily, brand per insurance  Continuous Blood Gluc Sensor (FREESTYLE CRISTINA 3 SENSOR) MISC, 1 each every 14 days Use to check blood sugars per  guidelines with Cristina 3 dionisio  Lancets (ONETOUCH DELICA PLUS [id number]) MISC, USE TO TEST BLOOD SUGAR FOUR TIMES A DAY  STATIN NOT PRESCRIBED (INTENTIONAL), Please choose reason not prescribed, below        Social History:  Social History     Socioeconomic History     Marital status: Single     Spouse name: Not on file     Number of children: Not on file     Years of education: Not on file     Highest education level: Not on file   Occupational History     Not on file   Tobacco Use     Smoking status: Former     Packs/day: 0.50     Years: 20.00     Pack years: 10.00     Types: Cigarettes     Start date: 1987     Quit date: 2006     Years since quittin.4     Smokeless tobacco: Never   Substance and Sexual Activity     Alcohol use: Yes     Comment: occ     Drug use: No     Sexual activity: Yes     Partners: Female   Other Topics Concern      Service Yes     Blood Transfusions No     Caffeine Concern No     Occupational Exposure No     Hobby Hazards No     Sleep Concern  No     Stress Concern No     Weight Concern Yes     Comment: Would like to lose some weight     Special Diet No     Back Care No     Exercise Yes     Bike Helmet No     Seat Belt Yes     Self-Exams Yes     Parent/sibling w/ CABG, MI or angioplasty before 65F 55M? No   Social History Narrative     Not on file     Social Determinants of Health     Financial Resource Strain: Not on file   Food Insecurity: Not on file   Transportation Needs: No Transportation Needs (3/24/2020)    PRAPARE - Transportation      Lack of Transportation (Medical): No      Lack of Transportation (Non-Medical): No   Physical Activity: Inactive (3/24/2020)    Exercise Vital Sign      Days of Exercise per Week: 0 days      Minutes of Exercise per Session: 0 min   Stress: Not on file   Social Connections: Not on file   Intimate Partner Violence: Not on file   Housing Stability: Not on file       Family History:  Family History   Problem Relation Age of Onset     Genetic Disorder Other      Genetic Disorder Other      Psychotic Disorder Mother      Diabetes Father      Lung Cancer Father      Hypertension Father      Genetic Disorder Maternal Grandmother      Genetic Disorder Maternal Grandfather      Asthma Sister      Breast Cancer Sister      C.A.D. No family hx of      Hypertension No family hx of      Cerebrovascular Disease No family hx of      Breast Cancer No family hx of      Cancer - colorectal No family hx of      Prostate Cancer No family hx of      Alcohol/Drug No family hx of        ROS:    The remainder of the complete ROS was negative unless noted in the HPI.    Exam:  /87 (BP Location: Right arm, Patient Position: Sitting, Cuff Size: Adult Regular)   Pulse 110   Temp 99.3  F (37.4  C) (Oral)   Resp 18   SpO2 97%   General:Alert and oriented with appropriate responses to questions, in NAD.  HEENT: NC/AT, sclera anicteric,  Neck: Supple, no JVD  Resp: NLB RA  Cardiac: regular rate and rhythm, no murmur.  Abdomen: Soft,  nontender, nondistended. No rebound or guarding.  Extremities:RLE BKA stump with no evidence of infection   LLE BKA with edema and cellulitis, non tender, no obvious fluctuance but edematous stump.   Skin: no jaundice or rash  Neuro: Cn II-XII intact, moves all extremities equally    Labs:  Most Recent CBC:   Recent Labs   Lab Test 06/24/23  1612   WBC 22.9*   RBC 4.36*   HGB 12.8*   HCT 37.6*   MCV 86   MCH 29.4   MCHC 34.0   RDW 12.0        Most Recent BMP:   Recent Labs   Lab Test 06/24/23  1612   *   POTASSIUM 4.3   CHLORIDE 99   CO2 21*   BUN 46.0*   CR 2.45*   *           Imaging:  Recent Results (from the past 24 hour(s))   XR Tibia and Fibula Left 2 Views    Narrative    EXAM: XR TIBIA AND FIBULA LEFT 2 VIEWS  LOCATION: [facility]  DATE: 6/24/2023    INDICATION: Cellulitis, wound at stump site  COMPARISON: None.      Impression    IMPRESSION: Soft tissue swelling about the stump of the below the knee amputation. There is some bone formation along the resection margin of the tibia where there is some periosteal reaction. This can be seen with postoperative change although   osteomyelitis is difficult to exclude based on radiograph alone. There are no prior studies to compare this to. Atherosclerotic vascular calcifications.    NOTE: ABNORMAL REPORT    THE DICTATION ABOVE DESCRIBES AN ABNORMALITY FOR WHICH FOLLOW-UP IS NEEDED.    US Lower Extremity Venous Duplex Left    Narrative    EXAM: US LOWER EXTREMITY VENOUS DUPLEX LEFT  LOCATION: [facility]  DATE: 6/24/2023    INDICATION: swelling and stump site  COMPARISON: None.  TECHNIQUE: Venous Duplex ultrasound of the left lower extremity with and without compression, augmentation and duplex. Color flow and spectral Doppler with waveform analysis performed.    FINDINGS: Exam includes the common femoral, femoral, popliteal, and contralateral common femoral veins as well as segmentally  visualized deep calf veins and greater saphenous vein.     LEFT: No deep vein thrombosis. No superficial thrombophlebitis. No popliteal cyst.      Impression    IMPRESSION:  1.  No deep venous thrombosis in the left lower extremity.   CT Tibia Fibula Lower Leg Left wo Contrast    Narrative    EXAM: CT TIBIA FIBULA LOWER LEG LEFT WO CONTRAST  LOCATION: [facility]  DATE: 6/24/2023    INDICATION: Rule out abscess. Non con CT  COMPARISON: None.  TECHNIQUE: Noncontrast. Axial, sagittal and coronal thin-section reconstruction. Dose reduction techniques were used.     FINDINGS: Postoperative changes prior below-the-knee amputation.    Poorly defined soft tissue and fluid along the distal stump does raise concern for abscess. This measures at least 5.9 x 5.3 x 7.8 cm in cross-sectional dimension and extends to the tibial resection margin. There are some faint periosteal bone formation   and erosive change along the tibial resection margin which does raise concern for osteomyelitis. There is a pocket of fluid which tracks along the posterolateral aspect of the tibia along the stump as seen on axial image 89 for example. There is no fluid   abutting the fibular resection margin which appears smooth without evidence for osteomyelitis.    There is considerable surrounding soft tissue fluid and inflammation.    No acute fracture. Atherosclerotic vascular calcifications.      Impression    IMPRESSION:  1.  Postoperative changes prior below-the-knee amputation.  2.  Poorly defined soft tissue and fluid along the distal stump, presumably an abscess until proven otherwise. This measures at least 5.9 x 5.3 x 7.8 cm. The collection abuts the tibial resection margin, and tracks a short segment proximally along the   posterolateral tibial shaft as described.  3.  There is some faint periosteal bone formation and erosive change along the tibial resection margin which shows raise concern for early  osteomyelitis.    NOTE: ABNORMAL REPORT    THE DICTATION ABOVE DESCRIBES AN ABNORMALITY FOR WHICH FOLLOW-UP IS NEEDED.        Assessment/ Plan: See above.     Kia Luevano MD    Vascular Surgery   Pager: [phone]    Pt reviewed with

## 2023-06-25 NOTE — PROGRESS NOTES
Vascular Surgery Note    Doing well. Mild pain over L BKA . No fevers or chills.     On exam, erythema is much improved, edema still unchanged, no crepitus     Labs WBC down to 18 ,Cr trending down too         A/P    56 yo M with L BKA cellulitis ,being managed with IV abx    - Will hold off on surgical intervention today given improvement with IV abx. Ok to eat    Kia Luevano MD  Fellow

## 2023-06-25 NOTE — PHARMACY-VANCOMYCIN DOSING SERVICE
"Pharmacy Vancomycin Initial Note  Date of Service 2023  Patient's  1965  57 year old, male    Indication: Sepsis    Current estimated CrCl = Estimated Creatinine Clearance: 43.3 mL/min (A) (based on SCr of 2.45 mg/dL (H)).    Creatinine for last 3 days  2023:  4:12 PM Creatinine 2.45 mg/dL    Recent Vancomycin Level(s) for last 3 days  No results found for requested labs within last 3 days.      Vancomycin IV Administrations (past 72 hours)                   vancomycin (VANCOCIN) 2,500 mg in 0.9% NaCl 500 mL intermittent infusion (mg) 2,500 mg New Bag 23 1746                Nephrotoxins and other renal medications (From now, onward)    Start     Dose/Rate Route Frequency Ordered Stop    23 1800  vancomycin (VANCOCIN) 1000 mg in dextrose 5% 200 mL PREMIX         1,000 mg  200 mL/hr over 1 Hours Intravenous EVERY 24 HOURS 23 2248      23 2300  piperacillin-tazobactam (ZOSYN) 4.5 g vial to attach to  mL bag        Note to Pharmacy: For [facility], [facility] and [facility]: For Zosyn-naive patients, use the \"Zosyn initial dose + extended infusion\" order panel.    4.5 g  over 30 Minutes Intravenous EVERY 6 HOURS 23 2143            Contrast Orders - past 72 hours (72h ago, onward)    None          [employer] Prediction of Planned Initial Vancomycin Regimen  Loading dose: 2500 mg IV once  Regimen: 1000 mg IV every 24 hours.  Start time: 05:46 on 2023  Exposure target: AUC24 (range)400-600 mg/L.hr   AUC24,ss: 439 mg/L.hr  Probability of AUC24 > 400: 60 %  Ctrough,ss: 14.4 mg/L  Probability of Ctrough,ss > 20: 22 %  Probability of nephrotoxicity (Lodise ALEXANDRA ): 10 %          Plan:  1. Start vancomycin  1000 mg IV q24h.   2. Vancomycin monitoring method: AUC  3. Vancomycin therapeutic monitoring goal: 400-600 mg*h/L  4. Pharmacy will check vancomycin levels as appropriate in 1-3 Days.    5. Serum creatinine levels will be ordered daily for the first week of therapy and at least " twice weekly for subsequent weeks.      Shira Godinez, RPH

## 2023-06-25 NOTE — PROVIDER NOTIFICATION
.MD Notification    Notified Person: MD    Notified Person Name: Dr. Akins    Notification Date/Time: 06/24. 2338    Notification Interaction: Page    Purpose of Notification: Pt concern about having Tresiba tonight, it's not on schedule, but I looked at hospitalist note said it will be continue 50% of it since pt'll be npo. pls advice.    Orders Received: Order Tresiba.    Comments:

## 2023-06-26 LAB
ANION GAP SERPL CALCULATED.3IONS-SCNC: 12 MMOL/L (ref 7–15)
BUN SERPL-MCNC: 31.2 MG/DL (ref 6–20)
CALCIUM SERPL-MCNC: 8.1 MG/DL (ref 8.6–10)
CHLORIDE SERPL-SCNC: 102 MMOL/L (ref 98–107)
CREAT SERPL-MCNC: 1.81 MG/DL (ref 0.67–1.17)
CREAT SERPL-MCNC: 1.81 MG/DL (ref 0.67–1.17)
CRP SERPL-MCNC: 192.54 MG/L
DEPRECATED HCO3 PLAS-SCNC: 22 MMOL/L (ref 22–29)
ERYTHROCYTE [DISTWIDTH] IN BLOOD BY AUTOMATED COUNT: 12.1 % (ref 10–15)
GFR SERPL CREATININE-BSD FRML MDRD: 44 ML/MIN/1.73M2
GFR SERPL CREATININE-BSD FRML MDRD: 44 ML/MIN/1.73M2
GLUCOSE BLDC GLUCOMTR-MCNC: 108 MG/DL (ref 70–99)
GLUCOSE BLDC GLUCOMTR-MCNC: 132 MG/DL (ref 70–99)
GLUCOSE BLDC GLUCOMTR-MCNC: 147 MG/DL (ref 70–99)
GLUCOSE BLDC GLUCOMTR-MCNC: 152 MG/DL (ref 70–99)
GLUCOSE BLDC GLUCOMTR-MCNC: 81 MG/DL (ref 70–99)
GLUCOSE SERPL-MCNC: 108 MG/DL (ref 70–99)
HCT VFR BLD AUTO: 33.5 % (ref 40–53)
HGB BLD-MCNC: 11.3 G/DL (ref 13.3–17.7)
MCH RBC QN AUTO: 29.6 PG (ref 26.5–33)
MCHC RBC AUTO-ENTMCNC: 33.7 G/DL (ref 31.5–36.5)
MCV RBC AUTO: 88 FL (ref 78–100)
PLATELET # BLD AUTO: 267 10E3/UL (ref 150–450)
POTASSIUM SERPL-SCNC: 3.8 MMOL/L (ref 3.4–5.3)
RBC # BLD AUTO: 3.82 10E6/UL (ref 4.4–5.9)
SODIUM SERPL-SCNC: 136 MMOL/L (ref 136–145)
WBC # BLD AUTO: 19.6 10E3/UL (ref 4–11)

## 2023-06-26 PROCEDURE — 36415 COLL VENOUS BLD VENIPUNCTURE: CPT | Performed by: INTERNAL MEDICINE

## 2023-06-26 PROCEDURE — 99232 SBSQ HOSP IP/OBS MODERATE 35: CPT | Performed by: INTERNAL MEDICINE

## 2023-06-26 PROCEDURE — 258N000003 HC RX IP 258 OP 636: Performed by: INTERNAL MEDICINE

## 2023-06-26 PROCEDURE — 120N000001 HC R&B MED SURG/OB

## 2023-06-26 PROCEDURE — 80048 BASIC METABOLIC PNL TOTAL CA: CPT | Performed by: INTERNAL MEDICINE

## 2023-06-26 PROCEDURE — 250N000011 HC RX IP 250 OP 636: Mod: JZ | Performed by: HOSPITALIST

## 2023-06-26 PROCEDURE — 250N000011 HC RX IP 250 OP 636: Mod: JZ | Performed by: INTERNAL MEDICINE

## 2023-06-26 PROCEDURE — 86140 C-REACTIVE PROTEIN: CPT | Performed by: INTERNAL MEDICINE

## 2023-06-26 PROCEDURE — 250N000013 HC RX MED GY IP 250 OP 250 PS 637: Performed by: INTERNAL MEDICINE

## 2023-06-26 PROCEDURE — 85027 COMPLETE CBC AUTOMATED: CPT | Performed by: INTERNAL MEDICINE

## 2023-06-26 PROCEDURE — 99222 1ST HOSP IP/OBS MODERATE 55: CPT | Performed by: INTERNAL MEDICINE

## 2023-06-26 RX ADMIN — PIPERACILLIN AND TAZOBACTAM 4.5 G: 4; .5 INJECTION, POWDER, FOR SOLUTION INTRAVENOUS at 22:42

## 2023-06-26 RX ADMIN — SODIUM CHLORIDE: 9 INJECTION, SOLUTION INTRAVENOUS at 04:00

## 2023-06-26 RX ADMIN — PIPERACILLIN AND TAZOBACTAM 4.5 G: 4; .5 INJECTION, POWDER, FOR SOLUTION INTRAVENOUS at 12:10

## 2023-06-26 RX ADMIN — DAPTOMYCIN 700 MG: 500 INJECTION, POWDER, LYOPHILIZED, FOR SOLUTION INTRAVENOUS at 13:33

## 2023-06-26 RX ADMIN — EZETIMIBE 10 MG: 10 TABLET ORAL at 09:13

## 2023-06-26 RX ADMIN — HEPARIN SODIUM 5000 UNITS: 5000 INJECTION, SOLUTION INTRAVENOUS; SUBCUTANEOUS at 21:14

## 2023-06-26 RX ADMIN — PIPERACILLIN AND TAZOBACTAM 4.5 G: 4; .5 INJECTION, POWDER, FOR SOLUTION INTRAVENOUS at 04:00

## 2023-06-26 RX ADMIN — PIPERACILLIN AND TAZOBACTAM 4.5 G: 4; .5 INJECTION, POWDER, FOR SOLUTION INTRAVENOUS at 17:19

## 2023-06-26 RX ADMIN — HEPARIN SODIUM 5000 UNITS: 5000 INJECTION, SOLUTION INTRAVENOUS; SUBCUTANEOUS at 09:13

## 2023-06-26 ASSESSMENT — ACTIVITIES OF DAILY LIVING (ADL)
ADLS_ACUITY_SCORE: 33
ADLS_ACUITY_SCORE: 35
ADLS_ACUITY_SCORE: 33
ADLS_ACUITY_SCORE: 35
ADLS_ACUITY_SCORE: 35
DEPENDENT_IADLS:: INDEPENDENT
ADLS_ACUITY_SCORE: 33
ADLS_ACUITY_SCORE: 33
ADLS_ACUITY_SCORE: 35
ADLS_ACUITY_SCORE: 33

## 2023-06-26 NOTE — PLAN OF CARE
Goal Outcome Evaluation: [id number]       Patient A&Ox4. VSS on RA. L knee stump swollen and redness improved from yesterday per patient and spouse. Self repositioning in bed independently. Encourage pt to sit in the chair, but pt declined. Tylenol and po dilaudid X1 given for pain. Voiding spontaneously in urinal. Abx infusing intermittently. Continue to monitor.                   [Cough] : cough [Wheezing] : wheezing [SOB on Exertion] : sob on exertion [Negative] : Cardiovascular

## 2023-06-26 NOTE — PROGRESS NOTES
Vascular Surgery Note    Doing well. Mild pain over L BKA . No fevers or chills.     On exam, erythema is unchanged from yesterday (improved from admission), edema still unchanged, no crepitus     WBC 19.6 today (increased)        A/P    58 yo M with L BKA cellulitis ,being managed with IV abx    - Discussed that he may need I and D given his very slow improvement. Will reassess later today.     Kia Luevano MD  Fellow

## 2023-06-26 NOTE — CONSULTS
Care Management Initial Consult    General Information  Assessment completed with: Patient,    Type of CM/SW Visit: Initial Assessment    Primary Care Provider verified and updated as needed: Yes   Readmission within the last 30 days: no previous admission in last 30 days      Reason for Consult: discharge planning  Advance Care Planning: Advance Care Planning Reviewed: present on chart, verified with patient, no concerns identified          Communication Assessment  Patient's communication style: spoken language (English or Bilingual)    Hearing Difficulty or Deaf: no        Cognitive  Cognitive/Neuro/Behavioral: WDL                      Living Environment:   People in home: significant other  Claudette  Current living Arrangements: house      Able to return to prior arrangements: yes       Family/Social Support:  Care provided by: self  Provides care for: no one  Marital Status: Lives with Significant Other  Significant Other, Children       Claudette  Description of Support System: Supportive, Involved         Current Resources:   Patient receiving home care services: No     Community Resources: None  Equipment currently used at home:    Supplies currently used at home: Diabetic Supplies    Employment/Financial:  Employment Status: employed full-time (works from home, technology)        Financial Concerns: No concerns identified   Referral to Financial Worker: No       Does the patient's insurance plan have a 3 day qualifying hospital stay waiver?  No    Lifestyle & Psychosocial Needs:  Social Determinants of Health     Tobacco Use: Medium Risk (2/13/2023)    Patient History      Smoking Tobacco Use: Former      Smokeless Tobacco Use: Never      Passive Exposure: Not on file   Alcohol Use: Not on file   Financial Resource Strain: Not on file   Food Insecurity: Not on file   Transportation Needs: No Transportation Needs (3/24/2020)    PRAPARE - Transportation      Lack of Transportation (Medical): No      Lack of  Transportation (Non-Medical): No   Physical Activity: Inactive (3/24/2020)    Exercise Vital Sign      Days of Exercise per Week: 0 days      Minutes of Exercise per Session: 0 min   Stress: Not on file   Social Connections: Not on file   Intimate Partner Violence: Not on file   Depression: Not at risk (11/9/2022)    PHQ-2      PHQ-2 Score: 0   Housing Stability: Not on file       Functional Status:  Prior to admission patient needed assistance:   Dependent ADLs:: Independent (uses prosthetics to ambulate, access to walkers and wheelchair, own wheelchair at the hospital)  Dependent IADLs:: Independent       Mental Health Status:  Mental Health Status: No Current Concerns       Chemical Dependency Status:  Chemical Dependency Status: No Current Concerns             Values/Beliefs:  Spiritual, Cultural Beliefs, Temple Practices, Values that affect care: no               Additional Information:  Initial consult done with patient. Patient lives independently with his significant other in their home. Currently denies needs or concerns. Case management will follow along and watch for any needs if they arise.     Lindy Colorado RN   [facility]   Phone [phone] or [phone]

## 2023-06-26 NOTE — PLAN OF CARE
A&Ox4. VSS on RA. Pain manageable, declined intervention. PIV infusing NS at 100 ml/hr. Intermittent abx. Tolerating mod carb diet. Voiding adequately in urinal. L stump with improved redness per pt report. Edema unchanged. Vascular surgery and ID following. Plan pending continued improvement.

## 2023-06-26 NOTE — CONSULTS
[facility]    Infectious Disease Consultation     Date of Admission:  6/24/2023  Date of Consult (When I saw the patient): 06/26/23    Assessment & Plan   Ry Horner is a 57 year old male who was admitted on 6/24/2023.     Impression:  1. 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 2. Stage III CKD  3. Bilateral BKA   4. Presented to the ER due to pain, swelling and redness with small ulcer at Lt BKA stump and found to have cellulitis/sepsis. This BKA was done in 2022   5. No history of MRSA  6. Workup shows JOHNATHAN  7. Started on vanc and zosyn     Recommendations:   Given CKD, JOHNATHAN stop vancomycin   Start daptomycin continue on zosyn   Suspect will need surgical intervention     CT   IMPRESSION:  1.  Postoperative changes prior below-the-knee amputation.  2.  Poorly defined soft tissue and fluid along the distal stump, presumably an abscess until proven otherwise. This measures at least 5.9 x 5.3 x 7.8 cm. The collection abuts the tibial resection margin, and tracks a short segment proximally along the   posterolateral tibial shaft as described.  3.  There is some faint periosteal bone formation and erosive change along the tibial resection margin which shows raise concern for early osteomyelitis.  Franklin Maza MD    Reason for Consult   Reason for consult: I was asked to evaluate this patient for left stump infection     Primary Care Physician   Kody Wing    Chief Complaint   Swollen left stump     History is obtained from the patient and medical records    History of Present Illness   Ry Horner is a 57 year old male who presents with acute onset swelling on the left stump with drainage     Past Medical History   I have reviewed this patient's medical history and updated it with pertinent information if needed.   Past Medical History:   Diagnosis Date     BENIGN HYPERTENSION 4/4/2007     DIABETES MELLITUS TYPE II-UNCOMPL 4/4/2007      HYPERLIPIDEMIA NEC/NOS 4/4/2007     Tobacco use disorder 4/4/2007       Past Surgical History   I have reviewed this patient's surgical history and updated it with pertinent information if needed.  Past Surgical History:   Procedure Laterality Date     AMPUTATE FOOT Left 11/17/2019    Procedure: LEFT PARTIAL FOOT AMPUTATION;  Surgeon: Antoine Pena DPM;  Location: [facility]     AMPUTATE FOOT Left 12/4/2019    Procedure: LEFT PARTIAL FOOT AMPUTATION;  Surgeon: Tim Douglas DPM;  Location: [facility]     AMPUTATE FOOT Left 12/11/2019    Procedure: POSSIBLE PARTIAL FOOT AMPUTATION;  Surgeon: Tim Douglas DPM;  Location: [facility]     AMPUTATE FOOT Left 2/10/2022    Procedure: Partial left foot amputation;  Surgeon: Milena Cevallos DPM, Podiatry/Foot and Ankle Surgery;  Location: [facility]     AMPUTATE LEG BELOW KNEE Right 9/1/2017    Procedure: AMPUTATE LEG BELOW KNEE;  RIGHT BELOW KNEE AMPUTATION ;  Surgeon: Nikolas Nascimento MD;  Location: [facility]     AMPUTATE LEG BELOW KNEE Left 9/27/2022    Procedure: AMPUTATION BELOW KNEE;  Surgeon: Neal Blackman MD;  Location: [facility]     AMPUTATE LEG BELOW KNEE Left 9/29/2022    Procedure: LEFT SIDE COMPLETION BELOW THE KNEE AMPUTATION;  Surgeon: Reny Child MD;  Location: [facility]     AMPUTATE TOE(S) Left 2/3/2020    Procedure: LEFT SECOND TOE AMPUTATION;  Surgeon: Milena Cevallos DPM, Podiatry/Foot and Ankle Surgery;  [facility]: [facility]     APPENDECTOMY       APPLY WOUND VAC Right 3/2/2015    Procedure: APPLY WOUND VAC;  Surgeon: Milena Cevallos DPM, Pod;  Location: [facility]     BIOPSY BONE FOOT Right 7/15/2016    Procedure: BIOPSY BONE FOOT;  Surgeon: Tim Douglas DPM;  [facility]     BIOPSY BONE TOE Left 12/4/2019    Procedure: BONE BIOPSY LEFT SECOND TOE;  Surgeon: Tim Douglas DPM;  [facility]     IRRIGATION AND DEBRIDEMENT FOOT, COMBINED Right 3/2/2015    Procedure: COMBINED IRRIGATION AND DEBRIDEMENT FOOT;  Surgeon: Ban  Milena MALDONADO DPM, Pod;  Location: [facility]     IRRIGATION AND DEBRIDEMENT FOOT, COMBINED Right 7/15/2016    Procedure: COMBINED IRRIGATION AND DEBRIDEMENT FOOT;  Surgeon: Tim Douglas DPM;  [facility]     IRRIGATION AND DEBRIDEMENT FOOT, COMBINED Right 2016    Procedure: COMBINED IRRIGATION AND DEBRIDEMENT FOOT;  Surgeon: Tim Douglas DPM;  [facility]     IRRIGATION AND DEBRIDEMENT FOOT, COMBINED Left 2019    Procedure: REVISIONAL IRRIGATION AND DEBRIDEMENT LEFT FOOT AND BONE DEBRIDEMENT;  Surgeon: Joseph Randhawa DPM;  [facility]     IRRIGATION AND DEBRIDEMENT FOOT, COMBINED Left 2019    Procedure: EXCISIONAL DEBRIDEMENT LEFT FOOT;  Surgeon: Tim Douglas DPM;  [facility]     IRRIGATION AND DEBRIDEMENT FOOT, COMBINED Left 2019    Procedure: IRRIGATION AND DEBRIDEMENT FOOT, Partial osteotomy left first metatarsal;  Surgeon: Tim Douglas DPM;  [facility]     IRRIGATION AND DEBRIDEMENT FOOT, COMBINED Left 2020    Procedure: IRRIGATION AND DEBRIDEMENT LEFT FOOT;  Surgeon: Tim Douglas DPM;  [facility]: [facility]     ORTHOPEDIC SURGERY         Prior to Admission Medications   Prior to Admission Medications   Prescriptions Last Dose Informant Patient Reported? Taking?   BD PEN NEEDLE MEGAN 2ND GEN 32G X 4 MM miscellaneous   No No   Sig: USE WITH INSULIN INJECTIONS UNDER THE SKIN TWO TIMES A DAY AS DIRECTED   Continuous Blood Gluc Sensor (FREESTYLE SJ 3 SENSOR) [facility] [place]   No No   Si each every 14 days Use to check blood sugars per  guidelines with Sj 3 dionisio   Insulin Lispro-aabc, 1 U Dial, (LYUMJEV KWIKPEN) 100 UNIT/ML SOPN 2023 at 1200 Self No Yes   Sig: Inject 5 Units Subcutaneous 3 times daily (before meals) Take 5 units breakfast, 5 units lunch, 5 units dinner + 1u/50mg/dL>150 mg/dL +2 u prime before each dose-total of 30 u/day   JANUVIA 50 MG tablet 2023 at AM Self No Yes   Sig: TAKE ONE TABLET BY MOUTH EVERY DAY    Lancets (ONETOUCH DELICA PLUS TEFRCA95I) MISC   No No   Sig: USE TO TEST BLOOD SUGAR FOUR TIMES A DAY   STATIN NOT PRESCRIBED (INTENTIONAL)   No No   Sig: Please choose reason not prescribed, below   acetaminophen (TYLENOL) 325 MG tablet Unknown at PRN Self Yes Yes   Sig: Take 650 mg by mouth every 6 hours as needed for mild pain Alternates use with ibuprofen   aspirin 81 MG chewable tablet 2023 at AM Self Yes Yes   Sig: Take 1 tablet (81 mg) by mouth daily   blood glucose (NO BRAND SPECIFIED) lancets standard   No No   Sig: Use to test blood sugar FOUR times daily, to match lancing device per insurance   blood glucose (NO BRAND SPECIFIED) test strip   No No   Si strips by In Vitro route 4 times daily Use to test blood sugar up to 4 times daily or as directed.   blood glucose monitoring (NO BRAND SPECIFIED) meter device kit   No No   Sig: Use to test blood sugar FOUR times daily, brand per insurance   empagliflozin (JARDIANCE) 10 MG TABS tablet 2023 at AM Self No Yes   Sig: Take 1 tablet (10 mg) by mouth daily   ezetimibe (ZETIA) 10 MG tablet 2023 at AM Self No Yes   Sig: TAKE ONE TABLET BY MOUTH EVERY DAY   ibuprofen (ADVIL/MOTRIN) 200 MG tablet 2023 at took 1000 mg, normally takes 600 mg Self Yes Yes   Sig: Take 600 mg by mouth every 8 hours as needed for pain Alternates use with acetaminophen   insulin degludec (TRESIBA FLEXTOUCH) 100 UNIT/ML pen 2023 at 1200 Self No Yes   Sig: Inject 34 Units Subcutaneous 2 times daily   ipratropium (ATROVENT) 0.06 % nasal spray 2023 at AM Self No Yes   Sig: INSTILL TWO SPRAYS INTO EACH NOSTRIL FOUR TIMES A DAY AS NEEDED FOR RHINITIS   lisinopril (ZESTRIL) 5 MG tablet 2023 at AM Self No Yes   Sig: Take 1 tablet (5 mg) by mouth daily   multivitamin (CENTRUM SILVER) tablet 2023 at AM Self Yes Yes   Sig: Take 1 tablet by mouth daily      Facility-Administered Medications: None     Allergies   Allergies   Allergen Reactions      "Pravastatin      \"sucked the life blood out of me,\" Indicates this occurs with all statins.       Immunization History   Immunization History   Administered Date(s) Administered     COVID-19 Vaccine (Sean) 04/08/2021, 07/20/2022     Influenza (H1N1) 01/19/2010     Influenza (IIV3) PF 11/01/2010, 09/22/2011     Pneumococcal 23 valent 10/31/2011, 07/16/2016     TDAP Vaccine (Adacel) 10/28/2014       Social History   I have reviewed this patient's social history and updated it with pertinent information if needed. Ry Horner  reports that he quit smoking about 17 years ago. His smoking use included cigarettes. He started smoking about 36 years ago. He has a 10.00 pack-year smoking history. He has never used smokeless tobacco. He reports current alcohol use. He reports that he does not use drugs.    Family History   I have reviewed this patient's family history and updated it with pertinent information if needed.   Family History   Problem Relation Age of Onset     Genetic Disorder Other      Genetic Disorder Other      Psychotic Disorder Mother      Diabetes Father      Lung Cancer Father      Hypertension Father      Genetic Disorder Maternal Grandmother      Genetic Disorder Maternal Grandfather      Asthma Sister      Breast Cancer Sister      C.A.D. No family hx of      Hypertension No family hx of      Cerebrovascular Disease No family hx of      Breast Cancer No family hx of      Cancer - colorectal No family hx of      Prostate Cancer No family hx of      Alcohol/Drug No family hx of        Review of Systems   The 10 point Review of Systems is negative    Physical Exam   Temp: 98.8  F (37.1  C) Temp src: Oral BP: (!) 150/87 Pulse: 85   Resp: 16 SpO2: 96 % O2 Device: None (Room air)    Vital Signs with Ranges  Temp:  [98.5  F (36.9  C)-99.3  F (37.4  C)] 98.8  F (37.1  C)  Pulse:  [82-95] 85  Resp:  [16-17] 16  BP: (131-150)/(81-87) 150/87  SpO2:  [96 %-97 %] 96 %  251 lbs 12.25 oz  Body mass index is 34.15 " kg/m .    GENERAL APPEARANCE:  awake  EYES: Eyes grossly normal to inspection  NECK: no adenopathy  RESP: lungs clear   CV: regular rates and rhythm  LYMPHATICS: normal ant/post cervical and supraclavicular nodes  ABDOMEN: soft, nontender  MS: when compared to the right the left stump is swollen and indurated and red   SKIN: no suspicious lesions or rashes        Data   Lab Results   Component Value Date    WBC 19.6 (H) 06/26/2023    HGB 11.3 (L) 06/26/2023    HCT 33.5 (L) 06/26/2023     06/26/2023     06/26/2023    POTASSIUM 3.8 06/26/2023    CHLORIDE 102 06/26/2023    CO2 22 06/26/2023    BUN 31.2 (H) 06/26/2023    CR 1.81 (H) 06/26/2023    CR 1.81 (H) 06/26/2023     (H) 06/26/2023    SED 85 (H) 09/25/2022    AST 13 06/24/2023    ALT 14 06/24/2023    ALKPHOS 84 06/24/2023    BILITOTAL 0.9 06/24/2023     No results for input(s): CULT in the last 168 hours.  Recent Labs   Lab Test 02/03/20  1324 02/03/20  0007 02/02/20  2250 12/04/19  1619 11/19/19  1829 11/17/19  1525 11/17/19  1419 11/17/19  1411 08/28/17  1136   CULT Heavy growth  beta hemolytic   Streptococcus constellatus  *  Light growth  Staphylococcus aureus  * No growth No growth No anaerobes isolated  No growth Light growth  Bacteroides fragilis  *  Light growth  Parvimonas micra  *  Susceptibility testing not routinely done  On day 2, isolated in broth only:  beta hemolytic   Streptococcus constellatus  * Heavy growth  beta hemolytic   Streptococcus constellatus  Susceptibility testing done on previous specimen  *  Light growth  Alcaligenes faecalis  *  Light growth  Staphylococcus aureus  *  On day 1, isolated in broth only:  Anaerobic gram negative rods  See anaerobic report for identification  * Heavy growth  Bacteroides fragilis  Susceptibility testing not routinely done  *  Heavy growth  Peptoniphilus asaccharolyticus  Susceptibility testing not routinely done  *  Moderate growth  beta hemolytic   Streptococcus  constellatus  *  On day 1, isolated in broth only:  Anaerobic gram negative rods  See anaerobic report for identification  * Heavy growth  Bacteroides fragilis  *  Heavy growth  Parvimonas micra  *  Heavy growth  Peptoniphilus asaccharolyticus  *  Heavy growth  Mixed aerobic and anaerobic rosa  *  Susceptibility testing not routinely done No growth          All cultures:  Recent Labs   Lab 06/24/23  1612   CULTURE No growth after 1 day  No growth after 1 day      Blood culture:  Results for orders placed or performed during the hospital encounter of 06/24/23   Blood Culture Peripheral Blood    Specimen: Peripheral Blood   Result Value Ref Range    Culture No growth after 1 day    Blood Culture Peripheral Blood    Specimen: Peripheral Blood   Result Value Ref Range    Culture No growth after 1 day    Results for orders placed or performed during the hospital encounter of 02/02/20   Blood culture    Specimen: Blood    Right Arm   Result Value Ref Range    Specimen Description Blood Right Arm     Culture Micro No growth    Blood culture    Specimen: Blood    Right Arm   Result Value Ref Range    Specimen Description Blood Right Arm     Culture Micro No growth    Results for orders placed or performed during the hospital encounter of 08/28/17   Blood culture    Specimen: Arm, Right; Blood    Right Arm   Result Value Ref Range    Specimen Description Blood Right Arm     Special Requests Aerobic and anaerobic bottles received     Culture Micro No growth    Blood culture    Specimen: Arm, Right; Blood    Right Arm   Result Value Ref Range    Specimen Description Blood Right Arm     Special Requests Aerobic and anaerobic bottles received     Culture Micro No growth    Results for orders placed or performed during the hospital encounter of 07/14/16   Blood culture    Specimen: Blood   Result Value Ref Range    Specimen Description Blood Left Arm     Special Requests Aerobic and anaerobic bottles received     Culture  Micro No growth     Micro Report Status FINAL 07/20/2016    Blood culture    Specimen: Blood   Result Value Ref Range    Specimen Description Blood Right Arm     Special Requests Aerobic and anaerobic bottles received     Culture Micro No growth     Micro Report Status FINAL 07/20/2016       Urine culture:  No results found for this or any previous visit.

## 2023-06-26 NOTE — PROGRESS NOTES
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      Cellulitis involving Lt BKA stump with sepsis  -Patient presented with pain, redness, swelling at his left BKA stump site.  Marked leukocytosis of 22.9.  Tachycardic to 110s.  X-ray Shows soft tissue swelling, some bone formation along the resection margin of the tibia with periosteal reaction.  -Continue IV vancomycin and Zosyn   -Vascular surgery following, patient not improving as expected, per their evaluation this morning he may need I&D  -Reconsult today     Acute kidney injury on CKD stage III  Creatinine 2.45.  Likely baseline is 1.5-1.6.  Although noted it was 1.9 recently.  Suspect related to sepsis. Takes Ibuprofen occasionally.   -Hold lisinopril  -Creatinine improved to 1.8, has significant left arm edema, will discontinue IV fluids and encourage oral hydration.     Type II DM, uncontrolled, on insulin  Noted HbA1c was 10.4 in February.  States his blood sugars run in 200s.  -Continue PTA Tresiba, 34 units twice a day  -Continue sliding scale  -Hold PTA oral agents including Jardiance, Januvia.     Mild hyponatremia    Diet: Moderate Consistent Carb (60 g CHO per Meal) Diet     DVT Prophylaxis: Pneumatic Compression Devices   Corrales Catheter: Not present  Code Status: Full Code     Disposition Plan       Expected Discharge Date: 06/28/2023        Discharge Comments: Poss i&d 6/26      Entered: Kai Viramontes MD 06/26/2023, 11:43 AM        Clinically Significant Risk Factors          # Hypocalcemia: Lowest Ca = 8  mg/dL in last 2 days, will monitor and replace as appropriate     # Hypoalbuminemia: Lowest albumin = 3.4 g/dL at 6/24/2023  4:12 PM, will monitor as appropriate     # Hypertension: Noted on problem list       # DMII: A1C = N/A within past 6 months, PRESENT ON ADMISSION  # Obesity: Estimated body mass index is 34.15 kg/m  as calculated from the following:    Height as of this encounter: 1.829 m (6').    Weight as of this encounter: 114.2 kg (251 lb 12.3 oz)., PRESENT ON ADMISSION             The patient's care was discussed with the Bedside Nurse and Patient.    Medical Decision Making       **CLEAR ALL SELECTIONS**        Labs/Imaging Reviewed:  See Information above and Data section below    Time SPENT BY ME on the date of service doing chart review, history, exam, documentation & further activities per the note:  35 MINUTES    Kai Viramontes MD  Hospitalist Service  [facility]  Text Page 7AM-6PM  Securely message with the Vocera Web Console (learn more here)  Text page via [employer] Paging/Directory    ______________________________________________________________________    Interval History   Afebrile overall feels slightly better compared to yesterday however still has significant swelling and some ongoing erythema of the left BKA stump.  Continues to have leukocytosis although CRP trending down.    Data reviewed today: I reviewed all medications, new labs and imaging results over the last 24 hours. I personally reviewed no images or EKG's today.    Physical Exam   Vital signs:  Temp: 98.8  F (37.1  C) Temp src: Oral BP: (!) 150/87 Pulse: 85   Resp: 16 SpO2: 96 % O2 Device: None (Room air)   Height: 182.9 cm (6') Weight: 114.2 kg (251 lb 12.3 oz)  Estimated body mass index is 34.15 kg/m  as calculated from the following:    Height as of this encounter: 1.829 m (6').    Weight as of this encounter: 114.2 kg (251 lb 12.3 oz).      Wt Readings from Last 2 Encounters:   06/26/23 114.2 kg  (251 lb 12.3 oz)   10/03/22 110 kg (242 lb 8.1 oz)       Gen: AAOX3, NAD, comfortable  Resp: CTA B/L, normal WOB  CVS: RRR, no murmur  Abd/GI: Soft, non-tender. BS- normoactive.    Skin: Warm, dry no rashes  MSK: Left BKA stump site with mild erythema and significant edema.    Neuro- CN- intact. No focal deficits.     Data   Recent Labs   Lab 06/26/23  0822 06/26/23  0738 06/26/23  0612 06/26/23  0155 06/25/23  0821 06/25/23  0636 06/24/23  2215 06/24/23  1612   WBC 19.6*  --   --   --   --  18.0*  --  22.9*   HGB 11.3*  --   --   --   --  11.4*  --  12.8*   MCV 88  --   --   --   --  87  --  86     --   --   --   --  222  --  235   NA  --  136  --   --   --  135*  --  134*   POTASSIUM  --  3.8  --   --   --  3.8  --  4.3   CHLORIDE  --  102  --   --   --  101  --  99   CO2  --  22  --   --   --  21*  --  21*   BUN  --  31.2*  --   --   --  45.6*  --  46.0*   CR  --  1.81*  1.81*  --   --   --  2.21*  --  2.45*   ANIONGAP  --  12  --   --   --  13  --  14   FERNANDO  --  8.1*  --   --   --  8.0*  --  8.8   GLC  --  108* 108* 132*   < > 186*   < > 270*   ALBUMIN  --   --   --   --   --   --   --  3.4*   PROTTOTAL  --   --   --   --   --   --   --  7.3   BILITOTAL  --   --   --   --   --   --   --  0.9   ALKPHOS  --   --   --   --   --   --   --  84   ALT  --   --   --   --   --   --   --  14   AST  --   --   --   --   --   --   --  13    < > = values in this interval not displayed.       No results found for this or any previous visit (from the past 24 hour(s)).  Medications     sodium chloride 100 mL/hr at 06/26/23 0400       ezetimibe  10 mg Oral Daily     heparin ANTICOAGULANT  5,000 Units Subcutaneous Q12H     insulin aspart  1-10 Units Subcutaneous TID AC     insulin aspart  1-7 Units Subcutaneous At Bedtime     insulin degludec  34 Units Subcutaneous BID     piperacillin-tazobactam  4.5 g Intravenous Q6H     sodium chloride (PF)  3 mL Intracatheter Q8H     vancomycin  1,000 mg Intravenous Q24H

## 2023-06-26 NOTE — PLAN OF CARE
Date/Time: 6/26/23, [id number]    Trauma/Ortho/Medical (Choose one) : Medical    Diagnosis:Cellulitis of L stump, severe sepsis  POD#:N/A  Mental Status:A&Ox4  Activity/dangle: Pivot w/ wheelchair independently, wheelchair bound  Diet:On Moderate consistent carb  Pain: rate pain at 3/10, refused meds  Corrales/Voiding:Void's adequately via bedside urinal  Tele/Restraints/Iso:N/A  02/LDA:VSS on RA, ex HTN, PIV SL w/ int abx  D/C Date:Discharge pending improvement  Other Info:Plan: continue current abx regimen.L stump redness w/ +2 edema

## 2023-06-27 ENCOUNTER — APPOINTMENT (OUTPATIENT)
Dept: MRI IMAGING | Facility: CLINIC | Age: 58
DRG: 463 | End: 2023-06-27
Payer: COMMERCIAL

## 2023-06-27 LAB
ABO/RH(D): NORMAL
ANION GAP SERPL CALCULATED.3IONS-SCNC: 11 MMOL/L (ref 7–15)
ANTIBODY SCREEN: NEGATIVE
APTT PPP: 28 SECONDS (ref 22–38)
BUN SERPL-MCNC: 19.7 MG/DL (ref 6–20)
CALCIUM SERPL-MCNC: 8.3 MG/DL (ref 8.6–10)
CHLORIDE SERPL-SCNC: 102 MMOL/L (ref 98–107)
CK SERPL-CCNC: 104 U/L (ref 39–308)
CREAT SERPL-MCNC: 1.58 MG/DL (ref 0.67–1.17)
DEPRECATED HCO3 PLAS-SCNC: 22 MMOL/L (ref 22–29)
ERYTHROCYTE [DISTWIDTH] IN BLOOD BY AUTOMATED COUNT: 11.9 % (ref 10–15)
FIBRINOGEN PPP-MCNC: 866 MG/DL (ref 170–490)
GFR SERPL CREATININE-BSD FRML MDRD: 51 ML/MIN/1.73M2
GLUCOSE BLDC GLUCOMTR-MCNC: 113 MG/DL (ref 70–99)
GLUCOSE BLDC GLUCOMTR-MCNC: 118 MG/DL (ref 70–99)
GLUCOSE BLDC GLUCOMTR-MCNC: 176 MG/DL (ref 70–99)
GLUCOSE BLDC GLUCOMTR-MCNC: 192 MG/DL (ref 70–99)
GLUCOSE BLDC GLUCOMTR-MCNC: 80 MG/DL (ref 70–99)
GLUCOSE SERPL-MCNC: 101 MG/DL (ref 70–99)
HBA1C MFR BLD: 7.6 %
HCT VFR BLD AUTO: 34 % (ref 40–53)
HGB BLD-MCNC: 11.4 G/DL (ref 13.3–17.7)
INR PPP: 1.06 (ref 0.85–1.15)
MCH RBC QN AUTO: 29.1 PG (ref 26.5–33)
MCHC RBC AUTO-ENTMCNC: 33.5 G/DL (ref 31.5–36.5)
MCV RBC AUTO: 87 FL (ref 78–100)
PLATELET # BLD AUTO: 288 10E3/UL (ref 150–450)
POTASSIUM SERPL-SCNC: 3.4 MMOL/L (ref 3.4–5.3)
RBC # BLD AUTO: 3.92 10E6/UL (ref 4.4–5.9)
SODIUM SERPL-SCNC: 135 MMOL/L (ref 136–145)
SPECIMEN EXPIRATION DATE: NORMAL
WBC # BLD AUTO: 21.3 10E3/UL (ref 4–11)

## 2023-06-27 PROCEDURE — 36415 COLL VENOUS BLD VENIPUNCTURE: CPT | Performed by: STUDENT IN AN ORGANIZED HEALTH CARE EDUCATION/TRAINING PROGRAM

## 2023-06-27 PROCEDURE — 80061 LIPID PANEL: CPT | Performed by: STUDENT IN AN ORGANIZED HEALTH CARE EDUCATION/TRAINING PROGRAM

## 2023-06-27 PROCEDURE — 85730 THROMBOPLASTIN TIME PARTIAL: CPT | Performed by: STUDENT IN AN ORGANIZED HEALTH CARE EDUCATION/TRAINING PROGRAM

## 2023-06-27 PROCEDURE — 36415 COLL VENOUS BLD VENIPUNCTURE: CPT | Performed by: INTERNAL MEDICINE

## 2023-06-27 PROCEDURE — 85027 COMPLETE CBC AUTOMATED: CPT | Performed by: INTERNAL MEDICINE

## 2023-06-27 PROCEDURE — 83036 HEMOGLOBIN GLYCOSYLATED A1C: CPT | Performed by: STUDENT IN AN ORGANIZED HEALTH CARE EDUCATION/TRAINING PROGRAM

## 2023-06-27 PROCEDURE — 82550 ASSAY OF CK (CPK): CPT

## 2023-06-27 PROCEDURE — 85610 PROTHROMBIN TIME: CPT | Performed by: STUDENT IN AN ORGANIZED HEALTH CARE EDUCATION/TRAINING PROGRAM

## 2023-06-27 PROCEDURE — 86850 RBC ANTIBODY SCREEN: CPT | Performed by: STUDENT IN AN ORGANIZED HEALTH CARE EDUCATION/TRAINING PROGRAM

## 2023-06-27 PROCEDURE — 120N000001 HC R&B MED SURG/OB

## 2023-06-27 PROCEDURE — 250N000013 HC RX MED GY IP 250 OP 250 PS 637: Performed by: HOSPITALIST

## 2023-06-27 PROCEDURE — 250N000011 HC RX IP 250 OP 636: Mod: JZ

## 2023-06-27 PROCEDURE — 85384 FIBRINOGEN ACTIVITY: CPT | Performed by: STUDENT IN AN ORGANIZED HEALTH CARE EDUCATION/TRAINING PROGRAM

## 2023-06-27 PROCEDURE — 250N000013 HC RX MED GY IP 250 OP 250 PS 637: Performed by: INTERNAL MEDICINE

## 2023-06-27 PROCEDURE — 73718 MRI LOWER EXTREMITY W/O DYE: CPT | Mod: LT

## 2023-06-27 PROCEDURE — 82310 ASSAY OF CALCIUM: CPT | Performed by: INTERNAL MEDICINE

## 2023-06-27 PROCEDURE — 258N000003 HC RX IP 258 OP 636

## 2023-06-27 PROCEDURE — 86901 BLOOD TYPING SEROLOGIC RH(D): CPT | Performed by: STUDENT IN AN ORGANIZED HEALTH CARE EDUCATION/TRAINING PROGRAM

## 2023-06-27 PROCEDURE — 250N000011 HC RX IP 250 OP 636: Performed by: HOSPITALIST

## 2023-06-27 PROCEDURE — 99232 SBSQ HOSP IP/OBS MODERATE 35: CPT | Performed by: INTERNAL MEDICINE

## 2023-06-27 RX ADMIN — PIPERACILLIN AND TAZOBACTAM 4.5 G: 4; .5 INJECTION, POWDER, FOR SOLUTION INTRAVENOUS at 11:00

## 2023-06-27 RX ADMIN — EZETIMIBE 10 MG: 10 TABLET ORAL at 08:21

## 2023-06-27 RX ADMIN — ONDANSETRON 4 MG: 4 TABLET, ORALLY DISINTEGRATING ORAL at 15:36

## 2023-06-27 RX ADMIN — HEPARIN SODIUM 5000 UNITS: 5000 INJECTION, SOLUTION INTRAVENOUS; SUBCUTANEOUS at 11:14

## 2023-06-27 RX ADMIN — HYDROMORPHONE HYDROCHLORIDE 1 MG: 2 TABLET ORAL at 17:59

## 2023-06-27 RX ADMIN — PIPERACILLIN AND TAZOBACTAM 4.5 G: 4; .5 INJECTION, POWDER, FOR SOLUTION INTRAVENOUS at 05:41

## 2023-06-27 RX ADMIN — PIPERACILLIN AND TAZOBACTAM 4.5 G: 4; .5 INJECTION, POWDER, FOR SOLUTION INTRAVENOUS at 18:00

## 2023-06-27 RX ADMIN — DAPTOMYCIN 700 MG: 500 INJECTION, POWDER, LYOPHILIZED, FOR SOLUTION INTRAVENOUS at 11:43

## 2023-06-27 RX ADMIN — HEPARIN SODIUM 5000 UNITS: 5000 INJECTION, SOLUTION INTRAVENOUS; SUBCUTANEOUS at 22:54

## 2023-06-27 ASSESSMENT — ACTIVITIES OF DAILY LIVING (ADL)
ADLS_ACUITY_SCORE: 33
ADLS_ACUITY_SCORE: 35
ADLS_ACUITY_SCORE: 35
ADLS_ACUITY_SCORE: 33
ADLS_ACUITY_SCORE: 35
ADLS_ACUITY_SCORE: 33
ADLS_ACUITY_SCORE: 33

## 2023-06-27 NOTE — PROGRESS NOTES
Vascular Surgery Note    58 yo M with L BKA now with cellulitis. Not improving with antbiotics as expected. WBC is trending up    Discussed with  primary surgeon, Dr. Child and will plan on MRI of LLE to better guide extent of bone resection. Recheck HbA1c, lipid panel and coags     AIm for BG<180. Tentative plan for revision of L BKA on 6/29 with Dr. Mateusz Luevano MD  Fellow

## 2023-06-27 NOTE — PLAN OF CARE
Goal Outcome Evaluation:      Plan of Care Reviewed With: patient    Overall Patient Progress: improvingOverall Patient Progress: improving    Shift [id number]:    Pt here with cellulitis of left stump. A&O x4. VSS, ex HTN. Pt is on a mod carb diet, thin liquids. Takes pills whole. Pt is able to transfer to and from wheelchair independently, bilateral BKA. Pt c/o mild pain, declined need for pain medication. PIV SL'd, intermittent Abx. Plan is to continue IVF and antibiotics. ID and Vascular following. Discharge pending.

## 2023-06-27 NOTE — PROGRESS NOTES
VSS ex HTN. A/O. Ind, able to use wheelchair. Bi BKA. L edema. Denies pain. Tolerating diet. Voiding fine.

## 2023-06-27 NOTE — PROGRESS NOTES
[facility]    Medicine Progress Note - Hospitalist Service        Date of Admission:  6/24/2023  3:36 PM    Assessment & Plan:   Ry Horner is a 57 year old male with medical history significant for uncontrolled type II DM with polyneuropathy and nephropathy, stage III CKD, bilateral BKA presented to the ER due to pain, swelling and redness with small ulcer at Lt BKA stump and found to have cellulitis/sepsis and is being admitted on 6/24/2023 for further management.      Sepsis due to cellulitis involving Lt BKA stump.  -Patient presented with pain, redness, swelling at his left BKA stump site.  Marked leukocytosis of 22.9.  Tachycardic to 110s.  X-ray Shows soft tissue swelling, some bone formation along the resection margin of the tibia with periosteal reaction.  -Initially on vancomycin and Zosyn.  Infectious disease following, vancomycin changed to daptomycin yesterday  -Vascular surgery following, patient not improving as expected, await further evaluation, ?  I&D     Acute kidney injury on CKD stage III  Creatinine 2.45.  Likely baseline is 1.5-1.6.  Although noted it was 1.9 recently.  Suspect related to sepsis. Takes Ibuprofen occasionally.   -Hold lisinopril  -Creatinine improved to 1.6, which is most likely his baseline.  Off IV fluids at this time.    Type II DM, uncontrolled, on insulin  Noted HbA1c was 10.4 in February.  States his blood sugars run in 200s.  -Continue PTA Tresiba, 34 units twice a day  -Continue sliding scale  -Hold PTA oral agents including Jardiance, Januvia.     Mild hyponatremia    Diet: Moderate Consistent Carb (60 g CHO per Meal) Diet     DVT Prophylaxis: Pneumatic Compression Devices   [place]rales Catheter: Not present  Code Status: Full Code     Disposition Plan      Expected Discharge Date: 06/28/2023      Destination: home;home with family  Discharge Comments: Poss i&d 6/26      Entered: Kai Viramontes MD 06/27/2023, 9:36 AM        Clinically Significant Risk  Factors              # Hypoalbuminemia: Lowest albumin = 3.4 g/dL at 6/24/2023  4:12 PM, will monitor as appropriate     # Hypertension: Noted on problem list       # DMII: A1C = N/A within past 6 months, PRESENT ON ADMISSION  # Obesity: Estimated body mass index is 33.31 kg/m  as calculated from the following:    Height as of this encounter: 1.829 m (6').    Weight as of this encounter: 111.4 kg (245 lb 9.5 oz)., PRESENT ON ADMISSION             The patient's care was discussed with the Bedside Nurse and Patient.    Medical Decision Making       **CLEAR ALL SELECTIONS**        Labs/Imaging Reviewed:  See Information above and Data section below    Time SPENT BY ME on the date of service doing chart review, history, exam, documentation & further activities per the note:  35 MINUTES    Kai Viramontes MD  Hospitalist Service  [facility]  Text Page 7AM-6PM  Securely message with the Vocera Web Console (learn more here)  Text page via [employer] Paging/Directory    ______________________________________________________________________    Interval History   Patient continues to have significant swelling, mild erythema and warmth at the left stump site.  Afebrile.  Blood sugars adequately controlled.    Data reviewed today: I reviewed all medications, new labs and imaging results over the last 24 hours. I personally reviewed no images or EKG's today.    Physical Exam   Vital signs:  Temp: 99.1  F (37.3  C) Temp src: Oral BP: (!) 139/94 Pulse: 83   Resp: 20 SpO2: 96 % O2 Device: None (Room air)   Height: 182.9 cm (6') Weight: 111.4 kg (245 lb 9.5 oz)  Estimated body mass index is 33.31 kg/m  as calculated from the following:    Height as of this encounter: 1.829 m (6').    Weight as of this encounter: 111.4 kg (245 lb 9.5 oz).      Wt Readings from Last 2 Encounters:   06/27/23 111.4 kg (245 lb 9.5 oz)   10/03/22 110 kg (242 lb 8.1 oz)       Gen: AAOX3, NAD, comfortable  Resp: CTA B/L, normal  WOB  CVS: RRR, no murmur  Abd/GI: Soft, non-tender. BS- normoactive.    Skin: Warm, dry no rashes  MSK: Left BKA stump site with mild erythema and significant edema.    Neuro- CN- intact. No focal deficits.     Data   Recent Labs   Lab 06/27/23  0757 06/27/23  0726 06/27/23  0202 06/26/23  1153 06/26/23  0822 06/26/23  0738 06/25/23  0821 06/25/23  0636 06/24/23  2215 06/24/23  1612   WBC  --  21.3*  --   --  19.6*  --   --  18.0*  --  22.9*   HGB  --  11.4*  --   --  11.3*  --   --  11.4*  --  12.8*   MCV  --  87  --   --  88  --   --  87  --  86   PLT  --  288  --   --  267  --   --  222  --  235   NA  --  135*  --   --   --  136  --  135*  --  134*   POTASSIUM  --  3.4  --   --   --  3.8  --  3.8  --  4.3   CHLORIDE  --  102  --   --   --  102  --  101  --  99   CO2  --  22  --   --   --  22  --  21*  --  21*   BUN  --  19.7  --   --   --  31.2*  --  45.6*  --  46.0*   CR  --  1.58*  --   --   --  1.81*  1.81*  --  2.21*  --  2.45*   ANIONGAP  --  11  --   --   --  12  --  13  --  14   FERNANDO  --  8.3*  --   --   --  8.1*  --  8.0*  --  8.8   * 101* 113*   < >  --  108*   < > 186*   < > 270*   ALBUMIN  --   --   --   --   --   --   --   --   --  3.4*   PROTTOTAL  --   --   --   --   --   --   --   --   --  7.3   BILITOTAL  --   --   --   --   --   --   --   --   --  0.9   ALKPHOS  --   --   --   --   --   --   --   --   --  84   ALT  --   --   --   --   --   --   --   --   --  14   AST  --   --   --   --   --   --   --   --   --  13    < > = values in this interval not displayed.       No results found for this or any previous visit (from the past 24 hour(s)).  Medications       DAPTOmycin (CUBICIN) 700 mg in sodium chloride 0.9 % 100 mL intermittent infusion  6 mg/kg Intravenous Q24H     ezetimibe  10 mg Oral Daily     heparin ANTICOAGULANT  5,000 Units Subcutaneous Q12H     insulin aspart  1-10 Units Subcutaneous TID AC     insulin aspart  1-7 Units Subcutaneous At Bedtime     insulin degludec  34 Units  Subcutaneous BID     piperacillin-tazobactam  4.5 g Intravenous Q6H     sodium chloride (PF)  3 mL Intracatheter Q8H

## 2023-06-27 NOTE — PROGRESS NOTES
MEDICARE WELLNESS VISIT + NOTE    CHIEF COMPLAINT:  Charlie Wilson presents for his Subsequent Annual Medicare Wellness Visit.   His additional complaints or concerns are addressed below.      Patient Care Team:  Flo Tsang MD as PCP - General (Internal Medicine)        Patient Active Problem List   Diagnosis   • Sleep apnea   • Routine general medical examination at a health care facility   • HOLGER on CPAP   • OA (osteoarthritis)   • History of colonoscopy   • Environmental allergies   • GERD (gastroesophageal reflux disease)   • Narcolepsy   • Narcolepsy   • Hypoglycemia   • RLS (restless legs syndrome)   • Spinal stenosis of lumbar region with neurogenic claudication   • Periumbilical abdominal pain   • S/P right inguinal hernia repair, follow-up exam   • Radial styloid tenosynovitis   • S/P lumbar laminectomy   • Patient left without being seen   • Abdominal obesity and metabolic syndrome   • Obesity with serious comorbidity   • Abnormal thyroid function test         Past Medical History:   Diagnosis Date   • Environmental allergies     Yes,   • GERD (gastroesophageal reflux disease)    • History of colonoscopy 2011   • Hypoglycemia    • Iron deficiency    • Lumbar radiculopathy     WITH SPINAL STENOSIS   • Narcolepsy     ON MODFINAL DR CHAVEZ   • OA (osteoarthritis)     severe r hip pain and arthritis   • HOLGER on CPAP 2016    Dr. Chavez   • Overweight    • RLS (restless legs syndrome)    • Sleep apnea 2014    CPAP 11cm   • Tinnitus          Past Surgical History:   Procedure Laterality Date   • Carpal tunnel release Bilateral    • Inguinal hernia repair Right 10/25/2019    open with mesh   • Laminectomy     • Narcolepsy associated antigen     • Tinnitus assessment     • Total hip arthroplasty Left 10/01/2018   • Total hip arthroplasty Right 02/04/2019         Social History     Tobacco Use   • Smoking status: Former Smoker     Packs/day: 0.50     Years: 10.00     Pack years: 5.00     Types: Cigarettes      Pt pain managed with Hydromorphone PO and scheduled Tylenol. MRI today and tentative I&D on the 29/6 due to cellulitis of LLE stump. Tolerating diet. Up ind in room. Vitally stable on RA   Quit date:      Years since quittin.7   • Smokeless tobacco: Never Used   Substance Use Topics   • Alcohol use: Yes     Alcohol/week: 12.0 standard drinks     Types: 12 Cans of beer per week   • Drug use: No     Family History   Problem Relation Age of Onset   • Heart disease Mother    • Diabetes Father          Current Outpatient Medications   Medication Sig Dispense Refill   • gabapentin (NEURONTIN) 600 MG tablet Take 600 mg by mouth daily.     • amoxicillin (AMOXIL) 500 MG capsule Take 500 mg by mouth in the morning and 500 mg at noon and 500 mg in the evening.     • meloxicam (MOBIC) 7.5 MG tablet Take 7.5 mg by mouth daily.     • fexofenadine (ALLEGRA) 180 MG tablet daily as needed.      • carbidopa-levodopa (SINEMET)  MG per tablet Take 1 tablet by mouth daily as needed.     • modafinil (PROVIGIL) 200 MG tablet Take 200 mg by mouth daily.     • Multiple Vitamin (MULTI VITAMIN DAILY) Tab daily.     • omeprazole 20 MG tablet Take 20 mg by mouth daily.     • pramipexole (MIRAPEX) 0.25 MG tablet Take 0.25 mg by mouth daily.       No current facility-administered medications for this visit.        The following items on the Medicare Health Risk Assessment were found to be positive  1.) Do you have an Advance directive, living will, or power of  for health care document that contains your wishes for end of life care?: No     7a.) Have you had a fall in the past year?: Yes         Vision and Hearing screens: No exam data present    Advance Directive:   The patient has the following documents:  No Advance Directives on file. Patient offered documents.    Cognitive/Functional Status: no evidence of cognitive dysfunction by direct observation    Opioid Review: Charlie is not taking opioid medications.    Recent PHQ 2/9 Score:    PHQ 2:  Date Able to Complete Adult PHQ 2 Score Adult PHQ 2 Interpretation   2022 - 0 No further screening needed       PHQ 9:  Date Able to Complete   2021 (No  Data)       DEPRESSION ASSESSMENT/PLAN:  Depression screening is negative no further plan needed.     Body mass index is 33.12 kg/m².    BMI ASSESSMENT/PLAN:  Patient is obese.    Caloric restriction and Low carbohydrate diet        OUTPATIENT PROGRESS NOTE    Subjective   Chief Complaint Medicare Wellness Visit, complete physical exam, and follow up on his multiple medical conditions.     Patient has given consent to record this visit for documentation in their clinical record.    HPI     Medicare annual wellness visit, subsequent:  He lives at home with his wife, they both watch over each other. Has been doing well.   He is compliant with his medication, does take his medication as prescribed.   Falls Risk: Had no falls, or any injuries of concern. Feels safe at home.   Cognitive/Functional Status: His cognitive skills are great  ADLs: He is able to drive, take care of his finances.  Depression screening: Denies any depression. He is socially active, and does well.  Vision: Follows up with ophthalmologist yearly once for an eye exam. He has not noticed any major changes or been told that he has had eye conditions before.   Hearing: His hearing is good. Has had no major issues, does not wear a hearing aid. He is able to hear things and participate in conversation without any difficulty.   Diet: his diet is fairly good. He tries to watch his diet, tries to eat healthy.   Exercise: He does try to get some exercise. Reports walking. Also, he has started going back to the gym. Goes to the gym at least 2-3 times a week.   Colorectal screening: Last screening colonoscopy was done in February 2022 by Dr. Ballesteros, he had 2 small benign polyps which were removed, and diverticulosis of the sigmoid colon.   Prostate cancer screening: Most recent PSA was low at 0.8 ng/mL from 0.71 ng/mL.  Screening Labs: His numbers overall looked excellent. His liver function test was good. Kidney function test was good except his  creatinine was low at 0.56 mg/dL. His cholesterol numbers were normal. Urinalysis looked good which was done last year, and he is yet to do it this year.   Immunization: Due for new COVID-19 vaccine.   Otherwise, doing fairly well.     Impacted cerumen of both ears:  Has known history, and it was removed previously.     Ear Cerumen Removal    Date/Time: 10/16/2022 9:24 PM  Performed by: Flo Tsang MD  Authorized by: Flo Tsang MD     Consent:     Consent obtained:  Verbal    Consent given by:  Patient    Risks discussed:  Bleeding, infection and incomplete removal     10 MINS SPENT      Glucose intolerance (impaired glucose tolerance):  Most recent labs reported his blood sugar was mildly elevated at 112 mg/dL; however, his HbA1c was normal at 5.3%.    RLS (restless legs syndrome):  Known to have restless leg syndrome, and is under fair control.    HOLGER on CPAP:  [facility] disorder, . He is compliant with CPAP appliance nightly. Overall, feels good, and feels energetic in the morning. Has not got any motor vehicle accidents. He does not sleep when he falls into a chair. Otherwise, he has been fairly active.   SLEEP DISORDER EVALUATION    Sleep apnea: snoring    age 42 or older and is male or postmenopausal female            CURRENT CO-MORBIDITIES:    [] HTN     [] AF/ARRHYTHMIA    [] HYPOTHYROIDISM     [] CAD     [] OBESITY                  [] INSOMNIA  [] CHF     [] DM                            [] STROKE/TIA  []OTHER:                                [place] Sleepiness Scale:      0 = would never doze  1 = slight chance of dozing  2 = moderate chance of dozing  3 = high chance of dozing    Situation                                                   Chance of Dozing                            1. Sitting and reading                                       ___    2. Watching TV                                                 ___                                                   3. Sitting, inactive in a public place                        (e.g. a theatre or a meeting)                         ___  4. As a passenger in a car for an hour                   without a break                                             ___  5. Lying down to rest in the afternoon                               when circumstances permit                         ___                     6. Sitting and talking to someone                     ___                7. Sitting quietly after lunch without      Alcohol                                                         ___        8. In a car, while stopped for a few      Minutes in traffic                                                                                                                                                                                                       TOTAL      1 - 6: good sleep  0 - 6     Good Sleep  7 - 8     Average sleep  9+       Needs work up      Abdominal obesity and metabolic syndrome; Obesity with serious comorbidity, unspecified classification, unspecified obesity type:    Has lost some weight. Reports following regular regimen for physical activity, walking. Also, he goes to the gym at least 2-3 times a week. He tries to watch his diet, tries to eat healthy.     Primary narcolepsy without cataplexy:  Has known history. Has been doing well.     Abnormal thyroid function test:  Most recent labs reported his free T4 was elevated at 1.6 ng/dL, free T3 was elevated at 5.1 pg/mL. Currently, not on any medication. Denies any palpitations, excess sweating, anxiety, unexplained weight loss, fever, or chest discomfort.     Additional comments:   Blood pressure measured during rooming today was at 138/70 mmHg.   The patient reported that he had a COVID-19 infection early April 2021. He has been vaccinated for COVID-19 as he claimed. Has had headaches, and body aches. Has not had any respiratory symptoms. Denies any  loss of taste or smell. Was quarantined during that time. Is fully recovered.     Medications  Medications were reviewed and updated today.    Histories  I have personally reviewed and updated the patient's past medical, past surgical, family and social histories during today's visit.    Review of Systems    Constitutional: Per HPI.   HENT: Per HPI.   Eyes: Per HPI.   Respiratory: Per HPI.  Negative for problems with breathing.  Cardiovascular: Per HPI.  Negative for problems with chest pain or chest pressure.   Gastrointestinal: Per HPI.   Endocrine: Per HPI.   Genitourinary: Per HPI.   Skin: Per HPI.   Musculoskeletal: Per HPI.   Neurological: Per HPI.    Psychiatric/Behavioral: Per HPI.    Objective   Visit Vitals  /70   Pulse 74   Temp 97.3 °F (36.3 °C) (Temporal)   Resp 20   Ht 5' 5\"   Wt 90.3 kg (199 lb)   SpO2 97%   BMI 33.12 kg/m²     Physical Exam    Constitutional: Oriented to person, place, and time. Well-developed and well-nourished.  Head: Normocephalic and atraumatic.  Right Ear: External ear normal. Positive for impacted cerumen.   Left Ear: External ear normal. Positive for impacted cerumen.   Nose: Nose normal.  Mouth/Throat: Oropharynx is clear and moist.  Eyes: Pupils are equal, round, and reactive to light. Conjunctivae and EOM are normal.  Neck: Normal range of motion. Neck supple. No thyroidmegaly present.  Cardiovascular: Normal rate, regular rhythm, normal heart sounds and intact distal pulses. No masses  Pulmonary/Chest: Effort normal and breath sounds normal. No respiratory distress. No wheezes or rales.  Abdominal: Soft. Bowel sounds are normal. Exhibits no distension. There is no tenderness. There is no rebound and no guarding. No masses or bruits  Musculoskeletal: Normal range of motion.  Lymphadenopathy: No cervical adenopathy.  Neurological: Alert and oriented to person, place, and time. Normal reflexes. No cranial nerve deficit. Recent and remote memory are intact No focal  deficits Motor and sensory grossly intact.  Skin: Skin is warm and dry. No rash noted. Psychiatric: Normal mood and affect. Behavior is normal. Judgment and thought content normal.  Rectal exam is deferred to colonoscopy done in February 2022.       Laboratory  I have reviewed the pertinent laboratory tests.    Imaging  I have reviewed the pertinent imaging study reports.    Assessment & Plan   Diagnoses and associated orders for this visit:  1. Medicare annual wellness visit, subsequent  -     POCT Urine Dip Auto  2. Impacted cerumen of both ears  3. Glucose intolerance (impaired glucose tolerance)  4. RLS (restless legs syndrome)  5. HOLGER on CPAP  6. Abdominal obesity and metabolic syndrome  7. Primary narcolepsy without cataplexy  8. Abnormal thyroid function test  9. Obesity with serious comorbidity, unspecified classification, unspecified obesity type    Normal medicare wellness exam:   Overall, I think he is doing great. No major issues or problems.   Medicare Health Risk Assessment reviewed, and otherwise, fairly unremarkable.   Reviewed and discussed previous reports. Reassured the patient.   Reviewed the immunization history, and reconciled.Discussed findings of the prior colonoscopy.   Educated on diverticulosis vs diverticulitis. Reassured the patient.   All questions and concerns have been addressed.     Abnormal thyroid function test:  Asymptomatic.   Reviewed and discussed previous reports.  Will repeat the lab in 6 months.   Not too concerned at this time, numbers are minimally abnormal.   Reassured the patient about his thyroid numbers.   Symptomatology discussed.   Instructed to notify me if he develops any palpitations, fast heart rate, fevers, unexplained weight loss.     Glucose intolerance (impaired glucose tolerance):  Reviewed and discussed previous reports.    I do believe that he has got underline metabolic syndrome but overall not too concerned about it.   His A1c was normal at this time.    Encouraged weight loss, rationale explained.     HOLGER on CPAP:   Under good control with his CPAP.   [facility] disorder,  yearly once.   Continue using CPAP nightly.     Primary narcolepsy without cataplexy:  Has been doing well.   Not involved in any motor vehicle accidents, not having any injuries.   Otherwise, has been doing fairly well.     Abdominal obesity and metabolic syndrome; Obesity with serious comorbidity, unspecified classification, unspecified obesity type:  Encouraged weight reduction, rationale explained.   Discussed it would be beneficial if he loses another 10 lb.   However, I think he has been doing fairly good at this time.     Impacted cerumen of both ears:  I tried removing cerumen and I generally remove partial amounts, and he had a procedure done for ear cleaning by my MA and did well with cleaning out the ears. Time taken was approximately 10 minutes.     RLS (restless legs syndrome):  Under fair control.     Additional comments:   Vitals reviewed.   Discussed the goal blood pressure.     The patient otherwise is doing very good.   He was given results of his previous lab results.   Informed to call if any questions or concerns.  Otherwise, continue with the same medications, and follow-up as instructed.     Follow-up in 5 months with repeat thyroid function testing, or sooner if needed.     Refer to orders.  Medical compliance with plan discussed and risks of non-compliance reviewed.  Patient education completed on disease process, etiology & prognosis.  Proper usage and side effects of medications reviewed & discussed.  Return to clinic as clinically indicated as discussed with patient who verbalized understanding of the plan and is in agreement with the plan.    I, Dr. Namrata Barnes, have created a visit summary document based on the audio recording between Flo Tsang MD and this patient for the physician to review, edit as  needed, and authenticate.  Creation Date: 9/22/2022    I have reviewed and edited the visit summary above and attest that it is accurate.

## 2023-06-28 LAB
CHOLEST SERPL-MCNC: 113 MG/DL
CREAT SERPL-MCNC: 1.62 MG/DL (ref 0.67–1.17)
GFR SERPL CREATININE-BSD FRML MDRD: 50 ML/MIN/1.73M2
GLUCOSE BLDC GLUCOMTR-MCNC: 139 MG/DL (ref 70–99)
GLUCOSE BLDC GLUCOMTR-MCNC: 174 MG/DL (ref 70–99)
GLUCOSE BLDC GLUCOMTR-MCNC: 232 MG/DL (ref 70–99)
GLUCOSE BLDC GLUCOMTR-MCNC: 260 MG/DL (ref 70–99)
GLUCOSE BLDC GLUCOMTR-MCNC: 62 MG/DL (ref 70–99)
HDLC SERPL-MCNC: 17 MG/DL
LDLC SERPL CALC-MCNC: 53 MG/DL
NONHDLC SERPL-MCNC: 96 MG/DL
TRIGL SERPL-MCNC: 214 MG/DL

## 2023-06-28 PROCEDURE — 36415 COLL VENOUS BLD VENIPUNCTURE: CPT | Performed by: INTERNAL MEDICINE

## 2023-06-28 PROCEDURE — 250N000013 HC RX MED GY IP 250 OP 250 PS 637: Performed by: HOSPITALIST

## 2023-06-28 PROCEDURE — 250N000011 HC RX IP 250 OP 636: Mod: JZ | Performed by: HOSPITALIST

## 2023-06-28 PROCEDURE — 250N000011 HC RX IP 250 OP 636: Mod: JZ

## 2023-06-28 PROCEDURE — 82565 ASSAY OF CREATININE: CPT | Performed by: INTERNAL MEDICINE

## 2023-06-28 PROCEDURE — 250N000013 HC RX MED GY IP 250 OP 250 PS 637: Performed by: INTERNAL MEDICINE

## 2023-06-28 PROCEDURE — 99233 SBSQ HOSP IP/OBS HIGH 50: CPT | Performed by: INTERNAL MEDICINE

## 2023-06-28 PROCEDURE — 99232 SBSQ HOSP IP/OBS MODERATE 35: CPT | Performed by: INTERNAL MEDICINE

## 2023-06-28 PROCEDURE — 258N000003 HC RX IP 258 OP 636

## 2023-06-28 PROCEDURE — 120N000001 HC R&B MED SURG/OB

## 2023-06-28 PROCEDURE — 99231 SBSQ HOSP IP/OBS SF/LOW 25: CPT

## 2023-06-28 RX ORDER — DEXTROSE MONOHYDRATE 25 G/50ML
25-50 INJECTION, SOLUTION INTRAVENOUS
Status: DISCONTINUED | OUTPATIENT
Start: 2023-06-28 | End: 2023-07-28 | Stop reason: HOSPADM

## 2023-06-28 RX ORDER — NICOTINE POLACRILEX 4 MG
15-30 LOZENGE BUCCAL
Status: DISCONTINUED | OUTPATIENT
Start: 2023-06-28 | End: 2023-07-28 | Stop reason: HOSPADM

## 2023-06-28 RX ADMIN — HEPARIN SODIUM 5000 UNITS: 5000 INJECTION, SOLUTION INTRAVENOUS; SUBCUTANEOUS at 21:14

## 2023-06-28 RX ADMIN — PIPERACILLIN AND TAZOBACTAM 4.5 G: 4; .5 INJECTION, POWDER, FOR SOLUTION INTRAVENOUS at 00:08

## 2023-06-28 RX ADMIN — PIPERACILLIN AND TAZOBACTAM 4.5 G: 4; .5 INJECTION, POWDER, FOR SOLUTION INTRAVENOUS at 10:56

## 2023-06-28 RX ADMIN — EZETIMIBE 10 MG: 10 TABLET ORAL at 08:25

## 2023-06-28 RX ADMIN — HYDROMORPHONE HYDROCHLORIDE 1 MG: 2 TABLET ORAL at 14:23

## 2023-06-28 RX ADMIN — DAPTOMYCIN 600 MG: 500 INJECTION, POWDER, LYOPHILIZED, FOR SOLUTION INTRAVENOUS at 12:14

## 2023-06-28 RX ADMIN — INSULIN ASPART 3 UNITS: 100 INJECTION, SOLUTION INTRAVENOUS; SUBCUTANEOUS at 17:40

## 2023-06-28 RX ADMIN — PIPERACILLIN AND TAZOBACTAM 4.5 G: 4; .5 INJECTION, POWDER, FOR SOLUTION INTRAVENOUS at 16:07

## 2023-06-28 RX ADMIN — HEPARIN SODIUM 5000 UNITS: 5000 INJECTION, SOLUTION INTRAVENOUS; SUBCUTANEOUS at 09:10

## 2023-06-28 RX ADMIN — HYDROMORPHONE HYDROCHLORIDE 1 MG: 2 TABLET ORAL at 00:08

## 2023-06-28 RX ADMIN — PIPERACILLIN AND TAZOBACTAM 4.5 G: 4; .5 INJECTION, POWDER, FOR SOLUTION INTRAVENOUS at 05:43

## 2023-06-28 RX ADMIN — HYDROMORPHONE HYDROCHLORIDE 1 MG: 2 TABLET ORAL at 20:06

## 2023-06-28 ASSESSMENT — ACTIVITIES OF DAILY LIVING (ADL)
ADLS_ACUITY_SCORE: 20
ADLS_ACUITY_SCORE: 33
ADLS_ACUITY_SCORE: 20
TOILETING_ISSUES: NO
WALKING_OR_CLIMBING_STAIRS_DIFFICULTY: YES
ADLS_ACUITY_SCORE: 20
DRESSING/BATHING_DIFFICULTY: NO
ADLS_ACUITY_SCORE: 33
DIFFICULTY_EATING/SWALLOWING: NO
ADLS_ACUITY_SCORE: 20
ADLS_ACUITY_SCORE: 33
ADLS_ACUITY_SCORE: 33
TRANSFERRING: 0-->INDEPENDENT
ADLS_ACUITY_SCORE: 33
WEAR_GLASSES_OR_BLIND: NO
CHANGE_IN_FUNCTIONAL_STATUS_SINCE_ONSET_OF_CURRENT_ILLNESS/INJURY: NO
HEARING_DIFFICULTY_OR_DEAF: NO
ADLS_ACUITY_SCORE: 33
FALL_HISTORY_WITHIN_LAST_SIX_MONTHS: NO
WALKING_OR_CLIMBING_STAIRS: STAIR CLIMBING DIFFICULTY, DEPENDENT
TRANSFERRING: 0-->INDEPENDENT
DIFFICULTY_COMMUNICATING: NO
EQUIPMENT_CURRENTLY_USED_AT_HOME: PROSTHESIS
DOING_ERRANDS_INDEPENDENTLY_DIFFICULTY: NO
ADLS_ACUITY_SCORE: 33
CONCENTRATING,_REMEMBERING_OR_MAKING_DECISIONS_DIFFICULTY: NO
ADLS_ACUITY_SCORE: 33

## 2023-06-28 NOTE — PROGRESS NOTES
VASCULAR SURGERY PROGRESS NOTE    Subjective:  No acute events overnight. Denies fevers, chills. States redness marginally improved.    Objective:  Intake/Output Summary (Last 24 hours) at 6/28/2023 0745  Last data filed at 6/28/2023 0600  Gross per 24 hour   Intake 300 ml   Output 750 ml   Net -450 ml     PHYSICAL EXAM:  BP (!) 145/90 (BP Location: Right arm)   Pulse 83   Temp 98.3  F (36.8  C) (Oral)   Resp 18   Ht 1.829 m (6')   Wt 111.4 kg (245 lb 9.5 oz)   SpO2 96%   BMI 33.31 kg/m    Alert and oriented x4  WBC trending up-awaiting labs from today  MRI shows osteomyelitis in the residual tibial with concern for abscesses to just above the knee  Fibrinogen 866    ASSESSMENT:  58 yo M with L BKA now with cellulitis. Not improving with antbiotics as expected.    PLAN:  -continue to try and keep tight glucose control with BG under 180  -continue medication regimen  -tentative plan for revision of L BKA on 6/29 with Dr. Child  -appreciate all teams involved  -NPO at midnight    Discussed pt history, exam, assessment and plan with Dr. Luevano, the Vascular Surgery Fellow.     Antonieta Perdomo, NP  VASCULAR SURGERY

## 2023-06-28 NOTE — PROGRESS NOTES
Vascular Surgery Note    Patient on OR schedule for revision of L BKA on 6/30 Friday    iKa Luevano MD  Fellow

## 2023-06-28 NOTE — PLAN OF CARE
Pt here w/ cellulitis of L stump. A&OX4, VSS, ex HTN. Pain managed w/ oral dilaudid x1. No N/V. Tolerating mod carb diet. Pt able to transfer to and from wheelchair ind. Bilateral BKA. PIV SL. Using urinal at bedside.

## 2023-06-28 NOTE — PROGRESS NOTES
[facility]    Medicine Progress Note - Hospitalist Service        Date of Admission:  6/24/2023  3:36 PM    Assessment & Plan:   Ry Horner is a 57 year old male with medical history significant for uncontrolled type II DM with polyneuropathy and nephropathy, stage III CKD, bilateral BKA presented to the ER due to pain, swelling and redness with small ulcer at Lt BKA stump and found to have cellulitis/sepsis and is being admitted on 6/24/2023 for further management.      Sepsis due to cellulitis and likely abscesses involving Lt BKA stump.  -Patient presented with pain, redness, swelling at his left BKA stump site.  Marked leukocytosis of 22.9.  Tachycardic to 110s.  X-ray Shows soft tissue swelling, some bone formation along the resection margin of the tibia with periosteal reaction.  -Initially on vancomycin and Zosyn.  Infectious disease following, vancomycin changed to daptomycin  -Vascular surgery following, patient not improving as expected, worsening leukocytosis, and MRI shows signal changes compatible with osteomyelitis of the residual tibia along with fluid collection suspicious for abscesses, now planned for revision of left BKA tomorrow.    Acute kidney injury on CKD stage III  Creatinine 2.45.  Likely baseline is 1.5-1.6.  Although noted it was 1.9 recently.  Suspect related to sepsis. Takes Ibuprofen occasionally.   -Hold lisinopril  -Creatinine improved to 1.6, which is most likely his baseline.  Off IV fluids at this time.    Type II DM, uncontrolled, on insulin  Noted HbA1c was 10.4 in February.  States his blood sugars run in 200s.  -Continue PTA Tresiba, 34 units twice a day  -Borderline hypoglycemia this morning with blood sugar of 62 discussed with patient he is comfortable with continuing PTA dose of Tresiba  -Obviously need tight blood sugar control given concern for infection  -Change sliding scale to medium acting  -Hold PTA oral agents including Jardiance,  Januvia.  -Hypoglycemia protocol    Mild hyponatremia    Diet: Moderate Consistent Carb (60 g CHO per Meal) Diet     DVT Prophylaxis: Pneumatic Compression Devices   [place] Catheter: Not present  Code Status: Full Code     Disposition Plan      Expected Discharge Date: 06/29/2023      Destination: home;home with family  Discharge Comments: Poss i&d 6/26      Entered: Kai Viramontes MD 06/28/2023, 11:18 AM        Clinically Significant Risk Factors              # Hypoalbuminemia: Lowest albumin = 3.4 g/dL at 6/24/2023  4:12 PM, will monitor as appropriate     # Hypertension: Noted on problem list       # DMII: A1C = 7.6 % (Ref range: <5.7 %) within past 6 months, PRESENT ON ADMISSION  # Obesity: Estimated body mass index is 33.31 kg/m  as calculated from the following:    Height as of this encounter: 1.829 m (6').    Weight as of this encounter: 111.4 kg (245 lb 9.5 oz)., PRESENT ON ADMISSION             The patient's care was discussed with the Bedside Nurse and Patient.    Medical Decision Making       **CLEAR ALL SELECTIONS**        Labs/Imaging Reviewed:  See Information above and Data section below    Time SPENT BY ME on the date of service doing chart review, history, exam, documentation & further activities per the note:  50 MINUTES    Kai Viramontes MD  Hospitalist Service  [facility]  Text Page 7AM-6PM  Securely message with the Vocera Web Console (learn more here)  Text page via [employer] Paging/Directory    ______________________________________________________________________    Interval History   MRI of the left stump shows signal changes compatible with osteomyelitis of the residual tibia also concern for fluid collection suspicious for abscesses.  Plan for revision of left BKA stump in the operating room tomorrow.  Mild hypoglycemia earlier this morning.  Afebrile    Data reviewed today: I reviewed all medications, new labs and imaging results over the last 24 hours. I  personally reviewed no images or EKG's today.    Physical Exam   Vital signs:  Temp: 98.3  F (36.8  C) Temp src: Oral BP: (!) 145/90 Pulse: 83   Resp: 18 SpO2: 96 % O2 Device: None (Room air)   Height: 182.9 cm (6') Weight: 111.4 kg (245 lb 9.5 oz)  Estimated body mass index is 33.31 kg/m  as calculated from the following:    Height as of this encounter: 1.829 m (6').    Weight as of this encounter: 111.4 kg (245 lb 9.5 oz).      Wt Readings from Last 2 Encounters:   06/27/23 111.4 kg (245 lb 9.5 oz)   10/03/22 110 kg (242 lb 8.1 oz)       Gen: AAOX3, NAD, comfortable  Resp: CTA B/L, normal WOB  CVS: RRR, no murmur  Abd/GI: Soft, non-tender. BS- normoactive.    Skin: Warm, dry no rashes  MSK: Left BKA stump site with mild erythema and significant edema.    Neuro- CN- intact. No focal deficits.     Data   Recent Labs   Lab 06/28/23  1054 06/28/23  0802 06/28/23  0650 06/27/23  2157 06/27/23  1718 06/27/23  1502 06/27/23  0757 06/27/23  0726 06/26/23  1153 06/26/23  0822 06/26/23  0738 06/25/23  0821 06/25/23  0636 06/24/23  2215 06/24/23  1612   WBC  --   --   --   --   --   --   --  21.3*  --  19.6*  --   --  18.0*  --  22.9*   HGB  --   --   --   --   --   --   --  11.4*  --  11.3*  --   --  11.4*  --  12.8*   MCV  --   --   --   --   --   --   --  87  --  88  --   --  87  --  86   PLT  --   --   --   --   --   --   --  288  --  267  --   --  222  --  235   INR  --   --   --   --   --  1.06  --   --   --   --   --   --   --   --   --    NA  --   --   --   --   --   --   --  135*  --   --  136  --  135*  --  134*   POTASSIUM  --   --   --   --   --   --   --  3.4  --   --  3.8  --  3.8  --  4.3   CHLORIDE  --   --   --   --   --   --   --  102  --   --  102  --  101  --  99   CO2  --   --   --   --   --   --   --  22  --   --  22  --  21*  --  21*   BUN  --   --   --   --   --   --   --  19.7  --   --  31.2*  --  45.6*  --  46.0*   CR  --  1.62*  --   --   --   --   --  1.58*  --   --  1.81*  1.81*  --  2.21*  --   2.45*   ANIONGAP  --   --   --   --   --   --   --  11  --   --  12  --  13  --  14   FERNANDO  --   --   --   --   --   --   --  8.3*  --   --  8.1*  --  8.0*  --  8.8   *  --  62* 176*   < >  --    < > 101*   < >  --  108*   < > 186*   < > 270*   ALBUMIN  --   --   --   --   --   --   --   --   --   --   --   --   --   --  3.4*   PROTTOTAL  --   --   --   --   --   --   --   --   --   --   --   --   --   --  7.3   BILITOTAL  --   --   --   --   --   --   --   --   --   --   --   --   --   --  0.9   ALKPHOS  --   --   --   --   --   --   --   --   --   --   --   --   --   --  84   ALT  --   --   --   --   --   --   --   --   --   --   --   --   --   --  14   AST  --   --   --   --   --   --   --   --   --   --   --   --   --   --  13    < > = values in this interval not displayed.       Recent Results (from the past 24 hour(s))   MR Tibia Fibula Lower Leg Left wo Contrast    Addendum: 6/28/2023    Additional   impression:    Small knee joint effusion. Septic arthritis not excluded given the additional findings.      Narrative    EXAM: MR TIBIA FIBULA LOWER LEG LEFT W/O CONTRAST  LOCATION: [facility]  DATE: 6/27/2023    INDICATION: MRI to define extent of osteomyelitis. Left BKA.  COMPARISON: CT 06/24/2023, radiographs 06/24/2023.  TECHNIQUE: Unenhanced.    FINDINGS:     BONES:   -Below knee amputation. Abnormal marrow signal at the tibial amputation site characterized by increased T2 STIR signal and confluent low T1 signal. A small amount of abnormal signal extends superiorly in the anterior aspect of the tibia tapering to the   level of the tibial tuberosity.    SOFT TISSUES:    -Below-knee amputation. Intravenous contrast somewhat limits evaluation. There is a fluid collection at the distal aspect of the amputation stump, extending anterior to the residual distal tibia. The component just at the distal aspect of the stomach   measures approximately 4.8 x 4.3 cm in maximal axial  dimensions. In the anterior subcutaneous tissue a contiguous component of the collection measures 5.7 x 1.7 x 5.6 cm. There is extension of fluid in the anterolateral subcutaneous tissue superiorly,   coursing along the lateral to posterolateral aspect of the immediately subcutaneous tissue. Representative measurements 4.5 x 1.4 cm just below the knee joint (series 6 image 23), and extends superiorly to about 5 cm above the knee joint in the   posterolateral subcutaneous tissue. Diffuse soft tissue edema.      Impression    IMPRESSION:  1.  Signal changes compatible with osteomyelitis in the residual tibia. A small amount of abnormal signal in the anterior tibia extends to approximately the level of the tibial tuberosity.  2.  Fluid collections suspicious for abscesses surrounding the distal tibial amputation site, as well as the the anterior and lateral subcutaneous tissues soft tissue of the leg extending cranially just above the knee. Lack of intravenous contrast   somewhat limits evaluation.     Medications       DAPTOmycin (CUBICIN) 600 mg in sodium chloride 0.9 % 100 mL intermittent infusion  6 mg/kg (Adjusted) Intravenous Q24H     ezetimibe  10 mg Oral Daily     heparin ANTICOAGULANT  5,000 Units Subcutaneous Q12H     insulin aspart  1-10 Units Subcutaneous TID AC     insulin aspart  1-7 Units Subcutaneous At Bedtime     insulin degludec  34 Units Subcutaneous BID     piperacillin-tazobactam  4.5 g Intravenous Q6H     sodium chloride (PF)  3 mL Intracatheter Q8H

## 2023-06-28 NOTE — PLAN OF CARE
06/28/23 0700 - 1900    Aox4; VSS RA with elevated BP; mod carb diet, thin liquids; bilateral BKA, transfers IND; 1mg diaudid given x1; PIV SL; intermittent abx; urinal at bedside; wound on bottom of L BKA;

## 2023-06-28 NOTE — PROGRESS NOTES
[facility]    Infectious Disease Progress Note    Date of Service (when I saw the patient): 06/28/2023     Assessment & Plan   Ry Horner is a 57 year old male who was admitted on 6/24/2023.     Impression:  1. 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 2. Stage III CKD  3. Bilateral BKA   4. Presented to the ER due to pain, swelling and redness with small ulcer at Lt BKA stump and found to have cellulitis/sepsis. This BKA was done in 2022   5. No history of MRSA  6. Workup shows JOHNATHAN  7. Started on vanc and zosyn      Recommendations:   Continue on daptomycin continue on zosyn   Noted plan for revision   Will continue to follow      CT   IMPRESSION:  1.  Postoperative changes prior below-the-knee amputation.  2.  Poorly defined soft tissue and fluid along the distal stump, presumably an abscess until proven otherwise. This measures at least 5.9 x 5.3 x 7.8 cm. The collection abuts the tibial resection margin, and tracks a short segment proximally along the   posterolateral tibial shaft as described.  3.  There is some faint periosteal bone formation and erosive change along the tibial resection margin which shows raise concern for early osteomyelitis.  MD Franklin Cat MD    Interval History   Tolerating antibiotics ok   No new rashes or issues with antibiotics   Labs reviewed   No changes to past medical, social or family history   Afebrile   A 10 point ROS was done and is negative other than noted in the interval history above     Physical Exam   Temp: 98.3  F (36.8  C) Temp src: Oral BP: (!) 145/90 Pulse: 83   Resp: 18 SpO2: 96 % O2 Device: None (Room air)    Vitals:    06/24/23 2236 06/26/23 0650 06/27/23 0621   Weight: 108 kg (238 lb 1.6 oz) 114.2 kg (251 lb 12.3 oz) 111.4 kg (245 lb 9.5 oz)     Vital Signs with Ranges  Temp:  [98.3  F (36.8  C)-99.7  F (37.6  C)] 98.3  F (36.8  C)  Pulse:  [82-90] 83  Resp:  [18-20]  18  BP: (145-180)/(90-91) 145/90  SpO2:  [96 %-97 %] 96 %    Constitutional: Awake, alert, cooperative, no apparent distress  Lungs: Clear to auscultation bilaterally, no crackles or wheezing  Cardiovascular: Regular rate and rhythm, normal S1 and S2, and no murmur noted  Abdomen: Normal bowel sounds, soft, non-distended, non-tender  Skin: increase in the edema and induration left BKA   Other:    Medications       DAPTOmycin (CUBICIN) 600 mg in sodium chloride 0.9 % 100 mL intermittent infusion  6 mg/kg (Adjusted) Intravenous Q24H     ezetimibe  10 mg Oral Daily     heparin ANTICOAGULANT  5,000 Units Subcutaneous Q12H     insulin aspart  1-10 Units Subcutaneous TID AC     insulin aspart  1-7 Units Subcutaneous At Bedtime     insulin degludec  34 Units Subcutaneous BID     piperacillin-tazobactam  4.5 g Intravenous Q6H     sodium chloride (PF)  3 mL Intracatheter Q8H       Data   All microbiology laboratory data reviewed.  Recent Labs   Lab Test 06/27/23  0726 06/26/23  0822 06/25/23  0636   WBC 21.3* 19.6* 18.0*   HGB 11.4* 11.3* 11.4*   HCT 34.0* 33.5* 33.6*   MCV 87 88 87    267 222     Recent Labs   Lab Test 06/28/23  0802 06/27/23  0726 06/26/23  0738   CR 1.62* 1.58* 1.81*  1.81*     Recent Labs   Lab Test 09/25/22  1529   SED 85*     Recent Labs   Lab Test 02/03/20  1324 02/03/20  0007 02/02/20  2250 12/04/19  1619 11/19/19  1829 11/17/19  1525 11/17/19  1419 11/17/19  1411 08/28/17  1136   CULT Heavy growth  beta hemolytic   Streptococcus constellatus  *  Light growth  Staphylococcus aureus  * No growth No growth No anaerobes isolated  No growth Light growth  Bacteroides fragilis  *  Light growth  Parvimonas micra  *  Susceptibility testing not routinely done  On day 2, isolated in broth only:  beta hemolytic   Streptococcus constellatus  * Heavy growth  beta hemolytic   Streptococcus constellatus  Susceptibility testing done on previous specimen  *  Light growth  Alcaligenes faecalis  *  Light  growth  Staphylococcus aureus  *  On day 1, isolated in broth only:  Anaerobic gram negative rods  See anaerobic report for identification  * Heavy growth  Bacteroides fragilis  Susceptibility testing not routinely done  *  Heavy growth  Peptoniphilus asaccharolyticus  Susceptibility testing not routinely done  *  Moderate growth  beta hemolytic   Streptococcus constellatus  *  On day 1, isolated in broth only:  Anaerobic gram negative rods  See anaerobic report for identification  * Heavy growth  Bacteroides fragilis  *  Heavy growth  Parvimonas micra  *  Heavy growth  Peptoniphilus asaccharolyticus  *  Heavy growth  Mixed aerobic and anaerobic rosa  *  Susceptibility testing not routinely done No growth       All cultures:  Recent Labs   Lab 06/24/23  1612   CULTURE No growth after 3 days  No growth after 3 days      Blood culture:  Results for orders placed or performed during the hospital encounter of 06/24/23   Blood Culture Peripheral Blood    Specimen: Peripheral Blood   Result Value Ref Range    Culture No growth after 3 days    Blood Culture Peripheral Blood    Specimen: Peripheral Blood   Result Value Ref Range    Culture No growth after 3 days    Results for orders placed or performed during the hospital encounter of 02/02/20   Blood culture    Specimen: Blood    Right Arm   Result Value Ref Range    Specimen Description Blood Right Arm     Culture Micro No growth    Blood culture    Specimen: Blood    Right Arm   Result Value Ref Range    Specimen Description Blood Right Arm     Culture Micro No growth    Results for orders placed or performed during the hospital encounter of 08/28/17   Blood culture    Specimen: Arm, Right; Blood    Right Arm   Result Value Ref Range    Specimen Description Blood Right Arm     Special Requests Aerobic and anaerobic bottles received     Culture Micro No growth    Blood culture    Specimen: Arm, Right; Blood    Right Arm   Result Value Ref Range    Specimen  Description Blood Right Arm     Special Requests Aerobic and anaerobic bottles received     Culture Micro No growth    Results for orders placed or performed during the hospital encounter of 07/14/16   Blood culture    Specimen: Blood   Result Value Ref Range    Specimen Description Blood Left Arm     Special Requests Aerobic and anaerobic bottles received     Culture Micro No growth     Micro Report Status FINAL 07/20/2016    Blood culture    Specimen: Blood   Result Value Ref Range    Specimen Description Blood Right Arm     Special Requests Aerobic and anaerobic bottles received     Culture Micro No growth     Micro Report Status FINAL 07/20/2016       Urine culture:  No results found for this or any previous visit.

## 2023-06-29 ENCOUNTER — ANESTHESIA EVENT (OUTPATIENT)
Dept: SURGERY | Facility: CLINIC | Age: 58
DRG: 463 | End: 2023-06-29
Payer: COMMERCIAL

## 2023-06-29 LAB
BACTERIA BLD CULT: NO GROWTH
BACTERIA BLD CULT: NO GROWTH
CREAT SERPL-MCNC: 1.69 MG/DL (ref 0.67–1.17)
ERYTHROCYTE [DISTWIDTH] IN BLOOD BY AUTOMATED COUNT: 11.9 % (ref 10–15)
GFR SERPL CREATININE-BSD FRML MDRD: 47 ML/MIN/1.73M2
GLUCOSE BLDC GLUCOMTR-MCNC: 101 MG/DL (ref 70–99)
GLUCOSE BLDC GLUCOMTR-MCNC: 162 MG/DL (ref 70–99)
GLUCOSE BLDC GLUCOMTR-MCNC: 166 MG/DL (ref 70–99)
GLUCOSE BLDC GLUCOMTR-MCNC: 266 MG/DL (ref 70–99)
GLUCOSE BLDC GLUCOMTR-MCNC: 66 MG/DL (ref 70–99)
GLUCOSE BLDC GLUCOMTR-MCNC: 77 MG/DL (ref 70–99)
HCT VFR BLD AUTO: 32.8 % (ref 40–53)
HGB BLD-MCNC: 11.3 G/DL (ref 13.3–17.7)
MCH RBC QN AUTO: 29.4 PG (ref 26.5–33)
MCHC RBC AUTO-ENTMCNC: 34.5 G/DL (ref 31.5–36.5)
MCV RBC AUTO: 85 FL (ref 78–100)
PLATELET # BLD AUTO: 355 10E3/UL (ref 150–450)
RBC # BLD AUTO: 3.85 10E6/UL (ref 4.4–5.9)
WBC # BLD AUTO: 21.7 10E3/UL (ref 4–11)

## 2023-06-29 PROCEDURE — 250N000013 HC RX MED GY IP 250 OP 250 PS 637: Performed by: HOSPITALIST

## 2023-06-29 PROCEDURE — 250N000011 HC RX IP 250 OP 636: Performed by: HOSPITALIST

## 2023-06-29 PROCEDURE — 99232 SBSQ HOSP IP/OBS MODERATE 35: CPT | Performed by: INTERNAL MEDICINE

## 2023-06-29 PROCEDURE — 85027 COMPLETE CBC AUTOMATED: CPT | Performed by: INTERNAL MEDICINE

## 2023-06-29 PROCEDURE — 250N000013 HC RX MED GY IP 250 OP 250 PS 637: Performed by: INTERNAL MEDICINE

## 2023-06-29 PROCEDURE — 250N000011 HC RX IP 250 OP 636: Mod: JZ

## 2023-06-29 PROCEDURE — 36415 COLL VENOUS BLD VENIPUNCTURE: CPT | Performed by: INTERNAL MEDICINE

## 2023-06-29 PROCEDURE — 120N000001 HC R&B MED SURG/OB

## 2023-06-29 PROCEDURE — 258N000003 HC RX IP 258 OP 636

## 2023-06-29 PROCEDURE — 82565 ASSAY OF CREATININE: CPT | Performed by: INTERNAL MEDICINE

## 2023-06-29 RX ADMIN — ACETAMINOPHEN 650 MG: 325 TABLET, FILM COATED ORAL at 00:10

## 2023-06-29 RX ADMIN — DAPTOMYCIN 600 MG: 500 INJECTION, POWDER, LYOPHILIZED, FOR SOLUTION INTRAVENOUS at 12:45

## 2023-06-29 RX ADMIN — PIPERACILLIN AND TAZOBACTAM 4.5 G: 4; .5 INJECTION, POWDER, FOR SOLUTION INTRAVENOUS at 16:32

## 2023-06-29 RX ADMIN — ACETAMINOPHEN 650 MG: 325 TABLET, FILM COATED ORAL at 16:31

## 2023-06-29 RX ADMIN — INSULIN DEGLUDEC INJECTION 34 UNITS: 100 INJECTION, SOLUTION SUBCUTANEOUS at 10:41

## 2023-06-29 RX ADMIN — PIPERACILLIN AND TAZOBACTAM 4.5 G: 4; .5 INJECTION, POWDER, FOR SOLUTION INTRAVENOUS at 05:32

## 2023-06-29 RX ADMIN — HYDROMORPHONE HYDROCHLORIDE 1 MG: 2 TABLET ORAL at 16:32

## 2023-06-29 RX ADMIN — HYDROMORPHONE HYDROCHLORIDE 1 MG: 2 TABLET ORAL at 00:10

## 2023-06-29 RX ADMIN — HYDROMORPHONE HYDROCHLORIDE 1 MG: 2 TABLET ORAL at 09:40

## 2023-06-29 RX ADMIN — PIPERACILLIN AND TAZOBACTAM 4.5 G: 4; .5 INJECTION, POWDER, FOR SOLUTION INTRAVENOUS at 00:00

## 2023-06-29 RX ADMIN — EZETIMIBE 10 MG: 10 TABLET ORAL at 08:39

## 2023-06-29 RX ADMIN — INSULIN ASPART 1 UNITS: 100 INJECTION, SOLUTION INTRAVENOUS; SUBCUTANEOUS at 13:29

## 2023-06-29 RX ADMIN — HEPARIN SODIUM 5000 UNITS: 5000 INJECTION, SOLUTION INTRAVENOUS; SUBCUTANEOUS at 09:40

## 2023-06-29 RX ADMIN — HEPARIN SODIUM 5000 UNITS: 5000 INJECTION, SOLUTION INTRAVENOUS; SUBCUTANEOUS at 21:05

## 2023-06-29 RX ADMIN — PIPERACILLIN AND TAZOBACTAM 4.5 G: 4; .5 INJECTION, POWDER, FOR SOLUTION INTRAVENOUS at 22:30

## 2023-06-29 RX ADMIN — PIPERACILLIN AND TAZOBACTAM 4.5 G: 4; .5 INJECTION, POWDER, FOR SOLUTION INTRAVENOUS at 10:06

## 2023-06-29 ASSESSMENT — ACTIVITIES OF DAILY LIVING (ADL)
ADLS_ACUITY_SCORE: 20

## 2023-06-29 NOTE — PROGRESS NOTES
[id number]  Patient AOX4. VSS on RA. Elevated BP.Bilateral BKA.Pt transfers independent. Pain managed with dilaudid/tylenol. Wound on L BKA,JACKI.Voiding in urinal. BS audible/No BM on shift. Diet Mod Carb./thin liq. PIV SL. Continue to monitor. Plan for surgery Friday 6/30.

## 2023-06-29 NOTE — PLAN OF CARE
06/29/23 0700 - 1900     Patient AOX4; VSS on RA; Bilateral BKA. IND for transfers; Pain controlled with PRN oxy x2 and tylenol x1;  Open wound to L BKA JACKI; Continent of bowel and bladder, urinal at bedside;  Mod carb diet, thin liquids; PIV SL, intermittent abx; Surgery scheduled for 0730 06/30/23.

## 2023-06-29 NOTE — PROGRESS NOTES
[facility]    Medicine Progress Note - Hospitalist Service        Date of Admission:  6/24/2023  3:36 PM    Assessment & Plan:   Ry Horner is a 57 year old male with medical history significant for uncontrolled type II DM with polyneuropathy and nephropathy, stage III CKD, bilateral BKA presented to the ER due to pain, swelling and redness with small ulcer at Lt BKA stump and found to have cellulitis/sepsis and is being admitted on 6/24/2023 for further management.      Sepsis due to cellulitis and likely abscesses involving Lt BKA stump.  -Patient presented with pain, redness, swelling at his left BKA stump site.  Marked leukocytosis of 22.9.  Tachycardic to 110s.  X-ray Shows soft tissue swelling, some bone formation along the resection margin of the tibia with periosteal reaction.  -Initially on vancomycin and Zosyn.    -Infectious disease following, vancomycin changed to daptomycin  -Vascular surgery following, patient not improving as expected, worsening leukocytosis, and MRI shows signal changes compatible with osteomyelitis of the residual tibia along with fluid collection suspicious for abscesses.  -Patient was initially planned for revision of left BKA stump this morning however plan canceled for today and apparently now rescheduled for tomorrow.    Acute kidney injury on CKD stage III  Creatinine 2.45. Likely baseline is 1.5-1.6. Although noted it was 1.9 recently.  Suspect related to sepsis. Takes Ibuprofen occasionally.   -Hold lisinopril  -Creatinine improved to 1.6, which is most likely his baseline.  Off IV fluids at this time.    Type II DM, uncontrolled, on insulin  Noted HbA1c was 10.4 in February.  States his blood sugars run in 200s.  -Continue PTA Tresiba, 34 units twice a day, will likely hold tomorrow morning dose of Tresiba if surgery confirmed for tomorrow.  -Medium intensity sliding scale  -Hold PTA oral agents including Jardiance, Januvia.  -Hypoglycemia protocol    Mild  hyponatremia    Diet: NPO for Medical/Clinical Reasons Except for: Meds  Regular Diet Adult     DVT Prophylaxis: Pneumatic Compression Devices   Corrales Catheter: Not present  Code Status: Full Code     Disposition Plan       Expected Discharge Date: 07/03/2023      Destination: home;home with family  Discharge Comments: Surgery 6/29/2023      Entered: Kai Viramontes MD 06/29/2023, 10:05 AM        Clinically Significant Risk Factors              # Hypoalbuminemia: Lowest albumin = 3.4 g/dL at 6/24/2023  4:12 PM, will monitor as appropriate     # Hypertension: Noted on problem list       # DMII: A1C = 7.6 % (Ref range: <5.7 %) within past 6 months   # Obesity: Estimated body mass index is 32.8 kg/m  as calculated from the following:    Height as of this encounter: 1.829 m (6').    Weight as of this encounter: 109.7 kg (241 lb 13.5 oz).              The patient's care was discussed with the Bedside Nurse and Patient.    Medical Decision Making       **CLEAR ALL SELECTIONS**        Labs/Imaging Reviewed:  See Information above and Data section below    Time SPENT BY ME on the date of service doing chart review, history, exam, documentation & further activities per the note: 35 MINUTES    Kai Viramontes MD  Hospitalist Service  [facility]  Text Page 7AM-6PM  Securely message with the Vocera Web Console (learn more here)  Text page via [employer] Paging/Directory    ______________________________________________________________________    Interval History   Patient is a little frustrated that the revision surgery for left BKA stump has been now postponed for tomorrow.  Patient endorses tightness around the stump site.  Afebrile.    Data reviewed today: I reviewed all medications, new labs and imaging results over the last 24 hours. I personally reviewed no images or EKG's today.    Physical Exam   Vital signs:  Temp: 98.7  F (37.1  C) Temp src: Oral BP: (!) 146/87 Pulse: 78   Resp: 16 SpO2: 97 %  O2 Device: None (Room air)   Height: 182.9 cm (6') Weight: 109.7 kg (241 lb 13.5 oz)  Estimated body mass index is 32.8 kg/m  as calculated from the following:    Height as of this encounter: 1.829 m (6').    Weight as of this encounter: 109.7 kg (241 lb 13.5 oz).      Wt Readings from Last 2 Encounters:   06/29/23 109.7 kg (241 lb 13.5 oz)   10/03/22 110 kg (242 lb 8.1 oz)       Gen: AAOX3, NAD, comfortable  Resp: CTA B/L, normal WOB  CVS: RRR, no murmur  Abd/GI: Soft, non-tender. BS- normoactive.    Skin: Warm, dry no rashes  MSK: Left BKA stump site with mild erythema and significant edema.    Neuro- CN- intact. No focal deficits.     Data   Recent Labs   Lab 06/29/23  0835 06/29/23  0830 06/29/23  0611 06/29/23  0151 06/28/23  1054 06/28/23  0802 06/27/23  1718 06/27/23  1502 06/27/23  0757 06/27/23  0726 06/26/23  1153 06/26/23  0822 06/26/23  0738 06/25/23  0821 06/25/23  0636 06/24/23  2215 06/24/23  1612   WBC  --  21.7*  --   --   --   --   --   --   --  21.3*  --  19.6*  --   --  18.0*  --  22.9*   HGB  --  11.3*  --   --   --   --   --   --   --  11.4*  --  11.3*  --   --  11.4*  --  12.8*   MCV  --  85  --   --   --   --   --   --   --  87  --  88  --   --  87  --  86   PLT  --  355  --   --   --   --   --   --   --  288  --  267  --   --  222  --  235   INR  --   --   --   --   --   --   --  1.06  --   --   --   --   --   --   --   --   --    NA  --   --   --   --   --   --   --   --   --  135*  --   --  136  --  135*  --  134*   POTASSIUM  --   --   --   --   --   --   --   --   --  3.4  --   --  3.8  --  3.8  --  4.3   CHLORIDE  --   --   --   --   --   --   --   --   --  102  --   --  102  --  101  --  99   CO2  --   --   --   --   --   --   --   --   --  22  --   --  22  --  21*  --  21*   BUN  --   --   --   --   --   --   --   --   --  19.7  --   --  31.2*  --  45.6*  --  46.0*   CR  --  1.69*  --   --   --  1.62*  --   --   --  1.58*  --   --  1.81*  1.81*  --  2.21*  --  2.45*   ANIONGAP  --    --   --   --   --   --   --   --   --  11  --   --  12  --  13  --  14   FERNANDO  --   --   --   --   --   --   --   --   --  8.3*  --   --  8.1*  --  8.0*  --  8.8   GLC 66*  --  77 162*   < >  --    < >  --    < > 101*   < >  --  108*   < > 186*   < > 270*   ALBUMIN  --   --   --   --   --   --   --   --   --   --   --   --   --   --   --   --  3.4*   PROTTOTAL  --   --   --   --   --   --   --   --   --   --   --   --   --   --   --   --  7.3   BILITOTAL  --   --   --   --   --   --   --   --   --   --   --   --   --   --   --   --  0.9   ALKPHOS  --   --   --   --   --   --   --   --   --   --   --   --   --   --   --   --  84   ALT  --   --   --   --   --   --   --   --   --   --   --   --   --   --   --   --  14   AST  --   --   --   --   --   --   --   --   --   --   --   --   --   --   --   --  13    < > = values in this interval not displayed.       No results found for this or any previous visit (from the past 24 hour(s)).  Medications       DAPTOmycin (CUBICIN) 600 mg in sodium chloride 0.9 % 100 mL intermittent infusion  6 mg/kg (Adjusted) Intravenous Q24H     ezetimibe  10 mg Oral Daily     heparin ANTICOAGULANT  5,000 Units Subcutaneous Q12H     insulin aspart  1-7 Units Subcutaneous TID AC     insulin aspart  1-5 Units Subcutaneous At Bedtime     insulin degludec  34 Units Subcutaneous BID     piperacillin-tazobactam  4.5 g Intravenous Q6H     sodium chloride (PF)  3 mL Intracatheter Q8H

## 2023-06-29 NOTE — PROGRESS NOTES
Care Management Follow Up    Length of Stay (days): 5    Expected Discharge Date: 07/03/2023     Concerns to be Addressed: denies needs/concerns at this time, no discharge needs identified     Patient plan of care discussed at interdisciplinary rounds: Yes    Anticipated Discharge Disposition: Home     Anticipated Discharge Services: None  Anticipated Discharge DME: None    Patient/family educated on Medicare website which has current facility and service quality ratings:    Education Provided on the Discharge Plan:    Patient/Family in Agreement with the Plan: yes    Referrals Placed by CM/SW:    Private pay costs discussed: Not applicable    Additional Information:  Writer [facility] Sheyla can assist with discharge planning if needed. Sheyla's phone number is [phone] ext [phone]      Lindy Colorado RN   [facility]   Phone [phone] or [phone]

## 2023-06-29 NOTE — PROGRESS NOTES
Mr. Horner seen and examined today, while up in wheelchair and about to start on his work.     Significant swelling and tense distal left BKA stump, with tenderness to palpation (mild) around the bone stump and a feeling of pressure. No significant erythema currently, no active drainage. WBC remains elevated around 22k. Afebrile in last 24 hours.     Plan for debridement tomorrow in the OR .   MRI reviewed, with signal flare to the level of the tibial tuberosity concerning for osteomyelitis affecting the entire residual tibial stump.       Will resect bone and send for pathology, with plan to leave wound open.     If yoel osteomyelitis seen, would need revision ultimately to above-knee amputation.     If reactive bone without infection seen, would plan secondary debridement and antibiotic bead implantation with definitive closure potentially next week.     Discussed plan with Mr. Horner.     Case delayed due to OR schedule.     NPO after MN tonight.     Reny Child MD

## 2023-06-30 ENCOUNTER — ANESTHESIA (OUTPATIENT)
Dept: SURGERY | Facility: CLINIC | Age: 58
DRG: 463 | End: 2023-06-30
Payer: COMMERCIAL

## 2023-06-30 ENCOUNTER — APPOINTMENT (OUTPATIENT)
Dept: SURGERY | Facility: PHYSICIAN GROUP | Age: 58
End: 2023-06-30
Payer: COMMERCIAL

## 2023-06-30 LAB
CREAT SERPL-MCNC: 1.82 MG/DL (ref 0.67–1.17)
GFR SERPL CREATININE-BSD FRML MDRD: 43 ML/MIN/1.73M2
GLUCOSE BLDC GLUCOMTR-MCNC: 111 MG/DL (ref 70–99)
GLUCOSE BLDC GLUCOMTR-MCNC: 120 MG/DL (ref 70–99)
GLUCOSE BLDC GLUCOMTR-MCNC: 130 MG/DL (ref 70–99)
GLUCOSE BLDC GLUCOMTR-MCNC: 133 MG/DL (ref 70–99)
GLUCOSE BLDC GLUCOMTR-MCNC: 198 MG/DL (ref 70–99)
GLUCOSE BLDC GLUCOMTR-MCNC: 228 MG/DL (ref 70–99)
GLUCOSE BLDC GLUCOMTR-MCNC: 240 MG/DL (ref 70–99)
PLATELET # BLD AUTO: 354 10E3/UL (ref 150–450)

## 2023-06-30 PROCEDURE — 10061 I&D ABSCESS COMP/MULTIPLE: CPT | Mod: 59 | Performed by: SURGERY

## 2023-06-30 PROCEDURE — 258N000003 HC RX IP 258 OP 636: Performed by: ANESTHESIOLOGY

## 2023-06-30 PROCEDURE — 99232 SBSQ HOSP IP/OBS MODERATE 35: CPT | Performed by: STUDENT IN AN ORGANIZED HEALTH CARE EDUCATION/TRAINING PROGRAM

## 2023-06-30 PROCEDURE — 250N000011 HC RX IP 250 OP 636: Performed by: PHYSICIAN ASSISTANT

## 2023-06-30 PROCEDURE — 0QDH0ZZ EXTRACTION OF LEFT TIBIA, OPEN APPROACH: ICD-10-PCS | Performed by: SURGERY

## 2023-06-30 PROCEDURE — 272N000001 HC OR GENERAL SUPPLY STERILE: Performed by: SURGERY

## 2023-06-30 PROCEDURE — 250N000009 HC RX 250

## 2023-06-30 PROCEDURE — 250N000011 HC RX IP 250 OP 636: Mod: JZ | Performed by: PHYSICIAN ASSISTANT

## 2023-06-30 PROCEDURE — 999N000141 HC STATISTIC PRE-PROCEDURE NURSING ASSESSMENT: Performed by: SURGERY

## 2023-06-30 PROCEDURE — 370N000017 HC ANESTHESIA TECHNICAL FEE, PER MIN: Performed by: SURGERY

## 2023-06-30 PROCEDURE — 250N000013 HC RX MED GY IP 250 OP 250 PS 637: Performed by: HOSPITALIST

## 2023-06-30 PROCEDURE — 250N000025 HC SEVOFLURANE, PER MIN: Performed by: SURGERY

## 2023-06-30 PROCEDURE — 250N000011 HC RX IP 250 OP 636: Performed by: SURGERY

## 2023-06-30 PROCEDURE — 250N000011 HC RX IP 250 OP 636

## 2023-06-30 PROCEDURE — 88304 TISSUE EXAM BY PATHOLOGIST: CPT | Mod: 26 | Performed by: PATHOLOGY

## 2023-06-30 PROCEDURE — 85049 AUTOMATED PLATELET COUNT: CPT | Performed by: HOSPITALIST

## 2023-06-30 PROCEDURE — 120N000001 HC R&B MED SURG/OB

## 2023-06-30 PROCEDURE — 88304 TISSUE EXAM BY PATHOLOGIST: CPT | Mod: TC | Performed by: SURGERY

## 2023-06-30 PROCEDURE — 250N000011 HC RX IP 250 OP 636: Mod: JZ | Performed by: HOSPITALIST

## 2023-06-30 PROCEDURE — 87075 CULTR BACTERIA EXCEPT BLOOD: CPT | Performed by: SURGERY

## 2023-06-30 PROCEDURE — 36415 COLL VENOUS BLD VENIPUNCTURE: CPT | Performed by: INTERNAL MEDICINE

## 2023-06-30 PROCEDURE — 258N000003 HC RX IP 258 OP 636: Performed by: PHYSICIAN ASSISTANT

## 2023-06-30 PROCEDURE — 11044 DBRDMT BONE 1ST 20 SQ CM/<: CPT | Performed by: SURGERY

## 2023-06-30 PROCEDURE — 258N000003 HC RX IP 258 OP 636

## 2023-06-30 PROCEDURE — 87070 CULTURE OTHR SPECIMN AEROBIC: CPT | Performed by: SURGERY

## 2023-06-30 PROCEDURE — 99232 SBSQ HOSP IP/OBS MODERATE 35: CPT | Performed by: INTERNAL MEDICINE

## 2023-06-30 PROCEDURE — 360N000075 HC SURGERY LEVEL 2, PER MIN: Performed by: SURGERY

## 2023-06-30 PROCEDURE — 0QDK0ZZ EXTRACTION OF LEFT FIBULA, OPEN APPROACH: ICD-10-PCS | Performed by: SURGERY

## 2023-06-30 PROCEDURE — 82565 ASSAY OF CREATININE: CPT | Performed by: INTERNAL MEDICINE

## 2023-06-30 PROCEDURE — 250N000011 HC RX IP 250 OP 636: Performed by: ANESTHESIOLOGY

## 2023-06-30 PROCEDURE — 250N000013 HC RX MED GY IP 250 OP 250 PS 637: Performed by: PHYSICIAN ASSISTANT

## 2023-06-30 PROCEDURE — 710N000009 HC RECOVERY PHASE 1, LEVEL 1, PER MIN: Performed by: SURGERY

## 2023-06-30 RX ORDER — HYDROMORPHONE HCL IN WATER/PF 6 MG/30 ML
0.4 PATIENT CONTROLLED ANALGESIA SYRINGE INTRAVENOUS EVERY 5 MIN PRN
Status: DISCONTINUED | OUTPATIENT
Start: 2023-06-30 | End: 2023-06-30 | Stop reason: HOSPADM

## 2023-06-30 RX ORDER — SODIUM CHLORIDE 9 MG/ML
INJECTION, SOLUTION INTRAVENOUS CONTINUOUS
Status: DISCONTINUED | OUTPATIENT
Start: 2023-06-30 | End: 2023-06-30 | Stop reason: HOSPADM

## 2023-06-30 RX ORDER — DIMENHYDRINATE 50 MG/ML
25 INJECTION, SOLUTION INTRAMUSCULAR; INTRAVENOUS
Status: DISCONTINUED | OUTPATIENT
Start: 2023-06-30 | End: 2023-06-30 | Stop reason: HOSPADM

## 2023-06-30 RX ORDER — CEFAZOLIN SODIUM/WATER 2 G/20 ML
SYRINGE (ML) INTRAVENOUS PRN
Status: DISCONTINUED | OUTPATIENT
Start: 2023-06-30 | End: 2023-06-30

## 2023-06-30 RX ORDER — ACETAMINOPHEN 325 MG/1
975 TABLET ORAL ONCE
Status: DISCONTINUED | OUTPATIENT
Start: 2023-06-30 | End: 2023-06-30 | Stop reason: HOSPADM

## 2023-06-30 RX ORDER — ONDANSETRON 2 MG/ML
4 INJECTION INTRAMUSCULAR; INTRAVENOUS EVERY 30 MIN PRN
Status: DISCONTINUED | OUTPATIENT
Start: 2023-06-30 | End: 2023-06-30 | Stop reason: HOSPADM

## 2023-06-30 RX ORDER — LABETALOL HYDROCHLORIDE 5 MG/ML
10 INJECTION, SOLUTION INTRAVENOUS
Status: DISCONTINUED | OUTPATIENT
Start: 2023-06-30 | End: 2023-06-30 | Stop reason: HOSPADM

## 2023-06-30 RX ORDER — SODIUM CHLORIDE, SODIUM LACTATE, POTASSIUM CHLORIDE, CALCIUM CHLORIDE 600; 310; 30; 20 MG/100ML; MG/100ML; MG/100ML; MG/100ML
INJECTION, SOLUTION INTRAVENOUS CONTINUOUS
Status: DISCONTINUED | OUTPATIENT
Start: 2023-06-30 | End: 2023-06-30 | Stop reason: HOSPADM

## 2023-06-30 RX ORDER — HYDROMORPHONE HCL IN WATER/PF 6 MG/30 ML
0.2 PATIENT CONTROLLED ANALGESIA SYRINGE INTRAVENOUS EVERY 5 MIN PRN
Status: DISCONTINUED | OUTPATIENT
Start: 2023-06-30 | End: 2023-06-30 | Stop reason: HOSPADM

## 2023-06-30 RX ORDER — HYDRALAZINE HYDROCHLORIDE 20 MG/ML
2.5-5 INJECTION INTRAMUSCULAR; INTRAVENOUS EVERY 10 MIN PRN
Status: DISCONTINUED | OUTPATIENT
Start: 2023-06-30 | End: 2023-06-30 | Stop reason: HOSPADM

## 2023-06-30 RX ORDER — HYDROXYZINE HYDROCHLORIDE 25 MG/1
25 TABLET, FILM COATED ORAL EVERY 6 HOURS PRN
Status: DISCONTINUED | OUTPATIENT
Start: 2023-06-30 | End: 2023-06-30 | Stop reason: HOSPADM

## 2023-06-30 RX ORDER — FENTANYL CITRATE 50 UG/ML
INJECTION, SOLUTION INTRAMUSCULAR; INTRAVENOUS PRN
Status: DISCONTINUED | OUTPATIENT
Start: 2023-06-30 | End: 2023-06-30

## 2023-06-30 RX ORDER — ONDANSETRON 4 MG/1
4 TABLET, ORALLY DISINTEGRATING ORAL EVERY 30 MIN PRN
Status: DISCONTINUED | OUTPATIENT
Start: 2023-06-30 | End: 2023-06-30 | Stop reason: HOSPADM

## 2023-06-30 RX ORDER — PROPOFOL 10 MG/ML
INJECTION, EMULSION INTRAVENOUS PRN
Status: DISCONTINUED | OUTPATIENT
Start: 2023-06-30 | End: 2023-06-30

## 2023-06-30 RX ORDER — HYDROMORPHONE HYDROCHLORIDE 1 MG/ML
1 INJECTION, SOLUTION INTRAMUSCULAR; INTRAVENOUS; SUBCUTANEOUS ONCE
Status: COMPLETED | OUTPATIENT
Start: 2023-06-30 | End: 2023-06-30

## 2023-06-30 RX ORDER — FENTANYL CITRATE 0.05 MG/ML
50 INJECTION, SOLUTION INTRAMUSCULAR; INTRAVENOUS EVERY 5 MIN PRN
Status: DISCONTINUED | OUTPATIENT
Start: 2023-06-30 | End: 2023-06-30 | Stop reason: HOSPADM

## 2023-06-30 RX ORDER — FENTANYL CITRATE 0.05 MG/ML
25 INJECTION, SOLUTION INTRAMUSCULAR; INTRAVENOUS EVERY 5 MIN PRN
Status: DISCONTINUED | OUTPATIENT
Start: 2023-06-30 | End: 2023-06-30 | Stop reason: HOSPADM

## 2023-06-30 RX ORDER — DEXMEDETOMIDINE HYDROCHLORIDE 4 UG/ML
INJECTION, SOLUTION INTRAVENOUS PRN
Status: DISCONTINUED | OUTPATIENT
Start: 2023-06-30 | End: 2023-06-30

## 2023-06-30 RX ORDER — LIDOCAINE HYDROCHLORIDE 20 MG/ML
INJECTION, SOLUTION INFILTRATION; PERINEURAL PRN
Status: DISCONTINUED | OUTPATIENT
Start: 2023-06-30 | End: 2023-06-30

## 2023-06-30 RX ORDER — ONDANSETRON 2 MG/ML
INJECTION INTRAMUSCULAR; INTRAVENOUS PRN
Status: DISCONTINUED | OUTPATIENT
Start: 2023-06-30 | End: 2023-06-30

## 2023-06-30 RX ADMIN — FENTANYL CITRATE 50 MCG: 50 INJECTION, SOLUTION INTRAMUSCULAR; INTRAVENOUS at 07:48

## 2023-06-30 RX ADMIN — ACETAMINOPHEN 650 MG: 325 TABLET, FILM COATED ORAL at 17:00

## 2023-06-30 RX ADMIN — FENTANYL CITRATE 50 MCG: 50 INJECTION, SOLUTION INTRAMUSCULAR; INTRAVENOUS at 08:01

## 2023-06-30 RX ADMIN — ONDANSETRON 4 MG: 2 INJECTION INTRAMUSCULAR; INTRAVENOUS at 08:11

## 2023-06-30 RX ADMIN — PIPERACILLIN AND TAZOBACTAM 4.5 G: 4; .5 INJECTION, POWDER, FOR SOLUTION INTRAVENOUS at 16:56

## 2023-06-30 RX ADMIN — FENTANYL CITRATE 25 MCG: 50 INJECTION, SOLUTION INTRAMUSCULAR; INTRAVENOUS at 09:31

## 2023-06-30 RX ADMIN — HYDROMORPHONE HYDROCHLORIDE 1 MG: 2 TABLET ORAL at 16:56

## 2023-06-30 RX ADMIN — Medication 2 G: at 07:50

## 2023-06-30 RX ADMIN — FENTANYL CITRATE 25 MCG: 50 INJECTION, SOLUTION INTRAMUSCULAR; INTRAVENOUS at 09:17

## 2023-06-30 RX ADMIN — PROPOFOL 300 MG: 10 INJECTION, EMULSION INTRAVENOUS at 07:48

## 2023-06-30 RX ADMIN — DEXMEDETOMIDINE HYDROCHLORIDE 12 MCG: 200 INJECTION INTRAVENOUS at 07:54

## 2023-06-30 RX ADMIN — DAPTOMYCIN 600 MG: 500 INJECTION, POWDER, LYOPHILIZED, FOR SOLUTION INTRAVENOUS at 12:11

## 2023-06-30 RX ADMIN — HYDROMORPHONE HYDROCHLORIDE 1 MG: 1 INJECTION, SOLUTION INTRAMUSCULAR; INTRAVENOUS; SUBCUTANEOUS at 10:41

## 2023-06-30 RX ADMIN — PHENYLEPHRINE HYDROCHLORIDE 100 MCG: 10 INJECTION INTRAVENOUS at 08:24

## 2023-06-30 RX ADMIN — HEPARIN SODIUM 5000 UNITS: 5000 INJECTION, SOLUTION INTRAVENOUS; SUBCUTANEOUS at 21:45

## 2023-06-30 RX ADMIN — PIPERACILLIN AND TAZOBACTAM 4.5 G: 4; .5 INJECTION, POWDER, FOR SOLUTION INTRAVENOUS at 04:45

## 2023-06-30 RX ADMIN — LIDOCAINE HYDROCHLORIDE 100 MG: 20 INJECTION, SOLUTION INFILTRATION; PERINEURAL at 07:48

## 2023-06-30 RX ADMIN — INSULIN DEGLUDEC INJECTION 34 UNITS: 100 INJECTION, SOLUTION SUBCUTANEOUS at 21:43

## 2023-06-30 RX ADMIN — HYDROMORPHONE HYDROCHLORIDE 1 MG: 2 TABLET ORAL at 05:50

## 2023-06-30 RX ADMIN — MIDAZOLAM 2 MG: 1 INJECTION INTRAMUSCULAR; INTRAVENOUS at 07:44

## 2023-06-30 RX ADMIN — HYDROMORPHONE HYDROCHLORIDE 0.3 MG: 1 INJECTION, SOLUTION INTRAMUSCULAR; INTRAVENOUS; SUBCUTANEOUS at 20:37

## 2023-06-30 RX ADMIN — PHENYLEPHRINE HYDROCHLORIDE 100 MCG: 10 INJECTION INTRAVENOUS at 08:18

## 2023-06-30 RX ADMIN — SODIUM CHLORIDE: 9 INJECTION, SOLUTION INTRAVENOUS at 06:45

## 2023-06-30 RX ADMIN — PIPERACILLIN AND TAZOBACTAM 4.5 G: 4; .5 INJECTION, POWDER, FOR SOLUTION INTRAVENOUS at 22:45

## 2023-06-30 RX ADMIN — FENTANYL CITRATE 25 MCG: 50 INJECTION, SOLUTION INTRAMUSCULAR; INTRAVENOUS at 10:29

## 2023-06-30 RX ADMIN — ACETAMINOPHEN 650 MG: 325 TABLET, FILM COATED ORAL at 05:50

## 2023-06-30 ASSESSMENT — ACTIVITIES OF DAILY LIVING (ADL)
ADLS_ACUITY_SCORE: 20

## 2023-06-30 ASSESSMENT — LIFESTYLE VARIABLES: TOBACCO_USE: 1

## 2023-06-30 NOTE — PROGRESS NOTES
"SPIRITUAL HEALTH SERVICES Progress Note  [facility]  General Surgery    Saw pt Ry SUE  per length of stay. Pt's wife was present. Introduced myself and Spiritual Health Services. Pt stated, \"I'm actually doing really well but thank you for stopping by.\" Informed pt how to access Spiritual Health Services in the future.      Sp[facility] Services available for emotional and spiritual support per pt request or need.     MICAH YoungDiv  Chaplain Resident   [phone]   "

## 2023-06-30 NOTE — PROGRESS NOTES
[facility]    Infectious Disease Progress Note    Date of Service (when I saw the patient): 06/30/2023     Assessment & Plan   Ry Horner is a 57 year old male who was admitted on 6/24/2023.     Impression:  1. 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 2. Stage III CKD  3. Bilateral BKA   4. Presented to the ER due to pain, swelling and redness with small ulcer at Lt BKA stump and found to have cellulitis/sepsis. This BKA was done in 2022   5. No history of MRSA  6. Workup shows JOHNATHAN  7. Started on vanc and zosyn      Recommendations:   Continue on daptomycin continue on zosyn   S/P: Incision and debridement of left below-knee stump today on 6/30   Pending OR cultures and path   Will continue to follow      CT   IMPRESSION:  1.  Postoperative changes prior below-the-knee amputation.  2.  Poorly defined soft tissue and fluid along the distal stump, presumably an abscess until proven otherwise. This measures at least 5.9 x 5.3 x 7.8 cm. The collection abuts the tibial resection margin, and tracks a short segment proximally along the   posterolateral tibial shaft as described.  3.  There is some faint periosteal bone formation and erosive change along the tibial resection margin which shows raise concern for early osteomyelitis.  MD Franklin Cat MD    Interval History   Tolerating antibiotics ok   No new rashes or issues with antibiotics   Labs reviewed   No changes to past medical, social or family history   Afebrile   A 10 point ROS was done and is negative other than noted in the interval history above     Physical Exam   Temp: 98.6  F (37  C) Temp src: Oral BP: 138/83 Pulse: 84   Resp: 19 SpO2: 98 % O2 Device: None (Room air) Oxygen Delivery: 6 LPM  Vitals:    06/27/23 0621 06/29/23 0615 06/30/23 0600   Weight: 111.4 kg (245 lb 9.5 oz) 109.7 kg (241 lb 13.5 oz) 112.4 kg (247 lb 12.8 oz)     Vital Signs with  Ranges  Temp:  [97.7  F (36.5  C)-99.1  F (37.3  C)] 98.6  F (37  C)  Pulse:  [72-94] 84  Resp:  [10-33] 19  BP: (119-170)/() 138/83  SpO2:  [94 %-99 %] 98 %    Constitutional: Awake, alert, cooperative, no apparent distress  Lungs: Clear to auscultation bilaterally, no crackles or wheezing  Cardiovascular: Regular rate and rhythm, normal S1 and S2, and no murmur noted  Abdomen: Normal bowel sounds, soft, non-distended, non-tender  Skin: increase in the edema and induration left BKA   Other:    Medications       DAPTOmycin (CUBICIN) 600 mg in sodium chloride 0.9 % 100 mL intermittent infusion  6 mg/kg (Adjusted) Intravenous Q24H     ezetimibe  10 mg Oral Daily     heparin ANTICOAGULANT  5,000 Units Subcutaneous Q12H     insulin aspart  1-7 Units Subcutaneous TID AC     insulin aspart  1-5 Units Subcutaneous At Bedtime     insulin degludec  34 Units Subcutaneous BID     piperacillin-tazobactam  4.5 g Intravenous Q6H     sodium chloride (PF)  3 mL Intracatheter Q8H       Data   All microbiology laboratory data reviewed.  Recent Labs   Lab Test 06/30/23  0712 06/29/23  0830 06/27/23  0726 06/26/23  0822   WBC  --  21.7* 21.3* 19.6*   HGB  --  11.3* 11.4* 11.3*   HCT  --  32.8* 34.0* 33.5*   MCV  --  85 87 88    355 288 267     Recent Labs   Lab Test 06/30/23  0712 06/29/23  0830 06/28/23  0802   CR 1.82* 1.69* 1.62*     Recent Labs   Lab Test 09/25/22  1529   SED 85*     Recent Labs   Lab Test 02/03/20  1324 02/03/20  0007 02/02/20  2250 12/04/19  1619 11/19/19  1829 11/17/19  1525 11/17/19  1419 11/17/19  1411 08/28/17  1136   CULT Heavy growth  beta hemolytic   Streptococcus constellatus  *  Light growth  Staphylococcus aureus  * No growth No growth No anaerobes isolated  No growth Light growth  Bacteroides fragilis  *  Light growth  Parvimonas micra  *  Susceptibility testing not routinely done  On day 2, isolated in broth only:  beta hemolytic   Streptococcus constellatus  * Heavy growth  beta  hemolytic   Streptococcus constellatus  Susceptibility testing done on previous specimen  *  Light growth  Alcaligenes faecalis  *  Light growth  Staphylococcus aureus  *  On day 1, isolated in broth only:  Anaerobic gram negative rods  See anaerobic report for identification  * Heavy growth  Bacteroides fragilis  Susceptibility testing not routinely done  *  Heavy growth  Peptoniphilus asaccharolyticus  Susceptibility testing not routinely done  *  Moderate growth  beta hemolytic   Streptococcus constellatus  *  On day 1, isolated in broth only:  Anaerobic gram negative rods  See anaerobic report for identification  * Heavy growth  Bacteroides fragilis  *  Heavy growth  Parvimonas micra  *  Heavy growth  Peptoniphilus asaccharolyticus  *  Heavy growth  Mixed aerobic and anaerobic rosa  *  Susceptibility testing not routinely done No growth       All cultures:  Recent Labs   Lab 06/24/23  1612   CULTURE No Growth  No Growth      Blood culture:  Results for orders placed or performed during the hospital encounter of 06/24/23   Blood Culture Peripheral Blood    Specimen: Peripheral Blood   Result Value Ref Range    Culture No Growth    Blood Culture Peripheral Blood    Specimen: Peripheral Blood   Result Value Ref Range    Culture No Growth    Results for orders placed or performed during the hospital encounter of 02/02/20   Blood culture    Specimen: Blood    Right Arm   Result Value Ref Range    Specimen Description Blood Right Arm     Culture Micro No growth    Blood culture    Specimen: Blood    Right Arm   Result Value Ref Range    Specimen Description Blood Right Arm     Culture Micro No growth    Results for orders placed or performed during the hospital encounter of 08/28/17   Blood culture    Specimen: Arm, Right; Blood    Right Arm   Result Value Ref Range    Specimen Description Blood Right Arm     Special Requests Aerobic and anaerobic bottles received     Culture Micro No growth    Blood  culture    Specimen: Arm, Right; Blood    Right Arm   Result Value Ref Range    Specimen Description Blood Right Arm     Special Requests Aerobic and anaerobic bottles received     Culture Micro No growth    Results for orders placed or performed during the hospital encounter of 07/14/16   Blood culture    Specimen: Blood   Result Value Ref Range    Specimen Description Blood Left Arm     Special Requests Aerobic and anaerobic bottles received     Culture Micro No growth     Micro Report Status FINAL 07/20/2016    Blood culture    Specimen: Blood   Result Value Ref Range    Specimen Description Blood Right Arm     Special Requests Aerobic and anaerobic bottles received     Culture Micro No growth     Micro Report Status FINAL 07/20/2016       Urine culture:  No results found for this or any previous visit.

## 2023-06-30 NOTE — ANESTHESIA CARE TRANSFER NOTE
Patient: Ry Horner    Procedure: Procedure(s):  Irrigation and debridement lower extremity amputation stump- left       Diagnosis: Cellulitis of left lower extremity [L03.116]  Diagnosis Additional Information: No value filed.    Anesthesia Type:   General     Note:    Oropharynx: oropharynx clear of all foreign objects and spontaneously breathing  Level of Consciousness: awake  Oxygen Supplementation: face mask  Level of Supplemental Oxygen (L/min / FiO2): 6  Independent Airway: airway patency satisfactory and stable  Dentition: dentition unchanged  Vital Signs Stable: post-procedure vital signs reviewed and stable  Report to RN Given: handoff report given  Patient transferred to: PACU    Handoff Report: Identifed the Patient, Identified the Reponsible Provider, Reviewed the pertinent medical history, Discussed the surgical course, Reviewed Intra-OP anesthesia mangement and issues during anesthesia, Set expectations for post-procedure period and Allowed opportunity for questions and acknowledgement of understanding      Vitals:  Vitals Value Taken Time   BP     Temp     Pulse 79 06/30/23 0849   Resp 11 06/30/23 0849   SpO2 98 % 06/30/23 0849   Vitals shown include unvalidated device data.    Electronically Signed By: LILIANA Quiles CRNA  June 30, 2023  8:51 AM

## 2023-06-30 NOTE — OR NURSING
Wound vac not working properly, saying it is blocked but it is rteally sucking... OR staff notified and new one coming, unit exchanged.

## 2023-06-30 NOTE — OP NOTE
Vascular Surgery Operative Note     PREOPERATIVE DIAGNOSIS:  Left below-knee amputation stump infection     POSTOPERATIVE DIAGNOSIS:  Same     PROCEDURE: Incision and debridement of left below-knee stump     SURGEON:  Reyn Child MD     ASSISTANT:  Abdoul Thomas PA-C     ANESTHESIA:  General Endotracheal     ESTIMATED BLOOD LOSS:  100 mL    SPECIMENS:   1. Tissue of lower left leg for aerobic and anaerobic cultures  2. Bone fragments of distal left tibia for aerobic and anaerobic cultures  3. Bone fragments of distal left tibia for pathology to r/o osteomyelitis     INDICATIONS:    Mr. Horner is a 57M who underwent staged left below-knee amputation in 09/2022. This healed well and he was ambulatory on his prosthetic. He presented with sudden onset of pain, swelling, and wound on the distal tip of the left below-knee stump, with surrounding cellulitis. He was started on IV antibiotics and brought to the OR today to undergo debridement and possible revision.      INTRAOPERATIVE FINDINGS:    Distal tibia well-healed in surrounding tissue and scar, without yoel fluid pocket. Anterior to the tibial fascia is a fluid pocket with murky fluid drainage and fat necrosis or purulent debris, no yoel pus.      DESCRIPTION OF TECHNIQUE:   Mr. Schultz was brought into the operating room and transferred to the operating table in the supine position. After uneventful endotracheal intubation, general anesthesia was initiated. Antibiotics were administered. The left leg was prepped and draped in standard sterile fashion. Timeout was performed and the entire surgical team was in agreement with the planned procedure and correctly marked side.      A new incision was created sharply just anterior to the previously well-healed incision, below the punctate scab that had been draining on admission. This was deepened through the subcutaneous tissues with bovie; scar tissue and well-incorporated, well-healed tissues were found with  significant edema and some reactive bleeding but no fluid pockets. The bone edge was palpated and overlying tissue at the distal edge debrided with cautery; a rongeur was used to remove tissue for culture and pathology. To find the fluid pocket, the incision was extended anteriorly to create a wider ellipse incorporating the scab; this revealed a subcutaneous, extrafascial fluid collection with necrotic tissue and murky fluid without yoel purulence. This was bluntly debrided. A liter of saline was used to irrigate the wound. The surrounding skin was cleaned and dried, and a wound VAC cut to size and applied.     At the end of the case all needle, sponge and instrument counts were correct.  Mr. Horner was then extubated in stable condition and was taken to the postoperative care unit.      The Physician Assistant was medically necessary for their expertise in retraction, exposure, and suctioning.     PLAN:   Further debridement with revision if the bone cultures show no active osteomyelitis, likely with antibiotic bead implantation.   If osteomyelitis show, will plan for above-knee amputation in the coming week.   Change wound VAC Saturday and Tuesday.

## 2023-06-30 NOTE — ANESTHESIA PREPROCEDURE EVALUATION
Anesthesia Pre-Procedure Evaluation    Patient: Ry Horner   MRN: [id number] : 1965        Procedure : Procedure(s):  REVISION, AMPUTATION STUMP, LEFT LOWER EXTREMITY          Past Medical History:   Diagnosis Date     BENIGN HYPERTENSION 2007     DIABETES MELLITUS TYPE II-UNCOMPL 2007     HYPERLIPIDEMIA NEC/NOS 2007     Tobacco use disorder 2007      Past Surgical History:   Procedure Laterality Date     AMPUTATE FOOT Left 2019    Procedure: LEFT PARTIAL FOOT AMPUTATION;  Surgeon: Antoine Pena DPM;  Location: [facility]     AMPUTATE FOOT Left 2019    Procedure: LEFT PARTIAL FOOT AMPUTATION;  Surgeon: Tim Douglas DPM;  Location: [facility]     AMPUTATE FOOT Left 2019    Procedure: POSSIBLE PARTIAL FOOT AMPUTATION;  Surgeon: Tim Douglas DPM;  Location[facility]     AMPUTATE FOOT Left 2/10/2022    Procedure: Partial left foot amputation;  Surgeon: Milena Cevallos DPM, Podiatry/Foot and Ankle Surgery;  Location: [facility]     AMPUTATE LEG BELOW KNEE Right 2017    Procedure: AMPUTATE LEG BELOW KNEE;  RIGHT BELOW KNEE AMPUTATION ;  Surgeon: Nikolas Nascimento MD;  Location: [facility]     AMPUTATE LEG BELOW KNEE Left 2022    Procedure: AMPUTATION BELOW KNEE;  Surgeon: Neal Blackman MD;  Location: [facility]     AMPUTATE LEG BELOW KNEE Left 2022    Procedure: LEFT SIDE COMPLETION BELOW THE KNEE AMPUTATION;  Surgeon: Reny Child MD;  Location[facility]     AMPUTATE TOE(S) Left 2/3/2020    Procedure: LEFT SECOND TOE AMPUTATION;  Surgeon: Milena Cevallos DPM, Podiatry/Foot and Ankle Surgery;  Location: [facility]     APPENDECTOMY       APPLY WOUND VAC Right 3/2/2015    Procedure: APPLY WOUND VAC;  Surgeon: Milena Cevallos DPM, Pod;  Location: [facility]     BIOPSY BONE FOOT Right 7/15/2016    Procedure: BIOPSY BONE FOOT;  Surgeon: Tim Douglas DPM;  [facility]     BIOPSY BONE TOE Left 2019    Procedure: BONE BIOPSY LEFT SECOND TOE;   "Surgeon: Tim Douglas DPM;  Location[facility]     IRRIGATION AND DEBRIDEMENT FOOT, COMBINED Right 3/2/2015    Procedure: COMBINED IRRIGATION AND DEBRIDEMENT FOOT;  Surgeon: Milena Cevallos DPM, Pod;  Location: [facility]     IRRIGATION AND DEBRIDEMENT FOOT, COMBINED Right 7/15/2016    Procedure: COMBINED IRRIGATION AND DEBRIDEMENT FOOT;  Surgeon: Tim Douglas DPM;  [facility]     IRRIGATION AND DEBRIDEMENT FOOT, COMBINED Right 2016    Procedure: COMBINED IRRIGATION AND DEBRIDEMENT FOOT;  Surgeon: Tim Douglas DPM;  [facility]     IRRIGATION AND DEBRIDEMENT FOOT, COMBINED Left 2019    Procedure: REVISIONAL IRRIGATION AND DEBRIDEMENT LEFT FOOT AND BONE DEBRIDEMENT;  Surgeon: Joseph Randhawa DPM;  [facility]     IRRIGATION AND DEBRIDEMENT FOOT, COMBINED Left 2019    Procedure: EXCISIONAL DEBRIDEMENT LEFT FOOT;  Surgeon: Tim Douglas DPM;  [facility]     IRRIGATION AND DEBRIDEMENT FOOT, COMBINED Left 2019    Procedure: IRRIGATION AND DEBRIDEMENT FOOT, Partial osteotomy left first metatarsal;  Surgeon: Tim Douglas DPM;  [facility]     IRRIGATION AND DEBRIDEMENT FOOT, COMBINED Left 2020    Procedure: IRRIGATION AND DEBRIDEMENT LEFT FOOT;  Surgeon: Tim Douglas DPM;  Location: [facility]     ORTHOPEDIC SURGERY        Allergies   Allergen Reactions     Pravastatin      \"sucked the life blood out of me,\" Indicates this occurs with all statins.     Statins       Social History     Tobacco Use     Smoking status: Former     Packs/day: 0.50     Years: 20.00     Pack years: 10.00     Types: Cigarettes     Start date: 1987     Quit date: 2006     Years since quittin.5     Smokeless tobacco: Never   Substance Use Topics     Alcohol use: Yes     Comment: occ      Wt Readings from Last 1 Encounters:   23 112.4 kg (247 lb 12.8 oz)        Anesthesia Evaluation   Pt has had prior anesthetic.     No history of anesthetic complications "       ROS/MED HX  ENT/Pulmonary:     (+) tobacco use (Quit in '06), Past use, 10  Pack-Year Hx,      Neurologic:       Cardiovascular:     (+) Dyslipidemia hypertension-Peripheral Vascular Disease----    METS/Exercise Tolerance:     Hematologic:       Musculoskeletal:       GI/Hepatic:       Renal/Genitourinary:     (+) renal disease, type: CRI and ARF,     Endo:     (+) type II DM, Diabetic complications: nephropathy neuropathy. Obesity (Class 2),     Psychiatric/Substance Use:       Infectious Disease: Comment: H/o sepsis      Malignancy:       Other:            Physical Exam    Airway        Mallampati: II   TM distance: > 3 FB   Neck ROM: full   Mouth opening: > 3 cm    Respiratory Devices and Support         Dental       (+) caps      Cardiovascular   cardiovascular exam normal          Pulmonary   pulmonary exam normal                OUTSIDE LABS:  CBC:   Lab Results   Component Value Date    WBC 21.7 (H) 06/29/2023    WBC 21.3 (H) 06/27/2023    HGB 11.3 (L) 06/29/2023    HGB 11.4 (L) 06/27/2023    HCT 32.8 (L) 06/29/2023    HCT 34.0 (L) 06/27/2023     06/30/2023     06/29/2023     BMP:   Lab Results   Component Value Date     (L) 06/27/2023     06/26/2023    POTASSIUM 3.4 06/27/2023    POTASSIUM 3.8 06/26/2023    CHLORIDE 102 06/27/2023    CHLORIDE 102 06/26/2023    CO2 22 06/27/2023    CO2 22 06/26/2023    BUN 19.7 06/27/2023    BUN 31.2 (H) 06/26/2023    CR 1.69 (H) 06/29/2023    CR 1.62 (H) 06/28/2023     (H) 06/30/2023     (H) 06/30/2023     COAGS:   Lab Results   Component Value Date    PTT 28 06/27/2023    INR 1.06 06/27/2023    FIBR 866 (H) 06/27/2023     POC:   Lab Results   Component Value Date     (H) 02/07/2020     HEPATIC:   Lab Results   Component Value Date    ALBUMIN 3.4 (L) 06/24/2023    PROTTOTAL 7.3 06/24/2023    ALT 14 06/24/2023    AST 13 06/24/2023    ALKPHOS 84 06/24/2023    BILITOTAL 0.9 06/24/2023     OTHER:   Lab Results   Component Value  Date    PH 7.39 09/23/2022    LACT 0.8 06/24/2023    A1C 7.6 (H) 06/27/2023    FERNANDO 8.3 (L) 06/27/2023    PHOS 3.7 02/06/2020    TSH 0.94 12/19/2019    .0 (H) 02/02/2020    SED 85 (H) 09/25/2022       Anesthesia Plan    ASA Status:  3   NPO Status:  NPO Appropriate    Anesthesia Type: General.     - Airway: LMA   Induction: Intravenous.   Maintenance: Balanced.        Consents    Anesthesia Plan(s) and associated risks, benefits, and realistic alternatives discussed. Questions answered and patient/representative(s) expressed understanding.    - Discussed:     - Discussed with:  Patient         Postoperative Care    Pain management: IV analgesics, Oral pain medications, Multi-modal analgesia.   PONV prophylaxis: Ondansetron (or other 5HT-3)     Comments:                James Evans MD

## 2023-06-30 NOTE — PROGRESS NOTES
VSS. A/O. Ind in bed to the wheel chair. L stump vac intact. Dilaudid/tylenol given. Tolerating diet. Voiding fine.

## 2023-06-30 NOTE — PROGRESS NOTES
[facility]    Medicine Progress Note - Hospitalist Service        Date of Admission:  6/24/2023  3:36 PM    Assessment & Plan:   Ry Horner is a 57 year old male with medical history significant for uncontrolled type II DM with polyneuropathy and nephropathy, stage III CKD, bilateral BKA presented to the ER due to pain, swelling and redness with small ulcer at Lt BKA stump and found to have cellulitis/sepsis and is being admitted on 6/24/2023 for further management.      Sepsis due Lt BKA stump infection  S/p I&D of of left BKA stump on 6/30/23  * Patient presented with pain, redness, swelling at his left BKA stump site.  Marked leukocytosis of 22.9.  Tachycardic to 110s.  X-ray Shows soft tissue swelling, some bone formation along the resection margin of the tibia with periosteal reaction.  -Initially on vancomycin and Zosyn.    -Infectious disease following, vancomycin changed to daptomycin  -Vascular surgery following, patient not improving as expected, worsening leukocytosis, and MRI shows signal changes compatible with osteomyelitis of the residual tibia along with fluid collection suspicious for abscesses.  -Underwent I&D of left stump on 6/30/23 with post-op placement of wound vac.   - wound vac management per vascular surgery  - follow bone cultures and pathology. If evidence of osteomyelitis will plan to proceed with left above knee amputation in coming week.    Acute kidney injury on CKD stage III  Creatinine 2.45. Likely baseline is 1.5-1.6. Although noted it was 1.9 recently.  Suspect related to sepsis. Takes Ibuprofen occasionally.   -Hold lisinopril  -Creatinine improved to 1.6, which is most likely his baseline.  Off IV fluids at this time.    Type II DM, uncontrolled, on insulin  Noted HbA1c was 10.4 in February.  States his blood sugars run in 200s.  -Continue PTA Tresiba, 34 units twice a day  -Medium intensity sliding scale  -Hold PTA oral agents including Jardiance,  Januvia.  -Hypoglycemia protocol  -patient declines to start carb coverage or scheduled mealtime insulin    Mild hyponatremia    Diet: Regular Diet Adult     DVT Prophylaxis: Pneumatic Compression Devices   Corrales Catheter: Not present  Code Status: Full Code     Disposition Plan      Expected Discharge Date: 07/03/2023      Destination: home;home with family  Discharge Comments: Surgery 6/29/2023      Entered: Gary Dias MD 06/30/2023, 12:39 PM        Clinically Significant Risk Factors              # Hypoalbuminemia: Lowest albumin = 3.4 g/dL at 6/24/2023  4:12 PM, will monitor as appropriate     # Hypertension: Noted on problem list       # DMII: A1C = 7.6 % (Ref range: <5.7 %) within past 6 months   # Obesity: Estimated body mass index is 33.61 kg/m  as calculated from the following:    Height as of this encounter: 1.829 m (6').    Weight as of this encounter: 112.4 kg (247 lb 12.8 oz).              The patient's care was discussed with the Bedside Nurse and Patient.    Medical Decision Making       35 MINUTES SPENT BY ME on the date of service doing chart review, history, exam, documentation & further activities per the note.        Gary Dias MD   Hospitalist Service  [facility]  Text Page 7AM-6PM  Securely message with the Vocera Web Console (learn more here)  Text page via [employer] Paging/Directory    ______________________________________________________________________    Interval History   Extensive conversation today about glucose management. He is resistant to any changes in his insulin plan. Discussed benefit of basal/bolus insulin rather than his current regimen of basal and sliding scale insulin. He is resistant to starting any carb coverage or even scheduled mealtime insulin. He wants to try and manage with diet. His wife will bring his continuous glucometer tomorrow. We discussed importance of post op glucose control with healing.     Wife present and in  agreement.     Data reviewed today: I reviewed all medications, new labs and imaging results over the last 24 hours. I personally reviewed no images or EKG's today.    Physical Exam   Vital signs:  Temp: 98.6  F (37  C) Temp src: Oral BP: 138/83 Pulse: 84   Resp: 19 SpO2: 98 % O2 Device: None (Room air) Oxygen Delivery: 6 LPM Height: 182.9 cm (6') Weight: 112.4 kg (247 lb 12.8 oz)  Estimated body mass index is 33.61 kg/m  as calculated from the following:    Height as of this encounter: 1.829 m (6').    Weight as of this encounter: 112.4 kg (247 lb 12.8 oz).      Wt Readings from Last 2 Encounters:   06/30/23 112.4 kg (247 lb 12.8 oz)   10/03/22 110 kg (242 lb 8.1 oz)       Gen: AAOX3, NAD, comfortable  Resp: CTA B/L, normal WOB  CVS: RRR, no murmur  Abd/GI: Soft, non-tender. BS- normoactive.    Skin: Warm, dry no rashes  MSK: Left BKA stump site with wound vac in place.   Neuro- CN- intact. No focal deficits.     Data   Recent Labs   Lab 06/30/23  1128 06/30/23  0849 06/30/23  0712 06/30/23  0549 06/29/23  0835 06/29/23  0830 06/28/23  1054 06/28/23  0802 06/27/23  1718 06/27/23  1502 06/27/23  0757 06/27/23  0726 06/26/23  1153 06/26/23  0822 06/26/23  0738 06/25/23  0821 06/25/23  0636 06/24/23  2215 06/24/23  1612   WBC  --   --   --   --   --  21.7*  --   --   --   --   --  21.3*  --  19.6*  --   --  18.0*  --  22.9*   HGB  --   --   --   --   --  11.3*  --   --   --   --   --  11.4*  --  11.3*  --   --  11.4*  --  12.8*   MCV  --   --   --   --   --  85  --   --   --   --   --  87  --  88  --   --  87  --  86   PLT  --   --  354  --   --  355  --   --   --   --   --  288  --  267  --   --  222  --  235   INR  --   --   --   --   --   --   --   --   --  1.06  --   --   --   --   --   --   --   --   --    NA  --   --   --   --   --   --   --   --   --   --   --  135*  --   --  136  --  135*  --  134*   POTASSIUM  --   --   --   --   --   --   --   --   --   --   --  3.4  --   --  3.8  --  3.8  --  4.3   CHLORIDE   --   --   --   --   --   --   --   --   --   --   --  102  --   --  102  --  101  --  99   CO2  --   --   --   --   --   --   --   --   --   --   --  22  --   --  22  --  21*  --  21*   BUN  --   --   --   --   --   --   --   --   --   --   --  19.7  --   --  31.2*  --  45.6*  --  46.0*   CR  --   --  1.82*  --   --  1.69*  --  1.62*  --   --   --  1.58*  --   --  1.81*  1.81*  --  2.21*  --  2.45*   ANIONGAP  --   --   --   --   --   --   --   --   --   --   --  11  --   --  12  --  13  --  14   FERNANDO  --   --   --   --   --   --   --   --   --   --   --  8.3*  --   --  8.1*  --  8.0*  --  8.8   * 120*  --  133*   < >  --    < >  --    < >  --    < > 101*   < >  --  108*   < > 186*   < > 270*   ALBUMIN  --   --   --   --   --   --   --   --   --   --   --   --   --   --   --   --   --   --  3.4*   PROTTOTAL  --   --   --   --   --   --   --   --   --   --   --   --   --   --   --   --   --   --  7.3   BILITOTAL  --   --   --   --   --   --   --   --   --   --   --   --   --   --   --   --   --   --  0.9   ALKPHOS  --   --   --   --   --   --   --   --   --   --   --   --   --   --   --   --   --   --  84   ALT  --   --   --   --   --   --   --   --   --   --   --   --   --   --   --   --   --   --  14   AST  --   --   --   --   --   --   --   --   --   --   --   --   --   --   --   --   --   --  13    < > = values in this interval not displayed.       No results found for this or any previous visit (from the past 24 hour(s)).  Medications       DAPTOmycin (CUBICIN) 600 mg in sodium chloride 0.9 % 100 mL intermittent infusion  6 mg/kg (Adjusted) Intravenous Q24H     ezetimibe  10 mg Oral Daily     heparin ANTICOAGULANT  5,000 Units Subcutaneous Q12H     insulin aspart  1-7 Units Subcutaneous TID AC     insulin aspart  1-5 Units Subcutaneous At Bedtime     insulin degludec  34 Units Subcutaneous BID     piperacillin-tazobactam  4.5 g Intravenous Q6H     sodium chloride (PF)  3 mL Intracatheter Q8H

## 2023-06-30 NOTE — PROGRESS NOTES
[id number]  Patient AOX4. VSS on RA. Elevated BP.Bilateral BKA.Pt transfers independent. Pain managed with dilaudid/tylenol. Wound on L BKA,JACKI.Voiding in urinal. BS audible. Diet NPO. PIV SL. Continue to monitor. Plan for surgery today.

## 2023-06-30 NOTE — ANESTHESIA PROCEDURE NOTES
Airway       Patient location during procedure: OR  Staff -        Anesthesiologist:  James Evans MD       CRNA: Russel Duarte APRN CRNA       Performed By: CRNA  Consent for Airway        Urgency: elective  Indications and Patient Condition       Indications for airway management: alexander-procedural       Induction type:intravenous       Mask difficulty assessment: 1 - vent by mask    Final Airway Details       Final airway type: supraglottic airway    Supraglottic Airway Details        Type: LMA       Brand: Ambu AuraGain       LMA size: 5    Post intubation assessment        Placement verified by: capnometry and chest rise        Number of attempts at approach: 1       Number of other approaches attempted: 0       Ease of procedure: easy       Dentition: Intact and Unchanged

## 2023-06-30 NOTE — ANESTHESIA POSTPROCEDURE EVALUATION
Patient: Ry Horner    Procedure: Procedure(s):  Irrigation and debridement lower extremity amputation stump- left       Anesthesia Type:  General    Note:     Postop Pain Control: Uneventful            Sign Out: Well controlled pain   PONV: No   Neuro/Psych: Uneventful            Sign Out: Acceptable/Baseline neuro status   Airway/Respiratory: Uneventful            Sign Out: Acceptable/Baseline resp. status   CV/Hemodynamics: Uneventful            Sign Out: Acceptable CV status   Other NRE: NONE   DID A NON-ROUTINE EVENT OCCUR?            Last vitals:  Vitals Value Taken Time   /89 06/30/23 1100   Temp     Pulse 76 06/30/23 1103   Resp 23 06/30/23 1103   SpO2 98 % 06/30/23 1104   Vitals shown include unvalidated device data.    Electronically Signed By: James Evans MD  June 30, 2023  2:00 PM

## 2023-06-30 NOTE — BRIEF OP NOTE
[facility]    Brief Operative Note    Pre-operative diagnosis: Cellulitis of left lower extremity [L03.116]  Post-operative diagnosis Left BKA Stump Cellulitis, possible Osteomyelitis    Procedure: Procedure(s):  Irrigation and debridement lower extremity amputation stump- left   Wound Vac Placement    Surgeon: Surgeon(s) and Role:     * Reny Child MD - Primary     * Perez Thomas PA-C - Assisting   Balbina Hull DNP Student - Assisting    Anesthesia: * No anesthesia type entered *   Estimated Blood Loss: Less than 50 ml    Drains: None.  Wound VAC placed at set at 125mmHg  Specimens:   ID Type Source Tests Collected by Time Destination   1 : distal tibia- assess for osteomyelitis Tissue Tibia, Left SURGICAL PATHOLOGY EXAM Reny Child MD 6/30/2023  8:14 AM    A : left leg tissue (aerobic) Tissue Leg, Below Knee, Left AEROBIC BACTERIAL CULTURE ROUTINE Reny Child MD 6/30/2023  8:11 AM    B : left leg tissue (anaerobic) Tissue Leg, Below Knee, Left ANAEROBIC BACTERIAL CULTURE ROUTINE Reny Child MD 6/30/2023  8:12 AM    C : distal tibia (anaerobic) Bone Biopsy Tibia, Left ANAEROBIC BACTERIAL CULTURE ROUTINE Reny Child MD 6/30/2023  8:19 AM    D : distal tibia (aerobic) Bone Biopsy Tibia, Left AEROBIC BACTERIAL CULTURE ROUTINE Reny Child MD 6/30/2023  8:20 AM      Findings:   None.  See Operative Report for full details.  Complications: None.  Implants: * No implants in log *      Perez Thomas PA-C  [phone]

## 2023-07-01 LAB
ALBUMIN SERPL BCG-MCNC: 2.6 G/DL (ref 3.5–5.2)
ANION GAP SERPL CALCULATED.3IONS-SCNC: 12 MMOL/L (ref 7–15)
ANION GAP SERPL CALCULATED.3IONS-SCNC: 12 MMOL/L (ref 7–15)
BUN SERPL-MCNC: 31 MG/DL (ref 6–20)
BUN SERPL-MCNC: 31.5 MG/DL (ref 6–20)
CALCIUM SERPL-MCNC: 7.9 MG/DL (ref 8.6–10)
CALCIUM SERPL-MCNC: 8.1 MG/DL (ref 8.6–10)
CHLORIDE SERPL-SCNC: 102 MMOL/L (ref 98–107)
CHLORIDE SERPL-SCNC: 103 MMOL/L (ref 98–107)
CREAT SERPL-MCNC: 2.54 MG/DL (ref 0.67–1.17)
CREAT SERPL-MCNC: 2.6 MG/DL (ref 0.67–1.17)
DEPRECATED HCO3 PLAS-SCNC: 21 MMOL/L (ref 22–29)
DEPRECATED HCO3 PLAS-SCNC: 22 MMOL/L (ref 22–29)
ERYTHROCYTE [DISTWIDTH] IN BLOOD BY AUTOMATED COUNT: 11.9 % (ref 10–15)
GFR SERPL CREATININE-BSD FRML MDRD: 28 ML/MIN/1.73M2
GFR SERPL CREATININE-BSD FRML MDRD: 29 ML/MIN/1.73M2
GLUCOSE BLDC GLUCOMTR-MCNC: 126 MG/DL (ref 70–99)
GLUCOSE BLDC GLUCOMTR-MCNC: 146 MG/DL (ref 70–99)
GLUCOSE BLDC GLUCOMTR-MCNC: 162 MG/DL (ref 70–99)
GLUCOSE BLDC GLUCOMTR-MCNC: 210 MG/DL (ref 70–99)
GLUCOSE BLDC GLUCOMTR-MCNC: 99 MG/DL (ref 70–99)
GLUCOSE SERPL-MCNC: 119 MG/DL (ref 70–99)
GLUCOSE SERPL-MCNC: 148 MG/DL (ref 70–99)
HCT VFR BLD AUTO: 30.4 % (ref 40–53)
HGB BLD-MCNC: 10.3 G/DL (ref 13.3–17.7)
MCH RBC QN AUTO: 29.2 PG (ref 26.5–33)
MCHC RBC AUTO-ENTMCNC: 33.9 G/DL (ref 31.5–36.5)
MCV RBC AUTO: 86 FL (ref 78–100)
PHOSPHATE SERPL-MCNC: 4 MG/DL (ref 2.5–4.5)
PLATELET # BLD AUTO: 354 10E3/UL (ref 150–450)
POTASSIUM SERPL-SCNC: 3.8 MMOL/L (ref 3.4–5.3)
POTASSIUM SERPL-SCNC: 4.1 MMOL/L (ref 3.4–5.3)
RBC # BLD AUTO: 3.53 10E6/UL (ref 4.4–5.9)
SODIUM SERPL-SCNC: 136 MMOL/L (ref 136–145)
SODIUM SERPL-SCNC: 136 MMOL/L (ref 136–145)
WBC # BLD AUTO: 19.5 10E3/UL (ref 4–11)

## 2023-07-01 PROCEDURE — 120N000001 HC R&B MED SURG/OB

## 2023-07-01 PROCEDURE — 250N000013 HC RX MED GY IP 250 OP 250 PS 637: Performed by: INTERNAL MEDICINE

## 2023-07-01 PROCEDURE — 250N000011 HC RX IP 250 OP 636: Performed by: PHYSICIAN ASSISTANT

## 2023-07-01 PROCEDURE — 250N000013 HC RX MED GY IP 250 OP 250 PS 637: Performed by: PHYSICIAN ASSISTANT

## 2023-07-01 PROCEDURE — 85027 COMPLETE CBC AUTOMATED: CPT | Performed by: STUDENT IN AN ORGANIZED HEALTH CARE EDUCATION/TRAINING PROGRAM

## 2023-07-01 PROCEDURE — 36415 COLL VENOUS BLD VENIPUNCTURE: CPT | Performed by: INTERNAL MEDICINE

## 2023-07-01 PROCEDURE — 99233 SBSQ HOSP IP/OBS HIGH 50: CPT | Performed by: INTERNAL MEDICINE

## 2023-07-01 PROCEDURE — 36415 COLL VENOUS BLD VENIPUNCTURE: CPT | Performed by: STUDENT IN AN ORGANIZED HEALTH CARE EDUCATION/TRAINING PROGRAM

## 2023-07-01 PROCEDURE — 258N000003 HC RX IP 258 OP 636: Performed by: PHYSICIAN ASSISTANT

## 2023-07-01 PROCEDURE — 250N000011 HC RX IP 250 OP 636: Mod: JZ | Performed by: PHYSICIAN ASSISTANT

## 2023-07-01 PROCEDURE — 258N000003 HC RX IP 258 OP 636: Performed by: INTERNAL MEDICINE

## 2023-07-01 PROCEDURE — 80069 RENAL FUNCTION PANEL: CPT | Performed by: STUDENT IN AN ORGANIZED HEALTH CARE EDUCATION/TRAINING PROGRAM

## 2023-07-01 RX ORDER — AMLODIPINE BESYLATE 2.5 MG/1
2.5 TABLET ORAL DAILY
Status: CANCELLED | OUTPATIENT
Start: 2023-07-01

## 2023-07-01 RX ORDER — OXYCODONE HYDROCHLORIDE 5 MG/1
5 TABLET ORAL EVERY 4 HOURS PRN
Status: DISCONTINUED | OUTPATIENT
Start: 2023-07-01 | End: 2023-07-11

## 2023-07-01 RX ORDER — PROCHLORPERAZINE MALEATE 5 MG
5 TABLET ORAL EVERY 6 HOURS PRN
Status: DISCONTINUED | OUTPATIENT
Start: 2023-07-01 | End: 2023-07-28 | Stop reason: HOSPADM

## 2023-07-01 RX ORDER — PROCHLORPERAZINE 25 MG
25 SUPPOSITORY, RECTAL RECTAL EVERY 12 HOURS PRN
Status: DISCONTINUED | OUTPATIENT
Start: 2023-07-01 | End: 2023-07-28 | Stop reason: HOSPADM

## 2023-07-01 RX ADMIN — OXYCODONE HYDROCHLORIDE 5 MG: 5 TABLET ORAL at 22:53

## 2023-07-01 RX ADMIN — HEPARIN SODIUM 5000 UNITS: 5000 INJECTION, SOLUTION INTRAVENOUS; SUBCUTANEOUS at 21:35

## 2023-07-01 RX ADMIN — OXYCODONE HYDROCHLORIDE 5 MG: 5 TABLET ORAL at 17:21

## 2023-07-01 RX ADMIN — HYDROMORPHONE HYDROCHLORIDE 0.3 MG: 1 INJECTION, SOLUTION INTRAMUSCULAR; INTRAVENOUS; SUBCUTANEOUS at 00:31

## 2023-07-01 RX ADMIN — PIPERACILLIN AND TAZOBACTAM 4.5 G: 4; .5 INJECTION, POWDER, FOR SOLUTION INTRAVENOUS at 23:24

## 2023-07-01 RX ADMIN — EZETIMIBE 10 MG: 10 TABLET ORAL at 08:10

## 2023-07-01 RX ADMIN — INSULIN DEGLUDEC INJECTION 34 UNITS: 100 INJECTION, SOLUTION SUBCUTANEOUS at 21:35

## 2023-07-01 RX ADMIN — DAPTOMYCIN 600 MG: 500 INJECTION, POWDER, LYOPHILIZED, FOR SOLUTION INTRAVENOUS at 12:39

## 2023-07-01 RX ADMIN — SODIUM CHLORIDE, POTASSIUM CHLORIDE, SODIUM LACTATE AND CALCIUM CHLORIDE 1000 ML: 600; 310; 30; 20 INJECTION, SOLUTION INTRAVENOUS at 12:39

## 2023-07-01 RX ADMIN — PIPERACILLIN AND TAZOBACTAM 4.5 G: 4; .5 INJECTION, POWDER, FOR SOLUTION INTRAVENOUS at 11:16

## 2023-07-01 RX ADMIN — ONDANSETRON 4 MG: 4 TABLET, ORALLY DISINTEGRATING ORAL at 17:21

## 2023-07-01 RX ADMIN — HYDROMORPHONE HYDROCHLORIDE 1 MG: 2 TABLET ORAL at 08:10

## 2023-07-01 RX ADMIN — INSULIN ASPART 1 UNITS: 100 INJECTION, SOLUTION INTRAVENOUS; SUBCUTANEOUS at 12:45

## 2023-07-01 RX ADMIN — HEPARIN SODIUM 5000 UNITS: 5000 INJECTION, SOLUTION INTRAVENOUS; SUBCUTANEOUS at 09:21

## 2023-07-01 RX ADMIN — INSULIN DEGLUDEC INJECTION 34 UNITS: 100 INJECTION, SOLUTION SUBCUTANEOUS at 09:20

## 2023-07-01 RX ADMIN — ACETAMINOPHEN 650 MG: 325 TABLET, FILM COATED ORAL at 17:21

## 2023-07-01 RX ADMIN — ONDANSETRON 4 MG: 2 INJECTION INTRAMUSCULAR; INTRAVENOUS at 10:19

## 2023-07-01 RX ADMIN — PIPERACILLIN AND TAZOBACTAM 4.5 G: 4; .5 INJECTION, POWDER, FOR SOLUTION INTRAVENOUS at 04:47

## 2023-07-01 RX ADMIN — PIPERACILLIN AND TAZOBACTAM 4.5 G: 4; .5 INJECTION, POWDER, FOR SOLUTION INTRAVENOUS at 17:22

## 2023-07-01 ASSESSMENT — ACTIVITIES OF DAILY LIVING (ADL)
ADLS_ACUITY_SCORE: 20

## 2023-07-01 NOTE — PROVIDER NOTIFICATION
"MD Notification    Notified Person: MD    Notified Person Name: Mohsin Renetta GARCIA    Notification Date/Time: 6/30/23 745pm    Notification Interaction: text page    Purpose of Notification: Pt reporting pain 7/10 \"feels like my LLE amputation will explode.\" Had PO dilaudid and Tylenol, not yet available again. Wound vac looks OK. OK to give IV dilaudid?    Orders Received:    Comments:    "

## 2023-07-01 NOTE — PLAN OF CARE
Received pt 1500, pt requested oxy for pain, Md notified and reordered, stating dilaudid does not work well for him and it gives him nausea, zofran given with some relief, very poor po intake, only had some bites of banana, intake encouraged, had BM today, BS +, on IVf for creatinine increase, monitor L IV, voiding, can reposition self, dressing CDI, VAC in place minimal output, pending culture and pathology.

## 2023-07-01 NOTE — PROGRESS NOTES
[facility]    Medicine Progress Note - Hospitalist Service        Date of Admission:  6/24/2023  3:36 PM    Assessment & Plan:   Ry Horner is a 57 year old male with medical history significant for uncontrolled type II DM with polyneuropathy and nephropathy, stage III CKD, bilateral BKA presented to the ER due to pain, swelling and redness with small ulcer at Lt BKA stump and found to have cellulitis/sepsis and is being admitted on 6/24/2023 for further management.      Sepsis due Lt BKA stump infection  S/p I&D of of left BKA stump on 6/30/23  * Patient presented with pain, redness, swelling at his left BKA stump site.  Marked leukocytosis of 22.9.  Tachycardic to 110s.  X-ray Shows soft tissue swelling, some bone formation along the resection margin of the tibia with periosteal reaction.  -Initially on vancomycin and Zosyn.    -Infectious disease following, vancomycin changed to daptomycin  -Vascular surgery following, patient not improving as expected, worsening leukocytosis, and MRI shows signal changes compatible with osteomyelitis of the residual tibia along with fluid collection suspicious for abscesses.  -Underwent I&D of left stump on 6/30/23 with post-op placement of wound vac.   - wound vac management per vascular surgery  - follow bone cultures and pathology. If evidence of osteomyelitis will plan to proceed with left above knee amputation in coming week.    Acute kidney injury on CKD stage III  Creatinine up again today to 2.51 after getting back to likely baseline (1.5-1.6) recently. Likely related to operation yesterday. Patient reports poorer UOP today 7/1.  -Hold lisinopril  -1L LR over 12 hours today  -Repeat Cr this PM  -AM BMP    Type II DM, uncontrolled, on insulin  Noted HbA1c was 10.4 in February.  States his blood sugars run in 200s.  -Continue PTA Tresiba, 34 units twice a day  -Medium intensity sliding scale  -Hold PTA oral agents including Jardiance,  Januvia.  -Hypoglycemia protocol  -patient declines to start carb coverage or scheduled mealtime insulin    Mild hyponatremia    Diet: Regular Diet Adult     DVT Prophylaxis: Pneumatic Compression Devices   Corrales Catheter: Not present  Code Status: Full Code     Disposition Plan       Expected Discharge Date: 07/03/2023      Destination: home;home with family  Discharge Comments: Surgery 6/29/2023      Entered: Javier Milian MD 07/01/2023, 7:05 AM        Clinically Significant Risk Factors              # Hypoalbuminemia: Lowest albumin = 3.4 g/dL at 6/24/2023  4:12 PM, will monitor as appropriate     # Hypertension: Noted on problem list       # DMII: A1C = 7.6 % (Ref range: <5.7 %) within past 6 months   # Obesity: Estimated body mass index is 33.55 kg/m  as calculated from the following:    Height as of this encounter: 1.829 m (6').    Weight as of this encounter: 112.2 kg (247 lb 5.7 oz).           The patient's care was discussed with the Bedside Nurse and Patient.    Javier Milian MD   Hospitalist Service  [facility]  Text Page 7AM-6PM  Securely message with the Vocera Web Console (learn more here)  Text page via [employer] Paging/Directory    ______________________________________________________________________    Interval History   -I assumed care this AM  -Pain ongoing, a bit nauseous today    Data reviewed today: I reviewed all medications, new labs and imaging results over the last 24 hours. I personally reviewed no images or EKG's today.    Physical Exam   Vital signs:  Temp: 99  F (37.2  C) Temp src: Oral BP: (!) 156/92 Pulse: 88   Resp: 18 SpO2: 96 % O2 Device: None (Room air) Oxygen Delivery: 6 LPM Height: 182.9 cm (6') Weight: 112.2 kg (247 lb 5.7 oz)  Estimated body mass index is 33.55 kg/m  as calculated from the following:    Height as of this encounter: 1.829 m (6').    Weight as of this encounter: 112.2 kg (247 lb 5.7 oz).      Wt Readings from Last 2 Encounters:   07/01/23  112.2 kg (247 lb 5.7 oz)   10/03/22 110 kg (242 lb 8.1 oz)     Gen: AAOX3, NAD, comfortable  Resp: CTA B/L, normal WOB  CVS: RRR, no murmur  Abd/GI: Soft, non-tender. BS- normoactive.    Skin: Warm, dry no rashes  MSK: Left BKA stump site with wound vac in place.   Neuro- CN- intact. No focal deficits.     Data   Recent Labs   Lab 07/01/23  0201 06/30/23  2121 06/30/23  1718 06/30/23  0849 06/30/23  0712 06/29/23  0835 06/29/23  0830 06/28/23  1054 06/28/23  0802 06/27/23  1718 06/27/23  1502 06/27/23  0757 06/27/23  0726 06/26/23  1153 06/26/23  0822 06/26/23  0738 06/25/23  0821 06/25/23  0636 06/24/23  2215 06/24/23  1612   WBC  --   --   --   --   --   --  21.7*  --   --   --   --   --  21.3*  --  19.6*  --   --  18.0*  --  22.9*   HGB  --   --   --   --   --   --  11.3*  --   --   --   --   --  11.4*  --  11.3*  --   --  11.4*  --  12.8*   MCV  --   --   --   --   --   --  85  --   --   --   --   --  87  --  88  --   --  87  --  86   PLT  --   --   --   --  354  --  355  --   --   --   --   --  288  --  267  --   --  222  --  235   INR  --   --   --   --   --   --   --   --   --   --  1.06  --   --   --   --   --   --   --   --   --    NA  --   --   --   --   --   --   --   --   --   --   --   --  135*  --   --  136  --  135*  --  134*   POTASSIUM  --   --   --   --   --   --   --   --   --   --   --   --  3.4  --   --  3.8  --  3.8  --  4.3   CHLORIDE  --   --   --   --   --   --   --   --   --   --   --   --  102  --   --  102  --  101  --  99   CO2  --   --   --   --   --   --   --   --   --   --   --   --  22  --   --  22  --  21*  --  21*   BUN  --   --   --   --   --   --   --   --   --   --   --   --  19.7  --   --  31.2*  --  45.6*  --  46.0*   CR  --   --   --   --  1.82*  --  1.69*  --  1.62*  --   --   --  1.58*  --   --  1.81*  1.81*  --  2.21*  --  2.45*   ANIONGAP  --   --   --   --   --   --   --   --   --   --   --   --  11  --   --  12  --  13  --  14   FERNANDO  --   --   --   --   --   --   --    --   --   --   --   --  8.3*  --   --  8.1*  --  8.0*  --  8.8   * 240* 130*   < >  --    < >  --    < >  --    < >  --    < > 101*   < >  --  108*   < > 186*   < > 270*   ALBUMIN  --   --   --   --   --   --   --   --   --   --   --   --   --   --   --   --   --   --   --  3.4*   PROTTOTAL  --   --   --   --   --   --   --   --   --   --   --   --   --   --   --   --   --   --   --  7.3   BILITOTAL  --   --   --   --   --   --   --   --   --   --   --   --   --   --   --   --   --   --   --  0.9   ALKPHOS  --   --   --   --   --   --   --   --   --   --   --   --   --   --   --   --   --   --   --  84   ALT  --   --   --   --   --   --   --   --   --   --   --   --   --   --   --   --   --   --   --  14   AST  --   --   --   --   --   --   --   --   --   --   --   --   --   --   --   --   --   --   --  13    < > = values in this interval not displayed.       No results found for this or any previous visit (from the past 24 hour(s)).  Medications       DAPTOmycin (CUBICIN) 600 mg in sodium chloride 0.9 % 100 mL intermittent infusion  6 mg/kg (Adjusted) Intravenous Q24H     ezetimibe  10 mg Oral Daily     heparin ANTICOAGULANT  5,000 Units Subcutaneous Q12H     insulin aspart  1-7 Units Subcutaneous TID AC     insulin aspart  1-5 Units Subcutaneous At Bedtime     insulin degludec  34 Units Subcutaneous BID     piperacillin-tazobactam  4.5 g Intravenous Q6H     sodium chloride (PF)  3 mL Intracatheter Q8H

## 2023-07-01 NOTE — PLAN OF CARE
Goal Outcome Evaluation:    DATE & TIME: 1100-1530    Cognitive Concerns/ Orientation : A/Ox4  BEHAVIOR & AGGRESSION TOOL COLOR: green  ABNL VS/O2: VSS on RA w/HTN (161/92)  MOBILITY: Pt transfer with minimal assistance to wheelchair   PAIN MANAGMENT: c/o L BKA pain   DIET: regular  BOWEL/BLADDER: continent  ABNL LAB/BG: K 3.8, , Cr 2.54  DRAIN/DEVICES: LFA PIV infusing LR 83 ml/hr - intermittent abx   SKIN: wound vac to L BKA  TESTS/PROCEDURES: I/D to L-BKA  Discharge Barriers: 7/3 - abx

## 2023-07-01 NOTE — PLAN OF CARE
Goal Outcome Evaluation:       [id number]  POD#1 for irrigation and debridement of LLE amputation stump with wound vac placement. Patient AOX4. VSS on RA exc HTN. Bilateral BKA.Pt transfers SBA to w/c with wound vac, normally independent. Pain managed with dilaudid/tylenol. Wound on L BKA, with wound vac 125 continuous CDI.Voiding in urinal. BS audible, BMx1. Diet reg with carb count. Last . PIV SL, int abx. Will continue to monitor.

## 2023-07-01 NOTE — PLAN OF CARE
POD#1 for irrigation and debridement of LLE amputation stump with wound vac placement. Patient A/Ox4. VSS on RA besides HTN. Bilateral BKA .Pt transfers SBA to w/c with wound vac, normally independent. Prosthesis in room. Pain managed with PRN Dilaudid PO x1. Denied further intervention. PRN Zofran given x1 for nausea. Wound on L BKA, with wound vac 125 continuous CDI. Cont of B&B. Diet reg with carb count. PIV SL w/ int. ABX. Creatinine 2.54. When ABX finished pt is to start LR. Pt reports he is difficult to place PIV if needed.

## 2023-07-02 ENCOUNTER — APPOINTMENT (OUTPATIENT)
Dept: ULTRASOUND IMAGING | Facility: CLINIC | Age: 58
DRG: 463 | End: 2023-07-02
Attending: INTERNAL MEDICINE
Payer: COMMERCIAL

## 2023-07-02 PROBLEM — N17.0 ACUTE KIDNEY FAILURE WITH TUBULAR NECROSIS (H): Status: ACTIVE | Noted: 2023-07-02

## 2023-07-02 LAB
ALBUMIN MFR UR ELPH: 760 MG/DL
ALBUMIN UR-MCNC: 300 MG/DL
ALBUMIN UR-MCNC: 600 MG/DL
AMORPH CRY #/AREA URNS HPF: ABNORMAL /HPF
ANION GAP SERPL CALCULATED.3IONS-SCNC: 10 MMOL/L (ref 7–15)
APPEARANCE UR: ABNORMAL
APPEARANCE UR: ABNORMAL
BACTERIA #/AREA URNS HPF: ABNORMAL /HPF
BACTERIA #/AREA URNS HPF: ABNORMAL /HPF
BILIRUB UR QL STRIP: NEGATIVE
BILIRUB UR QL STRIP: NEGATIVE
BUN SERPL-MCNC: 33.8 MG/DL (ref 6–20)
CALCIUM SERPL-MCNC: 8 MG/DL (ref 8.6–10)
CHLORIDE SERPL-SCNC: 101 MMOL/L (ref 98–107)
COLOR UR AUTO: ABNORMAL
COLOR UR AUTO: YELLOW
CREAT SERPL-MCNC: 2.97 MG/DL (ref 0.67–1.17)
CREAT UR-MCNC: 214.1 MG/DL
CREAT UR-MCNC: 217.4 MG/DL
DEPRECATED HCO3 PLAS-SCNC: 23 MMOL/L (ref 22–29)
ERYTHROCYTE [DISTWIDTH] IN BLOOD BY AUTOMATED COUNT: 11.8 % (ref 10–15)
FRACT EXCRET NA UR+SERPL-RTO: NORMAL %
GFR SERPL CREATININE-BSD FRML MDRD: 24 ML/MIN/1.73M2
GLUCOSE BLDC GLUCOMTR-MCNC: 120 MG/DL (ref 70–99)
GLUCOSE BLDC GLUCOMTR-MCNC: 73 MG/DL (ref 70–99)
GLUCOSE SERPL-MCNC: 79 MG/DL (ref 70–99)
GLUCOSE UR STRIP-MCNC: NEGATIVE MG/DL
GLUCOSE UR STRIP-MCNC: NEGATIVE MG/DL
HCT VFR BLD AUTO: 28.5 % (ref 40–53)
HGB BLD-MCNC: 9.7 G/DL (ref 13.3–17.7)
HGB UR QL STRIP: ABNORMAL
HGB UR QL STRIP: ABNORMAL
HYALINE CASTS: 4 /LPF
KETONES UR STRIP-MCNC: NEGATIVE MG/DL
KETONES UR STRIP-MCNC: NEGATIVE MG/DL
LEUKOCYTE ESTERASE UR QL STRIP: NEGATIVE
LEUKOCYTE ESTERASE UR QL STRIP: NEGATIVE
MCH RBC QN AUTO: 29.6 PG (ref 26.5–33)
MCHC RBC AUTO-ENTMCNC: 34 G/DL (ref 31.5–36.5)
MCV RBC AUTO: 87 FL (ref 78–100)
MUCOUS THREADS #/AREA URNS LPF: PRESENT /LPF
MUCOUS THREADS #/AREA URNS LPF: PRESENT /LPF
NITRATE UR QL: NEGATIVE
NITRATE UR QL: NEGATIVE
PH UR STRIP: 6 [PH] (ref 5–7)
PH UR STRIP: 6 [PH] (ref 5–7)
PLATELET # BLD AUTO: 331 10E3/UL (ref 150–450)
POTASSIUM SERPL-SCNC: 4 MMOL/L (ref 3.4–5.3)
PROT/CREAT 24H UR: 3.55 MG/MG CR (ref 0–0.2)
RBC # BLD AUTO: 3.28 10E6/UL (ref 4.4–5.9)
RBC URINE: 53 /HPF
RBC URINE: 53 /HPF
SODIUM SERPL-SCNC: 134 MMOL/L (ref 136–145)
SODIUM UR-SCNC: <20 MMOL/L
SODIUM UR-SCNC: <20 MMOL/L
SP GR UR STRIP: 1.02 (ref 1–1.03)
SP GR UR STRIP: 1.02 (ref 1–1.03)
SQUAMOUS EPITHELIAL: 1 /HPF
SQUAMOUS EPITHELIAL: 1 /HPF
UROBILINOGEN UR STRIP-MCNC: NORMAL MG/DL
UROBILINOGEN UR STRIP-MCNC: NORMAL MG/DL
WBC # BLD AUTO: 21.7 10E3/UL (ref 4–11)
WBC URINE: 5 /HPF
WBC URINE: 8 /HPF

## 2023-07-02 PROCEDURE — 76770 US EXAM ABDO BACK WALL COMP: CPT

## 2023-07-02 PROCEDURE — 250N000013 HC RX MED GY IP 250 OP 250 PS 637: Performed by: INTERNAL MEDICINE

## 2023-07-02 PROCEDURE — 120N000001 HC R&B MED SURG/OB

## 2023-07-02 PROCEDURE — 84300 ASSAY OF URINE SODIUM: CPT | Performed by: INTERNAL MEDICINE

## 2023-07-02 PROCEDURE — 250N000011 HC RX IP 250 OP 636: Mod: JZ | Performed by: PHYSICIAN ASSISTANT

## 2023-07-02 PROCEDURE — 85027 COMPLETE CBC AUTOMATED: CPT | Performed by: INTERNAL MEDICINE

## 2023-07-02 PROCEDURE — 250N000013 HC RX MED GY IP 250 OP 250 PS 637: Performed by: PHYSICIAN ASSISTANT

## 2023-07-02 PROCEDURE — 80048 BASIC METABOLIC PNL TOTAL CA: CPT | Performed by: INTERNAL MEDICINE

## 2023-07-02 PROCEDURE — 81003 URINALYSIS AUTO W/O SCOPE: CPT | Performed by: INTERNAL MEDICINE

## 2023-07-02 PROCEDURE — 258N000003 HC RX IP 258 OP 636: Performed by: PHYSICIAN ASSISTANT

## 2023-07-02 PROCEDURE — 99233 SBSQ HOSP IP/OBS HIGH 50: CPT | Performed by: INTERNAL MEDICINE

## 2023-07-02 PROCEDURE — 250N000011 HC RX IP 250 OP 636: Performed by: PHYSICIAN ASSISTANT

## 2023-07-02 PROCEDURE — 84156 ASSAY OF PROTEIN URINE: CPT | Performed by: INTERNAL MEDICINE

## 2023-07-02 PROCEDURE — 258N000003 HC RX IP 258 OP 636: Performed by: INTERNAL MEDICINE

## 2023-07-02 PROCEDURE — 36415 COLL VENOUS BLD VENIPUNCTURE: CPT | Performed by: INTERNAL MEDICINE

## 2023-07-02 PROCEDURE — 250N000011 HC RX IP 250 OP 636: Mod: JZ | Performed by: STUDENT IN AN ORGANIZED HEALTH CARE EDUCATION/TRAINING PROGRAM

## 2023-07-02 RX ORDER — SENNOSIDES 8.6 MG
1-2 TABLET ORAL 2 TIMES DAILY
Status: DISCONTINUED | OUTPATIENT
Start: 2023-07-02 | End: 2023-07-06

## 2023-07-02 RX ORDER — POLYETHYLENE GLYCOL 3350 17 G/17G
17 POWDER, FOR SOLUTION ORAL 2 TIMES DAILY
Status: DISCONTINUED | OUTPATIENT
Start: 2023-07-02 | End: 2023-07-06

## 2023-07-02 RX ORDER — BISACODYL 10 MG
10 SUPPOSITORY, RECTAL RECTAL DAILY PRN
Status: DISCONTINUED | OUTPATIENT
Start: 2023-07-02 | End: 2023-07-06

## 2023-07-02 RX ORDER — PIPERACILLIN SODIUM, TAZOBACTAM SODIUM 3; .375 G/15ML; G/15ML
3.38 INJECTION, POWDER, LYOPHILIZED, FOR SOLUTION INTRAVENOUS EVERY 6 HOURS
Status: DISCONTINUED | OUTPATIENT
Start: 2023-07-02 | End: 2023-07-03

## 2023-07-02 RX ORDER — SODIUM CHLORIDE 9 MG/ML
INJECTION, SOLUTION INTRAVENOUS CONTINUOUS
Status: DISCONTINUED | OUTPATIENT
Start: 2023-07-02 | End: 2023-07-03

## 2023-07-02 RX ADMIN — PIPERACILLIN AND TAZOBACTAM 3.38 G: 3; .375 INJECTION, POWDER, FOR SOLUTION INTRAVENOUS at 17:06

## 2023-07-02 RX ADMIN — ACETAMINOPHEN 650 MG: 325 TABLET, FILM COATED ORAL at 21:28

## 2023-07-02 RX ADMIN — OXYCODONE HYDROCHLORIDE 5 MG: 5 TABLET ORAL at 09:34

## 2023-07-02 RX ADMIN — OXYCODONE HYDROCHLORIDE 5 MG: 5 TABLET ORAL at 13:32

## 2023-07-02 RX ADMIN — OXYCODONE HYDROCHLORIDE 5 MG: 5 TABLET ORAL at 05:26

## 2023-07-02 RX ADMIN — OXYCODONE HYDROCHLORIDE 5 MG: 5 TABLET ORAL at 17:05

## 2023-07-02 RX ADMIN — ACETAMINOPHEN 650 MG: 325 TABLET, FILM COATED ORAL at 09:34

## 2023-07-02 RX ADMIN — HEPARIN SODIUM 5000 UNITS: 5000 INJECTION, SOLUTION INTRAVENOUS; SUBCUTANEOUS at 09:35

## 2023-07-02 RX ADMIN — PIPERACILLIN AND TAZOBACTAM 3.38 G: 3; .375 INJECTION, POWDER, FOR SOLUTION INTRAVENOUS at 23:55

## 2023-07-02 RX ADMIN — EZETIMIBE 10 MG: 10 TABLET ORAL at 09:34

## 2023-07-02 RX ADMIN — INSULIN DEGLUDEC INJECTION 34 UNITS: 100 INJECTION, SOLUTION SUBCUTANEOUS at 09:35

## 2023-07-02 RX ADMIN — PIPERACILLIN AND TAZOBACTAM 4.5 G: 4; .5 INJECTION, POWDER, FOR SOLUTION INTRAVENOUS at 12:53

## 2023-07-02 RX ADMIN — OXYCODONE HYDROCHLORIDE 5 MG: 5 TABLET ORAL at 21:28

## 2023-07-02 RX ADMIN — ONDANSETRON 4 MG: 4 TABLET, ORALLY DISINTEGRATING ORAL at 13:32

## 2023-07-02 RX ADMIN — DAPTOMYCIN 600 MG: 500 INJECTION, POWDER, LYOPHILIZED, FOR SOLUTION INTRAVENOUS at 13:32

## 2023-07-02 RX ADMIN — ACETAMINOPHEN 650 MG: 325 TABLET, FILM COATED ORAL at 03:32

## 2023-07-02 RX ADMIN — PIPERACILLIN AND TAZOBACTAM 4.5 G: 4; .5 INJECTION, POWDER, FOR SOLUTION INTRAVENOUS at 05:28

## 2023-07-02 RX ADMIN — HEPARIN SODIUM 5000 UNITS: 5000 INJECTION, SOLUTION INTRAVENOUS; SUBCUTANEOUS at 21:29

## 2023-07-02 RX ADMIN — ONDANSETRON 4 MG: 4 TABLET, ORALLY DISINTEGRATING ORAL at 05:26

## 2023-07-02 RX ADMIN — SODIUM CHLORIDE: 9 INJECTION, SOLUTION INTRAVENOUS at 18:12

## 2023-07-02 ASSESSMENT — ACTIVITIES OF DAILY LIVING (ADL)
ADLS_ACUITY_SCORE: 20

## 2023-07-02 NOTE — PLAN OF CARE
Problem: Admitted 6/24 for JOHNATHAN, sepsis & infection to L BKA. POD 2 Left BKA stump I&D w/ wound VAC. Had wound vac changed today.   Hx: SOLIS BKA  Vitals: VSS besides HTN on RA  Orientation/Neuro: A/Ox4  Activity Level: SBA to w/c  Diet: Reg diet. Fair appetite.  GI/: Cont of B&B. Pt uses urinal at bedside. Needs PVR x1.  Labs: Creatinine & U/A sent. BG checks. blood cultures pending.   IV /Drains/Tubes: PIV SL w/ int. ABX  Incisions/Dressings/Skin: Wound VAC to LLE  Pain Management: LLE pain managed with PRN Oxycodone & Tylenol. Ice applied to L BKA site.   Other: Had wound VAC change today  Plan: Continue plan of care.

## 2023-07-02 NOTE — PROGRESS NOTES
[id number]  POD#2 LBKA w/ wound vac. A&Ox4, VSS RA except HTN. Pain managed with PRN Oxycodone & Tylenol, per patient Dilaudid does not work and causes nausea. Regular diet, poor intake. Bowel sounds present. LPIV needs monitoring. PIV SL post bolus w/ intermittent abx. Recheck creatinine in AM. Voiding in bedside urinal. Can self reposition. Wound vacc continuous 125, scant sanguinous output, LLE warm/tender to touch w/ +2 pitting edema. Pending cultures and pathology. IND & Vascular following.

## 2023-07-02 NOTE — PROGRESS NOTES
VASCULAR SURGERY PROGRESS NOTE    Mr. Horner is a 58yo man with a left BKA stump infection s/p I&D 6/30.    NAEON. Pain control improving. Nausea improving after stopping dilaudid. Voiding spontaneously. Was able to tolerate breakfast. Last BM yesterday.    Vitals reviewed. Hypertensive.    On exam, resting comfortably in bed. No distress. Unlabored breathing. Left stump vac in place, changed at bedside. Measures 10 x 5 x 2cm. Replaced with one piece silver sponge.         Labs reviewed. WBC remains elevated at 21.7 from 19.5. Cr 2.97. Hgb 9.7 from 10.3. Glucose  in previous 24h. No growth from operative cultures.    Mr. Horner is a 58yo man with a left BKA stump infection s/p I&D, bedside vac change, recovering  - continue current management  - appreciate primary team    Aimee Peterson MD  07/02/23  10:06 AM

## 2023-07-02 NOTE — PROGRESS NOTES
[facility]    Hospitalist Progress Note    Date of Service (when I saw the patient): 07/02/2023  Admit date: 6/24/2023    Interval History   Full details of events over last 24 hours outlined below.   Patient's only concern is to find out what is going on with his stump.  We discussed that we suspect infection and cultures remain negative.  Waiting on bone pathology  Discussed decreased renal function.  Patient states he is noted decreased urine output, but has noted no difficulty with emptying the bladder or other urinary symptoms.  Blood pressure remains elevated, no signs of dehydration or sepsis other than continued leukocytosis.  Afebrile mental status normal.  Good oxygenation  Also notes that pain is better controlled now that he is using oxycodone (15 mg over last 24 hours)   Notes some abdominal distention but no abdominal pain. Having regular BMs  Expresses appreciation for care.   Denies any SOB, CP, abdominal pain, N/V/D.    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      Sepsis due Lt BKA stump infection  S/p I&D of of left BKA stump on 6/30/23  Patient presented with pain, redness, swelling at his left BKA stump site.  Marked leukocytosis of 22.9.  Tachycardic to 110s.  X-ray Shows soft tissue swelling, some bone formation along the resection margin of the tibia with periosteal reaction.      Initially on vancomycin and Zosyn.      ID vancomycin changed to daptomycin on 6/26    Vascular surgery following    MRI: signal changes compatible with osteomyelitis of the residual tibia along with fluid collection suspicious for abscesses.    Underwent I&D of left stump  on 6/30/23 with post-op placement of wound vac.     wound vac management per vascular surgery    follow bone cultures and pathology. If evidence of osteomyelitis will plan to proceed with left above knee amputation in coming week. Cx NGTD and pathology pending on 07/02/23.    Continued leukocytosis, otherwise afebrile, hemodynamically stable    For pain related to above, using oxycodone judiciously: 15 mg over last 24 hours.    Started bowel regimen on 07/02/23. Notes abdominal bloating, but having regular BMs    Recent Labs   Lab 07/02/23  0726 07/01/23  0656 06/29/23  0830 06/27/23  0726 06/26/23  0822   WBC 21.7* 19.5* 21.7* 21.3* 19.6*         Acute kidney injury on CKD stage III - Cr @ admission 2.45. Last Cr PTA 1.9 in 11/2022. (prior to that baseline ~ 1.6)   * Initially improved to baseline and started going back up 6/28.   * Only received 2 doses of vancomycin, last dose 6/25. No levels obtained. I do not see any contrast. No NSAIDs  * No episodes of hypotension here.   * Vancomycin stopped and remains on zosyn and dapto      S/p LR x 1 L on 7/1/23, and SBP has been up, so prerenal seems less likely.     Cr up further on 07/02/23, UOP okay.     Check bladder scan and renal ultrsound    Stat UA and FENA > If low, resume IVF with NS    Holding PTA lisinopril.     Daily BMP    Vitals:    06/27/23 0621 06/29/23 0615 06/30/23 0600 07/01/23 0511   Weight: 111.4 kg (245 lb 9.5 oz) 109.7 kg (241 lb 13.5 oz) 112.4 kg (247 lb 12.8 oz) 112.2 kg (247 lb 5.7 oz)    07/02/23 0500   Weight: 114.4 kg (252 lb 3.3 oz)     I/O last 3 completed shifts:  In: 100 [P.O.:100]  Out: 1050 [Urine:1050]  Recent Labs   Lab 07/02/23  0726 07/01/23  1600 07/01/23  0656 06/30/23  0712 06/29/23  0830 06/28/23  0802 06/27/23  0726 06/26/23  0738   CO2 23 22 21*  --   --   --  22 22   * 136 136  --   --   --  135* 136   POTASSIUM 4.0 4.1 3.8  --   --   --  3.4 3.8   BUN 33.8* 31.5* 31.0*  --   --   --  19.7 31.2*   CR 2.97* 2.60* 2.54*  1.82* 1.69* 1.62* 1.58* 1.81*  1.81*         Type II DM, uncontrolled, on insulin, A1c 7.6 on 6/27/23    BS falling below 100 in the context of JOHNATHAN    Decrease PTA Tresiba to 25 units (previously 34 units) on 07/02/23     Continue to hold PTA oral agents including Jardiance, Januvia.    Hypoglycemia protocol     Recent Labs   Lab 07/02/23  0746 07/02/23  0726 07/01/23  2120 07/01/23  1737 07/01/23  1600 07/01/23  1203   GLC 73 79 146* 99 119* 162*       Mild hyponatremia    FENA and treat JOHNATHAN above.     Recent Labs   Lab 07/02/23  0726 07/01/23  1600 07/01/23  0656 06/27/23  0726 06/26/23  0738   * 136 136 135* 136       Clinically Significant Risk Factors              # Hypoalbuminemia: Lowest albumin = 2.6 g/dL at 7/1/2023  4:00 PM, will monitor as appropriate    # Acute Kidney Injury, unspecified: based on a >150% or 0.3 mg/dL increase in last creatinine compared to past 90 day average, will monitor renal function  # Hypertension: Noted on problem list       # DMII: A1C = 7.6 % (Ref range: <5.7 %) within past 6 months   # Obesity: Estimated body mass index is 34.21 kg/m  as calculated from the following:    Height as of this encounter: 1.829 m (6').    Weight as of this encounter: 114.4 kg (252 lb 3.3 oz).             Diet: Orders Placed This Encounter      Regular Diet Adult     IVF: As above, None currently, will place on NS if Arnold low.   Corrales Catheter: Not present     DVT Prophylaxis: Heparin SQ  Code Status: Full Code     Disposition: Expected discharge [facility]  Communication: Discussed with RN, patient on 07/02/23    Lay Palomo MD    Hospitalist Service  [facility]  Securely message with the [employer]oc[employer] Web Console (learn more here)  Text page via [employer] Paging/Directory    Medical Decision Making       over 50 MINUTES SPENT BY ME on the date of service doing chart review, history, exam, documentation & further activities per the note.      -Data reviewed today: I reviewed all  new labs and imaging results over the last 24 hours. I personally reviewed no images or EKG's today.    Physical Exam   Temp: 98.4  F (36.9  C) Temp src: Oral BP: (!) 165/89 Pulse: 83   Resp: 18 SpO2: 97 % O2 Device: None (Room air)    Vitals:    06/30/23 0600 07/01/23 0511 07/02/23 0500   Weight: 112.4 kg (247 lb 12.8 oz) 112.2 kg (247 lb 5.7 oz) 114.4 kg (252 lb 3.3 oz)     Vital Signs with Ranges  Temp:  [97.5  F (36.4  C)-98.8  F (37.1  C)] 98.4  F (36.9  C)  Pulse:  [83-90] 83  Resp:  [18] 18  BP: (159-165)/(89-98) 165/89  SpO2:  [94 %-97 %] 97 %  I/O last 3 completed shifts:  In: 100 [P.O.:100]  Out: 1050 [Urine:1050]    Today's Exam  Constitutional:  NAD,   Neuropsyche:  alert and oriented, answers questions appropriately. Very kind   Respiratory:  Breathing comfortably, good air exchange, no wheezes, no crackles.   Cardiovascular:  Regular rate and rhythm, no edema.  GI:  soft, NT/ND, BS normal  Skin/Integumen:  No acute rash or sign of bleeding. Wound vac on L stump    Medications   All medications reviewed on 07/02/23        DAPTOmycin (CUBICIN) 600 mg in sodium chloride 0.9 % 100 mL intermittent infusion  6 mg/kg (Adjusted) Intravenous Q24H     ezetimibe  10 mg Oral Daily     heparin ANTICOAGULANT  5,000 Units Subcutaneous Q12H     insulin aspart  1-7 Units Subcutaneous TID AC     insulin aspart  1-5 Units Subcutaneous At Bedtime     insulin degludec  25 Units Subcutaneous BID     piperacillin-tazobactam  3.375 g Intravenous Q6H     polyethylene glycol  17 g Oral BID     sennosides  1-2 tablet Oral BID     sodium chloride (PF)  3 mL Intracatheter Q8H     PRN Meds: acetaminophen **OR** acetaminophen, glucose **OR** dextrose **OR** glucagon, HYDROmorphone, lidocaine 4%, lidocaine (buffered or not buffered), melatonin, naloxone **OR** naloxone **OR** naloxone **OR** naloxone, ondansetron **OR** ondansetron, oxyCODONE, prochlorperazine **OR** prochlorperazine **OR** prochlorperazine, senna-docusate **OR**  senna-docusate, sodium chloride (PF)    Data   Recent Labs   Lab 07/02/23  0746 07/02/23  0726 07/01/23  2120 07/01/23  1737 07/01/23  1600 07/01/23  0855 07/01/23  0656 06/30/23  0849 06/30/23  0712 06/29/23  0835 06/29/23  0830 06/27/23  1718 06/27/23  1502   WBC  --  21.7*  --   --   --   --  19.5*  --   --   --  21.7*  --   --    HGB  --  9.7*  --   --   --   --  10.3*  --   --   --  11.3*  --   --    MCV  --  87  --   --   --   --  86  --   --   --  85  --   --    PLT  --  331  --   --   --   --  354  --  354  --  355  --   --    INR  --   --   --   --   --   --   --   --   --   --   --   --  1.06   NA  --  134*  --   --  136  --  136  --   --   --   --   --   --    POTASSIUM  --  4.0  --   --  4.1  --  3.8  --   --   --   --   --   --    CHLORIDE  --  101  --   --  102  --  103  --   --   --   --   --   --    CO2  --  23  --   --  22  --  21*  --   --   --   --   --   --    BUN  --  33.8*  --   --  31.5*  --  31.0*  --   --   --   --   --   --    CR  --  2.97*  --   --  2.60*  --  2.54*  --  1.82*  --  1.69*   < >  --    ANIONGAP  --  10  --   --  12  --  12  --   --   --   --   --   --    FERNANDO  --  8.0*  --   --  8.1*  --  7.9*  --   --   --   --   --   --    GLC 73 79 146*   < > 119*   < > 148*   < >  --    < >  --    < >  --    ALBUMIN  --   --   --   --  2.6*  --   --   --   --   --   --   --   --     < > = values in this interval not displayed.       No results found for this or any previous visit (from the past 24 hour(s)).

## 2023-07-03 LAB
ANION GAP SERPL CALCULATED.3IONS-SCNC: 15 MMOL/L (ref 7–15)
BUN SERPL-MCNC: 40.8 MG/DL (ref 6–20)
CALCIUM SERPL-MCNC: 8 MG/DL (ref 8.6–10)
CHLORIDE SERPL-SCNC: 101 MMOL/L (ref 98–107)
CREAT SERPL-MCNC: 4.16 MG/DL (ref 0.67–1.17)
DEPRECATED HCO3 PLAS-SCNC: 18 MMOL/L (ref 22–29)
ERYTHROCYTE [DISTWIDTH] IN BLOOD BY AUTOMATED COUNT: 11.9 % (ref 10–15)
GFR SERPL CREATININE-BSD FRML MDRD: 16 ML/MIN/1.73M2
GLUCOSE BLDC GLUCOMTR-MCNC: 107 MG/DL (ref 70–99)
GLUCOSE BLDC GLUCOMTR-MCNC: 108 MG/DL (ref 70–99)
GLUCOSE BLDC GLUCOMTR-MCNC: 87 MG/DL (ref 70–99)
GLUCOSE BLDC GLUCOMTR-MCNC: 87 MG/DL (ref 70–99)
GLUCOSE SERPL-MCNC: 83 MG/DL (ref 70–99)
HCT VFR BLD AUTO: 30.1 % (ref 40–53)
HGB BLD-MCNC: 10.2 G/DL (ref 13.3–17.7)
MCH RBC QN AUTO: 29 PG (ref 26.5–33)
MCHC RBC AUTO-ENTMCNC: 33.9 G/DL (ref 31.5–36.5)
MCV RBC AUTO: 86 FL (ref 78–100)
PLATELET # BLD AUTO: 333 10E3/UL (ref 150–450)
POTASSIUM SERPL-SCNC: 3.8 MMOL/L (ref 3.4–5.3)
RBC # BLD AUTO: 3.52 10E6/UL (ref 4.4–5.9)
SODIUM SERPL-SCNC: 134 MMOL/L (ref 136–145)
WBC # BLD AUTO: 25 10E3/UL (ref 4–11)

## 2023-07-03 PROCEDURE — 250N000011 HC RX IP 250 OP 636: Mod: JZ | Performed by: STUDENT IN AN ORGANIZED HEALTH CARE EDUCATION/TRAINING PROGRAM

## 2023-07-03 PROCEDURE — 99222 1ST HOSP IP/OBS MODERATE 55: CPT | Performed by: INTERNAL MEDICINE

## 2023-07-03 PROCEDURE — 250N000013 HC RX MED GY IP 250 OP 250 PS 637: Performed by: INTERNAL MEDICINE

## 2023-07-03 PROCEDURE — 250N000011 HC RX IP 250 OP 636: Mod: JZ

## 2023-07-03 PROCEDURE — 258N000003 HC RX IP 258 OP 636: Performed by: INTERNAL MEDICINE

## 2023-07-03 PROCEDURE — 85027 COMPLETE CBC AUTOMATED: CPT | Performed by: INTERNAL MEDICINE

## 2023-07-03 PROCEDURE — 250N000013 HC RX MED GY IP 250 OP 250 PS 637: Performed by: STUDENT IN AN ORGANIZED HEALTH CARE EDUCATION/TRAINING PROGRAM

## 2023-07-03 PROCEDURE — 36415 COLL VENOUS BLD VENIPUNCTURE: CPT | Performed by: INTERNAL MEDICINE

## 2023-07-03 PROCEDURE — 99232 SBSQ HOSP IP/OBS MODERATE 35: CPT | Performed by: INTERNAL MEDICINE

## 2023-07-03 PROCEDURE — 250N000011 HC RX IP 250 OP 636: Performed by: PHYSICIAN ASSISTANT

## 2023-07-03 PROCEDURE — 120N000001 HC R&B MED SURG/OB

## 2023-07-03 PROCEDURE — 250N000013 HC RX MED GY IP 250 OP 250 PS 637: Performed by: PHYSICIAN ASSISTANT

## 2023-07-03 PROCEDURE — 80048 BASIC METABOLIC PNL TOTAL CA: CPT | Performed by: INTERNAL MEDICINE

## 2023-07-03 RX ORDER — HYDRALAZINE HYDROCHLORIDE 20 MG/ML
10 INJECTION INTRAMUSCULAR; INTRAVENOUS EVERY 4 HOURS PRN
Status: DISCONTINUED | OUTPATIENT
Start: 2023-07-03 | End: 2023-07-28 | Stop reason: HOSPADM

## 2023-07-03 RX ORDER — METOPROLOL TARTRATE 25 MG/1
25 TABLET, FILM COATED ORAL 2 TIMES DAILY
Status: DISCONTINUED | OUTPATIENT
Start: 2023-07-03 | End: 2023-07-03

## 2023-07-03 RX ORDER — PIPERACILLIN SODIUM, TAZOBACTAM SODIUM 2; .25 G/10ML; G/10ML
2.25 INJECTION, POWDER, LYOPHILIZED, FOR SOLUTION INTRAVENOUS EVERY 6 HOURS
Status: DISCONTINUED | OUTPATIENT
Start: 2023-07-03 | End: 2023-07-20

## 2023-07-03 RX ORDER — METOPROLOL TARTRATE 25 MG/1
25 TABLET, FILM COATED ORAL 2 TIMES DAILY
Status: DISCONTINUED | OUTPATIENT
Start: 2023-07-03 | End: 2023-07-04

## 2023-07-03 RX ORDER — SODIUM CHLORIDE, SODIUM LACTATE, POTASSIUM CHLORIDE, CALCIUM CHLORIDE 600; 310; 30; 20 MG/100ML; MG/100ML; MG/100ML; MG/100ML
INJECTION, SOLUTION INTRAVENOUS CONTINUOUS
Status: DISCONTINUED | OUTPATIENT
Start: 2023-07-03 | End: 2023-07-04

## 2023-07-03 RX ADMIN — ACETAMINOPHEN 650 MG: 325 TABLET, FILM COATED ORAL at 19:44

## 2023-07-03 RX ADMIN — PIPERACILLIN AND TAZOBACTAM 2.25 G: 2; .25 INJECTION, POWDER, FOR SOLUTION INTRAVENOUS at 11:52

## 2023-07-03 RX ADMIN — OXYCODONE HYDROCHLORIDE 5 MG: 5 TABLET ORAL at 19:44

## 2023-07-03 RX ADMIN — SENNOSIDES 1 TABLET: 8.6 TABLET, FILM COATED ORAL at 08:37

## 2023-07-03 RX ADMIN — OXYCODONE HYDROCHLORIDE 5 MG: 5 TABLET ORAL at 08:40

## 2023-07-03 RX ADMIN — SODIUM CHLORIDE 1000 ML: 9 INJECTION, SOLUTION INTRAVENOUS at 13:19

## 2023-07-03 RX ADMIN — SODIUM CHLORIDE: 9 INJECTION, SOLUTION INTRAVENOUS at 06:08

## 2023-07-03 RX ADMIN — SODIUM CHLORIDE, POTASSIUM CHLORIDE, SODIUM LACTATE AND CALCIUM CHLORIDE: 600; 310; 30; 20 INJECTION, SOLUTION INTRAVENOUS at 16:56

## 2023-07-03 RX ADMIN — HEPARIN SODIUM 5000 UNITS: 5000 INJECTION, SOLUTION INTRAVENOUS; SUBCUTANEOUS at 21:25

## 2023-07-03 RX ADMIN — HEPARIN SODIUM 5000 UNITS: 5000 INJECTION, SOLUTION INTRAVENOUS; SUBCUTANEOUS at 11:53

## 2023-07-03 RX ADMIN — PIPERACILLIN AND TAZOBACTAM 2.25 G: 2; .25 INJECTION, POWDER, FOR SOLUTION INTRAVENOUS at 17:35

## 2023-07-03 RX ADMIN — ONDANSETRON 4 MG: 4 TABLET, ORALLY DISINTEGRATING ORAL at 11:56

## 2023-07-03 RX ADMIN — PROCHLORPERAZINE MALEATE 5 MG: 5 TABLET ORAL at 16:55

## 2023-07-03 RX ADMIN — EZETIMIBE 10 MG: 10 TABLET ORAL at 08:37

## 2023-07-03 RX ADMIN — METOPROLOL TARTRATE 25 MG: 25 TABLET, FILM COATED ORAL at 17:37

## 2023-07-03 RX ADMIN — PIPERACILLIN AND TAZOBACTAM 3.38 G: 3; .375 INJECTION, POWDER, FOR SOLUTION INTRAVENOUS at 06:12

## 2023-07-03 RX ADMIN — METOPROLOL TARTRATE 25 MG: 25 TABLET, FILM COATED ORAL at 23:23

## 2023-07-03 RX ADMIN — PIPERACILLIN AND TAZOBACTAM 2.25 G: 2; .25 INJECTION, POWDER, FOR SOLUTION INTRAVENOUS at 23:23

## 2023-07-03 ASSESSMENT — ACTIVITIES OF DAILY LIVING (ADL)
ADLS_ACUITY_SCORE: 19
ADLS_ACUITY_SCORE: 19
ADLS_ACUITY_SCORE: 20
ADLS_ACUITY_SCORE: 20
ADLS_ACUITY_SCORE: 19
ADLS_ACUITY_SCORE: 20
ADLS_ACUITY_SCORE: 19
ADLS_ACUITY_SCORE: 20
ADLS_ACUITY_SCORE: 20
ADLS_ACUITY_SCORE: 19

## 2023-07-03 NOTE — CONSULTS
RENAL CONSULTATION NOTE    REFERRING MD:  Bell Mitchell MD    REASON FOR CONSULTATION:  debbie on ckd iii; lower extremity cellulitis/sepsis; on dapto/zosyn    HPI:  57 y.o man with CKD III, DM, HTN and bilateral BKA, who was admitted on 6/24 sepsis from an infected left stump.   He had an I&D on 6/30.   Received two doses of vancomcyin-->dapto on 6/26.  He has been on Zosyn.     He had CKD III with baseline Scr ~ 1.5-1.7 mg/dl.   Admitted with Scr of 2.45, which improved to baseline of 1.6 mg/dl.   Scr has been trending up since I&D on 6/30  No contrast exposure.   Poor intake for the last couple says.  He says he has not been able to keep much down for last couple days.   No cardiopulmonary complaints.   He is frustrated.    ROS:  A complete review of systems was performed and is negative except as noted above.    PMH:    Past Medical History:   Diagnosis Date     BENIGN HYPERTENSION 4/4/2007     DIABETES MELLITUS TYPE II-UNCOMPL 4/4/2007     HYPERLIPIDEMIA NEC/NOS 4/4/2007     Tobacco use disorder 4/4/2007       PSH:    Past Surgical History:   Procedure Laterality Date     AMPUTATE FOOT Left 11/17/2019    Procedure: LEFT PARTIAL FOOT AMPUTATION;  Surgeon: Antoine Pena DPM;  Location: [facility]     AMPUTATE FOOT Left 12/4/2019    Procedure: LEFT PARTIAL FOOT AMPUTATION;  Surgeon: Tim Douglas DPM;  Location: [facility]     AMPUTATE FOOT Left 12/11/2019    Procedure: POSSIBLE PARTIAL FOOT AMPUTATION;  Surgeon: Tim Douglas DPM;  Location: [facility]     AMPUTATE FOOT Left 2/10/2022    Procedure: Partial left foot amputation;  Surgeon: Milena Cevallos DPM, Podiatry/Foot and Ankle Surgery;  Location: [facility]     AMPUTATE LEG BELOW KNEE Right 9/1/2017    Procedure: AMPUTATE LEG BELOW KNEE;  RIGHT BELOW KNEE AMPUTATION ;  Surgeon: Nikolas Nascimento MD;  Location: [facility]     AMPUTATE LEG BELOW KNEE Left 9/27/2022    Procedure: AMPUTATION BELOW KNEE;  Surgeon: Neal Blackman MD;  Location:  OR      AMPUTATE LEG BELOW KNEE Left 9/29/2022    Procedure: LEFT SIDE COMPLETION BELOW THE KNEE AMPUTATION;  Surgeon: Reny Child MD;  Location: [facility]     AMPUTATE TOE(S) Left 2/3/2020    Procedure: LEFT SECOND TOE AMPUTATION;  Surgeon: Milena Cevallos DPM, Podiatry/Foot and Ankle Surgery;  Location: [facility]     APPENDECTOMY       APPLY WOUND VAC Right 3/2/2015    Procedure: APPLY WOUND VAC;  Surgeon: Milena Cevallos DPM, Pod;  Location: [facility]     BIOPSY BONE FOOT Right 7/15/2016    Procedure: BIOPSY BONE FOOT;  Surgeon: Tim Douglas DPM;  Location: [facility]     BIOPSY BONE TOE Left 12/4/2019    Procedure: BONE BIOPSY LEFT SECOND TOE;  Surgeon: Tim Douglas DPM;  Location: [facility]     IRRIGATION AND DEBRIDEMENT FOOT, COMBINED Right 3/2/2015    Procedure: COMBINED IRRIGATION AND DEBRIDEMENT FOOT;  Surgeon: Milena Cevallos DPM, Pod;  Location: [facility]     IRRIGATION AND DEBRIDEMENT FOOT, COMBINED Right 7/15/2016    Procedure: COMBINED IRRIGATION AND DEBRIDEMENT FOOT;  Surgeon: Tim Douglas DPM;  [facility]     IRRIGATION AND DEBRIDEMENT FOOT, COMBINED Right 7/20/2016    Procedure: COMBINED IRRIGATION AND DEBRIDEMENT FOOT;  Surgeon: Tim Douglas DPM;  [facility]     IRRIGATION AND DEBRIDEMENT FOOT, COMBINED Left 11/19/2019    Procedure: REVISIONAL IRRIGATION AND DEBRIDEMENT LEFT FOOT AND BONE DEBRIDEMENT;  Surgeon: Joseph Randhawa DPM;  Location: [facility]     IRRIGATION AND DEBRIDEMENT FOOT, COMBINED Left 12/4/2019    Procedure: EXCISIONAL DEBRIDEMENT LEFT FOOT;  Surgeon: Tim Douglas DPM;  [facility]     IRRIGATION AND DEBRIDEMENT FOOT, COMBINED Left 12/11/2019    Procedure: IRRIGATION AND DEBRIDEMENT FOOT, Partial osteotomy left first metatarsal;  Surgeon: Tim Douglas DPM;  Location[facility]     IRRIGATION AND DEBRIDEMENT FOOT, COMBINED Left 2/5/2020    Procedure: IRRIGATION AND DEBRIDEMENT LEFT FOOT;  Surgeon: Tim Douglas DPM;  [facility]      IRRIGATION AND DEBRIDEMENT LOWER EXTREMITY, COMBINED Left 6/30/2023    Procedure: Irrigation and debridement lower extremity amputation stump- left;  Surgeon: Reny Child MD;  Location:  OR     ORTHOPEDIC SURGERY         MEDICATIONS:      sodium chloride 0.9%  1,000 mL Intravenous Once     [START ON 7/4/2023] DAPTOmycin (CUBICIN) 600 mg in sodium chloride 0.9 % 100 mL intermittent infusion  6 mg/kg (Adjusted) Intravenous Q48H     ezetimibe  10 mg Oral Daily     heparin ANTICOAGULANT  5,000 Units Subcutaneous Q12H     insulin aspart  1-7 Units Subcutaneous TID AC     insulin aspart  1-5 Units Subcutaneous At Bedtime     insulin degludec  25 Units Subcutaneous BID     piperacillin-tazobactam  2.25 g Intravenous Q6H     polyethylene glycol  17 g Oral BID     sennosides  1-2 tablet Oral BID     sodium chloride (PF)  3 mL Intracatheter Q8H       ALLERGIES:    Allergies as of 06/24/2023 - Reviewed 06/24/2023   Allergen Reaction Noted     Pravastatin  09/14/2017       FH:    Family History   Problem Relation Age of Onset     Genetic Disorder Other      Genetic Disorder Other      Psychotic Disorder Mother      Diabetes Father      Lung Cancer Father      Hypertension Father      Genetic Disorder Maternal Grandmother      Genetic Disorder Maternal Grandfather      Asthma Sister      Breast Cancer Sister      C.A.D. No family hx of      Hypertension No family hx of      Cerebrovascular Disease No family hx of      Breast Cancer No family hx of      Cancer - colorectal No family hx of      Prostate Cancer No family hx of      Alcohol/Drug No family hx of        [facility]:    Social History     Socioeconomic History     Marital status: Single     Spouse name: Not on file     Number of children: Not on file     Years of education: Not on file     Highest education level: Not on file   Occupational History     Not on file   Tobacco Use     Smoking status: Former     Packs/day: 0.50     Years: 20.00     Pack years:  10.00     Types: Cigarettes     Start date: 1987     Quit date: 2006     Years since quittin.5     Smokeless tobacco: Never   Substance and Sexual Activity     Alcohol use: Yes     Comment: occ     Drug use: No     Sexual activity: Yes     Partners: Female   Other Topics Concern      Service Yes     Blood Transfusions No     Caffeine Concern No     Occupational Exposure No     Hobby Hazards No     Sleep Concern No     Stress Concern No     Weight Concern Yes     Comment: Would like to lose some weight     Special Diet No     Back Care No     Exercise Yes     Bike Helmet No     Seat Belt Yes     Self-Exams Yes     Parent/sibling w/ CABG, MI or angioplasty before 65F 55M? No   Social History Narrative     Not on file     Social Determinants of Health     Financial Resource Strain: Not on file   Food Insecurity: Not on file   Transportation Needs: No Transportation Needs (3/24/2020)    PRAPARE - Transportation      Lack of Transportation (Medical): No      Lack of Transportation (Non-Medical): No   Physical Activity: Inactive (3/24/2020)    Exercise Vital Sign      Days of Exercise per Week: 0 days      Minutes of Exercise per Session: 0 min   Stress: Not on file   Social Connections: Not on file   Intimate Partner Violence: Not on file   Housing Stability: Not on file       PHYSICAL EXAM:    BP (!) 176/105 (BP Location: Left arm)   Pulse 94   Temp 98.6  F (37  C) (Oral)   Resp 19   Ht 1.829 m (6')   Wt 116.7 kg (257 lb 4.4 oz)   SpO2 94%   BMI 34.89 kg/m    GENERAL: NAD  HEENT:  Normocephalic. No gross abnormalities.  Pupils equal.  MMM.   CV: RRR, no murmurs, no clicks, gallops, or rubs  RESP: Clear bilaterally with good efforts. No wheezes or crackles  GI: Abdomen obese, soft, NT  MUSCULOSKELETAL: Bilateral BKA. R leg-no edema. L stump is swollen with wound vac  SKIN: no suspicious lesions or rashes, dry to touch  NEURO: Awake, alert and answering questions.  PSYCH: He is frustrated and is in  a foul mood  LYMPH: No palpable ant/post cervical     LABS:      CBC RESULTS:     Recent Labs   Lab 07/03/23  0747 07/02/23  0726 07/01/23  0656 06/30/23  0712 06/29/23  0830 06/27/23  0726   WBC 25.0* 21.7* 19.5*  --  21.7* 21.3*   RBC 3.52* 3.28* 3.53*  --  3.85* 3.92*   HGB 10.2* 9.7* 10.3*  --  11.3* 11.4*   HCT 30.1* 28.5* 30.4*  --  32.8* 34.0*    331 354 354 355 288       BMP RESULTS:  Recent Labs   Lab 07/03/23  1207 07/03/23  0747 07/03/23  0227 07/02/23  1224 07/02/23  0746 07/02/23  0726 07/01/23  1737 07/01/23  1600 07/01/23  0855 07/01/23  0656 06/30/23  0849 06/30/23  0712 06/29/23  0835 06/29/23  0830 06/27/23  0757 06/27/23  0726   NA  --  134*  --   --   --  134*  --  136  --  136  --   --   --   --   --  135*   POTASSIUM  --  3.8  --   --   --  4.0  --  4.1  --  3.8  --   --   --   --   --  3.4   CHLORIDE  --  101  --   --   --  101  --  102  --  103  --   --   --   --   --  102   CO2  --  18*  --   --   --  23  --  22  --  21*  --   --   --   --   --  22   BUN  --  40.8*  --   --   --  33.8*  --  31.5*  --  31.0*  --   --   --   --   --  19.7   CR  --  4.16*  --   --   --  2.97*  --  2.60*  --  2.54*  --  1.82*  --  1.69*   < > 1.58*   GLC 87 83 107* 120* 73 79   < > 119*   < > 148*   < >  --    < >  --    < > 101*   FERNANDO  --  8.0*  --   --   --  8.0*  --  8.1*  --  7.9*  --   --   --   --   --  8.3*    < > = values in this interval not displayed.       INR  Recent Labs   Lab 06/27/23  1502   INR 1.06        DIAGNOSTICS:  Reviewed    Renal ultrasound    RIGHT KIDNEY: 13.4 x 8 x 8.3 cm. Normal cortical thickness and echogenicity. Simple 1.3 cm lower pole cyst, which does not require follow-up. No sonographically detectable calculi. No hydronephrosis.     LEFT KIDNEY: 14.3 x 6.2 x 6 cm. Normal cortical thickness and echogenicity. No definite sonographically detectable calculi. A 0.8 cm nonshadowing echogenic focus at the lower pole is favored to be artifactual rather than a calculus. No  hydronephrosis.     BLADDER: Partially distended and contains a small amount of layering debris.                                                                      IMPRESSION:     1.  No hydronephrosis.     2.  Small amount of intraluminal bladder debris.    A/P:  57 y.o man with CKD III due to DM and HTN, consulted for JOHNATHAN.     # CKD III:    -bl ~ 1.5-1.7 mg/dl   -2.5 g/g albuminuria    # Severe JOHNATHAN: Non-oliguric. Suspect pre-renal. Also on Zosyn/dapto   -FENa is too low to calculate   -UA with hyaline casts    # Infected left stump with osteomyelitis s/p I&D:   -dapto/zosyn    # FEN: Seems euvolemic. Hyponatremia and acidosis.     # Hypertension:    -Jardiance and lisinopril are on hold    Plan:   # Change NS to LR  # Dose all medications to eGR of <10 ml/min  #Avoid NSAIDs and IV dye  # Add-peripheral eosinophil  # start metoprolol 25 mg bid      Jag Gomes MD  [facility] - Nephrology  Office Phone: [phone]  Pager: [phone]

## 2023-07-03 NOTE — PROGRESS NOTES
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     Sepsis due Lt BKA stump infection  S/p I&D of of left BKA stump on 6/30/23  Patient presented with pain, redness, swelling at his left BKA stump site.  Marked leukocytosis of 22.9.  Tachycardic to 110s.  X-ray Shows soft tissue swelling, some bone formation along the resection margin of the tibia with periosteal reaction.       Initially on vancomycin and Zosyn.      ID vancomycin changed to daptomycin on (6/26-); Zosyn (6/24-)    Vascular surgery following    MRI: signal changes compatible with osteomyelitis of the residual tibia along with fluid collection suspicious for abscesses.    Underwent I&D of left stump on 6/30/23 with post-op placement of wound vac.     wound vac management per vascular surgery    follow bone cultures and pathology. If evidence of osteomyelitis will plan to proceed with left above knee amputation in coming week. Cx NGTD and pathology pending on 07/02/23.    Continued leukocytosis, otherwise afebrile, hemodynamically stable    For pain related to above, using oxycodone judiciously: 15 mg over last 24 hours.    Started bowel regimen on 07/02/23. Notes abdominal bloating, but having regular BMs      Acute kidney injury on CKD stage III - Cr @ admission 2.45. Last Cr PTA 1.9 in 11/2022. (prior to that baseline ~ 1.6)   * Initially improved to baseline and started going back up 6/28. Now 4 as of 7/3   * Only received 2 doses of vancomycin, last dose 6/25. No levels obtained. I do not see any contrast. No NSAIDs  *  No episodes of hypotension here.   * Vancomycin stopped and remains on zosyn and dapto       S/p LR x 1 L on 7/1/23, and SBP has been up, so prerenal seems less likely.     Cr up further on 07/02/23, UOP okay. 1LNS given again 7/3    Check bladder scan and renal ultrsound - WNL; requested straight cath    Renal consulted 7/3    Stat UA and FENA > If low, resume IVF with NS    Holding PTA lisinopril.     Daily BMP    Type II DM, uncontrolled, on insulin, A1c 7.6 on 6/27/23    BS falling below 100 in the context of JOHNATHAN    Decrease PTA Tresiba to 25 units (previously 34 units) on 07/02/23     Continue to hold PTA oral agents including Jardiance, Januvia.    Hypoglycemia protocol    Mild hyponatremia    FENA and treat JOHNATHAN above.      Diet: Regular Diet Adult    DVT Prophylaxis: Heparin SQ  [place] Catheter: Not present  Lines: None     Cardiac Monitoring: None  Code Status: Full Code      Clinically Significant Risk Factors              # Hypoalbuminemia: Lowest albumin = 2.6 g/dL at 7/1/2023  4:00 PM, will monitor as appropriate    # Acute Kidney Injury, unspecified: based on a >150% or 0.3 mg/dL increase in last creatinine compared to past 90 day average, will monitor renal function  # Hypertension: Noted on problem list       # DMII: A1C = 7.6 % (Ref range: <5.7 %) within past 6 months   # Obesity: Estimated body mass index is 34.89 kg/m  as calculated from the following:    Height as of this encounter: 1.829 m (6').    Weight as of this encounter: 116.7 kg (257 lb 4.4 oz).           Disposition Plan     Expected Discharge Date: 07/07/2023      Destination: home;home with family  Discharge Comments: Awaiting 6/30/23 bone culture/pathology results.  If no oseto, likely needs I&D and closure.  If osteo, likely AKA.          Bell Mitchell MD  Hospitalist Service  [facility]  Securely message with [employer] (more info)  Text page via [facility] Paging/Directory    ______________________________________________________________________    Interval History   Doing ok  Still nauseous  Advised for nausea meds pre meals  Senses needs a bM today  No fevers/chills    Physical Exam   Vital Signs: Temp: 98.8  F (37.1  C) Temp src: Oral BP: (!) 163/108 Pulse: 95   Resp: 20 SpO2: 94 % O2 Device: None (Room air)    Weight: 257 lbs 4.43 oz    General: No acute distress, breathing comfortably on room air  Neuro: EOMI, PERRLA. Facial muscles symmetric, strength/sensation grossly intact.  HEENT: Anicteric sclera. Oropharynx is clear. No lymphadenopathy.  Chest/Lungs: No accessory respiratory muscle use. Adequate air movement throughout. CTAB.  CV: Normal rate, regular rhythm. nl S1/S2. No m/r/g. Cap refill &lt;2s.  Abd: BS+. Soft, NTND.  Ext: Warm, well-perfused. Wound vac in place  Skin: No new rashes      Medical Decision Making       40 MINUTES SPENT BY ME on the date of service doing chart review, history, exam, documentation & further activities per the note.      Data     I have personally reviewed the following data over the past 24 hrs:    25.0 (H)  \   10.2 (L)   / 333     134 (L) 101 40.8 (H) /  87   3.8 18 (L) 4.16 (H) \       Imaging results reviewed over the past 24 hrs:   Recent Results (from the past 24 hour(s))   US Renal Complete Non-Vascular    Narrative    EXAM: US RENAL COMPLETE NON-VASCULAR  LOCATION: [facility]  DATE: 7/2/2023    INDICATION: Acute kidney injury.  COMPARISON: Renal ultrasound 07/19/2021.  TECHNIQUE: Routine Bilateral Renal and Bladder Ultrasound.    FINDINGS:    RIGHT KIDNEY: 13.4 x 8 x 8.3 cm. Normal cortical thickness and echogenicity. Simple 1.3 cm lower pole cyst, which does not require follow-up. No sonographically detectable calculi. No hydronephrosis.    LEFT KIDNEY: 14.3 x 6.2 x 6 cm. Normal cortical thickness and echogenicity. No definite sonographically detectable calculi. A 0.8 cm nonshadowing echogenic focus at the  lower pole is favored to be artifactual rather than a calculus. No hydronephrosis.    BLADDER: Partially distended and contains a small amount of layering debris.      Impression    IMPRESSION:    1.  No hydronephrosis.    2.  Small amount of intraluminal bladder debris.     Recent Labs   Lab 07/03/23  1207 07/03/23  0747 07/03/23  0227 07/02/23  0746 07/02/23  0726 07/01/23  1737 07/01/23  1600 07/01/23  0855 07/01/23  0656 06/27/23  1718 06/27/23  1502   WBC  --  25.0*  --   --  21.7*  --   --   --  19.5*   < >  --    HGB  --  10.2*  --   --  9.7*  --   --   --  10.3*   < >  --    MCV  --  86  --   --  87  --   --   --  86   < >  --    PLT  --  333  --   --  331  --   --   --  354   < >  --    INR  --   --   --   --   --   --   --   --   --   --  1.06   NA  --  134*  --   --  134*  --  136  --  136  --   --    POTASSIUM  --  3.8  --   --  4.0  --  4.1  --  3.8  --   --    CHLORIDE  --  101  --   --  101  --  102  --  103  --   --    CO2  --  18*  --   --  23  --  22  --  21*  --   --    BUN  --  40.8*  --   --  33.8*  --  31.5*  --  31.0*  --   --    CR  --  4.16*  --   --  2.97*  --  2.60*  --  2.54*   < >  --    ANIONGAP  --  15  --   --  10  --  12  --  12  --   --    FERNANDO  --  8.0*  --   --  8.0*  --  8.1*  --  7.9*  --   --    GLC 87 83 107*   < > 79   < > 119*   < > 148*   < >  --    ALBUMIN  --   --   --   --   --   --  2.6*  --   --   --   --     < > = values in this interval not displayed.

## 2023-07-03 NOTE — PROVIDER NOTIFICATION
MD Notification    Notified Person: MD    Notified Person Name: Dr. Zafar    Notification Date/Time: 07/03/23 0459    Notification Interaction: AmCom    Purpose of Notification:BP elevated multiple checks. 180/115. Inadequate urine output, bladder scan 14mL. Please advise.    Orders Received:    Comments:

## 2023-07-03 NOTE — PLAN OF CARE
Pt is AOx4, pain is managed with oxy, L popliteal pulse +2, wound vac in place, minimal output, voiding in urinal, intake encouraged, no nausea today, fair appetite, IVF, ADLs encouraged, PVR 100c, adequate output for shift, VSS, plan for abx and pending cultures.

## 2023-07-03 NOTE — PROGRESS NOTES
[facility]    Infectious Disease Progress Note    Date of Service (when I saw the patient): 07/03/2023     Assessment & Plan   Ry Horner is a 57 year old male who was admitted on 6/24/2023.     Impression:  1. 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 2. Stage III CKD  3. Bilateral BKA   4. Presented to the ER due to pain, swelling and redness with small ulcer at Lt BKA stump and found to have cellulitis/sepsis. This BKA was done in 2022   5. No history of MRSA  6. Workup shows JOHNATHAN  7. Started on vanc ( only 2 doses) and zosyn switched to dapto on 6/26      Recommendations:   Continue on daptomycin continue on zosyn   S/P: Incision and debridement of left below-knee stump today on 6/30   Pending OR cultures and path pending so far no micro data but patient has been on broad spectrum antibiotics   Will continue to follow      CT   IMPRESSION:  1.  Postoperative changes prior below-the-knee amputation.  2.  Poorly defined soft tissue and fluid along the distal stump, presumably an abscess until proven otherwise. This measures at least 5.9 x 5.3 x 7.8 cm. The collection abuts the tibial resection margin, and tracks a short segment proximally along the   posterolateral tibial shaft as described.  3.  There is some faint periosteal bone formation and erosive change along the tibial resection margin which shows raise concern for early osteomyelitis.    Franklin Maza MD    Interval History   No new micro    Creat up   Nephrology evaluating     Physical Exam   Temp: 98.6  F (37  C) Temp src: Oral BP: (!) 176/105 Pulse: 94   Resp: 19 SpO2: 94 % O2 Device: None (Room air)    Vitals:    07/01/23 0511 07/02/23 0500 07/03/23 0615   Weight: 112.2 kg (247 lb 5.7 oz) 114.4 kg (252 lb 3.3 oz) 116.7 kg (257 lb 4.4 oz)     Vital Signs with Ranges  Temp:  [98.6  F (37  C)-99.2  F (37.3  C)] 98.6  F (37  C)  Pulse:  [79-98] 94  Resp:  [14-20] 19  BP:  (160-193)/() 176/105  SpO2:  [94 %-97 %] 94 %     Constitutional: Awake, alert, cooperative, no apparent distress  Lungs: Clear to auscultation bilaterally, no crackles or wheezing  Cardiovascular: Regular rate and rhythm, normal S1 and S2, and no murmur noted  Abdomen: Normal bowel sounds, soft, non-distended, non-tender  Skin: dressing on the  left BKA   Other:       Medications     lactated ringers         [START ON 7/4/2023] DAPTOmycin (CUBICIN) 600 mg in sodium chloride 0.9 % 100 mL intermittent infusion  6 mg/kg (Adjusted) Intravenous Q48H     ezetimibe  10 mg Oral Daily     heparin ANTICOAGULANT  5,000 Units Subcutaneous Q12H     insulin aspart  1-7 Units Subcutaneous TID AC     insulin aspart  1-5 Units Subcutaneous At Bedtime     insulin degludec  25 Units Subcutaneous BID     metoprolol tartrate  25 mg Oral BID     piperacillin-tazobactam  2.25 g Intravenous Q6H     polyethylene glycol  17 g Oral BID     sennosides  1-2 tablet Oral BID     sodium chloride (PF)  3 mL Intracatheter Q8H       Data   All microbiology laboratory data reviewed.  Recent Labs   Lab Test 07/03/23  0747 07/02/23  0726 07/01/23  0656   WBC 25.0* 21.7* 19.5*   HGB 10.2* 9.7* 10.3*   HCT 30.1* 28.5* 30.4*   MCV 86 87 86    331 354     Recent Labs   Lab Test 07/03/23  0747 07/02/23  0726 07/01/23  1600   CR 4.16* 2.97* 2.60*     Recent Labs   Lab Test 09/25/22  1529   SED 85*     Recent Labs   Lab Test 02/03/20  1324 02/03/20  0007 02/02/20  2250 12/04/19  1619 11/19/19  1829 11/17/19  1525 11/17/19  1419 11/17/19  1411 08/28/17  1136   CULT Heavy growth  beta hemolytic   Streptococcus constellatus  *  Light growth  Staphylococcus aureus  * No growth No growth No anaerobes isolated  No growth Light growth  Bacteroides fragilis  *  Light growth  Parvimonas micra  *  Susceptibility testing not routinely done  On day 2, isolated in broth only:  beta hemolytic   Streptococcus constellatus  * Heavy growth  beta hemolytic    Streptococcus constellatus  Susceptibility testing done on previous specimen  *  Light growth  Alcaligenes faecalis  *  Light growth  Staphylococcus aureus  *  On day 1, isolated in broth only:  Anaerobic gram negative rods  See anaerobic report for identification  * Heavy growth  Bacteroides fragilis  Susceptibility testing not routinely done  *  Heavy growth  Peptoniphilus asaccharolyticus  Susceptibility testing not routinely done  *  Moderate growth  beta hemolytic   Streptococcus constellatus  *  On day 1, isolated in broth only:  Anaerobic gram negative rods  See anaerobic report for identification  * Heavy growth  Bacteroides fragilis  *  Heavy growth  Parvimonas micra  *  Heavy growth  Peptoniphilus asaccharolyticus  *  Heavy growth  Mixed aerobic and anaerobic rosa  *  Susceptibility testing not routinely done No growth       All cultures:  Recent Labs   Lab 06/30/23  0820 06/30/23  0819 06/30/23  0812 06/30/23  0811   CULTURE No growth after 2 days No anaerobic organisms isolated after 3 days No anaerobic organisms isolated after 3 days No growth after 2 days      Blood culture:  Results for orders placed or performed during the hospital encounter of 06/24/23   Blood Culture Peripheral Blood    Specimen: Peripheral Blood   Result Value Ref Range    Culture No Growth    Blood Culture Peripheral Blood    Specimen: Peripheral Blood   Result Value Ref Range    Culture No Growth    Results for orders placed or performed during the hospital encounter of 02/02/20   Blood culture    Specimen: Blood    Right Arm   Result Value Ref Range    Specimen Description Blood Right Arm     Culture Micro No growth    Blood culture    Specimen: Blood    Right Arm   Result Value Ref Range    Specimen Description Blood Right Arm     Culture Micro No growth    Results for orders placed or performed during the hospital encounter of 08/28/17   Blood culture    Specimen: Arm, Right; Blood    Right Arm   Result Value Ref  Range    Specimen Description Blood Right Arm     Special Requests Aerobic and anaerobic bottles received     Culture Micro No growth    Blood culture    Specimen: Arm, Right; Blood    Right Arm   Result Value Ref Range    Specimen Description Blood Right Arm     Special Requests Aerobic and anaerobic bottles received     Culture Micro No growth    Results for orders placed or performed during the hospital encounter of 07/14/16   Blood culture    Specimen: Blood   Result Value Ref Range    Specimen Description Blood Left Arm     Special Requests Aerobic and anaerobic bottles received     Culture Micro No growth     Micro Report Status FINAL 07/20/2016    Blood culture    Specimen: Blood   Result Value Ref Range    Specimen Description Blood Right Arm     Special Requests Aerobic and anaerobic bottles received     Culture Micro No growth     Micro Report Status FINAL 07/20/2016       Urine culture:  No results found for this or any previous visit.

## 2023-07-03 NOTE — PLAN OF CARE
Goal Outcome Evaluation:      Plan of Care Reviewed With: patient    Overall Patient Progress: decliningOverall Patient Progress: declining      [id number]  POD#3 LBKA w/ wound vac. A&Ox4, VSS RA except HTN. HTN episode x1 SBP>180, decreased naturally, per patient was experiencing nausea & dry heaving just prior. Pain managed with PRN Oxycodone, per patient Dilaudid does not work and causes nausea. Regular diet, poor intake. Bowel sounds present. LPIV needs monitoring. PIV NS infusing w/ intermittent abx. Voiding inadequate amt in bedside urinal, post void scan = 14mL. Can self reposition. Wound vacc continuous 125, scant sanguinous output, LLE warm/tender to touch w/ +2 pitting edema. Pending cultures and pathology. ID & Vascular following.

## 2023-07-04 LAB
ANION GAP SERPL CALCULATED.3IONS-SCNC: 15 MMOL/L (ref 7–15)
BASOPHILS # BLD AUTO: 0.1 10E3/UL (ref 0–0.2)
BASOPHILS NFR BLD AUTO: 0 %
BUN SERPL-MCNC: 48.3 MG/DL (ref 6–20)
CALCIUM SERPL-MCNC: 7.8 MG/DL (ref 8.6–10)
CHLORIDE SERPL-SCNC: 99 MMOL/L (ref 98–107)
CK SERPL-CCNC: 71 U/L (ref 39–308)
CREAT SERPL-MCNC: 5.28 MG/DL (ref 0.67–1.17)
DEPRECATED HCO3 PLAS-SCNC: 18 MMOL/L (ref 22–29)
EOSINOPHIL # BLD AUTO: 0 10E3/UL (ref 0–0.7)
EOSINOPHIL NFR BLD AUTO: 0 %
ERYTHROCYTE [DISTWIDTH] IN BLOOD BY AUTOMATED COUNT: 11.8 % (ref 10–15)
GFR SERPL CREATININE-BSD FRML MDRD: 12 ML/MIN/1.73M2
GLUCOSE BLDC GLUCOMTR-MCNC: 102 MG/DL (ref 70–99)
GLUCOSE BLDC GLUCOMTR-MCNC: 114 MG/DL (ref 70–99)
GLUCOSE BLDC GLUCOMTR-MCNC: 145 MG/DL (ref 70–99)
GLUCOSE BLDC GLUCOMTR-MCNC: 152 MG/DL (ref 70–99)
GLUCOSE BLDC GLUCOMTR-MCNC: 96 MG/DL (ref 70–99)
GLUCOSE SERPL-MCNC: 108 MG/DL (ref 70–99)
HCT VFR BLD AUTO: 28.1 % (ref 40–53)
HGB BLD-MCNC: 9.5 G/DL (ref 13.3–17.7)
IMM GRANULOCYTES # BLD: 0.2 10E3/UL
IMM GRANULOCYTES NFR BLD: 1 %
LYMPHOCYTES # BLD AUTO: 0.9 10E3/UL (ref 0.8–5.3)
LYMPHOCYTES NFR BLD AUTO: 4 %
MCH RBC QN AUTO: 29.3 PG (ref 26.5–33)
MCHC RBC AUTO-ENTMCNC: 33.8 G/DL (ref 31.5–36.5)
MCV RBC AUTO: 87 FL (ref 78–100)
MONOCYTES # BLD AUTO: 1.2 10E3/UL (ref 0–1.3)
MONOCYTES NFR BLD AUTO: 5 %
NEUTROPHILS # BLD AUTO: 23.5 10E3/UL (ref 1.6–8.3)
NEUTROPHILS NFR BLD AUTO: 90 %
NRBC # BLD AUTO: 0 10E3/UL
NRBC BLD AUTO-RTO: 0 /100
PLATELET # BLD AUTO: 319 10E3/UL (ref 150–450)
POTASSIUM SERPL-SCNC: 4 MMOL/L (ref 3.4–5.3)
RBC # BLD AUTO: 3.24 10E6/UL (ref 4.4–5.9)
SODIUM SERPL-SCNC: 132 MMOL/L (ref 136–145)
WBC # BLD AUTO: 25.9 10E3/UL (ref 4–11)

## 2023-07-04 PROCEDURE — 99232 SBSQ HOSP IP/OBS MODERATE 35: CPT | Performed by: INTERNAL MEDICINE

## 2023-07-04 PROCEDURE — 258N000003 HC RX IP 258 OP 636: Performed by: INTERNAL MEDICINE

## 2023-07-04 PROCEDURE — 258N000003 HC RX IP 258 OP 636

## 2023-07-04 PROCEDURE — 250N000011 HC RX IP 250 OP 636: Mod: JZ

## 2023-07-04 PROCEDURE — 250N000013 HC RX MED GY IP 250 OP 250 PS 637: Performed by: INTERNAL MEDICINE

## 2023-07-04 PROCEDURE — 250N000013 HC RX MED GY IP 250 OP 250 PS 637: Performed by: STUDENT IN AN ORGANIZED HEALTH CARE EDUCATION/TRAINING PROGRAM

## 2023-07-04 PROCEDURE — 36415 COLL VENOUS BLD VENIPUNCTURE: CPT | Performed by: INTERNAL MEDICINE

## 2023-07-04 PROCEDURE — 999N000040 HC STATISTIC CONSULT NO CHARGE VASC ACCESS

## 2023-07-04 PROCEDURE — 85025 COMPLETE CBC W/AUTO DIFF WBC: CPT | Performed by: INTERNAL MEDICINE

## 2023-07-04 PROCEDURE — 80048 BASIC METABOLIC PNL TOTAL CA: CPT | Performed by: INTERNAL MEDICINE

## 2023-07-04 PROCEDURE — 82550 ASSAY OF CK (CPK): CPT | Performed by: PHYSICIAN ASSISTANT

## 2023-07-04 PROCEDURE — 250N000011 HC RX IP 250 OP 636: Performed by: PHYSICIAN ASSISTANT

## 2023-07-04 PROCEDURE — 120N000001 HC R&B MED SURG/OB

## 2023-07-04 PROCEDURE — 250N000013 HC RX MED GY IP 250 OP 250 PS 637: Performed by: PHYSICIAN ASSISTANT

## 2023-07-04 RX ORDER — METOPROLOL TARTRATE 50 MG
50 TABLET ORAL 2 TIMES DAILY
Status: DISCONTINUED | OUTPATIENT
Start: 2023-07-04 | End: 2023-07-12

## 2023-07-04 RX ADMIN — HEPARIN SODIUM 5000 UNITS: 5000 INJECTION, SOLUTION INTRAVENOUS; SUBCUTANEOUS at 21:24

## 2023-07-04 RX ADMIN — PIPERACILLIN AND TAZOBACTAM 2.25 G: 2; .25 INJECTION, POWDER, FOR SOLUTION INTRAVENOUS at 05:34

## 2023-07-04 RX ADMIN — PIPERACILLIN AND TAZOBACTAM 2.25 G: 2; .25 INJECTION, POWDER, FOR SOLUTION INTRAVENOUS at 12:29

## 2023-07-04 RX ADMIN — ACETAMINOPHEN 650 MG: 325 TABLET, FILM COATED ORAL at 09:42

## 2023-07-04 RX ADMIN — OXYCODONE HYDROCHLORIDE 5 MG: 5 TABLET ORAL at 09:42

## 2023-07-04 RX ADMIN — HEPARIN SODIUM 5000 UNITS: 5000 INJECTION, SOLUTION INTRAVENOUS; SUBCUTANEOUS at 09:42

## 2023-07-04 RX ADMIN — EZETIMIBE 10 MG: 10 TABLET ORAL at 09:42

## 2023-07-04 RX ADMIN — PIPERACILLIN AND TAZOBACTAM 2.25 G: 2; .25 INJECTION, POWDER, FOR SOLUTION INTRAVENOUS at 17:18

## 2023-07-04 RX ADMIN — SODIUM CHLORIDE, POTASSIUM CHLORIDE, SODIUM LACTATE AND CALCIUM CHLORIDE: 600; 310; 30; 20 INJECTION, SOLUTION INTRAVENOUS at 05:34

## 2023-07-04 RX ADMIN — DAPTOMYCIN 600 MG: 500 INJECTION, POWDER, LYOPHILIZED, FOR SOLUTION INTRAVENOUS at 14:54

## 2023-07-04 RX ADMIN — ONDANSETRON 4 MG: 4 TABLET, ORALLY DISINTEGRATING ORAL at 09:42

## 2023-07-04 RX ADMIN — INSULIN ASPART 1 UNITS: 100 INJECTION, SOLUTION INTRAVENOUS; SUBCUTANEOUS at 16:57

## 2023-07-04 RX ADMIN — SENNOSIDES 1 TABLET: 8.6 TABLET, FILM COATED ORAL at 21:24

## 2023-07-04 RX ADMIN — METOPROLOL TARTRATE 50 MG: 50 TABLET, FILM COATED ORAL at 21:24

## 2023-07-04 RX ADMIN — OXYCODONE HYDROCHLORIDE 5 MG: 5 TABLET ORAL at 21:24

## 2023-07-04 RX ADMIN — METOPROLOL TARTRATE 25 MG: 25 TABLET, FILM COATED ORAL at 09:42

## 2023-07-04 RX ADMIN — OXYCODONE HYDROCHLORIDE 5 MG: 5 TABLET ORAL at 16:56

## 2023-07-04 ASSESSMENT — ACTIVITIES OF DAILY LIVING (ADL)
ADLS_ACUITY_SCORE: 19

## 2023-07-04 NOTE — PROGRESS NOTES
Vascular Surgery Note    S: No fevers or chills    O  BP (!) 162/104 (BP Location: Right arm)   Pulse 95   Temp 97.8  F (36.6  C) (Oral)   Resp 20   Ht 1.829 m (6')   Wt 120.9 kg (266 lb 8.6 oz)   SpO2 93%   BMI 36.15 kg/m    Gen Aox 3  Resp: NLB  Extremities: L BKA stump with improving edema, minimal erythema     BAse of wound clean with any areas of necrosis , wound vac replaced     Cx negative (checked 7.4)        A/P  Mr. Horner is a 56yo man with a left BKA stump infection s/p I&D, bedside vac change, recovering. WBC persistently elevated     - Continue current Rx, ID following  - RTOR this week, timing TBD    Kia Luevano MD  Fellow

## 2023-07-04 NOTE — PROGRESS NOTES
Renal Medicine Progress Note            Assessment/Plan:     57 y.o man with CKD III due to DM and HTN, consulted for JOHNATHAN.      1) CKD III:                -bl ~ 1.5-1.9 mg/dl (GFR 45-55)               -2.5 g/g albuminuria     2)  Severe JOHNATHAN: Non-oliguric. At baseline creatinine until 6/29 and subsequent rise, more dramatically after OR 6/30.    - on Zosyn/dapto               -urine sodum <20 7/2 reflecting possibe pre-renal state.                -UA with hyaline casts    - Renal US with large sized kidneys, especially with underlying CKD, this nephromegaly likely reflects some ATN injury.    With edema, hypertension I don't think he is in an ongoing pre-renal state and no improvemente with isotonic fluids. Thus will stop and assume he has ATN injury. Will monitor for dialytic needs.     3)  Infected left stump with osteomyelitis s/p I&D:               -dapto/zosyn     #4) FEN: Seems euvolemic. Hyponatremia and acidosis.      5)  Hypertension:                -Jardiance and lisinopril are on hold     6) anemia: Hgb 9.5..     Plan:   1) changing diet to renal  2) stopping IVF's  3) Dose all medications to eGR of <10 ml/min  4) avoid NSAIDs and IV dye      Julio Rios DO  [facility]  Office: [phone]  Cell: [phone]        Interval History:     Remains on LR at 100cc/hr.      7/3 I/O's: 3108/465, today 2024/200 urine.   BP's 160-180 systolic.     Spouse at bedside. Discussed JOHNATHAN and related issues. He is pushing fluids.          Medications and Allergies:       DAPTOmycin (CUBICIN) 600 mg in sodium chloride 0.9 % 100 mL intermittent infusion  6 mg/kg (Adjusted) Intravenous Q48H     ezetimibe  10 mg Oral Daily     heparin ANTICOAGULANT  5,000 Units Subcutaneous Q12H     insulin aspart  1-7 Units Subcutaneous TID AC     insulin aspart  1-5 Units Subcutaneous At Bedtime     insulin degludec  25 Units Subcutaneous BID     metoprolol tartrate  50 mg Oral BID     piperacillin-tazobactam  2.25 g  "Intravenous Q6H     polyethylene glycol  17 g Oral BID     sennosides  1-2 tablet Oral BID     sodium chloride (PF)  3 mL Intracatheter Q8H        Allergies   Allergen Reactions     Pravastatin      \"sucked the life blood out of me,\" Indicates this occurs with all statins.     Statins             Physical Exam:   Vitals were reviewed  BP (!) 162/104 (BP Location: Right arm)   Pulse 95   Temp 97.8  F (36.6  C) (Oral)   Resp 20   Ht 1.829 m (6')   Wt 120.9 kg (266 lb 8.6 oz)   SpO2 93%   BMI 36.15 kg/m      Wt Readings from Last 3 Encounters:   07/04/23 120.9 kg (266 lb 8.6 oz)   10/03/22 110 kg (242 lb 8.1 oz)   08/17/22 119.9 kg (264 lb 6.4 oz)       Intake/Output Summary (Last 24 hours) at 7/4/2023 1619  Last data filed at 7/4/2023 1400  Gross per 24 hour   Intake 2244 ml   Output 200 ml   Net 2044 ml       GENERAL APPEARANCE: alert, oriented  HEENT:  Eyes/ears/nose/neck grossly normal  RESP: lungs cta b c good efforts, no crackles, rhonchi or wheezes  CV: RRR, nl S1/S2, no m/r/g   ABDOMEN: o/s/nt/nd, bs present  EXTREMITIES/SKIN: no c/c/rashes/lesions; 2+ b/l leg edema             Data:     BMP  Recent Labs   Lab 07/04/23  1242 07/04/23  0622 07/04/23  0539 07/04/23  0203 07/03/23  1207 07/03/23  0747 07/02/23  0746 07/02/23  0726 07/01/23  1737 07/01/23  1600   NA  --  132*  --   --   --  134*  --  134*  --  136   POTASSIUM  --  4.0  --   --   --  3.8  --  4.0  --  4.1   CHLORIDE  --  99  --   --   --  101  --  101  --  102   FERNANDO  --  7.8*  --   --   --  8.0*  --  8.0*  --  8.1*   CO2  --  18*  --   --   --  18*  --  23  --  22   BUN  --  48.3*  --   --   --  40.8*  --  33.8*  --  31.5*   CR  --  5.28*  --   --   --  4.16*  --  2.97*  --  2.60*   GLC 96 108* 102* 114*   < > 83   < > 79   < > 119*    < > = values in this interval not displayed.     CBC  Recent Labs   Lab 07/04/23  0622 07/03/23  0747 07/02/23  0726 07/01/23  0656   WBC 25.9* 25.0* 21.7* 19.5*   HGB 9.5* 10.2* 9.7* 10.3*   HCT 28.1* 30.1* " 28.5* 30.4*   MCV 87 86 87 86    333 331 354     Lab Results   Component Value Date    AST 13 06/24/2023    ALT 14 06/24/2023    ALKPHOS 84 06/24/2023    BILITOTAL 0.9 06/24/2023     Lab Results   Component Value Date    INR 1.06 06/27/2023       Attestation:  I have reviewed today's vital signs, notes, medications, labs and imaging.    DO [facility]  Office: [phone]  Cell: [phone]       eyeglasses

## 2023-07-04 NOTE — PROGRESS NOTES
[facility]    Infectious Disease Progress Note    Date of Service (when I saw the patient): 07/04/2023     Assessment & Plan   Ry Horner is a 57 year old male who was admitted on 6/24/2023.     Impression:  1. 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 2. Stage III CKD  3. Bilateral BKA   4. Presented to the ER due to pain, swelling and redness with small ulcer at Lt BKA stump and found to have cellulitis/sepsis. This BKA was done in 2022   5. No history of MRSA  6. Workup shows JOHNATHAN  7. Started on vanc ( only 2 doses) and zosyn switched to dapto on 6/26      Recommendations:   Continue on daptomycin continue on zosyn   S/P: Incision and debridement of left below-knee stump today on 6/30   Pending OR cultures and path pending so far no micro data but patient has been on broad spectrum antibiotics   Will continue to follow      CT   IMPRESSION:  1.  Postoperative changes prior below-the-knee amputation.  2.  Poorly defined soft tissue and fluid along the distal stump, presumably an abscess until proven otherwise. This measures at least 5.9 x 5.3 x 7.8 cm. The collection abuts the tibial resection margin, and tracks a short segment proximally along the   posterolateral tibial shaft as described.  3.  There is some faint periosteal bone formation and erosive change along the tibial resection margin which shows raise concern for early osteomyelitis.    Franklin Maza MD    Interval History   No new micro    Creat up   Nephrology evaluating     Physical Exam   Temp: 97.8  F (36.6  C) Temp src: Oral BP: (!) 162/104 Pulse: 95   Resp: 20 SpO2: 93 % O2 Device: None (Room air)    Vitals:    07/02/23 0500 07/03/23 0615 07/04/23 0627   Weight: 114.4 kg (252 lb 3.3 oz) 116.7 kg (257 lb 4.4 oz) 120.9 kg (266 lb 8.6 oz)     Vital Signs with Ranges  Temp:  [97.5  F (36.4  C)-99  F (37.2  C)] 97.8  F (36.6  C)  Pulse:  [92-96] 95  Resp:  [18-20] 20  BP:  (162-183)/() 162/104  SpO2:  [93 %-94 %] 93 %     Constitutional: Awake, alert, cooperative, no apparent distress  Lungs: Clear to auscultation bilaterally, no crackles or wheezing  Cardiovascular: Regular rate and rhythm, normal S1 and S2, and no murmur noted  Abdomen: Normal bowel sounds, soft, non-distended, non-tender  Skin: dressing on the  left BKA   Other:       Medications     lactated ringers 100 mL/hr at 07/04/23 0534       DAPTOmycin (CUBICIN) 600 mg in sodium chloride 0.9 % 100 mL intermittent infusion  6 mg/kg (Adjusted) Intravenous Q48H     ezetimibe  10 mg Oral Daily     heparin ANTICOAGULANT  5,000 Units Subcutaneous Q12H     insulin aspart  1-7 Units Subcutaneous TID AC     insulin aspart  1-5 Units Subcutaneous At Bedtime     insulin degludec  25 Units Subcutaneous BID     metoprolol tartrate  25 mg Oral BID     piperacillin-tazobactam  2.25 g Intravenous Q6H     polyethylene glycol  17 g Oral BID     sennosides  1-2 tablet Oral BID     sodium chloride (PF)  3 mL Intracatheter Q8H       Data   All microbiology laboratory data reviewed.  Recent Labs   Lab Test 07/04/23  0622 07/03/23  0747 07/02/23  0726   WBC 25.9* 25.0* 21.7*   HGB 9.5* 10.2* 9.7*   HCT 28.1* 30.1* 28.5*   MCV 87 86 87    333 331     Recent Labs   Lab Test 07/04/23  0622 07/03/23  0747 07/02/23  0726   CR 5.28* 4.16* 2.97*     Recent Labs   Lab Test 09/25/22  1529   SED 85*     Recent Labs   Lab Test 02/03/20  1324 02/03/20  0007 02/02/20  2250 12/04/19  1619 11/19/19  1829 11/17/19  1525 11/17/19  1419 11/17/19  1411 08/28/17  1136   CULT Heavy growth  beta hemolytic   Streptococcus constellatus  *  Light growth  Staphylococcus aureus  * No growth No growth No anaerobes isolated  No growth Light growth  Bacteroides fragilis  *  Light growth  Parvimonas micra  *  Susceptibility testing not routinely done  On day 2, isolated in broth only:  beta hemolytic   Streptococcus constellatus  * Heavy growth  beta  hemolytic   Streptococcus constellatus  Susceptibility testing done on previous specimen  *  Light growth  Alcaligenes faecalis  *  Light growth  Staphylococcus aureus  *  On day 1, isolated in broth only:  Anaerobic gram negative rods  See anaerobic report for identification  * Heavy growth  Bacteroides fragilis  Susceptibility testing not routinely done  *  Heavy growth  Peptoniphilus asaccharolyticus  Susceptibility testing not routinely done  *  Moderate growth  beta hemolytic   Streptococcus constellatus  *  On day 1, isolated in broth only:  Anaerobic gram negative rods  See anaerobic report for identification  * Heavy growth  Bacteroides fragilis  *  Heavy growth  Parvimonas micra  *  Heavy growth  Peptoniphilus asaccharolyticus  *  Heavy growth  Mixed aerobic and anaerobic rosa  *  Susceptibility testing not routinely done No growth       All cultures:  Recent Labs   Lab 06/30/23  0820 06/30/23  0819 06/30/23  0812 06/30/23  0811   CULTURE No growth after 3 days No anaerobic organisms isolated after 3 days No anaerobic organisms isolated after 3 days No growth after 3 days      Blood culture:  Results for orders placed or performed during the hospital encounter of 06/24/23   Blood Culture Peripheral Blood    Specimen: Peripheral Blood   Result Value Ref Range    Culture No Growth    Blood Culture Peripheral Blood    Specimen: Peripheral Blood   Result Value Ref Range    Culture No Growth    Results for orders placed or performed during the hospital encounter of 02/02/20   Blood culture    Specimen: Blood    Right Arm   Result Value Ref Range    Specimen Description Blood Right Arm     Culture Micro No growth    Blood culture    Specimen: Blood    Right Arm   Result Value Ref Range    Specimen Description Blood Right Arm     Culture Micro No growth    Results for orders placed or performed during the hospital encounter of 08/28/17   Blood culture    Specimen: Arm, Right; Blood    Right Arm   Result  Value Ref Range    Specimen Description Blood Right Arm     Special Requests Aerobic and anaerobic bottles received     Culture Micro No growth    Blood culture    Specimen: Arm, Right; Blood    Right Arm   Result Value Ref Range    Specimen Description Blood Right Arm     Special Requests Aerobic and anaerobic bottles received     Culture Micro No growth    Results for orders placed or performed during the hospital encounter of 07/14/16   Blood culture    Specimen: Blood   Result Value Ref Range    Specimen Description Blood Left Arm     Special Requests Aerobic and anaerobic bottles received     Culture Micro No growth     Micro Report Status FINAL 07/20/2016    Blood culture    Specimen: Blood   Result Value Ref Range    Specimen Description Blood Right Arm     Special Requests Aerobic and anaerobic bottles received     Culture Micro No growth     Micro Report Status FINAL 07/20/2016       Urine culture:  No results found for this or any previous visit.

## 2023-07-04 NOTE — PROGRESS NOTES
[id number]  POD#4 L BKA I&D.Patient AOX4. VSS on RA. Pt ambulating Independent. Bilateral BKA. Pain managed with oxycodone/tylenol. Wound Vac 125mmHg, sanguinous output. Voiding in urinal. BS audible. Diet Reg. PIV infusing LR@100ml/hr. Plan for discharge pending.

## 2023-07-04 NOTE — PLAN OF CARE
Up ind in room with prosthetics. Poor PO intake. IVF and abx infusing. Voiding infrequently. LLE vac changed today per vascular, plan for OR later this week.

## 2023-07-04 NOTE — PROGRESS NOTES
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      Sepsis due Lt BKA stump infection  S/p I&D of of left BKA stump on 6/30/23  Patient presented with pain, redness, swelling at his left BKA stump site.  Marked leukocytosis of 22.9. Tachycardic to 110s.  X-ray Shows soft tissue swelling, some bone formation along the resection margin of the tibia with periosteal reaction.   -Initially on vancomycin and Zosyn, subsequently changed by infectious disease to daptomycin and Zosyn  -Vascular surgery also following  -MRI of the stump showed findings compatible with osteomyelitis of the residual tibia along with fluid collection suspicious for abscess, underwent I&D of the left stump on 6/30/2023 with placement of wound VAC  -Bone culture negative thus far, however awaiting pathology, if evidence of osteomyelitis then plan will be to proceed with above-knee amputation in the coming weeks.  -Continues to have significant leukocytosis  -Hospital course complicated by acute kidney injury on CKD stage III.  Please see discussion below.    Acute oliguric kidney injury on CKD stage III   -Cr @ admission 2.45. Last Cr PTA 1.9 in 11/2022. (prior to that baseline ~ 1.6)   * Initially improved to baseline and started going back up 6/28.   Creatinine still peaking, up to 5.28 today.   * Only received 2 doses of vancomycin, last dose 6/25. No levels obtained. I do not see any contrast. No NSAIDs  * No episodes of hypotension here.   * Vancomycin stopped and remains on zosyn and dapto  -IV fluid  management per nephrology, currently on LR  -Renal ultrasound without evidence of hydronephrosis  -Nephrology following  -Holding prior to admission lisinopril.      Type II DM, uncontrolled, on insulin, A1c 7.6 on 6/27/23  -BS falling below 100 in the context of JOHNATHAN  -PTA Tresiba decreased to 25 units (previously 34 units) on 07/02/23   -Continue to hold PTA oral agents including Jardiance, Januvia.  -Hypoglycemia protocol    Essential hypertension  -Hold prior to admission lisinopril  -Blood pressure has been quite elevated this hospital stay, started on metoprolol 25 mg twice a day  -We will increase metoprolol to 50 mg twice a day     Mild hyponatremia     Diet: Regular Diet Adult     DVT Prophylaxis: Pneumatic Compression Devices   Corrales Catheter: Not present  Code Status: Full Code     Disposition Plan      Expected Discharge Date: 07/05/2023      Destination: home;home with family  Discharge Comments: Awaiting 6/30/23 bone culture/pathology results.  If no oseto, likely needs I&D and closure.  If osteo, likely AKA.      Entered: Kai Viramontes MD 07/04/2023, 10:26 AM        Clinically Significant Risk Factors              # Hypoalbuminemia: Lowest albumin = 2.6 g/dL at 7/1/2023  4:00 PM, will monitor as appropriate    # Acute Kidney Injury, unspecified: based on a >150% or 0.3 mg/dL increase in last creatinine compared to past 90 day average, will monitor renal function  # Hypertension: Noted on problem list       # DMII: A1C = 7.6 % (Ref range: <5.7 %) within past 6 months   # Obesity: Estimated body mass index is 36.15 kg/m  as calculated from the following:    Height as of this encounter: 1.829 m (6').    Weight as of this encounter: 120.9 kg (266 lb 8.6 oz).             The patient's care was discussed with the Bedside Nurse and Patient.    Medical Decision Making       **CLEAR ALL SELECTIONS**        Labs/Imaging Reviewed:  See Information above and Data section below    Time SPENT BY ME on the date  of service doing chart review, history, exam, documentation & further activities per the note:  35  MINUTES    Kai Viramontes MD  Hospitalist Service  [facility]  Text Page 7AM-6PM  Securely message with the Vocera Web Console (learn more here)  Text page via [facility] Paging/Directory    ______________________________________________________________________    Interval History   Creatinine is still peaking.  Patient concerned about decreased urine output in the last 24 hours.  Per intake and output charting he has voided about 375 cc in the last 24 hours.  Denies dyspnea.  Patient very frustrated about the complicated clinical course, uncertainty about whether or not he is going to need above-knee amputation and now worsening renal function.    Data reviewed today: I reviewed all medications, new labs and imaging results over the last 24 hours. I personally reviewed no images or EKG's today.    Physical Exam   Vital signs:  Temp: 97.8  F (36.6  C) Temp src: Oral BP: (!) 162/104 Pulse: 95   Resp: 20 SpO2: 93 % O2 Device: None (Room air) Oxygen Delivery: 6 LPM Height: 182.9 cm (6') Weight: 120.9 kg (266 lb 8.6 oz)  Estimated body mass index is 36.15 kg/m  as calculated from the following:    Height as of this encounter: 1.829 m (6').    Weight as of this encounter: 120.9 kg (266 lb 8.6 oz).      Wt Readings from Last 2 Encounters:   07/04/23 120.9 kg (266 lb 8.6 oz)   10/03/22 110 kg (242 lb 8.1 oz)       Gen: AAOX3, NAD, comfortable  HEENT: Supple neck, moist oral mucosa, no pallor  Resp: CTA B/L, normal WOB, no crackles, no wheezes  CVS: RRR, no murmur  Abd/GI: Soft, non-tender. BS- normoactive.   Skin: Warm, dry no rashes  MSK: Significant edema around the left stump  Neuro- CN- intact. No focal deficits.        Data   Recent Labs   Lab 07/04/23  0622 07/04/23  0539 07/04/23  0203 07/03/23  1207 07/03/23  0747 07/02/23  0746 07/02/23  0726 07/01/23  1737 07/01/23  1600 06/27/23  1718  06/27/23  1502   WBC 25.9*  --   --   --  25.0*  --  21.7*  --   --    < >  --    HGB 9.5*  --   --   --  10.2*  --  9.7*  --   --    < >  --    MCV 87  --   --   --  86  --  87  --   --    < >  --      --   --   --  333  --  331  --   --    < >  --    INR  --   --   --   --   --   --   --   --   --   --  1.06   *  --   --   --  134*  --  134*  --  136   < >  --    POTASSIUM 4.0  --   --   --  3.8  --  4.0  --  4.1   < >  --    CHLORIDE 99  --   --   --  101  --  101  --  102   < >  --    CO2 18*  --   --   --  18*  --  23  --  22   < >  --    BUN 48.3*  --   --   --  40.8*  --  33.8*  --  31.5*   < >  --    CR 5.28*  --   --   --  4.16*  --  2.97*  --  2.60*   < >  --    ANIONGAP 15  --   --   --  15  --  10  --  12   < >  --    FERNANDO 7.8*  --   --   --  8.0*  --  8.0*  --  8.1*   < >  --    * 102* 114*   < > 83   < > 79   < > 119*   < >  --    ALBUMIN  --   --   --   --   --   --   --   --  2.6*  --   --     < > = values in this interval not displayed.       No results found for this or any previous visit (from the past 24 hour(s)).  Medications     lactated ringers 100 mL/hr at 07/04/23 0534       DAPTOmycin (CUBICIN) 600 mg in sodium chloride 0.9 % 100 mL intermittent infusion  6 mg/kg (Adjusted) Intravenous Q48H     ezetimibe  10 mg Oral Daily     heparin ANTICOAGULANT  5,000 Units Subcutaneous Q12H     insulin aspart  1-7 Units Subcutaneous TID AC     insulin aspart  1-5 Units Subcutaneous At Bedtime     insulin degludec  25 Units Subcutaneous BID     metoprolol tartrate  25 mg Oral BID     piperacillin-tazobactam  2.25 g Intravenous Q6H     polyethylene glycol  17 g Oral BID     sennosides  1-2 tablet Oral BID     sodium chloride (PF)  3 mL Intracatheter Q8H

## 2023-07-05 ENCOUNTER — ANESTHESIA EVENT (OUTPATIENT)
Dept: SURGERY | Facility: CLINIC | Age: 58
DRG: 463 | End: 2023-07-05
Payer: COMMERCIAL

## 2023-07-05 LAB
ANION GAP SERPL CALCULATED.3IONS-SCNC: 17 MMOL/L (ref 7–15)
BACTERIA TISS BX CULT: NO GROWTH
BUN SERPL-MCNC: 59.3 MG/DL (ref 6–20)
CALCIUM SERPL-MCNC: 7.8 MG/DL (ref 8.6–10)
CHLORIDE SERPL-SCNC: 98 MMOL/L (ref 98–107)
CREAT SERPL-MCNC: 6.39 MG/DL (ref 0.67–1.17)
DEPRECATED HCO3 PLAS-SCNC: 16 MMOL/L (ref 22–29)
ERYTHROCYTE [DISTWIDTH] IN BLOOD BY AUTOMATED COUNT: 12 % (ref 10–15)
GFR SERPL CREATININE-BSD FRML MDRD: 10 ML/MIN/1.73M2
GLUCOSE BLDC GLUCOMTR-MCNC: 135 MG/DL (ref 70–99)
GLUCOSE BLDC GLUCOMTR-MCNC: 160 MG/DL (ref 70–99)
GLUCOSE SERPL-MCNC: 172 MG/DL (ref 70–99)
HCT VFR BLD AUTO: 26.8 % (ref 40–53)
HGB BLD-MCNC: 9 G/DL (ref 13.3–17.7)
MCH RBC QN AUTO: 28.7 PG (ref 26.5–33)
MCHC RBC AUTO-ENTMCNC: 33.6 G/DL (ref 31.5–36.5)
MCV RBC AUTO: 85 FL (ref 78–100)
PATH REPORT.COMMENTS IMP SPEC: NORMAL
PATH REPORT.COMMENTS IMP SPEC: NORMAL
PATH REPORT.FINAL DX SPEC: NORMAL
PATH REPORT.GROSS SPEC: NORMAL
PATH REPORT.MICROSCOPIC SPEC OTHER STN: NORMAL
PATH REPORT.RELEVANT HX SPEC: NORMAL
PHOTO IMAGE: NORMAL
PLATELET # BLD AUTO: 325 10E3/UL (ref 150–450)
POTASSIUM SERPL-SCNC: 4.3 MMOL/L (ref 3.4–5.3)
RBC # BLD AUTO: 3.14 10E6/UL (ref 4.4–5.9)
SODIUM SERPL-SCNC: 131 MMOL/L (ref 136–145)
WBC # BLD AUTO: 22.2 10E3/UL (ref 4–11)

## 2023-07-05 PROCEDURE — 250N000013 HC RX MED GY IP 250 OP 250 PS 637: Performed by: PHYSICIAN ASSISTANT

## 2023-07-05 PROCEDURE — 250N000011 HC RX IP 250 OP 636: Performed by: PHYSICIAN ASSISTANT

## 2023-07-05 PROCEDURE — 120N000001 HC R&B MED SURG/OB

## 2023-07-05 PROCEDURE — 99231 SBSQ HOSP IP/OBS SF/LOW 25: CPT | Mod: 24

## 2023-07-05 PROCEDURE — 250N000011 HC RX IP 250 OP 636: Mod: JZ | Performed by: INTERNAL MEDICINE

## 2023-07-05 PROCEDURE — 250N000013 HC RX MED GY IP 250 OP 250 PS 637: Performed by: INTERNAL MEDICINE

## 2023-07-05 PROCEDURE — 85027 COMPLETE CBC AUTOMATED: CPT | Performed by: INTERNAL MEDICINE

## 2023-07-05 PROCEDURE — 80048 BASIC METABOLIC PNL TOTAL CA: CPT | Performed by: INTERNAL MEDICINE

## 2023-07-05 PROCEDURE — 99232 SBSQ HOSP IP/OBS MODERATE 35: CPT | Performed by: INTERNAL MEDICINE

## 2023-07-05 PROCEDURE — 36415 COLL VENOUS BLD VENIPUNCTURE: CPT | Performed by: INTERNAL MEDICINE

## 2023-07-05 PROCEDURE — 250N000011 HC RX IP 250 OP 636: Mod: JZ

## 2023-07-05 RX ORDER — BUMETANIDE 0.25 MG/ML
3 INJECTION INTRAMUSCULAR; INTRAVENOUS ONCE
Status: COMPLETED | OUTPATIENT
Start: 2023-07-05 | End: 2023-07-05

## 2023-07-05 RX ORDER — CITRIC ACID/SODIUM CITRATE 334-500MG
15 SOLUTION, ORAL ORAL 2 TIMES DAILY
Status: DISCONTINUED | OUTPATIENT
Start: 2023-07-05 | End: 2023-07-06

## 2023-07-05 RX ADMIN — BUMETANIDE 3 MG: 0.25 INJECTION INTRAMUSCULAR; INTRAVENOUS at 15:41

## 2023-07-05 RX ADMIN — EZETIMIBE 10 MG: 10 TABLET ORAL at 08:52

## 2023-07-05 RX ADMIN — SODIUM CITRATE AND CITRIC ACID MONOHYDRATE 15 ML: 500; 334 SOLUTION ORAL at 21:02

## 2023-07-05 RX ADMIN — PIPERACILLIN AND TAZOBACTAM 2.25 G: 2; .25 INJECTION, POWDER, FOR SOLUTION INTRAVENOUS at 18:25

## 2023-07-05 RX ADMIN — PIPERACILLIN AND TAZOBACTAM 2.25 G: 2; .25 INJECTION, POWDER, FOR SOLUTION INTRAVENOUS at 00:24

## 2023-07-05 RX ADMIN — OXYCODONE HYDROCHLORIDE 5 MG: 5 TABLET ORAL at 22:28

## 2023-07-05 RX ADMIN — HEPARIN SODIUM 5000 UNITS: 5000 INJECTION, SOLUTION INTRAVENOUS; SUBCUTANEOUS at 22:28

## 2023-07-05 RX ADMIN — SODIUM CITRATE AND CITRIC ACID MONOHYDRATE 15 ML: 500; 334 SOLUTION ORAL at 14:53

## 2023-07-05 RX ADMIN — SENNOSIDES 1 TABLET: 8.6 TABLET, FILM COATED ORAL at 21:02

## 2023-07-05 RX ADMIN — PIPERACILLIN AND TAZOBACTAM 2.25 G: 2; .25 INJECTION, POWDER, FOR SOLUTION INTRAVENOUS at 12:28

## 2023-07-05 RX ADMIN — METOPROLOL TARTRATE 50 MG: 50 TABLET, FILM COATED ORAL at 21:02

## 2023-07-05 RX ADMIN — METOPROLOL TARTRATE 50 MG: 50 TABLET, FILM COATED ORAL at 08:52

## 2023-07-05 RX ADMIN — ACETAMINOPHEN 650 MG: 325 TABLET, FILM COATED ORAL at 22:28

## 2023-07-05 RX ADMIN — INSULIN ASPART 1 UNITS: 100 INJECTION, SOLUTION INTRAVENOUS; SUBCUTANEOUS at 18:27

## 2023-07-05 RX ADMIN — HEPARIN SODIUM 5000 UNITS: 5000 INJECTION, SOLUTION INTRAVENOUS; SUBCUTANEOUS at 10:14

## 2023-07-05 RX ADMIN — PIPERACILLIN AND TAZOBACTAM 2.25 G: 2; .25 INJECTION, POWDER, FOR SOLUTION INTRAVENOUS at 05:30

## 2023-07-05 RX ADMIN — INSULIN ASPART 1 UNITS: 100 INJECTION, SOLUTION INTRAVENOUS; SUBCUTANEOUS at 10:10

## 2023-07-05 ASSESSMENT — ACTIVITIES OF DAILY LIVING (ADL)
ADLS_ACUITY_SCORE: 19

## 2023-07-05 NOTE — PROGRESS NOTES
VSS. A/O. Ind. Bi LE BKA, wound vac on left. Very poor appetite today. Able to void x2 today, refused to check PVR. Denies pain. Pt checks his own sugar using CGM device.

## 2023-07-05 NOTE — PROGRESS NOTES
VASCULAR SURGERY PROGRESS NOTE    Subjective:  Denies fevers or chills, no acute events overnight. Continues to have left lower extremity edema, minimal erythema. Regular diet.     Objective:  Intake/Output Summary (Last 24 hours) at 7/5/2023 0808  Last data filed at 7/4/2023 2000  Gross per 24 hour   Intake 2024 ml   Output 350 ml   Net 1674 ml     PHYSICAL EXAM:  BP (!) 165/97 (BP Location: Left arm)   Pulse 90   Temp 98.6  F (37  C) (Oral)   Resp 18   Ht 1.829 m (6')   Wt 120.6 kg (265 lb 14 oz)   SpO2 93%   BMI 36.06 kg/m    Alert and oriented x4  2+ edema in the left lower extremity  VSS  WBC 22.2 today    ASSESSMENT:  Ry Horner is a 57 year old male s/p left BKA with I and D for stump infection.       PLAN:  -RTOR this week, timing TBD  -continue current Rx, ID following  -elevate left lower extremity as able  -continue wound vac    Antonieta Perdomo NP  VASCULAR SURGERY       Addendum:   I do not have a good explanation for his persistently high leukocytosis - would have anticipated better resolution after source control with the incision and drainage performed on 6/30/23.    I do not have a good explanation for his precipitous JOHNATHAN alexander-operatively (low-grade bleeding periop from incision + EBL of about 100 mL --> drop in Hg from 11.3 --> 10.3, not enough to drive this much JOHNATHAN).  So far, his bone cultures have remained negative to date, and pathology on the bone fragments is pending.     I do not have a clear explanation from the BKA stump as to his infection and I am trying to preserve as much functional limb length as possible. In other words, I believe we have obtained source control from what was present operatively and based on culture results, but his clinical status has not improved based on labs. I am suspicious that the bone was reactive on MRI, and did not have true osteomyelitis, as there was a distinct tissue plane separation between the subcutaneous infection and the otherwise well-healed  bone stump.    I was hoping to perform a limited revision and subsequent higher closure of the BKA but I do not think it is safe to do any definitive surgery at this point. I also do not want to subject him to a through-knee guillotine amputation if the leg is not the complete source of infection. Will plan a repeat operative debridement to ensure there is no missed pocket of purulence, tentatively for tomorrow.     I greatly appreciate ID input on this case.   Reny Child MD

## 2023-07-05 NOTE — PLAN OF CARE
Shift 2300- 7:30am.   Pt is A&Ox4. VSS on RA. Pain controlled w/ Oxy & repositioning. PIV SL w/ intermittent ABX. Bilateral BKA - wound vac in place to LLE w/ sanguinous OP. Up IND in room w/ wheelchair, voiding via urinal - low urine OP- PVRs done per orders, active bowel sounds, no BM, passing little gas per pt. Tolerating renal diet. Plan to go back for another I&D procedure of BKA later this week. Discharge pending.

## 2023-07-05 NOTE — PROGRESS NOTES
Renal Medicine Progress Note            Assessment/Plan:     57 y.o man with CKD III due to DM and HTN, consulted for JOHNATHAN.      1) CKD III:                -bl ~ 1.5-1.9 mg/dl (GFR 45-55)               -2.5 g/g albuminuria     2)  Severe JOHNATHAN: Remains Non-oliguric. At baseline creatinine until 6/29 and subsequent rise, more dramatically after OR 6/30.               - on Zosyn/dapto               -urine sodum <20 7/2 reflecting possibe pre-renal state.                -UA with hyaline casts              - Renal US with large sized kidneys, especially with underlying CKD, this nephromegaly likely reflects some ATN injury.    No indications for dialysis. Will try diuretics to treat his edema/hypervolemia and hypertension. Hope for recovering and no dialysis but will monitor close. High risk recurrent ATN especially with planned more OR procedures.       3)  Infected left stump with osteomyelitis s/p I&D:               -dapto/zosyn     4) FEN: . Hyponatremia at 131, bicarb 16      5)  Hypertension:                -Jardiance and lisinopril are on hold     6) anemia: Hgb 9.0      Plan:   1) bicitra 15cc bid  2) diuretic challenge, bumex 3mg x1. If some response, may schedule.   3) Dose all medications to eGR of <10 ml/min  4) avoid NSAIDs and IV dye  5) assess for dialysis daily. Needs daily BMP's, ongoing I/O's.       Julio Rios DO  [facility]  Office: [phone]  Cell: [phone]        Interval History:     Pt hypertensive, still low urine output.   7/3 I/O's: 2024/450. Today 250cc urine so far.   Pt resting comfortably, denies any major changes. No dyspnea. No uremic related symptoms. Has fatigue but ongoing.           Medications and Allergies:       DAPTOmycin (CUBICIN) 600 mg in sodium chloride 0.9 % 100 mL intermittent infusion  6 mg/kg (Adjusted) Intravenous Q48H     ezetimibe  10 mg Oral Daily     heparin ANTICOAGULANT  5,000 Units Subcutaneous Q12H     insulin aspart  1-7 Units Subcutaneous TID  "AC     insulin aspart  1-5 Units Subcutaneous At Bedtime     insulin degludec  25 Units Subcutaneous BID     metoprolol tartrate  50 mg Oral BID     piperacillin-tazobactam  2.25 g Intravenous Q6H     polyethylene glycol  17 g Oral BID     sennosides  1-2 tablet Oral BID     sodium chloride (PF)  3 mL Intracatheter Q8H        Allergies   Allergen Reactions     Pravastatin      \"sucked the life blood out of me,\" Indicates this occurs with all statins.     Statins             Physical Exam:   Vitals were reviewed  BP (!) 165/97 (BP Location: Left arm)   Pulse 90   Temp 98.6  F (37  C) (Oral)   Resp 18   Ht 1.829 m (6')   Wt 120.6 kg (265 lb 14 oz)   SpO2 93%   BMI 36.06 kg/m      Wt Readings from Last 3 Encounters:   07/05/23 120.6 kg (265 lb 14 oz)   10/03/22 110 kg (242 lb 8.1 oz)   08/17/22 119.9 kg (264 lb 6.4 oz)       Intake/Output Summary (Last 24 hours) at 7/5/2023 1118  Last data filed at 7/5/2023 1005  Gross per 24 hour   Intake 628 ml   Output 600 ml   Net 28 ml       GENERAL APPEARANCE: alert, oriented  HEENT:  Eyes/ears/nose/neck grossly normal  RESP: lungs cta b c good efforts, no crackles, rhonchi or wheezes  CV: RRR, nl S1/S2, no m/r/g   ABDOMEN: o/s/nt/nd, bs present  EXTREMITIES/SKIN: no c/c/rashes/lesions; 2+ b/l leg edema           Data:     BMP  Recent Labs   Lab 07/05/23  0739 07/05/23  0149 07/04/23  2124 07/04/23  1656 07/04/23  1242 07/04/23  0622 07/03/23  1207 07/03/23  0747 07/02/23  0746 07/02/23  0726   *  --   --   --   --  132*  --  134*  --  134*   POTASSIUM 4.3  --   --   --   --  4.0  --  3.8  --  4.0   CHLORIDE 98  --   --   --   --  99  --  101  --  101   FERNANDO 7.8*  --   --   --   --  7.8*  --  8.0*  --  8.0*   CO2 16*  --   --   --   --  18*  --  18*  --  23   BUN 59.3*  --   --   --   --  48.3*  --  40.8*  --  33.8*   CR 6.39*  --   --   --   --  5.28*  --  4.16*  --  2.97*   * 135* 152* 145*   < > 108*   < > 83   < > 79    < > = values in this interval not " displayed.     CBC  Recent Labs   Lab 07/05/23  0739 07/04/23  0622 07/03/23  0747 07/02/23  0726   WBC 22.2* 25.9* 25.0* 21.7*   HGB 9.0* 9.5* 10.2* 9.7*   HCT 26.8* 28.1* 30.1* 28.5*   MCV 85 87 86 87    319 333 331     Lab Results   Component Value Date    AST 13 06/24/2023    ALT 14 06/24/2023    ALKPHOS 84 06/24/2023    BILITOTAL 0.9 06/24/2023     Lab Results   Component Value Date    INR 1.06 06/27/2023       Attestation:  I have reviewed today's vital signs, notes, medications, labs and imaging.    DO [facility] - Nephrology  Office: [phone]  Cell: [phone]

## 2023-07-05 NOTE — PLAN OF CARE
Date/Time: 7/4/23, 1583-9217    Trauma/Ortho/Medical (Choose one) : Medical    Diagnosis:L BKA cellulitis  POD#: 4 from I & D of LE amputation stump  Mental Status:A&Ox4  Activity/dangle: Independent in room w/ wheelchair  Diet:Regular  Pain:Decrease w/ Oxy  Corrales/Voiding:Void's adequately via urinal, PVR done  Tele/Restraints/Iso:N/A  02/LDA:VSS on RA  D/C Date: Discharge pending improvement  Other Info:RTOR this week.

## 2023-07-05 NOTE — PROGRESS NOTES
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      Sepsis due Lt BKA stump infection  S/p I&D of of left BKA stump on 6/30/23  -Patient presented with pain, redness, swelling at his left BKA stump site.  Marked leukocytosis of 22.9. Tachycardic to 110s.  X-ray Shows soft tissue swelling, some bone formation along the resection margin of the tibia with periosteal reaction.   -Initially on vancomycin and Zosyn, subsequently changed by infectious disease to daptomycin and Zosyn  -Vascular surgery following  -MRI of the stump showed findings compatible with osteomyelitis of the residual tibia along with fluid collection suspicious for abscess, underwent I&D of the left stump on 6/30/2023 with placement of wound VAC  -Bone culture negative thus far.  Pathology findings are nonspecific and not definitive of osteomyelitis.  Will defer to vascular surgery regarding the next steps.  -Continues to have significant leukocytosis although slightly better today  -Hospital course complicated by acute kidney injury on CKD stage III.  Please see discussion below.    Acute oliguric kidney injury on CKD stage III   -Cr @ admission 2.45. Last Cr PTA 1.9 in 11/2022. (prior to that baseline ~ 1.6)   * Initially improved to baseline and started going back up 6/28.   * Only received 2 doses of vancomycin, last dose 6/25. No levels obtained. No NSAIDs  * No episodes of hypotension here.   * Vancomycin stopped and remains on zosyn and dapto  -Creatinine still peaking, up to 6.39 today today.   -Nephrology  following, IV fluids discontinued yesterday, following creatinine trends now  -Holding prior to admission lisinopril.   -Noted plans by nephrology to start diuretics  -Daily BMP      Type II DM, uncontrolled, on insulin, A1c 7.6 on 6/27/23  -BS falling below 100 in the context of JOHNATHAN  -PTA Tresiba decreased to 25 units (previously 34 units) on 07/02/23   -Continue to hold PTA oral agents including Jardiance, Januvia.  -Hypoglycemia protocol    Essential hypertension  -Hold prior to admission lisinopril  -Blood pressure has been quite elevated this hospital stay, started on metoprolol 25 mg twice a day and increase to 50 mg twice a day.  Diuretic plan as above.     Mild hyponatremia     Diet: Renal Diet (non-dialysis)     DVT Prophylaxis: Pneumatic Compression Devices   Corrales Catheter: Not present  Code Status: Full Code     Disposition Plan       Expected Discharge Date: 07/10/2023      Destination: home;home with family  Discharge Comments: CX and return to OR this week (7/4)      Entered: Kai Viramontes MD 07/05/2023, 10:41 AM        Clinically Significant Risk Factors              # Hypoalbuminemia: Lowest albumin = 2.6 g/dL at 7/1/2023  4:00 PM, will monitor as appropriate    # Acute Kidney Injury, unspecified: based on a >150% or 0.3 mg/dL increase in last creatinine compared to past 90 day average, will monitor renal function  # Hypertension: Noted on problem list       # DMII: A1C = 7.6 % (Ref range: <5.7 %) within past 6 months   # Obesity: Estimated body mass index is 36.06 kg/m  as calculated from the following:    Height as of this encounter: 1.829 m (6').    Weight as of this encounter: 120.6 kg (265 lb 14 oz).             The patient's care was discussed with the Bedside Nurse and Patient.    Medical Decision Making       **CLEAR ALL SELECTIONS**        Labs/Imaging Reviewed:  See Information above and Data section below    Time SPENT BY ME on the date of service doing chart review, history, exam,  documentation & further activities per the note:  35  MINUTES    Kai Viramontes MD  Hospitalist Service  [facility]  Text Page 7AM-6PM  Securely message with the Vocera Web Console (learn more here)  Text page via [employer] Paging/Directory    ______________________________________________________________________    Interval History   Creatinine still peaking.  Minimal urine output.  Denies nausea or vomiting.  Mild cough.  Denies dyspnea.  Afebrile.  Blood sugars are adequately controlled.    Data reviewed today: I reviewed all medications, new labs and imaging results over the last 24 hours. I personally reviewed no images or EKG's today.    Physical Exam   Vital signs:  Temp: 98.6  F (37  C) Temp src: Oral BP: (!) 165/97 Pulse: 90   Resp: 18 SpO2: 93 % O2 Device: None (Room air) Oxygen Delivery: 6 LPM Height: 182.9 cm (6') Weight: 120.6 kg (265 lb 14 oz)  Estimated body mass index is 36.06 kg/m  as calculated from the following:    Height as of this encounter: 1.829 m (6').    Weight as of this encounter: 120.6 kg (265 lb 14 oz).      Wt Readings from Last 2 Encounters:   07/05/23 120.6 kg (265 lb 14 oz)   10/03/22 110 kg (242 lb 8.1 oz)       Gen: AAOX3, NAD, comfortable  HEENT: Supple neck, moist oral mucosa, no pallor  Resp: CTA B/L, normal WOB, no crackles, no wheezes  CVS: RRR, no murmur  Abd/GI: Soft, non-tender. BS- normoactive.   Skin: Warm, dry no rashes  MSK: Significant edema around the left stump  Neuro- CN- intact. No focal deficits.        Data   Recent Labs   Lab 07/05/23  0739 07/05/23  0149 07/04/23  2124 07/04/23  1242 07/04/23  0622 07/03/23  1207 07/03/23  0747 07/01/23  1737 07/01/23  1600   WBC 22.2*  --   --   --  25.9*  --  25.0*   < >  --    HGB 9.0*  --   --   --  9.5*  --  10.2*   < >  --    MCV 85  --   --   --  87  --  86   < >  --      --   --   --  319  --  333   < >  --    *  --   --   --  132*  --  134*   < > 136   POTASSIUM 4.3  --   --   --   4.0  --  3.8   < > 4.1   CHLORIDE 98  --   --   --  99  --  101   < > 102   CO2 16*  --   --   --  18*  --  18*   < > 22   BUN 59.3*  --   --   --  48.3*  --  40.8*   < > 31.5*   CR 6.39*  --   --   --  5.28*  --  4.16*   < > 2.60*   ANIONGAP 17*  --   --   --  15  --  15   < > 12   FERNANDO 7.8*  --   --   --  7.8*  --  8.0*   < > 8.1*   * 135* 152*   < > 108*   < > 83   < > 119*   ALBUMIN  --   --   --   --   --   --   --   --  2.6*    < > = values in this interval not displayed.       No results found for this or any previous visit (from the past 24 hour(s)).  Medications       DAPTOmycin (CUBICIN) 600 mg in sodium chloride 0.9 % 100 mL intermittent infusion  6 mg/kg (Adjusted) Intravenous Q48H     ezetimibe  10 mg Oral Daily     heparin ANTICOAGULANT  5,000 Units Subcutaneous Q12H     insulin aspart  1-7 Units Subcutaneous TID AC     insulin aspart  1-5 Units Subcutaneous At Bedtime     insulin degludec  25 Units Subcutaneous BID     metoprolol tartrate  50 mg Oral BID     piperacillin-tazobactam  2.25 g Intravenous Q6H     polyethylene glycol  17 g Oral BID     sennosides  1-2 tablet Oral BID     sodium chloride (PF)  3 mL Intracatheter Q8H

## 2023-07-06 ENCOUNTER — ANESTHESIA (OUTPATIENT)
Dept: SURGERY | Facility: CLINIC | Age: 58
DRG: 463 | End: 2023-07-06
Payer: COMMERCIAL

## 2023-07-06 ENCOUNTER — APPOINTMENT (OUTPATIENT)
Dept: SURGERY | Facility: PHYSICIAN GROUP | Age: 58
End: 2023-07-06
Payer: COMMERCIAL

## 2023-07-06 LAB
ABO/RH(D): NORMAL
ANION GAP SERPL CALCULATED.3IONS-SCNC: 18 MMOL/L (ref 7–15)
ANTIBODY SCREEN: NEGATIVE
BUN SERPL-MCNC: 67.3 MG/DL (ref 6–20)
C DIFF TOX B STL QL: NEGATIVE
CALCIUM SERPL-MCNC: 7.8 MG/DL (ref 8.6–10)
CHLORIDE SERPL-SCNC: 100 MMOL/L (ref 98–107)
CREAT SERPL-MCNC: 7.42 MG/DL (ref 0.67–1.17)
DEPRECATED HCO3 PLAS-SCNC: 15 MMOL/L (ref 22–29)
ERYTHROCYTE [DISTWIDTH] IN BLOOD BY AUTOMATED COUNT: 11.9 % (ref 10–15)
GFR SERPL CREATININE-BSD FRML MDRD: 8 ML/MIN/1.73M2
GLUCOSE BLDC GLUCOMTR-MCNC: 142 MG/DL (ref 70–99)
GLUCOSE BLDC GLUCOMTR-MCNC: 145 MG/DL (ref 70–99)
GLUCOSE BLDC GLUCOMTR-MCNC: 146 MG/DL (ref 70–99)
GLUCOSE BLDC GLUCOMTR-MCNC: 147 MG/DL (ref 70–99)
GLUCOSE BLDC GLUCOMTR-MCNC: 147 MG/DL (ref 70–99)
GLUCOSE SERPL-MCNC: 145 MG/DL (ref 70–99)
HCT VFR BLD AUTO: 27.3 % (ref 40–53)
HGB BLD-MCNC: 9 G/DL (ref 13.3–17.7)
MCH RBC QN AUTO: 28.5 PG (ref 26.5–33)
MCHC RBC AUTO-ENTMCNC: 33 G/DL (ref 31.5–36.5)
MCV RBC AUTO: 86 FL (ref 78–100)
PLATELET # BLD AUTO: 375 10E3/UL (ref 150–450)
POTASSIUM SERPL-SCNC: 4.4 MMOL/L (ref 3.4–5.3)
RBC # BLD AUTO: 3.16 10E6/UL (ref 4.4–5.9)
SODIUM SERPL-SCNC: 133 MMOL/L (ref 136–145)
SPECIMEN EXPIRATION DATE: NORMAL
WBC # BLD AUTO: 20.6 10E3/UL (ref 4–11)

## 2023-07-06 PROCEDURE — 99233 SBSQ HOSP IP/OBS HIGH 50: CPT | Performed by: INTERNAL MEDICINE

## 2023-07-06 PROCEDURE — 36415 COLL VENOUS BLD VENIPUNCTURE: CPT | Performed by: INTERNAL MEDICINE

## 2023-07-06 PROCEDURE — 87493 C DIFF AMPLIFIED PROBE: CPT | Performed by: SURGERY

## 2023-07-06 PROCEDURE — 250N000013 HC RX MED GY IP 250 OP 250 PS 637: Performed by: INTERNAL MEDICINE

## 2023-07-06 PROCEDURE — 120N000001 HC R&B MED SURG/OB

## 2023-07-06 PROCEDURE — 370N000017 HC ANESTHESIA TECHNICAL FEE, PER MIN: Performed by: SURGERY

## 2023-07-06 PROCEDURE — 0JBP0ZZ EXCISION OF LEFT LOWER LEG SUBCUTANEOUS TISSUE AND FASCIA, OPEN APPROACH: ICD-10-PCS | Performed by: SURGERY

## 2023-07-06 PROCEDURE — 250N000013 HC RX MED GY IP 250 OP 250 PS 637: Performed by: SURGERY

## 2023-07-06 PROCEDURE — 99232 SBSQ HOSP IP/OBS MODERATE 35: CPT | Performed by: INTERNAL MEDICINE

## 2023-07-06 PROCEDURE — 250N000011 HC RX IP 250 OP 636: Mod: JZ

## 2023-07-06 PROCEDURE — 97597 DBRDMT OPN WND 1ST 20 CM/<: CPT | Mod: 78 | Performed by: SURGERY

## 2023-07-06 PROCEDURE — 250N000011 HC RX IP 250 OP 636: Performed by: NURSE ANESTHETIST, CERTIFIED REGISTERED

## 2023-07-06 PROCEDURE — 99231 SBSQ HOSP IP/OBS SF/LOW 25: CPT | Mod: 24

## 2023-07-06 PROCEDURE — 250N000009 HC RX 250: Performed by: SURGERY

## 2023-07-06 PROCEDURE — 360N000076 HC SURGERY LEVEL 3, PER MIN: Performed by: SURGERY

## 2023-07-06 PROCEDURE — 258N000003 HC RX IP 258 OP 636: Performed by: ANESTHESIOLOGY

## 2023-07-06 PROCEDURE — 272N000001 HC OR GENERAL SUPPLY STERILE: Performed by: SURGERY

## 2023-07-06 PROCEDURE — 250N000011 HC RX IP 250 OP 636: Performed by: PHYSICIAN ASSISTANT

## 2023-07-06 PROCEDURE — 250N000025 HC SEVOFLURANE, PER MIN: Performed by: SURGERY

## 2023-07-06 PROCEDURE — 250N000011 HC RX IP 250 OP 636: Mod: JZ | Performed by: SURGERY

## 2023-07-06 PROCEDURE — 250N000009 HC RX 250: Performed by: NURSE ANESTHETIST, CERTIFIED REGISTERED

## 2023-07-06 PROCEDURE — 86850 RBC ANTIBODY SCREEN: CPT | Performed by: STUDENT IN AN ORGANIZED HEALTH CARE EDUCATION/TRAINING PROGRAM

## 2023-07-06 PROCEDURE — 710N000010 HC RECOVERY PHASE 1, LEVEL 2, PER MIN: Performed by: SURGERY

## 2023-07-06 PROCEDURE — 999N000141 HC STATISTIC PRE-PROCEDURE NURSING ASSESSMENT: Performed by: SURGERY

## 2023-07-06 PROCEDURE — 85027 COMPLETE CBC AUTOMATED: CPT | Performed by: INTERNAL MEDICINE

## 2023-07-06 PROCEDURE — 250N000013 HC RX MED GY IP 250 OP 250 PS 637: Performed by: PHYSICIAN ASSISTANT

## 2023-07-06 PROCEDURE — 80048 BASIC METABOLIC PNL TOTAL CA: CPT | Performed by: INTERNAL MEDICINE

## 2023-07-06 PROCEDURE — 258N000003 HC RX IP 258 OP 636

## 2023-07-06 RX ORDER — SODIUM CHLORIDE, SODIUM LACTATE, POTASSIUM CHLORIDE, CALCIUM CHLORIDE 600; 310; 30; 20 MG/100ML; MG/100ML; MG/100ML; MG/100ML
INJECTION, SOLUTION INTRAVENOUS CONTINUOUS
Status: DISCONTINUED | OUTPATIENT
Start: 2023-07-06 | End: 2023-07-06

## 2023-07-06 RX ORDER — LIDOCAINE 40 MG/G
CREAM TOPICAL
Status: DISCONTINUED | OUTPATIENT
Start: 2023-07-06 | End: 2023-07-06

## 2023-07-06 RX ORDER — RIFAMPIN 150 MG/1
CAPSULE ORAL PRN
Status: DISCONTINUED | OUTPATIENT
Start: 2023-07-06 | End: 2023-07-06 | Stop reason: HOSPADM

## 2023-07-06 RX ORDER — HEPARIN SODIUM 5000 [USP'U]/.5ML
5000 INJECTION, SOLUTION INTRAVENOUS; SUBCUTANEOUS EVERY 8 HOURS
Status: DISCONTINUED | OUTPATIENT
Start: 2023-07-07 | End: 2023-07-28 | Stop reason: HOSPADM

## 2023-07-06 RX ORDER — ONDANSETRON 4 MG/1
4 TABLET, ORALLY DISINTEGRATING ORAL EVERY 30 MIN PRN
Status: DISCONTINUED | OUTPATIENT
Start: 2023-07-06 | End: 2023-07-06

## 2023-07-06 RX ORDER — PROPOFOL 10 MG/ML
INJECTION, EMULSION INTRAVENOUS PRN
Status: DISCONTINUED | OUTPATIENT
Start: 2023-07-06 | End: 2023-07-06

## 2023-07-06 RX ORDER — FENTANYL CITRATE 50 UG/ML
INJECTION, SOLUTION INTRAMUSCULAR; INTRAVENOUS PRN
Status: DISCONTINUED | OUTPATIENT
Start: 2023-07-06 | End: 2023-07-06

## 2023-07-06 RX ORDER — ONDANSETRON 2 MG/ML
4 INJECTION INTRAMUSCULAR; INTRAVENOUS EVERY 30 MIN PRN
Status: DISCONTINUED | OUTPATIENT
Start: 2023-07-06 | End: 2023-07-06

## 2023-07-06 RX ORDER — FENTANYL CITRATE 0.05 MG/ML
25 INJECTION, SOLUTION INTRAMUSCULAR; INTRAVENOUS EVERY 5 MIN PRN
Status: DISCONTINUED | OUTPATIENT
Start: 2023-07-06 | End: 2023-07-06

## 2023-07-06 RX ORDER — HYDROMORPHONE HCL IN WATER/PF 6 MG/30 ML
0.2 PATIENT CONTROLLED ANALGESIA SYRINGE INTRAVENOUS EVERY 5 MIN PRN
Status: DISCONTINUED | OUTPATIENT
Start: 2023-07-06 | End: 2023-07-06

## 2023-07-06 RX ORDER — FENTANYL CITRATE 0.05 MG/ML
50 INJECTION, SOLUTION INTRAMUSCULAR; INTRAVENOUS EVERY 5 MIN PRN
Status: DISCONTINUED | OUTPATIENT
Start: 2023-07-06 | End: 2023-07-06

## 2023-07-06 RX ORDER — CITRIC ACID/SODIUM CITRATE 334-500MG
30 SOLUTION, ORAL ORAL 2 TIMES DAILY
Status: DISCONTINUED | OUTPATIENT
Start: 2023-07-06 | End: 2023-07-25

## 2023-07-06 RX ORDER — SODIUM CHLORIDE 9 MG/ML
INJECTION, SOLUTION INTRAVENOUS CONTINUOUS
Status: DISCONTINUED | OUTPATIENT
Start: 2023-07-06 | End: 2023-07-06 | Stop reason: HOSPADM

## 2023-07-06 RX ORDER — LIDOCAINE HYDROCHLORIDE 20 MG/ML
INJECTION, SOLUTION INFILTRATION; PERINEURAL PRN
Status: DISCONTINUED | OUTPATIENT
Start: 2023-07-06 | End: 2023-07-06

## 2023-07-06 RX ORDER — HYDROMORPHONE HCL IN WATER/PF 6 MG/30 ML
0.4 PATIENT CONTROLLED ANALGESIA SYRINGE INTRAVENOUS EVERY 5 MIN PRN
Status: DISCONTINUED | OUTPATIENT
Start: 2023-07-06 | End: 2023-07-06

## 2023-07-06 RX ORDER — MAGNESIUM HYDROXIDE 1200 MG/15ML
LIQUID ORAL PRN
Status: DISCONTINUED | OUTPATIENT
Start: 2023-07-06 | End: 2023-07-06

## 2023-07-06 RX ADMIN — INSULIN DEGLUDEC INJECTION 25 UNITS: 100 INJECTION, SOLUTION SUBCUTANEOUS at 21:04

## 2023-07-06 RX ADMIN — EZETIMIBE 10 MG: 10 TABLET ORAL at 08:08

## 2023-07-06 RX ADMIN — OXYCODONE HYDROCHLORIDE 5 MG: 5 TABLET ORAL at 18:55

## 2023-07-06 RX ADMIN — MIDAZOLAM 2 MG: 1 INJECTION INTRAMUSCULAR; INTRAVENOUS at 12:47

## 2023-07-06 RX ADMIN — SODIUM CITRATE AND CITRIC ACID MONOHYDRATE 30 ML: 500; 334 SOLUTION ORAL at 21:03

## 2023-07-06 RX ADMIN — METOPROLOL TARTRATE 50 MG: 50 TABLET, FILM COATED ORAL at 21:03

## 2023-07-06 RX ADMIN — LIDOCAINE HYDROCHLORIDE 100 MG: 20 INJECTION, SOLUTION INFILTRATION; PERINEURAL at 13:04

## 2023-07-06 RX ADMIN — METOPROLOL TARTRATE 50 MG: 50 TABLET, FILM COATED ORAL at 08:08

## 2023-07-06 RX ADMIN — SUCCINYLCHOLINE CHLORIDE 180 MG: 20 INJECTION, SOLUTION INTRAMUSCULAR; INTRAVENOUS; PARENTERAL at 13:04

## 2023-07-06 RX ADMIN — PROPOFOL 200 MG: 10 INJECTION, EMULSION INTRAVENOUS at 13:04

## 2023-07-06 RX ADMIN — FENTANYL CITRATE 50 MCG: 50 INJECTION, SOLUTION INTRAMUSCULAR; INTRAVENOUS at 13:04

## 2023-07-06 RX ADMIN — PIPERACILLIN AND TAZOBACTAM 2.25 G: 2; .25 INJECTION, POWDER, FOR SOLUTION INTRAVENOUS at 21:03

## 2023-07-06 RX ADMIN — FENTANYL CITRATE 50 MCG: 50 INJECTION, SOLUTION INTRAMUSCULAR; INTRAVENOUS at 13:24

## 2023-07-06 RX ADMIN — DAPTOMYCIN 600 MG: 500 INJECTION, POWDER, LYOPHILIZED, FOR SOLUTION INTRAVENOUS at 15:09

## 2023-07-06 RX ADMIN — SODIUM CHLORIDE: 9 INJECTION, SOLUTION INTRAVENOUS at 10:38

## 2023-07-06 RX ADMIN — PIPERACILLIN AND TAZOBACTAM 2.25 G: 2; .25 INJECTION, POWDER, FOR SOLUTION INTRAVENOUS at 00:13

## 2023-07-06 RX ADMIN — PIPERACILLIN AND TAZOBACTAM 2.25 G: 2; .25 INJECTION, POWDER, FOR SOLUTION INTRAVENOUS at 05:19

## 2023-07-06 RX ADMIN — ONDANSETRON 4 MG: 4 TABLET, ORALLY DISINTEGRATING ORAL at 08:16

## 2023-07-06 RX ADMIN — SODIUM CITRATE AND CITRIC ACID MONOHYDRATE 15 ML: 500; 334 SOLUTION ORAL at 08:08

## 2023-07-06 RX ADMIN — PIPERACILLIN AND TAZOBACTAM 2.25 G: 2; .25 INJECTION, POWDER, FOR SOLUTION INTRAVENOUS at 14:28

## 2023-07-06 RX ADMIN — ACETAMINOPHEN 650 MG: 325 TABLET, FILM COATED ORAL at 18:55

## 2023-07-06 ASSESSMENT — ACTIVITIES OF DAILY LIVING (ADL)
ADLS_ACUITY_SCORE: 19
ADLS_ACUITY_SCORE: 23
ADLS_ACUITY_SCORE: 19
ADLS_ACUITY_SCORE: 23
ADLS_ACUITY_SCORE: 23
ADLS_ACUITY_SCORE: 29
ADLS_ACUITY_SCORE: 19
ADLS_ACUITY_SCORE: 29
ADLS_ACUITY_SCORE: 29
ADLS_ACUITY_SCORE: 23
ADLS_ACUITY_SCORE: 23
ADLS_ACUITY_SCORE: 19

## 2023-07-06 ASSESSMENT — LIFESTYLE VARIABLES: TOBACCO_USE: 1

## 2023-07-06 NOTE — ANESTHESIA POSTPROCEDURE EVALUATION
Patient: Ry Horner    Procedure: Procedure(s):  LEFT BELOW KNEE AMPUTATION STUMP DEBRIDEMENT       Anesthesia Type:  General    Note:  Disposition: Inpatient   Postop Pain Control: Uneventful            Sign Out: Well controlled pain   PONV: No   Neuro/Psych: Uneventful            Sign Out: Acceptable/Baseline neuro status   Airway/Respiratory: Uneventful            Sign Out: Acceptable/Baseline resp. status   CV/Hemodynamics: Uneventful            Sign Out: Acceptable CV status; No obvious hypovolemia; No obvious fluid overload   Other NRE: NONE   DID A NON-ROUTINE EVENT OCCUR? No           Last vitals:  Vitals Value Taken Time   /98 07/06/23 1446   Temp 36.3  C (97.4  F) 07/06/23 1411   Pulse 83 07/06/23 1458   Resp 24 07/06/23 1458   SpO2 96 % 07/06/23 1458   Vitals shown include unvalidated device data.    Electronically Signed By: Tim Padilla MD  July 6, 2023  2:59 PM

## 2023-07-06 NOTE — OP NOTE
Vascular Surgery Operative Note     PREOPERATIVE DIAGNOSIS:  Left below-knee amputation stump infection     POSTOPERATIVE DIAGNOSIS:  Same     PROCEDURE: Incision and debridement of left below-knee stump     SURGEON:  Reny Child MD     ASSISTANT:  Maggie Phillips MD     ANESTHESIA:  General Endotracheal     ESTIMATED BLOOD LOSS:  100 mL    SPECIMENS: None      INDICATIONS:   Mr. Horner is a 57M who underwent staged left below-knee amputation in 09/2022. This healed well and he was ambulatory on his prosthetic. He presented with sudden onset of pain, swelling, and wound on the distal tip of the left below-knee stump, with surrounding cellulitis. He underwent debridement on 6/30/23 but had persistent signs of infection and returned to the OR today to undergo repeat debridement.      INTRAOPERATIVE FINDINGS:    No pus or purulent fluid found. Edema and hyperemic tissue. Ascending induration along the lateral thigh with associated erythema; the subcutaneous tissues were bluntly opened and penrose drains placed along the tract.     DESCRIPTION OF TECHNIQUE:   Mr. Horner was brought into the operating room and transferred to the operating table in the supine position. After uneventful endotracheal intubation, general anesthesia was initiated. Antibiotics were administered. The left leg  was prepped and draped in standard sterile fashion. Timeout was performed and the entire surgical team was in agreement with the planned procedure and correctly marked side.      Blunt excisional debridement with a curette was used to scrape away fibrinous debris from the exposed below-knee incision. There was no significant pus or purulence. Skip incisions were created sharply along the lateral thigh, along a line of induration with erythema; these were tunneled along the subcutaneous tissue and penrose drains looped and externalized and secured along these tracts. Rifampin in saline was used to thoroughly irrigate the tissues. There was only  edema present, without signs of deeper tissue infection. The fascia appeared to be intact in the lateral thigh and in the lateral and anterior compartments of the calf.    The incision was irrigated and hemostasis confirmed. Rifampin-soaked gauze was placed as a wet-to-dry dressing in the distal stump, over the bone edge. Gauze was threaded between the penrose loops and dry gauze used to cover and wrap the leg.     At the end of the case all needle, sponge and instrument counts were correct.  Mr. Horner was then extubated in stable condition and was taken to the postoperative care unit.

## 2023-07-06 NOTE — PROGRESS NOTES
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      Sepsis due Lt BKA stump infection  S/p I&D of of left BKA stump on 6/30/23  -Patient presented with pain, redness, swelling at his left BKA stump site.  Marked leukocytosis of 22.9. Tachycardic to 110s.  X-ray Shows soft tissue swelling, some bone formation along the resection margin of the tibia with periosteal reaction.   -Initially on vancomycin and Zosyn, subsequently changed by infectious disease to daptomycin and Zosyn  -Vascular surgery following  -MRI of the stump showed findings compatible with osteomyelitis of the residual tibia along with fluid collection suspicious for abscess, underwent I&D of the left stump on 6/30/2023 with placement of wound VAC  -Bone culture negative thus far.  Pathology findings are nonspecific and not definitive of osteomyelitis.  Will defer to vascular surgery regarding the next steps.  Plan for surgical reexploration of the stump today.  -Continues to have significant leukocytosis although slightly better today  -Hospital course complicated by acute kidney injury on CKD stage III.  Please see discussion below.    Acute kidney injury on CKD stage III   -Cr @ admission 2.45. Last Cr PTA 1.9 in 11/2022. (prior to that baseline ~ 1.6)   * Initially improved to baseline and started going back up 6/28.   * Only received 2 doses of vancomycin, last dose 6/25. No levels obtained. No NSAIDs  * No episodes of hypotension here.   * Vancomycin stopped and remains on zosyn and dapto  -Creatinine still  peaking, up to 7.42 today today.   -Nephrology following, challenged with diuretics yesterday, at best modest response, defer further diuretics to nephrology  -Holding prior to admission lisinopril.   -Daily BMP.      Type II DM, uncontrolled, on insulin, A1c 7.6 on 6/27/23  -BS falling below 100 in the context of JOHNATHAN  -Continue to hold PTA oral agents including Jardiance, Januvia.  -CPAP has been on hold as patient has decreased appetite and n.p.o. for surgery  -Continue sliding scale insulin, will reassess need for Tresiba after surgery  -Hypoglycemia protocol    Essential hypertension  -Hold prior to admission lisinopril  -Blood pressure has been quite elevated this hospital stay, currently on metoprolol 50 mg twice a day.     Mild hyponatremia     Diet: NPO per Anesthesia Guidelines for Procedure/Surgery Except for: Meds     DVT Prophylaxis: Pneumatic Compression Devices   [place] Catheter: Not present  Code Status: Full Code     Disposition Plan      Expected Discharge Date: 07/10/2023      Destination: home;home with family  Discharge Comments: CX and return to OR this week (7/4)      Entered: Kai Viramontes MD 07/06/2023, 10:44 AM        Clinically Significant Risk Factors              # Hypoalbuminemia: Lowest albumin = 2.6 g/dL at 7/1/2023  4:00 PM, will monitor as appropriate    # Acute Kidney Injury, unspecified: based on a >150% or 0.3 mg/dL increase in last creatinine compared to past 90 day average, will monitor renal function  # Hypertension: Noted on problem list       # DMII: A1C = 7.6 % (Ref range: <5.7 %) within past 6 months   # Obesity: Estimated body mass index is 36.18 kg/m  as calculated from the following:    Height as of this encounter: 1.829 m (6').    Weight as of this encounter: 121 kg (266 lb 12.1 oz).             The patient's care was discussed with the Bedside Nurse and Patient.    Medical Decision Making       **CLEAR ALL SELECTIONS**        Labs/Imaging Reviewed:  See Information  above and Data section below    Time SPENT BY ME on the date of service doing chart review, history, exam, documentation & further activities per the note:  35  MINUTES    Kai Viramontes MD  Hospitalist Service  [facility]  Text Page 7AM-6PM  Securely message with the Vocera Web Console (learn more here)  Text page via [employer] Paging/Directory    ______________________________________________________________________    Interval History   Patient endorses intermittent nausea.  Renal function still peaking.  Patient will be going for surgery this morning with vascular surgery.  Blood sugars adequately controlled.  Patient frustrated about the clinical course and the prolonged hospital stay and uncertainty about whether or not he is going to need further amputation.    Data reviewed today: I reviewed all medications, new labs and imaging results over the last 24 hours. I personally reviewed no images or EKG's today.    Physical Exam   Vital signs:  Temp: 97.7  F (36.5  C) Temp src: Oral BP: (!) 145/87 Pulse: 91   Resp: 22 SpO2: 94 % O2 Device: None (Room air) Oxygen Delivery: 6 LPM Height: 182.9 cm (6') Weight: 121 kg (266 lb 12.1 oz)  Estimated body mass index is 36.18 kg/m  as calculated from the following:    Height as of this encounter: 1.829 m (6').    Weight as of this encounter: 121 kg (266 lb 12.1 oz).      Wt Readings from Last 2 Encounters:   07/06/23 121 kg (266 lb 12.1 oz)   10/03/22 110 kg (242 lb 8.1 oz)       Gen: AAOX3, NAD, comfortable  HEENT: no pallor  Resp: CTA B/L, normal WOB  CVS: RRR, no murmur  Abd/GI: Soft, non-tender. BS- normoactive.   Skin: Warm, dry no rashes  MSK: Edema of the left stump with VAC in place.  Neuro- CN- intact. No focal deficits.        Data   Recent Labs   Lab 07/06/23  0812 07/06/23  0756 07/06/23  0145 07/05/23  1721 07/05/23  0739 07/04/23  1242 07/04/23  0622 07/01/23  1737 07/01/23  1600   WBC 20.6*  --   --   --  22.2*  --  25.9*   < >  --     HGB 9.0*  --   --   --  9.0*  --  9.5*   < >  --    MCV 86  --   --   --  85  --  87   < >  --      --   --   --  325  --  319   < >  --    *  --   --   --  131*  --  132*   < > 136   POTASSIUM 4.4  --   --   --  4.3  --  4.0   < > 4.1   CHLORIDE 100  --   --   --  98  --  99   < > 102   CO2 15*  --   --   --  16*  --  18*   < > 22   BUN 67.3*  --   --   --  59.3*  --  48.3*   < > 31.5*   CR 7.42*  --   --   --  6.39*  --  5.28*   < > 2.60*   ANIONGAP 18*  --   --   --  17*  --  15   < > 12   FERNANDO 7.8*  --   --   --  7.8*  --  7.8*   < > 8.1*   * 147* 146*   < > 172*   < > 108*   < > 119*   ALBUMIN  --   --   --   --   --   --   --   --  2.6*    < > = values in this interval not displayed.       No results found for this or any previous visit (from the past 24 hour(s)).  Medications     sodium chloride 10 mL/hr at 07/06/23 1038       [Auto Hold] DAPTOmycin (CUBICIN) 600 mg in sodium chloride 0.9 % 100 mL intermittent infusion  6 mg/kg (Adjusted) Intravenous Q48H     [Auto Hold] ezetimibe  10 mg Oral Daily     [Auto Hold] heparin ANTICOAGULANT  5,000 Units Subcutaneous Q12H     [Auto Hold] insulin aspart  1-7 Units Subcutaneous TID AC     [Auto Hold] insulin aspart  1-5 Units Subcutaneous At Bedtime     [Auto Hold] insulin degludec  25 Units Subcutaneous BID     [Auto Hold] metoprolol tartrate  50 mg Oral BID     [Auto Hold] piperacillin-tazobactam  2.25 g Intravenous Q6H     [Auto Hold] polyethylene glycol  17 g Oral BID     [Auto Hold] sennosides  1-2 tablet Oral BID     [Auto Hold] sodium chloride (PF)  3 mL Intracatheter Q8H     [Auto Hold] sodium citrate-citric acid  15 mL Oral BID

## 2023-07-06 NOTE — PLAN OF CARE
Pt is A&Ox4, noticed he very is down/withdrawn this shift. VSS on RA. Pain controlled w/ Oxy & repositioning. PIV SL w/ intermittent ABX. Bilateral BKA - wound vac in place to LLE w/ serosanguinous OP. Up IND in room w/ wheelchair, voiding via urinal - still low urine OP overnight, PVRs done per orders, active bowel sounds, had a loose BM accident this shift, passing gas per pt. NPO diet since midnight for planned I&D procedure of L BKA wound. Discharge pending.

## 2023-07-06 NOTE — PROGRESS NOTES
Progress Note    For wound changes:     Wet-to-dry gauze goes here    1. Wet-to-dry wound dressings to be done TID to lower edge of left BKA wound, with saline-moistened gauze packed into wound tissue. Cover with dry gauze. Avoid getting moist gauze on surrounding intact skin.        Dry gauze is threaded between the penrose drain loops    2. Put dry gauze (fluffed kerlix or fluffed 4x4s) under the penrose drain loops; clean the surrounding skin and cover with absorbant pads or more dry gauze. Change with every wet-to-dry change TID, and more frequently if needed to keep dressings and surrounding skin dry.     Reny Child MD

## 2023-07-06 NOTE — BRIEF OP NOTE
[facility]    Brief Operative Note    Pre-operative diagnosis: Cellulitis of left lower extremity [L03.116]  Post-operative diagnosis Same as pre-operative diagnosis    Procedure: Procedure(s):  LEFT BELOW KNEE AMPUTATION STUMP DEBRIDEMENT  Surgeon: Surgeon(s) and Role:     * Reny Child MD - Primary     * Maggie Phillips MD - Assisting  Anesthesia: General   Estimated Blood Loss: 25 ml    Drains:  Penrose drains x5  Specimens:   ID Type Source Tests Collected by Time Destination   A : Rule out c-diff Stool Per Rectum C. DIFFICILE TOXIN B PCR WITH REFLEX TO C. DIFFICILE ANTIGEN AND TOXINS A/B EIA Reny Child MD 7/6/2023  1:08 PM      Findings:   Surgical debridement of left BKA stump performed down to healthy bleeding tissues. Evidence of superficial soft tissue infection noted along the lateral aspect of the left leg. multiple mini-incisions were made for pus drainage which were minimal. Penrose was used for loop drainage between the incisions.   Complications: None.  Implants: * No implants in log *

## 2023-07-06 NOTE — PROGRESS NOTES
Renal Medicine Progress Note            Assessment/Plan:     57 y.o man with CKD III due to DM and HTN, consulted for JOHNATHAN.      1) CKD III:                -bl ~ 1.5-1.9 mg/dl (GFR 45-55)               -2.5 g/g albuminuria     2)  Severe JOHNATHAN: Remains Non-oliguric. At baseline creatinine until 6/29 and subsequent rise, more dramatically after OR 6/30.               - on Zosyn/dapto               -urine sodum <20 7/2 reflecting possibe pre-renal state.                -UA with hyaline casts              - Renal US with large sized kidneys, especially with underlying CKD, this nephromegaly likely reflects some ATN injury.     No indications for dialysis also today, still will do medical managmenet and hope for some renal recovery.  High risk recurrent ATN especially with planned more OR procedures.       3)  Infected left stump with osteomyelitis s/p I&D:               -dapto/zosyn   - OR trip today     4) FEN: . Hyponatremia at 133, bicarb 15      5)  Hypertension:                -Jardiance and lisinopril are on hold     6) anemia: Hgb 9.0      Plan:   1) increasing bicitra to 30cc bid  2) continue bumex 3mg q12.   3) Dose all medications to eGR of <10 ml/min  4) avoid NSAIDs and IV dye  5) assess for dialysis daily. Needs daily BMP's, ongoing I/O's.   6) If no improvement or signs of some early recovery, will have IR place dialysis catheter and start HD tomorrow.      Julio Rios DO  [facility]  Office: [phone]  Cell: [phone]        Interval History:     Pt with planned trip to OR today. 7/5 600 ml urine output, 250 so far today recorded and just emptied 300 into urinal prior to stepping into room. Pt denies new or acute concerns or complaints. Spouse at bedside.           Medications and Allergies:       DAPTOmycin (CUBICIN) 600 mg in sodium chloride 0.9 % 100 mL intermittent infusion  6 mg/kg (Adjusted) Intravenous Q48H     ezetimibe  10 mg Oral Daily     heparin ANTICOAGULANT  5,000 Units  "Subcutaneous Q12H     insulin aspart  1-7 Units Subcutaneous TID AC     insulin aspart  1-5 Units Subcutaneous At Bedtime     insulin degludec  25 Units Subcutaneous BID     metoprolol tartrate  50 mg Oral BID     piperacillin-tazobactam  2.25 g Intravenous Q6H     polyethylene glycol  17 g Oral BID     sennosides  1-2 tablet Oral BID     sodium chloride (PF)  3 mL Intracatheter Q8H     sodium citrate-citric acid  15 mL Oral BID        Allergies   Allergen Reactions     Pravastatin      \"sucked the life blood out of me,\" Indicates this occurs with all statins.     Statins             Physical Exam:   Vitals were reviewed  BP (!) 145/87   Pulse 91   Temp 97.7  F (36.5  C) (Oral)   Resp 22   Ht 1.829 m (6')   Wt 121 kg (266 lb 12.1 oz)   SpO2 94%   BMI 36.18 kg/m      Wt Readings from Last 3 Encounters:   07/06/23 121 kg (266 lb 12.1 oz)   10/03/22 110 kg (242 lb 8.1 oz)   08/17/22 119.9 kg (264 lb 6.4 oz)       Intake/Output Summary (Last 24 hours) at 7/6/2023 1014  Last data filed at 7/6/2023 0000  Gross per 24 hour   Intake 440 ml   Output 600 ml   Net -160 ml       GENERAL APPEARANCE: alert, oriented             Data:     [facility]  Recent Labs   Lab 07/06/23  0812 07/06/23  0756 07/06/23  0145 07/05/23  1721 07/05/23  0739 07/04/23  1242 07/04/23  0622 07/03/23  1207 07/03/23  0747   *  --   --   --  131*  --  132*  --  134*   POTASSIUM 4.4  --   --   --  4.3  --  4.0  --  3.8   CHLORIDE 100  --   --   --  98  --  99  --  101   FERNANDO 7.8*  --   --   --  7.8*  --  7.8*  --  8.0*   CO2 15*  --   --   --  16*  --  18*  --  18*   BUN 67.3*  --   --   --  59.3*  --  48.3*  --  40.8*   CR 7.42*  --   --   --  6.39*  --  5.28*  --  4.16*   * 147* 146* 160* 172*   < > 108*   < > 83    < > = values in this interval not displayed.     CBC  Recent Labs   Lab 07/06/23  0812 07/05/23  0739 07/04/23  0622 07/03/23  0747   WBC 20.6* 22.2* 25.9* 25.0*   HGB 9.0* 9.0* 9.5* 10.2*   HCT 27.3* 26.8* 28.1* 30.1*   MCV 86 " 85 87 86    325 319 333     Lab Results   Component Value Date    AST 13 06/24/2023    ALT 14 06/24/2023    ALKPHOS 84 06/24/2023    BILITOTAL 0.9 06/24/2023     Lab Results   Component Value Date    INR 1.06 06/27/2023       Attestation:  I have reviewed today's vital signs, notes, medications, labs and imaging.    DO [facility] - Nephrology  Office: [phone]  Cell: [phone]

## 2023-07-06 NOTE — PROGRESS NOTES
VASCULAR SURGERY PROGRESS NOTE    Subjective:  Resting comfortably in bed. Of note, abdomen more distended, intermittent nausea. NPO for surgery this AM. Discussed with Ry plan for surgery, updated significant other.     Objective:  Intake/Output Summary (Last 24 hours) at 7/6/2023 0805  Last data filed at 7/6/2023 0000  Gross per 24 hour   Intake 440 ml   Output 850 ml   Net -410 ml     PHYSICAL EXAM:  BP (!) 173/109 (BP Location: Left arm)   Pulse 90   Temp 97.7  F (36.5  C) (Oral)   Resp 22   Ht 1.829 m (6')   Wt 121 kg (266 lb 12.1 oz)   SpO2 94%   BMI 36.18 kg/m    Alert and oriented x4  LLE edematous, has not worsened in interval  Abdominal distension, bowel sounds remain active  VSS-hypertensive     ASSESSMENT:  Ry Horner is a 57 year old male s/p left BKA with I and D for stump infection.     PLAN:  -RTOR this am  -continue current Rx, ID following  -elevate left lower extremity as able  -waiting for morning labs  -cultures have been negative after 5 days  -avoid nephrotoxic medications    Antonieta Perdomo, NP  VASCULAR SURGERY

## 2023-07-06 NOTE — ANESTHESIA CARE TRANSFER NOTE
Patient: Ry Horner    Procedure: Procedure(s):  LEFT BELOW KNEE AMPUTATION STUMP DEBRIDEMENT       Diagnosis: Cellulitis of left lower extremity [L03.116]  Diagnosis Additional Information: No value filed.    Anesthesia Type:   General     Note:    Oropharynx: oropharynx clear of all foreign objects and spontaneously breathing  Level of Consciousness: awake  Oxygen Supplementation: face mask  Level of Supplemental Oxygen (L/min / FiO2): 6  Independent Airway: airway patency satisfactory and stable  Dentition: dentition unchanged  Vital Signs Stable: post-procedure vital signs reviewed and stable  Report to RN Given: handoff report given  Patient transferred to: PACU  Comments: Neuromuscular blockade not used after succinylcholine for intubation, spontaneous return of TOF 4/4 with sustained tetany, spontaneous respirations, adequate tidal volumes, followed commands to voice, oropharynx suctioned with soft flexible catheter, extubated atraumatically, extubated with suction, airway patent after extubation.  Oxygen via facemask at 8 liters per minute to PACU. Oxygen tubing connected to wall O2 in PACU, SpO2, NiBP, and EKG monitors and alarms on and functioning, [place] Hugger warmer connected to patient gown, report on patient's clinical status given to PACU RN, RN questions answered.     Handoff Report: Identifed the Patient, Identified the Reponsible Provider, Reviewed the pertinent medical history, Discussed the surgical course, Reviewed Intra-OP anesthesia mangement and issues during anesthesia, Set expectations for post-procedure period and Allowed opportunity for questions and acknowledgement of understanding      Vitals:  Vitals Value Taken Time   /95 07/06/23 1411   Temp     Pulse 85 07/06/23 1415   Resp 27 07/06/23 1415   SpO2 98 % 07/06/23 1415   Vitals shown include unvalidated device data.    Electronically Signed By: Awilda Inman  July 6, 2023  2:16 PM

## 2023-07-06 NOTE — ANESTHESIA PREPROCEDURE EVALUATION
Anesthesia Pre-Procedure Evaluation    Patient: Ry Horner   MRN: [id number] : 1965        Procedure : Procedure(s):  LEFT BELOW KNEE AMPUTATION STUMP DEBRIDEMENT (Left: Leg)     Past Medical History:   Diagnosis Date     BENIGN HYPERTENSION 2007     DIABETES MELLITUS TYPE II-UNCOMPL 2007     HYPERLIPIDEMIA NEC/NOS 2007     Tobacco use disorder 2007      Past Surgical History:   Procedure Laterality Date     AMPUTATE FOOT Left 2019    Procedure: LEFT PARTIAL FOOT AMPUTATION;  Surgeon: Antoine Pena DPM;  Location: [facility]     AMPUTATE FOOT Left 2019    Procedure: LEFT PARTIAL FOOT AMPUTATION;  Surgeon: Tim Douglas DPM;  Location: [facility]     AMPUTATE FOOT Left 2019    Procedure: POSSIBLE PARTIAL FOOT AMPUTATION;  Surgeon: Tim Douglas DPM;  Location: [facility]     AMPUTATE FOOT Left 2/10/2022    Procedure: Partial left foot amputation;  Surgeon: Milena Cevallos DPM, Podiatry/Foot and Ankle Surgery;  Location: [facility]     AMPUTATE LEG BELOW KNEE Right 2017    Procedure: AMPUTATE LEG BELOW KNEE;  RIGHT BELOW KNEE AMPUTATION ;  Surgeon: Nikolas Nascimento MD;  Location: [facility]     AMPUTATE LEG BELOW KNEE Left 2022    Procedure: AMPUTATION BELOW KNEE;  Surgeon: Neal Blackman MD;  [facility]: [facility]     AMPUTATE LEG BELOW KNEE Left 2022    Procedure: LEFT SIDE COMPLETION BELOW THE KNEE AMPUTATION;  Surgeon: Reny Child MD;  Location: [facility]     AMPUTATE TOE(S) Left 2/3/2020    Procedure: LEFT SECOND TOE AMPUTATION;  Surgeon: Milena Cevallos DPM, Podiatry/Foot and Ankle Surgery;  Location: [facility]     APPENDECTOMY       APPLY WOUND VAC Right 3/2/2015    Procedure: APPLY WOUND VAC;  Surgeon: Milena Cevallos DPM, Pod;  Location: [facility]     BIOPSY BONE FOOT Right 7/15/2016    Procedure: BIOPSY BONE FOOT;  Surgeon: Tim Douglas DPM;  [facility]     BIOPSY BONE TOE Left 2019    Procedure: BONE BIOPSY LEFT SECOND TOE;   "Surgeon: Tim Douglas DPM;  Location[facility]     IRRIGATION AND DEBRIDEMENT FOOT, COMBINED Right 3/2/2015    Procedure: COMBINED IRRIGATION AND DEBRIDEMENT FOOT;  Surgeon: Milena Cevallos DPM, Pod;  Location: [facility]     IRRIGATION AND DEBRIDEMENT FOOT, COMBINED Right 7/15/2016    Procedure: COMBINED IRRIGATION AND DEBRIDEMENT FOOT;  Surgeon: Tim Douglas DPM;  [facility]     IRRIGATION AND DEBRIDEMENT FOOT, COMBINED Right 2016    Procedure: COMBINED IRRIGATION AND DEBRIDEMENT FOOT;  Surgeon: Tim Douglas DPM;  [facility]     IRRIGATION AND DEBRIDEMENT FOOT, COMBINED Left 2019    Procedure: REVISIONAL IRRIGATION AND DEBRIDEMENT LEFT FOOT AND BONE DEBRIDEMENT;  Surgeon: Joseph Randhawa DPM;  [facility]     IRRIGATION AND DEBRIDEMENT FOOT, COMBINED Left 2019    Procedure: EXCISIONAL DEBRIDEMENT LEFT FOOT;  Surgeon: Tim Douglas DPM;  [facility]     IRRIGATION AND DEBRIDEMENT FOOT, COMBINED Left 2019    Procedure: IRRIGATION AND DEBRIDEMENT FOOT, Partial osteotomy left first metatarsal;  Surgeon: Tim Douglas DPM;  [facility]     IRRIGATION AND DEBRIDEMENT FOOT, COMBINED Left 2020    Procedure: IRRIGATION AND DEBRIDEMENT LEFT FOOT;  Surgeon: Tim Douglas DPM;  Location[facility]     IRRIGATION AND DEBRIDEMENT LOWER EXTREMITY, COMBINED Left 2023    Procedure: Irrigation and debridement lower extremity amputation stump- left;  Surgeon: Reny Child MD;  Location:  OR     ORTHOPEDIC SURGERY        Allergies   Allergen Reactions     Pravastatin      \"sucked the life blood out of me,\" Indicates this occurs with all statins.     Statins       Social History     Tobacco Use     Smoking status: Former     Packs/day: 0.50     Years: 20.00     Pack years: 10.00     Types: Cigarettes     Start date: 1987     Quit date: 2006     Years since quittin.5     Smokeless tobacco: Never   Substance Use Topics     " Alcohol use: Yes     Comment: occ      Wt Readings from Last 1 Encounters:   07/06/23 121 kg (266 lb 12.1 oz)        Anesthesia Evaluation   Pt has had prior anesthetic.     No history of anesthetic complications       ROS/MED HX  ENT/Pulmonary:     (+) tobacco use (Quit in '06), Past use, 10  Pack-Year Hx,      Neurologic:       Cardiovascular:     (+) Dyslipidemia hypertension-Peripheral Vascular Disease----    METS/Exercise Tolerance:     Hematologic:       Musculoskeletal: Comment: Sepsis due Lt BKA stump infection  S/p I&D of of left BKA stump on 6/30/23      GI/Hepatic:       Renal/Genitourinary:     (+) renal disease, type: CRI and ARF,     Endo: Comment: Mild hyponatremia    (+) type II DM, Diabetic complications: nephropathy neuropathy. Obesity (Class 2),     Psychiatric/Substance Use:       Infectious Disease: Comment: H/o sepsis      Malignancy:       Other:            Physical Exam    Airway        Mallampati: II   TM distance: > 3 FB   Neck ROM: full   Mouth opening: > 3 cm    Respiratory Devices and Support         Dental       (+) Minor Abnormalities - some fillings, tiny chips      Cardiovascular   cardiovascular exam normal          Pulmonary   pulmonary exam normal                OUTSIDE LABS:  CBC:   Lab Results   Component Value Date    WBC 20.6 (H) 07/06/2023    WBC 22.2 (H) 07/05/2023    HGB 9.0 (L) 07/06/2023    HGB 9.0 (L) 07/05/2023    HCT 27.3 (L) 07/06/2023    HCT 26.8 (L) 07/05/2023     07/06/2023     07/05/2023     BMP:   Lab Results   Component Value Date     (L) 07/06/2023     (L) 07/05/2023    POTASSIUM 4.4 07/06/2023    POTASSIUM 4.3 07/05/2023    CHLORIDE 100 07/06/2023    CHLORIDE 98 07/05/2023    CO2 15 (L) 07/06/2023    CO2 16 (L) 07/05/2023    BUN 67.3 (H) 07/06/2023    BUN 59.3 (H) 07/05/2023    CR 7.42 (H) 07/06/2023    CR 6.39 (H) 07/05/2023     (H) 07/06/2023     (H) 07/06/2023     COAGS:   Lab Results   Component Value Date    PTT 28  06/27/2023    INR 1.06 06/27/2023    FIBR 866 (H) 06/27/2023     POC:   Lab Results   Component Value Date     (H) 02/07/2020     HEPATIC:   Lab Results   Component Value Date    ALBUMIN 2.6 (L) 07/01/2023    PROTTOTAL 7.3 06/24/2023    ALT 14 06/24/2023    AST 13 06/24/2023    ALKPHOS 84 06/24/2023    BILITOTAL 0.9 06/24/2023     OTHER:   Lab Results   Component Value Date    PH 7.39 09/23/2022    LACT 0.8 06/24/2023    A1C 7.6 (H) 06/27/2023    FERNANDO 7.8 (L) 07/06/2023    PHOS 4.0 07/01/2023    TSH 0.94 12/19/2019    .0 (H) 02/02/2020    SED 85 (H) 09/25/2022       Anesthesia Plan    ASA Status:  3   NPO Status:  NPO Appropriate    Anesthesia Type: General.     - Airway: ETT   Induction: Intravenous.   Maintenance: Balanced.   Techniques and Equipment:     - Airway: Video-Laryngoscope, Shoulder Ann Marie/Ramp         Consents    Anesthesia Plan(s) and associated risks, benefits, and realistic alternatives discussed. Questions answered and patient/representative(s) expressed understanding.    - Discussed:     - Discussed with:  Patient         Postoperative Care    Pain management: IV analgesics, Multi-modal analgesia.   PONV prophylaxis: Ondansetron (or other 5HT-3), Background Propofol Infusion     Comments:                    Tim Padilla MD

## 2023-07-07 LAB
ANION GAP SERPL CALCULATED.3IONS-SCNC: 17 MMOL/L (ref 7–15)
BACTERIA BONE ANAEROBE+AEROBE CULT: NO GROWTH
BACTERIA BONE ANAEROBE+AEROBE CULT: NORMAL
BACTERIA TISS BX CULT: NORMAL
BUN SERPL-MCNC: 74.8 MG/DL (ref 6–20)
CALCIUM SERPL-MCNC: 7.5 MG/DL (ref 8.6–10)
CHLORIDE SERPL-SCNC: 99 MMOL/L (ref 98–107)
CREAT SERPL-MCNC: 8.02 MG/DL (ref 0.67–1.17)
DEPRECATED HCO3 PLAS-SCNC: 16 MMOL/L (ref 22–29)
ERYTHROCYTE [DISTWIDTH] IN BLOOD BY AUTOMATED COUNT: 12.1 % (ref 10–15)
GFR SERPL CREATININE-BSD FRML MDRD: 7 ML/MIN/1.73M2
GLUCOSE BLDC GLUCOMTR-MCNC: 192 MG/DL (ref 70–99)
GLUCOSE BLDC GLUCOMTR-MCNC: 209 MG/DL (ref 70–99)
GLUCOSE SERPL-MCNC: 179 MG/DL (ref 70–99)
HBV SURFACE AB SERPL IA-ACNC: 0.55 M[IU]/ML
HBV SURFACE AB SERPL IA-ACNC: NONREACTIVE M[IU]/ML
HBV SURFACE AG SERPL QL IA: NONREACTIVE
HCT VFR BLD AUTO: 25 % (ref 40–53)
HGB BLD-MCNC: 8.3 G/DL (ref 13.3–17.7)
MCH RBC QN AUTO: 28.8 PG (ref 26.5–33)
MCHC RBC AUTO-ENTMCNC: 33.2 G/DL (ref 31.5–36.5)
MCV RBC AUTO: 87 FL (ref 78–100)
PLATELET # BLD AUTO: 327 10E3/UL (ref 150–450)
POTASSIUM SERPL-SCNC: 4.3 MMOL/L (ref 3.4–5.3)
RBC # BLD AUTO: 2.88 10E6/UL (ref 4.4–5.9)
SODIUM SERPL-SCNC: 132 MMOL/L (ref 136–145)
WBC # BLD AUTO: 19.4 10E3/UL (ref 4–11)

## 2023-07-07 PROCEDURE — 120N000001 HC R&B MED SURG/OB

## 2023-07-07 PROCEDURE — 272N000452 HC KIT SHRLOCK 5FR POWER PICC TRIPLE LUMEN

## 2023-07-07 PROCEDURE — 250N000011 HC RX IP 250 OP 636: Mod: JZ | Performed by: SURGERY

## 2023-07-07 PROCEDURE — 85027 COMPLETE CBC AUTOMATED: CPT | Performed by: SURGERY

## 2023-07-07 PROCEDURE — 87340 HEPATITIS B SURFACE AG IA: CPT | Performed by: INTERNAL MEDICINE

## 2023-07-07 PROCEDURE — 82310 ASSAY OF CALCIUM: CPT | Performed by: SURGERY

## 2023-07-07 PROCEDURE — 99232 SBSQ HOSP IP/OBS MODERATE 35: CPT | Performed by: INTERNAL MEDICINE

## 2023-07-07 PROCEDURE — 36569 INSJ PICC 5 YR+ W/O IMAGING: CPT

## 2023-07-07 PROCEDURE — 250N000013 HC RX MED GY IP 250 OP 250 PS 637: Performed by: SURGERY

## 2023-07-07 PROCEDURE — 250N000011 HC RX IP 250 OP 636: Performed by: SURGERY

## 2023-07-07 PROCEDURE — 36415 COLL VENOUS BLD VENIPUNCTURE: CPT | Performed by: SURGERY

## 2023-07-07 PROCEDURE — 250N000009 HC RX 250: Performed by: INTERNAL MEDICINE

## 2023-07-07 PROCEDURE — 86706 HEP B SURFACE ANTIBODY: CPT | Performed by: INTERNAL MEDICINE

## 2023-07-07 RX ORDER — LIDOCAINE 40 MG/G
CREAM TOPICAL
Status: DISCONTINUED | OUTPATIENT
Start: 2023-07-07 | End: 2023-07-10

## 2023-07-07 RX ADMIN — OXYCODONE HYDROCHLORIDE 5 MG: 5 TABLET ORAL at 08:55

## 2023-07-07 RX ADMIN — INSULIN ASPART 1 UNITS: 100 INJECTION, SOLUTION INTRAVENOUS; SUBCUTANEOUS at 08:57

## 2023-07-07 RX ADMIN — INSULIN DEGLUDEC INJECTION 25 UNITS: 100 INJECTION, SOLUTION SUBCUTANEOUS at 08:58

## 2023-07-07 RX ADMIN — INSULIN ASPART 1 UNITS: 100 INJECTION, SOLUTION INTRAVENOUS; SUBCUTANEOUS at 18:13

## 2023-07-07 RX ADMIN — INSULIN DEGLUDEC INJECTION 25 UNITS: 100 INJECTION, SOLUTION SUBCUTANEOUS at 21:52

## 2023-07-07 RX ADMIN — EZETIMIBE 10 MG: 10 TABLET ORAL at 08:52

## 2023-07-07 RX ADMIN — PIPERACILLIN AND TAZOBACTAM 2.25 G: 2; .25 INJECTION, POWDER, FOR SOLUTION INTRAVENOUS at 00:27

## 2023-07-07 RX ADMIN — PIPERACILLIN AND TAZOBACTAM 2.25 G: 2; .25 INJECTION, POWDER, FOR SOLUTION INTRAVENOUS at 21:39

## 2023-07-07 RX ADMIN — SODIUM CITRATE AND CITRIC ACID MONOHYDRATE 30 ML: 500; 334 SOLUTION ORAL at 08:51

## 2023-07-07 RX ADMIN — PIPERACILLIN AND TAZOBACTAM 2.25 G: 2; .25 INJECTION, POWDER, FOR SOLUTION INTRAVENOUS at 06:03

## 2023-07-07 RX ADMIN — OXYCODONE HYDROCHLORIDE 5 MG: 5 TABLET ORAL at 17:34

## 2023-07-07 RX ADMIN — HEPARIN SODIUM 5000 UNITS: 5000 INJECTION, SOLUTION INTRAVENOUS; SUBCUTANEOUS at 06:07

## 2023-07-07 RX ADMIN — ACETAMINOPHEN 650 MG: 325 TABLET, FILM COATED ORAL at 08:52

## 2023-07-07 RX ADMIN — ACETAMINOPHEN 650 MG: 325 TABLET, FILM COATED ORAL at 17:34

## 2023-07-07 RX ADMIN — HEPARIN SODIUM 5000 UNITS: 5000 INJECTION, SOLUTION INTRAVENOUS; SUBCUTANEOUS at 15:18

## 2023-07-07 RX ADMIN — METOPROLOL TARTRATE 50 MG: 50 TABLET, FILM COATED ORAL at 08:52

## 2023-07-07 RX ADMIN — HEPARIN SODIUM 5000 UNITS: 5000 INJECTION, SOLUTION INTRAVENOUS; SUBCUTANEOUS at 21:36

## 2023-07-07 RX ADMIN — METOPROLOL TARTRATE 50 MG: 50 TABLET, FILM COATED ORAL at 21:36

## 2023-07-07 RX ADMIN — OXYCODONE HYDROCHLORIDE 5 MG: 5 TABLET ORAL at 21:35

## 2023-07-07 RX ADMIN — LIDOCAINE HYDROCHLORIDE ANHYDROUS 1 ML: 10 INJECTION, SOLUTION INFILTRATION at 20:58

## 2023-07-07 RX ADMIN — INSULIN ASPART 1 UNITS: 100 INJECTION, SOLUTION INTRAVENOUS; SUBCUTANEOUS at 12:22

## 2023-07-07 RX ADMIN — PIPERACILLIN AND TAZOBACTAM 2.25 G: 2; .25 INJECTION, POWDER, FOR SOLUTION INTRAVENOUS at 12:24

## 2023-07-07 RX ADMIN — SODIUM CITRATE AND CITRIC ACID MONOHYDRATE 30 ML: 500; 334 SOLUTION ORAL at 21:45

## 2023-07-07 ASSESSMENT — ACTIVITIES OF DAILY LIVING (ADL)
ADLS_ACUITY_SCORE: 26
ADLS_ACUITY_SCORE: 26
ADLS_ACUITY_SCORE: 29
ADLS_ACUITY_SCORE: 26
ADLS_ACUITY_SCORE: 26
ADLS_ACUITY_SCORE: 25
ADLS_ACUITY_SCORE: 26
ADLS_ACUITY_SCORE: 25
ADLS_ACUITY_SCORE: 25
ADLS_ACUITY_SCORE: 26

## 2023-07-07 NOTE — PROGRESS NOTES
VSS ex HTN. A/O. L stump ace dressing CDI, refused to change tonight. Oxy/tylenol given. Tolerating diet. Distended abdo. Multiple loose/soft stools. C-diff negative. Voiding fine.

## 2023-07-07 NOTE — PROGRESS NOTES
"CLINICAL NUTRITION SERVICES  -  ASSESSMENT NOTE    Recommendations Ordered by Registered Dietitian (RD):   Ordered Andrea (orange) BID at lunch and dinner   Malnutrition:   % Weight Loss:  None noted  % Intake:  <75% for > 7 days (moderate malnutrition)  Subcutaneous Fat Loss:  None observed  Muscle Loss:  None observed  Fluid Retention: Mild edema (left arm, wrist, hand, knee)    Malnutrition Diagnosis: Patient does not meet two of the above criteria necessary for diagnosing malnutrition     REASON FOR ASSESSMENT  Ry Horner is a 57 year old male seen by Registered Dietitian for [facility].    PMH of uncontrolled T2DM with polyneuropathy and nephropathy, CKD3, bilateral BKA. Presents with pain, swelling, small ulcer at lt BKA stump and found to have cellulitis/sepsis.    NUTRITION HISTORY  6/30: revision of left below-knee stump amputation w/ post-op placement of wound vac.  Left BKA in September 2022.    No recent RD notes on nutrition history.    Spoke with patient at bedside. Patient noted appetite has been poor lately. Ordering 1 meal/day and getting 1 meal from outside the hospital (usually just fruit, juice, maybe a sandwich). He noted he can barely finish his meals d/t ongoing nausea. Discussed importance of meeting protein needs d/t recent procedure and wound on left BKA stump. Patient agreeable to Andrea BID as he has had this in the past.    CURRENT NUTRITION ORDERS  Diet Order: Renal Diet (non-dialysis)  Intermittently NPO since admission d/t procedures    Current Intake/Tolerance:  - 0-100% intakes per flowsheets  - Only ordered 4 meals total in the last week per [facility]  - Per chart review, intermittently nauseous, poor appetite    NUTRITION FOCUSED PHYSICAL ASSESSMENT FOR DIAGNOSING MALNUTRITION)  Yes - visual as pt was laying on side    Obtained from Chart/Interdisciplinary Team:  Per MD note 6/30, \"If evidence of osteomyelitis will plan to proceed with left above knee amputation in coming week.\"  Per MD " "note 7/2, pt notes abdominal bloating, but having regular BMs    ANTHROPOMETRICS  Height: 6' 0\"  Weight: 112.2 kg - lowest wt post debridement procedure  BMI: 33.4  Weight Status:  Overweight BMI 25-29.9  IBW: 80.9 kg - 7% (left/right BKA): 75.2 kg  % IBW: 149%  Weight History: wt's seem to be trending up   Wt Readings from Last 10 Encounters:   07/06/23 121 kg (266 lb 12.1 oz)   10/03/22 110 kg (242 lb 8.1 oz)   08/17/22 119.9 kg (264 lb 6.4 oz)   08/15/22 113.4 kg (250 lb)   02/10/22 119.3 kg (263 lb)   02/07/22 119.7 kg (264 lb)   02/01/22 121.6 kg (268 lb)   01/03/22 121.8 kg (268 lb 8 oz)   10/11/21 117.8 kg (259 lb 9.6 oz)   10/01/21 118.8 kg (262 lb)     07/06/23 0500 121 kg (266 lb 12.1 oz) Bed scale   07/05/23 0555 120.6 kg (265 lb 14 oz) Bed scale   07/04/23 0627 120.9 kg (266 lb 8.6 oz) Bed scale   07/03/23 0615 116.7 kg (257 lb 4.4 oz) --   07/02/23 0500 114.4 kg (252 lb 3.3 oz) Bed scale   07/01/23 0511 112.2 kg (247 lb 5.7 oz) Bed scale   06/30/23 0600 112.4 kg (247 lb 12.8 oz) Bed scale     LABS  Na 132 (L), BUN 74.8 (H), Cr 8.02 (H), GFR 7 (L), -209 (H)    MEDICATIONS  Medium sliding scale insulin, tresiba    ASSESSED NUTRITION NEEDS PER APPROVED PRACTICE GUIDELINES:  Dosing Weight 84.5 kg (adjusted based off current wt of 112 kg and adjusted IBW for BKA's of 75.2 kg)  Estimated Energy Needs: 2777-1773 kcals (25-30 Kcal/Kg)  Justification: increased needs post op  Estimated Protein Needs: 126+ grams protein (1.5+ g pro/Kg)  Justification: increased needs post procedure, wound healing  Estimated Fluid Needs: 1 mL/kcal or per provider pending fluid status    NUTRITION DIAGNOSIS:  Inadequate oral intake related to poor appetite and increased needs as evidenced by pt note of poor appetite d/t nausea and increased protein needs (1.5+ g/kg) d/t wound healing    NUTRITION INTERVENTIONS  Recommendations / Nutrition Prescription  Ordered BID Andrea    Implementation  Nutrition education: Discussed " higher protein needs post procedure and d/t wound  Medical Food Supplement - ordered    Nutrition Goals  Patient to consume >50% of nutritionally adequate meals + 1-2 supplements per day    MONITORING AND EVALUATION:  Progress towards goals will be monitored and evaluated per protocol and Practice Guidelines    Krystal Lara  Clinical Dietitian - [facility]

## 2023-07-07 NOTE — PROGRESS NOTES
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      Sepsis due Lt BKA stump infection  S/p I&D of of left BKA stump on 6/30/23  -Patient presented with pain, redness, swelling at his left BKA stump site.  Marked leukocytosis of 22.9. Tachycardic to 110s.  X-ray Shows soft tissue swelling, some bone formation along the resection margin of the tibia with periosteal reaction.   -Initially on vancomycin and Zosyn, subsequently changed by infectious disease to daptomycin and Zosyn  -Vascular surgery following  -MRI of the stump showed findings compatible with osteomyelitis of the residual tibia along with fluid collection suspicious for abscess, underwent I&D of the left stump on 6/30/2023 with placement of wound VAC  -Bone culture negative thus far. Pathology findings are nonspecific and not definitive of osteomyelitis.    -Underwent reexploration of the wound with I&D of the left below-knee stump yesterday i.e. 7/6/2023.  No pus or purulent fluid found.  Edema and hyperemic tissue.  Ascending induration along the lateral thigh with associated erythema.  Penrose drains placed along the tract.  -Leukocytosis marginally better  -Defer antibiotics to ID  -Hospital course complicated by acute kidney injury on CKD stage III.  Please see discussion below.    Acute kidney injury on CKD stage III   -Cr @ admission 2.45. Last Cr PTA 1.9 in 11/2022. (prior to that baseline ~ 1.6)   * Initially improved to baseline and started going back up 6/28.   * Only received 2 doses of vancomycin, last dose  6/25. No levels obtained. No NSAIDs  * No episodes of hypotension here.   * Vancomycin stopped and remains on zosyn and dapto  -Creatinine still peaking, up to 8 today  -Nephrology following, patient still with minimal urine output.  Will defer decision regarding potential dialysis to nephrology.  -Holding prior to admission lisinopril.   -Daily BMP.      Type II DM, uncontrolled, on insulin, A1c 7.6 on 6/27/23  -BS falling below 100 in the context of JOHNATHAN  -Continue to hold PTA oral agents including Jardiance Januvia.  -Continue sliding scale insulin, Tresiba resumed this AM  -Hypoglycemia protocol    Essential hypertension  -Hold prior to admission lisinopril  -Blood pressure has been quite elevated this hospital stay, currently on metoprolol 50 mg twice a day.     Mild hyponatremia     Diet: Renal Diet (non-dialysis)  Snacks/Supplements Adult: Other; Fruit Punch Andrea at lunch and dinner; With Meals     DVT Prophylaxis: Pneumatic Compression Devices   [place] Catheter: Not present  Code Status: Full Code     Disposition Plan      Expected Discharge Date: 07/10/2023      Destination: home;home with family  Discharge Comments: CX and return to OR this week (7/4)      Entered: Kai Viramontes MD 07/07/2023, 10:04 AM        Clinically Significant Risk Factors              # Hypoalbuminemia: Lowest albumin = 2.6 g/dL at 7/1/2023  4:00 PM, will monitor as appropriate    # Acute Kidney Injury, unspecified: based on a >150% or 0.3 mg/dL increase in last creatinine compared to past 90 day average, will monitor renal function  # Hypertension: Noted on problem list       # DMII: A1C = 7.6 % (Ref range: <5.7 %) within past 6 months   # Obesity: Estimated body mass index is 36.18 kg/m  as calculated from the following:    Height as of this encounter: 1.829 m (6').    Weight as of this encounter: 121 kg (266 lb 12.1 oz).             The patient's care was discussed with the Bedside Nurse and Patient.    Medical Decision Making        **CLEAR ALL SELECTIONS**        Labs/Imaging Reviewed:  See Information above and Data section below    Time SPENT BY ME on the date of service doing chart review, history, exam, documentation & further activities per the note:  35  MINUTES    Kai Viramontes MD  Hospitalist Service  [facility]  Text Page 7AM-6PM  Securely message with the Vocera Web Console (learn more here)  Text page via [employer] Paging/Directory    ______________________________________________________________________    Interval History   Patient underwent repeat I&D yesterday.  No evidence of pus or purulence found however there was ascending induration along the lateral thigh with associated erythema.  Drains placed.  Endorses pain at surgical site which is adequately controlled though.  Creatinine still peaking.  Minimal urine output.    Data reviewed today: I reviewed all medications, new labs and imaging results over the last 24 hours. I personally reviewed no images or EKG's today.    Physical Exam   Vital signs:  Temp: 98.6  F (37  C) Temp src: Oral BP: (!) 158/94 Pulse: 83   Resp: 18 SpO2: 95 % O2 Device: None (Room air) Oxygen Delivery: 2 LPM Height: 182.9 cm (6') Weight: 121 kg (266 lb 12.1 oz)  Estimated body mass index is 36.18 kg/m  as calculated from the following:    Height as of this encounter: 1.829 m (6').    Weight as of this encounter: 121 kg (266 lb 12.1 oz).      Wt Readings from Last 2 Encounters:   07/06/23 121 kg (266 lb 12.1 oz)   10/03/22 110 kg (242 lb 8.1 oz)       Gen: AAOX3, NAD, comfortable  HEENT: no pallor  Resp: CTA B/L, normal WOB  CVS: RRR, no murmur  Abd/GI: Soft, non-tender. BS- normoactive.   Skin: Warm, dry no rashes  MSK: Left below-knee stump is heavily bandaged.  Neuro- CN- intact. No focal deficits.        Data   Recent Labs   Lab 07/07/23  0743 07/07/23  0611 07/07/23  0158 07/06/23  1244 07/06/23  0812 07/05/23  1721 07/05/23  0739 07/01/23  1737 07/01/23  1600   WBC  19.4*  --   --   --  20.6*  --  22.2*   < >  --    HGB 8.3*  --   --   --  9.0*  --  9.0*   < >  --    MCV 87  --   --   --  86  --  85   < >  --      --   --   --  375  --  325   < >  --    *  --   --   --  133*  --  131*   < > 136   POTASSIUM 4.3  --   --   --  4.4  --  4.3   < > 4.1   CHLORIDE 99  --   --   --  100  --  98   < > 102   CO2 16*  --   --   --  15*  --  16*   < > 22   BUN 74.8*  --   --   --  67.3*  --  59.3*   < > 31.5*   CR 8.02*  --   --   --  7.42*  --  6.39*   < > 2.60*   ANIONGAP 17*  --   --   --  18*  --  17*   < > 12   FERNANDO 7.5*  --   --   --  7.8*  --  7.8*   < > 8.1*   * 192* 209*   < > 145*   < > 172*   < > 119*   ALBUMIN  --   --   --   --   --   --   --   --  2.6*    < > = values in this interval not displayed.       No results found for this or any previous visit (from the past 24 hour(s)).  Medications       DAPTOmycin (CUBICIN) 600 mg in sodium chloride 0.9 % 100 mL intermittent infusion  6 mg/kg (Adjusted) Intravenous Q48H     ezetimibe  10 mg Oral Daily     heparin ANTICOAGULANT  5,000 Units Subcutaneous Q8H     insulin aspart  1-7 Units Subcutaneous TID AC     insulin aspart  1-5 Units Subcutaneous At Bedtime     insulin degludec  25 Units Subcutaneous BID     metoprolol tartrate  50 mg Oral BID     piperacillin-tazobactam  2.25 g Intravenous Q6H     sodium chloride (PF)  3 mL Intracatheter Q8H     sodium citrate-citric acid  30 mL Oral BID

## 2023-07-07 NOTE — PROGRESS NOTES
Vascular Surgery Progress Note     Date of Admission:  6/24/2023  Date: July 7, 2023     Subjective  Mr. Horner says he is feeling well; he is sleeping on-and-off and catching a few hours of rest intermittently. He has adequate pain control today and the left thigh feels less tight.       Physical Exam   Temp: 98.2  F (36.8  C) Temp src: Oral BP: (!) 149/85 Pulse: 74   Resp: 18 SpO2: 91 % O2 Device: None (Room air) Oxygen Delivery: 2 LPM  Vital Signs with Ranges  Temp:  [97.4  F (36.3  C)-98.7  F (37.1  C)] 98.2  F (36.8  C)  Pulse:  [74-92] 74  Resp:  [16-27] 18  BP: (149-182)/() 149/85  SpO2:  [91 %-98 %] 91 %  266 lbs 12.11 oz    Constitutional: cooperative, no apparent distress  Vascular: dressings changed today; granulation tissue, beefy red, apparent in the distal stump incision. Stab incisions with penrose drains along left lateral thigh with serosanguinous drainage, no current erythema.    Neurologic: Awake, alert, oriented to name, place, time, and situation; appears slightly more alert today.    Data   Most Recent 3 CBCs:  Recent Labs   Lab Test 07/07/23  0743 07/06/23  0812 07/05/23  0739   WBC 19.4* 20.6* 22.2*   HGB 8.3* 9.0* 9.0*   MCV 87 86 85    375 325       Most Recent 3 BMPs:  Recent Labs   Lab Test 07/07/23  0743 07/06/23  0812 07/05/23  0739   * 133* 131*   POTASSIUM 4.3 4.4 4.3   CHLORIDE 99 100 98   CO2 16* 15* 16*   BUN 74.8* 67.3* 59.3*   CR 8.02* 7.42* 6.39*   ANIONGAP 17* 18* 17*   FERNANDO 7.5* 7.8* 7.8*   * 145* 172*    < > = values in this interval not displayed.       Assessment & Plan   Mr. Horner is a 57M who underwent staged left below-knee amputation in 09/2022. This healed well and he was ambulatory on his prosthetic. He presented with sudden onset of pain, swelling, and wound on the distal tip of the left below-knee stump, with surrounding cellulitis. He underwent debridement on 6/30/23 but had persistent signs of infection and returned to the OR 7/6/23 to  undergo repeat debridement.       Please see progress note from 7/6/23 to see the wounds and dressing instructions.     Dressings were not changed by 11 am today, despite q8 hour orders; Mr. Horner does not recall refusing dressing changes last night.     Due to the concern about sepsis, soiled dressings cannot be allowed to remain in place for long durations. Will increase dressings to q4 hours. Discussed with nursing staff and Mr. Horner that these changes need to continue.     Again, unclear whether the leg is the only cause of his septic picture.     Plan for repeat debridement in the OR on Monday, 7/10.    If his leukocytosis remains so significantly elevated and his renal function has not improved, will discuss through-knee amputation (guillotine AKA).     I am trying to preserve function and the BKA as much as possible. Osteomyelitis vs reactive bone edema on MR imaging, cultures negative to date. Repeat imaging is not an option with his current renal function. I do not believe there are undrained soft tissue collections at this point, although it is possible. I do not believe osteomyelitis in the residual tibia is causing his current lab abnormalities. I greatly appreciate the insight of ID into his case.     Reny Child MD

## 2023-07-07 NOTE — PROGRESS NOTES
Renal Medicine Progress Note            Assessment/Plan:     57 y.o man with CKD III due to DM and HTN, consulted for JOHNATHAN.      1) CKD III:                -bl ~ 1.5-1.9 mg/dl (GFR 45-55)               -2.5 g/g albuminuria     2)  Severe JOHNATHAN: Remains Non-oliguric. At baseline creatinine until 6/29 and subsequent rise, more dramatically after OR 6/30.               - on Zosyn/dapto               -urine sodum <20 7/2 reflecting possibe pre-renal state.                -UA with hyaline casts              - Renal US with large sized kidneys, especially with underlying CKD, this nephromegaly likely reflects some ATN injury.     Remains nonoliguric, has no obvious uremic symptoms and does not need to treat specifically his creatinine or GFR number.  Additionally I will bother electrolytes are only mildly abnormal and overall no indications to initiate dialysis today.  Some possible suggestion of a plateauing of the JOHNATHAN and will give more time to have a more urgent indication for initiation.     3)  Infected left stump with osteomyelitis s/p I&D:               -dapto/zosyn     4) FEN: . Hyponatremia at 132, bicarb 16     5)  Hypertension:                -Jardiance and lisinopril are on hold     6) anemia: Hgb 8.3      Plan:   1) continue bicitra to 30cc bid  2) continue bumex 3mg q12.   3) Dose all medications to eGR of <10 ml/min  4) avoid NSAIDs and IV dye  5) no urgent indication for dialysis patient today due to lack of uremic symptoms and stable electrolyte parameters.  6) we will assess daily for uremia and biochemical reasons for dialysis closely       Julio Rios DO  [facility]  Office: [phone]  Cell: [phone]        Interval History:     Patient reports he is doing mild to fair this morning.  Has an appetite denies any nausea or emesis.  Had 1 L of urine output yesterday from days prior which has been increasing and responding to the Bumex.  Status post OR with the pictures reviewed from  "yesterday.  Discussed today his labs and renal function.          Medications and Allergies:       DAPTOmycin (CUBICIN) 600 mg in sodium chloride 0.9 % 100 mL intermittent infusion  6 mg/kg (Adjusted) Intravenous Q48H     ezetimibe  10 mg Oral Daily     heparin ANTICOAGULANT  5,000 Units Subcutaneous Q8H     insulin aspart  1-7 Units Subcutaneous TID AC     insulin aspart  1-5 Units Subcutaneous At Bedtime     insulin degludec  25 Units Subcutaneous BID     metoprolol tartrate  50 mg Oral BID     piperacillin-tazobactam  2.25 g Intravenous Q6H     sodium chloride (PF)  3 mL Intracatheter Q8H     sodium citrate-citric acid  30 mL Oral BID        Allergies   Allergen Reactions     Pravastatin      \"sucked the life blood out of me,\" Indicates this occurs with all statins.     Statins             Physical Exam:   Vitals were reviewed  BP (!) 158/94 (BP Location: Left arm)   Pulse 83   Temp 98.6  F (37  C) (Oral)   Resp 18   Ht 1.829 m (6')   Wt 121 kg (266 lb 12.1 oz)   SpO2 95%   BMI 36.18 kg/m      Wt Readings from Last 3 Encounters:   07/06/23 121 kg (266 lb 12.1 oz)   10/03/22 110 kg (242 lb 8.1 oz)   08/17/22 119.9 kg (264 lb 6.4 oz)       Intake/Output Summary (Last 24 hours) at 7/7/2023 1052  Last data filed at 7/7/2023 0617  Gross per 24 hour   Intake 1000 ml   Output 630 ml   Net 370 ml       GENERAL APPEARANCE: Laying on right side, alert and oriented no distress  HEENT:  Eyes/ears/nose/neck grossly normal  RESP: lungs cta b c good efforts, no crackles, rhonchi or wheezes  CV: RRR, nl S1/S2, no m/r/g   ABDOMEN: o/s/nt/nd, bs present  EXTREMITIES/SKIN: no c/c/rashes/lesions; 2+ dependent edema  NEURO: Grossly nonfocal             Data:     BMP  Recent Labs   Lab 07/07/23  0743 07/07/23  0611 07/07/23  0158 07/06/23  1715 07/06/23  1244 07/06/23  0812 07/05/23  1721 07/05/23  0739 07/04/23  1242 07/04/23  0622   *  --   --   --   --  133*  --  131*  --  132*   POTASSIUM 4.3  --   --   --   --  4.4  " --  4.3  --  4.0   CHLORIDE 99  --   --   --   --  100  --  98  --  99   FERNANDO 7.5*  --   --   --   --  7.8*  --  7.8*  --  7.8*   CO2 16*  --   --   --   --  15*  --  16*  --  18*   BUN 74.8*  --   --   --   --  67.3*  --  59.3*  --  48.3*   CR 8.02*  --   --   --   --  7.42*  --  6.39*  --  5.28*   * 192* 209* 142*   < > 145*   < > 172*   < > 108*    < > = values in this interval not displayed.     CBC  Recent Labs   Lab 07/07/23  0743 07/06/23  0812 07/05/23  0739 07/04/23  0622   WBC 19.4* 20.6* 22.2* 25.9*   HGB 8.3* 9.0* 9.0* 9.5*   HCT 25.0* 27.3* 26.8* 28.1*   MCV 87 86 85 87    375 325 319     Lab Results   Component Value Date    AST 13 06/24/2023    ALT 14 06/24/2023    ALKPHOS 84 06/24/2023    BILITOTAL 0.9 06/24/2023     Lab Results   Component Value Date    INR 1.06 06/27/2023       Attestation:  I have reviewed today's vital signs, notes, medications, labs and imaging.    DO [facility] - Nephrology  Office: [phone]  Cell: [phone]

## 2023-07-07 NOTE — PROGRESS NOTES
Patient with historically difficult vasculature to access, states phlebotomy also having difficulty obtaining labs.  Unclear whether patient is candidate for PICC yet, ID MD on-call paged to discuss.

## 2023-07-07 NOTE — PLAN OF CARE
Date/time: 7/7/23 night shift [id number]  Summary: POD X1 of LEFT BELOW KNEE AMPUTATION STUMP DEBRIDEMENT   Hx of significant for uncontrolled type II DM with polyneuropathy and nephropathy, stage III CKD, bilateral BKA presented to the ER due to pain, swelling and redness with small ulcer at Lt BKA stump and found to have cellulitis/sepsis   Orientation: Pt is A&Ox4, noticed he very is flat/withdrawn/hypoactive this shift.     Vitals/Tele: VSS on 2LPM NC. HTN ( SBP 170s).   IV Access/drains: PIV SL w/ intermittent ABX.    Diet: renal diet    Mobility: Up IND in room w/ wheelchair, reposition indepen in bed     GI/: voiding via urinal,  Distended abdo, incontinent to bowel-had one large loose stool, C-diff negative    Wound/Skin: L stump ace dressing CDI, refused to change tonight    Consults: none    Discharge Plan: Discharge pending.     Creatine trending up from 5.28 to 7.8  Pt refused to get the dressing change tonight,   See Flow sheets for assessment

## 2023-07-07 NOTE — PLAN OF CARE
[id number]  Pt here POD1 of LLE BKA I/D. VSS on Ra ex htn. Tolerating renal diet. Up A+1 walker to bedside commode. BS active and abd distended. Continent B/B. PIV gone bad, Discussed with IV team and possible PICC placement tonight. Bilateral BKA. Dressing on L BKA, dressing changes to be done wet to dry and q4hr, see POC. Pain controlled with prn oxy and tylenol. A&Ox4. Denies N/V. Nephrology, ID, Vascular, and Hospitalist consulted. Creat high at 8.02, discussed with Nephrology and due to other labs and adequate voiding will hold off of line placement and dialysis and reassess daily, see nephrology note. Discharge pending.

## 2023-07-07 NOTE — PROGRESS NOTES
[facility]    Infectious Disease Progress Note    Date of Service (when I saw the patient): 07/07/2023     Assessment & Plan   Ry Horner is a 57 year old male who was admitted on 6/24/2023.     Impression:  1. 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 2. Stage III CKD  3. Bilateral BKA   4. Presented to the ER due to pain, swelling and redness with small ulcer at Lt BKA stump and found to have cellulitis/sepsis. This BKA was done in 2022   5. No history of MRSA  6. Workup shows JOHNATHAN  7. Started on vanc ( only 2 doses) and zosyn switched to dapto on 6/26      Recommendations:   Continue on daptomycin continue on zosyn   S/P: Incision and debridement of left below-knee stump today on 6/30   S/p: repeat I and D on 7/6  Pending OR cultures and path pending so far no micro data but patient has been on broad spectrum antibiotics   Will continue to follow          Franklin Maza MD    Interval History   No new micro    Creat up   Nephrology evaluating     Physical Exam   Temp: 98.6  F (37  C) Temp src: Oral BP: (!) 158/94 Pulse: 83   Resp: 18 SpO2: 95 % O2 Device: None (Room air) Oxygen Delivery: 2 LPM  Vitals:    07/04/23 0627 07/05/23 0555 07/06/23 0500   Weight: 120.9 kg (266 lb 8.6 oz) 120.6 kg (265 lb 14 oz) 121 kg (266 lb 12.1 oz)     Vital Signs with Ranges  Temp:  [97.4  F (36.3  C)-98.7  F (37.1  C)] 98.6  F (37  C)  Pulse:  [81-92] 83  Resp:  [16-27] 18  BP: (153-182)/() 158/94  SpO2:  [93 %-98 %] 95 %     Constitutional: Awake, alert, cooperative, no apparent distress  Lungs: Clear to auscultation bilaterally, no crackles or wheezing  Cardiovascular: Regular rate and rhythm, normal S1 and S2, and no murmur noted  Abdomen: Normal bowel sounds, soft, non-distended, non-tender  Skin: dressing on the  left BKA   Other:       Medications       DAPTOmycin (CUBICIN) 600 mg in sodium chloride 0.9 % 100 mL intermittent infusion  6 mg/kg  (Adjusted) Intravenous Q48H     ezetimibe  10 mg Oral Daily     heparin ANTICOAGULANT  5,000 Units Subcutaneous Q8H     insulin aspart  1-7 Units Subcutaneous TID AC     insulin aspart  1-5 Units Subcutaneous At Bedtime     insulin degludec  25 Units Subcutaneous BID     metoprolol tartrate  50 mg Oral BID     piperacillin-tazobactam  2.25 g Intravenous Q6H     sodium chloride (PF)  3 mL Intracatheter Q8H     sodium citrate-citric acid  30 mL Oral BID       Data   All microbiology laboratory data reviewed.  Recent Labs   Lab Test 07/07/23  0743 07/06/23  0812 07/05/23  0739   WBC 19.4* 20.6* 22.2*   HGB 8.3* 9.0* 9.0*   HCT 25.0* 27.3* 26.8*   MCV 87 86 85    375 325     Recent Labs   Lab Test 07/07/23  0743 07/06/23  0812 07/05/23  0739   CR 8.02* 7.42* 6.39*     Recent Labs   Lab Test 09/25/22  1529   SED 85*     Recent Labs   Lab Test 02/03/20  1324 02/03/20  0007 02/02/20  2250 12/04/19  1619 11/19/19  1829 11/17/19  1525 11/17/19  1419 11/17/19  1411 08/28/17  1136   CULT Heavy growth  beta hemolytic   Streptococcus constellatus  *  Light growth  Staphylococcus aureus  * No growth No growth No anaerobes isolated  No growth Light growth  Bacteroides fragilis  *  Light growth  Parvimonas micra  *  Susceptibility testing not routinely done  On day 2, isolated in broth only:  beta hemolytic   Streptococcus constellatus  * Heavy growth  beta hemolytic   Streptococcus constellatus  Susceptibility testing done on previous specimen  *  Light growth  Alcaligenes faecalis  *  Light growth  Staphylococcus aureus  *  On day 1, isolated in broth only:  Anaerobic gram negative rods  See anaerobic report for identification  * Heavy growth  Bacteroides fragilis  Susceptibility testing not routinely done  *  Heavy growth  Peptoniphilus asaccharolyticus  Susceptibility testing not routinely done  *  Moderate growth  beta hemolytic   Streptococcus constellatus  *  On day 1, isolated in broth only:  Anaerobic  gram negative rods  See anaerobic report for identification  * Heavy growth  Bacteroides fragilis  *  Heavy growth  Parvimonas micra  *  Heavy growth  Peptoniphilus asaccharolyticus  *  Heavy growth  Mixed aerobic and anaerobic rosa  *  Susceptibility testing not routinely done No growth       All cultures:  No results for input(s): CULTURE in the last 168 hours.   Blood culture:  Results for orders placed or performed during the hospital encounter of 06/24/23   Blood Culture Peripheral Blood    Specimen: Peripheral Blood   Result Value Ref Range    Culture No Growth    Blood Culture Peripheral Blood    Specimen: Peripheral Blood   Result Value Ref Range    Culture No Growth    Results for orders placed or performed during the hospital encounter of 02/02/20   Blood culture    Specimen: Blood    Right Arm   Result Value Ref Range    Specimen Description Blood Right Arm     Culture Micro No growth    Blood culture    Specimen: Blood    Right Arm   Result Value Ref Range    Specimen Description Blood Right Arm     Culture Micro No growth    Results for orders placed or performed during the hospital encounter of 08/28/17   Blood culture    Specimen: Arm, Right; Blood    Right Arm   Result Value Ref Range    Specimen Description Blood Right Arm     Special Requests Aerobic and anaerobic bottles received     Culture Micro No growth    Blood culture    Specimen: Arm, Right; Blood    Right Arm   Result Value Ref Range    Specimen Description Blood Right Arm     Special Requests Aerobic and anaerobic bottles received     Culture Micro No growth    Results for orders placed or performed during the hospital encounter of 07/14/16   Blood culture    Specimen: Blood   Result Value Ref Range    Specimen Description Blood Left Arm     Special Requests Aerobic and anaerobic bottles received     Culture Micro No growth     Micro Report Status FINAL 07/20/2016    Blood culture    Specimen: Blood   Result Value Ref Range     Specimen Description Blood Right Arm     Special Requests Aerobic and anaerobic bottles received     Culture Micro No growth     Micro Report Status FINAL 07/20/2016       Urine culture:  No results found for this or any previous visit.

## 2023-07-07 NOTE — PROGRESS NOTES
"SPIRITUAL HEALTH SERVICES Progress Note  [facility]  General Surgery    Saw pt Ry Horner per RN referral. Introduced myself and Spiritual Health Services. Pt politely declined a visit, stating \" I'm actually doing great. I have a great support system.\" Informed how to access Spiritual Health Service in the future.      Spiritual Health Services remain available per request.      MICAH YoungDiv  Chaplain Resident   [phone]   "

## 2023-07-07 NOTE — PROGRESS NOTES
"MD Notification    Notified Person: MD    Notified Person Name: Julio Rios     Notification Date/Time: 7/7/23 09:08    Notification Interaction: Vocera Message    Purpose of Notification:\"Creat is up to 8.02. Wondering if plan is for Dialysis today and if so we need a line placed.\"    Orders Received: Will evaluate patient in person before putting plan in place.     Comments:  Discussed with Dr. Rios and no need to plan for dialysis today. Will continue to monitor labs and reassess.     "

## 2023-07-08 LAB
ALBUMIN SERPL BCG-MCNC: 2.4 G/DL (ref 3.5–5.2)
ANION GAP SERPL CALCULATED.3IONS-SCNC: 16 MMOL/L (ref 7–15)
BUN SERPL-MCNC: 83.5 MG/DL (ref 6–20)
CALCIUM SERPL-MCNC: 7.5 MG/DL (ref 8.6–10)
CHLORIDE SERPL-SCNC: 100 MMOL/L (ref 98–107)
CREAT SERPL-MCNC: 8.7 MG/DL (ref 0.67–1.17)
DEPRECATED HCO3 PLAS-SCNC: 18 MMOL/L (ref 22–29)
ERYTHROCYTE [DISTWIDTH] IN BLOOD BY AUTOMATED COUNT: 12.2 % (ref 10–15)
GFR SERPL CREATININE-BSD FRML MDRD: 7 ML/MIN/1.73M2
GLUCOSE SERPL-MCNC: 155 MG/DL (ref 70–99)
HCT VFR BLD AUTO: 23.9 % (ref 40–53)
HGB BLD-MCNC: 7.9 G/DL (ref 13.3–17.7)
MCH RBC QN AUTO: 28.9 PG (ref 26.5–33)
MCHC RBC AUTO-ENTMCNC: 33.1 G/DL (ref 31.5–36.5)
MCV RBC AUTO: 88 FL (ref 78–100)
PHOSPHATE SERPL-MCNC: 8.2 MG/DL (ref 2.5–4.5)
PLATELET # BLD AUTO: 321 10E3/UL (ref 150–450)
POTASSIUM SERPL-SCNC: 4 MMOL/L (ref 3.4–5.3)
RBC # BLD AUTO: 2.73 10E6/UL (ref 4.4–5.9)
SODIUM SERPL-SCNC: 134 MMOL/L (ref 136–145)
WBC # BLD AUTO: 16.2 10E3/UL (ref 4–11)

## 2023-07-08 PROCEDURE — 250N000011 HC RX IP 250 OP 636: Mod: JZ | Performed by: SURGERY

## 2023-07-08 PROCEDURE — 250N000011 HC RX IP 250 OP 636: Performed by: SURGERY

## 2023-07-08 PROCEDURE — 99232 SBSQ HOSP IP/OBS MODERATE 35: CPT | Performed by: INTERNAL MEDICINE

## 2023-07-08 PROCEDURE — 80069 RENAL FUNCTION PANEL: CPT | Performed by: INTERNAL MEDICINE

## 2023-07-08 PROCEDURE — 250N000013 HC RX MED GY IP 250 OP 250 PS 637: Performed by: SURGERY

## 2023-07-08 PROCEDURE — 250N000011 HC RX IP 250 OP 636: Performed by: INTERNAL MEDICINE

## 2023-07-08 PROCEDURE — 258N000003 HC RX IP 258 OP 636: Performed by: SURGERY

## 2023-07-08 PROCEDURE — 85027 COMPLETE CBC AUTOMATED: CPT | Performed by: INTERNAL MEDICINE

## 2023-07-08 PROCEDURE — 120N000001 HC R&B MED SURG/OB

## 2023-07-08 RX ORDER — BUMETANIDE 0.25 MG/ML
3 INJECTION INTRAMUSCULAR; INTRAVENOUS 2 TIMES DAILY
Status: DISCONTINUED | OUTPATIENT
Start: 2023-07-08 | End: 2023-07-09

## 2023-07-08 RX ADMIN — EZETIMIBE 10 MG: 10 TABLET ORAL at 09:16

## 2023-07-08 RX ADMIN — HEPARIN SODIUM 5000 UNITS: 5000 INJECTION, SOLUTION INTRAVENOUS; SUBCUTANEOUS at 06:34

## 2023-07-08 RX ADMIN — INSULIN DEGLUDEC INJECTION 25 UNITS: 100 INJECTION, SOLUTION SUBCUTANEOUS at 09:25

## 2023-07-08 RX ADMIN — OXYCODONE HYDROCHLORIDE 5 MG: 5 TABLET ORAL at 17:00

## 2023-07-08 RX ADMIN — ACETAMINOPHEN 650 MG: 325 TABLET, FILM COATED ORAL at 17:00

## 2023-07-08 RX ADMIN — PIPERACILLIN AND TAZOBACTAM 2.25 G: 2; .25 INJECTION, POWDER, FOR SOLUTION INTRAVENOUS at 02:46

## 2023-07-08 RX ADMIN — PIPERACILLIN AND TAZOBACTAM 2.25 G: 2; .25 INJECTION, POWDER, FOR SOLUTION INTRAVENOUS at 09:16

## 2023-07-08 RX ADMIN — HEPARIN SODIUM 5000 UNITS: 5000 INJECTION, SOLUTION INTRAVENOUS; SUBCUTANEOUS at 21:33

## 2023-07-08 RX ADMIN — ACETAMINOPHEN 650 MG: 325 TABLET, FILM COATED ORAL at 01:56

## 2023-07-08 RX ADMIN — OXYCODONE HYDROCHLORIDE 5 MG: 5 TABLET ORAL at 10:31

## 2023-07-08 RX ADMIN — OXYCODONE HYDROCHLORIDE 5 MG: 5 TABLET ORAL at 05:54

## 2023-07-08 RX ADMIN — METOPROLOL TARTRATE 50 MG: 50 TABLET, FILM COATED ORAL at 09:16

## 2023-07-08 RX ADMIN — PIPERACILLIN AND TAZOBACTAM 2.25 G: 2; .25 INJECTION, POWDER, FOR SOLUTION INTRAVENOUS at 15:49

## 2023-07-08 RX ADMIN — BUMETANIDE 3 MG: 0.25 INJECTION, SOLUTION INTRAMUSCULAR; INTRAVENOUS at 20:39

## 2023-07-08 RX ADMIN — SODIUM CITRATE AND CITRIC ACID MONOHYDRATE 30 ML: 500; 334 SOLUTION ORAL at 20:36

## 2023-07-08 RX ADMIN — PIPERACILLIN AND TAZOBACTAM 2.25 G: 2; .25 INJECTION, POWDER, FOR SOLUTION INTRAVENOUS at 20:39

## 2023-07-08 RX ADMIN — OXYCODONE HYDROCHLORIDE 5 MG: 5 TABLET ORAL at 21:33

## 2023-07-08 RX ADMIN — BUMETANIDE 3 MG: 0.25 INJECTION, SOLUTION INTRAMUSCULAR; INTRAVENOUS at 12:11

## 2023-07-08 RX ADMIN — METOPROLOL TARTRATE 50 MG: 50 TABLET, FILM COATED ORAL at 20:36

## 2023-07-08 RX ADMIN — INSULIN DEGLUDEC INJECTION 25 UNITS: 100 INJECTION, SOLUTION SUBCUTANEOUS at 20:34

## 2023-07-08 RX ADMIN — OXYCODONE HYDROCHLORIDE 5 MG: 5 TABLET ORAL at 01:56

## 2023-07-08 RX ADMIN — INSULIN ASPART 1 UNITS: 100 INJECTION, SOLUTION INTRAVENOUS; SUBCUTANEOUS at 18:25

## 2023-07-08 RX ADMIN — ACETAMINOPHEN 650 MG: 325 TABLET, FILM COATED ORAL at 10:31

## 2023-07-08 RX ADMIN — SODIUM CITRATE AND CITRIC ACID MONOHYDRATE 30 ML: 500; 334 SOLUTION ORAL at 09:16

## 2023-07-08 RX ADMIN — HEPARIN SODIUM 5000 UNITS: 5000 INJECTION, SOLUTION INTRAVENOUS; SUBCUTANEOUS at 14:33

## 2023-07-08 RX ADMIN — DAPTOMYCIN 600 MG: 500 INJECTION, POWDER, LYOPHILIZED, FOR SOLUTION INTRAVENOUS at 14:33

## 2023-07-08 ASSESSMENT — ACTIVITIES OF DAILY LIVING (ADL)
ADLS_ACUITY_SCORE: 26
ADLS_ACUITY_SCORE: 26
ADLS_ACUITY_SCORE: 30
ADLS_ACUITY_SCORE: 26
ADLS_ACUITY_SCORE: 30
ADLS_ACUITY_SCORE: 26
ADLS_ACUITY_SCORE: 30

## 2023-07-08 NOTE — PROCEDURES
[facility]    Triple Lumen PICC Placement    Date/Time: 7/7/2023 9:27 PM    Performed by: Shavonne Castano RN  Authorized by: Oscar Carmona MD  Indications: vascular access      UNIVERSAL PROTOCOL   Site Marked: Yes  Prior Images Obtained and Reviewed:  Yes  Required items: Required blood products, implants, devices and special equipment available    Patient identity confirmed:  Verbally with patient, arm band, provided demographic data and hospital-assigned identification number  NA - No sedation, light sedation, or local anesthesia  Confirmation Checklist:  Relevant allergies, procedure was appropriate and matched the consent or emergent situation, patient's identity using two indicators and correct equipment/implants were available  Time out: Immediately prior to the procedure a time out was called    Universal Protocol: the Joint Commission Universal Protocol was followed    Preparation: Patient was prepped and draped in usual sterile fashion       ANESTHESIA    Anesthesia: Local infiltration  Local Anesthetic:  Lidocaine 1% without epinephrine  Anesthetic Total (mL):  1      SEDATION    Patient Sedated: No        Preparation: skin prepped with 2% chlorhexidine  Skin prep agent: skin prep agent completely dried prior to procedure  Sterile barriers: maximum sterile barriers were used: cap, mask, sterile gown, sterile gloves, and large sterile sheet  Hand hygiene: hand hygiene performed prior to central venous catheter insertion  Type of line used: PICC  Catheter type: triple lumen  Lumen type: power PICC and valved  Lumen Identification: Red, Gray and White  Catheter size: 5 Fr  Brand: Bard  Lot number: [id number]  Placement method: MST, ultrasound and tip navigation system  Number of attempts: 1  Difficulty threading catheter: no  Successful placement: yes  Orientation: right    Location: basilic vein  Tip Location: SVC/RA Junction  Arm circumference: adults 10 cm  Extremity  circumference: 39  Visible catheter length: 7.5  Total catheter length: 55  Dressing and securement: alcohol impregnated caps, chlorhexidine disc applied, gloves changed prior to final dressing, sterile dressing applied and subcutaneous anchor securement system  Post procedure assessment: blood return through all ports and placement verified by 3CG technology  PROCEDURE   Disposal: sharps and needle count correct at the end of procedure, needles and guidewire disposed in sharps container

## 2023-07-08 NOTE — PROGRESS NOTES
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      Sepsis due Lt BKA stump infection  S/p I&D of of left BKA stump on 6/30/23  -Patient presented with pain, redness, swelling at his left BKA stump site.  Marked leukocytosis of 22.9. Tachycardic to 110s.  X-ray Shows soft tissue swelling, some bone formation along the resection margin of the tibia with periosteal reaction.   -Initially on vancomycin and Zosyn, subsequently changed by infectious disease to daptomycin and Zosyn  -Vascular surgery following  -MRI of the stump showed findings compatible with osteomyelitis of the residual tibia along with fluid collection suspicious for abscess, underwent I&D of the left stump on 6/30/2023 with placement of wound VAC  -Bone culture negative thus far. Pathology findings are nonspecific and not definitive of osteomyelitis.    -Underwent reexploration of the wound with I&D of the left below-knee stump yesterday i.e. 7/6/2023.  No pus or purulent fluid found.  Edema and hyperemic tissue.  Ascending induration along the lateral thigh with associated erythema.  Penrose drains placed along the tract.  -Leukocytosis slightly better  -Defer antibiotics to ID, continues on daptomycin and Zosyn.  -Plan for reexploration of the wound again on Monday  -Hospital course complicated by acute kidney injury on CKD stage III.  Please see discussion below.    Acute kidney injury on CKD stage III   -Cr @ admission 2.45. Last Cr PTA 1.9 in 11/2022. (prior to that baseline ~ 1.6)   * Initially improved to  baseline and started going back up 6/28.   * Only received 2 doses of vancomycin, last dose 6/25. No levels obtained. No NSAIDs  * No episodes of hypotension here.   * Vancomycin stopped and remains on zosyn and dapto  -Creatinine still peaking, up to 8.7 today  -Nephrology following, no indication for dialysis yet per nephrology, continuing to monitor on a daily basis.  -Holding prior to admission lisinopril.   -Daily BMP.      Type II DM, uncontrolled, on insulin, A1c 7.6 on 6/27/23  -BS falling below 100 in the context of JOHNATHAN  -Continue to hold PTA oral agents including Jardiance, Januvia.  -Continue sliding scale insulin  -Continue Tresiba 25 units twice a day  -Hypoglycemia protocol    Essential hypertension  -Hold prior to admission lisinopril  -Blood pressure has been quite elevated this hospital stay, currently on metoprolol 50 mg twice a day.     Mild hyponatremia     Acute on chronic anemia  -Hemoglobin at presentation was 12.8, slowly has drifted down, likely is multifactorial   -Hemoglobin dropped to 7.9 today, monitor intermittently.    Diet: Renal Diet (non-dialysis)  Snacks/Supplements Adult: Other; Orange Andrea at lunch and dinner; With Meals     DVT Prophylaxis: Pneumatic Compression Devices   [place] Catheter: Not present  Code Status: Full Code     Disposition Plan      Expected Discharge Date: 07/10/2023      Destination: home;home with family  Discharge Comments: Pending creat      Entered: Kai Viramontes MD 07/08/2023, 9:46 AM        Clinically Significant Risk Factors              # Hypoalbuminemia: Lowest albumin = 2.4 g/dL at 7/8/2023  6:39 AM, will monitor as appropriate    # Acute Kidney Injury, unspecified: based on a >150% or 0.3 mg/dL increase in last creatinine compared to past 90 day average, will monitor renal function  # Hypertension: Noted on problem list       # DMII: A1C = 7.6 % (Ref range: <5.7 %) within past 6 months   # Obesity: Estimated body mass index is 36.18 kg/m  as  calculated from the following:    Height as of this encounter: 1.829 m (6').    Weight as of this encounter: 121 kg (266 lb 12.1 oz).             The patient's care was discussed with the Bedside Nurse and Patient.    Medical Decision Making       **CLEAR ALL SELECTIONS**        Labs/Imaging Reviewed:  See Information above and Data section below    Time SPENT BY ME on the date of service doing chart review, history, exam, documentation & further activities per the note:  35  MINUTES    Kai Viramontes MD  Hospitalist Service  [facility]  Text Page 7AM-6PM  Securely message with the Vocera Web Console (learn more here)  Text page via ttwick Paging/Directory    ______________________________________________________________________    Interval History   Afebrile.  Leukocytosis slightly better.  Patient overall frustrated but trying to stay composed.  Creatinine continues to peak, no plans for dialysis yet    Data reviewed today: I reviewed all medications, new labs and imaging results over the last 24 hours. I personally reviewed no images or EKG's today.    Physical Exam   Vital signs:  Temp: 97.5  F (36.4  C) Temp src: Oral BP: (!) 169/98 Pulse: 78   Resp: 18 SpO2: 93 % O2 Device: None (Room air) Oxygen Delivery: 2 LPM Height: 182.9 cm (6') Weight: 121 kg (266 lb 12.1 oz)  Estimated body mass index is 36.18 kg/m  as calculated from the following:    Height as of this encounter: 1.829 m (6').    Weight as of this encounter: 121 kg (266 lb 12.1 oz).      Wt Readings from Last 2 Encounters:   07/06/23 121 kg (266 lb 12.1 oz)   10/03/22 110 kg (242 lb 8.1 oz)       Gen: AAOX3, NAD, comfortable  HEENT: no pallor  Resp: CTA B/L, normal WOB  CVS: RRR, no murmur  Abd/GI: Soft, non-tender. BS- normoactive.   Skin: Warm, dry no rashes  MSK: Left below-knee stump is heavily bandaged.  Neuro- CN- intact. No focal deficits.        Data   Recent Labs   Lab 07/08/23  0639 07/07/23  0743 07/07/23  0611  07/06/23  1244 07/06/23  0812 07/01/23  1737 07/01/23  1600   WBC 16.2* 19.4*  --   --  20.6*   < >  --    HGB 7.9* 8.3*  --   --  9.0*   < >  --    MCV 88 87  --   --  86   < >  --     327  --   --  375   < >  --    * 132*  --   --  133*   < > 136   POTASSIUM 4.0 4.3  --   --  4.4   < > 4.1   CHLORIDE 100 99  --   --  100   < > 102   CO2 18* 16*  --   --  15*   < > 22   BUN 83.5* 74.8*  --   --  67.3*   < > 31.5*   CR 8.70* 8.02*  --   --  7.42*   < > 2.60*   ANIONGAP 16* 17*  --   --  18*   < > 12   FERNANDO 7.5* 7.5*  --   --  7.8*   < > 8.1*   * 179* 192*   < > 145*   < > 119*   ALBUMIN 2.4*  --   --   --   --   --  2.6*    < > = values in this interval not displayed.       No results found for this or any previous visit (from the past 24 hour(s)).  Medications       DAPTOmycin (CUBICIN) 600 mg in sodium chloride 0.9 % 100 mL intermittent infusion  6 mg/kg (Adjusted) Intravenous Q48H     ezetimibe  10 mg Oral Daily     heparin ANTICOAGULANT  5,000 Units Subcutaneous Q8H     insulin aspart  1-7 Units Subcutaneous TID AC     insulin aspart  1-5 Units Subcutaneous At Bedtime     insulin degludec  25 Units Subcutaneous BID     metoprolol tartrate  50 mg Oral BID     piperacillin-tazobactam  2.25 g Intravenous Q6H     sodium chloride (PF)  10-40 mL Intracatheter Q7 Days     sodium chloride (PF)  3 mL Intracatheter Q8H     sodium citrate-citric acid  30 mL Oral BID

## 2023-07-08 NOTE — PROGRESS NOTES
Renal Medicine Progress Note            Assessment/Plan:     57 y.o man with CKD III due to DM and HTN, consulted for JOHNATHAN.      1) CKD III:                -bl ~ 1.5-1.9 mg/dl (GFR 45-55)               -2.5 g/g albuminuria     2)  Severe JOHNATHAN: Remains Non-oliguric. At baseline creatinine until 6/29 and subsequent rise, more dramatically after OR 6/30.               - on Zosyn/dapto               -urine sodum <20 7/2 reflecting possibe pre-renal state.                -UA with hyaline casts              - Renal US with large sized kidneys, especially with underlying CKD, this nephromegaly likely reflects some ATN injury.     Remains nonoliguric, has no obvious uremic symptoms and does not need to treat specifically his creatinine or GFR number (which is 7 yesterday and today). Some concern with planned trip to OR again Monday and if not having stabalization early next week, expect will need to initiate dialysis.      3)  Infected left stump with osteomyelitis s/p I&D:               -dapto/zosyn     4) FEN: . Hyponatremia at 134, bicarb 18     5)  Hypertension:                -Jardiance and lisinopril are on hold     6) anemia: Hgb 7.9     Plan:   1) continue bicitra to 30cc bid  2) continue bumex 3mg q12.   3) Dose all medications to eGR of <10 ml/min  4) avoid NSAIDs and IV dye  5) no urgent indication for dialysis patient today due to lack of uremic symptoms and stable electrolyte parameters. Treatment for a particular creatinine number is not needed.   6) we will assess daily for uremia and biochemical reasons for dialysis closely     Julio Rios DO  [facility]  Office: [phone]  Cell: [phone]        Interval History:     Some ongoing generalized fatigue, aches but nothing changed today. Mixed urine and stool yesterday, thus not all urine output was recorded yesterday. NO nausea, has some appetite, denies dysgeusia.           Medications and Allergies:       DAPTOmycin (CUBICIN) 600 mg in  "sodium chloride 0.9 % 100 mL intermittent infusion  6 mg/kg (Adjusted) Intravenous Q48H     ezetimibe  10 mg Oral Daily     heparin ANTICOAGULANT  5,000 Units Subcutaneous Q8H     insulin aspart  1-7 Units Subcutaneous TID AC     insulin aspart  1-5 Units Subcutaneous At Bedtime     insulin degludec  25 Units Subcutaneous BID     metoprolol tartrate  50 mg Oral BID     piperacillin-tazobactam  2.25 g Intravenous Q6H     sodium chloride (PF)  10-40 mL Intracatheter Q7 Days     sodium chloride (PF)  3 mL Intracatheter Q8H     sodium citrate-citric acid  30 mL Oral BID        Allergies   Allergen Reactions     Pravastatin      \"sucked the life blood out of me,\" Indicates this occurs with all statins.     Statins             Physical Exam:   Vitals were reviewed  BP (!) 169/98 (BP Location: Left arm)   Pulse 78   Temp 97.5  F (36.4  C) (Oral)   Resp 18   Ht 1.829 m (6')   Wt 121 kg (266 lb 12.1 oz)   SpO2 93%   BMI 36.18 kg/m      Wt Readings from Last 3 Encounters:   07/06/23 121 kg (266 lb 12.1 oz)   10/03/22 110 kg (242 lb 8.1 oz)   08/17/22 119.9 kg (264 lb 6.4 oz)       Intake/Output Summary (Last 24 hours) at 7/8/2023 0827  Last data filed at 7/8/2023 0157  Gross per 24 hour   Intake 50 ml   Output 225 ml   Net -175 ml       GENERAL APPEARANCE: alert in no distress  HEENT:  Eyes/ears/nose/neck grossly normal  RESP: lungs cta b c good efforts, no crackles, rhonchi or wheezes  CV: RRR, nl S1/S2, no m/r/g   ABDOMEN: o/s/nt/nd, bs present  EXTREMITIES/SKIN: no c/c/rashes/lesions; 2+ dependent edema unchanged  NEURO: Grossly nonfocal         Data:     BMP  Recent Labs   Lab 07/08/23  0639 07/07/23  0743 07/07/23  0611 07/07/23  0158 07/06/23  1244 07/06/23  0812 07/05/23  1721 07/05/23  0739   * 132*  --   --   --  133*  --  131*   POTASSIUM 4.0 4.3  --   --   --  4.4  --  4.3   CHLORIDE 100 99  --   --   --  100  --  98   FERNANDO 7.5* 7.5*  --   --   --  7.8*  --  7.8*   CO2 18* 16*  --   --   --  15*  --  " 16*   BUN 83.5* 74.8*  --   --   --  67.3*  --  59.3*   CR 8.70* 8.02*  --   --   --  7.42*  --  6.39*   * 179* 192* 209*   < > 145*   < > 172*    < > = values in this interval not displayed.     CBC  Recent Labs   Lab 07/08/23  0639 07/07/23  0743 07/06/23  0812 07/05/23  0739   WBC 16.2* 19.4* 20.6* 22.2*   HGB 7.9* 8.3* 9.0* 9.0*   HCT 23.9* 25.0* 27.3* 26.8*   MCV 88 87 86 85    327 375 325     Lab Results   Component Value Date    AST 13 06/24/2023    ALT 14 06/24/2023    ALKPHOS 84 06/24/2023    BILITOTAL 0.9 06/24/2023     Lab Results   Component Value Date    INR 1.06 06/27/2023       Attestation:  I have reviewed today's vital signs, notes, medications, labs and imaging.    DO [facility]  Office: [phone]  Cell: [phone]

## 2023-07-08 NOTE — PROGRESS NOTES
VASCULAR SURGERY PROGRESS NOTE    Mr. Horner is a 58yo man with a left BKA stump wound infection.     NAEON. Frustrated by his hospitalization. Pain adequately controlled.    Vitals reviewed. Hypertensive.    On exam, resting in bed in no acute distress. Left stump dressed with ABD and spandage c/d/i.    Labs reviewed. Cr 8.7. K 4. WBC 16.2. Hgb 7.9. Plt 321. Glucose    Mr. Horner is a 58yo man with a left BKA stump infection as well as worsening renal function. Appreciate primary team, ID, Nephrology. Planning for OR Monday for washout/debridement. NPO at midnight tomorrow.    Aimee Peterson MD  07/08/23  8:53 AM

## 2023-07-08 NOTE — PLAN OF CARE
Date/time: 7/7/23 night shift [id number]  Summary: POD X1 of LEFT BELOW KNEE AMPUTATION STUMP DEBRIDEMENT   Hx of significant for uncontrolled type II DM with polyneuropathy and nephropathy, stage III CKD, bilateral BKA presented to the ER due to pain, swelling and redness with small ulcer at Lt BKA stump and found to have cellulitis/sepsis   Orientation: Pt is A&Ox4, noticed he very is flat/hypoactive this shift.      Vitals/Tele: VSS on RA. HTN ( SBP 170s).   IV Access/drains: picc line SL on the R arm w/ intermittent ABX.     Diet: renal diet     Mobility: Up IND in room w/ wheelchair, reposition indepen in bed . Up A+1 walker to bedside commode     GI/: voiding via urinal,  Distended abdo, incontinent to bowel-had one large loose stool, C-diff negative     Wound/Skin: Dressing change q4h. Last changed at 2230. Pt referred to have pain med before dressing change.      Consults: Nephrology, ID, Vascular, and Hospitalist consulted.   Plan to continue dressing changes and plans to return to OR back on Monday.  Discharge Plan: Discharge pending.    Creat high at 8.02, discussed with Nephrology and due to other labs and adequate voiding will hold off of line placement and dialysis and will reassess daily, see nephrology note.   Pain controlled given x1and tylenol  Denies N/V.  See Flow sheets for assessment

## 2023-07-08 NOTE — PLAN OF CARE
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 HTN ( SBP 170s).     IV Access/drains: picc line SL on the R arm w/ intermittent ABX.     Diet: renal diet     Mobility: Up IND in room w/ wheelchair, reposition indepen in bed . Up A+1 walker to bedside commode     GI/: voiding vper urinal,  obese, reported incontinent to bowel-had one large loose stool, C-diff negative     Wound/Skin: Dressing change q4h. Last changed at 0615. Premed Tylenol/Oxycodone     Consults: Nephrology, ID, Vascular, and Hospitalist consulted.    Plan\" to continue dressing changes and plans to return to OR on Monday.    Discharge Plan: TBD     LABS:  per own Libre3, WBC trending down, Na 132, Creat 8.02, Nephrology to defer dialysis for now. See note.     See Flow sheets for assessment                                 "

## 2023-07-08 NOTE — PLAN OF CARE
[id number]  Pt here POD2 of LLE BKA I/D. A&Ox4. VSS on Ra ex htn(160's). Tolerating renal diet. Up A+1 walker to bedside commode along with prosthesis. BS active and abd less distended today. Incontinent of stool, large loose BM today. Triple Lumen PICC in RUE. Bilateral BKA. Dressing on L BKA, dressing changes to be done wet to dry and TID, see POC. Pain controlled with prn oxy and tylenol. Denies N/V. Nephrology, ID, Vascular, and Hospitalist consulted. Creat high at 8.70, discussed with Nephrology and due to other labs and adequate voiding will hold off of line placement and dialysis and reassess daily, see nephrology note. Plan for NPO tonight at midnight for possible Dialysis cath placement tomorrow if needed. Plan to return to surgery Monday.

## 2023-07-09 ENCOUNTER — ANESTHESIA EVENT (OUTPATIENT)
Dept: SURGERY | Facility: CLINIC | Age: 58
DRG: 463 | End: 2023-07-09
Payer: COMMERCIAL

## 2023-07-09 LAB
ALBUMIN SERPL BCG-MCNC: 2.5 G/DL (ref 3.5–5.2)
ANION GAP SERPL CALCULATED.3IONS-SCNC: 18 MMOL/L (ref 7–15)
BUN SERPL-MCNC: 86.1 MG/DL (ref 6–20)
CALCIUM SERPL-MCNC: 7.7 MG/DL (ref 8.6–10)
CHLORIDE SERPL-SCNC: 100 MMOL/L (ref 98–107)
CREAT SERPL-MCNC: 8.48 MG/DL (ref 0.67–1.17)
DEPRECATED HCO3 PLAS-SCNC: 18 MMOL/L (ref 22–29)
ERYTHROCYTE [DISTWIDTH] IN BLOOD BY AUTOMATED COUNT: 12.3 % (ref 10–15)
GFR SERPL CREATININE-BSD FRML MDRD: 7 ML/MIN/1.73M2
GLUCOSE SERPL-MCNC: 110 MG/DL (ref 70–99)
HCT VFR BLD AUTO: 24.3 % (ref 40–53)
HGB BLD-MCNC: 8.3 G/DL (ref 13.3–17.7)
MCH RBC QN AUTO: 29.2 PG (ref 26.5–33)
MCHC RBC AUTO-ENTMCNC: 34.2 G/DL (ref 31.5–36.5)
MCV RBC AUTO: 86 FL (ref 78–100)
PHOSPHATE SERPL-MCNC: 8.4 MG/DL (ref 2.5–4.5)
PLATELET # BLD AUTO: 333 10E3/UL (ref 150–450)
POTASSIUM SERPL-SCNC: 3.9 MMOL/L (ref 3.4–5.3)
RBC # BLD AUTO: 2.84 10E6/UL (ref 4.4–5.9)
SODIUM SERPL-SCNC: 136 MMOL/L (ref 136–145)
WBC # BLD AUTO: 16.5 10E3/UL (ref 4–11)

## 2023-07-09 PROCEDURE — 250N000013 HC RX MED GY IP 250 OP 250 PS 637: Performed by: SURGERY

## 2023-07-09 PROCEDURE — 250N000011 HC RX IP 250 OP 636: Performed by: INTERNAL MEDICINE

## 2023-07-09 PROCEDURE — 120N000001 HC R&B MED SURG/OB

## 2023-07-09 PROCEDURE — 250N000013 HC RX MED GY IP 250 OP 250 PS 637: Performed by: INTERNAL MEDICINE

## 2023-07-09 PROCEDURE — 82040 ASSAY OF SERUM ALBUMIN: CPT | Performed by: INTERNAL MEDICINE

## 2023-07-09 PROCEDURE — 85027 COMPLETE CBC AUTOMATED: CPT | Performed by: INTERNAL MEDICINE

## 2023-07-09 PROCEDURE — 99232 SBSQ HOSP IP/OBS MODERATE 35: CPT | Performed by: INTERNAL MEDICINE

## 2023-07-09 PROCEDURE — 250N000011 HC RX IP 250 OP 636: Performed by: SURGERY

## 2023-07-09 PROCEDURE — 250N000011 HC RX IP 250 OP 636: Mod: JZ | Performed by: SURGERY

## 2023-07-09 RX ORDER — HYDRALAZINE HYDROCHLORIDE 20 MG/ML
10 INJECTION INTRAMUSCULAR; INTRAVENOUS EVERY 4 HOURS PRN
Status: DISCONTINUED | OUTPATIENT
Start: 2023-07-09 | End: 2023-07-09

## 2023-07-09 RX ORDER — AMLODIPINE BESYLATE 5 MG/1
5 TABLET ORAL DAILY
Status: DISCONTINUED | OUTPATIENT
Start: 2023-07-09 | End: 2023-07-18

## 2023-07-09 RX ORDER — BUMETANIDE 0.25 MG/ML
1 INJECTION INTRAMUSCULAR; INTRAVENOUS 2 TIMES DAILY
Status: DISCONTINUED | OUTPATIENT
Start: 2023-07-09 | End: 2023-07-14

## 2023-07-09 RX ADMIN — OXYCODONE HYDROCHLORIDE 5 MG: 5 TABLET ORAL at 10:40

## 2023-07-09 RX ADMIN — PIPERACILLIN AND TAZOBACTAM 2.25 G: 2; .25 INJECTION, POWDER, FOR SOLUTION INTRAVENOUS at 20:47

## 2023-07-09 RX ADMIN — HEPARIN SODIUM 5000 UNITS: 5000 INJECTION, SOLUTION INTRAVENOUS; SUBCUTANEOUS at 06:02

## 2023-07-09 RX ADMIN — OXYCODONE HYDROCHLORIDE 5 MG: 5 TABLET ORAL at 21:30

## 2023-07-09 RX ADMIN — HEPARIN SODIUM 5000 UNITS: 5000 INJECTION, SOLUTION INTRAVENOUS; SUBCUTANEOUS at 13:44

## 2023-07-09 RX ADMIN — INSULIN ASPART 1 UNITS: 100 INJECTION, SOLUTION INTRAVENOUS; SUBCUTANEOUS at 13:40

## 2023-07-09 RX ADMIN — HEPARIN SODIUM 5000 UNITS: 5000 INJECTION, SOLUTION INTRAVENOUS; SUBCUTANEOUS at 21:31

## 2023-07-09 RX ADMIN — PIPERACILLIN AND TAZOBACTAM 2.25 G: 2; .25 INJECTION, POWDER, FOR SOLUTION INTRAVENOUS at 15:21

## 2023-07-09 RX ADMIN — PIPERACILLIN AND TAZOBACTAM 2.25 G: 2; .25 INJECTION, POWDER, FOR SOLUTION INTRAVENOUS at 09:59

## 2023-07-09 RX ADMIN — SODIUM CITRATE AND CITRIC ACID MONOHYDRATE 30 ML: 500; 334 SOLUTION ORAL at 09:57

## 2023-07-09 RX ADMIN — METOPROLOL TARTRATE 50 MG: 50 TABLET, FILM COATED ORAL at 20:57

## 2023-07-09 RX ADMIN — METOPROLOL TARTRATE 50 MG: 50 TABLET, FILM COATED ORAL at 09:57

## 2023-07-09 RX ADMIN — OXYCODONE HYDROCHLORIDE 5 MG: 5 TABLET ORAL at 17:28

## 2023-07-09 RX ADMIN — SODIUM CITRATE AND CITRIC ACID MONOHYDRATE 30 ML: 500; 334 SOLUTION ORAL at 20:56

## 2023-07-09 RX ADMIN — ACETAMINOPHEN 650 MG: 325 TABLET, FILM COATED ORAL at 17:28

## 2023-07-09 RX ADMIN — EZETIMIBE 10 MG: 10 TABLET ORAL at 09:57

## 2023-07-09 RX ADMIN — AMLODIPINE BESYLATE 5 MG: 5 TABLET ORAL at 12:24

## 2023-07-09 RX ADMIN — PIPERACILLIN AND TAZOBACTAM 2.25 G: 2; .25 INJECTION, POWDER, FOR SOLUTION INTRAVENOUS at 02:48

## 2023-07-09 RX ADMIN — BUMETANIDE 1 MG: 0.25 INJECTION, SOLUTION INTRAMUSCULAR; INTRAVENOUS at 20:47

## 2023-07-09 RX ADMIN — INSULIN DEGLUDEC INJECTION 25 UNITS: 100 INJECTION, SOLUTION SUBCUTANEOUS at 10:32

## 2023-07-09 RX ADMIN — ACETAMINOPHEN 650 MG: 325 TABLET, FILM COATED ORAL at 10:40

## 2023-07-09 ASSESSMENT — ACTIVITIES OF DAILY LIVING (ADL)
ADLS_ACUITY_SCORE: 30
ADLS_ACUITY_SCORE: 26
ADLS_ACUITY_SCORE: 30
ADLS_ACUITY_SCORE: 26
ADLS_ACUITY_SCORE: 26
ADLS_ACUITY_SCORE: 30

## 2023-07-09 NOTE — PROGRESS NOTES
[facility]    Medicine Progress Note - Hospitalist Service        Date of Admission:  6/24/2023  3:36 PM    Assessment & Plan:   Ry Horner is a 57 year old male with medical history significant for uncontrolled type II DM with polyneuropathy and nephropathy, stage III CKD, bilateral BKA presented to the ER due to pain, swelling and redness with small ulcer at Lt BKA stump and found to have cellulitis/sepsis and is being admitted on 6/24/2023 for further management.      Sepsis due Lt BKA stump infection  S/p I&D of of left BKA stump on 6/30/23  -Patient presented with pain, redness, swelling at his left BKA stump site.  Marked leukocytosis of 22.9. Tachycardic to 110s.  X-ray Shows soft tissue swelling, some bone formation along the resection margin of the tibia with periosteal reaction.   -Initially on vancomycin and Zosyn, subsequently changed by infectious disease to daptomycin and Zosyn  -MRI of the stump showed findings compatible with osteomyelitis of the residual tibia along with fluid collection suspicious for abscess, underwent I&D of the left stump on 6/30/2023 with placement of wound VAC  -Bone culture negative thus far. Pathology findings are nonspecific and not definitive of osteomyelitis.    -Underwent reexploration of the wound with I&D of the left below-knee stump yesterday i.e. 7/6/2023.  No pus or purulent fluid found.  Edema and hyperemic tissue.  Ascending induration along the lateral thigh with associated erythema.  Penrose drains placed along the tract.  -Leukocytosis slightly better  -Defer antibiotics to ID, continues on daptomycin and Zosyn.  -Vascular surgery following  -Plan for reexploration of the wound tomorrow    Acute kidney injury on CKD stage III   -Cr @ admission 2.45. Last Cr PTA 1.9 in 11/2022. (prior to that baseline ~ 1.6)   * Initially improved to baseline and started going back up 6/28.   * Only received 2 doses of vancomycin, last dose 6/25. No levels  obtained. No NSAIDs  * No episodes of hypotension here.   * Vancomycin stopped and remains on zosyn and dapto  -Creatinine appears to have peaked at 8.7 and now downtrending  -Nephrology following, no indication for dialysis yet per nephrology, continuing to monitor on a daily basis.  Currently being challenged with diuretics, good urine output in the last 24 hours.  Defer dosing of diuretics to nephrology  -Holding prior to admission lisinopril.   -Daily BMP.      Type II DM, uncontrolled, on insulin, A1c 7.6 on 6/27/23  -BS falling below 100 in the context of JOHNATHAN  -Continue to hold PTA oral agents including Jardiance, Januvia.  -Continue sliding scale insulin  -Continue Tresiba 25 units twice a day  -Hypoglycemia protocol    Essential hypertension  -Hold prior to admission lisinopril  -Blood pressure quite elevated, continue metoprolol 50 mg twice a day  -Add amlodipine 5 mg daily  -As needed hydralazine     Mild hyponatremia     Acute on chronic anemia  -Hemoglobin at presentation was 12.8, slowly has drifted down, likely is multifactorial   -Hemoglobin dropped to 7.9 today, monitor intermittently.    Diet: Snacks/Supplements Adult: Other; Orange Andrea at lunch and dinner; With Meals  2 Gram Sodium Diet  NPO per Anesthesia Guidelines for Procedure/Surgery Except for: Meds     DVT Prophylaxis: Pneumatic Compression Devices   [place] Catheter: Not present  Code Status: Full Code     Disposition Plan      Expected Discharge Date: 07/10/2023      Destination: home;home with family  Discharge Comments: Pending creat      Entered: Kai Viramontes MD 07/09/2023, 9:38 AM        Clinically Significant Risk Factors              # Hypoalbuminemia: Lowest albumin = 2.4 g/dL at 7/8/2023  6:39 AM, will monitor as appropriate    # Acute Kidney Injury, unspecified: based on a >150% or 0.3 mg/dL increase in last creatinine compared to past 90 day average, will monitor renal function  # Hypertension: Noted on problem list       #  DMII: A1C = 7.6 % (Ref range: <5.7 %) within past 6 months   # Obesity: Estimated body mass index is 36.48 kg/m  as calculated from the following:    Height as of this encounter: 1.829 m (6').    Weight as of this encounter: 122 kg (268 lb 15.4 oz).             The patient's care was discussed with the Bedside Nurse and Patient.    Medical Decision Making       **CLEAR ALL SELECTIONS**        Labs/Imaging Reviewed:  See Information above and Data section below    Time SPENT BY ME on the date of service doing chart review, history, exam, documentation & further activities per the note:  35  MINUTES    Kai Viramontes MD  Hospitalist Service  [facility]  Text Page 7AM-6PM  Securely message with the Vocera Web Console (learn more here)  Text page via [employer] Paging/Directory    ______________________________________________________________________    Interval History   Creatinine appears to have peaked and now downtrending.  Patient is slightly more upbeat today.  Good diuretic response to diuretic challenge.  Afebrile.  Blood pressures on the higher side    Data reviewed today: I reviewed all medications, new labs and imaging results over the last 24 hours. I personally reviewed no images or EKG's today.    Physical Exam   Vital signs:  Temp: 98.3  F (36.8  C) Temp src: Oral BP: (!) 176/103 Pulse: 86   Resp: 20 SpO2: 95 % O2 Device: None (Room air) Oxygen Delivery: 2 LPM Height: 182.9 cm (6') Weight: 122 kg (268 lb 15.4 oz)  Estimated body mass index is 36.48 kg/m  as calculated from the following:    Height as of this encounter: 1.829 m (6').    Weight as of this encounter: 122 kg (268 lb 15.4 oz).      Wt Readings from Last 2 Encounters:   07/09/23 122 kg (268 lb 15.4 oz)   10/03/22 110 kg (242 lb 8.1 oz)       Gen: AAOX3, NAD, comfortable  HEENT: no pallor  Resp: CTA B/L, normal WOB  CVS: RRR, no murmur  Abd/GI: Soft, non-tender. BS- normoactive.   Skin: Warm, dry no rashes  MSK: Left  below-knee stump is bandaged, mild edema throughout including upper extremity  Neuro- CN- intact. No focal deficits.        Data   Recent Labs   Lab 07/09/23  0647 07/09/23  0612 07/08/23  0639 07/07/23  0743   WBC  --  16.5* 16.2* 19.4*   HGB  --  8.3* 7.9* 8.3*   MCV  --  86 88 87   PLT  --  333 321 327     --  134* 132*   POTASSIUM 3.9  --  4.0 4.3   CHLORIDE 100  --  100 99   CO2 18*  --  18* 16*   BUN 86.1*  --  83.5* 74.8*   CR 8.48*  --  8.70* 8.02*   ANIONGAP 18*  --  16* 17*   FERNANDO 7.7*  --  7.5* 7.5*   *  --  155* 179*   ALBUMIN 2.5*  --  2.4*  --        No results found for this or any previous visit (from the past 24 hour(s)).  Medications       bumetanide  1 mg Intravenous BID     DAPTOmycin (CUBICIN) 600 mg in sodium chloride 0.9 % 100 mL intermittent infusion  6 mg/kg (Adjusted) Intravenous Q48H     ezetimibe  10 mg Oral Daily     heparin ANTICOAGULANT  5,000 Units Subcutaneous Q8H     insulin aspart  1-7 Units Subcutaneous TID AC     insulin aspart  1-5 Units Subcutaneous At Bedtime     insulin degludec  25 Units Subcutaneous BID     metoprolol tartrate  50 mg Oral BID     piperacillin-tazobactam  2.25 g Intravenous Q6H     sodium chloride (PF)  10-40 mL Intracatheter Q7 Days     sodium chloride (PF)  3 mL Intracatheter Q8H     sodium citrate-citric acid  30 mL Oral BID

## 2023-07-09 NOTE — PLAN OF CARE
Goal Outcome Evaluation:      Plan of Care Reviewed With: patient    Overall Patient Progress: no changeOverall Patient Progress: no change    Pt here for cellulitis of his left stump, POD 3 of I&D. A&O x4. CMS WDL. Bowel sounds normoactive, passing flatus, tolerating renal diet. Pt has been NPO since midnight for possible dialysis catheter placement today. Urinal at bedside, voiding adequately. VSS, ex HTN. Wet to dry LLE dressing changed once last night. Transfers to wheelchair independently. C/o moderate to severe pain, decreased with tylenol and oxycodone. Plan is for OR Monday. Nephrology, vascular and ID following.

## 2023-07-09 NOTE — PLAN OF CARE
[id number]  Pt here POD3 of LLE BKA I/D. A&Ox4. VSS on Ra ex htn(160's-170's). Tolerating regular diet. Up A+1 walker to bedside commode along with prosthesis. BS active and abd less distended today. Incontinent of stool, large loose BM today. Triple Lumen PICC in RUE. Bilateral BKA. Dressing on L BKA, dressing changes to be done wet to dry and TID, see POC. Pain controlled with prn oxy and tylenol. Denies N/V. Nephrology, ID, Vascular, and Hospitalist consulted. Creat slightly lower today at 8.48, discussed with Nephrology and plan to hold off dialysis today, see nephrology note. Plan for NPO tonight at midnight for surgery tomorrow morning. Discharge pending.

## 2023-07-09 NOTE — PROGRESS NOTES
Renal Medicine Progress Note            Assessment/Plan:     57 y.o man with CKD III due to DM and HTN, consulted for JOHNATHAN.      1) CKD III:                -bl ~ 1.5-1.9 mg/dl (GFR 45-55)               -2.5 g/g albuminuria     2)  Severe JOHNATHAN: Remains Non-oliguric. At baseline creatinine until 6/29 and subsequent rise, more dramatically after OR 6/30.               - on Zosyn/dapto               -urine sodum <20 7/2 reflecting possibe pre-renal state.                -UA with hyaline casts              - Renal US with large sized kidneys, especially with underlying CKD, this nephromegaly likely reflects some ATN injury.      Pt with a plateau and today start of potential recovery. Very diuretic responsive, 1.5L urine output today so far. Has high phos, can tolerate short term. If rising, will need to change to renal diet (pt requested regular diet, negotiated to a low sodium diet)     3)  Infected left stump with osteomyelitis s/p I&D:               -dapto/zosyn     4) FEN: . Hyponatremia at 136, bicarb 18     5)  Hypertension:                -Jardiance and lisinopril are on hold     6) anemia: Hgb 8.3     Plan:   1) continue bicitra to 30cc bid  2) AM bumex held, dose decreased to 1mg q12.   3) avoid NSAIDs, contrast, nephrotoxins as able   4) No indications dialysis today. In recovery phase but will need to monitor closely with OR procedure tomorrow  5) Resume diet, 2gm low sodium.     Julio Rios DO  [facility]  Office: [phone]  Cell: [phone]        Interval History:   Pt waking up, sleepy. Many voids, he does not feel his edema is any better yet. NO dyspnea. NO nausea, emesis or other potential uremic symptoms. Ongoing fatigue which has been during course of hospitalization.           Medications and Allergies:       bumetanide  1 mg Intravenous BID     DAPTOmycin (CUBICIN) 600 mg in sodium chloride 0.9 % 100 mL intermittent infusion  6 mg/kg (Adjusted) Intravenous Q48H     ezetimibe  10 mg  "Oral Daily     heparin ANTICOAGULANT  5,000 Units Subcutaneous Q8H     insulin aspart  1-7 Units Subcutaneous TID AC     insulin aspart  1-5 Units Subcutaneous At Bedtime     insulin degludec  25 Units Subcutaneous BID     metoprolol tartrate  50 mg Oral BID     piperacillin-tazobactam  2.25 g Intravenous Q6H     sodium chloride (PF)  10-40 mL Intracatheter Q7 Days     sodium chloride (PF)  3 mL Intracatheter Q8H     sodium citrate-citric acid  30 mL Oral BID        Allergies   Allergen Reactions     Pravastatin      \"sucked the life blood out of me,\" Indicates this occurs with all statins.     Statins             Physical Exam:   Vitals were reviewed  BP (!) 176/103 (BP Location: Left arm, Patient Position: Semi-Martinez's, Cuff Size: Adult Large)   Pulse 86   Temp 98.3  F (36.8  C) (Oral)   Resp 20   Ht 1.829 m (6')   Wt 122 kg (268 lb 15.4 oz)   SpO2 95%   BMI 36.48 kg/m      Wt Readings from Last 3 Encounters:   07/09/23 122 kg (268 lb 15.4 oz)   10/03/22 110 kg (242 lb 8.1 oz)   08/17/22 119.9 kg (264 lb 6.4 oz)       Intake/Output Summary (Last 24 hours) at 7/9/2023 0758  Last data filed at 7/9/2023 0643  Gross per 24 hour   Intake 740 ml   Output 2805 ml   Net -2065 ml     GENERAL APPEARANCE: alert in no distress  HEENT:  Eyes/ears/nose/neck grossly normal  RESP: lungs cta b c good efforts, no crackles, rhonchi or wheezes  CV: RRR, nl S1/S2, no m/r/g   ABDOMEN: o/s/nt/nd, bs present  EXTREMITIES/SKIN: no c/c/rashes/lesions; 2+dependently, left thigh >right  NEURO: Grossly nonfocal         Data:     BMP  Recent Labs   Lab 07/09/23  0647 07/08/23  0639 07/07/23  0743 07/07/23  0611 07/06/23  1244 07/06/23  0812    134* 132*  --   --  133*   POTASSIUM 3.9 4.0 4.3  --   --  4.4   CHLORIDE 100 100 99  --   --  100   FERNANDO 7.7* 7.5* 7.5*  --   --  7.8*   CO2 18* 18* 16*  --   --  15*   BUN 86.1* 83.5* 74.8*  --   --  67.3*   CR 8.48* 8.70* 8.02*  --   --  7.42*   * 155* 179* 192*   < > 145*    < > = " values in this interval not displayed.     CBC  Recent Labs   Lab 07/09/23  0612 07/08/23  0639 07/07/23  0743 07/06/23  0812   WBC 16.5* 16.2* 19.4* 20.6*   HGB 8.3* 7.9* 8.3* 9.0*   HCT 24.3* 23.9* 25.0* 27.3*   MCV 86 88 87 86    321 327 375     Lab Results   Component Value Date    AST 13 06/24/2023    ALT 14 06/24/2023    ALKPHOS 84 06/24/2023    BILITOTAL 0.9 06/24/2023     Lab Results   Component Value Date    INR 1.06 06/27/2023       Attestation:  I have reviewed today's vital signs, notes, medications, labs and imaging.    DO [facility] - Nephrology  Office: [phone]  Cell: [phone]

## 2023-07-09 NOTE — PROGRESS NOTES
Vascular Surgery Progress Note    S: Doing well today.    O: Tolerating diet.  Bowel function normal.  Vitals:  BP  Min: 165/98  Max: 181/102  Temp  Av.2  F (36.8  C)  Min: 97.8  F (36.6  C)  Max: 98.4  F (36.9  C)  Pulse  Av.5  Min: 80  Max: 89  I/O last 3 completed shifts:  In: 740 [P.O.:620; I.V.:120]  Out: 2805 [Urine:2805]    Physical Exam: No change in exam.     Nursing staff changing dressings 3 times daily with Vashe soaked dressings.  Very clean.      Assessment/Plan: Doing well.  Plan back to OR tomorrow morning with Dr. Child for final wound closure.  Patient now much more understanding now that he is informed on time of surgery tomorrow.    Wm. Ej MD

## 2023-07-10 ENCOUNTER — ANESTHESIA (OUTPATIENT)
Dept: SURGERY | Facility: CLINIC | Age: 58
DRG: 463 | End: 2023-07-10
Payer: COMMERCIAL

## 2023-07-10 ENCOUNTER — APPOINTMENT (OUTPATIENT)
Dept: SURGERY | Facility: PHYSICIAN GROUP | Age: 58
End: 2023-07-10
Payer: COMMERCIAL

## 2023-07-10 LAB
ABO/RH(D): NORMAL
ANION GAP SERPL CALCULATED.3IONS-SCNC: 16 MMOL/L (ref 7–15)
ANTIBODY SCREEN: NEGATIVE
BUN SERPL-MCNC: 87.4 MG/DL (ref 6–20)
CALCIUM SERPL-MCNC: 8.1 MG/DL (ref 8.6–10)
CHLORIDE SERPL-SCNC: 101 MMOL/L (ref 98–107)
CREAT SERPL-MCNC: 7.8 MG/DL (ref 0.67–1.17)
DEPRECATED HCO3 PLAS-SCNC: 21 MMOL/L (ref 22–29)
ERYTHROCYTE [DISTWIDTH] IN BLOOD BY AUTOMATED COUNT: 12.2 % (ref 10–15)
GFR SERPL CREATININE-BSD FRML MDRD: 8 ML/MIN/1.73M2
GLUCOSE SERPL-MCNC: 122 MG/DL (ref 70–99)
HCT VFR BLD AUTO: 24.2 % (ref 40–53)
HGB BLD-MCNC: 8.3 G/DL (ref 13.3–17.7)
MCH RBC QN AUTO: 29.3 PG (ref 26.5–33)
MCHC RBC AUTO-ENTMCNC: 34.3 G/DL (ref 31.5–36.5)
MCV RBC AUTO: 86 FL (ref 78–100)
PLATELET # BLD AUTO: 335 10E3/UL (ref 150–450)
POTASSIUM SERPL-SCNC: 4.2 MMOL/L (ref 3.4–5.3)
RBC # BLD AUTO: 2.83 10E6/UL (ref 4.4–5.9)
SODIUM SERPL-SCNC: 138 MMOL/L (ref 136–145)
SPECIMEN EXPIRATION DATE: NORMAL
WBC # BLD AUTO: 13.6 10E3/UL (ref 4–11)

## 2023-07-10 PROCEDURE — 360N000076 HC SURGERY LEVEL 3, PER MIN: Performed by: SURGERY

## 2023-07-10 PROCEDURE — 272N000001 HC OR GENERAL SUPPLY STERILE: Performed by: SURGERY

## 2023-07-10 PROCEDURE — 250N000013 HC RX MED GY IP 250 OP 250 PS 637: Performed by: SURGERY

## 2023-07-10 PROCEDURE — 250N000013 HC RX MED GY IP 250 OP 250 PS 637: Performed by: STUDENT IN AN ORGANIZED HEALTH CARE EDUCATION/TRAINING PROGRAM

## 2023-07-10 PROCEDURE — 250N000011 HC RX IP 250 OP 636: Performed by: INTERNAL MEDICINE

## 2023-07-10 PROCEDURE — 250N000013 HC RX MED GY IP 250 OP 250 PS 637: Performed by: INTERNAL MEDICINE

## 2023-07-10 PROCEDURE — 0JDP0ZZ EXTRACTION OF LEFT LOWER LEG SUBCUTANEOUS TISSUE AND FASCIA, OPEN APPROACH: ICD-10-PCS | Performed by: SURGERY

## 2023-07-10 PROCEDURE — 86850 RBC ANTIBODY SCREEN: CPT | Performed by: STUDENT IN AN ORGANIZED HEALTH CARE EDUCATION/TRAINING PROGRAM

## 2023-07-10 PROCEDURE — 250N000011 HC RX IP 250 OP 636: Performed by: STUDENT IN AN ORGANIZED HEALTH CARE EDUCATION/TRAINING PROGRAM

## 2023-07-10 PROCEDURE — 99232 SBSQ HOSP IP/OBS MODERATE 35: CPT | Performed by: STUDENT IN AN ORGANIZED HEALTH CARE EDUCATION/TRAINING PROGRAM

## 2023-07-10 PROCEDURE — 86901 BLOOD TYPING SEROLOGIC RH(D): CPT | Performed by: STUDENT IN AN ORGANIZED HEALTH CARE EDUCATION/TRAINING PROGRAM

## 2023-07-10 PROCEDURE — 999N000141 HC STATISTIC PRE-PROCEDURE NURSING ASSESSMENT: Performed by: SURGERY

## 2023-07-10 PROCEDURE — 250N000011 HC RX IP 250 OP 636: Performed by: SURGERY

## 2023-07-10 PROCEDURE — 250N000011 HC RX IP 250 OP 636: Performed by: ANESTHESIOLOGY

## 2023-07-10 PROCEDURE — 120N000001 HC R&B MED SURG/OB

## 2023-07-10 PROCEDURE — 710N000009 HC RECOVERY PHASE 1, LEVEL 1, PER MIN: Performed by: SURGERY

## 2023-07-10 PROCEDURE — 250N000025 HC SEVOFLURANE, PER MIN: Performed by: SURGERY

## 2023-07-10 PROCEDURE — 250N000009 HC RX 250: Performed by: ANESTHESIOLOGY

## 2023-07-10 PROCEDURE — 250N000009 HC RX 250: Performed by: SURGERY

## 2023-07-10 PROCEDURE — 99232 SBSQ HOSP IP/OBS MODERATE 35: CPT | Performed by: INTERNAL MEDICINE

## 2023-07-10 PROCEDURE — 82310 ASSAY OF CALCIUM: CPT | Performed by: INTERNAL MEDICINE

## 2023-07-10 PROCEDURE — 250N000011 HC RX IP 250 OP 636: Mod: JZ | Performed by: STUDENT IN AN ORGANIZED HEALTH CARE EDUCATION/TRAINING PROGRAM

## 2023-07-10 PROCEDURE — 250N000011 HC RX IP 250 OP 636

## 2023-07-10 PROCEDURE — 370N000017 HC ANESTHESIA TECHNICAL FEE, PER MIN: Performed by: SURGERY

## 2023-07-10 PROCEDURE — 97597 DBRDMT OPN WND 1ST 20 CM/<: CPT | Mod: 78 | Performed by: SURGERY

## 2023-07-10 PROCEDURE — 10060 I&D ABSCESS SIMPLE/SINGLE: CPT | Mod: 78 | Performed by: SURGERY

## 2023-07-10 PROCEDURE — 250N000011 HC RX IP 250 OP 636: Mod: JZ | Performed by: SURGERY

## 2023-07-10 PROCEDURE — 258N000003 HC RX IP 258 OP 636: Performed by: STUDENT IN AN ORGANIZED HEALTH CARE EDUCATION/TRAINING PROGRAM

## 2023-07-10 PROCEDURE — 85027 COMPLETE CBC AUTOMATED: CPT | Performed by: STUDENT IN AN ORGANIZED HEALTH CARE EDUCATION/TRAINING PROGRAM

## 2023-07-10 PROCEDURE — 258N000003 HC RX IP 258 OP 636: Performed by: ANESTHESIOLOGY

## 2023-07-10 RX ORDER — SODIUM CHLORIDE, SODIUM LACTATE, POTASSIUM CHLORIDE, CALCIUM CHLORIDE 600; 310; 30; 20 MG/100ML; MG/100ML; MG/100ML; MG/100ML
INJECTION, SOLUTION INTRAVENOUS CONTINUOUS
Status: DISCONTINUED | OUTPATIENT
Start: 2023-07-10 | End: 2023-07-13

## 2023-07-10 RX ORDER — ONDANSETRON 4 MG/1
4 TABLET, ORALLY DISINTEGRATING ORAL EVERY 30 MIN PRN
Status: DISCONTINUED | OUTPATIENT
Start: 2023-07-10 | End: 2023-07-10 | Stop reason: HOSPADM

## 2023-07-10 RX ORDER — PROPOFOL 10 MG/ML
INJECTION, EMULSION INTRAVENOUS PRN
Status: DISCONTINUED | OUTPATIENT
Start: 2023-07-10 | End: 2023-07-10

## 2023-07-10 RX ORDER — FENTANYL CITRATE 0.05 MG/ML
50 INJECTION, SOLUTION INTRAMUSCULAR; INTRAVENOUS EVERY 5 MIN PRN
Status: DISCONTINUED | OUTPATIENT
Start: 2023-07-10 | End: 2023-07-10 | Stop reason: HOSPADM

## 2023-07-10 RX ORDER — PROPOFOL 10 MG/ML
INJECTION, EMULSION INTRAVENOUS CONTINUOUS PRN
Status: DISCONTINUED | OUTPATIENT
Start: 2023-07-10 | End: 2023-07-10

## 2023-07-10 RX ORDER — LIDOCAINE HYDROCHLORIDE 20 MG/ML
INJECTION, SOLUTION INFILTRATION; PERINEURAL PRN
Status: DISCONTINUED | OUTPATIENT
Start: 2023-07-10 | End: 2023-07-10

## 2023-07-10 RX ORDER — SODIUM CHLORIDE, SODIUM LACTATE, POTASSIUM CHLORIDE, CALCIUM CHLORIDE 600; 310; 30; 20 MG/100ML; MG/100ML; MG/100ML; MG/100ML
INJECTION, SOLUTION INTRAVENOUS CONTINUOUS
Status: DISCONTINUED | OUTPATIENT
Start: 2023-07-10 | End: 2023-07-10 | Stop reason: HOSPADM

## 2023-07-10 RX ORDER — FENTANYL CITRATE 0.05 MG/ML
25 INJECTION, SOLUTION INTRAMUSCULAR; INTRAVENOUS EVERY 5 MIN PRN
Status: DISCONTINUED | OUTPATIENT
Start: 2023-07-10 | End: 2023-07-10 | Stop reason: HOSPADM

## 2023-07-10 RX ORDER — HYDROMORPHONE HCL IN WATER/PF 6 MG/30 ML
0.4 PATIENT CONTROLLED ANALGESIA SYRINGE INTRAVENOUS EVERY 5 MIN PRN
Status: DISCONTINUED | OUTPATIENT
Start: 2023-07-10 | End: 2023-07-10 | Stop reason: HOSPADM

## 2023-07-10 RX ORDER — SODIUM CHLORIDE 9 MG/ML
INJECTION, SOLUTION INTRAVENOUS CONTINUOUS
Status: CANCELLED | OUTPATIENT
Start: 2023-07-10

## 2023-07-10 RX ORDER — OXYCODONE HYDROCHLORIDE 5 MG/1
5 TABLET ORAL EVERY 4 HOURS PRN
Status: DISCONTINUED | OUTPATIENT
Start: 2023-07-10 | End: 2023-07-11

## 2023-07-10 RX ORDER — LABETALOL HYDROCHLORIDE 5 MG/ML
10 INJECTION, SOLUTION INTRAVENOUS
Status: DISCONTINUED | OUTPATIENT
Start: 2023-07-10 | End: 2023-07-10 | Stop reason: HOSPADM

## 2023-07-10 RX ORDER — HYDROMORPHONE HCL IN WATER/PF 6 MG/30 ML
0.2 PATIENT CONTROLLED ANALGESIA SYRINGE INTRAVENOUS EVERY 5 MIN PRN
Status: DISCONTINUED | OUTPATIENT
Start: 2023-07-10 | End: 2023-07-10 | Stop reason: HOSPADM

## 2023-07-10 RX ORDER — CEFAZOLIN SODIUM 1 G/3ML
INJECTION, POWDER, FOR SOLUTION INTRAMUSCULAR; INTRAVENOUS PRN
Status: DISCONTINUED | OUTPATIENT
Start: 2023-07-10 | End: 2023-07-10

## 2023-07-10 RX ORDER — CEFAZOLIN SODIUM/WATER 2 G/20 ML
SYRINGE (ML) INTRAVENOUS PRN
Status: DISCONTINUED | OUTPATIENT
Start: 2023-07-10 | End: 2023-07-10

## 2023-07-10 RX ORDER — ONDANSETRON 2 MG/ML
INJECTION INTRAMUSCULAR; INTRAVENOUS PRN
Status: DISCONTINUED | OUTPATIENT
Start: 2023-07-10 | End: 2023-07-10

## 2023-07-10 RX ORDER — MAGNESIUM HYDROXIDE 1200 MG/15ML
LIQUID ORAL PRN
Status: DISCONTINUED | OUTPATIENT
Start: 2023-07-10 | End: 2023-07-10 | Stop reason: HOSPADM

## 2023-07-10 RX ORDER — FENTANYL CITRATE 50 UG/ML
INJECTION, SOLUTION INTRAMUSCULAR; INTRAVENOUS PRN
Status: DISCONTINUED | OUTPATIENT
Start: 2023-07-10 | End: 2023-07-10

## 2023-07-10 RX ORDER — ONDANSETRON 2 MG/ML
4 INJECTION INTRAMUSCULAR; INTRAVENOUS EVERY 30 MIN PRN
Status: DISCONTINUED | OUTPATIENT
Start: 2023-07-10 | End: 2023-07-10 | Stop reason: HOSPADM

## 2023-07-10 RX ADMIN — OXYCODONE HYDROCHLORIDE 5 MG: 5 TABLET ORAL at 17:33

## 2023-07-10 RX ADMIN — HEPARIN SODIUM 5000 UNITS: 5000 INJECTION, SOLUTION INTRAVENOUS; SUBCUTANEOUS at 14:15

## 2023-07-10 RX ADMIN — OXYCODONE HYDROCHLORIDE 5 MG: 5 TABLET ORAL at 22:33

## 2023-07-10 RX ADMIN — DAPTOMYCIN 600 MG: 500 INJECTION, POWDER, LYOPHILIZED, FOR SOLUTION INTRAVENOUS at 14:16

## 2023-07-10 RX ADMIN — Medication 2 G: at 11:13

## 2023-07-10 RX ADMIN — SODIUM CITRATE AND CITRIC ACID MONOHYDRATE 30 ML: 500; 334 SOLUTION ORAL at 08:13

## 2023-07-10 RX ADMIN — LIDOCAINE HYDROCHLORIDE 100 MG: 20 INJECTION, SOLUTION INFILTRATION; PERINEURAL at 11:22

## 2023-07-10 RX ADMIN — EZETIMIBE 10 MG: 10 TABLET ORAL at 08:13

## 2023-07-10 RX ADMIN — CEFAZOLIN 1 G: 1 INJECTION, POWDER, FOR SOLUTION INTRAMUSCULAR; INTRAVENOUS at 11:13

## 2023-07-10 RX ADMIN — PIPERACILLIN AND TAZOBACTAM 2.25 G: 2; .25 INJECTION, POWDER, FOR SOLUTION INTRAVENOUS at 04:13

## 2023-07-10 RX ADMIN — METOPROLOL TARTRATE 50 MG: 50 TABLET, FILM COATED ORAL at 08:13

## 2023-07-10 RX ADMIN — HEPARIN SODIUM 5000 UNITS: 5000 INJECTION, SOLUTION INTRAVENOUS; SUBCUTANEOUS at 05:06

## 2023-07-10 RX ADMIN — METOPROLOL TARTRATE 50 MG: 50 TABLET, FILM COATED ORAL at 20:16

## 2023-07-10 RX ADMIN — PIPERACILLIN AND TAZOBACTAM 2.25 G: 2; .25 INJECTION, POWDER, FOR SOLUTION INTRAVENOUS at 08:36

## 2023-07-10 RX ADMIN — ACETAMINOPHEN 650 MG: 325 TABLET, FILM COATED ORAL at 22:33

## 2023-07-10 RX ADMIN — ONDANSETRON 4 MG: 2 INJECTION INTRAMUSCULAR; INTRAVENOUS at 11:51

## 2023-07-10 RX ADMIN — HEPARIN SODIUM 5000 UNITS: 5000 INJECTION, SOLUTION INTRAVENOUS; SUBCUTANEOUS at 22:38

## 2023-07-10 RX ADMIN — SODIUM CHLORIDE, POTASSIUM CHLORIDE, SODIUM LACTATE AND CALCIUM CHLORIDE: 600; 310; 30; 20 INJECTION, SOLUTION INTRAVENOUS at 10:19

## 2023-07-10 RX ADMIN — INSULIN DEGLUDEC INJECTION 25 UNITS: 100 INJECTION, SOLUTION SUBCUTANEOUS at 22:35

## 2023-07-10 RX ADMIN — PIPERACILLIN AND TAZOBACTAM 2.25 G: 2; .25 INJECTION, POWDER, FOR SOLUTION INTRAVENOUS at 22:36

## 2023-07-10 RX ADMIN — MIDAZOLAM 2 MG: 1 INJECTION INTRAMUSCULAR; INTRAVENOUS at 11:13

## 2023-07-10 RX ADMIN — PROPOFOL 25 MCG/KG/MIN: 10 INJECTION, EMULSION INTRAVENOUS at 11:29

## 2023-07-10 RX ADMIN — PIPERACILLIN AND TAZOBACTAM 2.25 G: 2; .25 INJECTION, POWDER, FOR SOLUTION INTRAVENOUS at 14:44

## 2023-07-10 RX ADMIN — PROPOFOL 200 MG: 10 INJECTION, EMULSION INTRAVENOUS at 11:22

## 2023-07-10 RX ADMIN — SODIUM CITRATE AND CITRIC ACID MONOHYDRATE 30 ML: 500; 334 SOLUTION ORAL at 20:16

## 2023-07-10 RX ADMIN — BUMETANIDE 1 MG: 0.25 INJECTION, SOLUTION INTRAMUSCULAR; INTRAVENOUS at 08:13

## 2023-07-10 RX ADMIN — AMLODIPINE BESYLATE 5 MG: 5 TABLET ORAL at 08:13

## 2023-07-10 RX ADMIN — BUMETANIDE 1 MG: 0.25 INJECTION, SOLUTION INTRAMUSCULAR; INTRAVENOUS at 20:16

## 2023-07-10 RX ADMIN — FENTANYL CITRATE 50 MCG: 50 INJECTION, SOLUTION INTRAMUSCULAR; INTRAVENOUS at 11:22

## 2023-07-10 ASSESSMENT — ACTIVITIES OF DAILY LIVING (ADL)
ADLS_ACUITY_SCORE: 26
ADLS_ACUITY_SCORE: 30
ADLS_ACUITY_SCORE: 26
ADLS_ACUITY_SCORE: 30
ADLS_ACUITY_SCORE: 30
ADLS_ACUITY_SCORE: 26
ADLS_ACUITY_SCORE: 30
ADLS_ACUITY_SCORE: 26
ADLS_ACUITY_SCORE: 26

## 2023-07-10 ASSESSMENT — LIFESTYLE VARIABLES: TOBACCO_USE: 1

## 2023-07-10 NOTE — ANESTHESIA CARE TRANSFER NOTE
Patient: Ry Horner    Procedure: Procedure(s):  LEFT BELOW KNEE AMPUTATION STUMP IRRIGATION AND DEBRIDEMENT       Diagnosis: Cellulitis of left lower extremity [L03.116]  Diagnosis Additional Information: No value filed.    Anesthesia Type:   General     Note:    Oropharynx: oropharynx clear of all foreign objects and spontaneously breathing  Level of Consciousness: awake  Oxygen Supplementation: room air    Independent Airway: airway patency satisfactory and stable  Dentition: dentition unchanged  Vital Signs Stable: post-procedure vital signs reviewed and stable  Report to RN Given: handoff report given  Patient transferred to: PACU  Comments:     At end of procedure, spontaneous respirations, adequate tidal volumes, LMA removed atraumatically, airway patent after LMA removal. Oxygen via facemask at 6 liters per minute to PACU, then room air. SpO2, NiBP, and EKG monitors and alarms on and functioning, report on patient's clinical status given to PACU RN by GEOVANNI and CRNA, RN questions answered.  Handoff Report: Identifed the Patient, Identified the Reponsible Provider, Reviewed the pertinent medical history, Discussed the surgical course, Reviewed Intra-OP anesthesia mangement and issues during anesthesia, Set expectations for post-procedure period and Allowed opportunity for questions and acknowledgement of understanding      Vitals:  Vitals Value Taken Time   /84 07/10/23 1212   Temp 36.4  C (97.6  F) 07/10/23 1211   Pulse 73 07/10/23 1215   Resp 19 07/10/23 1215   SpO2 99 % 07/10/23 1215   Vitals shown include unvalidated device data.    Electronically Signed By: LILIANA Rutledge CRNA  July 10, 2023  12:15 PM

## 2023-07-10 NOTE — PLAN OF CARE
Goal Outcome Evaluation:      Plan of Care Reviewed With: patient    Overall Patient Progress: no changeOverall Patient Progress: no change    Pt here for cellulitis of his left stump, POD 4 from his last I&D. A&O x4. CMS WDL. Bowel sounds normoactive, passing flatus, tolerating low sodium diet. Pt has been NPO since midnight for the OR today for an I&D. Urinal at bedside, voiding adequately. VSS, ex HTN. Wet to dry LLE dressing changed once last night. Transfers to wheelchair independently/SBA. C/o moderate to severe pain, decreased with oxycodone and tylenol. Blood glucose 186, 169, 138, and 126. Plan is for OR today. Nephrology, vascular, hospitalist and ID following.

## 2023-07-10 NOTE — PROVIDER NOTIFICATION
"MD Notification    Notified Person: MD    Notified Person Name: Bernard Hickman    Notification Date/Time: 7/9/23 @ 9:25 pm    Notification Interaction: AMCOM    Purpose of Notification: \"FYI pt refused bedtime tresiba (25 units), states since he's NPO at MN he does not want his BG to drop. Pt has own internal monitor and we will check his glucose @ 10pm, 2am, 6am.\"    Orders Received: Awaiting MD response.    Comments:    "

## 2023-07-10 NOTE — PROGRESS NOTES
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      Sepsis due Lt BKA stump infection  S/p I&D of of left BKA stump on 6/30/23  -Patient presented with pain, redness, swelling at his left BKA stump site.  Marked leukocytosis of 22.9. Tachycardic to 110s.  X-ray Shows soft tissue swelling, some bone formation along the resection margin of the tibia with periosteal reaction.   -MRI of the stump showed findings compatible with osteomyelitis of the residual tibia along with fluid collection suspicious for abscess, underwent I&D of the left stump on 6/30/2023 with placement of wound VAC  -Bone culture negative thus far. Pathology findings are nonspecific and not definitive of osteomyelitis.    -Underwent reexploration of the wound with I&D of the left below-knee stump on 7/6/2023.  No pus or purulent fluid found.  Edema and hyperemic tissue.  Ascending induration along the lateral thigh with associated erythema.  Penrose drains placed along the tract.  -Leukocytosis improving  -Patient will be going back to the OR for I&D today  -Defer antibiotics to ID, continues on daptomycin and Zosyn  -Vascular surgery following    Acute kidney injury on CKD stage III   -Cr @ admission 2.45. Last Cr PTA 1.9 in 11/2022. (prior to that baseline ~ 1.6)   * Initially improved to baseline and started going back up 6/28.   * Only received 2 doses of vancomycin, last dose 6/25. No levels obtained. No NSAIDs  * No episodes of hypotension here.   * Vancomycin stopped and remains on zosyn and  dapto  -Nephrology following, no indication for dialysis yet per nephrology, continuing to monitor on a daily basis.    -Creatinine appears to have peaked around 9 and now downtrending, good urine output with diuretics.  -Defer diuretics to nephro  -Holding prior to admission lisinopril.   -Daily BMP.      Type II DM, uncontrolled, on insulin, A1c 7.6 on 6/27/23  -BS falling below 100 in the context of JOHNATHAN  -Continue to hold PTA oral agents including Jardiance, Januvia.  -Continue sliding scale insulin  -Continue Tresiba 25 units twice a day(skipped) last night and this morning dose due to being n.p.o.  -Hypoglycemia protocol    Essential hypertension  -Hold prior to admission lisinopril  -Blood pressure quite elevated, continue metoprolol 50 mg twice a day  -Amlodipine added yesterday 5 mg daily, will monitor response for 1 more day and uptitrate tomorrow if blood pressure persistently high  -Diuretics as above.  -As needed hydralazine     Mild hyponatremia     Acute on chronic anemia  -Hemoglobin at presentation was 12.8, slowly has drifted down, likely is multifactorial   -Hemoglobin dropped to 7.9 today, monitor intermittently.    Diet: Snacks/Supplements Adult: Other; Orange Andrea at lunch and dinner; With Meals  NPO per Anesthesia Guidelines for Procedure/Surgery Except for: Meds     DVT Prophylaxis: Pneumatic Compression Devices   [place] Catheter: Not present  Code Status: Full Code     Disposition Plan      Expected Discharge Date: 07/12/2023      Destination: home;home with family  Discharge Comments: Pending creat      Entered: Kai Viramontes MD 07/10/2023, 10:11 AM        Clinically Significant Risk Factors              # Hypoalbuminemia: Lowest albumin = 2.4 g/dL at 7/8/2023  6:39 AM, will monitor as appropriate    # Acute Kidney Injury, unspecified: based on a >150% or 0.3 mg/dL increase in last creatinine compared to past 90 day average, will monitor renal function  # Hypertension: Noted on problem  list       # DMII: A1C = 7.6 % (Ref range: <5.7 %) within past 6 months   # Obesity: Estimated body mass index is 36.15 kg/m  as calculated from the following:    Height as of this encounter: 1.829 m (6').    Weight as of this encounter: 120.9 kg (266 lb 8.6 oz).             The patient's care was discussed with the Bedside Nurse and Patient.    Medical Decision Making       **CLEAR ALL SELECTIONS**        Labs/Imaging Reviewed:  See Information above and Data section below    Time SPENT BY ME on the date of service doing chart review, history, exam, documentation & further activities per the note:  35  MINUTES    Kai Viramontes MD  Hospitalist Service  [facility]  Text Page 7AM-6PM  Securely message with the Vocera Web Console (learn more here)  Text page via [employer] Paging/Directory    ______________________________________________________________________    Interval History   Creatinine now downtrending.  Good diuretic response to Bumex.  Patient going for repeat I&D today.  Afebrile.  WBC count also improving.    Data reviewed today: I reviewed all medications, new labs and imaging results over the last 24 hours. I personally reviewed no images or EKG's today.    Physical Exam   Vital signs:  Temp: 97.8  F (36.6  C) Temp src: Oral BP: (!) 182/103 Pulse: 83   Resp: 18 SpO2: 96 % O2 Device: None (Room air) Oxygen Delivery: 2 LPM Height: 182.9 cm (6') Weight: 120.9 kg (266 lb 8.6 oz)  Estimated body mass index is 36.15 kg/m  as calculated from the following:    Height as of this encounter: 1.829 m (6').    Weight as of this encounter: 120.9 kg (266 lb 8.6 oz).      Wt Readings from Last 2 Encounters:   07/10/23 120.9 kg (266 lb 8.6 oz)   10/03/22 110 kg (242 lb 8.1 oz)       Gen: AAOX3, NAD, comfortable  HEENT: no pallor  Resp: CTA B/L, normal WOB  CVS: RRR, no murmur  Abd/GI: Soft, non-tender. BS- normoactive.   Skin: Warm, dry no rashes  MSK: Left below-knee stump is bandaged, mild  edema throughout including upper extremity  Neuro- CN- intact. No focal deficits.        Data   Recent Labs   Lab 07/10/23  0803 07/10/23  0710 07/09/23  0647 07/09/23  0612 07/08/23  0639   WBC  --  13.6*  --  16.5* 16.2*   HGB  --  8.3*  --  8.3* 7.9*   MCV  --  86  --  86 88   PLT  --  335  --  333 321     --  136  --  134*   POTASSIUM 4.2  --  3.9  --  4.0   CHLORIDE 101  --  100  --  100   CO2 21*  --  18*  --  18*   BUN 87.4*  --  86.1*  --  83.5*   CR 7.80*  --  8.48*  --  8.70*   ANIONGAP 16*  --  18*  --  16*   FERNANDO 8.1*  --  7.7*  --  7.5*   *  --  110*  --  155*   ALBUMIN  --   --  2.5*  --  2.4*       No results found for this or any previous visit (from the past 24 hour(s)).  Medications       [Auto Hold] amLODIPine  5 mg Oral Daily     [Auto Hold] bumetanide  1 mg Intravenous BID     [Auto Hold] DAPTOmycin (CUBICIN) 600 mg in sodium chloride 0.9 % 100 mL intermittent infusion  6 mg/kg (Adjusted) Intravenous Q48H     [Auto Hold] ezetimibe  10 mg Oral Daily     [Auto Hold] heparin ANTICOAGULANT  5,000 Units Subcutaneous Q8H     [Auto Hold] insulin aspart  1-7 Units Subcutaneous TID AC     [Auto Hold] insulin aspart  1-5 Units Subcutaneous At Bedtime     [Auto Hold] insulin degludec  25 Units Subcutaneous BID     [Auto Hold] metoprolol tartrate  50 mg Oral BID     [Auto Hold] piperacillin-tazobactam  2.25 g Intravenous Q6H     [Auto Hold] sodium chloride (PF)  10-40 mL Intracatheter Q7 Days     [Auto Hold] sodium chloride (PF)  3 mL Intracatheter Q8H     [Auto Hold] sodium citrate-citric acid  30 mL Oral BID

## 2023-07-10 NOTE — OP NOTE
Vascular Surgery Operative Note     PREOPERATIVE DIAGNOSIS:  Left below-knee amputation stump infection     POSTOPERATIVE DIAGNOSIS:  Same     PROCEDURE: Incision and debridement of left below-knee stump     SURGEON:  Reny Child MD     ASSISTANT:  None     ANESTHESIA:  LMA     ESTIMATED BLOOD LOSS:  100 mL     SPECIMENS: None       INDICATIONS:    Mr. Horner is a 57M who underwent staged left below-knee amputation in 09/2022. This healed well and he was ambulatory on his prosthetic. He presented with sudden onset of pain, swelling, and wound on the distal tip of the left below-knee stump, with surrounding cellulitis. He underwent debridement on 6/30/23 but had persistent signs of infection and returned to the OR today for repeat debridement on 7/6/23 and returns today for repeat debridement.      INTRAOPERATIVE FINDINGS:    Minimal residual fluid pocket in the left posterior thigh; granulation tissue; persistent subcutaneous edema and induration.     DESCRIPTION OF TECHNIQUE:   Mr. Horner was brought into the operating room and transferred to the operating table in the supine position. After uneventful LMA intubation, general anesthesia was initiated. Antibiotics were administered. The left leg  was prepped and draped in standard sterile fashion. Timeout was performed and the entire surgical team was in agreement with the planned procedure and correctly marked side.      Sharp non-excisional debridement was used along the base of the left BKA stump, with a #10 scalpel blade used to sharply debride the granulation tissue and fibrinous slough. The existing penrose drain sites were irrigated, with some residual induration along the lateral thigh; these were bluntly probed. A pocket of fluid was found in the posterior distal thigh (just above the popliteal fossa) and two new penrose drain loops were externalized through this. One existing penrose loop was removed from the anterolateral left tibia to the lateral  knee, as this cavity was mostly collapsed without much persistent edema.     The incisions were copiously irrigated and hemostasis confirmed. Fluffed gauze was placed under the penrose drains and a wet-to-dry dressing placed over the distal stump.     At the end of the case all needle, sponge and instrument counts were correct.  Mr. Horner was then extubated in stable condition and was taken to the postoperative care unit.

## 2023-07-10 NOTE — ANESTHESIA POSTPROCEDURE EVALUATION
Patient: Ry Horner    Procedure: Procedure(s):  LEFT BELOW KNEE AMPUTATION STUMP IRRIGATION AND DEBRIDEMENT       Anesthesia Type:  General    Note:  Disposition: Inpatient   Postop Pain Control: Uneventful            Sign Out: Well controlled pain   PONV: No   Neuro/Psych: Uneventful            Sign Out: Acceptable/Baseline neuro status   Airway/Respiratory: Uneventful            Sign Out: Acceptable/Baseline resp. status   CV/Hemodynamics: Uneventful            Sign Out: Acceptable CV status   Other NRE: NONE   DID A NON-ROUTINE EVENT OCCUR? No           Last vitals:  Vitals Value Taken Time   /96 07/10/23 1250   Temp 36.4  C (97.6  F) 07/10/23 1211   Pulse 75 07/10/23 1254   Resp 11 07/10/23 1254   SpO2 96 % 07/10/23 1254   Vitals shown include unvalidated device data.    Electronically Signed By: Reynold German MD  July 10, 2023  12:56 PM

## 2023-07-10 NOTE — PROGRESS NOTES
Renal Medicine Progress Note            Assessment/Plan:     57 y.o man with CKD III due to DM and HTN, consulted for JOHNATHAN.      1) CKD III:                -bl ~ 1.5-1.9 mg/dl (GFR 45-55)               -2.5 g/g albuminuria     2)  Severe JOHNATHAN, improving: Remains Non-oliguric. At baseline creatinine until 6/29 and subsequent rise, more dramatically after OR 6/30.               - on Zosyn/dapto               -urine sodum <20 7/2 reflecting possibe pre-renal state.                -UA with hyaline casts              - Renal US with large sized kidneys, especially with underlying CKD, this nephromegaly likely reflects some ATN injury.     Peak Cr 8.7 on 7/8, improving slowly. Diuretic responsive, but may start to have post-ATN autodiuresis.        3)  Infected left stump with osteomyelitis s/p I&D:               -dapto/zosyn     4) FEN: . Hyponatremia at 138, bicarb 21     5)  Hypertension:                -Jardiance and lisinopril are on hold     6) anemia: Hgb 8.3     Plan:   1) continue bicitra to 30cc bid  2) hold diuretics for now, seems to be autodiuresing.   3) avoid NSAIDs, contrast, nephrotoxins as able   4) No indications dialysis today. In recovery phase but will need to monitor closely as he is very susceptible to re-injury post-OR  5) 2gm low sodium diet    Elfego Teague MD   [facility]  Office: [phone]        Interval History:     - no acute events overnight   - went to OR this AM for wound closure   - significant UOP, 2.775L yesterday, 2L since midnight today   - feels ok  - denies SOB   - doesn't feel improvement in edema yet but has noticed increased UOP.   - appetite present   - denies n/v          Medications and Allergies:       [Auto Hold] amLODIPine  5 mg Oral Daily     [Auto Hold] bumetanide  1 mg Intravenous BID     [Auto Hold] DAPTOmycin (CUBICIN) 600 mg in sodium chloride 0.9 % 100 mL intermittent infusion  6 mg/kg (Adjusted) Intravenous Q48H     [Auto Hold] ezetimibe  10 mg  "Oral Daily     [Auto Hold] heparin ANTICOAGULANT  5,000 Units Subcutaneous Q8H     [Auto Hold] insulin aspart  1-7 Units Subcutaneous TID AC     [Auto Hold] insulin aspart  1-5 Units Subcutaneous At Bedtime     [Auto Hold] insulin degludec  25 Units Subcutaneous BID     [Auto Hold] metoprolol tartrate  50 mg Oral BID     [Auto Hold] piperacillin-tazobactam  2.25 g Intravenous Q6H     [Auto Hold] sodium chloride (PF)  10-40 mL Intracatheter Q7 Days     [Auto Hold] sodium chloride (PF)  3 mL Intracatheter Q8H     [Auto Hold] sodium citrate-citric acid  30 mL Oral BID        Allergies   Allergen Reactions     Pravastatin      \"sucked the life blood out of me,\" Indicates this occurs with all statins.     Statins             Physical Exam:   Vitals were reviewed  BP (!) 148/87   Pulse 76   Temp 97.6  F (36.4  C)   Resp 18   Ht 1.829 m (6')   Wt 120.9 kg (266 lb 8.6 oz)   SpO2 95%   BMI 36.15 kg/m      Wt Readings from Last 3 Encounters:   07/10/23 120.9 kg (266 lb 8.6 oz)   10/03/22 110 kg (242 lb 8.1 oz)   08/17/22 119.9 kg (264 lb 6.4 oz)       Intake/Output Summary (Last 24 hours) at 7/9/2023 0758  Last data filed at 7/9/2023 0643  Gross per 24 hour   Intake 740 ml   Output 2805 ml   Net -2065 ml     GENERAL APPEARANCE: alert in no distress  HEENT:  MMM  RESP: lungs cta b c good efforts, no crackles, rhonchi or wheezes  CV: RRR, no murmur  EXTREMITIES/SKIN: no c/c/rashes/lesions; 2+dependently  NEURO: Grossly nonfocal         Data:     BMP  Recent Labs   Lab 07/10/23  0803 07/09/23  0647 07/08/23  0639 07/07/23  0743    136 134* 132*   POTASSIUM 4.2 3.9 4.0 4.3   CHLORIDE 101 100 100 99   FERNANDO 8.1* 7.7* 7.5* 7.5*   CO2 21* 18* 18* 16*   BUN 87.4* 86.1* 83.5* 74.8*   CR 7.80* 8.48* 8.70* 8.02*   * 110* 155* 179*     CBC  Recent Labs   Lab 07/10/23  0710 07/09/23  0612 07/08/23  0639 07/07/23  0743   WBC 13.6* 16.5* 16.2* 19.4*   HGB 8.3* 8.3* 7.9* 8.3*   HCT 24.2* 24.3* 23.9* 25.0*   MCV 86 86 88 87 "    333 321 327     Lab Results   Component Value Date    AST 13 06/24/2023    ALT 14 06/24/2023    ALKPHOS 84 06/24/2023    BILITOTAL 0.9 06/24/2023     Lab Results   Component Value Date    INR 1.06 06/27/2023       Attestation:  I have reviewed today's vital signs, notes, medications, labs and imaging.    Elfego Teague MD  [facility] - Nephrology  Office: [phone]

## 2023-07-10 NOTE — ANESTHESIA PREPROCEDURE EVALUATION
Anesthesia Pre-Procedure Evaluation    Patient: Ry Horner   MRN: [id number] : 1965        Procedure : Procedure(s):  LEFT BELOW KNEE AMPUTATION STUMP DEBRIDEMENT          Past Medical History:   Diagnosis Date     BENIGN HYPERTENSION 2007     DIABETES MELLITUS TYPE II-UNCOMPL 2007     HYPERLIPIDEMIA NEC/NOS 2007     Tobacco use disorder 2007      Past Surgical History:   Procedure Laterality Date     AMPUTATE FOOT Left 2019    Procedure: LEFT PARTIAL FOOT AMPUTATION;  Surgeon: Antoine Pena DPM;  Location: [facility]     AMPUTATE FOOT Left 2019    Procedure: LEFT PARTIAL FOOT AMPUTATION;  Surgeon: Tim Douglas DPM;  Location: [facility]     AMPUTATE FOOT Left 2019    Procedure: POSSIBLE PARTIAL FOOT AMPUTATION;  Surgeon: Tim Douglas DPM;  Location: [facility]     AMPUTATE FOOT Left 2/10/2022    Procedure: Partial left foot amputation;  Surgeon: Milena Cevallos DPM, Podiatry/Foot and Ankle Surgery;  Location: [facility]     AMPUTATE LEG BELOW KNEE Right 2017    Procedure: AMPUTATE LEG BELOW KNEE;  RIGHT BELOW KNEE AMPUTATION ;  Surgeon: Nikolas Nascimento MD;  Location: [facility]     AMPUTATE LEG BELOW KNEE Left 2022    Procedure: AMPUTATION BELOW KNEE;  Surgeon: Neal Blackman MD;  Location: [facility]     AMPUTATE LEG BELOW KNEE Left 2022    Procedure: LEFT SIDE COMPLETION BELOW THE KNEE AMPUTATION;  Surgeon: Reny Child MD;  Location[facility]     AMPUTATE REVISION STUMP LOWER EXTREMITY Left 2023    Procedure: LEFT BELOW KNEE AMPUTATION STUMP DEBRIDEMENT;  Surgeon: Reny Child MD;  [facility]     AMPUTATE TOE(S) Left 2/3/2020    Procedure: LEFT SECOND TOE AMPUTATION;  Surgeon: Milena Cevallos DPM, Podiatry/Foot and Ankle Surgery;  Location: [facility]     APPENDECTOMY       APPLY WOUND VAC Right 3/2/2015    Procedure: APPLY WOUND VAC;  Surgeon: Milena Cevallos DPM, Pod;  Location: [facility]     BIOPSY BONE FOOT Right  "7/15/2016    Procedure: BIOPSY BONE FOOT;  Surgeon: Tim Douglas DPM;  [facility]     BIOPSY BONE TOE Left 12/4/2019    Procedure: BONE BIOPSY LEFT SECOND TOE;  Surgeon: Tim Douglas DPM;  [facility]     IRRIGATION AND DEBRIDEMENT FOOT, COMBINED Right 3/2/2015    Procedure: COMBINED IRRIGATION AND DEBRIDEMENT FOOT;  Surgeon: Milena Cevallos DPM, Pod;  Location: [facility]     IRRIGATION AND DEBRIDEMENT FOOT, COMBINED Right 7/15/2016    Procedure: COMBINED IRRIGATION AND DEBRIDEMENT FOOT;  Surgeon: Tim Douglas DPM;  [facility]     IRRIGATION AND DEBRIDEMENT FOOT, COMBINED Right 7/20/2016    Procedure: COMBINED IRRIGATION AND DEBRIDEMENT FOOT;  Surgeon: Tim Douglas DPM;  [facility]     IRRIGATION AND DEBRIDEMENT FOOT, COMBINED Left 11/19/2019    Procedure: REVISIONAL IRRIGATION AND DEBRIDEMENT LEFT FOOT AND BONE DEBRIDEMENT;  Surgeon: Joseph Randhawa DPM;  [facility]     IRRIGATION AND DEBRIDEMENT FOOT, COMBINED Left 12/4/2019    Procedure: EXCISIONAL DEBRIDEMENT LEFT FOOT;  Surgeon: Tim Douglas DPM;  [facility]     IRRIGATION AND DEBRIDEMENT FOOT, COMBINED Left 12/11/2019    Procedure: IRRIGATION AND DEBRIDEMENT FOOT, Partial osteotomy left first metatarsal;  Surgeon: Tim Douglas DPM;  [facility]     IRRIGATION AND DEBRIDEMENT FOOT, COMBINED Left 2/5/2020    Procedure: IRRIGATION AND DEBRIDEMENT LEFT FOOT;  Surgeon: Tim Douglas DPM;  [facility]     IRRIGATION AND DEBRIDEMENT LOWER EXTREMITY, COMBINED Left 6/30/2023    Procedure: Irrigation and debridement lower extremity amputation stump- left;  Surgeon: Reny Child MD;  Location:  OR     ORTHOPEDIC SURGERY        Allergies   Allergen Reactions     Pravastatin      \"sucked the life blood out of me,\" Indicates this occurs with all statins.     Statins       Social History     Tobacco Use     Smoking status: Former     Packs/day: 0.50     Years: 20.00     Pack " years: 10.00     Types: Cigarettes     Start date: 1987     Quit date: 2006     Years since quittin.5     Smokeless tobacco: Never   Substance Use Topics     Alcohol use: Yes     Comment: occ      Wt Readings from Last 1 Encounters:   07/10/23 120.9 kg (266 lb 8.6 oz)        Anesthesia Evaluation   Pt has had prior anesthetic.     No history of anesthetic complications       ROS/MED HX  ENT/Pulmonary:     (+) tobacco use (Quit in '06), Past use, 10  Pack-Year Hx,      Neurologic:       Cardiovascular:     (+) Dyslipidemia hypertension-Peripheral Vascular Disease----    METS/Exercise Tolerance:     Hematologic:       Musculoskeletal: Comment: Sepsis due Lt BKA stump infection  S/p I&D of of left BKA stump on 23      GI/Hepatic:       Renal/Genitourinary:     (+) renal disease, type: CRI and ARF,     Endo:     (+) type II DM, Diabetic complications: nephropathy neuropathy. Obesity (Class 2),     Psychiatric/Substance Use:       Infectious Disease: Comment: H/o sepsis      Malignancy:       Other:            Physical Exam    Airway  airway exam normal      Mallampati: II   TM distance: > 3 FB   Neck ROM: full   Mouth opening: > 3 cm    Respiratory Devices and Support         Dental       (+) Modest Abnormalities - crowns, retainers, 1 or 2 missing teeth      Cardiovascular   cardiovascular exam normal          Pulmonary   pulmonary exam normal                OUTSIDE LABS:  CBC:   Lab Results   Component Value Date    WBC 13.6 (H) 07/10/2023    WBC 16.5 (H) 2023    HGB 8.3 (L) 07/10/2023    HGB 8.3 (L) 2023    HCT 24.2 (L) 07/10/2023    HCT 24.3 (L) 2023     07/10/2023     2023     BMP:   Lab Results   Component Value Date     07/10/2023     2023    POTASSIUM 4.2 07/10/2023    POTASSIUM 3.9 2023    CHLORIDE 101 07/10/2023    CHLORIDE 100 2023    CO2 21 (L) 07/10/2023    CO2 18 (L) 2023    BUN 87.4 (H) 07/10/2023    BUN 86.1 (H)  07/09/2023    CR 7.80 (H) 07/10/2023    CR 8.48 (H) 07/09/2023     (H) 07/10/2023     (H) 07/09/2023     COAGS:   Lab Results   Component Value Date    PTT 28 06/27/2023    INR 1.06 06/27/2023    FIBR 866 (H) 06/27/2023     POC:   Lab Results   Component Value Date     (H) 02/07/2020     HEPATIC:   Lab Results   Component Value Date    ALBUMIN 2.5 (L) 07/09/2023    PROTTOTAL 7.3 06/24/2023    ALT 14 06/24/2023    AST 13 06/24/2023    ALKPHOS 84 06/24/2023    BILITOTAL 0.9 06/24/2023     OTHER:   Lab Results   Component Value Date    PH 7.39 09/23/2022    LACT 0.8 06/24/2023    A1C 7.6 (H) 06/27/2023    FERNANDO 8.1 (L) 07/10/2023    PHOS 8.4 (H) 07/09/2023    TSH 0.94 12/19/2019    .0 (H) 02/02/2020    SED 85 (H) 09/25/2022       Anesthesia Plan    ASA Status:  3   NPO Status:  NPO Appropriate    Anesthesia Type: General.     - Airway: LMA   Induction: Intravenous.   Maintenance: Balanced.        Consents    Anesthesia Plan(s) and associated risks, benefits, and realistic alternatives discussed. Questions answered and patient/representative(s) expressed understanding.    - Discussed:     - Discussed with:  Patient         Postoperative Care    Pain management: IV analgesics, Oral pain medications.   PONV prophylaxis: Ondansetron (or other 5HT-3), Dexamethasone or Solumedrol, Background Propofol Infusion     Comments:                Reynold German MD

## 2023-07-10 NOTE — CARE PLAN
Alert and oriented x4. VSS. Denies pain. Up independently/SBA/ Voiding well. Went to OR for I and D today. Doing well from surgical standpoint. MD would like wet to dry dressing to be done q4 hours. (seems excessive for a wet to dry). Blood glucose 126, 112. Plan to continue to monitor tonight.

## 2023-07-11 LAB
ALBUMIN SERPL BCG-MCNC: 2.5 G/DL (ref 3.5–5.2)
ANION GAP SERPL CALCULATED.3IONS-SCNC: 15 MMOL/L (ref 7–15)
BUN SERPL-MCNC: 88.2 MG/DL (ref 6–20)
CALCIUM SERPL-MCNC: 8 MG/DL (ref 8.6–10)
CHLORIDE SERPL-SCNC: 100 MMOL/L (ref 98–107)
CREAT SERPL-MCNC: 7.27 MG/DL (ref 0.67–1.17)
DEPRECATED HCO3 PLAS-SCNC: 22 MMOL/L (ref 22–29)
ERYTHROCYTE [DISTWIDTH] IN BLOOD BY AUTOMATED COUNT: 12.3 % (ref 10–15)
GFR SERPL CREATININE-BSD FRML MDRD: 8 ML/MIN/1.73M2
GLUCOSE SERPL-MCNC: 223 MG/DL (ref 70–99)
HCT VFR BLD AUTO: 24.4 % (ref 40–53)
HGB BLD-MCNC: 8.1 G/DL (ref 13.3–17.7)
MCH RBC QN AUTO: 28.9 PG (ref 26.5–33)
MCHC RBC AUTO-ENTMCNC: 33.2 G/DL (ref 31.5–36.5)
MCV RBC AUTO: 87 FL (ref 78–100)
PHOSPHATE SERPL-MCNC: 7.6 MG/DL (ref 2.5–4.5)
PLATELET # BLD AUTO: 306 10E3/UL (ref 150–450)
POTASSIUM SERPL-SCNC: 4.5 MMOL/L (ref 3.4–5.3)
RBC # BLD AUTO: 2.8 10E6/UL (ref 4.4–5.9)
SODIUM SERPL-SCNC: 137 MMOL/L (ref 136–145)
WBC # BLD AUTO: 13.1 10E3/UL (ref 4–11)

## 2023-07-11 PROCEDURE — 99232 SBSQ HOSP IP/OBS MODERATE 35: CPT | Performed by: INTERNAL MEDICINE

## 2023-07-11 PROCEDURE — 250N000013 HC RX MED GY IP 250 OP 250 PS 637: Performed by: STUDENT IN AN ORGANIZED HEALTH CARE EDUCATION/TRAINING PROGRAM

## 2023-07-11 PROCEDURE — 120N000001 HC R&B MED SURG/OB

## 2023-07-11 PROCEDURE — 250N000011 HC RX IP 250 OP 636: Performed by: STUDENT IN AN ORGANIZED HEALTH CARE EDUCATION/TRAINING PROGRAM

## 2023-07-11 PROCEDURE — 85014 HEMATOCRIT: CPT | Performed by: STUDENT IN AN ORGANIZED HEALTH CARE EDUCATION/TRAINING PROGRAM

## 2023-07-11 PROCEDURE — 250N000011 HC RX IP 250 OP 636: Mod: JZ | Performed by: STUDENT IN AN ORGANIZED HEALTH CARE EDUCATION/TRAINING PROGRAM

## 2023-07-11 PROCEDURE — 99232 SBSQ HOSP IP/OBS MODERATE 35: CPT | Performed by: STUDENT IN AN ORGANIZED HEALTH CARE EDUCATION/TRAINING PROGRAM

## 2023-07-11 PROCEDURE — 80069 RENAL FUNCTION PANEL: CPT | Performed by: STUDENT IN AN ORGANIZED HEALTH CARE EDUCATION/TRAINING PROGRAM

## 2023-07-11 PROCEDURE — 250N000013 HC RX MED GY IP 250 OP 250 PS 637

## 2023-07-11 PROCEDURE — 250N000013 HC RX MED GY IP 250 OP 250 PS 637: Performed by: HOSPITALIST

## 2023-07-11 RX ORDER — OXYCODONE HYDROCHLORIDE 5 MG/1
10 TABLET ORAL EVERY 4 HOURS PRN
Status: DISCONTINUED | OUTPATIENT
Start: 2023-07-11 | End: 2023-07-28 | Stop reason: HOSPADM

## 2023-07-11 RX ADMIN — EZETIMIBE 10 MG: 10 TABLET ORAL at 08:16

## 2023-07-11 RX ADMIN — OXYCODONE HYDROCHLORIDE 5 MG: 5 TABLET ORAL at 02:18

## 2023-07-11 RX ADMIN — INSULIN ASPART 2 UNITS: 100 INJECTION, SOLUTION INTRAVENOUS; SUBCUTANEOUS at 08:17

## 2023-07-11 RX ADMIN — OXYCODONE HYDROCHLORIDE 10 MG: 5 TABLET ORAL at 15:55

## 2023-07-11 RX ADMIN — PIPERACILLIN AND TAZOBACTAM 2.25 G: 2; .25 INJECTION, POWDER, FOR SOLUTION INTRAVENOUS at 08:35

## 2023-07-11 RX ADMIN — METOPROLOL TARTRATE 50 MG: 50 TABLET, FILM COATED ORAL at 21:13

## 2023-07-11 RX ADMIN — BUMETANIDE 1 MG: 0.25 INJECTION, SOLUTION INTRAMUSCULAR; INTRAVENOUS at 08:16

## 2023-07-11 RX ADMIN — SODIUM CITRATE AND CITRIC ACID MONOHYDRATE 30 ML: 500; 334 SOLUTION ORAL at 22:01

## 2023-07-11 RX ADMIN — ACETAMINOPHEN 650 MG: 325 TABLET, FILM COATED ORAL at 20:09

## 2023-07-11 RX ADMIN — OXYCODONE HYDROCHLORIDE 5 MG: 5 TABLET ORAL at 02:17

## 2023-07-11 RX ADMIN — AMLODIPINE BESYLATE 5 MG: 5 TABLET ORAL at 08:16

## 2023-07-11 RX ADMIN — INSULIN DEGLUDEC INJECTION 25 UNITS: 100 INJECTION, SOLUTION SUBCUTANEOUS at 08:20

## 2023-07-11 RX ADMIN — INSULIN DEGLUDEC INJECTION 25 UNITS: 100 INJECTION, SOLUTION SUBCUTANEOUS at 22:21

## 2023-07-11 RX ADMIN — HEPARIN SODIUM 5000 UNITS: 5000 INJECTION, SOLUTION INTRAVENOUS; SUBCUTANEOUS at 06:06

## 2023-07-11 RX ADMIN — METOPROLOL TARTRATE 50 MG: 50 TABLET, FILM COATED ORAL at 08:16

## 2023-07-11 RX ADMIN — OXYCODONE HYDROCHLORIDE 5 MG: 5 TABLET ORAL at 06:09

## 2023-07-11 RX ADMIN — PIPERACILLIN AND TAZOBACTAM 2.25 G: 2; .25 INJECTION, POWDER, FOR SOLUTION INTRAVENOUS at 03:05

## 2023-07-11 RX ADMIN — HEPARIN SODIUM 5000 UNITS: 5000 INJECTION, SOLUTION INTRAVENOUS; SUBCUTANEOUS at 15:13

## 2023-07-11 RX ADMIN — SODIUM CITRATE AND CITRIC ACID MONOHYDRATE 30 ML: 500; 334 SOLUTION ORAL at 08:16

## 2023-07-11 RX ADMIN — INSULIN ASPART 2 UNITS: 100 INJECTION, SOLUTION INTRAVENOUS; SUBCUTANEOUS at 17:54

## 2023-07-11 RX ADMIN — OXYCODONE HYDROCHLORIDE 10 MG: 5 TABLET ORAL at 21:13

## 2023-07-11 RX ADMIN — PIPERACILLIN AND TAZOBACTAM 2.25 G: 2; .25 INJECTION, POWDER, FOR SOLUTION INTRAVENOUS at 15:14

## 2023-07-11 RX ADMIN — OXYCODONE HYDROCHLORIDE 10 MG: 5 TABLET ORAL at 10:13

## 2023-07-11 RX ADMIN — OXYCODONE HYDROCHLORIDE 5 MG: 5 TABLET ORAL at 06:07

## 2023-07-11 RX ADMIN — HEPARIN SODIUM 5000 UNITS: 5000 INJECTION, SOLUTION INTRAVENOUS; SUBCUTANEOUS at 22:01

## 2023-07-11 RX ADMIN — PIPERACILLIN AND TAZOBACTAM 2.25 G: 2; .25 INJECTION, POWDER, FOR SOLUTION INTRAVENOUS at 22:01

## 2023-07-11 ASSESSMENT — ACTIVITIES OF DAILY LIVING (ADL)
ADLS_ACUITY_SCORE: 30

## 2023-07-11 NOTE — PROGRESS NOTES
Vascular Surgery Progress Note     Date of Admission:  6/24/2023  Date: July 11, 2023     Subjective  Mr. Horner reports that his pain medications somehow shifted after the case yesterday and he has had more pain with dressing changes. He otherwise feels OK.       Physical Exam   Temp: 98.4  F (36.9  C) Temp src: Oral BP: (!) 160/92 Pulse: 84   Resp: 19 SpO2: 94 % O2 Device: None (Room air) Oxygen Delivery: 2 LPM  Vital Signs with Ranges  Temp:  [97.6  F (36.4  C)-98.7  F (37.1  C)] 98.4  F (36.9  C)  Pulse:  [75-98] 84  Resp:  [14-21] 19  BP: (148-175)/() 160/92  SpO2:  [91 %-96 %] 94 %  266 lbs 8.58 oz    Constitutional: cooperative, no apparent distress  Vascular: granulation tissue in left BKA incision; penrose drains in place with surrounding edema and induration, no erythema or active exudate  Musculoskeletal: grossly normal and symmetric ROM and strength in BL extremities   Neurologic: Awake, alert, oriented to name, place, time, and situation    Data   Most Recent 3 CBCs:  Recent Labs   Lab Test 07/11/23  0601 07/10/23  0710 07/09/23  0612   WBC 13.1* 13.6* 16.5*   HGB 8.1* 8.3* 8.3*   MCV 87 86 86    335 333       Most Recent 3 BMPs:  Recent Labs   Lab Test 07/11/23  0601 07/10/23  0803 07/09/23  0647    138 136   POTASSIUM 4.5 4.2 3.9   CHLORIDE 100 101 100   CO2 22 21* 18*   BUN 88.2* 87.4* 86.1*   CR 7.27* 7.80* 8.48*   ANIONGAP 15 16* 18*   FERNANDO 8.0* 8.1* 7.7*   * 122* 110*           Assessment & Plan   Mr. Horner is a 57M who underwent staged left below-knee amputation in 09/2022. This healed well and he was ambulatory on his prosthetic. He presented with sudden onset of pain, swelling, and wound on the distal tip of the left below-knee stump, with surrounding cellulitis. He underwent left BKA stump debridement on 6/30/23 and repeat debridement with drain placement on 7/6/23 and 7/10/23.        Leukocytosis is finally down-trending    Continue wet-to-dry dressings (carefully  outlined in orders and with pictorial notes from last week - needs to be q4hrs)    I DO NOT HAVE AN EXPLANATION FOR HIS PERSISTENT HIGH WBCs OR ACUTE RENAL FAILURE    At best, he has had superficial cellulitis    Defer to ID for antibiotic selection and duration    Plan to salvage BKA and tibia, do not believe osteomyelitis present    Will wait for normalization of leukocytosis and renal function before attempting formal revision and closure of the BKA stump.     Appreciate primary team care    Reny Child MD

## 2023-07-11 NOTE — PROVIDER NOTIFICATION
"MD Notification    Notified Person: MD    Notified Person Name: Antonieta Perdomo    Notification Date/Time: 07/11/23 9:37 AM    Notification Interaction: [employer]    Purpose of Notification: \"Pts dressing change is coming up soon, are we able to get that 10mg oxy order placed? Also there is a FMLA form here needing to be filled out by vascular.\"    Orders Received: 10mg oxy ordered    Comments:      " Brow Lift Text: A midfrontal incision was made medially to the defect to allow access to the tissues just superior to the left eyebrow. Following careful dissection inferiorly in a supraperiosteal plane to the level of the left eyebrow, several 3-0 monocryl sutures were used to resuspend the eyebrow orbicularis oculi muscular unit to the superior frontal bone periosteum. This resulted in an appropriate reapproximation of static eyebrow symmetry and correction of the left brow ptosis.

## 2023-07-11 NOTE — PROGRESS NOTES
[facility]    Infectious Disease Progress Note    Date of Service (when I saw the patient): 07/11/2023     Assessment & Plan   Ry Horner is a 57 year old male who was admitted on 6/24/2023.     Impression:  1. 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 2. Stage III CKD  3. Bilateral BKA   4. Presented to the ER due to pain, swelling and redness with small ulcer at Lt BKA stump and found to have cellulitis/sepsis. This BKA was done in 2022   5. No history of MRSA  6. Workup shows JOHNATHAN  7. Started on vanc ( only 2 doses) and zosyn switched to dapto on 6/26      Recommendations:   Continue on daptomycin continue on zosyn   S/P: Incision and debridement of left below-knee stump today on 6/30   S/p: repeat I and D on 7/6  S/p repeat I and D on 7/ 10  Pending OR cultures and path pending so far no micro data but patient has been on broad spectrum antibiotics   Will continue to follow          Franklin Maza MD    Interval History   No new micro    Creat up   Nephrology evaluating     Physical Exam   Temp: 98.4  F (36.9  C) Temp src: Oral BP: (!) 160/92 Pulse: 84   Resp: 19 SpO2: 94 % O2 Device: None (Room air) Oxygen Delivery: 2 LPM  Vitals:    07/06/23 0500 07/09/23 0615 07/10/23 0700   Weight: 121 kg (266 lb 12.1 oz) 122 kg (268 lb 15.4 oz) 120.9 kg (266 lb 8.6 oz)     Vital Signs with Ranges  Temp:  [97.6  F (36.4  C)-98.7  F (37.1  C)] 98.4  F (36.9  C)  Pulse:  [73-98] 84  Resp:  [14-21] 19  BP: (141-175)/() 160/92  SpO2:  [91 %-100 %] 94 %       Medications     lactated ringers 250 mL/hr at 07/10/23 1158       amLODIPine  5 mg Oral Daily     [Held by provider] bumetanide  1 mg Intravenous BID     DAPTOmycin (CUBICIN) 600 mg in sodium chloride 0.9 % 100 mL intermittent infusion  6 mg/kg (Adjusted) Intravenous Q48H     ezetimibe  10 mg Oral Daily     heparin ANTICOAGULANT  5,000 Units Subcutaneous Q8H     insulin aspart  1-7 Units  Subcutaneous TID AC     insulin aspart  1-5 Units Subcutaneous At Bedtime     insulin degludec  25 Units Subcutaneous BID     metoprolol tartrate  50 mg Oral BID     piperacillin-tazobactam  2.25 g Intravenous Q6H     sodium chloride (PF)  10-40 mL Intracatheter Q7 Days     sodium chloride (PF)  3 mL Intracatheter Q8H     sodium citrate-citric acid  30 mL Oral BID       Data   All microbiology laboratory data reviewed.  Recent Labs   Lab Test 07/11/23  0601 07/10/23  0710 07/09/23  0612   WBC 13.1* 13.6* 16.5*   HGB 8.1* 8.3* 8.3*   HCT 24.4* 24.2* 24.3*   MCV 87 86 86    335 333     Recent Labs   Lab Test 07/11/23  0601 07/10/23  0803 07/09/23  0647   CR 7.27* 7.80* 8.48*     Recent Labs   Lab Test 09/25/22  1529   SED 85*     Recent Labs   Lab Test 02/03/20  1324 02/03/20  0007 02/02/20  2250 12/04/19  1619 11/19/19  1829 11/17/19  1525 11/17/19  1419 11/17/19  1411 08/28/17  1136   CULT Heavy growth  beta hemolytic   Streptococcus constellatus  *  Light growth  Staphylococcus aureus  * No growth No growth No anaerobes isolated  No growth Light growth  Bacteroides fragilis  *  Light growth  Parvimonas micra  *  Susceptibility testing not routinely done  On day 2, isolated in broth only:  beta hemolytic   Streptococcus constellatus  * Heavy growth  beta hemolytic   Streptococcus constellatus  Susceptibility testing done on previous specimen  *  Light growth  Alcaligenes faecalis  *  Light growth  Staphylococcus aureus  *  On day 1, isolated in broth only:  Anaerobic gram negative rods  See anaerobic report for identification  * Heavy growth  Bacteroides fragilis  Susceptibility testing not routinely done  *  Heavy growth  Peptoniphilus asaccharolyticus  Susceptibility testing not routinely done  *  Moderate growth  beta hemolytic   Streptococcus constellatus  *  On day 1, isolated in broth only:  Anaerobic gram negative rods  See anaerobic report for identification  * Heavy growth  Bacteroides  fragilis  *  Heavy growth  Parvimonas micra  *  Heavy growth  Peptoniphilus asaccharolyticus  *  Heavy growth  Mixed aerobic and anaerobic rosa  *  Susceptibility testing not routinely done No growth       All cultures:  No results for input(s): CULTURE in the last 168 hours.   Blood culture:  Results for orders placed or performed during the hospital encounter of 06/24/23   Blood Culture Peripheral Blood    Specimen: Peripheral Blood   Result Value Ref Range    Culture No Growth    Blood Culture Peripheral Blood    Specimen: Peripheral Blood   Result Value Ref Range    Culture No Growth    Results for orders placed or performed during the hospital encounter of 02/02/20   Blood culture    Specimen: Blood    Right Arm   Result Value Ref Range    Specimen Description Blood Right Arm     Culture Micro No growth    Blood culture    Specimen: Blood    Right Arm   Result Value Ref Range    Specimen Description Blood Right Arm     Culture Micro No growth    Results for orders placed or performed during the hospital encounter of 08/28/17   Blood culture    Specimen: Arm, Right; Blood    Right Arm   Result Value Ref Range    Specimen Description Blood Right Arm     Special Requests Aerobic and anaerobic bottles received     Culture Micro No growth    Blood culture    Specimen: Arm, Right; Blood    Right Arm   Result Value Ref Range    Specimen Description Blood Right Arm     Special Requests Aerobic and anaerobic bottles received     Culture Micro No growth    Results for orders placed or performed during the hospital encounter of 07/14/16   Blood culture    Specimen: Blood   Result Value Ref Range    Specimen Description Blood Left Arm     Special Requests Aerobic and anaerobic bottles received     Culture Micro No growth     Micro Report Status FINAL 07/20/2016    Blood culture    Specimen: Blood   Result Value Ref Range    Specimen Description Blood Right Arm     Special Requests Aerobic and anaerobic bottles  received     Culture Micro No growth     Micro Report Status FINAL 07/20/2016       Urine culture:  No results found for this or any previous visit.

## 2023-07-11 NOTE — PLAN OF CARE
[id number]    POD#1 I&D of the LLE. A&O x4. VSS on RA ex HTN, PRNs available if needed. Tolerating mod carb diet. PICC line SL, blood return noted. CHG bath done. Voiding adequately with urinal. Incontinent of BM x1, large watery stool. Q4hr dressing changes, last done at 11am. Transfers to  Ax1 with prosthetic. Not oob during shift, per patient it has been much harder to do so recently. Oxy 10mg to be given prior to dressing changes. BS checks, able to use patients BGM monitor. Discharge pending improvement.

## 2023-07-11 NOTE — PROGRESS NOTES
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      Sepsis due Lt BKA stump infection  S/p I&D of of left BKA stump on 6/30/23  -Patient presented with pain, redness, swelling at his left BKA stump site.  Marked leukocytosis of 22.9. Tachycardic to 110s.  X-ray Shows soft tissue swelling, some bone formation along the resection margin of the tibia with periosteal reaction.   -MRI of the stump showed findings compatible with osteomyelitis of the residual tibia along with fluid collection suspicious for abscess, underwent I&D of the left stump on 6/30/2023 with placement of wound VAC  -Bone culture negative thus far. Pathology findings are nonspecific and not definitive of osteomyelitis.    -Underwent I&D of left below-knee stump first on 6/30/2023, second on 7/6/2023 and third on 7/10/2023.  Per operative report from yesterday patient was found to have minimal residual fluid pocket in the left posterior thigh, granulation tissue, persistent subcutaneous edema and induration.    -Leukocytosis improving  -Defer antibiotics to ID, continues on daptomycin(since 6/26) and Zosyn  -Vascular surgery following    Acute kidney injury on CKD stage III   -Cr @ admission 2.45. Last Cr PTA 1.9 in 11/2022. (prior to that baseline ~ 1.6)   * Initially improved to baseline and started going back up 6/28.   * Only received 2 doses of vancomycin, last dose 6/25. No levels obtained. No NSAIDs  * No episodes of hypotension here.   * Vancomycin stopped and remains on zosyn and dapto  -Nephrology  following, no indication for dialysis yet per nephrology, continuing to monitor on a daily basis.    -Creatinine peaked at 8.7 on 7/8 and now subsequently down trending  -She will be challenged with diuretics by nephrology, currently on hold as patient appears to be auto-diuresing  -Monitor intake and output closely  -Neurology following, no indication for dialysis at the moment  -Continue to monitor renal function closely  -Holding prior to admission lisinopril.   -Daily BMP.      Type II DM, uncontrolled, on insulin, A1c 7.6 on 6/27/23   -Prior to admission on Tresiba 34 units twice a day  -BS falling below 100 during this hospitalization in the context of JOHNATHAN  -Continue to hold PTA oral agents including Jardiance, Januvia.  -Continue sliding scale insulin  -Continue Tresiba at a reduced dose of 25 units twice a day, adjust as clinically indicated  -Hypoglycemia protocol    Essential hypertension  -Hold prior to admission lisinopril  -Blood pressure quite elevated this hospitalization, started on metoprolol, continue at 50 mg twice a day  -Also started on amlodipine 5 mg daily, continue for now.  Blood pressure still intermittently high, continue to monitor and adjust as clinically indicated.  With auto diuresis suspect his blood pressure will improve.  -As needed hydralazine available    Mild hyponatremia     Acute on chronic anemia  -Hemoglobin at presentation was 12.8, slowly has drifted down, likely is multifactorial   -Hemoglobin stable at 8.1 this morning.    Diet: Snacks/Supplements Adult: Other; Orange Andrea at lunch and dinner; With Meals  Moderate Consistent Carb (60 g CHO per Meal) Diet     DVT Prophylaxis: Pneumatic Compression Devices   Corrales Catheter: Not present  Code Status: Full Code     Disposition Plan      Expected Discharge Date: 07/12/2023      Destination: home;home with family  Discharge Comments: Pending creat      Entered: Kai Viramontes MD 07/11/2023, 9:59 AM        Clinically  Significant Risk Factors              # Hypoalbuminemia: Lowest albumin = 2.4 g/dL at 7/8/2023  6:39 AM, will monitor as appropriate    # Acute Kidney Injury, unspecified: based on a >150% or 0.3 mg/dL increase in last creatinine compared to past 90 day average, will monitor renal function  # Hypertension: Noted on problem list       # DMII: A1C = 7.6 % (Ref range: <5.7 %) within past 6 months   # Obesity: Estimated body mass index is 36.15 kg/m  as calculated from the following:    Height as of this encounter: 1.829 m (6').    Weight as of this encounter: 120.9 kg (266 lb 8.6 oz).             The patient's care was discussed with the Bedside Nurse and Patient.    Medical Decision Making       **CLEAR ALL SELECTIONS**        Labs/Imaging Reviewed:  See Information above and Data section below    Time SPENT BY ME on the date of service doing chart review, history, exam, documentation & further activities per the note:  35  MINUTES    Kai Viramontes MD  Hospitalist Service  [facility]  Text Page 7AM-6PM  Securely message with the [employer] Web Console (learn more here)  Text page via [employer] Paging/Directory    ______________________________________________________________________    Interval History   Patient underwent I&D of the left BKA stump yesterday.  Please see operative findings for details.  WBC count slowly improving.  Renal function also improving.  Diuretics on hold and appears to be urinating quite a bit.    Data reviewed today: I reviewed all medications, new labs and imaging results over the last 24 hours. I personally reviewed no images or EKG's today.    Physical Exam   Vital signs:  Temp: 98.4  F (36.9  C) Temp src: Oral BP: (!) 160/92 Pulse: 84   Resp: 19 SpO2: 94 % O2 Device: None (Room air) Oxygen Delivery: 2 LPM Height: 182.9 cm (6') Weight: 120.9 kg (266 lb 8.6 oz)  Estimated body mass index is 36.15 kg/m  as calculated from the following:    Height as of this encounter:  1.829 m (6').    Weight as of this encounter: 120.9 kg (266 lb 8.6 oz).      Wt Readings from Last 2 Encounters:   07/10/23 120.9 kg (266 lb 8.6 oz)   10/03/22 110 kg (242 lb 8.1 oz)       Gen: AAOX3, NAD, comfortable  HEENT: no pallor  Resp: CTA B/L, normal WOB  CVS: RRR, no murmur  Abd/GI: Soft, non-tender. BS- normoactive.   Skin: Warm, dry no rashes  MSK: Left below-knee stump is bandaged  Neuro- CN- intact. No focal deficits.        Data   Recent Labs   Lab 07/11/23  0601 07/10/23  0803 07/10/23  0710 07/09/23  0647 07/09/23  0612   WBC 13.1*  --  13.6*  --  16.5*   HGB 8.1*  --  8.3*  --  8.3*   MCV 87  --  86  --  86     --  335  --  333    138  --  136  --    POTASSIUM 4.5 4.2  --  3.9  --    CHLORIDE 100 101  --  100  --    CO2 22 21*  --  18*  --    BUN 88.2* 87.4*  --  86.1*  --    CR 7.27* 7.80*  --  8.48*  --    ANIONGAP 15 16*  --  18*  --    FERNANDO 8.0* 8.1*  --  7.7*  --    * 122*  --  110*  --    ALBUMIN 2.5*  --   --  2.5*  --        No results found for this or any previous visit (from the past 24 hour(s)).  Medications     lactated ringers 250 mL/hr at 07/10/23 1158       amLODIPine  5 mg Oral Daily     [Held by provider] bumetanide  1 mg Intravenous BID     DAPTOmycin (CUBICIN) 600 mg in sodium chloride 0.9 % 100 mL intermittent infusion  6 mg/kg (Adjusted) Intravenous Q48H     ezetimibe  10 mg Oral Daily     heparin ANTICOAGULANT  5,000 Units Subcutaneous Q8H     insulin aspart  1-7 Units Subcutaneous TID AC     insulin aspart  1-5 Units Subcutaneous At Bedtime     insulin degludec  25 Units Subcutaneous BID     metoprolol tartrate  50 mg Oral BID     piperacillin-tazobactam  2.25 g Intravenous Q6H     sodium chloride (PF)  10-40 mL Intracatheter Q7 Days     sodium chloride (PF)  3 mL Intracatheter Q8H     sodium citrate-citric acid  30 mL Oral BID

## 2023-07-11 NOTE — PLAN OF CARE
Goal Outcome Evaluation:      Plan of Care Reviewed With: patient    Overall Patient Progress: no changeOverall Patient Progress: no change     POD#1 for Irrigation and Debridement. Aox4. VSS on RA exc HTN, has PRNs. Mod carb diet. Voiding adequately with urinal. No BM this shift, BS normoactive, passing flatus. Wet to dry dressings done x3 this shift, small drainage noted on dressing change. Transfers to  SBA with prosthetic. Pt requests oxy be increased to 10mg PRN for dressing changes. PICC with blood return noted. IV abx given. Will continue to monitor.

## 2023-07-11 NOTE — PROGRESS NOTES
Renal Medicine Progress Note            Assessment/Plan:     57 y.o man with CKD III due to DM and HTN, consulted for JOHNATHAN.      1) CKD III:                -bl ~ 1.5-1.9 mg/dl (GFR 45-55)               -2.5 g/g albuminuria     2)  Severe JOHNATHAN, improving: Remains Non-oliguric. At baseline creatinine until 6/29 and subsequent rise, more dramatically after OR 6/30.               - on Zosyn/dapto               -urine sodum <20 7/2 reflecting possibe pre-renal state.                -UA with hyaline casts              - Renal US with large sized kidneys, especially with underlying CKD, this nephromegaly likely reflects some ATN injury.     Peak Cr 8.7 on 7/8, improving slowly. Diuretic responsive, but now polyuric with post ATN diuresis.  Will hold IV diuretics for today, likely due to start p.o. diuretics tomorrow if renal function continues to improve.       3)  Infected left stump with osteomyelitis s/p I&D:               -dapto/zosyn     4) FEN: . Hyponatremia resolved, acidosis improved on bicitra.  Will continue Bicitra for now, likely can stop in the next few days to see continues to diurese and renal function improves.  Phos improving.     5)  Hypertension:                -Jardiance and lisinopril are on hold     6) anemia: Hgb 8.1     Plan:   1) continue bicitra to 30cc bid  2) hold diuretics for now, seems to be autodiuresing.  If renal function stable, will consider restarting p.o. diuretics tomorrow  3) avoid NSAIDs, contrast, nephrotoxins as able   4) 2gm low sodium diet        Elfego Teague MD   [facility]  Office: [phone]        Interval History:     -No acute events overnight  -Happy to hear renal function improving  -Significant urine output, 3.15 L yesterday, so far today 1.5 L since midnight  -Received Bumex yesterday evening and this morning, will hold further dosing  -Appetite okay  -Denies nausea or vomiting  -Pain well controlled  -Denies shortness of breath          Medications  "and Allergies:       amLODIPine  5 mg Oral Daily     [Held by provider] bumetanide  1 mg Intravenous BID     DAPTOmycin (CUBICIN) 600 mg in sodium chloride 0.9 % 100 mL intermittent infusion  6 mg/kg (Adjusted) Intravenous Q48H     ezetimibe  10 mg Oral Daily     heparin ANTICOAGULANT  5,000 Units Subcutaneous Q8H     insulin aspart  1-7 Units Subcutaneous TID AC     insulin aspart  1-5 Units Subcutaneous At Bedtime     insulin degludec  25 Units Subcutaneous BID     metoprolol tartrate  50 mg Oral BID     piperacillin-tazobactam  2.25 g Intravenous Q6H     sodium chloride (PF)  10-40 mL Intracatheter Q7 Days     sodium chloride (PF)  3 mL Intracatheter Q8H     sodium citrate-citric acid  30 mL Oral BID        Allergies   Allergen Reactions     Pravastatin      \"sucked the life blood out of me,\" Indicates this occurs with all statins.     Statins             Physical Exam:   Vitals were reviewed  BP (!) 160/92 (BP Location: Left arm)   Pulse 84   Temp 98.4  F (36.9  C) (Oral)   Resp 19   Ht 1.829 m (6')   Wt 120.9 kg (266 lb 8.6 oz)   SpO2 94%   BMI 36.15 kg/m      Wt Readings from Last 3 Encounters:   07/10/23 120.9 kg (266 lb 8.6 oz)   10/03/22 110 kg (242 lb 8.1 oz)   08/17/22 119.9 kg (264 lb 6.4 oz)       Intake/Output Summary (Last 24 hours) at 7/9/2023 0758  Last data filed at 7/9/2023 0643  Gross per 24 hour   Intake 740 ml   Output 2805 ml   Net -2065 ml     GENERAL APPEARANCE: alert in no distress  HEENT:  MMM  RESP: lungs cta b c good efforts, no crackles, rhonchi or wheezes  CV: RRR, no murmur  EXTREMITIES/SKIN:  2+dependently worse in upper extremities  NEURO: Grossly nonfocal         Data:     BMP  Recent Labs   Lab 07/11/23  0601 07/10/23  0803 07/09/23  0647 07/08/23  0639    138 136 134*   POTASSIUM 4.5 4.2 3.9 4.0   CHLORIDE 100 101 100 100   FERNANDO 8.0* 8.1* 7.7* 7.5*   CO2 22 21* 18* 18*   BUN 88.2* 87.4* 86.1* 83.5*   CR 7.27* 7.80* 8.48* 8.70*   * 122* 110* 155*     CBC  Recent " Labs   Lab 07/11/23  0601 07/10/23  0710 07/09/23  0612 07/08/23  0639   WBC 13.1* 13.6* 16.5* 16.2*   HGB 8.1* 8.3* 8.3* 7.9*   HCT 24.4* 24.2* 24.3* 23.9*   MCV 87 86 86 88    335 333 321     Lab Results   Component Value Date    AST 13 06/24/2023    ALT 14 06/24/2023    ALKPHOS 84 06/24/2023    BILITOTAL 0.9 06/24/2023     Lab Results   Component Value Date    INR 1.06 06/27/2023       Attestation:  I have reviewed today's vital signs, notes, medications, labs and imaging.    Elfego Teague MD  [facility] - Nephrology  Office: [phone]

## 2023-07-11 NOTE — PROVIDER NOTIFICATION
MD Notification    Notified Person: MD    Notified Person Name: Gregorio Mc MD    Notification Date/Time: 11pm 7/10/23    Notification Interaction: text page    Purpose of Notification: Pt reports he was receiving 10mg of oxy prior to this latest I&D. Currently only has 5mg and needs it increased for dressing changes. MD increased his dressing changes to LLE to q4hr. Pt is trying to stay away from dilaudid unless absolutely necessary.    Orders Received: OK to give additional 5mg to equal 10mg of oxy prior to dressing changes x2 this shift, then have primary MD review pain management in morning.    Comments:

## 2023-07-12 LAB
ALBUMIN SERPL BCG-MCNC: 2.6 G/DL (ref 3.5–5.2)
ANION GAP SERPL CALCULATED.3IONS-SCNC: 14 MMOL/L (ref 7–15)
BUN SERPL-MCNC: 83.8 MG/DL (ref 6–20)
CALCIUM SERPL-MCNC: 8 MG/DL (ref 8.6–10)
CHLORIDE SERPL-SCNC: 101 MMOL/L (ref 98–107)
CREAT SERPL-MCNC: 6.78 MG/DL (ref 0.67–1.17)
DEPRECATED HCO3 PLAS-SCNC: 22 MMOL/L (ref 22–29)
ERYTHROCYTE [DISTWIDTH] IN BLOOD BY AUTOMATED COUNT: 12.4 % (ref 10–15)
GFR SERPL CREATININE-BSD FRML MDRD: 9 ML/MIN/1.73M2
GLUCOSE SERPL-MCNC: 117 MG/DL (ref 70–99)
HCT VFR BLD AUTO: 23.6 % (ref 40–53)
HGB BLD-MCNC: 7.8 G/DL (ref 13.3–17.7)
MCH RBC QN AUTO: 28.9 PG (ref 26.5–33)
MCHC RBC AUTO-ENTMCNC: 33.1 G/DL (ref 31.5–36.5)
MCV RBC AUTO: 87 FL (ref 78–100)
PHOSPHATE SERPL-MCNC: 7.1 MG/DL (ref 2.5–4.5)
PLATELET # BLD AUTO: 277 10E3/UL (ref 150–450)
POTASSIUM SERPL-SCNC: 4.1 MMOL/L (ref 3.4–5.3)
RBC # BLD AUTO: 2.7 10E6/UL (ref 4.4–5.9)
SODIUM SERPL-SCNC: 137 MMOL/L (ref 136–145)
WBC # BLD AUTO: 11.8 10E3/UL (ref 4–11)

## 2023-07-12 PROCEDURE — 80069 RENAL FUNCTION PANEL: CPT | Performed by: STUDENT IN AN ORGANIZED HEALTH CARE EDUCATION/TRAINING PROGRAM

## 2023-07-12 PROCEDURE — 120N000001 HC R&B MED SURG/OB

## 2023-07-12 PROCEDURE — 85027 COMPLETE CBC AUTOMATED: CPT | Performed by: SURGERY

## 2023-07-12 PROCEDURE — 99232 SBSQ HOSP IP/OBS MODERATE 35: CPT | Performed by: STUDENT IN AN ORGANIZED HEALTH CARE EDUCATION/TRAINING PROGRAM

## 2023-07-12 PROCEDURE — 250N000013 HC RX MED GY IP 250 OP 250 PS 637: Performed by: STUDENT IN AN ORGANIZED HEALTH CARE EDUCATION/TRAINING PROGRAM

## 2023-07-12 PROCEDURE — 250N000011 HC RX IP 250 OP 636: Mod: JZ | Performed by: STUDENT IN AN ORGANIZED HEALTH CARE EDUCATION/TRAINING PROGRAM

## 2023-07-12 PROCEDURE — 250N000011 HC RX IP 250 OP 636: Performed by: STUDENT IN AN ORGANIZED HEALTH CARE EDUCATION/TRAINING PROGRAM

## 2023-07-12 PROCEDURE — 258N000003 HC RX IP 258 OP 636: Performed by: STUDENT IN AN ORGANIZED HEALTH CARE EDUCATION/TRAINING PROGRAM

## 2023-07-12 PROCEDURE — 250N000013 HC RX MED GY IP 250 OP 250 PS 637

## 2023-07-12 RX ORDER — CARVEDILOL 12.5 MG/1
12.5 TABLET ORAL 2 TIMES DAILY WITH MEALS
Status: DISCONTINUED | OUTPATIENT
Start: 2023-07-12 | End: 2023-07-13

## 2023-07-12 RX ORDER — BUMETANIDE 0.5 MG/1
0.5 TABLET ORAL
Status: DISCONTINUED | OUTPATIENT
Start: 2023-07-12 | End: 2023-07-14

## 2023-07-12 RX ORDER — CALCIUM CARBONATE 500 MG/1
500 TABLET, CHEWABLE ORAL 3 TIMES DAILY PRN
Status: DISCONTINUED | OUTPATIENT
Start: 2023-07-12 | End: 2023-07-28 | Stop reason: HOSPADM

## 2023-07-12 RX ADMIN — BUMETANIDE 0.5 MG: 0.5 TABLET ORAL at 16:49

## 2023-07-12 RX ADMIN — HEPARIN SODIUM 5000 UNITS: 5000 INJECTION, SOLUTION INTRAVENOUS; SUBCUTANEOUS at 06:12

## 2023-07-12 RX ADMIN — PIPERACILLIN AND TAZOBACTAM 2.25 G: 2; .25 INJECTION, POWDER, FOR SOLUTION INTRAVENOUS at 03:52

## 2023-07-12 RX ADMIN — EZETIMIBE 10 MG: 10 TABLET ORAL at 08:55

## 2023-07-12 RX ADMIN — OXYCODONE HYDROCHLORIDE 10 MG: 5 TABLET ORAL at 21:57

## 2023-07-12 RX ADMIN — METOPROLOL TARTRATE 50 MG: 50 TABLET, FILM COATED ORAL at 08:55

## 2023-07-12 RX ADMIN — BUMETANIDE 0.5 MG: 0.5 TABLET ORAL at 12:21

## 2023-07-12 RX ADMIN — OXYCODONE HYDROCHLORIDE 10 MG: 5 TABLET ORAL at 17:54

## 2023-07-12 RX ADMIN — AMLODIPINE BESYLATE 5 MG: 5 TABLET ORAL at 08:55

## 2023-07-12 RX ADMIN — CARVEDILOL 12.5 MG: 12.5 TABLET, FILM COATED ORAL at 17:53

## 2023-07-12 RX ADMIN — HEPARIN SODIUM 5000 UNITS: 5000 INJECTION, SOLUTION INTRAVENOUS; SUBCUTANEOUS at 13:41

## 2023-07-12 RX ADMIN — CALCIUM CARBONATE (ANTACID) CHEW TAB 500 MG 500 MG: 500 CHEW TAB at 19:29

## 2023-07-12 RX ADMIN — PIPERACILLIN AND TAZOBACTAM 2.25 G: 2; .25 INJECTION, POWDER, FOR SOLUTION INTRAVENOUS at 16:49

## 2023-07-12 RX ADMIN — SODIUM CITRATE AND CITRIC ACID MONOHYDRATE 30 ML: 500; 334 SOLUTION ORAL at 21:12

## 2023-07-12 RX ADMIN — HEPARIN SODIUM 5000 UNITS: 5000 INJECTION, SOLUTION INTRAVENOUS; SUBCUTANEOUS at 21:13

## 2023-07-12 RX ADMIN — PIPERACILLIN AND TAZOBACTAM 2.25 G: 2; .25 INJECTION, POWDER, FOR SOLUTION INTRAVENOUS at 08:55

## 2023-07-12 RX ADMIN — SODIUM CITRATE AND CITRIC ACID MONOHYDRATE 30 ML: 500; 334 SOLUTION ORAL at 08:56

## 2023-07-12 RX ADMIN — CALCIUM CARBONATE (ANTACID) CHEW TAB 500 MG 500 MG: 500 CHEW TAB at 11:28

## 2023-07-12 RX ADMIN — OXYCODONE HYDROCHLORIDE 10 MG: 5 TABLET ORAL at 13:41

## 2023-07-12 RX ADMIN — OXYCODONE HYDROCHLORIDE 10 MG: 5 TABLET ORAL at 04:59

## 2023-07-12 RX ADMIN — OXYCODONE HYDROCHLORIDE 10 MG: 5 TABLET ORAL at 08:55

## 2023-07-12 RX ADMIN — INSULIN DEGLUDEC INJECTION 25 UNITS: 100 INJECTION, SOLUTION SUBCUTANEOUS at 09:07

## 2023-07-12 RX ADMIN — DAPTOMYCIN 600 MG: 500 INJECTION, POWDER, LYOPHILIZED, FOR SOLUTION INTRAVENOUS at 13:41

## 2023-07-12 RX ADMIN — PIPERACILLIN AND TAZOBACTAM 2.25 G: 2; .25 INJECTION, POWDER, FOR SOLUTION INTRAVENOUS at 21:58

## 2023-07-12 RX ADMIN — OXYCODONE HYDROCHLORIDE 10 MG: 5 TABLET ORAL at 01:02

## 2023-07-12 ASSESSMENT — ACTIVITIES OF DAILY LIVING (ADL)
ADLS_ACUITY_SCORE: 30
ADLS_ACUITY_SCORE: 22
ADLS_ACUITY_SCORE: 30
ADLS_ACUITY_SCORE: 22
ADLS_ACUITY_SCORE: 30

## 2023-07-12 NOTE — PROGRESS NOTES
Vascular Surgery Note    S: No acute issues. Pain controlled    O    Afebrile, VSS  BP (!) 155/89 (BP Location: Left arm, Patient Position: Semi-Martinez's, Cuff Size: Adult Large)   Pulse 84   Temp 98.7  F (37.1  C) (Oral)   Resp 18   Ht 1.829 m (6')   Wt 120.9 kg (266 lb 8.6 oz)   SpO2 96%   BMI 36.15 kg/m       Dressing intact     Labs    Cr 6.78 (trending down)  WBC 11.8 (trending down)        A/P    Mr. Horner is a 57M who underwent staged left below-knee amputation in 09/2022. This healed well and he was ambulatory on his prosthetic. He presented with sudden onset of pain, swelling, and wound on the distal tip of the left below-knee stump, with surrounding cellulitis. He underwent left BKA stump debridement on 6/30/23 and repeat debridement with drain placement on 7/6/23 and 7/10/23.      Continue wet-to-dry dressings (carefully outlined in orders and with pictorial notes from last week - needs to be q4hrs)    Will wait for normalization of leukocytosis and renal function before attempting formal revision and closure of the BKA stump.     Appreciate primary team care    Kia Luevano MD  Fellow

## 2023-07-12 NOTE — PLAN OF CARE
Goal Outcome Evaluation:      Plan of Care Reviewed With: patient    Overall Patient Progress: no changeOverall Patient Progress: no change     [id number]    POD#2 I&D of the LLE. A&Ox4. VSS on RA ex HTN, PRNs available if needed. Tolerating mod carb diet. PICC line SL, blood return noted. Voiding adequately with urinal. No BM this shift. BS normoactive. Q4hr dressing changes, last done at 6am. Transfers to  Ax1 with prosthetic. Not oob during shift, per patient it has been much harder to do so recently. Oxy 10mg to be given prior to dressing changes. BS checks, able to use patients BGM monitor.  Phos: 7.1 Creat: 6.78 B. HgB: 7.8. Discharge pending improvement.

## 2023-07-12 NOTE — PROGRESS NOTES
Pain well controlled with PRN Oxycodone - given before dressing changes. Dressings changed per orders - wet to dry. Penrose drain x4 in place. Tolerating diet. VSS on room air. Voiding in urinal. Refused getting out of bed this evening. PICC in place with intermittent antibiotics. ID and vascular surgery following. Awaiting for kidney function and WBC to improve, then will have wound closure done.

## 2023-07-12 NOTE — PROGRESS NOTES
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      Sepsis due Lt BKA stump infection  S/p I&D of of left BKA stump on 06/30, 07/06 and 07/10  -Patient presented with pain, redness, swelling at his left BKA stump site.  Marked leukocytosis of 22.9. Tachycardic to 110s.  X-ray Shows soft tissue swelling, some bone formation along the resection margin of the tibia with periosteal reaction.   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      - per vascular surgery note today waiting on medical optimization before proceeding with formal revision and closure. Normalized WBC and renal function  - ID following and guiding antibiotics. Currently on dapto and zosyn. Most recent OR culture negative however on broad spectrum antibiotics     Acute kidney injury on CKD stage III   -Cr @ admission 2.45, baseline ~ 1.6  * Initially improved to baseline and then worsening JOHNATHAN after OR 06/30    - nephrology consulted for severe JOHNATHAN with peak Cr of 8.7 07/08. Non oliguric and suspected to be ATN injury  - Cr continues to trend down and post ATN diuresis now  - follow electrolytes and output closely  - avoid further nephrotoxic agents       Type II DM, uncontrolled, on insulin, A1c 7.6 on 6/27/23   - Prior to admission on Tresiba 34 units twice a day  - currently holding oral agents    - BG level 117 this AM with  with very little meal time insulin given yesterday, will reduce tresiba further today. Continue sliding scale    Essential hypertension  - Holding prior to admission lisinopril for JOHNATHAN, have started metoprolol and amlodipine in the interim.  - will exchange metoprolol for coreg and try to wean off amlodipine tomorrow  - suspect to see improvement in HTN as JOHNATHAN improves.   - As needed hydralazine available    Mild hyponatremia -resolved    Acute on chronic anemia  -Hemoglobin at presentation was 12.8, slowly has drifted down, likely is multifactorial due to acute infections and ongoing surgeries  - monitor with labs    Diet: Snacks/Supplements Adult: Other; Orange Andrea at lunch and dinner; With Meals  Moderate Consistent Carb (60 g CHO per Meal) Diet     DVT Prophylaxis: Pneumatic Compression Devices   Corrales Catheter: Not present  Code Status: Full Code     Disposition Plan      Expected Discharge Date: 07/14/2023      Destination: home;home with family  Discharge Comments: Pending creat      Entered: Ilda Parra,  07/12/2023, 9:32 AM        Clinically Significant Risk Factors              # Hypoalbuminemia: Lowest albumin = 2.4 g/dL at 7/8/2023  6:39 AM, will monitor as appropriate    # Acute Kidney Injury, unspecified: based on a >150% or 0.3 mg/dL increase in last creatinine compared to past 90 day average, will monitor renal function  # Hypertension: Noted on problem list       # DMII: A1C = 7.6 % (Ref range: <5.7 %) within past 6 months   # Obesity: Estimated body mass index is 36.15 kg/m  as calculated from the following:    Height as of this encounter: 1.829 m (6').    Weight as of this encounter: 120.9 kg (266 lb 8.6 oz).             The patient's care was discussed with the Bedside Nurse and Patient.    Medical Decision Making         Labs/Imaging Reviewed all labs from today. Vitals. Blood sugars    Time SPENT BY ME on the date of service doing chart review, history, exam, documentation & further  activities per the note:  45  MINUTES    Ilda Fidel DO  Hospitalist Service  [facility]  7AM-6PM  Securely message with the [employer] Web Console (learn more here)  Text page via [employer] Paging/Directory    ______________________________________________________________________    Interval History   Patient with a lot of questions related to his lab values today. Overall she is feel satisfied with everything but understandably frustrated. Some pain in his stump. Overall swelling noted. Breathing is normal    Data reviewed today: I reviewed all medications, new labs and imaging results over the last 24 hours. I personally reviewed no images or EKG's today.    Physical Exam   Vital signs:  Temp: 98.3  F (36.8  C) Temp src: Oral BP: (!) 164/100 Pulse: 83   Resp: 18 SpO2: 96 % O2 Device: None (Room air) Oxygen Delivery: 2 LPM Height: 182.9 cm (6') Weight: 120.9 kg (266 lb 8.6 oz)  Estimated body mass index is 36.15 kg/m  as calculated from the following:    Height as of this encounter: 1.829 m (6').    Weight as of this encounter: 120.9 kg (266 lb 8.6 oz).      Wt Readings from Last 2 Encounters:   07/10/23 120.9 kg (266 lb 8.6 oz)   10/03/22 110 kg (242 lb 8.1 oz)       Gen: AAOX3, NAD, comfortable  Resp: CTA B/L, normal WOB  CVS: RRR, no murmur  Abd/GI: Soft, non-tender. BS- normoactive.   Skin: Warm, dry no rashes, noted scars  MSK: Left below-knee stump is bandaged, R AKA clean  Neuro- CN- intact. No focal deficits.        Data   Recent Labs   Lab 07/12/23  0614 07/11/23  0601 07/10/23  0803 07/10/23  0710   WBC 11.8* 13.1*  --  13.6*   HGB 7.8* 8.1*  --  8.3*   MCV 87 87  --  86    306  --  335    137 138  --    POTASSIUM 4.1 4.5 4.2  --    CHLORIDE 101 100 101  --    CO2 22 22 21*  --    BUN 83.8* 88.2* 87.4*  --    CR 6.78* 7.27* 7.80*  --    ANIONGAP 14 15 16*  --    FERNANDO 8.0* 8.0* 8.1*  --    * 223* 122*  --    ALBUMIN 2.6* 2.5*  --   --        No results found for  this or any previous visit (from the past 24 hour(s)).  Medications     lactated ringers 250 mL/hr at 07/10/23 1158       amLODIPine  5 mg Oral Daily     [Held by provider] bumetanide  1 mg Intravenous BID     DAPTOmycin (CUBICIN) 600 mg in sodium chloride 0.9 % 100 mL intermittent infusion  6 mg/kg (Adjusted) Intravenous Q48H     ezetimibe  10 mg Oral Daily     heparin ANTICOAGULANT  5,000 Units Subcutaneous Q8H     insulin aspart  1-7 Units Subcutaneous TID AC     insulin aspart  1-5 Units Subcutaneous At Bedtime     insulin degludec  25 Units Subcutaneous BID     metoprolol tartrate  50 mg Oral BID     piperacillin-tazobactam  2.25 g Intravenous Q6H     sodium chloride (PF)  10-40 mL Intracatheter Q7 Days     sodium chloride (PF)  3 mL Intracatheter Q8H     sodium citrate-citric acid  30 mL Oral BID

## 2023-07-12 NOTE — PROGRESS NOTES
Renal Medicine Progress Note            Assessment/Plan:     57 y.o man with CKD III due to DM and HTN, consulted for JOHNATHAN.      1) CKD III:                -bl ~ 1.5-1.9 mg/dl (GFR 45-55)               -2.5 g/g albuminuria     2)  Severe JOHNATHAN, improving: Remains Non-oliguric. At baseline creatinine until 6/29 and subsequent rise, more dramatically after OR 6/30.               - on Zosyn/dapto               -urine sodum <20 7/2 reflecting possibe pre-renal state.                -UA with hyaline casts              - Renal US with large sized kidneys, especially with underlying CKD, this nephromegaly likely reflects some ATN injury.     Peak Cr 8.7 on 7/8, improving slowly. Diuretic responsive, was making a good amount of urine even without diuretic, but remains ~20 lbs above probable dry weight (240lbs). Will start gentle PO diuretics.        3)  Infected left stump with osteomyelitis s/p I&D:               -dapto/zosyn     4) FEN: . Hyponatremia resolved, acidosis improved on bicitra.  Will continue Bicitra for now, likely can stop in the next few days to see continues to diurese and renal function improves.  Phos improving.     5)  Hypertension:                -Jardiance and lisinopril are on hold     6) anemia: Hgb 8.1     Plan:   1) continue bicitra to 30cc bid  2) starting PO bumex 0.5mg BID  3) avoid NSAIDs, contrast, nephrotoxins as able   4) 2gm low sodium diet  5) prn tums ordered for indigestion        Elfego Teague MD   [facility]  Office: [phone]        Interval History:     -No acute events overnight  - renal function continues to improve   - good UOP 1900 ml yesterday, 750 ml today without diuretics  - remains hypervolemic  - no SOB   - still has GI upset with bicitra, wants to try tums          Medications and Allergies:       amLODIPine  5 mg Oral Daily     [Held by provider] bumetanide  1 mg Intravenous BID     bumetanide  0.5 mg Oral BID     carvedilol  12.5 mg Oral BID w/meals  "    DAPTOmycin (CUBICIN) 600 mg in sodium chloride 0.9 % 100 mL intermittent infusion  6 mg/kg (Adjusted) Intravenous Q48H     ezetimibe  10 mg Oral Daily     heparin ANTICOAGULANT  5,000 Units Subcutaneous Q8H     insulin aspart  1-7 Units Subcutaneous TID AC     insulin aspart  1-5 Units Subcutaneous At Bedtime     insulin degludec  20 Units Subcutaneous BID     piperacillin-tazobactam  2.25 g Intravenous Q6H     sodium chloride (PF)  10-40 mL Intracatheter Q7 Days     sodium chloride (PF)  3 mL Intracatheter Q8H     sodium citrate-citric acid  30 mL Oral BID        Allergies   Allergen Reactions     Pravastatin      \"sucked the life blood out of me,\" Indicates this occurs with all statins.     Statins             Physical Exam:   Vitals were reviewed  BP (!) 164/100 (BP Location: Left arm)   Pulse 83   Temp 98.3  F (36.8  C) (Oral)   Resp 18   Ht 1.829 m (6')   Wt 120.9 kg (266 lb 8.6 oz)   SpO2 96%   BMI 36.15 kg/m      Wt Readings from Last 3 Encounters:   07/10/23 120.9 kg (266 lb 8.6 oz)   10/03/22 110 kg (242 lb 8.1 oz)   08/17/22 119.9 kg (264 lb 6.4 oz)       Intake/Output Summary (Last 24 hours) at 7/9/2023 0758  Last data filed at 7/9/2023 0643  Gross per 24 hour   Intake 740 ml   Output 2805 ml   Net -2065 ml     GENERAL APPEARANCE: alert in no distress  HEENT:  MMM  RESP: lungs cta b c good efforts, no crackles, rhonchi or wheezes  CV: RRR, no murmur  EXTREMITIES/SKIN:  2+dependently worse in upper extremities, bilateral BKA  NEURO: Grossly nonfocal         Data:     BMP  Recent Labs   Lab 07/12/23  0614 07/11/23  0601 07/10/23  0803 07/09/23  0647    137 138 136   POTASSIUM 4.1 4.5 4.2 3.9   CHLORIDE 101 100 101 100   FERNANDO 8.0* 8.0* 8.1* 7.7*   CO2 22 22 21* 18*   BUN 83.8* 88.2* 87.4* 86.1*   CR 6.78* 7.27* 7.80* 8.48*   * 223* 122* 110*     CBC  Recent Labs   Lab 07/12/23  0614 07/11/23  0601 07/10/23  0710 07/09/23  0612   WBC 11.8* 13.1* 13.6* 16.5*   HGB 7.8* 8.1* 8.3* 8.3* "   HCT 23.6* 24.4* 24.2* 24.3*   MCV 87 87 86 86    306 335 333     Lab Results   Component Value Date    AST 13 06/24/2023    ALT 14 06/24/2023    ALKPHOS 84 06/24/2023    BILITOTAL 0.9 06/24/2023     Lab Results   Component Value Date    INR 1.06 06/27/2023       Attestation:  I have reviewed today's vital signs, notes, medications, labs and imaging.    Elfego Teague MD  [facility] - Nephrology  Office: [phone]

## 2023-07-13 LAB
ALBUMIN SERPL BCG-MCNC: 2.6 G/DL (ref 3.5–5.2)
ANION GAP SERPL CALCULATED.3IONS-SCNC: 15 MMOL/L (ref 7–15)
BUN SERPL-MCNC: 85.8 MG/DL (ref 6–20)
CALCIUM SERPL-MCNC: 8 MG/DL (ref 8.6–10)
CHLORIDE SERPL-SCNC: 100 MMOL/L (ref 98–107)
CK SERPL-CCNC: 59 U/L (ref 39–308)
CREAT SERPL-MCNC: 6.54 MG/DL (ref 0.67–1.17)
DEPRECATED HCO3 PLAS-SCNC: 23 MMOL/L (ref 22–29)
ERYTHROCYTE [DISTWIDTH] IN BLOOD BY AUTOMATED COUNT: 12.3 % (ref 10–15)
GFR SERPL CREATININE-BSD FRML MDRD: 9 ML/MIN/1.73M2
GLUCOSE SERPL-MCNC: 161 MG/DL (ref 70–99)
HCT VFR BLD AUTO: 22.6 % (ref 40–53)
HGB BLD-MCNC: 7.4 G/DL (ref 13.3–17.7)
MCH RBC QN AUTO: 29 PG (ref 26.5–33)
MCHC RBC AUTO-ENTMCNC: 32.7 G/DL (ref 31.5–36.5)
MCV RBC AUTO: 89 FL (ref 78–100)
PHOSPHATE SERPL-MCNC: 6.9 MG/DL (ref 2.5–4.5)
PLATELET # BLD AUTO: 262 10E3/UL (ref 150–450)
POTASSIUM SERPL-SCNC: 4.4 MMOL/L (ref 3.4–5.3)
RBC # BLD AUTO: 2.55 10E6/UL (ref 4.4–5.9)
SODIUM SERPL-SCNC: 138 MMOL/L (ref 136–145)
WBC # BLD AUTO: 11.1 10E3/UL (ref 4–11)

## 2023-07-13 PROCEDURE — 250N000013 HC RX MED GY IP 250 OP 250 PS 637: Performed by: STUDENT IN AN ORGANIZED HEALTH CARE EDUCATION/TRAINING PROGRAM

## 2023-07-13 PROCEDURE — 120N000001 HC R&B MED SURG/OB

## 2023-07-13 PROCEDURE — 82550 ASSAY OF CK (CPK): CPT

## 2023-07-13 PROCEDURE — 99232 SBSQ HOSP IP/OBS MODERATE 35: CPT | Performed by: STUDENT IN AN ORGANIZED HEALTH CARE EDUCATION/TRAINING PROGRAM

## 2023-07-13 PROCEDURE — 99232 SBSQ HOSP IP/OBS MODERATE 35: CPT | Performed by: INTERNAL MEDICINE

## 2023-07-13 PROCEDURE — 250N000011 HC RX IP 250 OP 636: Performed by: STUDENT IN AN ORGANIZED HEALTH CARE EDUCATION/TRAINING PROGRAM

## 2023-07-13 PROCEDURE — 85027 COMPLETE CBC AUTOMATED: CPT | Performed by: STUDENT IN AN ORGANIZED HEALTH CARE EDUCATION/TRAINING PROGRAM

## 2023-07-13 PROCEDURE — 250N000013 HC RX MED GY IP 250 OP 250 PS 637

## 2023-07-13 PROCEDURE — 99231 SBSQ HOSP IP/OBS SF/LOW 25: CPT | Mod: 24

## 2023-07-13 PROCEDURE — 250N000011 HC RX IP 250 OP 636: Mod: JZ | Performed by: STUDENT IN AN ORGANIZED HEALTH CARE EDUCATION/TRAINING PROGRAM

## 2023-07-13 PROCEDURE — 80069 RENAL FUNCTION PANEL: CPT | Performed by: STUDENT IN AN ORGANIZED HEALTH CARE EDUCATION/TRAINING PROGRAM

## 2023-07-13 RX ORDER — CARVEDILOL 12.5 MG/1
12.5 TABLET ORAL ONCE
Status: COMPLETED | OUTPATIENT
Start: 2023-07-13 | End: 2023-07-13

## 2023-07-13 RX ORDER — MULTIPLE VITAMINS W/ MINERALS TAB 9MG-400MCG
1 TAB ORAL DAILY
Status: DISCONTINUED | OUTPATIENT
Start: 2023-07-13 | End: 2023-07-28 | Stop reason: HOSPADM

## 2023-07-13 RX ORDER — CARVEDILOL 25 MG/1
25 TABLET ORAL 2 TIMES DAILY WITH MEALS
Status: DISCONTINUED | OUTPATIENT
Start: 2023-07-13 | End: 2023-07-23

## 2023-07-13 RX ADMIN — SODIUM CITRATE AND CITRIC ACID MONOHYDRATE 30 ML: 500; 334 SOLUTION ORAL at 21:10

## 2023-07-13 RX ADMIN — BUMETANIDE 0.5 MG: 0.5 TABLET ORAL at 09:05

## 2023-07-13 RX ADMIN — CARVEDILOL 25 MG: 25 TABLET, FILM COATED ORAL at 17:31

## 2023-07-13 RX ADMIN — OXYCODONE HYDROCHLORIDE 10 MG: 5 TABLET ORAL at 14:04

## 2023-07-13 RX ADMIN — BUMETANIDE 0.5 MG: 0.5 TABLET ORAL at 15:50

## 2023-07-13 RX ADMIN — CARVEDILOL 12.5 MG: 12.5 TABLET, FILM COATED ORAL at 11:15

## 2023-07-13 RX ADMIN — MULTIPLE VITAMINS W/ MINERALS TAB 1 TABLET: TAB at 11:15

## 2023-07-13 RX ADMIN — HEPARIN SODIUM 5000 UNITS: 5000 INJECTION, SOLUTION INTRAVENOUS; SUBCUTANEOUS at 05:38

## 2023-07-13 RX ADMIN — PIPERACILLIN AND TAZOBACTAM 2.25 G: 2; .25 INJECTION, POWDER, FOR SOLUTION INTRAVENOUS at 05:04

## 2023-07-13 RX ADMIN — CALCIUM CARBONATE (ANTACID) CHEW TAB 500 MG 500 MG: 500 CHEW TAB at 18:52

## 2023-07-13 RX ADMIN — SODIUM CITRATE AND CITRIC ACID MONOHYDRATE 30 ML: 500; 334 SOLUTION ORAL at 09:05

## 2023-07-13 RX ADMIN — OXYCODONE HYDROCHLORIDE 10 MG: 5 TABLET ORAL at 22:09

## 2023-07-13 RX ADMIN — PIPERACILLIN AND TAZOBACTAM 2.25 G: 2; .25 INJECTION, POWDER, FOR SOLUTION INTRAVENOUS at 22:10

## 2023-07-13 RX ADMIN — AMLODIPINE BESYLATE 5 MG: 5 TABLET ORAL at 09:05

## 2023-07-13 RX ADMIN — OXYCODONE HYDROCHLORIDE 10 MG: 5 TABLET ORAL at 10:00

## 2023-07-13 RX ADMIN — OXYCODONE HYDROCHLORIDE 10 MG: 5 TABLET ORAL at 06:03

## 2023-07-13 RX ADMIN — OXYCODONE HYDROCHLORIDE 10 MG: 5 TABLET ORAL at 01:58

## 2023-07-13 RX ADMIN — HEPARIN SODIUM 5000 UNITS: 5000 INJECTION, SOLUTION INTRAVENOUS; SUBCUTANEOUS at 14:05

## 2023-07-13 RX ADMIN — EZETIMIBE 10 MG: 10 TABLET ORAL at 09:05

## 2023-07-13 RX ADMIN — PIPERACILLIN AND TAZOBACTAM 2.25 G: 2; .25 INJECTION, POWDER, FOR SOLUTION INTRAVENOUS at 10:00

## 2023-07-13 RX ADMIN — HEPARIN SODIUM 5000 UNITS: 5000 INJECTION, SOLUTION INTRAVENOUS; SUBCUTANEOUS at 21:11

## 2023-07-13 RX ADMIN — CALCIUM CARBONATE (ANTACID) CHEW TAB 500 MG 500 MG: 500 CHEW TAB at 10:03

## 2023-07-13 RX ADMIN — OXYCODONE HYDROCHLORIDE 10 MG: 5 TABLET ORAL at 18:08

## 2023-07-13 RX ADMIN — PIPERACILLIN AND TAZOBACTAM 2.25 G: 2; .25 INJECTION, POWDER, FOR SOLUTION INTRAVENOUS at 15:49

## 2023-07-13 RX ADMIN — CARVEDILOL 12.5 MG: 12.5 TABLET, FILM COATED ORAL at 09:05

## 2023-07-13 ASSESSMENT — ACTIVITIES OF DAILY LIVING (ADL)
ADLS_ACUITY_SCORE: 32
ADLS_ACUITY_SCORE: 22
ADLS_ACUITY_SCORE: 32

## 2023-07-13 NOTE — PLAN OF CARE
Goal Outcome Evaluation:       Pt A&OX4. Pain controlled and given 1hr before dressing change. Dressing change every 4hrs. Patient declined using our device for BG check, using his own margot. Dr. Parra is aware and okay with it.  Voiding adequately. Continue to monitor.

## 2023-07-13 NOTE — PROGRESS NOTES
[facility]    Infectious Disease Progress Note    Date of Service (when I saw the patient): 07/13/2023     Assessment & Plan   Ry Horner is a 57 year old male who was admitted on 6/24/2023.     Impression:  1. 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 2. Stage III CKD  3. Bilateral BKA   4. Presented to the ER due to pain, swelling and redness with small ulcer at Lt BKA stump and found to have cellulitis/sepsis. This BKA was done in 2022   5. No history of MRSA  6. Workup shows JOHNATHAN  7. Started on vanc ( only 2 doses) and zosyn switched to dapto on 6/26      Recommendations:   Continue on daptomycin continue on zosyn   S/P: Incision and debridement of left below-knee stump today on 6/30   S/p: repeat I and D on 7/6  S/p repeat I and D on 7/ 10  Pending OR cultures and path pending so far no micro data but patient has been on broad spectrum antibiotics   Will continue to follow      Day 19 of antibiotics today most recent path on the tibial bone was non specific for osteo.   Plan is to stop antibiotics 24 hours after formalization of the BKA         Franklin Maza MD    Interval History   No new micro    Creat up   Nephrology evaluating     Physical Exam   Temp: 98.6  F (37  C) Temp src: Oral BP: (!) 155/89 Pulse: 84   Resp: 16 SpO2: 97 % O2 Device: None (Room air)    Vitals:    07/09/23 0615 07/10/23 0700 07/13/23 0500   Weight: 122 kg (268 lb 15.4 oz) 120.9 kg (266 lb 8.6 oz) 126 kg (277 lb 12.5 oz)     Vital Signs with Ranges  Temp:  [98.3  F (36.8  C)-98.7  F (37.1  C)] 98.6  F (37  C)  Pulse:  [84-96] 84  Resp:  [16-18] 16  BP: (149-166)/(89-97) 155/89  SpO2:  [94 %-97 %] 97 %     Constitutional: Awake, alert, cooperative, no apparent distress  Lungs: Clear to auscultation bilaterally, no crackles or wheezing  Cardiovascular: Regular rate and rhythm, normal S1 and S2, and no murmur noted  Abdomen: Normal bowel sounds, soft,  non-distended, non-tender  Skin: dressing on the  left BKA   Other:     Medications     lactated ringers 250 mL/hr at 07/10/23 1158       amLODIPine  5 mg Oral Daily     [Held by provider] bumetanide  1 mg Intravenous BID     bumetanide  0.5 mg Oral BID     carvedilol  12.5 mg Oral Once     carvedilol  25 mg Oral BID w/meals     DAPTOmycin (CUBICIN) 600 mg in sodium chloride 0.9 % 100 mL intermittent infusion  6 mg/kg (Adjusted) Intravenous Q48H     ezetimibe  10 mg Oral Daily     heparin ANTICOAGULANT  5,000 Units Subcutaneous Q8H     insulin aspart  1-7 Units Subcutaneous TID AC     insulin aspart  1-5 Units Subcutaneous At Bedtime     insulin degludec  20 Units Subcutaneous BID     multivitamin w/minerals  1 tablet Oral Daily     piperacillin-tazobactam  2.25 g Intravenous Q6H     sodium chloride (PF)  10-40 mL Intracatheter Q7 Days     sodium chloride (PF)  3 mL Intracatheter Q8H     sodium citrate-citric acid  30 mL Oral BID       Data   All microbiology laboratory data reviewed.  Recent Labs   Lab Test 07/13/23  0545 07/12/23  0614 07/11/23  0601   WBC 11.1* 11.8* 13.1*   HGB 7.4* 7.8* 8.1*   HCT 22.6* 23.6* 24.4*   MCV 89 87 87    277 306     Recent Labs   Lab Test 07/13/23  0545 07/12/23  0614 07/11/23  0601   CR 6.54* 6.78* 7.27*     Recent Labs   Lab Test 09/25/22  1529   SED 85*     Recent Labs   Lab Test 02/03/20  1324 02/03/20  0007 02/02/20  2250 12/04/19  1619 11/19/19  1829 11/17/19  1525 11/17/19  1419 11/17/19  1411 08/28/17  1136   CULT Heavy growth  beta hemolytic   Streptococcus constellatus  *  Light growth  Staphylococcus aureus  * No growth No growth No anaerobes isolated  No growth Light growth  Bacteroides fragilis  *  Light growth  Parvimonas micra  *  Susceptibility testing not routinely done  On day 2, isolated in broth only:  beta hemolytic   Streptococcus constellatus  * Heavy growth  beta hemolytic   Streptococcus constellatus  Susceptibility testing done on previous  specimen  *  Light growth  Alcaligenes faecalis  *  Light growth  Staphylococcus aureus  *  On day 1, isolated in broth only:  Anaerobic gram negative rods  See anaerobic report for identification  * Heavy growth  Bacteroides fragilis  Susceptibility testing not routinely done  *  Heavy growth  Peptoniphilus asaccharolyticus  Susceptibility testing not routinely done  *  Moderate growth  beta hemolytic   Streptococcus constellatus  *  On day 1, isolated in broth only:  Anaerobic gram negative rods  See anaerobic report for identification  * Heavy growth  Bacteroides fragilis  *  Heavy growth  Parvimonas micra  *  Heavy growth  Peptoniphilus asaccharolyticus  *  Heavy growth  Mixed aerobic and anaerobic rosa  *  Susceptibility testing not routinely done No growth       All cultures:  No results for input(s): CULTURE in the last 168 hours.   Blood culture:  Results for orders placed or performed during the hospital encounter of 06/24/23   Blood Culture Peripheral Blood    Specimen: Peripheral Blood   Result Value Ref Range    Culture No Growth    Blood Culture Peripheral Blood    Specimen: Peripheral Blood   Result Value Ref Range    Culture No Growth    Results for orders placed or performed during the hospital encounter of 02/02/20   Blood culture    Specimen: Blood    Right Arm   Result Value Ref Range    Specimen Description Blood Right Arm     Culture Micro No growth    Blood culture    Specimen: Blood    Right Arm   Result Value Ref Range    Specimen Description Blood Right Arm     Culture Micro No growth    Results for orders placed or performed during the hospital encounter of 08/28/17   Blood culture    Specimen: Arm, Right; Blood    Right Arm   Result Value Ref Range    Specimen Description Blood Right Arm     Special Requests Aerobic and anaerobic bottles received     Culture Micro No growth    Blood culture    Specimen: Arm, Right; Blood    Right Arm   Result Value Ref Range    Specimen  Description Blood Right Arm     Special Requests Aerobic and anaerobic bottles received     Culture Micro No growth    Results for orders placed or performed during the hospital encounter of 07/14/16   Blood culture    Specimen: Blood   Result Value Ref Range    Specimen Description Blood Left Arm     Special Requests Aerobic and anaerobic bottles received     Culture Micro No growth     Micro Report Status FINAL 07/20/2016    Blood culture    Specimen: Blood   Result Value Ref Range    Specimen Description Blood Right Arm     Special Requests Aerobic and anaerobic bottles received     Culture Micro No growth     Micro Report Status FINAL 07/20/2016       Urine culture:  No results found for this or any previous visit.

## 2023-07-13 NOTE — PLAN OF CARE
Goal Outcome Evaluation:       VSS on RA. Continue with dressing change every 4 hrs. External male catheter applied d/t scrotum edema- tolerating it okay. Declined getting out of bed via lift. Incontinence of bowel X1. Oxycodone given every 4hrs, 1 hr before dressing change. Continue to monitor.

## 2023-07-13 NOTE — PLAN OF CARE
Goal Outcome Evaluation:      Plan of Care Reviewed With: patient    Overall Patient Progress: no changeOverall Patient Progress: no change    Pt here for cellulitis of his left stump, POD 3 from his last I&D. A&O x4. CMS WDL. Bowel sounds normoactive, passing flatus, incontinent of BM x1 overnight. Urinal at bedside, voiding adequately. Tolerating mod carb diet. VSS, ex HTN. Wet to dry LLE dressing changed q4 hours. Ax1 transfers to wheelchair. C/o moderate to severe pain, decreased with oxycodone and tylenol; pain medications given 1 hour prior to dressing changes. Triple lumen PICC SL'd, intermittent Abx. Blood glucoses 236, 232, 197; using patient's margot device. Cr 6.78 yesterday, trending down, rechecking this AM. Plan is for possible revision/closure next week. Nephrology, vascular, hospitalist and ID following.

## 2023-07-13 NOTE — PROGRESS NOTES
Renal Medicine Progress Note            Assessment/Plan:     57 y.o man with CKD III due to DM and HTN, consulted for JOHNATHAN.      1) CKD III:                -bl ~ 1.5-1.9 mg/dl (GFR 45-55)               -2.5 g/g albuminuria     2)  Severe JOHNATHAN, improving: Remains Non-oliguric. At baseline creatinine until 6/29 and subsequent rise, more dramatically after OR 6/30.               - on Zosyn/dapto               -urine sodum <20 7/2 reflecting possibe pre-renal state.                -UA with hyaline casts              - Renal US with large sized kidneys, especially with underlying CKD, this nephromegaly likely reflects some ATN injury.     Peak Cr 8.7 on 7/8, improving slowly. Diuretic responsive, was making a good amount of urine even without diuretic, but remains ~20 lbs above probable dry weight (240lbs). Will start gentle PO diuretics.        3)  Infected left stump with osteomyelitis s/p I&D:               -dapto/zosyn     4) FEN: . Hyponatremia resolved, acidosis improved on bicitra.  Will continue Bicitra for now, likely can stop in the next few days to see continues to diurese and renal function improves.  Phos improving.     5)  Hypertension:                -Jardiance and lisinopril are on hold     6) anemia: Hgb 8.1     Plan:   1) continue bicitra to 30cc bid  2) continue PO bumex 0.5mg BID  3) avoid NSAIDs, contrast, nephrotoxins as able   4) 2gm low sodium diet  5) prn tums ordered for indigestion  6) bladder scan as needed  7) Daily weights, if no improvement in weight by tomorrow then can increase dose to Bumex 1 mg twice daily        Elfego Teague MD   [facility]  Office: [phone]        Interval History:     -No acute events overnight  -Urine function continues to improve slowly  -Continues to have significant edema, he does not feel improvement in this yet  -2.3 L urine output yesterday, 1.2 to 5 L urine output so far today since midnight  -Denies shortness of breath  -Expresses a lot  "of frustration  -Some difficulty urinating due to scrotal edema and positioning/mobility, willing to try external catheter.  Discussed with nurse bladder scanning to ensure fully emptying bladder          Medications and Allergies:       amLODIPine  5 mg Oral Daily     [Held by provider] bumetanide  1 mg Intravenous BID     bumetanide  0.5 mg Oral BID     carvedilol  25 mg Oral BID w/meals     DAPTOmycin (CUBICIN) 600 mg in sodium chloride 0.9 % 100 mL intermittent infusion  6 mg/kg (Adjusted) Intravenous Q48H     ezetimibe  10 mg Oral Daily     heparin ANTICOAGULANT  5,000 Units Subcutaneous Q8H     insulin aspart  1-7 Units Subcutaneous TID AC     insulin aspart  1-5 Units Subcutaneous At Bedtime     insulin degludec  20 Units Subcutaneous BID     multivitamin w/minerals  1 tablet Oral Daily     piperacillin-tazobactam  2.25 g Intravenous Q6H     sodium chloride (PF)  10-40 mL Intracatheter Q7 Days     sodium chloride (PF)  3 mL Intracatheter Q8H     sodium citrate-citric acid  30 mL Oral BID        Allergies   Allergen Reactions     Pravastatin      \"sucked the life blood out of me,\" Indicates this occurs with all statins.     Statins             Physical Exam:   Vitals were reviewed  BP (!) 155/89 (BP Location: Left arm)   Pulse 84   Temp 98.6  F (37  C) (Oral)   Resp 16   Ht 1.829 m (6')   Wt 126 kg (277 lb 12.5 oz)   SpO2 97%   BMI 37.67 kg/m      Wt Readings from Last 3 Encounters:   07/13/23 126 kg (277 lb 12.5 oz)   10/03/22 110 kg (242 lb 8.1 oz)   08/17/22 119.9 kg (264 lb 6.4 oz)       Intake/Output Summary (Last 24 hours) at 7/9/2023 075  Last data filed at 7/9/2023 0643  Gross per 24 hour   Intake 740 ml   Output 2805 ml   Net -2065 ml     GENERAL APPEARANCE: alert in no distress  HEENT:  MMM  Deferred cardio respiratory exam due to patient getting dressing change  EXTREMITIES/SKIN:  2+dependently worse in upper extremities, bilateral BKA  NEURO: Grossly nonfocal         Data:     BMP  Recent " Labs   Lab 07/13/23  0545 07/12/23  0614 07/11/23  0601 07/10/23  0803    137 137 138   POTASSIUM 4.4 4.1 4.5 4.2   CHLORIDE 100 101 100 101   FERNANDO 8.0* 8.0* 8.0* 8.1*   CO2 23 22 22 21*   BUN 85.8* 83.8* 88.2* 87.4*   CR 6.54* 6.78* 7.27* 7.80*   * 117* 223* 122*     CBC  Recent Labs   Lab 07/13/23  0545 07/12/23  0614 07/11/23  0601 07/10/23  0710   WBC 11.1* 11.8* 13.1* 13.6*   HGB 7.4* 7.8* 8.1* 8.3*   HCT 22.6* 23.6* 24.4* 24.2*   MCV 89 87 87 86    277 306 335     Lab Results   Component Value Date    AST 13 06/24/2023    ALT 14 06/24/2023    ALKPHOS 84 06/24/2023    BILITOTAL 0.9 06/24/2023     Lab Results   Component Value Date    INR 1.06 06/27/2023       Attestation:  I have reviewed today's vital signs, notes, medications, labs and imaging.    Elfego Teague MD  [facility] - Nephrology  Office: [phone]

## 2023-07-13 NOTE — PROGRESS NOTES
VASCULAR SURGERY PROGRESS NOTE    Subjective:  Pain well controlled. Noticeable generalized edema, per patient, has worsened. No acute issues.  Objective:  Intake/Output Summary (Last 24 hours) at 7/13/2023 0722  Last data filed at 7/13/2023 0539  Gross per 24 hour   Intake 1560 ml   Output 2225 ml   Net -665 ml     PHYSICAL EXAM:  BP (!) 154/91 (BP Location: Left arm, Patient Position: Semi-Martinez's, Cuff Size: Adult Large)   Pulse 85   Temp 98.7  F (37.1  C) (Oral)   Resp 17   Ht 1.829 m (6')   Wt 126 kg (277 lb 12.5 oz)   SpO2 96%   BMI 37.67 kg/m    Dressings intact  Alert and oriented x4  Cr 6.54 WBC 11.1      ASSESSMENT:  Mr. Horner is a 57M who underwent staged left below-knee amputation in 09/2022. This healed well and he was ambulatory on his prosthetic. He presented with sudden onset of pain, swelling, and wound on the distal tip of the left below-knee stump, with surrounding cellulitis. He underwent left BKA stump debridement on 6/30/23 and repeat debridement with drain placement on 7/6/23 and 7/10/23.    PLAN:  -Continue wet to dry dressings, outlined in orders, q4hrs  -continue to monitor renal function and WBC, once normalizes will attempt formal revision and closure of BKA stump  -defer to primary team and nephrology in terms of generalized edema    Antonieta Perdomo NP  VASCULAR SURGERY

## 2023-07-13 NOTE — PROGRESS NOTES
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      Sepsis due Lt BKA stump infection  S/p I&D of of left BKA stump on 06/30, 07/06 and 07/10  -Patient presented with pain, redness, swelling at his left BKA stump site.  Marked leukocytosis of 22.9. Tachycardic to 110s.  X-ray Shows soft tissue swelling, some bone formation along the resection margin of the tibia with periosteal reaction.   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      - vascular surgery waiting on medical optimization before proceeding with formal revision and closure. Normalized WBC and renal function  - ID following and guiding antibiotics. Currently on dapto and zosyn. Most recent OR culture negative however on broad spectrum antibiotics     Acute kidney injury on CKD stage III   Cr @ admission 2.45, baseline ~ 1.6  * Initially improved to baseline and then worsening JOHNATHAN after OR 06/30    - nephrology consulted for severe JOHNATHAN with peak Cr of 8.7 07/08. Non oliguric and suspected to be ATN injury  - Cr continues to trend down and post ATN diuresis now, nephrology assisting with PO bumex  - follow electrolytes and output closely  - avoid further nephrotoxic agents       Type II DM, uncontrolled, on insulin, A1c 7.6 on 6/27/23   - Prior to admission on Tresiba 34 units twice a day with oral agents    - currently holding oral  meds  - BG levels were not previously recorded as they were being checked by patient.   - tresiba further reduced yesterday for BG into 100s and very little meal time insulin needs  - Continue tresiba 20 BID with mealtime sliding scale, as JOHNATHAN improved suspect to see insulin needs increase    Essential hypertension  - Holding prior to admission lisinopril for JOHNATHAN, have started coreg and amlodipine in the interim.  - As needed hydralazine available    Mild hyponatremia -resolved    Acute on chronic anemia  -Hemoglobin at presentation was 12.8, slowly has drifted down, likely is multifactorial due to acute infections and ongoing surgeries  - monitor    Diet: Snacks/Supplements Adult: Expedite Cup; Between Meals  Snacks/Supplements Adult: Other; 8 pm: cottage cheese with pears; Between Meals  High Consistent Carb (75 g CHO per Meal) Diet     DVT Prophylaxis: Pneumatic Compression Devices   Corrales Catheter: Not present  Code Status: Full Code     Disposition Plan       Expected Discharge Date: 07/17/2023      Destination: home;home with family  Discharge Comments: TCU      Entered: Ilda Parra DO 07/13/2023, 12:00 PM        Clinically Significant Risk Factors              # Hypoalbuminemia: Lowest albumin = 2.4 g/dL at 7/8/2023  6:39 AM, will monitor as appropriate    # Acute Kidney Injury, unspecified: based on a >150% or 0.3 mg/dL increase in last creatinine compared to past 90 day average, will monitor renal function  # Hypertension: Noted on problem list       # DMII: A1C = 7.6 % (Ref range: <5.7 %) within past 6 months   # Obesity: Estimated body mass index is 37.67 kg/m  as calculated from the following:    Height as of this encounter: 1.829 m (6').    Weight as of this encounter: 126 kg (277 lb 12.5 oz).             The patient's care was discussed with the Bedside Nurse and Patient.    Medical Decision Making         Labs/Imaging Reviewed all labs from today. Vitals. Blood sugars    Time SPENT BY ME on the  date of service doing chart review, history, exam, documentation & further activities per the note:  45  MINUTES    Ilda Parra DO  Hospitalist Service  [facility]  7AM-6PM  Securely message with the Vocera Web Console (learn more here)  Text page via [employer] Paging/Directory    ______________________________________________________________________    Interval History   Patient frustrated with his difficulty in urinating etc. He is limited by his bilateral BKAs. He continues to feel swollen. He denies SOB. Pain is controlled. He was updated on his insulin needs.    Data reviewed today: I reviewed all medications, new labs and imaging results over the last 24 hours. I personally reviewed no images or EKG's today.    Physical Exam   Vital signs:  Temp: 98.6  F (37  C) Temp src: Oral BP: (!) 155/89 Pulse: 84   Resp: 16 SpO2: 97 % O2 Device: None (Room air) Oxygen Delivery: 2 LPM Height: 182.9 cm (6') Weight: 126 kg (277 lb 12.5 oz)  Estimated body mass index is 37.67 kg/m  as calculated from the following:    Height as of this encounter: 1.829 m (6').    Weight as of this encounter: 126 kg (277 lb 12.5 oz).      Wt Readings from Last 2 Encounters:   07/13/23 126 kg (277 lb 12.5 oz)   10/03/22 110 kg (242 lb 8.1 oz)       Gen: AAOX3, NAD, comfortable  Resp: CTA B/L, normal WOB  CVS: RRR, no murmur  Abd/GI: Soft, non-tender. BS- normoactive.   Skin: Warm, dry no rashes, noted scars  MSK: Left below-knee stump is bandaged, R BKA clean  Neuro- CN- intact. No focal deficits.        Data   Recent Labs   Lab 07/13/23  0545 07/12/23  0614 07/11/23  0601   WBC 11.1* 11.8* 13.1*   HGB 7.4* 7.8* 8.1*   MCV 89 87 87    277 306    137 137   POTASSIUM 4.4 4.1 4.5   CHLORIDE 100 101 100   CO2 23 22 22   BUN 85.8* 83.8* 88.2*   CR 6.54* 6.78* 7.27*   ANIONGAP 15 14 15   FERNANDO 8.0* 8.0* 8.0*   * 117* 223*   ALBUMIN 2.6* 2.6* 2.5*       No results found for this or any previous visit (from  the past 24 hour(s)).  Medications     lactated ringers 250 mL/hr at 07/10/23 1158       amLODIPine  5 mg Oral Daily     [Held by provider] bumetanide  1 mg Intravenous BID     bumetanide  0.5 mg Oral BID     carvedilol  25 mg Oral BID w/meals     DAPTOmycin (CUBICIN) 600 mg in sodium chloride 0.9 % 100 mL intermittent infusion  6 mg/kg (Adjusted) Intravenous Q48H     ezetimibe  10 mg Oral Daily     heparin ANTICOAGULANT  5,000 Units Subcutaneous Q8H     insulin aspart  1-7 Units Subcutaneous TID AC     insulin aspart  1-5 Units Subcutaneous At Bedtime     insulin degludec  20 Units Subcutaneous BID     multivitamin w/minerals  1 tablet Oral Daily     piperacillin-tazobactam  2.25 g Intravenous Q6H     sodium chloride (PF)  10-40 mL Intracatheter Q7 Days     sodium chloride (PF)  3 mL Intracatheter Q8H     sodium citrate-citric acid  30 mL Oral BID

## 2023-07-13 NOTE — PROGRESS NOTES
"CLINICAL NUTRITION SERVICES - REASSESSMENT NOTE      Recommendations Ordered by Registered Dietitian (RD):   Discontinued Andrea BID  Ordered daily Expedite gelatein   Ordered thera-vit-m for wound healing (per staff rec)  Ordered 8 pm snack of cottage cheese and pears    Liberalized diet to high consistent CHO to optimize nutrition intake for wound healing    RD encouraged pt to continue with 2+ meals/day, adequate protein. Discussed Expedite's importance in wound healing.      Malnutrition:   % Weight Loss:  Difficult to assess d/t fluid-status  % Intake:  <75% for > 7 days (moderate malnutrition)-- x13 days, ongoing  Subcutaneous Fat Loss:  None observed (7/7)  Muscle Loss:  None observed (7/7)  Fluid Retention:  Trace to mild bilateral hand, left arm, scrotum, and left knee edema     Malnutrition Diagnosis: Patient does not meet two of the established criteria necessary for diagnosing malnutrition         EVALUATION OF PROGRESS TOWARD GOALS   Diet: Moderate Consistent Carb (60 g CHO per Meal) Diet    Snacks/Supplements Adult: Orange Andrea at lunch and dinner    7/6: Renal, non-dialysis diet  7/9: NPO --> 2g Na  7/10: NPO --> Mod consistent CHO diet     Intake/Tolerance:  Visited with pt this morning. Pt reported he has been eating 2 meals/day. Stated \"I'm happy with that\" considering all that is going on. He shared how rough this hospital admit has been for him. Has good sources of protein with each. Likes scrambled eggs, cheese, yogurt, cottage cheese. Would like evening snack of cottage cheese and pears scheduled. Encouraged pt to continue with 2+ meals/day, adequate protein. Does not like the Andrea. Has been able to drink just 1 pkt per day. Would like to give Expedite gelatein a try. Discussed its importance in wound healing. Feels frustrated with room service, struggles to order meals with various diet restrictions and difficulty speaking with call center.   Tolerating diet per chart review. Appetite is ok. " "  Pt receiving 1-2 meals/day + Andrea per health touch. Mostly % intakes per nursing flow sheet.      ASSESSED NUTRITION NEEDS:  Dosing Weight: 84.5 kg (adjusted based off current wt of 112 kg and adjusted IBW for BKA's of 75.2 kg)  Estimated Energy Needs: 1524-3884 kcals (25-30 Kcal/Kg)  Justification: increased needs post op  Estimated Protein Needs: 126+ grams protein (1.5+ g pro/Kg)  Justification: increased needs post procedure, wound healing  Estimated Fluid Needs: 1 mL/kcal or per provider pending fluid status       NEW FINDINGS:   Weight: wt remains up  Date/Time Weight Weight Method   07/13/23 0500 126 kg (277 lb 12.5 oz) --   07/10/23 0700 120.9 kg (266 lb 8.6 oz) Bed scale   07/09/23 0615 122 kg (268 lb 15.4 oz) Bed scale   07/06/23 0500 121 kg (266 lb 12.1 oz) Bed scale   7/12: nephrology: \"remains ~20 lbs above probable dry weight (240lbs).\"    I/O: 1x BM yesterday, 1x on 7/11, 1x on 7/10, 2x on 7/9, 3x on 7/8. Net IO Since Admission: -4,691 mL [07/13/23 0831].    Labs: reviewed. No indication for dialysis at this time per nephrology    BUN 85.8 (H) 07/13/2023    CR 6.54 (H) 07/13/2023    PHOS 6.9 (H) 07/13/2023     Lab 07/13/23  0545 07/12/23  0614 07/11/23  0601 07/10/23  0803 07/09/23  0647 07/08/23  0639   * 117* 223* 122* 110* 155*     Medications: reviewed     [Held by provider] bumetanide  1 mg Intravenous BID     bumetanide  0.5 mg Oral BID       insulin aspart (MSSI)  1-7 Units Subcutaneous TID AC     insulin aspart (MSSI)  1-5 Units Subcutaneous At Bedtime     insulin degludec  20 Units Subcutaneous BID     Skin: S/p I&D of of left BKA stump on 06/30, 07/06 and 07/10      Previous Goals:   Patient to consume >50% of nutritionally adequate meals + 1-2 supplements per day  Evaluation: Met    Previous Nutrition Diagnosis:   Inadequate oral intake related to poor appetite and increased needs as evidenced by pt note of poor appetite d/t nausea and increased protein needs (1.5+ g/kg) d/t " wound healing  Evaluation: No change      MALNUTRITION  % Weight Loss:  Difficult to assess d/t fluid-status  % Intake:  <75% for > 7 days (moderate malnutrition)-- x13 days, ongoing  Subcutaneous Fat Loss:  None observed (7/7)  Muscle Loss:  None observed (7/7)  Fluid Retention:  Trace to mild bilateral hand, left arm, scrotum, and left knee edema     Malnutrition Diagnosis: Patient does not meet two of the established criteria necessary for diagnosing malnutrition      CURRENT NUTRITION DIAGNOSIS  Inadequate oral intake related to variable appetite, difficulty with various diet orders, and increased needs 2/2 wound healing as evidenced by only ordering 1-2 meals/day, pt report, and increased protein needs (1.5+ g/kg)      INTERVENTIONS  Recommendations / Nutrition Prescription  Discontinued Andrea BID  Ordered daily Expedite gelatein   Ordered thera-vit-m for wound healing (per staff rec)  Ordered 8 pm snack of cottage cheese and pears  Liberalized diet to high consistent CHO to optimize nutrition intake for wound healing  RD encouraged pt to continue with 2+ meals/day, adequate protein. Discussed Expedite's importance in wound healing.       Implementation  Nutrition Education   Medical food supplement therapy  Modify composition of meals/snacks  Multivitamin/mineral  Diet order change    Goals  Pt to consume minimum of 125 g protein per day       MONITORING AND EVALUATION:  Progress towards goals will be monitored and evaluated per protocol and Practice Guidelines      Cindy Logan RD, LD   Pager: [phone]

## 2023-07-13 NOTE — PLAN OF CARE
Problem: Oral Intake Inadequate  Goal: Improved Oral Intake  Outcome: Progressing   Goal Outcome Evaluation:    Slowly progressing, feeling frustrated with diet orders. Liberalized to high CHO to allow for increased PO intake. Encouraged protein for wound healing. Switched WS-ONS.     Cindy Logan RD, LD

## 2023-07-14 LAB
ABO/RH(D): NORMAL
ALBUMIN SERPL BCG-MCNC: 2.7 G/DL (ref 3.5–5.2)
ANION GAP SERPL CALCULATED.3IONS-SCNC: 14 MMOL/L (ref 7–15)
ANTIBODY SCREEN: NEGATIVE
BLD PROD TYP BPU: NORMAL
BLOOD COMPONENT TYPE: NORMAL
BUN SERPL-MCNC: 83.1 MG/DL (ref 6–20)
CALCIUM SERPL-MCNC: 8.2 MG/DL (ref 8.6–10)
CHLORIDE SERPL-SCNC: 100 MMOL/L (ref 98–107)
CODING SYSTEM: NORMAL
CREAT SERPL-MCNC: 6.61 MG/DL (ref 0.67–1.17)
CROSSMATCH: NORMAL
DEPRECATED HCO3 PLAS-SCNC: 23 MMOL/L (ref 22–29)
ERYTHROCYTE [DISTWIDTH] IN BLOOD BY AUTOMATED COUNT: 12.5 % (ref 10–15)
GFR SERPL CREATININE-BSD FRML MDRD: 9 ML/MIN/1.73M2
GLUCOSE SERPL-MCNC: 128 MG/DL (ref 70–99)
HCT VFR BLD AUTO: 20.7 % (ref 40–53)
HGB BLD-MCNC: 6.8 G/DL (ref 13.3–17.7)
ISSUE DATE AND TIME: NORMAL
MCH RBC QN AUTO: 29.1 PG (ref 26.5–33)
MCHC RBC AUTO-ENTMCNC: 32.9 G/DL (ref 31.5–36.5)
MCV RBC AUTO: 89 FL (ref 78–100)
PHOSPHATE SERPL-MCNC: 7.1 MG/DL (ref 2.5–4.5)
PLATELET # BLD AUTO: 227 10E3/UL (ref 150–450)
POTASSIUM SERPL-SCNC: 4.6 MMOL/L (ref 3.4–5.3)
RBC # BLD AUTO: 2.34 10E6/UL (ref 4.4–5.9)
SODIUM SERPL-SCNC: 137 MMOL/L (ref 136–145)
SPECIMEN EXPIRATION DATE: NORMAL
UNIT ABO/RH: NORMAL
UNIT NUMBER: NORMAL
UNIT STATUS: NORMAL
UNIT TYPE ISBT: 5100
WBC # BLD AUTO: 10 10E3/UL (ref 4–11)

## 2023-07-14 PROCEDURE — 250N000011 HC RX IP 250 OP 636: Mod: JZ | Performed by: STUDENT IN AN ORGANIZED HEALTH CARE EDUCATION/TRAINING PROGRAM

## 2023-07-14 PROCEDURE — 99231 SBSQ HOSP IP/OBS SF/LOW 25: CPT | Mod: 24

## 2023-07-14 PROCEDURE — 99233 SBSQ HOSP IP/OBS HIGH 50: CPT | Performed by: HOSPITALIST

## 2023-07-14 PROCEDURE — 250N000013 HC RX MED GY IP 250 OP 250 PS 637: Performed by: STUDENT IN AN ORGANIZED HEALTH CARE EDUCATION/TRAINING PROGRAM

## 2023-07-14 PROCEDURE — 82040 ASSAY OF SERUM ALBUMIN: CPT | Performed by: STUDENT IN AN ORGANIZED HEALTH CARE EDUCATION/TRAINING PROGRAM

## 2023-07-14 PROCEDURE — 86901 BLOOD TYPING SEROLOGIC RH(D): CPT | Performed by: HOSPITALIST

## 2023-07-14 PROCEDURE — 120N000001 HC R&B MED SURG/OB

## 2023-07-14 PROCEDURE — 99232 SBSQ HOSP IP/OBS MODERATE 35: CPT | Performed by: STUDENT IN AN ORGANIZED HEALTH CARE EDUCATION/TRAINING PROGRAM

## 2023-07-14 PROCEDURE — P9016 RBC LEUKOCYTES REDUCED: HCPCS | Performed by: INTERNAL MEDICINE

## 2023-07-14 PROCEDURE — 86850 RBC ANTIBODY SCREEN: CPT | Performed by: HOSPITALIST

## 2023-07-14 PROCEDURE — 250N000011 HC RX IP 250 OP 636: Performed by: STUDENT IN AN ORGANIZED HEALTH CARE EDUCATION/TRAINING PROGRAM

## 2023-07-14 PROCEDURE — 250N000013 HC RX MED GY IP 250 OP 250 PS 637

## 2023-07-14 PROCEDURE — 85027 COMPLETE CBC AUTOMATED: CPT | Performed by: STUDENT IN AN ORGANIZED HEALTH CARE EDUCATION/TRAINING PROGRAM

## 2023-07-14 PROCEDURE — 86923 COMPATIBILITY TEST ELECTRIC: CPT | Performed by: INTERNAL MEDICINE

## 2023-07-14 PROCEDURE — 258N000003 HC RX IP 258 OP 636: Performed by: STUDENT IN AN ORGANIZED HEALTH CARE EDUCATION/TRAINING PROGRAM

## 2023-07-14 RX ORDER — BUMETANIDE 1 MG/1
1 TABLET ORAL
Status: DISCONTINUED | OUTPATIENT
Start: 2023-07-14 | End: 2023-07-14

## 2023-07-14 RX ORDER — BUMETANIDE 0.25 MG/ML
1 INJECTION INTRAMUSCULAR; INTRAVENOUS 2 TIMES DAILY
Status: DISCONTINUED | OUTPATIENT
Start: 2023-07-14 | End: 2023-07-24

## 2023-07-14 RX ADMIN — SODIUM CITRATE AND CITRIC ACID MONOHYDRATE 30 ML: 500; 334 SOLUTION ORAL at 08:32

## 2023-07-14 RX ADMIN — EZETIMIBE 10 MG: 10 TABLET ORAL at 08:32

## 2023-07-14 RX ADMIN — AMLODIPINE BESYLATE 5 MG: 5 TABLET ORAL at 08:33

## 2023-07-14 RX ADMIN — PIPERACILLIN AND TAZOBACTAM 2.25 G: 2; .25 INJECTION, POWDER, FOR SOLUTION INTRAVENOUS at 10:34

## 2023-07-14 RX ADMIN — OXYCODONE HYDROCHLORIDE 10 MG: 5 TABLET ORAL at 14:07

## 2023-07-14 RX ADMIN — HEPARIN SODIUM 5000 UNITS: 5000 INJECTION, SOLUTION INTRAVENOUS; SUBCUTANEOUS at 22:23

## 2023-07-14 RX ADMIN — PIPERACILLIN AND TAZOBACTAM 2.25 G: 2; .25 INJECTION, POWDER, FOR SOLUTION INTRAVENOUS at 22:08

## 2023-07-14 RX ADMIN — INSULIN DEGLUDEC INJECTION 22 UNITS: 100 INJECTION, SOLUTION SUBCUTANEOUS at 21:12

## 2023-07-14 RX ADMIN — BUMETANIDE 0.5 MG: 0.5 TABLET ORAL at 08:32

## 2023-07-14 RX ADMIN — OXYCODONE HYDROCHLORIDE 10 MG: 5 TABLET ORAL at 04:19

## 2023-07-14 RX ADMIN — HEPARIN SODIUM 5000 UNITS: 5000 INJECTION, SOLUTION INTRAVENOUS; SUBCUTANEOUS at 05:52

## 2023-07-14 RX ADMIN — HEPARIN SODIUM 5000 UNITS: 5000 INJECTION, SOLUTION INTRAVENOUS; SUBCUTANEOUS at 15:11

## 2023-07-14 RX ADMIN — PIPERACILLIN AND TAZOBACTAM 2.25 G: 2; .25 INJECTION, POWDER, FOR SOLUTION INTRAVENOUS at 17:38

## 2023-07-14 RX ADMIN — PIPERACILLIN AND TAZOBACTAM 2.25 G: 2; .25 INJECTION, POWDER, FOR SOLUTION INTRAVENOUS at 04:19

## 2023-07-14 RX ADMIN — OXYCODONE HYDROCHLORIDE 10 MG: 5 TABLET ORAL at 08:33

## 2023-07-14 RX ADMIN — DAPTOMYCIN 600 MG: 500 INJECTION, POWDER, LYOPHILIZED, FOR SOLUTION INTRAVENOUS at 15:12

## 2023-07-14 RX ADMIN — CARVEDILOL 25 MG: 25 TABLET, FILM COATED ORAL at 17:38

## 2023-07-14 RX ADMIN — BUMETANIDE 1 MG: 0.25 INJECTION, SOLUTION INTRAMUSCULAR; INTRAVENOUS at 15:10

## 2023-07-14 RX ADMIN — OXYCODONE HYDROCHLORIDE 10 MG: 5 TABLET ORAL at 21:10

## 2023-07-14 RX ADMIN — BUMETANIDE 1 MG: 0.25 INJECTION, SOLUTION INTRAMUSCULAR; INTRAVENOUS at 21:10

## 2023-07-14 RX ADMIN — MULTIPLE VITAMINS W/ MINERALS TAB 1 TABLET: TAB at 08:32

## 2023-07-14 RX ADMIN — ACETAMINOPHEN 650 MG: 325 TABLET, FILM COATED ORAL at 08:33

## 2023-07-14 RX ADMIN — CARVEDILOL 25 MG: 25 TABLET, FILM COATED ORAL at 08:33

## 2023-07-14 RX ADMIN — SODIUM CITRATE AND CITRIC ACID MONOHYDRATE 30 ML: 500; 334 SOLUTION ORAL at 21:10

## 2023-07-14 ASSESSMENT — ACTIVITIES OF DAILY LIVING (ADL)
ADLS_ACUITY_SCORE: 36
ADLS_ACUITY_SCORE: 32
ADLS_ACUITY_SCORE: 36
ADLS_ACUITY_SCORE: 32
ADLS_ACUITY_SCORE: 32
ADLS_ACUITY_SCORE: 36
ADLS_ACUITY_SCORE: 32
ADLS_ACUITY_SCORE: 32
ADLS_ACUITY_SCORE: 36
ADLS_ACUITY_SCORE: 32

## 2023-07-14 NOTE — PROVIDER NOTIFICATION
MD Notification    Notified Person: MD    Notified Person Name: Dr. Ragland    Notification Date/Time: July 14, 2023 8562    Notification Interaction: Amcom paged    Purpose of Notification: Hgb 6.8. No conditionals. Has consent signed.     Orders Received: 1 unit PRBC    Comments:

## 2023-07-14 NOTE — PROGRESS NOTES
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      Sepsis due Lt BKA stump infection  S/p I&D of of left BKA stump on 06/30, 07/06 and 07/10  Patient presented with pain, redness, swelling at his left BKA stump site.  Marked leukocytosis of 22.9. Tachycardic to 110s.  X-ray Shows soft tissue swelling, some bone formation along the resection margin of the tibia with periosteal reaction.   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       - Vascular surgery waiting on medical optimization before proceeding with formal revision and closure.    - ID following and guiding antibiotics. Currently on dapto and zosyn. Most recent OR culture negative however remains on broad spectrum antibiotics      Acute kidney injury on CKD stage III   Cr @ admission 2.45, baseline ~ 1.6  * Initially improved to baseline and then worsening JOHNATHAN after OR 06/30     - nephrology consulted for severe JOHNATHAN, peak Cr of 8.7 07/08. Non oliguric and suspected to be ATN injury  - Cr continues to trend down and post ATN diuresis now, nephrology assisting with diuresis. Noted anasarca. Bumex changed to IV 7/24  - follow electrolytes and output closely  - avoid nephrotoxic agents as able      Type II DM, uncontrolled, on insulin, A1c 7.6 on 6/27/23   Prior to admission on Tresiba 34 units twice a day with oral agents     - Currently  holding PTA oral meds  - BG levels were not previously recorded as they were being checked by patient.   - BS upto 200 in last 24 hours, dose was initially reduced.  - Increase tresiba to 22 untis BID with mealtime sliding scale, as JOHNATHAN improved suspect to see insulin needs increase     Essential hypertension  Holding prior to admission lisinopril for JOHNATHAN, have started coreg and amlodipine in the interim.  - As needed hydralazine available     Mild hyponatremia -resolved     Acute on chronic anemia  -Hemoglobin at presentation was 12.8, slowly has drifted down, likely is multifactorial due to acute infections and ongoing surgeries  - Hb 6.8, transfuse and follow       Diet: Snacks/Supplements Adult: Expedite Cup; Between Meals  Snacks/Supplements Adult: Other; 8 pm: cottage cheese with pears; Between Meals  High Consistent Carb (75 g CHO per Meal) Diet    DVT Prophylaxis: Heparin SQ  Corrales Catheter: Not present  Lines: PRESENT      PICC 07/07/23 Triple Lumen Right Basilic-Site Assessment: WDL except;Drainage (dried)      Cardiac Monitoring: None  Code Status: Full Code      Clinically Significant Risk Factors              # Hypoalbuminemia: Lowest albumin = 2.4 g/dL at 7/8/2023  6:39 AM, will monitor as appropriate          # Obesity: Estimated body mass index is 37.67 kg/m  as calculated from the following:    Height as of this encounter: 1.829 m (6').    Weight as of this encounter: 126 kg (277 lb 12.5 oz).           Disposition Plan      Expected Discharge Date: 07/17/2023      Destination: home;home with family  Discharge Comments: GISSELL Robertson MD  Hospitalist Service  [facility]  Securely message with [employer] (more info)  Text page via [facility] Paging/Directory   ______________________________________________________________________    Interval History   Chart reviewed extensively discussed with nursing staff and evaluated patient this morning.  Noted slight drop in  hemoglobin to 6.8 this morning.  No hematochezia or melena and dressing not soaked with blood.  -Patient feels frustrated about generalized swelling and difficulty urination despite external catheter due to scrotal and penile swelling.  Denies chest pain or shortness of breath.    Physical Exam   Vital Signs: Temp: 97.9  F (36.6  C) Temp src: Oral BP: 130/78 Pulse: 72   Resp: 18 SpO2: 96 % O2 Device: None (Room air)    Weight: 277 lbs 12.47 oz    General: AAOx3, appears comfortable.  HEENT: PERRLA EOMI. Mucosa moist.   Lungs: Bilateral equal air entry. Clear to auscultation, normal work of breathing.   CVS: S1S2 regular, no tachycardia or murmur.   Abdomen: Soft, obese/distended, nontender. BS heard.  MSK: Bilateral BKA, dressing covering the stump on left.  Thighs are swollen.  Neuro: AAOX3. CN 2-12 normal.    Skin: No rash.  Appears pale      Medical Decision Making       50 MINUTES SPENT BY ME on the date of service doing chart review, history, exam, documentation & further activities per the note.      Data     I have personally reviewed the following data over the past 24 hrs:    10.0  \   6.8 (LL)   / 227     137 100 83.1 (H) /  128 (H)   4.6 23 6.61 (H) \       ALT: N/A AST: N/A AP: N/A TBILI: N/A   ALB: 2.7 (L) TOT PROTEIN: N/A LIPASE: N/A       Imaging results reviewed over the past 24 hrs:   No results found for this or any previous visit (from the past 24 hour(s)).

## 2023-07-14 NOTE — PROGRESS NOTES
VASCULAR SURGERY PROGRESS NOTE    Subjective:  Pain well controlled, edema slightly improved. No acute issues    Objective:  Intake/Output Summary (Last 24 hours) at 7/14/2023 0948  Last data filed at 7/14/2023 0900  Gross per 24 hour   Intake 1080 ml   Output 1950 ml   Net -870 ml     PHYSICAL EXAM:  BP (!) 140/84 (BP Location: Left arm)   Pulse 73   Temp 98.4  F (36.9  C) (Oral)   Resp 20   Ht 1.829 m (6')   Wt 126 kg (277 lb 12.5 oz)   SpO2 97%   BMI 37.67 kg/m      Dressings intact  Alert and oriented x4  Cr 6.61 WBC 10.0        ASSESSMENT:  Mr. Horner is a 57M who underwent staged left below-knee amputation in 09/2022. This healed well and he was ambulatory on his prosthetic. He presented with sudden onset of pain, swelling, and wound on the distal tip of the left below-knee stump, with surrounding cellulitis. He underwent left BKA stump debridement on 6/30/23 and repeat debridement with drain placement on 7/6/23 and 7/10/23.     PLAN:  -Continue wet to dry dressings, outlined in orders, q4hrs  -continue to monitor renal function and WBC, once normalizes will attempt formal revision and closure of BKA stump  -defer to primary team and nephrology in terms of generalized edema    Antonieta Perdomo NP  VASCULAR SURGERY

## 2023-07-14 NOTE — PROGRESS NOTES
Reason for admission: Sepsis due Lt BKA stump infection  Surgical Procedure/s:  S/p I&D of of left BKA stump on 06/30, 07/06 and 07/10  Mental Status: Ox4  Activity: AX2 CL  Diet: High Consistent Carb at 75g  Pain: Controlled w/ PO pain meds  /GI: Incontinent of B/B. Had two loose BM today.  Tele/Restraints/Iso: NA  LDA: PICC triple lumen SL w/ blood return  Expected D/C Date: TBD  Other Info: Currently on IV Bumex for ansarca. On broad spectrum IV abx. Hgb at 6.8 today, received 1u of PRBC. JOHNATHAN improving. Writer did dressing change this morning and this afternoon vascular doctor removed one penrose drain and did another new dressing change. Per vascular, once lab normalizes they will attempt formal revision and closure of BKA stump. Writer attempted third dressing change at 6:30 but pt has visitors in room. Writer would defer the next dressing change to night shift, pt in agreement.

## 2023-07-14 NOTE — PROVIDER NOTIFICATION
Gen SUrg E P [id number]    Good morning! Wondering if you are the one rounding today for this pt? Pt hgb 6.8 and got a blood transfusion order from on call hospitalist. Dimitrios BARBA -[phone]

## 2023-07-14 NOTE — PLAN OF CARE
Goal Outcome Evaluation:      Plan of Care Reviewed With: patient    Overall Patient Progress: no changeOverall Patient Progress: no change      Shift [id number]:    Pt here for cellulitis of his left stump, POD 3 from his last I&D. A&O x4. CMS WDL. Bowel sounds normoactive, passing flatus, tolerating high carb diet. VSS, ex HTN. Wet to dry LLE dressing changed q4 hours. Up with Ax1-2, pt reports difficulty transferring lately. C/o moderate to severe pain, decreased with oxycodone q4 hours before dressing changes. Triple lumen PICC SL'd, intermittent Abx, good blood return. Blood glucose 159, using patient's margot device. Cr 6.54, trending down, recheck in the AM. Pt has had increased episodes of incontinence, external male catheter in place to assist with emptying of his bladder. Plan is for possibel revision/closure next week. Nephrology, vascular, hospitalist, and ID following. Discharge pending.

## 2023-07-14 NOTE — PROGRESS NOTES
Renal Medicine Progress Note            Assessment/Plan:     57 y.o man with CKD III due to DM and HTN, consulted for JOHNATHAN.      1) CKD III:                -bl ~ 1.5-1.9 mg/dl (GFR 45-55)               -2.5 g/g albuminuria     2)  Severe JOHNATHAN, improving but now stalled: Remains Non-oliguric. At baseline creatinine until 6/29 and subsequent rise, more dramatically after OR 6/30.               - on Zosyn/dapto               -urine sodum <20 7/2 reflecting possibe pre-renal state.                -UA with hyaline casts              - Renal US with large sized kidneys, especially with underlying CKD, this nephromegaly likely reflects some ATN injury.     Peak Cr 8.7 on 7/8, improving slowly. Diuretic responsive, was making a good amount of urine even without diuretic, but remains ~20 lbs above probable dry weight (240lbs). Started PO diuretics, but remains quite fluid up. Will switch to IV bumex 1mg BID.        3)  Infected left stump with osteomyelitis s/p I&D:               -dapto/zosyn     4) FEN: . Hyponatremia resolved, acidosis improved on bicitra.  Will continue Bicitra for now, likely can stop in the next few days to see continues to diurese and renal function improves.  Phos improving.     5)  Hypertension:                -Jardiance and lisinopril are on hold     6) anemia: Hgb 8.1     Plan:   1) continue bicitra to 30cc bid  2) switched to IV bumex 1mg BID   3) avoid NSAIDs, contrast, nephrotoxins as able   4) 2gm low sodium diet  5) prn tums ordered for indigestion  6) bladder scan as needed  7) Daily weights        Elfego Teague MD   [facility]  Office: [phone]        Interval History:     -No acute events overnight  - 1.8L UOP yesterday, 800 ml since midnight   - denies SOB   - remains frustrated, but feels ok otherwise   - small improvement in LE edema          Medications and Allergies:       amLODIPine  5 mg Oral Daily     bumetanide  1 mg Intravenous BID     carvedilol  25 mg Oral  "BID w/meals     DAPTOmycin (CUBICIN) 600 mg in sodium chloride 0.9 % 100 mL intermittent infusion  6 mg/kg (Adjusted) Intravenous Q48H     ezetimibe  10 mg Oral Daily     heparin ANTICOAGULANT  5,000 Units Subcutaneous Q8H     insulin aspart  1-7 Units Subcutaneous TID AC     insulin aspart  1-5 Units Subcutaneous At Bedtime     insulin degludec  20 Units Subcutaneous BID     multivitamin w/minerals  1 tablet Oral Daily     piperacillin-tazobactam  2.25 g Intravenous Q6H     sodium chloride (PF)  10-40 mL Intracatheter Q7 Days     sodium chloride (PF)  3 mL Intracatheter Q8H     sodium citrate-citric acid  30 mL Oral BID        Allergies   Allergen Reactions     Pravastatin      \"sucked the life blood out of me,\" Indicates this occurs with all statins.     Statins             Physical Exam:   Vitals were reviewed  BP (!) 140/84 (BP Location: Left arm)   Pulse 73   Temp 98.4  F (36.9  C) (Oral)   Resp 20   Ht 1.829 m (6')   Wt 126 kg (277 lb 12.5 oz)   SpO2 97%   BMI 37.67 kg/m      Wt Readings from Last 3 Encounters:   07/13/23 126 kg (277 lb 12.5 oz)   10/03/22 110 kg (242 lb 8.1 oz)   08/17/22 119.9 kg (264 lb 6.4 oz)       Intake/Output Summary (Last 24 hours) at 7/9/2023 0758  Last data filed at 7/9/2023 0643  Gross per 24 hour   Intake 740 ml   Output 2805 ml   Net -2065 ml     GENERAL APPEARANCE: alert in no distress  HEENT:  MMM  EXTREMITIES/SKIN:  2+dependently worse in upper extremities, bilateral BKA  NEURO: Grossly nonfocal         Data:     BMP  Recent Labs   Lab 07/14/23  0540 07/13/23  0545 07/12/23  0614 07/11/23  0601    138 137 137   POTASSIUM 4.6 4.4 4.1 4.5   CHLORIDE 100 100 101 100   FERNANDO 8.2* 8.0* 8.0* 8.0*   CO2 23 23 22 22   BUN 83.1* 85.8* 83.8* 88.2*   CR 6.61* 6.54* 6.78* 7.27*   * 161* 117* 223*     CBC  Recent Labs   Lab 07/14/23  0540 07/13/23  0545 07/12/23  0614 07/11/23  0601   WBC 10.0 11.1* 11.8* 13.1*   HGB 6.8* 7.4* 7.8* 8.1*   HCT 20.7* 22.6* 23.6* 24.4*   MCV " 89 89 87 87    262 277 306     Lab Results   Component Value Date    AST 13 06/24/2023    ALT 14 06/24/2023    ALKPHOS 84 06/24/2023    BILITOTAL 0.9 06/24/2023     Lab Results   Component Value Date    INR 1.06 06/27/2023       Attestation:  I have reviewed today's vital signs, notes, medications, labs and imaging.    Elfego Teague MD  [facility] - Nephrology  Office: [phone]

## 2023-07-14 NOTE — PLAN OF CARE
Goal Outcome Evaluation:      A&O X4, VSS on RA. POD 4 from his last I&D. Pt here for cellulitis of his left stump,  . CMS WDL. Bowel sounds normoactive, passing flatus, tolerating high carb diet. Wet to dry LLE dressing changed q4 hours. C/o moderate to severe pain, decreased with oxycodone q4 hours before dressing changes. Triple lumen PICC SL, intermittent Abx, good blood return. Blood glucose self check, using patient's margot device. Hgb 6.8, unit of blood ordered, blood typing initiated. Up with Ax1-2, pt reports difficulty transferring lately.external male catheter in place to assist with emptying of his bladder. Plan is for possible revision/closure next week. Nephrology, vascular, hospitalist, and ID following. Discharge pending.

## 2023-07-15 LAB
ANION GAP SERPL CALCULATED.3IONS-SCNC: 14 MMOL/L (ref 7–15)
BASOPHILS # BLD AUTO: 0 10E3/UL (ref 0–0.2)
BASOPHILS NFR BLD AUTO: 0 %
BUN SERPL-MCNC: 82 MG/DL (ref 6–20)
CALCIUM SERPL-MCNC: 8.2 MG/DL (ref 8.6–10)
CHLORIDE SERPL-SCNC: 99 MMOL/L (ref 98–107)
CREAT SERPL-MCNC: 6.26 MG/DL (ref 0.67–1.17)
DEPRECATED HCO3 PLAS-SCNC: 24 MMOL/L (ref 22–29)
EOSINOPHIL # BLD AUTO: 0.2 10E3/UL (ref 0–0.7)
EOSINOPHIL NFR BLD AUTO: 2 %
ERYTHROCYTE [DISTWIDTH] IN BLOOD BY AUTOMATED COUNT: 12.9 % (ref 10–15)
GFR SERPL CREATININE-BSD FRML MDRD: 10 ML/MIN/1.73M2
GLUCOSE SERPL-MCNC: 166 MG/DL (ref 70–99)
HCT VFR BLD AUTO: 23.7 % (ref 40–53)
HGB BLD-MCNC: 7.8 G/DL (ref 13.3–17.7)
IMM GRANULOCYTES # BLD: 0.1 10E3/UL
IMM GRANULOCYTES NFR BLD: 1 %
LYMPHOCYTES # BLD AUTO: 0.8 10E3/UL (ref 0.8–5.3)
LYMPHOCYTES NFR BLD AUTO: 6 %
MCH RBC QN AUTO: 29.1 PG (ref 26.5–33)
MCHC RBC AUTO-ENTMCNC: 32.9 G/DL (ref 31.5–36.5)
MCV RBC AUTO: 88 FL (ref 78–100)
MONOCYTES # BLD AUTO: 0.6 10E3/UL (ref 0–1.3)
MONOCYTES NFR BLD AUTO: 5 %
NEUTROPHILS # BLD AUTO: 10.2 10E3/UL (ref 1.6–8.3)
NEUTROPHILS NFR BLD AUTO: 86 %
NRBC # BLD AUTO: 0 10E3/UL
NRBC BLD AUTO-RTO: 0 /100
PLATELET # BLD AUTO: 231 10E3/UL (ref 150–450)
POTASSIUM SERPL-SCNC: 4.4 MMOL/L (ref 3.4–5.3)
RBC # BLD AUTO: 2.68 10E6/UL (ref 4.4–5.9)
SODIUM SERPL-SCNC: 137 MMOL/L (ref 136–145)
WBC # BLD AUTO: 11.9 10E3/UL (ref 4–11)

## 2023-07-15 PROCEDURE — 99232 SBSQ HOSP IP/OBS MODERATE 35: CPT | Performed by: HOSPITALIST

## 2023-07-15 PROCEDURE — 120N000001 HC R&B MED SURG/OB

## 2023-07-15 PROCEDURE — 250N000011 HC RX IP 250 OP 636: Performed by: STUDENT IN AN ORGANIZED HEALTH CARE EDUCATION/TRAINING PROGRAM

## 2023-07-15 PROCEDURE — 999N000040 HC STATISTIC CONSULT NO CHARGE VASC ACCESS

## 2023-07-15 PROCEDURE — 99232 SBSQ HOSP IP/OBS MODERATE 35: CPT | Performed by: INTERNAL MEDICINE

## 2023-07-15 PROCEDURE — 250N000011 HC RX IP 250 OP 636: Mod: JZ | Performed by: STUDENT IN AN ORGANIZED HEALTH CARE EDUCATION/TRAINING PROGRAM

## 2023-07-15 PROCEDURE — 250N000013 HC RX MED GY IP 250 OP 250 PS 637: Performed by: HOSPITALIST

## 2023-07-15 PROCEDURE — 250N000013 HC RX MED GY IP 250 OP 250 PS 637: Performed by: STUDENT IN AN ORGANIZED HEALTH CARE EDUCATION/TRAINING PROGRAM

## 2023-07-15 PROCEDURE — 80048 BASIC METABOLIC PNL TOTAL CA: CPT | Performed by: HOSPITALIST

## 2023-07-15 PROCEDURE — 85025 COMPLETE CBC W/AUTO DIFF WBC: CPT | Performed by: HOSPITALIST

## 2023-07-15 PROCEDURE — 250N000013 HC RX MED GY IP 250 OP 250 PS 637

## 2023-07-15 RX ADMIN — SODIUM CITRATE AND CITRIC ACID MONOHYDRATE 30 ML: 500; 334 SOLUTION ORAL at 09:43

## 2023-07-15 RX ADMIN — CARVEDILOL 25 MG: 25 TABLET, FILM COATED ORAL at 18:19

## 2023-07-15 RX ADMIN — INSULIN DEGLUDEC INJECTION 22 UNITS: 100 INJECTION, SOLUTION SUBCUTANEOUS at 22:10

## 2023-07-15 RX ADMIN — CARVEDILOL 25 MG: 25 TABLET, FILM COATED ORAL at 09:43

## 2023-07-15 RX ADMIN — MULTIPLE VITAMINS W/ MINERALS TAB 1 TABLET: TAB at 09:43

## 2023-07-15 RX ADMIN — SODIUM CITRATE AND CITRIC ACID MONOHYDRATE 30 ML: 500; 334 SOLUTION ORAL at 20:54

## 2023-07-15 RX ADMIN — OXYCODONE HYDROCHLORIDE 10 MG: 5 TABLET ORAL at 06:06

## 2023-07-15 RX ADMIN — HEPARIN SODIUM 5000 UNITS: 5000 INJECTION, SOLUTION INTRAVENOUS; SUBCUTANEOUS at 22:09

## 2023-07-15 RX ADMIN — BUMETANIDE 1 MG: 0.25 INJECTION, SOLUTION INTRAMUSCULAR; INTRAVENOUS at 09:43

## 2023-07-15 RX ADMIN — AMLODIPINE BESYLATE 5 MG: 5 TABLET ORAL at 09:43

## 2023-07-15 RX ADMIN — PIPERACILLIN AND TAZOBACTAM 2.25 G: 2; .25 INJECTION, POWDER, FOR SOLUTION INTRAVENOUS at 22:09

## 2023-07-15 RX ADMIN — OXYCODONE HYDROCHLORIDE 10 MG: 5 TABLET ORAL at 02:00

## 2023-07-15 RX ADMIN — PIPERACILLIN AND TAZOBACTAM 2.25 G: 2; .25 INJECTION, POWDER, FOR SOLUTION INTRAVENOUS at 09:43

## 2023-07-15 RX ADMIN — EZETIMIBE 10 MG: 10 TABLET ORAL at 09:43

## 2023-07-15 RX ADMIN — PIPERACILLIN AND TAZOBACTAM 2.25 G: 2; .25 INJECTION, POWDER, FOR SOLUTION INTRAVENOUS at 15:59

## 2023-07-15 RX ADMIN — INSULIN DEGLUDEC INJECTION 22 UNITS: 100 INJECTION, SOLUTION SUBCUTANEOUS at 09:44

## 2023-07-15 RX ADMIN — HEPARIN SODIUM 5000 UNITS: 5000 INJECTION, SOLUTION INTRAVENOUS; SUBCUTANEOUS at 07:21

## 2023-07-15 RX ADMIN — PIPERACILLIN AND TAZOBACTAM 2.25 G: 2; .25 INJECTION, POWDER, FOR SOLUTION INTRAVENOUS at 04:41

## 2023-07-15 RX ADMIN — HEPARIN SODIUM 5000 UNITS: 5000 INJECTION, SOLUTION INTRAVENOUS; SUBCUTANEOUS at 15:59

## 2023-07-15 RX ADMIN — BUMETANIDE 1 MG: 0.25 INJECTION, SOLUTION INTRAMUSCULAR; INTRAVENOUS at 20:54

## 2023-07-15 ASSESSMENT — ACTIVITIES OF DAILY LIVING (ADL)
ADLS_ACUITY_SCORE: 28
ADLS_ACUITY_SCORE: 28
ADLS_ACUITY_SCORE: 32
ADLS_ACUITY_SCORE: 32
ADLS_ACUITY_SCORE: 28
ADLS_ACUITY_SCORE: 32

## 2023-07-15 NOTE — PROGRESS NOTES
7/14/2023 [id number]    Orientation A&Ox4     Vitals/Tele VSS on RA    IV Access/drains Triple lumen PICC SL with good blood return    Diet High consistent carb diet    Mobility Assist 1-2, Lift    GI/ External catheter, bowel incontinent. 1 loose BM this shift    Wound/Skin Dressing change q4hr. Bandages CDI.     Discharge Plan TBD      See Flow sheets for assessment

## 2023-07-15 NOTE — PLAN OF CARE
7/14/2023 [id number]    Orientation A&Ox4     Vitals/Tele VSS on RA    IV Access/drains Triple lumen PICC SL with good blood return    Diet High consistent carb diet    Mobility Assist 1-2, Lift    GI/ External catheter, bowel incontinent. 1 loose BM this shift    Wound/Skin Dressing changed this shift x3. Bandages CDI.     Discharge Plan TBD      See Flow sheets for assessment

## 2023-07-15 NOTE — PROGRESS NOTES
Vascular Surgery Progress Note    S: Doing quite well today.  Much more comfortable and tolerating dressing changes every 4 hours.     Tolerating diet.  O:   Vitals:  BP  Min: 122/69  Max: 157/93  Temp  Av.2  F (36.8  C)  Min: 97.8  F (36.6  C)  Max: 98.6  F (37  C)  Pulse  Av  Min: 72  Max: 81  I/O last 3 completed shifts:  In: 1540 [P.O.:1240]  Out: 1800 [Urine:1800]    Physical Exam: Alert and appropriate.  Very pleasant and conversant.    Did not evaluate open BKA wound since dressings had just recently been performed     All very clean per nursing staff and patient.       Blood sugars under much better control.  Using Dexcom system.   Hgb= 7.8   WBC= 11.9    Assessment/Plan: #1.  Doing well.  We will continue every 4 hours dressing changes but hold at night when he sleeping.  Evaluating for return to operating room for final closure.  Decisions will be made evaluating wound on 2023.  Discussed with patient.      #2.  Anemia--hemodynamically stable.  On iron supplements.    This with patient today including chart review.  Discussed plan with hospitalist Dr. Robertson who was also at bedside this morning.    Wm. Ej MD

## 2023-07-15 NOTE — PROGRESS NOTES
Renal Medicine Progress Note                                Ry Horner MRN# [id number]   Age: 57 year old YOB: 1965   Date of Admission: 6/24/2023 Hospital LOS: 21                  Assessment/Plan:     57 y.o man with CKD III due to DM and HTN, consulted for JOHNATHAN.      1) CKD III:                -bl ~ 1.5-1.9 mg/dl (GFR 45-55)               -2.5 g/g albuminuria     2)  Severe Acute Kidney Injury    Improving: Remains Non-oliguric. At baseline creatinine until 6/29 and subsequent rise, more dramatically after OR 6/30.               - on Zosyn/dapto               -urine sodum <20 7/2 reflecting possibe pre-renal state.                -UA with hyaline casts    Renal US with large sized kidneys, especially with underlying CKD, this nephromegaly likely reflects some ATN injury.     Peak Cr 8.7 on 7/8, improving slowly. Diuretic responsive, was making a good amount of urine even without diuretic, but remains ~20 lbs above probable dry weight (240lbs). Started PO diuretics, but remains quite fluid up. Will switch to IV bumex 1mg BID.      3)  Infected left stump with osteomyelitis s/p I&D:               -dapto/zosyn     4) FEN: . Hyponatremia resolved, acidosis improved on bicitra.  Will continue Bicitra for now, likely can stop in the next few days to see continues to diurese and renal function improves.  Phos improving.     5)  Hypertension:                -Jardiance and lisinopril are on hold     6) anemia: Hgb 8.1      Continue same diuretic  Follow labs     Interval History:     UO in the 2 liter range  Creatinine with gradual decline   Lytes OK    Follows      ROS:     GENERAL: NAD, No fever,chills  R: NEGATIVE for significant cough or SOB  CV: NEGATIVE for chest pain, palpitations  EXT: no change edema  ROS otherwise negative    Medications and Allergies:     Reviewed    Physical Exam:     Vitals were reviewed  Patient Vitals for the past 8 hrs:   BP Temp Temp src Pulse Resp SpO2 Weight   07/15/23  0700 -- -- -- -- 16 -- --   07/15/23 0638 (!) 150/90 98.6  F (37  C) Oral 79 16 94 % 128.6 kg (283 lb 8.2 oz)   07/15/23 0605 -- -- -- -- 16 -- --     I/O last 3 completed shifts:  In: 1540 [P.O.:1240]  Out: 1800 [Urine:1800]    Vitals:    07/06/23 0500 07/09/23 0615 07/10/23 0700 07/13/23 0500   Weight: 121 kg (266 lb 12.1 oz) 122 kg (268 lb 15.4 oz) 120.9 kg (266 lb 8.6 oz) 126 kg (277 lb 12.5 oz)    07/15/23 0638   Weight: 128.6 kg (283 lb 8.2 oz)         GENERAL: awake, alert, follows  HEENT: NC/AT, PERRLA, EOMI, non icteric, pharynx moist without lesion  RESP:  clear anteriorly  CV: RRR, normal S1 S2  MS: no clubbing, cyanosis   SKIN: clear without significant rashes or lesions  NEURO: speech normal and cranial nerves 2-12 intact  PSYCH: affect normal/bright    Data:     Recent Labs   Lab 07/15/23  0642 07/14/23  0540 07/13/23  0545 07/12/23  0614    137 138 137   POTASSIUM 4.4 4.6 4.4 4.1   CHLORIDE 99 100 100 101   CO2 24 23 23 22   ANIONGAP 14 14 15 14   * 128* 161* 117*   BUN 82.0* 83.1* 85.8* 83.8*   CR 6.26* 6.61* 6.54* 6.78*   GFRESTIMATED 10* 9* 9* 9*   FERNANDO 8.2* 8.2* 8.0* 8.0*     Recent Labs   Lab 07/15/23  0642 07/14/23  0540 07/13/23  0545 07/12/23  0614 07/11/23  0601 07/10/23  0803 07/09/23  0647   CR 6.26* 6.61* 6.54* 6.78* 7.27* 7.80* 8.48*     Recent Labs   Lab Test 07/15/23  0642 07/14/23  0540 07/13/23  0545 07/12/23  0614 07/11/23  0601 07/10/23  0803 07/09/23  0647 07/08/23  0639 07/07/23  0743 07/06/23  0812    137 138 137 137 138 136 134* 132* 133*     Recent Labs   Lab 07/15/23  0642 07/14/23  0540 07/13/23  0545 07/12/23  0614 07/11/23  0601   POTASSIUM 4.4 4.6 4.4 4.1 4.5     Recent Labs   Lab 07/14/23  0540 07/13/23  0545 07/12/23  0614 07/11/23  0601   ALBUMIN 2.7* 2.6* 2.6* 2.5*     Recent Labs   Lab 07/15/23  0642 07/14/23  0540 07/13/23  0545 07/12/23  0614 07/11/23  0601   PHOS  --  7.1* 6.9* 7.1* 7.6*   HGB 7.8* 6.8* 7.4* 7.8* 8.1*         G Rajesh Gonsales,  MD [facility] - Nephrology  [phone]

## 2023-07-15 NOTE — PROGRESS NOTES
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      Sepsis due Lt BKA stump infection  S/p I&D of of left BKA stump on 06/30, 07/06 and 07/10  Patient presented with pain, redness, swelling at his left BKA stump site.  Marked leukocytosis of 22.9. Tachycardic to 110s.  X-ray Shows soft tissue swelling, some bone formation along the resection margin of the tibia with periosteal reaction.   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       - Vascular surgery waiting on medical optimization before proceeding with formal revision and closure.    - ID following and guiding antibiotics. Currently on dapto and zosyn. Most recent OR culture negative however remains on broad spectrum antibiotics, Antibiotic to continue until closure per ID.  -Plan is to return to the OR for closure timing per vascular surgery.       Acute kidney injury on CKD stage III   Cr @ admission 2.45, baseline ~ 1.6  * Initially improved to baseline and then worsening JOHNATHAN after OR 06/30     - nephrology consulted for severe JOHNATHAN, peak Cr of 8.7 07/08. Non oliguric and suspected to be ATN injury  - Cr continues to trend down and post ATN diuresis now, nephrology assisting with diuresis. Noted anasarca. Bumex changed to IV 7/14. Nephrology following.  Appreciate assistance.  - follow electrolytes and output closely  - avoid nephrotoxic  agents as able      Type II DM, uncontrolled, on insulin, A1c 7.6 on 6/27/23   Prior to admission on Tresiba 34 units twice a day with oral agents     - Currently holding PTA oral meds  - BG levels were not previously recorded as they were being checked by patient.   - BS in 100s, continue current dose of Tresiba 22 untis BID with mealtime sliding scale. As JOHNATHAN improves, suspect to see insulin needs increase.     Essential hypertension  Holding prior to admission lisinopril for JOHNATHAN, have started coreg and amlodipine in the interim.  - As needed hydralazine available     Mild hyponatremia -resolved     Acute on chronic anemia  -Hemoglobin at presentation was 12.8, slowly has drifted down, likely is multifactorial due to acute infections and ongoing surgeries  -1 unit PRBC 7/14 for Hb 6.8 with appropriate rise in Hb.  -Follow daily       Diet: Snacks/Supplements Adult: Expedite Cup; Between Meals  Snacks/Supplements Adult: Other; 8 pm: cottage cheese with pears; Between Meals  High Consistent Carb (75 g CHO per Meal) Diet    DVT Prophylaxis: Heparin SQ  Corrales Catheter: Not present  Lines: PRESENT      PICC 07/07/23 Triple Lumen Right Basilic-Site Assessment: WDL      Cardiac Monitoring: None  Code Status: Full Code      Clinically Significant Risk Factors              # Hypoalbuminemia: Lowest albumin = 2.4 g/dL at 7/8/2023  6:39 AM, will monitor as appropriate          # Obesity: Estimated body mass index is 38.45 kg/m  as calculated from the following:    Height as of this encounter: 1.829 m (6').    Weight as of this encounter: 128.6 kg (283 lb 8.2 oz).           Disposition Plan      Expected Discharge Date: 07/17/2023      Destination: home;home with family  Discharge Comments: GISSELL Robertson MD  Hospitalist Service  [facility]  Securely message with [employer] (more info)  Text page via [employer] Paging/Directory    ______________________________________________________________________    Interval History     Evaluated patient this morning, no acute issues reported.  Expressed frustration about blood sugar checks prior.  Using his Dexcom reading for the last 2 days.  -Appropriate rise in hemoglobin after blood transfusion.  -Denies shortness of breath, nausea, abdominal pain.  - Pain controlled  - Noted urine output of 1800 cc IV Bumex, creatinine lowly trending down.    Physical Exam   Vital Signs: Temp: 98.6  F (37  C) Temp src: Oral BP: (!) 150/90 Pulse: 79   Resp: 16 SpO2: 94 % O2 Device: None (Room air)    Weight: 283 lbs 8.18 oz    General: AAOx3, appears comfortable.  HEENT: PERRLA EOMI. Mucosa moist.   Lungs: Bilateral equal air entry. Clear to auscultation, normal work of breathing.   CVS: S1S2 regular, no tachycardia or murmur.   Abdomen: Soft, obese/distended, nontender. BS heard.  MSK: Bilateral BKA, dressing covering the stump on left.  Thighs are swollen.  Neuro: AAOX3. CN 2-12 normal.    Skin: No rash.  Appears pale      Medical Decision Making       35 MINUTES SPENT BY ME on the date of service doing chart review, history, exam, documentation & further activities per the note.      Data     I have personally reviewed the following data over the past 24 hrs:    11.9 (H)  \   7.8 (L)   / 231     137 99 82.0 (H) /  166 (H)   4.4 24 6.26 (H) \       Imaging results reviewed over the past 24 hrs:   No results found for this or any previous visit (from the past 24 hour(s)).

## 2023-07-16 LAB
ANION GAP SERPL CALCULATED.3IONS-SCNC: 13 MMOL/L (ref 7–15)
BASOPHILS # BLD AUTO: 0 10E3/UL (ref 0–0.2)
BASOPHILS NFR BLD AUTO: 0 %
BUN SERPL-MCNC: 79.6 MG/DL (ref 6–20)
CALCIUM SERPL-MCNC: 8.3 MG/DL (ref 8.6–10)
CHLORIDE SERPL-SCNC: 101 MMOL/L (ref 98–107)
CREAT SERPL-MCNC: 5.96 MG/DL (ref 0.67–1.17)
DEPRECATED HCO3 PLAS-SCNC: 26 MMOL/L (ref 22–29)
EOSINOPHIL # BLD AUTO: 0.2 10E3/UL (ref 0–0.7)
EOSINOPHIL NFR BLD AUTO: 2 %
ERYTHROCYTE [DISTWIDTH] IN BLOOD BY AUTOMATED COUNT: 12.7 % (ref 10–15)
GFR SERPL CREATININE-BSD FRML MDRD: 10 ML/MIN/1.73M2
GLUCOSE SERPL-MCNC: 77 MG/DL (ref 70–99)
HCT VFR BLD AUTO: 22.5 % (ref 40–53)
HGB BLD-MCNC: 7.4 G/DL (ref 13.3–17.7)
IMM GRANULOCYTES # BLD: 0.1 10E3/UL
IMM GRANULOCYTES NFR BLD: 1 %
LYMPHOCYTES # BLD AUTO: 0.8 10E3/UL (ref 0.8–5.3)
LYMPHOCYTES NFR BLD AUTO: 9 %
MCH RBC QN AUTO: 29 PG (ref 26.5–33)
MCHC RBC AUTO-ENTMCNC: 32.9 G/DL (ref 31.5–36.5)
MCV RBC AUTO: 88 FL (ref 78–100)
MONOCYTES # BLD AUTO: 0.6 10E3/UL (ref 0–1.3)
MONOCYTES NFR BLD AUTO: 6 %
NEUTROPHILS # BLD AUTO: 8 10E3/UL (ref 1.6–8.3)
NEUTROPHILS NFR BLD AUTO: 82 %
NRBC # BLD AUTO: 0 10E3/UL
NRBC BLD AUTO-RTO: 0 /100
PLATELET # BLD AUTO: 243 10E3/UL (ref 150–450)
POTASSIUM SERPL-SCNC: 4.3 MMOL/L (ref 3.4–5.3)
RBC # BLD AUTO: 2.55 10E6/UL (ref 4.4–5.9)
SODIUM SERPL-SCNC: 140 MMOL/L (ref 136–145)
WBC # BLD AUTO: 9.7 10E3/UL (ref 4–11)

## 2023-07-16 PROCEDURE — 85004 AUTOMATED DIFF WBC COUNT: CPT | Performed by: HOSPITALIST

## 2023-07-16 PROCEDURE — 99231 SBSQ HOSP IP/OBS SF/LOW 25: CPT | Performed by: INTERNAL MEDICINE

## 2023-07-16 PROCEDURE — 250N000013 HC RX MED GY IP 250 OP 250 PS 637: Performed by: STUDENT IN AN ORGANIZED HEALTH CARE EDUCATION/TRAINING PROGRAM

## 2023-07-16 PROCEDURE — 250N000011 HC RX IP 250 OP 636: Mod: JZ | Performed by: STUDENT IN AN ORGANIZED HEALTH CARE EDUCATION/TRAINING PROGRAM

## 2023-07-16 PROCEDURE — 250N000013 HC RX MED GY IP 250 OP 250 PS 637

## 2023-07-16 PROCEDURE — 258N000003 HC RX IP 258 OP 636: Performed by: STUDENT IN AN ORGANIZED HEALTH CARE EDUCATION/TRAINING PROGRAM

## 2023-07-16 PROCEDURE — 80048 BASIC METABOLIC PNL TOTAL CA: CPT | Performed by: INTERNAL MEDICINE

## 2023-07-16 PROCEDURE — 250N000011 HC RX IP 250 OP 636: Performed by: STUDENT IN AN ORGANIZED HEALTH CARE EDUCATION/TRAINING PROGRAM

## 2023-07-16 PROCEDURE — 120N000001 HC R&B MED SURG/OB

## 2023-07-16 PROCEDURE — 99232 SBSQ HOSP IP/OBS MODERATE 35: CPT | Performed by: HOSPITALIST

## 2023-07-16 RX ADMIN — PIPERACILLIN AND TAZOBACTAM 2.25 G: 2; .25 INJECTION, POWDER, FOR SOLUTION INTRAVENOUS at 04:10

## 2023-07-16 RX ADMIN — CARVEDILOL 25 MG: 25 TABLET, FILM COATED ORAL at 08:34

## 2023-07-16 RX ADMIN — PIPERACILLIN AND TAZOBACTAM 2.25 G: 2; .25 INJECTION, POWDER, FOR SOLUTION INTRAVENOUS at 16:45

## 2023-07-16 RX ADMIN — PIPERACILLIN AND TAZOBACTAM 2.25 G: 2; .25 INJECTION, POWDER, FOR SOLUTION INTRAVENOUS at 10:21

## 2023-07-16 RX ADMIN — EZETIMIBE 10 MG: 10 TABLET ORAL at 08:34

## 2023-07-16 RX ADMIN — OXYCODONE HYDROCHLORIDE 10 MG: 5 TABLET ORAL at 17:24

## 2023-07-16 RX ADMIN — BUMETANIDE 1 MG: 0.25 INJECTION, SOLUTION INTRAMUSCULAR; INTRAVENOUS at 20:48

## 2023-07-16 RX ADMIN — CARVEDILOL 25 MG: 25 TABLET, FILM COATED ORAL at 17:24

## 2023-07-16 RX ADMIN — DAPTOMYCIN 600 MG: 500 INJECTION, POWDER, LYOPHILIZED, FOR SOLUTION INTRAVENOUS at 13:52

## 2023-07-16 RX ADMIN — AMLODIPINE BESYLATE 5 MG: 5 TABLET ORAL at 08:34

## 2023-07-16 RX ADMIN — HEPARIN SODIUM 5000 UNITS: 5000 INJECTION, SOLUTION INTRAVENOUS; SUBCUTANEOUS at 13:52

## 2023-07-16 RX ADMIN — BUMETANIDE 1 MG: 0.25 INJECTION, SOLUTION INTRAMUSCULAR; INTRAVENOUS at 07:34

## 2023-07-16 RX ADMIN — INSULIN DEGLUDEC INJECTION 22 UNITS: 100 INJECTION, SOLUTION SUBCUTANEOUS at 08:34

## 2023-07-16 RX ADMIN — SODIUM CITRATE AND CITRIC ACID MONOHYDRATE 30 ML: 500; 334 SOLUTION ORAL at 08:34

## 2023-07-16 RX ADMIN — HEPARIN SODIUM 5000 UNITS: 5000 INJECTION, SOLUTION INTRAVENOUS; SUBCUTANEOUS at 06:58

## 2023-07-16 RX ADMIN — OXYCODONE HYDROCHLORIDE 10 MG: 5 TABLET ORAL at 07:34

## 2023-07-16 RX ADMIN — PIPERACILLIN AND TAZOBACTAM 2.25 G: 2; .25 INJECTION, POWDER, FOR SOLUTION INTRAVENOUS at 22:23

## 2023-07-16 RX ADMIN — SODIUM CITRATE AND CITRIC ACID MONOHYDRATE 30 ML: 500; 334 SOLUTION ORAL at 20:48

## 2023-07-16 RX ADMIN — MULTIPLE VITAMINS W/ MINERALS TAB 1 TABLET: TAB at 08:34

## 2023-07-16 RX ADMIN — INSULIN DEGLUDEC INJECTION 22 UNITS: 100 INJECTION, SOLUTION SUBCUTANEOUS at 20:59

## 2023-07-16 RX ADMIN — HEPARIN SODIUM 5000 UNITS: 5000 INJECTION, SOLUTION INTRAVENOUS; SUBCUTANEOUS at 22:23

## 2023-07-16 RX ADMIN — OXYCODONE HYDROCHLORIDE 10 MG: 5 TABLET ORAL at 03:59

## 2023-07-16 ASSESSMENT — ACTIVITIES OF DAILY LIVING (ADL)
ADLS_ACUITY_SCORE: 32

## 2023-07-16 NOTE — PLAN OF CARE
07/15/2023 [id number]     Orientation A&Ox4     Vitals/Tele VSS except HTN at times on RA , Pain controlled with PO pain meds.      IV Access/drains Triple lumen SL with good blood return.     Diet High consistent Carb at 75g     Mobility AX2, Lift     GI/ Incontinent of B/B. External Catheter. 1 Scant loose BM this shift     Wound/Skin  Wet to dry LLE dressing change TID, Two during day shift and one during night. Pre med with Oxycodone 45 mins. Bandages CDI     BG Patient checks BG using his own device will report results.      Discharge Plan TBD        See Flow sheets for assessment

## 2023-07-16 NOTE — PROGRESS NOTES
Vascular Surgery Note    Doing well. Pain controlled, Swelling improving over LLE. WBC WNL, Cr trending down     Will discuss plan for definitive closure with surgeon, Dr Child tomorrow     Kia Luevano MD  Fellow

## 2023-07-16 NOTE — PROGRESS NOTES
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      Sepsis due Lt BKA stump infection  S/p I&D of of left BKA stump on 06/30, 07/06 and 07/10  Patient presented with pain, redness, swelling at his left BKA stump site.  Marked leukocytosis of 22.9. Tachycardic to 110s.  X-ray Shows soft tissue swelling, some bone formation along the resection margin of the tibia with periosteal reaction.   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       - Vascular surgery waiting on medical optimization before proceeding with formal revision and closure.    - ID following and guiding antibiotics. Currently on dapto and zosyn. Most recent OR culture negative however remains on broad spectrum antibiotics, Antibiotic to continue until closure per ID.  -Plan is to return to the OR for closure timing per vascular surgery.       Acute kidney injury on CKD stage III   Cr @ admission 2.45, baseline ~ 1.6  * Initially improved to baseline and then worsening JOHNATHAN after OR 06/30     - nephrology consulted for severe JOHNATHAN, peak Cr of 8.7 07/08. Non oliguric and suspected to be ATN injury  - Cr continues to trend down and post ATN diuresis now, nephrology assisting with diuresis. Noted anasarca. Bumex changed to IV 7/14. Nephrology following.  Appreciate assistance.  - follow electrolytes and output closely  - avoid nephrotoxic  agents as able      Type II DM, uncontrolled, on insulin, A1c 7.6 on 6/27/23   Prior to admission on Tresiba 34 units twice a day with oral agents     - Currently holding PTA oral meds  - BG levels were not previously recorded as they were being checked by patient.   - BS in 100s, continue current dose of Tresiba 22 untis BID with mealtime sliding scale. As JOHNATHAN improves, suspect to see insulin needs increase.     Essential hypertension  Holding prior to admission lisinopril for JOHNATHAN, have started coreg and amlodipine in the interim.  - As needed hydralazine available     Mild hyponatremia -resolved     Acute on chronic anemia  -Hemoglobin at presentation was 12.8, slowly has drifted down, likely is multifactorial due to acute infections and ongoing surgeries  -1 unit PRBC 7/14 for Hb 6.8 with appropriate rise in Hb although down slightly today.  -Follow daily       Diet: Snacks/Supplements Adult: Expedite Cup; Between Meals  Snacks/Supplements Adult: Other; 8 pm: cottage cheese with pears; Between Meals  High Consistent Carb (75 g CHO per Meal) Diet    DVT Prophylaxis: Heparin SQ  Corrales Catheter: Not present  Lines: PRESENT      PICC 07/07/23 Triple Lumen Right Basilic-Site Assessment: WDL      Cardiac Monitoring: None  Code Status: Full Code      Clinically Significant Risk Factors              # Hypoalbuminemia: Lowest albumin = 2.4 g/dL at 7/8/2023  6:39 AM, will monitor as appropriate          # Obesity: Estimated body mass index is 38.24 kg/m  as calculated from the following:    Height as of this encounter: 1.829 m (6').    Weight as of this encounter: 127.9 kg (281 lb 15.5 oz).           Disposition Plan      Expected Discharge Date: 07/24/2023      Destination: home;home with family  Discharge Comments: GISSELL Robertson MD  Hospitalist Service  [facility]  Securely message with [employer] (more info)  Text page via [employer] Paging/Directory    ______________________________________________________________________    Interval History   Discussed with nursing staff and patient was seen this morning.  No acute issues reported.  Blood sugar was 77, asymptomatic, states he had some salads last night but had good breakfast this morning.  -Good urine output, not all shifts output has been charted.  Creatinine has continued to trend down slowly.  -Pain controlled    Physical Exam   Vital Signs: Temp: 98.4  F (36.9  C) Temp src: Oral BP: (!) 144/84 Pulse: 71   Resp: 18 SpO2: 94 % O2 Device: None (Room air)    Weight: 281 lbs 15.49 oz    General: AAOx3, appears comfortable.  HEENT: PERRLA EOMI. Mucosa moist.   Lungs: Bilateral equal air entry. Clear to auscultation, normal work of breathing.   CVS: S1S2 regular, no tachycardia or murmur.   Abdomen: Soft, obese/distended, nontender. BS heard.  MSK: Bilateral BKA, dressing covering the stump on left.  Thighs are swollen.  Neuro: AAOX3. CN 2-12 normal.    Skin: No rash.  Appears pale      Medical Decision Making       35 MINUTES SPENT BY ME on the date of service doing chart review, history, exam, documentation & further activities per the note.      Data     I have personally reviewed the following data over the past 24 hrs:    9.7  \   7.4 (L)   / 243     140 101 79.6 (H) /  77   4.3 26 5.96 (H) \       Imaging results reviewed over the past 24 hrs:   No results found for this or any previous visit (from the past 24 hour(s)).

## 2023-07-16 NOTE — PROGRESS NOTES
Reason for admission: Sepsis due Lt BKA stump infection  Surgical Procedure/s:  S/p I&D of of left BKA stump on 06/30, 07/06 and 07/10  Mental Status: Ox4  Activity: AX2 CL  Diet: High Consistent Carb at 75g  Pain: Controlled w/ PO pain meds  /GI: Incontinent of B/B. Had two loose BM today.  Tele/Restraints/Iso: NA  LDA: PICC triple lumen SL w/ blood return  Expected D/C Date: TBD  Other Info: Currently on IV Bumex for ansarca. On broad spectrum IV abx. Hgb at 7.8 today, received 1u of PRBC yesterday. JOHNATHAN improving. Writer did dressing change this morning and this evening, pt tolerated the procedure. Noted some small serosanguinous discharge from penrose drains during dressing change. IV team changed PICC dressing today. Per vascular, once lab normalizes they will attempt formal revision and closure of BKA stump.

## 2023-07-16 NOTE — PROGRESS NOTES
A/Ox4, VSS on RA, denies pain, N/V. 2 assist with lift, incontinent of bowel, 2 BMs on shift, external catheter in place, LLE dressing changed, blood sugar checks with personal device. Discharge pending.

## 2023-07-17 LAB
ANION GAP SERPL CALCULATED.3IONS-SCNC: 12 MMOL/L (ref 7–15)
BUN SERPL-MCNC: 78.4 MG/DL (ref 6–20)
CALCIUM SERPL-MCNC: 8.4 MG/DL (ref 8.6–10)
CHLORIDE SERPL-SCNC: 101 MMOL/L (ref 98–107)
CREAT SERPL-MCNC: 5.73 MG/DL (ref 0.67–1.17)
DEPRECATED HCO3 PLAS-SCNC: 27 MMOL/L (ref 22–29)
GFR SERPL CREATININE-BSD FRML MDRD: 11 ML/MIN/1.73M2
GLUCOSE BLDC GLUCOMTR-MCNC: 238 MG/DL (ref 70–99)
GLUCOSE SERPL-MCNC: 85 MG/DL (ref 70–99)
HCT VFR BLD AUTO: 22.6 % (ref 40–53)
HGB BLD-MCNC: 7.5 G/DL (ref 13.3–17.7)
POTASSIUM SERPL-SCNC: 4.3 MMOL/L (ref 3.4–5.3)
SODIUM SERPL-SCNC: 140 MMOL/L (ref 136–145)

## 2023-07-17 PROCEDURE — 99232 SBSQ HOSP IP/OBS MODERATE 35: CPT | Performed by: HOSPITALIST

## 2023-07-17 PROCEDURE — 258N000003 HC RX IP 258 OP 636: Performed by: INTERNAL MEDICINE

## 2023-07-17 PROCEDURE — 250N000011 HC RX IP 250 OP 636: Mod: JZ | Performed by: STUDENT IN AN ORGANIZED HEALTH CARE EDUCATION/TRAINING PROGRAM

## 2023-07-17 PROCEDURE — 250N000011 HC RX IP 250 OP 636: Performed by: STUDENT IN AN ORGANIZED HEALTH CARE EDUCATION/TRAINING PROGRAM

## 2023-07-17 PROCEDURE — 250N000013 HC RX MED GY IP 250 OP 250 PS 637: Performed by: STUDENT IN AN ORGANIZED HEALTH CARE EDUCATION/TRAINING PROGRAM

## 2023-07-17 PROCEDURE — 80048 BASIC METABOLIC PNL TOTAL CA: CPT | Performed by: INTERNAL MEDICINE

## 2023-07-17 PROCEDURE — 99232 SBSQ HOSP IP/OBS MODERATE 35: CPT | Performed by: INTERNAL MEDICINE

## 2023-07-17 PROCEDURE — 120N000001 HC R&B MED SURG/OB

## 2023-07-17 PROCEDURE — 85018 HEMOGLOBIN: CPT | Performed by: HOSPITALIST

## 2023-07-17 RX ADMIN — INSULIN ASPART 2 UNITS: 100 INJECTION, SOLUTION INTRAVENOUS; SUBCUTANEOUS at 17:26

## 2023-07-17 RX ADMIN — HEPARIN SODIUM 5000 UNITS: 5000 INJECTION, SOLUTION INTRAVENOUS; SUBCUTANEOUS at 14:21

## 2023-07-17 RX ADMIN — BUMETANIDE 1 MG: 0.25 INJECTION, SOLUTION INTRAMUSCULAR; INTRAVENOUS at 21:41

## 2023-07-17 RX ADMIN — SODIUM CITRATE AND CITRIC ACID MONOHYDRATE 30 ML: 500; 334 SOLUTION ORAL at 21:41

## 2023-07-17 RX ADMIN — PIPERACILLIN AND TAZOBACTAM 2.25 G: 2; .25 INJECTION, POWDER, FOR SOLUTION INTRAVENOUS at 17:04

## 2023-07-17 RX ADMIN — PIPERACILLIN AND TAZOBACTAM 2.25 G: 2; .25 INJECTION, POWDER, FOR SOLUTION INTRAVENOUS at 05:18

## 2023-07-17 RX ADMIN — PIPERACILLIN AND TAZOBACTAM 2.25 G: 2; .25 INJECTION, POWDER, FOR SOLUTION INTRAVENOUS at 21:41

## 2023-07-17 RX ADMIN — SODIUM CITRATE AND CITRIC ACID MONOHYDRATE 30 ML: 500; 334 SOLUTION ORAL at 10:12

## 2023-07-17 RX ADMIN — MULTIPLE VITAMINS W/ MINERALS TAB 1 TABLET: TAB at 10:12

## 2023-07-17 RX ADMIN — BUMETANIDE 1 MG: 0.25 INJECTION, SOLUTION INTRAMUSCULAR; INTRAVENOUS at 10:13

## 2023-07-17 RX ADMIN — HEPARIN SODIUM 5000 UNITS: 5000 INJECTION, SOLUTION INTRAVENOUS; SUBCUTANEOUS at 21:41

## 2023-07-17 RX ADMIN — HEPARIN SODIUM 5000 UNITS: 5000 INJECTION, SOLUTION INTRAVENOUS; SUBCUTANEOUS at 05:18

## 2023-07-17 RX ADMIN — CARVEDILOL 25 MG: 25 TABLET, FILM COATED ORAL at 10:12

## 2023-07-17 RX ADMIN — CARVEDILOL 25 MG: 25 TABLET, FILM COATED ORAL at 17:04

## 2023-07-17 RX ADMIN — AMLODIPINE BESYLATE 5 MG: 5 TABLET ORAL at 10:12

## 2023-07-17 RX ADMIN — PIPERACILLIN AND TAZOBACTAM 2.25 G: 2; .25 INJECTION, POWDER, FOR SOLUTION INTRAVENOUS at 10:19

## 2023-07-17 RX ADMIN — EZETIMIBE 10 MG: 10 TABLET ORAL at 10:12

## 2023-07-17 ASSESSMENT — ACTIVITIES OF DAILY LIVING (ADL)
ADLS_ACUITY_SCORE: 36
ADLS_ACUITY_SCORE: 32
ADLS_ACUITY_SCORE: 36

## 2023-07-17 NOTE — PROGRESS NOTES
" Renal Medicine Progress Note            Assessment/Plan:     57 y.o man with CKD III due to DM and HTN, consulted for JOHNATHAN.      # CKD III:                -bl ~ 1.5-1.9 mg/dl (GFR 45-55)               -2.5 g/g albuminuria     # Severe acute kidney injury: Scr peaked at 8.7 mg/dl. Slowly improving.      # Infected left stump with osteomyelitis s/p I&D:               -dapto/Zosyn     # FEN: Some edema. Hyperphosphatemia.      # Anemia: Hgb is low.      Plan:  # Continue IV Bumex for another day  # Add-on iron study and phos  # Renal panel in AM          Interval History:     Afebrile. VSS.  Excellent urine output.  On Bumex.  Scr continues to trend down slowly.  He says he feels well.  No specific complaints form him.          Medications and Allergies:       amLODIPine  5 mg Oral Daily     bumetanide  1 mg Intravenous BID     carvedilol  25 mg Oral BID w/meals     DAPTOmycin (CUBICIN) 600 mg in sodium chloride 0.9 % 100 mL intermittent infusion  6 mg/kg (Adjusted) Intravenous Q48H     ezetimibe  10 mg Oral Daily     heparin ANTICOAGULANT  5,000 Units Subcutaneous Q8H     insulin aspart  1-7 Units Subcutaneous TID AC     insulin aspart  1-5 Units Subcutaneous At Bedtime     insulin degludec  20 Units Subcutaneous QAM     insulin degludec  22 Units Subcutaneous At Bedtime     multivitamin w/minerals  1 tablet Oral Daily     piperacillin-tazobactam  2.25 g Intravenous Q6H     sodium chloride (PF)  10-40 mL Intracatheter Q7 Days     sodium chloride (PF)  3 mL Intracatheter Q8H     sodium citrate-citric acid  30 mL Oral BID        Allergies   Allergen Reactions     Pravastatin      \"sucked the life blood out of me,\" Indicates this occurs with all statins.     Statins             Physical Exam:   Vitals were reviewed   , Blood pressure (!) 143/82, pulse 70, temperature 97.7  F (36.5  C), temperature source Oral, resp. rate 18, height 1.829 m (6'), weight 127.9 kg (281 lb 15.5 oz), SpO2 94 %.    Wt Readings from Last 3 " Encounters:   07/16/23 127.9 kg (281 lb 15.5 oz)   10/03/22 110 kg (242 lb 8.1 oz)   08/17/22 119.9 kg (264 lb 6.4 oz)       Intake/Output Summary (Last 24 hours) at 7/17/2023 1440  Last data filed at 7/17/2023 0554  Gross per 24 hour   Intake --   Output 1450 ml   Net -1450 ml       GENERAL APPEARANCE: NAD  HEENT:  Eyes/ears/nose/neck grossly normal  RESP: lungs cta b c good efforts  CV: RRR  ABDOMEN: Obese, soft, NT.  EXTREMITIES/SKIN: no rashes/lesions on observed skin; some edema  NEURO: Awake, alert and conversing         Data:     CBC RESULTS:     Recent Labs   Lab 07/17/23  0526 07/16/23  0652 07/15/23  0642 07/14/23  0540 07/13/23  0545 07/12/23  0614 07/11/23  0601   WBC  --  9.7 11.9* 10.0 11.1* 11.8* 13.1*   RBC  --  2.55* 2.68* 2.34* 2.55* 2.70* 2.80*   HGB 7.5* 7.4* 7.8* 6.8* 7.4* 7.8* 8.1*   HCT 22.6* 22.5* 23.7* 20.7* 22.6* 23.6* 24.4*   PLT  --  243 231 227 262 277 306       Basic Metabolic Panel:  Recent Labs   Lab 07/17/23 0526 07/16/23 0652 07/15/23  0642 07/14/23  0540 07/13/23  0545 07/12/23  0614    140 137 137 138 137   POTASSIUM 4.3 4.3 4.4 4.6 4.4 4.1   CHLORIDE 101 101 99 100 100 101   CO2 27 26 24 23 23 22   BUN 78.4* 79.6* 82.0* 83.1* 85.8* 83.8*   CR 5.73* 5.96* 6.26* 6.61* 6.54* 6.78*   GLC 85 77 166* 128* 161* 117*   FERNANDO 8.4* 8.3* 8.2* 8.2* 8.0* 8.0*       INRNo lab results found in last 7 days.   Attestation:   I have reviewed today's relevant vital signs, notes, medications, labs and imaging.    Jag Gomes MD  [facility] - Nephrology  Office phone :[phone]  Pager: [phone]

## 2023-07-17 NOTE — PLAN OF CARE
Date & Time: 2300-0700.  Surgery/POD#: Here for sepsis due to L BKA stump infection.  Behavior & Aggression: Green - no concerns.  Fall Risk: Yes.  Orientation: A&Ox4.  ABNL VS/O2: VSS on RA.  ABNL Labs: see chart.  Pain Management: Denies pain this shift.  Bowel/Bladder: Incontinent of B/B, active bowel sounds, had a couple loose BM this shift, passing gas.   IV/Drains: R PICC SL.   Diet: High consistent carb at 75g.  Activity Level: Ax2 w/ cares, lift.  Tests/Procedures: N/A.  Anticipated  DC Date: TBD.  Significant Information: RLE dressings changed per order.

## 2023-07-17 NOTE — PROGRESS NOTES
Date & Time: 1500-1930  Surgery/POD#: Left stump cellulitis, revision and JOHNATHAN   Behavior & Aggression: Green  Fall Risk:  Yes  Orientation:x4  ABNL VS/O2: Room air  ABNL Labs: See chart  Pain Management: Oxycodone 10mg   Bowel/Bladder:  Incontinent bladder/bowel  Drains: PICC triple lumen  Diet: Regular   Activity Level:  Ax2  Tests/Procedures: N/A  Anticipated  DC Date:  Pending   Significant Information:  Dressing changed per order set at 6pm.

## 2023-07-17 NOTE — PROGRESS NOTES
[facility]    Medicine Progress Note - Hospitalist Service    Date of Admission:  6/24/2023    Assessment & Plan     Ry Horner is a 57 year old male with medical history significant for uncontrolled type II DM with polyneuropathy and nephropathy, stage III CKD, bilateral BKA presented to the ER due to pain, swelling and redness with small ulcer at Lt BKA stump and found to have cellulitis/sepsis and is being admitted on 6/24/2023 for further management.      Sepsis due Lt BKA stump infection  S/p I&D of of left BKA stump on 06/30, 07/06 and 07/10  Patient presented with pain, redness, swelling at his left BKA stump site.  Marked leukocytosis of 22.9. Tachycardic to 110s.  X-ray Shows soft tissue swelling, some bone formation along the resection margin of the tibia with periosteal reaction.   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       - Vascular surgery waiting on medical optimization before proceeding with formal revision and closure.    - ID following and guiding antibiotics. Currently on dapto and zosyn. Most recent OR culture negative however remains on broad spectrum antibiotics. Per ID, plan is to stop antibiotics 24 hours after formalization of the BKA   -Plan is to return to the OR for closure timing per vascular surgery.       Acute kidney injury on CKD stage III   Cr @ admission 2.45, baseline ~ 1.6  * Initially improved to baseline and then worsening JOHNATHAN after OR 06/30     - nephrology consulted for severe JOHNATHAN, peak Cr of 8.7 07/08. Non oliguric and suspected to be ATN injury  - Cr continues to trend down and post ATN diuresis now, nephrology assisting with diuresis. Noted anasarca. Bumex changed to IV 7/14. Nephrology following.  Appreciate assistance.  - follow electrolytes and  output closely  - avoid nephrotoxic agents as able      Type II DM, uncontrolled, on insulin, A1c 7.6 on 6/27/23   Prior to admission on Tresiba 34 units twice a day with oral agents     - Currently holding PTA oral meds  - BG levels were not previously recorded as they were being checked by patient.   - BS in 100s, continue current Tresiba 20 untis qAM and 22 units qPM with mealtime sliding scale. As JOHNATHAN improves, suspect to see insulin needs increase.     Essential hypertension  Holding prior to admission lisinopril for JOHNATHAN, have started coreg and amlodipine in the interim.  - As needed hydralazine available     Mild hyponatremia -resolved     Acute on chronic anemia  -Hemoglobin at presentation was 12.8, slowly has drifted down, likely is multifactorial due to acute infections and ongoing surgeries  -1 unit PRBC 7/14 for Hb 6.8   -Hb stable at mid 7, follow daily. Transfuse if <7       Diet: Snacks/Supplements Adult: Expedite Cup; Between Meals  Snacks/Supplements Adult: Other; 8 pm: cottage cheese with pears; Between Meals  High Consistent Carb (75 g CHO per Meal) Diet    DVT Prophylaxis: Heparin SQ  Corrales Catheter: Not present  Lines: PRESENT      PICC 07/07/23 Triple Lumen Right Basilic-Site Assessment: WDL      Cardiac Monitoring: None  Code Status: Full Code      Clinically Significant Risk Factors              # Hypoalbuminemia: Lowest albumin = 2.4 g/dL at 7/8/2023  6:39 AM, will monitor as appropriate          # Obesity: Estimated body mass index is 38.24 kg/m  as calculated from the following:    Height as of this encounter: 1.829 m (6').    Weight as of this encounter: 127.9 kg (281 lb 15.5 oz).           Disposition Plan     Expected Discharge Date: 07/24/2023      Destination: home;home with family  Discharge Comments: GISSELL Robertson MD  Hospitalist Service  [facility]  Securely message with [email or web address] (more info)  Text page via [employer] Paging/Directory    ______________________________________________________________________    Interval History     Discussed with nursing staff and patient was seen this morning.  No acute issues reported.  Blood sugar was 85 this am.   -Continues to have Good urine output with IV bumex, and creatinine has continued to trend down slowly.    Physical Exam   Vital Signs: Temp: 97.7  F (36.5  C) Temp src: Oral BP: (!) 143/82 Pulse: 70   Resp: 18 SpO2: 94 % O2 Device: None (Room air)    Weight: 281 lbs 15.49 oz    General: AAOx3, appears comfortable.  HEENT: PERRLA EOMI. Mucosa moist.   Lungs: Bilateral equal air entry. Clear to auscultation, normal work of breathing.   CVS: S1S2 regular, no tachycardia or murmur.   Abdomen: Soft, obese/distended, nontender. BS heard.  MSK: Bilateral BKA, dressing covering the stump on left.  Thighs are swollen.  Neuro: AAOX3. CN 2-12 normal.    Skin: No rash.  Appears pale      Medical Decision Making       35 MINUTES SPENT BY ME on the date of service doing chart review, history, exam, documentation & further activities per the note.      Data     I have personally reviewed the following data over the past 24 hrs:    N/A  \   7.5 (L)   / N/A     140 101 78.4 (H) /  85   4.3 27 5.73 (H) \       Imaging results reviewed over the past 24 hrs:   No results found for this or any previous visit (from the past 24 hour(s)).

## 2023-07-17 NOTE — PLAN OF CARE
Date & Time: 7/17/23 1354-6646  Fall Risk: Yes  Orientation:A&Ox4  ABNL VS/O2:VSS on RA  ABNL Labs: BG 73, 84, 24, 195. Creat 5.73  Pain Management:Denies  Bowel/Bladder: Inc of B/B, external cath.   Drains: PICC SL. Penrose drains to LLE  Diet: High consistent carb  Activity Level: Lift  Tests/Procedures: Dressing changes q4hrs, last changed at 1700  Anticipated  DC Date: Pending  Significant Information: Neph/Vascular following. BGM per patients meter.

## 2023-07-17 NOTE — PLAN OF CARE
Date & Time: 3875-4287  Surgery/POD#: Left stump cellulitis, revision and JOHNATHAN   Behavior & Aggression: Green  Fall Risk:  Yes  Orientation:x4  ABNL VS/O2: Room air  ABNL Labs: See chart  Pain Management: Oxycodone 10mg with dressing change  Bowel/Bladder:  Incontinent bladder/bowel  Drains: PICC triple lumen  Diet: Regular   Activity Level:  Ax2 lift   Tests/Procedures: N/A  Anticipated  DC Date:  Pending   Significant Information:  Dressing changed per order set at 10pm, pt has [place] 3 checks his BG on phone

## 2023-07-18 LAB
ANION GAP SERPL CALCULATED.3IONS-SCNC: 13 MMOL/L (ref 7–15)
BUN SERPL-MCNC: 74.1 MG/DL (ref 6–20)
CALCIUM SERPL-MCNC: 8.3 MG/DL (ref 8.6–10)
CHLORIDE SERPL-SCNC: 102 MMOL/L (ref 98–107)
CREAT SERPL-MCNC: 5.52 MG/DL (ref 0.67–1.17)
CREAT SERPL-MCNC: 5.57 MG/DL (ref 0.67–1.17)
DEPRECATED HCO3 PLAS-SCNC: 26 MMOL/L (ref 22–29)
ERYTHROCYTE [DISTWIDTH] IN BLOOD BY AUTOMATED COUNT: 12.7 % (ref 10–15)
FERRITIN SERPL-MCNC: 329 NG/ML (ref 31–409)
GFR SERPL CREATININE-BSD FRML MDRD: 11 ML/MIN/1.73M2
GFR SERPL CREATININE-BSD FRML MDRD: 11 ML/MIN/1.73M2
GLUCOSE SERPL-MCNC: 84 MG/DL (ref 70–99)
HCT VFR BLD AUTO: 22.4 % (ref 40–53)
HGB BLD-MCNC: 7.3 G/DL (ref 13.3–17.7)
IRON BINDING CAPACITY (ROCHE): 155 UG/DL (ref 240–430)
IRON SATN MFR SERPL: 20 % (ref 15–46)
IRON SERPL-MCNC: 31 UG/DL (ref 61–157)
MCH RBC QN AUTO: 29.1 PG (ref 26.5–33)
MCHC RBC AUTO-ENTMCNC: 32.6 G/DL (ref 31.5–36.5)
MCV RBC AUTO: 89 FL (ref 78–100)
PHOSPHATE SERPL-MCNC: 5.7 MG/DL (ref 2.5–4.5)
PLATELET # BLD AUTO: 261 10E3/UL (ref 150–450)
POTASSIUM SERPL-SCNC: 4.1 MMOL/L (ref 3.4–5.3)
POTASSIUM SERPL-SCNC: 4.2 MMOL/L (ref 3.4–5.3)
RBC # BLD AUTO: 2.51 10E6/UL (ref 4.4–5.9)
SODIUM SERPL-SCNC: 141 MMOL/L (ref 136–145)
WBC # BLD AUTO: 11.5 10E3/UL (ref 4–11)

## 2023-07-18 PROCEDURE — 83550 IRON BINDING TEST: CPT | Performed by: INTERNAL MEDICINE

## 2023-07-18 PROCEDURE — 99231 SBSQ HOSP IP/OBS SF/LOW 25: CPT | Mod: 24

## 2023-07-18 PROCEDURE — 250N000011 HC RX IP 250 OP 636: Performed by: STUDENT IN AN ORGANIZED HEALTH CARE EDUCATION/TRAINING PROGRAM

## 2023-07-18 PROCEDURE — 250N000011 HC RX IP 250 OP 636: Mod: JZ | Performed by: STUDENT IN AN ORGANIZED HEALTH CARE EDUCATION/TRAINING PROGRAM

## 2023-07-18 PROCEDURE — 250N000013 HC RX MED GY IP 250 OP 250 PS 637: Performed by: STUDENT IN AN ORGANIZED HEALTH CARE EDUCATION/TRAINING PROGRAM

## 2023-07-18 PROCEDURE — 99232 SBSQ HOSP IP/OBS MODERATE 35: CPT | Performed by: HOSPITALIST

## 2023-07-18 PROCEDURE — 85027 COMPLETE CBC AUTOMATED: CPT | Performed by: STUDENT IN AN ORGANIZED HEALTH CARE EDUCATION/TRAINING PROGRAM

## 2023-07-18 PROCEDURE — 250N000013 HC RX MED GY IP 250 OP 250 PS 637

## 2023-07-18 PROCEDURE — 84100 ASSAY OF PHOSPHORUS: CPT | Performed by: INTERNAL MEDICINE

## 2023-07-18 PROCEDURE — 99232 SBSQ HOSP IP/OBS MODERATE 35: CPT | Performed by: INTERNAL MEDICINE

## 2023-07-18 PROCEDURE — 250N000013 HC RX MED GY IP 250 OP 250 PS 637: Performed by: HOSPITALIST

## 2023-07-18 PROCEDURE — 82728 ASSAY OF FERRITIN: CPT | Performed by: INTERNAL MEDICINE

## 2023-07-18 PROCEDURE — 120N000001 HC R&B MED SURG/OB

## 2023-07-18 PROCEDURE — 82565 ASSAY OF CREATININE: CPT | Performed by: HOSPITALIST

## 2023-07-18 PROCEDURE — 80048 BASIC METABOLIC PNL TOTAL CA: CPT | Performed by: HOSPITALIST

## 2023-07-18 PROCEDURE — 84132 ASSAY OF SERUM POTASSIUM: CPT | Performed by: HOSPITALIST

## 2023-07-18 PROCEDURE — 258N000003 HC RX IP 258 OP 636: Performed by: STUDENT IN AN ORGANIZED HEALTH CARE EDUCATION/TRAINING PROGRAM

## 2023-07-18 RX ORDER — AMLODIPINE BESYLATE 5 MG/1
5 TABLET ORAL ONCE
Status: COMPLETED | OUTPATIENT
Start: 2023-07-18 | End: 2023-07-18

## 2023-07-18 RX ORDER — AMLODIPINE BESYLATE 10 MG/1
10 TABLET ORAL DAILY
Status: DISCONTINUED | OUTPATIENT
Start: 2023-07-19 | End: 2023-07-20

## 2023-07-18 RX ADMIN — SODIUM CITRATE AND CITRIC ACID MONOHYDRATE 30 ML: 500; 334 SOLUTION ORAL at 21:59

## 2023-07-18 RX ADMIN — AMLODIPINE BESYLATE 5 MG: 5 TABLET ORAL at 12:07

## 2023-07-18 RX ADMIN — BUMETANIDE 1 MG: 0.25 INJECTION, SOLUTION INTRAMUSCULAR; INTRAVENOUS at 09:04

## 2023-07-18 RX ADMIN — ACETAMINOPHEN 650 MG: 325 TABLET, FILM COATED ORAL at 09:01

## 2023-07-18 RX ADMIN — CARVEDILOL 25 MG: 25 TABLET, FILM COATED ORAL at 17:20

## 2023-07-18 RX ADMIN — EZETIMIBE 10 MG: 10 TABLET ORAL at 08:57

## 2023-07-18 RX ADMIN — HEPARIN SODIUM 5000 UNITS: 5000 INJECTION, SOLUTION INTRAVENOUS; SUBCUTANEOUS at 06:46

## 2023-07-18 RX ADMIN — PIPERACILLIN AND TAZOBACTAM 2.25 G: 2; .25 INJECTION, POWDER, FOR SOLUTION INTRAVENOUS at 21:57

## 2023-07-18 RX ADMIN — SODIUM CITRATE AND CITRIC ACID MONOHYDRATE 30 ML: 500; 334 SOLUTION ORAL at 08:57

## 2023-07-18 RX ADMIN — CARVEDILOL 25 MG: 25 TABLET, FILM COATED ORAL at 08:57

## 2023-07-18 RX ADMIN — HEPARIN SODIUM 5000 UNITS: 5000 INJECTION, SOLUTION INTRAVENOUS; SUBCUTANEOUS at 21:57

## 2023-07-18 RX ADMIN — PIPERACILLIN AND TAZOBACTAM 2.25 G: 2; .25 INJECTION, POWDER, FOR SOLUTION INTRAVENOUS at 17:20

## 2023-07-18 RX ADMIN — DAPTOMYCIN 600 MG: 500 INJECTION, POWDER, LYOPHILIZED, FOR SOLUTION INTRAVENOUS at 14:53

## 2023-07-18 RX ADMIN — HEPARIN SODIUM 5000 UNITS: 5000 INJECTION, SOLUTION INTRAVENOUS; SUBCUTANEOUS at 14:53

## 2023-07-18 RX ADMIN — PIPERACILLIN AND TAZOBACTAM 2.25 G: 2; .25 INJECTION, POWDER, FOR SOLUTION INTRAVENOUS at 03:55

## 2023-07-18 RX ADMIN — OXYCODONE HYDROCHLORIDE 10 MG: 5 TABLET ORAL at 22:08

## 2023-07-18 RX ADMIN — AMLODIPINE BESYLATE 5 MG: 5 TABLET ORAL at 08:57

## 2023-07-18 RX ADMIN — MULTIPLE VITAMINS W/ MINERALS TAB 1 TABLET: TAB at 08:57

## 2023-07-18 RX ADMIN — PIPERACILLIN AND TAZOBACTAM 2.25 G: 2; .25 INJECTION, POWDER, FOR SOLUTION INTRAVENOUS at 12:07

## 2023-07-18 ASSESSMENT — ACTIVITIES OF DAILY LIVING (ADL)
ADLS_ACUITY_SCORE: 36

## 2023-07-18 NOTE — PROGRESS NOTES
VASCULAR SURGERY PROGRESS NOTE    Subjective:  Pain well controlled, swelling improving over LLE. No acute events overnight.    Objective:  Intake/Output Summary (Last 24 hours) at 7/18/2023 0836  Last data filed at 7/18/2023 0300  Gross per 24 hour   Intake 240 ml   Output 800 ml   Net -560 ml     PHYSICAL EXAM:  BP (!) 148/86 (BP Location: Left arm)   Pulse 76   Temp 98.1  F (36.7  C) (Oral)   Resp 16   Ht 1.829 m (6')   Wt 126.3 kg (278 lb 7.1 oz)   SpO2 93%   BMI 37.76 kg/m    Alert and oriented x4  WBC trending up to 11.5, awaiting Cr      ASSESSMENT:  Mr. Horner is a 57M who underwent staged left below-knee amputation in 09/2022. This healed well and he was ambulatory on his prosthetic. He presented with sudden onset of pain, swelling, and wound on the distal tip of the left below-knee stump, with surrounding cellulitis. He underwent left BKA stump debridement on 6/30/23 and repeat debridement with drain placement on 7/6/23 and 7/10/23.      PLAN:  -Continue wet to dry dressings, outlined in orders, q4hrs  -continue to monitor renal function and WBC, once normalizes will attempt formal revision and closure of BKA stump  -defer to primary team and nephrology in terms of generalized edema  -physical therapy    Anotnieta Perdomo NP  VASCULAR SURGERY

## 2023-07-18 NOTE — PROGRESS NOTES
Renal Medicine Progress Note            Assessment/Plan:     57 y.o man with CKD III due to DM and HTN, consulted for JOHNATHAN.      # CKD III:                -bl ~ 1.5-1.9 mg/dl (GFR 45-55)               -2.5 g/g albuminuria     # Severe acute kidney injury: Scr peaked at 8.7 mg/dl. Slowly improving.      # Infected left stump with osteomyelitis s/p I&D:               -dapto/Zosyn     # FEN: Patient does not have generalized edema. His edema is much better. Electrolytes are acceptable.      # Anemia: Hgb is low. Fe def.      Plan:  # Start IV iron if okay with ID  # Hold Bumex  # If kidney function remains the same in the next couple days, he may decide to proceed with stump revision, which is not unreasonable as we do not know when and where his kidney function will settle. He is frustrated. He would like something to be done soon. I had a long discussion with him.         Interval History:      Afebrile. VSS.   He is frustrated.   He wants a plan as to know what the plan for the stump revision.   I discussed vascular's recommendations with him.          Medications and Allergies:       [START ON 7/19/2023] amLODIPine  10 mg Oral Daily     [Held by provider] bumetanide  1 mg Intravenous BID     carvedilol  25 mg Oral BID w/meals     DAPTOmycin (CUBICIN) 600 mg in sodium chloride 0.9 % 100 mL intermittent infusion  6 mg/kg (Adjusted) Intravenous Q48H     ezetimibe  10 mg Oral Daily     heparin ANTICOAGULANT  5,000 Units Subcutaneous Q8H     insulin aspart  1-7 Units Subcutaneous TID AC     insulin aspart  1-5 Units Subcutaneous At Bedtime     insulin degludec  20 Units Subcutaneous QAM     insulin degludec  22 Units Subcutaneous At Bedtime     multivitamin w/minerals  1 tablet Oral Daily     piperacillin-tazobactam  2.25 g Intravenous Q6H     sodium chloride (PF)  10-40 mL Intracatheter Q7 Days     sodium chloride (PF)  3 mL Intracatheter Q8H     sodium citrate-citric acid  30 mL Oral BID        Allergies   Allergen  "Reactions     Pravastatin      \"sucked the life blood out of me,\" Indicates this occurs with all statins.     Statins             Physical Exam:   Vitals were reviewed   , Blood pressure 130/78, pulse 76, temperature 98.1  F (36.7  C), temperature source Oral, resp. rate 16, height 1.829 m (6'), weight 126.3 kg (278 lb 7.1 oz), SpO2 93 %.    Wt Readings from Last 3 Encounters:   07/18/23 126.3 kg (278 lb 7.1 oz)   10/03/22 110 kg (242 lb 8.1 oz)   08/17/22 119.9 kg (264 lb 6.4 oz)       Intake/Output Summary (Last 24 hours) at 7/18/2023 1226  Last data filed at 7/18/2023 0300  Gross per 24 hour   Intake 240 ml   Output 800 ml   Net -560 ml     GENERAL APPEARANCE: NAD  HEENT:  Eyes/ears/nose/neck grossly normal  RESP: lungs cta b c good efforts  CV: RRR  ABDOMEN: Obese, soft, NT.  EXTREMITIES/SKIN: no rashes/lesions on observed skin; edema is much better.   NEURO: Awake, alert and conversing.             Data:     CBC RESULTS:     Recent Labs   Lab 07/18/23  0702 07/17/23  0526 07/16/23  0652 07/15/23  0642 07/14/23  0540 07/13/23  0545 07/12/23  0614   WBC 11.5*  --  9.7 11.9* 10.0 11.1* 11.8*   RBC 2.51*  --  2.55* 2.68* 2.34* 2.55* 2.70*   HGB 7.3* 7.5* 7.4* 7.8* 6.8* 7.4* 7.8*   HCT 22.4* 22.6* 22.5* 23.7* 20.7* 22.6* 23.6*     --  243 231 227 262 277       Basic Metabolic Panel:  Recent Labs   Lab 07/18/23  0924 07/18/23  0655 07/17/23  0526 07/16/23  0652 07/15/23  0642 07/14/23  0540 07/13/23  0545     --  140 140 137 137 138   POTASSIUM 4.1 4.2 4.3 4.3 4.4 4.6 4.4   CHLORIDE 102  --  101 101 99 100 100   CO2 26  --  27 26 24 23 23   BUN 74.1*  --  78.4* 79.6* 82.0* 83.1* 85.8*   CR 5.52* 5.57* 5.73* 5.96* 6.26* 6.61* 6.54*   GLC 84  --  85 77 166* 128* 161*   FERNANDO 8.3*  --  8.4* 8.3* 8.2* 8.2* 8.0*       INRNo lab results found in last 7 days.   Attestation:   I have reviewed today's relevant vital signs, notes, medications, labs and imaging.    Jag Gomes MD  [facility] - " Nephrology  Office phone :[phone]  Pager: [phone]

## 2023-07-18 NOTE — PROGRESS NOTES
"Care Management Follow Up    Length of Stay (days): 24    Expected Discharge Date: 07/21/2023     Concerns to be Addressed: denies needs/concerns at this time, no discharge needs identified     Patient plan of care discussed at interdisciplinary rounds: Yes    Anticipated Discharge Disposition: Home     Anticipated Discharge Services: None  Anticipated Discharge DME: None    Patient/family educated on Medicare website which has current facility and service quality ratings:    Education Provided on the Discharge Plan:    Patient/Family in Agreement with the Plan: yes    Referrals Placed by CM/SW:    Private pay costs discussed: Not applicable    Additional Information:  Writer went to see patient at bedside per discussions during rounds. Patient is frustrated that no plan of care or plan in general has been established. Patient is requesting a care conference with all disciplines present to discuss goals of care and plan moving forward.     Writer discussed potential ARU/TCU recommendations and although patient may be interested in participating in rehab he is even more interested in figuring out \"what is wrong with him, and what the plan is to fix it.\"       GLORIA Medina      "

## 2023-07-18 NOTE — PROGRESS NOTES
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      Sepsis due Lt BKA stump infection  S/p I&D of of left BKA stump on 06/30, 07/06 and 07/10  Patient presented with pain, redness, swelling at his left BKA stump site.  Marked leukocytosis of 22.9. Tachycardic to 110s.  X-ray Shows soft tissue swelling, some bone formation along the resection margin of the tibia with periosteal reaction.   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       - Vascular surgeryplanning to proceed for formal revision and closure once medically optimized   - ID following and guiding antibiotics. Currently on dapto and zosyn. Most recent OR culture negative however remains on broad spectrum antibiotics. Per ID, plan is to stop antibiotics 24 hours after formalization of the BKA   - Pain control, PT.        Acute kidney injury on CKD stage III   Cr @ admission 2.45, baseline ~ 1.6  * Initially improved to baseline and then worsening JOHNATHAN after OR 06/30     - nephrology consulted for severe JOHNATHAN, peak Cr of 8.7 07/08. Non oliguric and suspected to be ATN injury  - Cr continues to trend down and post ATN diuresis now, nephrology assisting with diuresis. Noted anasarca. Bumex changed to IV 7/14. Nephrology following.  Appreciate assistance.  - follow electrolytes and output closely  - avoid nephrotoxic agents as able      Type  II DM, uncontrolled, on insulin, A1c 7.6 on 6/27/23   Prior to admission on Tresiba 34 units twice a day with oral agents     - Currently holding PTA oral meds  - BG levels are obtained via patient's dexcom.  - BS in 100s, continue current Tresiba 20 untis qAM and 22 units qPM with mealtime sliding scale. As JOHNATHAN improves, suspect to see insulin needs increase.     Essential hypertension  Holding prior to admission lisinopril for JOHNATHAN, have started coreg and amlodipine in the interim.  - BP remains elevated, increase amlodipine to 10 Mg p.o. daily  - As needed hydralazine available     Mild hyponatremia -resolved     Acute on chronic anemia  -Hemoglobin at presentation was 12.8, slowly has drifted down, likely is multifactorial due to acute infections and ongoing surgeries  -1 unit PRBC 7/14 for Hb 6.8   -Hb stable at mid 7, follow daily. Transfuse if <7       Diet: Snacks/Supplements Adult: Expedite Cup; Between Meals  Snacks/Supplements Adult: Other; 8 pm: cottage cheese with pears; Between Meals  High Consistent Carb (75 g CHO per Meal) Diet    DVT Prophylaxis: Heparin SQ  Corrales Catheter: Not present  Lines: PRESENT      PICC 07/07/23 Triple Lumen Right Basilic-Site Assessment: WDL      Cardiac Monitoring: None  Code Status: Full Code      Clinically Significant Risk Factors              # Hypoalbuminemia: Lowest albumin = 2.4 g/dL at 7/8/2023  6:39 AM, will monitor as appropriate          # Obesity: Estimated body mass index is 37.76 kg/m  as calculated from the following:    Height as of this encounter: 1.829 m (6').    Weight as of this encounter: 126.3 kg (278 lb 7.1 oz).           Disposition Plan      Expected Discharge Date: 07/21/2023      Destination: home;home with family  Discharge Comments: GISSELL Robertson MD  Hospitalist Service  [facility]  Securely message with [employer] (more info)  Text page via [employer] Paging/Directory    ______________________________________________________________________    Interval History     No acute issues reported, urine output was not charted, reviewed with nursing staff.  Creatinine has continued to trend down.     Physical Exam   Vital Signs: Temp: 98.1  F (36.7  C) Temp src: Oral BP: (!) 148/86 Pulse: 76   Resp: 16 SpO2: 93 % O2 Device: None (Room air)    Weight: 278 lbs 7.06 oz    General: AAOx3, appears comfortable.  HEENT: PERRLA EOMI. Mucosa moist.   Lungs: Bilateral equal air entry. Clear to auscultation, normal work of breathing.   CVS: S1S2 regular, no tachycardia or murmur.   Abdomen: Soft, obese/distended, nontender. BS heard.  MSK: Bilateral BKA, dressing covering the stump and on the left thigh. Thighs are swollen.  Neuro: AAOX3. CN 2-12 normal.    Skin: No rash.  Appears pale      Medical Decision Making       35 MINUTES SPENT BY ME on the date of service doing chart review, history, exam, documentation & further activities per the note.      Data     I have personally reviewed the following data over the past 24 hrs:    11.5 (H)  \   7.3 (L)   / 261     N/A N/A N/A /  N/A   4.2 N/A 5.57 (H) \       Imaging results reviewed over the past 24 hrs:   No results found for this or any previous visit (from the past 24 hour(s)).

## 2023-07-19 ENCOUNTER — APPOINTMENT (OUTPATIENT)
Dept: PHYSICAL THERAPY | Facility: CLINIC | Age: 58
DRG: 463 | End: 2023-07-19
Payer: COMMERCIAL

## 2023-07-19 LAB
ALBUMIN SERPL BCG-MCNC: 2.9 G/DL (ref 3.5–5.2)
ANION GAP SERPL CALCULATED.3IONS-SCNC: 12 MMOL/L (ref 7–15)
BUN SERPL-MCNC: 72.8 MG/DL (ref 6–20)
CALCIUM SERPL-MCNC: 8.2 MG/DL (ref 8.6–10)
CHLORIDE SERPL-SCNC: 102 MMOL/L (ref 98–107)
CREAT SERPL-MCNC: 5.26 MG/DL (ref 0.67–1.17)
DEPRECATED HCO3 PLAS-SCNC: 26 MMOL/L (ref 22–29)
ERYTHROCYTE [DISTWIDTH] IN BLOOD BY AUTOMATED COUNT: 12.9 % (ref 10–15)
GFR SERPL CREATININE-BSD FRML MDRD: 12 ML/MIN/1.73M2
GLUCOSE SERPL-MCNC: 81 MG/DL (ref 70–99)
HCT VFR BLD AUTO: 24 % (ref 40–53)
HGB BLD-MCNC: 7.7 G/DL (ref 13.3–17.7)
MCH RBC QN AUTO: 28.7 PG (ref 26.5–33)
MCHC RBC AUTO-ENTMCNC: 32.1 G/DL (ref 31.5–36.5)
MCV RBC AUTO: 90 FL (ref 78–100)
PHOSPHATE SERPL-MCNC: 5.9 MG/DL (ref 2.5–4.5)
PLATELET # BLD AUTO: 269 10E3/UL (ref 150–450)
POTASSIUM SERPL-SCNC: 4.3 MMOL/L (ref 3.4–5.3)
RBC # BLD AUTO: 2.68 10E6/UL (ref 4.4–5.9)
SODIUM SERPL-SCNC: 140 MMOL/L (ref 136–145)
WBC # BLD AUTO: 9.4 10E3/UL (ref 4–11)

## 2023-07-19 PROCEDURE — 250N000013 HC RX MED GY IP 250 OP 250 PS 637: Performed by: STUDENT IN AN ORGANIZED HEALTH CARE EDUCATION/TRAINING PROGRAM

## 2023-07-19 PROCEDURE — 120N000001 HC R&B MED SURG/OB

## 2023-07-19 PROCEDURE — 250N000011 HC RX IP 250 OP 636: Performed by: STUDENT IN AN ORGANIZED HEALTH CARE EDUCATION/TRAINING PROGRAM

## 2023-07-19 PROCEDURE — 250N000011 HC RX IP 250 OP 636: Mod: JZ | Performed by: STUDENT IN AN ORGANIZED HEALTH CARE EDUCATION/TRAINING PROGRAM

## 2023-07-19 PROCEDURE — 99232 SBSQ HOSP IP/OBS MODERATE 35: CPT | Performed by: INTERNAL MEDICINE

## 2023-07-19 PROCEDURE — 82040 ASSAY OF SERUM ALBUMIN: CPT | Performed by: INTERNAL MEDICINE

## 2023-07-19 PROCEDURE — 97162 PT EVAL MOD COMPLEX 30 MIN: CPT | Mod: GP

## 2023-07-19 PROCEDURE — 85027 COMPLETE CBC AUTOMATED: CPT | Performed by: HOSPITALIST

## 2023-07-19 PROCEDURE — 99232 SBSQ HOSP IP/OBS MODERATE 35: CPT | Performed by: HOSPITALIST

## 2023-07-19 PROCEDURE — 250N000013 HC RX MED GY IP 250 OP 250 PS 637: Performed by: INTERNAL MEDICINE

## 2023-07-19 RX ADMIN — PIPERACILLIN AND TAZOBACTAM 2.25 G: 2; .25 INJECTION, POWDER, FOR SOLUTION INTRAVENOUS at 16:44

## 2023-07-19 RX ADMIN — PIPERACILLIN AND TAZOBACTAM 2.25 G: 2; .25 INJECTION, POWDER, FOR SOLUTION INTRAVENOUS at 10:58

## 2023-07-19 RX ADMIN — EZETIMIBE 10 MG: 10 TABLET ORAL at 09:09

## 2023-07-19 RX ADMIN — PIPERACILLIN AND TAZOBACTAM 2.25 G: 2; .25 INJECTION, POWDER, FOR SOLUTION INTRAVENOUS at 04:42

## 2023-07-19 RX ADMIN — HEPARIN SODIUM 5000 UNITS: 5000 INJECTION, SOLUTION INTRAVENOUS; SUBCUTANEOUS at 14:20

## 2023-07-19 RX ADMIN — PIPERACILLIN AND TAZOBACTAM 2.25 G: 2; .25 INJECTION, POWDER, FOR SOLUTION INTRAVENOUS at 22:17

## 2023-07-19 RX ADMIN — HEPARIN SODIUM 5000 UNITS: 5000 INJECTION, SOLUTION INTRAVENOUS; SUBCUTANEOUS at 06:16

## 2023-07-19 RX ADMIN — HEPARIN SODIUM 5000 UNITS: 5000 INJECTION, SOLUTION INTRAVENOUS; SUBCUTANEOUS at 22:20

## 2023-07-19 RX ADMIN — SODIUM CITRATE AND CITRIC ACID MONOHYDRATE 30 ML: 500; 334 SOLUTION ORAL at 09:09

## 2023-07-19 RX ADMIN — SODIUM CITRATE AND CITRIC ACID MONOHYDRATE 30 ML: 500; 334 SOLUTION ORAL at 21:03

## 2023-07-19 RX ADMIN — MULTIPLE VITAMINS W/ MINERALS TAB 1 TABLET: TAB at 09:09

## 2023-07-19 RX ADMIN — CARVEDILOL 25 MG: 25 TABLET, FILM COATED ORAL at 17:50

## 2023-07-19 RX ADMIN — CARVEDILOL 25 MG: 25 TABLET, FILM COATED ORAL at 09:09

## 2023-07-19 RX ADMIN — AMLODIPINE BESYLATE 10 MG: 10 TABLET ORAL at 09:09

## 2023-07-19 ASSESSMENT — ACTIVITIES OF DAILY LIVING (ADL)
ADLS_ACUITY_SCORE: 36

## 2023-07-19 NOTE — PROGRESS NOTES
Care Management Follow Up    Length of Stay (days): 25    Expected Discharge Date: 07/21/2023     Concerns to be Addressed: denies needs/concerns at this time, no discharge needs identified     Patient plan of care discussed at interdisciplinary rounds: Yes    Anticipated Discharge Disposition: Home     Anticipated Discharge Services: None  Anticipated Discharge DME: None    Patient/family educated on Medicare website which has current facility and service quality ratings:    Education Provided on the Discharge Plan:    Patient/Family in Agreement with the Plan: yes    Referrals Placed by CM/SW:    Private pay costs discussed: Not applicable    Additional Information:  Per SW, pt requesting a Care Conference with Vascular Surgery, Nephrology and Hospitalist.  Sent page to Dr. Child regarding this.  Received call from Dr. Child and she shared that tomorrow, Thursday, July 20th at 11AM will work for a Care Conference.   confirmed he can attend at this time and Dr. Maza stated she is able to attend Care Conference at 11am tomorrow.  Message Dr. Ohara date & time of care conference.  SW informed patient that Care Conference is plan and who will attend.    Inpatient Care Coordination will continue to follow for discharge planning.  RALPH Angeles RN, BSN, OCN   Inpatient Care Coordination General Surgery   [facility]  Office: [phone]

## 2023-07-19 NOTE — PROGRESS NOTES
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      Sepsis due Lt BKA stump infection  S/p I&D of of left BKA stump on 06/30, 07/06 and 07/10  -Patient presented with pain, redness, swelling at his left BKA stump site.  Marked leukocytosis of 22.9. Tachycardic to 110s.  X-ray Shows soft tissue swelling, some bone formation along the resection margin of the tibia with periosteal reaction.   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     - Vascular surgeryplanning to proceed for formal revision and closure once medically optimized   - ID following and guiding antibiotics and  Currently on dapto and zosyn. Most recent OR culture negative however remains on broad spectrum antibiotics. Per ID, plan is to stop antibiotics 24 hours after formalization of the BKA   -Continue with pain control, PT.   -Patient is frustrated as he is not very clear about plan of care and I discussed with him and care coordinator team and we will have care conference tomorrow and the patient was satisfied        Acute kidney injury on CKD stage III   -Cr @ admission 2.45, baseline ~ 1.6  * Initially improved to baseline and then worsening JOHNATHAN after OR 06/30   - nephrology consulted for severe JOHNATHAN, peak Cr of 8.7 07/08. Non oliguric and suspected to be ATN injury  - Cr continues to trend down and post ATN diuresis now,  nephrology assisting with diuresis and he is currently on IV Bumex which was started on 7/14  -I did review intake and output and he is -12.6 L since admission  -We will continue to monitor renal function, continue to avoid nephrotoxic agents  -Nephrology following and creatinine today is 5.26 appreciate input from nephrology team      Type II DM, uncontrolled, on insulin, A1c 7.6 on 6/27/23   -Prior to admission on Tresiba 34 units twice a day with oral agents  -His oral hypoglycemic agents were held on admission  - BG levels are obtained via patient's dexcom.  -Patient is currently onTresiba 20 untis qAM and 22 units qPM with mealtime sliding scale.  -We do expect that as JOHNATHAN improves, his insulin needs will need to go up and we will continue to monitor closely    Essential hypertension  -Given his JOHNATHAN his lisinopril was held but he was continued with Coreg and amlodipine  -Given his elevated blood pressure his amlodipine dose was increased to 10 mg  -Blood pressure this morning is 143/82 and will continue with the increased dose of amlodipine and 10 mg, Coreg, as needed hydralazine       Mild hyponatremia -resolved     Acute on chronic anemia  -Hemoglobin at presentation was 12.8, slowly has drifted down, likely was multifactorial due to acute infections and ongoing surgeries  -1 unit PRBC 7/14 for Hb 6.8   -Hemoglobin on 7/18 was 7.3 and today is 7.7  -We will continue to monitor and transfuse for hemoglobin less than 7    Lines  -He has a triple-lumen on the right basilic vein which was placed on 7/7 and he does have ongoing needs given his infection and is on IV antibiotics and he will reassess daily about ongoing needs of catheter to prevent line infection  -Drain in place over the thigh        Diet: Snacks/Supplements Adult: Expedite Cup; Between Meals  Snacks/Supplements Adult: Other; 8 pm: cottage cheese with pears; Between Meals  High Consistent Carb (75 g CHO per Meal) Diet    DVT Prophylaxis: Heparin  SQ  Corrales Catheter: Not present  Lines: PRESENT      PICC 07/07/23 Triple Lumen Right Basilic-Site Assessment: WDL      Cardiac Monitoring: None  Code Status: Full Code      Clinically Significant Risk Factors              # Hypoalbuminemia: Lowest albumin = 2.4 g/dL at 7/8/2023  6:39 AM, will monitor as appropriate    # Acute Kidney Injury, unspecified: based on a >150% or 0.3 mg/dL increase in last creatinine compared to past 90 day average, will monitor renal function  # Hypertension: Noted on problem list       # DMII: A1C = 7.6 % (Ref range: <5.7 %) within past 6 months   # Obesity: Estimated body mass index is 37.73 kg/m  as calculated from the following:    Height as of this encounter: 1.829 m (6').    Weight as of this encounter: 126.2 kg (278 lb 3.5 oz).           Disposition Plan     Expected Discharge Date: 07/21/2023      Destination: home;home with family  Discharge Comments: TCBEAR Ohara MD  Hospitalist Service  [facility]  Securely message with [employer] (more info)  Text page via [facility] Paging/Directory   ______________________________________________________________________    Interval History     Seen this morning and was laying in the bed and his local pain is well controlled and he has been tolerating the diet.  He is on room air.  He denies any shortness of breath or chest discomfort.    Discussed plan of care with patient's nurse and I reviewed notes from nephrology, vascular surgery and patient had questions which were answered to satisfaction and we will have care coordinator meeting tomorrow    Physical Exam   Vital Signs: Temp: 98.1  F (36.7  C) Temp src: Oral BP: (!) 143/82 Pulse: 77   Resp: 18 SpO2: 98 % O2 Device: None (Room air)    Weight: 278 lbs 3.53 oz        General: Patient appears comfortable and in no acute distress.  HEENT: Head is atraumatic, normocephalic.   Neck: Neck is supple   Respiratory: Lungs are clear to auscultation bilaterally with  no wheeze or crackles   Cardiovascular: Regular rate , S1 and S2 normal with no murmer or rubs or gallops and has edema over lower  Abdomen:   soft , non tender , non distended and bowel sound present   Skin: No skin rashes   Neurologic:  No facial droop  Musculoskeletal: Normal Range of motion over upper and has  right BKA and has left BKA and has drain in place  Psychiatric: cooperative   Lines: PICC line    Medical Decision Making             Data     I have personally reviewed the following data over the past 24 hrs:    9.4  \   7.7 (L)   / 269     140 102 72.8 (H) /  81   4.3 26 5.26 (H) \       ALT: N/A AST: N/A AP: N/A TBILI: N/A   ALB: 2.9 (L) TOT PROTEIN: N/A LIPASE: N/A       Imaging results reviewed over the past 24 hrs:   No results found for this or any previous visit (from the past 24 hour(s)).

## 2023-07-19 NOTE — PROGRESS NOTES
Renal Medicine Progress Note            Assessment/Plan:     57 y.o man with CKD III due to DM and HTN, following for severe JOHNATHAN.      # CKD III:                -bl ~ 1.5-1.9 mg/dl (GFR 45-55)               -2.5 g/g albuminuria     # Severe acute kidney injury: Scr peaked at 8.7 mg/dl. Slowly improving.      # Infected left stump with osteomyelitis s/p I&D:               -dapto/Zosyn     # FEN: His edema is much better. Excellent urine output wo needing diuretic. Hyperphosphatemia.      # Anemia: Hgb is low but stable. Iron def.     # Hypertension:    -Norvasc 10 mg daily   -Coreg 25 mg bid     Plan:  # Start IV iron if okay with ID  # Continue to hold Bumex  # If kidney function remains the same in the next couple days, he may decide to proceed with stump revision, which is not unreasonable as we do not know when and where his kidney function will settle. He is frustrated, which is understandable as he has been hospitalized for almost a month now. He would like to have an overall plan, so a team meeting is scheduled for tomorrow.           Interval History:     Patient requested a team conference, which is scheduled for tomorrow.  He wants to know the plans for the left stump revision.   No cardiopulmonary complaints.  Excellent urine output wo diuretic.   Scr is better today.           Medications and Allergies:       amLODIPine  10 mg Oral Daily     [Held by provider] bumetanide  1 mg Intravenous BID     carvedilol  25 mg Oral BID w/meals     DAPTOmycin (CUBICIN) 600 mg in sodium chloride 0.9 % 100 mL intermittent infusion  6 mg/kg (Adjusted) Intravenous Q48H     ezetimibe  10 mg Oral Daily     heparin ANTICOAGULANT  5,000 Units Subcutaneous Q8H     insulin aspart  1-7 Units Subcutaneous TID AC     insulin aspart  1-5 Units Subcutaneous At Bedtime     insulin degludec  20 Units Subcutaneous QAM     insulin degludec  22 Units Subcutaneous At Bedtime     multivitamin w/minerals  1 tablet Oral Daily      "piperacillin-tazobactam  2.25 g Intravenous Q6H     sodium chloride (PF)  10-40 mL Intracatheter Q7 Days     sodium chloride (PF)  3 mL Intracatheter Q8H     sodium citrate-citric acid  30 mL Oral BID        Allergies   Allergen Reactions     Pravastatin      \"sucked the life blood out of me,\" Indicates this occurs with all statins.     Statins             Physical Exam:   Vitals were reviewed   , Blood pressure 124/73, pulse 77, temperature 98.3  F (36.8  C), temperature source Oral, resp. rate 16, height 1.829 m (6'), weight 126.2 kg (278 lb 3.5 oz), SpO2 98 %.    Wt Readings from Last 3 Encounters:   07/19/23 126.2 kg (278 lb 3.5 oz)   10/03/22 110 kg (242 lb 8.1 oz)   08/17/22 119.9 kg (264 lb 6.4 oz)       Intake/Output Summary (Last 24 hours) at 7/19/2023 1414  Last data filed at 7/19/2023 1000  Gross per 24 hour   Intake 1190 ml   Output 2350 ml   Net -1160 ml     GENERAL APPEARANCE: NAD  HEENT:  Eyes/ears/nose/neck grossly normal  RESP: lungs cta b c good efforts  CV: RRR  ABDOMEN: Obese, soft, NT.  EXTREMITIES/SKIN: no rashes/lesions on observed skin; edema is much better.   NEURO: Awake, alert and conversing.          Data:     CBC RESULTS:     Recent Labs   Lab 07/19/23  0926 07/18/23  0702 07/17/23  0526 07/16/23  0652 07/15/23  0642 07/14/23  0540 07/13/23  0545   WBC 9.4 11.5*  --  9.7 11.9* 10.0 11.1*   RBC 2.68* 2.51*  --  2.55* 2.68* 2.34* 2.55*   HGB 7.7* 7.3* 7.5* 7.4* 7.8* 6.8* 7.4*   HCT 24.0* 22.4* 22.6* 22.5* 23.7* 20.7* 22.6*    261  --  243 231 227 262       Basic Metabolic Panel:  Recent Labs   Lab 07/19/23  0926 07/18/23  0924 07/18/23  0655 07/17/23  0526 07/16/23  0652 07/15/23  0642 07/14/23  0540    141  --  140 140 137 137   POTASSIUM 4.3 4.1 4.2 4.3 4.3 4.4 4.6   CHLORIDE 102 102  --  101 101 99 100   CO2 26 26  --  27 26 24 23   BUN 72.8* 74.1*  --  78.4* 79.6* 82.0* 83.1*   CR 5.26* 5.52* 5.57* 5.73* 5.96* 6.26* 6.61*   GLC 81 84  --  85 77 166* 128*   FERNANDO 8.2* 8.3*  " --  8.4* 8.3* 8.2* 8.2*       INRNo lab results found in last 7 days.   Attestation:   I have reviewed today's relevant vital signs, notes, medications, labs and imaging.    Jag Gomes MD  [facility] - Nephrology  Office phone :[phone]  Pager: [phone]

## 2023-07-19 NOTE — PLAN OF CARE
Goal Outcome Evaluation:    A&Ox4. VSS on RA except HTN. C/o mild LLE pain, controlled with rest. R PICC SL, good blood return noted & int abx. Tolerating a high consistent carb diet, BG checks with own monitor per MD order: 87, 105 & 75 today. Voiding adequately, using bedside urinal. BS audible & no BM. Ax2 lift, repositions self frequently. Dressing to LLE changed x2, CDI. Penrose drains to LLE. Plan for care conference tomorrow at 11am.

## 2023-07-19 NOTE — PLAN OF CARE
Goal Outcome Evaluation:      Plan of Care Reviewed With: patient, family  Date & Time: 7/18/23   [id number]  Fall Risk: Yes  Orientation:A&Ox4  ABNL VS/O2:VSS on RA  ABNL Labs: BG 99,106,77. Hgb 7.3 Creat 5.73  Pain Management: 3 managed with tylenol   Bowel/Bladder: Inc of B/B, external cath.   Drains: PICC SL. Penrose drains to LLE  Diet: High consistent carb  Activity Level: Lift  Tests/Procedures: Dressing changes BID, last changed at 1330  Anticipated  DC Date: Pending  Significant Information: Neph,ID, & Vascular following. BGM per patients meter.

## 2023-07-19 NOTE — PLAN OF CARE
Problem: Oral Intake Inadequate  Goal: Improved Oral Intake  Outcome: Progressing   Goal Outcome Evaluation:       Intakes improving since diet liberalized to high consistent carb 7/13, however protein needs not being met (goal is >/= 1.5 g/kg). Continue encouragement of high protein foods, higher po intakes.    Krystal Lara  Clinical Dietitian - [facility]

## 2023-07-19 NOTE — PROGRESS NOTES
07/19/23 1026   Appointment Info   Signing Clinician's Name / Credentials (PT) Aleyda Coello, PT, DPT   Living Environment   People in Home significant other  (Claudette)   Current Living Arrangements house   Home Accessibility stairs to enter home;stairs within home   Number of Stairs, Main Entrance 2   Number of Stairs, Within Home, Primary other (see comments)  (flight)   Stair Railings, Within Home, Primary railing on left side (ascending)   Self-Care   Usual Activity Tolerance good   Current Activity Tolerance fair   Equipment Currently Used at Home prosthesis;wheelchair, manual  (shower bench, has FWW)   Fall history within last six months no   Activity/Exercise/Self-Care Comment Pt reports 4 weeks ago he was walking INDly with his B LE prosthesis playing golf, IND, works in his basement and does the stairs to his office at home.   General Information   Onset of Illness/Injury or Date of Surgery 06/24/23   Referring Physician Antonieta Perdomo NP   Patient/Family Therapy Goals Statement (PT) to figure out what the medical plan is then progress mobility   Pertinent History of Current Problem (include personal factors and/or comorbidities that impact the POC) 56 y/o M admitted with swelling and redness with small ulcer at Lt BKA stump, found to have cellulitis/sepsis. S/p I&D of of left BKA stump on 06/30, 07/06 and 07/10. Vascular surgeryplanning to proceed for formal revision and closure once medically optimized. 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 See chart for further details.   Existing Precautions/Restrictions fall   Weight-Bearing Status - LLE nonweight-bearing   General Observations resting in bed, NAD, however verbalizing frustration regarding his medical situation   Cognition   Affect/Mental Status (Cognition) anxious;agitated   Orientation Status (Cognition) oriented to;person;place;situation   Cognitive Status Comments pt appears  overwhelmed/frustrated-wanting to know his medical plan for tx.   Pain Assessment   Patient Currently in Pain Yes, see Vital Sign flowsheet  (left LE with movement)   Integumentary/Edema   Integumentary/Edema Comments left LE: dressing over stump-open wound, pt reports increased swelling in right thigh-prosthesis does not fit, pt reports scrotal swelling.   Strength (Manual Muscle Testing)   Strength Comments generalized weakness   Bed Mobility   Comment, (Bed Mobility) pt was able to perform partial rolling in bed with heavy use of UEs on bed railings   Transfers   Comment, (Transfers) NT   Gait/Stairs (Locomotion)   Comment, (Gait/Stairs) unable-NWBing status on left LE, pt is B LE amputee   Balance   Balance Comments NT   Clinical Impression   Criteria for Skilled Therapeutic Intervention Yes, treatment indicated   PT Diagnosis (PT) impaired gait   Influenced by the following impairments right LE swelling, left LE open wound with NWBing orders, impaired activity tolerance, pain, decreased strength.   Functional limitations due to impairments impaired IND with functional mobility   Clinical Presentation (PT Evaluation Complexity) Evolving/Changing   Clinical Presentation Rationale clinical judgement, PMHx, medical status   Clinical Decision Making (Complexity) moderate complexity   Planned Therapy Interventions (PT) balance training;bed mobility training;gait training;home program guidelines;risk factor education;progressive activity/exercise;wheelchair management/propulsion training;transfer training;stretching;strengthening;patient/family education;ROM (range of motion);home exercise program   Anticipated Equipment Needs at Discharge (PT)   (tbd based on progress, possible slide board)   Risk & Benefits of therapy have been explained evaluation/treatment results reviewed;care plan/treatment goals reviewed;risks/benefits reviewed;current/potential barriers reviewed;participants voiced agreement with care  plan;participants included;patient   PT Total Evaluation Time   PT Eval, Moderate Complexity Minutes (85396) 19   Plan of Care Review   Plan of Care Reviewed With patient   Physical Therapy Goals   PT Frequency 5x/week   PT Predicted Duration/Target Date for Goal Attainment 08/02/23   PT Goals Bed Mobility;Transfers;Wheelchair Mobility   PT: Bed Mobility Modified independent;Supine to/from sit   PT: Transfers Supervision/stand-by assist;Bed to/from chair;Assistive device;Within precautions  (L LE-NWBing, slide board)   PT: Wheelchair Mobility 150 feet;manual wheelchair   PT Discharge Planning   PT Plan clarify activity with provider-does pt need anything to protect left stump due to open wound with mobility? RN joy Rosado regarding mobility questions-waiting for response, UE/LE ex, pt also reports right prosthesis currently does not fit due to swelling in his right thigh-unsure if prosthetics can see during hospitalization?   PT Discharge Recommendation (DC Rec) [facility]-Motivated patient will benefit from intensive, interdisciplinary therapy.  Anticipate will be able to tolerate 3 hours of therapy per day;home with home care physical therapy;home with assist   PT Rationale for DC Rec limited evaluation today as pt wanting to know medical plan for his left LE prior to initiating PT-pt reports meeting scheduled for tomorrow. Will need to further assess mobility once clarification if any protection needed for left stump with mobility due to open wound-updated RN. Will update recs with continued assessment.   Total Session Time   Total Session Time (sum of timed and untimed services) 19

## 2023-07-19 NOTE — PROGRESS NOTES
Care Management Follow Up    Length of Stay (days): 25    Expected Discharge Date: 07/26/2023     Concerns to be Addressed: denies needs/concerns at this time, no discharge needs identified     Patient plan of care discussed at interdisciplinary rounds: Yes    Anticipated Discharge Disposition: Home     Anticipated Discharge Services: None  Anticipated Discharge DME: None    Patient/family educated on Medicare website which has current facility and service quality ratings:    Education Provided on the Discharge Plan:    Patient/Family in Agreement with the Plan: yes    Referrals Placed by CM/SW:    Private pay costs discussed: Not applicable    Additional Information:  Writer informed patient about care conference time for tomorrow.  Patient would like two family members to participate, his GF and his sister. One will call and listen in from patients cell phone and the other will call in to IPAD, tablet device. Care conference scheduled for Thursday July 20 @ 11 A.M.       GLORIA Medina

## 2023-07-19 NOTE — PROGRESS NOTES
Pt is here w/ sepsis d/t BKA stump infection. A&Ox4. VSS on RA ex HTN. Mild pain, tylenol given. Adequate UOP using bedside urinal. Bowel sounds active & passing gas, no BM. 2 assist - repositions self frequently. Dressing change to LLE x2, c/d/i. Penrose drains to LLE, good output. PICC SL. High consistent carb diet. Phos 5.9, Ca 8.2, Creat 5.26. Discharge pending.

## 2023-07-19 NOTE — PROGRESS NOTES
CLINICAL NUTRITION SERVICES - REASSESSMENT NOTE    Recommendations Ordered by Registered Dietitian (RD):   Discontinued Expedite   Malnutrition:   % Weight Loss:  Difficult to assess d/t fluid-status  % Intake:  <75% for > 7 days (moderate malnutrition)-- improving  Subcutaneous Fat Loss:  None observed (7/7)  Muscle Loss:  None observed (7/7)  Fluid Retention:  Trace to mild bilateral hand, left arm, scrotum, and left knee edema      Malnutrition Diagnosis: Patient does not meet two of the established criteria necessary for diagnosing malnutrition     EVALUATION OF PROGRESS TOWARD GOALS   Diet:  High Consistent Carb Diet  Supplements: Expedite at 10am, cottage cheese + pears at 8pm    Intake/Tolerance:    - Ordering 1-3 meals per day/per [facility]  - % intakes per flowsheets  - Spoke with patient at bedside. He said ever since his diet got liberalized to high consistent carb he has been eating better (has more of an appetite). Before this, he was getting frustrated because he was getting the wrong food items sent to him. He mentioned how he doesn't like the expedite and hasn't been consuming any of it, however he said he likes the addition of the cottage cheese and pears at night and has been consuming 100% of that. Writer offered other supplements to replace the Expedite to help pt get more protein but he politely declined. Writer encouraged continued protein intakes.  - Per review of [facility], pt has been ordering/consuming <100g protein for the past week (<80% of needs)    ASSESSED NUTRITION NEEDS:  Dosing Weight: 84.5 kg (adjusted based off current wt of 112 kg and adjusted IBW for BKA's of 75.2 kg)  Estimated Energy Needs: 3308-3228 kcals (25-30 Kcal/Kg)  Justification: increased needs post op  Estimated Protein Needs: 126+ grams protein (1.5+ g pro/Kg)  Justification: increased needs post procedure, wound healing  Estimated Fluid Needs: 1 mL/kcal or per provider pending fluid status    NEW  FINDINGS:   Care conference tomorrow 7/20 to discuss GOC  Labs: BUN 72.8 (H), Cr 5.26 (H), GFR 12 (L), phos 5.9 (H)    Previous Goals:   Pt to consume minimum of 125 g protein per day  Evaluation: Not met - after reviewing [facility], pt has been consuming <100g protein in the past week    Previous Nutrition Diagnosis:   Inadequate oral intake related to variable appetite, difficulty with various diet orders, and increased needs 2/2 wound healing as evidenced by only ordering 1-2 meals/day, pt report, and increased protein needs (1.5+ g/kg)  Evaluation: No change    CURRENT NUTRITION DIAGNOSIS  Inadequate oral intake related to increased needs 2/2 wound healing as evidenced by increased protein needs (1.5+ g/kg) and pt meeting <80% of protein needs orally in the past week    INTERVENTIONS  Recommendations / Nutrition Prescription  Discontinued Expedite    Implementation  Medical Food Supplement - continue HS snack  Nutrition Education - discussed importance of protein and encouraged to order protein with each meal especially after discontinuation of Expedite    Goals  Patient to order 2-3 meals per day and meet 100% of protein needs    MONITORING AND EVALUATION:  Progress towards goals will be monitored and evaluated per protocol and Practice Guidelines    Krystal Lara  Clinical Dietitian - [facility]

## 2023-07-19 NOTE — PLAN OF CARE
Goal Outcome Evaluation:         Overall Patient Progress: improvingOverall Patient Progress: improving     Here with Sepsis due to L BKA stump infection. A&Ox4. VSS on RA except HTN. Denies pain during this shift. R PICC SL. Tolerating a high consistent carb diet. Adequate voiding using bedside urinal. BS audible- no BM. Ax2 lift- repositions self frequently. Dressing changes held overnight to promote patients sleep- per MD order. Dressing to LLE- CDI. Penrose drains to LLE. BG monitored with patients personal device. At 0200 BG was 98. Patient requests care conference to discuss goals & develop a plan. Continue to monitor.

## 2023-07-19 NOTE — PROGRESS NOTES
[facility]    Infectious Disease Progress Note    Date of Service (when I saw the patient): 07/19/2023     Assessment & Plan   Ry Horner is a 57 year old male who was admitted on 6/24/2023.     Impression:  1. 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 2. Stage III CKD  3. Bilateral BKA   4. Presented to the ER due to pain, swelling and redness with small ulcer at Lt BKA stump and found to have cellulitis/sepsis. This BKA was done in 2022   5. No history of MRSA  6. Workup shows JOHNATHAN  7. Started on vanc ( only 2 doses) and zosyn switched to dapto on 6/26      Recommendations:   Continue on daptomycin continue on zosyn   S/P: Incision and debridement of left below-knee stump 6/30   S/p: repeat I and D on 7/6  S/p repeat I and D on 7/ 10  Pending OR cultures and path pending so far no micro data but patient has been on broad spectrum antibiotics   Will continue to follow      Day 25 of antibiotics today most recent path on the tibial bone was non specific for osteo.   Plan is to stop antibiotics 24 hours after formalization of the BKA     Pt frustrated being in the hospital, over al condition, requesting care conference, I am available for ques.     Franklin Maza MD    Interval History   No new micro    Creat up   Nephrology evaluating cr is stable       Physical Exam   Temp: 98.1  F (36.7  C) Temp src: Oral BP: (!) 143/82 Pulse: 77   Resp: 18 SpO2: 98 % O2 Device: None (Room air)    Vitals:    07/16/23 0518 07/18/23 0600 07/19/23 0530   Weight: 127.9 kg (281 lb 15.5 oz) 126.3 kg (278 lb 7.1 oz) 126.2 kg (278 lb 3.5 oz)     Vital Signs with Ranges  Temp:  [98.1  F (36.7  C)-98.6  F (37  C)] 98.1  F (36.7  C)  Pulse:  [75-77] 77  Resp:  [16-18] 18  BP: (130-159)/(78-84) 143/82  SpO2:  [91 %-98 %] 98 %     Constitutional: Awake, alert, cooperative, no apparent distress  Lungs: Clear to auscultation bilaterally, no crackles or  wheezing  Cardiovascular: Regular rate and rhythm, normal S1 and S2, and no murmur noted  Abdomen: Normal bowel sounds, soft, non-distended, non-tender  Skin: dressing on the  left BKA   Other:     Medications       amLODIPine  10 mg Oral Daily     [Held by provider] bumetanide  1 mg Intravenous BID     carvedilol  25 mg Oral BID w/meals     DAPTOmycin (CUBICIN) 600 mg in sodium chloride 0.9 % 100 mL intermittent infusion  6 mg/kg (Adjusted) Intravenous Q48H     ezetimibe  10 mg Oral Daily     heparin ANTICOAGULANT  5,000 Units Subcutaneous Q8H     insulin aspart  1-7 Units Subcutaneous TID AC     insulin aspart  1-5 Units Subcutaneous At Bedtime     insulin degludec  20 Units Subcutaneous QAM     insulin degludec  22 Units Subcutaneous At Bedtime     multivitamin w/minerals  1 tablet Oral Daily     piperacillin-tazobactam  2.25 g Intravenous Q6H     sodium chloride (PF)  10-40 mL Intracatheter Q7 Days     sodium chloride (PF)  3 mL Intracatheter Q8H     sodium citrate-citric acid  30 mL Oral BID       Data   All microbiology laboratory data reviewed.  Recent Labs   Lab Test 07/19/23  0926 07/18/23  0702 07/17/23  0526 07/16/23  0652   WBC 9.4 11.5*  --  9.7   HGB 7.7* 7.3* 7.5* 7.4*   HCT 24.0* 22.4* 22.6* 22.5*   MCV 90 89  --  88    261  --  243     Recent Labs   Lab Test 07/19/23  0926 07/18/23  0924 07/18/23  0655   CR 5.26* 5.52* 5.57*     Recent Labs   Lab Test 09/25/22  1529   SED 85*     Recent Labs   Lab Test 02/03/20  1324 02/03/20  0007 02/02/20  2250 12/04/19  1619 11/19/19  1829 11/17/19  1525 11/17/19  1419 11/17/19  1411 08/28/17  1136   CULT Heavy growth  beta hemolytic   Streptococcus constellatus  *  Light growth  Staphylococcus aureus  * No growth No growth No anaerobes isolated  No growth Light growth  Bacteroides fragilis  *  Light growth  Parvimonas micra  *  Susceptibility testing not routinely done  On day 2, isolated in broth only:  beta hemolytic   Streptococcus constellatus  *  Heavy growth  beta hemolytic   Streptococcus constellatus  Susceptibility testing done on previous specimen  *  Light growth  Alcaligenes faecalis  *  Light growth  Staphylococcus aureus  *  On day 1, isolated in broth only:  Anaerobic gram negative rods  See anaerobic report for identification  * Heavy growth  Bacteroides fragilis  Susceptibility testing not routinely done  *  Heavy growth  Peptoniphilus asaccharolyticus  Susceptibility testing not routinely done  *  Moderate growth  beta hemolytic   Streptococcus constellatus  *  On day 1, isolated in broth only:  Anaerobic gram negative rods  See anaerobic report for identification  * Heavy growth  Bacteroides fragilis  *  Heavy growth  Parvimonas micra  *  Heavy growth  Peptoniphilus asaccharolyticus  *  Heavy growth  Mixed aerobic and anaerobic rosa  *  Susceptibility testing not routinely done No growth       All cultures:  No results for input(s): CULTURE in the last 168 hours.   Blood culture:  Results for orders placed or performed during the hospital encounter of 06/24/23   Blood Culture Peripheral Blood    Specimen: Peripheral Blood   Result Value Ref Range    Culture No Growth    Blood Culture Peripheral Blood    Specimen: Peripheral Blood   Result Value Ref Range    Culture No Growth    Results for orders placed or performed during the hospital encounter of 02/02/20   Blood culture    Specimen: Blood    Right Arm   Result Value Ref Range    Specimen Description Blood Right Arm     Culture Micro No growth    Blood culture    Specimen: Blood    Right Arm   Result Value Ref Range    Specimen Description Blood Right Arm     Culture Micro No growth    Results for orders placed or performed during the hospital encounter of 08/28/17   Blood culture    Specimen: Arm, Right; Blood    Right Arm   Result Value Ref Range    Specimen Description Blood Right Arm     Special Requests Aerobic and anaerobic bottles received     Culture Micro No growth     Blood culture    Specimen: Arm, Right; Blood    Right Arm   Result Value Ref Range    Specimen Description Blood Right Arm     Special Requests Aerobic and anaerobic bottles received     Culture Micro No growth    Results for orders placed or performed during the hospital encounter of 07/14/16   Blood culture    Specimen: Blood   Result Value Ref Range    Specimen Description Blood Left Arm     Special Requests Aerobic and anaerobic bottles received     Culture Micro No growth     Micro Report Status FINAL 07/20/2016    Blood culture    Specimen: Blood   Result Value Ref Range    Specimen Description Blood Right Arm     Special Requests Aerobic and anaerobic bottles received     Culture Micro No growth     Micro Report Status FINAL 07/20/2016       Urine culture:  No results found for this or any previous visit.

## 2023-07-20 ENCOUNTER — APPOINTMENT (OUTPATIENT)
Dept: ULTRASOUND IMAGING | Facility: CLINIC | Age: 58
DRG: 463 | End: 2023-07-20
Attending: NURSE PRACTITIONER
Payer: COMMERCIAL

## 2023-07-20 ENCOUNTER — APPOINTMENT (OUTPATIENT)
Dept: PHYSICAL THERAPY | Facility: CLINIC | Age: 58
DRG: 463 | End: 2023-07-20
Payer: COMMERCIAL

## 2023-07-20 ENCOUNTER — TRANSFERRED RECORDS (OUTPATIENT)
Dept: HEALTH INFORMATION MANAGEMENT | Facility: CLINIC | Age: 58
End: 2023-07-20

## 2023-07-20 ENCOUNTER — APPOINTMENT (OUTPATIENT)
Dept: GENERAL RADIOLOGY | Facility: CLINIC | Age: 58
DRG: 463 | End: 2023-07-20
Attending: NURSE PRACTITIONER
Payer: COMMERCIAL

## 2023-07-20 LAB
ANION GAP SERPL CALCULATED.3IONS-SCNC: 15 MMOL/L (ref 7–15)
BASE EXCESS BLDV CALC-SCNC: 3.3 MMOL/L (ref -7.7–1.9)
BUN SERPL-MCNC: 71.8 MG/DL (ref 6–20)
CALCIUM SERPL-MCNC: 8.3 MG/DL (ref 8.6–10)
CHLORIDE SERPL-SCNC: 101 MMOL/L (ref 98–107)
CREAT SERPL-MCNC: 5.06 MG/DL (ref 0.67–1.17)
D DIMER PPP FEU-MCNC: 6.7 UG/ML FEU (ref 0–0.5)
DEPRECATED HCO3 PLAS-SCNC: 25 MMOL/L (ref 22–29)
ERYTHROCYTE [DISTWIDTH] IN BLOOD BY AUTOMATED COUNT: 12.8 % (ref 10–15)
FERRITIN SERPL-MCNC: 309 NG/ML (ref 31–409)
GFR SERPL CREATININE-BSD FRML MDRD: 13 ML/MIN/1.73M2
GLUCOSE SERPL-MCNC: 84 MG/DL (ref 70–99)
HCO3 BLDV-SCNC: 29 MMOL/L (ref 21–28)
HCT VFR BLD AUTO: 22.9 % (ref 40–53)
HGB BLD-MCNC: 7.4 G/DL (ref 13.3–17.7)
MCH RBC QN AUTO: 29.1 PG (ref 26.5–33)
MCHC RBC AUTO-ENTMCNC: 32.3 G/DL (ref 31.5–36.5)
MCV RBC AUTO: 90 FL (ref 78–100)
NT-PROBNP SERPL-MCNC: ABNORMAL PG/ML (ref 0–900)
O2/TOTAL GAS SETTING VFR VENT: 5 %
PCO2 BLDV: 48 MM HG (ref 40–50)
PH BLDV: 7.38 [PH] (ref 7.32–7.43)
PLATELET # BLD AUTO: 285 10E3/UL (ref 150–450)
PO2 BLDV: 35 MM HG (ref 25–47)
POTASSIUM SERPL-SCNC: 3.9 MMOL/L (ref 3.4–5.3)
RBC # BLD AUTO: 2.54 10E6/UL (ref 4.4–5.9)
SODIUM SERPL-SCNC: 141 MMOL/L (ref 136–145)
TROPONIN T SERPL HS-MCNC: 38 NG/L
WBC # BLD AUTO: 9.5 10E3/UL (ref 4–11)

## 2023-07-20 PROCEDURE — 93010 ELECTROCARDIOGRAM REPORT: CPT | Performed by: INTERNAL MEDICINE

## 2023-07-20 PROCEDURE — 85027 COMPLETE CBC AUTOMATED: CPT | Performed by: HOSPITALIST

## 2023-07-20 PROCEDURE — 250N000013 HC RX MED GY IP 250 OP 250 PS 637: Performed by: STUDENT IN AN ORGANIZED HEALTH CARE EDUCATION/TRAINING PROGRAM

## 2023-07-20 PROCEDURE — 80048 BASIC METABOLIC PNL TOTAL CA: CPT | Performed by: HOSPITALIST

## 2023-07-20 PROCEDURE — 99233 SBSQ HOSP IP/OBS HIGH 50: CPT | Performed by: HOSPITALIST

## 2023-07-20 PROCEDURE — 97110 THERAPEUTIC EXERCISES: CPT | Mod: GP | Performed by: PHYSICAL THERAPIST

## 2023-07-20 PROCEDURE — 93970 EXTREMITY STUDY: CPT

## 2023-07-20 PROCEDURE — 82728 ASSAY OF FERRITIN: CPT | Performed by: HOSPITALIST

## 2023-07-20 PROCEDURE — 99207 PR NO BILLABLE SERVICE THIS VISIT: CPT | Performed by: NURSE PRACTITIONER

## 2023-07-20 PROCEDURE — 120N000001 HC R&B MED SURG/OB

## 2023-07-20 PROCEDURE — 250N000011 HC RX IP 250 OP 636: Performed by: NURSE PRACTITIONER

## 2023-07-20 PROCEDURE — 85379 FIBRIN DEGRADATION QUANT: CPT | Performed by: NURSE PRACTITIONER

## 2023-07-20 PROCEDURE — 99232 SBSQ HOSP IP/OBS MODERATE 35: CPT | Performed by: INTERNAL MEDICINE

## 2023-07-20 PROCEDURE — 258N000003 HC RX IP 258 OP 636: Performed by: HOSPITALIST

## 2023-07-20 PROCEDURE — 250N000011 HC RX IP 250 OP 636: Mod: JZ | Performed by: STUDENT IN AN ORGANIZED HEALTH CARE EDUCATION/TRAINING PROGRAM

## 2023-07-20 PROCEDURE — 250N000011 HC RX IP 250 OP 636: Performed by: STUDENT IN AN ORGANIZED HEALTH CARE EDUCATION/TRAINING PROGRAM

## 2023-07-20 PROCEDURE — 93005 ELECTROCARDIOGRAM TRACING: CPT

## 2023-07-20 PROCEDURE — 250N000011 HC RX IP 250 OP 636: Mod: JZ | Performed by: HOSPITALIST

## 2023-07-20 PROCEDURE — 84484 ASSAY OF TROPONIN QUANT: CPT | Performed by: NURSE PRACTITIONER

## 2023-07-20 PROCEDURE — 82803 BLOOD GASES ANY COMBINATION: CPT | Performed by: NURSE PRACTITIONER

## 2023-07-20 PROCEDURE — 99418 PROLNG IP/OBS E/M EA 15 MIN: CPT | Performed by: HOSPITALIST

## 2023-07-20 PROCEDURE — 250N000013 HC RX MED GY IP 250 OP 250 PS 637: Performed by: INTERNAL MEDICINE

## 2023-07-20 PROCEDURE — 74018 RADEX ABDOMEN 1 VIEW: CPT

## 2023-07-20 PROCEDURE — 99233 SBSQ HOSP IP/OBS HIGH 50: CPT | Performed by: INTERNAL MEDICINE

## 2023-07-20 PROCEDURE — 71045 X-RAY EXAM CHEST 1 VIEW: CPT

## 2023-07-20 PROCEDURE — 83880 ASSAY OF NATRIURETIC PEPTIDE: CPT | Performed by: NURSE PRACTITIONER

## 2023-07-20 PROCEDURE — 97530 THERAPEUTIC ACTIVITIES: CPT | Mod: GP | Performed by: PHYSICAL THERAPIST

## 2023-07-20 RX ORDER — BUMETANIDE 0.25 MG/ML
1 INJECTION INTRAMUSCULAR; INTRAVENOUS ONCE
Status: COMPLETED | OUTPATIENT
Start: 2023-07-20 | End: 2023-07-20

## 2023-07-20 RX ORDER — LABETALOL HYDROCHLORIDE 5 MG/ML
10 INJECTION, SOLUTION INTRAVENOUS
Status: DISCONTINUED | OUTPATIENT
Start: 2023-07-20 | End: 2023-07-28 | Stop reason: HOSPADM

## 2023-07-20 RX ORDER — METHYLPREDNISOLONE SODIUM SUCCINATE 125 MG/2ML
125 INJECTION, POWDER, LYOPHILIZED, FOR SOLUTION INTRAMUSCULAR; INTRAVENOUS
Status: DISCONTINUED | OUTPATIENT
Start: 2023-07-20 | End: 2023-07-22

## 2023-07-20 RX ORDER — DIPHENHYDRAMINE HYDROCHLORIDE 50 MG/ML
50 INJECTION INTRAMUSCULAR; INTRAVENOUS
Status: DISCONTINUED | OUTPATIENT
Start: 2023-07-20 | End: 2023-07-22

## 2023-07-20 RX ORDER — AMLODIPINE BESYLATE 5 MG/1
5 TABLET ORAL DAILY
Status: DISCONTINUED | OUTPATIENT
Start: 2023-07-21 | End: 2023-07-28 | Stop reason: HOSPADM

## 2023-07-20 RX ADMIN — HEPARIN SODIUM 5000 UNITS: 5000 INJECTION, SOLUTION INTRAVENOUS; SUBCUTANEOUS at 15:19

## 2023-07-20 RX ADMIN — IRON SUCROSE 300 MG: 20 INJECTION, SOLUTION INTRAVENOUS at 17:01

## 2023-07-20 RX ADMIN — SODIUM CITRATE AND CITRIC ACID MONOHYDRATE 30 ML: 500; 334 SOLUTION ORAL at 21:46

## 2023-07-20 RX ADMIN — AMLODIPINE BESYLATE 10 MG: 10 TABLET ORAL at 09:29

## 2023-07-20 RX ADMIN — SODIUM CITRATE AND CITRIC ACID MONOHYDRATE 30 ML: 500; 334 SOLUTION ORAL at 09:29

## 2023-07-20 RX ADMIN — MULTIPLE VITAMINS W/ MINERALS TAB 1 TABLET: TAB at 09:29

## 2023-07-20 RX ADMIN — HEPARIN SODIUM 5000 UNITS: 5000 INJECTION, SOLUTION INTRAVENOUS; SUBCUTANEOUS at 06:09

## 2023-07-20 RX ADMIN — CARVEDILOL 25 MG: 25 TABLET, FILM COATED ORAL at 09:30

## 2023-07-20 RX ADMIN — BUMETANIDE 1 MG: 0.25 INJECTION, SOLUTION INTRAMUSCULAR; INTRAVENOUS at 22:53

## 2023-07-20 RX ADMIN — HEPARIN SODIUM 5000 UNITS: 5000 INJECTION, SOLUTION INTRAVENOUS; SUBCUTANEOUS at 21:46

## 2023-07-20 RX ADMIN — PIPERACILLIN AND TAZOBACTAM 2.25 G: 2; .25 INJECTION, POWDER, FOR SOLUTION INTRAVENOUS at 09:31

## 2023-07-20 RX ADMIN — CARVEDILOL 25 MG: 25 TABLET, FILM COATED ORAL at 18:33

## 2023-07-20 RX ADMIN — PIPERACILLIN AND TAZOBACTAM 2.25 G: 2; .25 INJECTION, POWDER, FOR SOLUTION INTRAVENOUS at 04:14

## 2023-07-20 RX ADMIN — EZETIMIBE 10 MG: 10 TABLET ORAL at 09:29

## 2023-07-20 ASSESSMENT — ACTIVITIES OF DAILY LIVING (ADL)
ADLS_ACUITY_SCORE: 36

## 2023-07-20 NOTE — PLAN OF CARE
Goal Outcome Evaluation:      Plan of Care Reviewed With: patient    Overall Patient Progress: improvingOverall Patient Progress: improving     Here with Sepsis due to L BKA stump infection. A&Ox4. VSS on RA except HTN. Denies pain during this shift. R PICC SL with int abx. Tolerating a high consistent carb diet. Adequate voiding using bedside urinal. BS audible- no BM. Ax2 lift- repositions self frequently. Evening dressing change refused by patient. Overnight dressing changes held  to promote patients sleep- per MD order. Dressing to LLE- CDI. Penrose drains to LLE. BG monitored with patients personal device per MD order. B & 128. Plan for care conference today at 1100. Continue to monitor.

## 2023-07-20 NOTE — PROGRESS NOTES
Renal Medicine Progress Note            Assessment/Plan:     57 y.o man with CKD III due to DM and HTN, following for severe JOHNATHAN.      # CKD III:                -bl ~ 1.5-1.9 mg/dl (GFR 45-55)               -2.5 g/g albuminuria     # Severe acute kidney injury: Scr peaked at 8.7 mg/dl. Slowly improving.      # Infected left stump with osteomyelitis s/p I&D:               -dapto/Zosyn     # FEN: His edema is much better. Excellent urine output wo needing diuretic. Hyperphosphatemia.      # Anemia: Hgb is low but stable. Iron def.      # Hypertension:                -Norvasc 10 mg daily               -Coreg 25 mg bid     Plan:  # Observe off antibiotics,  # Start iron when appropriate.   # Continue to hold Bumex. He has excellent urine output wo needing diuretic.   # Decrease Norvasc to 5 mg daily to see if edema can get better. RN to check BP during the day.  # 2 grams of sodium restricted diet.  # Daily renal panel while here. See us in clinic in a week after discharge.     We had a 30+ minutes conference with the whole team (ID, IM, surgery, nephrology, care coordinator), patient and his family. He is happy with the plan.           Interval History:     Afebrile. VSS.   He says he feels better.  Still has peripheral but better.  Still difficult with fitting the right prosthesis   Excellent urine output wo diuretic.  Scr slowly trending down          Medications and Allergies:       amLODIPine  10 mg Oral Daily     [Held by provider] bumetanide  1 mg Intravenous BID     carvedilol  25 mg Oral BID w/meals     DAPTOmycin (CUBICIN) 600 mg in sodium chloride 0.9 % 100 mL intermittent infusion  6 mg/kg (Adjusted) Intravenous Q48H     ezetimibe  10 mg Oral Daily     heparin ANTICOAGULANT  5,000 Units Subcutaneous Q8H     insulin aspart  1-7 Units Subcutaneous TID AC     insulin aspart  1-5 Units Subcutaneous At Bedtime     insulin degludec  20 Units Subcutaneous QAM     insulin degludec  22 Units Subcutaneous At Bedtime  "    multivitamin w/minerals  1 tablet Oral Daily     piperacillin-tazobactam  2.25 g Intravenous Q6H     sodium chloride (PF)  10-40 mL Intracatheter Q7 Days     sodium chloride (PF)  3 mL Intracatheter Q8H     sodium citrate-citric acid  30 mL Oral BID        Allergies   Allergen Reactions     Pravastatin      \"sucked the life blood out of me,\" Indicates this occurs with all statins.     Statins             Physical Exam:   Vitals were reviewed   , Blood pressure (!) 154/96, pulse 76, temperature 97.9  F (36.6  C), temperature source Oral, resp. rate 18, height 1.829 m (6'), weight 126 kg (277 lb 12.5 oz), SpO2 93 %.    Wt Readings from Last 3 Encounters:   07/20/23 126 kg (277 lb 12.5 oz)   10/03/22 110 kg (242 lb 8.1 oz)   08/17/22 119.9 kg (264 lb 6.4 oz)       Intake/Output Summary (Last 24 hours) at 7/20/2023 1101  Last data filed at 7/20/2023 0618  Gross per 24 hour   Intake 1090 ml   Output 2525 ml   Net -1435 ml       GENERAL APPEARANCE: NAD  HEENT:  Eyes/ears/nose/neck grossly normal  RESP: lungs cta b c good efforts  CV: RRR  ABDOMEN: Obese, soft, NT.  EXTREMITIES/SKIN: no rashes/lesions on observed skin; Still has edema but better.   NEURO: Awake, alert and conversing appropriately         Data:     CBC RESULTS:     Recent Labs   Lab 07/20/23  0613 07/19/23  0926 07/18/23  0702 07/17/23  0526 07/16/23  0652 07/15/23  0642 07/14/23  0540   WBC 9.5 9.4 11.5*  --  9.7 11.9* 10.0   RBC 2.54* 2.68* 2.51*  --  2.55* 2.68* 2.34*   HGB 7.4* 7.7* 7.3* 7.5* 7.4* 7.8* 6.8*   HCT 22.9* 24.0* 22.4* 22.6* 22.5* 23.7* 20.7*    269 261  --  243 231 227       Basic Metabolic Panel:  Recent Labs   Lab 07/20/23  0613 07/19/23  0926 07/18/23  0924 07/18/23  0655 07/17/23  0526 07/16/23  0652 07/15/23  0642    140 141  --  140 140 137   POTASSIUM 3.9 4.3 4.1 4.2 4.3 4.3 4.4   CHLORIDE 101 102 102  --  101 101 99   CO2 25 26 26  --  27 26 24   BUN 71.8* 72.8* 74.1*  --  78.4* 79.6* 82.0*   CR 5.06* 5.26* 5.52* " 5.57* 5.73* 5.96* 6.26*   GLC 84 81 84  --  85 77 166*   FERNANDO 8.3* 8.2* 8.3*  --  8.4* 8.3* 8.2*       INRNo lab results found in last 7 days.   Attestation:   I have reviewed today's relevant vital signs, notes, medications, labs and imaging.    Jag Gomes MD  [facility] - Nephrology  Office phone :[phone]  Pager: [phone]

## 2023-07-20 NOTE — PROGRESS NOTES
Care Management Follow Up    Length of Stay (days): 26    Expected Discharge Date: 07/24/2023     Concerns to be Addressed: denies needs/concerns at this time, no discharge needs identified     Patient plan of care discussed at interdisciplinary rounds: Yes    Anticipated Discharge Disposition: Home     Anticipated Discharge Services: None  Anticipated Discharge DME: None    Patient/family educated on Medicare website which has current facility and service quality ratings:    Education Provided on the Discharge Plan:    Patient/Family in Agreement with the Plan: yes    Referrals Placed by CM/SW:    Private pay costs discussed: Not applicable    Additional Information:  Writer participated in care conference with ID, nephrology, vascular team, hospitalist, and patient. Writer assisted patients family joining the conference via a room IPAD, patients family joining were patient's sister Renée, and patient's significant other, Claudette. Dr Russell's vascular surgeon did not feel comfortable closing patients wound until patients kidney labs are closer to baseline. All disciplines are in agreement that patient could potentially go home with his open wound. Patient would have wet and dry dressing changes and outpatient monitoring prior to coming back in for  wound closure. IV antibiotics are going to be stopped and labs will be monitored closely over the next few days. If patient is able to get his prosthetic on his left leg and ambulate than plan will be to discharge home sometime in the next 3 - 5 days. Patient reported being appreciative of everyone's time and efforts.       GLORIA Medina

## 2023-07-20 NOTE — PROGRESS NOTES
[facility]    Infectious Disease Progress Note    Date of Service (when I saw the patient): 07/20/2023     Assessment & Plan   Ry Horner is a 57 year old male who was admitted on 6/24/2023.     Impression:  1. 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 2. Stage III CKD  3. Bilateral BKA   4. Presented to the ER due to pain, swelling and redness with small ulcer at Lt BKA stump and found to have cellulitis/sepsis. This BKA was done in 2022   5. No history of MRSA  6. Workup shows JOHNATHAN  7. Started on vanc ( only 2 doses) and zosyn switched to dapto on 6/26      Recommendations:   S/P: Incision and debridement of left below-knee stump 6/30   S/p: repeat I and D on 7/6  S/p repeat I and D on 7/ 10  Negative OR cultures and no positive micro data      Day 26 of antibiotics today most recent path on the tibial bone was non specific for osteo.     Closure is pending waiting on improvement in edema and improvement in the kidney function with latest plan to possible tackle this in couple weeks.     Care conference today with nephrology, vascula surgery and IM bedside.   : approx 4 weeks of antibiotics done with all negative cultures, no fever, WBC now normal the dealy in normalization probably related to elevated creatinine, clean wound with no evidence of infection,   and most recent path on the tibial bone was non specific for osteo. Enough antibiotics for now plan is to stop and watch inpatient         Franklin Maza MD    Interval History   No new micro    Creat up   Nephrology evaluating cr is stable       Physical Exam   Temp: 97.9  F (36.6  C) Temp src: Oral BP: (!) 141/86 Pulse: 75   Resp: 18 SpO2: 94 % O2 Device: None (Room air)    Vitals:    07/18/23 0600 07/19/23 0530 07/20/23 0500   Weight: 126.3 kg (278 lb 7.1 oz) 126.2 kg (278 lb 3.5 oz) 126 kg (277 lb 12.5 oz)     Vital Signs with Ranges  Temp:  [97.9  F (36.6  C)-98.7  F (37.1  C)]  97.9  F (36.6  C)  Pulse:  [75-80] 75  Resp:  [16-18] 18  BP: (138-154)/(80-96) 141/86  SpO2:  [91 %-94 %] 94 %     Constitutional: Awake, alert, cooperative, no apparent distress  Lungs: Clear to auscultation bilaterally, no crackles or wheezing  Cardiovascular: Regular rate and rhythm, normal S1 and S2, and no murmur noted  Abdomen: Normal bowel sounds, soft, non-distended, non-tender  Skin: dressing on the  left BKA   Other:     Medications       [START ON 7/21/2023] amLODIPine  5 mg Oral Daily     [Held by provider] bumetanide  1 mg Intravenous BID     carvedilol  25 mg Oral BID w/meals     ezetimibe  10 mg Oral Daily     heparin ANTICOAGULANT  5,000 Units Subcutaneous Q8H     insulin aspart  1-7 Units Subcutaneous TID AC     insulin aspart  1-5 Units Subcutaneous At Bedtime     [Held by provider] insulin degludec  20 Units Subcutaneous QAM     insulin degludec  22 Units Subcutaneous At Bedtime     multivitamin w/minerals  1 tablet Oral Daily     sodium chloride (PF)  10-40 mL Intracatheter Q7 Days     sodium chloride (PF)  3 mL Intracatheter Q8H     sodium citrate-citric acid  30 mL Oral BID       Data   All microbiology laboratory data reviewed.  Recent Labs   Lab Test 07/20/23  0613 07/19/23  0926 07/18/23  0702   WBC 9.5 9.4 11.5*   HGB 7.4* 7.7* 7.3*   HCT 22.9* 24.0* 22.4*   MCV 90 90 89    269 261     Recent Labs   Lab Test 07/20/23  0613 07/19/23  0926 07/18/23  0924   CR 5.06* 5.26* 5.52*     Recent Labs   Lab Test 09/25/22  1529   SED 85*     Recent Labs   Lab Test 02/03/20  1324 02/03/20  0007 02/02/20  2250 12/04/19  1619 11/19/19  1829 11/17/19  1525 11/17/19  1419 11/17/19  1411 08/28/17  1136   CULT Heavy growth  beta hemolytic   Streptococcus constellatus  *  Light growth  Staphylococcus aureus  * No growth No growth No anaerobes isolated  No growth Light growth  Bacteroides fragilis  *  Light growth  Parvimonas micra  *  Susceptibility testing not routinely done  On day 2, isolated  in broth only:  beta hemolytic   Streptococcus constellatus  * Heavy growth  beta hemolytic   Streptococcus constellatus  Susceptibility testing done on previous specimen  *  Light growth  Alcaligenes faecalis  *  Light growth  Staphylococcus aureus  *  On day 1, isolated in broth only:  Anaerobic gram negative rods  See anaerobic report for identification  * Heavy growth  Bacteroides fragilis  Susceptibility testing not routinely done  *  Heavy growth  Peptoniphilus asaccharolyticus  Susceptibility testing not routinely done  *  Moderate growth  beta hemolytic   Streptococcus constellatus  *  On day 1, isolated in broth only:  Anaerobic gram negative rods  See anaerobic report for identification  * Heavy growth  Bacteroides fragilis  *  Heavy growth  Parvimonas micra  *  Heavy growth  Peptoniphilus asaccharolyticus  *  Heavy growth  Mixed aerobic and anaerobic rosa  *  Susceptibility testing not routinely done No growth       All cultures:  No results for input(s): CULTURE in the last 168 hours.   Blood culture:  Results for orders placed or performed during the hospital encounter of 06/24/23   Blood Culture Peripheral Blood    Specimen: Peripheral Blood   Result Value Ref Range    Culture No Growth    Blood Culture Peripheral Blood    Specimen: Peripheral Blood   Result Value Ref Range    Culture No Growth    Results for orders placed or performed during the hospital encounter of 02/02/20   Blood culture    Specimen: Blood    Right Arm   Result Value Ref Range    Specimen Description Blood Right Arm     Culture Micro No growth    Blood culture    Specimen: Blood    Right Arm   Result Value Ref Range    Specimen Description Blood Right Arm     Culture Micro No growth    Results for orders placed or performed during the hospital encounter of 08/28/17   Blood culture    Specimen: Arm, Right; Blood    Right Arm   Result Value Ref Range    Specimen Description Blood Right Arm     Special Requests Aerobic and  anaerobic bottles received     Culture Micro No growth    Blood culture    Specimen: Arm, Right; Blood    Right Arm   Result Value Ref Range    Specimen Description Blood Right Arm     Special Requests Aerobic and anaerobic bottles received     Culture Micro No growth    Results for orders placed or performed during the hospital encounter of 07/14/16   Blood culture    Specimen: Blood   Result Value Ref Range    Specimen Description Blood Left Arm     Special Requests Aerobic and anaerobic bottles received     Culture Micro No growth     Micro Report Status FINAL 07/20/2016    Blood culture    Specimen: Blood   Result Value Ref Range    Specimen Description Blood Right Arm     Special Requests Aerobic and anaerobic bottles received     Culture Micro No growth     Micro Report Status FINAL 07/20/2016       Urine culture:  No results found for this or any previous visit.

## 2023-07-20 NOTE — PROGRESS NOTES
[facility]    Medicine Progress Note - Hospitalist Service    Date of Admission:  6/24/2023    Assessment & Plan     Ry Horner is a 57 year old male with medical history significant for uncontrolled type II DM with polyneuropathy and nephropathy, stage III CKD, bilateral BKA presented to the ER due to pain, swelling and redness with small ulcer at Lt BKA stump and found to have cellulitis/sepsis and is being admitted on 6/24/2023 for further management.      Sepsis due Lt BKA stump infection-resolved  S/p I&D of of left BKA stump on 06/30, 07/06 and 07/10  -Patient presented with pain, redness, swelling at his left BKA stump site.  Marked leukocytosis of 22.9. Tachycardic to 110s.  X-ray Shows soft tissue swelling, some bone formation along the resection margin of the tibia with periosteal reaction.   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     - Vascular surgeryplanning to proceed for formal revision and closure once medically optimized   -Patient has been afebrile for the past many days, WBC count is normal at 9.5 and detailed care conference was held with the access disease team, nephrology, vascular team, care coordinator and his sister and significant other and I also had detailed discussion with all teams separately together and given that his cultures have been negative we will discontinue his antibiotics and continue to monitor his daily labs.  Also as per vascular team they do want to do revision when creatinine goes down to near his baseline around 2.  Nephrology team was okay with discharge with close creatinine follow-up.  Patient did mention that he has concerns that given his swelling over lower extremities he is not able to use the other leg as prothesis may not be able to fit and  he also does not feel that given his scrotal edema he will be able to take care of himself.  -We have decided that we will DC the antibiotic, monitor his CBC and basic metabolic panel daily and also work on him getting his prosthetic leg in on the right side and other tools are available at his disposal so that he can function better at home.  As per vascular surgery he can continue to do dressings at home and when he is renal function is better as above then they can bring him back.  Patient and significant other and family are on board with the plan but they do understand that we will continue to monitor him clinically very closely           Acute kidney injury on CKD stage III -improving  -Cr @ admission 2.45, baseline ~ 1.6  * Initially improved to baseline and then worsening JOHNATHAN after OR 06/30   - nephrology consulted for severe JOHNATHAN, peak Cr of 8.7 07/08. Non oliguric and suspected to be ATN injury  - Cr continues to trend down and post ATN diuresis now, nephrology assisting he was being diuresed with IV Bumex but it has been hold  -I did review intake and output and he is -14. L since admission  -We will continue to monitor renal function, continue to avoid nephrotoxic agents  -Nephrology following and creatinine today is 5.06 appreciate input from nephrology team  -And given that patient is making about 3 L of urine every day there does not seem to be any need for IV diuretics which is reasonable      Type II DM, uncontrolled, on insulin, A1c 7.6 on 6/27/23   -Prior to admission on Tresiba 34 units twice a day with oral agents and he uses with meals 5 units + 1/150  -His oral hypoglycemic agents were held on admission  - BG levels are obtained via patient's dexcom.  -Patient is currently onTresiba 20 untis qAM and 22 units qPM with mealtime sliding scale.  -Blood sugar was 70 this morning and I have held his a.m. Tresiba and will decrease the dose of p.m. Tresiba also and continue with sliding  scale    Essential hypertension  -Given his JOHNATHAN his lisinopril was held but he was continued with Coreg and amlodipine  -Given his elevated blood pressure his amlodipine dose was increased to 10 mg  -Given his edema discussed with nephrology and we will continue the patient with Coreg and will decrease the dose of amlodipine back to 5 mg and watch his blood pressure closely and continue to hold Bumex       Mild hyponatremia -resolved     Acute on chronic anemia  Iron deficiency  -Hemoglobin at presentation was 12.8, slowly has drifted down, likely was multifactorial due to acute infections and ongoing surgeries  -1 unit PRBC 7/14 for Hb 6.8   -Hemoglobin on 7/18 was 7.3 and today is 7.4  -Total iron is low at 31 and iron binding capacity was 155 and will benefit from IV iron and no contraindications from infectious disease team and it has been ordered  -We will continue to monitor and transfuse for hemoglobin less than 7    Lines  -We will try to get PICC line out of possible in the next 1 to 2 days  -Drain in place over the thigh        Diet: Snacks/Supplements Adult: Other; 8 pm: cottage cheese with pears; Between Meals  High Consistent Carb (75 g CHO per Meal) Diet    DVT Prophylaxis: Heparin SQ  Corrales Catheter: Not present  Lines: PRESENT      PICC 07/07/23 Triple Lumen Right Basilic-Site Assessment: WDL      Cardiac Monitoring: None  Code Status: Full Code      Clinically Significant Risk Factors              # Hypoalbuminemia: Lowest albumin = 2.4 g/dL at 7/8/2023  6:39 AM, will monitor as appropriate     # Hypertension: Noted on problem list       # DMII: A1C = 7.6 % (Ref range: <5.7 %) within past 6 months   # Obesity: Estimated body mass index is 37.67 kg/m  as calculated from the following:    Height as of this encounter: 1.829 m (6').    Weight as of this encounter: 126 kg (277 lb 12.5 oz).           Disposition Plan      Expected Discharge Date: 07/24/2023      Destination: home;home with  family  Discharge Comments: TCU  7/20 11AM care conference          Damir Ohara MD  Hospitalist Service  [facility]  Securely message with [employer] (more info)  Text page via [employer] Paging/Directory   ______________________________________________________________________    Interval History     Seen today and care conference meeting was held with his family, significant other, nephrology, ID, vascular team and care coordinator team were present in the room and plan of care was discussed above with the patient.    His blood sugar was on the lower side at 70 and his a.m. long-acting insulin was held and we will decrease his evening insulin also.  Patient had questions which were answered to satisfaction    Plan of care discussed with the patient's nurse, family members, ID, nephrology, vascular team.  Patient is happy with plan of care and we will be checking his daily labs while he is in the hospital    Physical Exam   Vital Signs: Temp: 97.9  F (36.6  C) Temp src: Oral BP: (!) 141/86 Pulse: 75   Resp: 18 SpO2: 94 % O2 Device: None (Room air)    Weight: 277 lbs 12.47 oz        General: Patient appears comfortable and in no acute distress.  Respiratory: Lungs are clear to auscultation bilaterally with no wheeze or crackles   Cardiovascular: Regular rate , S1 and S2 normal with no murmer or rubs or gallops and has edema over lower extremities and he has scrotal edema also  Abdomen:   soft , non tender , non distended and bowel sound present   Neurologic:  No facial droop  Musculoskeletal: Normal Range of motion over upper and has  right BKA and has left BKA and has drain in place  Psychiatric: cooperative   Lines: PICC line    Medical Decision Making     Spent in care of patient is 65 minutes and more than 35 minutes were spent in care conference with infectious disease, nephrology, vascular surgery, care coordinator team along with this significant other and sister.  I did review his  medications, labs and meds adjustment to his medications        Data     I have personally reviewed the following data over the past 24 hrs:    9.5  \   7.4 (L)   / 285     141 101 71.8 (H) /  84   3.9 25 5.06 (H) \       Ferritin:  309 % Retic:  N/A LDH:  N/A       Imaging results reviewed over the past 24 hrs:   No results found for this or any previous visit (from the past 24 hour(s)).

## 2023-07-21 ENCOUNTER — APPOINTMENT (OUTPATIENT)
Dept: NUCLEAR MEDICINE | Facility: CLINIC | Age: 58
DRG: 463 | End: 2023-07-21
Attending: HOSPITALIST
Payer: COMMERCIAL

## 2023-07-21 LAB
ANION GAP SERPL CALCULATED.3IONS-SCNC: 12 MMOL/L (ref 7–15)
BUN SERPL-MCNC: 67.1 MG/DL (ref 6–20)
CALCIUM SERPL-MCNC: 8.4 MG/DL (ref 8.6–10)
CHLORIDE SERPL-SCNC: 103 MMOL/L (ref 98–107)
CREAT SERPL-MCNC: 4.63 MG/DL (ref 0.67–1.17)
DEPRECATED HCO3 PLAS-SCNC: 26 MMOL/L (ref 22–29)
ERYTHROCYTE [DISTWIDTH] IN BLOOD BY AUTOMATED COUNT: 13.1 % (ref 10–15)
GFR SERPL CREATININE-BSD FRML MDRD: 14 ML/MIN/1.73M2
GLUCOSE SERPL-MCNC: 137 MG/DL (ref 70–99)
HCT VFR BLD AUTO: 24.2 % (ref 40–53)
HGB BLD-MCNC: 7.8 G/DL (ref 13.3–17.7)
MCH RBC QN AUTO: 28.8 PG (ref 26.5–33)
MCHC RBC AUTO-ENTMCNC: 32.2 G/DL (ref 31.5–36.5)
MCV RBC AUTO: 89 FL (ref 78–100)
PLATELET # BLD AUTO: 302 10E3/UL (ref 150–450)
POTASSIUM SERPL-SCNC: 4 MMOL/L (ref 3.4–5.3)
RBC # BLD AUTO: 2.71 10E6/UL (ref 4.4–5.9)
SODIUM SERPL-SCNC: 141 MMOL/L (ref 136–145)
TROPONIN T SERPL HS-MCNC: 38 NG/L
WBC # BLD AUTO: 9.9 10E3/UL (ref 4–11)

## 2023-07-21 PROCEDURE — 250N000013 HC RX MED GY IP 250 OP 250 PS 637: Performed by: INTERNAL MEDICINE

## 2023-07-21 PROCEDURE — 250N000013 HC RX MED GY IP 250 OP 250 PS 637: Performed by: STUDENT IN AN ORGANIZED HEALTH CARE EDUCATION/TRAINING PROGRAM

## 2023-07-21 PROCEDURE — 99233 SBSQ HOSP IP/OBS HIGH 50: CPT | Performed by: HOSPITALIST

## 2023-07-21 PROCEDURE — A9540 TC99M MAA: HCPCS | Performed by: STUDENT IN AN ORGANIZED HEALTH CARE EDUCATION/TRAINING PROGRAM

## 2023-07-21 PROCEDURE — 84484 ASSAY OF TROPONIN QUANT: CPT | Performed by: HOSPITALIST

## 2023-07-21 PROCEDURE — 99232 SBSQ HOSP IP/OBS MODERATE 35: CPT | Performed by: INTERNAL MEDICINE

## 2023-07-21 PROCEDURE — 272N000035 NM LUNG SCAN VENTILATION AND PERFUSION

## 2023-07-21 PROCEDURE — 343N000001 HC RX 343: Performed by: STUDENT IN AN ORGANIZED HEALTH CARE EDUCATION/TRAINING PROGRAM

## 2023-07-21 PROCEDURE — 250N000011 HC RX IP 250 OP 636: Performed by: STUDENT IN AN ORGANIZED HEALTH CARE EDUCATION/TRAINING PROGRAM

## 2023-07-21 PROCEDURE — 250N000011 HC RX IP 250 OP 636: Performed by: INTERNAL MEDICINE

## 2023-07-21 PROCEDURE — 85014 HEMATOCRIT: CPT | Performed by: HOSPITALIST

## 2023-07-21 PROCEDURE — 78582 LUNG VENTILAT&PERFUS IMAGING: CPT

## 2023-07-21 PROCEDURE — 80048 BASIC METABOLIC PNL TOTAL CA: CPT | Performed by: HOSPITALIST

## 2023-07-21 PROCEDURE — A9567 TECHNETIUM TC-99M AEROSOL: HCPCS | Performed by: STUDENT IN AN ORGANIZED HEALTH CARE EDUCATION/TRAINING PROGRAM

## 2023-07-21 PROCEDURE — 120N000001 HC R&B MED SURG/OB

## 2023-07-21 RX ORDER — BUMETANIDE 0.25 MG/ML
1 INJECTION INTRAMUSCULAR; INTRAVENOUS ONCE
Status: COMPLETED | OUTPATIENT
Start: 2023-07-21 | End: 2023-07-21

## 2023-07-21 RX ADMIN — EZETIMIBE 10 MG: 10 TABLET ORAL at 08:44

## 2023-07-21 RX ADMIN — AMLODIPINE BESYLATE 5 MG: 5 TABLET ORAL at 08:44

## 2023-07-21 RX ADMIN — HEPARIN SODIUM 5000 UNITS: 5000 INJECTION, SOLUTION INTRAVENOUS; SUBCUTANEOUS at 21:18

## 2023-07-21 RX ADMIN — MULTIPLE VITAMINS W/ MINERALS TAB 1 TABLET: TAB at 08:44

## 2023-07-21 RX ADMIN — HEPARIN SODIUM 5000 UNITS: 5000 INJECTION, SOLUTION INTRAVENOUS; SUBCUTANEOUS at 05:53

## 2023-07-21 RX ADMIN — SODIUM CITRATE AND CITRIC ACID MONOHYDRATE 30 ML: 500; 334 SOLUTION ORAL at 08:44

## 2023-07-21 RX ADMIN — SODIUM CITRATE AND CITRIC ACID MONOHYDRATE 30 ML: 500; 334 SOLUTION ORAL at 21:16

## 2023-07-21 RX ADMIN — KIT FOR THE PREPARATION OF TECHNETIUM TC 99M PENTETATE 58 MILLICURIE: 20 INJECTION, POWDER, LYOPHILIZED, FOR SOLUTION INTRAVENOUS; RESPIRATORY (INHALATION) at 14:25

## 2023-07-21 RX ADMIN — CARVEDILOL 25 MG: 25 TABLET, FILM COATED ORAL at 18:13

## 2023-07-21 RX ADMIN — CALCIUM CARBONATE (ANTACID) CHEW TAB 500 MG 500 MG: 500 CHEW TAB at 21:24

## 2023-07-21 RX ADMIN — BUMETANIDE 1 MG: 0.25 INJECTION, SOLUTION INTRAMUSCULAR; INTRAVENOUS at 12:30

## 2023-07-21 RX ADMIN — CARVEDILOL 25 MG: 25 TABLET, FILM COATED ORAL at 08:44

## 2023-07-21 RX ADMIN — KIT FOR THE PREPARATION OF TECHNETIUM TC 99M ALBUMIN AGGREGATED 6.6 MILLICURIE: 2.5 INJECTION, POWDER, FOR SOLUTION INTRAVENOUS at 14:50

## 2023-07-21 RX ADMIN — HEPARIN SODIUM 5000 UNITS: 5000 INJECTION, SOLUTION INTRAVENOUS; SUBCUTANEOUS at 15:52

## 2023-07-21 ASSESSMENT — ACTIVITIES OF DAILY LIVING (ADL)
ADLS_ACUITY_SCORE: 36

## 2023-07-21 NOTE — PLAN OF CARE
Goal Outcome Evaluation:      Plan of Care Reviewed With: patient, significant other      Date & Time: 7/20/2023 7am-7pm  Surgery:. 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 2. Stage III CKD  3. Bilateral BKA   4. Presented to the ER due to pain, swelling and redness with small ulcer at Lt BKA stump and found to have cellulitis/sepsis.  Behavior & Aggression: Calm and cooperative with periods of anxiety  Fall Risk: Low, pt is a lift.  High risk if pt is using his prothesis  Orientation:A/O x 4  ABNL VS/O2:VSS  ABNL Labs: CR+ elevated (MD aware and on-going), low BG this am, hospitalist aware  Pain Management:Pt denies pain  Bowel/Bladder: Pt uses urinal for voiding and had small incontinent BM  Drains: none  Diet:carb diet  Activity Level: bedrest   Tests/Procedures: none  Anticipated  DC Date: unknown  Significant Information: care conference today and RRT called at the end of the shift, see notes for details.

## 2023-07-21 NOTE — CODE/RAPID RESPONSE
"[facility]    House IZAIAH RRT Note  7/20/2023   Time Called: 1925    RRT called for: Hypoxia    Assessment & Plan     Acute hypoxia respiratory failure likely 2/2 pulmonary edema in setting of severe JOHNATHAN on CKD III and abdominal distention limiting expansion.  Alternatively considered ACS, acute CHF, HAP, aspiration PNA/pneumonitis, PE, iron infusion reaction, atelectasis   - Upon arrival, pt lying in bed at 50-60 degrees, awake, alert, in no overt distress on 15+L O2 via oxymask with O2 sats 100s%, RR low 20s, SBP 130s-180s, DBP 100s-110s, afebrile.  Nursing notes pt was recently being cleaned up in bed, lying on his R side when pt acutely developed SOB, reports \"eyes were fluttering\" and O2 sats dropped to the 60s.  Pt placed on oxymask with pt's O2 sats slowly trending back up > 92%.  Pt reports he noted some SOB earlier today while attempting to sit on edge of bed.  Pt reports intermittent SOB recently.  Pt notes very distended abdomen limiting his ability to take deep breaths as well.  Pt reports having loose stools while on antibiotics recently, does not feel constipated.  Pt noted voiding adequate, large amounts.  Pt notes ongoing scrotal swelling.  No recent fevers, chills or productive cough.        INTERVENTIONS:  - Stat CXR, abd flat xray  - Noted nephrology recommendations; in setting of pt being able to be titrated down to 2L O2, will defer additional diuretic therapy at this time.  Pending imaging, will defer to nephrology in resuming diuretic therapy deemed appriorate  - Considered Bipap therapy; however, in setting of pt not in respiratory distress at this time and able to be weaned down to 2L O2 via NC, will defer Bipap therapy at this time.  If has progressive SOB, could consider Bipap therapy at that time  - Stat BNP (may be abnormal in setting of CKD), d-dimer (if negative, less likely PE, could be positive for several reasons), trop, VBG (for baseline)  - Will place pt on " "continuous pulse oximetry overnight  - Has scheduled coreg this evening; if remains hypertensive, has previously ordered PRN hydralazine and will also order PRN labetalol if needed  - Noted diuretic therapy has been on hold for past 2 days  - Noted pt continues on subcutaneous heparin 5000 unit(s) Q8H  - Noted CXR with bilateral interstitial prominence possibly due to pulmonary edema and d-dimer elevated at 6.7.  After discussion with cross covering hospitalist, will obtain venous US of BLE and will order one time dose IV bumex 1 mg.  If pt's respiratory status improves and US negative for DVT, respiratory failure likely due to fluid overload in setting of JOHNATHAN on CKD III.  If pt's respiratory status continues to worsen despite additional dose IV diuretic, may need to consider V/Q scan to evaluate for PE   - Will defer to rounding hospitalist if echocardiogram deemed appropriate    At the end of the RRT pt resting in bed on 2L O2 via NC, reports improved SOB at this time, able to converse in complete sentences.      Discussed with and defer further cares to nursing and hospitalist    Interval History     Ry Horner is a 58 year old male who was admitted on 6/24/2023 for L BKA site pain, swelling, redness, wound.    Medical history significant for: DMII with polyneuropathy and nephropathy, CKD III, bilateral BKA    Code Status: Full Code    Allergies   Allergies   Allergen Reactions     Pravastatin      \"sucked the life blood out of me,\" Indicates this occurs with all statins.     Statins        Physical Exam   Vital Signs with Ranges:  Temp:  [97.9  F (36.6  C)-98.7  F (37.1  C)] 98.2  F (36.8  C)  Pulse:  [75-76] 76  Resp:  [16-18] 18  BP: (141-154)/(83-96) 149/90  SpO2:  [92 %-94 %] 93 %  I/O last 3 completed shifts:  In: 690 [P.O.:690]  Out: 2575 [Urine:2575]    Constitutional: Pt lying in bed at 50-60 degrees, awake, alert, in no overt distress  Neck: No upper airway wheezes or stridor noted  Pulmonary: In no " overt respiratory distress, clear to auscultation bilaterally, no crackles or wheezes noted  Cardiovascular: Regular rate and rhythm, normal S1S2, no murmur, rub or gallop noted  GI: Round, distended, soft, nontender to palpation, no guarding or rebound tenderness noted, active bowel sounds  Skin/Integumen: Warm, dry, pink  Neuro: Awake, alert, clear speech, no obvious focal neuro deficit noted, moving all extremities   Psych:  Calm  Extremities: No peripheral edema noted, noted dressing on L BKA    Data       IMAGING: (X-ray/CT/MRI)   Recent Results (from the past 24 hour(s))   XR Abdomen Port 1 View    Narrative    EXAM: XR ABDOMEN PORT 1 VIEW  LOCATION: [facility]  DATE: 7/20/2023    INDICATION: Abd distension, eval edema vs stool burden  COMPARISON: None.      Impression    IMPRESSION: Evaluation limited by portable technique and patient body habitus.    Nonobstructive bowel gas pattern. No significant retained stool.   XR Chest Port 1 View    Narrative    EXAM: XR CHEST PORT 1 VIEW  LOCATION: [facility]  DATE: 7/20/2023    INDICATION: RRT acute hypoxia with repositioning, JOHNATHAN, question pulmonary edema pleural effusion.  COMPARISON: None.      Impression    IMPRESSION: Right PICC line tip at the cavoatrial junction. Diffuse bilateral interstitial prominence. Correlate with a degree of pulmonary edema and interstitial lung disease. No effusion. Normal cardiac silhouette.   US Lower Extremity Venous Duplex Bilateral    Narrative    EXAM: US LOWER EXTREMITY VENOUS DUPLEX BILATERAL  LOCATION: [facility]  DATE: 7/21/2023    INDICATION: Elevated d dimer, new onset SOB, JOHNATHAN CKD, recent L BKA infection; bilateral BKA  COMPARISON: None.  TECHNIQUE: Venous Duplex ultrasound of bilateral lower extremities with and without compression, augmentation and duplex. Color flow and spectral Doppler with waveform analysis performed. Technically difficult  exam due to swelling and open wounds.   Bilateral below-the-knee amputations.    FINDINGS: Exam includes the common femoral, femoral, popliteal veins as well as segmentally visualized deep calf veins and greater saphenous vein. Technically difficult exam due to swelling and open wounds. Bilateral below-the-knee amputations.    RIGHT: No deep vein thrombosis. No superficial thrombophlebitis. No popliteal cyst.    LEFT: No deep vein thrombosis. No superficial thrombophlebitis. No popliteal cyst.      Impression    IMPRESSION:  1.  No deep venous thrombosis in the bilateral lower extremities.       VBG:  Recent Labs   Lab 07/20/23 2053   PHV 7.38   PO2V 35   PCO2V 48   HCO3V 29*       BNP:  Recent Labs   Lab 07/20/23 2033   NTBNPI 11,262*       D-dimer:  Recent Labs   Lab 07/20/23 2033   DD 6.70*        Latest Reference Range & Units 07/20/23 20:33   Troponin T, High Sensitivity <=22 ng/L 38 (H)   (H): Data is abnormally high      Medical Decision Making       45 MINUTES SPENT BY ME on the date of service doing chart review, history, exam, documentation & further activities per the note.         LILIANA Montgomery [facility]  Hospitalist-House IZAIAH  Hospitalist Service  Securely message with [employer] (more info)  Text page via [facility] Paging/Directory

## 2023-07-21 NOTE — PLAN OF CARE
Goal Outcome Evaluation   sepsis due to L BKA stump infection.:    DATE & TIME: 07/20/2023 [id number]   Cognitive Concerns/ Orientation : A/Ox4  BEHAVIOR & AGGRESSION TOOL COLOR: Green   ABNL VS/O2: VSS, Hypertension, on 3L O2   MOBILITY: up with assist   PAIN MANAGMENT: None given on this shift   DIET: High Consistent Carb   BOWEL/BLADDER: Incontinent BB   ABNL LAB/BG: Troponin 38, creatinine 5.06  DRAIN/DEVICES: RPICC triple lumen,  saline locked  TELEMETRY RHYTHM: NA  SKIN: L BKA, wound change see orders, scrotal edema. Sacrum edema   TESTS/PROCEDURES: Ultrasound, Ct scan   D/C DATE: TBD   Discharge Barriers:   OTHER IMPORTANT INFO: RRT, O2 dropped to 67%os during a bowel movement change, while laying flat. On 3L o2, continuous pulse ox, wound dressing changed see order

## 2023-07-21 NOTE — PROGRESS NOTES
VSS. A/O. ind in bed. Bi BKA. L stump dressing changed. Denies pain. incont of B/B. O2 weaned to RA. Denies SOB. Scrotal edema. Tele SR.

## 2023-07-21 NOTE — PROGRESS NOTES
[facility]    Medicine Progress Note - Hospitalist Service    Date of Admission:  6/24/2023    Assessment & Plan     Ry Horner is a 57 year old male with medical history significant for uncontrolled type II DM with polyneuropathy and nephropathy, stage III CKD, bilateral BKA presented to the ER due to pain, swelling and redness with small ulcer at Lt BKA stump and found to have cellulitis/sepsis and is being admitted on 6/24/2023 for further management.      Sepsis due Lt BKA stump infection-resolved  S/p I&D of of left BKA stump on 06/30, 07/06 and 07/10  -Patient presented with pain, redness, swelling at his left BKA stump site.  Marked leukocytosis of 22.9. Tachycardic to 110s.  X-ray Shows soft tissue swelling, some bone formation along the resection margin of the tibia with periosteal reaction.   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     - Vascular surgeryplanning to proceed for formal revision and closure once medically optimized   -Patient has been afebrile for the past many days, WBC count is normal at 9.5 and detailed care conference was held with the access disease team, nephrology, vascular team, care coordinator and his sister and significant other and I also had detailed discussion with all teams separately together and given that his cultures have been negative we will discontinue his antibiotics and continue to monitor his daily labs.  Also as per vascular team they do want to do revision when creatinine goes down to near his baseline around 2.  Nephrology team was okay with discharge with close creatinine follow-up.  Patient did mention that he has concerns that given his swelling over lower extremities he is not able to use the other leg as prothesis may not be able to fit and  he also does not feel that given his scrotal edema he will be able to take care of himself.  -We have decided that we will DC the antibiotic, monitor his CBC and basic metabolic panel daily and also work on him getting his prosthetic leg in on the right side and other tools are available at his disposal so that he can function better at home.  As per vascular surgery he can continue to do dressings at home and when he is renal function is better as above then they can bring him back.  Patient and significant other and family are on board with the plan but they do understand that we will continue to monitor him clinically very closely  -WBC count this morning is stable at 9.9 and no evidence of any fever    Acute kidney injury on CKD stage III -improving  -Cr @ admission 2.45, baseline ~ 1.6  * Initially improved to baseline and then worsening JOHNATHAN after OR 06/30   - nephrology consulted for severe JOHNATHAN, peak Cr of 8.7 07/08. Non oliguric and suspected to be ATN injury  - Cr continues to trend down and post ATN diuresis now, nephrology assisting he was being diuresed with IV Bumex but it has been hold  -I did review intake and output and he is -14.8 L since admission  -We will continue to monitor renal function, continue to avoid nephrotoxic agents  -Overnight did receive dose of IV Bumex given respiratory failure  -Nephrology following and creatinine today is 4.63 appreciate input from nephrology team  -Discussed with nephrology team and he was given IV Bumex dose today  -Continue to check correct intake and output    Acute hypoxic respiratory failure likely due to pulmonary edema  -Overnight patient had rapid response called and he was on 15 L oxygen and his shortness of breath was acute and he was put on oxy mask and the sats trended more than 92%  -BNP was significantly elevated at 11,262, D-dimer was significantly elevated at 6.7 and stat chest x-ray and abdominal x-ray were ordered  -X-ray abdomen showed  nonobstructive bowel gas pattern and no significant retained stool  -pH was 7.38, PCO2 of 48 and bicarb of 29  -Of note patient has been continued with subcu heparin since admission  -X-ray of the chest showed diffuse bilateral interstitial prominence and correlate with degree of pulmonary edema interstitial lung disease, no effusion, normal cardiac silhouette  -Bilateral duplex was done which did not show any evidence of DVT  -Patient this morning is on 1 L of oxygen and discussed with the patient and differential can include fluid overload given his renal failure, other differential can and include hypersensitive reaction to iron infusion  -Did order echo and is pending and  V/Q scan did not show any PE  -I also talked with nephrology team and we will monitor him on telemetry and he was given dose of diuretics      Type II DM, uncontrolled, on insulin, A1c 7.6 on 6/27/23   -Prior to admission on Tresiba 34 units twice a day with oral agents and he uses with meals 5 units + 1/150  -His oral hypoglycemic agents were held on admission  - BG levels are obtained via patient's dexcom.  -Given his low blood sugar at 70 on 7/20 his a.m. dose of Tresiba was held and the p.m. dose of Tresiba was decreased to 12 units  -His blood sugar this morning was 96 and we will continue with sliding scale, continue to hold a.m. dose of Tresiba and continue with p.m. dose of Tresiba to 12 units      Essential hypertension  -Given his JOHNATHAN his lisinopril was held but he was continued with Coreg and amlodipine  -Given his elevated blood pressure his amlodipine dose was increased to 10 mg  -Pressures are stable and continue the patient with Coreg, amlodipine 5 mg and he is also getting diuretics       Mild hyponatremia -resolved     Acute on chronic anemia  Iron deficiency  -Hemoglobin at presentation was 12.8, slowly has drifted down, likely was multifactorial due to acute infections and ongoing surgeries  -1 unit PRBC 7/14 for Hb 6.8    -Total iron is low at 31 and iron binding capacity was 155 and will benefit from IV iron and no contraindications from infectious disease team and it has been ordered  -He did receive iron infusion yesterday and did develop shortness of breath and as hemoglobin is stable at 7.8 and he received 300 mg we will DC ongoing iron use  -We will continue to monitor and transfuse for hemoglobin less than 7    Lines  -We will try to get PICC line out of possible in the next 1 to 2 days  -Drain in place over the thigh        Diet: Snacks/Supplements Adult: Other; 8 pm: cottage cheese with pears; Between Meals  High Consistent Carb (75 g CHO per Meal) Diet    DVT Prophylaxis: Heparin SQ  Corrales Catheter: Not present  Lines: PRESENT      PICC 07/07/23 Triple Lumen Right Basilic-Site Assessment: WDL      Cardiac Monitoring: None  Code Status: Full Code      Clinically Significant Risk Factors              # Hypoalbuminemia: Lowest albumin = 2.4 g/dL at 7/8/2023  6:39 AM, will monitor as appropriate     # Hypertension: Noted on problem list       # DMII: A1C = 7.6 % (Ref range: <5.7 %) within past 6 months   # Obesity: Estimated body mass index is 37.67 kg/m  as calculated from the following:    Height as of this encounter: 1.829 m (6').    Weight as of this encounter: 126 kg (277 lb 12.5 oz).           Disposition Plan     Expected Discharge Date: 07/24/2023      Destination: home;home with family  Discharge Comments: TCU  7/20 11AM care conference          Damir Ohara MD  Hospitalist Service  [facility]  Securely message with [employer] (more info)  Text page via [employer] Paging/Directory   ______________________________________________________________________    Interval History     Reviewed events of overnight and yesterday evening patient had rapid response of when his oxygen sats were significantly elevated and this morning patient was on 1 L of oxygen but denied any shortness of breath.  Reviewed  work-up done during the rapid response.    Patient mentioned to me that it happened 3 times yesterday that he was not able to breathe and 1 time it was with physical therapy, second time was after the iron infusion got done and he denied any flushing at that time.  I also discussed differential with the patient and also plan for further work-up including echo and VQ scan and he understands    He also mentioned to me that his edema over the leg and the scrotum is getting better and he was able to wear the prosthesis.    Discussed the plan of care with the patient's nurse, nephrology team    I offered to call his GF but he said he will update her himself    Physical Exam   Vital Signs: Temp: 97.7  F (36.5  C) Temp src: Oral BP: (!) 129/96 Pulse: 74   Resp: 18 SpO2: 97 % O2 Device: Nasal cannula Oxygen Delivery: 1 LPM  Weight: 277 lbs 12.47 oz        General: Patient appears comfortable and in no acute distress.  Respiratory: Lungs are clear to auscultation bilaterally with no wheeze but has basal crackles   Cardiovascular: Regular rate , S1 and S2 normal with no murmer or rubs or gallops and has edema over lower extremities and he has scrotal edema also  Abdomen:   soft , non tender , non distended and bowel sound present   Neurologic:  No facial droop  Musculoskeletal: Normal Range of motion over upper and has  right BKA and has left BKA and has drain in place  Psychiatric: cooperative   Lines: PICC line    Medical Decision Making           Time spent in care of patient is 57 minutes and I reviewed the events of overnight from rapid response, reviewed labs, medications, discussed in detail with nephrology and ordered further work-up today      Data     I have personally reviewed the following data over the past 24 hrs:    9.9  \   7.8 (L)   / 302     141 103 67.1 (H) /  137 (H)   4.0 26 4.63 (H) \       Trop: 38 (H) BNP: 11,262 (H)       INR:  N/A PTT:  N/A   D-dimer:  6.70 (H) Fibrinogen:  N/A       Ferritin:  N/A %  Retic:  N/A LDH:  N/A       Imaging results reviewed over the past 24 hrs:   Recent Results (from the past 24 hour(s))   XR Abdomen Port 1 View    Narrative    EXAM: XR ABDOMEN PORT 1 VIEW  LOCATION: [facility]  DATE: 7/20/2023    INDICATION: Abd distension, eval edema vs stool burden  COMPARISON: None.      Impression    IMPRESSION: Evaluation limited by portable technique and patient body habitus.    Nonobstructive bowel gas pattern. No significant retained stool.   XR Chest Port 1 View    Narrative    EXAM: XR CHEST PORT 1 VIEW  LOCATION: [facility]  DATE: 7/20/2023    INDICATION: RRT acute hypoxia with repositioning, JOHNATHAN, question pulmonary edema pleural effusion.  COMPARISON: None.      Impression    IMPRESSION: Right PICC line tip at the cavoatrial junction. Diffuse bilateral interstitial prominence. Correlate with a degree of pulmonary edema and interstitial lung disease. No effusion. Normal cardiac silhouette.   US Lower Extremity Venous Duplex Bilateral    Narrative    EXAM: US LOWER EXTREMITY VENOUS DUPLEX BILATERAL  LOCATION: [facility]  DATE: 7/21/2023    INDICATION: Elevated d dimer, new onset SOB, JOHNATHAN CKD, recent L BKA infection; bilateral BKA  COMPARISON: None.  TECHNIQUE: Venous Duplex ultrasound of bilateral lower extremities with and without compression, augmentation and duplex. Color flow and spectral Doppler with waveform analysis performed. Technically difficult exam due to swelling and open wounds.   Bilateral below-the-knee amputations.    FINDINGS: Exam includes the common femoral, femoral, popliteal veins as well as segmentally visualized deep calf veins and greater saphenous vein. Technically difficult exam due to swelling and open wounds. Bilateral below-the-knee amputations.    RIGHT: No deep vein thrombosis. No superficial thrombophlebitis. No popliteal cyst.    LEFT: No deep vein thrombosis. No superficial  thrombophlebitis. No popliteal cyst.      Impression    IMPRESSION:  1.  No deep venous thrombosis in the bilateral lower extremities.

## 2023-07-21 NOTE — PROGRESS NOTES
" Renal Medicine Progress Note            Assessment/Plan:        57 y.o man with CKD III due to DM and HTN, following for severe JOHNATHAN.      # CKD III:                -bl ~ 1.5-1.9 mg/dl (GFR 45-55)               -2.5 g/g albuminuria     # Severe acute kidney injury: Scr peaked at 8.7 mg/dl. Slowly improving.      # Infected left stump with osteomyelitis s/p I&D:               -dapto/Zosyn     # FEN: His edema is much better. Excellent urine output wo needing diuretic. Hyperphosphatemia.      # Anemia: Hgb is low but stable. Iron def.      # Hypertension:                -Norvasc 10 mg daily               -Coreg 25 mg bid     Plan:  # Bumex 1 mg IV. Okay to mor doses as needed  # track I/O more closely  # V/Q scan ordered    I discussed the case with Dr. Ohara in person        Interval History:     He had an RRT event yesterday.   He acutely developed SOB staff were trying to get into his prosthesis.  He was given 1 mg IV Bumex.   Per record, he had 1.3 liters of urine out + more as not all was collected.   Breathing much better now on 1 liter O2.   CXR with some vascular congestions and BNP is high.   Scr is down to 4.6.   His say his prosthesis fits.           Medications and Allergies:       amLODIPine  5 mg Oral Daily     [Held by provider] bumetanide  1 mg Intravenous BID     carvedilol  25 mg Oral BID w/meals     ezetimibe  10 mg Oral Daily     heparin ANTICOAGULANT  5,000 Units Subcutaneous Q8H     insulin aspart  1-7 Units Subcutaneous TID AC     insulin aspart  1-5 Units Subcutaneous At Bedtime     insulin degludec  12 Units Subcutaneous At Bedtime     [Held by provider] insulin degludec  20 Units Subcutaneous QAM     multivitamin w/minerals  1 tablet Oral Daily     sodium chloride (PF)  10-40 mL Intracatheter Q7 Days     sodium chloride (PF)  3 mL Intracatheter Q8H     sodium citrate-citric acid  30 mL Oral BID        Allergies   Allergen Reactions     Pravastatin      \"sucked the life blood out of me,\" " Indicates this occurs with all statins.     Statins             Physical Exam:   Vitals were reviewed   , Blood pressure (!) 129/96, pulse 74, temperature 97.7  F (36.5  C), temperature source Oral, resp. rate 18, height 1.829 m (6'), weight 126 kg (277 lb 12.5 oz), SpO2 97 %.    Wt Readings from Last 3 Encounters:   07/20/23 126 kg (277 lb 12.5 oz)   10/03/22 110 kg (242 lb 8.1 oz)   08/17/22 119.9 kg (264 lb 6.4 oz)       Intake/Output Summary (Last 24 hours) at 7/21/2023 1152  Last data filed at 7/20/2023 2300  Gross per 24 hour   Intake --   Output 1325 ml   Net -1325 ml     GENERAL APPEARANCE: NAD  HEENT:  Eyes/ears/nose/neck grossly normal  RESP: Some crackles in the lung bases.   CV: RRR  ABDOMEN: Obese, soft, NT.  EXTREMITIES/SKIN: no rashes/lesions on observed skin; + edema  NEURO: Awake, alert and conversing appropriately         Data:     CBC RESULTS:     Recent Labs   Lab 07/21/23  0604 07/20/23  0613 07/19/23  0926 07/18/23  0702 07/17/23  0526 07/16/23  0652 07/15/23  0642   WBC 9.9 9.5 9.4 11.5*  --  9.7 11.9*   RBC 2.71* 2.54* 2.68* 2.51*  --  2.55* 2.68*   HGB 7.8* 7.4* 7.7* 7.3* 7.5* 7.4* 7.8*   HCT 24.2* 22.9* 24.0* 22.4* 22.6* 22.5* 23.7*    285 269 261  --  243 231       Basic Metabolic Panel:  Recent Labs   Lab 07/21/23  0604 07/20/23  0613 07/19/23  0926 07/18/23  0924 07/18/23  0655 07/17/23  0526 07/16/23  0652    141 140 141  --  140 140   POTASSIUM 4.0 3.9 4.3 4.1 4.2 4.3 4.3   CHLORIDE 103 101 102 102  --  101 101   CO2 26 25 26 26  --  27 26   BUN 67.1* 71.8* 72.8* 74.1*  --  78.4* 79.6*   CR 4.63* 5.06* 5.26* 5.52* 5.57* 5.73* 5.96*   * 84 81 84  --  85 77   FERNANDO 8.4* 8.3* 8.2* 8.3*  --  8.4* 8.3*       INRNo lab results found in last 7 days.   Attestation:   I have reviewed today's relevant vital signs, notes, medications, labs and imaging.    Jag Gomes MD  [facility] - Nephrology  Office phone :[phone]  Pager: [phone]

## 2023-07-22 ENCOUNTER — APPOINTMENT (OUTPATIENT)
Dept: CARDIOLOGY | Facility: CLINIC | Age: 58
DRG: 463 | End: 2023-07-22
Attending: HOSPITALIST
Payer: COMMERCIAL

## 2023-07-22 LAB
ANION GAP SERPL CALCULATED.3IONS-SCNC: 12 MMOL/L (ref 7–15)
BUN SERPL-MCNC: 63.2 MG/DL (ref 6–20)
CALCIUM SERPL-MCNC: 8.3 MG/DL (ref 8.6–10)
CHLORIDE SERPL-SCNC: 101 MMOL/L (ref 98–107)
CREAT SERPL-MCNC: 4.12 MG/DL (ref 0.67–1.17)
DEPRECATED HCO3 PLAS-SCNC: 27 MMOL/L (ref 22–29)
ERYTHROCYTE [DISTWIDTH] IN BLOOD BY AUTOMATED COUNT: 13.1 % (ref 10–15)
GFR SERPL CREATININE-BSD FRML MDRD: 16 ML/MIN/1.73M2
GLUCOSE SERPL-MCNC: 138 MG/DL (ref 70–99)
HCT VFR BLD AUTO: 24.4 % (ref 40–53)
HGB BLD-MCNC: 7.8 G/DL (ref 13.3–17.7)
LVEF ECHO: NORMAL
MCH RBC QN AUTO: 28.8 PG (ref 26.5–33)
MCHC RBC AUTO-ENTMCNC: 32 G/DL (ref 31.5–36.5)
MCV RBC AUTO: 90 FL (ref 78–100)
PLATELET # BLD AUTO: 296 10E3/UL (ref 150–450)
POTASSIUM SERPL-SCNC: 3.9 MMOL/L (ref 3.4–5.3)
RBC # BLD AUTO: 2.71 10E6/UL (ref 4.4–5.9)
SODIUM SERPL-SCNC: 140 MMOL/L (ref 136–145)
WBC # BLD AUTO: 9.9 10E3/UL (ref 4–11)

## 2023-07-22 PROCEDURE — 250N000011 HC RX IP 250 OP 636: Performed by: INTERNAL MEDICINE

## 2023-07-22 PROCEDURE — 250N000013 HC RX MED GY IP 250 OP 250 PS 637: Performed by: STUDENT IN AN ORGANIZED HEALTH CARE EDUCATION/TRAINING PROGRAM

## 2023-07-22 PROCEDURE — 250N000013 HC RX MED GY IP 250 OP 250 PS 637

## 2023-07-22 PROCEDURE — 93306 TTE W/DOPPLER COMPLETE: CPT | Mod: 26 | Performed by: INTERNAL MEDICINE

## 2023-07-22 PROCEDURE — 120N000001 HC R&B MED SURG/OB

## 2023-07-22 PROCEDURE — 999N000040 HC STATISTIC CONSULT NO CHARGE VASC ACCESS

## 2023-07-22 PROCEDURE — 250N000011 HC RX IP 250 OP 636: Performed by: STUDENT IN AN ORGANIZED HEALTH CARE EDUCATION/TRAINING PROGRAM

## 2023-07-22 PROCEDURE — 999N000208 ECHOCARDIOGRAM COMPLETE

## 2023-07-22 PROCEDURE — 99232 SBSQ HOSP IP/OBS MODERATE 35: CPT | Performed by: INTERNAL MEDICINE

## 2023-07-22 PROCEDURE — 80048 BASIC METABOLIC PNL TOTAL CA: CPT | Performed by: HOSPITALIST

## 2023-07-22 PROCEDURE — 255N000002 HC RX 255 OP 636: Performed by: STUDENT IN AN ORGANIZED HEALTH CARE EDUCATION/TRAINING PROGRAM

## 2023-07-22 PROCEDURE — 250N000013 HC RX MED GY IP 250 OP 250 PS 637: Performed by: INTERNAL MEDICINE

## 2023-07-22 PROCEDURE — 85027 COMPLETE CBC AUTOMATED: CPT | Performed by: HOSPITALIST

## 2023-07-22 PROCEDURE — 99232 SBSQ HOSP IP/OBS MODERATE 35: CPT | Performed by: HOSPITALIST

## 2023-07-22 PROCEDURE — 250N000013 HC RX MED GY IP 250 OP 250 PS 637: Performed by: HOSPITALIST

## 2023-07-22 PROCEDURE — 999N000190 HC STATISTIC VAT ROUNDS

## 2023-07-22 RX ORDER — CARVEDILOL 12.5 MG/1
12.5 TABLET ORAL ONCE
Status: COMPLETED | OUTPATIENT
Start: 2023-07-22 | End: 2023-07-22

## 2023-07-22 RX ORDER — METOLAZONE 2.5 MG/1
5 TABLET ORAL ONCE
Status: COMPLETED | OUTPATIENT
Start: 2023-07-22 | End: 2023-07-22

## 2023-07-22 RX ORDER — BUMETANIDE 0.25 MG/ML
1 INJECTION INTRAMUSCULAR; INTRAVENOUS ONCE
Status: COMPLETED | OUTPATIENT
Start: 2023-07-22 | End: 2023-07-22

## 2023-07-22 RX ADMIN — HEPARIN SODIUM 5000 UNITS: 5000 INJECTION, SOLUTION INTRAVENOUS; SUBCUTANEOUS at 21:14

## 2023-07-22 RX ADMIN — CARVEDILOL 25 MG: 25 TABLET, FILM COATED ORAL at 08:18

## 2023-07-22 RX ADMIN — BUMETANIDE 1 MG: 0.25 INJECTION, SOLUTION INTRAMUSCULAR; INTRAVENOUS at 08:17

## 2023-07-22 RX ADMIN — SODIUM CITRATE AND CITRIC ACID MONOHYDRATE 30 ML: 500; 334 SOLUTION ORAL at 08:18

## 2023-07-22 RX ADMIN — MULTIPLE VITAMINS W/ MINERALS TAB 1 TABLET: TAB at 08:18

## 2023-07-22 RX ADMIN — HEPARIN SODIUM 5000 UNITS: 5000 INJECTION, SOLUTION INTRAVENOUS; SUBCUTANEOUS at 06:01

## 2023-07-22 RX ADMIN — SODIUM CITRATE AND CITRIC ACID MONOHYDRATE 30 ML: 500; 334 SOLUTION ORAL at 21:11

## 2023-07-22 RX ADMIN — CARVEDILOL 25 MG: 25 TABLET, FILM COATED ORAL at 17:07

## 2023-07-22 RX ADMIN — HEPARIN SODIUM 5000 UNITS: 5000 INJECTION, SOLUTION INTRAVENOUS; SUBCUTANEOUS at 14:17

## 2023-07-22 RX ADMIN — METOLAZONE 5 MG: 2.5 TABLET ORAL at 08:18

## 2023-07-22 RX ADMIN — AMLODIPINE BESYLATE 5 MG: 5 TABLET ORAL at 08:18

## 2023-07-22 RX ADMIN — EZETIMIBE 10 MG: 10 TABLET ORAL at 08:18

## 2023-07-22 RX ADMIN — Medication 1 MG: at 23:00

## 2023-07-22 RX ADMIN — OXYCODONE HYDROCHLORIDE 10 MG: 5 TABLET ORAL at 21:29

## 2023-07-22 RX ADMIN — HUMAN ALBUMIN MICROSPHERES AND PERFLUTREN 9 ML: 10; .22 INJECTION, SOLUTION INTRAVENOUS at 11:58

## 2023-07-22 RX ADMIN — CARVEDILOL 12.5 MG: 12.5 TABLET, FILM COATED ORAL at 09:54

## 2023-07-22 ASSESSMENT — ACTIVITIES OF DAILY LIVING (ADL)
ADLS_ACUITY_SCORE: 36

## 2023-07-22 NOTE — PROGRESS NOTES
VSS. A/O. Room air. L leg dressing changed. Denies pain. Scrotal edema. incont of B/B.    Detail Level: Detailed

## 2023-07-22 NOTE — PROGRESS NOTES
" Renal Medicine Progress Note            Assessment/Plan:     57 y.o man with CKD III due to DM and HTN, following for severe JOHNATHAN.      # CKD III:                -bl ~ 1.5-1.9 mg/dl (GFR 45-55)               -2.5 g/g albuminuria     # Severe acute kidney injury: Scr peaked at 8.7 mg/dl. Continues to improve.      # Infected left stump with osteomyelitis s/p I&D:               -dapto/Zosyn     # FEN: His edema is much better. Excellent urine output      # Anemia: Hgb is low but stable. Iron def.      # Hypertension:                -Norvasc 10 mg daily               -Coreg 25 mg bid     Plan:  # Bumex 1 mg IV + metolazone 5 mg   # Pt would like to try wearing diapers. No need to strictly record urine output as long as kidney function continues to improve.         Interval History:     He is frustrated that it is difficult for him to ambulate and do things.  He is frustrated that he is soiling on himself and his bed.   He would like to try wearing a diaper.   Denies worsening SOB.  Scr continues to improve.   V/Q scan wo evidence of PE.           Medications and Allergies:       amLODIPine  5 mg Oral Daily     [Held by provider] bumetanide  1 mg Intravenous BID     carvedilol  25 mg Oral BID w/meals     ezetimibe  10 mg Oral Daily     heparin ANTICOAGULANT  5,000 Units Subcutaneous Q8H     insulin aspart  1-7 Units Subcutaneous TID AC     insulin aspart  1-5 Units Subcutaneous At Bedtime     insulin degludec  12 Units Subcutaneous At Bedtime     [Held by provider] insulin degludec  20 Units Subcutaneous QAM     multivitamin w/minerals  1 tablet Oral Daily     sodium chloride (PF)  10-40 mL Intracatheter Q7 Days     sodium chloride (PF)  3 mL Intracatheter Q8H     sodium citrate-citric acid  30 mL Oral BID        Allergies   Allergen Reactions     Pravastatin      \"sucked the life blood out of me,\" Indicates this occurs with all statins.     Statins             Physical Exam:   Vitals were reviewed   , Blood pressure " (!) 160/95, pulse 77, temperature 98.1  F (36.7  C), temperature source Oral, resp. rate 20, height 1.829 m (6'), weight 132.2 kg (291 lb 7.2 oz), SpO2 92 %.    Wt Readings from Last 3 Encounters:   07/22/23 132.2 kg (291 lb 7.2 oz)   10/03/22 110 kg (242 lb 8.1 oz)   08/17/22 119.9 kg (264 lb 6.4 oz)       Intake/Output Summary (Last 24 hours) at 7/22/2023 1308  Last data filed at 7/22/2023 1000  Gross per 24 hour   Intake 300 ml   Output 1250 ml   Net -950 ml     GENERAL APPEARANCE: NAD  HEENT:  Eyes/ears/nose/neck grossly normal  RESP: Good airflow anteriorly.   CV: RRR  ABDOMEN: Obese, soft, NT.  EXTREMITIES/SKIN: no rashes/lesions on observed skin; + edema-better  NEURO: Awake, alert and conversing appropriately  PSYCH: Frustrated.          Data:     CBC RESULTS:     Recent Labs   Lab 07/22/23  0601 07/21/23  0604 07/20/23  0613 07/19/23  0926 07/18/23  0702 07/17/23  0526 07/16/23  0652   WBC 9.9 9.9 9.5 9.4 11.5*  --  9.7   RBC 2.71* 2.71* 2.54* 2.68* 2.51*  --  2.55*   HGB 7.8* 7.8* 7.4* 7.7* 7.3* 7.5* 7.4*   HCT 24.4* 24.2* 22.9* 24.0* 22.4* 22.6* 22.5*    302 285 269 261  --  243       Basic Metabolic Panel:  Recent Labs   Lab 07/22/23  0601 07/21/23  0604 07/20/23  0613 07/19/23  0926 07/18/23  0924 07/18/23  0655 07/17/23  0526    141 141 140 141  --  140   POTASSIUM 3.9 4.0 3.9 4.3 4.1 4.2 4.3   CHLORIDE 101 103 101 102 102  --  101   CO2 27 26 25 26 26  --  27   BUN 63.2* 67.1* 71.8* 72.8* 74.1*  --  78.4*   CR 4.12* 4.63* 5.06* 5.26* 5.52* 5.57* 5.73*   * 137* 84 81 84  --  85   FERNANDO 8.3* 8.4* 8.3* 8.2* 8.3*  --  8.4*       INRNo lab results found in last 7 days.   Attestation:   I have reviewed today's relevant vital signs, notes, medications, labs and imaging.    Jag Gomes MD  [facility] - Nephrology  Office phone :[phone]  Pager: [phone]

## 2023-07-22 NOTE — PLAN OF CARE
Goal Outcome Evaluation: VSS ex HTN, parameters not met for PRN hydralazine. PT reported feeling SOB when turning to the left for incontinence care, oxygen saturation dropped and was placed on oxygen, weaned throughout the night, currently on 1L NC. A/O. ind in bed. Bi BKA. L stump dressing changed. Denies pain. incont of B/B. Edema noted on arms and BLE, including shaft of penis & Scrotal edema. Tele SR. BG checks reported from pt monitor. Continue with plan of care.       Plan of Care Reviewed With: patient    Overall Patient Progress: no changeOverall Patient Progress: no change

## 2023-07-22 NOTE — PROGRESS NOTES
Vascular Surgery Progress Note    S: No complaints    O:   Vitals:  BP  Min: 142/81  Max: 190/117  Temp  Av.2  F (36.8  C)  Min: 98.1  F (36.7  C)  Max: 98.2  F (36.8  C)  Pulse  Av.8  Min: 76  Max: 90  I/O last 3 completed shifts:  In: 600 [P.O.:600]  Out: 700 [Urine:700]    Physical Exam: Tolerating dressing changes well.        Reviewed recent wound photographs in the media--very clean wound bed    Hgb= 7.8   WBC= 9.9    Assessment/Plan: Continue with present plans.  Follow-up arranged with Dr. Child.      Wm. Ej MD

## 2023-07-22 NOTE — PROGRESS NOTES
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      Sepsis due Lt BKA stump infection-resolved  S/p I&D of of left BKA stump on 06/30, 07/06 and 07/10  -Patient presented with pain, redness, swelling at his left BKA stump site.  Marked leukocytosis of 22.9. Tachycardic to 110s.  X-ray Shows soft tissue swelling, some bone formation along the resection margin of the tibia with periosteal reaction.   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     - 7/20- care conference ->Patient has been afebrile for the past many days, WBC count is normal at 9.5 and detailed care conference was held with the access disease team, nephrology, vascular team, care coordinator and his sister and significant other and I also had detailed discussion with all teams separately together and given that his cultures have been negative we will discontinue his antibiotics and continue to monitor his daily labs.  Also as per vascular team they do want to do revision when creatinine goes down to near his baseline around 2.  Nephrology team was okay with discharge with close creatinine follow-up.  Patient did mention that he has concerns that given his swelling over lower extremities he is not able to use the other leg as prothesis may not be able to fit and he also does not feel that given his scrotal edema he will be able to  take care of himself.  His antibiotics were discontinued and plan is to fit his prosthesis and is amputation site on the right, optimize him medically with plan to do revision when his renal function is stable around 2 and monitor him closely in the hospital  -Patient has been monitored closely and has been afebrile and his WBC count has been stable at 9.9 for the past 2 days  -Of note he is able to fit a prosthesis on his right amputation stump    Acute kidney injury on CKD stage III -improving  -Cr @ admission 2.45, baseline ~ 1.6  * Initially improved to baseline and then worsening JOHNATHAN after OR 06/30   - nephrology consulted for severe JOHNATHAN, peak Cr of 8.7 07/08. Non oliguric and suspected to be ATN injury  -Patient has been followed by nephrology and he was initially diuresed with IV Bumex but given that he was making 3 L of urine the Bumex was held for 2 days but on p.m. of 7/20 he had an RRT and did receive IV Bumex  -His renal function has been trending down and patient did receive IV dose of Bumex on 7/21  -I did review the intake and output and he is net -14.7 L approximately and creatinine today has come down to 4.12  -Given that patient still feels short of breath and his BNP was elevated and chest x-ray was concerning with fluid overload I will continue the patient at this time with IV Bumex    Acute hypoxic respiratory failure likely due to pulmonary edema  Likely diastolic heart failure exacerbation  -Overnight patient had rapid response called and he was on 15 L oxygen and his shortness of breath was acute and he was put on oxy mask and the sats trended more than 92%  -BNP was significantly elevated at 11,262, D-dimer was significantly elevated at 6.7 and stat chest x-ray and abdominal x-ray were ordered  -X-ray abdomen showed nonobstructive bowel gas pattern and no significant retained stool  -pH was 7.38, PCO2 of 48 and bicarb of 29  -Of note patient has been continued with subcu heparin since  admission  -X-ray of the chest on 7/20- showed diffuse bilateral interstitial prominence and correlate with degree of pulmonary edema interstitial lung disease, no effusion, normal cardiac silhouette  -Bilateral duplex was done which did not show any evidence of DVT  -VQ scan was done which did not show any evidence of pulmonary embolism  -Differentials most likely include fluid overload versus hypersensitive reaction to iron  -He continues to have episodes where he feels short of breath with moving and was put on oxygen last night and will continue with IV diuresis and we will try to get more accurate intake and output and will follow on results of echo  -Echo results were reviewed today and shows EF of 60 to 65%, RV systolic function is normal, diastolic Doppler findings suggest LV filling pressures are increased    Mildly elevated troponin likely due to demand  -His troponins have been flat at 38 and he never had chest pain but did develop hypoxia-an EKG did not show any ST changes  -We will get echo which has been pending since yesterday      Type II DM, uncontrolled, on insulin, A1c 7.6 on 6/27/23   -Prior to admission on Tresiba 34 units twice a day with oral agents and he uses with meals 5 units + 1/150  -His oral hypoglycemic agents were held on admission  - BG levels are obtained via patient's dexcom.  -Given his low blood sugar at 70 on 7/20 his a.m. dose of Tresiba was held and the p.m. dose of Tresiba was decreased to 12 units  -His blood sugar this morning was 140 and we will continue with sliding scale, continue to hold a.m. dose of Tresiba and continue with p.m. dose of Tresiba to 12 units      Essential hypertension  -Given his JOHNATHAN his lisinopril was held but he was continued with Coreg and amlodipine  -Given his elevated blood pressure his amlodipine dose was increased to 10 mg  -Given concerns of edema the dose of amlodipine was again decreased to 5 mg and patient was continued with Coreg 25 twice  daily but given significantly elevated blood pressures we will go up on dose of Coreg to 37.5 mg and monitor his blood pressure closely along with heart rate which has been stable       Mild hyponatremia -resolved     Acute on chronic anemia  Iron deficiency  Status 1 dose of IV Venofer on 7/20  -Hemoglobin at presentation was 12.8, slowly has drifted down, likely was multifactorial due to acute infections and ongoing surgeries  -1 unit PRBC 7/14 for Hb 6.8   -Total iron is low at 31 and iron binding capacity was 155 and will benefit from IV iron and no contraindications from infectious disease team and it has been ordered  -Status 1 dose of IV Venofer on 7/20 PM but he developed shortness of breath and we have decided not to give him more doses and his hemoglobin has been stable at 7.8 for the past 2 days    Lines  -We will try to get PICC line out of possible in the next 1 to 2 days  -Drain in place over the thigh        Diet: Snacks/Supplements Adult: Other; 8 pm: cottage cheese with pears; Between Meals  High Consistent Carb (75 g CHO per Meal) Diet    DVT Prophylaxis: Heparin SQ  Corrales Catheter: Not present  Lines: PRESENT      PICC 07/07/23 Triple Lumen Right Basilic-Site Assessment: WDL      Cardiac Monitoring: ACTIVE order. Indication: Hypoxia  Code Status: Full Code      Clinically Significant Risk Factors              # Hypoalbuminemia: Lowest albumin = 2.4 g/dL at 7/8/2023  6:39 AM, will monitor as appropriate     # Hypertension: Noted on problem list       # DMII: A1C = 7.6 % (Ref range: <5.7 %) within past 6 months   # Obesity: Estimated body mass index is 39.53 kg/m  as calculated from the following:    Height as of this encounter: 1.829 m (6').    Weight as of this encounter: 132.2 kg (291 lb 7.2 oz).           Disposition Plan     Expected Discharge Date: 07/24/2023      Destination: home;home with family  Discharge Comments: TCU  7/20 11AM care conference          Damir Ohara MD  Hospitalist  [facility]  Securely message with [employer] (more info)  Text page via [employer] Paging/Directory   ______________________________________________________________________    Interval History     Seen today and reviewed events of overnight and his blood pressure has been elevated.  He also feels short of breath with movement and was on 2 L of oxygen.  Echo is pending.  No fever or chills and his echo was being done. BSL was 140's on his meter    Discussed with RN and patient and questions answered to satisfaction and plan of care discussed    Physical Exam   Vital Signs: Temp: 98.1  F (36.7  C) Temp src: Oral BP: (!) 160/95 Pulse: 77   Resp: 20 SpO2: 97 % O2 Device: Nasal cannula Oxygen Delivery: 2 LPM  Weight: 291 lbs 7.17 oz        General: Patient appears comfortable and in no acute distress.  Respiratory: Lungs are clear to auscultation bilaterally with no wheeze but has basal crackles   Cardiovascular: Regular rate , S1 and S2 normal with no murmer or rubs or gallops and has edema over lower extremities and he has scrotal edema also  Abdomen:   soft , non tender , non distended and bowel sound present   Neurologic:  No facial droop  Musculoskeletal: Normal Range of motion over upper and has  right BKA and has left BKA and has drain in place  Psychiatric: cooperative   Lines: PICC line    Medical Decision Making           Time spent in care of patient is 57 minutes and I reviewed the events of overnight from rapid response, reviewed labs, medications, discussed in detail with nephrology and ordered further work-up today      Data     I have personally reviewed the following data over the past 24 hrs:    9.9  \   7.8 (L)   / 296     140 101 63.2 (H) /  138 (H)   3.9 27 4.12 (H) \       Trop: N/A BNP: N/A       Imaging results reviewed over the past 24 hrs:   Recent Results (from the past 24 hour(s))   NM Lung Scan Ventilation and Perfusion    Narrative    EXAM: NM LUNG SCAN VENTILATION  AND PERFUSION  LOCATION: [facility]  DATE: 7/21/2023    INDICATION: Shortness of breath.  COMPARISON: Chest radiograph 07/20/2023.  TECHNIQUE: 58 mCi Tc-99m DTPA inhaled. 6.6 mCi Tc-99m MAA, IV. Standard planar imaging during perfusion and ventilation portions of exam.    FINDINGS: Mild central clumping of the radiotracer on ventilatory images, likely representing turbulent airflow. Normal pulmonary perfusion. No mismatched segmental perfusion defect.      Impression    IMPRESSION:    No evidence of pulmonary embolism.

## 2023-07-23 ENCOUNTER — APPOINTMENT (OUTPATIENT)
Dept: PHYSICAL THERAPY | Facility: CLINIC | Age: 58
DRG: 463 | End: 2023-07-23
Payer: COMMERCIAL

## 2023-07-23 LAB
ANION GAP SERPL CALCULATED.3IONS-SCNC: 12 MMOL/L (ref 7–15)
BUN SERPL-MCNC: 61.3 MG/DL (ref 6–20)
CALCIUM SERPL-MCNC: 8.5 MG/DL (ref 8.6–10)
CHLORIDE SERPL-SCNC: 103 MMOL/L (ref 98–107)
CREAT SERPL-MCNC: 3.95 MG/DL (ref 0.67–1.17)
DEPRECATED HCO3 PLAS-SCNC: 27 MMOL/L (ref 22–29)
ERYTHROCYTE [DISTWIDTH] IN BLOOD BY AUTOMATED COUNT: 13.2 % (ref 10–15)
GFR SERPL CREATININE-BSD FRML MDRD: 17 ML/MIN/1.73M2
GLUCOSE SERPL-MCNC: 143 MG/DL (ref 70–99)
HCT VFR BLD AUTO: 24.2 % (ref 40–53)
HGB BLD-MCNC: 7.7 G/DL (ref 13.3–17.7)
MCH RBC QN AUTO: 28.8 PG (ref 26.5–33)
MCHC RBC AUTO-ENTMCNC: 31.8 G/DL (ref 31.5–36.5)
MCV RBC AUTO: 91 FL (ref 78–100)
PLATELET # BLD AUTO: 267 10E3/UL (ref 150–450)
POTASSIUM SERPL-SCNC: 3.8 MMOL/L (ref 3.4–5.3)
RBC # BLD AUTO: 2.67 10E6/UL (ref 4.4–5.9)
SODIUM SERPL-SCNC: 142 MMOL/L (ref 136–145)
WBC # BLD AUTO: 9.4 10E3/UL (ref 4–11)

## 2023-07-23 PROCEDURE — 250N000013 HC RX MED GY IP 250 OP 250 PS 637: Performed by: STUDENT IN AN ORGANIZED HEALTH CARE EDUCATION/TRAINING PROGRAM

## 2023-07-23 PROCEDURE — 250N000013 HC RX MED GY IP 250 OP 250 PS 637: Performed by: INTERNAL MEDICINE

## 2023-07-23 PROCEDURE — 120N000001 HC R&B MED SURG/OB

## 2023-07-23 PROCEDURE — 250N000011 HC RX IP 250 OP 636: Performed by: STUDENT IN AN ORGANIZED HEALTH CARE EDUCATION/TRAINING PROGRAM

## 2023-07-23 PROCEDURE — 99233 SBSQ HOSP IP/OBS HIGH 50: CPT | Performed by: HOSPITALIST

## 2023-07-23 PROCEDURE — 250N000011 HC RX IP 250 OP 636: Performed by: INTERNAL MEDICINE

## 2023-07-23 PROCEDURE — 97530 THERAPEUTIC ACTIVITIES: CPT | Mod: GP | Performed by: PHYSICAL THERAPIST

## 2023-07-23 PROCEDURE — 80048 BASIC METABOLIC PNL TOTAL CA: CPT | Performed by: HOSPITALIST

## 2023-07-23 PROCEDURE — 99231 SBSQ HOSP IP/OBS SF/LOW 25: CPT | Performed by: INTERNAL MEDICINE

## 2023-07-23 PROCEDURE — 250N000012 HC RX MED GY IP 250 OP 636 PS 637: Performed by: STUDENT IN AN ORGANIZED HEALTH CARE EDUCATION/TRAINING PROGRAM

## 2023-07-23 PROCEDURE — 250N000013 HC RX MED GY IP 250 OP 250 PS 637: Performed by: HOSPITALIST

## 2023-07-23 PROCEDURE — 85027 COMPLETE CBC AUTOMATED: CPT | Performed by: HOSPITALIST

## 2023-07-23 RX ORDER — FERROUS SULFATE 325(65) MG
325 TABLET ORAL 2 TIMES DAILY
Status: DISCONTINUED | OUTPATIENT
Start: 2023-07-23 | End: 2023-07-26

## 2023-07-23 RX ORDER — BUMETANIDE 0.25 MG/ML
1 INJECTION INTRAMUSCULAR; INTRAVENOUS ONCE
Status: COMPLETED | OUTPATIENT
Start: 2023-07-23 | End: 2023-07-23

## 2023-07-23 RX ORDER — METOLAZONE 2.5 MG/1
5 TABLET ORAL ONCE
Status: COMPLETED | OUTPATIENT
Start: 2023-07-23 | End: 2023-07-23

## 2023-07-23 RX ORDER — CARVEDILOL 12.5 MG/1
12.5 TABLET ORAL ONCE
Status: COMPLETED | OUTPATIENT
Start: 2023-07-23 | End: 2023-07-23

## 2023-07-23 RX ADMIN — SODIUM CITRATE AND CITRIC ACID MONOHYDRATE 30 ML: 500; 334 SOLUTION ORAL at 08:30

## 2023-07-23 RX ADMIN — AMLODIPINE BESYLATE 5 MG: 5 TABLET ORAL at 08:31

## 2023-07-23 RX ADMIN — HEPARIN SODIUM 5000 UNITS: 5000 INJECTION, SOLUTION INTRAVENOUS; SUBCUTANEOUS at 13:44

## 2023-07-23 RX ADMIN — INSULIN ASPART 2 UNITS: 100 INJECTION, SOLUTION INTRAVENOUS; SUBCUTANEOUS at 17:19

## 2023-07-23 RX ADMIN — HEPARIN SODIUM 5000 UNITS: 5000 INJECTION, SOLUTION INTRAVENOUS; SUBCUTANEOUS at 21:44

## 2023-07-23 RX ADMIN — FERROUS SULFATE TAB 325 MG (65 MG ELEMENTAL FE) 325 MG: 325 (65 FE) TAB at 21:44

## 2023-07-23 RX ADMIN — ACETAMINOPHEN 650 MG: 325 TABLET, FILM COATED ORAL at 21:59

## 2023-07-23 RX ADMIN — METOLAZONE 5 MG: 2.5 TABLET ORAL at 14:30

## 2023-07-23 RX ADMIN — INSULIN ASPART 1 UNITS: 100 INJECTION, SOLUTION INTRAVENOUS; SUBCUTANEOUS at 13:38

## 2023-07-23 RX ADMIN — MULTIPLE VITAMINS W/ MINERALS TAB 1 TABLET: TAB at 08:31

## 2023-07-23 RX ADMIN — SODIUM CITRATE AND CITRIC ACID MONOHYDRATE 30 ML: 500; 334 SOLUTION ORAL at 21:44

## 2023-07-23 RX ADMIN — CARVEDILOL 12.5 MG: 12.5 TABLET, FILM COATED ORAL at 08:31

## 2023-07-23 RX ADMIN — BUMETANIDE 1 MG: 0.25 INJECTION, SOLUTION INTRAMUSCULAR; INTRAVENOUS at 14:30

## 2023-07-23 RX ADMIN — EZETIMIBE 10 MG: 10 TABLET ORAL at 08:30

## 2023-07-23 RX ADMIN — HEPARIN SODIUM 5000 UNITS: 5000 INJECTION, SOLUTION INTRAVENOUS; SUBCUTANEOUS at 06:14

## 2023-07-23 RX ADMIN — CARVEDILOL 25 MG: 25 TABLET, FILM COATED ORAL at 08:31

## 2023-07-23 RX ADMIN — CARVEDILOL 37.5 MG: 25 TABLET, FILM COATED ORAL at 17:19

## 2023-07-23 ASSESSMENT — ACTIVITIES OF DAILY LIVING (ADL)
ADLS_ACUITY_SCORE: 36

## 2023-07-23 NOTE — PROGRESS NOTES
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      Sepsis due Lt BKA stump infection-resolved  S/p I&D of of left BKA stump on 06/30, 07/06 and 07/10  -Patient presented with pain, redness, swelling at his left BKA stump site.  Marked leukocytosis of 22.9. Tachycardic to 110s.  X-ray Shows soft tissue swelling, some bone formation along the resection margin of the tibia with periosteal reaction.   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     - 7/20- care conference ->Patient has been afebrile for the past many days, WBC count is normal at 9.5 and detailed care conference was held with the access disease team, nephrology, vascular team, care coordinator and his sister and significant other and I also had detailed discussion with all teams separately together and given that his cultures have been negative we will discontinue his antibiotics and continue to monitor his daily labs.  Also as per vascular team they do want to do revision when creatinine goes down to near his baseline around 2.  Nephrology team was okay with discharge with close creatinine follow-up.  Patient did mention that he has concerns that given his swelling over lower extremities he is not able to use the other leg as prothesis may not be able to fit and he also does not feel that given his scrotal edema he will be able to  take care of himself.  His antibiotics were discontinued and plan is to fit his prosthesis and is amputation site on the right, optimize him medically with plan to do revision when his renal function is stable around 2 and monitor him closely in the hospital  -Patient has been monitored closely and has been afebrile and his WBC count has been stable at 9.4  -Of note he is able to fit a prosthesis on his right amputation stump  - edema over the scrotum and legs is still there and he has challenge with urinating  -Continue with pain control    Acute kidney injury on CKD stage III -improving   Generalized edema   Scrotal edema   -Cr @ admission 2.45, baseline ~ 1.6  - Initially improved to baseline and then worsening JOHNATHAN after OR 06/30   - nephrology consulted for severe JOHNATHAN, peak Cr of 8.7 07/08. Non oliguric and suspected to be ATN injury  -Patient has been followed by nephrology and he was initially diuresed with IV Bumex but given that he was making 3 L of urine the Bumex was held for 2 days but on p.m. of 7/20 he had an RRT and did receive IV Bumex  -Nephrology following the patient and he is currently on IV Bumex 1 mg daily along with metolazone and his creatinine this morning has improved to 3.95  I reviewed his intake and output and he made about 1.8 L of urine  - d/w nephrology they have ordered bumex 1 mg and metolazone  5mg once    Acute hypoxic respiratory failure likely due to pulmonary edema  Likely diastolic heart failure exacerbation  -Overnight patient had rapid response called and he was on 15 L oxygen and his shortness of breath was acute and he was put on oxy mask and the sats trended more than 92%  -BNP was significantly elevated at 11,262, D-dimer was significantly elevated at 6.7 and stat chest x-ray and abdominal x-ray were ordered  -X-ray abdomen showed nonobstructive bowel gas pattern and no significant retained stool  -pH was 7.38, PCO2 of 48 and bicarb of 29  -Of note patient has been  continued with subcu heparin since admission  -X-ray of the chest on 7/20- showed diffuse bilateral interstitial prominence and correlate with degree of pulmonary edema interstitial lung disease, no effusion, normal cardiac silhouette  -Bilateral duplex was done which did not show any evidence of DVT  -VQ scan was done which did not show any evidence of pulmonary embolism  -Differentials most likely include fluid overload versus hypersensitive reaction to iron  -Echo done on 7/22 showed EF of 60 to 65%, RV systolic function is normal, diastolic Doppler findings suggest LV filling pressures are increased, RV is normal in size, left atrium is severely dilated and no valvular abnormality  -On exam he does have generalized edema and has fine basilar crackles and d/w nephrology and they will decide on further diuresis   - will order overnight oximetry  - will plan to repeat cxr in am     Mildly elevated troponin likely due to demand  -His troponins have been flat at 38 and he never had chest pain but did develop hypoxia-an EKG did not show any ST changes  -We will get echo which has been pending since yesterday      Type II DM, uncontrolled, on insulin, A1c 7.6 on 6/27/23   -Prior to admission on Tresiba 34 units twice a day with oral agents and he uses with meals 5 units + 1/150  -His oral hypoglycemic agents were held on admission  - BG levels are obtained via patient's dexcom.  -Given his low blood sugar at 70 on 7/20 his a.m. dose of Tresiba was held and the p.m. dose of Tresiba was decreased to 12 units  -His blood sugar this morning was 160 and we will continue with sliding scale, continue to hold a.m. dose of Tresiba and continue with p.m. dose of Tresiba to 12 units      Essential hypertension  -Given his JOHNATHAN his lisinopril was held but he was continued with Coreg and amlodipine  -Given his elevated blood pressure his amlodipine dose was increased to 10 mg  -Given concerns of edema the dose of amlodipine was  again decreased to 5 mg and patient was continued with Coreg 25 twice daily but given significantly elevated blood pressures dose of Coreg has been increased to 37.5 mg daily twice daily and we will continue to monitor and his heart rate is stable and if his blood pressures are elevated then we can go up on dose of Coreg        Mild hyponatremia -resolved     Acute on chronic anemia  Iron deficiency  Status 1 dose of IV Venofer on 7/20  -Hemoglobin at presentation was 12.8, slowly has drifted down, likely was multifactorial due to acute infections and ongoing surgeries  -1 unit PRBC 7/14 for Hb 6.8   -Total iron is low at 31 and iron binding capacity was 155 and will benefit from IV iron and no contraindications from infectious disease team and it has been ordered  -Status 1 dose of IV Venofer on 7/20 PM but he developed shortness of breath and we have decided not to give him more doses and his hemoglobin is stable at 7.7  - will start oral iron    Lines  -We will try to get PICC line out of possible in the next 1 to 2 days  -Drain in place over the thigh        Diet: Snacks/Supplements Adult: Other; 8 pm: cottage cheese with pears; Between Meals  High Consistent Carb (75 g CHO per Meal) Diet    DVT Prophylaxis: Heparin SQ  Corrales Catheter: Not present  Lines: PRESENT      PICC 07/07/23 Triple Lumen Right Basilic-Site Assessment: WDL      Cardiac Monitoring: ACTIVE order. Indication: Hypoxia  Code Status: Full Code      Clinically Significant Risk Factors              # Hypoalbuminemia: Lowest albumin = 2.4 g/dL at 7/8/2023  6:39 AM, will monitor as appropriate     # Hypertension: Noted on problem list       # DMII: A1C = 7.6 % (Ref range: <5.7 %) within past 6 months   # Obesity: Estimated body mass index is 37.34 kg/m  as calculated from the following:    Height as of this encounter: 1.829 m (6').    Weight as of this encounter: 124.9 kg (275 lb 5.7 oz).           Disposition Plan     Expected Discharge Date:  07/25/2023      Destination: home;home with family  Discharge Comments: TCU  7/20 11AM care conference          Damir Ohara MD  Hospitalist Service  [facility]  Securely message with [employer] (more info)  Text page via [employer] Paging/Directory   ______________________________________________________________________    Interval History       Seen today and he is frustrated that he has to sit in his urine.  Given his scrotal edema he is not able to empty fully in the urinal and condom catheter did not fit.  He did try diapers which worked well and we will try the same and have input from urology tomorrow. he also mentioned that when he lays on his left side he gets short of breath and was on room air when I was seeing him and was able to talk in full sentences without any issues    He also mentioned that he is not able to provide fully while working with physical therapy and I do not think he is ready to go home.  We will continue to work on getting his swelling decreased.    Discussed at length with patient's nurse, his significant other and plan of care was discussed and patient was satisfied        Physical Exam   Vital Signs: Temp: 98.1  F (36.7  C) Temp src: Oral BP: (!) 157/100 (am BP pills given.) Pulse: 74   Resp: 18 SpO2: 97 % O2 Device: Nasal cannula Oxygen Delivery: 1 LPM  Weight: 275 lbs 5.67 oz        General: AOx3  Respiratory: Lungs are clear to auscultation bilaterally with no wheeze but has basal crackles and they seem to have improved  Cardiovascular: Regular rate , S1 and S2 normal with no murmer or rubs or gallops and has edema over lower extremities and scrotal edema is improving  Abdomen:   soft , non tender , non distended and bowel sound present   Neurologic:  No facial droop  Musculoskeletal: Normal Range of motion over upper and has  right BKA and has left BKA and has drain in place and there is swelling over both legs  Psychiatric: cooperative   Lines: PICC  line    Medical Decision Making                 Data     I have personally reviewed the following data over the past 24 hrs:    9.4  \   7.7 (L)   / 267     142 103 61.3 (H) /  143 (H)   3.8 27 3.95 (H) \       Imaging results reviewed over the past 24 hrs:   Recent Results (from the past 24 hour(s))   Echocardiogram Complete   Result Value    LVEF  60-65%    Narrative    [id number]  [id number]  [id number]  [id number]^JUAN DANIEL^AZUCENA     [facility]  Echocardiography Laboratory  [address] [place] [id number]     Name: YNES YBARRA  MRN: [id number]  : 1965  Study Date: 2023 11:23 AM  Age: 58 yrs  Gender: Male  Patient Location: [facility], [place]  Reason For Study: SOB  Ordering Physician: AZUCENA FRANCES  Referring Physician: Kody Wing  Performed By: Michel Eller     BSA: 2.5 m2  Height: 72 in  Weight: 291 lb  HR: 74  BP: 129/96 mmHg  ______________________________________________________________________________  Procedure  Complete Portable Echo Adult. [employer] (NDC #[id number]) given intravenously.  Technically difficult study.  ______________________________________________________________________________  Interpretation Summary     Left ventricular systolic function is normal.  The visual ejection fraction is 60-65%.  The right ventricular systolic function is normal.  No hemodynamically significant valvular abnormalities on 2D or color flow  imaging.  ______________________________________________________________________________  Left Ventricle  The left ventricle is normal in size. Diastolic Doppler findings (E/E' ratio  and/or other parameters) suggest left ventricular filling pressures are  increased. Left ventricular systolic function is normal. The visual ejection  fraction is 60-65%. Regional wall motion abnormalities cannot be excluded due  to limited visualization.     Right Ventricle  The right ventricle is grossly normal size. The right ventricle is not well  visualized. The right  ventricular systolic function is normal.     Atria  The left atrium is severely dilated. The left atrium is not well visualized.  Right atrium not well visualized. Right atrial size is normal.     Mitral Valve  The mitral valve is not well visualized. There is mild mitral annular  calcification. There is no mitral regurgitation noted. There is no mitral  valve stenosis.     Tricuspid Valve  The tricuspid valve is not well visualized, but is grossly normal. The right  ventricular systolic pressure is approximated at 17.6 mmHg plus the right  atrial pressure.     Aortic Valve  The aortic valve is not well visualized. No hemodynamically significant  valvular aortic stenosis.     Pulmonic Valve  The pulmonic valve is not well visualized.     Vessels  Borderline aortic root dilatation. Dilation of the inferior vena cava is  present with abnormal respiratory variation in diameter.     Pericardium  There is no pericardial effusion.     ______________________________________________________________________________  MMode/2D Measurements & Calculations  IVSd: 1.0 cm  LVIDd: 5.0 cm  LVIDs: 3.3 cm  LVPWd: 1.4 cm  FS: 33.9 %  LV mass(C)d: 235.2 grams  LV mass(C)dI: 94.1 grams/m2     Ao root diam: 3.9 cm  LA dimension: 3.1 cm  asc Aorta Diam: 3.7 cm  LA/Ao: 0.80  LVOT diam: 2.1 cm  LVOT area: 3.5 cm2  LA Volume (BP): 105.0 ml  LA Volume Index (BP): 42.0 ml/m2  RWT: 0.55  TAPSE: 2.5 cm     Doppler Measurements & Calculations  MV E max jayro: 113.0 cm/sec  MV A max jayro: 126.8 cm/sec  MV E/A: 0.89     MV dec slope: 617.8 cm/sec2  MV dec time: 0.18 sec  PA acc time: 0.11 sec  TR max jayro: 209.5 cm/sec  TR max P.6 mmHg  E/E' av.1  Lateral E/e': 18.5  Medial E/e': 15.7  RV S Jayro: 15.8 cm/sec     ______________________________________________________________________________  Report approved by: Timbo Soto 2023 02:21 PM

## 2023-07-23 NOTE — PROGRESS NOTES
VSS. A/O. Bi BKA, L stump dressing changed. Mild LE edema, +4 perineum edema, elevated. Voiding fine in urinal, incont B/B once. Room air right now. mapilex applied on coccyx prophylaxis. Tele SR

## 2023-07-23 NOTE — PROGRESS NOTES
VASCULAR SURGERY    Very stable.  Tolerating left BKA stump open wound dressing changes well.  Open areas clean with good granulation.       Meliotn Pagan MD

## 2023-07-23 NOTE — PROGRESS NOTES
" Renal Medicine Progress Note            Assessment/Plan:     58 y.o man with CKD III due to DM and HTN, following for severe JOHNATHAN.      # CKD III:                -bl ~ 1.5-1.9 mg/dl (GFR 45-55)               -2.5 g/g albuminuria     # Severe acute kidney injury: Scr peaked at 8.7 mg/dl. Continues to improve.      # Infected left stump with osteomyelitis s/p I&D:               -dapto/Zosyn--has been off.     # FEN: His edema is much better. Excellent urine output      # Anemia: Hgb is low but stable. Iron def.      # Hypertension:                -Norvasc 10 mg daily               -Coreg 25 mg bid     Plan:  # Bumex 1 mg IV + metolazone 5 mg-on more dose today. Hopeful Scr does not bump up tomorrow.        Interval History:     No new complaints from him.   Intermittent SOB.   Urine urine output.   Afebrile.           Medications and Allergies:       amLODIPine  5 mg Oral Daily     bumetanide  1 mg Intravenous Once     [Held by provider] bumetanide  1 mg Intravenous BID     carvedilol  37.5 mg Oral BID w/meals     ezetimibe  10 mg Oral Daily     heparin ANTICOAGULANT  5,000 Units Subcutaneous Q8H     insulin aspart  1-7 Units Subcutaneous TID AC     insulin aspart  1-5 Units Subcutaneous At Bedtime     insulin degludec  12 Units Subcutaneous At Bedtime     [Held by provider] insulin degludec  20 Units Subcutaneous QAM     metolazone  5 mg Oral Once     multivitamin w/minerals  1 tablet Oral Daily     sodium chloride (PF)  10-40 mL Intracatheter Q7 Days     sodium chloride (PF)  3 mL Intracatheter Q8H     sodium citrate-citric acid  30 mL Oral BID        Allergies   Allergen Reactions     Pravastatin      \"sucked the life blood out of me,\" Indicates this occurs with all statins.     Statins             Physical Exam:   Vitals were reviewed   , Blood pressure (!) 157/100, pulse 74, temperature 98.1  F (36.7  C), temperature source Oral, resp. rate 18, height 1.829 m (6'), weight 124.9 kg (275 lb 5.7 oz), SpO2 97 " %.    Wt Readings from Last 3 Encounters:   07/23/23 124.9 kg (275 lb 5.7 oz)   10/03/22 110 kg (242 lb 8.1 oz)   08/17/22 119.9 kg (264 lb 6.4 oz)       Intake/Output Summary (Last 24 hours) at 7/23/2023 1421  Last data filed at 7/22/2023 2209  Gross per 24 hour   Intake 300 ml   Output 1300 ml   Net -1000 ml     GENERAL APPEARANCE: NAD  HEENT:  Eyes/ears/nose/neck grossly normal  RESP: some crackles in the lung base but better than two days ago  CV: RRR  ABDOMEN: Obese, soft, NT.  EXTREMITIES/SKIN: no rashes/lesions on observed skin; + edema-better  NEURO: Awake, alert and conversing appropriately         Data:     CBC RESULTS:     Recent Labs   Lab 07/23/23  0604 07/22/23  0601 07/21/23  0604 07/20/23  0613 07/19/23  0926 07/18/23  0702   WBC 9.4 9.9 9.9 9.5 9.4 11.5*   RBC 2.67* 2.71* 2.71* 2.54* 2.68* 2.51*   HGB 7.7* 7.8* 7.8* 7.4* 7.7* 7.3*   HCT 24.2* 24.4* 24.2* 22.9* 24.0* 22.4*    296 302 285 269 261       Basic Metabolic Panel:  Recent Labs   Lab 07/23/23  0604 07/22/23  0601 07/21/23  0604 07/20/23  0613 07/19/23  0926 07/18/23  0924    140 141 141 140 141   POTASSIUM 3.8 3.9 4.0 3.9 4.3 4.1   CHLORIDE 103 101 103 101 102 102   CO2 27 27 26 25 26 26   BUN 61.3* 63.2* 67.1* 71.8* 72.8* 74.1*   CR 3.95* 4.12* 4.63* 5.06* 5.26* 5.52*   * 138* 137* 84 81 84   FERNANDO 8.5* 8.3* 8.4* 8.3* 8.2* 8.3*       INRNo lab results found in last 7 days.   Attestation:   I have reviewed today's relevant vital signs, notes, medications, labs and imaging.    Jag Gomes MD  [facility] - Nephrology  Office phone :[phone]  Pager: [phone]

## 2023-07-23 NOTE — PROGRESS NOTES
VSS ex HTN, parameters not met for PRN hydralazine., oxygen saturation dropping to high 80's on RA, placed back on 1l NC. Lung sounds with fine crackles at base. Pushing IS. A/O. ind in bed. Bi BKA. L stump dressing changed. Reports sharp nerve pain, PRN oxy given with relief. Edema noted on arms and BLE, including shaft of penis & Scrotal edema. Tele SR. BG checks reported from pt monitor. Continue with plan of care.

## 2023-07-24 ENCOUNTER — APPOINTMENT (OUTPATIENT)
Dept: PHYSICAL THERAPY | Facility: CLINIC | Age: 58
DRG: 463 | End: 2023-07-24
Payer: COMMERCIAL

## 2023-07-24 ENCOUNTER — APPOINTMENT (OUTPATIENT)
Dept: GENERAL RADIOLOGY | Facility: CLINIC | Age: 58
DRG: 463 | End: 2023-07-24
Attending: HOSPITALIST
Payer: COMMERCIAL

## 2023-07-24 ENCOUNTER — MEDICAL CORRESPONDENCE (OUTPATIENT)
Dept: HEALTH INFORMATION MANAGEMENT | Facility: CLINIC | Age: 58
End: 2023-07-24

## 2023-07-24 LAB
ANION GAP SERPL CALCULATED.3IONS-SCNC: 11 MMOL/L (ref 7–15)
ATRIAL RATE - MUSE: 85 BPM
BUN SERPL-MCNC: 58.8 MG/DL (ref 6–20)
CALCIUM SERPL-MCNC: 8.5 MG/DL (ref 8.6–10)
CHLORIDE SERPL-SCNC: 102 MMOL/L (ref 98–107)
CREAT SERPL-MCNC: 3.7 MG/DL (ref 0.67–1.17)
DEPRECATED HCO3 PLAS-SCNC: 29 MMOL/L (ref 22–29)
DIASTOLIC BLOOD PRESSURE - MUSE: NORMAL MMHG
ERYTHROCYTE [DISTWIDTH] IN BLOOD BY AUTOMATED COUNT: 13.2 % (ref 10–15)
GFR SERPL CREATININE-BSD FRML MDRD: 18 ML/MIN/1.73M2
GLUCOSE SERPL-MCNC: 131 MG/DL (ref 70–99)
HCT VFR BLD AUTO: 24.5 % (ref 40–53)
HGB BLD-MCNC: 8 G/DL (ref 13.3–17.7)
INTERPRETATION ECG - MUSE: NORMAL
MAGNESIUM SERPL-MCNC: 2.2 MG/DL (ref 1.7–2.3)
MCH RBC QN AUTO: 29.3 PG (ref 26.5–33)
MCHC RBC AUTO-ENTMCNC: 32.7 G/DL (ref 31.5–36.5)
MCV RBC AUTO: 90 FL (ref 78–100)
P AXIS - MUSE: 38 DEGREES
PLATELET # BLD AUTO: 262 10E3/UL (ref 150–450)
POTASSIUM SERPL-SCNC: 3.5 MMOL/L (ref 3.4–5.3)
PR INTERVAL - MUSE: 178 MS
QRS DURATION - MUSE: 74 MS
QT - MUSE: 406 MS
QTC - MUSE: 483 MS
R AXIS - MUSE: 19 DEGREES
RBC # BLD AUTO: 2.73 10E6/UL (ref 4.4–5.9)
SODIUM SERPL-SCNC: 142 MMOL/L (ref 136–145)
SYSTOLIC BLOOD PRESSURE - MUSE: NORMAL MMHG
T AXIS - MUSE: 23 DEGREES
VENTRICULAR RATE- MUSE: 85 BPM
WBC # BLD AUTO: 10 10E3/UL (ref 4–11)

## 2023-07-24 PROCEDURE — 250N000013 HC RX MED GY IP 250 OP 250 PS 637: Performed by: STUDENT IN AN ORGANIZED HEALTH CARE EDUCATION/TRAINING PROGRAM

## 2023-07-24 PROCEDURE — 85014 HEMATOCRIT: CPT | Performed by: HOSPITALIST

## 2023-07-24 PROCEDURE — 120N000001 HC R&B MED SURG/OB

## 2023-07-24 PROCEDURE — 250N000013 HC RX MED GY IP 250 OP 250 PS 637: Performed by: HOSPITALIST

## 2023-07-24 PROCEDURE — 250N000013 HC RX MED GY IP 250 OP 250 PS 637: Performed by: INTERNAL MEDICINE

## 2023-07-24 PROCEDURE — 99232 SBSQ HOSP IP/OBS MODERATE 35: CPT | Performed by: HOSPITALIST

## 2023-07-24 PROCEDURE — 97530 THERAPEUTIC ACTIVITIES: CPT | Mod: GP

## 2023-07-24 PROCEDURE — 250N000013 HC RX MED GY IP 250 OP 250 PS 637

## 2023-07-24 PROCEDURE — 83735 ASSAY OF MAGNESIUM: CPT

## 2023-07-24 PROCEDURE — 250N000011 HC RX IP 250 OP 636: Performed by: STUDENT IN AN ORGANIZED HEALTH CARE EDUCATION/TRAINING PROGRAM

## 2023-07-24 PROCEDURE — 99232 SBSQ HOSP IP/OBS MODERATE 35: CPT | Performed by: INTERNAL MEDICINE

## 2023-07-24 PROCEDURE — 71045 X-RAY EXAM CHEST 1 VIEW: CPT

## 2023-07-24 PROCEDURE — 82310 ASSAY OF CALCIUM: CPT | Performed by: HOSPITALIST

## 2023-07-24 PROCEDURE — 999N000040 HC STATISTIC CONSULT NO CHARGE VASC ACCESS

## 2023-07-24 RX ORDER — BUMETANIDE 1 MG/1
2 TABLET ORAL
Status: DISCONTINUED | OUTPATIENT
Start: 2023-07-24 | End: 2023-07-26

## 2023-07-24 RX ADMIN — FERROUS SULFATE TAB 325 MG (65 MG ELEMENTAL FE) 325 MG: 325 (65 FE) TAB at 20:37

## 2023-07-24 RX ADMIN — HEPARIN SODIUM 5000 UNITS: 5000 INJECTION, SOLUTION INTRAVENOUS; SUBCUTANEOUS at 06:14

## 2023-07-24 RX ADMIN — MULTIPLE VITAMINS W/ MINERALS TAB 1 TABLET: TAB at 09:30

## 2023-07-24 RX ADMIN — AMLODIPINE BESYLATE 5 MG: 5 TABLET ORAL at 09:30

## 2023-07-24 RX ADMIN — BUMETANIDE 2 MG: 1 TABLET ORAL at 20:37

## 2023-07-24 RX ADMIN — CARVEDILOL 37.5 MG: 25 TABLET, FILM COATED ORAL at 09:30

## 2023-07-24 RX ADMIN — HEPARIN SODIUM 5000 UNITS: 5000 INJECTION, SOLUTION INTRAVENOUS; SUBCUTANEOUS at 23:10

## 2023-07-24 RX ADMIN — SODIUM CITRATE AND CITRIC ACID MONOHYDRATE 30 ML: 500; 334 SOLUTION ORAL at 20:37

## 2023-07-24 RX ADMIN — SODIUM CITRATE AND CITRIC ACID MONOHYDRATE 30 ML: 500; 334 SOLUTION ORAL at 09:31

## 2023-07-24 RX ADMIN — CARVEDILOL 37.5 MG: 25 TABLET, FILM COATED ORAL at 18:28

## 2023-07-24 RX ADMIN — EZETIMIBE 10 MG: 10 TABLET ORAL at 09:30

## 2023-07-24 RX ADMIN — ACETAMINOPHEN 650 MG: 325 TABLET, FILM COATED ORAL at 23:10

## 2023-07-24 RX ADMIN — OXYCODONE HYDROCHLORIDE 10 MG: 5 TABLET ORAL at 23:10

## 2023-07-24 RX ADMIN — Medication 1 TABLET: at 15:46

## 2023-07-24 RX ADMIN — FERROUS SULFATE TAB 325 MG (65 MG ELEMENTAL FE) 325 MG: 325 (65 FE) TAB at 09:30

## 2023-07-24 RX ADMIN — HEPARIN SODIUM 5000 UNITS: 5000 INJECTION, SOLUTION INTRAVENOUS; SUBCUTANEOUS at 15:45

## 2023-07-24 RX ADMIN — BUMETANIDE 2 MG: 1 TABLET ORAL at 15:45

## 2023-07-24 ASSESSMENT — ACTIVITIES OF DAILY LIVING (ADL)
ADLS_ACUITY_SCORE: 36

## 2023-07-24 NOTE — PROGRESS NOTES
[facility]    Infectious Disease Progress Note    Date of Service (when I saw the patient): 07/24/2023     Assessment & Plan   Ry Horner is a 57 year old male who was admitted on 6/24/2023.     Impression:  1. 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 2. Stage III CKD  3. Bilateral BKA   4. Presented to the ER due to pain, swelling and redness with small ulcer at Lt BKA stump and found to have cellulitis/sepsis. This BKA was done in 2022   5. No history of MRSA  6. Workup shows JOHNATHAN  7. Started on vanc ( only 2 doses) and zosyn switched to dapto on 6/26      Recommendations:   S/P: Incision and debridement of left below-knee stump 6/30   S/p: repeat I and D on 7/6  S/p repeat I and D on 7/ 10  Negative OR cultures and no positive micro data      S/p 26 days  of antibiotics  most recent path on the tibial bone was non specific for osteo.     Closure is pending waiting on improvement in edema and improvement in the kidney function with latest plan to possible tackle this in couple weeks.     Care conference on 7/ 20 with nephrology, vascula surgery and IM bedside.   : approx 4 weeks of antibiotics done with all negative cultures, no fever, WBC now normal the dealy in normalization probably related to elevated creatinine, clean wound with no evidence of infection,   and most recent path on the tibial bone was non specific for osteo. Enough antibiotics for now plan is to stop and watch inpatient         Franklin Maza MD    Interval History   No new micro    Creat up   Nephrology evaluating cr is stable       Physical Exam   Temp: 98  F (36.7  C) Temp src: Oral BP: (!) 152/93 Pulse: 84   Resp: 19 SpO2: 91 % O2 Device: None (Room air) Oxygen Delivery: 1 LPM  Vitals:    07/20/23 0500 07/22/23 0600 07/23/23 0603   Weight: 126 kg (277 lb 12.5 oz) 132.2 kg (291 lb 7.2 oz) 124.9 kg (275 lb 5.7 oz)     Vital Signs with Ranges  Temp:  [98  F (36.7   C)-98.5  F (36.9  C)] 98  F (36.7  C)  Pulse:  [79-84] 84  Resp:  [18-19] 19  BP: (137-165)/() 152/93  SpO2:  [91 %-97 %] 91 %     Constitutional: Awake, alert, cooperative, no apparent distress  Lungs: Clear to auscultation bilaterally, no crackles or wheezing  Cardiovascular: Regular rate and rhythm, normal S1 and S2, and no murmur noted  Abdomen: Normal bowel sounds, soft, non-distended, non-tender  Skin: dressing on the  left BKA   Other:     Medications      amLODIPine  5 mg Oral Daily    bumetanide  2 mg Oral BID    carvedilol  37.5 mg Oral BID w/meals    ezetimibe  10 mg Oral Daily    ferrous sulfate  325 mg Oral BID    folic acid-vit B6-vit B12  1 tablet Oral Daily    heparin ANTICOAGULANT  5,000 Units Subcutaneous Q8H    insulin aspart  1-7 Units Subcutaneous TID AC    insulin aspart  1-5 Units Subcutaneous At Bedtime    insulin degludec  12 Units Subcutaneous At Bedtime    [Held by provider] insulin degludec  20 Units Subcutaneous QAM    multivitamin w/minerals  1 tablet Oral Daily    sodium chloride (PF)  10-40 mL Intracatheter Q7 Days    sodium chloride (PF)  3 mL Intracatheter Q8H    sodium citrate-citric acid  30 mL Oral BID       Data   All microbiology laboratory data reviewed.  Recent Labs   Lab Test 07/24/23  0620 07/23/23  0604 07/22/23  0601   WBC 10.0 9.4 9.9   HGB 8.0* 7.7* 7.8*   HCT 24.5* 24.2* 24.4*   MCV 90 91 90    267 296       Recent Labs   Lab Test 07/24/23  0620 07/23/23  0604 07/22/23  0601   CR 3.70* 3.95* 4.12*       Recent Labs   Lab Test 09/25/22  1529   SED 85*       Recent Labs   Lab Test 02/03/20  1324 02/03/20  0007 02/02/20  2250 12/04/19  1619 11/19/19  1829 11/17/19  1525 11/17/19  1419 11/17/19  1411 08/28/17  1136   CULT Heavy growth  beta hemolytic   Streptococcus constellatus  *  Light growth  Staphylococcus aureus  * No growth No growth No anaerobes isolated  No growth Light growth  Bacteroides fragilis  *  Light growth  Parvimonas micra  *   Susceptibility testing not routinely done  On day 2, isolated in broth only:  beta hemolytic   Streptococcus constellatus  * Heavy growth  beta hemolytic   Streptococcus constellatus  Susceptibility testing done on previous specimen  *  Light growth  Alcaligenes faecalis  *  Light growth  Staphylococcus aureus  *  On day 1, isolated in broth only:  Anaerobic gram negative rods  See anaerobic report for identification  * Heavy growth  Bacteroides fragilis  Susceptibility testing not routinely done  *  Heavy growth  Peptoniphilus asaccharolyticus  Susceptibility testing not routinely done  *  Moderate growth  beta hemolytic   Streptococcus constellatus  *  On day 1, isolated in broth only:  Anaerobic gram negative rods  See anaerobic report for identification  * Heavy growth  Bacteroides fragilis  *  Heavy growth  Parvimonas micra  *  Heavy growth  Peptoniphilus asaccharolyticus  *  Heavy growth  Mixed aerobic and anaerobic rosa  *  Susceptibility testing not routinely done No growth         All cultures:  No results for input(s): CULTURE in the last 168 hours.   Blood culture:  Results for orders placed or performed during the hospital encounter of 06/24/23   Blood Culture Peripheral Blood    Specimen: Peripheral Blood   Result Value Ref Range    Culture No Growth    Blood Culture Peripheral Blood    Specimen: Peripheral Blood   Result Value Ref Range    Culture No Growth    Results for orders placed or performed during the hospital encounter of 02/02/20   Blood culture    Specimen: Blood    Right Arm   Result Value Ref Range    Specimen Description Blood Right Arm     Culture Micro No growth    Blood culture    Specimen: Blood    Right Arm   Result Value Ref Range    Specimen Description Blood Right Arm     Culture Micro No growth    Results for orders placed or performed during the hospital encounter of 08/28/17   Blood culture    Specimen: Arm, Right; Blood    Right Arm   Result Value Ref Range     Specimen Description Blood Right Arm     Special Requests Aerobic and anaerobic bottles received     Culture Micro No growth    Blood culture    Specimen: Arm, Right; Blood    Right Arm   Result Value Ref Range    Specimen Description Blood Right Arm     Special Requests Aerobic and anaerobic bottles received     Culture Micro No growth    Results for orders placed or performed during the hospital encounter of 07/14/16   Blood culture    Specimen: Blood   Result Value Ref Range    Specimen Description Blood Left Arm     Special Requests Aerobic and anaerobic bottles received     Culture Micro No growth     Micro Report Status FINAL 07/20/2016    Blood culture    Specimen: Blood   Result Value Ref Range    Specimen Description Blood Right Arm     Special Requests Aerobic and anaerobic bottles received     Culture Micro No growth     Micro Report Status FINAL 07/20/2016       Urine culture:  No results found for this or any previous visit.

## 2023-07-24 NOTE — PROGRESS NOTES
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      Sepsis due Lt BKA stump infection-resolved  S/p I&D of of left BKA stump on 06/30, 07/06 and 07/10  -Patient presented with pain, redness, swelling at his left BKA stump site.  Marked leukocytosis of 22.9. Tachycardic to 110s.  X-ray Shows soft tissue swelling, some bone formation along the resection margin of the tibia with periosteal reaction.   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     - 7/20- care conference ->Patient has been afebrile for the past many days, WBC count is normal at 9.5 and detailed care conference was held with the access disease team, nephrology, vascular team, care coordinator and his sister and significant other and I also had detailed discussion with all teams separately together and given that his cultures have been negative we will discontinue his antibiotics and continue to monitor his daily labs.  Also as per vascular team they do want to do revision when creatinine goes down to near his baseline around 2.  Nephrology team was okay with discharge with close creatinine follow-up.  Patient did mention that he has concerns that given his swelling over lower extremities he is not able to use the other leg as prothesis may not be able to fit and he also does not feel that given his scrotal edema he will be able to  take care of himself.  His antibiotics were discontinued and plan is to fit his prosthesis and is amputation site on the right, optimize him medically with plan to do revision when his renal function is stable around 2 and monitor him closely in the hospital  -Of note he is able to fit a prosthesis on his right amputation stump but continues to have swelling  - edema over the scrotum and legs is still there and he has challenge 's with urinating and that is a hindrance for him going home he is not able to pivot transfer even on the board  -Continue with pain control  --Patient has been monitored closely and has been afebrile and his WBC count has been stable at 10  -We will need closure of the amputation site once he is medically optimized    Acute kidney injury on CKD stage III -improving   Generalized edema   Scrotal edema   -Cr @ admission 2.45, baseline ~ 1.6  - Initially improved to baseline and then worsening JOHNATHAN after OR 06/30   - nephrology consulted for severe JOHNATHAN, peak Cr of 8.7 07/08. Non oliguric and suspected to be ATN injury  -he was initially diuresed with IV Bumex but given that he was making 3 L of urine the Bumex was held for 2 days but on p.m. of 7/20 he had an RRT and did receive IV Bumex  -Nephrology following the patient and he was restarted on IV diuresis given improvement in his renal function and persistent edema and shortness of breath  -Creatinine today is improved to 3.7 and reviewed intake and output and he made about 1600 mL of urine and diuresis per nephrology team  -urology was consulted and they also think that he is third spacing and they have recommended rolled towel underneath the scrotum while in bed to assist in elevation and help reduce the swelling and continue with diuresis    Acute hypoxic respiratory failure likely due to pulmonary edema-resolved  Likely diastolic heart failure exacerbation  -Overnight patient had rapid response called and he was on 15 L oxygen and his  shortness of breath was acute and he was put on oxy mask and the sats trended more than 92%  -BNP was significantly elevated at 11,262, D-dimer was significantly elevated at 6.7 and stat chest x-ray and abdominal x-ray were ordered  -X-ray abdomen showed nonobstructive bowel gas pattern and no significant retained stool  -pH was 7.38, PCO2 of 48 and bicarb of 29  -Of note patient has been continued with subcu heparin since admission  -X-ray of the chest on 7/20- showed diffuse bilateral interstitial prominence and correlate with degree of pulmonary edema interstitial lung disease, no effusion, normal cardiac silhouette  -Bilateral duplex was done which did not show any evidence of DVT  -VQ scan was done which did not show any evidence of pulmonary embolism  -Differentials most likely include fluid overload versus hypersensitive reaction to iron  -Echo done on 7/22 showed EF of 60 to 65%, RV systolic function is normal, diastolic Doppler findings suggest LV filling pressures are increased, RV is normal in size, left atrium is severely dilated and no valvular abnormality  -On exam he does have generalized edema and has fine basilar crackles and d/w nephrology and they will decide on further diuresis and was started on oral diuretics today  -Overnight oximetry was ordered yesterday but he refused and will attempt today  -Chest x-ray has been ordered this morning and it showed stable mild interstitial changes with no presence of any infiltrate or effusions      Mildly elevated troponin likely due to demand  -His troponins have been flat at 38 and he never had chest pain but did develop hypoxia-an EKG did not show any ST changes  -Echo as above      Type II DM, uncontrolled, on insulin, A1c 7.6 on 6/27/23   -Prior to admission on Tresiba 34 units twice a day with oral agents and he uses with meals 5 units + 1/150  -His oral hypoglycemic agents were held on admission  - BG levels are obtained via patient's dexcom.  -Given  his low blood sugar at 70 on 7/20 his a.m. dose of Tresiba was held and the p.m. dose of Tresiba was decreased to 12 units  -His blood sugar this morning is in early 100s and we will continue with sliding scale, continue to hold a.m. dose of Tresiba and continue with p.m. dose of Tresiba to 12 units      Essential hypertension  -Given his JOHNATHAN his lisinopril was held but he was continued with Coreg and amlodipine  -Given his elevated blood pressure his amlodipine dose was increased to 10 mg  -Given concerns of edema the dose of amlodipine was again decreased to 5 mg and Coreg was increased to 37.5 mg twice daily       Mild hyponatremia -resolved     Acute on chronic anemia  Iron deficiency  -Status one dose of IV Venofer on 7/20  -Hemoglobin at presentation was 12.8, slowly has drifted down, likely was multifactorial due to acute infections and ongoing surgeries  -one unit PRBC 7/14 for Hb 6.8   -Total iron is low at 31 and iron binding capacity was 155 and will benefit from IV iron and no contraindications from infectious disease team and it has been ordered  -Status one dose of IV Venofer on 7/20 PM but he developed shortness of breath and we have decided not to give him more doses and his hemoglobin is stable at 7.7  -Was some concern that that he may have some degree of iv iron hypersensitivity and  he was started on oral iron and hemoglobin today is 8    Lines  -We will DC the PICC line as there is no ongoing needs for IV  - order placed for picc removal        Diet: Snacks/Supplements Adult: Other; 8 pm: cottage cheese with pears; Between Meals  High Consistent Carb (75 g CHO per Meal) Diet    DVT Prophylaxis: Heparin SQ  [place]es Catheter: Not present  Lines: PRESENT      PICC 07/07/23 Triple Lumen Right Basilic-Site Assessment: WDL      Cardiac Monitoring: ACTIVE order. Indication: Hypoxia  Code Status: Full Code      Clinically Significant Risk Factors              # Hypoalbuminemia: Lowest albumin = 2.4 g/dL  at 7/8/2023  6:39 AM, will monitor as appropriate     # Hypertension: Noted on problem list         # DMII: A1C = 7.6 % (Ref range: <5.7 %) within past 6 months     # Obesity: Estimated body mass index is 37.34 kg/m  as calculated from the following:    Height as of this encounter: 1.829 m (6').    Weight as of this encounter: 124.9 kg (275 lb 5.7 oz).             Disposition Plan      Expected Discharge Date: 07/25/2023      Destination: home;home with family  Discharge Comments: TCU  7/20 11AM care conference          Damir Ohara MD  Hospitalist Service  [facility]  Securely message with [employer] (more info)  Text page via [employer] Paging/Directory   _    I am Off service from tomorrow morning and his care will be taken over by hospital medicine team  _____________________________________________________________________    Interval History       Saw the patient today and he was already evaluated by nephrology, urology team.  He was on oxygen but when I checked his oxygen saturations he was more than 93%.  He mentioned that his breathing is better and only gets short of breath when he is moved in a certain way.  He still is frustrated that he is not able to urinate fully given the degree of his scrotal edema and is not ready to go home as he is not near his baseline.  He did refuse overnight oximetry study and he was under the impression that sleep team will come but I clarified the misunderstanding he had and he understands.  RN was present when I saw him        Physical Exam   Vital Signs: Temp: 98  F (36.7  C) Temp src: Oral BP: (!) 152/93 Pulse: 84   Resp: 19 SpO2: 93 % O2 Device: Nasal cannula Oxygen Delivery: 1 LPM  Weight: 275 lbs 5.67 oz        General: AOx3  Respiratory: Lungs are clear to auscultation bilaterally with no wheeze but has basal crackles and they seem to have improved  Cardiovascular: Regular rate , S1 and S2 normal with no murmer or rubs or gallops and has edema over  lower extremities and scrotal edema is improving  Abdomen:   soft , non tender , non distended and bowel sound present   Neurologic:  No facial droop  Musculoskeletal: Normal Range of motion over upper and has  right BKA and has left BKA and has drain in place and there is swelling over both legs  Psychiatric: cooperative   Lines: PICC line    Medical Decision Making                 Data     I have personally reviewed the following data over the past 24 hrs:    10.0  \   8.0 (L)   / 262     142 102 58.8 (H) /  131 (H)   3.5 29 3.70 (H) \       Imaging results reviewed over the past 24 hrs:   No results found for this or any previous visit (from the past 24 hour(s)).

## 2023-07-24 NOTE — PROGRESS NOTES
Cr continues to downtrend, with WBC at 10.0    Tolerating left BKA stump open wound dressing changes well, open areas remain clean, good granulation.     No complaints today.     Antonieta Perdomo NP

## 2023-07-24 NOTE — PROGRESS NOTES
VSS ex HTN, parameters not met for PRN hydralazine., oxygen saturation dropping to high 80's again on RA, placed back on 1l NC. Lung sounds with fine crackles at base. Pushing IS. A/O. ind in bed. Bi BKA. L stump dressing changed.  Edema noted on arms and BLE, including shaft of penis & Scrotal edema. Tele SR. BG checks reported from pt monitor. Continue with plan of care

## 2023-07-24 NOTE — CONSULTS
[facility] Inpatient Consultation Note    Ry Horner MRN# [id number]   Age: 58 year old YOB: 1965     Date of Admission: 6/24/2023    Reason for consult: Scrotal edema        Requesting physician: Dr. 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      Urology consulted on 7/24 for scrotal edema.     Patient reports developing penoscrotal edema roughly 3 to 4 weeks ago.  He denied having any scrotal edema prior to hospital admission.  Denies urinary complaints at baseline.  He reports that while here he has had a lot of episodes of incontinence and this is bothersome to him as it is causing some skin breakdown.  He has a hard time using the urinal secondary to the edema of his penis and scrotum.  He has some mild discomfort associated with the swelling.         Past Medical History:     Past Medical History:   Diagnosis Date    BENIGN HYPERTENSION 4/4/2007    DIABETES MELLITUS TYPE II-UNCOMPL 4/4/2007    HYPERLIPIDEMIA NEC/NOS 4/4/2007    Tobacco use disorder 4/4/2007             Past Surgical History:     Past Surgical History:   Procedure Laterality Date    AMPUTATE FOOT Left 11/17/2019    Procedure: LEFT PARTIAL FOOT AMPUTATION;  Surgeon: Antoine Pena DPM;  Location: [facility]    AMPUTATE FOOT Left 12/4/2019    Procedure: LEFT PARTIAL FOOT AMPUTATION;  Surgeon: Tim Douglas DPM;  Location: [facility]    AMPUTATE FOOT Left 12/11/2019    Procedure: POSSIBLE PARTIAL FOOT AMPUTATION;  Surgeon: Tim Douglas DPM;  Location: [facility]    AMPUTATE FOOT Left 2/10/2022    Procedure: Partial left foot amputation;  Surgeon: Milena Cevallos DPM,  Podiatry/Foot and Ankle Surgery;  Location: [facility]    AMPUTATE LEG BELOW KNEE Right 9/1/2017    Procedure: AMPUTATE LEG BELOW KNEE;  RIGHT BELOW KNEE AMPUTATION ;  Surgeon: Nikolas Nascimento MD;  Location: [facility]    AMPUTATE LEG BELOW KNEE Left 9/27/2022    Procedure: AMPUTATION BELOW KNEE;  Surgeon: Neal Blackman MD;  Location: [facility]    AMPUTATE LEG BELOW KNEE Left 9/29/2022    Procedure: LEFT SIDE COMPLETION BELOW THE KNEE AMPUTATION;  Surgeon: Reny Child MD;  Location: [facility]    AMPUTATE REVISION STUMP LOWER EXTREMITY Left 7/6/2023    Procedure: LEFT BELOW KNEE AMPUTATION STUMP DEBRIDEMENT;  Surgeon: Reny Child MD;  Location: [facility]    AMPUTATE REVISION STUMP LOWER EXTREMITY Left 7/10/2023    Procedure: LEFT BELOW KNEE AMPUTATION STUMP IRRIGATION AND DEBRIDEMENT;  Surgeon: Reny Child MD;  Location: [facility]    AMPUTATE TOE(S) Left 2/3/2020    Procedure: LEFT SECOND TOE AMPUTATION;  Surgeon: Milena Cevallos DPM, Podiatry/Foot and Ankle Surgery;  Location: [facility]    APPENDECTOMY      APPLY WOUND VAC Right 3/2/2015    Procedure: APPLY WOUND VAC;  Surgeon: Milena Cevallos DPM, Pod;  Location: [facility]    BIOPSY BONE FOOT Right 7/15/2016    Procedure: BIOPSY BONE FOOT;  Surgeon: Tim Douglas DPM;  Location: [facility]    BIOPSY BONE TOE Left 12/4/2019    Procedure: BONE BIOPSY LEFT SECOND TOE;  Surgeon: Tim Douglas DPM;  [facility]    IRRIGATION AND DEBRIDEMENT FOOT, COMBINED Right 3/2/2015    Procedure: COMBINED IRRIGATION AND DEBRIDEMENT FOOT;  Surgeon: Milena Cevallos DPM, Pod;  Location: [facility]    IRRIGATION AND DEBRIDEMENT FOOT, COMBINED Right 7/15/2016    Procedure: COMBINED IRRIGATION AND DEBRIDEMENT FOOT;  Surgeon: Tim Douglas DPM;  [facility]    IRRIGATION AND DEBRIDEMENT FOOT, COMBINED Right 7/20/2016    Procedure: COMBINED IRRIGATION AND DEBRIDEMENT FOOT;  Surgeon: Tim Douglas DPM;  [facility]    IRRIGATION AND  DEBRIDEMENT FOOT, COMBINED Left 2019    Procedure: REVISIONAL IRRIGATION AND DEBRIDEMENT LEFT FOOT AND BONE DEBRIDEMENT;  Surgeon: Joseph Randhawa DPM;  [facility]    IRRIGATION AND DEBRIDEMENT FOOT, COMBINED Left 2019    Procedure: EXCISIONAL DEBRIDEMENT LEFT FOOT;  Surgeon: Tim Douglas DPM;  [facility]    IRRIGATION AND DEBRIDEMENT FOOT, COMBINED Left 2019    Procedure: IRRIGATION AND DEBRIDEMENT FOOT, Partial osteotomy left first metatarsal;  Surgeon: Tim Douglas DPM;  [facility]    IRRIGATION AND DEBRIDEMENT FOOT, COMBINED Left 2020    Procedure: IRRIGATION AND DEBRIDEMENT LEFT FOOT;  Surgeon: Tim Douglas DPM;  Location: [facility]    IRRIGATION AND DEBRIDEMENT LOWER EXTREMITY, COMBINED Left 2023    Procedure: Irrigation and debridement lower extremity amputation stump- left;  Surgeon: Reny Child MD;  Location:  OR    ORTHOPEDIC SURGERY               Social History:     Social History     Socioeconomic History    Marital status: Single     Spouse name: Not on file    Number of children: Not on file    Years of education: Not on file    Highest education level: Not on file   Occupational History    Not on file   Tobacco Use    Smoking status: Former     Packs/day: 0.50     Years: 20.00     Pack years: 10.00     Types: Cigarettes     Start date: 1987     Quit date:      Years since quittin.5    Smokeless tobacco: Never   Substance and Sexual Activity    Alcohol use: Yes     Comment: occ    Drug use: No    Sexual activity: Yes     Partners: Female   Other Topics Concern     Service Yes    Blood Transfusions No    Caffeine Concern No    Occupational Exposure No    Hobby Hazards No    Sleep Concern No    Stress Concern No    Weight Concern Yes     Comment: Would like to lose some weight    Special Diet No    Back Care No    Exercise Yes    Bike Helmet No    Seat Belt Yes    Self-Exams Yes    Parent/sibling w/ CABG,  "MI or angioplasty before 65F 55M? No   Social History Narrative    Not on file     Social Determinants of Health     Financial Resource Strain: Not on file   Food Insecurity: Not on file   Transportation Needs: No Transportation Needs (3/24/2020)    PRAPARE - Transportation     Lack of Transportation (Medical): No     Lack of Transportation (Non-Medical): No   Physical Activity: Inactive (3/24/2020)    Exercise Vital Sign     Days of Exercise per Week: 0 days     Minutes of Exercise per Session: 0 min   Stress: Not on file   Social Connections: Not on file   Intimate Partner Violence: Not on file   Housing Stability: Not on file             Family History:     Family History   Problem Relation Age of Onset    Genetic Disorder Other     Genetic Disorder Other     Psychotic Disorder Mother     Diabetes Father     Lung Cancer Father     Hypertension Father     Genetic Disorder Maternal Grandmother     Genetic Disorder Maternal Grandfather     Asthma Sister     Breast Cancer Sister     C.A.D. No family hx of     Hypertension No family hx of     Cerebrovascular Disease No family hx of     Breast Cancer No family hx of     Cancer - colorectal No family hx of     Prostate Cancer No family hx of     Alcohol/Drug No family hx of              Immunizations:     Immunization History   Administered Date(s) Administered    COVID-19 Vaccine (Sean) 04/08/2021, 07/20/2022    Influenza (H1N1) 01/19/2010    Influenza (IIV3) PF 11/01/2010, 09/22/2011    Pneumococcal 23 valent 10/31/2011, 07/16/2016    TDAP Vaccine (Adacel) 10/28/2014             Allergies:     Allergies   Allergen Reactions    Pravastatin      \"sucked the life blood out of me,\" Indicates this occurs with all statins.    Statins              Medications:     Current Facility-Administered Medications   Medication    0.9% sodium chloride BOLUS    acetaminophen (TYLENOL) tablet 650 mg    amLODIPine (NORVASC) tablet 5 mg    [Held by provider] bumetanide (BUMEX) " injection 1 mg    calcium carbonate (TUMS) chewable tablet 500 mg    carvedilol (COREG) tablet 37.5 mg    glucose gel 15-30 g    Or    dextrose 50 % injection 25-50 mL    Or    glucagon injection 1 mg    ezetimibe (ZETIA) tablet 10 mg    ferrous sulfate (FEROSUL) tablet 325 mg    heparin ANTICOAGULANT injection 5,000 Units    hydrALAZINE (APRESOLINE) injection 10 mg    HYDROmorphone (PF) (DILAUDID) injection 0.3 mg    insulin aspart (NovoLOG) injection (RAPID ACTING)    insulin aspart (NovoLOG) injection (RAPID ACTING)    insulin degludec (TRESIBA) 100 UNIT/ML injection 12 Units    [Held by provider] insulin degludec (TRESIBA) 100 UNIT/ML injection 20 Units    labetalol (NORMODYNE/TRANDATE) injection 10 mg    melatonin tablet 1 mg    multivitamin w/minerals (THERA-VIT-M) tablet 1 tablet    naloxone (NARCAN) injection 0.2 mg    Or    naloxone (NARCAN) injection 0.4 mg    Or    naloxone (NARCAN) injection 0.2 mg    Or    naloxone (NARCAN) injection 0.4 mg    ondansetron (ZOFRAN ODT) ODT tab 4 mg    Or    ondansetron (ZOFRAN) injection 4 mg    oxyCODONE (ROXICODONE) tablet 10 mg    prochlorperazine (COMPAZINE) injection 5 mg    Or    prochlorperazine (COMPAZINE) tablet 5 mg    Or    prochlorperazine (COMPAZINE) suppository 25 mg    senna-docusate (SENOKOT-S/PERICOLACE) 8.6-50 MG per tablet 1 tablet    Or    senna-docusate (SENOKOT-S/PERICOLACE) 8.6-50 MG per tablet 2 tablet    sodium chloride (PF) 0.9% PF flush 10-20 mL    sodium chloride (PF) 0.9% PF flush 10-40 mL    sodium chloride (PF) 0.9% PF flush 10-40 mL    sodium chloride (PF) 0.9% PF flush 10-40 mL    sodium chloride (PF) 0.9% PF flush 3 mL    sodium citrate-citric acid (BICITRA) solution 30 mL             Review of Systems:   Comprehensive review of systems from the internist  note dated 7/23 at [facility] was reviewed with no changes except per HPI.     Examination:  BP (!) 152/93 (BP Location: Left arm)   Pulse 84   Temp 98  F (36.7  C)  (Oral)   Resp 19   Ht 1.829 m (6')   Wt 124.9 kg (275 lb 5.7 oz)   SpO2 91%   BMI 37.34 kg/m    General: Alert and oriented, no distress.  HEENT: Face symmetric, atraumatic.   Eyes: No scleral icterus, EOM intact.   Neck: Symmetric.  Chest wall: Symmetric.  Respiratory: Breathing unlabored, no signs of respiratory distress.   Abdomen: obese  Back: No CVA or flank tenderness.  : significant penoscrotal edema is present, some mild discomfort on exam  Pysch: Normal mood and affect.            Data:     Lab Results   Component Value Date    WBC 10.0 07/24/2023    WBC 10.5 06/25/2021     Lab Results   Component Value Date    RBC 2.73 07/24/2023    RBC 4.18 06/25/2021     Lab Results   Component Value Date    HGB 8.0 07/24/2023    HGB 12.8 06/25/2021     Lab Results   Component Value Date    HCT 24.5 07/24/2023    HCT 35.7 06/25/2021     Lab Results   Component Value Date     07/24/2023     06/25/2021     Creatinine   Date Value Ref Range Status   07/24/2023 3.70 (H) 0.67 - 1.17 mg/dL Final   06/25/2021 1.51 (H) 0.66 - 1.25 mg/dL Final   ]  Lab Results   Component Value Date    BUN 58.8 07/24/2023    BUN 39 11/09/2022    BUN 41 06/25/2021     Assessment/Plan:  58-year-old female with multiple medical comorbidities noted to have scrotal swelling roughly 3 to 4 weeks ago.  Appears that he is third spacing fluid.  No swelling prior to hospital admission.  Doubt hydrocele.  Patient is having a hard time voiding secondary to the swelling.  Could trial male external catheter to see if this helps him.  We also talked about the possibility of an indwelling catheter though this comes with significant risks including infection.  Would recommend rolled towel underneath the scrotum while in bed to assist in elevation and to hlep reduce the swelling. Continue diuresis per IM and nephrology       Maren Youngblood PA-C  [facility]

## 2023-07-24 NOTE — PROGRESS NOTES
Assessment and Plan:     CKD-3: associated with hypertension and DM.    JOHNATHAN: Labs today,Na 142, K 3.5, HCO3 29. Cr steadily improving over last 3 weeks, now 3.70. UO 1600 ml yest.     On Bicitra.             Interval History:   Infected stump on L: osteomyelitis,S/P I & D.           Hypertension: on amlodipine, coreg. Was getting metolazone po and IV bumex.     DM:     Anemia: Hgb 8.0.  Fe def. Folate borderline. Ferr 309. Will give folgard and cont oral Fe.            Review of Systems:   Notes swelling of legs. No SOB but on O2 per NC due low O2 sats          Medications:      amLODIPine  5 mg Oral Daily    [Held by provider] bumetanide  1 mg Intravenous BID    carvedilol  37.5 mg Oral BID w/meals    ezetimibe  10 mg Oral Daily    ferrous sulfate  325 mg Oral BID    heparin ANTICOAGULANT  5,000 Units Subcutaneous Q8H    insulin aspart  1-7 Units Subcutaneous TID AC    insulin aspart  1-5 Units Subcutaneous At Bedtime    insulin degludec  12 Units Subcutaneous At Bedtime    [Held by provider] insulin degludec  20 Units Subcutaneous QAM    multivitamin w/minerals  1 tablet Oral Daily    sodium chloride (PF)  10-40 mL Intracatheter Q7 Days    sodium chloride (PF)  3 mL Intracatheter Q8H    sodium citrate-citric acid  30 mL Oral BID       Current active medications and PTA medications reviewed, see medication list for details.            Physical Exam:   Vitals were reviewed  Patient Vitals for the past 24 hrs:   BP Temp Temp src Pulse Resp SpO2   07/24/23 0900 -- -- -- -- -- 91 %   07/24/23 0816 (!) 152/93 98  F (36.7  C) Oral 84 19 93 %   07/24/23 0740 -- -- -- -- -- 97 %   07/23/23 2257 (!) 165/102 98.2  F (36.8  C) Oral 79 19 95 %   07/23/23 1615 137/75 98.5  F (36.9  C) Oral 84 18 92 %   07/23/23 1300 -- -- -- -- -- 93 %       Temp:  [98  F (36.7  C)-98.5  F (36.9  C)] 98  F (36.7  C)  Pulse:  [79-84] 84  Resp:  [18-19] 19  BP: (137-165)/() 152/93  SpO2:  [91 %-97 %] 91 %    Temperatures:  Current  - Temp: 98  F (36.7  C); Max - Temp  Av.2  F (36.8  C)  Min: 98  F (36.7  C)  Max: 98.5  F (36.9  C)  Respiration range: Resp  Av.7  Min: 18  Max: 19  Pulse range: Pulse  Av.3  Min: 79  Max: 84  Blood pressure range: Systolic (24hrs), Av , Min:137 , Max:165   ; Diastolic (24hrs), Av, Min:75, Max:102    Pulse oximetry range: SpO2  Av.5 %  Min: 91 %  Max: 97 %    I/O last 3 completed shifts:  In: 500 [P.O.:500]  Out: 1600 [Urine:1600]      Intake/Output Summary (Last 24 hours) at 2023 1139  Last data filed at 2023 0800  Gross per 24 hour   Intake 500 ml   Output 2400 ml   Net -1900 ml       Alert, NC O2  Lungs with clear ant BS  Cor Nl S1 S2 no M, mod JVD  LE bilat BKA, 1+ edema       Wt Readings from Last 4 Encounters:   23 124.9 kg (275 lb 5.7 oz)   10/03/22 110 kg (242 lb 8.1 oz)   22 119.9 kg (264 lb 6.4 oz)   08/15/22 113.4 kg (250 lb)          Data:          Lab Results   Component Value Date     2023     2023     2023     2021     2020     2020    Lab Results   Component Value Date    CHLORIDE 102 2023    CHLORIDE 103 2023    CHLORIDE 101 2023    CHLORIDE 102 2022    CHLORIDE 103 10/02/2022    CHLORIDE 102 10/01/2022    CHLORIDE 103 2021    CHLORIDE 106 2020    CHLORIDE 107 2020    Lab Results   Component Value Date    BUN 58.8 2023    BUN 61.3 2023    BUN 63.2 2023    BUN 39 2022    BUN 19 10/02/2022    BUN 21 10/01/2022    BUN 41 2021    BUN 12 2020    BUN 17 2020      Lab Results   Component Value Date    POTASSIUM 3.5 2023    POTASSIUM 3.8 2023    POTASSIUM 3.9 2023    POTASSIUM 4.8 2022    POTASSIUM 3.7 10/02/2022    POTASSIUM 3.8 10/01/2022    POTASSIUM 5.2 2021    POTASSIUM 4.2 2020    POTASSIUM 4.2 2020    Lab Results   Component Value Date    CO2 29  07/24/2023    CO2 27 07/23/2023    CO2 27 07/22/2023    CO2 30 11/09/2022    CO2 25 10/02/2022    CO2 25 10/01/2022    CO2 29 06/25/2021    CO2 29 02/06/2020    CO2 27 02/05/2020    Lab Results   Component Value Date    CR 3.70 07/24/2023    CR 3.95 07/23/2023    CR 4.12 07/22/2023    CR 1.51 06/25/2021    CR 1.10 02/06/2020    CR 1.25 02/05/2020        Recent Labs   Lab Test 07/24/23  0620 07/23/23  0604 07/22/23  0601   WBC 10.0 9.4 9.9   HGB 8.0* 7.7* 7.8*   HCT 24.5* 24.2* 24.4*   MCV 90 91 90    267 296     Recent Labs   Lab Test 06/24/23  1612 09/23/22  0106 06/25/21  0820   AST 13 39 19   ALT 14 58 52   ALKPHOS 84 102 84   BILITOTAL 0.9 0.4 0.4       No results for input(s): MAG in the last 26971 hours.  Recent Labs   Lab Test 07/19/23  0926 07/18/23  0655 07/14/23  0540   PHOS 5.9* 5.7* 7.1*     Recent Labs   Lab Test 07/24/23  0620 07/23/23  0604 07/22/23  0601   FERNANDO 8.5* 8.5* 8.3*       Lab Results   Component Value Date    FERNANDO 8.5 (L) 07/24/2023     Lab Results   Component Value Date    WBC 10.0 07/24/2023    HGB 8.0 (L) 07/24/2023    HCT 24.5 (L) 07/24/2023    MCV 90 07/24/2023     07/24/2023     Lab Results   Component Value Date     07/24/2023    POTASSIUM 3.5 07/24/2023    CHLORIDE 102 07/24/2023    CO2 29 07/24/2023     (H) 07/24/2023     Lab Results   Component Value Date    BUN 58.8 (H) 07/24/2023    CR 3.70 (H) 07/24/2023     No results found for: MAG  Lab Results   Component Value Date    PHOS 5.9 (H) 07/19/2023       Creatinine   Date Value Ref Range Status   07/24/2023 3.70 (H) 0.67 - 1.17 mg/dL Final   07/23/2023 3.95 (H) 0.67 - 1.17 mg/dL Final   07/22/2023 4.12 (H) 0.67 - 1.17 mg/dL Final   07/21/2023 4.63 (H) 0.67 - 1.17 mg/dL Final   07/20/2023 5.06 (H) 0.67 - 1.17 mg/dL Final   07/19/2023 5.26 (H) 0.67 - 1.17 mg/dL Final   06/25/2021 1.51 (H) 0.66 - 1.25 mg/dL Final   02/06/2020 1.10 0.66 - 1.25 mg/dL Final   02/05/2020 1.25 0.66 - 1.25 mg/dL Final   02/04/2020  1.33 (H) 0.66 - 1.25 mg/dL Final   02/03/2020 1.41 (H) 0.66 - 1.25 mg/dL Final   02/02/2020 1.60 (H) 0.66 - 1.25 mg/dL Final       Attestation:  I have reviewed today's vital signs, notes, medications, labs and imaging.     Manpreet Nelson MD

## 2023-07-24 NOTE — PROGRESS NOTES
Pt refused overnight oximetry tonight but said he would do it tomorrow. He says someone is coming to talk to him about it tomorrow.

## 2023-07-24 NOTE — PLAN OF CARE
Up with lift. Tolerating small PO intake. Pain contollred without pain medications. Scrotal swelling continues, Urology consulted with elevation orders. RBKA with compression on. LBKA dressing in tact. Tele NSR. Oxygen removed, some TORRES but recovers quickly. Repeat Xray done today, bumex given.

## 2023-07-25 ENCOUNTER — APPOINTMENT (OUTPATIENT)
Dept: OCCUPATIONAL THERAPY | Facility: CLINIC | Age: 58
DRG: 463 | End: 2023-07-25
Attending: HOSPITALIST
Payer: COMMERCIAL

## 2023-07-25 ENCOUNTER — APPOINTMENT (OUTPATIENT)
Dept: PHYSICAL THERAPY | Facility: CLINIC | Age: 58
DRG: 463 | End: 2023-07-25
Payer: COMMERCIAL

## 2023-07-25 LAB
ANION GAP SERPL CALCULATED.3IONS-SCNC: 13 MMOL/L (ref 7–15)
BUN SERPL-MCNC: 52.8 MG/DL (ref 6–20)
CALCIUM SERPL-MCNC: 8.5 MG/DL (ref 8.6–10)
CHLORIDE SERPL-SCNC: 97 MMOL/L (ref 98–107)
CREAT SERPL-MCNC: 3.42 MG/DL (ref 0.67–1.17)
DEPRECATED HCO3 PLAS-SCNC: 30 MMOL/L (ref 22–29)
ERYTHROCYTE [DISTWIDTH] IN BLOOD BY AUTOMATED COUNT: 13.5 % (ref 10–15)
GFR SERPL CREATININE-BSD FRML MDRD: 20 ML/MIN/1.73M2
GLUCOSE SERPL-MCNC: 118 MG/DL (ref 70–99)
HCT VFR BLD AUTO: 24.9 % (ref 40–53)
HGB BLD-MCNC: 8.2 G/DL (ref 13.3–17.7)
MAGNESIUM SERPL-MCNC: 2 MG/DL (ref 1.7–2.3)
MCH RBC QN AUTO: 29.1 PG (ref 26.5–33)
MCHC RBC AUTO-ENTMCNC: 32.9 G/DL (ref 31.5–36.5)
MCV RBC AUTO: 88 FL (ref 78–100)
PHOSPHATE SERPL-MCNC: 4.6 MG/DL (ref 2.5–4.5)
PLATELET # BLD AUTO: 232 10E3/UL (ref 150–450)
POTASSIUM SERPL-SCNC: 3.3 MMOL/L (ref 3.4–5.3)
POTASSIUM SERPL-SCNC: 3.7 MMOL/L (ref 3.4–5.3)
RBC # BLD AUTO: 2.82 10E6/UL (ref 4.4–5.9)
SODIUM SERPL-SCNC: 140 MMOL/L (ref 136–145)
WBC # BLD AUTO: 9 10E3/UL (ref 4–11)

## 2023-07-25 PROCEDURE — 250N000013 HC RX MED GY IP 250 OP 250 PS 637: Performed by: STUDENT IN AN ORGANIZED HEALTH CARE EDUCATION/TRAINING PROGRAM

## 2023-07-25 PROCEDURE — 85027 COMPLETE CBC AUTOMATED: CPT | Performed by: HOSPITALIST

## 2023-07-25 PROCEDURE — 80048 BASIC METABOLIC PNL TOTAL CA: CPT | Performed by: HOSPITALIST

## 2023-07-25 PROCEDURE — 250N000013 HC RX MED GY IP 250 OP 250 PS 637

## 2023-07-25 PROCEDURE — 36415 COLL VENOUS BLD VENIPUNCTURE: CPT | Performed by: INTERNAL MEDICINE

## 2023-07-25 PROCEDURE — 97140 MANUAL THERAPY 1/> REGIONS: CPT | Mod: GO

## 2023-07-25 PROCEDURE — 83735 ASSAY OF MAGNESIUM: CPT | Performed by: INTERNAL MEDICINE

## 2023-07-25 PROCEDURE — 84132 ASSAY OF SERUM POTASSIUM: CPT | Performed by: HOSPITALIST

## 2023-07-25 PROCEDURE — 250N000011 HC RX IP 250 OP 636: Mod: JZ | Performed by: INTERNAL MEDICINE

## 2023-07-25 PROCEDURE — 97165 OT EVAL LOW COMPLEX 30 MIN: CPT | Mod: GO

## 2023-07-25 PROCEDURE — 97110 THERAPEUTIC EXERCISES: CPT | Mod: GP

## 2023-07-25 PROCEDURE — 99232 SBSQ HOSP IP/OBS MODERATE 35: CPT | Performed by: HOSPITALIST

## 2023-07-25 PROCEDURE — 97530 THERAPEUTIC ACTIVITIES: CPT | Mod: GP

## 2023-07-25 PROCEDURE — 84100 ASSAY OF PHOSPHORUS: CPT | Performed by: INTERNAL MEDICINE

## 2023-07-25 PROCEDURE — 120N000001 HC R&B MED SURG/OB

## 2023-07-25 PROCEDURE — 36415 COLL VENOUS BLD VENIPUNCTURE: CPT | Performed by: HOSPITALIST

## 2023-07-25 PROCEDURE — 250N000013 HC RX MED GY IP 250 OP 250 PS 637: Performed by: INTERNAL MEDICINE

## 2023-07-25 PROCEDURE — 250N000011 HC RX IP 250 OP 636: Performed by: STUDENT IN AN ORGANIZED HEALTH CARE EDUCATION/TRAINING PROGRAM

## 2023-07-25 PROCEDURE — 99232 SBSQ HOSP IP/OBS MODERATE 35: CPT | Performed by: INTERNAL MEDICINE

## 2023-07-25 PROCEDURE — 250N000013 HC RX MED GY IP 250 OP 250 PS 637: Performed by: HOSPITALIST

## 2023-07-25 PROCEDURE — 97530 THERAPEUTIC ACTIVITIES: CPT | Mod: GO

## 2023-07-25 RX ORDER — POTASSIUM CHLORIDE 1500 MG/1
40 TABLET, EXTENDED RELEASE ORAL ONCE
Status: COMPLETED | OUTPATIENT
Start: 2023-07-25 | End: 2023-07-25

## 2023-07-25 RX ADMIN — CARVEDILOL 37.5 MG: 25 TABLET, FILM COATED ORAL at 17:40

## 2023-07-25 RX ADMIN — EZETIMIBE 10 MG: 10 TABLET ORAL at 09:59

## 2023-07-25 RX ADMIN — Medication 1 TABLET: at 09:59

## 2023-07-25 RX ADMIN — BUMETANIDE 2 MG: 1 TABLET ORAL at 17:40

## 2023-07-25 RX ADMIN — HEPARIN SODIUM 5000 UNITS: 5000 INJECTION, SOLUTION INTRAVENOUS; SUBCUTANEOUS at 06:04

## 2023-07-25 RX ADMIN — SODIUM CITRATE AND CITRIC ACID MONOHYDRATE 30 ML: 500; 334 SOLUTION ORAL at 09:59

## 2023-07-25 RX ADMIN — POTASSIUM CHLORIDE 40 MEQ: 1500 TABLET, EXTENDED RELEASE ORAL at 14:57

## 2023-07-25 RX ADMIN — FERROUS SULFATE TAB 325 MG (65 MG ELEMENTAL FE) 325 MG: 325 (65 FE) TAB at 21:34

## 2023-07-25 RX ADMIN — CARVEDILOL 37.5 MG: 25 TABLET, FILM COATED ORAL at 09:59

## 2023-07-25 RX ADMIN — FERROUS SULFATE TAB 325 MG (65 MG ELEMENTAL FE) 325 MG: 325 (65 FE) TAB at 09:59

## 2023-07-25 RX ADMIN — DARBEPOETIN ALFA 100 MCG: 100 INJECTION, SOLUTION INTRAVENOUS; SUBCUTANEOUS at 14:54

## 2023-07-25 RX ADMIN — BUMETANIDE 2 MG: 1 TABLET ORAL at 09:59

## 2023-07-25 RX ADMIN — AMLODIPINE BESYLATE 5 MG: 5 TABLET ORAL at 09:59

## 2023-07-25 RX ADMIN — ACETAMINOPHEN 650 MG: 325 TABLET, FILM COATED ORAL at 21:51

## 2023-07-25 RX ADMIN — MULTIPLE VITAMINS W/ MINERALS TAB 1 TABLET: TAB at 09:59

## 2023-07-25 RX ADMIN — HEPARIN SODIUM 5000 UNITS: 5000 INJECTION, SOLUTION INTRAVENOUS; SUBCUTANEOUS at 21:37

## 2023-07-25 RX ADMIN — HEPARIN SODIUM 5000 UNITS: 5000 INJECTION, SOLUTION INTRAVENOUS; SUBCUTANEOUS at 14:54

## 2023-07-25 RX ADMIN — OXYCODONE HYDROCHLORIDE 5 MG: 5 TABLET ORAL at 21:51

## 2023-07-25 ASSESSMENT — ACTIVITIES OF DAILY LIVING (ADL)
ADLS_ACUITY_SCORE: 32
PREVIOUS_RESPONSIBILITIES: MEAL PREP;HOUSEKEEPING;LAUNDRY;MEDICATION MANAGEMENT;FINANCES
ADLS_ACUITY_SCORE: 32

## 2023-07-25 NOTE — PROGRESS NOTES
[id number]   Patient AOX4. VSS on RA,hypertension. Pt ambulating assist 2/lift. Pain managed with oxycodone/tylenol. Dressing changed, CDI. Scrotal edema.Voiding adequately in urinal. BS audible/BM on shift per patient. Diet High Carb. No IV access. Tele NSR.Continue to monitor. Plan for discharge pending.

## 2023-07-25 NOTE — PROGRESS NOTES
07/25/23 1040   Appointment Info   Signing Clinician's Name / Credentials (OT) Manish Rosenthal OTR/L   Rehab Comments (OT) Bilateral BKA   Quick Adds   Quick Adds Edema   Living Environment   People in Home significant other   Current Living Arrangements house   Home Accessibility stairs to enter home;stairs within home   Number of Stairs, Main Entrance 2   Number of Stairs, Within Home, Primary other (see comments)  (Flight)   Stair Railings, Within Home, Primary railing on left side (ascending)   Transportation Anticipated family or friend will provide   Self-Care   Usual Activity Tolerance good   Current Activity Tolerance fair   Equipment Currently Used at Home prosthesis;wheelchair, manual  (Shower bench, has FWW)   Fall history within last six months no   Activity/Exercise/Self-Care Comment Pt reports being completely IND w/ all ADL's prior to admission into hospital (playing golf, etc.).   Instrumental Activities of Daily Living (IADL)   Previous Responsibilities meal prep;housekeeping;laundry;medication management;finances   IADL Comments Pt reports being IND w/ all IADL's at baseline prior to admission. Works from home.   General Information   Onset of Illness/Injury or Date of Surgery 06/24/23   Referring Physician Damir Ohara MD   Patient/Family Therapy Goal Statement (OT) Get back to home.   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   Existing Precautions/Restrictions fall   Cognitive Status Examination   Orientation Status orientation to person, place and time   Visual Perception   Visual Impairment/Limitations WFL   Sensory    Sensory Quick Adds sensation intact   Pain Assessment   Patient Currently in Pain Yes, see Vital Sign flowsheet  (Reports discomfort near scortum)   Edema General Information   Onset of Edema 07/24/23   Affected Body Part(s) Right LE;Other (see comments)  (Scrotum)   Edema Etiology Surgery   Edema Precautions Renal Insufficiency;Acute infection   Edema Examination/Assessment   Skin Condition Non-pitting;Intact   Skin Condition Comments 1+ edema to RLE below knee and to scrotum   Scar Yes   Ulcerations No   Edema Assessment Comments Pt with B BKA. Unable to assess stemmer sign and pulses. Noted edema in RLE below knee and within scrotum.   Range of Motion Comprehensive   General Range of Motion no range of motion deficits identified   Strength Comprehensive (MMT)   General Manual Muscle Testing (MMT) Assessment no strength deficits identified   Bed Mobility   Bed Mobility supine-sit;sit-supine   Comment (Bed Mobility) SBA   Activities of Daily Living   BADL Assessment/Intervention lower body dressing;grooming   Lower Body Dressing Assessment/Training   Position (Lower Body Dressing) edge of bed sitting   Comment, (Lower Body Dressing) SBA   Grooming Assessment/Training   [place] Level (Grooming) supervision   Comment, (Grooming) Per clinical judgement   Clinical Impression   Criteria for Skilled Therapeutic Interventions Met (OT) Yes, treatment indicated   OT Diagnosis Decreased ADL independence and Edema   Edema: Patient Presentation Edema;Stage 1 Lymphedema   OT Problem List-Impairments impacting ADL problems related to;activity tolerance impaired   Assessment of Occupational Performance 1-3 Performance Deficits   Identified Performance Deficits Toileting/toilet transfers, LB dressing   Planned Therapy Interventions (OT) ADL retraining;IADL retraining;manual therapy;home program guidelines;progressive activity/exercise;risk factor education   Edema: Planned Interventions Gradient compression bandaging;Fit  for compression garment;Precautions to prevent infection/exacerbation;Education;Manual therapy;ADL training   Clinical Decision Making Complexity (OT) low complexity   Anticipated Equipment Needs Upon Discharge (OT)   (TBD)   Risk & Benefits of therapy have been explained evaluation/treatment results reviewed;care plan/treatment goals reviewed;risks/benefits reviewed;current/potential barriers reviewed;patient   OT Total Evaluation Time   OT Eval, Low Complexity Minutes (80441) 10   OT Goals   Therapy Frequency (OT) Daily   OT Predicted Duration/Target Date for Goal Attainment 07/31/23   OT Goals Hygiene/Grooming;Lower Body Dressing;Toilet Transfer/Toileting;Edema   OT: Hygiene/Grooming modified independent   OT: Lower Body Dressing Supervision/stand-by assist;including orthotic;including set-up/clothing retrieval   OT: Toilet Transfer/Toileting Minimal assist;toilet transfer;cleaning and garment management   OT: Edema education to increase ability to manage edema after discharge from the hospital Patient;Verbalize;Skin care routine;signs/symptoms of intolerance;wear schedule;limb positioning   OT: Management of edema bandages Patient;Verbalize;Demonstrate;quick wrap;garment(s)   OT: Functional edema exercise program to reduce limb volume, increase activity tolerance and improve independence with ADL Patient;Verbalize;Demonstrates;HEP   Interventions   Interventions Quick Adds Therapeutic Activity;Manual Therapy   Manual Therapy   Manual Therapy: Mobilization, MFR, MLD, friction massage minutes (79464) 40   Treatment Detail/Skilled Intervention OT: Lymphedema evaluation complete and treatment initiated.  Pt demonstrates stage 1 lymphedema in RLE below knee and within scrotum. Pt is appropriate for RLE quick wrap, using short stretch (SS) bandages, not ACE wraps. Pt will also benefit from conservative scrotal edema management. Pt educated in rationale for compression with wound healing and importance of using SS  bandages which have low resting pressure and high working pressure, vs ACE wraps which have high resting pressure and low working pressure. RLE washed, lotion applied, and quick wraps completed to RLE with 10/12 cm short stretch bandage from below knee to approx. 6 inches above the knee with appropriate stretch and spacing to allow gradient compression. Following completion, addressed scrotal edema. Elevated the scrotum using folded up pillow case. Educated to pt regarding safe positioning and suggested compression; handout provided to patient.Coordinated with nursing regarding wearing schedule, and completing LE cares for the following day prior to therapy arrival. Updated white board with schedule and quick wrap instruction sheet if wraps become loose or are otherwise not tolerated. If pt does not tolerate wraps well, please remove wraps but keep RLE elevated.   Therapeutic Activities   Therapeutic Activity Minutes (57964) 10   Symptoms noted during/after treatment fatigue   Treatment Detail/Skilled Intervention Pt greeted supine in bed and agreeable for OT evaluation. Pt on 1 L of O2 via NC; SpO2 at 96% while at rest. O2 removed during therapy session. SBA sup > sit w/ head of bed elevated and use of bed rail. Increased time and effort needed to complete bed mobility d/t fatigue. Pt able to maintain sitting balance w/ SBA. Pt able to don RLE prosthetic w/ supervision for safety but unable to complete d/t RLE edema. Following completion, pt returned to supine in bed w/ SBA. Pt able to boost self up towards head of bed. Pt remained supine in bed at end of therapy session w/ all needs met and bed alarm activated.   OT Discharge Planning   OT Plan Assess response to RLE wraps and scrotal management, LB dressing w/ prosthetic   OT Discharge Recommendation (DC Rec) [facility] patient will benefit from intensive, interdisciplinary therapy.  Anticipate will be able to tolerate 3 hours of therapy per  day;home with home care occupational therapy;home with assist   OT Rationale for DC Rec Pt currently functioning below baseline d/t decreased activity tolerance, pain and edema. Pt w/ scrotal and RLE edema and will benefit from continued conservative management. Pt currently requiring assist w/ all mobility and ADL's at this time. Pt would benefit from ARU and would tolerate 3 hours of therapy a day.   OT Brief overview of current status SBA bed mobility, RLE lymph wraps and scrotal edema management, education   Total Session Time   Timed Code Treatment Minutes 50   Total Session Time (sum of timed and untimed services) 60

## 2023-07-25 NOTE — PROGRESS NOTES
Assessment and Plan:     CKD-3: DM. HT.    JOHNATHAN: labs show improving Cr. Low K and high bicarb consistent with diuresis. Alsoon bicitra. Got KCL 40 mew po today. UO 2200 ml yest.     Stop bicitra.Continue bumex 2mg bid.   Follow labs.            Interval History:   Infected stump on L: osteomyelitis,S/P I & D.            Hypertension: on amlodipine, coreg. Now on oral bumex and K.     DM:      Anemia: Hgb 8.0.  Fe def. Folate borderline. Ferr 309. Will give folgard and cont oral Fe.    One dose off EPO.               Review of Systems:   C/O poor exercise tolerance. Notes swelling of legs. No SOB.           Medications:      amLODIPine  5 mg Oral Daily    bumetanide  2 mg Oral BID    carvedilol  37.5 mg Oral BID w/meals    ezetimibe  10 mg Oral Daily    ferrous sulfate  325 mg Oral BID    folic acid-vit B6-vit B12  1 tablet Oral Daily    heparin ANTICOAGULANT  5,000 Units Subcutaneous Q8H    insulin aspart  1-7 Units Subcutaneous TID AC    insulin aspart  1-5 Units Subcutaneous At Bedtime    insulin degludec  12 Units Subcutaneous At Bedtime    [Held by provider] insulin degludec  20 Units Subcutaneous QAM    multivitamin w/minerals  1 tablet Oral Daily    potassium chloride  40 mEq Oral Once    sodium chloride (PF)  10-40 mL Intracatheter Q7 Days    sodium citrate-citric acid  30 mL Oral BID       Current active medications and PTA medications reviewed, see medication list for details.            Physical Exam:   Vitals were reviewed  Patient Vitals for the past 24 hrs:   BP Temp Temp src Pulse Resp SpO2 Weight   07/25/23 0729 (!) 149/88 98.2  F (36.8  C) Oral 73 16 94 % --   07/25/23 0558 -- -- -- -- -- -- 118.9 kg (262 lb 2 oz)   07/24/23 2314 (!) 164/100 98.6  F (37  C) Oral 81 16 98 % --   07/24/23 1828 134/72 -- -- 84 -- 94 % --   07/24/23 1827 -- -- -- -- -- (!) 87 % --   07/24/23 1559 (!) 169/98 98.3  F (36.8  C) Oral 80 20 91 % --       Temp:  [98.2  F (36.8  C)-98.6  F (37  C)] 98.2  F (36.8   C)  Pulse:  [73-84] 73  Resp:  [16-20] 16  BP: (134-169)/() 149/88  SpO2:  [87 %-98 %] 94 %    Temperatures:  Current - Temp: 98.2  F (36.8  C); Max - Temp  Av.4  F (36.9  C)  Min: 98.2  F (36.8  C)  Max: 98.6  F (37  C)  Respiration range: Resp  Av.3  Min: 16  Max: 20  Pulse range: Pulse  Av.5  Min: 73  Max: 84  Blood pressure range: Systolic (24hrs), Av , Min:134 , Max:169   ; Diastolic (24hrs), Av, Min:72, Max:100    Pulse oximetry range: SpO2  Av.8 %  Min: 87 %  Max: 98 %    I/O last 3 completed shifts:  In: 990 [P.O.:990]  Out: 4000 [Urine:4000]      Intake/Output Summary (Last 24 hours) at 2023 1405  Last data filed at 2023 1000  Gross per 24 hour   Intake 1080 ml   Output 4150 ml   Net -3070 ml     Alert and responsive  LE with 1-2+ edema   RLE wrapped, LLE with dressing on          Wt Readings from Last 4 Encounters:   23 118.9 kg (262 lb 2 oz)   10/03/22 110 kg (242 lb 8.1 oz)   22 119.9 kg (264 lb 6.4 oz)   08/15/22 113.4 kg (250 lb)          Data:          Lab Results   Component Value Date     2023     2023     2023     2021     2020     2020    Lab Results   Component Value Date    CHLORIDE 97 2023    CHLORIDE 102 2023    CHLORIDE 103 2023    CHLORIDE 102 2022    CHLORIDE 103 10/02/2022    CHLORIDE 102 10/01/2022    CHLORIDE 103 2021    CHLORIDE 106 2020    CHLORIDE 107 2020    Lab Results   Component Value Date    BUN 52.8 2023    BUN 58.8 2023    BUN 61.3 2023    BUN 39 2022    BUN 19 10/02/2022    BUN 21 10/01/2022    BUN 41 2021    BUN 12 2020    BUN 17 2020      Lab Results   Component Value Date    POTASSIUM 3.3 2023    POTASSIUM 3.5 2023    POTASSIUM 3.8 2023    POTASSIUM 4.8 2022    POTASSIUM 3.7 10/02/2022    POTASSIUM 3.8 10/01/2022    POTASSIUM 5.2 2021     POTASSIUM 4.2 02/06/2020    POTASSIUM 4.2 02/05/2020    Lab Results   Component Value Date    CO2 30 07/25/2023    CO2 29 07/24/2023    CO2 27 07/23/2023    CO2 30 11/09/2022    CO2 25 10/02/2022    CO2 25 10/01/2022    CO2 29 06/25/2021    CO2 29 02/06/2020    CO2 27 02/05/2020    Lab Results   Component Value Date    CR 3.42 07/25/2023    CR 3.70 07/24/2023    CR 3.95 07/23/2023    CR 1.51 06/25/2021    CR 1.10 02/06/2020    CR 1.25 02/05/2020        Recent Labs   Lab Test 07/25/23  0832 07/24/23  0620 07/23/23  0604   WBC 9.0 10.0 9.4   HGB 8.2* 8.0* 7.7*   HCT 24.9* 24.5* 24.2*   MCV 88 90 91    262 267     Recent Labs   Lab Test 06/24/23  1612 09/23/22  0106 06/25/21  0820   AST 13 39 19   ALT 14 58 52   ALKPHOS 84 102 84   BILITOTAL 0.9 0.4 0.4       Recent Labs   Lab Test 07/25/23  0832 07/24/23  0620   MAG 2.0 2.2     Recent Labs   Lab Test 07/25/23  0832 07/19/23  0926 07/18/23  0655   PHOS 4.6* 5.9* 5.7*     Recent Labs   Lab Test 07/25/23  0832 07/24/23  0620 07/23/23  0604   FERNANDO 8.5* 8.5* 8.5*       Lab Results   Component Value Date    FERNANDO 8.5 (L) 07/25/2023     Lab Results   Component Value Date    WBC 9.0 07/25/2023    HGB 8.2 (L) 07/25/2023    HCT 24.9 (L) 07/25/2023    MCV 88 07/25/2023     07/25/2023     Lab Results   Component Value Date     07/25/2023    POTASSIUM 3.3 (L) 07/25/2023    CHLORIDE 97 (L) 07/25/2023    CO2 30 (H) 07/25/2023     (H) 07/25/2023     Lab Results   Component Value Date    BUN 52.8 (H) 07/25/2023    CR 3.42 (H) 07/25/2023     Lab Results   Component Value Date    MAG 2.0 07/25/2023     Lab Results   Component Value Date    PHOS 4.6 (H) 07/25/2023       Creatinine   Date Value Ref Range Status   07/25/2023 3.42 (H) 0.67 - 1.17 mg/dL Final   07/24/2023 3.70 (H) 0.67 - 1.17 mg/dL Final   07/23/2023 3.95 (H) 0.67 - 1.17 mg/dL Final   07/22/2023 4.12 (H) 0.67 - 1.17 mg/dL Final   07/21/2023 4.63 (H) 0.67 - 1.17 mg/dL Final   07/20/2023 5.06 (H)  0.67 - 1.17 mg/dL Final   06/25/2021 1.51 (H) 0.66 - 1.25 mg/dL Final   02/06/2020 1.10 0.66 - 1.25 mg/dL Final   02/05/2020 1.25 0.66 - 1.25 mg/dL Final   02/04/2020 1.33 (H) 0.66 - 1.25 mg/dL Final   02/03/2020 1.41 (H) 0.66 - 1.25 mg/dL Final   02/02/2020 1.60 (H) 0.66 - 1.25 mg/dL Final       Attestation:  I have reviewed today's vital signs, notes, medications, labs and imaging.     Manpreet Nelson MD

## 2023-07-25 NOTE — PROGRESS NOTES
[facility]    Medicine Progress Note - Hospitalist Service    Date of Admission:  6/24/2023    Assessment & Plan   Ry Horner is a 58 year old male admitted on 6/24/2023.  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        Sepsis due Lt BKA stump infection-resolved  S/p I&D of of left BKA stump on 06/30, 07/06 and 07/10  *Patient presented with pain, redness, swelling at his left BKA stump site.  Marked leukocytosis of 22.9. Tachycardic to 110s.  X-ray Shows soft tissue swelling, some bone formation along the resection margin of the tibia with periosteal reaction.   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     *S/p 26 days of antibiotics (all cultures negative) most recent path on the tibial bone was non specific for osteo; antibiotics discontinued 7/20  *see progress note 7/22 regarding details of care conference held 7/20: plan to monitor off antibiotics with plan for stump revision once medically optimized (JOHNATHAN still resolving, fluid overloaded)    - Of note he is able to fit a prosthesis on his right amputation stump but continues to have swelling  - therapies recommending ARU; SW following  - remains afebrile without leukocytosis off antibiotics 07/25/23   - stump wound management per Vascular Surgery     Acute kidney injury on CKD stage III -improving   Generalized edema   Scrotal edema   *Cr @ admission 2.45, baseline ~ 1.6  *Initially improved to baseline and then worsening JOHNATHAN after OR 06/30  *nephrology consulted for severe JOHNATHAN,  peak Cr of 8.7 on 07/08. Non oliguric and suspected to be ATN injury  *ultimately initiated on bumex diuresis per Neph with improving edema, renal function tolerating well  *Urology 7/24 consulted for scrotal edema; suspect third spacing and recommend scrotal elevation    - continue bumex 2 mg bid; net negative 3L past 24 hours and weight down-trending  - Cr trending down at 3.4 07/25/23; monitor daily  - Neph recs appreciated    Hypokalemia  - K 3.3 on 07/25/23; give 40mEq KCl x1 and repeat later today     Acute hypoxic respiratory failure likely due to pulmonary edema-resolved  Likely acute diastolic heart failure exacerbation  Type II NSTEMI due to CHF exacerbation and hypoxic respiratory failure  *RRT 7/20 for acute hypoxia with BNP >11K, CXR consistent with pulmonary edema  *d-dimer elevated at 6.7 (expected with recent surgeries); LE US negative for DVT and VQ scan negative  *EKG without ischemic changes, serial trop flat, TTE 7/22 showed EF of 60 to 65%, RV systolic function is normal, diastolic Doppler findings suggest LV filling pressures are increased, RV is normal in size, left atrium is severely dilated and no valvular abnormality  *received diuretic with rapid improvement in oxygen needs; suspect fluid overload versus hypersensitive reaction to iron    - stable on 1L oxygen, wean as able  - diuresis as above      Type II DM, uncontrolled, on long term insulin, A1c 7.6 on 6/27/23   [PTA on Tresiba 34 units twice a day, mealtime insulin 5 units + 1/150, Jardiance, Januvia]  - degludec 12U at bedtime, medium ssi - glucose levels adequate  - oral meds held at admission      Essential hypertension  [PTA lisinopril 5 mg daily]  - holding lisinopril due to JOHNATHAN  - continue amlodipine 5 mg daily (decreased 7/21 due to edema), carvedilol 37.5 mg bid (increased 7/23)     Acute on chronic anemia due to blood loss and acute illness  Iron deficiency  *Hemoglobin at presentation was 12.8, slowly has drifted down,  likely was multifactorial due to acute infections and ongoing surgeries  *s/p 1U PRBC 7/14 and one dose of IV Venofer on 7/20 (no further IV iron as some concern for possible reaction as above)  - continue oral iron  - hgb stable 7-8 g/dL, no need for ongoing daily monitoring         Diet: Snacks/Supplements Adult: Other; 8 pm: cottage cheese with pears; Between Meals  High Consistent Carb (75 g CHO per Meal) Diet    DVT Prophylaxis: Heparin SQ  [place] Catheter: Not present  Lines: None     Cardiac Monitoring: ACTIVE order. Indication: Hypoxia  Code Status: Full Code      Clinically Significant Risk Factors        # Hypokalemia: Lowest K = 3.3 mmol/L in last 2 days, will replace as needed       # Hypoalbuminemia: Lowest albumin = 2.4 g/dL at 7/8/2023  6:39 AM, will monitor as appropriate     # Hypertension: Noted on problem list       # DMII: A1C = 7.6 % (Ref range: <5.7 %) within past 6 months            Disposition Plan      Expected Discharge Date: 07/27/2023      Destination: home;home with family  Discharge Comments: TCU  7/20 11AM care conference          Victoriano Velasco MD  Hospitalist Service  [facility]  Securely message with [employer] (more info)  Text page via [employer] Paging/Directory   ______________________________________________________________________    Interval History   Doing well, denies any pain.  No dyspnea.  No fever/chills or sweats off antibiotics.     Physical Exam   Vital Signs: Temp: 98.2  F (36.8  C) Temp src: Oral BP: (!) 149/88 Pulse: 73   Resp: 16 SpO2: 94 % O2 Device: Nasal cannula Oxygen Delivery: 1 LPM  Weight: 262 lbs 2.03 oz    General Appearance: Well nourished male in NAD  Respiratory: diminished bibasilar lung sounds, no wheezing   Cardiovascular: RRR, normal s1/s2 without murmur  GI: normal bowel sounds  Skin: 1-2+ pitting edema to hips  Other: Alert and appropriate, cranial nerves grossly intact      Medical Decision Making       30 MINUTES SPENT BY ME on  the date of service doing chart review, history, exam, documentation & further activities per the note.  MANAGEMENT DISCUSSED with the following over the past 24 hours: bedside RN, care coordinator and    Tests ORDERED & REVIEWED in the past 24 hours:  - BMP  - CBC  Medical complexity over the past 24 hours:  - Prescription DRUG MANAGEMENT performed      Data     I have personally reviewed the following data over the past 24 hrs:    9.0  \   8.2 (L)   / 232     140 97 (L) 52.8 (H) /  118 (H)   3.3 (L) 30 (H) 3.42 (H) \

## 2023-07-25 NOTE — PROGRESS NOTES
Care Management Follow Up    Length of Stay (days): 31    Expected Discharge Date: 07/27/2023     Concerns to be Addressed: denies needs/concerns at this time, no discharge needs identified     Patient plan of care discussed at interdisciplinary rounds: Yes    Anticipated Discharge Disposition: Home     Anticipated Discharge Services: None  Anticipated Discharge DME: None    Patient/family educated on Medicare website which has current facility and service quality ratings:    Education Provided on the Discharge Plan:    Patient/Family in Agreement with the Plan: yes    Referrals Placed by CM/SW:    Private pay costs discussed: Not applicable    Additional Information:  Writer met with patient at bedside to discuss the possibility of ARU as aan option prior to discharging home.  Patient would ultimately like to be able to reduce swelling enough to don his prosthesis and go home, however is open to ARU as an option and would like more information. Writer sent a referral and had Eder take a look to see if he would be willing or interested to consider ARU and also to understand if he qualifies.     Addendum - Eder from ARU was wanting patient to complete slide board to qualify for ARU.  Writer relayed the information to the PT/OT team, however, they said due to patients scrotal swelling the slide board would be difficult. PT requested Eder number and were going to discuss.     GLORIA Medina

## 2023-07-26 ENCOUNTER — APPOINTMENT (OUTPATIENT)
Dept: PHYSICAL THERAPY | Facility: CLINIC | Age: 58
DRG: 463 | End: 2023-07-26
Payer: COMMERCIAL

## 2023-07-26 ENCOUNTER — APPOINTMENT (OUTPATIENT)
Dept: OCCUPATIONAL THERAPY | Facility: CLINIC | Age: 58
DRG: 463 | End: 2023-07-26
Payer: COMMERCIAL

## 2023-07-26 LAB
ANION GAP SERPL CALCULATED.3IONS-SCNC: 13 MMOL/L (ref 7–15)
BUN SERPL-MCNC: 55.5 MG/DL (ref 6–20)
CALCIUM SERPL-MCNC: 8.6 MG/DL (ref 8.6–10)
CHLORIDE SERPL-SCNC: 97 MMOL/L (ref 98–107)
CREAT SERPL-MCNC: 3.69 MG/DL (ref 0.67–1.17)
DEPRECATED HCO3 PLAS-SCNC: 31 MMOL/L (ref 22–29)
GFR SERPL CREATININE-BSD FRML MDRD: 18 ML/MIN/1.73M2
GLUCOSE SERPL-MCNC: 141 MG/DL (ref 70–99)
POTASSIUM SERPL-SCNC: 3.4 MMOL/L (ref 3.4–5.3)
SODIUM SERPL-SCNC: 141 MMOL/L (ref 136–145)

## 2023-07-26 PROCEDURE — 250N000013 HC RX MED GY IP 250 OP 250 PS 637: Performed by: INTERNAL MEDICINE

## 2023-07-26 PROCEDURE — 250N000013 HC RX MED GY IP 250 OP 250 PS 637: Performed by: STUDENT IN AN ORGANIZED HEALTH CARE EDUCATION/TRAINING PROGRAM

## 2023-07-26 PROCEDURE — 97110 THERAPEUTIC EXERCISES: CPT | Mod: GP

## 2023-07-26 PROCEDURE — 120N000001 HC R&B MED SURG/OB

## 2023-07-26 PROCEDURE — 99231 SBSQ HOSP IP/OBS SF/LOW 25: CPT | Performed by: HOSPITALIST

## 2023-07-26 PROCEDURE — 80048 BASIC METABOLIC PNL TOTAL CA: CPT | Performed by: HOSPITALIST

## 2023-07-26 PROCEDURE — 97530 THERAPEUTIC ACTIVITIES: CPT | Mod: GP

## 2023-07-26 PROCEDURE — 250N000013 HC RX MED GY IP 250 OP 250 PS 637: Performed by: HOSPITALIST

## 2023-07-26 PROCEDURE — 97535 SELF CARE MNGMENT TRAINING: CPT | Mod: GO | Performed by: OCCUPATIONAL THERAPIST

## 2023-07-26 PROCEDURE — 36415 COLL VENOUS BLD VENIPUNCTURE: CPT | Performed by: HOSPITALIST

## 2023-07-26 PROCEDURE — 97140 MANUAL THERAPY 1/> REGIONS: CPT | Mod: GO | Performed by: OCCUPATIONAL THERAPIST

## 2023-07-26 PROCEDURE — 99232 SBSQ HOSP IP/OBS MODERATE 35: CPT | Performed by: INTERNAL MEDICINE

## 2023-07-26 PROCEDURE — 250N000011 HC RX IP 250 OP 636: Performed by: STUDENT IN AN ORGANIZED HEALTH CARE EDUCATION/TRAINING PROGRAM

## 2023-07-26 RX ORDER — FERROUS SULFATE 325(65) MG
325 TABLET ORAL EVERY OTHER DAY
Status: DISCONTINUED | OUTPATIENT
Start: 2023-07-28 | End: 2023-07-28 | Stop reason: HOSPADM

## 2023-07-26 RX ADMIN — INSULIN ASPART 2 UNITS: 100 INJECTION, SOLUTION INTRAVENOUS; SUBCUTANEOUS at 16:38

## 2023-07-26 RX ADMIN — Medication 1 TABLET: at 08:20

## 2023-07-26 RX ADMIN — CARVEDILOL 37.5 MG: 25 TABLET, FILM COATED ORAL at 08:20

## 2023-07-26 RX ADMIN — MULTIPLE VITAMINS W/ MINERALS TAB 1 TABLET: TAB at 08:20

## 2023-07-26 RX ADMIN — HEPARIN SODIUM 5000 UNITS: 5000 INJECTION, SOLUTION INTRAVENOUS; SUBCUTANEOUS at 14:43

## 2023-07-26 RX ADMIN — BUMETANIDE 2 MG: 1 TABLET ORAL at 08:20

## 2023-07-26 RX ADMIN — INSULIN ASPART 1 UNITS: 100 INJECTION, SOLUTION INTRAVENOUS; SUBCUTANEOUS at 08:22

## 2023-07-26 RX ADMIN — AMLODIPINE BESYLATE 5 MG: 5 TABLET ORAL at 08:20

## 2023-07-26 RX ADMIN — EZETIMIBE 10 MG: 10 TABLET ORAL at 08:20

## 2023-07-26 RX ADMIN — HEPARIN SODIUM 5000 UNITS: 5000 INJECTION, SOLUTION INTRAVENOUS; SUBCUTANEOUS at 22:18

## 2023-07-26 RX ADMIN — HEPARIN SODIUM 5000 UNITS: 5000 INJECTION, SOLUTION INTRAVENOUS; SUBCUTANEOUS at 06:34

## 2023-07-26 RX ADMIN — FERROUS SULFATE TAB 325 MG (65 MG ELEMENTAL FE) 325 MG: 325 (65 FE) TAB at 08:20

## 2023-07-26 RX ADMIN — INSULIN ASPART 1 UNITS: 100 INJECTION, SOLUTION INTRAVENOUS; SUBCUTANEOUS at 12:47

## 2023-07-26 RX ADMIN — CARVEDILOL 37.5 MG: 25 TABLET, FILM COATED ORAL at 17:09

## 2023-07-26 ASSESSMENT — ACTIVITIES OF DAILY LIVING (ADL)
ADLS_ACUITY_SCORE: 28
ADLS_ACUITY_SCORE: 32
ADLS_ACUITY_SCORE: 32
ADLS_ACUITY_SCORE: 28
ADLS_ACUITY_SCORE: 32
ADLS_ACUITY_SCORE: 28
ADLS_ACUITY_SCORE: 32
ADLS_ACUITY_SCORE: 32
ADLS_ACUITY_SCORE: 28

## 2023-07-26 NOTE — PROGRESS NOTES
[facility]    Medicine Progress Note - Hospitalist Service    Date of Admission:  6/24/2023    Assessment & Plan   Ry Horner is a 58 year old male admitted on 6/24/2023.  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        Sepsis due Lt BKA stump infection-resolved  S/p I&D of of left BKA stump on 06/30, 07/06 and 07/10  *Patient presented with pain, redness, swelling at his left BKA stump site.  Marked leukocytosis of 22.9. Tachycardic to 110s.  X-ray Shows soft tissue swelling, some bone formation along the resection margin of the tibia with periosteal reaction.   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     *S/p 26 days of antibiotics (all cultures negative) most recent path on the tibial bone was non specific for osteo; antibiotics discontinued 7/20  *see progress note 7/22 regarding details of care conference held 7/20: plan to monitor off antibiotics with plan for stump revision once medically optimized (JOHNATHAN still resolving, fluid overloaded)    - Of note he is able to fit a prosthesis on his right amputation stump but continues to have swelling  - therapies recommending ARU; SW following  - remains afebrile off antibiotics 07/26/23   - stump wound management and timing of revision per Vascular Surgery - ?ok to discharge to ARU and return for surgery     Acute kidney injury on CKD stage III -improving   Generalized edema   Scrotal edema   *Cr @ admission 2.45, baseline ~ 1.6  *Initially improved to baseline and then worsening JOHNATHAN  after OR 06/30  *nephrology consulted for severe JOHNATHAN, peak Cr of 8.7 on 07/08. Non oliguric and suspected to be ATN injury  *ultimately initiated on bumex diuresis per Neph with improving edema, renal function tolerating well  *Urology 7/24 consulted for scrotal edema; suspect third spacing and recommend scrotal elevation    - continue bumex 2 mg bid; net negative 2.8L past 24 hours, marked decline in weight is likely error  - Cr up slightly at 3.69 with slight increase in BUN and bicarb 07/26/23 - ?contraction - defer any diuretic adjustment to Neph, recs appreciated  - daily BMP    Hypokalemia  - replace as needed; holding on protocol due to borderline renal function     Acute hypoxic respiratory failure likely due to pulmonary edema-resolved  Likely acute diastolic heart failure exacerbation  Type II NSTEMI due to CHF exacerbation and hypoxic respiratory failure  *RRT 7/20 for acute hypoxia with BNP >11K, CXR consistent with pulmonary edema  *d-dimer elevated at 6.7 (expected with recent surgeries); LE US negative for DVT and VQ scan negative  *EKG without ischemic changes, serial trop flat, TTE 7/22 showed EF of 60 to 65%, RV systolic function is normal, diastolic Doppler findings suggest LV filling pressures are increased, RV is normal in size, left atrium is severely dilated and no valvular abnormality  *received diuretic with rapid improvement in oxygen needs; suspect fluid overload versus hypersensitive reaction to iron    - utilizing 1L oxygen at night, on room air during the day  - diuresis as above   - consider overnight oximetry if still requiring oxygen once euvolemic     Type II DM, uncontrolled, on long term insulin, A1c 7.6 on 6/27/23   [PTA on Tresiba 34 units twice a day, mealtime insulin 5 units + 1/150, Jardiance, Januvia]  - degludec 12U at bedtime, medium ssi - glucose levels adequate 07/26/23   - oral meds held at admission      Essential hypertension  [PTA lisinopril 5 mg daily]  - holding  lisinopril due to JOHNATHAN  - continue amlodipine 5 mg daily (decreased 7/21 due to edema), carvedilol 37.5 mg bid (increased 7/23)  - mildly hypertensive, monitor     Acute on chronic anemia due to blood loss and acute illness  Iron deficiency  *Hemoglobin at presentation was 12.8, slowly has drifted down, likely was multifactorial due to acute infections and ongoing surgeries  *s/p 1U PRBC 7/14 and one dose of IV Venofer on 7/20 (no further IV iron as some concern for possible reaction as above)  - continue oral iron  - hgb stable 7-8 g/dL, no need for ongoing daily monitoring         Diet: Snacks/Supplements Adult: Other; 8 pm: cottage cheese with pears; Between Meals  High Consistent Carb (75 g CHO per Meal) Diet    DVT Prophylaxis: Heparin SQ  [place] Catheter: Not present  Lines: None     Cardiac Monitoring: None  Code Status: Full Code      Clinically Significant Risk Factors        # Hypokalemia: Lowest K = 3.3 mmol/L in last 2 days, will replace as needed       # Hypoalbuminemia: Lowest albumin = 2.4 g/dL at 7/8/2023  6:39 AM, will monitor as appropriate       # Hypertension: Noted on problem list       # DMII: A1C = 7.6 % (Ref range: <5.7 %) within past 6 months              Disposition Plan     Expected Discharge Date: 07/27/2023      Destination: home;home with family  Discharge Comments: TCU  7/20 11AM care conference          Victoriano Velasco MD  Hospitalist Service  [facility]  Securely message with [employer] (more info)  Text page via [employer]Directory   ______________________________________________________________________    Interval History   Feeling stronger and overall improved.  Denies any fever/chills.  Scrotal edema improving mildly.  No lightheadedness or other complaints.  Denies dyspnea.    Physical Exam   Vital Signs: Temp: 98.4  F (36.9  C) Temp src: Oral BP: (!) 142/81 Pulse: 75   Resp: 16 SpO2: 96 % O2 Device: Nasal cannula Oxygen Delivery: 1 LPM  Weight: 251 lbs 1.66  oz    General Appearance: Well nourished male in NAD  Respiratory: diminished bibasilar lung sounds, no crackles, no wheezing   Cardiovascular: RRR, normal s1/s2 without murmur  GI: normal bowel sounds  Skin: 1-2+ pitting edema to hips bilaterally  Other: Alert and appropriate, cranial nerves grossly intact      Medical Decision Making       25 MINUTES SPENT BY ME on the date of service doing chart review, history, exam, documentation & further activities per the note.  MANAGEMENT DISCUSSED with the following over the past 24 hours: bedside RN   Tests ORDERED & REVIEWED in the past 24 hours:  - BMP      Data     I have personally reviewed the following data over the past 24 hrs:    N/A  \   N/A   / N/A     141 97 (L) 55.5 (H) /  141 (H)   3.4 31 (H) 3.69 (H) \

## 2023-07-26 NOTE — PLAN OF CARE
A&Ox4. VSS on RA. Satting 89-90 on RA. Stable on 1L O2 overnight. Tolerating high carb diet. BG checks 196 & 151. Voiding in urinal w/ good UOP. Dressing change to L BKA completed. RLE w/ compression wrap. Scrotal edema, elevated w/ pillow case. Positions self in bed, up w/ lift. Discharge plan pending stump revision.

## 2023-07-26 NOTE — PROGRESS NOTES
Assessment and Plan:     JOHNATHAN: Cr had been improving. Cr now increased 3.42 > 3.69. Na 141, K 3.4, HCO3 31.He has been getting K po.     UO yest 4550 ml.      Date/Time Weight Weight Method   07/26/23 0636 113.9 kg (251 lb 1.7 oz) Bed scale   07/25/23 0558 118.9 kg (262 lb 2 oz) Bed scale   07/23/23 0603 124.9 kg (275 lb 5.7 oz) Wheelchair scale   07/22/23 0600 132.2 kg (291 lb 7.2 oz)        Lytes and rising Cr consistent with over-diuresis. Hold bumex then restart at lower dose.            Interval History:   Anemia: 7/25 Hgb  8.2. Aranesp 100 mcg yesterday. Oral  Fe. Oral Folgard.     HT: stop bumex.on amlodipine, coreg. BP borderline high.      DM: on insulin.         Sepsis due L BKA stump infection-resolved.    S/p I&D of of left BKA stump on 06/30, 07/06 and 07/10           Review of Systems:             Medications:      amLODIPine  5 mg Oral Daily    bumetanide  2 mg Oral BID    carvedilol  37.5 mg Oral BID w/meals    ezetimibe  10 mg Oral Daily    ferrous sulfate  325 mg Oral BID    folic acid-vit B6-vit B12  1 tablet Oral Daily    heparin ANTICOAGULANT  5,000 Units Subcutaneous Q8H    insulin aspart  1-7 Units Subcutaneous TID AC    insulin aspart  1-5 Units Subcutaneous At Bedtime    insulin degludec  12 Units Subcutaneous At Bedtime    [Held by provider] insulin degludec  20 Units Subcutaneous QAM    multivitamin w/minerals  1 tablet Oral Daily    sodium chloride (PF)  10-40 mL Intracatheter Q7 Days       Current active medications and PTA medications reviewed, see medication list for details.            Physical Exam:   Vitals were reviewed  Patient Vitals for the past 24 hrs:   BP Temp Temp src Pulse Resp SpO2 Weight   07/26/23 0748 (!) 142/81 98.4  F (36.9  C) Oral 75 16 96 % --   07/26/23 0636 -- -- -- -- -- -- 113.9 kg (251 lb 1.7 oz)   07/25/23 2153 -- -- -- -- -- 94 % --   07/25/23 2151 (!) 148/84 98.8  F (37.1  C) Oral 85 16 90 % --   07/25/23 1554 (!) 149/86 98.3  F (36.8  C) Oral 74 18  96 % --       Temp:  [98.3  F (36.8  C)-98.8  F (37.1  C)] 98.4  F (36.9  C)  Pulse:  [74-85] 75  Resp:  [16-18] 16  BP: (142-149)/(81-86) 142/81  SpO2:  [90 %-96 %] 96 %    Temperatures:  Current - Temp: 98.4  F (36.9  C); Max - Temp  Av.5  F (36.9  C)  Min: 98.3  F (36.8  C)  Max: 98.8  F (37.1  C)  Respiration range: Resp  Av.7  Min: 16  Max: 18  Pulse range: Pulse  Av  Min: 74  Max: 85  Blood pressure range: Systolic (24hrs), Av , Min:142 , Max:149   ; Diastolic (24hrs), Av, Min:81, Max:86    Pulse oximetry range: SpO2  Av %  Min: 90 %  Max: 96 %    I/O last 3 completed shifts:  In: 1920 [P.O.:1920]  Out: 4800 [Urine:4800]      Intake/Output Summary (Last 24 hours) at 2023 1258  Last data filed at 2023 1000  Gross per 24 hour   Intake 2400 ml   Output 3850 ml   Net -1450 ml     Alert and responsive  LE 1+ edema bilat  Mild-mod scrotal edema  Lungs with clear ant BS  Cor RRR nl S1 S2 no M           Wt Readings from Last 4 Encounters:   23 113.9 kg (251 lb 1.7 oz)   10/03/22 110 kg (242 lb 8.1 oz)   22 119.9 kg (264 lb 6.4 oz)   08/15/22 113.4 kg (250 lb)          Data:          Lab Results   Component Value Date     2023     2023     2023     2021     2020     2020    Lab Results   Component Value Date    CHLORIDE 97 2023    CHLORIDE 97 2023    CHLORIDE 102 2023    CHLORIDE 102 2022    CHLORIDE 103 10/02/2022    CHLORIDE 102 10/01/2022    CHLORIDE 103 2021    CHLORIDE 106 2020    CHLORIDE 107 2020    Lab Results   Component Value Date    BUN 55.5 2023    BUN 52.8 2023    BUN 58.8 2023    BUN 39 2022    BUN 19 10/02/2022    BUN 21 10/01/2022    BUN 41 2021    BUN 12 2020    BUN 17 2020      Lab Results   Component Value Date    POTASSIUM 3.4 2023    POTASSIUM 3.7 2023    POTASSIUM 3.3 2023     POTASSIUM 4.8 11/09/2022    POTASSIUM 3.7 10/02/2022    POTASSIUM 3.8 10/01/2022    POTASSIUM 5.2 06/25/2021    POTASSIUM 4.2 02/06/2020    POTASSIUM 4.2 02/05/2020    Lab Results   Component Value Date    CO2 31 07/26/2023    CO2 30 07/25/2023    CO2 29 07/24/2023    CO2 30 11/09/2022    CO2 25 10/02/2022    CO2 25 10/01/2022    CO2 29 06/25/2021    CO2 29 02/06/2020    CO2 27 02/05/2020    Lab Results   Component Value Date    CR 3.69 07/26/2023    CR 3.42 07/25/2023    CR 3.70 07/24/2023    CR 1.51 06/25/2021    CR 1.10 02/06/2020    CR 1.25 02/05/2020        Recent Labs   Lab Test 07/25/23  0832 07/24/23  0620 07/23/23  0604   WBC 9.0 10.0 9.4   HGB 8.2* 8.0* 7.7*   HCT 24.9* 24.5* 24.2*   MCV 88 90 91    262 267     Recent Labs   Lab Test 06/24/23  1612 09/23/22  0106 06/25/21  0820   AST 13 39 19   ALT 14 58 52   ALKPHOS 84 102 84   BILITOTAL 0.9 0.4 0.4       Recent Labs   Lab Test 07/25/23  0832 07/24/23  0620   MAG 2.0 2.2     Recent Labs   Lab Test 07/25/23  0832 07/19/23  0926 07/18/23  0655   PHOS 4.6* 5.9* 5.7*     Recent Labs   Lab Test 07/26/23  0721 07/25/23  0832 07/24/23  0620   FERNANDO 8.6 8.5* 8.5*       Lab Results   Component Value Date    FERNANDO 8.6 07/26/2023     Lab Results   Component Value Date    WBC 9.0 07/25/2023    HGB 8.2 (L) 07/25/2023    HCT 24.9 (L) 07/25/2023    MCV 88 07/25/2023     07/25/2023     Lab Results   Component Value Date     07/26/2023    POTASSIUM 3.4 07/26/2023    CHLORIDE 97 (L) 07/26/2023    CO2 31 (H) 07/26/2023     (H) 07/26/2023     Lab Results   Component Value Date    BUN 55.5 (H) 07/26/2023    CR 3.69 (H) 07/26/2023     Lab Results   Component Value Date    MAG 2.0 07/25/2023     Lab Results   Component Value Date    PHOS 4.6 (H) 07/25/2023       Creatinine   Date Value Ref Range Status   07/26/2023 3.69 (H) 0.67 - 1.17 mg/dL Final   07/25/2023 3.42 (H) 0.67 - 1.17 mg/dL Final   07/24/2023 3.70 (H) 0.67 - 1.17 mg/dL Final   07/23/2023 3.95  (H) 0.67 - 1.17 mg/dL Final   07/22/2023 4.12 (H) 0.67 - 1.17 mg/dL Final   07/21/2023 4.63 (H) 0.67 - 1.17 mg/dL Final   06/25/2021 1.51 (H) 0.66 - 1.25 mg/dL Final   02/06/2020 1.10 0.66 - 1.25 mg/dL Final   02/05/2020 1.25 0.66 - 1.25 mg/dL Final   02/04/2020 1.33 (H) 0.66 - 1.25 mg/dL Final   02/03/2020 1.41 (H) 0.66 - 1.25 mg/dL Final   02/02/2020 1.60 (H) 0.66 - 1.25 mg/dL Final       Attestation:  I have reviewed today's vital signs, notes, medications, labs and imaging.     Manpreet Nelson MD

## 2023-07-26 NOTE — PROGRESS NOTES
A/Ox4, VSS on RA, denies pain, N/V, blood sugar checks with personal device. 2 assist with lift, voiding in bedside urinal. LLE dressing changed, RLE Ace wrapped, elevated on pillow. Discharge pending.

## 2023-07-26 NOTE — PROGRESS NOTES
Rehab Admissions:  Met with the patient at the request of care transitions. Let the pt know that he currently meets criteria for ARC admission, however would need prior auth from his insurance company. Educated the pt in the benefits of ARC. He reports he is aware of ARC from prior BKA rehab. He reports his primary goal is to return home instead of go to rehab, but he is concerned about his medical status and plan for closure of his recent surgery site. Spoke with OT who reports she feels he would still benefit from a relatively short stint on ARC for strengthening due to deconditioning. He also requires significant edema management with swelling of residual limb and scrotum, both of which have limited mobility. The patient reports he is not concerned about these limiting his ability to return home, and hopes to have a ramp built in the next week at home. We will continue to follow for possible placement at the ARC level. Relayed content of conversation to SW and [facility]. They report he is not medically ready for transfer at this time, so will continue to follow for readiness and ultimate determination when he is medically ready.     Thank you for the referral, we will continue to follow this patient for post acute placement.     Determination of admission is based upon the patient's need for an intensive, interdisciplinary approach to rehabilitation, their ability to progress, their ability to tolerate intensive therapies, their need for daily physician supervision, their need for twenty four hour nursing assistance, and their ability and willingness to participate in such a program.    Eder Davila CM  Rehab Liaison/  [facility]  7/26/2023    4:51 PM

## 2023-07-26 NOTE — PROGRESS NOTES
Care Management Follow Up    Length of Stay (days): 32    Expected Discharge Date: 07/27/2023     Concerns to be Addressed: denies needs/concerns at this time, no discharge needs identified     Patient plan of care discussed at interdisciplinary rounds: Yes    Anticipated Discharge Disposition: Home     Anticipated Discharge Services: None  Anticipated Discharge DME: None    Patient/family educated on Medicare website which has current facility and service quality ratings:    Education Provided on the Discharge Plan:    Patient/Family in Agreement with the Plan: yes    Referrals Placed by CM/SW:    Private pay costs discussed: Not applicable    Additional Information:  Writer received information that patient was able to don prosthetic today and work with PT.  ARU may still be an option or home with home care to assist with his edema wraps for swelling.     GLORIA Medina

## 2023-07-26 NOTE — PROGRESS NOTES
"CLINICAL NUTRITION SERVICES - REASSESSMENT NOTE      Malnutrition: (7/19)  % Weight Loss:  Difficult to assess d/t fluid-status  % Intake:  <75% for > 7 days (moderate malnutrition)-- improving  Subcutaneous Fat Loss:  None observed (7/7)  Muscle Loss:  None observed (7/7)  Fluid Retention:  Trace to mild bilateral hand, left arm, scrotum, and left knee edema      Malnutrition Diagnosis: Patient does not meet two of the established criteria necessary for diagnosing malnutrition       EVALUATION OF PROGRESS TOWARD GOALS   Diet:    High CHO  8pm: cottage cheese, pears      Intake/Tolerance:    Chart reviewed  Visited with pt  Tells me that he dislikes the hospital food - having meals brought in  Notes that he is eating 2-3 meals/day and getting at least 100 gm  pro in per day  He is tired of his evening snack and asked to discontinue   Just ordered some lunch  Pt declines all offers of nutrition supplements - \"they all taste awful and I can't take them\"      ASSESSED NUTRITION NEEDS:  Dosing Weight: 84.5 kg (adjusted based off wt of 112 kg and adjusted IBW for BKA's of 75.2 kg)  Estimated Energy Needs: 0654-3767 kcals (25-30 Kcal/Kg)  Justification: increased needs for healing  Estimated Protein Needs: 100-125-grams protein (1.2-1.5g pro/Kg)  Justification: CKD, wound healing      NEW FINDINGS:   7/26: Na 141           K 3.4           BUN 55.5 (H)           Cr 3.69 (H)           GFR 18 (L)    Previous Goals (7/19):   Patient to order 2-3 meals per day and meet 100% of protein needs   Evaluation: Met    Previous Nutrition Diagnosis (7/19):   Inadequate oral intake related to increased needs 2/2 wound healing as evidenced by increased protein needs (1.5+ g/kg) and pt meeting <80% of protein needs orally in the past week   Evaluation: Improving        CURRENT NUTRITION DIAGNOSIS  No nutrition diagnosis identified at this time     INTERVENTIONS  Recommendations / Nutrition Prescription  High CHO  Will discontinue the HS " snack      Goals  Pt to consume 2-3 meals per day and take at last 100 gm protein per day      MONITORING AND EVALUATION:  Progress towards goals will be monitored and evaluated per protocol and Practice Guidelines

## 2023-07-27 ENCOUNTER — APPOINTMENT (OUTPATIENT)
Dept: OCCUPATIONAL THERAPY | Facility: CLINIC | Age: 58
DRG: 463 | End: 2023-07-27
Payer: COMMERCIAL

## 2023-07-27 ENCOUNTER — APPOINTMENT (OUTPATIENT)
Dept: PHYSICAL THERAPY | Facility: CLINIC | Age: 58
DRG: 463 | End: 2023-07-27
Payer: COMMERCIAL

## 2023-07-27 LAB
ANION GAP SERPL CALCULATED.3IONS-SCNC: 13 MMOL/L (ref 7–15)
BUN SERPL-MCNC: 52.5 MG/DL (ref 6–20)
CALCIUM SERPL-MCNC: 8.6 MG/DL (ref 8.6–10)
CHLORIDE SERPL-SCNC: 95 MMOL/L (ref 98–107)
CREAT SERPL-MCNC: 3.58 MG/DL (ref 0.67–1.17)
DEPRECATED HCO3 PLAS-SCNC: 32 MMOL/L (ref 22–29)
GFR SERPL CREATININE-BSD FRML MDRD: 19 ML/MIN/1.73M2
GLUCOSE SERPL-MCNC: 169 MG/DL (ref 70–99)
POTASSIUM SERPL-SCNC: 3.4 MMOL/L (ref 3.4–5.3)
SODIUM SERPL-SCNC: 140 MMOL/L (ref 136–145)

## 2023-07-27 PROCEDURE — 120N000001 HC R&B MED SURG/OB

## 2023-07-27 PROCEDURE — 97140 MANUAL THERAPY 1/> REGIONS: CPT | Mod: GO

## 2023-07-27 PROCEDURE — 250N000011 HC RX IP 250 OP 636: Performed by: STUDENT IN AN ORGANIZED HEALTH CARE EDUCATION/TRAINING PROGRAM

## 2023-07-27 PROCEDURE — 250N000013 HC RX MED GY IP 250 OP 250 PS 637: Performed by: STUDENT IN AN ORGANIZED HEALTH CARE EDUCATION/TRAINING PROGRAM

## 2023-07-27 PROCEDURE — 97530 THERAPEUTIC ACTIVITIES: CPT | Mod: GP | Performed by: PHYSICAL THERAPIST

## 2023-07-27 PROCEDURE — 82310 ASSAY OF CALCIUM: CPT | Performed by: HOSPITALIST

## 2023-07-27 PROCEDURE — 99231 SBSQ HOSP IP/OBS SF/LOW 25: CPT

## 2023-07-27 PROCEDURE — 99231 SBSQ HOSP IP/OBS SF/LOW 25: CPT | Performed by: INTERNAL MEDICINE

## 2023-07-27 PROCEDURE — 99232 SBSQ HOSP IP/OBS MODERATE 35: CPT | Performed by: INTERNAL MEDICINE

## 2023-07-27 PROCEDURE — 36415 COLL VENOUS BLD VENIPUNCTURE: CPT | Performed by: HOSPITALIST

## 2023-07-27 PROCEDURE — 97110 THERAPEUTIC EXERCISES: CPT | Mod: GO

## 2023-07-27 PROCEDURE — 250N000013 HC RX MED GY IP 250 OP 250 PS 637: Performed by: HOSPITALIST

## 2023-07-27 PROCEDURE — 250N000013 HC RX MED GY IP 250 OP 250 PS 637: Performed by: INTERNAL MEDICINE

## 2023-07-27 PROCEDURE — 250N000013 HC RX MED GY IP 250 OP 250 PS 637

## 2023-07-27 RX ORDER — BUMETANIDE 1 MG/1
2 TABLET ORAL DAILY
Status: DISCONTINUED | OUTPATIENT
Start: 2023-07-27 | End: 2023-07-28 | Stop reason: HOSPADM

## 2023-07-27 RX ADMIN — HEPARIN SODIUM 5000 UNITS: 5000 INJECTION, SOLUTION INTRAVENOUS; SUBCUTANEOUS at 14:27

## 2023-07-27 RX ADMIN — INSULIN ASPART 2 UNITS: 100 INJECTION, SOLUTION INTRAVENOUS; SUBCUTANEOUS at 11:08

## 2023-07-27 RX ADMIN — ACETAMINOPHEN 650 MG: 325 TABLET, FILM COATED ORAL at 19:58

## 2023-07-27 RX ADMIN — HEPARIN SODIUM 5000 UNITS: 5000 INJECTION, SOLUTION INTRAVENOUS; SUBCUTANEOUS at 23:35

## 2023-07-27 RX ADMIN — ACETAMINOPHEN 650 MG: 325 TABLET, FILM COATED ORAL at 08:12

## 2023-07-27 RX ADMIN — INSULIN ASPART 1 UNITS: 100 INJECTION, SOLUTION INTRAVENOUS; SUBCUTANEOUS at 08:09

## 2023-07-27 RX ADMIN — MULTIPLE VITAMINS W/ MINERALS TAB 1 TABLET: TAB at 08:09

## 2023-07-27 RX ADMIN — AMLODIPINE BESYLATE 5 MG: 5 TABLET ORAL at 08:09

## 2023-07-27 RX ADMIN — Medication 1 TABLET: at 08:09

## 2023-07-27 RX ADMIN — BUMETANIDE 2 MG: 1 TABLET ORAL at 16:40

## 2023-07-27 RX ADMIN — EZETIMIBE 10 MG: 10 TABLET ORAL at 08:09

## 2023-07-27 RX ADMIN — HEPARIN SODIUM 5000 UNITS: 5000 INJECTION, SOLUTION INTRAVENOUS; SUBCUTANEOUS at 06:34

## 2023-07-27 RX ADMIN — CARVEDILOL 37.5 MG: 25 TABLET, FILM COATED ORAL at 18:29

## 2023-07-27 RX ADMIN — INSULIN ASPART 1 UNITS: 100 INJECTION, SOLUTION INTRAVENOUS; SUBCUTANEOUS at 16:41

## 2023-07-27 RX ADMIN — OXYCODONE HYDROCHLORIDE 5 MG: 5 TABLET ORAL at 19:58

## 2023-07-27 RX ADMIN — CARVEDILOL 37.5 MG: 25 TABLET, FILM COATED ORAL at 08:09

## 2023-07-27 ASSESSMENT — ACTIVITIES OF DAILY LIVING (ADL)
ADLS_ACUITY_SCORE: 28

## 2023-07-27 NOTE — PLAN OF CARE
A&Ox4. VSS on RA except HTN. Tolerating high carb diet. BG checks 233 & 181. Voiding in urinal w/ good UOP. Dressing CDI to L BKA. RLE w/ compression wrap. Scrotal edema, elevated w/ pillow case. Positions self in bed, up w/ lift. Discharge plan pending stump revision.

## 2023-07-27 NOTE — PROGRESS NOTES
Vascular Surgery     Cr slightly elevated on 7/26, WBC as well at 3.69 and 9.0 on 7/25 respectively    Tolerating left BKA stump open wound dressing changes well, open areas remain clean, good granulation.     No complaints today    Pending stump revision, ok to discharge from Vascular Surgery standpoint with ability to do current dressing changes, plan to see Dr. Child in clinic on 8/8 to discuss stump revision    Antonieta Perdomo NP   Vascular Surgery

## 2023-07-27 NOTE — PROVIDER NOTIFICATION
MD Notification    Notified Person: MD    Notified Person Name: PA    Notification Date/Time:    Notification Interaction: Am[employer]    Purpose of Notification:  E.P 5230 Please clarify if Q4 dressing changes still needed, thanks!   Phil Rand RN [phone]    Orders Received: Dressing changes still Q4h.    Comments:

## 2023-07-27 NOTE — PROGRESS NOTES
A/Ox4, VSS on RA, denies pain, N/V, blood sugar checks with personal device. 2 assist with lift, voiding in bedside urinal. LLE dressing changed, RLE Ace wrapped, elevated on pillow. PA paged regarding dressing change clarification, awaiting orders. Discharge pending.

## 2023-07-27 NOTE — PROGRESS NOTES
[facility]    Medicine Progress Note - Hospitalist Service    Date of Admission:  6/24/2023    Assessment & Plan   Ry Horner is a 58 year old male admitted on 6/24/2023.  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        Sepsis due Lt BKA stump infection-resolved  S/p I&D of of left BKA stump on 06/30, 07/06 and 07/10  *Patient presented with pain, redness, swelling at his left BKA stump site.  Marked leukocytosis of 22.9. Tachycardic to 110s.  X-ray Shows soft tissue swelling, some bone formation along the resection margin of the tibia with periosteal reaction.   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     *S/p 26 days of antibiotics (all cultures negative) most recent path on the tibial bone was non specific for osteo; antibiotics discontinued 7/20  *see progress note 7/22 regarding details of care conference held 7/20: plan to monitor off antibiotics with plan for stump revision once medically optimized (JOHNATHAN still resolving, fluid overloaded)  -Of note he is able to fit a prosthesis on his right amputation stump but continues to have swelling  - therapies recommending ARU; SW following  - remains afebrile off antibiotics 07/26/23   - stump wound management and timing of revision per Vascular Surgery, planned as outpatient      Acute kidney injury on CKD stage III-improving  Generalized edema   Scrotal edema   *Cr @ admission 2.45, baseline ~ 1.6. Creatinine 3.58.  *Initially improved to baseline and then worsening JONHATHAN after OR  06/30  *nephrology consulted for severe JOHNATHAN, peak Cr of 8.7 on 07/08. Non oliguric and suspected to be ATN injury  *ultimately initiated on bumex diuresis per Neph with improving edema, renal function tolerating well  *Urology 7/24 consulted for scrotal edema; suspect third spacing and recommend scrotal elevation  -Bumex resumed for LE swelling    Hypokalemia  - replace as needed; holding on protocol due to borderline renal function     Acute hypoxic respiratory failure likely due to pulmonary edema-resolved  Likely acute diastolic heart failure exacerbation  Type II NSTEMI due to CHF exacerbation and hypoxic respiratory failure  *RRT 7/20 for acute hypoxia with BNP >11K, CXR consistent with pulmonary edema  *d-dimer elevated at 6.7 (expected with recent surgeries); LE US negative for DVT and VQ scan negative  *EKG without ischemic changes, serial trop flat, TTE 7/22 showed EF of 60 to 65%, RV systolic function is normal, diastolic Doppler findings suggest LV filling pressures are increased, RV is normal in size, left atrium is severely dilated and no valvular abnormality  *received diuretic with rapid improvement in oxygen needs; suspect fluid overload versus hypersensitive reaction to iron  -Patient currently on room air  - diuresis as above   - consider overnight oximetry if he desats     Type II DM, uncontrolled, on long term insulin, A1c 7.6 on 6/27/23   [PTA on Tresiba 34 units twice a day, mealtime insulin 5 units + 1/150, Jardiance, Januvia]  - degludec 12U at bedtime, medium ssi - glucose levels adequate 07/26/23   - oral meds held at admission      Essential hypertension  [PTA lisinopril 5 mg daily]  - holding lisinopril due to JOHNATHAN  - continue amlodipine 5 mg daily (decreased 7/21 due to edema), carvedilol 37.5 mg bid (increased 7/23)  - mildly hypertensive, monitor     Acute on chronic anemia due to blood loss and acute illness  Iron deficiency  *Hemoglobin at presentation was 12.8, slowly has drifted  down, likely was multifactorial due to acute infections and ongoing surgeries  *s/p 1U PRBC 7/14 and one dose of IV Venofer on 7/20 (no further IV iron as some concern for possible reaction as above)  - continue oral iron  - hgb stable 7-8 g/dL, no need for ongoing daily monitoring         Diet: High Consistent Carb (75 g CHO per Meal) Diet    DVT Prophylaxis: Heparin SQ  [place] Catheter: Not present  Lines: None     Cardiac Monitoring: None  Code Status: Full Code      Clinically Significant Risk Factors              # Hypoalbuminemia: Lowest albumin = 2.4 g/dL at 7/8/2023  6:39 AM, will monitor as appropriate       # Hypertension: Noted on problem list       # DMII: A1C = 7.6 % (Ref range: <5.7 %) within past 6 months              Disposition Plan      Expected Discharge Date: 07/31/2023      Destination: home;home with family  Discharge Comments: TCU-patient refusing.  Plan to discharge home pending home ramps delivery            Usha Choi MD  Hospitalist Service  [facility]  Securely message with [employer] (more info)  Text page via [employer] Paging/Directory   ______________________________________________________________________    Interval History   Feeling stronger and overall improved.  Denies any fever/chills.  Scrotal edema improving mildly.  No lightheadedness or other complaints.  Denies dyspnea.    Physical Exam   Vital Signs: Temp: 98.3  F (36.8  C) Temp src: Oral BP: (!) 150/94 Pulse: 78   Resp: 16 SpO2: 93 % O2 Device: None (Room air)    Weight: 265 lbs 3.41 oz    General Appearance: Well nourished male in NAD  Respiratory: diminished bibasilar lung sounds, no crackles, no wheezing   Cardiovascular: RRR, normal s1/s2 without murmur  GI: normal bowel sounds  Skin: 1-2+ pitting edema to hips bilaterally  Other: Alert and appropriate, cranial nerves grossly intact      Medical Decision Making       25 MINUTES SPENT BY ME on the date of service doing chart review, history, exam,  documentation & further activities per the note.  MANAGEMENT DISCUSSED with the following over the past 24 hours: bedside RN   Tests ORDERED & REVIEWED in the past 24 hours:  - BMP      Data     I have personally reviewed the following data over the past 24 hrs:    N/A  \   N/A   / N/A     140 95 (L) 52.5 (H) /  169 (H)   3.4 32 (H) 3.58 (H) \

## 2023-07-27 NOTE — PROGRESS NOTES
Assessment and Plan:     JOHNATHAN: Cr 3.69 > 3.58. K 3.4 and HCO3 32.   I/O 1700/4050.   Pt noted decreased swelling in legs and scrotum.    Resolving JOHNATHAN and edema  Will start back bumex at a lower dose.     Ok for discharge. RTC our office Hospital Follow up Visit in 2 weeks or so.  [facility] [phone]            Interval History:   L BKA stump wound. Pending stump revision.        Anemia: 7/25 Hgb  8.2. Aranesp 100 mcg given. Oral  Fe. Oral Folgard. Re-check in am.      HT: On amlodipine 5mg per day, coreg 37.5 mg bid. BP well controlled.     DM: on insulin.        DM              Review of Systems:   Denies pain, N or V. Up with lift and assist.           Medications:      amLODIPine  5 mg Oral Daily    carvedilol  37.5 mg Oral BID w/meals    ezetimibe  10 mg Oral Daily    [START ON 7/28/2023] ferrous sulfate  325 mg Oral Every Other Day    folic acid-vit B6-vit B12  1 tablet Oral Daily    heparin ANTICOAGULANT  5,000 Units Subcutaneous Q8H    insulin aspart  1-7 Units Subcutaneous TID AC    insulin aspart  1-5 Units Subcutaneous At Bedtime    insulin degludec  12 Units Subcutaneous At Bedtime    [Held by provider] insulin degludec  20 Units Subcutaneous QAM    multivitamin w/minerals  1 tablet Oral Daily    sodium chloride (PF)  10-40 mL Intracatheter Q7 Days       Current active medications and PTA medications reviewed, see medication list for details.            Physical Exam:   Vitals were reviewed  Patient Vitals for the past 24 hrs:   BP Temp Temp src Pulse Resp SpO2 Weight   07/27/23 0744 132/73 98.6  F (37  C) Oral 77 16 94 % --   07/27/23 0655 -- -- -- -- -- -- 120.3 kg (265 lb 3.4 oz)   07/27/23 0637 -- -- -- -- -- -- 120.3 kg (265 lb 3.4 oz)   07/27/23 0600 -- -- -- -- -- -- 113.5 kg (250 lb 3.6 oz)   07/26/23 2300 (!) 150/89 98.8  F (37.1  C) Oral 77 16 95 % --   07/26/23 1601 137/80 98.4  F (36.9  C) Oral 83 16 91 % --       Temp:  [98.4  F (36.9  C)-98.8  F (37.1  C)] 98.6  F (37   C)  Pulse:  [77-83] 77  Resp:  [16] 16  BP: (132-150)/(73-89) 132/73  SpO2:  [91 %-95 %] 94 %    Temperatures:  Current - Temp: 98.6  F (37  C); Max - Temp  Av.6  F (37  C)  Min: 98.4  F (36.9  C)  Max: 98.8  F (37.1  C)  Respiration range: Resp  Av  Min: 16  Max: 16  Pulse range: Pulse  Av  Min: 77  Max: 83  Blood pressure range: Systolic (24hrs), Av , Min:132 , Max:150   ; Diastolic (24hrs), Av, Min:73, Max:89    Pulse oximetry range: SpO2  Av.3 %  Min: 91 %  Max: 95 %    I/O last 3 completed shifts:  In: 1220 [P.O.:1220]  Out: 2925 [Urine:2925]      Intake/Output Summary (Last 24 hours) at 2023 1456  Last data filed at 2023 0635  Gross per 24 hour   Intake --   Output 1525 ml   Net -1525 ml     Alert and responsive  LLE with no edema, scrotum decreased edema  RLE with packed stump wound         Wt Readings from Last 4 Encounters:   23 120.3 kg (265 lb 3.4 oz)   10/03/22 110 kg (242 lb 8.1 oz)   22 119.9 kg (264 lb 6.4 oz)   08/15/22 113.4 kg (250 lb)          Data:          Lab Results   Component Value Date     2023     2023     2023     2021     2020     2020    Lab Results   Component Value Date    CHLORIDE 95 2023    CHLORIDE 97 2023    CHLORIDE 97 2023    CHLORIDE 102 2022    CHLORIDE 103 10/02/2022    CHLORIDE 102 10/01/2022    CHLORIDE 103 2021    CHLORIDE 106 2020    CHLORIDE 107 2020    Lab Results   Component Value Date    BUN 52.5 2023    BUN 55.5 2023    BUN 52.8 2023    BUN 39 2022    BUN 19 10/02/2022    BUN 21 10/01/2022    BUN 41 2021    BUN 12 2020    BUN 17 2020      Lab Results   Component Value Date    POTASSIUM 3.4 2023    POTASSIUM 3.4 2023    POTASSIUM 3.7 2023    POTASSIUM 4.8 2022    POTASSIUM 3.7 10/02/2022    POTASSIUM 3.8 10/01/2022    POTASSIUM 5.2  06/25/2021    POTASSIUM 4.2 02/06/2020    POTASSIUM 4.2 02/05/2020    Lab Results   Component Value Date    CO2 32 07/27/2023    CO2 31 07/26/2023    CO2 30 07/25/2023    CO2 30 11/09/2022    CO2 25 10/02/2022    CO2 25 10/01/2022    CO2 29 06/25/2021    CO2 29 02/06/2020    CO2 27 02/05/2020    Lab Results   Component Value Date    CR 3.58 07/27/2023    CR 3.69 07/26/2023    CR 3.42 07/25/2023    CR 1.51 06/25/2021    CR 1.10 02/06/2020    CR 1.25 02/05/2020        Recent Labs   Lab Test 07/25/23  0832 07/24/23  0620 07/23/23  0604   WBC 9.0 10.0 9.4   HGB 8.2* 8.0* 7.7*   HCT 24.9* 24.5* 24.2*   MCV 88 90 91    262 267     Recent Labs   Lab Test 06/24/23  1612 09/23/22  0106 06/25/21  0820   AST 13 39 19   ALT 14 58 52   ALKPHOS 84 102 84   BILITOTAL 0.9 0.4 0.4       Recent Labs   Lab Test 07/25/23  0832 07/24/23  0620   MAG 2.0 2.2     Recent Labs   Lab Test 07/25/23  0832 07/19/23  0926 07/18/23  0655   PHOS 4.6* 5.9* 5.7*     Recent Labs   Lab Test 07/27/23  0850 07/26/23  0721 07/25/23  0832   FERNANDO 8.6 8.6 8.5*       Lab Results   Component Value Date    FERNANDO 8.6 07/27/2023     Lab Results   Component Value Date    WBC 9.0 07/25/2023    HGB 8.2 (L) 07/25/2023    HCT 24.9 (L) 07/25/2023    MCV 88 07/25/2023     07/25/2023     Lab Results   Component Value Date     07/27/2023    POTASSIUM 3.4 07/27/2023    CHLORIDE 95 (L) 07/27/2023    CO2 32 (H) 07/27/2023     (H) 07/27/2023     Lab Results   Component Value Date    BUN 52.5 (H) 07/27/2023    CR 3.58 (H) 07/27/2023     Lab Results   Component Value Date    MAG 2.0 07/25/2023     Lab Results   Component Value Date    PHOS 4.6 (H) 07/25/2023       Creatinine   Date Value Ref Range Status   07/27/2023 3.58 (H) 0.67 - 1.17 mg/dL Final   07/26/2023 3.69 (H) 0.67 - 1.17 mg/dL Final   07/25/2023 3.42 (H) 0.67 - 1.17 mg/dL Final   07/24/2023 3.70 (H) 0.67 - 1.17 mg/dL Final   07/23/2023 3.95 (H) 0.67 - 1.17 mg/dL Final   07/22/2023 4.12 (H)  0.67 - 1.17 mg/dL Final   06/25/2021 1.51 (H) 0.66 - 1.25 mg/dL Final   02/06/2020 1.10 0.66 - 1.25 mg/dL Final   02/05/2020 1.25 0.66 - 1.25 mg/dL Final   02/04/2020 1.33 (H) 0.66 - 1.25 mg/dL Final   02/03/2020 1.41 (H) 0.66 - 1.25 mg/dL Final   02/02/2020 1.60 (H) 0.66 - 1.25 mg/dL Final       Attestation:  I have reviewed today's vital signs, notes, medications, labs and imaging.     Manpreet Nelson MD

## 2023-07-28 VITALS
RESPIRATION RATE: 18 BRPM | BODY MASS INDEX: 35.83 KG/M2 | WEIGHT: 264.55 LBS | HEART RATE: 74 BPM | OXYGEN SATURATION: 94 % | SYSTOLIC BLOOD PRESSURE: 140 MMHG | TEMPERATURE: 98 F | HEIGHT: 72 IN | DIASTOLIC BLOOD PRESSURE: 83 MMHG

## 2023-07-28 DIAGNOSIS — Z09 HOSPITAL DISCHARGE FOLLOW-UP: ICD-10-CM

## 2023-07-28 LAB
ANION GAP SERPL CALCULATED.3IONS-SCNC: 10 MMOL/L (ref 7–15)
BUN SERPL-MCNC: 47.8 MG/DL (ref 6–20)
CALCIUM SERPL-MCNC: 8.5 MG/DL (ref 8.6–10)
CHLORIDE SERPL-SCNC: 95 MMOL/L (ref 98–107)
CREAT SERPL-MCNC: 3.25 MG/DL (ref 0.67–1.17)
DEPRECATED HCO3 PLAS-SCNC: 33 MMOL/L (ref 22–29)
GFR SERPL CREATININE-BSD FRML MDRD: 21 ML/MIN/1.73M2
GLUCOSE SERPL-MCNC: 153 MG/DL (ref 70–99)
POTASSIUM SERPL-SCNC: 3 MMOL/L (ref 3.4–5.3)
SODIUM SERPL-SCNC: 138 MMOL/L (ref 136–145)

## 2023-07-28 PROCEDURE — 250N000011 HC RX IP 250 OP 636: Performed by: STUDENT IN AN ORGANIZED HEALTH CARE EDUCATION/TRAINING PROGRAM

## 2023-07-28 PROCEDURE — 250N000013 HC RX MED GY IP 250 OP 250 PS 637: Performed by: INTERNAL MEDICINE

## 2023-07-28 PROCEDURE — 250N000013 HC RX MED GY IP 250 OP 250 PS 637: Performed by: STUDENT IN AN ORGANIZED HEALTH CARE EDUCATION/TRAINING PROGRAM

## 2023-07-28 PROCEDURE — 99239 HOSP IP/OBS DSCHRG MGMT >30: CPT | Performed by: INTERNAL MEDICINE

## 2023-07-28 PROCEDURE — 82310 ASSAY OF CALCIUM: CPT | Performed by: HOSPITALIST

## 2023-07-28 PROCEDURE — 36415 COLL VENOUS BLD VENIPUNCTURE: CPT | Performed by: HOSPITALIST

## 2023-07-28 PROCEDURE — 250N000013 HC RX MED GY IP 250 OP 250 PS 637: Performed by: HOSPITALIST

## 2023-07-28 RX ORDER — AMLODIPINE BESYLATE 5 MG/1
5 TABLET ORAL DAILY
Qty: 90 TABLET | Refills: 0 | Status: SHIPPED | OUTPATIENT
Start: 2023-07-28 | End: 2023-08-02

## 2023-07-28 RX ORDER — FERROUS SULFATE 325(65) MG
325 TABLET ORAL EVERY OTHER DAY
Qty: 30 TABLET | Refills: 0 | Status: SHIPPED | OUTPATIENT
Start: 2023-07-28

## 2023-07-28 RX ORDER — CARVEDILOL 12.5 MG/1
37.5 TABLET ORAL 2 TIMES DAILY WITH MEALS
Qty: 60 TABLET | Refills: 0 | Status: SHIPPED | OUTPATIENT
Start: 2023-07-28 | End: 2023-08-02

## 2023-07-28 RX ORDER — ACETAMINOPHEN 325 MG/1
650 TABLET ORAL EVERY 6 HOURS PRN
Qty: 90 TABLET | Refills: 0 | Status: SHIPPED | OUTPATIENT
Start: 2023-07-28

## 2023-07-28 RX ORDER — POTASSIUM CHLORIDE 1500 MG/1
40 TABLET, EXTENDED RELEASE ORAL ONCE
Status: COMPLETED | OUTPATIENT
Start: 2023-07-28 | End: 2023-07-28

## 2023-07-28 RX ORDER — BUMETANIDE 2 MG/1
2 TABLET ORAL DAILY
Qty: 60 TABLET | Refills: 0 | Status: SHIPPED | OUTPATIENT
Start: 2023-07-28 | End: 2023-08-02

## 2023-07-28 RX ORDER — INSULIN DEGLUDEC 100 U/ML
12 INJECTION, SOLUTION SUBCUTANEOUS AT BEDTIME
Qty: 15 ML | Refills: 0 | Status: SHIPPED | OUTPATIENT
Start: 2023-07-28 | End: 2023-08-09

## 2023-07-28 RX ADMIN — CARVEDILOL 37.5 MG: 25 TABLET, FILM COATED ORAL at 09:08

## 2023-07-28 RX ADMIN — EZETIMIBE 10 MG: 10 TABLET ORAL at 09:07

## 2023-07-28 RX ADMIN — INSULIN ASPART 1 UNITS: 100 INJECTION, SOLUTION INTRAVENOUS; SUBCUTANEOUS at 09:09

## 2023-07-28 RX ADMIN — Medication 1 TABLET: at 09:08

## 2023-07-28 RX ADMIN — HEPARIN SODIUM 5000 UNITS: 5000 INJECTION, SOLUTION INTRAVENOUS; SUBCUTANEOUS at 07:09

## 2023-07-28 RX ADMIN — AMLODIPINE BESYLATE 5 MG: 5 TABLET ORAL at 09:07

## 2023-07-28 RX ADMIN — MULTIPLE VITAMINS W/ MINERALS TAB 1 TABLET: TAB at 09:07

## 2023-07-28 RX ADMIN — POTASSIUM CHLORIDE 40 MEQ: 1500 TABLET, EXTENDED RELEASE ORAL at 10:57

## 2023-07-28 RX ADMIN — FERROUS SULFATE TAB 325 MG (65 MG ELEMENTAL FE) 325 MG: 325 (65 FE) TAB at 09:07

## 2023-07-28 RX ADMIN — BUMETANIDE 2 MG: 1 TABLET ORAL at 09:08

## 2023-07-28 ASSESSMENT — ACTIVITIES OF DAILY LIVING (ADL)
ADLS_ACUITY_SCORE: 28

## 2023-07-28 NOTE — PLAN OF CARE
Goal Outcome Evaluation:      Plan of Care Reviewed With: patient    Overall Patient Progress: improvingOverall Patient Progress: improving     Date & Time: 7/27/23 9928-7116  Surgery/POD#: I&D of L BKA stump on 6/30, 7/6, and 7/10  Behavior & Aggression: green  Fall Risk: yes  Orientation:AOx4  ABNL VS/O2:VSS on RA exc HTN  ABNL Labs: ,   Pain Management:Pain managed with oxy and tylenol x1  Bowel/Bladder: No BM this shift, last BM 7/27. Urinal in use, adequate UOP  IV/Drains: No IV access  Diet:High Consistent Carb Diet  Activity Level: Ind w/ pivot to w/c using prosthetics  Tests/Procedures: N/A  Anticipated  DC Date: Pending  Significant Information: Pt would like his prescriptions filled at [facility], not at [facility], when pt discharges. Woundcare completed x3 this shift, minimal drainage.  Per pt, waiting on accessible ramp to be delivered to his home today prior to discharge.

## 2023-07-28 NOTE — PLAN OF CARE
Physical Therapy Discharge Summary    Reason for therapy discharge:    Discharged to home.    Progress towards therapy goal(s). See goals on Care Plan in [facility] electronic health record for goal details.  Goals partially met.  Barriers to achieving goals:   discharge from facility.    Therapy recommendation(s):    No further therapy is recommended.

## 2023-07-28 NOTE — CONSULTS
Care Management Discharge Note    Discharge Date: 07/28/2023       Discharge Disposition: Home    Discharge Services: None    Discharge DME: supplies per [facility]OC    Discharge Transportation: family or friend will provide    Private pay costs discussed: Not applicable    Does the patient's insurance plan have a 3 day qualifying hospital stay waiver?  No    PAS Confirmation Code:  n/a  Patient/family educated on Medicare website which has current facility and service quality ratings:  no    Education Provided on the Discharge Plan:  yes  Persons Notified of Discharge Plans: Patient and Significant Other at bedside, Bedside RN and discharging provider  Patient/Family in Agreement with the Plan: yes    Handoff Referral Completed: Yes    Additional Information:  Writer met with patient and significant other at the bedside. Patient cleared for discharge to home with assist of Significant other.  Per discharging provider patient declined the need for ARU and homecare.  Bedside RN went over discharge and wound care teaching and the bedside and supplies were sent with the patient.  Patient is aware that he requires PCP follow up with repeat labs within a week, [facility] follow up in 2 weeks and has scheduled follow up with Vascular 8/8.  Patient and Significant other identified no further barriers for discharge.    Kavita Reed RN, BSN, ACM   Care Transitions Specialist  [facility]  Care Transitions Specialist  [id number] [id number]  [email or web address]  Office: [phone]   Fax: [phone]  [facility]

## 2023-07-28 NOTE — DISCHARGE SUMMARY
[facility]  Hospitalist Discharge Summary      Date of Admission:  6/24/2023  Date of Discharge:  7/28/2023  Discharging Provider: Rosanna Ceballos MD  Discharge Service: Hospitalist Service    Discharge Diagnoses   Please see hospital course     Clinically Significant Risk Factors     # DMII: A1C = 7.6 % (Ref range: <5.7 %) within past 6 months       Follow-ups Needed After Discharge   Follow-up Appointments     Follow-up and recommended labs and tests       Follow up with primary care provider, Kody Wing, within 7 days for   hospital follow- up.  The following labs/tests are recommended: CBC, BMP   in 1week   F/u with Nephrology in 2-3weeks   F/u with vascular surgery in 2weeks .        Unresulted Labs Ordered in the Past 30 Days of this Admission       No orders found from 5/25/2023 to 6/25/2023.            Discharge Disposition   Discharged to home  Condition at discharge: Stable    Hospital Course   Ry Horner is a 58 year old male admitted on 6/24/2023.  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         Sepsis due Lt BKA stump infection-resolved  S/p I&D of of left BKA stump on 06/30, 07/06 and 07/10  *Patient presented with pain, redness, swelling at his left BKA stump site.  Marked leukocytosis of 22.9. Tachycardic to 110s.  X-ray Shows soft tissue swelling, some bone formation along the resection margin of the tibia with periosteal reaction.   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     *S/p 26 days of antibiotics (all  cultures negative) most recent path on the tibial bone was non specific for osteo; antibiotics discontinued 7/20  *see progress note 7/22 regarding details of care conference held 7/20: plan to monitor off antibiotics with plan for stump revision once medically optimized (JOHNATHAN still resolving, fluid overloaded)  -Of note he is able to fit a prosthesis on his right amputation stump but continues to have swelling  - therapies recommending ARU;  following  - remains afebrile off antibiotics 07/26/23   - stump wound management and timing of revision per Vascular Surgery, planned as outpatient        Acute kidney injury on CKD stage III-improving  Generalized edema   Scrotal edema   *Cr @ admission 2.45, baseline ~ 1.6. Creatinine 3.58.  *Initially improved to baseline and then worsening JOHNATHAN after OR 06/30  *nephrology consulted for severe JOHNATHAN, peak Cr of 8.7 on 07/08. Non oliguric and suspected to be ATN injury  *ultimately initiated on bumex diuresis per Neph with improving edema, renal function tolerating well  *Urology 7/24 consulted for scrotal edema; suspect third spacing and recommend scrotal elevation  -Bumex resumed for LE swelling     Hypokalemia  - replace as needed; holding on protocol due to borderline renal function     Acute hypoxic respiratory failure likely due to pulmonary edema-resolved  Likely acute diastolic heart failure exacerbation  Type II NSTEMI due to CHF exacerbation and hypoxic respiratory failure  *RRT 7/20 for acute hypoxia with BNP >11K, CXR consistent with pulmonary edema  *d-dimer elevated at 6.7 (expected with recent surgeries); LE US negative for DVT and VQ scan negative  *EKG without ischemic changes, serial trop flat, TTE 7/22 showed EF of 60 to 65%, RV systolic function is normal, diastolic Doppler findings suggest LV filling pressures are increased, RV is normal in size, left atrium is severely dilated and no valvular abnormality  *received diuretic with rapid improvement in  oxygen needs; suspect fluid overload versus hypersensitive reaction to iron  -Patient currently on room air  - diuresis as above   - consider overnight oximetry if he desats     Type II DM, uncontrolled, on long term insulin, A1c 7.6 on 6/27/23   [PTA on Tresiba 34 units twice a day, mealtime insulin 5 units + 1/150, Jardiance, Januvia]  - degludec 12U at bedtime, medium ssi - glucose levels adequate 07/26/23   - oral meds held at admission      Essential hypertension  [PTA lisinopril 5 mg daily]  - holding lisinopril due to JOHNATHAN  - continue amlodipine 5 mg daily (decreased 7/21 due to edema), carvedilol 37.5 mg bid (increased 7/23)  - mildly hypertensive, monitor     Acute on chronic anemia due to blood loss and acute illness  Iron deficiency  *Hemoglobin at presentation was 12.8, slowly has drifted down, likely was multifactorial due to acute infections and ongoing surgeries  *s/p 1U PRBC 7/14 and one dose of IV Venofer on 7/20 (no further IV iron as some concern for possible reaction as above)  - continue oral iron  - hgb stable 7-8 g/dL, no need for ongoing daily monitoring              Diet: High Consistent Carb (75 g CHO per Meal) Diet    DVT Prophylaxis: Heparin SQ  Corrales Catheter: Not present  Lines: None     Cardiac Monitoring: None  Code Status: Full Code          Clinically Significant Risk Factors              # Hypoalbuminemia: Lowest albumin = 2.4 g/dL at 7/8/2023  6:39 AM, will monitor as appropriate        # Hypertension: Noted on problem list       # DMII: A1C = 7.6 % (Ref range: <5.7 %) within past 6 months                     Disposition Plan  Expected Discharge Date: 7/28/23   Destination: home;home with family  Discharge Comments: Home. Pt refused to go to TCU. Refused HHC services on discharge          Consultations This Hospital Stay   PHARMACY TO DOSE VANCO  PHARMACY TO DOSE VANCO  CARE MANAGEMENT / SOCIAL WORK IP CONSULT  VASCULAR SURGERY IP CONSULT  INFECTIOUS DISEASES IP CONSULT  VASCULAR  ACCESS ADULT IP CONSULT  NEPHROLOGY IP CONSULT  VASCULAR ACCESS ADULT IP CONSULT  VASCULAR ACCESS ADULT IP CONSULT  VASCULAR ACCESS ADULT IP CONSULT  VASCULAR ACCESS ADULT IP CONSULT  PHYSICAL THERAPY ADULT IP CONSULT  VASCULAR ACCESS ADULT IP CONSULT  UROLOGY IP CONSULT  LYMPHEDEMA THERAPY IP CONSULT  OCCUPATIONAL THERAPY ADULT IP CONSULT    Code Status   Full Code    Time Spent on this Encounter   IRosanna MD, personally saw the patient today and spent greater than 30 minutes discharging this patient.       Rosanna Ceballos MD  [address] [address]  Phone: [phone]  Fax: [phone]  ______________________________________________________________________    Physical Exam   Vital Signs: Temp: 98  F (36.7  C) Temp src: Oral BP: (!) 140/83 Pulse: 74   Resp: 18 SpO2: 94 % O2 Device: None (Room air)    Weight: 264 lbs 8.83 oz  General Appearance: Alert, awake, NAD   Respiratory: CTA b/l   Cardiovascular: RRR  GI: soft, NT, ND, BS+  Skin: warm and dry   Other:         Primary Care Physician   Kody Wing    Discharge Orders      Reason for your hospital stay    Sepsis due Lt BKA stump infection  S/p I&D of of left BKA stump on 06/30, 07/06 and 07/10     Follow-up and recommended labs and tests     Follow up with primary care provider, Kody Wing, within 7 days for hospital follow- up.  The following labs/tests are recommended: CBC, BMP in 1week   F/u with Nephrology in 2-3weeks   F/u with vascular surgery in 2weeks .     Activity    Your activity upon discharge: activity as tolerated     Diet    Follow this diet upon discharge: Orders Placed This Encounter      High Consistent Carb (75 g CHO per Meal) Diet       Significant Results and Procedures   Most Recent 3 CBC's:  Recent Labs   Lab Test 07/25/23  0832 07/24/23  0620 07/23/23  0604   WBC 9.0 10.0 9.4   HGB 8.2* 8.0* 7.7*   MCV 88 90 91    262 267     Most Recent 3  BMP's:  Recent Labs   Lab Test 07/28/23  0842 07/27/23  0850 07/26/23  0721    140 141   POTASSIUM 3.0* 3.4 3.4   CHLORIDE 95* 95* 97*   CO2 33* 32* 31*   BUN 47.8* 52.5* 55.5*   CR 3.25* 3.58* 3.69*   ANIONGAP 10 13 13   FERNANDO 8.5* 8.6 8.6   * 169* 141*     Most Recent 2 LFT's:  Recent Labs   Lab Test 06/24/23  1612 09/23/22  0106   AST 13 39   ALT 14 58   ALKPHOS 84 102   BILITOTAL 0.9 0.4     Most Recent 3 INR's:  Recent Labs   Lab Test 06/27/23  1502   INR 1.06     Results for orders placed or performed during the hospital encounter of 06/24/23   XR Tibia and Fibula Left 2 Views    Narrative    EXAM: XR TIBIA AND FIBULA LEFT 2 VIEWS  LOCATION: [facility]  DATE: 6/24/2023    INDICATION: Cellulitis, wound at stump site  COMPARISON: None.      Impression    IMPRESSION: Soft tissue swelling about the stump of the below the knee amputation. There is some bone formation along the resection margin of the tibia where there is some periosteal reaction. This can be seen with postoperative change although   osteomyelitis is difficult to exclude based on radiograph alone. There are no prior studies to compare this to. Atherosclerotic vascular calcifications.    NOTE: ABNORMAL REPORT    THE DICTATION ABOVE DESCRIBES AN ABNORMALITY FOR WHICH FOLLOW-UP IS NEEDED.    US Lower Extremity Venous Duplex Left    Narrative    EXAM: US LOWER EXTREMITY VENOUS DUPLEX LEFT  LOCATION: [facility]  DATE: 6/24/2023    INDICATION: swelling and stump site  COMPARISON: None.  TECHNIQUE: Venous Duplex ultrasound of the left lower extremity with and without compression, augmentation and duplex. Color flow and spectral Doppler with waveform analysis performed.    FINDINGS: Exam includes the common femoral, femoral, popliteal, and contralateral common femoral veins as well as segmentally visualized deep calf veins and greater saphenous vein.     LEFT: No deep vein thrombosis. No  superficial thrombophlebitis. No popliteal cyst.      Impression    IMPRESSION:  1.  No deep venous thrombosis in the left lower extremity.   CT Tibia Fibula Lower Leg Left wo Contrast    Narrative    EXAM: CT TIBIA FIBULA LOWER LEG LEFT WO CONTRAST  LOCATION: [facility]  DATE: 6/24/2023    INDICATION: Rule out abscess. Non con CT  COMPARISON: None.  TECHNIQUE: Noncontrast. Axial, sagittal and coronal thin-section reconstruction. Dose reduction techniques were used.     FINDINGS: Postoperative changes prior below-the-knee amputation.    Poorly defined soft tissue and fluid along the distal stump does raise concern for abscess. This measures at least 5.9 x 5.3 x 7.8 cm in cross-sectional dimension and extends to the tibial resection margin. There are some faint periosteal bone formation   and erosive change along the tibial resection margin which does raise concern for osteomyelitis. There is a pocket of fluid which tracks along the posterolateral aspect of the tibia along the stump as seen on axial image 89 for example. There is no fluid   abutting the fibular resection margin which appears smooth without evidence for osteomyelitis.    There is considerable surrounding soft tissue fluid and inflammation.    No acute fracture. Atherosclerotic vascular calcifications.      Impression    IMPRESSION:  1.  Postoperative changes prior below-the-knee amputation.  2.  Poorly defined soft tissue and fluid along the distal stump, presumably an abscess until proven otherwise. This measures at least 5.9 x 5.3 x 7.8 cm. The collection abuts the tibial resection margin, and tracks a short segment proximally along the   posterolateral tibial shaft as described.  3.  There is some faint periosteal bone formation and erosive change along the tibial resection margin which shows raise concern for early osteomyelitis.    NOTE: ABNORMAL REPORT    THE DICTATION ABOVE DESCRIBES AN ABNORMALITY FOR WHICH FOLLOW-UP  IS NEEDED.    MR Tibia Fibula Lower Leg Left wo Contrast    Addendum: 6/28/2023    Additional   impression:    Small knee joint effusion. Septic arthritis not excluded given the additional findings.      Narrative    EXAM: MR TIBIA FIBULA LOWER LEG LEFT W/O CONTRAST  LOCATION: [facility]  DATE: 6/27/2023    INDICATION: MRI to define extent of osteomyelitis. Left BKA.  COMPARISON: CT 06/24/2023, radiographs 06/24/2023.  TECHNIQUE: Unenhanced.    FINDINGS:     BONES:   -Below knee amputation. Abnormal marrow signal at the tibial amputation site characterized by increased T2 STIR signal and confluent low T1 signal. A small amount of abnormal signal extends superiorly in the anterior aspect of the tibia tapering to the   level of the tibial tuberosity.    SOFT TISSUES:    -Below-knee amputation. Intravenous contrast somewhat limits evaluation. There is a fluid collection at the distal aspect of the amputation stump, extending anterior to the residual distal tibia. The component just at the distal aspect of the stomach   measures approximately 4.8 x 4.3 cm in maximal axial dimensions. In the anterior subcutaneous tissue a contiguous component of the collection measures 5.7 x 1.7 x 5.6 cm. There is extension of fluid in the anterolateral subcutaneous tissue superiorly,   coursing along the lateral to posterolateral aspect of the immediately subcutaneous tissue. Representative measurements 4.5 x 1.4 cm just below the knee joint (series 6 image 23), and extends superiorly to about 5 cm above the knee joint in the   posterolateral subcutaneous tissue. Diffuse soft tissue edema.      Impression    IMPRESSION:  1.  Signal changes compatible with osteomyelitis in the residual tibia. A small amount of abnormal signal in the anterior tibia extends to approximately the level of the tibial tuberosity.  2.  Fluid collections suspicious for abscesses surrounding the distal tibial amputation site, as well as  the the anterior and lateral subcutaneous tissues soft tissue of the leg extending cranially just above the knee. Lack of intravenous contrast   somewhat limits evaluation.   US Renal Complete Non-Vascular    Narrative    EXAM: US RENAL COMPLETE NON-VASCULAR  LOCATION: [facility]  DATE: 7/2/2023    INDICATION: Acute kidney injury.  COMPARISON: Renal ultrasound 07/19/2021.  TECHNIQUE: Routine Bilateral Renal and Bladder Ultrasound.    FINDINGS:    RIGHT KIDNEY: 13.4 x 8 x 8.3 cm. Normal cortical thickness and echogenicity. Simple 1.3 cm lower pole cyst, which does not require follow-up. No sonographically detectable calculi. No hydronephrosis.    LEFT KIDNEY: 14.3 x 6.2 x 6 cm. Normal cortical thickness and echogenicity. No definite sonographically detectable calculi. A 0.8 cm nonshadowing echogenic focus at the lower pole is favored to be artifactual rather than a calculus. No hydronephrosis.    BLADDER: Partially distended and contains a small amount of layering debris.      Impression    IMPRESSION:    1.  No hydronephrosis.    2.  Small amount of intraluminal bladder debris.   XR Chest Port 1 View    Narrative    EXAM: XR CHEST PORT 1 VIEW  LOCATION: [facility]  DATE: 7/20/2023    INDICATION: RRT acute hypoxia with repositioning, JOHNATHAN, question pulmonary edema pleural effusion.  COMPARISON: None.      Impression    IMPRESSION: Right PICC line tip at the cavoatrial junction. Diffuse bilateral interstitial prominence. Correlate with a degree of pulmonary edema and interstitial lung disease. No effusion. Normal cardiac silhouette.   XR Abdomen Port 1 View    Narrative    EXAM: XR ABDOMEN PORT 1 VIEW  LOCATION: [facility]  DATE: 7/20/2023    INDICATION: Abd distension, eval edema vs stool burden  COMPARISON: None.      Impression    IMPRESSION: Evaluation limited by portable technique and patient body habitus.    Nonobstructive bowel gas  pattern. No significant retained stool.   US Lower Extremity Venous Duplex Bilateral    Narrative    EXAM: US LOWER EXTREMITY VENOUS DUPLEX BILATERAL  LOCATION: [facility]  DATE: 7/21/2023    INDICATION: Elevated d dimer, new onset SOB, JOHNATHAN CKD, recent L BKA infection; bilateral BKA  COMPARISON: None.  TECHNIQUE: Venous Duplex ultrasound of bilateral lower extremities with and without compression, augmentation and duplex. Color flow and spectral Doppler with waveform analysis performed. Technically difficult exam due to swelling and open wounds.   Bilateral below-the-knee amputations.    FINDINGS: Exam includes the common femoral, femoral, popliteal veins as well as segmentally visualized deep calf veins and greater saphenous vein. Technically difficult exam due to swelling and open wounds. Bilateral below-the-knee amputations.    RIGHT: No deep vein thrombosis. No superficial thrombophlebitis. No popliteal cyst.    LEFT: No deep vein thrombosis. No superficial thrombophlebitis. No popliteal cyst.      Impression    IMPRESSION:  1.  No deep venous thrombosis in the bilateral lower extremities.   NM Lung Scan Ventilation and Perfusion    Narrative    EXAM: NM LUNG SCAN VENTILATION AND PERFUSION  LOCATION: [facility]  DATE: 7/21/2023    INDICATION: Shortness of breath.  COMPARISON: Chest radiograph 07/20/2023.  TECHNIQUE: 58 mCi Tc-99m DTPA inhaled. 6.6 mCi Tc-99m MAA, IV. Standard planar imaging during perfusion and ventilation portions of exam.    FINDINGS: Mild central clumping of the radiotracer on ventilatory images, likely representing turbulent airflow. Normal pulmonary perfusion. No mismatched segmental perfusion defect.      Impression    IMPRESSION:    No evidence of pulmonary embolism.   XR Chest Port 1 View    Narrative    CHEST ONE VIEW July 24, 2023 11:48 AM     HISTORY: Shortness of breath.    COMPARISON: July 20, 2023.      Impression    IMPRESSION:  Stable mild interstitial changes. Stable PICC line. No new  infiltrates or effusions.    KENDELL COUCH MD         SYSTEM ID:  [id number]   Echocardiogram Complete     Value    LVEF  60-65%    Narrative    [id number]  [id number]  [id number]^JUAN DANIEL^AZUCENA     [facility]  Echocardiography Laboratory  [address] [place] [address]     Name: YNES YBARRA  MRN: [id number]  : 1965  Study Date: 2023 11:23 AM  Age: 58 yrs  Gender: Male  Patient Location: [facility]  Reason For Study: SOB  Ordering Physician: AZUCENA FRANCES  Referring Physician: Kody Wing  Performed By: Michel Eller     BSA: 2.5 m2  Height: 72 in  Weight: 291 lb  HR: 74  BP: 129/96 mmHg  ______________________________________________________________________________  Procedure  Complete Portable Echo Adult. Optison (NDC #[id number]) given intravenously.  Technically difficult study.  ______________________________________________________________________________  Interpretation Summary     Left ventricular systolic function is normal.  The visual ejection fraction is 60-65%.  The right ventricular systolic function is normal.  No hemodynamically significant valvular abnormalities on 2D or color flow  imaging.  ______________________________________________________________________________  Left Ventricle  The left ventricle is normal in size. Diastolic Doppler findings (E/E' ratio  and/or other parameters) suggest left ventricular filling pressures are  increased. Left ventricular systolic function is normal. The visual ejection  fraction is 60-65%. Regional wall motion abnormalities cannot be excluded due  to limited visualization.     Right Ventricle  The right ventricle is grossly normal size. The right ventricle is not well  visualized. The right ventricular systolic function is normal.     Atria  The left atrium is severely dilated. The left atrium is not well visualized.  Right atrium not well visualized. Right  atrial size is normal.     Mitral Valve  The mitral valve is not well visualized. There is mild mitral annular  calcification. There is no mitral regurgitation noted. There is no mitral  valve stenosis.     Tricuspid Valve  The tricuspid valve is not well visualized, but is grossly normal. The right  ventricular systolic pressure is approximated at 17.6 mmHg plus the right  atrial pressure.     Aortic Valve  The aortic valve is not well visualized. No hemodynamically significant  valvular aortic stenosis.     Pulmonic Valve  The pulmonic valve is not well visualized.     Vessels  Borderline aortic root dilatation. Dilation of the inferior vena cava is  present with abnormal respiratory variation in diameter.     Pericardium  There is no pericardial effusion.     ______________________________________________________________________________  MMode/2D Measurements & Calculations  IVSd: 1.0 cm  LVIDd: 5.0 cm  LVIDs: 3.3 cm  LVPWd: 1.4 cm  FS: 33.9 %  LV mass(C)d: 235.2 grams  LV mass(C)dI: 94.1 grams/m2     Ao root diam: 3.9 cm  LA dimension: 3.1 cm  asc Aorta Diam: 3.7 cm  LA/Ao: 0.80  LVOT diam: 2.1 cm  LVOT area: 3.5 cm2  LA Volume (BP): 105.0 ml  LA Volume Index (BP): 42.0 ml/m2  RWT: 0.55  TAPSE: 2.5 cm     Doppler Measurements & Calculations  MV E max jayro: 113.0 cm/sec  MV A max jayro: 126.8 cm/sec  MV E/A: 0.89     MV dec slope: 617.8 cm/sec2  MV dec time: 0.18 sec  PA acc time: 0.11 sec  TR max jayro: 209.5 cm/sec  TR max P.6 mmHg  E/E' av.1  Lateral E/e': 18.5  Medial E/e': 15.7  RV S Jayro: 15.8 cm/sec     ______________________________________________________________________________  Report approved by: Timbo Soto 2023 02:21 PM               Discharge Medications   Current Discharge Medication List        START taking these medications    Details   amLODIPine (NORVASC) 5 MG tablet Take 1 tablet (5 mg) by mouth daily  Qty: 90 tablet, Refills: 0    Associated Diagnoses: Benign essential  hypertension      bumetanide (BUMEX) 2 MG tablet Take 1 tablet (2 mg) by mouth daily  Qty: 60 tablet, Refills: 0    Associated Diagnoses: Benign essential hypertension      carvedilol (COREG) 12.5 MG tablet Take 3 tablets (37.5 mg) by mouth 2 times daily (with meals)  Qty: 60 tablet, Refills: 0    Associated Diagnoses: Benign essential hypertension      ferrous sulfate (FEROSUL) 325 (65 Fe) MG tablet Take 1 tablet (325 mg) by mouth every other day  Qty: 30 tablet, Refills: 0    Associated Diagnoses: Diabetic foot infection (H)      folic acid-vit B6-vit B12 (FOLGARD) 0.8-10-0.115 MG TABS per tablet Take 1 tablet by mouth daily  Qty: 30 tablet, Refills: 0    Associated Diagnoses: Diabetic foot infection (H)           CONTINUE these medications which have CHANGED    Details   acetaminophen (TYLENOL) 325 MG tablet Take 2 tablets (650 mg) by mouth every 6 hours as needed for mild pain Alternates use with ibuprofen  Qty: 90 tablet, Refills: 0    Associated Diagnoses: Diabetic foot infection (H)      insulin degludec (TRESIBA FLEXTOUCH) 100 UNIT/ML pen Inject 12 Units Subcutaneous At Bedtime  Qty: 15 mL, Refills: 0    Associated Diagnoses: Type 2 diabetes mellitus with diabetic polyneuropathy, without long-term current use of insulin (H)           CONTINUE these medications which have NOT CHANGED    Details   aspirin 81 MG chewable tablet Take 1 tablet (81 mg) by mouth daily  Qty: 108 tablet, Refills: 3    Associated Diagnoses: Type 2 diabetes mellitus with diabetic polyneuropathy, without long-term current use of insulin (H)      empagliflozin (JARDIANCE) 10 MG TABS tablet Take 1 tablet (10 mg) by mouth daily  Qty: 90 tablet, Refills: 3    Associated Diagnoses: Type 2 diabetes mellitus with diabetic polyneuropathy, without long-term current use of insulin (H)      ezetimibe (ZETIA) 10 MG tablet TAKE ONE TABLET BY MOUTH EVERY DAY  Qty: 90 tablet, Refills: 1    Associated Diagnoses: Hyperlipidemia LDL goal <100       ipratropium (ATROVENT) 0.06 % nasal spray INSTILL TWO SPRAYS INTO EACH NOSTRIL FOUR TIMES A DAY AS NEEDED FOR RHINITIS  Qty: 15 mL, Refills: 11      JANUVIA 50 MG tablet TAKE ONE TABLET BY MOUTH EVERY DAY  Qty: 90 tablet, Refills: 3    Associated Diagnoses: Type 2 diabetes mellitus with diabetic polyneuropathy, without long-term current use of insulin (H)      multivitamin (CENTRUM SILVER) tablet Take 1 tablet by mouth daily      BD PEN NEEDLE MEGAN 2ND GEN 32G X 4 MM miscellaneous USE WITH INSULIN INJECTIONS UNDER THE SKIN TWO TIMES A DAY AS DIRECTED  Qty: 180 each, Refills: 11    Associated Diagnoses: Type 2 diabetes mellitus with diabetic polyneuropathy, without long-term current use of insulin (H)      blood glucose (NO BRAND SPECIFIED) lancets standard Use to test blood sugar FOUR times daily, to match lancing device per insurance  Qty: 400 each, Refills: 1    Comments: To match lancing device per insurance  Associated Diagnoses: Type 2 diabetes mellitus with diabetic polyneuropathy, without long-term current use of insulin (H)      blood glucose (NO BRAND SPECIFIED) test strip 120 strips by In Vitro route 4 times daily Use to test blood sugar up to 4 times daily or as directed.  Qty: 360 strip, Refills: 3    Associated Diagnoses: Type 2 diabetes mellitus with diabetic polyneuropathy, without long-term current use of insulin (H)      blood glucose monitoring (NO BRAND SPECIFIED) meter device kit Use to test blood sugar FOUR times daily, brand per insurance  Qty: 1 kit, Refills: 0    Comments: Brand per insurance  Associated Diagnoses: Type 2 diabetes mellitus with diabetic polyneuropathy, without long-term current use of insulin (H)      Continuous Blood Gluc Sensor (FREESTYLE CRISTINA 3 SENSOR) MISC 1 each every 14 days Use to check blood sugars per  guidelines with Cristina 3 dionisio  Qty: 2 each, Refills: 5    Associated Diagnoses: Type 2 diabetes mellitus with diabetic polyneuropathy, without long-term  "current use of insulin (H)      Lancets (ONETOUCH DELICA PLUS [id number]) MISC USE TO TEST BLOOD SUGAR FOUR TIMES A DAY  Qty: 400 each, Refills: 1    Associated Diagnoses: Diabetes mellitus, type 2 (H)      STATIN NOT PRESCRIBED (INTENTIONAL) Please choose reason not prescribed, below    Associated Diagnoses: Hyperlipidemia LDL goal <100           STOP taking these medications       ibuprofen (ADVIL/MOTRIN) 200 MG tablet Comments:   Reason for Stopping:         Insulin Lispro-aabc, 1 U Dial, (LYUMJEV KWIKPEN) 100 UNIT/ML SOPN Comments:   Reason for Stopping:         lisinopril (ZESTRIL) 5 MG tablet Comments:   Reason for Stopping:             Allergies   Allergies   Allergen Reactions    Pravastatin      \"sucked the life blood out of me,\" Indicates this occurs with all statins.    Statins      "

## 2023-07-28 NOTE — PLAN OF CARE
Date & Time: 7/28/23   Surgery/POD#: I&D of L BKA stump on 6/30, 7/6, and 7/10  Behavior & Aggression: Green  Fall Risk: Yes  Orientation:A&Ox4  ABNL VS/O2:VSS on RA exc HTN  ABNL Labs:   Pain Management: Denies pain   Bowel/Bladder: No BM this shift, last BM 7/27. Urinal in use, adequate UOP  IV/Drains: No IV access  Diet:High Consistent Carb Diet  Activity Level: Ind w/ pivot to w/c using prosthetics  Tests/Procedures: N/A  Anticipated  DC Date: Pending  Significant Information: Pt ramp at home in place. Wound Care completed this morning and reviewed with patient's S/O who will be helping with dressing changes. Supplies given. Discharge instructions and medications reviewed with patient and S/O. Pt discharged home today @3197.

## 2023-07-28 NOTE — PLAN OF CARE
Occupational Therapy Discharge Summary    Reason for therapy discharge:    Discharged to home.    Progress towards therapy goal(s). See goals on Care Plan in [facility] electronic health record for goal details.  Goals not met.  Barriers to achieving goals:   discharge from facility.    Therapy recommendation(s):    Assist in I/ADLs as needed .         *Therapist completing this discharge note did not see patient this date. Note written based on information from chart.

## 2023-07-31 ENCOUNTER — TELEPHONE (OUTPATIENT)
Dept: FAMILY MEDICINE | Facility: CLINIC | Age: 58
End: 2023-07-31
Payer: COMMERCIAL

## 2023-07-31 NOTE — TELEPHONE ENCOUNTER
Reason for Call:  Appointment Request    Patient requesting this type of appt:  Hospital/ED Follow-Up - [facility]-06/24/23 TO 07/28/23-     Requested provider: Kody Wing    Reason patient unable to be scheduled: Not within requested timeframe    When does patient want to be seen/preferred time: 3-7 days    Comments: Will need labwork- only wants Kody Wing    Could we send this information to you in Flyr or would you prefer to receive a phone call?:   Patient would prefer a phone call   Okay to leave a detailed message?: Yes at Cell number on file:    Telephone Information:   Mobile [phone]       Call taken on 7/31/2023 at 3:35 PM by Mira LUTZ

## 2023-08-02 ENCOUNTER — OFFICE VISIT (OUTPATIENT)
Dept: FAMILY MEDICINE | Facility: CLINIC | Age: 58
End: 2023-08-02
Payer: COMMERCIAL

## 2023-08-02 VITALS
WEIGHT: 240 LBS | DIASTOLIC BLOOD PRESSURE: 103 MMHG | OXYGEN SATURATION: 95 % | BODY MASS INDEX: 32.51 KG/M2 | TEMPERATURE: 96.9 F | HEIGHT: 72 IN | RESPIRATION RATE: 20 BRPM | SYSTOLIC BLOOD PRESSURE: 169 MMHG | HEART RATE: 85 BPM

## 2023-08-02 DIAGNOSIS — L97.525 DIABETIC ULCER OF LEFT FOOT ASSOCIATED WITH TYPE 2 DIABETES MELLITUS, WITH MUSCLE INVOLVEMENT WITHOUT EVIDENCE OF NECROSIS, UNSPECIFIED PART OF FOOT (H): Primary | ICD-10-CM

## 2023-08-02 DIAGNOSIS — I10 BENIGN ESSENTIAL HYPERTENSION: ICD-10-CM

## 2023-08-02 DIAGNOSIS — E11.621 DIABETIC ULCER OF LEFT FOOT ASSOCIATED WITH TYPE 2 DIABETES MELLITUS, WITH MUSCLE INVOLVEMENT WITHOUT EVIDENCE OF NECROSIS, UNSPECIFIED PART OF FOOT (H): Primary | ICD-10-CM

## 2023-08-02 LAB
ANION GAP SERPL CALCULATED.3IONS-SCNC: 13 MMOL/L (ref 7–15)
BUN SERPL-MCNC: 39 MG/DL (ref 6–20)
CALCIUM SERPL-MCNC: 8.8 MG/DL (ref 8.6–10)
CHLORIDE SERPL-SCNC: 102 MMOL/L (ref 98–107)
CREAT SERPL-MCNC: 2.85 MG/DL (ref 0.67–1.17)
DEPRECATED HCO3 PLAS-SCNC: 26 MMOL/L (ref 22–29)
ERYTHROCYTE [DISTWIDTH] IN BLOOD BY AUTOMATED COUNT: 14.8 % (ref 10–15)
GFR SERPL CREATININE-BSD FRML MDRD: 25 ML/MIN/1.73M2
GLUCOSE SERPL-MCNC: 162 MG/DL (ref 70–99)
HCT VFR BLD AUTO: 31.7 % (ref 40–53)
HGB BLD-MCNC: 10.5 G/DL (ref 13.3–17.7)
MCH RBC QN AUTO: 29.5 PG (ref 26.5–33)
MCHC RBC AUTO-ENTMCNC: 33.1 G/DL (ref 31.5–36.5)
MCV RBC AUTO: 89 FL (ref 78–100)
PLATELET # BLD AUTO: 211 10E3/UL (ref 150–450)
POTASSIUM SERPL-SCNC: 3.3 MMOL/L (ref 3.4–5.3)
RBC # BLD AUTO: 3.56 10E6/UL (ref 4.4–5.9)
SODIUM SERPL-SCNC: 141 MMOL/L (ref 136–145)
WBC # BLD AUTO: 11.2 10E3/UL (ref 4–11)

## 2023-08-02 PROCEDURE — 80048 BASIC METABOLIC PNL TOTAL CA: CPT | Performed by: INTERNAL MEDICINE

## 2023-08-02 PROCEDURE — 36415 COLL VENOUS BLD VENIPUNCTURE: CPT | Performed by: INTERNAL MEDICINE

## 2023-08-02 PROCEDURE — 85027 COMPLETE CBC AUTOMATED: CPT | Performed by: INTERNAL MEDICINE

## 2023-08-02 PROCEDURE — 99214 OFFICE O/P EST MOD 30 MIN: CPT | Performed by: INTERNAL MEDICINE

## 2023-08-02 RX ORDER — IPRATROPIUM BROMIDE 42 UG/1
SPRAY, METERED NASAL
Qty: 15 ML | Refills: 11 | Status: SHIPPED | OUTPATIENT
Start: 2023-08-02

## 2023-08-02 RX ORDER — AMLODIPINE BESYLATE 10 MG/1
10 TABLET ORAL DAILY
Qty: 90 TABLET | Refills: 3 | Status: SHIPPED | OUTPATIENT
Start: 2023-08-02 | End: 2023-11-16

## 2023-08-02 RX ORDER — CARVEDILOL 12.5 MG/1
37.5 TABLET ORAL 2 TIMES DAILY WITH MEALS
Qty: 540 TABLET | Refills: 3 | Status: SHIPPED | OUTPATIENT
Start: 2023-08-02 | End: 2023-08-30 | Stop reason: DRUGHIGH

## 2023-08-02 ASSESSMENT — PAIN SCALES - GENERAL: PAINLEVEL: NO PAIN (0)

## 2023-08-02 NOTE — NURSING NOTE
BP (!) 169/103   Pulse 85   Temp 96.9  F (36.1  C) (Temporal)   Resp 20   Ht 1.829 m (6')   Wt 108.9 kg (240 lb)   SpO2 95%   BMI 32.55 kg/m       85  Disposition: provider notified while patient in the clinic     Caro Jackson CMA

## 2023-08-02 NOTE — PROGRESS NOTES
Assessment & Plan     Diabetic ulcer of left foot associated with type 2 diabetes mellitus, with muscle involvement without evidence of necrosis, unspecified part of foot (H)      Benign essential hypertension    - carvedilol (COREG) 12.5 MG tablet; Take 3 tablets (37.5 mg) by mouth 2 times daily (with meals)  - amLODIPine (NORVASC) 10 MG tablet; Take 1 tablet (10 mg) by mouth daily  - Basic metabolic panel  (Ca, Cl, CO2, Creat, Gluc, K, Na, BUN); Future  - CBC with platelets; Future  - Basic metabolic panel  (Ca, Cl, CO2, Creat, Gluc, K, Na, BUN); Future    Blood pressure is too high, continue carvedilol, increase amlodipine from 5 mg daily to 10 mg daily, since his scrotal edema has resolved completely by his report, stop the loop diuretic, pending results of his kidney blood test, restart hydrochlorothiazide if possible as this will likely lower blood pressure better than the loop diuretic, he has done well on hydrochlorothiazide in the past, touch base about the Premeal sugars next week by telephone visit, consider adding back with meal insulin if needed, his GFR is too low to take the Januvia at the current dose, and to be honest, I am not sure how much the Januvia is really doing for him?  Since he has lability in his GFR, I recommended stopping Januvia.  Consider an GLP-1 in the future if we need additional glycemic control  No LOS data to display   Time spent by me doing chart review, history and exam, documentation and further activities per the note     MED REC REQUIRED  Post Medication Reconciliation Status: discharge medications reconciled and changed, per note/orders  BMI:   Estimated body mass index is 32.55 kg/m  as calculated from the following:    Height as of this encounter: 1.829 m (6').    Weight as of this encounter: 108.9 kg (240 lb).           Kody Wign MD  [facility] SARANYA Raza is a 58 year old, presenting for the following health issues:  Hospital F/U  (Patient here for a  hospital follow up visit.  Patient at [facility] from 6/24/2023 thru 7/28/2023.) and Health Maintenance (Last Eye Exam done on 2021)      HPI       Hospital Follow-up Visit:    Hospital/Nursing Home/IP Rehab Facility: [facility]  Date of Admission: 6/24/2023  Date of Discharge: 7/28/2023  Reason(s) for Admission:     Cellulitis of left lower extremity    Acute kidney injury (H)    Severe sepsis (H)    Ulcer of amputation stump of lower extremity (H)    Acute kidney failure with tubular necrosis     Was your hospitalization related to COVID-19? No   Problems taking medications regularly:  None  Medication changes since discharge: None  Problems adhering to non-medication therapy:  None    Summary of hospitalization:  [facility] discharge summary reviewed  Diagnostic Tests/Treatments reviewed.  Follow up needed: Nephrology, vascular surgery  Other Healthcare Providers Involved in Patient s Care:         None  Update since discharge: Improved         Plan of care communicated with patient and family             38-year-old man with diabetes status post bilateral amputations, hypertension, chronic kidney disease, hyperlipidemia with statin intolerance    Hospitalized for stump infection  Diuresis for scrotal edema  Sustained acute kidney injury  Creatinine was improving at the time of discharge but above baseline  Wound incision and drainage performed, wound left open for healing by secondary intention with daily wet-to-dry dressings with the help of his wife  This seems to be going well  Home blood sugar readings are mildly elevated with Premeal sugars noted to be in about 170s, he has adjusted his Tresiba dose due to this, short acting insulin was stopped when he left the hospital      Review of Systems         Objective    BP (!) 169/103   Pulse 85   Temp 96.9  F (36.1  C) (Temporal)   Resp 20   Ht 1.829 m (6')   Wt 108.9 kg (240 lb)   SpO2  95%   BMI 32.55 kg/m    Body mass index is 32.55 kg/m .  Physical Exam   General: This is a nontoxic, well-appearing man in no acute distress.  His left amputation stump site is covered by dressing

## 2023-08-03 RX ORDER — POTASSIUM CHLORIDE 750 MG/1
10 TABLET, EXTENDED RELEASE ORAL 2 TIMES DAILY
Qty: 180 TABLET | Refills: 3 | Status: SHIPPED | OUTPATIENT
Start: 2023-08-03 | End: 2023-08-16

## 2023-08-03 RX ORDER — HYDROCHLOROTHIAZIDE 25 MG/1
25 TABLET ORAL DAILY
Qty: 90 TABLET | Refills: 3 | Status: SHIPPED | OUTPATIENT
Start: 2023-08-03 | End: 2023-11-16

## 2023-08-03 NOTE — RESULT ENCOUNTER NOTE
The following letter pertains to your most recent diagnostic tests:    The metabolic panel shows improving kidney function since hospital discharge.  It also demonstrates mildly low blood potassium level which is probably the result of taking high doses of bumetanide (diuretic).    The blood counts show improving hemoglobin levels.    Given these results, I would advise returning to the blood pressure medication hydrochlorothiazide.  Hydrochlorothiazide is also a diuretic, but it is effective at lowering blood pressure.  Along with the hydrochlorothiazide, I would advise taking a potassium supplement to replace some of the potassium that you have lost by taking the bumetanide.    I sent new prescriptions for hydrochlorothiazide and potassium to your pharmacy.  Again, we should recheck these lab test after you have been taking these new medications for a few days as previously planned by scheduling a lab appointment next week.  We will follow-up on the repeat lab test and your blood sugar trends as well as your blood pressures when we speak by virtual visit next week as well.    Bottom line: Stop bumetanide, start hydrochlorothiazide, start potassium tablet, follow-up in the lab next week and schedule a virtual visit after the lab appointment to discuss those results and your blood pressure and blood sugar trends.    Sincerely,    Dr. Wing

## 2023-08-08 ENCOUNTER — OFFICE VISIT (OUTPATIENT)
Dept: OTHER | Facility: CLINIC | Age: 58
End: 2023-08-08
Attending: SURGERY
Payer: COMMERCIAL

## 2023-08-08 ENCOUNTER — LAB (OUTPATIENT)
Dept: LAB | Facility: CLINIC | Age: 58
End: 2023-08-08
Payer: COMMERCIAL

## 2023-08-08 VITALS
WEIGHT: 240 LBS | HEIGHT: 72 IN | SYSTOLIC BLOOD PRESSURE: 123 MMHG | DIASTOLIC BLOOD PRESSURE: 77 MMHG | BODY MASS INDEX: 32.51 KG/M2 | HEART RATE: 81 BPM

## 2023-08-08 DIAGNOSIS — I10 BENIGN ESSENTIAL HYPERTENSION: ICD-10-CM

## 2023-08-08 DIAGNOSIS — Z89.512 STATUS POST BELOW-KNEE AMPUTATION OF LEFT LOWER EXTREMITY (H): Primary | ICD-10-CM

## 2023-08-08 LAB
ANION GAP SERPL CALCULATED.3IONS-SCNC: 14 MMOL/L (ref 7–15)
BUN SERPL-MCNC: 55.1 MG/DL (ref 6–20)
CALCIUM SERPL-MCNC: 8.6 MG/DL (ref 8.6–10)
CHLORIDE SERPL-SCNC: 104 MMOL/L (ref 98–107)
CREAT SERPL-MCNC: 3.14 MG/DL (ref 0.67–1.17)
DEPRECATED HCO3 PLAS-SCNC: 22 MMOL/L (ref 22–29)
GFR SERPL CREATININE-BSD FRML MDRD: 22 ML/MIN/1.73M2
GLUCOSE SERPL-MCNC: 152 MG/DL (ref 70–99)
POTASSIUM SERPL-SCNC: 3.8 MMOL/L (ref 3.4–5.3)
SODIUM SERPL-SCNC: 140 MMOL/L (ref 136–145)

## 2023-08-08 PROCEDURE — 80048 BASIC METABOLIC PNL TOTAL CA: CPT

## 2023-08-08 PROCEDURE — 99214 OFFICE O/P EST MOD 30 MIN: CPT | Performed by: SURGERY

## 2023-08-08 PROCEDURE — 36415 COLL VENOUS BLD VENIPUNCTURE: CPT

## 2023-08-08 PROCEDURE — G0463 HOSPITAL OUTPT CLINIC VISIT: HCPCS

## 2023-08-08 NOTE — PROGRESS NOTES
[facility]        Patient is here for a  follow up.    Pt is currently taking Aspirin.    /77 (BP Location: Right arm, Patient Position: Chair, Cuff Size: Adult Regular)   Pulse 81   Ht 6' (1.829 m)   Wt 240 lb (108.9 kg)   BMI 32.55 kg/m      The provider has been notified that the patient has no concerns.     Questions patient would like addressed today are: N/A.    Refills are needed: N/A    Has homecare services and agency name:  Nikkie Valencia MA

## 2023-08-08 NOTE — PROGRESS NOTES
Vascular Surgery Progress Note     Date: August 8, 2023     Reason for Visit:  Follow-up of left BKA    Subjective:  Mr. Horner reports doing really well since he has been at home. He is feeling much better and a great deal of his swelling has resolved.         Current Outpatient Medications:     acetaminophen (TYLENOL) 325 MG tablet, Take 2 tablets (650 mg) by mouth every 6 hours as needed for mild pain Alternates use with ibuprofen, Disp: 90 tablet, Rfl: 0    amLODIPine (NORVASC) 10 MG tablet, Take 1 tablet (10 mg) by mouth daily, Disp: 90 tablet, Rfl: 3    aspirin 81 MG chewable tablet, Take 1 tablet (81 mg) by mouth daily, Disp: 108 tablet, Rfl: 3    BD PEN NEEDLE MEGAN 2ND GEN 32G X 4 MM miscellaneous, USE WITH INSULIN INJECTIONS UNDER THE SKIN TWO TIMES A DAY AS DIRECTED, Disp: 180 each, Rfl: 11    blood glucose (NO BRAND SPECIFIED) lancets standard, Use to test blood sugar FOUR times daily, to match lancing device per insurance, Disp: 400 each, Rfl: 1    blood glucose (NO BRAND SPECIFIED) test strip, 120 strips by In Vitro route 4 times daily Use to test blood sugar up to 4 times daily or as directed., Disp: 360 strip, Rfl: 3    blood glucose monitoring (NO BRAND SPECIFIED) meter device kit, Use to test blood sugar FOUR times daily, brand per insurance, Disp: 1 kit, Rfl: 0    carvedilol (COREG) 12.5 MG tablet, Take 3 tablets (37.5 mg) by mouth 2 times daily (with meals), Disp: 540 tablet, Rfl: 3    Continuous Blood Gluc Sensor (FREESTYLE CRISTINA 3 SENSOR) [facility], 1 each every 14 days Use to check blood sugars per  guidelines with Cristina 3 dionisio, Disp: 2 each, Rfl: 5    empagliflozin (JARDIANCE) 10 MG TABS tablet, Take 1 tablet (10 mg) by mouth daily, Disp: 90 tablet, Rfl: 3    ezetimibe (ZETIA) 10 MG tablet, TAKE ONE TABLET BY MOUTH EVERY DAY, Disp: 90 tablet, Rfl: 1    ferrous sulfate (FEROSUL) 325 (65 Fe) MG tablet, Take 1 tablet (325 mg) by mouth every other day, Disp: 30 tablet, Rfl: 0    folic acid-vit  B6-vit B12 (FOLGARD) 0.8-10-0.115 MG TABS per tablet, Take 1 tablet by mouth daily, Disp: 30 tablet, Rfl: 0    hydrochlorothiazide (HYDRODIURIL) 25 MG tablet, Take 1 tablet (25 mg) by mouth daily, Disp: 90 tablet, Rfl: 3    insulin degludec (TRESIBA FLEXTOUCH) 100 UNIT/ML pen, Inject 12 Units Subcutaneous At Bedtime, Disp: 15 mL, Rfl: 0    ipratropium (ATROVENT) 0.06 % nasal spray, INSTILL TWO SPRAYS INTO EACH NOSTRIL FOUR TIMES A DAY AS NEEDED FOR RHINITIS, Disp: 15 mL, Rfl: 11    Lancets (ONETOUCH DELICA PLUS [id number]) MISC, USE TO TEST BLOOD SUGAR FOUR TIMES A DAY, Disp: 400 each, Rfl: 1    multivitamin (CENTRUM SILVER) tablet, Take 1 tablet by mouth daily, Disp: , Rfl:     potassium chloride ER (KLOR-CON M) 10 MEQ CR tablet, Take 1 tablet (10 mEq) by mouth 2 times daily, Disp: 180 tablet, Rfl: 3    STATIN NOT PRESCRIBED (INTENTIONAL), Please choose reason not prescribed, below, Disp: , Rfl:      Physical Exam       BP: 123/77 Pulse: 81            Vital Signs with Ranges  Pulse:  [81] 81  BP: (123)/(77) 123/77  240 lbs 0 oz    Constitutional: cooperative, no apparent distress, sitting comfortably in chair. Accompanied by his wife.  Musculoskeletal: grossly normal and symmetric ROM and strength in BL extremities. Minimal induration on posterolateral left thigh with well-healed stab incision sites, no erythema, no bogginess or ballotable fluid collections, skin appears normal. BKA stump on left with granulation tissue in open wound. Able to wear right BKA prosthetic.   Neurologic: Awake, alert, oriented to name, place, time, and situation        Labs:        Lab Results   Component Value Date     08/02/2023     06/25/2021    Lab Results   Component Value Date    CHLORIDE 102 08/02/2023    CHLORIDE 102 11/09/2022    CHLORIDE 103 06/25/2021    Lab Results   Component Value Date    BUN 39.0 08/02/2023    BUN 39 11/09/2022    BUN 41 06/25/2021      Lab Results   Component Value Date    POTASSIUM 3.3  08/02/2023    POTASSIUM 4.8 11/09/2022    POTASSIUM 5.2 06/25/2021    Lab Results   Component Value Date    CO2 26 08/02/2023    CO2 30 11/09/2022    CO2 29 06/25/2021    Lab Results   Component Value Date    CR 2.85 08/02/2023    CR 1.51 06/25/2021        Lab Results   Component Value Date    WBC 11.2 (H) 08/02/2023    HGB 10.5 (L) 08/02/2023    HCT 31.7 (L) 08/02/2023    MCV 89 08/02/2023     08/02/2023         Assessment & Plan   Mr. Horner is a 58M who underwent staged left below-knee amputation in 09/2022. This healed well and he was ambulatory on his prosthetic. He presented with sudden onset of pain, swelling, and wound on the distal tip of the left below-knee stump, with surrounding cellulitis. He underwent left BKA stump debridement on 6/30/23 and repeat debridement with drain placement on 7/6/23 and 7/10/23.  He was treated with extended-duration antibiotics and was hospitalized ultimately from 6/24 to 7/28.     His course was notable for JOHNATHAN on CKD; he was discharged with plans to follow-up his renal function to ensure stability or improvement prior to proceeding with definitive left BKA stump revision.   Current Cr reassuring at 2.85; Mr. Horner will also work to schedule follow-up with Dr. Gomes.  Will plan left BKA stump revision; will use antibiotic-impregnated beads as an adjunct.  Discussed that this may fail and he may still need conversion to an above-knee amputation, but I believe he is free of infection and clinically looks excellent. I think it is worth attempting salvage of his BKA.   Will work to schedule stump revision.     Reny Child MD    Total time spent on the date of this encounter doing: chart review, review of test results, patient visit, physical exam, education, counseling, developing plan of care, and documenting = 22 minutes

## 2023-08-08 NOTE — NURSING NOTE
Patient Education    Procedure: Left BKA revision  Diagnosis: Left BKA stump wound  Anticoagulation Instruction: Continue ASA  Pre-Operative Physical Exam: You need to have a pre-op physical exam within 30 days of your procedure. Your procedure may be cancelled if you do not have a current History and Physical. Call your PCP's office to schedule.  Allergies:  Updated in Epic  Bowel Prep: NA  NPO for solid 8 hours prior to arrival time.   NPO for clear liquids 2 hours prior to arrival time.   Post Procedure Education: [facility] patient post-procedure fact sheet reviewed with patient.    Showering instructions reviewed: Yes    Learner(s):patient and significant other  Method: Listening  Barriers to Learning:No Barrier  Outcome: Patient did verbalize understanding of above education.    Agustina NAVARRETE, RN    [facility]  Vascular [facility]  Office: [phone]  Fax: [phone]

## 2023-08-08 NOTE — RESULT ENCOUNTER NOTE
The following letter pertains to your most recent diagnostic tests:    Please ignore the previous message, it was sent erroneously as I clicked the wrong box with the mouse.      The basic metabolic panel shows an improved potassium level.  The kidney functions are relatively stable although slightly worse from your most recent check but stable from the checks prior to that during the hospital.  These results would suggest that it is reasonable to continue to take the hydrochlorothiazide as you are.  We can discuss these results and your home blood pressure readings as well as blood sugar readings when we meet virtually later this week.    Sincerely,    Dr. Wing

## 2023-08-09 ENCOUNTER — VIRTUAL VISIT (OUTPATIENT)
Dept: FAMILY MEDICINE | Facility: CLINIC | Age: 58
End: 2023-08-09
Payer: COMMERCIAL

## 2023-08-09 DIAGNOSIS — I10 HYPERTENSION, UNSPECIFIED TYPE: ICD-10-CM

## 2023-08-09 DIAGNOSIS — E11.621 DIABETIC ULCER OF LEFT FOOT ASSOCIATED WITH TYPE 2 DIABETES MELLITUS, WITH MUSCLE INVOLVEMENT WITHOUT EVIDENCE OF NECROSIS, UNSPECIFIED PART OF FOOT (H): Primary | ICD-10-CM

## 2023-08-09 DIAGNOSIS — N18.4 CKD (CHRONIC KIDNEY DISEASE), STAGE IV (H): ICD-10-CM

## 2023-08-09 DIAGNOSIS — E11.42 TYPE 2 DIABETES MELLITUS WITH DIABETIC POLYNEUROPATHY, WITHOUT LONG-TERM CURRENT USE OF INSULIN (H): ICD-10-CM

## 2023-08-09 DIAGNOSIS — L97.525 DIABETIC ULCER OF LEFT FOOT ASSOCIATED WITH TYPE 2 DIABETES MELLITUS, WITH MUSCLE INVOLVEMENT WITHOUT EVIDENCE OF NECROSIS, UNSPECIFIED PART OF FOOT (H): Primary | ICD-10-CM

## 2023-08-09 PROCEDURE — 99214 OFFICE O/P EST MOD 30 MIN: CPT | Mod: VID | Performed by: INTERNAL MEDICINE

## 2023-08-09 RX ORDER — INSULIN DEGLUDEC 100 U/ML
25 INJECTION, SOLUTION SUBCUTANEOUS AT BEDTIME
Qty: 15 ML | Refills: 0 | Status: SHIPPED | OUTPATIENT
Start: 2023-08-09 | End: 2023-08-09

## 2023-08-09 RX ORDER — INSULIN DEGLUDEC 100 U/ML
25 INJECTION, SOLUTION SUBCUTANEOUS EVERY 12 HOURS
Qty: 15 ML | Refills: 11 | Status: SHIPPED | OUTPATIENT
Start: 2023-08-09 | End: 2024-08-09

## 2023-08-09 NOTE — PROGRESS NOTES
Ry is a 58 year old who is being evaluated via a billable video visit.      How would you like to obtain your AVS? MyChart  If the video visit is dropped, the invitation should be resent by: Text to cell phone: [phone]  Will anyone else be joining your video visit? No          Assessment & Plan     Diabetic ulcer of left foot associated with type 2 diabetes mellitus, with muscle involvement without evidence of necrosis, unspecified part of foot (H)  Continue follow-up with vascular surgery    Type 2 diabetes mellitus with diabetic polyneuropathy, without long-term current use of insulin (H)  Improving glycemic control, check A1c at 3-month interval, schedule lab appointment for that, updated Tresiba dose with his pharmacy.  He is taking 25 units every 12 hours  - insulin degludec (TRESIBA FLEXTOUCH) 100 UNIT/ML pen; Inject 25 Units Subcutaneous every 12 hours    CKD (chronic kidney disease), stage IV (H)  Unfortunately, his creatinine did not moving the right direction, he is off loop diuretics now and on hydrochlorothiazide which seems to be helping his blood pressures, however he probably needs to return to taking a ACE/ARB if possible although he will see nephrology for that tomorrow.  Unfortunately, the long-term prognosis for his kidney function is guarded.  He is on a low-dose SGLT2 inhibitor    Hypertension, unspecified type  Refer to discussion above, blood pressures are better      No LOS data to display   Time spent by me doing chart review, history and exam, documentation and further activities per the note           Kody Wing MD  [facility]    Subjective   Ry is a 58 year old, presenting for the following health issues:  Follow Up (1 week )      HPI       Blood sugars are better  AM sugar this AM was 130  Blood pressure yesterday at vascular surgery clinic was at goal  Feeling better  Will have surgery to correct stump problem  Sees nephrology tomorrow  Swelling in  scrotum resolved       Review of Systems         Objective           Vitals:  No vitals were obtained today due to virtual visit.    Physical Exam   GENERAL: Healthy, alert and no distress  EYES: Eyes grossly normal to inspection.  No discharge or erythema, or obvious scleral/conjunctival abnormalities.  RESP: No audible wheeze, cough, or visible cyanosis.  No visible retractions or increased work of breathing.    SKIN: Visible skin clear. No significant rash, abnormal pigmentation or lesions.  NEURO: Cranial nerves grossly intact.  Mentation and speech appropriate for age.  PSYCH: Mentation appears normal, affect normal/bright, judgement and insight intact, normal speech and appearance well-groomed.                Video-Visit Details    Type of service:  Video Visit     Originating Location (pt. Location): Home    Distant Location (provider location):  On-site  Platform used for Video Visit: [employer]

## 2023-08-10 ENCOUNTER — TELEPHONE (OUTPATIENT)
Dept: OTHER | Facility: CLINIC | Age: 58
End: 2023-08-10
Payer: COMMERCIAL

## 2023-08-10 NOTE — TELEPHONE ENCOUNTER
Case request received on 08/08/23 for LEFT BELOW KNEE AMPUTATION REVISION WITH ANTIBIOTIC SOAKED BEADS WITH WOUND VAC PLACEMENT.  CASE ID: [id number]    Spoke with Ry and his girlfriend and they are hopeful for 08/17/23.

## 2023-08-11 ENCOUNTER — MEDICAL CORRESPONDENCE (OUTPATIENT)
Dept: HEALTH INFORMATION MANAGEMENT | Facility: CLINIC | Age: 58
End: 2023-08-11
Payer: COMMERCIAL

## 2023-08-16 ENCOUNTER — OFFICE VISIT (OUTPATIENT)
Dept: FAMILY MEDICINE | Facility: CLINIC | Age: 58
End: 2023-08-16
Payer: COMMERCIAL

## 2023-08-16 ENCOUNTER — ANESTHESIA EVENT (OUTPATIENT)
Dept: SURGERY | Facility: CLINIC | Age: 58
DRG: 475 | End: 2023-08-16
Payer: COMMERCIAL

## 2023-08-16 ENCOUNTER — TELEPHONE (OUTPATIENT)
Dept: FAMILY MEDICINE | Facility: CLINIC | Age: 58
End: 2023-08-16

## 2023-08-16 VITALS
OXYGEN SATURATION: 97 % | TEMPERATURE: 98 F | DIASTOLIC BLOOD PRESSURE: 83 MMHG | HEART RATE: 89 BPM | SYSTOLIC BLOOD PRESSURE: 135 MMHG | RESPIRATION RATE: 20 BRPM

## 2023-08-16 DIAGNOSIS — T87.89 ULCER OF AMPUTATION STUMP OF LOWER EXTREMITY (H): ICD-10-CM

## 2023-08-16 DIAGNOSIS — E11.42 TYPE 2 DIABETES MELLITUS WITH DIABETIC POLYNEUROPATHY, WITHOUT LONG-TERM CURRENT USE OF INSULIN (H): ICD-10-CM

## 2023-08-16 DIAGNOSIS — L97.909 ULCER OF AMPUTATION STUMP OF LOWER EXTREMITY (H): ICD-10-CM

## 2023-08-16 DIAGNOSIS — N18.4 CKD (CHRONIC KIDNEY DISEASE), STAGE IV (H): ICD-10-CM

## 2023-08-16 DIAGNOSIS — I10 HYPERTENSION, UNSPECIFIED TYPE: ICD-10-CM

## 2023-08-16 DIAGNOSIS — Z01.818 PREOP GENERAL PHYSICAL EXAM: Primary | ICD-10-CM

## 2023-08-16 PROCEDURE — 99214 OFFICE O/P EST MOD 30 MIN: CPT | Performed by: INTERNAL MEDICINE

## 2023-08-16 RX ORDER — LISINOPRIL 5 MG/1
1 TABLET ORAL DAILY
COMMUNITY
End: 2023-08-16

## 2023-08-16 ASSESSMENT — PAIN SCALES - GENERAL: PAINLEVEL: NO PAIN (0)

## 2023-08-16 NOTE — TELEPHONE ENCOUNTER
Angel Luis called. They saw him on 8/2 and 8/9. Patient is having surgery tomorrow, 8/17. Are you comfortable putting in a surgery clearance without seeing him again? They very much want it.

## 2023-08-16 NOTE — TELEPHONE ENCOUNTER
Putting in a surgery clearance?  I am not sure what that means?  I don't see any absolute contraindications to him proceeding with surgery, but I would be surprised if the surgeon and the surgery center would be OK without a preop history and physical.

## 2023-08-16 NOTE — PROGRESS NOTES
[facility]  [address] [facility], [address]  [facility] [address]  Phone: [phone]  Primary Provider: Rodrigue Joshi  Pre-op Performing Provider: RODRIGUE JOSHI      PREOPERATIVE EVALUATION:  Today's date: 8/16/2023    Ry Horner is a 58 year old male who presents for a preoperative evaluation.      Surgical Information:  Surgery/Procedure: Left below-knee amputation stump revision with antibiotic bead placement WOUND VAC PLACEMENT   Surgery Location: [facility]  Surgeon: Reny Child MD   Surgery Date: 8/17/2023  Time of Surgery: 7:30 AM   Where patient plans to recover: At home with family  Fax number for surgical facility: Note does not need to be faxed, will be available electronically in Epic.    Assessment & Plan     The proposed surgical procedure is considered INTERMEDIATE risk.    Preop general physical exam  Suitable candidate for planned procedure tomorrow morning    Ulcer of amputation stump of lower extremity (H)  Continue follow-up with vascular surgery as directed, may need wound VAC following surgery    Type 2 diabetes mellitus with diabetic polyneuropathy, without long-term current use of insulin (H)  Improving control, recheck A1c at 3-month interval; reduce long-acting insulin tonight and tomorrow by 50%    Hypertension, unspecified type  Under good control in clinic today, continue current medications which include amlodipine, carvedilol and hydrochlorothiazide.  However, hold hydrochlorothiazide on the a.m. of surgery.  Take carvedilol and amlodipine with a sip of water on the way to the surgery center.    CKD (chronic kidney disease), stage IV (H)  Continue follow-up with nephrology as directed, SGLT2 inhibitor recently stopped, consider restarting ACE inhibitor or ARB in the future and restarting SGLT2 inhibitor in the future if GFR improves           Risks and Recommendations:  The patient has the following additional risks  and recommendations for perioperative complications:   - Consult Hospitalist / IM to assist with post-op medical management    Diabetes:  - Patient is on insulin therapy; diabetic NPO guidelines provided and discussed.  Anemia/Bleeding/Clotting:    - Anemia and does not require treatment prior to surgery. Monitor hemoglobin postoperatively    Antiplatelet or Anticoagulation Medication Instructions:   - aspirin: Discontinue aspirin 7-10 days prior to procedure to reduce bleeding risk. It should be resumed postoperatively.     Additional Medication Instructions:  Patient is to take all scheduled medications on the day of surgery EXCEPT for modifications listed below:   - Beta Blockers: Continue taking on the day of surgery.   - Calcium Channel Blockers: May be continued on the day of surgery.   - Diuretics: HOLD on the day of surgery.   - Long acting insulin (e.g. glargine, detemir): Take 50% of the usual evening or morning dose before surgery.     RECOMMENDATION:  APPROVAL GIVEN to proceed with proposed procedure, without further diagnostic evaluation.      No LOS data to display   Time spent by me doing chart review, history and exam, documentation and further activities per the note      Subjective       HPI related to upcoming procedure:    58-year-old man with diabetes, hypertension, hyperlipidemia with statin intolerance, history of right leg amputation, history of left leg amputation, CKD with recent JOHNATHAN.  Difficulties with left leg amputation stump site wound.  He needs to have a wound debridement with wound VAC placement tomorrow.  He denies new chest pains or dyspnea.  He otherwise feels well.          8/16/2023     3:52 PM   Preop Questions   1. Have you ever had a heart attack or stroke? No   2. Have you ever had surgery on your heart or blood vessels, such as a stent placement, a coronary artery bypass, or surgery on an artery in your head, neck, heart, or legs? YES -    3. Do you have chest pain with  activity? No   4. Do you have a history of  heart failure? No   5. Do you currently have a cold, bronchitis or symptoms of other infection? No   6. Do you have a cough, shortness of breath, or wheezing? No   7. Do you or anyone in your family have previous history of blood clots? No   8. Do you or does anyone in your family have a serious bleeding problem such as prolonged bleeding following surgeries or cuts? No   9. Have you ever had problems with anemia or been told to take iron pills? YES -    10. Have you had any abnormal blood loss such as black, tarry or bloody stools? No   11. Have you ever had a blood transfusion? YES -    11a. Have you ever had a transfusion reaction? No   12. Are you willing to have a blood transfusion if it is medically needed before, during, or after your surgery? Yes   13. Have you or any of your relatives ever had problems with anesthesia? No   14. Do you have sleep apnea, excessive snoring or daytime drowsiness? No   15. Do you have any artifical heart valves or other implanted medical devices like a pacemaker, defibrillator, or continuous glucose monitor? No   16. Do you have artificial joints? No   17. Are you allergic to latex? No     Health Care Directive:  Patient has a Health Care Directive on file          Review of Systems  Constitutional, neuro, ENT, endocrine, pulmonary, cardiac, gastrointestinal, genitourinary, musculoskeletal, integument and psychiatric systems are negative, except as otherwise noted.    Patient Active Problem List    Diagnosis Date Noted    Acute kidney failure with tubular necrosis (H) 07/02/2023     Priority: Medium    Acute kidney injury (H) 06/24/2023     Priority: Medium    Severe sepsis (H) 06/24/2023     Priority: Medium    Ulcer of amputation stump of lower extremity (H) 06/24/2023     Priority: Medium    Cellulitis of left lower extremity 09/23/2022     Priority: Medium    Diabetic ulcer of left foot associated with type 2 diabetes mellitus,  with muscle involvement without evidence of necrosis, unspecified part of foot (H) 09/23/2022     Priority: Medium    Open wound of plantar aspect of foot, left, subsequent encounter 10/01/2021     Priority: Medium    CKD (chronic kidney disease), stage IV (H) 07/01/2021     Priority: Medium    Morbid obesity (H) 02/22/2021     Priority: Medium    Cellulitis in diabetic foot (H) 02/03/2020     Priority: Medium    Type 2 diabetes mellitus with diabetic polyneuropathy, without long-term current use of insulin (H) 07/11/2019     Priority: Medium    Status post below knee amputation, right (H) 09/14/2017     Priority: Medium    Open wound of right foot 08/28/2017     Priority: Medium    Non compliance w medication regimen 02/09/2015     Priority: Medium    Leukocytosis 02/06/2015     Priority: Medium    SIRS due to infectious process without acute organ dysfunction 02/06/2015     Priority: Medium    Diabetic foot infection (H) 02/06/2015     Priority: Medium    Cellulitis of eyelid 06/27/2014     Priority: Medium    Cellulitis and abscess of buttock 10/30/2011     Priority: Medium    Hypertension 10/30/2011     Priority: Medium    Tachycardia 10/30/2011     Priority: Medium    Diabetes mellitus, type 2 (H) 04/04/2007     Priority: Medium     Problem list name updated by automated process. Provider to review    Formatting of this note might be different from the original.  a system change updated this record. This will not affect patient care or billing. This comment can be deleted.      Essential hypertension, benign 04/04/2007     Priority: Medium    Hyperlipidemia LDL goal <100 04/04/2007     Priority: Medium     Problem list name updated by automated process. Provider to review      Impotence of organic origin 04/04/2007     Priority: Medium      Past Medical History:   Diagnosis Date    BENIGN HYPERTENSION 4/4/2007    DIABETES MELLITUS TYPE II-UNCOMPL 4/4/2007    HYPERLIPIDEMIA NEC/NOS 4/4/2007    Tobacco use  disorder 4/4/2007     Past Surgical History:   Procedure Laterality Date    AMPUTATE FOOT Left 11/17/2019    Procedure: LEFT PARTIAL FOOT AMPUTATION;  Surgeon: Antoine Pena DPM;  Location: [facility]    AMPUTATE FOOT Left 12/4/2019    Procedure: LEFT PARTIAL FOOT AMPUTATION;  Surgeon: Tim Douglas DPM;  Location: [facility]    AMPUTATE FOOT Left 12/11/2019    Procedure: POSSIBLE PARTIAL FOOT AMPUTATION;  Surgeon: Tim Douglas DPM;  Location: [facility]    AMPUTATE FOOT Left 2/10/2022    Procedure: Partial left foot amputation;  Surgeon: Milena Cevallos DPM, Podiatry/Foot and Ankle Surgery;  Location: [facility]    AMPUTATE LEG BELOW KNEE Right 9/1/2017    Procedure: AMPUTATE LEG BELOW KNEE;  RIGHT BELOW KNEE AMPUTATION ;  Surgeon: Nikolas Nasciemnto MD;  [facility]    AMPUTATE LEG BELOW KNEE Left 9/27/2022    Procedure: AMPUTATION BELOW KNEE;  Surgeon: Neal Blackman MD;  [facility]    AMPUTATE LEG BELOW KNEE Left 9/29/2022    Procedure: LEFT SIDE COMPLETION BELOW THE KNEE AMPUTATION;  Surgeon: Reny Child MD;  [facility]    AMPUTATE REVISION STUMP LOWER EXTREMITY Left 7/6/2023    Procedure: LEFT BELOW KNEE AMPUTATION STUMP DEBRIDEMENT;  Surgeon: Reny Child MD;  [facility]    AMPUTATE REVISION STUMP LOWER EXTREMITY Left 7/10/2023    Procedure: LEFT BELOW KNEE AMPUTATION STUMP IRRIGATION AND DEBRIDEMENT;  Surgeon: Reny Child MD;  [facility]    AMPUTATE TOE(S) Left 2/3/2020    Procedure: LEFT SECOND TOE AMPUTATION;  Surgeon: Milena Cevallos DPM, Podiatry/Foot and Ankle Surgery;  [facility]    APPENDECTOMY      APPLY WOUND VAC Right 3/2/2015    Procedure: APPLY WOUND VAC;  Surgeon: Milena Cevallos DPM, Pod;  Location: [facility]    BIOPSY BONE FOOT Right 7/15/2016    Procedure: BIOPSY BONE FOOT;  Surgeon: Tim Douglas DPM;  [facility]    BIOPSY BONE TOE Left 12/4/2019    Procedure: BONE BIOPSY LEFT SECOND TOE;  Surgeon:  Tim Douglas DPM;  [facility]    IRRIGATION AND DEBRIDEMENT FOOT, COMBINED Right 3/2/2015    Procedure: COMBINED IRRIGATION AND DEBRIDEMENT FOOT;  Surgeon: Milena Cevallos DPM, Pod;  Location: [facility]    IRRIGATION AND DEBRIDEMENT FOOT, COMBINED Right 7/15/2016    Procedure: COMBINED IRRIGATION AND DEBRIDEMENT FOOT;  Surgeon: Tim Douglas DPM;  [facility]    IRRIGATION AND DEBRIDEMENT FOOT, COMBINED Right 7/20/2016    Procedure: COMBINED IRRIGATION AND DEBRIDEMENT FOOT;  Surgeon: Tim Douglas DPM;  [facility]    IRRIGATION AND DEBRIDEMENT FOOT, COMBINED Left 11/19/2019    Procedure: REVISIONAL IRRIGATION AND DEBRIDEMENT LEFT FOOT AND BONE DEBRIDEMENT;  Surgeon: Joseph Randhawa DPM;  [facility]    IRRIGATION AND DEBRIDEMENT FOOT, COMBINED Left 12/4/2019    Procedure: EXCISIONAL DEBRIDEMENT LEFT FOOT;  Surgeon: Tim Douglas DPM;  [facility]    IRRIGATION AND DEBRIDEMENT FOOT, COMBINED Left 12/11/2019    Procedure: IRRIGATION AND DEBRIDEMENT FOOT, Partial osteotomy left first metatarsal;  Surgeon: Tim Douglas DPM;  [facility]    IRRIGATION AND DEBRIDEMENT FOOT, COMBINED Left 2/5/2020    Procedure: IRRIGATION AND DEBRIDEMENT LEFT FOOT;  Surgeon: Tim Douglas DPM;  [facility]    IRRIGATION AND DEBRIDEMENT LOWER EXTREMITY, COMBINED Left 6/30/2023    Procedure: Irrigation and debridement lower extremity amputation stump- left;  Surgeon: Reny Child MD;  Location:  OR    ORTHOPEDIC SURGERY       Current Outpatient Medications   Medication Sig Dispense Refill    acetaminophen (TYLENOL) 325 MG tablet Take 2 tablets (650 mg) by mouth every 6 hours as needed for mild pain Alternates use with ibuprofen 90 tablet 0    amLODIPine (NORVASC) 10 MG tablet Take 1 tablet (10 mg) by mouth daily 90 tablet 3    aspirin 81 MG chewable tablet Take 1 tablet (81 mg) by mouth daily 108 tablet 3    BD PEN NEEDLE MEGAN 2ND GEN 32G X 4 MM  "miscellaneous USE WITH INSULIN INJECTIONS UNDER THE SKIN TWO TIMES A DAY AS DIRECTED 180 each 11    blood glucose (NO BRAND SPECIFIED) lancets standard Use to test blood sugar FOUR times daily, to match lancing device per insurance 400 each 1    blood glucose (NO BRAND SPECIFIED) test strip 120 strips by In Vitro route 4 times daily Use to test blood sugar up to 4 times daily or as directed. 360 strip 3    blood glucose monitoring (NO BRAND SPECIFIED) meter device kit Use to test blood sugar FOUR times daily, brand per insurance 1 kit 0    carvedilol (COREG) 12.5 MG tablet Take 3 tablets (37.5 mg) by mouth 2 times daily (with meals) 540 tablet 3    Continuous Blood Gluc Sensor (FREESTYLE CRISTINA 3 SENSOR) [facility] 1 each every 14 days Use to check blood sugars per  guidelines with Cristina 3 dionisio 2 each 5    ezetimibe (ZETIA) 10 MG tablet TAKE ONE TABLET BY MOUTH EVERY DAY 90 tablet 1    ferrous sulfate (FEROSUL) 325 (65 Fe) MG tablet Take 1 tablet (325 mg) by mouth every other day 30 tablet 0    hydrochlorothiazide (HYDRODIURIL) 25 MG tablet Take 1 tablet (25 mg) by mouth daily 90 tablet 3    insulin degludec (TRESIBA FLEXTOUCH) 100 UNIT/ML pen Inject 25 Units Subcutaneous every 12 hours 15 mL 11    ipratropium (ATROVENT) 0.06 % nasal spray INSTILL TWO SPRAYS INTO EACH NOSTRIL FOUR TIMES A DAY AS NEEDED FOR RHINITIS 15 mL 11    Lancets (ONETOUCH DELICA PLUS [id number]) MISC USE TO TEST BLOOD SUGAR FOUR TIMES A  each 1    multivitamin (CENTRUM SILVER) tablet Take 1 tablet by mouth daily      STATIN NOT PRESCRIBED (INTENTIONAL) Please choose reason not prescribed, below         Allergies   Allergen Reactions    Pravastatin      \"sucked the life blood out of me,\" Indicates this occurs with all statins.    Statins         Social History     Tobacco Use    Smoking status: Former     Packs/day: 0.50     Years: 20.00     Pack years: 10.00     Types: Cigarettes     Start date: 5/7/1987     Quit date: 2006     Years " since quittin.6    Smokeless tobacco: Never   Substance Use Topics    Alcohol use: Yes     Comment: occ     Family History   Problem Relation Age of Onset    Genetic Disorder Other     Genetic Disorder Other     Psychotic Disorder Mother     Diabetes Father     Lung Cancer Father     Hypertension Father     Genetic Disorder Maternal Grandmother     Genetic Disorder Maternal Grandfather     Asthma Sister     Breast Cancer Sister     C.A.D. No family hx of     Hypertension No family hx of     Cerebrovascular Disease No family hx of     Breast Cancer No family hx of     Cancer - colorectal No family hx of     Prostate Cancer No family hx of     Alcohol/Drug No family hx of      History   Drug Use No         Objective     /83 (BP Location: Left arm, Patient Position: Sitting, Cuff Size: Adult Large)   Pulse 89   Temp 98  F (36.7  C) (Oral)   Resp 20   SpO2 97%     Physical Exam    GENERAL APPEARANCE: healthy, alert and no distress     EYES: EOMI,  PERRL     HENT: ear canals and TM's normal and nose and mouth without ulcers or lesions     NECK: no adenopathy, no asymmetry, masses, or scars and thyroid normal to palpation     RESP: lungs clear to auscultation - no rales, rhonchi or wheezes     CV: regular rates and rhythm, normal S1 S2, no S3 or S4 and no murmur, click or rub     ABDOMEN:  soft, nontender, no HSM or masses and bowel sounds normal     SKIN: no suspicious lesions or rashes; there is a bandage over the left amputation site stump that is clean dry and intact     NEURO: Normal strength and tone, sensory exam grossly normal, mentation intact and speech normal     PSYCH: mentation appears normal. and affect normal/bright     LYMPHATICS: No cervical adenopathy    Recent Labs   Lab Test 23  1021 23  1352 23  0832 23  0802 23  1502 23  1612 23  1436   HGB  --  10.5*  --  8.2*   < >  --    < > 14.1   PLT  --  211  --  232   < >  --    < >  --     INR  --   --   --   --   --  1.06  --   --     141   < > 140   < >  --    < >  --    POTASSIUM 3.8 3.3*   < > 3.3*   < >  --    < >  --    CR 3.14* 2.85*   < > 3.42*   < >  --    < >  --    A1C  --   --   --   --   --  7.6*  --  10.4*    < > = values in this interval not displayed.        Diagnostics:  Labs reviewed, stable renal function, improved hemoglobin, labs okay for proceeding with surgery  EKG dated 7/20 shows sinus rhythm rate 85 with a QTc of 483 no acute ST segment changes    Revised Cardiac Risk Index (RCRI):  The patient has the following serious cardiovascular risks for perioperative complications:   - Diabetes Mellitus (on Insulin) = 1 point   - Serum Creatinine >2.0 mg/dl = 1 point     RCRI Interpretation: 2 points: Class III (moderate risk - 6.6% complication rate)     Estimated Functional Capacity: Performs 4 METS exercise without symptoms (e.g., light housework, stairs, 4 mph walk, 7 mph bike, slow step dance)           Signed Electronically by: Kody Wing MD  Copy of this evaluation report is provided to requesting physician.

## 2023-08-16 NOTE — PROGRESS NOTES
PTA medications updated by Medication Scribe prior to surgery via phone call with patient (last doses completed by Nurse)     Medication history sources: Patient, Surescripts, and H&P  In the past week, patient estimated taking medication this percent of the time: Greater than 90%      Significant changes made to the medication list:  None      Additional medication history information:   None    Medication reconciliation completed by provider prior to medication history? No    Time spent in this activity: 30 minutes    The information provided in this note is only as accurate as the sources available at the time of update(s)    Prior to Admission medications    Medication Sig Last Dose Taking? Auth Provider Long Term End Date   acetaminophen (TYLENOL) 325 MG tablet Take 2 tablets (650 mg) by mouth every 6 hours as needed for mild pain Alternates use with ibuprofen Unknown at prn Yes Rosanna Ceballos MD     amLODIPine (NORVASC) 10 MG tablet Take 1 tablet (10 mg) by mouth daily 8/16/2023 at am Yes Kody Wing MD Yes    aspirin 81 MG chewable tablet Take 1 tablet (81 mg) by mouth daily 8/16/2023 at am Yes Kody Wing MD     carvedilol (COREG) 12.5 MG tablet Take 3 tablets (37.5 mg) by mouth 2 times daily (with meals)  at am Yes Kody Wing MD Yes    ezetimibe (ZETIA) 10 MG tablet TAKE ONE TABLET BY MOUTH EVERY DAY 8/16/2023 at am Yes Kody Wing MD Yes    ferrous sulfate (FEROSUL) 325 (65 Fe) MG tablet Take 1 tablet (325 mg) by mouth every other day 8/16/2023 at am Yes Rosanna Ceballos MD     hydrochlorothiazide (HYDRODIURIL) 25 MG tablet Take 1 tablet (25 mg) by mouth daily 8/16/2023 at am Yes Kody Wing MD Yes    insulin degludec (TRESIBA FLEXTOUCH) 100 UNIT/ML pen Inject 25 Units Subcutaneous every 12 hours 8/16/2023 at am Yes Kody Wing MD No    ipratropium (ATROVENT) 0.06 % nasal spray INSTILL TWO SPRAYS INTO EACH NOSTRIL FOUR TIMES A DAY AS NEEDED FOR RHINITIS Unknown at prn Yes  Kody Wing MD     lisinopril (ZESTRIL) 5 MG tablet Take 1 tablet by mouth daily Unknown at Unknown Yes Reported, Patient No    multivitamin (CENTRUM SILVER) tablet Take 1 tablet by mouth daily 8/16/2023 at am Yes Unknown, Entered By History     BD PEN NEEDLE MEGAN 2ND GEN 32G X 4 MM miscellaneous USE WITH INSULIN INJECTIONS UNDER THE SKIN TWO TIMES A DAY AS DIRECTED   Kody Wing MD     blood glucose (NO BRAND SPECIFIED) lancets standard Use to test blood sugar FOUR times daily, to match lancing device per insurance   Kody Wing MD     blood glucose (NO BRAND SPECIFIED) test strip 120 strips by In Vitro route 4 times daily Use to test blood sugar up to 4 times daily or as directed.   Kody Wing MD     blood glucose monitoring (NO BRAND SPECIFIED) meter device kit Use to test blood sugar FOUR times daily, brand per insurance   Kody Wing MD     Continuous Blood Gluc Sensor (FREESTYLE SJ 3 SENSOR) MISC 1 each every 14 days Use to check blood sugars per  guidelines with Sj 3 dionisio   Kody Wing MD     empagliflozin (JARDIANCE) 10 MG TABS tablet Take 1 tablet (10 mg) by mouth daily 8/10/2023 at am  Kody Wing MD     Lancets (ONETOUCH DELICA PLUS [id number]) MISC USE TO TEST BLOOD SUGAR FOUR TIMES A DAY   Kody Wing MD     potassium chloride ER (KLOR-CON M) 10 MEQ CR tablet Take 1 tablet (10 mEq) by mouth 2 times daily 8/10/2023 at pm  Kody Wing MD     STATIN NOT PRESCRIBED (INTENTIONAL) Please choose reason not prescribed, below   Kody Wing MD         Medication history completed by:    Raj Shell CPhT  Medication [facility]

## 2023-08-16 NOTE — TELEPHONE ENCOUNTER
Writer called and spoke with patient who is agreeable to 4:00pm appointment today with Dr. Wing, appears appointment was already scheduled, patient is appreciative of work in from PCP:    8/16/2023 4:00 PM (Arrive by 3:40 PM) Kody Wing MD [facility]     No further questions or concerns at this time.    Signing encounter.    Alley Berman RN  [facility]

## 2023-08-16 NOTE — TELEPHONE ENCOUNTER
No callback number was listed. Writer called [facility] at [phone] and transferred to Orthopedic Surgery department - unable to reach staff - call was ended.    Triage to recall [facility]     Alley Berman RN  [facility]

## 2023-08-16 NOTE — PATIENT INSTRUCTIONS
On the AM of surgery take your amlodipine and carvedilol with a sip of water.    Reduce your Tresiba insulin dose to 12 units on the night prior to surgery (tonight) and 12 units on the AM of surgery.

## 2023-08-17 ENCOUNTER — APPOINTMENT (OUTPATIENT)
Dept: SURGERY | Facility: PHYSICIAN GROUP | Age: 58
End: 2023-08-17
Payer: COMMERCIAL

## 2023-08-17 ENCOUNTER — HOSPITAL ENCOUNTER (INPATIENT)
Facility: CLINIC | Age: 58
LOS: 1 days | Discharge: HOME | DRG: 475 | End: 2023-08-18
Attending: SURGERY | Admitting: SURGERY
Payer: COMMERCIAL

## 2023-08-17 ENCOUNTER — ANESTHESIA (OUTPATIENT)
Dept: SURGERY | Facility: CLINIC | Age: 58
DRG: 475 | End: 2023-08-17
Payer: COMMERCIAL

## 2023-08-17 DIAGNOSIS — L97.909 ULCER OF AMPUTATION STUMP OF LOWER EXTREMITY (H): Primary | ICD-10-CM

## 2023-08-17 DIAGNOSIS — T87.89 ULCER OF AMPUTATION STUMP OF LOWER EXTREMITY (H): Primary | ICD-10-CM

## 2023-08-17 LAB
ABO/RH(D): NORMAL
ANTIBODY SCREEN: NEGATIVE
CHOLEST SERPL-MCNC: 181 MG/DL
CREAT SERPL-MCNC: 2.7 MG/DL (ref 0.67–1.17)
GFR SERPL CREATININE-BSD FRML MDRD: 27 ML/MIN/1.73M2
GLUCOSE SERPL-MCNC: 190 MG/DL (ref 70–99)
HBA1C MFR BLD: 6.7 %
HDLC SERPL-MCNC: 32 MG/DL
LDLC SERPL CALC-MCNC: 110 MG/DL
NONHDLC SERPL-MCNC: 149 MG/DL
POTASSIUM SERPL-SCNC: 3.8 MMOL/L (ref 3.4–5.3)
SPECIMEN EXPIRATION DATE: NORMAL
TRIGL SERPL-MCNC: 196 MG/DL

## 2023-08-17 PROCEDURE — 250N000011 HC RX IP 250 OP 636: Mod: JZ | Performed by: STUDENT IN AN ORGANIZED HEALTH CARE EDUCATION/TRAINING PROGRAM

## 2023-08-17 PROCEDURE — 93005 ELECTROCARDIOGRAM TRACING: CPT

## 2023-08-17 PROCEDURE — 250N000025 HC SEVOFLURANE, PER MIN: Performed by: SURGERY

## 2023-08-17 PROCEDURE — 88311 DECALCIFY TISSUE: CPT | Mod: TC | Performed by: SURGERY

## 2023-08-17 PROCEDURE — 82565 ASSAY OF CREATININE: CPT | Performed by: STUDENT IN AN ORGANIZED HEALTH CARE EDUCATION/TRAINING PROGRAM

## 2023-08-17 PROCEDURE — 258N000003 HC RX IP 258 OP 636: Performed by: STUDENT IN AN ORGANIZED HEALTH CARE EDUCATION/TRAINING PROGRAM

## 2023-08-17 PROCEDURE — 27886 AMPUTATION FOLLOW-UP SURGERY: CPT | Mod: LT | Performed by: SURGERY

## 2023-08-17 PROCEDURE — 250N000009 HC RX 250: Performed by: REGISTERED NURSE

## 2023-08-17 PROCEDURE — 120N000001 HC R&B MED SURG/OB

## 2023-08-17 PROCEDURE — 370N000017 HC ANESTHESIA TECHNICAL FEE, PER MIN: Performed by: SURGERY

## 2023-08-17 PROCEDURE — 86901 BLOOD TYPING SEROLOGIC RH(D): CPT | Performed by: SURGERY

## 2023-08-17 PROCEDURE — 88304 TISSUE EXAM BY PATHOLOGIST: CPT | Mod: 26 | Performed by: PATHOLOGY

## 2023-08-17 PROCEDURE — 93010 ELECTROCARDIOGRAM REPORT: CPT | Performed by: INTERNAL MEDICINE

## 2023-08-17 PROCEDURE — 250N000013 HC RX MED GY IP 250 OP 250 PS 637: Performed by: STUDENT IN AN ORGANIZED HEALTH CARE EDUCATION/TRAINING PROGRAM

## 2023-08-17 PROCEDURE — 82947 ASSAY GLUCOSE BLOOD QUANT: CPT | Performed by: STUDENT IN AN ORGANIZED HEALTH CARE EDUCATION/TRAINING PROGRAM

## 2023-08-17 PROCEDURE — 258N000003 HC RX IP 258 OP 636: Performed by: REGISTERED NURSE

## 2023-08-17 PROCEDURE — 80061 LIPID PANEL: CPT | Performed by: SURGERY

## 2023-08-17 PROCEDURE — 710N000009 HC RECOVERY PHASE 1, LEVEL 1, PER MIN: Performed by: SURGERY

## 2023-08-17 PROCEDURE — 250N000012 HC RX MED GY IP 250 OP 636 PS 637: Performed by: STUDENT IN AN ORGANIZED HEALTH CARE EDUCATION/TRAINING PROGRAM

## 2023-08-17 PROCEDURE — 88311 DECALCIFY TISSUE: CPT | Mod: 26 | Performed by: PATHOLOGY

## 2023-08-17 PROCEDURE — 84132 ASSAY OF SERUM POTASSIUM: CPT | Performed by: STUDENT IN AN ORGANIZED HEALTH CARE EDUCATION/TRAINING PROGRAM

## 2023-08-17 PROCEDURE — 360N000076 HC SURGERY LEVEL 3, PER MIN: Performed by: SURGERY

## 2023-08-17 PROCEDURE — 36415 COLL VENOUS BLD VENIPUNCTURE: CPT | Performed by: STUDENT IN AN ORGANIZED HEALTH CARE EDUCATION/TRAINING PROGRAM

## 2023-08-17 PROCEDURE — 250N000011 HC RX IP 250 OP 636: Performed by: SURGERY

## 2023-08-17 PROCEDURE — 83036 HEMOGLOBIN GLYCOSYLATED A1C: CPT | Performed by: SURGERY

## 2023-08-17 PROCEDURE — 250N000011 HC RX IP 250 OP 636: Mod: JZ | Performed by: REGISTERED NURSE

## 2023-08-17 PROCEDURE — 272N000001 HC OR GENERAL SUPPLY STERILE: Performed by: SURGERY

## 2023-08-17 PROCEDURE — 86850 RBC ANTIBODY SCREEN: CPT | Performed by: SURGERY

## 2023-08-17 PROCEDURE — 250N000009 HC RX 250: Performed by: SURGERY

## 2023-08-17 PROCEDURE — C1763 CONN TISS, NON-HUMAN: HCPCS | Performed by: SURGERY

## 2023-08-17 PROCEDURE — 999N000141 HC STATISTIC PRE-PROCEDURE NURSING ASSESSMENT: Performed by: SURGERY

## 2023-08-17 PROCEDURE — 0Y6J0Z3 DETACHMENT AT LEFT LOWER LEG, LOW, OPEN APPROACH: ICD-10-PCS | Performed by: SURGERY

## 2023-08-17 PROCEDURE — 250N000011 HC RX IP 250 OP 636: Performed by: STUDENT IN AN ORGANIZED HEALTH CARE EDUCATION/TRAINING PROGRAM

## 2023-08-17 DEVICE — RESORBABLE BEAD KIT - FAST CURE
Type: IMPLANTABLE DEVICE | Site: LEG | Status: FUNCTIONAL
Brand: OSTEOSET

## 2023-08-17 RX ORDER — LIDOCAINE HYDROCHLORIDE 20 MG/ML
INJECTION, SOLUTION INFILTRATION; PERINEURAL PRN
Status: DISCONTINUED | OUTPATIENT
Start: 2023-08-17 | End: 2023-08-17

## 2023-08-17 RX ORDER — ACETAMINOPHEN 325 MG/1
650 TABLET ORAL EVERY 6 HOURS
Status: DISCONTINUED | OUTPATIENT
Start: 2023-08-17 | End: 2023-08-18 | Stop reason: HOSPADM

## 2023-08-17 RX ORDER — NALOXONE HYDROCHLORIDE 0.4 MG/ML
0.4 INJECTION, SOLUTION INTRAMUSCULAR; INTRAVENOUS; SUBCUTANEOUS
Status: DISCONTINUED | OUTPATIENT
Start: 2023-08-17 | End: 2023-08-18 | Stop reason: HOSPADM

## 2023-08-17 RX ORDER — EPHEDRINE SULFATE 50 MG/ML
INJECTION, SOLUTION INTRAMUSCULAR; INTRAVENOUS; SUBCUTANEOUS PRN
Status: DISCONTINUED | OUTPATIENT
Start: 2023-08-17 | End: 2023-08-17

## 2023-08-17 RX ORDER — HYDROMORPHONE HCL IN WATER/PF 6 MG/30 ML
0.2 PATIENT CONTROLLED ANALGESIA SYRINGE INTRAVENOUS
Status: DISCONTINUED | OUTPATIENT
Start: 2023-08-17 | End: 2023-08-18 | Stop reason: HOSPADM

## 2023-08-17 RX ORDER — ONDANSETRON 2 MG/ML
4 INJECTION INTRAMUSCULAR; INTRAVENOUS EVERY 6 HOURS PRN
Status: DISCONTINUED | OUTPATIENT
Start: 2023-08-17 | End: 2023-08-18 | Stop reason: HOSPADM

## 2023-08-17 RX ORDER — PROPOFOL 10 MG/ML
INJECTION, EMULSION INTRAVENOUS PRN
Status: DISCONTINUED | OUTPATIENT
Start: 2023-08-17 | End: 2023-08-17

## 2023-08-17 RX ORDER — NICOTINE POLACRILEX 4 MG
15-30 LOZENGE BUCCAL
Status: DISCONTINUED | OUTPATIENT
Start: 2023-08-17 | End: 2023-08-18 | Stop reason: HOSPADM

## 2023-08-17 RX ORDER — LIDOCAINE 40 MG/G
CREAM TOPICAL
Status: CANCELLED | OUTPATIENT
Start: 2023-08-17

## 2023-08-17 RX ORDER — POLYETHYLENE GLYCOL 3350 17 G/17G
17 POWDER, FOR SOLUTION ORAL DAILY
Status: DISCONTINUED | OUTPATIENT
Start: 2023-08-18 | End: 2023-08-18 | Stop reason: HOSPADM

## 2023-08-17 RX ORDER — HYDROMORPHONE HCL IN WATER/PF 6 MG/30 ML
0.2 PATIENT CONTROLLED ANALGESIA SYRINGE INTRAVENOUS EVERY 5 MIN PRN
Status: DISCONTINUED | OUTPATIENT
Start: 2023-08-17 | End: 2023-08-17 | Stop reason: HOSPADM

## 2023-08-17 RX ORDER — HYDROMORPHONE HCL IN WATER/PF 6 MG/30 ML
0.4 PATIENT CONTROLLED ANALGESIA SYRINGE INTRAVENOUS EVERY 5 MIN PRN
Status: DISCONTINUED | OUTPATIENT
Start: 2023-08-17 | End: 2023-08-17 | Stop reason: HOSPADM

## 2023-08-17 RX ORDER — PROCHLORPERAZINE MALEATE 10 MG
10 TABLET ORAL EVERY 6 HOURS PRN
Status: DISCONTINUED | OUTPATIENT
Start: 2023-08-17 | End: 2023-08-18 | Stop reason: HOSPADM

## 2023-08-17 RX ORDER — HYDROCHLOROTHIAZIDE 25 MG/1
25 TABLET ORAL DAILY
Status: DISCONTINUED | OUTPATIENT
Start: 2023-08-18 | End: 2023-08-18 | Stop reason: HOSPADM

## 2023-08-17 RX ORDER — FENTANYL CITRATE 0.05 MG/ML
25 INJECTION, SOLUTION INTRAMUSCULAR; INTRAVENOUS EVERY 5 MIN PRN
Status: DISCONTINUED | OUTPATIENT
Start: 2023-08-17 | End: 2023-08-17 | Stop reason: HOSPADM

## 2023-08-17 RX ORDER — CEFAZOLIN SODIUM/WATER 2 G/20 ML
2 SYRINGE (ML) INTRAVENOUS
Status: COMPLETED | OUTPATIENT
Start: 2023-08-17 | End: 2023-08-17

## 2023-08-17 RX ORDER — FENTANYL CITRATE 50 UG/ML
INJECTION, SOLUTION INTRAMUSCULAR; INTRAVENOUS PRN
Status: DISCONTINUED | OUTPATIENT
Start: 2023-08-17 | End: 2023-08-17

## 2023-08-17 RX ORDER — AMLODIPINE BESYLATE 5 MG/1
10 TABLET ORAL DAILY
Status: DISCONTINUED | OUTPATIENT
Start: 2023-08-18 | End: 2023-08-18 | Stop reason: HOSPADM

## 2023-08-17 RX ORDER — CEFAZOLIN SODIUM 1 G/3ML
1 INJECTION, POWDER, FOR SOLUTION INTRAMUSCULAR; INTRAVENOUS EVERY 8 HOURS
Status: DISCONTINUED | OUTPATIENT
Start: 2023-08-17 | End: 2023-08-17

## 2023-08-17 RX ORDER — ONDANSETRON 4 MG/1
4 TABLET, ORALLY DISINTEGRATING ORAL EVERY 6 HOURS PRN
Status: DISCONTINUED | OUTPATIENT
Start: 2023-08-17 | End: 2023-08-18 | Stop reason: HOSPADM

## 2023-08-17 RX ORDER — HEPARIN SODIUM 5000 [USP'U]/.5ML
5000 INJECTION, SOLUTION INTRAVENOUS; SUBCUTANEOUS EVERY 8 HOURS
Status: DISCONTINUED | OUTPATIENT
Start: 2023-08-18 | End: 2023-08-18 | Stop reason: HOSPADM

## 2023-08-17 RX ORDER — OXYCODONE HYDROCHLORIDE 5 MG/1
10 TABLET ORAL EVERY 4 HOURS PRN
Status: DISCONTINUED | OUTPATIENT
Start: 2023-08-17 | End: 2023-08-18 | Stop reason: HOSPADM

## 2023-08-17 RX ORDER — ONDANSETRON 2 MG/ML
INJECTION INTRAMUSCULAR; INTRAVENOUS PRN
Status: DISCONTINUED | OUTPATIENT
Start: 2023-08-17 | End: 2023-08-17

## 2023-08-17 RX ORDER — BISACODYL 10 MG
10 SUPPOSITORY, RECTAL RECTAL DAILY PRN
Status: DISCONTINUED | OUTPATIENT
Start: 2023-08-17 | End: 2023-08-18 | Stop reason: HOSPADM

## 2023-08-17 RX ORDER — GABAPENTIN 300 MG/1
300 CAPSULE ORAL 3 TIMES DAILY
Status: DISCONTINUED | OUTPATIENT
Start: 2023-08-17 | End: 2023-08-18 | Stop reason: HOSPADM

## 2023-08-17 RX ORDER — TOBRAMYCIN 1.2 G/30ML
2.4 INJECTION, POWDER, LYOPHILIZED, FOR SOLUTION INTRAVENOUS ONCE
Status: DISCONTINUED | OUTPATIENT
Start: 2023-08-17 | End: 2023-08-17

## 2023-08-17 RX ORDER — SODIUM CHLORIDE, SODIUM LACTATE, POTASSIUM CHLORIDE, CALCIUM CHLORIDE 600; 310; 30; 20 MG/100ML; MG/100ML; MG/100ML; MG/100ML
INJECTION, SOLUTION INTRAVENOUS CONTINUOUS
Status: DISCONTINUED | OUTPATIENT
Start: 2023-08-17 | End: 2023-08-17 | Stop reason: HOSPADM

## 2023-08-17 RX ORDER — MAGNESIUM HYDROXIDE 1200 MG/15ML
LIQUID ORAL PRN
Status: DISCONTINUED | OUTPATIENT
Start: 2023-08-17 | End: 2023-08-17 | Stop reason: HOSPADM

## 2023-08-17 RX ORDER — DEXAMETHASONE SODIUM PHOSPHATE 4 MG/ML
INJECTION, SOLUTION INTRA-ARTICULAR; INTRALESIONAL; INTRAMUSCULAR; INTRAVENOUS; SOFT TISSUE PRN
Status: DISCONTINUED | OUTPATIENT
Start: 2023-08-17 | End: 2023-08-17

## 2023-08-17 RX ORDER — NALOXONE HYDROCHLORIDE 0.4 MG/ML
0.2 INJECTION, SOLUTION INTRAMUSCULAR; INTRAVENOUS; SUBCUTANEOUS
Status: DISCONTINUED | OUTPATIENT
Start: 2023-08-17 | End: 2023-08-18 | Stop reason: HOSPADM

## 2023-08-17 RX ORDER — ONDANSETRON 2 MG/ML
4 INJECTION INTRAMUSCULAR; INTRAVENOUS EVERY 30 MIN PRN
Status: DISCONTINUED | OUTPATIENT
Start: 2023-08-17 | End: 2023-08-17 | Stop reason: HOSPADM

## 2023-08-17 RX ORDER — HYDROMORPHONE HYDROCHLORIDE 1 MG/ML
0.5 INJECTION, SOLUTION INTRAMUSCULAR; INTRAVENOUS; SUBCUTANEOUS
Status: DISCONTINUED | OUTPATIENT
Start: 2023-08-17 | End: 2023-08-18 | Stop reason: HOSPADM

## 2023-08-17 RX ORDER — AMOXICILLIN 250 MG
1 CAPSULE ORAL 2 TIMES DAILY
Status: DISCONTINUED | OUTPATIENT
Start: 2023-08-17 | End: 2023-08-18 | Stop reason: HOSPADM

## 2023-08-17 RX ORDER — DEXTROSE MONOHYDRATE 25 G/50ML
25-50 INJECTION, SOLUTION INTRAVENOUS
Status: DISCONTINUED | OUTPATIENT
Start: 2023-08-17 | End: 2023-08-18 | Stop reason: HOSPADM

## 2023-08-17 RX ORDER — ONDANSETRON 4 MG/1
4 TABLET, ORALLY DISINTEGRATING ORAL EVERY 30 MIN PRN
Status: DISCONTINUED | OUTPATIENT
Start: 2023-08-17 | End: 2023-08-17 | Stop reason: HOSPADM

## 2023-08-17 RX ORDER — SODIUM CHLORIDE 9 MG/ML
INJECTION, SOLUTION INTRAVENOUS CONTINUOUS
Status: ACTIVE | OUTPATIENT
Start: 2023-08-17 | End: 2023-08-17

## 2023-08-17 RX ORDER — OXYCODONE HYDROCHLORIDE 5 MG/1
15 TABLET ORAL EVERY 4 HOURS PRN
Status: DISCONTINUED | OUTPATIENT
Start: 2023-08-17 | End: 2023-08-18 | Stop reason: HOSPADM

## 2023-08-17 RX ORDER — FENTANYL CITRATE 0.05 MG/ML
50 INJECTION, SOLUTION INTRAMUSCULAR; INTRAVENOUS EVERY 5 MIN PRN
Status: DISCONTINUED | OUTPATIENT
Start: 2023-08-17 | End: 2023-08-17 | Stop reason: HOSPADM

## 2023-08-17 RX ORDER — CEFAZOLIN SODIUM 2 G/100ML
2 INJECTION, SOLUTION INTRAVENOUS EVERY 8 HOURS
Status: COMPLETED | OUTPATIENT
Start: 2023-08-17 | End: 2023-08-18

## 2023-08-17 RX ORDER — TOBRAMYCIN 1.2 G/30ML
INJECTION, POWDER, LYOPHILIZED, FOR SOLUTION INTRAVENOUS PRN
Status: DISCONTINUED | OUTPATIENT
Start: 2023-08-17 | End: 2023-08-17 | Stop reason: HOSPADM

## 2023-08-17 RX ADMIN — SUGAMMADEX 200 MG: 100 INJECTION, SOLUTION INTRAVENOUS at 09:26

## 2023-08-17 RX ADMIN — HYDROMORPHONE HYDROCHLORIDE 0.5 MG: 1 INJECTION, SOLUTION INTRAMUSCULAR; INTRAVENOUS; SUBCUTANEOUS at 09:22

## 2023-08-17 RX ADMIN — OXYCODONE HYDROCHLORIDE 15 MG: 5 TABLET ORAL at 12:27

## 2023-08-17 RX ADMIN — ROCURONIUM BROMIDE 10 MG: 50 INJECTION, SOLUTION INTRAVENOUS at 08:11

## 2023-08-17 RX ADMIN — INSULIN ASPART 1 UNITS: 100 INJECTION, SOLUTION INTRAVENOUS; SUBCUTANEOUS at 18:12

## 2023-08-17 RX ADMIN — SENNOSIDES AND DOCUSATE SODIUM 1 TABLET: 50; 8.6 TABLET ORAL at 21:42

## 2023-08-17 RX ADMIN — PHENYLEPHRINE HYDROCHLORIDE 200 MCG: 10 INJECTION INTRAVENOUS at 08:14

## 2023-08-17 RX ADMIN — PHENYLEPHRINE HYDROCHLORIDE 0.3 MCG/KG/MIN: 10 INJECTION INTRAVENOUS at 08:05

## 2023-08-17 RX ADMIN — Medication 5 MG: at 09:07

## 2023-08-17 RX ADMIN — ROCURONIUM BROMIDE 40 MG: 50 INJECTION, SOLUTION INTRAVENOUS at 07:48

## 2023-08-17 RX ADMIN — SODIUM CHLORIDE: 9 INJECTION, SOLUTION INTRAVENOUS at 11:18

## 2023-08-17 RX ADMIN — MIDAZOLAM 2 MG: 1 INJECTION INTRAMUSCULAR; INTRAVENOUS at 07:42

## 2023-08-17 RX ADMIN — LIDOCAINE HYDROCHLORIDE 100 MG: 20 INJECTION, SOLUTION INFILTRATION; PERINEURAL at 07:47

## 2023-08-17 RX ADMIN — INSULIN DEGLUDEC 25 UNITS: 100 INJECTION, SOLUTION SUBCUTANEOUS at 20:33

## 2023-08-17 RX ADMIN — ACETAMINOPHEN 650 MG: 325 TABLET, FILM COATED ORAL at 12:28

## 2023-08-17 RX ADMIN — PHENYLEPHRINE HYDROCHLORIDE 100 MCG: 10 INJECTION INTRAVENOUS at 09:23

## 2023-08-17 RX ADMIN — Medication 2 G: at 07:51

## 2023-08-17 RX ADMIN — PHENYLEPHRINE HYDROCHLORIDE 100 MCG: 10 INJECTION INTRAVENOUS at 07:58

## 2023-08-17 RX ADMIN — OXYCODONE HYDROCHLORIDE 15 MG: 5 TABLET ORAL at 18:14

## 2023-08-17 RX ADMIN — GABAPENTIN 300 MG: 300 CAPSULE ORAL at 15:44

## 2023-08-17 RX ADMIN — PROPOFOL 200 MG: 10 INJECTION, EMULSION INTRAVENOUS at 07:48

## 2023-08-17 RX ADMIN — SODIUM CHLORIDE, POTASSIUM CHLORIDE, SODIUM LACTATE AND CALCIUM CHLORIDE: 600; 310; 30; 20 INJECTION, SOLUTION INTRAVENOUS at 07:42

## 2023-08-17 RX ADMIN — ONDANSETRON 4 MG: 2 INJECTION INTRAMUSCULAR; INTRAVENOUS at 07:54

## 2023-08-17 RX ADMIN — CEFAZOLIN SODIUM 2 G: 2 INJECTION, SOLUTION INTRAVENOUS at 15:44

## 2023-08-17 RX ADMIN — CEFAZOLIN SODIUM 2 G: 2 INJECTION, SOLUTION INTRAVENOUS at 23:55

## 2023-08-17 RX ADMIN — GABAPENTIN 300 MG: 300 CAPSULE ORAL at 21:42

## 2023-08-17 RX ADMIN — Medication 5 MG: at 09:29

## 2023-08-17 RX ADMIN — DEXAMETHASONE SODIUM PHOSPHATE 4 MG: 4 INJECTION, SOLUTION INTRA-ARTICULAR; INTRALESIONAL; INTRAMUSCULAR; INTRAVENOUS; SOFT TISSUE at 07:54

## 2023-08-17 RX ADMIN — Medication 5 MG: at 08:20

## 2023-08-17 RX ADMIN — INSULIN ASPART 1 UNITS: 100 INJECTION, SOLUTION INTRAVENOUS; SUBCUTANEOUS at 12:29

## 2023-08-17 RX ADMIN — FENTANYL CITRATE 25 MCG: 50 INJECTION, SOLUTION INTRAMUSCULAR; INTRAVENOUS at 10:17

## 2023-08-17 RX ADMIN — ACETAMINOPHEN 650 MG: 325 TABLET, FILM COATED ORAL at 18:14

## 2023-08-17 RX ADMIN — Medication 5 MG: at 09:15

## 2023-08-17 RX ADMIN — FENTANYL CITRATE 100 MCG: 50 INJECTION, SOLUTION INTRAMUSCULAR; INTRAVENOUS at 07:47

## 2023-08-17 RX ADMIN — SODIUM CHLORIDE, POTASSIUM CHLORIDE, SODIUM LACTATE AND CALCIUM CHLORIDE: 600; 310; 30; 20 INJECTION, SOLUTION INTRAVENOUS at 08:52

## 2023-08-17 RX ADMIN — ACETAMINOPHEN 650 MG: 325 TABLET, FILM COATED ORAL at 23:55

## 2023-08-17 RX ADMIN — Medication 5 MG: at 09:23

## 2023-08-17 ASSESSMENT — ACTIVITIES OF DAILY LIVING (ADL)
ADLS_ACUITY_SCORE: 25
ADLS_ACUITY_SCORE: 25
ADLS_ACUITY_SCORE: 28
ADLS_ACUITY_SCORE: 22
ADLS_ACUITY_SCORE: 28

## 2023-08-17 NOTE — ANESTHESIA CARE TRANSFER NOTE
Patient: Ry Horner    Procedure: Procedure(s):  Left below-knee amputation stump revision with antibiotic bead placement  WOUND VAC PLACEMENT       Diagnosis: Status post below-knee amputation of left lower extremity (H) [Z89.512]  Diagnosis Additional Information: No value filed.    Anesthesia Type:   General     Note:    Oropharynx: oropharynx clear of all foreign objects and spontaneously breathing  Level of Consciousness: drowsy  Oxygen Supplementation: face mask  Level of Supplemental Oxygen (L/min / FiO2): 8  Independent Airway: airway patency satisfactory and stable  Dentition: dentition unchanged  Vital Signs Stable: post-procedure vital signs reviewed and stable  Report to RN Given: handoff report given  Patient transferred to: PACU    Handoff Report: Identifed the Patient, Identified the Reponsible Provider, Reviewed the pertinent medical history, Discussed the surgical course, Reviewed Intra-OP anesthesia mangement and issues during anesthesia, Set expectations for post-procedure period and Allowed opportunity for questions and acknowledgement of understanding      Vitals:  Vitals Value Taken Time   /71 08/17/23 0936   Temp 97.3    Pulse 79 08/17/23 0938   Resp 12 08/17/23 0938   SpO2 99 % 08/17/23 0938   Vitals shown include unvalidated device data.    Electronically Signed By: LILIANA Bonner CRNA  August 17, 2023  9:40 AM

## 2023-08-17 NOTE — BRIEF OP NOTE
[facility]    Brief Operative Note    Pre-operative diagnosis: Status post below-knee amputation of left lower extremity (H) [Z89.512]  Post-operative diagnosis Same as pre-operative diagnosis    Procedure: Procedure(s):  Left below-knee amputation stump revision with antibiotic bead placement  WOUND VAC PLACEMENT  Surgeon: Surgeon(s) and Role:     * Reny Child MD - Primary     * Maggie Phillips MD  Anesthesia: General   Estimated Blood Loss: 150 mL from 8/17/2023  7:42 AM to 8/17/2023  9:33 AM      Drains: None  Specimens:   ID Type Source Tests Collected by Time Destination   1 : LEFT DISTAL TIBIA & FIBULA TO R/O OSTEOMYELITIS Bone Fragments Bone SURGICAL PATHOLOGY EXAM Reny Child MD 8/17/2023  8:39 AM      Findings:   Left BKA stump revision .  Complications: None.  Implants:   Implant Name Type Inv. Item Serial No.  Lot No. LRB No. Used Action   GRAFT BONE OSTEOSET KIT FAST CURE 25ML [id number] - [id number] Bone/Tissue/Biologic GRAFT BONE OSTEOSET KIT FAST CURE 25ML [id number]  [facility] TECHN [id number] 1 Implanted

## 2023-08-17 NOTE — OP NOTE
Vascular Surgery Operative Note     PREOPERATIVE DIAGNOSIS:  Chronic wound of left below-knee amputation stump     POSTOPERATIVE DIAGNOSIS:  Same     PROCEDURE: Revision of left below-knee amputation; Prevena incisional VAC application     SURGEON:  Reny Child MD     ASSISTANT:  Maggie Phillips MD     ANESTHESIA:  General Endotracheal     ESTIMATED BLOOD LOSS:  150 mL    SPECIMENS: Distal left tibia and fibula      INDICATIONS:    Mr. Horner is a 58M who underwent staged left below-knee amputation in 09/2022. This healed well and he was ambulatory on his prosthetic. He presented with sudden onset of pain, swelling, and wound on the distal tip of the left below-knee stump, with surrounding cellulitis. He underwent left BKA stump debridement on 6/30/23 and repeat debridement with drain placement on 7/6/23 and 7/10/23.  He was treated with extended-duration antibiotics and was hospitalized ultimately from 6/24 to 7/28. Bone biopsy results were negative for osteomyelitis. He was brought back to the OR today to undergo definitive revision of the left below-knee amputation.      INTRAOPERATIVE FINDINGS:    Well-healed, incorporated distal tibial and fibular edges. Mild inflammatory rind superficially and anteriorly at the site of the prior debridement.      DESCRIPTION OF TECHNIQUE:   Mr. Horner was brought into the operating room and transferred to the operating table in the supine position. After uneventful endotracheal intubation, general anesthesia was initiated. Antibiotics were administered. The left leg was prepped and draped in standard sterile fashion. Timeout was performed and the entire surgical team was in agreement with the planned procedure and correctly marked side.      The skin incision was marked circumferentially around the prior incision, incorporating the granulated wound bed. The incision was created sharply and extended through the subcutaneous tissue with Bovie electrocautery, elevating and removing an  ellipse of skin. The fascia was incised, along with the inflammatory rind. The well-incorporated distal tibial edge was identified and was circumferentially dissected with Bovie electrocautery and a periosteal elevator. The surrounding muscles were noted to be viable, pink, and have no evidence of residual infection or inflammation. The fibula was circumferentially cleared of muscle and tendinous attachments and divided with a . The oscillating saw was used to transect the distal 2cm of tibia. The distal tibial and fibular bone remnants were passed of the table as a specimen for pathology.     The anterior portion of the tibia and fibula were smoothed using a rasp. The posterior flap muscle edges were cauterized and suture ligated as needed to stop bleeding. The flap and amputation site were thoroughly irrigated with saline, with red-pink, healthy muscle tissue and viable-appearing skin edges throughout.    The posterior flap was secured to the anterior incision via interrupted [id number] polysorb sutures, effectively closing the stump. The skin was closed with interrupted deep dermal polysorb sutures and interrupted nylon sutures. The skin of the incision and leg was cleaned and dried, and an Aquacel-AG dressing applied.     Mr. Horner tolerated the procedure well. He was awoken from anesthesia and brought to the PACU in stable condition. At the end of the case all needle, sponge and instrument counts were correct.      Reny Child MD  Vascular Surgery

## 2023-08-17 NOTE — ANESTHESIA POSTPROCEDURE EVALUATION
Patient: Ry Horner    Procedure: Procedure(s):  Left below-knee amputation stump revision with antibiotic bead placement  WOUND VAC PLACEMENT       Anesthesia Type:  General    Note:     Postop Pain Control: Uneventful            Sign Out: Well controlled pain   PONV: No   Neuro/Psych: Uneventful            Sign Out: Acceptable/Baseline neuro status   Airway/Respiratory: Uneventful            Sign Out: Acceptable/Baseline resp. status   CV/Hemodynamics: Uneventful            Sign Out: Acceptable CV status   Other NRE: NONE   DID A NON-ROUTINE EVENT OCCUR?            Last vitals:  Vitals Value Taken Time   /70 08/17/23 0945   Temp     Pulse 79 08/17/23 0951   Resp 8 08/17/23 0951   SpO2 97 % 08/17/23 0951   Vitals shown include unvalidated device data.    Electronically Signed By: Colten Valero MD  August 17, 2023  9:53 AM

## 2023-08-17 NOTE — ANESTHESIA PROCEDURE NOTES
Airway       Patient location during procedure: OR       Procedure Start/Stop Times: 8/17/2023 7:50 AM  Staff -        Anesthesiologist:  Colten Valero MD       CRNA: Augustine Robles APRN CRNA       Performed By: CRNAIndications and Patient Condition       Indications for airway management: alexander-procedural       Induction type:intravenous       Mask difficulty assessment: 1 - vent by mask    Final Airway Details       Final airway type: endotracheal airway       Successful airway: ETT - single  Endotracheal Airway Details        ETT size (mm): 8.0       Cuffed: yes       Cuff volume (mL): 8       Successful intubation technique: direct laryngoscopy       DL Blade Type: Bhandari 2       Grade View of Cords: 1       Adjucts: stylet       Position: Right       Measured from: lips       Secured at (cm): 23       Bite block used: None    Post intubation assessment        Placement verified by: capnometry, equal breath sounds and chest rise        Number of other approaches attempted: 0       Secured with: silk tape       Ease of procedure: easy       Dentition: Intact and Unchanged    Medication(s) Administered   Medication Administration Time: 8/17/2023 7:50 AM

## 2023-08-17 NOTE — ANESTHESIA PREPROCEDURE EVALUATION
Anesthesia Pre-Procedure Evaluation    Patient: Ry Horner   MRN: [id number] : 1965        Procedure : Procedure(s):  Left below-knee amputation stump revision with antibiotic bead placement  WOUND VAC PLACEMENT          Past Medical History:   Diagnosis Date    BENIGN HYPERTENSION 2007    DIABETES MELLITUS TYPE II-UNCOMPL 2007    HYPERLIPIDEMIA NEC/NOS 2007    Tobacco use disorder 2007      Past Surgical History:   Procedure Laterality Date    AMPUTATE FOOT Left 2019    Procedure: LEFT PARTIAL FOOT AMPUTATION;  Surgeon: Antoine Pena DPM;  Location: [facility]    AMPUTATE FOOT Left 2019    Procedure: LEFT PARTIAL FOOT AMPUTATION;  Surgeon: Tim Douglas DPM;  Location: [facility]    AMPUTATE FOOT Left 2019    Procedure: POSSIBLE PARTIAL FOOT AMPUTATION;  Surgeon: Tim Douglas DPM;  Location: [facility]    AMPUTATE FOOT Left 2/10/2022    Procedure: Partial left foot amputation;  Surgeon: Milena Cevallos DPM, Podiatry/Foot and Ankle Surgery;  Location: [facility]    AMPUTATE LEG BELOW KNEE Right 2017    Procedure: AMPUTATE LEG BELOW KNEE;  RIGHT BELOW KNEE AMPUTATION ;  Surgeon: Nikolas Nascimento MD;  Location: [facility]    AMPUTATE LEG BELOW KNEE Left 2022    Procedure: AMPUTATION BELOW KNEE;  Surgeon: Neal Blackman MD;  Location: [facility]    AMPUTATE LEG BELOW KNEE Left 2022    Procedure: LEFT SIDE COMPLETION BELOW THE KNEE AMPUTATION;  Surgeon: Reny Child MD;  Location: [facility]    AMPUTATE REVISION STUMP LOWER EXTREMITY Left 2023    Procedure: LEFT BELOW KNEE AMPUTATION STUMP DEBRIDEMENT;  Surgeon: Reny Child MD;  [facility]: [facility]    AMPUTATE REVISION STUMP LOWER EXTREMITY Left 7/10/2023    Procedure: LEFT BELOW KNEE AMPUTATION STUMP IRRIGATION AND DEBRIDEMENT;  Surgeon: Reny Child MD;  Location: [facility]    AMPUTATE TOE(S) Left 2/3/2020    Procedure: LEFT SECOND TOE AMPUTATION;  Surgeon: Milena Cevallos  LAURA, Podiatry/Foot and Ankle Surgery;  Location: [facility]    APPENDECTOMY      APPLY WOUND VAC Right 3/2/2015    Procedure: APPLY WOUND VAC;  Surgeon: Milena Cevallos DPM, Pod;  Location: [facility] OR    BIOPSY BONE FOOT Right 7/15/2016    Procedure: BIOPSY BONE FOOT;  Surgeon: Tim Douglas DPM;  [facility]: [facility]    BIOPSY BONE TOE Left 12/4/2019    Procedure: BONE BIOPSY LEFT SECOND TOE;  Surgeon: Tim Douglas DPM;  Location: [facility]    IRRIGATION AND DEBRIDEMENT FOOT, COMBINED Right 3/2/2015    Procedure: COMBINED IRRIGATION AND DEBRIDEMENT FOOT;  Surgeon: Milena Cevallos DPM, Pod;  Location: [facility]    IRRIGATION AND DEBRIDEMENT FOOT, COMBINED Right 7/15/2016    Procedure: COMBINED IRRIGATION AND DEBRIDEMENT FOOT;  Surgeon: Tim Douglas DPM;  [facility]: [facility]    IRRIGATION AND DEBRIDEMENT FOOT, COMBINED Right 7/20/2016    Procedure: COMBINED IRRIGATION AND DEBRIDEMENT FOOT;  Surgeon: Tim Douglas DPM;  Location:  OR    IRRIGATION AND DEBRIDEMENT FOOT, COMBINED Left 11/19/2019    Procedure: REVISIONAL IRRIGATION AND DEBRIDEMENT LEFT FOOT AND BONE DEBRIDEMENT;  Surgeon: Joseph Randhawa DPM;  Location:  OR    IRRIGATION AND DEBRIDEMENT FOOT, COMBINED Left 12/4/2019    Procedure: EXCISIONAL DEBRIDEMENT LEFT FOOT;  Surgeon: Tim Douglas DPM;  Location:  OR    IRRIGATION AND DEBRIDEMENT FOOT, COMBINED Left 12/11/2019    Procedure: IRRIGATION AND DEBRIDEMENT FOOT, Partial osteotomy left first metatarsal;  Surgeon: Tim Douglas DPM;  Location:  OR    IRRIGATION AND DEBRIDEMENT FOOT, COMBINED Left 2/5/2020    Procedure: IRRIGATION AND DEBRIDEMENT LEFT FOOT;  Surgeon: Tim Douglas DPM;  Location: [facility]    IRRIGATION AND DEBRIDEMENT LOWER EXTREMITY, COMBINED Left 6/30/2023    Procedure: Irrigation and debridement lower extremity amputation stump- left;  Surgeon: Reny Child MD;  Location:  OR    ORTHOPEDIC SURGERY        Allergies   Allergen Reactions     "Pravastatin      \"sucked the life blood out of me,\" Indicates this occurs with all statins.    Statins       Social History     Tobacco Use    Smoking status: Former     Packs/day: 0.50     Years: 20.00     Pack years: 10.00     Types: Cigarettes     Start date: 1987     Quit date: 2006     Years since quittin.6    Smokeless tobacco: Never   Substance Use Topics    Alcohol use: Yes     Comment: couple drinks per month      Wt Readings from Last 1 Encounters:   23 108.9 kg (240 lb)        Anesthesia Evaluation   Pt has had prior anesthetic. Type: General, MAC and Regional.    No history of anesthetic complications       ROS/MED HX  ENT/Pulmonary:  - neg pulmonary ROS     Neurologic:  - neg neurologic ROS     Cardiovascular:     (+)  hypertension- -   -  - -                                      METS/Exercise Tolerance:     Hematologic:  - neg hematologic  ROS     Musculoskeletal:  - neg musculoskeletal ROS     GI/Hepatic:       Renal/Genitourinary:     (+) renal disease,             Endo:     (+)  type II DM,             Obesity,       Psychiatric/Substance Use:  - neg psychiatric ROS     Infectious Disease: Comment: Infection of left BKA      Malignancy:  - neg malignancy ROS     Other:  - neg other ROS          Physical Exam    Airway        Mallampati: II   TM distance: > 3 FB   Neck ROM: full   Mouth opening: > 3 cm    Respiratory Devices and Support         Dental       (+) Modest Abnormalities - crowns, retainers, 1 or 2 missing teeth      Cardiovascular          Rhythm and rate: regular and normal     Pulmonary           breath sounds clear to auscultation           OUTSIDE LABS:  CBC:   Lab Results   Component Value Date    WBC 11.2 (H) 2023    WBC 9.0 2023    HGB 10.5 (L) 2023    HGB 8.2 (L) 2023    HCT 31.7 (L) 2023    HCT 24.9 (L) 2023     2023     2023     BMP:   Lab Results   Component Value Date     2023     " 08/02/2023    POTASSIUM 3.8 08/17/2023    POTASSIUM 3.8 08/08/2023    CHLORIDE 104 08/08/2023    CHLORIDE 102 08/02/2023    CO2 22 08/08/2023    CO2 26 08/02/2023    BUN 55.1 (H) 08/08/2023    BUN 39.0 (H) 08/02/2023    CR 2.70 (H) 08/17/2023    CR 3.14 (H) 08/08/2023     (H) 08/17/2023     (H) 08/08/2023     COAGS:   Lab Results   Component Value Date    PTT 28 06/27/2023    INR 1.06 06/27/2023    FIBR 866 (H) 06/27/2023     POC:   Lab Results   Component Value Date     (H) 02/07/2020     HEPATIC:   Lab Results   Component Value Date    ALBUMIN 2.9 (L) 07/19/2023    PROTTOTAL 7.3 06/24/2023    ALT 14 06/24/2023    AST 13 06/24/2023    ALKPHOS 84 06/24/2023    BILITOTAL 0.9 06/24/2023     OTHER:   Lab Results   Component Value Date    PH 7.39 09/23/2022    LACT 0.8 06/24/2023    A1C 6.7 (H) 08/17/2023    FERNANDO 8.6 08/08/2023    PHOS 4.6 (H) 07/25/2023    MAG 2.0 07/25/2023    TSH 0.94 12/19/2019    .0 (H) 02/02/2020    SED 85 (H) 09/25/2022       Anesthesia Plan    ASA Status:  3       Anesthesia Type: General.     - Airway: ETT   Induction: Intravenous.   Maintenance: Inhalation.        Consents    Anesthesia Plan(s) and associated risks, benefits, and realistic alternatives discussed. Questions answered and patient/representative(s) expressed understanding.     - Discussed: Risks, Benefits and Alternatives for BOTH SEDATION and the PROCEDURE were discussed     - Discussed with:  Patient            Postoperative Care    Pain management: IV analgesics, Oral pain medications.   PONV prophylaxis: Ondansetron (or other 5HT-3), Dexamethasone or Solumedrol     Comments:                Colten Valero MD

## 2023-08-17 NOTE — CARE PLAN
Alert and oriented x4. VSS. Pain helped with scheduled tylenol and oxycodone. Has not gotten out of bed yet, and is due to void. Stump with wound vac on it. CMS intact Blood glucose was 177 on his monitor. Tolerating diet. Plan to continue tonight.

## 2023-08-17 NOTE — OR NURSING
Transfer criteria met.  Order received per Dr. Valero to transfer to Surgical Nursing unit for continued recovery.  Hand-off report given to RN.  To 2224-1 per cart with all belongings.  Will transport with Capnography monitoring.  Will notify Significant Other, Claudette about transfer.

## 2023-08-18 VITALS
OXYGEN SATURATION: 99 % | HEIGHT: 72 IN | WEIGHT: 226.19 LBS | RESPIRATION RATE: 16 BRPM | DIASTOLIC BLOOD PRESSURE: 91 MMHG | HEART RATE: 102 BPM | TEMPERATURE: 98.1 F | SYSTOLIC BLOOD PRESSURE: 135 MMHG | BODY MASS INDEX: 30.64 KG/M2

## 2023-08-18 LAB
ANION GAP SERPL CALCULATED.3IONS-SCNC: 14 MMOL/L (ref 7–15)
BASOPHILS # BLD AUTO: 0 10E3/UL (ref 0–0.2)
BASOPHILS NFR BLD AUTO: 0 %
BUN SERPL-MCNC: 58.1 MG/DL (ref 6–20)
CALCIUM SERPL-MCNC: 9.3 MG/DL (ref 8.6–10)
CHLORIDE SERPL-SCNC: 103 MMOL/L (ref 98–107)
CREAT SERPL-MCNC: 2.81 MG/DL (ref 0.67–1.17)
DEPRECATED HCO3 PLAS-SCNC: 19 MMOL/L (ref 22–29)
EOSINOPHIL # BLD AUTO: 0.1 10E3/UL (ref 0–0.7)
EOSINOPHIL NFR BLD AUTO: 1 %
ERYTHROCYTE [DISTWIDTH] IN BLOOD BY AUTOMATED COUNT: 13.3 % (ref 10–15)
GFR SERPL CREATININE-BSD FRML MDRD: 26 ML/MIN/1.73M2
GLUCOSE SERPL-MCNC: 190 MG/DL (ref 70–99)
HCT VFR BLD AUTO: 30.7 % (ref 40–53)
HGB BLD-MCNC: 10 G/DL (ref 13.3–17.7)
IMM GRANULOCYTES # BLD: 0.1 10E3/UL
IMM GRANULOCYTES NFR BLD: 1 %
INR PPP: 1.06 (ref 0.85–1.15)
LACTATE SERPL-SCNC: 0.7 MMOL/L (ref 0.7–2)
LYMPHOCYTES # BLD AUTO: 1.8 10E3/UL (ref 0.8–5.3)
LYMPHOCYTES NFR BLD AUTO: 14 %
MCH RBC QN AUTO: 28.4 PG (ref 26.5–33)
MCHC RBC AUTO-ENTMCNC: 32.6 G/DL (ref 31.5–36.5)
MCV RBC AUTO: 87 FL (ref 78–100)
MONOCYTES # BLD AUTO: 0.8 10E3/UL (ref 0–1.3)
MONOCYTES NFR BLD AUTO: 6 %
NEUTROPHILS # BLD AUTO: 10 10E3/UL (ref 1.6–8.3)
NEUTROPHILS NFR BLD AUTO: 78 %
NRBC # BLD AUTO: 0 10E3/UL
NRBC BLD AUTO-RTO: 0 /100
PLATELET # BLD AUTO: 317 10E3/UL (ref 150–450)
POTASSIUM SERPL-SCNC: 3.9 MMOL/L (ref 3.4–5.3)
RBC # BLD AUTO: 3.52 10E6/UL (ref 4.4–5.9)
SODIUM SERPL-SCNC: 136 MMOL/L (ref 136–145)
WBC # BLD AUTO: 12.7 10E3/UL (ref 4–11)

## 2023-08-18 PROCEDURE — 36415 COLL VENOUS BLD VENIPUNCTURE: CPT | Performed by: STUDENT IN AN ORGANIZED HEALTH CARE EDUCATION/TRAINING PROGRAM

## 2023-08-18 PROCEDURE — 99231 SBSQ HOSP IP/OBS SF/LOW 25: CPT | Mod: 24

## 2023-08-18 PROCEDURE — 83605 ASSAY OF LACTIC ACID: CPT | Performed by: SURGERY

## 2023-08-18 PROCEDURE — 250N000013 HC RX MED GY IP 250 OP 250 PS 637: Performed by: STUDENT IN AN ORGANIZED HEALTH CARE EDUCATION/TRAINING PROGRAM

## 2023-08-18 PROCEDURE — 85025 COMPLETE CBC W/AUTO DIFF WBC: CPT | Performed by: STUDENT IN AN ORGANIZED HEALTH CARE EDUCATION/TRAINING PROGRAM

## 2023-08-18 PROCEDURE — 85610 PROTHROMBIN TIME: CPT | Performed by: STUDENT IN AN ORGANIZED HEALTH CARE EDUCATION/TRAINING PROGRAM

## 2023-08-18 PROCEDURE — 82310 ASSAY OF CALCIUM: CPT | Performed by: STUDENT IN AN ORGANIZED HEALTH CARE EDUCATION/TRAINING PROGRAM

## 2023-08-18 PROCEDURE — 36415 COLL VENOUS BLD VENIPUNCTURE: CPT | Performed by: SURGERY

## 2023-08-18 PROCEDURE — 250N000011 HC RX IP 250 OP 636: Performed by: STUDENT IN AN ORGANIZED HEALTH CARE EDUCATION/TRAINING PROGRAM

## 2023-08-18 RX ORDER — GABAPENTIN 300 MG/1
300 CAPSULE ORAL 3 TIMES DAILY
Qty: 15 CAPSULE | Refills: 0 | Status: SHIPPED | OUTPATIENT
Start: 2023-08-18 | End: 2023-08-30

## 2023-08-18 RX ORDER — OXYCODONE HYDROCHLORIDE 5 MG/1
10 TABLET ORAL EVERY 6 HOURS PRN
Qty: 22 TABLET | Refills: 0 | Status: SHIPPED | OUTPATIENT
Start: 2023-08-18 | End: 2023-08-21

## 2023-08-18 RX ADMIN — OXYCODONE HYDROCHLORIDE 10 MG: 5 TABLET ORAL at 11:05

## 2023-08-18 RX ADMIN — HYDROCHLOROTHIAZIDE 25 MG: 25 TABLET ORAL at 08:44

## 2023-08-18 RX ADMIN — ACETAMINOPHEN 650 MG: 325 TABLET, FILM COATED ORAL at 06:08

## 2023-08-18 RX ADMIN — GABAPENTIN 300 MG: 300 CAPSULE ORAL at 08:43

## 2023-08-18 RX ADMIN — INSULIN DEGLUDEC 25 UNITS: 100 INJECTION, SOLUTION SUBCUTANEOUS at 07:47

## 2023-08-18 RX ADMIN — INSULIN ASPART 1 UNITS: 100 INJECTION, SOLUTION INTRAVENOUS; SUBCUTANEOUS at 07:46

## 2023-08-18 RX ADMIN — HEPARIN SODIUM 5000 UNITS: 5000 INJECTION, SOLUTION INTRAVENOUS; SUBCUTANEOUS at 06:08

## 2023-08-18 RX ADMIN — OXYCODONE HYDROCHLORIDE 10 MG: 5 TABLET ORAL at 06:10

## 2023-08-18 RX ADMIN — CARVEDILOL 37.5 MG: 25 TABLET, FILM COATED ORAL at 08:44

## 2023-08-18 RX ADMIN — ACETAMINOPHEN 650 MG: 325 TABLET, FILM COATED ORAL at 11:05

## 2023-08-18 RX ADMIN — AMLODIPINE BESYLATE 10 MG: 5 TABLET ORAL at 08:43

## 2023-08-18 ASSESSMENT — ACTIVITIES OF DAILY LIVING (ADL)
ADLS_ACUITY_SCORE: 25
DEPENDENT_IADLS:: INDEPENDENT
ADLS_ACUITY_SCORE: 25

## 2023-08-18 NOTE — PLAN OF CARE
Date & Time: 1607-0450.  Surgery/POD#: POD 0 from a L BKA stump revision w/ wound vac placement.  Behavior & Aggression: Green - no concerns.  Fall Risk: Yes.  Orientation: A&Ox4.  ABNL VS/O2: VSS on RA.  ABNL Labs: see chart - BGs were 195, 361, 368.  Pain Management: scheduled Tylenol, PRN Oxy.  Bowel/Bladder: Continent, voiding via bathroom, active bowel sounds, no BM, passing gas per pt.  IV/Drains/ Incisions: PIV infusing NS @ 100 ml/hr. Wound vac in place w/ sanguinous OP. Old surgical incisions are C/D/I.    Diet: Mod carb.  Activity Level: SBA.  Tests/Procedures: N/A.  Anticipated  DC Date: TBD.  Significant information: Refused finger stick glucose checks this shift - pt has a [place] (continuous glucose monitoring device).

## 2023-08-18 NOTE — PLAN OF CARE
POD #1 s/p L BKA stump revision w/ abx bead placement & wound vac. A&Ox4. VSS on RA, except slightly tachycardic. Tylenol & Oxy effective for LLE stump pain. Wound vac in place w/ minimal output. PIV SL w/ intermittent Ancef. Voiding adequately in urinal. Tolerating mod carb diet. BG checks elevated last night, in 300s. Up Ax1 to W/C. Discharge plan pending.

## 2023-08-18 NOTE — PROGRESS NOTES
Vascular Surgery Discharge Summary    NAME: Ry Horner   MRN: [id number]   : 1965     DATE OF ADMISSION: 2023     PRE/POSTOPERATIVE DIAGNOSES:  Chronic wound of left below-knee amputation stump       PROCEDURES PERFORMED, 2023: Revision of left below-knee amputation; Prevena incisional VAC application     INTRAOPERATIVE FINDINGS: Well-healed, incorporated distal tibial and fibular edges. Mild inflammatory rind superficially and anteriorly at the site of the prior debridement.      POSTOPERATIVE COMPLICATIONS: None     DATE OF DISCHARGE:  2023    HOSPITAL COURSE: Ry Horner is a 58 year old male who on 2023  underwent the above-named procedures.  He tolerated the procedure well and postoperatively was transferred to the general post-surgical unit.  The remainder of his course was essentiallly uncomplicated.  Prior to discharge, his pain was controlled well with oxycodone.  he was able to perform ADLs and ambulate independently without difficulty, and had full return of bowel and bladder function.  On 2023, he was discharged to home in stable condition.    DISCHARGE INSTRUCTIONS:  Copy from AVS    FOLLOW UP APPOINTMENTS:   Copy from AVS    DISCHARGE MEDICATIONS:     Review of your medicines        START taking        Dose / Directions   gabapentin 300 MG capsule  Commonly known as: NEURONTIN  Used for: Ulcer of amputation stump of lower extremity (H)      Dose: 300 mg  Take 1 capsule (300 mg) by mouth 3 times daily  Quantity: 15 capsule  Refills: 0     oxyCODONE 5 MG tablet  Commonly known as: ROXICODONE  Used for: Ulcer of amputation stump of lower extremity (H)      Dose: 10 mg  Take 2 tablets (10 mg) by mouth every 6 hours as needed for pain  Quantity: 22 tablet  Refills: 0            CONTINUE these medicines which have NOT CHANGED        Dose / Directions   acetaminophen 325 MG tablet  Commonly known as: TYLENOL  Used for: Diabetic foot infection (H)      Dose: 650 mg  Take 2  tablets (650 mg) by mouth every 6 hours as needed for mild pain Alternates use with ibuprofen  Quantity: 90 tablet  Refills: 0     amLODIPine 10 MG tablet  Commonly known as: NORVASC  Used for: Benign essential hypertension      Dose: 10 mg  Take 1 tablet (10 mg) by mouth daily  Quantity: 90 tablet  Refills: 3     aspirin 81 MG chewable tablet  Commonly known as: ASA  Used for: Type 2 diabetes mellitus with diabetic polyneuropathy, without long-term current use of insulin (H)      Dose: 81 mg  Take 1 tablet (81 mg) by mouth daily  Quantity: 108 tablet  Refills: 3     BD Pen Needle Crorine 2nd Gen 32G X 4 MM miscellaneous  Used for: Type 2 diabetes mellitus with diabetic polyneuropathy, without long-term current use of insulin (H)  Generic drug: insulin pen needle      USE WITH INSULIN INJECTIONS UNDER THE SKIN TWO TIMES A DAY AS DIRECTED  Quantity: 180 each  Refills: 11     blood glucose lancets standard  Commonly known as: NO BRAND SPECIFIED  Used for: Type 2 diabetes mellitus with diabetic polyneuropathy, without long-term current use of insulin (H)      Use to test blood sugar FOUR times daily, to match lancing device per insurance  Quantity: 400 each  Refills: 1     blood glucose monitoring meter device kit  Commonly known as: NO BRAND SPECIFIED  Used for: Type 2 diabetes mellitus with diabetic polyneuropathy, without long-term current use of insulin (H)      Use to test blood sugar FOUR times daily, brand per insurance  Quantity: 1 kit  Refills: 0     blood glucose test strip  Commonly known as: NO BRAND SPECIFIED  Used for: Type 2 diabetes mellitus with diabetic polyneuropathy, without long-term current use of insulin (H)      Dose: 120 strip  120 strips by In Vitro route 4 times daily Use to test blood sugar up to 4 times daily or as directed.  Quantity: 360 strip  Refills: 3     carvedilol 12.5 MG tablet  Commonly known as: COREG  Used for: Benign essential hypertension      Dose: 37.5 mg  Take 3 tablets (37.5  mg) by mouth 2 times daily (with meals)  Quantity: 540 tablet  Refills: 3     ezetimibe 10 MG tablet  Commonly known as: ZETIA  Used for: Hyperlipidemia LDL goal <100      TAKE ONE TABLET BY MOUTH EVERY DAY  Quantity: 90 tablet  Refills: 1     ferrous sulfate 325 (65 Fe) MG tablet  Commonly known as: FEROSUL  Used for: Diabetic foot infection (H)      Dose: 325 mg  Take 1 tablet (325 mg) by mouth every other day  Quantity: 30 tablet  Refills: 0     FreeStyle Sj 3 Sensor Misc  Used for: Type 2 diabetes mellitus with diabetic polyneuropathy, without long-term current use of insulin (H)      Dose: 1 each  1 each every 14 days Use to check blood sugars per  guidelines with Sj 3 dionisio  Quantity: 2 each  Refills: 5     hydrochlorothiazide 25 MG tablet  Commonly known as: HYDRODIURIL  Used for: Benign essential hypertension      Dose: 25 mg  Take 1 tablet (25 mg) by mouth daily  Quantity: 90 tablet  Refills: 3     ipratropium 0.06 % nasal spray  Commonly known as: ATROVENT      INSTILL TWO SPRAYS INTO EACH NOSTRIL FOUR TIMES A DAY AS NEEDED FOR RHINITIS  Quantity: 15 mL  Refills: 11     multivitamin tablet      Dose: 1 tablet  Take 1 tablet by mouth daily  Refills: 0     OneTouch Delica Plus [id number] Misc  Used for: Diabetes mellitus, type 2 (H)      USE TO TEST BLOOD SUGAR FOUR TIMES A DAY  Quantity: 400 each  Refills: 1     STATIN NOT PRESCRIBED  Commonly known as: INTENTIONAL  Used for: Hyperlipidemia LDL goal <100      Please choose reason not prescribed, below  Refills: 0     Tresiba FlexTouch 100 UNIT/ML pen  Used for: Type 2 diabetes mellitus with diabetic polyneuropathy, without long-term current use of insulin (H)  Generic drug: insulin degludec      Dose: 25 Units  Inject 25 Units Subcutaneous every 12 hours  Quantity: 15 mL  Refills: 11               Where to get your medicines        These medications were sent to [facility] [place]  [place] - [address] [place] [address]  [address]  [facility], [facility] [address]      Phone: [phone]   gabapentin 300 MG capsule  oxyCODONE 5 MG tablet

## 2023-08-18 NOTE — PROGRESS NOTES
VASCULAR SURGERY PROGRESS NOTE    Subjective:  Resting comfortably in bed. No acute issues overnight. Slightly tachycardic. Regular diet-tolerating well. Preveena in place.    Objective:  Intake/Output Summary (Last 24 hours) at 8/18/2023 0847  Last data filed at 8/18/2023 0730  Gross per 24 hour   Intake 3191 ml   Output 2325 ml   Net 866 ml     PHYSICAL EXAM:  BP (!) 135/91   Pulse 102   Temp 98.1  F (36.7  C) (Oral)   Resp 16   Ht 1.829 m (6')   Wt 102.6 kg (226 lb 3.1 oz)   SpO2 99%   BMI 30.68 kg/m    Alert and oriented x4  Full ROM noted of the L BKA, Preveena in place  Minimal edema noted  BG remains elevated in 300s  Cr slightly increased this am to 2.81 compared to 2.70  WBC 12.7      ASSESSMENT:  Ry Honrer is a 58 year old male who underwent a revision of the left below-knee amputation on 8/17    PLAN:  -monitor Cr, WBC, can discharge once Cr starts to downtrend, will check Cr later this afternoon as Ry is anxious to discharge home  -Preveena to stay on for a minimum of 7 days since day of surgery, discussed cares with patient  -continue all medications, ideal BG less than 150  -discussed with Ry that possible discharge later today, 8/18, but more likely over the weekend  -ok to discharge today per Dr. Mateusz Perdomo, NP  VASCULAR SURGERY

## 2023-08-18 NOTE — DISCHARGE SUMMARY
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 Mild inflammatory rind superficially and anteriorly at the site of the prior debridement.      POSTOPERATIVE COMPLICATIONS: None     DATE OF DISCHARGE:  2023    HOSPITAL COURSE: Ry Horner is a 58 year old male who on 2023  underwent the above-named procedures.  He tolerated the procedure well and postoperatively was transferred to the general post-surgical unit.  The remainder of his course was essentiallly uncomplicated.  Prior to discharge, his pain was controlled well with oxycodone.  he was able to perform ADLs and ambulate independently without difficulty, and had full return of bowel and bladder function.  On 2023, he was discharged to home in stable condition.    DISCHARGE INSTRUCTIONS:  Copy from AVS    FOLLOW UP APPOINTMENTS:   Copy from AVS    DISCHARGE MEDICATIONS:     Review of your medicines        START taking        Dose / Directions   gabapentin 300 MG capsule  Commonly known as: NEURONTIN  Used for: Ulcer of amputation stump of lower extremity (H)      Dose: 300 mg  Take 1 capsule (300 mg) by mouth 3 times daily  Quantity: 15 capsule  Refills: 0     oxyCODONE 5 MG tablet  Commonly known as: ROXICODONE  Used for: Ulcer of amputation stump of lower extremity (H)      Dose: 10 mg  Take 2 tablets (10 mg) by mouth every 6 hours as needed for pain  Quantity: 22 tablet  Refills: 0            CONTINUE these medicines which have NOT CHANGED        Dose / Directions   acetaminophen 325 MG tablet  Commonly known as: TYLENOL  Used for: Diabetic foot infection (H)      Dose: 650 mg  Take 2  tablets (650 mg) by mouth every 6 hours as needed for mild pain Alternates use with ibuprofen  Quantity: 90 tablet  Refills: 0     amLODIPine 10 MG tablet  Commonly known as: NORVASC  Used for: Benign essential hypertension      Dose: 10 mg  Take 1 tablet (10 mg) by mouth daily  Quantity: 90 tablet  Refills: 3     aspirin 81 MG chewable tablet  Commonly known as: ASA  Used for: Type 2 diabetes mellitus with diabetic polyneuropathy, without long-term current use of insulin (H)      Dose: 81 mg  Take 1 tablet (81 mg) by mouth daily  Quantity: 108 tablet  Refills: 3     BD Pen Needle Corrine 2nd Gen 32G X 4 MM miscellaneous  Used for: Type 2 diabetes mellitus with diabetic polyneuropathy, without long-term current use of insulin (H)  Generic drug: insulin pen needle      USE WITH INSULIN INJECTIONS UNDER THE SKIN TWO TIMES A DAY AS DIRECTED  Quantity: 180 each  Refills: 11     blood glucose lancets standard  Commonly known as: NO BRAND SPECIFIED  Used for: Type 2 diabetes mellitus with diabetic polyneuropathy, without long-term current use of insulin (H)      Use to test blood sugar FOUR times daily, to match lancing device per insurance  Quantity: 400 each  Refills: 1     blood glucose monitoring meter device kit  Commonly known as: NO BRAND SPECIFIED  Used for: Type 2 diabetes mellitus with diabetic polyneuropathy, without long-term current use of insulin (H)      Use to test blood sugar FOUR times daily, brand per insurance  Quantity: 1 kit  Refills: 0     blood glucose test strip  Commonly known as: NO BRAND SPECIFIED  Used for: Type 2 diabetes mellitus with diabetic polyneuropathy, without long-term current use of insulin (H)      Dose: 120 strip  120 strips by In Vitro route 4 times daily Use to test blood sugar up to 4 times daily or as directed.  Quantity: 360 strip  Refills: 3     carvedilol 12.5 MG tablet  Commonly known as: COREG  Used for: Benign essential hypertension      Dose: 37.5 mg  Take 3 tablets (37.5  mg) by mouth 2 times daily (with meals)  Quantity: 540 tablet  Refills: 3     ezetimibe 10 MG tablet  Commonly known as: ZETIA  Used for: Hyperlipidemia LDL goal <100      TAKE ONE TABLET BY MOUTH EVERY DAY  Quantity: 90 tablet  Refills: 1     ferrous sulfate 325 (65 Fe) MG tablet  Commonly known as: FEROSUL  Used for: Diabetic foot infection (H)      Dose: 325 mg  Take 1 tablet (325 mg) by mouth every other day  Quantity: 30 tablet  Refills: 0     FreeStyle Sj 3 Sensor Misc  Used for: Type 2 diabetes mellitus with diabetic polyneuropathy, without long-term current use of insulin (H)      Dose: 1 each  1 each every 14 days Use to check blood sugars per  guidelines with Sj 3 dionisio  Quantity: 2 each  Refills: 5     hydrochlorothiazide 25 MG tablet  Commonly known as: HYDRODIURIL  Used for: Benign essential hypertension      Dose: 25 mg  Take 1 tablet (25 mg) by mouth daily  Quantity: 90 tablet  Refills: 3     ipratropium 0.06 % nasal spray  Commonly known as: ATROVENT      INSTILL TWO SPRAYS INTO EACH NOSTRIL FOUR TIMES A DAY AS NEEDED FOR RHINITIS  Quantity: 15 mL  Refills: 11     multivitamin tablet      Dose: 1 tablet  Take 1 tablet by mouth daily  Refills: 0     OneTouch Delica Plus Sapblt19T Misc  Used for: Diabetes mellitus, type 2 (H)      USE TO TEST BLOOD SUGAR FOUR TIMES A DAY  Quantity: 400 each  Refills: 1     STATIN NOT PRESCRIBED  Commonly known as: INTENTIONAL  Used for: Hyperlipidemia LDL goal <100      Please choose reason not prescribed, below  Refills: 0     Tresiba FlexTouch 100 UNIT/ML pen  Used for: Type 2 diabetes mellitus with diabetic polyneuropathy, without long-term current use of insulin (H)  Generic drug: insulin degludec      Dose: 25 Units  Inject 25 Units Subcutaneous every 12 hours  Quantity: 15 mL  Refills: 11               Where to get your medicines        These medications were sent to [facility] [place]  [place] - [address] [place] [address]  [address]  [facility], [facility] [address]      Phone: [phone]   gabapentin 300 MG capsule  oxyCODONE 5 MG tablet

## 2023-08-18 NOTE — CONSULTS
Care Management Initial Consult    General Information  Assessment completed with: Patient, VM-chart review, Ry  Type of CM/SW Visit: Initial Assessment  Primary Care Provider verified and updated as needed: Yes (Dr. Wing [facility])   Readmission within the last 30 days: planned readmission (Revision of left below-knee amputation; Prevena incisional VAC application)      Reason for Consult: other (see comments) (elevated readmission risk score)  Advance Care Planning:    HCD on file in [facility] Care Directive 11/17/2019     Communication Assessment  Patient's communication style: spoken language (English or Bilingual)    Hearing Difficulty or Deaf: no   Wear Glasses or Blind: yes    Cognitive  Cognitive/Neuro/Behavioral: WDL  Level of Consciousness: alert  Arousal Level: opens eyes spontaneously  Orientation: oriented x 4             Living Environment:   People in home: significant other  Claudette (Significant other)   [phone]  Current living Arrangements: house      Able to return to prior arrangements: yes    Family/Social Support:  Care provided by: self  Provides care for: no one  Marital Status: Single  Support system: Significant Other       Claudette  [phone]  Description of Support System:      Support Assessment: Adequate family and caregiver support    Current Resources:   Patient receiving home care services: No  Community Resources: None  Equipment currently used at home: glucometer, prosthesis, wheelchair, manual, grab bar, toilet, grab bar, tub/shower, shower chair  Supplies currently used at home: Diabetic Supplies (freestyle andres)    Employment/Financial:  Employment Status: employed full-time     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  No    Lifestyle & Psychosocial  Needs:  Social Determinants of Health     Tobacco Use: Medium Risk (8/18/2023)    Patient History     Smoking Tobacco Use: Former     Smokeless Tobacco Use: Never     Passive Exposure: Not on file   Alcohol Use: Not on file   Financial Resource Strain: Not on file   Food Insecurity: Not on file   Transportation Needs: No Transportation Needs (3/24/2020)    PRAPARE - Transportation     Lack of Transportation (Medical): No     Lack of Transportation (Non-Medical): No   Physical Activity: Inactive (3/24/2020)    Exercise Vital Sign     Days of Exercise per Week: 0 days     Minutes of Exercise per Session: 0 min   Stress: Not on file   Social Connections: Not on file   Intimate Partner Violence: Not on file   Depression: Not at risk (8/2/2023)    PHQ-2     PHQ-2 Score: 1   Housing Stability: Not on file       Functional Status:  Prior to admission patient needed assistance:   Dependent ADLs:: Independent (confirms still  uses prosthetics to ambulate, access to walkers and wheelchair, own wheelchair at the hospital)  Dependent IADLs:: Independent       Mental Health Status:  Mental Health Status: No Current Concerns       Chemical Dependency Status:  Chemical Dependency Status: No Current Concerns             Values/Beliefs:  Spiritual, Cultural Beliefs, Judaism Practices, Values that affect care: no          Values/Beliefs Comment: Jorje    Additional Information:  Met with patient in room, introduced self and role in discharge planning. Confirmed the information in the above assessment, please see each section for helpful details.     Care Management Discharge Note    Discharge Date: 08/18/2023  Discharge Disposition:  home  Discharge Services:  none  Discharge DME:  no new DME  Discharge Transportation:  family/friend will provide  Private pay costs discussed: Not applicable  Does the patient's insurance plan have a 3 day qualifying hospital stay waiver?  No  PAS Confirmation Code:  n/a  Patient/family  educated on Medicare website which has current facility and service quality ratings:  n/a  Education Provided on the Discharge Plan:  yes  Persons Notified of Discharge Plans: patient, clinic PCP care coordinator via handoff  Patient/Family in Agreement with the Plan:  yes  Handoff Referral Completed: Yes - TATI 20%    Additional Information:  Pt anticipating discharge today, no needs.      Yvette Hunt RN, BSN, PHN  [facility]  Inpatient Care Management - FLOAT  Gen Surg CM RN Mobile: [phone] daily 7:30-4:00

## 2023-08-18 NOTE — DISCHARGE INSTRUCTIONS
You can remove Prevena wound vac on Thursday, August 24    You have a Prevena wound vacuum over your surgical incision(s). This wound vac will stay in place for 7-10 days (clear plastic over the purple sponge). While it's in place, avoid abrading or pulling at the edges as this may cause the dressing to come off.    If the vacuum loses suction, there is likely a loss of seal in the clear plastic dressing and the clear plastic portion should be pressed into the skin so the seal is reinforces and reinforce the edges of the dressing with tape or a tegaderm dressing. Turn vacuum off and off again as needed to reset. Further instructions, trouble shooting, and a 24hour contact information for [employer] (the company that makes the system) can be found online at [employer] or [phone].     After 7-10 days, the battery for the Prevena will be empty (it will start beeping to indicate this and the dressing may lose suction) and the whole system (including clear dressing, purple sponge, battery and canister pack) can be removed and thrown away in the garbage. The incisions under the wound vac were closed with sutures under the skin that will dissolve on their own and dermabond (which is somewhat like surgical super glue) to seal the site closed. You can leave the incision(s) opent to air unless you were otherwise instructed to cover incision(s) with a dressing prior to discharge and sent home with dressings. You can get these incisions wet, but avoid soaking/submerging them for the next 3 weeks to allow proper healing and always thoroughly dry (and/or) put a new dressing on after getting the incision wet. You should also avoid rubbing or abrading the glue. It will slowly dissolve or fall off on its own.    If you have any questions or concerns after troubleshooting the Prevena Vac, please call either:    Svetlana Jaimes, Care Coordinator RN, Vascular Surgery  [phone]  Or   Vascular Call  [facility]  [phone]    To contact someone after 5 pm, on a weekend, or on a Holiday, please call:  [facility]  [phone], option 4 to have a member of the Vascular Surgery Service paged.

## 2023-08-18 NOTE — PLAN OF CARE
Date & Time: 8/18/23 0359-8727  Surgery/POD#: POD1 of L BKA stump revision and abx bead placement and wound vac.   Behavior & Aggression: Green  Fall Risk: Ind  Orientation:A&Ox4  ABNL VS/O2:VSS on Ra ex htn and tachy at times  ABNL Labs: Creat 2.81   Pain Management:Oxy 10mg q4hrs and sched tylenol  Bowel/Bladder: Continent B/B  Drains: Preveena wound vac placed by vascular, will discharge home with it. PIV removed.   Diet:Mod CHO diet  Activity Level: Ind   Tests/Procedures: N/A  Anticipated  DC Date: Discharging today at 1330  Significant Information: Will follow up with vascular in 2 wks. Pt discharge home today.

## 2023-08-20 LAB
ATRIAL RATE - MUSE: 73 BPM
DIASTOLIC BLOOD PRESSURE - MUSE: NORMAL MMHG
INTERPRETATION ECG - MUSE: NORMAL
P AXIS - MUSE: 42 DEGREES
PR INTERVAL - MUSE: 180 MS
QRS DURATION - MUSE: 72 MS
QT - MUSE: 398 MS
QTC - MUSE: 438 MS
R AXIS - MUSE: 4 DEGREES
SYSTOLIC BLOOD PRESSURE - MUSE: NORMAL MMHG
T AXIS - MUSE: 63 DEGREES
VENTRICULAR RATE- MUSE: 73 BPM

## 2023-08-22 LAB
PATH REPORT.COMMENTS IMP SPEC: NORMAL
PATH REPORT.COMMENTS IMP SPEC: NORMAL
PATH REPORT.FINAL DX SPEC: NORMAL
PATH REPORT.GROSS SPEC: NORMAL
PATH REPORT.MICROSCOPIC SPEC OTHER STN: NORMAL
PATH REPORT.RELEVANT HX SPEC: NORMAL
PHOTO IMAGE: NORMAL

## 2023-08-24 NOTE — PROGRESS NOTES
Patient's pain in the left lower extremity guillotine amputation is much better controlled.  I explained to him that due to my schedule I have requested my partner Dr. Child to proceed with the completion of his left lower extremity below the knee amputation.  Patient is agreeable.  We will make him n.p.o. after midnight.   Billing Type: Third-Party Bill

## 2023-08-30 ENCOUNTER — OFFICE VISIT (OUTPATIENT)
Dept: OTHER | Facility: CLINIC | Age: 58
End: 2023-08-30
Attending: SURGERY
Payer: COMMERCIAL

## 2023-08-30 VITALS
HEART RATE: 77 BPM | WEIGHT: 240 LBS | DIASTOLIC BLOOD PRESSURE: 98 MMHG | SYSTOLIC BLOOD PRESSURE: 160 MMHG | BODY MASS INDEX: 32.55 KG/M2

## 2023-08-30 DIAGNOSIS — T87.89 ULCER OF AMPUTATION STUMP OF LOWER EXTREMITY (H): Primary | ICD-10-CM

## 2023-08-30 DIAGNOSIS — L97.909 ULCER OF AMPUTATION STUMP OF LOWER EXTREMITY (H): Primary | ICD-10-CM

## 2023-08-30 PROCEDURE — G0463 HOSPITAL OUTPT CLINIC VISIT: HCPCS

## 2023-08-30 PROCEDURE — 99024 POSTOP FOLLOW-UP VISIT: CPT | Performed by: SURGERY

## 2023-08-30 RX ORDER — OXYCODONE HYDROCHLORIDE 5 MG/1
5 TABLET ORAL EVERY 6 HOURS PRN
Qty: 30 TABLET | Refills: 0 | Status: SHIPPED | OUTPATIENT
Start: 2023-08-30 | End: 2023-09-13

## 2023-08-30 RX ORDER — CARVEDILOL PHOSPHATE 20 MG/1
40 CAPSULE, EXTENDED RELEASE ORAL DAILY
Qty: 90 CAPSULE | Refills: 3 | Status: SHIPPED | OUTPATIENT
Start: 2023-08-30 | End: 2023-09-18

## 2023-08-30 RX ORDER — GABAPENTIN 300 MG/1
300 CAPSULE ORAL 3 TIMES DAILY
Qty: 30 CAPSULE | Refills: 3 | Status: SHIPPED | OUTPATIENT
Start: 2023-08-30 | End: 2023-09-05

## 2023-08-30 NOTE — PROGRESS NOTES
[facility]        Patient is here for a post-op .    Pt is currently taking Aspirin.    BP (!) 160/98 (BP Location: Left arm, Patient Position: Chair, Cuff Size: Adult Regular)   Pulse 77   Wt 240 lb (108.9 kg)   BMI 32.55 kg/m      The provider has been notified that the patient has no concerns.     Questions patient would like addressed today are: N/A.    Refills are needed: Yes:  carvedilol, gabapentin,oxycodone    Has homecare services and agency name:  Nikkie Valencia MA

## 2023-08-30 NOTE — PROGRESS NOTES
Vascular Surgery Progress Note     Date: August 30, 2023     Reason for Visit:  Post-op after left BKA stump formal revision    Subjective:  Mr. Horner reports doing well. He has some pain on the medial and lateral edges of the incision, where it feels like something is pulling; he has pressure on the underside/posterior flap of stump when he has it resting on something too long. He is able to flex and extend his knee. He is using pain medication just at night to help sleep; he ran out of pain medications (oxycodone and gabapentin) and has had a few restless nights in result. He has had some swelling of the stump, but overall it has been significantly better than it was while he was in the hospital.      Current Outpatient Medications:     acetaminophen (TYLENOL) 325 MG tablet, Take 2 tablets (650 mg) by mouth every 6 hours as needed for mild pain Alternates use with ibuprofen, Disp: 90 tablet, Rfl: 0    amLODIPine (NORVASC) 10 MG tablet, Take 1 tablet (10 mg) by mouth daily, Disp: 90 tablet, Rfl: 3    aspirin 81 MG chewable tablet, Take 1 tablet (81 mg) by mouth daily, Disp: 108 tablet, Rfl: 3    BD PEN NEEDLE MEGAN 2ND GEN 32G X 4 MM miscellaneous, USE WITH INSULIN INJECTIONS UNDER THE SKIN TWO TIMES A DAY AS DIRECTED, Disp: 180 each, Rfl: 11    blood glucose (NO BRAND SPECIFIED) lancets standard, Use to test blood sugar FOUR times daily, to match lancing device per insurance, Disp: 400 each, Rfl: 1    blood glucose (NO BRAND SPECIFIED) test strip, 120 strips by In Vitro route 4 times daily Use to test blood sugar up to 4 times daily or as directed., Disp: 360 strip, Rfl: 3    blood glucose monitoring (NO BRAND SPECIFIED) meter device kit, Use to test blood sugar FOUR times daily, brand per insurance, Disp: 1 kit, Rfl: 0    carvedilol (COREG) 12.5 MG tablet, Take 3 tablets (37.5 mg) by mouth 2 times daily (with meals), Disp: 540 tablet, Rfl: 3    Continuous Blood Gluc Sensor (FREESTYLE CRISTINA 3 SENSOR) MISC, 1 each  "every 14 days Use to check blood sugars per  guidelines with [employer] 3 dionisio, Disp: 2 each, Rfl: 5    ezetimibe (ZETIA) 10 MG tablet, TAKE ONE TABLET BY MOUTH EVERY DAY, Disp: 90 tablet, Rfl: 1    ferrous sulfate (FEROSUL) 325 (65 Fe) MG tablet, Take 1 tablet (325 mg) by mouth every other day, Disp: 30 tablet, Rfl: 0    gabapentin (NEURONTIN) 300 MG capsule, Take 1 capsule (300 mg) by mouth 3 times daily, Disp: 15 capsule, Rfl: 0    hydrochlorothiazide (HYDRODIURIL) 25 MG tablet, Take 1 tablet (25 mg) by mouth daily, Disp: 90 tablet, Rfl: 3    insulin degludec (TRESIBA FLEXTOUCH) 100 UNIT/ML pen, Inject 25 Units Subcutaneous every 12 hours, Disp: 15 mL, Rfl: 11    ipratropium (ATROVENT) 0.06 % nasal spray, INSTILL TWO SPRAYS INTO EACH NOSTRIL FOUR TIMES A DAY AS NEEDED FOR RHINITIS, Disp: 15 mL, Rfl: 11    Lancets (ONETOUCH DELICA PLUS [id number]) MISC, USE TO TEST BLOOD SUGAR FOUR TIMES A DAY, Disp: 400 each, Rfl: 1    multivitamin (CENTRUM SILVER) tablet, Take 1 tablet by mouth daily, Disp: , Rfl:     STATIN NOT PRESCRIBED (INTENTIONAL), Please choose reason not prescribed, below, Disp: , Rfl:      Physical Exam       BP: (!) 160/98 Pulse: 77            Vital Signs with Ranges  Pulse:  [77] 77  BP: (160)/(98) 160/98  240 lbs 0 oz    Constitutional: cooperative, no apparent distress, sitting comfortably in chair. Accompanied by his partner.  Musculoskeletal: grossly normal and symmetric ROM and strength in BL extremities. No focal TTP over the bone edges, no fluctuance or induration. Well-approximated skin edge. Some sutures removed today in clinic. Able to fully flex/extend left knee.  Neurologic: Awake, alert, oriented to name, place, time, and situation        Labs:   Surgical Pathology (8/17/23): \"Left distal tibia and fibula, left below-knee amputation stump revision. Bone with scar tissue and mild chronic inflammation.  Negative for acute osteomyelitis.\"      Assessment & Plan   Mr. Horner is a 58M who " underwent staged left below-knee amputation in 09/2022. This healed well and he was ambulatory on his prosthetic. He presented with new cellulitis of the lower left leg and underwent left BKA stump debridement on 6/30/23 and repeat debridement with drain placement on 7/6/23 and 7/10/23.  He was treated with extended-duration antibiotics and was hospitalized ultimately from 6/24 to 7/28. Bone biopsy results were negative for osteomyelitis. He underwent definitive revision of the left below-knee amputation on 8/17/23.    He is due to follow-up with Dr. Gomes of Nephrology for his history of JOHNATHAN with recent exacerbation during his hospitalization earlier this summer. He has an appointment in November.   He will be seeing Dr. Wing in about a month.   Will see him back in 4 weeks for repeat wound check and suture removal.   Oxycodone refilled - 5 mg x 30 tablets - for post-operative pain. Gabapentin also refilled.    Reny Child MD

## 2023-09-05 ENCOUNTER — VIRTUAL VISIT (OUTPATIENT)
Dept: PHARMACY | Facility: CLINIC | Age: 58
End: 2023-09-05
Attending: INTERNAL MEDICINE
Payer: COMMERCIAL

## 2023-09-05 DIAGNOSIS — L97.909 ULCER OF AMPUTATION STUMP OF LOWER EXTREMITY (H): ICD-10-CM

## 2023-09-05 DIAGNOSIS — E11.42 TYPE 2 DIABETES MELLITUS WITH DIABETIC POLYNEUROPATHY, WITH LONG-TERM CURRENT USE OF INSULIN (H): Primary | ICD-10-CM

## 2023-09-05 DIAGNOSIS — Z79.4 TYPE 2 DIABETES MELLITUS WITH DIABETIC POLYNEUROPATHY, WITH LONG-TERM CURRENT USE OF INSULIN (H): Primary | ICD-10-CM

## 2023-09-05 DIAGNOSIS — T87.89 ULCER OF AMPUTATION STUMP OF LOWER EXTREMITY (H): ICD-10-CM

## 2023-09-05 DIAGNOSIS — E78.2 MIXED HYPERLIPIDEMIA: ICD-10-CM

## 2023-09-05 DIAGNOSIS — I10 HTN (HYPERTENSION), BENIGN: ICD-10-CM

## 2023-09-05 PROCEDURE — 99207 PR NO CHARGE LOS: CPT | Mod: VID | Performed by: PHARMACIST

## 2023-09-05 RX ORDER — GABAPENTIN 300 MG/1
300 CAPSULE ORAL 3 TIMES DAILY
Qty: 90 CAPSULE | Refills: 2 | Status: SHIPPED | OUTPATIENT
Start: 2023-09-05 | End: 2023-11-27

## 2023-09-05 NOTE — PATIENT INSTRUCTIONS
"Recommendations from today's MTM visit:                                                    MTM (medication therapy management) is a service provided by a clinical pharmacist designed to help you get the most of out of your medicines.   Today we reviewed what your medicines are for, how to know if they are working, that your medicines are safe and how to make your medicine regimen as easy as possible.      I will resend your gabapentin order to your pharmacy to fix the day supply- you should be less tired through the day as you heal and come off of oxycodone and then gabapentin.  Get an eye exam.  Start Carvedilol 40mg daily, let us know if you develop dizziness or light-headedness on standing.  Consider reducing caffeine intake to closer to 2 cups a day to come off your blood pressure medications faster.    Follow-up: reach out anytime!    It was great speaking with you today.  I value your experience and would be very thankful for your time in providing feedback in our clinic survey. In the next few days, you may receive an email or text message from [employer] with a link to a survey related to your  clinical pharmacist.\"     To schedule another MTM appointment, please call the clinic directly or you may call the MTM scheduling line at [phone] or toll-free at [phone].     My Clinical Pharmacist's contact information:                                                      Please feel free to contact me with any questions or concerns you have.      Danielle Pittman PharmD, [facility]  Medication Therapy Management Provider  Phone: [phone]  [email or web address]    "

## 2023-09-05 NOTE — PROGRESS NOTES
Medication Therapy Management (MTM) Encounter    ASSESSMENT:                            Medication Adherence/Access: No issues identified    Type 2 Diabetes:    SMBG not at goal <150 but patient was recently hospitalized- I am hesitant to push the dose of Tresiba, especially as he will hopefully be more active in the next few weeks once he gets his prostheisis. No changes today.    Hypertension:   Home readings not consistently at goal <140/90- patient should start carvedilol as already planned. We discussed caffeine impact here as well.    Hyperlipidemia:   Stable.    Amputation Pain:   Patient encouraged to follow up with PCP and specialists as planned- we discussed that both oxycodone and gabapentin are very sedating and this should improve as he heals and comes off of these.    PLAN:                            I will resend your gabapentin order to your pharmacy to fix the day supply- you should be less tired through the day as you heal and come off of oxycodone and then gabapentin.  Get an eye exam.  Start Carvedilol 40mg daily, let us know if you develop dizziness or light-headedness on standing.  Consider reducing caffeine intake to closer to 2 cups a day to come off your blood pressure medications faster.    Follow-up: reach out anytime!    SUBJECTIVE/OBJECTIVE:                          Ry Horner is a 58 year old male called for a transitions of care visit. He was discharged from [facility] on 8/18 for surgical revision of below-knee amputation.      Reason for visit: Med check.    Allergies/ADRs: Reviewed in chart  Past Medical History: Reviewed in chart  Tobacco: He reports that he quit smoking about 17 years ago. His smoking use included cigarettes. He started smoking about 36 years ago. He has a 10.00 pack-year smoking history. He has never used smokeless tobacco.  Alcohol: Less than 1 beverages / week  Caffeine: 4-5 cups coffee per day  Medication Adherence/Access: Patient takes medications directly from  bottles.  Per patient, misses medication 0-1 times per week.     Type 2 Diabetes:    Tresiba 25 units twice daily  Aspirin 81mg daily    Patient is not experiencing side effects.  Blood sugar monitoring: Continuous Glucose Monitor Average glucose Last 7 Days - 180 mg/dl  Current diabetes symptoms: none  Diet/Exercise: Patient is working on diet to get off of some of his meds but admits it is hard as he's in a wheelchair until he gets fitted for his second prosthesis.  Eye exam: due  Foot exam: up to date  Urine Albumin:   Lab Results   Component Value Date    UMALCR 2,550.00 (H) 11/09/2022      Lab Results   Component Value Date    A1C 6.7 (H) 08/17/2023     Hypertension:   Amlodipine 10mg daily  Carvedilol ER 40mg daily (has not been taking- has not gotten from the pharmacy)  Hydrochlorothiazide 25mg daily    Patient reports the following medication side effects: frequent urination- uses a urinal at night.  Patient self-monitors blood pressure.  Home BP monitoring 130s/90ish (about 30% of the time bottom is in the 90s).    BP Readings from Last 3 Encounters:   08/30/23 (!) 160/98   08/18/23 (!) 135/91   08/16/23 135/83     Pulse Readings from Last 3 Encounters:   08/30/23 77   08/18/23 102   08/16/23 89     Hyperlipidemia:   Ezetimibe (Zetia) 10mg once daily    Patient reports intolerable effects of multiple statins historically.    Recent Labs   Lab Test 08/17/23  0609 06/27/23  1502   CHOL 181 113   HDL 32* 17*   * 53   TRIG 196* 214*     Amputation Pain:   Oxycodone 5mg (taking once nightly to sleep through the night)  Gabapentin 300mg three times daily (appears to be written as 10 day supply with refills- will rewrite today)  Acetaminophen (not taking very often)    There are times where he feels fatigued enough to take a nap during the day after being active all morning. No complaints of fever or other signs of infection.    Post Discharge Medication Reconciliation Status: discharge medications  reconciled, continue medications without change.    I spent 32 minutes with this patient today. All changes were made via collaborative practice agreement with Kody Wing MD. A copy of the visit note was provided to the patient's provider(s).    A summary of these recommendations was sent via [employer].    Danielle Pittman, PharmD, BCACP  Medication Therapy Management Provider  Phone: [phone]  [email or web address]    Telemedicine Visit Details  Type of service:  Video Conference via [employer]  Start Time:  3:00 PM  End Time: 3:32 PM       Medication Therapy Recommendations  Hypertension    Current Medication: carvedilol ER (COREG CR) 20 MG 24 hr capsule   Rationale: Patient forgets to take - Adherence - Adherence   Recommendation: Provide Adherence Intervention   Status: Accepted - no CPA Needed

## 2023-09-06 ENCOUNTER — TELEPHONE (OUTPATIENT)
Dept: OTHER | Facility: CLINIC | Age: 58
End: 2023-09-06
Payer: COMMERCIAL

## 2023-09-06 DIAGNOSIS — T87.89 ULCER OF AMPUTATION STUMP OF LOWER EXTREMITY (H): ICD-10-CM

## 2023-09-06 DIAGNOSIS — L97.909 ULCER OF AMPUTATION STUMP OF LOWER EXTREMITY (H): ICD-10-CM

## 2023-09-06 NOTE — TELEPHONE ENCOUNTER
Prior Authorization Retail Medication Request    Medication/Dose:   carvedilol ER (COREG CR) 20 MG 24 hr capsule   Take 2 capsules (40 mg) by mouth daily     ICD code:   Ulcer of amputation stump of lower extremity (H) [T87.89, L97.909]  - Primary          Rationale:    Hypertension: Home readings not consistently at goal <140/90- patient should start carvedilol 40 mg daily.    Insurance:  [employer]  Subscriber ID:[id number]  Group number: [id number]    [facility] [address] [facility]       Routing to [facility] [place].  Anjana June RN BSN  [facility]

## 2023-09-07 NOTE — TELEPHONE ENCOUNTER
PA Initiation    Medication: CARVEDILOL PHOSPHATE ER 20 MG PO CP24  Insurance Company: [employer] Non-Specialty PA's - Phone [phone] Fax [phone]  Pharmacy Filling the Rx: [facility] [place] - [address] [place] [address]  Filling Pharmacy Phone: [phone]  Filling Pharmacy Fax:    Start Date: 9/7/2023

## 2023-09-14 NOTE — TELEPHONE ENCOUNTER
PRIOR AUTHORIZATION DENIED    Medication: CARVEDILOL PHOSPHATE ER 20 MG PO CP24  Insurance Company: [employer] - Phone [phone] Fax [phone]  Denial Date: 9/14/2023  Denial Rational: Insurance only covers 1 a day        Appeal Information: N/A  Patient Notified: No

## 2023-09-18 RX ORDER — CARVEDILOL PHOSPHATE 40 MG/1
40 CAPSULE, EXTENDED RELEASE ORAL DAILY
Qty: 90 CAPSULE | Refills: 3 | Status: SHIPPED | OUTPATIENT
Start: 2023-09-18

## 2023-09-18 NOTE — TELEPHONE ENCOUNTER
LIDYA for Coreg 40 mg tablet daily sent to [facility] pharmacy.    Agustina NAVARRETE, RN    [facility]  Office: [phone]  Fax: [phone]

## 2023-09-26 ENCOUNTER — OFFICE VISIT (OUTPATIENT)
Dept: OTHER | Facility: CLINIC | Age: 58
End: 2023-09-26
Attending: SURGERY
Payer: COMMERCIAL

## 2023-09-26 VITALS
OXYGEN SATURATION: 99 % | SYSTOLIC BLOOD PRESSURE: 132 MMHG | HEIGHT: 72 IN | DIASTOLIC BLOOD PRESSURE: 82 MMHG | WEIGHT: 240 LBS | BODY MASS INDEX: 32.51 KG/M2 | HEART RATE: 79 BPM

## 2023-09-26 DIAGNOSIS — T87.89 DELAYED SURGICAL WOUND HEALING OF BELOW-THE-KNEE AMPUTATION STUMP (H): Primary | ICD-10-CM

## 2023-09-26 DIAGNOSIS — T81.89XA DELAYED SURGICAL WOUND HEALING OF BELOW-THE-KNEE AMPUTATION STUMP (H): Primary | ICD-10-CM

## 2023-09-26 PROCEDURE — 99024 POSTOP FOLLOW-UP VISIT: CPT | Performed by: SURGERY

## 2023-09-26 PROCEDURE — G0463 HOSPITAL OUTPT CLINIC VISIT: HCPCS | Performed by: SURGERY

## 2023-09-26 NOTE — NURSING NOTE
[facility]        Patient is here for a post-op to discuss left BKA stump revision incision assessment and suture removal.     Pt is currently taking Aspirin.    /82 (BP Location: Left arm, Patient Position: Sitting, Cuff Size: Adult Large)   Pulse 79   Ht 6' (1.829 m)   Wt 240 lb (108.9 kg)   SpO2 99%   BMI 32.55 kg/m      The provider has been notified that the patient has no concerns.     Questions patient would like addressed today are: Prosthetic ready to start.    Refills are needed: No    Has homecare services and agency name:  ROSALBA Burnham, RN  [facility]  Office:  [phone] Fax: [phone]

## 2023-09-26 NOTE — PROGRESS NOTES
Vascular Surgery Progress Note     Date: September 26, 2023     Reason for Visit:  Post-op visit    Subjective:  Mr. Horner reports doing well; no significant pain. Did have one fall with minor bleeding of the stump; applied neosporin without further incident. Still has waxing-waning swelling of the stump, has been wearing a sock over it. Able to flex/extend knee comfortably. Still bothersome swelling and some tightness in the posterior flap. Eager to use his prosthetic again.       Current Outpatient Medications:     acetaminophen (TYLENOL) 325 MG tablet, Take 2 tablets (650 mg) by mouth every 6 hours as needed for mild pain Alternates use with ibuprofen, Disp: 90 tablet, Rfl: 0    amLODIPine (NORVASC) 10 MG tablet, Take 1 tablet (10 mg) by mouth daily, Disp: 90 tablet, Rfl: 3    aspirin 81 MG chewable tablet, Take 1 tablet (81 mg) by mouth daily, Disp: 108 tablet, Rfl: 3    BD PEN NEEDLE MEGAN 2ND GEN 32G X 4 MM miscellaneous, USE WITH INSULIN INJECTIONS UNDER THE SKIN TWO TIMES A DAY AS DIRECTED, Disp: 180 each, Rfl: 11    blood glucose (NO BRAND SPECIFIED) lancets standard, Use to test blood sugar FOUR times daily, to match lancing device per insurance, Disp: 400 each, Rfl: 1    blood glucose (NO BRAND SPECIFIED) test strip, 120 strips by In Vitro route 4 times daily Use to test blood sugar up to 4 times daily or as directed., Disp: 360 strip, Rfl: 3    blood glucose monitoring (NO BRAND SPECIFIED) meter device kit, Use to test blood sugar FOUR times daily, brand per insurance, Disp: 1 kit, Rfl: 0    carvedilol ER (COREG CR) 40 MG 24 hr capsule, Take 1 capsule (40 mg) by mouth daily, Disp: 90 capsule, Rfl: 3    Continuous Blood Gluc Sensor (FREESTYLE CRISTINA 3 SENSOR) MISC, 1 each every 14 days Use to check blood sugars per  guidelines with Cristina 3 dionisio, Disp: 2 each, Rfl: 5    ezetimibe (ZETIA) 10 MG tablet, TAKE ONE TABLET BY MOUTH EVERY DAY, Disp: 90 tablet, Rfl: 1    ferrous sulfate (FEROSUL) 325 (65  Fe) MG tablet, Take 1 tablet (325 mg) by mouth every other day, Disp: 30 tablet, Rfl: 0    gabapentin (NEURONTIN) 300 MG capsule, Take 1 capsule (300 mg) by mouth 3 times daily, Disp: 90 capsule, Rfl: 2    hydrochlorothiazide (HYDRODIURIL) 25 MG tablet, Take 1 tablet (25 mg) by mouth daily, Disp: 90 tablet, Rfl: 3    insulin degludec (TRESIBA FLEXTOUCH) 100 UNIT/ML pen, Inject 25 Units Subcutaneous every 12 hours, Disp: 15 mL, Rfl: 11    ipratropium (ATROVENT) 0.06 % nasal spray, INSTILL TWO SPRAYS INTO EACH NOSTRIL FOUR TIMES A DAY AS NEEDED FOR RHINITIS, Disp: 15 mL, Rfl: 11    Lancets (ONETOUCH DELICA PLUS [id number]) MISC, USE TO TEST BLOOD SUGAR FOUR TIMES A DAY, Disp: 400 each, Rfl: 1    multivitamin (CENTRUM SILVER) tablet, Take 1 tablet by mouth daily, Disp: , Rfl:     STATIN NOT PRESCRIBED (INTENTIONAL), Please choose reason not prescribed, below, Disp: , Rfl:      Physical Exam       BP: 132/82 Pulse: 79     SpO2: 99 % (room air)      Vital Signs with Ranges  Pulse:  [79] 79  BP: (132)/(82) 132/82  SpO2:  [99 %] 99 %  240 lbs 0 oz    Constitutional: cooperative, no apparent distress, sitting comfortably in chair. Accompanied by his wife.  Musculoskeletal: grossly normal and symmetric ROM and strength in BL extremities; some swelling in left BKA stump without overt edema. Punctate area of granulation tissue remaining. No exudate, sutures removed. No tenderness to palpation.   Neurologic: Awake, alert, oriented to name, place, time, and situation             Assessment & Plan   Mr. Horner is a 58M who underwent staged left below-knee amputation in 09/2022. This healed well and he was ambulatory on his prosthetic. He presented with new cellulitis of the lower left leg and underwent left BKA stump debridement on 6/30/23 and repeat debridement with drain placement on 7/6/23 and 7/10/23.  He was treated with extended-duration antibiotics and was hospitalized ultimately from 6/24 to 7/28. Bone biopsy results were  negative for osteomyelitis. He underwent definitive revision of the left below-knee amputation on 8/17/23.     Follow-up arranged with Nephrology in November  Will see him back in three months   OK to return to prosthetics for  socks and new stump moulding  He will return sooner if any questions or concerns arise      Reny Child MD

## 2023-10-16 ENCOUNTER — LAB (OUTPATIENT)
Dept: LAB | Facility: CLINIC | Age: 58
End: 2023-10-16
Payer: COMMERCIAL

## 2023-10-16 DIAGNOSIS — N18.4 CHRONIC KIDNEY DISEASE, STAGE IV (SEVERE) (H): ICD-10-CM

## 2023-10-16 DIAGNOSIS — E11.42 TYPE 2 DIABETES MELLITUS WITH DIABETIC POLYNEUROPATHY, WITHOUT LONG-TERM CURRENT USE OF INSULIN (H): ICD-10-CM

## 2023-10-16 LAB
ALBUMIN SERPL BCG-MCNC: 3.7 G/DL (ref 3.5–5.2)
ALBUMIN UR-MCNC: >=300 MG/DL
ANION GAP SERPL CALCULATED.3IONS-SCNC: 13 MMOL/L (ref 7–15)
APPEARANCE UR: CLEAR
BACTERIA #/AREA URNS HPF: ABNORMAL /HPF
BILIRUB UR QL STRIP: NEGATIVE
BUN SERPL-MCNC: 52.1 MG/DL (ref 6–20)
CALCIUM SERPL-MCNC: 9 MG/DL (ref 8.6–10)
CHLORIDE SERPL-SCNC: 104 MMOL/L (ref 98–107)
COLOR UR AUTO: YELLOW
CREAT SERPL-MCNC: 2.04 MG/DL (ref 0.67–1.17)
DEPRECATED HCO3 PLAS-SCNC: 19 MMOL/L (ref 22–29)
EGFRCR SERPLBLD CKD-EPI 2021: 38 ML/MIN/1.73M2
FERRITIN SERPL-MCNC: 246 NG/ML (ref 31–409)
GLUCOSE SERPL-MCNC: 303 MG/DL (ref 70–99)
GLUCOSE UR STRIP-MCNC: 500 MG/DL
HBA1C MFR BLD: 8.4 % (ref 0–5.6)
HGB UR QL STRIP: ABNORMAL
IRON BINDING CAPACITY (ROCHE): 244 UG/DL (ref 240–430)
IRON SATN MFR SERPL: 29 % (ref 15–46)
IRON SERPL-MCNC: 70 UG/DL (ref 61–157)
KETONES UR STRIP-MCNC: NEGATIVE MG/DL
LEUKOCYTE ESTERASE UR QL STRIP: NEGATIVE
NITRATE UR QL: NEGATIVE
PH UR STRIP: 6 [PH] (ref 5–7)
PHOSPHATE SERPL-MCNC: 4.4 MG/DL (ref 2.5–4.5)
POTASSIUM SERPL-SCNC: 4.5 MMOL/L (ref 3.4–5.3)
PTH-INTACT SERPL-MCNC: 75 PG/ML (ref 15–65)
RBC #/AREA URNS AUTO: ABNORMAL /HPF
SODIUM SERPL-SCNC: 136 MMOL/L (ref 135–145)
SP GR UR STRIP: 1.02 (ref 1–1.03)
SQUAMOUS #/AREA URNS AUTO: ABNORMAL /LPF
UROBILINOGEN UR STRIP-ACNC: 0.2 E.U./DL
VIT D+METAB SERPL-MCNC: 15 NG/ML (ref 20–50)
WBC #/AREA URNS AUTO: ABNORMAL /HPF

## 2023-10-16 PROCEDURE — 83970 ASSAY OF PARATHORMONE: CPT

## 2023-10-16 PROCEDURE — 82728 ASSAY OF FERRITIN: CPT

## 2023-10-16 PROCEDURE — 82306 VITAMIN D 25 HYDROXY: CPT

## 2023-10-16 PROCEDURE — 83550 IRON BINDING TEST: CPT

## 2023-10-16 PROCEDURE — 80069 RENAL FUNCTION PANEL: CPT

## 2023-10-16 PROCEDURE — 36415 COLL VENOUS BLD VENIPUNCTURE: CPT

## 2023-10-16 PROCEDURE — 83540 ASSAY OF IRON: CPT

## 2023-10-16 PROCEDURE — 83036 HEMOGLOBIN GLYCOSYLATED A1C: CPT

## 2023-10-16 PROCEDURE — 81001 URINALYSIS AUTO W/SCOPE: CPT

## 2023-10-16 PROCEDURE — 84156 ASSAY OF PROTEIN URINE: CPT

## 2023-10-17 LAB
ALBUMIN MFR UR ELPH: 386 MG/DL
CREAT UR-MCNC: 52.5 MG/DL
PROT/CREAT 24H UR: 7.35 MG/MG CR (ref 0–0.2)

## 2023-10-19 ENCOUNTER — TELEPHONE (OUTPATIENT)
Dept: OTHER | Facility: CLINIC | Age: 58
End: 2023-10-19
Payer: COMMERCIAL

## 2023-10-19 DIAGNOSIS — T87.89 DELAYED SURGICAL WOUND HEALING OF BELOW-THE-KNEE AMPUTATION STUMP (H): Primary | ICD-10-CM

## 2023-10-19 DIAGNOSIS — T81.89XA DELAYED SURGICAL WOUND HEALING OF BELOW-THE-KNEE AMPUTATION STUMP (H): Primary | ICD-10-CM

## 2023-10-19 DIAGNOSIS — Z89.512 STATUS POST BELOW KNEE AMPUTATION, LEFT (H): ICD-10-CM

## 2023-10-19 NOTE — TELEPHONE ENCOUNTER
[facility]    Who is the name of the provider?:  ELEAZAR GUZMAN   What is the location you see this provider at/preferred location?: [place]  Person calling / Facility: [facility]   Phone number:  [phone] (home)  Nurse call back needed:  YES     Reason for call:  Dr Guzman ok'd for socket for his prosthetic.. [facility] needs a referral and it needs to say evaluate and treat for new prosthesis please fax to [phone] Abdias Osborn

## 2023-10-19 NOTE — TELEPHONE ENCOUNTER
"LOV 9/26/23:  \"OK to return to prosthetics for  socks and new stump moulding\"  Orders placed and faxed.   Faxed  order, demographic sheet and progress note  October 19, 2023 to fax number [phone].   Right Fax confirmed at 1:48 PM    Called pt back and updated him of this. Pt verbalized appreciation and understanding.     MILLY BowersN, RN      "

## 2023-10-20 DIAGNOSIS — E78.5 HYPERLIPIDEMIA LDL GOAL <100: ICD-10-CM

## 2023-10-20 RX ORDER — EZETIMIBE 10 MG/1
TABLET ORAL
Qty: 90 TABLET | Refills: 0 | Status: SHIPPED | OUTPATIENT
Start: 2023-10-20 | End: 2024-01-22

## 2023-11-08 ENCOUNTER — MEDICAL CORRESPONDENCE (OUTPATIENT)
Dept: HEALTH INFORMATION MANAGEMENT | Facility: CLINIC | Age: 58
End: 2023-11-08
Payer: COMMERCIAL

## 2023-11-09 ENCOUNTER — TELEPHONE (OUTPATIENT)
Dept: OTHER | Facility: CLINIC | Age: 58
End: 2023-11-09
Payer: COMMERCIAL

## 2023-11-09 NOTE — TELEPHONE ENCOUNTER
Routing to MA's to follow up on patients request.  If form has not been received contact [facility] and/or patient to have form re-sent and fille katheryn NAVARRETE, RN    [facility] [place]  Office: [phone]  Fax: [phone]

## 2023-11-14 DIAGNOSIS — E11.42 TYPE 2 DIABETES MELLITUS WITH DIABETIC POLYNEUROPATHY, WITHOUT LONG-TERM CURRENT USE OF INSULIN (H): ICD-10-CM

## 2023-11-14 RX ORDER — BLOOD-GLUCOSE SENSOR
1 EACH MISCELLANEOUS
Qty: 2 EACH | Refills: 5 | Status: SHIPPED | OUTPATIENT
Start: 2023-11-14 | End: 2024-04-22

## 2023-11-16 ENCOUNTER — OFFICE VISIT (OUTPATIENT)
Dept: FAMILY MEDICINE | Facility: CLINIC | Age: 58
End: 2023-11-16
Payer: COMMERCIAL

## 2023-11-16 VITALS
HEART RATE: 91 BPM | OXYGEN SATURATION: 99 % | DIASTOLIC BLOOD PRESSURE: 93 MMHG | HEIGHT: 72 IN | TEMPERATURE: 98.1 F | BODY MASS INDEX: 34.9 KG/M2 | SYSTOLIC BLOOD PRESSURE: 148 MMHG | WEIGHT: 257.7 LBS | RESPIRATION RATE: 18 BRPM

## 2023-11-16 DIAGNOSIS — E78.5 HYPERLIPIDEMIA LDL GOAL <100: ICD-10-CM

## 2023-11-16 DIAGNOSIS — I10 ESSENTIAL HYPERTENSION, BENIGN: ICD-10-CM

## 2023-11-16 DIAGNOSIS — E11.42 TYPE 2 DIABETES MELLITUS WITH DIABETIC POLYNEUROPATHY, WITHOUT LONG-TERM CURRENT USE OF INSULIN (H): Primary | ICD-10-CM

## 2023-11-16 DIAGNOSIS — N18.4 CKD (CHRONIC KIDNEY DISEASE), STAGE IV (H): ICD-10-CM

## 2023-11-16 DIAGNOSIS — Z89.512 S/P BILATERAL BELOW KNEE AMPUTATION (H): ICD-10-CM

## 2023-11-16 DIAGNOSIS — Z89.511 S/P BILATERAL BELOW KNEE AMPUTATION (H): ICD-10-CM

## 2023-11-16 PROBLEM — A41.9 SEVERE SEPSIS (H): Status: RESOLVED | Noted: 2023-06-24 | Resolved: 2023-11-16

## 2023-11-16 PROBLEM — L03.116 CELLULITIS OF LEFT LOWER EXTREMITY: Status: RESOLVED | Noted: 2022-09-23 | Resolved: 2023-11-16

## 2023-11-16 PROBLEM — S91.301A OPEN WOUND OF RIGHT FOOT: Status: RESOLVED | Noted: 2017-08-28 | Resolved: 2023-11-16

## 2023-11-16 PROBLEM — E11.628 CELLULITIS IN DIABETIC FOOT (H): Status: RESOLVED | Noted: 2020-02-03 | Resolved: 2023-11-16

## 2023-11-16 PROBLEM — L97.525: Status: RESOLVED | Noted: 2022-09-23 | Resolved: 2023-11-16

## 2023-11-16 PROBLEM — E66.811 OBESITY (BMI 30.0-34.9): Status: ACTIVE | Noted: 2021-02-22

## 2023-11-16 PROBLEM — L03.119 CELLULITIS IN DIABETIC FOOT (H): Status: RESOLVED | Noted: 2020-02-03 | Resolved: 2023-11-16

## 2023-11-16 PROBLEM — R65.20 SEVERE SEPSIS (H): Status: RESOLVED | Noted: 2023-06-24 | Resolved: 2023-11-16

## 2023-11-16 PROBLEM — S91.302D: Status: RESOLVED | Noted: 2021-10-01 | Resolved: 2023-11-16

## 2023-11-16 PROBLEM — L97.909: Status: RESOLVED | Noted: 2023-06-24 | Resolved: 2023-11-16

## 2023-11-16 PROBLEM — E11.621: Status: RESOLVED | Noted: 2022-09-23 | Resolved: 2023-11-16

## 2023-11-16 PROBLEM — N17.0 ACUTE KIDNEY FAILURE WITH TUBULAR NECROSIS (H): Status: RESOLVED | Noted: 2023-07-02 | Resolved: 2023-11-16

## 2023-11-16 PROBLEM — N17.9 ACUTE KIDNEY INJURY (H): Status: RESOLVED | Noted: 2023-06-24 | Resolved: 2023-11-16

## 2023-11-16 PROBLEM — T87.89: Status: RESOLVED | Noted: 2023-06-24 | Resolved: 2023-11-16

## 2023-11-16 PROCEDURE — 99214 OFFICE O/P EST MOD 30 MIN: CPT | Performed by: INTERNAL MEDICINE

## 2023-11-16 RX ORDER — LISINOPRIL 5 MG/1
5 TABLET ORAL DAILY
Qty: 90 TABLET | Refills: 3 | Status: SHIPPED | OUTPATIENT
Start: 2023-11-16 | End: 2024-02-01

## 2023-11-16 ASSESSMENT — PAIN SCALES - GENERAL: PAINLEVEL: NO PAIN (0)

## 2023-11-16 NOTE — PATIENT INSTRUCTIONS
To protect your kidneys long-term, lets stop hydrochlorothiazide and amlodipine and restart lisinopril.  Start at the dose of 5 mg daily.  If after 3 days, the blood pressure is greater than 130/80, increase lisinopril to 10 mg (2 tabs) daily.  If after an additional 3 days, the blood pressure is still greater than 130/80, further increase lisinopril to 20 mg (4 tabs) daily.    Sometime between 1-3 weeks from now, schedule a lab appointment to check on your kidney and electrolyte tests after restarting lisinopril.      To control the post meal blood sugar spikes, lets start ozempic.  Be patient, the first month of ozempic does not usually lower sugar very much, but when you get to the target dose you should see improvement.  We should check the A1c after January 15th 2024.

## 2023-11-16 NOTE — PROGRESS NOTES
Assessment & Plan     Type 2 diabetes mellitus with diabetic polyneuropathy, without long-term current use of insulin (H)  (primary encounter diagnosis)  CKD (chronic kidney disease), stage IV (H)  Essential hypertension, benign  Hyperlipidemia LDL goal <100  S/P bilateral below knee amputation (H)       To avoid hypotension, stop both amlodipine and hydrochlorothiazide and restart lisinopril carefully as below.  Titrate to a dose that allows for most home blood pressure readings less than 130/80.  If that is not achievable on 20 mg of lisinopril, would add back a low-dose of amlodipine as that combination is often very helpful at keeping blood pressure down.  Discussed with patient.  See patient instructions.  Add GLP-1 receptor agonist to help with postprandial blood pressure spikes and to get A1c to goal.  Discussed potential side effects and risks including nausea and constipation.  Recheck A1c at a 3-month interval.      No LOS data to display   Time spent by me doing chart review, history and exam, documentation and further activities per the note       BMI:   Estimated body mass index is 34.95 kg/m  as calculated from the following:    Height as of this encounter: 1.829 m (6').    Weight as of this encounter: 116.9 kg (257 lb 11.2 oz).   Weight management plan: Discussed healthy diet and exercise guidelines    Patient Instructions   To protect your kidneys long-term, lets stop hydrochlorothiazide and amlodipine and restart lisinopril.  Start at the dose of 5 mg daily.  If after 3 days, the blood pressure is greater than 130/80, increase lisinopril to 10 mg (2 tabs) daily.  If after an additional 3 days, the blood pressure is still greater than 130/80, further increase lisinopril to 20 mg (4 tabs) daily.    Sometime between 1-3 weeks from now, schedule a lab appointment to check on your kidney and electrolyte tests after restarting lisinopril.      To control the post meal blood sugar spikes, lets start  ozempic.  Be patient, the first month of ozempic does not usually lower sugar very much, but when you get to the target dose you should see improvement.  We should check the A1c after January 15th 2024.      Kody Wing MD  [facility]INO Raza is a 58 year old, presenting for the following health issues:  Follow Up, Recheck Medication (Notes from dr julius monreal, start lisinopril, jardiance eval, coming of HCTZ), and LAB REQUEST (Renal check 10-14 day  to start ACE OR SGLT2I)        11/16/2023     4:04 PM   Additional Questions   Roomed by [facility]   Accompanied by Not applicable, by themselves       History of Present Illness       Diabetes:   He presents for follow up of diabetes.   He is checking home blood glucose with a continuous glucose monitor.   He checks blood glucose before meals, after meals, before and after meals and at bedtime.  Blood glucose is sometimes over 200 and sometimes under 70. He is aware of hypoglycemia symptoms including shakiness.   He is concerned about blood sugar frequently over 200.    He is not experiencing numbness or burning in feet, excessive thirst, blurry vision, weight changes or redness, sores or blisters on feet. The patient has not had a diabetic eye exam in the last 12 months.          Hypertension: He presents for follow up of hypertension.  He does check blood pressure  regularly outside of the clinic. Outside blood pressures have been over 140/90. He does not follow a low salt diet.     He eats 4 or more servings of fruits and vegetables daily.He consumes 0 sweetened beverage(s) daily.He exercises with enough effort to increase his heart rate 9 or less minutes per day.  He exercises with enough effort to increase his heart rate 3 or less days per week. He is missing 1 dose(s) of medications per week.         Ry is here to follow-up on his diabetes, CKD, hypertension, hyperlipidemia with statin intolerance, history of below  the knee amputations    His postprandial blood glucoses are quite high particularly after lunch and dinner.  His A1c was above goal in October.    He saw his nephrologist today, his official note is not available for my review today, but he had a handwritten note from Dr. Gomes indicating that it would be reasonable to go ahead and stop amlodipine and start an ACE inhibitor with plans to started SGLT2 inhibitor following that.    Ry reports some  severely low blood pressure readings on certain mornings after taking hydrochlorothiazide.    He has a history of very labile blood pressure and his blood pressure readings in clinic are often much higher than they are when he checks at home.  He reports that the majority of his home blood pressure readings are in the 120s to 130s over 60s to 70s.      Review of Systems         Objective    BP (!) 148/93 (BP Location: Left arm, Patient Position: Sitting, Cuff Size: Adult Large)   Pulse 91   Temp 98.1  F (36.7  C) (Oral)   Resp 18   Ht 1.829 m (6')   Wt 116.9 kg (257 lb 11.2 oz)   SpO2 99%   BMI 34.95 kg/m    Body mass index is 34.95 kg/m .  Physical Exam   General: This is a well-appearing man in no acute distress.  He is status post bilateral below the knee amputations.

## 2023-11-25 ENCOUNTER — MEDICAL CORRESPONDENCE (OUTPATIENT)
Dept: HEALTH INFORMATION MANAGEMENT | Facility: CLINIC | Age: 58
End: 2023-11-25
Payer: COMMERCIAL

## 2023-11-27 DIAGNOSIS — T87.89 ULCER OF AMPUTATION STUMP OF LOWER EXTREMITY (H): ICD-10-CM

## 2023-11-27 DIAGNOSIS — L97.909 ULCER OF AMPUTATION STUMP OF LOWER EXTREMITY (H): ICD-10-CM

## 2023-11-27 RX ORDER — GABAPENTIN 300 MG/1
300 CAPSULE ORAL 3 TIMES DAILY
Qty: 90 CAPSULE | Refills: 2 | Status: SHIPPED | OUTPATIENT
Start: 2023-11-27 | End: 2024-02-19

## 2023-11-30 ENCOUNTER — LAB (OUTPATIENT)
Dept: LAB | Facility: CLINIC | Age: 58
End: 2023-11-30
Payer: COMMERCIAL

## 2023-11-30 DIAGNOSIS — N18.4 CKD (CHRONIC KIDNEY DISEASE), STAGE IV (H): Primary | ICD-10-CM

## 2023-11-30 LAB
ANION GAP SERPL CALCULATED.3IONS-SCNC: 11 MMOL/L (ref 7–15)
BUN SERPL-MCNC: 42.5 MG/DL (ref 6–20)
CALCIUM SERPL-MCNC: 9.1 MG/DL (ref 8.6–10)
CHLORIDE SERPL-SCNC: 105 MMOL/L (ref 98–107)
CREAT SERPL-MCNC: 2.22 MG/DL (ref 0.67–1.17)
CREAT UR-MCNC: 77.9 MG/DL
DEPRECATED HCO3 PLAS-SCNC: 24 MMOL/L (ref 22–29)
EGFRCR SERPLBLD CKD-EPI 2021: 34 ML/MIN/1.73M2
GLUCOSE SERPL-MCNC: 145 MG/DL (ref 70–99)
HGB BLD-MCNC: 12.1 G/DL (ref 13.3–17.7)
MICROALBUMIN UR-MCNC: 3063 MG/L
MICROALBUMIN/CREAT UR: 3931.96 MG/G CR (ref 0–17)
POTASSIUM SERPL-SCNC: 4.8 MMOL/L (ref 3.4–5.3)
SODIUM SERPL-SCNC: 140 MMOL/L (ref 135–145)

## 2023-11-30 PROCEDURE — 82570 ASSAY OF URINE CREATININE: CPT

## 2023-11-30 PROCEDURE — 82043 UR ALBUMIN QUANTITATIVE: CPT

## 2023-11-30 PROCEDURE — 85018 HEMOGLOBIN: CPT

## 2023-11-30 PROCEDURE — 36415 COLL VENOUS BLD VENIPUNCTURE: CPT

## 2023-11-30 PROCEDURE — 80048 BASIC METABOLIC PNL TOTAL CA: CPT

## 2023-12-01 NOTE — RESULT ENCOUNTER NOTE
Can you please call Ry and help him schedule a lab appointment for hemoglobin A1c followed by an office visit appointment with Dr. Wing to discuss the results in 2 months?

## 2023-12-01 NOTE — RESULT ENCOUNTER NOTE
The following letter pertains to your most recent diagnostic tests:    -The urine microalbumin level is markedly elevated.  The medications that we restarted last visit (lisinopril) has been shown to improve this problem and be protective of the kidney function.  Please inform me if you have not been able to achieve blood pressures less than 130/80 consistently with the lisinopril that was started last visit.    -The metabolic panel shows essentially stable kidney function.  We do expect the creatinine to elevate slightly when starting lisinopril.    -The hemoglobin has increased since last check and is nearly back to normal.    I recommend a follow-up visit in 2 months with a hemoglobin A1c check prior to the office visit.  I will have my staff reach out to you to help you schedule this appointment.    Again, please reach out if you are not achieving good blood pressure control (i.e. less than 130/80 consistently) on the lisinopril.    Sincerely,    Dr. Wing

## 2023-12-19 ENCOUNTER — OFFICE VISIT (OUTPATIENT)
Dept: OTHER | Facility: CLINIC | Age: 58
End: 2023-12-19
Attending: SURGERY
Payer: COMMERCIAL

## 2023-12-19 VITALS
DIASTOLIC BLOOD PRESSURE: 120 MMHG | HEART RATE: 102 BPM | WEIGHT: 257.4 LBS | BODY MASS INDEX: 34.91 KG/M2 | SYSTOLIC BLOOD PRESSURE: 160 MMHG

## 2023-12-19 DIAGNOSIS — Z89.511 S/P BILATERAL BELOW KNEE AMPUTATION (H): Primary | ICD-10-CM

## 2023-12-19 DIAGNOSIS — Z89.512 S/P BILATERAL BELOW KNEE AMPUTATION (H): Primary | ICD-10-CM

## 2023-12-19 PROCEDURE — 99212 OFFICE O/P EST SF 10 MIN: CPT | Performed by: SURGERY

## 2023-12-19 PROCEDURE — 99213 OFFICE O/P EST LOW 20 MIN: CPT | Performed by: SURGERY

## 2023-12-19 NOTE — PROGRESS NOTES
Vascular Surgery Progress Note     Date: December 19, 2023     Reason for Visit:  Longitudinal follow-up of bilateral amputation sites    Subjective:  Mr. Horner reports doing well. He was fitted for a new prosthetic, and this seems to be doing better. He still has some asymmetry of the lower left leg stump but the swelling has resolved.      Current Outpatient Medications:     acetaminophen (TYLENOL) 325 MG tablet, Take 2 tablets (650 mg) by mouth every 6 hours as needed for mild pain Alternates use with ibuprofen, Disp: 90 tablet, Rfl: 0    aspirin 81 MG chewable tablet, Take 1 tablet (81 mg) by mouth daily, Disp: 108 tablet, Rfl: 3    BD PEN NEEDLE MEGAN 2ND GEN 32G X 4 MM miscellaneous, USE WITH INSULIN INJECTIONS UNDER THE SKIN TWO TIMES A DAY AS DIRECTED, Disp: 180 each, Rfl: 11    blood glucose (NO BRAND SPECIFIED) lancets standard, Use to test blood sugar FOUR times daily, to match lancing device per insurance, Disp: 400 each, Rfl: 1    blood glucose (NO BRAND SPECIFIED) test strip, 120 strips by In Vitro route 4 times daily Use to test blood sugar up to 4 times daily or as directed., Disp: 360 strip, Rfl: 3    blood glucose monitoring (NO BRAND SPECIFIED) meter device kit, Use to test blood sugar FOUR times daily, brand per insurance, Disp: 1 kit, Rfl: 0    carvedilol ER (COREG CR) 40 MG 24 hr capsule, Take 1 capsule (40 mg) by mouth daily, Disp: 90 capsule, Rfl: 3    Continuous Blood Gluc Sensor (FREESTYLE CRISTINA 3 SENSOR) MISC, 1 each every 14 days Use to check blood sugars per  guidelines with Cristina 3 dionisio, Disp: 2 each, Rfl: 5    ezetimibe (ZETIA) 10 MG tablet, TAKE ONE TABLET BY MOUTH EVERY DAY, Disp: 90 tablet, Rfl: 0    ferrous sulfate (FEROSUL) 325 (65 Fe) MG tablet, Take 1 tablet (325 mg) by mouth every other day, Disp: 30 tablet, Rfl: 0    gabapentin (NEURONTIN) 300 MG capsule, TAKE ONE CAPSULE BY MOUTH THREE TIMES A DAY, Disp: 90 capsule, Rfl: 2    insulin degludec (TRESIBA FLEXTOUCH) 100  UNIT/ML pen, Inject 25 Units Subcutaneous every 12 hours, Disp: 15 mL, Rfl: 11    ipratropium (ATROVENT) 0.06 % nasal spray, INSTILL TWO SPRAYS INTO EACH NOSTRIL FOUR TIMES A DAY AS NEEDED FOR RHINITIS, Disp: 15 mL, Rfl: 11    Lancets (ONETOUCH DELICA PLUS [id number]) MISC, USE TO TEST BLOOD SUGAR FOUR TIMES A DAY, Disp: 400 each, Rfl: 1    lisinopril (ZESTRIL) 5 MG tablet, Take 1 tablet (5 mg) by mouth daily, Disp: 90 tablet, Rfl: 3    multivitamin (CENTRUM SILVER) tablet, Take 1 tablet by mouth daily, Disp: , Rfl:     semaglutide (OZEMPIC) 2 MG/3ML pen, 0.25 mg once weekly for 4 weeks, then increase to 0.5 mg once weekly, Disp: 3 mL, Rfl: 11    STATIN NOT PRESCRIBED (INTENTIONAL), Please choose reason not prescribed, below, Disp: , Rfl:      Physical Exam       BP: (!) 160/120 Pulse: 102            Vital Signs with Ranges  Pulse:  [102] 102  BP: (160)/(120) 160/120  257 lbs 6.4 oz    Constitutional: cooperative, no apparent distress, sitting comfortably in chair.   Musculoskeletal: grossly normal and symmetric ROM and strength in BL extremities. Able to walk on bilateral BKA prostheses. Left stump has intact scar. Some asymmetry with nodularity on the medial aspect, not related to tibial edge (? Scarred tendon). No swelling or edema of the stump. Well-healed lateral leg stab incisions from prior drain sites.  Neurologic: Awake, alert, oriented to name, place, time, and situation       Assessment & Plan   Mr. Horner is a 58M who underwent staged left below-knee amputation in 09/2022. This healed well and he was ambulatory on his prosthetic. He presented with new cellulitis of the lower left leg and underwent left BKA stump debridement on 6/30/23 and repeat debridement with drain placement on 7/6/23 and 7/10/23.  He was treated with extended-duration antibiotics and was hospitalized ultimately from 6/24 to 7/28. Bone biopsy results were negative for osteomyelitis. He underwent definitive revision of the left below-knee  amputation on 8/17/23.      Mr. Horner is doing very well. He has had excellent healing.   He will return if any new questions or concerns arise in the future; no need for scheduled vascular surgery follow-up at this point.   Greatly appreciate multidisciplinary team care.    Reny Child MD    Total time spent on the date of this encounter doing: chart review, review of test results, patient visit, physical exam, education, counseling, developing plan of care, and documenting = 10 minutes

## 2023-12-19 NOTE — PROGRESS NOTES
[facility]        Patient is here for a  follow up.     Pt is currently taking Aspirin.    BP (!) 160/120 (BP Location: Right arm, Patient Position: Chair, Cuff Size: Adult Large)   Pulse 102   Wt 257 lb 6.4 oz (116.8 kg)   BMI 34.91 kg/m      The provider has been notified that the patient has no concerns.     Questions patient would like addressed today are: N/A.    Refills are needed: N/A    Has homecare services and agency name:  Nikkie Valencia MA

## 2024-01-11 ENCOUNTER — LAB (OUTPATIENT)
Dept: LAB | Facility: CLINIC | Age: 59
End: 2024-01-11
Payer: COMMERCIAL

## 2024-01-11 DIAGNOSIS — E11.42 TYPE 2 DIABETES MELLITUS WITH DIABETIC POLYNEUROPATHY, WITHOUT LONG-TERM CURRENT USE OF INSULIN (H): ICD-10-CM

## 2024-01-11 LAB — HBA1C MFR BLD: 7.3 % (ref 0–5.6)

## 2024-01-11 PROCEDURE — 36415 COLL VENOUS BLD VENIPUNCTURE: CPT

## 2024-01-11 PROCEDURE — 83036 HEMOGLOBIN GLYCOSYLATED A1C: CPT

## 2024-01-11 NOTE — RESULT ENCOUNTER NOTE
The following letter pertains to your most recent diagnostic tests:    Excellent result!  The diabetes is now under good control!      Sincerely,    Dr. Wing

## 2024-01-20 DIAGNOSIS — E78.5 HYPERLIPIDEMIA LDL GOAL <100: ICD-10-CM

## 2024-01-22 RX ORDER — EZETIMIBE 10 MG/1
TABLET ORAL
Qty: 90 TABLET | Refills: 0 | Status: SHIPPED | OUTPATIENT
Start: 2024-01-22 | End: 2024-04-15

## 2024-01-22 NOTE — TELEPHONE ENCOUNTER
Prescription approved per [facility] Refill Protocol.  Rere Prasad RN  [facility]

## 2024-02-01 ENCOUNTER — OFFICE VISIT (OUTPATIENT)
Dept: FAMILY MEDICINE | Facility: CLINIC | Age: 59
End: 2024-02-01
Payer: COMMERCIAL

## 2024-02-01 ENCOUNTER — LAB (OUTPATIENT)
Dept: LAB | Facility: CLINIC | Age: 59
End: 2024-02-01
Payer: COMMERCIAL

## 2024-02-01 VITALS
HEIGHT: 72 IN | HEART RATE: 107 BPM | WEIGHT: 256.2 LBS | OXYGEN SATURATION: 97 % | DIASTOLIC BLOOD PRESSURE: 84 MMHG | RESPIRATION RATE: 16 BRPM | SYSTOLIC BLOOD PRESSURE: 130 MMHG | TEMPERATURE: 97.1 F | BODY MASS INDEX: 34.7 KG/M2

## 2024-02-01 DIAGNOSIS — E11.42 TYPE 2 DIABETES MELLITUS WITH DIABETIC POLYNEUROPATHY, WITHOUT LONG-TERM CURRENT USE OF INSULIN (H): ICD-10-CM

## 2024-02-01 DIAGNOSIS — Z89.512 S/P BILATERAL BELOW KNEE AMPUTATION (H): ICD-10-CM

## 2024-02-01 DIAGNOSIS — E78.5 HYPERLIPIDEMIA LDL GOAL <100: ICD-10-CM

## 2024-02-01 DIAGNOSIS — N18.4 CKD (CHRONIC KIDNEY DISEASE), STAGE IV (H): Primary | ICD-10-CM

## 2024-02-01 DIAGNOSIS — Z89.511 S/P BILATERAL BELOW KNEE AMPUTATION (H): ICD-10-CM

## 2024-02-01 DIAGNOSIS — I10 ESSENTIAL HYPERTENSION, BENIGN: ICD-10-CM

## 2024-02-01 DIAGNOSIS — N18.4 CKD (CHRONIC KIDNEY DISEASE), STAGE IV (H): ICD-10-CM

## 2024-02-01 DIAGNOSIS — E11.42 TYPE 2 DIABETES MELLITUS WITH DIABETIC POLYNEUROPATHY, WITHOUT LONG-TERM CURRENT USE OF INSULIN (H): Primary | ICD-10-CM

## 2024-02-01 LAB
ANION GAP SERPL CALCULATED.3IONS-SCNC: 11 MMOL/L (ref 7–15)
BUN SERPL-MCNC: 56.3 MG/DL (ref 6–20)
CALCIUM SERPL-MCNC: 9.5 MG/DL (ref 8.6–10)
CHLORIDE SERPL-SCNC: 105 MMOL/L (ref 98–107)
CREAT SERPL-MCNC: 2.78 MG/DL (ref 0.67–1.17)
CREAT UR-MCNC: 102 MG/DL
DEPRECATED HCO3 PLAS-SCNC: 23 MMOL/L (ref 22–29)
EGFRCR SERPLBLD CKD-EPI 2021: 26 ML/MIN/1.73M2
GLUCOSE SERPL-MCNC: 129 MG/DL (ref 70–99)
MICROALBUMIN UR-MCNC: 3630 MG/L
MICROALBUMIN/CREAT UR: 3558.82 MG/G CR (ref 0–17)
POTASSIUM SERPL-SCNC: 4.8 MMOL/L (ref 3.4–5.3)
SODIUM SERPL-SCNC: 139 MMOL/L (ref 135–145)

## 2024-02-01 PROCEDURE — 82043 UR ALBUMIN QUANTITATIVE: CPT

## 2024-02-01 PROCEDURE — 82570 ASSAY OF URINE CREATININE: CPT

## 2024-02-01 PROCEDURE — 99213 OFFICE O/P EST LOW 20 MIN: CPT | Performed by: INTERNAL MEDICINE

## 2024-02-01 PROCEDURE — 36415 COLL VENOUS BLD VENIPUNCTURE: CPT

## 2024-02-01 PROCEDURE — 80048 BASIC METABOLIC PNL TOTAL CA: CPT

## 2024-02-01 RX ORDER — LISINOPRIL 10 MG/1
10 TABLET ORAL DAILY
Qty: 90 TABLET | Refills: 3 | Status: SHIPPED | OUTPATIENT
Start: 2024-02-01

## 2024-02-01 ASSESSMENT — PAIN SCALES - GENERAL: PAINLEVEL: NO PAIN (0)

## 2024-02-01 NOTE — LETTER
February 2, 2024      Ry Horner  [address] [place] [address]        Dear ,    We are writing to inform you of your test results.    The following letter pertains to your most recent diagnostic tests:     The level of protein in the urine remain high.  The kidney function is essentially stable.  The Creatinine is a little higher than last check, but we expect that when we increase the dose of lisinopril.  Long-term the good blood pressure control from the lisinopril is healthiest for your kidneys.     Given the persistence of the protein in the urine, I recommend going back on the Jardiance.  The Jardiance can help reduce the amount of protein in the urine and keep the kidneys functioning better in the long term.  I updated the pharmacy that you are going back on the Jardiance, so you can get refills when you need them.   We should recheck the hemoglobin A1c and microalbumin urine test along with the creatinine in May when you return.         Resulted Orders   Albumin Random Urine Quantitative with Creat Ratio   Result Value Ref Range    Creatinine Urine mg/dL 102.0 mg/dL      Comment:      The reference ranges have not been established in urine creatinine. The results should be integrated into the clinical context for interpretation.    Albumin Urine mg/L 3,630.0 mg/L      Comment:      The reference ranges have not been established in urine albumin. The results should be integrated into the clinical context for interpretation.    Albumin Urine mg/g Cr 3,558.82 (H) 0.00 - 17.00 mg/g Cr      Comment:      Microalbuminuria is defined as an albumin:creatinine ratio of 17 to 299 for males and 25 to 299 for females. A ratio of albumin:creatinine of 300 or higher is indicative of overt proteinuria.  Due to biologic variability, positive results should be confirmed by a second, first-morning random or 24-hour timed urine specimen. If there is discrepancy, a third specimen is recommended. When 2 out of 3  results are in the microalbuminuria range, this is evidence for incipient nephropathy and warrants increased efforts at glucose control, blood pressure control, and institution of therapy with an angiotensin-converting-enzyme (ACE) inhibitor (if the patient can tolerate it).     BASIC METABOLIC PANEL   Result Value Ref Range    Sodium 139 135 - 145 mmol/L      Comment:      Reference intervals for this test were updated on 09/26/2023 to more accurately reflect our healthy population. There may be differences in the flagging of prior results with similar values performed with this method. Interpretation of those prior results can be made in the context of the updated reference intervals.     Potassium 4.8 3.4 - 5.3 mmol/L    Chloride 105 98 - 107 mmol/L    Carbon Dioxide (CO2) 23 22 - 29 mmol/L    Anion Gap 11 7 - 15 mmol/L    Urea Nitrogen 56.3 (H) 6.0 - 20.0 mg/dL    Creatinine 2.78 (H) 0.67 - 1.17 mg/dL    GFR Estimate 26 (L) >60 mL/min/1.73m2    Calcium 9.5 8.6 - 10.0 mg/dL    Glucose 129 (H) 70 - 99 mg/dL       If you have any questions or concerns, please call the clinic at the number listed above.       Sincerely,      Kody Wing MD

## 2024-02-01 NOTE — PROGRESS NOTES
Assessment & Plan     Type 2 diabetes mellitus with diabetic polyneuropathy, without long-term current use of insulin (H)  Now under good control  Continue current insulin and Ozempic  Long term goal to get back on Jardiance too  Will see what trend of Cr is before pulling the trigger on that drug  Discussed with patient     CKD (chronic kidney disease), stage IV (H)  Taking 10 mg of lisinopril now; new prescription sent   - lisinopril (ZESTRIL) 10 MG tablet; Take 1 tablet (10 mg) by mouth daily    Essential hypertension, benign  Blood pressure at goal  < 130/80 at home on 10 of lisinopril and 40 of coreg, continue current drugs     Hyperlipidemia LDL goal <100  Statin in tolerant  On zetia  Recheck lipids in August at annual interval    S/P bilateral below knee amputation (H)  Doing well with prostheses  Planning to go to driving range this weekend and to play in golf tournaments this spring!  He is also going to do a 5K walk with his sister this summer!  He has been using a rowing machine and doing circuit training too and he attributes his improved blood pressure and A1c to increasing activity and I would agree and I tried to be as encouraging of keeping up these healthy lifestyle changes as possible!      No LOS data to display   Time spent by me doing chart review, history and exam, documentation and further activities per the note        FUTURE APPOINTMENTS:       -  Follow-up with whom? Me Follow-Up for what? Adult Preventive How? In Person Future, Expected: 5/1/2024 Approximate, Patient instructed to return to clinic or contact us sooner if symptoms worsen or new symptoms develop.     Marine Raza is a 58 year old, presenting for the following health issues:  Follow Up    HPI       Diabetes Follow-up    How often are you checking your blood sugar? Continuous glucose monitor  What time of day are you checking your blood sugars (select all that apply)?  Before and after meals  Have you had any blood  sugars above 200?  Yes   Have you had any blood sugars below 70?  No  What symptoms do you notice when your blood sugar is low?  Shaky and Lethargy  What concerns do you have today about your diabetes? Ozempic and Lisinopril - side effects    Do you have any of these symptoms? (Select all that apply)  No numbness or tingling in feet.  No redness, sores or blisters on feet.  No complaints of excessive thirst.  No reports of blurry vision.  No significant changes to weight.  Have you had a diabetic eye exam in the last 12 months? No        BP Readings from Last 2 Encounters:   02/01/24 130/84   12/19/23 (!) 160/120     Hemoglobin A1C (%)   Date Value   01/11/2024 7.3 (H)   10/16/2023 8.4 (H)   06/25/2021 8.5 (H)   02/19/2021 8.4 (H)     LDL Cholesterol Calculated (mg/dL)   Date Value   08/17/2023 110 (H)   06/27/2023 53   06/25/2021     Cannot estimate LDL when triglyceride exceeds 400 mg/dL   08/21/2020     Cannot estimate LDL when triglyceride exceeds 400 mg/dL     LDL Cholesterol Direct (mg/dL)   Date Value   11/09/2022 115 (H)   09/30/2021 92   06/25/2021 90   08/21/2020 79                     Objective    /84 (BP Location: Right arm, Patient Position: Sitting, Cuff Size: Adult Large)   Pulse 107   Temp 97.1  F (36.2  C) (Temporal)   Resp 16   Ht 1.829 m (6')   Wt 116.2 kg (256 lb 3.2 oz)   SpO2 97%   BMI 34.75 kg/m    Body mass index is 34.75 kg/m .  Physical Exam   GENERAL: alert and no distress    BMP and uACR pending         Signed Electronically by: Kody Wing MD

## 2024-02-02 DIAGNOSIS — E11.42 TYPE 2 DIABETES MELLITUS WITH DIABETIC POLYNEUROPATHY, WITHOUT LONG-TERM CURRENT USE OF INSULIN (H): ICD-10-CM

## 2024-02-02 NOTE — RESULT ENCOUNTER NOTE
The following letter pertains to your most recent diagnostic tests:    The level of protein in the urine remain high.  The kidney function is essentially stable.  The Creatinine is a little higher than last check, but we expect that when we increase the dose of lisinopril.  Long-term the good blood pressure control from the lisinopril is healthiest for your kidneys.    Given the persistence of the protein in the urine, I recommend going back on the Jardiance.  The Jardiance can help reduce the amount of protein in the urine and keep the kidneys functioning better in the long term.  I updated the pharmacy that you are going back on the Jardiance, so you can get refills when you need them.   We should recheck the hemoglobin A1c and microalbumin urine test along with the creatinine in May when you return.           Sincerely,    Dr. Wing

## 2024-02-18 DIAGNOSIS — T87.89 ULCER OF AMPUTATION STUMP OF LOWER EXTREMITY (H): ICD-10-CM

## 2024-02-18 DIAGNOSIS — L97.909 ULCER OF AMPUTATION STUMP OF LOWER EXTREMITY (H): ICD-10-CM

## 2024-02-19 RX ORDER — GABAPENTIN 300 MG/1
300 CAPSULE ORAL 3 TIMES DAILY
Qty: 90 CAPSULE | Refills: 2 | Status: SHIPPED | OUTPATIENT
Start: 2024-02-19 | End: 2024-05-13

## 2024-03-14 ENCOUNTER — TELEPHONE (OUTPATIENT)
Dept: FAMILY MEDICINE | Facility: CLINIC | Age: 59
End: 2024-03-14
Payer: COMMERCIAL

## 2024-03-14 NOTE — TELEPHONE ENCOUNTER
Prior Authorization Retail Medication Request    Medication/Dose: Freestyle Sj 3 sensor  Diagnosis and ICD code (if different than what is on RX):  E11.42  New/renewal/insurance change PA/secondary ins. PA:  Previously Tried and Failed:  Dexcom G6  Rationale:  Patient stable with Freestyle Sj and has had improvement in A1C with use of CGM    Insurance   Primary: [employer]  Insurance ID:  [id number]    Secondary (if applicable):  Insurance ID:      Pharmacy Information (if different than what is on RX)  Name:  [facility]  Phone:  [phone]  Fax:[phone]

## 2024-03-27 NOTE — TELEPHONE ENCOUNTER
Prior Authorization Approval    Med[facility]  Authorization Effective Date: 3/27/2024  Authorization Expiration Date: 3/27/2025  Approved Dose/Quantity:   Reference #: [id number]   Insurance Company: [employer] - Phone [phone] Fax [phone]  Which Pharmacy is filling the prescription: [facility] - [place] [address]  Pharmacy Notified: y  Patient Notified: y - pharmacy to notify

## 2024-03-27 NOTE — TELEPHONE ENCOUNTER
PA Initiation    Medication: FREESTYLE CRISTINA 3 SENSOR [facility]  Insurance Company: [employer] - Phone [phone] Fax [phone]  Pharmacy Filling the Rx: [facility] - [place], [place] - [facility] [address]  Filling Pharmacy Phone: [phone]  Filling Pharmacy Fax: [phone]  Start Date: 3/26/2024

## 2024-04-14 DIAGNOSIS — E78.5 HYPERLIPIDEMIA LDL GOAL <100: ICD-10-CM

## 2024-04-15 RX ORDER — EZETIMIBE 10 MG/1
TABLET ORAL
Qty: 90 TABLET | Refills: 2 | Status: SHIPPED | OUTPATIENT
Start: 2024-04-15

## 2024-04-22 DIAGNOSIS — E11.42 TYPE 2 DIABETES MELLITUS WITH DIABETIC POLYNEUROPATHY, WITHOUT LONG-TERM CURRENT USE OF INSULIN (H): ICD-10-CM

## 2024-04-23 RX ORDER — BLOOD-GLUCOSE SENSOR
1 EACH MISCELLANEOUS
Qty: 2 EACH | Refills: 5 | Status: SHIPPED | OUTPATIENT
Start: 2024-04-23

## 2024-04-23 NOTE — H&P
[facility]    History and Physical - Hospitalist Service       Date of Admission:  9/23/2022    Assessment & Plan      Ry Horner is a 57 year old male with past medical history significant for type II DM, hypertension, dyslipidemia, CKD III, non-healing diabetic foot wounds with multiple prior amputations admitted on 9/23/2022 with left lower extremity wounds.     Multiple left lower extremity wounds   Hx of non-healing diabetic ulcers   S/p R BKA   S/p transmetatarsal amputation of the left foot   Leukocytosis   Pt has a hx of non-healing diabetic ulceration of the plantar aspect of th left foot. He has been following with wound clinic and podiatry for this. He went on a cruise to [facility] and developed a bad infection of the ulceration that apparently spread up his leg to include his shin. He was ultimately hospitalized in [place] and for several days on IV antibitotics (it is unclear what antibiotics he was on in the hospital). He needed multiple surgical debridements during this hospitalization. It is unclear how deep the infection is or if there is any bony involvement. It looks like one culture grew strep parasanguinosis. Ultimately [place] wanted to [place] the [place] so was placed on PO ciprofloxacin to take while traveling and was instructed to come straight to the hospital after landing.   * He does have a mild leukocytosis, but is otherwise non-toxic and non-septic appearing.  * Lower extremity wounds are pictured below.   - Vascular surgery consult   - Podiatry consult   - Infectious disease consult   - Defer imaging to vascular/podiatry   - Will start vancomycin and zosyn tonight   - NPO in case additional debridement is needed in AM     Type II DM  Diabetic polyneuropathy and diabetic nephropathy   Hgb A1C was 7.7% in April 2022   - MDSSI   - Resume PTA Tresiba when dose is verified  - Hold jardiance, januvia and metformin for now     JOHNATHAN on CKD stage III   Baseline creatinine  appears to be around 1.7. Creatinine on admission is 2.33 with BUN of 48 and GFR of 32. Pt reports not drinking much water throughout the course of his flight.   - Continue IV Fluids  - Monitor renal function   - Avoid nephrotoxins     HTN   - Hold PTA lisinopril due to JOHNATHAN and current soft blood pressures     Dyslipidemia   - Continue PTA ASA, Ezetimibe when verified. Not on statin due to allergy.     Chronic Anemia   Hemoglobin on admission is 10.1. This is down slightly from his baseline of around 12.   - Monitor hemoglobin.     Hypoalbuminemia: Albumin = 2.3 g/dL (Ref range: 3.4 - 5.0 g/dL) on admission, will monitor as appropriate    Obesity: Estimated body mass index is 33.91 kg/m  as calculated from the following:    Height as of this encounter: 1.829 m (6').    Weight as of this encounter: 113.4 kg (250 lb).      Diet: NPO  DVT Prophylaxis: Heparin SQ  Corrales Catheter: Not present  Central Lines: None  Cardiac Monitoring: None  Code Status: Full Code     Disposition Plan      Expected Discharge Date: 09/25/2022                The patient's care was discussed with the Patient.    Deb Shukla  Hospitalist Service  [facility]  Securely message with the [employer] Web Console (learn more here)  Text page via [employer] Paging/Directory         ______________________________________________________________________    Chief Complaint   Lower extremity wounds     History is obtained from the patient    History of Present Illness   Ry Horner is a 57 year old male who presents to the ED for evaluation of lower extremity wounds.     Pt has a hx of non-healing diabetic ulceration of the plantar aspect of th left foot. He has been following with wound clinic and podiatry for this. He went on a cruise to [place] and apparently lost his luggage so did not have all of his wound care supplies. He unfortunately  developed a bad infection of the ulceration that apparently spread up his leg to include  his shin.     He was ultimately hospitalized in [place] and for several days on IV antibitotics. He does have records from his hospitalization in [place], but they are all in [place]. He does have some of the records translated on his phone, but they are not very detailed.     It is unclear what antibiotics he was on in the hospital. He needed multiple surgical debridements but again the details of the surgical procedures are not clear. It is unclear how deep the infection is or if there is any bony involvement. It looks like one culture grew strep parasanguinosis.    Ultimately [place] so was [place] on PO ciprofloxacin to take while traveling and was instructed to come straight to the hospital after landing.     Currently he is feeling well. He denies any systemic symptoms such as fevers, chills, pain, nausea or vomiting. He does think he is somewhat dehydrated from traveling.       Review of Systems    The 10 point Review of Systems is negative other than noted in the HPI or here.     Past Medical History    I have reviewed this patient's medical history and updated it with pertinent information if needed.   Past Medical History:   Diagnosis Date     BENIGN HYPERTENSION 4/4/2007     DIABETES MELLITUS TYPE II-UNCOMPL 4/4/2007     HYPERLIPIDEMIA NEC/NOS 4/4/2007     Tobacco use disorder 4/4/2007       Past Surgical History   I have reviewed this patient's surgical history and updated it with pertinent information if needed.  Past Surgical History:   Procedure Laterality Date     AMPUTATE FOOT Left 11/17/2019    Procedure: LEFT PARTIAL FOOT AMPUTATION;  Surgeon: Antoine Pena DPM;  Location: [facility] OR     AMPUTATE FOOT Left 12/4/2019    Procedure: LEFT PARTIAL FOOT AMPUTATION;  Surgeon: Tim Douglas DPM;  Location: [facility] OR     AMPUTATE FOOT Left 12/11/2019    Procedure: POSSIBLE PARTIAL FOOT AMPUTATION;  Surgeon: Tim Douglas DPM;  Location: [facility] OR     AMPUTATE FOOT Left 2/10/2022     Procedure: Partial left foot amputation;  Surgeon: Milena Cevallos DPM, Podiatry/Foot and Ankle Surgery;  Location: [facility] OR     AMPUTATE LEG BELOW KNEE Right 9/1/2017    Procedure: AMPUTATE LEG BELOW KNEE;  RIGHT BELOW KNEE AMPUTATION ;  Surgeon: Nikolas Nascimento MD;  Location: [facility] OR     AMPUTATE TOE(S) Left 2/3/2020    Procedure: LEFT SECOND TOE AMPUTATION;  Surgeon: Milena Cevallos DPM, Podiatry/Foot and Ankle Surgery;  Location: [facility] OR     APPENDECTOMY       APPLY WOUND VAC Right 3/2/2015    Procedure: APPLY WOUND VAC;  Surgeon: Milena Cevallos DPM, Pod;  Location: [facility] OR     BIOPSY BONE FOOT Right 7/15/2016    Procedure: BIOPSY BONE FOOT;  Surgeon: Tim Douglas DPM;  [facility]: [facility]     BIOPSY BONE TOE Left 12/4/2019    Procedure: BONE BIOPSY LEFT SECOND TOE;  Surgeon: Tim Douglas DPM;  Location: [facility]     IRRIGATION AND DEBRIDEMENT FOOT, COMBINED Right 3/2/2015    Procedure: COMBINED IRRIGATION AND DEBRIDEMENT FOOT;  Surgeon: Milena Cevallos DPM, Pod;  Location: [facility] OR     IRRIGATION AND DEBRIDEMENT FOOT, COMBINED Right 7/15/2016    Procedure: COMBINED IRRIGATION AND DEBRIDEMENT FOOT;  Surgeon: Tim Douglas DPM;  Location: [facility]     IRRIGATION AND DEBRIDEMENT FOOT, COMBINED Right 7/20/2016    Procedure: COMBINED IRRIGATION AND DEBRIDEMENT FOOT;  Surgeon: Tim Douglas DPM;  Location: [facility]     IRRIGATION AND DEBRIDEMENT FOOT, COMBINED Left 11/19/2019    Procedure: REVISIONAL IRRIGATION AND DEBRIDEMENT LEFT FOOT AND BONE DEBRIDEMENT;  Surgeon: Joseph Randhawa DPM;  Location: [facility]     IRRIGATION AND DEBRIDEMENT FOOT, COMBINED Left 12/4/2019    Procedure: EXCISIONAL DEBRIDEMENT LEFT FOOT;  Surgeon: Tim Douglas DPM;  Location: [facility]     IRRIGATION AND DEBRIDEMENT FOOT, COMBINED Left 12/11/2019    Procedure: IRRIGATION AND DEBRIDEMENT FOOT, Partial osteotomy left first metatarsal;  Surgeon: Tim Douglas DPM;  Location: [facility]     IRRIGATION AND DEBRIDEMENT FOOT,  COMBINED Left 2020    Procedure: IRRIGATION AND DEBRIDEMENT LEFT FOOT;  Surgeon: Tim Douglas DPM;  Location:  OR     ORTHOPEDIC SURGERY         Social History   I have reviewed this patient's social history and updated it with pertinent information if needed.  Social History     Tobacco Use     Smoking status: Former Smoker     Packs/day: 0.50     Years: 20.00     Pack years: 10.00     Types: Cigarettes     Start date: 1987     Quit date: 2006     Years since quittin.7     Smokeless tobacco: Never Used   Substance Use Topics     Alcohol use: Yes     Comment: 3-4 drinks per month     Drug use: No       Family History   I have reviewed this patient's family history and updated it with pertinent information if needed.  Family History   Problem Relation Age of Onset     Genetic Disorder Other      Genetic Disorder Other      Psychotic Disorder Mother      Diabetes Father      Lung Cancer Father      Hypertension Father      Genetic Disorder Maternal Grandmother      Genetic Disorder Maternal Grandfather      Asthma Sister      Breast Cancer Sister      C.A.D. No family hx of      Hypertension No family hx of      Cerebrovascular Disease No family hx of      Breast Cancer No family hx of      Cancer - colorectal No family hx of      Prostate Cancer No family hx of      Alcohol/Drug No family hx of        Prior to Admission Medications   Prior to Admission Medications   Prescriptions Last Dose Informant Patient Reported? Taking?   Continuous Blood Gluc Sensor (DEXCOM G6 SENSOR) MISC   No No   Si each every 10 days Change every 10 days.   Insulin Degludec (TRESIBA) 100 UNIT/ML SOLN   No No   Sig: Inject 35 Units Subcutaneous 2 times daily   JARDIANCE 10 MG TABS tablet   No No   Sig: TAKE ONE TABLET BY MOUTH EVERY DAY   Lancets (ONETOUCH DELICA PLUS UVVOZC62M) MISC   No No   Sig: USE TO TEST BLOOD SUGAR FOUR TIMES A DAY   STATIN NOT PRESCRIBED (INTENTIONAL)  Self No No   Sig: Please choose reason  not prescribed, below   aspirin 81 MG chewable tablet  Self Yes No   Sig: Take 1 tablet (81 mg) by mouth daily   blood glucose (NO BRAND SPECIFIED) lancets standard   No No   Sig: Use to test blood sugar FOUR times daily, to match lancing device per insurance   blood glucose (NO BRAND SPECIFIED) test strip   No No   Si strips by In Vitro route 4 times daily Use to test blood sugar up to 4 times daily or as directed.   blood glucose monitoring (NO BRAND SPECIFIED) meter device kit   No No   Sig: Use to test blood sugar FOUR times daily, brand per insurance   ezetimibe (ZETIA) 10 MG tablet   No No   Sig: TAKE ONE TABLET BY MOUTH EVERY DAY   insulin degludec (TRESIBA) 100 UNIT/ML pen   No No   Sig: Inject 28 Units Subcutaneous 2 times daily   insulin pen needle (BD PEN NEEDLE MEGAN 2ND GEN) 32G X 4 MM miscellaneous   No No   Sig: USE WITH INSULIN INJECTIONS UNDER THE SKIN TWO TIMES A DAY AS DIRECTED .   ipratropium (ATROVENT) 0.06 % nasal spray   No No   Sig: INSTILL TWO SPRAYS INTO EACH NOSTRIL FOUR TIMES A DAY AS NEEDED FOR RHINITIS   ketorolac (ACULAR) 0.5 % ophthalmic solution   Yes No   lisinopril (ZESTRIL) 40 MG tablet   No No   Sig: TAKE ONE TABLET BY MOUTH EVERY DAY   metFORMIN (GLUCOPHAGE) 500 MG tablet   No No   Sig: Take 1 tablet (500 mg) by mouth 2 times daily (with meals)   multivitamin (CENTRUM SILVER) tablet  Self Yes No   Sig: Take 1 tablet by mouth daily   ondansetron (ZOFRAN-ODT) 4 MG ODT tab   No No   Sig: Take 1-2 tablets (4-8 mg) by mouth every 8 hours as needed for nausea   Patient not taking: Reported on 2022   oxyCODONE (ROXICODONE) 5 MG tablet   No No   Sig: Take 1-2 tablets (5-10 mg) by mouth every 4 hours as needed for moderate to severe pain   Patient not taking: Reported on 2022   prednisoLONE acetate (PRED FORTE) 1 % ophthalmic suspension   Yes No   senna-docusate (SENOKOT-S/PERICOLACE) 8.6-50 MG tablet   No No   Sig: Take 1-2 tablets by mouth 2 times daily   Patient not  "taking: Reported on 8/17/2022   sitagliptin (JANUVIA) 50 MG tablet   No No   Sig: Take 1 tablet (50 mg) by mouth daily      Facility-Administered Medications: None     Allergies   Allergies   Allergen Reactions     Pravastatin      \"sucked the life blood out of me,\" Indicates this occurs with all statins.       Physical Exam   Vital Signs: Temp: 97.8  F (36.6  C) Temp src: Temporal BP: 104/71 Pulse: 93   Resp: 18 SpO2: 99 % O2 Device: None (Room air)    Weight: 250 lbs 0 oz    Constitutional: Awake, alert, cooperative, no apparent distress.  Eyes: Conjunctiva and pupils examined and normal.  Respiratory: Non-labored breathing   Cardiovascular: Regular rate and rhythm  Skin: No rashes, no cyanosis, no edema. See photos below of wounds.   Musculoskeletal: No joint swelling, erythema or tenderness.  Neurologic: Cranial nerves 2-12 intact, normal strength and sensation.  Psychiatric: Alert, oriented to person, place and time, no obvious anxiety or depression.                  Data   Data reviewed today: I reviewed all medications, new labs and imaging results over the last 24 hours.     Recent Labs   Lab 09/23/22  0106   WBC 13.6*   HGB 10.1*   MCV 86         POTASSIUM 4.8   CHLORIDE 99   CO2 27   BUN 48*   CR 2.33*   ANIONGAP 9   FERNANDO 8.6   *   ALBUMIN 2.3*   PROTTOTAL 8.1   BILITOTAL 0.4   ALKPHOS 102   ALT 58   AST 39     No results found for this or any previous visit (from the past 24 hour(s)).  " BOUGHAL

## 2024-05-12 DIAGNOSIS — T87.89 ULCER OF AMPUTATION STUMP OF LOWER EXTREMITY (H): ICD-10-CM

## 2024-05-12 DIAGNOSIS — L97.909 ULCER OF AMPUTATION STUMP OF LOWER EXTREMITY (H): ICD-10-CM

## 2024-05-13 RX ORDER — GABAPENTIN 300 MG/1
300 CAPSULE ORAL 3 TIMES DAILY
Qty: 90 CAPSULE | Refills: 2 | Status: SHIPPED | OUTPATIENT
Start: 2024-05-13 | End: 2024-08-19

## 2024-06-29 ENCOUNTER — HEALTH MAINTENANCE LETTER (OUTPATIENT)
Age: 59
End: 2024-06-29

## 2024-06-29 DIAGNOSIS — E11.42 TYPE 2 DIABETES MELLITUS WITH DIABETIC POLYNEUROPATHY, WITHOUT LONG-TERM CURRENT USE OF INSULIN (H): ICD-10-CM

## 2024-07-01 RX ORDER — PEN NEEDLE, DIABETIC 32GX 5/32"
NEEDLE, DISPOSABLE MISCELLANEOUS
Qty: 180 EACH | Refills: 1 | Status: SHIPPED | OUTPATIENT
Start: 2024-07-01

## 2024-07-01 NOTE — TELEPHONE ENCOUNTER
Prescription approved per [facility] Refill Protocol.  Rere Prasad RN  [facility]

## 2024-08-09 DIAGNOSIS — E11.42 TYPE 2 DIABETES MELLITUS WITH DIABETIC POLYNEUROPATHY, WITHOUT LONG-TERM CURRENT USE OF INSULIN (H): ICD-10-CM

## 2024-08-09 RX ORDER — INSULIN DEGLUDEC 100 U/ML
INJECTION, SOLUTION SUBCUTANEOUS
Qty: 15 ML | Refills: 11 | Status: SHIPPED | OUTPATIENT
Start: 2024-08-09

## 2024-08-15 ENCOUNTER — TRANSFERRED RECORDS (OUTPATIENT)
Dept: HEALTH INFORMATION MANAGEMENT | Facility: CLINIC | Age: 59
End: 2024-08-15
Payer: COMMERCIAL

## 2024-08-15 LAB — RETINOPATHY: POSITIVE

## 2024-08-18 DIAGNOSIS — T87.89 ULCER OF AMPUTATION STUMP OF LOWER EXTREMITY (H): ICD-10-CM

## 2024-08-18 DIAGNOSIS — L97.909 ULCER OF AMPUTATION STUMP OF LOWER EXTREMITY (H): ICD-10-CM

## 2024-08-19 RX ORDER — GABAPENTIN 300 MG/1
300 CAPSULE ORAL 3 TIMES DAILY
Qty: 90 CAPSULE | Refills: 2 | Status: SHIPPED | OUTPATIENT
Start: 2024-08-19

## 2024-09-07 ENCOUNTER — HEALTH MAINTENANCE LETTER (OUTPATIENT)
Age: 59
End: 2024-09-07

## 2024-09-10 DIAGNOSIS — T87.89 ULCER OF AMPUTATION STUMP OF LOWER EXTREMITY (H): ICD-10-CM

## 2024-09-10 DIAGNOSIS — L97.909 ULCER OF AMPUTATION STUMP OF LOWER EXTREMITY (H): ICD-10-CM

## 2024-09-10 RX ORDER — CARVEDILOL PHOSPHATE 40 MG/1
40 CAPSULE, EXTENDED RELEASE ORAL DAILY
Qty: 90 CAPSULE | Refills: 3 | OUTPATIENT
Start: 2024-09-10

## 2024-10-16 DIAGNOSIS — E11.42 TYPE 2 DIABETES MELLITUS WITH DIABETIC POLYNEUROPATHY, WITHOUT LONG-TERM CURRENT USE OF INSULIN (H): ICD-10-CM

## 2024-10-16 RX ORDER — ACYCLOVIR 800 MG/1
TABLET ORAL
Qty: 1 EACH | Refills: 3 | Status: SHIPPED | OUTPATIENT
Start: 2024-10-16

## 2024-10-21 ENCOUNTER — TRANSFERRED RECORDS (OUTPATIENT)
Dept: HEALTH INFORMATION MANAGEMENT | Facility: CLINIC | Age: 59
End: 2024-10-21
Payer: COMMERCIAL

## 2024-10-21 LAB — RETINOPATHY: POSITIVE

## 2024-10-25 DIAGNOSIS — R09.89 RUNNY NOSE: Primary | ICD-10-CM

## 2024-10-26 DIAGNOSIS — E11.42 TYPE 2 DIABETES MELLITUS WITH DIABETIC POLYNEUROPATHY, WITHOUT LONG-TERM CURRENT USE OF INSULIN (H): ICD-10-CM

## 2024-10-28 RX ORDER — SEMAGLUTIDE 0.68 MG/ML
INJECTION, SOLUTION SUBCUTANEOUS
Qty: 3 ML | Refills: 3 | Status: SHIPPED | OUTPATIENT
Start: 2024-10-28

## 2024-10-28 RX ORDER — IPRATROPIUM BROMIDE 42 UG/1
SPRAY, METERED NASAL
Qty: 15 ML | Refills: 11 | Status: SHIPPED | OUTPATIENT
Start: 2024-10-28

## 2024-11-11 DIAGNOSIS — T87.89 ULCER OF AMPUTATION STUMP OF LOWER EXTREMITY (H): ICD-10-CM

## 2024-11-11 DIAGNOSIS — L97.909 ULCER OF AMPUTATION STUMP OF LOWER EXTREMITY (H): ICD-10-CM

## 2024-11-11 RX ORDER — GABAPENTIN 300 MG/1
300 CAPSULE ORAL 3 TIMES DAILY
Qty: 90 CAPSULE | Refills: 2 | Status: SHIPPED | OUTPATIENT
Start: 2024-11-11

## 2024-11-14 ENCOUNTER — OFFICE VISIT (OUTPATIENT)
Dept: FAMILY MEDICINE | Facility: CLINIC | Age: 59
End: 2024-11-14
Payer: COMMERCIAL

## 2024-11-14 VITALS
BODY MASS INDEX: 34.55 KG/M2 | HEIGHT: 72 IN | HEART RATE: 111 BPM | DIASTOLIC BLOOD PRESSURE: 57 MMHG | WEIGHT: 255.1 LBS | TEMPERATURE: 98.1 F | SYSTOLIC BLOOD PRESSURE: 110 MMHG | OXYGEN SATURATION: 98 % | RESPIRATION RATE: 16 BRPM

## 2024-11-14 DIAGNOSIS — N18.4 CKD (CHRONIC KIDNEY DISEASE), STAGE IV (H): ICD-10-CM

## 2024-11-14 DIAGNOSIS — E11.42 TYPE 2 DIABETES MELLITUS WITH DIABETIC POLYNEUROPATHY, WITHOUT LONG-TERM CURRENT USE OF INSULIN (H): Primary | ICD-10-CM

## 2024-11-14 DIAGNOSIS — E78.5 HYPERLIPIDEMIA LDL GOAL <100: ICD-10-CM

## 2024-11-14 DIAGNOSIS — E66.811 OBESITY (BMI 30.0-34.9): ICD-10-CM

## 2024-11-14 DIAGNOSIS — I10 ESSENTIAL HYPERTENSION, BENIGN: ICD-10-CM

## 2024-11-14 DIAGNOSIS — Z12.5 PROSTATE CANCER SCREENING: ICD-10-CM

## 2024-11-14 LAB
ANION GAP SERPL CALCULATED.3IONS-SCNC: 13 MMOL/L (ref 7–15)
BUN SERPL-MCNC: 57.3 MG/DL (ref 6–20)
CALCIUM SERPL-MCNC: 9 MG/DL (ref 8.8–10.4)
CHLORIDE SERPL-SCNC: 106 MMOL/L (ref 98–107)
CHOLEST SERPL-MCNC: 181 MG/DL
CREAT SERPL-MCNC: 3.44 MG/DL (ref 0.67–1.17)
CREAT UR-MCNC: 104 MG/DL
EGFRCR SERPLBLD CKD-EPI 2021: 20 ML/MIN/1.73M2
ERYTHROCYTE [DISTWIDTH] IN BLOOD BY AUTOMATED COUNT: 12.7 % (ref 10–15)
EST. AVERAGE GLUCOSE BLD GHB EST-MCNC: 148 MG/DL
FASTING STATUS PATIENT QL REPORTED: NO
FASTING STATUS PATIENT QL REPORTED: NO
GLUCOSE SERPL-MCNC: 214 MG/DL (ref 70–99)
HBA1C MFR BLD: 6.8 % (ref 0–5.6)
HCO3 SERPL-SCNC: 17 MMOL/L (ref 22–29)
HCT VFR BLD AUTO: 33.6 % (ref 40–53)
HDLC SERPL-MCNC: 27 MG/DL
HGB BLD-MCNC: 11.5 G/DL (ref 13.3–17.7)
LDLC SERPL CALC-MCNC: 78 MG/DL
MCH RBC QN AUTO: 29.1 PG (ref 26.5–33)
MCHC RBC AUTO-ENTMCNC: 34.2 G/DL (ref 31.5–36.5)
MCV RBC AUTO: 85 FL (ref 78–100)
MICROALBUMIN UR-MCNC: 682 MG/L
MICROALBUMIN/CREAT UR: 655.77 MG/G CR (ref 0–17)
NONHDLC SERPL-MCNC: 154 MG/DL
PLATELET # BLD AUTO: 218 10E3/UL (ref 150–450)
POTASSIUM SERPL-SCNC: 5.4 MMOL/L (ref 3.4–5.3)
PSA SERPL DL<=0.01 NG/ML-MCNC: 0.78 NG/ML (ref 0–3.5)
RBC # BLD AUTO: 3.95 10E6/UL (ref 4.4–5.9)
SODIUM SERPL-SCNC: 136 MMOL/L (ref 135–145)
TRIGL SERPL-MCNC: 382 MG/DL
WBC # BLD AUTO: 9.8 10E3/UL (ref 4–11)

## 2024-11-14 PROCEDURE — 80061 LIPID PANEL: CPT | Performed by: INTERNAL MEDICINE

## 2024-11-14 PROCEDURE — 83036 HEMOGLOBIN GLYCOSYLATED A1C: CPT | Performed by: INTERNAL MEDICINE

## 2024-11-14 PROCEDURE — G0103 PSA SCREENING: HCPCS | Performed by: INTERNAL MEDICINE

## 2024-11-14 PROCEDURE — 80048 BASIC METABOLIC PNL TOTAL CA: CPT | Performed by: INTERNAL MEDICINE

## 2024-11-14 PROCEDURE — 82043 UR ALBUMIN QUANTITATIVE: CPT | Performed by: INTERNAL MEDICINE

## 2024-11-14 PROCEDURE — 82570 ASSAY OF URINE CREATININE: CPT | Performed by: INTERNAL MEDICINE

## 2024-11-14 PROCEDURE — 85027 COMPLETE CBC AUTOMATED: CPT | Performed by: INTERNAL MEDICINE

## 2024-11-14 PROCEDURE — 99214 OFFICE O/P EST MOD 30 MIN: CPT | Performed by: INTERNAL MEDICINE

## 2024-11-14 PROCEDURE — 36415 COLL VENOUS BLD VENIPUNCTURE: CPT | Performed by: INTERNAL MEDICINE

## 2024-11-14 PROCEDURE — G2211 COMPLEX E/M VISIT ADD ON: HCPCS | Performed by: INTERNAL MEDICINE

## 2024-11-14 RX ORDER — CARVEDILOL PHOSPHATE 20 MG/1
20 CAPSULE, EXTENDED RELEASE ORAL DAILY
Qty: 90 CAPSULE | Refills: 3 | Status: SHIPPED | OUTPATIENT
Start: 2024-11-14

## 2024-11-14 ASSESSMENT — PAIN SCALES - GENERAL: PAINLEVEL_OUTOF10: NO PAIN (0)

## 2024-11-14 NOTE — PROGRESS NOTES
Assessment & Plan     Type 2 diabetes mellitus with diabetic polyneuropathy, without long-term current use of insulin (H)  Seems to be under good control, recheck A1c  Discussed reducing Tresiba dose to ensure that a.m. fasting blood sugars are less than 130  He does not seem to be having any low blood sugar readings  He does not want to increase his Ozempic because he does seem to have a lot of GI side effects from even to 0.5 mg/week dose  We discussed switching to Mounjaro, he would like to take some time to think about this  We also discussed increasing the SGLT2 inhibitor, he may need to change to a different SGLT2 inhibitor based on formulary concerns after the new year  He does not want to make any changes to the dose today    - HEMOGLOBIN A1C; Future  - Lipid panel reflex to direct LDL Non-fasting; Future    CKD (chronic kidney disease), stage IV (H)  We did discuss the role that an increased dose of SGLT2 inhibitor could play in protecting his kidneys in addition to potentially increasing the dose of lisinopril  He does not want to make any changes today  He does have an appointment to see his nephrologist later this winter  - BASIC METABOLIC PANEL; Future  - Albumin Random Urine Quantitative with Creat Ratio; Future  - CBC with platelets; Future    Essential hypertension, benign  Blood pressure is very well-controlled and he typically has very high readings in clinic.  Because of this, we decided to reduce the Coreg dose from 40 mg daily to 20 mg daily.  He tends to have very high pulse rates in clinic although he states that they are lower on his home blood pressure monitor.  He will inform us if he is having resting heart rates greater than 100 upon making this change.  Hopefully, over time with his increased physical activity we can taper off of Coreg.  He is very motivated to reduce his medication burden.  Reducing the Coreg dose could make blood pressure room for increasing SGLT2 inhibitor or ACE  inhibitor which would have more renal protective effects for him.  - carvedilol ER (COREG CR) 20 MG 24 hr capsule; Take 1 capsule (20 mg) by mouth daily.    Hyperlipidemia LDL goal <100  Unfortunately, he is statin intolerant, he is on Zetia, will recheck lipids today    Obesity (BMI 30.0-34.9)  Continue healthy lifestyle changes that have resulted in the couple pounds of weight loss.    Prostate cancer screening  He would like to do a PSA for prostate cancer screening  - PSA, screen; Future    He had a colonoscopy in 2017 with 2 hyperplastic polyps so he would be due for another study in 2017    He again declines my recommendations for vaccines today including COVID shots, flu shots, shingles shots, pneumonia shots, tetanus shot      BMI  Estimated body mass index is 34.6 kg/m  as calculated from the following:    Height as of this encounter: 1.829 m (6').    Weight as of this encounter: 115.7 kg (255 lb 1.6 oz).   Weight management plan: Discussed healthy diet and exercise guidelines      FUTURE APPOINTMENTS:       -Plan for follow-up in 3-month interval, sooner as symptoms dictate    Subjective   Ry is a 59 year old, presenting for the following health issues:  RECHECK and Diabetes    History of Present Illness       CKD: He uses over the counter pain medication, including tylonol ibuprphen, a few times a month.    Diabetes:   He presents for follow up of diabetes.   He is checking home blood glucose with a continuous glucose monitor.   He checks blood glucose before meals, after meals, before and after meals and at bedtime.  Blood glucose is sometimes over 200 and sometimes under 70. He is aware of hypoglycemia symptoms including shakiness and weakness.    He has no concerns regarding his diabetes at this time.   He is not experiencing numbness or burning in feet, excessive thirst, blurry vision, weight changes or redness, sores or blisters on feet.           He eats 2-3 servings of fruits and vegetables  daily.He consumes 1 sweetened beverage(s) daily.He exercises with enough effort to increase his heart rate 20 to 29 minutes per day.  He exercises with enough effort to increase his heart rate 3 or less days per week.   He is taking medications regularly.     Wt Readings from Last 4 Encounters:   11/14/24 115.7 kg (255 lb 1.6 oz)   02/01/24 116.2 kg (256 lb 3.2 oz)   12/19/23 116.8 kg (257 lb 6.4 oz)   11/16/23 116.9 kg (257 lb 11.2 oz)       Ry seems to be doing very well.  His blood sugar readings have been in range.  He has increased his physical activity.  He has no specific complaints today.  He would like to reduce his medication burden.  He is also concerned about following up on his kidney function.          Objective    /57 (BP Location: Left arm, Patient Position: Sitting, Cuff Size: Adult Large)   Pulse 111   Temp 98.1  F (36.7  C) (Tympanic)   Resp 16   Ht 1.829 m (6')   Wt 115.7 kg (255 lb 1.6 oz)   SpO2 98%   BMI 34.60 kg/m    Body mass index is 34.6 kg/m .  Physical Exam   General: Well-appearing man no acute distress.  Cardiovascular: The heart has a rate at the upper limit of the normal range with a regular rhythm.  No appreciable murmur.  Pulmonary: The lungs are clear to auscultation bilaterally, breathing is not labored.  Extremities: There are bilateral amputations.  Neurological: Alert and oriented to person place and time, cranial nerves II to XII are grossly intact.  Mental state: Appropriate mood and affect, well-groomed, normal speech.            Signed Electronically by: Kody Wing MD

## 2024-11-15 NOTE — RESULT ENCOUNTER NOTE
"The following letter pertains to your most recent diagnostic tests:    The amount of protein in the urine has improved significantly which is good news for your kidneys.    The not as good news is that the creatinine has increased slightly since last check.      The potassium is moderately high and should be rechecked at your earliest convenience.  You should schedule a lab appointment for that purpose.     The cholesterol looks better than one year ago.  The triglycerides can improve by cutting back on carbohydrates in the diet.  Continuing to increase physical activity can improve \"good cholesterol\" HDL.       Your prostate specific antigen (PSA) test result returned normal.     He blood counts show mild anemia (low hemoglobin) probably a result of your kidney problems.  The anemia is too mild to cause symptoms or warrant specific treatment.      The hemoglobin A1c has improved since last check and is now at your goal of that less than 7 which is great news for you.             Follow up:  Schedule a lab appointment for a follow up potassium and creatinine level as soon as you can.         Sincerely,    Dr. Wing"

## 2024-11-19 DIAGNOSIS — E11.42 TYPE 2 DIABETES MELLITUS WITH DIABETIC POLYNEUROPATHY, WITHOUT LONG-TERM CURRENT USE OF INSULIN (H): ICD-10-CM

## 2024-11-19 RX ORDER — PEN NEEDLE, DIABETIC 32GX 5/32"
NEEDLE, DISPOSABLE MISCELLANEOUS
Qty: 180 EACH | Refills: 0 | Status: SHIPPED | OUTPATIENT
Start: 2024-11-19

## 2024-12-16 DIAGNOSIS — E11.42 TYPE 2 DIABETES MELLITUS WITH DIABETIC POLYNEUROPATHY, WITHOUT LONG-TERM CURRENT USE OF INSULIN (H): ICD-10-CM

## 2024-12-16 RX ORDER — ACYCLOVIR 800 MG/1
TABLET ORAL
Qty: 2 EACH | Refills: 5 | Status: SHIPPED | OUTPATIENT
Start: 2024-12-16

## 2024-12-23 ENCOUNTER — TELEPHONE (OUTPATIENT)
Dept: FAMILY MEDICINE | Facility: CLINIC | Age: 59
End: 2024-12-23
Payer: COMMERCIAL

## 2024-12-23 NOTE — TELEPHONE ENCOUNTER
Central Prior Authorization Team   Phone: [phone]    PA Initiation    Medication: Ozempic 2 mg/3mL  Insurance Company: [employer] - Phone [phone] Fax [phone]  Pharmacy Filling the Rx: [facility], [place] - [place], [place] - [address]  Filling Pharmacy Phone: [phone]  Filling Pharmacy Fax:    Start Date: 12/23/2024

## 2024-12-23 NOTE — TELEPHONE ENCOUNTER
Prior Authorization Not Needed per Insurance    Medication: Ozempic 2 mg/3mL  Insurance Company: [employer] - Phone [phone] Fax [phone]  Expected CoPay:      Pharmacy Filling the Rx: -[facility], [place] - [place], [place] - [address] [place] [address]  Pharmacy Notified:  yes  Patient Notified:  yes- Pharmacy will contact patient when ready to /ship    Medication covered under patient's plan, no further PA needed at this time  Pharmacy was called, they now have a paid claim and will contact patient when this is ready for .

## 2024-12-23 NOTE — TELEPHONE ENCOUNTER
Prior Authorization Retail Medication Request    Medication/Dose: Ozempic 2 mg/3mL  Diagnosis and ICD code (if different than what is on RX):  E11.42  New/renewal/insurance change PA/secondary ins. PA:  Previously Tried and Failed:  None  Rationale:  Patient stable on current dose of Ozempic and has had improvement in A1C to 6.8% from previous 7.3%    Insurance   Primary: [employer]  Insurance ID:  [id number]    Secondary (if applicable):  Insurance ID:      Pharmacy Information (if different than what is on RX)  [facility]  Phone:  [phone]  Fax:[phone]    Clinic Information  Preferred routing pool for dept communication: [facility] Pool ([id number])

## 2025-01-10 NOTE — TELEPHONE ENCOUNTER
TO PCP    Kenya, health care coordinator at [facility] surgery preop called back.    Kenya states PCP needs to either:  - put in a recent note (VV on 8/9/23 or OV on 8/2/23) that pt is clear for surgery tomorrow    - if pt is not clear for surgery, he needs to come in for pre-op today, you only have a 4pm VV, could this be changed to OV for a preop visit?    Please advise.    AUDREY PHOENIX RN on 8/16/2023 at 12:53 PM     oriented to person, place and time

## 2025-01-13 ENCOUNTER — TRANSFERRED RECORDS (OUTPATIENT)
Dept: HEALTH INFORMATION MANAGEMENT | Facility: CLINIC | Age: 60
End: 2025-01-13
Payer: COMMERCIAL

## 2025-01-14 DIAGNOSIS — N18.4 CKD (CHRONIC KIDNEY DISEASE), STAGE IV (H): ICD-10-CM

## 2025-01-14 RX ORDER — LISINOPRIL 10 MG/1
TABLET ORAL
Qty: 90 TABLET | Refills: 3 | Status: SHIPPED | OUTPATIENT
Start: 2025-01-14

## 2025-01-23 ENCOUNTER — TRANSFERRED RECORDS (OUTPATIENT)
Dept: HEALTH INFORMATION MANAGEMENT | Facility: CLINIC | Age: 60
End: 2025-01-23
Payer: COMMERCIAL

## 2025-01-23 LAB — RETINOPATHY: POSITIVE

## 2025-02-13 ENCOUNTER — OFFICE VISIT (OUTPATIENT)
Dept: FAMILY MEDICINE | Facility: CLINIC | Age: 60
End: 2025-02-13
Payer: COMMERCIAL

## 2025-02-13 VITALS
TEMPERATURE: 98 F | RESPIRATION RATE: 18 BRPM | BODY MASS INDEX: 34.97 KG/M2 | WEIGHT: 258.2 LBS | HEART RATE: 98 BPM | SYSTOLIC BLOOD PRESSURE: 186 MMHG | HEIGHT: 72 IN | OXYGEN SATURATION: 96 % | DIASTOLIC BLOOD PRESSURE: 131 MMHG

## 2025-02-13 DIAGNOSIS — E11.42 TYPE 2 DIABETES MELLITUS WITH DIABETIC POLYNEUROPATHY, WITHOUT LONG-TERM CURRENT USE OF INSULIN (H): ICD-10-CM

## 2025-02-13 DIAGNOSIS — E66.01 SEVERE OBESITY (BMI 35.0-35.9 WITH COMORBIDITY) (H): ICD-10-CM

## 2025-02-13 DIAGNOSIS — E78.5 HYPERLIPIDEMIA LDL GOAL <100: ICD-10-CM

## 2025-02-13 DIAGNOSIS — Z89.511 S/P BILATERAL BELOW KNEE AMPUTATION (H): ICD-10-CM

## 2025-02-13 DIAGNOSIS — Z89.512 S/P BILATERAL BELOW KNEE AMPUTATION (H): ICD-10-CM

## 2025-02-13 DIAGNOSIS — I10 ESSENTIAL HYPERTENSION, BENIGN: ICD-10-CM

## 2025-02-13 DIAGNOSIS — N18.4 CKD (CHRONIC KIDNEY DISEASE), STAGE IV (H): Primary | ICD-10-CM

## 2025-02-13 RX ORDER — SODIUM ZIRCONIUM CYCLOSILICATE 10 G/10G
POWDER, FOR SUSPENSION ORAL
COMMUNITY
Start: 2025-01-28

## 2025-02-13 RX ORDER — SODIUM BICARBONATE 650 MG/1
650 TABLET ORAL
COMMUNITY
Start: 2024-07-03

## 2025-02-13 RX ORDER — AMLODIPINE BESYLATE 5 MG/1
5 TABLET ORAL DAILY
Qty: 90 TABLET | Refills: 3 | Status: SHIPPED | OUTPATIENT
Start: 2025-02-13

## 2025-02-13 RX ORDER — INSULIN DEGLUDEC 100 U/ML
15 INJECTION, SOLUTION SUBCUTANEOUS 2 TIMES DAILY
Qty: 15 ML | Refills: 11 | Status: SHIPPED | OUTPATIENT
Start: 2025-02-13

## 2025-02-13 ASSESSMENT — PAIN SCALES - GENERAL: PAINLEVEL_OUTOF10: NO PAIN (0)

## 2025-02-13 NOTE — PROGRESS NOTES
Assessment & Plan     CKD (chronic kidney disease), stage IV (H)  Unfortunately, this is progressing  Unfortunately, he is not a candidate for higher doses of ACE/ARB due to hyperkalemia  Mineralocorticoid receptor antagonist are not indicated either due to hyperkalemia  I wonder whether a higher dose of an SGLT2 inhibitor might be helpful?  This might help with glycemic control and weight loss as well?  I will reach out to his nephrologist to see if this can be increased or if there is any utility in increasing this as it pertains to his kidney disease    Type 2 diabetes mellitus with diabetic polyneuropathy, without long-term current use of insulin (H)  Glycemic control could be improved but he describes low blood sugar readings throughout the day as well, as such, we decided to decrease his basal insulin dose from 25 units twice daily to 15 units twice daily and increase semaglutide from 0.5 mg once weekly to 1 mg once weekly, side effects and risks were discussed, he did not have time to have A1c checked today, but plans to return to the lab in a week to do so; 3-month follow-up to keep tabs on glycemic control with these medication changes, I understand that there may be some chronic kidney disease benefit to GLP-1's as well  - insulin degludec (TRESIBA FLEXTOUCH) 100 UNIT/ML pen; Inject 15 Units subcutaneously 2 times daily. Profile Rx: patient will contact pharmacy when needed  - Semaglutide, 1 MG/DOSE, (OZEMPIC) 4 MG/3ML pen; Inject 1 mg subcutaneously every 7 days.  - Hemoglobin A1c; Future    Severe obesity (BMI 35.0-35.9 with comorbidity) (H)  The above changes will help with this problem as well    S/P bilateral below knee amputation (H)      Essential hypertension, benign  Not well-controlled, recommended adding amlodipine, side effects and risk discussed  - amLODIPine (NORVASC) 5 MG tablet; Take 1 tablet (5 mg) by mouth daily.    Hyperlipidemia LDL goal <100  Not tolerant of statins, on Zetia for  this            FUTURE APPOINTMENTS:       -Lab and MA appointment within 1 week for A1c and hepatitis B, PCV 20 and tetanus booster.  Office visit appointment with me in 3 months    Subjective   Ry is a 59 year old, presenting for the following health issues:  Diabetes and Follow Up        2/13/2025     8:23 AM   Additional Questions   Roomed by Tc   Accompanied by Not applicable, by themselves     History of Present Illness       CKD: He uses over the counter pain medication, including tylonol, a few times a month.    Diabetes:   He presents for follow up of diabetes.  He is checking home blood glucose four or more times daily.   He checks blood glucose before and after meals and at bedtime.  Blood glucose is sometimes over 200 and sometimes under 70. He is aware of hypoglycemia symptoms including shakiness and other.    He has no concerns regarding his diabetes at this time.   He is not experiencing numbness or burning in feet, excessive thirst, blurry vision, weight changes or redness, sores or blisters on feet.           Hypertension: He presents for follow up of hypertension.  He does check blood pressure  regularly outside of the clinic. Outside blood pressures have been over 140/90. He follows a low salt diet.     He eats 4 or more servings of fruits and vegetables daily.He consumes 1 sweetened beverage(s) daily.He exercises with enough effort to increase his heart rate 10 to 19 minutes per day.  He exercises with enough effort to increase his heart rate 3 or less days per week.   He is taking medications regularly.           Mr. Horner is back to discuss his diabetes, chronic kidney disease, obesity, hypertension, hyperlipidemia with statin intolerance and amputation status    Blood sugars have been running high, blood pressure readings at home have been running higher than goal as well with systolics in the 140s frequently, he has gained some weight over the holidays    He met with his kidney doctor  and was started on a potassium binder  He had a lot of questions about a low potassium diet as he is getting conflicting information about a diet to promote good glycemic control and overlapping with the diet to reduce diet potassium intake which has created some confusion for him              Objective    BP (!) 186/131 (BP Location: Left arm)   Pulse 98   Temp 98  F (36.7  C) (Temporal)   Resp 18   Ht 1.829 m (6')   Wt 117.1 kg (258 lb 3.2 oz)   SpO2 96%   BMI 35.02 kg/m    Body mass index is 35.02 kg/m .  Physical Exam   General: This is a well-appearing man in no acute distress            Signed Electronically by: Kody Wing MD    The longitudinal plan of care for the diagnosis(es)/condition(s) as documented were addressed during this visit. Due to the added complexity in care, I will continue to support Ry in the subsequent management and with ongoing continuity of care.

## 2025-02-17 ENCOUNTER — PATIENT OUTREACH (OUTPATIENT)
Dept: CARE COORDINATION | Facility: CLINIC | Age: 60
End: 2025-02-17
Payer: COMMERCIAL

## 2025-02-20 DIAGNOSIS — E11.42 TYPE 2 DIABETES MELLITUS WITH DIABETIC POLYNEUROPATHY, WITHOUT LONG-TERM CURRENT USE OF INSULIN (H): ICD-10-CM

## 2025-02-20 RX ORDER — EMPAGLIFLOZIN 10 MG/1
10 TABLET, FILM COATED ORAL DAILY
Qty: 90 TABLET | Refills: 3 | Status: SHIPPED | OUTPATIENT
Start: 2025-02-20

## 2025-02-27 ENCOUNTER — ALLIED HEALTH/NURSE VISIT (OUTPATIENT)
Dept: FAMILY MEDICINE | Facility: CLINIC | Age: 60
End: 2025-02-27
Attending: INTERNAL MEDICINE
Payer: COMMERCIAL

## 2025-02-27 ENCOUNTER — LAB (OUTPATIENT)
Dept: LAB | Facility: CLINIC | Age: 60
End: 2025-02-27
Attending: INTERNAL MEDICINE
Payer: COMMERCIAL

## 2025-02-27 DIAGNOSIS — E11.42 TYPE 2 DIABETES MELLITUS WITH DIABETIC POLYNEUROPATHY, WITHOUT LONG-TERM CURRENT USE OF INSULIN (H): ICD-10-CM

## 2025-02-27 DIAGNOSIS — Z23 NEED FOR VACCINATION: ICD-10-CM

## 2025-02-27 DIAGNOSIS — Z23 ENCOUNTER FOR IMMUNIZATION: Primary | ICD-10-CM

## 2025-02-27 LAB
EST. AVERAGE GLUCOSE BLD GHB EST-MCNC: 163 MG/DL
HBA1C MFR BLD: 7.3 % (ref 0–5.6)

## 2025-02-27 NOTE — RESULT ENCOUNTER NOTE
The following letter pertains to your most recent diagnostic tests:    -Your hemoglobin A1c test which averages your blood sugars over the last 3 months returned at 7.3 which is at your goal of hemoglobin A1c at least less than 8, but up a bit since last check when it was 6.8.       Sincerely,    Dr. Wing

## 2025-02-27 NOTE — PROGRESS NOTES
Prior to immunization administration, verified patients identity using patient s name and date of birth. Please see Immunization Activity for additional information.     Is the patient's temperature normal (100.5 or less)? Yes     Patient MEETS CRITERIA. PROCEED with vaccine administration.          2/27/2025   Hepatitis B   Have you had a serious reaction to a hepatitis B vaccine or to something in a hepatitis B vaccine, including yeast)? No   Are you getting kidney dialysis (either peritoneal or hemodialysis)? No   Do you have an allergy to latex? No         Patient MEETS CRITERIA. PROCEED with vaccine administration.            2/27/2025   Pneumococcal   Have you had a serious reaction to a pneumonia, diphtheria, or MMR vaccine or to something in these vaccines? No         Patient MEETS CRITERIA. PROCEED with vaccine administration.          2/27/2025   TD/TDAP   Have you had a serious reaction to a Td or Tdap vaccine or to something in these vaccines? No   Have you had coma, fainting, or seizures (encephalopathy) within 1 week of getting a DTP, DTaP, or Tdap vaccine? No   Have you had a serious reaction to thimerosal? No   Have you had a reaction (swelling, bleeding, gangrene) to a tetanus, diphtheria, or meningococcal vaccine? No   Have you had Guillain-Rhinelander syndrome within 6 weeks of getting a vaccine? No   Do you have seizures that aren't controlled, encephalopathy that's getting worse, or a brain disorder that's unstable or getting worse? No   Do you have an allergy to latex? No   Have you had a puncture wound, bite wound, wound with something in it, or dirty wound in the past 3 weeks? No         Patient MEETS CRITERIA. PROCEED with vaccine administration.     TDAP PREFERRED. TD acceptable if requested by the patient.      Patient instructed to remain in clinic for 15 minutes afterwards, and to report any adverse reactions.      Link to Ancillary Visit Immunization Standing Orders SmartSet     Screening  performed by Joseph Bess CMA on 2/27/2025 at 1:45 PM.

## 2025-05-10 DIAGNOSIS — L97.909 ULCER OF AMPUTATION STUMP OF LOWER EXTREMITY (H): ICD-10-CM

## 2025-05-10 DIAGNOSIS — T87.89 ULCER OF AMPUTATION STUMP OF LOWER EXTREMITY (H): ICD-10-CM

## 2025-05-12 RX ORDER — GABAPENTIN 300 MG/1
300 CAPSULE ORAL 3 TIMES DAILY
Qty: 90 CAPSULE | Refills: 2 | Status: SHIPPED | OUTPATIENT
Start: 2025-05-12

## 2025-05-22 DIAGNOSIS — E11.42 TYPE 2 DIABETES MELLITUS WITH DIABETIC POLYNEUROPATHY, WITHOUT LONG-TERM CURRENT USE OF INSULIN (H): ICD-10-CM

## 2025-05-22 RX ORDER — ACYCLOVIR 800 MG/1
TABLET ORAL
Qty: 2 EACH | Refills: 5 | Status: SHIPPED | OUTPATIENT
Start: 2025-05-22

## 2025-07-12 ENCOUNTER — HEALTH MAINTENANCE LETTER (OUTPATIENT)
Age: 60
End: 2025-07-12

## 2025-07-21 ENCOUNTER — TRANSFERRED RECORDS (OUTPATIENT)
Dept: HEALTH INFORMATION MANAGEMENT | Facility: CLINIC | Age: 60
End: 2025-07-21
Payer: COMMERCIAL

## 2025-07-21 LAB — RETINOPATHY: POSITIVE

## (undated) DEVICE — DRAPE XRAY CASSETTE 20X23

## (undated) DEVICE — BASIN SET MINOR DISP

## (undated) DEVICE — DRAPE SHEET REV FOLD 3/4 9349

## (undated) DEVICE — SU PROLENE 4-0 FS-2 18' 8683G

## (undated) DEVICE — CAST PADDING 6" STERILE 9046S

## (undated) DEVICE — MANIFOLD NEPTUNE 4 PORT 700-20

## (undated) DEVICE — CAST PADDING 4" UNSTERILE 9044

## (undated) DEVICE — PACKING NUGAUZE 1/4" PLAIN 7631

## (undated) DEVICE — SOL WATER IRRIG 1000ML BOTTLE 2F7114

## (undated) DEVICE — Device

## (undated) DEVICE — GLOVE PROTEXIS BLUE W/NEU-THERA 6.5  2D73EB65

## (undated) DEVICE — NDL 25GA 1.5" 305127

## (undated) DEVICE — BNDG ELASTIC 4" DBL LENGTH UNSTERILE 6611-14

## (undated) DEVICE — PREP CHLORAPREP 26ML TINTED HI-LITE ORANGE 930815

## (undated) DEVICE — DRAPE STERI TOWEL LG 1010

## (undated) DEVICE — LINEN TOWEL PACK X5 5464

## (undated) DEVICE — SU VICRYL 2-0 SH 27" J317H

## (undated) DEVICE — DRSG XEROFORM 1X8"

## (undated) DEVICE — PACK EXTREMITY SOP15EXFSD

## (undated) DEVICE — SPONGE RAY-TEC 4X4" 7317

## (undated) DEVICE — BLADE KNIFE SURG 10 371110

## (undated) DEVICE — ESU PENCIL W/HOLSTER E2350H

## (undated) DEVICE — GLOVE PROTEXIS MICRO 8.0  2D73PM80

## (undated) DEVICE — SU SILK 2-0 TIE 24" SA75H

## (undated) DEVICE — LINEN LEG ROLL 5489

## (undated) DEVICE — SPECIMEN CULTURETTE DBL SWAB 220109

## (undated) DEVICE — SOL NACL 0.9% IRRIG 1000ML BOTTLE 2F7124

## (undated) DEVICE — SU VICRYL 3-0 SH 27" J316H

## (undated) DEVICE — SYR BULB IRRIG DOVER 60 ML LATEX FREE 67000

## (undated) DEVICE — GLOVE PROTEXIS W/NEU-THERA 8.0  2D73TE80

## (undated) DEVICE — DRSG KERLIX FLUFFS X5

## (undated) DEVICE — SU SILK 3-0 SH 30" K832H

## (undated) DEVICE — DRSG KERLIX 4 1/2"X4YDS ROLL 6715

## (undated) DEVICE — SOL NACL 0.9% IRRIG 3000ML BAG 2B7477

## (undated) DEVICE — SYR 10ML FINGER CONTROL W/O NDL 309695

## (undated) DEVICE — APPLICATOR COTTON TIP 6"X2 STERILE LF 6012

## (undated) DEVICE — NDL 19GA 1.5"

## (undated) DEVICE — SU PROLENE 3-0 PS-2 18" 8687H

## (undated) DEVICE — SU MONOCRYL 4-0 PS-2 18" UND Y496G

## (undated) DEVICE — GLOVE PROTEXIS BLUE W/NEU-THERA 7.5  2D73EB75

## (undated) DEVICE — BNDG ROLLER GAUZE CONFORM 4"X4YD 41-54

## (undated) DEVICE — BNDG ELASTIC 3"X5YDS UNSTERILE 6611-30

## (undated) DEVICE — PREP SKIN SCRUB TRAY 4461A

## (undated) DEVICE — BLADE SAW SAGITTAL STRK MED WIDE 25X73X0.89MM 2108-105-000

## (undated) DEVICE — DRAIN PENROSE 0.25"X18" LATEX FREE GR201

## (undated) DEVICE — SPONGE LAP 18X18" X8435

## (undated) DEVICE — SUCTION TIP YANKAUER W/O VENT K86

## (undated) DEVICE — DRAPE POUCH IRR 1016

## (undated) DEVICE — CAST PADDING 4" COTTON WEBRIL UNSTERILE 9084

## (undated) DEVICE — BNDG ELASTIC 4"X5YDS STERILE 6611-4S

## (undated) DEVICE — BLADE KNIFE SURG 15 371115

## (undated) DEVICE — CAST PLASTER ROLL 6"  7276

## (undated) DEVICE — ESU GROUND PAD UNIVERSAL W/O CORD

## (undated) DEVICE — STPL SKIN 35W ROTATING HEAD PRW35

## (undated) DEVICE — PREP CHLORAPREP 1-STEP .67ML AMP APPLICATOR

## (undated) DEVICE — CONNECTOR BLAKE DRAIN SGL BCC1

## (undated) DEVICE — BLADE SAW SAGITTAL STRK 25X90X1.37MM 4H SYS 6 6125-137-090

## (undated) DEVICE — BLADE CLIPPER 4412A

## (undated) DEVICE — CAST PADDING 4" STERILE 9044S

## (undated) DEVICE — DRAIN HEMOVAC RESERVOIR KIT 10FR 1/8" MED 00-2550-002-10

## (undated) DEVICE — GLOVE PROTEXIS POWDER FREE 7.5 ORTHOPEDIC 2D73ET75

## (undated) DEVICE — DRSG ABDOMINAL 07 1/2X8" 7197D

## (undated) DEVICE — GLOVE PROTEXIS W/NEU-THERA 7.5  2D73TE75

## (undated) DEVICE — DRSG GAUZE 4X4" 3033

## (undated) DEVICE — PREP DURAPREP 26ML APL 8630

## (undated) DEVICE — CANISTER WOUND VAC W/GEL 1000ML M8275093/5

## (undated) DEVICE — SU ETHILON 3-0 PS-1 18" 1663G

## (undated) DEVICE — DRSG ABDOMINAL 12X16"

## (undated) DEVICE — SU ETHILON 2-0 FS 18" 664G

## (undated) DEVICE — NDL 22GA 1.5"

## (undated) DEVICE — BLADE SAW SAGITTAL 25.5X9.5X.4MM FINE LINVATEC 5023-138

## (undated) DEVICE — BONE CLEANING TIP INTERPULSE  0210-010-000

## (undated) DEVICE — SYR 10ML LL W/O NDL 302995

## (undated) DEVICE — IMM LIMB ELEVATOR DC40-0203

## (undated) DEVICE — SU VICRYL 2-0 TIE 12X18" J905T

## (undated) DEVICE — SUCTION CANISTER MEDIVAC LINER 3000ML W/LID 65651-530

## (undated) DEVICE — IMM LEG ELEVATOR 79-90191

## (undated) DEVICE — DRAIN PENROSE 0.50"X18" LATEX FREE GR203

## (undated) DEVICE — BNDG ELASTIC 4"X5YDS UNSTERILE 6611-40

## (undated) DEVICE — SU VICRYL 3-0 PS-1 18" UND J683

## (undated) DEVICE — SU ETHIBOND 2 V-37 4X30" MX69G

## (undated) DEVICE — DRSG WOUND VAC SPONGE MED BLACK M8275052/5

## (undated) DEVICE — SPONGE RAY-TEC 4X8" 7318

## (undated) DEVICE — BONE WAX 2.5GM W31G

## (undated) DEVICE — PACKING IODOFORM STRIP 1/4" 7831

## (undated) DEVICE — SU ETHILON 3-0 FS-1 18" 663G

## (undated) DEVICE — DRAPE IOBAN INCISE 36X23" 6651EZ

## (undated) DEVICE — DRSG ADAPTIC 3X8" 6113

## (undated) DEVICE — GLOVE PROTEXIS POWDER FREE 6.5 ORTHOPEDIC 2D73ET65

## (undated) DEVICE — DRAPE STOCKINETTE IMPERVIOUS 12" 1587

## (undated) DEVICE — GLOVE PROTEGRITY MICRO 7.5 LATEX

## (undated) DEVICE — SUCTION IRR SYSTEM W/O TIP INTERPULSE HANDPIECE 0210-100-000

## (undated) DEVICE — PREP CHLORAPREP 26ML TINTED ORANGE  260815

## (undated) DEVICE — SU SILK 2-0 SH 30" K833H

## (undated) DEVICE — KIT WOUND VAC VIA SYSTEM STARTER VIAKIT077D01

## (undated) DEVICE — DRSG XEROFORM 5X9" 8884431605

## (undated) DEVICE — DECANTER BAG 2002S

## (undated) DEVICE — GLOVE PROTEXIS W/NEU-THERA 6.5  2D73TE65

## (undated) DEVICE — SU VICRYL 2-0 CT-1 27" UND J259H

## (undated) DEVICE — CAST PADDING 6" UNSTERILE 9046

## (undated) DEVICE — DRAPE MINI C-ARM 4003

## (undated) DEVICE — PACK TOTAL KNEE SOP15TKFSD

## (undated) DEVICE — SU MONOCRYL 3-0 PS-2 27" Y427H

## (undated) DEVICE — SU SILK 3-0 TIE 24" SA74H

## (undated) DEVICE — BNDG ELASTIC 4"X15YDS STERILE

## (undated) DEVICE — GLOVE PROTEXIS POWDER FREE 8.0 ORTHOPEDIC 2D73ET80

## (undated) DEVICE — SU ETHILON 4-0 FS-2 18" 662H

## (undated) DEVICE — GLOVE PROTEXIS BLUE W/NEU-THERA 8.0  2D73EB80

## (undated) DEVICE — DRAPE STERI U 1015

## (undated) DEVICE — BLADE SAW OSCILLATING STRYK MED 9.0X25X0.38MM 2296-003-111

## (undated) DEVICE — DRSG STERI STRIP 1/4X3" R1541

## (undated) DEVICE — SU ETHILON 3-0 FS-1 18" 669H

## (undated) DEVICE — SU PROLENE 3-0 SHDA 36" 8522H

## (undated) DEVICE — KIT DRSG PREVENA PLUS NEG PRESSURE W/CANISTER PRE4001US

## (undated) DEVICE — DRSG WND NEGATIVE PRESSURE PREVENA 20CM PRE1055US.S

## (undated) DEVICE — WIRE SAW GIGL 20" LOOP END SS NL851

## (undated) DEVICE — SU PROLENE 2-0 SHDA 48" 8533H

## (undated) DEVICE — PREP SURGPREP PVP SOL 2OZ

## (undated) DEVICE — SU VICRYL 2-0 CT-1 27" J339H

## (undated) DEVICE — BNDG KLING 3" 2232

## (undated) DEVICE — ESU CLEANER TIP 31142717

## (undated) RX ORDER — TOBRAMYCIN 1.2 G/30ML
INJECTION, POWDER, LYOPHILIZED, FOR SOLUTION INTRAVENOUS
Status: DISPENSED
Start: 2023-08-17

## (undated) RX ORDER — LIDOCAINE HYDROCHLORIDE 20 MG/ML
INJECTION, SOLUTION EPIDURAL; INFILTRATION; INTRACAUDAL; PERINEURAL
Status: DISPENSED
Start: 2022-09-27

## (undated) RX ORDER — PROPOFOL 10 MG/ML
INJECTION, EMULSION INTRAVENOUS
Status: DISPENSED
Start: 2019-11-17

## (undated) RX ORDER — PROPOFOL 10 MG/ML
INJECTION, EMULSION INTRAVENOUS
Status: DISPENSED
Start: 2019-12-04

## (undated) RX ORDER — PROPOFOL 10 MG/ML
INJECTION, EMULSION INTRAVENOUS
Status: DISPENSED
Start: 2019-12-11

## (undated) RX ORDER — LIDOCAINE HYDROCHLORIDE 20 MG/ML
INJECTION, SOLUTION INFILTRATION; PERINEURAL
Status: DISPENSED
Start: 2019-12-11

## (undated) RX ORDER — FENTANYL CITRATE 50 UG/ML
INJECTION, SOLUTION INTRAMUSCULAR; INTRAVENOUS
Status: DISPENSED
Start: 2017-09-01

## (undated) RX ORDER — LIDOCAINE HYDROCHLORIDE 10 MG/ML
INJECTION, SOLUTION INFILTRATION; PERINEURAL
Status: DISPENSED
Start: 2019-11-19

## (undated) RX ORDER — BUPIVACAINE HYDROCHLORIDE 5 MG/ML
INJECTION, SOLUTION EPIDURAL; INTRACAUDAL
Status: DISPENSED
Start: 2022-02-10

## (undated) RX ORDER — BUPIVACAINE HYDROCHLORIDE 5 MG/ML
INJECTION, SOLUTION EPIDURAL; INTRACAUDAL
Status: DISPENSED
Start: 2020-02-03

## (undated) RX ORDER — FENTANYL CITRATE 50 UG/ML
INJECTION, SOLUTION INTRAMUSCULAR; INTRAVENOUS
Status: DISPENSED
Start: 2019-11-19

## (undated) RX ORDER — GLYCOPYRROLATE 0.2 MG/ML
INJECTION, SOLUTION INTRAMUSCULAR; INTRAVENOUS
Status: DISPENSED
Start: 2022-09-29

## (undated) RX ORDER — LIDOCAINE HYDROCHLORIDE 20 MG/ML
INJECTION, SOLUTION EPIDURAL; INFILTRATION; INTRACAUDAL; PERINEURAL
Status: DISPENSED
Start: 2019-12-11

## (undated) RX ORDER — ONDANSETRON 2 MG/ML
INJECTION INTRAMUSCULAR; INTRAVENOUS
Status: DISPENSED
Start: 2022-09-29

## (undated) RX ORDER — LIDOCAINE HYDROCHLORIDE 20 MG/ML
INJECTION, SOLUTION INFILTRATION; PERINEURAL
Status: DISPENSED
Start: 2020-02-05

## (undated) RX ORDER — VANCOMYCIN HYDROCHLORIDE 1 G/20ML
INJECTION, POWDER, LYOPHILIZED, FOR SOLUTION INTRAVENOUS
Status: DISPENSED
Start: 2023-07-06

## (undated) RX ORDER — FENTANYL CITRATE 50 UG/ML
INJECTION, SOLUTION INTRAMUSCULAR; INTRAVENOUS
Status: DISPENSED
Start: 2022-09-29

## (undated) RX ORDER — BUPIVACAINE HYDROCHLORIDE 5 MG/ML
INJECTION, SOLUTION EPIDURAL; INTRACAUDAL
Status: DISPENSED
Start: 2020-02-05

## (undated) RX ORDER — CEFAZOLIN SODIUM/WATER 2 G/20 ML
SYRINGE (ML) INTRAVENOUS
Status: DISPENSED
Start: 2023-06-30

## (undated) RX ORDER — FENTANYL CITRATE 0.05 MG/ML
INJECTION, SOLUTION INTRAMUSCULAR; INTRAVENOUS
Status: DISPENSED
Start: 2022-09-27

## (undated) RX ORDER — NEOSTIGMINE METHYLSULFATE 1 MG/ML
VIAL (ML) INJECTION
Status: DISPENSED
Start: 2022-09-29

## (undated) RX ORDER — FENTANYL CITRATE 0.05 MG/ML
INJECTION, SOLUTION INTRAMUSCULAR; INTRAVENOUS
Status: DISPENSED
Start: 2023-06-30

## (undated) RX ORDER — GLYCOPYRROLATE 0.2 MG/ML
INJECTION, SOLUTION INTRAMUSCULAR; INTRAVENOUS
Status: DISPENSED
Start: 2020-02-03

## (undated) RX ORDER — PROPOFOL 10 MG/ML
INJECTION, EMULSION INTRAVENOUS
Status: DISPENSED
Start: 2023-06-30

## (undated) RX ORDER — HEPARIN SODIUM 1000 [USP'U]/ML
INJECTION, SOLUTION INTRAVENOUS; SUBCUTANEOUS
Status: DISPENSED
Start: 2019-12-11

## (undated) RX ORDER — CEFAZOLIN SODIUM 1 G/3ML
INJECTION, POWDER, FOR SOLUTION INTRAMUSCULAR; INTRAVENOUS
Status: DISPENSED
Start: 2023-07-10

## (undated) RX ORDER — LIDOCAINE HYDROCHLORIDE 20 MG/ML
INJECTION, SOLUTION EPIDURAL; INFILTRATION; INTRACAUDAL; PERINEURAL
Status: DISPENSED
Start: 2022-09-29

## (undated) RX ORDER — ONDANSETRON 2 MG/ML
INJECTION INTRAMUSCULAR; INTRAVENOUS
Status: DISPENSED
Start: 2019-12-11

## (undated) RX ORDER — GINSENG 100 MG
CAPSULE ORAL
Status: DISPENSED
Start: 2023-07-06

## (undated) RX ORDER — CEFAZOLIN SODIUM IN 0.9 % NACL 3 G/100 ML
INTRAVENOUS SOLUTION, PIGGYBACK (ML) INTRAVENOUS
Status: DISPENSED
Start: 2022-02-10

## (undated) RX ORDER — LIDOCAINE HYDROCHLORIDE 20 MG/ML
INJECTION, SOLUTION INFILTRATION; PERINEURAL
Status: DISPENSED
Start: 2019-12-04

## (undated) RX ORDER — PROPOFOL 10 MG/ML
INJECTION, EMULSION INTRAVENOUS
Status: DISPENSED
Start: 2022-02-10

## (undated) RX ORDER — ONDANSETRON 2 MG/ML
INJECTION INTRAMUSCULAR; INTRAVENOUS
Status: DISPENSED
Start: 2023-08-17

## (undated) RX ORDER — FENTANYL CITRATE 0.05 MG/ML
INJECTION, SOLUTION INTRAMUSCULAR; INTRAVENOUS
Status: DISPENSED
Start: 2023-08-17

## (undated) RX ORDER — PROPOFOL 10 MG/ML
INJECTION, EMULSION INTRAVENOUS
Status: DISPENSED
Start: 2020-02-05

## (undated) RX ORDER — FENTANYL CITRATE 50 UG/ML
INJECTION, SOLUTION INTRAMUSCULAR; INTRAVENOUS
Status: DISPENSED
Start: 2020-02-05

## (undated) RX ORDER — FENTANYL CITRATE 50 UG/ML
INJECTION, SOLUTION INTRAMUSCULAR; INTRAVENOUS
Status: DISPENSED
Start: 2023-07-10

## (undated) RX ORDER — PROPOFOL 10 MG/ML
INJECTION, EMULSION INTRAVENOUS
Status: DISPENSED
Start: 2019-11-19

## (undated) RX ORDER — FENTANYL CITRATE 50 UG/ML
INJECTION, SOLUTION INTRAMUSCULAR; INTRAVENOUS
Status: DISPENSED
Start: 2023-08-17

## (undated) RX ORDER — NEOSTIGMINE METHYLSULFATE 1 MG/ML
VIAL (ML) INJECTION
Status: DISPENSED
Start: 2022-09-27

## (undated) RX ORDER — BUPIVACAINE HYDROCHLORIDE 5 MG/ML
INJECTION, SOLUTION EPIDURAL; INTRACAUDAL
Status: DISPENSED
Start: 2017-09-01

## (undated) RX ORDER — FENTANYL CITRATE 50 UG/ML
INJECTION, SOLUTION INTRAMUSCULAR; INTRAVENOUS
Status: DISPENSED
Start: 2019-12-04

## (undated) RX ORDER — FENTANYL CITRATE 50 UG/ML
INJECTION, SOLUTION INTRAMUSCULAR; INTRAVENOUS
Status: DISPENSED
Start: 2019-11-17

## (undated) RX ORDER — ONDANSETRON 2 MG/ML
INJECTION INTRAMUSCULAR; INTRAVENOUS
Status: DISPENSED
Start: 2020-02-05

## (undated) RX ORDER — EPHEDRINE SULFATE 50 MG/ML
INJECTION, SOLUTION INTRAMUSCULAR; INTRAVENOUS; SUBCUTANEOUS
Status: DISPENSED
Start: 2023-08-17

## (undated) RX ORDER — FENTANYL CITRATE 50 UG/ML
INJECTION, SOLUTION INTRAMUSCULAR; INTRAVENOUS
Status: DISPENSED
Start: 2017-07-24

## (undated) RX ORDER — FENTANYL CITRATE 50 UG/ML
INJECTION, SOLUTION INTRAMUSCULAR; INTRAVENOUS
Status: DISPENSED
Start: 2022-09-27

## (undated) RX ORDER — PROPOFOL 10 MG/ML
INJECTION, EMULSION INTRAVENOUS
Status: DISPENSED
Start: 2023-08-17

## (undated) RX ORDER — FENTANYL CITRATE 50 UG/ML
INJECTION, SOLUTION INTRAMUSCULAR; INTRAVENOUS
Status: DISPENSED
Start: 2022-02-10

## (undated) RX ORDER — BUPIVACAINE HYDROCHLORIDE 5 MG/ML
INJECTION, SOLUTION EPIDURAL; INTRACAUDAL
Status: DISPENSED
Start: 2019-12-04

## (undated) RX ORDER — PIPERACILLIN SODIUM, TAZOBACTAM SODIUM 3; .375 G/15ML; G/15ML
INJECTION, POWDER, LYOPHILIZED, FOR SOLUTION INTRAVENOUS
Status: DISPENSED
Start: 2022-09-27

## (undated) RX ORDER — ONDANSETRON 2 MG/ML
INJECTION INTRAMUSCULAR; INTRAVENOUS
Status: DISPENSED
Start: 2017-09-01

## (undated) RX ORDER — DEXTROSE MONOHYDRATE 25 G/50ML
INJECTION, SOLUTION INTRAVENOUS
Status: DISPENSED
Start: 2019-12-11

## (undated) RX ORDER — ONDANSETRON 2 MG/ML
INJECTION INTRAMUSCULAR; INTRAVENOUS
Status: DISPENSED
Start: 2022-09-27

## (undated) RX ORDER — DEXAMETHASONE SODIUM PHOSPHATE 4 MG/ML
INJECTION, SOLUTION INTRA-ARTICULAR; INTRALESIONAL; INTRAMUSCULAR; INTRAVENOUS; SOFT TISSUE
Status: DISPENSED
Start: 2020-02-05

## (undated) RX ORDER — BUPIVACAINE HYDROCHLORIDE 5 MG/ML
INJECTION, SOLUTION EPIDURAL; INTRACAUDAL
Status: DISPENSED
Start: 2019-11-17

## (undated) RX ORDER — FENTANYL CITRATE 50 UG/ML
INJECTION, SOLUTION INTRAMUSCULAR; INTRAVENOUS
Status: DISPENSED
Start: 2020-02-03

## (undated) RX ORDER — ONDANSETRON 2 MG/ML
INJECTION INTRAMUSCULAR; INTRAVENOUS
Status: DISPENSED
Start: 2022-02-10

## (undated) RX ORDER — DEXAMETHASONE SODIUM PHOSPHATE 4 MG/ML
INJECTION, SOLUTION INTRA-ARTICULAR; INTRALESIONAL; INTRAMUSCULAR; INTRAVENOUS; SOFT TISSUE
Status: DISPENSED
Start: 2017-09-01

## (undated) RX ORDER — PROPOFOL 10 MG/ML
INJECTION, EMULSION INTRAVENOUS
Status: DISPENSED
Start: 2017-09-01

## (undated) RX ORDER — BUPIVACAINE HYDROCHLORIDE 5 MG/ML
INJECTION, SOLUTION EPIDURAL; INTRACAUDAL
Status: DISPENSED
Start: 2019-12-11

## (undated) RX ORDER — LIDOCAINE HYDROCHLORIDE 10 MG/ML
INJECTION, SOLUTION EPIDURAL; INFILTRATION; INTRACAUDAL; PERINEURAL
Status: DISPENSED
Start: 2017-09-01

## (undated) RX ORDER — ONDANSETRON 2 MG/ML
INJECTION INTRAMUSCULAR; INTRAVENOUS
Status: DISPENSED
Start: 2019-11-17

## (undated) RX ORDER — LIDOCAINE HYDROCHLORIDE 20 MG/ML
INJECTION, SOLUTION EPIDURAL; INFILTRATION; INTRACAUDAL; PERINEURAL
Status: DISPENSED
Start: 2017-09-01

## (undated) RX ORDER — LIDOCAINE HYDROCHLORIDE 20 MG/ML
INJECTION, SOLUTION EPIDURAL; INFILTRATION; INTRACAUDAL; PERINEURAL
Status: DISPENSED
Start: 2019-11-17

## (undated) RX ORDER — FENTANYL CITRATE 50 UG/ML
INJECTION, SOLUTION INTRAMUSCULAR; INTRAVENOUS
Status: DISPENSED
Start: 2023-07-06

## (undated) RX ORDER — PIPERACILLIN SODIUM, TAZOBACTAM SODIUM 3; .375 G/15ML; G/15ML
INJECTION, POWDER, LYOPHILIZED, FOR SOLUTION INTRAVENOUS
Status: DISPENSED
Start: 2019-11-17

## (undated) RX ORDER — FENTANYL CITRATE 0.05 MG/ML
INJECTION, SOLUTION INTRAMUSCULAR; INTRAVENOUS
Status: DISPENSED
Start: 2022-02-10

## (undated) RX ORDER — FENTANYL CITRATE 50 UG/ML
INJECTION, SOLUTION INTRAMUSCULAR; INTRAVENOUS
Status: DISPENSED
Start: 2023-06-30

## (undated) RX ORDER — FENTANYL CITRATE 50 UG/ML
INJECTION, SOLUTION INTRAMUSCULAR; INTRAVENOUS
Status: DISPENSED
Start: 2019-12-11

## (undated) RX ORDER — HYDROMORPHONE HYDROCHLORIDE 1 MG/ML
INJECTION, SOLUTION INTRAMUSCULAR; INTRAVENOUS; SUBCUTANEOUS
Status: DISPENSED
Start: 2023-06-30

## (undated) RX ORDER — HYDROMORPHONE HYDROCHLORIDE 1 MG/ML
INJECTION, SOLUTION INTRAMUSCULAR; INTRAVENOUS; SUBCUTANEOUS
Status: DISPENSED
Start: 2023-08-17

## (undated) RX ORDER — ONDANSETRON 2 MG/ML
INJECTION INTRAMUSCULAR; INTRAVENOUS
Status: DISPENSED
Start: 2020-02-03

## (undated) RX ORDER — PIPERACILLIN SODIUM, TAZOBACTAM SODIUM 3; .375 G/15ML; G/15ML
INJECTION, POWDER, LYOPHILIZED, FOR SOLUTION INTRAVENOUS
Status: DISPENSED
Start: 2019-11-19

## (undated) RX ORDER — DEXAMETHASONE SODIUM PHOSPHATE 4 MG/ML
INJECTION, SOLUTION INTRA-ARTICULAR; INTRALESIONAL; INTRAMUSCULAR; INTRAVENOUS; SOFT TISSUE
Status: DISPENSED
Start: 2023-08-17

## (undated) RX ORDER — LIDOCAINE HYDROCHLORIDE 20 MG/ML
INJECTION, SOLUTION EPIDURAL; INFILTRATION; INTRACAUDAL; PERINEURAL
Status: DISPENSED
Start: 2022-02-10

## (undated) RX ORDER — LIDOCAINE HYDROCHLORIDE 10 MG/ML
INJECTION, SOLUTION INFILTRATION; PERINEURAL
Status: DISPENSED
Start: 2020-02-03

## (undated) RX ORDER — CEFAZOLIN SODIUM 1 G/3ML
INJECTION, POWDER, FOR SOLUTION INTRAMUSCULAR; INTRAVENOUS
Status: DISPENSED
Start: 2022-09-29

## (undated) RX ORDER — PROPOFOL 10 MG/ML
INJECTION, EMULSION INTRAVENOUS
Status: DISPENSED
Start: 2020-02-03

## (undated) RX ORDER — LIDOCAINE HYDROCHLORIDE 20 MG/ML
INJECTION, SOLUTION EPIDURAL; INFILTRATION; INTRACAUDAL; PERINEURAL
Status: DISPENSED
Start: 2020-02-05

## (undated) RX ORDER — ROPIVACAINE HYDROCHLORIDE 5 MG/ML
INJECTION, SOLUTION EPIDURAL; INFILTRATION; PERINEURAL
Status: DISPENSED
Start: 2019-11-19